# Patient Record
Sex: MALE | Race: WHITE | NOT HISPANIC OR LATINO | URBAN - METROPOLITAN AREA
[De-identification: names, ages, dates, MRNs, and addresses within clinical notes are randomized per-mention and may not be internally consistent; named-entity substitution may affect disease eponyms.]

---

## 2017-08-23 ENCOUNTER — EMERGENCY (EMERGENCY)
Facility: HOSPITAL | Age: 77
LOS: 1 days | Discharge: HOME | End: 2017-08-23
Attending: EMERGENCY MEDICINE | Admitting: EMERGENCY MEDICINE
Payer: MEDICARE

## 2017-08-23 VITALS
OXYGEN SATURATION: 100 % | TEMPERATURE: 97 F | DIASTOLIC BLOOD PRESSURE: 65 MMHG | HEART RATE: 55 BPM | SYSTOLIC BLOOD PRESSURE: 181 MMHG | RESPIRATION RATE: 18 BRPM

## 2017-08-23 VITALS
DIASTOLIC BLOOD PRESSURE: 74 MMHG | RESPIRATION RATE: 18 BRPM | OXYGEN SATURATION: 99 % | SYSTOLIC BLOOD PRESSURE: 190 MMHG | HEART RATE: 55 BPM | TEMPERATURE: 98 F

## 2017-08-23 LAB
ALBUMIN SERPL ELPH-MCNC: 4.1 G/DL — SIGNIFICANT CHANGE UP (ref 3.3–5)
ALP SERPL-CCNC: 89 U/L — SIGNIFICANT CHANGE UP (ref 40–120)
ALT FLD-CCNC: 12 U/L — SIGNIFICANT CHANGE UP (ref 4–41)
APPEARANCE UR: CLEAR — SIGNIFICANT CHANGE UP
AST SERPL-CCNC: 12 U/L — SIGNIFICANT CHANGE UP (ref 4–40)
BASOPHILS # BLD AUTO: 0.06 K/UL — SIGNIFICANT CHANGE UP (ref 0–0.2)
BASOPHILS NFR BLD AUTO: 0.5 % — SIGNIFICANT CHANGE UP (ref 0–2)
BILIRUB SERPL-MCNC: 1 MG/DL — SIGNIFICANT CHANGE UP (ref 0.2–1.2)
BILIRUB UR-MCNC: NEGATIVE — SIGNIFICANT CHANGE UP
BLOOD UR QL VISUAL: NEGATIVE — SIGNIFICANT CHANGE UP
BUN SERPL-MCNC: 27 MG/DL — HIGH (ref 7–23)
CALCIUM SERPL-MCNC: 9 MG/DL — SIGNIFICANT CHANGE UP (ref 8.4–10.5)
CHLORIDE SERPL-SCNC: 102 MMOL/L — SIGNIFICANT CHANGE UP (ref 98–107)
CO2 SERPL-SCNC: 23 MMOL/L — SIGNIFICANT CHANGE UP (ref 22–31)
COLOR SPEC: YELLOW — SIGNIFICANT CHANGE UP
CREAT SERPL-MCNC: 0.95 MG/DL — SIGNIFICANT CHANGE UP (ref 0.5–1.3)
EOSINOPHIL # BLD AUTO: 0.2 K/UL — SIGNIFICANT CHANGE UP (ref 0–0.5)
EOSINOPHIL NFR BLD AUTO: 1.6 % — SIGNIFICANT CHANGE UP (ref 0–6)
GLUCOSE SERPL-MCNC: 157 MG/DL — HIGH (ref 70–99)
GLUCOSE UR-MCNC: NEGATIVE — SIGNIFICANT CHANGE UP
HCT VFR BLD CALC: 38 % — LOW (ref 39–50)
HGB BLD-MCNC: 12.7 G/DL — LOW (ref 13–17)
IMM GRANULOCYTES # BLD AUTO: 0.06 # — SIGNIFICANT CHANGE UP
IMM GRANULOCYTES NFR BLD AUTO: 0.5 % — SIGNIFICANT CHANGE UP (ref 0–1.5)
KETONES UR-MCNC: NEGATIVE — SIGNIFICANT CHANGE UP
LEUKOCYTE ESTERASE UR-ACNC: NEGATIVE — SIGNIFICANT CHANGE UP
LIDOCAIN IGE QN: 25.5 U/L — SIGNIFICANT CHANGE UP (ref 7–60)
LYMPHOCYTES # BLD AUTO: 1.22 K/UL — SIGNIFICANT CHANGE UP (ref 1–3.3)
LYMPHOCYTES # BLD AUTO: 9.7 % — LOW (ref 13–44)
MCHC RBC-ENTMCNC: 29.8 PG — SIGNIFICANT CHANGE UP (ref 27–34)
MCHC RBC-ENTMCNC: 33.4 % — SIGNIFICANT CHANGE UP (ref 32–36)
MCV RBC AUTO: 89.2 FL — SIGNIFICANT CHANGE UP (ref 80–100)
MONOCYTES # BLD AUTO: 0.81 K/UL — SIGNIFICANT CHANGE UP (ref 0–0.9)
MONOCYTES NFR BLD AUTO: 6.5 % — SIGNIFICANT CHANGE UP (ref 2–14)
MUCOUS THREADS # UR AUTO: SIGNIFICANT CHANGE UP
NEUTROPHILS # BLD AUTO: 10.17 K/UL — HIGH (ref 1.8–7.4)
NEUTROPHILS NFR BLD AUTO: 81.2 % — HIGH (ref 43–77)
NITRITE UR-MCNC: NEGATIVE — SIGNIFICANT CHANGE UP
NON-SQ EPI CELLS # UR AUTO: <1 — SIGNIFICANT CHANGE UP
NRBC # FLD: 0 — SIGNIFICANT CHANGE UP
PH UR: 6 — SIGNIFICANT CHANGE UP (ref 4.6–8)
PLATELET # BLD AUTO: 224 K/UL — SIGNIFICANT CHANGE UP (ref 150–400)
PMV BLD: 10.8 FL — SIGNIFICANT CHANGE UP (ref 7–13)
POTASSIUM SERPL-MCNC: 4.1 MMOL/L — SIGNIFICANT CHANGE UP (ref 3.5–5.3)
POTASSIUM SERPL-SCNC: 4.1 MMOL/L — SIGNIFICANT CHANGE UP (ref 3.5–5.3)
PROT SERPL-MCNC: 6.8 G/DL — SIGNIFICANT CHANGE UP (ref 6–8.3)
PROT UR-MCNC: 30 — SIGNIFICANT CHANGE UP
RBC # BLD: 4.26 M/UL — SIGNIFICANT CHANGE UP (ref 4.2–5.8)
RBC # FLD: 13.9 % — SIGNIFICANT CHANGE UP (ref 10.3–14.5)
RBC CASTS # UR COMP ASSIST: SIGNIFICANT CHANGE UP (ref 0–?)
SODIUM SERPL-SCNC: 140 MMOL/L — SIGNIFICANT CHANGE UP (ref 135–145)
SP GR SPEC: 1.02 — SIGNIFICANT CHANGE UP (ref 1–1.03)
UROBILINOGEN FLD QL: 1 E.U. — SIGNIFICANT CHANGE UP (ref 0.1–0.2)
WBC # BLD: 12.52 K/UL — HIGH (ref 3.8–10.5)
WBC # FLD AUTO: 12.52 K/UL — HIGH (ref 3.8–10.5)
WBC UR QL: SIGNIFICANT CHANGE UP (ref 0–?)

## 2017-08-23 PROCEDURE — 99285 EMERGENCY DEPT VISIT HI MDM: CPT | Mod: 25

## 2017-08-23 PROCEDURE — 74177 CT ABD & PELVIS W/CONTRAST: CPT | Mod: 26

## 2017-08-23 PROCEDURE — 76705 ECHO EXAM OF ABDOMEN: CPT | Mod: 26

## 2017-08-23 RX ORDER — MORPHINE SULFATE 50 MG/1
4 CAPSULE, EXTENDED RELEASE ORAL ONCE
Qty: 0 | Refills: 0 | Status: DISCONTINUED | OUTPATIENT
Start: 2017-08-23 | End: 2017-08-23

## 2017-08-23 RX ADMIN — MORPHINE SULFATE 4 MILLIGRAM(S): 50 CAPSULE, EXTENDED RELEASE ORAL at 08:04

## 2017-08-23 NOTE — ED PROVIDER NOTE - CARE PLAN
Principal Discharge DX:	Cholelithiasis  Instructions for follow-up, activity and diet:	Follow up with your PMD within 48-72 hours.  Rest, increase fluids. Take tylenol 650mg every 6 hours for pain. Eat bland, small meals as tolerated - avoid fatty foods.  Worsening or continued fever, chills, weakness, nausea, vomiting, abdominal pain return to ER

## 2017-08-23 NOTE — ED ADULT NURSE NOTE - OBJECTIVE STATEMENT
pt. a&ox3, came in c/o pain on periumbilical area x 3.5 hrs, non-radiating. as per pt, "I ate chinese food last night and went home around 12am, about 4am I started to have pain on my stomach. I drank Pepto-Bismol by 5:30, but the pain didn't go away." pt. denies any n/v/d/constipation, last BM was yesterday, normal as per pt. denies any dysuria. pt. with hx of HTN, DM, and heart problem. pt. noted hypertensive, has not yet taken BP meds today. denies any h/a nor dizziness. labs sent. inserted g20 saline lock on rt ac with no ss of infiltration. awaits MD cruz. will continue to monitor

## 2017-08-23 NOTE — ED ADULT TRIAGE NOTE - CHIEF COMPLAINT QUOTE
periumbilical pain since last night after eating chinese food. denies N/V/D. offers no other complaints.  PMH: HTN, DM, Enlarged heart, meds: metoprolol Er, Atorvastatin, glimiperide,

## 2017-08-23 NOTE — ED PROVIDER NOTE - PROGRESS NOTE DETAILS
sx recs- likely cholelithiasis without evidence of acute cholecystitis - pt pain completely, resolved, will po challenge and have pt f/u as outpt Pt tolerated a complete plate of food well. Requesting pain medication prescription, does not know what pharmacy to send to, states he wants to call back with the pharmacy information, admin pa contact given, pt is completely pain free, not a candidate for narcotics, will send a prescription for tylneol 650 mg q6 hour x 1 week if pt calls for prescription. Pt states he will take the bus to his home on [email or web address] which he usually uses for transportation.

## 2017-08-23 NOTE — ED PROVIDER NOTE - MEDICAL DECISION MAKING DETAILS
76 y/o M with periumbilical pain s/p eating chinese food last night  -pain control, cbc, cmp, lipase, ua, ct a/p with con

## 2017-08-23 NOTE — CONSULT NOTE ADULT - ASSESSMENT
77M w/CAD s/p stents presenting with 6-hour hx of infraumbilical/suprapubic pain, now resolved. CT demonstrates 3cm stone in gallbladder neck, WBC=12.52, LFTs WNL, U/A negative.    -given clinical history and exam, low suspicion for gallbladder as etiology of patient's pain, which has since resolved  -unclear etiology for leukocytosis  -please obtain RUQ U/S to better evaluate gallbladder  -will d/w Dr. Dyer 77M w/CAD s/p stents presenting with 6-hour hx of infraumbilical/suprapubic pain, now resolved. CT demonstrates 3cm stone in gallbladder neck, WBC=12.52, LFTs WNL, U/A negative.    -given clinical history and exam, low suspicion for gallbladder as etiology of patient's pain, which has since resolved  -unclear etiology for leukocytosis  -please obtain RUQ U/S to better evaluate gallbladder  -will d/w Dr. Dyer    **Update 3pm 8/23/17  -RUQ U/S obtained, shows nonmobile gallstone in the gallbladder neck with mild gallbladder wall thickening up to 4 mm. No pericholecystic fluid. Negative sonographic Jenkins sign. Patient remains pain free at this time, doubt gallbladder pathology. Spoke to patient's brother/power of  Berto Mitchell at [phone] and explained findings. Recommend PO challenge and d/c home per ED. Discussed with Dr. Dyer.    DANNI Hubbard MD

## 2017-08-23 NOTE — ED PROVIDER NOTE - OBJECTIVE STATEMENT
76 y/o M PMH HTN, CAD s/p stent x 2 (pt denies DM and taking DM meds despite triage note/previous ED chart) c/o periumbilical jordin since 0400 this am. States Last night he eat chinese food prior to onset of pain. States pain has not changed or moved since onset. Denies fever, chills, CP, SOB, N/V/D/, dysuria, hematuria, difficulty urinating, back pain. Pt denies etOH use.

## 2017-08-23 NOTE — ED ADULT NURSE NOTE - PMH
CAD (Coronary Artery Disease)    DM (Diabetes Mellitus)    HTN (Hypertension)    Hypercholesterolemia

## 2017-08-23 NOTE — ED PROVIDER NOTE - PLAN OF CARE
Follow up with your PMD within 48-72 hours.  Rest, increase fluids. Take tylenol 650mg every 6 hours for pain. Eat bland, small meals as tolerated - avoid fatty foods.  Worsening or continued fever, chills, weakness, nausea, vomiting, abdominal pain return to ER

## 2017-08-23 NOTE — CONSULT NOTE ADULT - SUBJECTIVE AND OBJECTIVE BOX
77M with hx of CAD s/p stents x3 on ASA/Plavix p/w acute onset infraumbilical pain/suprapubic pain since ~4am on 17. He notes he went out for Chinese food with friends last night; he woke up to go to the bathroom and noticed the pain at that time. He denies associated F/C, N/V, diarrhea, melena, hematochezia, dysuria. He has never had pain like this before. He is having flatus; last BM 2 days ago, normal. Last colonoscopy 5-10 years ago, normal per his memory. He denies sick contacts or recent travel. Since arriving to the ED, his pain has improved. He is currently pain free.    PMH  CAD s/p stents x 2 in  and stent x 1   HTN   DM  Hypercholesterolemia    PSH  Coronary stents    MEDS (patient does not remember entire list of medications, denies insulin or oral anticoagulant use)  ASA  Plavix  Metoprolol  Metformin    Allergies  No Known Allergies    Social  Lives alone. Used tobacco briefly (2 months) 40 years ago. Nondrinker.    Physical Exam  T(C): 36.7 (17 @ 07:43), Max: 36.8 (17 @ 07:21)  HR: 62 (17 @ 08:34) (55 - 66)  BP: 186/81 (17 @ 08:34) (186/81 - 192/58)  RR: 18 (17 @ 08:34) (17 - 18)  SpO2: 100% (17 @ 08:34) (99% - 100%)  Tmax: T(C): , Max: 36.8 (17 @ 07:21)    Gen: NAD  HEENT: normocephalic, atraumatic, no scleral icterus  CV: S1, S2, RRR  Pulm: CTA B/L  Abd: soft, obese, ND, NT, small umbilical defect without hernia, negative Jenkins's sign  Ext: warm, no edema, palp dp/pt    Labs:                        12.7   12.52 )-----------( 224      ( 23 Aug 2017 07:42 )             38.0         140  |  102  |  27<H>  ----------------------------<  157<H>  4.1   |  23  |  0.95    Ca    9.0      23 Aug 2017 07:42    TPro  6.8  /  Alb  4.1  /  TBili  1.0  /  DBili  x   /  AST  12  /  ALT  12  /  AlkPhos  89      Urinalysis Basic - ( 23 Aug 2017 08:20 )    Color: YELLOW / Appearance: CLEAR / S.022 / pH: 6.0  Gluc: NEGATIVE / Ketone: NEGATIVE  / Bili: NEGATIVE / Urobili: 1 E.U.   Blood: NEGATIVE / Protein: 30 / Nitrite: NEGATIVE   Leuk Esterase: NEGATIVE / RBC: 0-2 / WBC 0-2   Sq Epi: x / Non Sq Epi: x / Bacteria: x    Imaging    CT A/P FINDINGS:    LOWER CHEST: Aortic and coronary artery calcification. Bibasilar   dependent atelectasis.    LIVER: Within normal limits.  BILE DUCTS: Within normal limits.  GALLBLADDER: Mild gallbladder wall thickening. Calculus in the   gallbladder neck measuring 3 cm.     SPLEEN: Hypodensity in the inferior aspect of the spleen measuring 9 mm.  PANCREAS: Within normal limits.  ADRENALS: Within normal limits.  KIDNEYS/URETERS: Within normal limits.    BLADDER: Within normal limits.  REPRODUCTIVE ORGANS: The prostate is enlarged.    BOWEL: No bowel obstruction. Colonic diverticulosis without   diverticulitis. Appendix appears normal.  PERITONEUM: No ascites.  VESSELS:  Aortic atherosclerosis.  RETROPERITONEUM: No lymphadenopathy.    ABDOMINAL WALL: Within normal limits.  BONES: Degenerative changes of the spine. Grade 1 anterolisthesis of L5   on S1 with bilateral pars defects of L5.    IMPRESSION:        A 3 mm calculus in the gallbladder neck with mild gallbladder wall   thickening. Correlate for right upper quadrant pain/cholecystitis.    Colonic diverticulosis without diverticulitis.    Enlarged prostate.

## 2017-08-23 NOTE — ED PROVIDER NOTE - ATTENDING CONTRIBUTION TO CARE
pt with vague lower abd pain reported after eating chinese food yesterday.  No fevers.  Abd exam is rather benign overall.  Based on age and vague symptoms will get CT scan to eval for occult pathology.  Will treat symptomatically at same time and dispo based on response to therapy and studies

## 2017-09-06 ENCOUNTER — APPOINTMENT (OUTPATIENT)
Dept: GASTROENTEROLOGY | Facility: CLINIC | Age: 77
End: 2017-09-06
Payer: MEDICARE

## 2017-09-06 VITALS — WEIGHT: 214 LBS | BODY MASS INDEX: 34.54 KG/M2

## 2017-09-06 VITALS
HEIGHT: 66 IN | WEIGHT: 24 LBS | DIASTOLIC BLOOD PRESSURE: 70 MMHG | SYSTOLIC BLOOD PRESSURE: 120 MMHG | OXYGEN SATURATION: 99 % | BODY MASS INDEX: 3.86 KG/M2 | TEMPERATURE: 98 F | HEART RATE: 68 BPM

## 2017-09-06 VITALS — BODY MASS INDEX: 36.15 KG/M2 | WEIGHT: 224 LBS

## 2017-09-06 DIAGNOSIS — Z78.9 OTHER SPECIFIED HEALTH STATUS: ICD-10-CM

## 2017-09-06 DIAGNOSIS — K80.20 CALCULUS OF GALLBLADDER W/OUT CHOLECYSTITIS W/OUT OBSTRUCTION: ICD-10-CM

## 2017-09-06 DIAGNOSIS — Z82.49 FAMILY HISTORY OF ISCHEMIC HEART DISEASE AND OTHER DISEASES OF THE CIRCULATORY SYSTEM: ICD-10-CM

## 2017-09-06 DIAGNOSIS — R10.10 UPPER ABDOMINAL PAIN, UNSPECIFIED: ICD-10-CM

## 2017-09-06 DIAGNOSIS — Z86.79 PERSONAL HISTORY OF OTHER DISEASES OF THE CIRCULATORY SYSTEM: ICD-10-CM

## 2017-09-06 DIAGNOSIS — F15.90 OTHER STIMULANT USE, UNSPECIFIED, UNCOMPLICATED: ICD-10-CM

## 2017-09-06 DIAGNOSIS — E11.9 TYPE 2 DIABETES MELLITUS W/OUT COMPLICATIONS: ICD-10-CM

## 2017-09-06 DIAGNOSIS — Z87.898 PERSONAL HISTORY OF OTHER SPECIFIED CONDITIONS: ICD-10-CM

## 2017-09-06 PROCEDURE — 99204 OFFICE O/P NEW MOD 45 MIN: CPT

## 2017-09-06 RX ORDER — RAMIPRIL 10 MG/1
10 CAPSULE ORAL
Qty: 90 | Refills: 0 | Status: ACTIVE | COMMUNITY
Start: 2017-06-22

## 2017-09-06 RX ORDER — BRIMONIDINE TARTRATE, TIMOLOL MALEATE 2; 5 MG/ML; MG/ML
0.2-0.5 SOLUTION/ DROPS OPHTHALMIC
Qty: 10 | Refills: 0 | Status: ACTIVE | COMMUNITY
Start: 2017-03-16

## 2017-09-06 RX ORDER — METOPROLOL SUCCINATE 25 MG/1
25 TABLET, EXTENDED RELEASE ORAL
Qty: 90 | Refills: 0 | Status: ACTIVE | COMMUNITY
Start: 2017-05-18

## 2017-09-06 RX ORDER — PIOGLITAZONE HYDROCHLORIDE 30 MG/1
30 TABLET ORAL
Qty: 90 | Refills: 0 | Status: ACTIVE | COMMUNITY
Start: 2017-05-18

## 2017-09-06 RX ORDER — FINASTERIDE 5 MG/1
5 TABLET, FILM COATED ORAL
Qty: 90 | Refills: 0 | Status: ACTIVE | COMMUNITY
Start: 2017-03-20

## 2017-09-06 RX ORDER — TAMSULOSIN HYDROCHLORIDE 0.4 MG/1
0.4 CAPSULE ORAL
Qty: 90 | Refills: 0 | Status: ACTIVE | COMMUNITY
Start: 2017-06-22

## 2017-09-06 RX ORDER — CLOPIDOGREL BISULFATE 75 MG/1
75 TABLET, FILM COATED ORAL
Qty: 30 | Refills: 0 | Status: ACTIVE | COMMUNITY
Start: 2017-06-21

## 2017-09-06 RX ORDER — CILOSTAZOL 50 MG/1
50 TABLET ORAL
Qty: 180 | Refills: 0 | Status: ACTIVE | COMMUNITY
Start: 2017-02-15

## 2017-09-06 RX ORDER — RAMIPRIL 5 MG/1
5 CAPSULE ORAL
Qty: 90 | Refills: 0 | Status: ACTIVE | COMMUNITY
Start: 2017-05-18

## 2017-09-06 RX ORDER — BIMATOPROST 0.1 MG/ML
0.01 SOLUTION/ DROPS OPHTHALMIC
Qty: 25 | Refills: 0 | Status: ACTIVE | COMMUNITY
Start: 2017-02-22

## 2017-09-06 RX ORDER — ATORVASTATIN CALCIUM 80 MG/1
80 TABLET, FILM COATED ORAL
Qty: 30 | Refills: 0 | Status: ACTIVE | COMMUNITY
Start: 2016-10-05

## 2017-09-06 RX ORDER — FAMOTIDINE 40 MG/1
40 TABLET, FILM COATED ORAL
Qty: 30 | Refills: 0 | Status: ACTIVE | COMMUNITY
Start: 2017-03-20

## 2017-09-06 RX ORDER — METFORMIN ER 500 MG 500 MG/1
500 TABLET ORAL
Qty: 360 | Refills: 0 | Status: ACTIVE | COMMUNITY
Start: 2017-05-18

## 2017-09-06 RX ORDER — METFORMIN HYDROCHLORIDE 625 MG/1
TABLET ORAL
Refills: 0 | Status: ACTIVE | COMMUNITY

## 2017-09-06 RX ORDER — ALBUTEROL SULFATE 90 UG/1
108 (90 BASE) AEROSOL, METERED RESPIRATORY (INHALATION)
Qty: 85 | Refills: 0 | Status: ACTIVE | COMMUNITY
Start: 2017-05-18

## 2017-09-06 RX ORDER — METOPROLOL SUCCINATE 50 MG/1
50 TABLET, EXTENDED RELEASE ORAL
Qty: 90 | Refills: 0 | Status: ACTIVE | COMMUNITY
Start: 2017-06-22

## 2017-09-06 RX ORDER — GLIMEPIRIDE 4 MG/1
4 TABLET ORAL
Qty: 180 | Refills: 0 | Status: ACTIVE | COMMUNITY
Start: 2017-05-18

## 2017-09-07 ENCOUNTER — RX RENEWAL (OUTPATIENT)
Age: 77
End: 2017-09-07

## 2017-09-07 RX ORDER — HYOSCYAMINE SULFATE 0.12 MG/1
0.12 TABLET, ORALLY DISINTEGRATING ORAL 3 TIMES DAILY
Qty: 50 | Refills: 0 | Status: ACTIVE | COMMUNITY
Start: 2017-09-06 | End: 1900-01-01

## 2017-09-12 ENCOUNTER — EMERGENCY (EMERGENCY)
Facility: HOSPITAL | Age: 77
LOS: 1 days | Discharge: HOME | End: 2017-09-12
Attending: EMERGENCY MEDICINE | Admitting: EMERGENCY MEDICINE
Payer: MEDICARE

## 2017-09-12 VITALS
HEART RATE: 81 BPM | RESPIRATION RATE: 18 BRPM | DIASTOLIC BLOOD PRESSURE: 85 MMHG | SYSTOLIC BLOOD PRESSURE: 192 MMHG | TEMPERATURE: 98 F | OXYGEN SATURATION: 99 %

## 2017-09-12 PROCEDURE — 70450 CT HEAD/BRAIN W/O DYE: CPT | Mod: 26

## 2017-09-12 PROCEDURE — 99285 EMERGENCY DEPT VISIT HI MDM: CPT

## 2017-09-12 RX ORDER — TETANUS TOXOID, REDUCED DIPHTHERIA TOXOID AND ACELLULAR PERTUSSIS VACCINE, ADSORBED 5; 2.5; 8; 8; 2.5 [IU]/.5ML; [IU]/.5ML; UG/.5ML; UG/.5ML; UG/.5ML
0.5 SUSPENSION INTRAMUSCULAR ONCE
Qty: 0 | Refills: 0 | Status: COMPLETED | OUTPATIENT
Start: 2017-09-12 | End: 2017-09-12

## 2017-09-12 RX ADMIN — TETANUS TOXOID, REDUCED DIPHTHERIA TOXOID AND ACELLULAR PERTUSSIS VACCINE, ADSORBED 0.5 MILLILITER(S): 5; 2.5; 8; 8; 2.5 SUSPENSION INTRAMUSCULAR at 11:37

## 2017-09-12 NOTE — ED PROVIDER NOTE - PROGRESS NOTE DETAILS
Steri strips with dermabond applied to superficial laceration over R eyebrow, bacitracin applied  CT head negative

## 2017-09-12 NOTE — PROVIDER CONTACT NOTE (OTHER) - ASSESSMENT
KAYLEE Miller and I met and spoke with pt and his brother Berto in results waiting.  Pt s/p fall, he had 2 falls in past few weeks.  Pt requesting homecare services.  Referral sent to [facility] upon pt's request for RN, PT and JOHN cruz.  Also provided pt with private hire List for homecare services.  Berto will provide pt transportation home and will assume responsibility for pt's wellbeing.

## 2017-09-12 NOTE — ED PROVIDER NOTE - PHYSICAL EXAMINATION
Pt superficial abrasions to R side forehead with mildly dermal laceration with slow ooze, s/p injury 3hrs ago.  No clinical signs infection, no erythema, no purulence, no FB  Pt neck asymptomatic, no tenderness nor pain on palpation - no pain with active ROM of C-spine neck with flexion/extension/lateral rotation  No musculoskeletal pain on palpation to UEs and LEs nor chest.  Clear bilaterally, pupils equal, round and reactive to light.

## 2017-09-12 NOTE — ED PROVIDER NOTE - CHIEF COMPLAINT
The patient is a 77y Male complaining of The patient is a 77y Male complaining of head injury abrasion

## 2017-09-12 NOTE — ED ADULT TRIAGE NOTE - CHIEF COMPLAINT QUOTE
Tripped and fell on uneven side walk. Denies dizziness prior to fall. Hit forehead on cement.  Abrasion noted, bleeding controlled with gauze dressing. Denies LOC. Ambulatory on scene.

## 2017-09-12 NOTE — ED PROVIDER NOTE - OBJECTIVE STATEMENT
Pt states that he was meeting friends this morning for breakfast, following which he fell outside of a bank and hit his head.  He states that he did not lose any consciousness and is not experiencing any N/V/F/SOB or any complaints at this time other than the scrape on his head.  Pt states that he fell about a month ago outside of the same bank but it did not warrant a trip to the hospital.  He states that his friends made him come to the hospital following the injury, but he did not want to come.  Pt was here recently for abdominal pain (gallstone) and opted to forego treatment at the time.  Brother possesses power of  Berto Stephen [phone], spoken to - patient has mental disability matches with baseline today.

## 2017-09-12 NOTE — ED PROVIDER NOTE - ATTENDING CONTRIBUTION TO CARE
I performed a history and physical exam of the patient and discussed their management with the resident. I reviewed the resident's note and agree with the documented findings and plan of care. My medical decison making and observations are found above.  Bleeding from scalp. tetnus unknown. no neck pain

## 2017-09-12 NOTE — ED PROVIDER NOTE - MEDICAL DECISION MAKING DETAILS
Donny: Head trauma after fall. Mental status not normal but may be baseline will check with elyse who has power of attorny.  No other trauma sequelli

## 2017-09-25 ENCOUNTER — OTHER (OUTPATIENT)
Age: 77
End: 2017-09-25

## 2017-09-25 RX ORDER — DICYCLOMINE HYDROCHLORIDE 10 MG/1
10 CAPSULE ORAL 3 TIMES DAILY
Qty: 50 | Refills: 1 | Status: ACTIVE | COMMUNITY
Start: 2017-09-25

## 2017-10-05 ENCOUNTER — OUTPATIENT (OUTPATIENT)
Dept: OUTPATIENT SERVICES | Facility: HOSPITAL | Age: 77
LOS: 1 days | End: 2017-10-05

## 2017-10-05 VITALS
WEIGHT: 214.95 LBS | OXYGEN SATURATION: 98 % | TEMPERATURE: 97 F | HEART RATE: 57 BPM | DIASTOLIC BLOOD PRESSURE: 84 MMHG | HEIGHT: 64 IN | RESPIRATION RATE: 18 BRPM | SYSTOLIC BLOOD PRESSURE: 130 MMHG

## 2017-10-05 DIAGNOSIS — K81.1 CHRONIC CHOLECYSTITIS: ICD-10-CM

## 2017-10-05 DIAGNOSIS — E11.9 TYPE 2 DIABETES MELLITUS WITHOUT COMPLICATIONS: ICD-10-CM

## 2017-10-05 DIAGNOSIS — I25.10 ATHEROSCLEROTIC HEART DISEASE OF NATIVE CORONARY ARTERY WITHOUT ANGINA PECTORIS: ICD-10-CM

## 2017-10-05 DIAGNOSIS — G47.33 OBSTRUCTIVE SLEEP APNEA (ADULT) (PEDIATRIC): ICD-10-CM

## 2017-10-05 DIAGNOSIS — N40.0 BENIGN PROSTATIC HYPERPLASIA WITHOUT LOWER URINARY TRACT SYMPTOMS: ICD-10-CM

## 2017-10-05 DIAGNOSIS — I10 ESSENTIAL (PRIMARY) HYPERTENSION: ICD-10-CM

## 2017-10-05 LAB
ALBUMIN SERPL ELPH-MCNC: 3.9 G/DL — SIGNIFICANT CHANGE UP (ref 3.3–5)
ALP SERPL-CCNC: 100 U/L — SIGNIFICANT CHANGE UP (ref 40–120)
ALT FLD-CCNC: 9 U/L — SIGNIFICANT CHANGE UP (ref 4–41)
AST SERPL-CCNC: 12 U/L — SIGNIFICANT CHANGE UP (ref 4–40)
BILIRUB SERPL-MCNC: 1 MG/DL — SIGNIFICANT CHANGE UP (ref 0.2–1.2)
BUN SERPL-MCNC: 21 MG/DL — SIGNIFICANT CHANGE UP (ref 7–23)
CALCIUM SERPL-MCNC: 8.9 MG/DL — SIGNIFICANT CHANGE UP (ref 8.4–10.5)
CHLORIDE SERPL-SCNC: 103 MMOL/L — SIGNIFICANT CHANGE UP (ref 98–107)
CO2 SERPL-SCNC: 24 MMOL/L — SIGNIFICANT CHANGE UP (ref 22–31)
CREAT SERPL-MCNC: 0.91 MG/DL — SIGNIFICANT CHANGE UP (ref 0.5–1.3)
GLUCOSE SERPL-MCNC: 96 MG/DL — SIGNIFICANT CHANGE UP (ref 70–99)
HBA1C BLD-MCNC: 6.7 % — HIGH (ref 4–5.6)
HCT VFR BLD CALC: 39.4 % — SIGNIFICANT CHANGE UP (ref 39–50)
HGB BLD-MCNC: 12.7 G/DL — LOW (ref 13–17)
MCHC RBC-ENTMCNC: 29.8 PG — SIGNIFICANT CHANGE UP (ref 27–34)
MCHC RBC-ENTMCNC: 32.2 % — SIGNIFICANT CHANGE UP (ref 32–36)
MCV RBC AUTO: 92.5 FL — SIGNIFICANT CHANGE UP (ref 80–100)
NRBC # FLD: 0 — SIGNIFICANT CHANGE UP
PLATELET # BLD AUTO: 237 K/UL — SIGNIFICANT CHANGE UP (ref 150–400)
PMV BLD: 11.3 FL — SIGNIFICANT CHANGE UP (ref 7–13)
POTASSIUM SERPL-MCNC: 4.3 MMOL/L — SIGNIFICANT CHANGE UP (ref 3.5–5.3)
POTASSIUM SERPL-SCNC: 4.3 MMOL/L — SIGNIFICANT CHANGE UP (ref 3.5–5.3)
PROT SERPL-MCNC: 6.7 G/DL — SIGNIFICANT CHANGE UP (ref 6–8.3)
RBC # BLD: 4.26 M/UL — SIGNIFICANT CHANGE UP (ref 4.2–5.8)
RBC # FLD: 13.7 % — SIGNIFICANT CHANGE UP (ref 10.3–14.5)
SODIUM SERPL-SCNC: 141 MMOL/L — SIGNIFICANT CHANGE UP (ref 135–145)
WBC # BLD: 10.17 K/UL — SIGNIFICANT CHANGE UP (ref 3.8–10.5)
WBC # FLD AUTO: 10.17 K/UL — SIGNIFICANT CHANGE UP (ref 3.8–10.5)

## 2017-10-05 RX ORDER — METFORMIN HYDROCHLORIDE 850 MG/1
2 TABLET ORAL
Qty: 0 | Refills: 0 | COMMUNITY

## 2017-10-05 NOTE — H&P PST ADULT - HISTORY OF PRESENT ILLNESS
78 yo male with hx of HTN, DM, CAD s/p cardiac stents x 2 presents to have [facility] evaluation for laparoscopic cholecystectomy on 10/16/2017.   Patient is a poor historian, his brother Berto Mitchell possesses power of .  Patient denies abdominal pain, n/v while at [facility].  Patient reports hx of fall in 9/2017 & hit his head, denies headache, lightheadedness. 78 yo male with hx of HTN, DM, CAD s/p cardiac stents x 2 presents to have [facility] evaluation for laparoscopic cholecystectomy on 10/16/2017.   Patient is a poor historian, his brother Berto Mitchell possesses power of . Patient denies abdominal pain, n/v while at [facility].  Patient reports, hx of fall in 9/2017 & hit his head. presented to ER at [facility]. Denies headache, lightheadedness, visual changes.

## 2017-10-05 NOTE — H&P PST ADULT - PROBLEM SELECTOR PLAN 4
HbgA1C sent, pending result. Blood glucose - Accuchek ordered stat on admit.  Instructed to hold pioglitazone AM of surgery. Last dose for metformin & glimepiride AM on 10/15/2017. Verbalized understanding.

## 2017-10-05 NOTE — H&P PST ADULT - NEGATIVE ENMT SYMPTOMS
no dysphagia/no nasal discharge/no ear pain/no tinnitus/no nasal congestion/no vertigo/no sinus symptoms/no hearing difficulty/no nasal obstruction/no throat pain

## 2017-10-05 NOTE — H&P PST ADULT - PROBLEM SELECTOR PLAN 2
s/p cardiac stent x 2. Patient was evaluated by cardiologist for clearance, and was instructed to stop Plavix 5 days prior to surgery (LD 10/10/2017) & stay on aspirin 81 mg. Instructed to take cilostazol AM of surgery with a sip of water.

## 2017-10-05 NOTE — H&P PST ADULT - NEGATIVE ALLERGY TYPES
no reactions to medicines/no reactions to food/no outdoor environmental allergies/no indoor environmental allergies

## 2017-10-05 NOTE — H&P PST ADULT - NEGATIVE MUSCULOSKELETAL SYMPTOMS
no arthritis/no stiffness/no neck pain/no muscle weakness no back pain/no stiffness/no neck pain/no muscle weakness

## 2017-10-05 NOTE — H&P PST ADULT - PMH
BPH (benign prostatic hyperplasia)    CAD (Coronary Artery Disease)  s/p cardiac stent ( > 20 years ago)  DM (Diabetes Mellitus)    HTN (Hypertension)    Hypercholesterolemia

## 2017-10-16 ENCOUNTER — TRANSCRIPTION ENCOUNTER (OUTPATIENT)
Age: 77
End: 2017-10-16

## 2017-10-16 ENCOUNTER — INPATIENT (INPATIENT)
Facility: HOSPITAL | Age: 77
LOS: 1 days | Discharge: HOME | End: 2017-10-18
Payer: MEDICARE

## 2017-10-16 ENCOUNTER — RESULT REVIEW (OUTPATIENT)
Age: 77
End: 2017-10-16

## 2017-10-16 VITALS
SYSTOLIC BLOOD PRESSURE: 189 MMHG | RESPIRATION RATE: 18 BRPM | DIASTOLIC BLOOD PRESSURE: 73 MMHG | TEMPERATURE: 98 F | HEIGHT: 64 IN | WEIGHT: 214.95 LBS | HEART RATE: 67 BPM | OXYGEN SATURATION: 98 %

## 2017-10-16 DIAGNOSIS — K81.1 CHRONIC CHOLECYSTITIS: ICD-10-CM

## 2017-10-16 LAB
GLUCOSE BLDC GLUCOMTR-MCNC: 133 MG/DL — HIGH (ref 70–99)
GLUCOSE BLDC GLUCOMTR-MCNC: 156 MG/DL — HIGH (ref 70–99)
GLUCOSE BLDC GLUCOMTR-MCNC: 171 MG/DL — HIGH (ref 70–99)

## 2017-10-16 PROCEDURE — 88304 TISSUE EXAM BY PATHOLOGIST: CPT | Mod: 26

## 2017-10-16 RX ORDER — INSULIN LISPRO 100/ML
VIAL (ML) SUBCUTANEOUS AT BEDTIME
Qty: 0 | Refills: 0 | Status: DISCONTINUED | OUTPATIENT
Start: 2017-10-16 | End: 2017-10-18

## 2017-10-16 RX ORDER — FINASTERIDE 5 MG/1
5 TABLET, FILM COATED ORAL DAILY
Qty: 0 | Refills: 0 | Status: DISCONTINUED | OUTPATIENT
Start: 2017-10-16 | End: 2017-10-18

## 2017-10-16 RX ORDER — SODIUM CHLORIDE 9 MG/ML
1000 INJECTION, SOLUTION INTRAVENOUS
Qty: 0 | Refills: 0 | Status: DISCONTINUED | OUTPATIENT
Start: 2017-10-16 | End: 2017-10-17

## 2017-10-16 RX ORDER — ATORVASTATIN CALCIUM 80 MG/1
80 TABLET, FILM COATED ORAL AT BEDTIME
Qty: 0 | Refills: 0 | Status: DISCONTINUED | OUTPATIENT
Start: 2017-10-16 | End: 2017-10-18

## 2017-10-16 RX ORDER — FENTANYL CITRATE 50 UG/ML
25 INJECTION INTRAVENOUS
Qty: 0 | Refills: 0 | Status: DISCONTINUED | OUTPATIENT
Start: 2017-10-16 | End: 2017-10-16

## 2017-10-16 RX ORDER — ONDANSETRON 8 MG/1
4 TABLET, FILM COATED ORAL ONCE
Qty: 0 | Refills: 0 | Status: DISCONTINUED | OUTPATIENT
Start: 2017-10-16 | End: 2017-10-16

## 2017-10-16 RX ORDER — METOPROLOL TARTRATE 50 MG
25 TABLET ORAL DAILY
Qty: 0 | Refills: 0 | Status: DISCONTINUED | OUTPATIENT
Start: 2017-10-16 | End: 2017-10-18

## 2017-10-16 RX ORDER — HYDROMORPHONE HYDROCHLORIDE 2 MG/ML
0.5 INJECTION INTRAMUSCULAR; INTRAVENOUS; SUBCUTANEOUS
Qty: 0 | Refills: 0 | Status: DISCONTINUED | OUTPATIENT
Start: 2017-10-16 | End: 2017-10-16

## 2017-10-16 RX ORDER — ASPIRIN/CALCIUM CARB/MAGNESIUM 324 MG
81 TABLET ORAL DAILY
Qty: 0 | Refills: 0 | Status: DISCONTINUED | OUTPATIENT
Start: 2017-10-16 | End: 2017-10-18

## 2017-10-16 RX ORDER — OXYCODONE AND ACETAMINOPHEN 5; 325 MG/1; MG/1
1 TABLET ORAL EVERY 4 HOURS
Qty: 0 | Refills: 0 | Status: DISCONTINUED | OUTPATIENT
Start: 2017-10-16 | End: 2017-10-18

## 2017-10-16 RX ORDER — HYDRALAZINE HCL 50 MG
10 TABLET ORAL ONCE
Qty: 0 | Refills: 0 | Status: COMPLETED | OUTPATIENT
Start: 2017-10-16 | End: 2017-10-16

## 2017-10-16 RX ORDER — SODIUM CHLORIDE 9 MG/ML
1000 INJECTION, SOLUTION INTRAVENOUS
Qty: 0 | Refills: 0 | Status: DISCONTINUED | OUTPATIENT
Start: 2017-10-16 | End: 2017-10-16

## 2017-10-16 RX ORDER — TAMSULOSIN HYDROCHLORIDE 0.4 MG/1
0.4 CAPSULE ORAL AT BEDTIME
Qty: 0 | Refills: 0 | Status: DISCONTINUED | OUTPATIENT
Start: 2017-10-16 | End: 2017-10-18

## 2017-10-16 RX ORDER — HEPARIN SODIUM 5000 [USP'U]/ML
5000 INJECTION INTRAVENOUS; SUBCUTANEOUS EVERY 8 HOURS
Qty: 0 | Refills: 0 | Status: DISCONTINUED | OUTPATIENT
Start: 2017-10-16 | End: 2017-10-18

## 2017-10-16 RX ORDER — LISINOPRIL 2.5 MG/1
40 TABLET ORAL DAILY
Qty: 0 | Refills: 0 | Status: DISCONTINUED | OUTPATIENT
Start: 2017-10-16 | End: 2017-10-18

## 2017-10-16 RX ORDER — INSULIN LISPRO 100/ML
VIAL (ML) SUBCUTANEOUS
Qty: 0 | Refills: 0 | Status: DISCONTINUED | OUTPATIENT
Start: 2017-10-16 | End: 2017-10-18

## 2017-10-16 RX ADMIN — HEPARIN SODIUM 5000 UNIT(S): 5000 INJECTION INTRAVENOUS; SUBCUTANEOUS at 16:50

## 2017-10-16 RX ADMIN — Medication 1: at 17:00

## 2017-10-16 RX ADMIN — Medication 25 MILLIGRAM(S): at 15:47

## 2017-10-16 RX ADMIN — HYDROMORPHONE HYDROCHLORIDE 0.5 MILLIGRAM(S): 2 INJECTION INTRAMUSCULAR; INTRAVENOUS; SUBCUTANEOUS at 16:00

## 2017-10-16 RX ADMIN — TAMSULOSIN HYDROCHLORIDE 0.4 MILLIGRAM(S): 0.4 CAPSULE ORAL at 22:11

## 2017-10-16 RX ADMIN — SODIUM CHLORIDE 75 MILLILITER(S): 9 INJECTION, SOLUTION INTRAVENOUS at 15:55

## 2017-10-16 RX ADMIN — Medication 10 MILLIGRAM(S): at 18:52

## 2017-10-16 RX ADMIN — ATORVASTATIN CALCIUM 80 MILLIGRAM(S): 80 TABLET, FILM COATED ORAL at 22:12

## 2017-10-16 RX ADMIN — LISINOPRIL 40 MILLIGRAM(S): 2.5 TABLET ORAL at 16:08

## 2017-10-16 RX ADMIN — HYDROMORPHONE HYDROCHLORIDE 0.5 MILLIGRAM(S): 2 INJECTION INTRAMUSCULAR; INTRAVENOUS; SUBCUTANEOUS at 15:51

## 2017-10-16 NOTE — PROGRESS NOTE ADULT - ASSESSMENT
ASSESSMENT  77y male s/p  laparoscopic cholecystectomy, recovering without issue, hemodynamically stable. Patient staying overnight for hemodynamic monitoring for potential bleeding as patient takes aspirin and Plavix at home.     PLAN  -   - Diet: Regular  - Pain control with PO and IV medications.    - Continue home medications  - OOB, ambulate as tolerated  - continue chemical VTE ppx  - f/u AM labs  - Dispo: Plan for probable d/c in AM    A Team, [id number] ASSESSMENT  77y male s/p  laparoscopic cholecystectomy, recovering without issue, hemodynamically stable. Patient staying overnight for hemodynamic monitoring for potential bleeding as patient takes aspirin and Plavix at home.     PLAN  -   - Diet: Regular  - Pain control with PO and IV medications.    - Continue home medications, will hold restarting Plavix until Friday (10/20)  - OOB, ambulate as tolerated  - continue chemical VTE ppx  - f/u AM labs  - Dispo: Plan for probable d/c in AM    A Team, [id number]

## 2017-10-16 NOTE — PROGRESS NOTE ADULT - SUBJECTIVE AND OBJECTIVE BOX
STATUS POST:    Laparoscopic cholecystectomy    POD#:   0      SUBJECTIVE: Pt seen + examined at bedside. Recovering without issue. Pain well controlled. Denied any nausea vomiting fever, chills, CP, SOB, lightheadedness, or palpitations.      ---------------------------------------------------------------------------------------------   VITALS  T(C): 36.3 (10-16-17 @ 16:00), Max: 36.6 (10-16-17 @ 10:14)  HR: 62 (10-16-17 @ 16:15) (54 - 68)  BP: 165/57 (10-16-17 @ 16:00) (164/87 - 193/67)  BP(mean): --  RR: 15 (10-16-17 @ 16:15) (12 - 20)  SpO2: 98% (10-16-17 @ 16:15) (97% - 99%)  Wt(kg): --  CAPILLARY BLOOD GLUCOSE  133 (16 Oct 2017 10:14)      POCT Blood Glucose.: 171 mg/dL (16 Oct 2017 16:53)  POCT Blood Glucose.: 133 mg/dL (16 Oct 2017 10:14)      Is/Os    10-16 @ 07:01  -  10-16 @ 18:26  --------------------------------------------------------  IN:    Oral Fluid: 300 mL  Total IN: 300 mL    OUT:    Voided: 200 mL  Total OUT: 200 mL    Total NET: 100 mL          ---------------------------------------------------------------------------------------------   PHYSICAL EXAM: ***  General: NAD, Lying in bed comfortably  Neuro: alert, oriented x3  Cardio: RRR, nml S1/S2  Resp: Breathing comfortably on RA  GI/Abd: Soft, minimally distended, appropriately tender. Port site dressings CDI.   Musculoskeletal: All 4 extremities moving spontaneously, no limitations, no LE edema    ---------------------------------------------------------------------------------------------   MEDICATIONS (STANDING): aspirin  chewable 81 milliGRAM(s) Oral daily  atorvastatin 80 milliGRAM(s) Oral at bedtime  finasteride 5 milliGRAM(s) Oral daily  heparin  Injectable 5000 Unit(s) SubCutaneous every 8 hours  insulin lispro (HumaLOG) corrective regimen sliding scale   SubCutaneous three times a day before meals  insulin lispro (HumaLOG) corrective regimen sliding scale   SubCutaneous at bedtime  lactated ringers. 1000 milliLiter(s) IV Continuous <Continuous>  lisinopril 40 milliGRAM(s) Oral daily  metoprolol 25 milliGRAM(s) Oral daily  tamsulosin 0.4 milliGRAM(s) Oral at bedtime    MEDICATIONS (PRN):fentaNYL    Injectable 25 MICROGram(s) IV Push every 10 minutes PRN Mild Pain (1 - 3)  HYDROmorphone  Injectable 0.5 milliGRAM(s) IV Push every 10 minutes PRN Moderate Pain  ondansetron Injectable 4 milliGRAM(s) IV Push once PRN Nausea and/or Vomiting  oxyCODONE    5 mG/acetaminophen 325 mG 1 Tablet(s) Oral every 4 hours PRN Moderate Pain      ---------------------------------------------------------------------------------------------   LABS STATUS POST:    Laparoscopic cholecystectomy    POD#:   0      SUBJECTIVE: Pt seen + examined at bedside. Recovering without issue. Pain well controlled. Denied any nausea vomiting fever, chills, CP, SOB, lightheadedness, or palpitations.      ---------------------------------------------------------------------------------------------   VITALS  T(C): 36.3 (10-16-17 @ 16:00), Max: 36.6 (10-16-17 @ 10:14)  HR: 62 (10-16-17 @ 16:15) (54 - 68)  BP: 165/57 (10-16-17 @ 16:00) (164/87 - 193/67)  BP(mean): --  RR: 15 (10-16-17 @ 16:15) (12 - 20)  SpO2: 98% (10-16-17 @ 16:15) (97% - 99%)  Wt(kg): --  CAPILLARY BLOOD GLUCOSE  133 (16 Oct 2017 10:14)      POCT Blood Glucose.: 171 mg/dL (16 Oct 2017 16:53)  POCT Blood Glucose.: 133 mg/dL (16 Oct 2017 10:14)      Is/Os    10-16 @ 07:01  -  10-16 @ 18:26  --------------------------------------------------------  IN:    Oral Fluid: 300 mL  Total IN: 300 mL    OUT:    Voided: 200 mL  Total OUT: 200 mL    Total NET: 100 mL          ---------------------------------------------------------------------------------------------   PHYSICAL EXAM: ***  General: NAD, Lying in bed comfortably  Neuro: alert, oriented x3  Cardio: RRR, nml S1/S2  Resp: Breathing comfortably on RA  GI/Abd: Soft, moderately distended, nontender tender. Resonant to percussion Port site dressings CDI.   Musculoskeletal: All 4 extremities moving spontaneously, no limitations, no LE edema    ---------------------------------------------------------------------------------------------   MEDICATIONS (STANDING): aspirin  chewable 81 milliGRAM(s) Oral daily  atorvastatin 80 milliGRAM(s) Oral at bedtime  finasteride 5 milliGRAM(s) Oral daily  heparin  Injectable 5000 Unit(s) SubCutaneous every 8 hours  insulin lispro (HumaLOG) corrective regimen sliding scale   SubCutaneous three times a day before meals  insulin lispro (HumaLOG) corrective regimen sliding scale   SubCutaneous at bedtime  lactated ringers. 1000 milliLiter(s) IV Continuous <Continuous>  lisinopril 40 milliGRAM(s) Oral daily  metoprolol 25 milliGRAM(s) Oral daily  tamsulosin 0.4 milliGRAM(s) Oral at bedtime    MEDICATIONS (PRN):fentaNYL    Injectable 25 MICROGram(s) IV Push every 10 minutes PRN Mild Pain (1 - 3)  HYDROmorphone  Injectable 0.5 milliGRAM(s) IV Push every 10 minutes PRN Moderate Pain  ondansetron Injectable 4 milliGRAM(s) IV Push once PRN Nausea and/or Vomiting  oxyCODONE    5 mG/acetaminophen 325 mG 1 Tablet(s) Oral every 4 hours PRN Moderate Pain      ---------------------------------------------------------------------------------------------   LABS

## 2017-10-16 NOTE — BRIEF OPERATIVE NOTE - PROCEDURE
<<-----Click on this checkbox to enter Procedure Laparoscopic cholecystectomy  10/16/2017    Active  [id number]

## 2017-10-17 ENCOUNTER — TRANSCRIPTION ENCOUNTER (OUTPATIENT)
Age: 77
End: 2017-10-17

## 2017-10-17 DIAGNOSIS — F43.20 ADJUSTMENT DISORDER, UNSPECIFIED: ICD-10-CM

## 2017-10-17 DIAGNOSIS — K80.20 CALCULUS OF GALLBLADDER WITHOUT CHOLECYSTITIS WITHOUT OBSTRUCTION: ICD-10-CM

## 2017-10-17 LAB
ALBUMIN SERPL ELPH-MCNC: 4 G/DL — SIGNIFICANT CHANGE UP (ref 3.3–5)
ALP SERPL-CCNC: 88 U/L — SIGNIFICANT CHANGE UP (ref 40–120)
ALT FLD-CCNC: 16 U/L — SIGNIFICANT CHANGE UP (ref 4–41)
AST SERPL-CCNC: 14 U/L — SIGNIFICANT CHANGE UP (ref 4–40)
BILIRUB SERPL-MCNC: 1.9 MG/DL — HIGH (ref 0.2–1.2)
BUN SERPL-MCNC: 17 MG/DL — SIGNIFICANT CHANGE UP (ref 7–23)
CALCIUM SERPL-MCNC: 8.6 MG/DL — SIGNIFICANT CHANGE UP (ref 8.4–10.5)
CHLORIDE SERPL-SCNC: 99 MMOL/L — SIGNIFICANT CHANGE UP (ref 98–107)
CO2 SERPL-SCNC: 24 MMOL/L — SIGNIFICANT CHANGE UP (ref 22–31)
CREAT SERPL-MCNC: 0.87 MG/DL — SIGNIFICANT CHANGE UP (ref 0.5–1.3)
GLUCOSE BLDC GLUCOMTR-MCNC: 139 MG/DL — HIGH (ref 70–99)
GLUCOSE BLDC GLUCOMTR-MCNC: 143 MG/DL — HIGH (ref 70–99)
GLUCOSE BLDC GLUCOMTR-MCNC: 167 MG/DL — HIGH (ref 70–99)
GLUCOSE BLDC GLUCOMTR-MCNC: 97 MG/DL — SIGNIFICANT CHANGE UP (ref 70–99)
GLUCOSE SERPL-MCNC: 140 MG/DL — HIGH (ref 70–99)
HCT VFR BLD CALC: 36.2 % — LOW (ref 39–50)
HGB BLD-MCNC: 12.2 G/DL — LOW (ref 13–17)
MCHC RBC-ENTMCNC: 29.9 PG — SIGNIFICANT CHANGE UP (ref 27–34)
MCHC RBC-ENTMCNC: 33.7 % — SIGNIFICANT CHANGE UP (ref 32–36)
MCV RBC AUTO: 88.7 FL — SIGNIFICANT CHANGE UP (ref 80–100)
NRBC # FLD: 0 — SIGNIFICANT CHANGE UP
PLATELET # BLD AUTO: 216 K/UL — SIGNIFICANT CHANGE UP (ref 150–400)
PMV BLD: 10.9 FL — SIGNIFICANT CHANGE UP (ref 7–13)
POTASSIUM SERPL-MCNC: 4.2 MMOL/L — SIGNIFICANT CHANGE UP (ref 3.5–5.3)
POTASSIUM SERPL-SCNC: 4.2 MMOL/L — SIGNIFICANT CHANGE UP (ref 3.5–5.3)
PROT SERPL-MCNC: 6.4 G/DL — SIGNIFICANT CHANGE UP (ref 6–8.3)
RBC # BLD: 4.08 M/UL — LOW (ref 4.2–5.8)
RBC # FLD: 14 % — SIGNIFICANT CHANGE UP (ref 10.3–14.5)
SODIUM SERPL-SCNC: 137 MMOL/L — SIGNIFICANT CHANGE UP (ref 135–145)
WBC # BLD: 12.64 K/UL — HIGH (ref 3.8–10.5)
WBC # FLD AUTO: 12.64 K/UL — HIGH (ref 3.8–10.5)

## 2017-10-17 PROCEDURE — 90792 PSYCH DIAG EVAL W/MED SRVCS: CPT

## 2017-10-17 RX ORDER — MAGNESIUM SULFATE 500 MG/ML
2 VIAL (ML) INJECTION ONCE
Qty: 0 | Refills: 0 | Status: COMPLETED | OUTPATIENT
Start: 2017-10-17 | End: 2017-10-17

## 2017-10-17 RX ORDER — QUETIAPINE FUMARATE 200 MG/1
12.5 TABLET, FILM COATED ORAL EVERY 6 HOURS
Qty: 0 | Refills: 0 | Status: DISCONTINUED | OUTPATIENT
Start: 2017-10-17 | End: 2017-10-18

## 2017-10-17 RX ORDER — HALOPERIDOL DECANOATE 100 MG/ML
0.5 INJECTION INTRAMUSCULAR EVERY 6 HOURS
Qty: 0 | Refills: 0 | Status: DISCONTINUED | OUTPATIENT
Start: 2017-10-17 | End: 2017-10-18

## 2017-10-17 RX ADMIN — HEPARIN SODIUM 5000 UNIT(S): 5000 INJECTION INTRAVENOUS; SUBCUTANEOUS at 00:28

## 2017-10-17 RX ADMIN — HEPARIN SODIUM 5000 UNIT(S): 5000 INJECTION INTRAVENOUS; SUBCUTANEOUS at 13:42

## 2017-10-17 RX ADMIN — Medication 50 GRAM(S): at 13:41

## 2017-10-17 RX ADMIN — Medication 1: at 13:40

## 2017-10-17 RX ADMIN — Medication 81 MILLIGRAM(S): at 13:41

## 2017-10-17 RX ADMIN — HEPARIN SODIUM 5000 UNIT(S): 5000 INJECTION INTRAVENOUS; SUBCUTANEOUS at 23:20

## 2017-10-17 RX ADMIN — HEPARIN SODIUM 5000 UNIT(S): 5000 INJECTION INTRAVENOUS; SUBCUTANEOUS at 06:12

## 2017-10-17 RX ADMIN — FINASTERIDE 5 MILLIGRAM(S): 5 TABLET, FILM COATED ORAL at 13:41

## 2017-10-17 RX ADMIN — ATORVASTATIN CALCIUM 80 MILLIGRAM(S): 80 TABLET, FILM COATED ORAL at 22:01

## 2017-10-17 RX ADMIN — TAMSULOSIN HYDROCHLORIDE 0.4 MILLIGRAM(S): 0.4 CAPSULE ORAL at 22:01

## 2017-10-17 NOTE — DISCHARGE NOTE ADULT - MEDICATION SUMMARY - MEDICATIONS TO TAKE
I will START or STAY ON the medications listed below when I get home from the hospital:    finasteride 5 mg oral tablet  -- 1 tab(s) by mouth once a day  -- Indication: For BPH    oxyCODONE-acetaminophen 5 mg-325 mg oral tablet  -- 1 tab(s) by mouth every 4 hours, As needed, Moderate Pain MDD:6 tabs  -- Indication: For Post op pain    aspirin 81 mg oral tablet  -- 1 tab(s) by mouth once a day  -- Indication: For CAD    ramipril 10 mg oral capsule  -- 1 cap(s) by mouth once a day  -- Indication: For HTN    tamsulosin 0.4 mg oral capsule  -- 1 cap(s) by mouth once a day  -- Indication: For BPH    glimepiride 4 mg oral tablet  -- orally 2 times a day  -- Indication: For DM    metFORMIN 500 mg oral tablet  -- 2 tab(s) by mouth 2 times a day  -- Indication: For DM    pioglitazone 30 mg oral tablet  -- 1 tab(s) by mouth once a day  -- Indication: For DM    atorvastatin 80 mg oral tablet  -- 1 tab(s) by mouth once a day  -- Indication: For HLD    clopidogrel 75 mg oral tablet  -- 1 tab(s) by mouth once a day LD 10/10/2017  -- Indication: For CAD    metoprolol tartrate 25 mg oral tablet  -- orally once a day  -- Indication: For HTN    cilostazol 50 mg oral tablet  -- 1 tab(s) by mouth 2 times a day  -- Indication: For CAD

## 2017-10-17 NOTE — PROGRESS NOTE ADULT - SUBJECTIVE AND OBJECTIVE BOX
A TEAM GENERAL SURGERY DAILY PROGRESS NOTE:       Subjective: Patient examined at bedside. No issues overnight. Pain well controlled. + Flatus/ -  BM. Denied n/v, fever, chills, CP or SOB.        MEDICATIONS  (STANDING):  aspirin  chewable 81 milliGRAM(s) Oral daily  atorvastatin 80 milliGRAM(s) Oral at bedtime  finasteride 5 milliGRAM(s) Oral daily  heparin  Injectable 5000 Unit(s) SubCutaneous every 8 hours  insulin lispro (HumaLOG) corrective regimen sliding scale   SubCutaneous three times a day before meals  insulin lispro (HumaLOG) corrective regimen sliding scale   SubCutaneous at bedtime  lisinopril 40 milliGRAM(s) Oral daily  metoprolol 25 milliGRAM(s) Oral daily  tamsulosin 0.4 milliGRAM(s) Oral at bedtime    MEDICATIONS  (PRN):  oxyCODONE    5 mG/acetaminophen 325 mG 1 Tablet(s) Oral every 4 hours PRN Moderate Pain      Objective:  Vital Signs Last 24 Hrs  T(C): 36.9 (17 Oct 2017 06:02), Max: 37.1 (17 Oct 2017 02:00)  T(F): 98.4 (17 Oct 2017 06:02), Max: 98.8 (17 Oct 2017 02:00)  HR: 80 (17 Oct 2017 06:02) (54 - 80)  BP: 120/54 (17 Oct 2017 06:02) (120/54 - 193/67)  BP(mean): --  RR: 16 (17 Oct 2017 06:02) (12 - 20)  SpO2: 96% (17 Oct 2017 06:02) (96% - 100%)    Gen: NAD, lying comfortably in bed  Resp: Breathing comfortably on RA  Cardiac: RRR, no murmurs , gallops or rubs  GI: soft, nontender, nondistended. Port site CDI.   Ext: All 4 extremities warm and well perfused, no edema    I&O's Detail    16 Oct 2017 07:01  -  17 Oct 2017 06:43  --------------------------------------------------------  IN:    lactated ringers.: 450 mL    Oral Fluid: 400 mL  Total IN: 850 mL    OUT:    Voided: 200 mL  Total OUT: 200 mL    Total NET: 650 mL          Daily Height in cm: 162.56 (16 Oct 2017 10:14)    Daily     LABS:                RADIOLOGY & ADDITIONAL STUDIES:

## 2017-10-17 NOTE — DISCHARGE NOTE ADULT - PATIENT PORTAL LINK FT
“You can access the Follow[employer] Patient Portal, offered by [facility], by registering with the following website: [email or web address]”

## 2017-10-17 NOTE — BEHAVIORAL HEALTH ASSESSMENT NOTE - HPI (INCLUDE ILLNESS QUALITY, SEVERITY, DURATION, TIMING, CONTEXT, MODIFYING FACTORS, ASSOCIATED SIGNS AND SYMPTOMS)
Patient is a 77y male with no past psychiatric history,  no suicidal attempts, CAD, resides alone,  admitted for abdominal pain s/p  laparoscopic cholecystectomy, psychiatry is consulted for AMS.     Patient is alert, oriented to person, place, he is calm and cooperative. Patient relates adequate sleep, fair energy level, concentration with good appetite.  He denies feeling depressed, hopeless or helpless or anxious.  Patient is busy with friends and goes to the gym. 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  At this time, he denies auditory or visual hallucinations, paranoia, passive or active suicidal or homicidal ideation, intent or plan.     Patient provided consent to contact his brother, Berto Mitchell, [phone], who reports that patient had a ‘nervous breakdown’ at the age of 17y when his girlfriend ended the relationship, since then he was residing with his mother until her death.  Patient is not able to work as he is on disability.  He has no history of being on psychotropic medications or seeing a psychiatrist. Patient was not maintaining his ADLs, possibly incontinent of urine, but he is more cognizant of it recently.  Patient has not voiced auditory or visual hallucinations, suicidal ideation, and has no history of violence or agitation.  He does not use alcohol or illicit drug use.  There is no family history of dementia.

## 2017-10-17 NOTE — PROGRESS NOTE ADULT - SUBJECTIVE AND OBJECTIVE BOX
A TEAM GENERAL SURGERY DAILY PROGRESS NOTE: evening note     pod #1  S/P sydney dayton       Subjective: Patient examined at bedside.  Pt was having mild change in mental status . knowing that he lives alone in his apartment in [place] and his brother is from the city. Psych was consulted today for eval and safe discharge .   Pain well controlled.  Pt tolerating diet no acute issues. Voiding well.  Denied n/v, fever, chills, CP or SOB.        Objective:  Vital Signs Last 24 Hrs  T(C): 36.9 (17 Oct 2017 06:02), Max: 37.1 (17 Oct 2017 02:00)  T(F): 98.4 (17 Oct 2017 06:02), Max: 98.8 (17 Oct 2017 02:00)  HR: 80 (17 Oct 2017 06:02) (54 - 80)  BP: 120/54 (17 Oct 2017 06:02) (120/54 - 193/67)  BP(mean): --  RR: 16 (17 Oct 2017 06:02) (12 - 20)  SpO2: 96% (17 Oct 2017 06:02) (96% - 100%)    Gen: NAD, lying comfortably in bed  Resp: Breathing comfortably on RA  Cardiac: RRR, no murmurs , gallops or rubs  GI: soft, nontender, nondistended. Port sites CDI.   Ext: All 4 extremities warm and well perfused, no edema    I&O's Detail    16 Oct 2017 07:01  -  17 Oct 2017 06:43  --------------------------------------------------------  IN:    lactated ringers.: 450 mL    Oral Fluid: 400 mL  Total IN: 850 mL    OUT:    Voided: 200 mL  Total OUT: 200 mL    Total NET: 650 mL          Daily Height in cm: 162.56 (16 Oct 2017 10:14)    Daily     LABS:                RADIOLOGY & ADDITIONAL STUDIES:

## 2017-10-17 NOTE — PROGRESS NOTE ADULT - ASSESSMENT
ASSESSMENT  77y male s/p  laparoscopic cholecystectomy, recovering without issue, hemodynamically stable. Patient stayed overnight for hemodynamic monitoring for potential bleeding as patient takes aspirin and Plavix at home, liekly go home today     PLAN  -   - Diet: Regular  - Pain control with PO     - Continue home medications, will hold restarting Plavix until Friday (10/20)  - OOB, ambulate as tolerated  - continue chemical VTE ppx  - f/u AM labs  - Dispo: Plan for  d/c home in AM    A Team, [id number]

## 2017-10-17 NOTE — DISCHARGE NOTE ADULT - CARE PROVIDER_API CALL
Nancy Garcia (MD), ColonRectal Surgery; Surgery  [address] [facility]  [address]  [place] [address]  Phone: [phone]  Fax: [phone]

## 2017-10-17 NOTE — PROGRESS NOTE ADULT - ASSESSMENT
ASSESSMENT  77y male s/p  laparoscopic cholecystectomy, recovering without issue, hemodynamically stable. Patient stayed overnight for hemodynamic monitoring for potential bleeding as patient takes aspirin and Plavix at home, liekly go home today     PLAN  - Will f/u psych reccs  - PT Eval- no skilled PT needed   - Diet: Regular  - Pain control with PO     - Continue home medications, will hold restarting Plavix until Friday (10/20)- discussed with Brother  - OOB, ambulate as tolerated  - continue chemical VTE ppx  - f/u AM labs  - Dispo: likely discharge tomorrow after psych re evaluates tomorrow AM (10/18/17)  - discussed in length with Brother Mr Sabas Mitchell regarding the plan   - Discussed with attending regarding the discharge plan likely tomorrow     A Team  [id number]      A Team, [id number]

## 2017-10-17 NOTE — DISCHARGE NOTE ADULT - ADDITIONAL INSTRUCTIONS
1. Please follow up with your primary care physician in one week regarding your hospitalization.   2. Please follow-up with Dr. Garcia, your surgeon, within 7 days following discharge- please call to schedule an appointment. 1. Please follow up with your primary care physician in one week regarding your hospitalization.   2. Please follow-up with Dr. Garcia, your surgeon, within 10 days following discharge- please call to schedule an appointment.  3. hold off restarting Plavix until this Friday (10/20/17). 1. Please follow up with your primary care physician in one week regarding your hospitalization. Dr Pankaj Cruz   2. Please follow-up with Dr. Garcia, your surgeon, within 10 days following discharge- please call to schedule an appointment.  3. hold off restarting Plavix until this Friday (10/20/17).

## 2017-10-17 NOTE — BEHAVIORAL HEALTH ASSESSMENT NOTE - NSBHCHARTREVIEWLAB_PSY_A_CORE FT
12.2   12.64 )-----------( 216      ( 17 Oct 2017 06:35 )             36.2    10-17    137  |  99  |  17  ----------------------------<  140<H>  4.2   |  24  |  0.87    Ca    8.6      17 Oct 2017 06:35    TPro  6.4  /  Alb  4.0  /  TBili  1.9<H>  /  DBili  x   /  AST  14  /  ALT  16  /  AlkPhos  88  10-17

## 2017-10-17 NOTE — BEHAVIORAL HEALTH ASSESSMENT NOTE - SUMMARY
Patient is a 77y male with no past psychiatric history,  no suicidal attempts, CAD, resides alone,  admitted for abdominal pain s/p  laparoscopic cholecystectomy, psychiatry is consulted for AMS, patient is alert, oriented to person, place, most likely he may be have been lucid earlier secondary to surgery.     1) Start Seroquel 12.5mg po Q6h PRN, agitation  2) Start Haldol 0.5mg IM/ IV Q6h, PRN,  for severe agitation  3) Vitamin B12, TSH, Free T4, RPR Patient is a 77y male with no past psychiatric history,  no suicidal attempts, CAD, resides alone,  admitted for abdominal pain s/p  laparoscopic cholecystectomy, psychiatry is consulted for AMS, patient is alert, oriented to person, place, most likely he may have been less lucid earlier secondary to surgery.     1) Start Seroquel 12.5mg po Q6h PRN, agitation or delirium  2) Start Haldol 0.5mg IM/ IV Q6h, PRN,  for severe agitation  3) Vitamin B12, TSH, Free T4, RPR

## 2017-10-17 NOTE — BEHAVIORAL HEALTH ASSESSMENT NOTE - CASE SUMMARY
Patient is a 77y male with no past psychiatric history,  no suicidal attempts, CAD, resides alone,  admitted for abdominal pain s/p  laparoscopic cholecystectomy, psychiatry is consulted for AMS, patient is alert, oriented to person, place, most likely he may have been less lucid earlier secondary to surgery, and opiate medications.  Pt was on IV Dilaudid until yesterday afternoon.  He has no symptoms of delirium now and was fully oriented and able to perform serial sevens (100-7=93, 93-7=86, 86-7=79...).  Pt may benefit from staying with his brother until he is stronger since he has a history of falls at home.

## 2017-10-17 NOTE — PROGRESS NOTE ADULT - SUBJECTIVE AND OBJECTIVE BOX
INTERVAL HPI/OVERNIGHT EVENTS: Pt seen and examined. Feeling well. Tolerating diet. Pain controlled.     STATUS POST:  Miller dayton    POST OPERATIVE DAY #: 1    MEDICATIONS  (STANDING):  aspirin  chewable 81 milliGRAM(s) Oral daily  atorvastatin 80 milliGRAM(s) Oral at bedtime  finasteride 5 milliGRAM(s) Oral daily  heparin  Injectable 5000 Unit(s) SubCutaneous every 8 hours  insulin lispro (HumaLOG) corrective regimen sliding scale   SubCutaneous three times a day before meals  insulin lispro (HumaLOG) corrective regimen sliding scale   SubCutaneous at bedtime  lisinopril 40 milliGRAM(s) Oral daily  metoprolol 25 milliGRAM(s) Oral daily  tamsulosin 0.4 milliGRAM(s) Oral at bedtime    MEDICATIONS  (PRN):  oxyCODONE    5 mG/acetaminophen 325 mG 1 Tablet(s) Oral every 4 hours PRN Moderate Pain      Vital Signs Last 24 Hrs  T(C): 36.9 (17 Oct 2017 06:02), Max: 37.1 (17 Oct 2017 02:00)  T(F): 98.4 (17 Oct 2017 06:02), Max: 98.8 (17 Oct 2017 02:00)  HR: 80 (17 Oct 2017 06:02) (54 - 80)  BP: 120/54 (17 Oct 2017 06:02) (120/54 - 193/67)  BP(mean): --  RR: 16 (17 Oct 2017 06:02) (12 - 20)  SpO2: 96% (17 Oct 2017 06:02) (96% - 100%)    PHYSICAL EXAM:      Constitutional: NAD    Gastrointestinal: Abd soft, NT, ND. Steri strips in place, clean and dry    Skin: No jaundice    Eyes: No scleral icterus              I&O's Detail    16 Oct 2017 07:01  -  17 Oct 2017 07:00  --------------------------------------------------------  IN:    lactated ringers.: 450 mL    Oral Fluid: 400 mL  Total IN: 850 mL    OUT:    Voided: 200 mL  Total OUT: 200 mL    Total NET: 650 mL          LABS:                        12.2   12.64 )-----------( 216      ( 17 Oct 2017 06:35 )             36.2     10-17    137  |  99  |  17  ----------------------------<  140<H>  4.2   |  24  |  0.87    Ca    8.6      17 Oct 2017 06:35    TPro  6.4  /  Alb  4.0  /  TBili  1.9<H>  /  DBili  x   /  AST  14  /  ALT  16  /  AlkPhos  88  10-17          RADIOLOGY & ADDITIONAL STUDIES:

## 2017-10-17 NOTE — DISCHARGE NOTE ADULT - CARE PLAN
Principal Discharge DX:	Biliary colic  Goal:	recovery from surgery  Instructions for follow-up, activity and diet:	WOUND CARE:  Please keep incisions clean and dry. Please do not Scrub or rub incisions. Do not use lotion or powder on incisions. Steri strips in place. May shower with strips in place will fall off on their own in 5-7 days.   BATHING: Please do not submerge wound underwater. You may shower and/or sponge bathe.  ACTIVITY: No heavy lifting or straining. Otherwise, you may return to your usual level of physical activity. If you are taking narcotic pain medication (such as Oxycodone) DO NOT drive a car, operate machinery or make important decisions.  DIET: Return to your usual diet.  NOTIFY YOUR SURGEON IF: You have any bleeding that does not stop, any pus draining from your wound(s), any fever (over 100.4 F) or chills, persistent nausea/vomiting, persistent diarrhea, or if your pain is not controlled on your discharge pain medications.  FOLLOW-UP: Please follow up with your primary care physician in one week regarding your hospitalization. Please follow-up with Dr. Garcia, your surgeon, within 7 days following discharge- please call to schedule an appointment. Patient instructed to restart Plavix this Friday (10/20/17).  Secondary Diagnosis:	CAD (coronary artery disease)

## 2017-10-17 NOTE — DISCHARGE NOTE ADULT - PLAN OF CARE
recovery from surgery WOUND CARE:  Please keep incisions clean and dry. Please do not Scrub or rub incisions. Do not use lotion or powder on incisions. Steri strips in place. May shower with strips in place will fall off on their own in 5-7 days.   BATHING: Please do not submerge wound underwater. You may shower and/or sponge bathe.  ACTIVITY: No heavy lifting or straining. Otherwise, you may return to your usual level of physical activity. If you are taking narcotic pain medication (such as Oxycodone) DO NOT drive a car, operate machinery or make important decisions.  DIET: Return to your usual diet.  NOTIFY YOUR SURGEON IF: You have any bleeding that does not stop, any pus draining from your wound(s), any fever (over 100.4 F) or chills, persistent nausea/vomiting, persistent diarrhea, or if your pain is not controlled on your discharge pain medications.  FOLLOW-UP: Please follow up with your primary care physician in one week regarding your hospitalization. Please follow-up with Dr. Garcia, your surgeon, within 7 days following discharge- please call to schedule an appointment. Patient instructed to restart Plavix this Friday (10/20/17).

## 2017-10-17 NOTE — BEHAVIORAL HEALTH ASSESSMENT NOTE - NSBHCHARTREVIEWVS_PSY_A_CORE FT
Vital Signs Last 24 Hrs  T(C): 36.4 (17 Oct 2017 13:26), Max: 37.1 (17 Oct 2017 02:00)  T(F): 97.6 (17 Oct 2017 13:26), Max: 98.8 (17 Oct 2017 02:00)  HR: 73 (17 Oct 2017 13:26) (60 - 80)  BP: 123/53 (17 Oct 2017 13:26) (117/54 - 179/62)  BP(mean): --  RR: 18 (17 Oct 2017 13:26) (12 - 18)  SpO2: 98% (17 Oct 2017 13:26) (96% - 100%)

## 2017-10-17 NOTE — BEHAVIORAL HEALTH ASSESSMENT NOTE - RISK ASSESSMENT
Risk factors:  Patient resides alone, history of multiple falls    Protective factors:  No past psychiatric history, no suicidal attempt, future oriented

## 2017-10-17 NOTE — PROGRESS NOTE ADULT - ASSESSMENT
77M POD# 1 Lap dayton 77M POD# 1 Lap dayton admitted due to intraop  HTN and to monitor for bleeding due to Plavix

## 2017-10-18 ENCOUNTER — APPOINTMENT (OUTPATIENT)
Dept: GASTROENTEROLOGY | Facility: CLINIC | Age: 77
End: 2017-10-18

## 2017-10-18 VITALS
SYSTOLIC BLOOD PRESSURE: 135 MMHG | OXYGEN SATURATION: 99 % | TEMPERATURE: 98 F | HEART RATE: 66 BPM | DIASTOLIC BLOOD PRESSURE: 58 MMHG | RESPIRATION RATE: 16 BRPM

## 2017-10-18 LAB
GLUCOSE BLDC GLUCOMTR-MCNC: 138 MG/DL — HIGH (ref 70–99)
GLUCOSE BLDC GLUCOMTR-MCNC: 193 MG/DL — HIGH (ref 70–99)

## 2017-10-18 RX ADMIN — FINASTERIDE 5 MILLIGRAM(S): 5 TABLET, FILM COATED ORAL at 16:33

## 2017-10-18 RX ADMIN — HEPARIN SODIUM 5000 UNIT(S): 5000 INJECTION INTRAVENOUS; SUBCUTANEOUS at 07:33

## 2017-10-18 RX ADMIN — Medication 1: at 10:06

## 2017-10-18 RX ADMIN — HEPARIN SODIUM 5000 UNIT(S): 5000 INJECTION INTRAVENOUS; SUBCUTANEOUS at 16:32

## 2017-10-18 RX ADMIN — Medication 81 MILLIGRAM(S): at 16:32

## 2017-10-18 RX ADMIN — LISINOPRIL 40 MILLIGRAM(S): 2.5 TABLET ORAL at 07:33

## 2017-10-18 NOTE — PROGRESS NOTE ADULT - SUBJECTIVE AND OBJECTIVE BOX
A TEAM GENERAL SURGERY DAILY PROGRESS NOTE    pod #2  S/P sydney burris       Subjective: Patient examined at bedside. Patient expresses that he is feeling comfortable this morning and is ready to go home.  Pain well controlled.  Pt tolerating diet no acute issues. Voiding well.  Denied n/v, fever, chills, CP or SOB.      Objective:  Vital Signs Last 24 Hrs  T(C): 37 (18 Oct 2017 02:15), Max: 37 (18 Oct 2017 02:15)  T(F): 98.6 (18 Oct 2017 02:15), Max: 98.6 (18 Oct 2017 02:15)  HR: 73 (18 Oct 2017 02:15) (69 - 83)  BP: 151/63 (18 Oct 2017 02:15) (117/54 - 163/69)  BP(mean): --  RR: 17 (18 Oct 2017 02:15) (17 - 18)  SpO2: 96% (18 Oct 2017 02:15) (96% - 100%)    I&O's Detail    16 Oct 2017 07:01  -  17 Oct 2017 07:00  --------------------------------------------------------  IN:    lactated ringers.: 450 mL    Oral Fluid: 400 mL  Total IN: 850 mL    OUT:    Voided: 200 mL  Total OUT: 200 mL    Total NET: 650 mL      17 Oct 2017 07:01  -  18 Oct 2017 06:41  --------------------------------------------------------  IN:    IV PiggyBack: 50 mL    Oral Fluid: 550 mL  Total IN: 600 mL    OUT:    Voided: 550 mL  Total OUT: 550 mL    Total NET: 50 mL    Physical Exam:  Gen: NAD, lying comfortably in bed  Resp: Breathing comfortably on RA  Cardiac: RRR, no murmurs , gallops or rubs  GI: soft, nontender, nondistended. Port sites CDI.   Ext: All 4 extremities warm and well perfused, no edema                            12.2   12.64 )-----------( 216      ( 17 Oct 2017 06:35 )             36.2       10-17    137  |  99  |  17  ----------------------------<  140<H>  4.2   |  24  |  0.87    Ca    8.6      17 Oct 2017 06:35    TPro  6.4  /  Alb  4.0  /  TBili  1.9<H>  /  DBili  x   /  AST  14  /  ALT  16  /  AlkPhos  88  10-17      CAPILLARY BLOOD GLUCOSE  139 (17 Oct 2017 21:46)  97 (17 Oct 2017 17:19)  143 (17 Oct 2017 08:46)      POCT Blood Glucose.: 139 mg/dL (17 Oct 2017 21:42)  POCT Blood Glucose.: 97 mg/dL (17 Oct 2017 17:09)  POCT Blood Glucose.: 167 mg/dL (17 Oct 2017 11:53)  POCT Blood Glucose.: 143 mg/dL (17 Oct 2017 08:42)      LIVER FUNCTIONS - ( 17 Oct 2017 06:35 )  Alb: 4.0 g/dL / Pro: 6.4 g/dL / ALK PHOS: 88 u/L / ALT: 16 u/L / AST: 14 u/L / GGT: x                       Daily Height in cm: 162.56 (16 Oct 2017 10:14)

## 2017-10-18 NOTE — PROGRESS NOTE ADULT - ASSESSMENT
ASSESSMENT  77y male s/p  laparoscopic cholecystectomy, recovering without issue, hemodynamically stable. Patient stayed overnight for hemodynamic monitoring for potential bleeding as patient takes aspirin and Plavix at home, liekly go home today     PLAN  - Psych eval appreciated. As per recommendations AMS likely secondary to surgery and pain medication, not underlying etiology.   - PT Eval- no skilled PT needed   - Diet: Regular  - Pain control with PO     - Continue home medications, will hold restarting Plavix until Friday (10/20)- discussed with Brother  - OOB, ambulate as tolerated  - continue chemical VTE ppx  - f/u AM labs  - Dispo: likely discharge today, cleared by psych.   - discussed in length with Brother Mr Berto Mitchell regarding the plan     A Team  [id number]

## 2017-10-19 LAB — SURGICAL PATHOLOGY STUDY: SIGNIFICANT CHANGE UP

## 2017-12-13 NOTE — ASU PREOP CHECKLIST - NS PREOP CHK MONITOR ANESTHESIA CONSENT
attempted to secure bed at [facility], [facility] and [facility] all are at capacity . They are all at capacity. Patient's guardian uanble to drive patient to locations that are further away. ED provider is aware of attempts and has been informed that hospitals will have to be called in the morning after discharges have been made. done

## 2018-05-18 NOTE — DISCHARGE NOTE ADULT - MEDICATION SUMMARY - MEDICATIONS TO CHANGE
I will SWITCH the dose or number of times a day I take the medications listed below when I get home from the hospital:  None
Unknown

## 2018-07-28 PROBLEM — Z78.9 ALCOHOL USE: Status: ACTIVE | Noted: 2017-09-06

## 2018-09-03 ENCOUNTER — INPATIENT (INPATIENT)
Facility: HOSPITAL | Age: 78
LOS: 3 days | Discharge: HOME HEALTH | End: 2018-09-07
Attending: LEGAL MEDICINE | Admitting: LEGAL MEDICINE
Payer: MEDICARE

## 2018-09-03 VITALS
HEART RATE: 91 BPM | RESPIRATION RATE: 20 BRPM | TEMPERATURE: 98 F | OXYGEN SATURATION: 99 % | DIASTOLIC BLOOD PRESSURE: 55 MMHG | SYSTOLIC BLOOD PRESSURE: 131 MMHG

## 2018-09-03 DIAGNOSIS — R55 SYNCOPE AND COLLAPSE: ICD-10-CM

## 2018-09-03 PROBLEM — N40.0 BENIGN PROSTATIC HYPERPLASIA WITHOUT LOWER URINARY TRACT SYMPTOMS: Chronic | Status: ACTIVE | Noted: 2017-10-05

## 2018-09-03 LAB
ALBUMIN SERPL ELPH-MCNC: 4.1 G/DL — SIGNIFICANT CHANGE UP (ref 3.3–5)
ALP SERPL-CCNC: 98 U/L — SIGNIFICANT CHANGE UP (ref 40–120)
ALT FLD-CCNC: 13 U/L — SIGNIFICANT CHANGE UP (ref 4–41)
APPEARANCE UR: CLEAR — SIGNIFICANT CHANGE UP
AST SERPL-CCNC: 13 U/L — SIGNIFICANT CHANGE UP (ref 4–40)
BASOPHILS # BLD AUTO: 0.06 K/UL — SIGNIFICANT CHANGE UP (ref 0–0.2)
BASOPHILS NFR BLD AUTO: 0.6 % — SIGNIFICANT CHANGE UP (ref 0–2)
BILIRUB SERPL-MCNC: 1 MG/DL — SIGNIFICANT CHANGE UP (ref 0.2–1.2)
BILIRUB UR-MCNC: NEGATIVE — SIGNIFICANT CHANGE UP
BLOOD UR QL VISUAL: NEGATIVE — SIGNIFICANT CHANGE UP
BUN SERPL-MCNC: 29 MG/DL — HIGH (ref 7–23)
CALCIUM SERPL-MCNC: 9.2 MG/DL — SIGNIFICANT CHANGE UP (ref 8.4–10.5)
CHLORIDE SERPL-SCNC: 96 MMOL/L — LOW (ref 98–107)
CO2 SERPL-SCNC: 24 MMOL/L — SIGNIFICANT CHANGE UP (ref 22–31)
COLOR SPEC: SIGNIFICANT CHANGE UP
CREAT SERPL-MCNC: 1.02 MG/DL — SIGNIFICANT CHANGE UP (ref 0.5–1.3)
EOSINOPHIL # BLD AUTO: 0.05 K/UL — SIGNIFICANT CHANGE UP (ref 0–0.5)
EOSINOPHIL NFR BLD AUTO: 0.5 % — SIGNIFICANT CHANGE UP (ref 0–6)
GLUCOSE SERPL-MCNC: 336 MG/DL — HIGH (ref 70–99)
GLUCOSE UR-MCNC: >1000 — SIGNIFICANT CHANGE UP
HCT VFR BLD CALC: 39.6 % — SIGNIFICANT CHANGE UP (ref 39–50)
HGB BLD-MCNC: 13.8 G/DL — SIGNIFICANT CHANGE UP (ref 13–17)
IMM GRANULOCYTES # BLD AUTO: 0.07 # — SIGNIFICANT CHANGE UP
IMM GRANULOCYTES NFR BLD AUTO: 0.6 % — SIGNIFICANT CHANGE UP (ref 0–1.5)
KETONES UR-MCNC: SIGNIFICANT CHANGE UP
LEUKOCYTE ESTERASE UR-ACNC: NEGATIVE — SIGNIFICANT CHANGE UP
LYMPHOCYTES # BLD AUTO: 1.13 K/UL — SIGNIFICANT CHANGE UP (ref 1–3.3)
LYMPHOCYTES # BLD AUTO: 10.5 % — LOW (ref 13–44)
MCHC RBC-ENTMCNC: 31.2 PG — SIGNIFICANT CHANGE UP (ref 27–34)
MCHC RBC-ENTMCNC: 34.8 % — SIGNIFICANT CHANGE UP (ref 32–36)
MCV RBC AUTO: 89.4 FL — SIGNIFICANT CHANGE UP (ref 80–100)
MONOCYTES # BLD AUTO: 0.7 K/UL — SIGNIFICANT CHANGE UP (ref 0–0.9)
MONOCYTES NFR BLD AUTO: 6.5 % — SIGNIFICANT CHANGE UP (ref 2–14)
NEUTROPHILS # BLD AUTO: 8.78 K/UL — HIGH (ref 1.8–7.4)
NEUTROPHILS NFR BLD AUTO: 81.3 % — HIGH (ref 43–77)
NITRITE UR-MCNC: NEGATIVE — SIGNIFICANT CHANGE UP
NRBC # FLD: 0 — SIGNIFICANT CHANGE UP
NT-PROBNP SERPL-SCNC: 107.6 PG/ML — SIGNIFICANT CHANGE UP
PH UR: 6 — SIGNIFICANT CHANGE UP (ref 5–8)
PLATELET # BLD AUTO: 223 K/UL — SIGNIFICANT CHANGE UP (ref 150–400)
PMV BLD: 10.9 FL — SIGNIFICANT CHANGE UP (ref 7–13)
POTASSIUM SERPL-MCNC: 4.7 MMOL/L — SIGNIFICANT CHANGE UP (ref 3.5–5.3)
POTASSIUM SERPL-SCNC: 4.7 MMOL/L — SIGNIFICANT CHANGE UP (ref 3.5–5.3)
PROT SERPL-MCNC: 7 G/DL — SIGNIFICANT CHANGE UP (ref 6–8.3)
PROT UR-MCNC: SIGNIFICANT CHANGE UP
RBC # BLD: 4.43 M/UL — SIGNIFICANT CHANGE UP (ref 4.2–5.8)
RBC # FLD: 13.2 % — SIGNIFICANT CHANGE UP (ref 10.3–14.5)
SODIUM SERPL-SCNC: 135 MMOL/L — SIGNIFICANT CHANGE UP (ref 135–145)
SP GR SPEC: 1.03 — SIGNIFICANT CHANGE UP (ref 1–1.04)
TROPONIN T, HIGH SENSITIVITY: 13 NG/L — SIGNIFICANT CHANGE UP (ref ?–14)
UROBILINOGEN FLD QL: NORMAL — SIGNIFICANT CHANGE UP
WBC # BLD: 10.79 K/UL — HIGH (ref 3.8–10.5)
WBC # FLD AUTO: 10.79 K/UL — HIGH (ref 3.8–10.5)

## 2018-09-03 PROCEDURE — 74018 RADEX ABDOMEN 1 VIEW: CPT | Mod: 26

## 2018-09-03 PROCEDURE — 73610 X-RAY EXAM OF ANKLE: CPT | Mod: 26,RT

## 2018-09-03 PROCEDURE — 71046 X-RAY EXAM CHEST 2 VIEWS: CPT | Mod: 26

## 2018-09-03 RX ORDER — SODIUM CHLORIDE 9 MG/ML
1000 INJECTION, SOLUTION INTRAVENOUS ONCE
Qty: 0 | Refills: 0 | Status: COMPLETED | OUTPATIENT
Start: 2018-09-03 | End: 2018-09-03

## 2018-09-03 RX ORDER — METFORMIN HYDROCHLORIDE 850 MG/1
500 TABLET ORAL
Qty: 0 | Refills: 0 | Status: DISCONTINUED | OUTPATIENT
Start: 2018-09-03 | End: 2018-09-04

## 2018-09-03 RX ORDER — CEPHALEXIN 500 MG
1 CAPSULE ORAL
Qty: 14 | Refills: 0 | OUTPATIENT
Start: 2018-09-03 | End: 2018-09-09

## 2018-09-03 RX ORDER — GLIMEPIRIDE 1 MG
4 TABLET ORAL
Qty: 0 | Refills: 0 | Status: DISCONTINUED | OUTPATIENT
Start: 2018-09-03 | End: 2018-09-04

## 2018-09-03 RX ORDER — LISINOPRIL 2.5 MG/1
40 TABLET ORAL DAILY
Qty: 0 | Refills: 0 | Status: DISCONTINUED | OUTPATIENT
Start: 2018-09-03 | End: 2018-09-07

## 2018-09-03 RX ORDER — CEPHALEXIN 500 MG
500 CAPSULE ORAL ONCE
Qty: 0 | Refills: 0 | Status: DISCONTINUED | OUTPATIENT
Start: 2018-09-03 | End: 2018-09-03

## 2018-09-03 RX ORDER — ATORVASTATIN CALCIUM 80 MG/1
80 TABLET, FILM COATED ORAL AT BEDTIME
Qty: 0 | Refills: 0 | Status: DISCONTINUED | OUTPATIENT
Start: 2018-09-03 | End: 2018-09-07

## 2018-09-03 RX ORDER — PIOGLITAZONE HYDROCHLORIDE 15 MG/1
30 TABLET ORAL DAILY
Qty: 0 | Refills: 0 | Status: DISCONTINUED | OUTPATIENT
Start: 2018-09-03 | End: 2018-09-04

## 2018-09-03 RX ORDER — TAMSULOSIN HYDROCHLORIDE 0.4 MG/1
0.4 CAPSULE ORAL AT BEDTIME
Qty: 0 | Refills: 0 | Status: DISCONTINUED | OUTPATIENT
Start: 2018-09-03 | End: 2018-09-07

## 2018-09-03 RX ORDER — LISINOPRIL 2.5 MG/1
40 TABLET ORAL DAILY
Qty: 0 | Refills: 0 | Status: DISCONTINUED | OUTPATIENT
Start: 2018-09-03 | End: 2018-09-03

## 2018-09-03 RX ADMIN — ATORVASTATIN CALCIUM 80 MILLIGRAM(S): 80 TABLET, FILM COATED ORAL at 23:37

## 2018-09-03 RX ADMIN — TAMSULOSIN HYDROCHLORIDE 0.4 MILLIGRAM(S): 0.4 CAPSULE ORAL at 23:37

## 2018-09-03 RX ADMIN — SODIUM CHLORIDE 1000 MILLILITER(S): 9 INJECTION, SOLUTION INTRAVENOUS at 12:32

## 2018-09-03 RX ADMIN — LISINOPRIL 40 MILLIGRAM(S): 2.5 TABLET ORAL at 19:19

## 2018-09-03 RX ADMIN — Medication 4 MILLIGRAM(S): at 19:19

## 2018-09-03 RX ADMIN — METFORMIN HYDROCHLORIDE 500 MILLIGRAM(S): 850 TABLET ORAL at 19:19

## 2018-09-03 RX ADMIN — PIOGLITAZONE HYDROCHLORIDE 30 MILLIGRAM(S): 15 TABLET ORAL at 19:19

## 2018-09-03 RX ADMIN — SODIUM CHLORIDE 1000 MILLILITER(S): 9 INJECTION, SOLUTION INTRAVENOUS at 14:03

## 2018-09-03 NOTE — ED PROVIDER NOTE - OBJECTIVE STATEMENT
79 yo M h/o obesity, DM2 on metformin, glimepiride and pioglitazone, HTN, CAD x2 stents (over 20 yrs ago), hypelipidemia, BPH, who reports near syncope episode approx 7:30 am while walking back from breakfast. Notes that he felt generalized weakness right after breakfast but otherwise no acute complaints prior to this morning. He was at his apartment complex when he felt he was going to black out and his neighbors noticed his distress and aided him gently on the front lawn grass. Pt. denies chest pain, shortness of breath, palpitations, fevers, chills, headaches, changes in vision, neck pain, abdominal pain, back pain, flank pain. Hx of BPH w/ no acute urinary complaints, does urinate frequently at baseline but no change. Denies any injury as a result of this episode. States that he lives alone, ambulates w/ assistance of walker, has been having more difficulty w/ ambulation and attributes much of this to pain over the medial aspect of his right ankle from a fall ~6 months ago. Pt. also states that he has not been compliant with many of his medications but unable to provide specifics.

## 2018-09-03 NOTE — ED ADULT NURSE REASSESSMENT NOTE - NS ED NURSE REASSESS COMMENT FT1
ER pt coming with weakness, decrease appetite x 4 months, pt denies ABD pain/no dysuria.    pt AOX 3 brought by daughters, labs done IV to right AC 20g angio cath.   U/S done,  pending dispo.  Rosy Cid RN

## 2018-09-03 NOTE — ED ADULT TRIAGE NOTE - CHIEF COMPLAINT QUOTE
c/o feeling weak pt states "I was out eating breakfast I felt weak like I was going to fall but was held up by friends"  c/o feeling weak pt states "I was out eating breakfast I felt weak like I was going to fall but was held up by friends"  denies any chest pain c/o feeling weak pt states "I was out eating breakfast I felt weak like I was going to fall but was held up by friends"  denies any chest pain history of CAD HTN diabetes states "I have not been taking my meds"

## 2018-09-03 NOTE — ED PROVIDER NOTE - CONSTITUTIONAL, MLM
normal... Sitting up in bed, awake, alert, oriented to person, place, time/situation and in no apparent distress.

## 2018-09-03 NOTE — ED PROVIDER NOTE - MUSCULOSKELETAL NEGATIVE STATEMENT, MLM
no back pain, no gout, +right medial ankle pain x 6 mo (see HPI), no neck pain, and +weakness (see HPI)

## 2018-09-03 NOTE — ED ADULT NURSE REASSESSMENT NOTE - NS ED NURSE REASSESS COMMENT FT1
ER pt bought in by EMS for weakness while pt was at dinner pt AOX 3 ambulatory at home with cane, pt morbid obese with frequent urination assisted to use urinal approx. 3x/30min.  pt stated have been having mult. falls with some foot pain, denies dizziness/SOB/CP at present time.    Labs done, IV to right AC 20g angio cath place.   Breakfast taken well 80%.    X-ray done.    Rosy Cid RN

## 2018-09-03 NOTE — ED CDU PROVIDER INITIAL DAY NOTE - MEDICAL DECISION MAKING DETAILS
77 yo male c pmhx CAD x 2 stents, DM2, s/p near syncope earlier this AM;   -tele monitor, echo in am

## 2018-09-03 NOTE — ED CDU PROVIDER INITIAL DAY NOTE - ATTENDING CONTRIBUTION TO CARE
Seen and examined, have discussed plan of care with PA.   I agree with note as stated above, having amended the EMR as needed to reflect the findings. Pt. NAD at time of eval., ambulating easily, no weakness/dizziness/llightheadedness/palpitations. C/o inc. bulge near umbilicus (has had in past) and denies N/V/abd pain, +mild tenderness superior to umbilicus. Plan CM and echocardiogram.

## 2018-09-03 NOTE — ED PROVIDER NOTE - MEDICAL DECISION MAKING DETAILS
78M p/w near syncopal episode this morning while walking back from breakfast, a/w gen weakness, felt like he was going to pass out, neighbors caught him and laid him on the grass.  R ankle medial aspect pain x 6 mos after fall, had not been evaluated for it previously.  No other c/o.  No CP/SOB.  Not adherent to medications.  EKG – SR at 96 no marquez no std, no twi pr 178 qtc 449.  Plan - Labx, xrays, CDU for echo and tele CM.  Ankle xray.  Observed ambulating independently to BR.

## 2018-09-03 NOTE — ED CDU PROVIDER INITIAL DAY NOTE - OBJECTIVE STATEMENT
79 yo M h/o obesity, DM2 on metformin, glimepiride and pioglitazone, HTN, CAD x2 stents (over 20 yrs ago), hypelipidemia, BPH, who reports near syncope episode approx 7:30 am while walking back from breakfast. Notes that he felt generalized weakness right after breakfast but otherwise no acute complaints prior to this morning. He was at his apartment complex when he felt he was going to black out and his neighbors noticed his distress and aided him gently on the front lawn grass. Pt. denies chest pain, shortness of breath, palpitations, fevers, chills, headaches, changes in vision, neck pain, abdominal pain, back pain, flank pain. Hx of BPH w/ no acute urinary complaints, does urinate frequently at baseline but no change. Denies any injury as a result of this episode. States that he lives alone, ambulates w/ assistance of walker, has been having more difficulty w/ ambulation and attributes much of this to pain over the medial aspect of his right ankle from a fall ~6 months ago. Pt. also states that he has not been compliant with many of his medications but unable to provide specifics.    CDU PA: Agree with above.  Patient s/p near syncope earlier this AM.  Sent to CDU for tele monitoring, echo in AM.  Pt denies any CP, SOb, abd pain, n/v, cough, fever, chills.  Pt also noted to have elevate blood sugars, will check hba1c, diabetes education.  Pt sees Dr. Askew (cardiologist).

## 2018-09-03 NOTE — ED PROVIDER NOTE - MUSCULOSKELETAL, MLM
Spine appears normal, range of motion is not limited, +medial malleolus point tenderness w/o swelling, erythema, warmth, or obvious signs of trauma, (+) tenderness w/ ROM; otherwise -  no muscle or joint tenderness elsewhere

## 2018-09-03 NOTE — ED CDU PROVIDER INITIAL DAY NOTE - PROGRESS NOTE DETAILS
Over the last hour, upon the patient awakening, he has been alert, oriented x 2/3 but confused and forgetful. Patient began wandering, forgetting he has just used the bathroom, unable to recount events of today. Dr. Crawford notified and patient evaluated together at bedside. Patient does not appear to have acute change in mental status, but rather something similar to sundowning. Upon review of pt chart, pt has h/o delirium and psych recs for seroquel/haldol. Patient is calm and tech at bedside for assist. Will admit as patient lives alone and would be unsafe discharge.

## 2018-09-03 NOTE — ED PROVIDER NOTE - ATTENDING CONTRIBUTION TO CARE
78M p/w near syncopal episode this morning while walking back from breakfast, a/w gen weakness, felt like he was going to pass out, neighbors caught him and laid him on the grass.  R ankle medial aspect pain x 6 mos after fall, had not been evaluated for it previously.  No other c/o.  No CP/SOB.  Not adherent to medications.  EKG – SR at 96 no marquez no std, no twi pr 178 qtc 449.  Plan - Labx, xrays, CDU for echo and tele CM.  Ankle xray.  Observed ambulating independently to BR.  VS:  unremarkable except htn    GEN - NAD; malaise; A+O x3 Obese  HEAD - NC/AT     ENT - PEERL, EOMI, mucous membranes  moist , no discharge      NECK: Neck supple, non-tender without lymphadenopathy, no masses, no JVD  PULM - CTA b/l,  symmetric breath sounds  COR -  normal heart sounds    ABD - , ND, NT, soft,  BACK - no CVA tenderness, nontender spine     EXTREMS - 1+ nonpitting edema, no deformity, warm and well perfused.  R ankle mild diffuse ttp no deformity.  SKIN - no rash or bruising      NEUROLOGIC - alert, CN 2-12 intact, sensation nl, motor no focal deficit.

## 2018-09-04 ENCOUNTER — TRANSCRIPTION ENCOUNTER (OUTPATIENT)
Age: 78
End: 2018-09-04

## 2018-09-04 DIAGNOSIS — R35.0 FREQUENCY OF MICTURITION: ICD-10-CM

## 2018-09-04 DIAGNOSIS — R55 SYNCOPE AND COLLAPSE: ICD-10-CM

## 2018-09-04 DIAGNOSIS — Z79.899 OTHER LONG TERM (CURRENT) DRUG THERAPY: ICD-10-CM

## 2018-09-04 DIAGNOSIS — E78.00 PURE HYPERCHOLESTEROLEMIA, UNSPECIFIED: ICD-10-CM

## 2018-09-04 DIAGNOSIS — N40.0 BENIGN PROSTATIC HYPERPLASIA WITHOUT LOWER URINARY TRACT SYMPTOMS: ICD-10-CM

## 2018-09-04 DIAGNOSIS — I25.10 ATHEROSCLEROTIC HEART DISEASE OF NATIVE CORONARY ARTERY WITHOUT ANGINA PECTORIS: ICD-10-CM

## 2018-09-04 DIAGNOSIS — I10 ESSENTIAL (PRIMARY) HYPERTENSION: ICD-10-CM

## 2018-09-04 DIAGNOSIS — E11.9 TYPE 2 DIABETES MELLITUS WITHOUT COMPLICATIONS: ICD-10-CM

## 2018-09-04 LAB
BUN SERPL-MCNC: 28 MG/DL — HIGH (ref 7–23)
CALCIUM SERPL-MCNC: 9.1 MG/DL — SIGNIFICANT CHANGE UP (ref 8.4–10.5)
CHLORIDE SERPL-SCNC: 96 MMOL/L — LOW (ref 98–107)
CO2 SERPL-SCNC: 23 MMOL/L — SIGNIFICANT CHANGE UP (ref 22–31)
CREAT SERPL-MCNC: 1.19 MG/DL — SIGNIFICANT CHANGE UP (ref 0.5–1.3)
GLUCOSE BLDC GLUCOMTR-MCNC: 156 MG/DL — HIGH (ref 70–99)
GLUCOSE BLDC GLUCOMTR-MCNC: 195 MG/DL — HIGH (ref 70–99)
GLUCOSE BLDC GLUCOMTR-MCNC: 268 MG/DL — HIGH (ref 70–99)
GLUCOSE BLDC GLUCOMTR-MCNC: 272 MG/DL — HIGH (ref 70–99)
GLUCOSE SERPL-MCNC: 231 MG/DL — HIGH (ref 70–99)
HCT VFR BLD CALC: 38.5 % — LOW (ref 39–50)
HGB BLD-MCNC: 13.3 G/DL — SIGNIFICANT CHANGE UP (ref 13–17)
MAGNESIUM SERPL-MCNC: 1.5 MG/DL — LOW (ref 1.6–2.6)
MCHC RBC-ENTMCNC: 30.8 PG — SIGNIFICANT CHANGE UP (ref 27–34)
MCHC RBC-ENTMCNC: 34.5 % — SIGNIFICANT CHANGE UP (ref 32–36)
MCV RBC AUTO: 89.1 FL — SIGNIFICANT CHANGE UP (ref 80–100)
NRBC # FLD: 0 — SIGNIFICANT CHANGE UP
PLATELET # BLD AUTO: 233 K/UL — SIGNIFICANT CHANGE UP (ref 150–400)
PMV BLD: 11 FL — SIGNIFICANT CHANGE UP (ref 7–13)
POTASSIUM SERPL-MCNC: 4.2 MMOL/L — SIGNIFICANT CHANGE UP (ref 3.5–5.3)
POTASSIUM SERPL-SCNC: 4.2 MMOL/L — SIGNIFICANT CHANGE UP (ref 3.5–5.3)
RBC # BLD: 4.32 M/UL — SIGNIFICANT CHANGE UP (ref 4.2–5.8)
RBC # FLD: 13.4 % — SIGNIFICANT CHANGE UP (ref 10.3–14.5)
SODIUM SERPL-SCNC: 134 MMOL/L — LOW (ref 135–145)
TROPONIN T, HIGH SENSITIVITY: 15 NG/L — SIGNIFICANT CHANGE UP (ref ?–14)
WBC # BLD: 12.6 K/UL — HIGH (ref 3.8–10.5)
WBC # FLD AUTO: 12.6 K/UL — HIGH (ref 3.8–10.5)

## 2018-09-04 PROCEDURE — 99223 1ST HOSP IP/OBS HIGH 75: CPT

## 2018-09-04 PROCEDURE — 93306 TTE W/DOPPLER COMPLETE: CPT | Mod: 26

## 2018-09-04 RX ORDER — DEXTROSE 50 % IN WATER 50 %
25 SYRINGE (ML) INTRAVENOUS ONCE
Qty: 0 | Refills: 0 | Status: DISCONTINUED | OUTPATIENT
Start: 2018-09-04 | End: 2018-09-07

## 2018-09-04 RX ORDER — FINASTERIDE 5 MG/1
1 TABLET, FILM COATED ORAL
Qty: 0 | Refills: 0 | COMMUNITY

## 2018-09-04 RX ORDER — HALOPERIDOL DECANOATE 100 MG/ML
1 INJECTION INTRAMUSCULAR EVERY 6 HOURS
Qty: 0 | Refills: 0 | Status: DISCONTINUED | OUTPATIENT
Start: 2018-09-04 | End: 2018-09-07

## 2018-09-04 RX ORDER — METOPROLOL TARTRATE 50 MG
50 TABLET ORAL DAILY
Qty: 0 | Refills: 0 | Status: DISCONTINUED | OUTPATIENT
Start: 2018-09-04 | End: 2018-09-07

## 2018-09-04 RX ORDER — LANOLIN ALCOHOL/MO/W.PET/CERES
6 CREAM (GRAM) TOPICAL AT BEDTIME
Qty: 0 | Refills: 0 | Status: DISCONTINUED | OUTPATIENT
Start: 2018-09-04 | End: 2018-09-07

## 2018-09-04 RX ORDER — INSULIN LISPRO 100/ML
VIAL (ML) SUBCUTANEOUS
Qty: 0 | Refills: 0 | Status: DISCONTINUED | OUTPATIENT
Start: 2018-09-04 | End: 2018-09-07

## 2018-09-04 RX ORDER — HEPARIN SODIUM 5000 [USP'U]/ML
5000 INJECTION INTRAVENOUS; SUBCUTANEOUS EVERY 8 HOURS
Qty: 0 | Refills: 0 | Status: DISCONTINUED | OUTPATIENT
Start: 2018-09-04 | End: 2018-09-07

## 2018-09-04 RX ORDER — GLIMEPIRIDE 1 MG
0 TABLET ORAL
Qty: 0 | Refills: 0 | COMMUNITY

## 2018-09-04 RX ORDER — INFLUENZA VIRUS VACCINE 15; 15; 15; 15 UG/.5ML; UG/.5ML; UG/.5ML; UG/.5ML
0.5 SUSPENSION INTRAMUSCULAR ONCE
Qty: 0 | Refills: 0 | Status: DISCONTINUED | OUTPATIENT
Start: 2018-09-04 | End: 2018-09-07

## 2018-09-04 RX ORDER — ASPIRIN/CALCIUM CARB/MAGNESIUM 324 MG
81 TABLET ORAL DAILY
Qty: 0 | Refills: 0 | Status: DISCONTINUED | OUTPATIENT
Start: 2018-09-04 | End: 2018-09-07

## 2018-09-04 RX ORDER — CLOPIDOGREL BISULFATE 75 MG/1
75 TABLET, FILM COATED ORAL DAILY
Qty: 0 | Refills: 0 | Status: DISCONTINUED | OUTPATIENT
Start: 2018-09-04 | End: 2018-09-07

## 2018-09-04 RX ORDER — GLUCAGON INJECTION, SOLUTION 0.5 MG/.1ML
1 INJECTION, SOLUTION SUBCUTANEOUS ONCE
Qty: 0 | Refills: 0 | Status: DISCONTINUED | OUTPATIENT
Start: 2018-09-04 | End: 2018-09-07

## 2018-09-04 RX ORDER — DEXTROSE 50 % IN WATER 50 %
12.5 SYRINGE (ML) INTRAVENOUS ONCE
Qty: 0 | Refills: 0 | Status: DISCONTINUED | OUTPATIENT
Start: 2018-09-04 | End: 2018-09-07

## 2018-09-04 RX ORDER — PIOGLITAZONE HYDROCHLORIDE 15 MG/1
1 TABLET ORAL
Qty: 0 | Refills: 0 | COMMUNITY

## 2018-09-04 RX ORDER — DEXTROSE 50 % IN WATER 50 %
15 SYRINGE (ML) INTRAVENOUS ONCE
Qty: 0 | Refills: 0 | Status: DISCONTINUED | OUTPATIENT
Start: 2018-09-04 | End: 2018-09-07

## 2018-09-04 RX ORDER — METOPROLOL TARTRATE 50 MG
0 TABLET ORAL
Qty: 0 | Refills: 0 | COMMUNITY

## 2018-09-04 RX ORDER — HALOPERIDOL DECANOATE 100 MG/ML
1 INJECTION INTRAMUSCULAR EVERY 6 HOURS
Qty: 0 | Refills: 0 | Status: DISCONTINUED | OUTPATIENT
Start: 2018-09-04 | End: 2018-09-04

## 2018-09-04 RX ORDER — SODIUM CHLORIDE 9 MG/ML
1000 INJECTION, SOLUTION INTRAVENOUS
Qty: 0 | Refills: 0 | Status: DISCONTINUED | OUTPATIENT
Start: 2018-09-04 | End: 2018-09-07

## 2018-09-04 RX ORDER — LATANOPROST 0.05 MG/ML
1 SOLUTION/ DROPS OPHTHALMIC; TOPICAL AT BEDTIME
Qty: 0 | Refills: 0 | Status: DISCONTINUED | OUTPATIENT
Start: 2018-09-04 | End: 2018-09-07

## 2018-09-04 RX ORDER — METFORMIN HYDROCHLORIDE 850 MG/1
2 TABLET ORAL
Qty: 0 | Refills: 0 | COMMUNITY

## 2018-09-04 RX ORDER — SODIUM CHLORIDE 9 MG/ML
1000 INJECTION INTRAMUSCULAR; INTRAVENOUS; SUBCUTANEOUS
Qty: 0 | Refills: 0 | Status: COMPLETED | OUTPATIENT
Start: 2018-09-04 | End: 2018-09-04

## 2018-09-04 RX ORDER — MAGNESIUM SULFATE 500 MG/ML
2 VIAL (ML) INJECTION ONCE
Qty: 0 | Refills: 0 | Status: COMPLETED | OUTPATIENT
Start: 2018-09-04 | End: 2018-09-04

## 2018-09-04 RX ORDER — CILOSTAZOL 100 MG/1
1 TABLET ORAL
Qty: 0 | Refills: 0 | COMMUNITY

## 2018-09-04 RX ORDER — ASPIRIN/CALCIUM CARB/MAGNESIUM 324 MG
1 TABLET ORAL
Qty: 0 | Refills: 0 | COMMUNITY

## 2018-09-04 RX ADMIN — ATORVASTATIN CALCIUM 80 MILLIGRAM(S): 80 TABLET, FILM COATED ORAL at 23:01

## 2018-09-04 RX ADMIN — Medication 2: at 17:19

## 2018-09-04 RX ADMIN — HEPARIN SODIUM 5000 UNIT(S): 5000 INJECTION INTRAVENOUS; SUBCUTANEOUS at 07:02

## 2018-09-04 RX ADMIN — Medication 6 MILLIGRAM(S): at 23:00

## 2018-09-04 RX ADMIN — TAMSULOSIN HYDROCHLORIDE 0.4 MILLIGRAM(S): 0.4 CAPSULE ORAL at 23:00

## 2018-09-04 RX ADMIN — Medication 6: at 08:28

## 2018-09-04 RX ADMIN — Medication 81 MILLIGRAM(S): at 12:34

## 2018-09-04 RX ADMIN — Medication 6: at 13:00

## 2018-09-04 RX ADMIN — Medication 50 GRAM(S): at 09:52

## 2018-09-04 RX ADMIN — HEPARIN SODIUM 5000 UNIT(S): 5000 INJECTION INTRAVENOUS; SUBCUTANEOUS at 23:00

## 2018-09-04 RX ADMIN — CLOPIDOGREL BISULFATE 75 MILLIGRAM(S): 75 TABLET, FILM COATED ORAL at 12:33

## 2018-09-04 RX ADMIN — Medication 50 MILLIGRAM(S): at 12:33

## 2018-09-04 RX ADMIN — SODIUM CHLORIDE 100 MILLILITER(S): 9 INJECTION INTRAMUSCULAR; INTRAVENOUS; SUBCUTANEOUS at 23:39

## 2018-09-04 RX ADMIN — LISINOPRIL 40 MILLIGRAM(S): 2.5 TABLET ORAL at 23:36

## 2018-09-04 NOTE — DISCHARGE NOTE ADULT - CARE PLAN
Principal Discharge DX:	Near syncope  Secondary Diagnosis:	CAD (Coronary Artery Disease)  Secondary Diagnosis:	DM (Diabetes Mellitus)  Secondary Diagnosis:	HTN (Hypertension)  Secondary Diagnosis:	Hypercholesterolemia Principal Discharge DX:	Near syncope  Goal:	prevent fall  Assessment and plan of treatment:	continue home medication, fall precaution. Follow up with your PMD in 2 weeks  Secondary Diagnosis:	CAD (Coronary Artery Disease)  Goal:	To be asymptomatic, to reduce risks factors such as hypertension, diabetes and hyperlipidemia to lower the risk of blood clots formation; and to prevent complications of coronary artery disease such as worsening chest pain, heart attack and death.  Assessment and plan of treatment:	continue plavix. Follow up with your PMD  Secondary Diagnosis:	DM (Diabetes Mellitus)  Goal:	To maintain a normal blood glucose level and HgA1C level < 5.7 and to prevent diabetic complications such as uncontrolled diabetes, diabetic coma, blindness, renal failure, and amputations.  Assessment and plan of treatment:	Monitor finger sticks pre-meal and bedtime, low salt, fat and carbohydrate diet, minimize glucose intake.  Exercise daily for at least 30 minutes and weight loss.  Follow up with primary care physician and endocrinologist for routine Hemoglobin A1C checks and management.  Follow up with your ophthalmologist for routine yearly vision exams.  Secondary Diagnosis:	HTN (Hypertension)  Goal:	To maintain a normal blood pressure to prevent heart attack, stroke and renal failure.  Assessment and plan of treatment:	Low sodium and fat diet, continue anti-hypertensive medications, and follow up with primary care physician.  Secondary Diagnosis:	Hypercholesterolemia  Goal:	To maintain normal cholesterol levels to prevent stroke, coronary artery disease, peripheral vascular disease and heart attacks.  Assessment and plan of treatment:	Low fat diet, exercise daily and continue current medications. Follow up with primary care physician and cardiologist for management.

## 2018-09-04 NOTE — H&P ADULT - NSHPPHYSICALEXAM_GEN_ALL_CORE
Vital Signs Last 24 Hrs  T(C): 36.4 (04 Sep 2018 05:30), Max: 36.8 (03 Sep 2018 13:06)  T(F): 97.6 (04 Sep 2018 05:30), Max: 98.2 (03 Sep 2018 13:06)  HR: 82 (04 Sep 2018 05:30) (72 - 91)  BP: 117/43 (04 Sep 2018 05:30) (117/43 - 163/97)  BP(mean): --  RR: 18 (04 Sep 2018 05:30) (17 - 20)  SpO2: 99% (04 Sep 2018 05:30) (99% - 100%)

## 2018-09-04 NOTE — H&P ADULT - PROBLEM SELECTOR PLAN 1
admitted to telemetry , troponin 13-> 15, pro  , daily EKG, echo ordered  monitor WBC count / temperature  attempted to reach patients pharmacy - will reattempt in am  will need  vs social work evaluation pending PT recs- currently lives alone with no home services ambulates with cane '  tele with no events admitted to telemetry , troponin 13-> 15, pro  , daily EKG, echo ordered  monitor WBC count / temperature  attempted to reach patients pharmacy - will reattempt in am  will need  vs social work evaluation pending PT recs- currently lives alone with no home services ambulates with cane '  tele with no events  discussed above with attending on call - Unclear etiology, possibly cardiac vs 2/2 poor PO intake (patient states that his diet has been poor recently) vs weakness 2/2 infection (?UTI)  - Currently doing better as per patient  - Will continue to monitor on telemetry, will check TTE, troponin 13-> 15 (inadequate rise, ruling out ACS), pro   - monitor WBC count / temperature  - Will obtain PT eval  - will need social work evaluation pending PT recs-> currently lives alone with no home services ambulates with cane '

## 2018-09-04 NOTE — DISCHARGE NOTE ADULT - HOSPITAL COURSE
79yo Male with history of diabetes currently non compliant with meds, HTN , CAD with 2 stents in the past, Hyperlipidemia, and BPH reports episode of weakness prior to admission. Patient said that he was walking in his neighborhood when he became weak and felt like he was going to fall down. His neighbors noticed this and help him down to the ground. He denied having any LOC, chest pain, palpitations, SOB, dizziness, or head trauma during this episode. Said that he has noticed an increase in the amount of times he has to urinate but denies having any dysuria, hematuria or sensation of incomplete bladder voiding. Said that he lives alone and lately feels like he is not doing well at home, has a brother who lives in [place] and neices/nephews around but he does not like to bother them. Ambulates with a cane but has had multiple falls and believes he is in need of a walker. Denies any other complaints currently.     Hospital course:  trop 15 -> 13  A1c= 8.9  Pro   LFt's normal   WBC 10.7   CXR clear  ankle xray negative   UA negative nitrites  9/4: TTE EF68  Mitral annular calcification, otherwise normal mitral valve. Minimal mitral regurgitation. Normal left ventricular internal dimensions and wall thicknesses. Endocardium not well visualized; grossly normal left ventricular systolic function. The right ventricle is not well visualized; grossly normal right ventricular systolic function.    Pt admitted with near syncope, CE negative. TTE unremarkable. Pt with elevated WBC but negative UA, no fever. PT recommended home PT and rolling walker. 79yo Male with history of diabetes currently non compliant with meds, HTN , CAD with 2 stents in the past, Hyperlipidemia, and BPH reports episode of weakness prior to admission. Patient said that he was walking in his neighborhood when he became weak and felt like he was going to fall down. His neighbors noticed this and help him down to the ground. He denied having any LOC, chest pain, palpitations, SOB, dizziness, or head trauma during this episode. Said that he has noticed an increase in the amount of times he has to urinate but denies having any dysuria, hematuria or sensation of incomplete bladder voiding. Said that he lives alone and lately feels like he is not doing well at home, has a brother who lives in [place] and neices/nephews around but he does not like to bother them. Ambulates with a cane but has had multiple falls and believes he is in need of a walker. Denies any other complaints currently.     Hospital course:  trop 15 -> 13  A1c= 8.9  Pro   LFt's normal   WBC 10.7   CXR clear  ankle xray negative   UA negative nitrites  9/4: TTE EF68  Mitral annular calcification, otherwise normal mitral valve. Minimal mitral regurgitation. Normal left ventricular internal dimensions and wall thicknesses. Endocardium not well visualized; grossly normal left ventricular systolic function. The right ventricle is not well visualized; grossly normal right ventricular systolic function.    Pt admitted with near syncope, CE negative. TTE unremarkable. Cardiology consulted for syncope, symptoms likely from dehydration pt given IVF. Pt with elevated WBC but negative UA, no fever. PT recommended home PT and rolling walker. Psy consulted for agitation and wandering, recommended haldol prn. SW/CM service provided for safe discharge plan 79yo Male with history of diabetes currently non compliant with meds, HTN , CAD with 2 stents in the past, Hyperlipidemia, and BPH reports episode of weakness prior to admission. Patient said that he was walking in his neighborhood when he became weak and felt like he was going to fall down. His neighbors noticed this and help him down to the ground. He denied having any LOC, chest pain, palpitations, SOB, dizziness, or head trauma during this episode. Said that he has noticed an increase in the amount of times he has to urinate but denies having any dysuria, hematuria or sensation of incomplete bladder voiding. Said that he lives alone and lately feels like he is not doing well at home, has a brother who lives in [place] and neices/nephews around but he does not like to bother them. Ambulates with a cane but has had multiple falls and believes he is in need of a walker. Denies any other complaints currently.     Hospital course:  trop 15 -> 13  A1c= 8.9  Pro   LFt's normal   WBC 10.7   CXR clear  ankle xray negative   UA negative nitrites  9/4: TTE EF68  Mitral annular calcification, otherwise normal mitral valve. Minimal mitral regurgitation. Normal left ventricular internal dimensions and wall thicknesses. Endocardium not well visualized; grossly normal left ventricular systolic function. The right ventricle is not well visualized; grossly normal right ventricular systolic function.    Pt admitted with near syncope, CE negative. TTE unremarkable. Cardiology consulted for syncope, symptoms likely from dehydration pt given IVF. Pt with elevated WBC but negative UA, no fever. PT recommended home PT and rolling walker. Psy consulted for agitation and wandering, recommended haldol prn. SW/[facility] service provided for safe discharge plan, brother agreed to Private hire aid for supplemental care and home care RN arranged by JACOB for home assessment. Patient insurance does not have placement coverage.

## 2018-09-04 NOTE — DISCHARGE NOTE ADULT - OTHER SIGNIFICANT FINDINGS
trop 15 -> 13  A1c= 8.9  Pro   LFt's normal   WBC 10.7 on admission  CXR clear  ankle xray negative   UA negative nitrites  9/4: TTE EF68 1. Mitral annular calcification, otherwise normal mitral valve. Minimal mitral regurgitation. 2. Normal left ventricular internal dimensions and wall thicknesses.  3. Endocardium not well visualized; grossly normal left ventricular systolic function.  4. The right ventricle is not well visualized; grossly normal right ventricular systolic function.

## 2018-09-04 NOTE — BEHAVIORAL HEALTH ASSESSMENT NOTE - SUMMARY
Patient is a 78y male with no past psychiatric history,  no suicidal attempts, HTN , hypercholes, DM, resides alone,  admitted for syncope and concerns for safety at home. Consulted for PRN medications for agitation.  Patient as per medical team has been AOx2 on previous assessments.  At this time, aware it is September, does not answer year, knows name and why in the hospital.  Patient denies change in mood.   Does recall seeing psychologist during teen years but denies medication.  No formal psychiatric hx of med trials. At this time, he denies auditory or visual hallucinations, paranoia, passive or active suicidal or homicidal ideation, intent or plan.   Patient denies substance use. He reports poor sleep, good appetite.    PLAN  -start Haldol 1mg PO/IM/IV for agitation  -no standing medications at this time  -avoid benzos as risk to increase confusion  -start Melatonin 6mg qhs for insomnia  - SW team involvement for safe dispo planning Patient is a 78y male with no past psychiatric history,  no suicidal attempts, HTN , hypercholes, DM, resides alone,  admitted for syncope and concerns for safety at home. Consulted for PRN medications for agitation. NONE given this admission.   Patient as per medical team has been AOx2 - patient does report some confusion at times, being forgetful.  At this time, aware it is September, does not answer year, knows name and why in the hospital.  Patient denies change in mood.   Does recall seeing psychologist during teen years but denies medication.  No formal psychiatric hx of med trials. At this time, he denies auditory or visual hallucinations, paranoia, passive or active suicidal or homicidal ideation, intent or plan.   Patient denies substance use. He reports poor sleep, good appetite.    PLAN  -start Haldol 1mg PO/IM/IV for agitation  -minimize the use of benzos, opiods, anticholinergics, and other deuterogenic agents whenever possible. Maintain sleep wake cycle.   -start Melatonin 6mg qhs for insomnia  - SW team involvement for safe dispo planning - involve brother as needed. Patient is a 78y male with no past psychiatric history,  no suicidal attempts, HTN , hypercholesterolemia, DM, resides alone,  admitted for syncope and concerns for safety at home. Consulted for PRN medications for agitation. NONE given this admission.   Patient as per medical team has been AOx2 - patient does report some confusion at times, being forgetful.  At this time, aware it is September, does not answer year, knows name and why in the hospital.  Patient denies change in mood.   Does recall seeing psychologist during teen years but denies medication.  No formal psychiatric hx of med trials. At this time, he denies auditory or visual hallucinations, paranoia, passive or active suicidal or homicidal ideation, intent or plan.   Patient denies substance use. He reports poor sleep, good appetite.    PLAN  -start Haldol 1mg PO/IM/IV for agitation  -minimize the use of benzos, opiods, anticholinergics, and other deuterogenic agents whenever possible. Maintain sleep wake cycle.   -start Melatonin 6mg qhs for insomnia  - SW team involvement for safe dispo planning - involve brother as needed.

## 2018-09-04 NOTE — BEHAVIORAL HEALTH ASSESSMENT NOTE - HPI (INCLUDE ILLNESS QUALITY, SEVERITY, DURATION, TIMING, CONTEXT, MODIFYING FACTORS, ASSOCIATED SIGNS AND SYMPTOMS)
Patient is a 78y male with no past psychiatric history,  no suicidal attempts ,HTN , hypercholes, DM resides alone,  admitted for syncope and concerns for safety at home.     Patient is alert, pleasant on approach.  Patient frequently making jokes, at times using jokes to avoid orientation questions.  Patient as per medical team has been AOx2 on previous assessments.  At this time, aware it is September, does not answer year, knows name and why in the hospital.  Patient denies change in mood.   Does recall seeing psychologist during teen years but denies medication.  No formal psychiatric hx of med trials. At this time, he denies auditory or visual hallucinations, paranoia, passive or active suicidal or homicidal ideation, intent or plan. Patient reports unrealistic goals as his protective factors such as traveling to [place] and buying a new sports care, however does discuss future orientation about moving back into his apartment and seeing family.  Patient denies substance use. He reports poor sleep, good appetite.     Collateral: Berto Mitchell, [phone], reports patient is "lonely" and would benefit being connected to resources to help occupy his time. Denies brother has made Suicidal statements. Denies agitation or aggression at home.  Does report being forgetful at times. He confirms no formal psychiatric hx.  He is currently in [place] and will be returning 9/6/18 stating he would like to help his brother settle in at home. Patient is a 78y male with no past psychiatric history,  no suicidal attempts ,HTN , hypercholes, DM resides alone,  admitted for syncope and concerns for safety at home.     Patient is alert, pleasant on approach.  Patient frequently making jokes, at times using jokes to avoid orientation questions.  Patient as per medical team has been AOx2 on previous assessments. Does report feeling confused at times.  At this time, aware it is September, does not answer year, knows name and why in the hospital.  Patient denies change in mood.   Does recall seeing psychologist during teen years but denies medication or current need for intervention.  No formal psychiatric hx or med trials. At this time, he denies auditory or visual hallucinations, paranoia, passive or active suicidal or homicidal ideation, intent or plan. Patient reports unrealistic goals as his protective factors such as traveling to [place] and buying a new sports car, however does discuss future orientation about moving back into his apartment and seeing family.  Patient denies substance use. He reports poor sleep, good appetite. No PRNs used this admission on chart review.     Collateral: Berto Mitchell, [phone], reports patient is "lonely" and would benefit being connected to resources to help occupy his time. Denies brother has made Suicidal statements. Denies agitation or aggression at home.  Does report being forgetful at times. He confirms no formal psychiatric hx.  He is currently in [place] and will be returning 9/6/18 stating he would like to help his brother settle in at home.

## 2018-09-04 NOTE — H&P ADULT - PROBLEM SELECTOR PLAN 3
continue tele  ASA/ plavix, bblocker ACE , statin - continue medication as per prior d/c, will need to clarify meds in AM

## 2018-09-04 NOTE — H&P ADULT - PROBLEM SELECTOR PLAN 2
continue medication - Patient states that he has had increased urinary frequency/urgency but does not produce a lot of urine when he urinates, denies any dysuria, hematuria, sensation of incomplete voiding, weak stream or dribbling  - UA with >1000 glucose but no signs of infection, might therefore have urinary frequency 2/2 glucosuria   - Will hold off on abx for now, will check UCx

## 2018-09-04 NOTE — BEHAVIORAL HEALTH ASSESSMENT NOTE - CASE SUMMARY
Patient is a 78y male with no past psychiatric history,  no suicidal attempts, HTN , hypercholesterolemia, DM, resides alone,  admitted for syncope and concerns for safety at home. Consulted for PRN medications for agitation. NONE given this admission.   Patient as per medical team has been AOx2 - patient does report some confusion at times, being forgetful.  At this time, aware it is September, does not answer year, knows name and why in the hospital.  Patient denies change in mood.   Does recall seeing psychologist during teen years but denies medication.  No formal psychiatric hx of med trials. At this time, he denies auditory or visual hallucinations, paranoia, passive or active suicidal or homicidal ideation, intent or plan.   Patient denies substance use. He reports poor sleep, good appetite. Pt may benefit from standing Melatonin and Trazodone 25mg as a prn for insomnia.

## 2018-09-04 NOTE — H&P ADULT - NEGATIVE CARDIOVASCULAR SYMPTOMS
no chest pain/no dyspnea on exertion/no palpitations no palpitations/no peripheral edema/no dyspnea on exertion/no chest pain

## 2018-09-04 NOTE — H&P ADULT - MUSCULOSKELETAL
negative No joint pain, swelling or deformity; no limitation of movement details… detailed exam normal strength/no joint swelling/no joint erythema/no calf tenderness

## 2018-09-04 NOTE — BEHAVIORAL HEALTH ASSESSMENT NOTE - RISK ASSESSMENT
Risk factors:  Patient resides alone, history of multiple falls    Protective factors:  No past psychiatric history, no suicidal attempt, future oriented, family support    Patient does not meet criteria for inpatient hospitalization nor is deemed an acute risk to self.

## 2018-09-04 NOTE — H&P ADULT - HISTORY OF PRESENT ILLNESS
77yo Male with history of diabetes currently non compliant with meds, HTN , CAD with 2 stents in the past, Hyperlipidemia BPH reports episode of weakness prior to admission . Patient laying in bed fully undressed. Alert and oriented to person , place and time. He states [place] when he became weak and was unable to walk . A neighbor noticed the patient not being able to ambulate and guided him down to the grass. Denies any dizziness  , syncope , LOC or head trauma. Denies chest pain ,s hortness of breath , palpitations , changes in bladder or bowel habits. + reports urinary frequency.  Denies any back pain , abdominal pain , fever,  headaches, changes in speech  + reports chills today . Ambulates with a cane but has had multiple falls and believes he is in need of a walker 77yo Male with history of diabetes currently non compliant with meds, HTN , CAD with 2 stents in the past, Hyperlipidemia BPH reports episode of weakness prior to admission . Patient laying in bed fully undressed. Alert and oriented to person , place and time. He states Was [place] apartment [place] when he became weak and was unable to walk . A neighbor noticed the patient not being able to ambulate and guided him down to the grass. Denies any dizziness  , syncope , LOC or head trauma. Denies chest pain ,s hortness of breath , palpitations , changes in bladder or bowel habits. + reports urinary frequency.  Denies any back pain , abdominal pain , fever,  headaches, changes in speech  + reports chills today . Ambulates with a cane but has had multiple falls and believes he is in need of a walker . 77yo Male with history of diabetes currently non compliant with meds, HTN , CAD with 2 stents in the past, Hyperlipidemia, and BPH reports episode of weakness prior to admission. Patient said that he was walking in his neighborhood when he became weak and felt like he was going to fall down. His neighbors noticed this and help him down to the ground. He denied having any LOC, chest pain, palpitations, SOB, dizziness, or head trauma during this episode. Said that he has noticed an increase in the amount of times he has to urinate but denies having any dysuria, hematuria or sensation of incomplete bladder voiding. Said that he lives alone and lately feels like he is not doing well at home, has a brother who lives in [place] and neices/nephews around but he does not like to bother them. Ambulates with a cane but has had multiple falls and believes he is in need of a walker. Denies any other complaints currently.     On arrival to the ED, his vitals were T 97.8, P 91, /55, R 20, O2 sat 99% RA. His lab work was most significant for elevated glucose and indeterminant hsTrop (on repeat it trended up less than 6). His imaging did not show any significant abnormalities. His UA showed >1000 glucose but was otherwise negative for an infection. He was initially admitted to the CDU but while there he was noted to be sundowning and confused and was therefore admitted to medicine for further management.

## 2018-09-04 NOTE — H&P ADULT - GENERAL GENITOURINARY SYMPTOMS
urinary frequency but denies dysuria urinary frequency but denies dysuria/increased urinary frequency

## 2018-09-04 NOTE — H&P ADULT - NSHPLABSRESULTS_GEN_ALL_CORE
Comprehensive Metabolic Panel (09.03.18 @ 09:45)    Sodium, Serum: 135 mmol/L    Potassium, Serum: 4.7 mmol/L    Chloride, Serum: 96 mmol/L    Carbon Dioxide, Serum: 24 mmol/L    Blood Urea Nitrogen, Serum: 29 mg/dL    Creatinine, Serum: 1.02 mg/dL    Glucose, Serum: 336 mg/dL    Calcium, Total Serum: 9.2 mg/dL    Protein Total, Serum: 7.0 g/dL    Albumin, Serum: 4.1 g/dL    Bilirubin Total, Serum: 1.0 mg/dL    Alkaline Phosphatase, Serum: 98 u/L    Aspartate Aminotransferase (AST/SGOT): 13 u/L    Alanine Aminotransferase (ALT/SGPT): 13 u/L    eGFR if Non : 71: The units for eGFR are ml/min/1.73m2 (normalized body  surface area). The eGFR is calculated from a serum  creatinine using the CKD-EPI equation. Other variables  required for calculation are race, age and sex. Among  patients with chronic kidney disease (CKD), the eGFR is  useful in determining the stage of disease according to  KDOQI CKD classification. All eGFR results are reported  numerically with the following interpretation.    GFR  (ml/min/1.73 m2)          W/KIDNEY DAMAGE    W/O KIDNEY DMG  ==========================================================  >= 90.......................Stage 1..............Normal  60-89.......................Stage 2...........Decreased GFR  30-59.......................Stage 3..............Stage 3  15-29.......................Stage 4..............Stage 4  < 15........................Stage 5..............Stage 5    Each stage of CKD assumes that the associated GFR level  has been in effect for at least 3 months. Determination of  stages one and two (with eGFR > 59ml/min/m2) requires  estimation of kidney damage for at least 3 months as  defined by structural or functional abnormalities.    Limitations: All estimates of GFR will be less accurate  for patients at extremes of muscle mass (including but  not limited to frail elderly, critically ill, or cancer  patients), those with unusual diets, and those with  conditions associated with reduced secretion or  extrarenal elimination of creatinine. The eGFR equation  is not recommended for use in patients with unstable  creatinine levels. mL/min    eGFR if : 82 mL/min      Complete Blood Count + Automated Diff (09.03.18 @ 09:45)    Nucleated RBC #: 0    WBC Count: 10.79 K/uL    RBC Count: 4.43 M/uL    Hemoglobin: 13.8 g/dL    Hematocrit: 39.6 %    Mean Cell Volume: 89.4 fL    Mean Cell Hemoglobin: 31.2 pg    Mean Cell Hemoglobin Conc: 34.8 %    Red Cell Distrib Width: 13.2 %    Platelet Count - Automated: 223 K/uL    MPV: 10.9 fl    Auto Neutrophil #: 8.78 K/uL    Auto Lymphocyte #: 1.13 K/uL    Auto Monocyte #: 0.70 K/uL    Auto Eosinophil #: 0.05 K/uL    Auto Basophil #: 0.06 K/uL    Auto Immature Granulocyte #: 0.07: (Includes meta, myelo and promyelocytes) #    Auto Neutrophil %: 81.3 %    Auto Lymphocyte %: 10.5 %    Auto Monocyte %: 6.5 %    Auto Eosinophil %: 0.5 %    Auto Basophil %: 0.6 %    Auto Immature Granulocyte %: 0.6: (Includes meta, myelo and promyelocytes) % LABS and ADDITIONAL STUDIES:                        13.8   10.79 )-----------( 223      ( 03 Sep 2018 09:45 )             39.6         135  |  96<L>  |  29<H>  ----------------------------<  336<H>  4.7   |  24  |  1.02    Ca    9.2      03 Sep 2018 09:45    A1C - 8.9    TPro  7.0  /  Alb  4.1  /  TBili  1.0  /  DBili  x   /  AST  13  /  ALT  13  /  AlkPhos  98      LIVER FUNCTIONS - ( 03 Sep 2018 09:45 )  Alb: 4.1 g/dL / Pro: 7.0 g/dL / ALK PHOS: 98 u/L / ALT: 13 u/L / AST: 13 u/L / GGT: x           Urinalysis Basic - ( 03 Sep 2018 09:40 )  Color: LIGHT YELLOW / Appearance: CLEAR / S.026 / pH: 6.0  Gluc: >1000 / Ketone: TRACE  / Bili: NEGATIVE / Urobili: NORMAL   Blood: NEGATIVE / Protein: TRACE / Nitrite: NEGATIVE   Leuk Esterase: NEGATIVE / RBC: x / WBC x   Sq Epi: x / Non Sq Epi: x / Bacteria: x    hsTrop 13-> 15  proBNP - 107.6    EKG - Sinus rhythm with sinus arrhythmia at 96, , QTc 449, No TWI, no ST changes    < from: Xray Chest 2 Views PA/Lat (18 @ 10:53) >  INTERPRETATION:   The lateral x-ray is limited due to technique.  Lungs are clear.  Lungs are clear bilaterally. No pleural effusion or pneumothorax noted. Heart size appears enlarged. Degenerative changes of the spine. Upper abdominal surgical clips are noted.    IMPRESSION: Clear lungs.  < end of copied text >    < from: Xray Ankle Complete 3 Views, Right (18 @ 10:52) >  FINDINGS:  No acute fracture or dislocation.  Preserved joint spaces. Degenerative changes.  Vascular calcifications are noted. Mild soft tissue swelling over the bilateral malleoli.    IMPRESSION:  No acute fracture or dislocation.  < end of copied text >    < from: Xray Kidney Ureter Bladder (18 @ 16:37) >  ******PRELIMINARY REPORT******        INTERPRETATION:  Left hand overlying the left hip limits evaluation. No   acute displaced fractures.  < end of copied text >

## 2018-09-04 NOTE — H&P ADULT - RS GEN PE MLT RESP DETAILS PC
no rales/no wheezes/good air movement/respirations non-labored/no rhonchi/airway patent/clear to auscultation bilaterally/breath sounds equal

## 2018-09-04 NOTE — H&P ADULT - PROBLEM SELECTOR PLAN 5
uncontrolled A1C = 8.9 - will hold oral diabetic medications while admitted place on sliding scale for now - monitor BP closely   - continue home meds

## 2018-09-04 NOTE — DISCHARGE NOTE ADULT - MEDICATION SUMMARY - MEDICATIONS TO TAKE
I will START or STAY ON the medications listed below when I get home from the hospital:    glaucometer  -- Use as directed  -- Indication: For DM (Diabetes Mellitus)    aspirin 81 mg oral delayed release tablet  -- 1 tab(s) by mouth once a day  -- Indication: For CAD (Coronary Artery Disease)    ramipril 10 mg oral capsule  -- 1 cap(s) by mouth once a day  -- Indication: For HTN (Hypertension)    tamsulosin 0.4 mg oral capsule  -- 1 cap(s) by mouth once a day (at bedtime)  -- Indication: For BPH (benign prostatic hyperplasia)    metFORMIN 1000 mg oral tablet  -- 1 tab(s) by mouth 2 times a day  -- Indication: For DM (Diabetes Mellitus)    glimepiride 4 mg oral tablet  -- 1 tab(s) by mouth 2 times a day  -- Indication: For DM (Diabetes Mellitus)    atorvastatin 80 mg oral tablet  -- 1 tab(s) by mouth once a day (at bedtime)  -- Indication: For HLD    clopidogrel 75 mg oral tablet  -- 1 tab(s) by mouth once a day  -- Indication: For CAD (Coronary Artery Disease)    metoprolol succinate 50 mg oral tablet, extended release  -- 1 tab(s) by mouth once a day  -- Indication: For HTN (Hypertension)    latanoprost 0.005% ophthalmic solution  -- 1 drop(s) to each affected eye once a day (in the evening)  -- Indication: For glauicoma

## 2018-09-04 NOTE — DISCHARGE NOTE ADULT - HOME CARE AGENCY
[facility] for RN, PT and Home Health Aid services.  Please call [phone] with questions/concerns.

## 2018-09-04 NOTE — DISCHARGE NOTE ADULT - PLAN OF CARE
prevent fall continue home medication, fall precaution. Follow up with your PMD in 2 weeks To be asymptomatic, to reduce risks factors such as hypertension, diabetes and hyperlipidemia to lower the risk of blood clots formation; and to prevent complications of coronary artery disease such as worsening chest pain, heart attack and death. continue plavix. Follow up with your PMD To maintain a normal blood glucose level and HgA1C level < 5.7 and to prevent diabetic complications such as uncontrolled diabetes, diabetic coma, blindness, renal failure, and amputations. Monitor finger sticks pre-meal and bedtime, low salt, fat and carbohydrate diet, minimize glucose intake.  Exercise daily for at least 30 minutes and weight loss.  Follow up with primary care physician and endocrinologist for routine Hemoglobin A1C checks and management.  Follow up with your ophthalmologist for routine yearly vision exams. To maintain a normal blood pressure to prevent heart attack, stroke and renal failure. Low sodium and fat diet, continue anti-hypertensive medications, and follow up with primary care physician. To maintain normal cholesterol levels to prevent stroke, coronary artery disease, peripheral vascular disease and heart attacks. Low fat diet, exercise daily and continue current medications. Follow up with primary care physician and cardiologist for management.

## 2018-09-04 NOTE — H&P ADULT - NEGATIVE GENERAL GENITOURINARY SYMPTOMS
no hematuria/no flank pain L/no flank pain R/no urinary hesitancy or sensation of incomplete voiding/no dysuria/no urinary hesitancy

## 2018-09-04 NOTE — DISCHARGE NOTE ADULT - PATIENT PORTAL LINK FT
You can access the [facility] Patient Portal, offered by [facility], by registering with the following website: [email or web address]

## 2018-09-04 NOTE — H&P ADULT - FAMILY HISTORY
No pertinent family history in first degree relatives No pertinent family history in first degree relatives, No family history pertinent to this admission

## 2018-09-04 NOTE — H&P ADULT - ATTENDING COMMENTS
Patient seen and examined on 9/4/18, case discussed with Tele NEIL Park, agree with assessment and plan as above.

## 2018-09-04 NOTE — H&P ADULT - PROBLEM SELECTOR PLAN 7
will need to call patients pharmacy in the am to verify medications and dosages. Patient unsure of medication names but confirms taking aspirin / plavix  ordered - continue lipitor 80

## 2018-09-04 NOTE — H&P ADULT - PROBLEM SELECTOR PLAN 6
continue lipitor 80 - uncontrolled A1C = 8.9 - will hold oral diabetic medications while admitted place on sliding scale for now

## 2018-09-04 NOTE — H&P ADULT - ASSESSMENT
77yo M with hx of HTN , DM , CAD with stents admitted with near syncope 77yo M with hx of HTN , DM , CAD with stents admitted with near syncope.

## 2018-09-04 NOTE — BEHAVIORAL HEALTH ASSESSMENT NOTE - NSBHCHARTREVIEWVS_PSY_A_CORE FT
Vital Signs Last 24 Hrs  T(C): 36.5 (04 Sep 2018 12:46), Max: 36.5 (04 Sep 2018 12:46)  T(F): 97.7 (04 Sep 2018 12:46), Max: 97.7 (04 Sep 2018 12:46)  HR: 81 (04 Sep 2018 12:46) (72 - 82)  BP: 105/52 (04 Sep 2018 12:46) (105/52 - 147/69)  BP(mean): --  RR: 18 (04 Sep 2018 12:46) (18 - 18)  SpO2: 99% (04 Sep 2018 12:46) (99% - 100%)

## 2018-09-04 NOTE — H&P ADULT - MENTAL STATUS
AAO x2-3 (oriented to self and place, sometimes to time), able to hold conversations but confused on details, requires frequent redirections as per nursing staff, getting out of bed frequently and undressing frequently

## 2018-09-04 NOTE — BEHAVIORAL HEALTH ASSESSMENT NOTE - NSBHCHARTREVIEWLAB_PSY_A_CORE FT
13.3   12.60 )-----------( 233      ( 04 Sep 2018 07:00 )             38.5   09-04    134<L>  |  96<L>  |  28<H>  ----------------------------<  231<H>  4.2   |  23  |  1.19    Ca    9.1      04 Sep 2018 07:00  Mg     1.5     09-04    TPro  7.0  /  Alb  4.1  /  TBili  1.0  /  DBili  x   /  AST  13  /  ALT  13  /  AlkPhos  98  09-03

## 2018-09-04 NOTE — H&P ADULT - PROBLEM SELECTOR PLAN 8
- will need to call patients pharmacy in the am to verify medications and dosages. Patient unsure of medication names but confirms taking aspirin / plavix  ordered  - Hospital pharmacist emailed to aide in medication reconciliation

## 2018-09-05 DIAGNOSIS — R55 SYNCOPE AND COLLAPSE: ICD-10-CM

## 2018-09-05 LAB
BUN SERPL-MCNC: 21 MG/DL — SIGNIFICANT CHANGE UP (ref 7–23)
CALCIUM SERPL-MCNC: 8.9 MG/DL — SIGNIFICANT CHANGE UP (ref 8.4–10.5)
CHLORIDE SERPL-SCNC: 102 MMOL/L — SIGNIFICANT CHANGE UP (ref 98–107)
CO2 SERPL-SCNC: 22 MMOL/L — SIGNIFICANT CHANGE UP (ref 22–31)
CREAT SERPL-MCNC: 0.91 MG/DL — SIGNIFICANT CHANGE UP (ref 0.5–1.3)
GLUCOSE BLDC GLUCOMTR-MCNC: 137 MG/DL — HIGH (ref 70–99)
GLUCOSE BLDC GLUCOMTR-MCNC: 174 MG/DL — HIGH (ref 70–99)
GLUCOSE BLDC GLUCOMTR-MCNC: 268 MG/DL — HIGH (ref 70–99)
GLUCOSE SERPL-MCNC: 174 MG/DL — HIGH (ref 70–99)
HCT VFR BLD CALC: 40.2 % — SIGNIFICANT CHANGE UP (ref 39–50)
HGB BLD-MCNC: 13.5 G/DL — SIGNIFICANT CHANGE UP (ref 13–17)
MAGNESIUM SERPL-MCNC: 1.7 MG/DL — SIGNIFICANT CHANGE UP (ref 1.6–2.6)
MCHC RBC-ENTMCNC: 29.7 PG — SIGNIFICANT CHANGE UP (ref 27–34)
MCHC RBC-ENTMCNC: 33.6 % — SIGNIFICANT CHANGE UP (ref 32–36)
MCV RBC AUTO: 88.5 FL — SIGNIFICANT CHANGE UP (ref 80–100)
NRBC # FLD: 0 — SIGNIFICANT CHANGE UP
PLATELET # BLD AUTO: 224 K/UL — SIGNIFICANT CHANGE UP (ref 150–400)
PMV BLD: 11.2 FL — SIGNIFICANT CHANGE UP (ref 7–13)
POTASSIUM SERPL-MCNC: 4.3 MMOL/L — SIGNIFICANT CHANGE UP (ref 3.5–5.3)
POTASSIUM SERPL-SCNC: 4.3 MMOL/L — SIGNIFICANT CHANGE UP (ref 3.5–5.3)
RBC # BLD: 4.54 M/UL — SIGNIFICANT CHANGE UP (ref 4.2–5.8)
RBC # FLD: 13.4 % — SIGNIFICANT CHANGE UP (ref 10.3–14.5)
SODIUM SERPL-SCNC: 137 MMOL/L — SIGNIFICANT CHANGE UP (ref 135–145)
WBC # BLD: 11.73 K/UL — HIGH (ref 3.8–10.5)
WBC # FLD AUTO: 11.73 K/UL — HIGH (ref 3.8–10.5)

## 2018-09-05 PROCEDURE — 90792 PSYCH DIAG EVAL W/MED SRVCS: CPT

## 2018-09-05 RX ORDER — INSULIN LISPRO 100/ML
6 VIAL (ML) SUBCUTANEOUS
Qty: 0 | Refills: 0 | Status: DISCONTINUED | OUTPATIENT
Start: 2018-09-05 | End: 2018-09-07

## 2018-09-05 RX ORDER — INSULIN GLARGINE 100 [IU]/ML
7 INJECTION, SOLUTION SUBCUTANEOUS AT BEDTIME
Qty: 0 | Refills: 0 | Status: DISCONTINUED | OUTPATIENT
Start: 2018-09-05 | End: 2018-09-07

## 2018-09-05 RX ADMIN — INSULIN GLARGINE 7 UNIT(S): 100 INJECTION, SOLUTION SUBCUTANEOUS at 21:48

## 2018-09-05 RX ADMIN — ATORVASTATIN CALCIUM 80 MILLIGRAM(S): 80 TABLET, FILM COATED ORAL at 21:29

## 2018-09-05 RX ADMIN — HALOPERIDOL DECANOATE 1 MILLIGRAM(S): 100 INJECTION INTRAMUSCULAR at 21:30

## 2018-09-05 RX ADMIN — HEPARIN SODIUM 5000 UNIT(S): 5000 INJECTION INTRAVENOUS; SUBCUTANEOUS at 21:30

## 2018-09-05 RX ADMIN — LATANOPROST 1 DROP(S): 0.05 SOLUTION/ DROPS OPHTHALMIC; TOPICAL at 21:30

## 2018-09-05 RX ADMIN — LISINOPRIL 40 MILLIGRAM(S): 2.5 TABLET ORAL at 21:31

## 2018-09-05 RX ADMIN — TAMSULOSIN HYDROCHLORIDE 0.4 MILLIGRAM(S): 0.4 CAPSULE ORAL at 21:29

## 2018-09-05 RX ADMIN — CLOPIDOGREL BISULFATE 75 MILLIGRAM(S): 75 TABLET, FILM COATED ORAL at 11:31

## 2018-09-05 RX ADMIN — Medication 81 MILLIGRAM(S): at 11:31

## 2018-09-05 RX ADMIN — Medication 6 MILLIGRAM(S): at 21:29

## 2018-09-05 RX ADMIN — Medication 6: at 12:40

## 2018-09-05 RX ADMIN — Medication 6 UNIT(S): at 18:37

## 2018-09-05 RX ADMIN — Medication 50 MILLIGRAM(S): at 05:38

## 2018-09-05 NOTE — CONSULT NOTE ADULT - ASSESSMENT
Assessment  DMT2: 78y Male with DM T2 with hyperglycemia on insulin coverage, blood sugars running high, no hypoglycemic episode,  eating meals,  non compliant with low carb diet.  CAD: On medications, stable, monitored.  Syncope: Etiology?, monitored, stable.  HTN: Controlled, On med.
EKG - NSR pac   Echo 9/4/18 - Normal LV     A/P     1) Syncope - likely sec to poor PO intake, as per pt it was 94 degrees out side, Echo looks ok, some PVC on tele, no major arrythmias, orthostatics mildly + would give IV fluids    2) CAD s/p PCI - cont asa plavix and lopressor    3) DVT prophylasix - on sc heparin

## 2018-09-05 NOTE — CONSULT NOTE ADULT - SUBJECTIVE AND OBJECTIVE BOX
HPI:  77yo Male with history of diabetes currently non compliant with meds, HTN , CAD with 2 stents in the past, Hyperlipidemia, and BPH reports episode of weakness prior to admission. Patient said that he was walking in his neighborhood when he became weak and felt like he was going to fall down. His neighbors noticed this and help him down to the ground. He denied having any LOC, chest pain, palpitations, SOB, dizziness, or head trauma during this episode. Said that he has noticed an increase in the amount of times he has to urinate but denies having any dysuria, hematuria or sensation of incomplete bladder voiding. Said that he lives alone and lately feels like he is not doing well at home, has a brother who lives in [place] and neices/nephews around but he does not like to bother them. Ambulates with a cane but has had multiple falls and believes he is in need of a walker. Denies any other complaints currently.     On arrival to the ED, his vitals were T 97.8, P 91, /55, R 20, O2 sat 99% RA. His lab work was most significant for elevated glucose and indeterminant hsTrop (on repeat it trended up less than 6). His imaging did not show any significant abnormalities. His UA showed >1000 glucose but was otherwise negative for an infection. He was initially admitted to the CDU but while there he was noted to be sundowning and confused and was therefore admitted to medicine for further management. (04 Sep 2018 02:46)  Patient has history of diabetes, on oral meds at home,  does not check sugars regularly, no recent hypoglycemic episodes, no polyuria polydipsia. Patient follows up with PCP for diabetes management.    PAST MEDICAL & SURGICAL HISTORY:  BPH (benign prostatic hyperplasia)  CAD (Coronary Artery Disease): s/p cardiac stent ( &gt; 20 years ago)  HTN (Hypertension)  DM (Diabetes Mellitus)  Hypercholesterolemia  Coronary Stent: x 2 ( 20 years )      FAMILY HISTORY:  No pertinent family history in first degree relatives: No family history pertinent to this admission      Social History:    Outpatient Medications:    MEDICATIONS  (STANDING):  aspirin enteric coated 81 milliGRAM(s) Oral daily  atorvastatin 80 milliGRAM(s) Oral at bedtime  clopidogrel Tablet 75 milliGRAM(s) Oral daily  dextrose 5%. 1000 milliLiter(s) (50 mL/Hr) IV Continuous <Continuous>  dextrose 50% Injectable 12.5 Gram(s) IV Push once  dextrose 50% Injectable 25 Gram(s) IV Push once  dextrose 50% Injectable 25 Gram(s) IV Push once  heparin  Injectable 5000 Unit(s) SubCutaneous every 8 hours  influenza   Vaccine 0.5 milliLiter(s) IntraMuscular once  insulin glargine Injectable (LANTUS) 7 Unit(s) SubCutaneous at bedtime  insulin lispro (HumaLOG) corrective regimen sliding scale   SubCutaneous three times a day before meals  insulin lispro Injectable (HumaLOG) 6 Unit(s) SubCutaneous three times a day before meals  latanoprost 0.005% Ophthalmic Solution 1 Drop(s) Both EYES at bedtime  lisinopril 40 milliGRAM(s) Oral daily  melatonin 6 milliGRAM(s) Oral at bedtime  metoprolol succinate ER 50 milliGRAM(s) Oral daily  tamsulosin 0.4 milliGRAM(s) Oral at bedtime    MEDICATIONS  (PRN):  dextrose 40% Gel 15 Gram(s) Oral once PRN Blood Glucose LESS THAN 70 milliGRAM(s)/deciliter  glucagon  Injectable 1 milliGRAM(s) IntraMuscular once PRN Glucose LESS THAN 70 milligrams/deciliter  haloperidol     Tablet 1 milliGRAM(s) Oral every 6 hours PRN agitation  haloperidol    Injectable 1 milliGRAM(s) IntraMuscular every 6 hours PRN agitation  haloperidol    Injectable 1 milliGRAM(s) IV Push every 6 hours PRN agitation      Allergies    No Known Allergies    Intolerances      Review of Systems:  Constitutional: No fever, no chills  Eyes: No blurry vision  Neuro: No tremors  HEENT: No pain, no neck swelling  Cardiovascular: No chest pain, no palpitations  Respiratory: Has SOB, no cough  GI: No nausea, vomiting, abdominal pain  : No dysuria  Skin: no rash  MSK: Has leg swelling.  Psych: no depression  Endocrine: no polyuria, polydipsia    ALL OTHER SYSTEMS REVIEWED AND NEGATIVE    UNABLE TO OBTAIN    PHYSICAL EXAM:  VITALS: T(C): 36.9 (09-05-18 @ 10:49)  T(F): 98.4 (09-05-18 @ 10:49), Max: 98.5 (09-04-18 @ 22:12)  HR: 66 (09-05-18 @ 10:49) (66 - 97)  BP: 124/50 (09-05-18 @ 10:49) (116/43 - 153/71)  RR:  (16 - 18)  SpO2:  (99% - 100%)  Wt(kg): --  GENERAL: NAD, well-groomed, well-developed  EYES: No proptosis, no lid lag  HEENT:  Atraumatic, Normocephalic  THYROID: Normal size, no palpable nodules  RESPIRATORY: Clear to auscultation bilaterally; No rales, rhonchi, wheezing  CARDIOVASCULAR: Si S2, No murmurs;  GI: Soft, non distended, normal bowel sounds  SKIN: Dry, intact, No rashes or lesions  MUSCULOSKELETAL: Has BL lower extremity edema.  NEURO:  no tremor, sensation decreased in feet BL,    POCT Blood Glucose.: 268 mg/dL (09-05-18 @ 12:31)  POCT Blood Glucose.: 156 mg/dL (09-04-18 @ 22:27)  POCT Blood Glucose.: 195 mg/dL (09-04-18 @ 16:52)  POCT Blood Glucose.: 272 mg/dL (09-04-18 @ 12:33)  POCT Blood Glucose.: 268 mg/dL (09-04-18 @ 08:11)  POCT Blood Glucose.: 240 mg/dL (09-03-18 @ 20:30)  POCT Blood Glucose.: 236 mg/dL (09-03-18 @ 18:41)  POCT Blood Glucose.: 292 mg/dL (09-03-18 @ 13:32)                            13.5   11.73 )-----------( 224      ( 05 Sep 2018 05:48 )             40.2       09-05    137  |  102  |  21  ----------------------------<  174<H>  4.3   |  22  |  0.91    EGFR if : 95  EGFR if non : 82    Ca    8.9      09-05  Mg     1.7     09-05    TPro  7.0  /  Alb  4.1  /  TBili  1.0  /  DBili  x   /  AST  13  /  ALT  13  /  AlkPhos  98  09-03      Thyroid Function Tests:      Hemoglobin A1C, Whole Blood: 8.9 % <H> [4.0 - 5.6] (09-03-18 @ 09:45)          Radiology:
Bennie Hwang MD  Interventional Cardiology / Advance Heart Failure and Cardiac Transplant Specialist  [place] Office : [address] [place] [address] [place] [address]  Tel:   [place] Office : [address] [facility] [place] [address]  Tel: [phone]  Cell : [phone]    HISTORY OF PRESENT ILLNESS:  79yo Male with history of diabetes currently non compliant with meds, HTN , CAD with 2 stents in the past, Hyperlipidemia, and BPH reports episode of weakness prior to admission. Patient said that he was walking in his neighborhood when he became weak and felt like he was going to fall down. His neighbors noticed this and help him down to the ground. He denied having any LOC, chest pain, palpitations, SOB, dizziness, or head trauma during this episode. Said that he has noticed an increase in the amount of times he has to urinate but denies having any dysuria, hematuria or sensation of incomplete bladder voiding. Said that he lives alone and lately feels like he is not doing well at home, has a brother who lives in [place] and neices/nephews around but he does not like to bother them. Ambulates with a cane but has had multiple falls and believes he is in need of a walker. Denies any other complaints currently.   On arrival to the ED, his vitals were T 97.8, P 91, /55, R 20, O2 sat 99% RA. His lab work was most significant for elevated glucose and indeterminant hsTrop (on repeat it trended up less than 6). His imaging did not show any significant abnormalities. His UA showed >1000 glucose but was otherwise negative for an infection. He was initially admitted to the CDU but while there he was noted to be sundowning and confused and was therefore admitted to medicine for further management.   Pt denies any chest pains or SOB prior to passing out, did get lightheaded and dizzy. as per pt passed out , no injury and he was out for 3 - 5 mins, no seizure like activity    PAST MEDICAL & SURGICAL HISTORY:  BPH (benign prostatic hyperplasia)  CAD (Coronary Artery Disease): s/p cardiac stent ( &gt; 20 years ago)  HTN (Hypertension)  DM (Diabetes Mellitus)  Hypercholesterolemia  Coronary Stent: x 2 ( 20 years )    	    MEDICATIONS:  aspirin enteric coated 81 milliGRAM(s) Oral daily  clopidogrel Tablet 75 milliGRAM(s) Oral daily  heparin  Injectable 5000 Unit(s) SubCutaneous every 8 hours  lisinopril 40 milliGRAM(s) Oral daily  metoprolol succinate ER 50 milliGRAM(s) Oral daily  tamsulosin 0.4 milliGRAM(s) Oral at bedtime            atorvastatin 80 milliGRAM(s) Oral at bedtime  dextrose 40% Gel 15 Gram(s) Oral once PRN  dextrose 50% Injectable 12.5 Gram(s) IV Push once  dextrose 50% Injectable 25 Gram(s) IV Push once  dextrose 50% Injectable 25 Gram(s) IV Push once  glucagon  Injectable 1 milliGRAM(s) IntraMuscular once PRN  insulin lispro (HumaLOG) corrective regimen sliding scale   SubCutaneous three times a day before meals    dextrose 5%. 1000 milliLiter(s) IV Continuous <Continuous>  influenza   Vaccine 0.5 milliLiter(s) IntraMuscular once  latanoprost 0.005% Ophthalmic Solution 1 Drop(s) Both EYES at bedtime      FAMILY HISTORY:  No pertinent family history in first degree relatives: No family history pertinent to this admission        Allergies    No Known Allergies    Intolerances    	      PHYSICAL EXAM:  T(C): 36.5 (09-04-18 @ 12:46), Max: 36.7 (09-03-18 @ 18:48)  HR: 81 (09-04-18 @ 12:46) (72 - 82)  BP: 105/52 (09-04-18 @ 12:46) (105/52 - 163/97)  RR: 18 (09-04-18 @ 12:46) (18 - 18)  SpO2: 99% (09-04-18 @ 12:46) (99% - 100%)  Wt(kg): --  I&O's Summary    03 Sep 2018 07:01  -  04 Sep 2018 07:00  --------------------------------------------------------  IN: 250 mL / OUT: 100 mL / NET: 150 mL    04 Sep 2018 07:01  -  04 Sep 2018 13:29  --------------------------------------------------------  IN: 0 mL / OUT: 275 mL / NET: -275 mL        Appearance: Normal	  HEENT:   Normal oral mucosa, PERRL, EOMI	  Cardiovascular: Normal S1 S2, No JVD, No murmurs, No edema  Respiratory: Lungs clear to auscultation	  Gastrointestinal:  Soft, Non-tender, + BS	  Extremities: 1+ edema b/l    LABS:	 	    CARDIAC MARKERS:                                  13.3   12.60 )-----------( 233      ( 04 Sep 2018 07:00 )             38.5     09-04    134<L>  |  96<L>  |  28<H>  ----------------------------<  231<H>  4.2   |  23  |  1.19    Ca    9.1      04 Sep 2018 07:00  Mg     1.5     09-04    TPro  7.0  /  Alb  4.1  /  TBili  1.0  /  DBili  x   /  AST  13  /  ALT  13  /  AlkPhos  98  09-03    proBNP:   Lipid Profile:   HgA1c:   TSH:     ASSESSMENT/PLAN:

## 2018-09-06 LAB
BUN SERPL-MCNC: 19 MG/DL — SIGNIFICANT CHANGE UP (ref 7–23)
CALCIUM SERPL-MCNC: 8.8 MG/DL — SIGNIFICANT CHANGE UP (ref 8.4–10.5)
CHLORIDE SERPL-SCNC: 100 MMOL/L — SIGNIFICANT CHANGE UP (ref 98–107)
CO2 SERPL-SCNC: 24 MMOL/L — SIGNIFICANT CHANGE UP (ref 22–31)
CREAT SERPL-MCNC: 0.85 MG/DL — SIGNIFICANT CHANGE UP (ref 0.5–1.3)
GLUCOSE BLDC GLUCOMTR-MCNC: 154 MG/DL — HIGH (ref 70–99)
GLUCOSE BLDC GLUCOMTR-MCNC: 168 MG/DL — HIGH (ref 70–99)
GLUCOSE BLDC GLUCOMTR-MCNC: 214 MG/DL — HIGH (ref 70–99)
GLUCOSE BLDC GLUCOMTR-MCNC: 90 MG/DL — SIGNIFICANT CHANGE UP (ref 70–99)
GLUCOSE SERPL-MCNC: 185 MG/DL — HIGH (ref 70–99)
HCT VFR BLD CALC: 40.1 % — SIGNIFICANT CHANGE UP (ref 39–50)
HGB BLD-MCNC: 13.4 G/DL — SIGNIFICANT CHANGE UP (ref 13–17)
MAGNESIUM SERPL-MCNC: 1.6 MG/DL — SIGNIFICANT CHANGE UP (ref 1.6–2.6)
MCHC RBC-ENTMCNC: 29.5 PG — SIGNIFICANT CHANGE UP (ref 27–34)
MCHC RBC-ENTMCNC: 33.4 % — SIGNIFICANT CHANGE UP (ref 32–36)
MCV RBC AUTO: 88.1 FL — SIGNIFICANT CHANGE UP (ref 80–100)
NRBC # FLD: 0 — SIGNIFICANT CHANGE UP
PLATELET # BLD AUTO: 224 K/UL — SIGNIFICANT CHANGE UP (ref 150–400)
PMV BLD: 11 FL — SIGNIFICANT CHANGE UP (ref 7–13)
POTASSIUM SERPL-MCNC: 4 MMOL/L — SIGNIFICANT CHANGE UP (ref 3.5–5.3)
POTASSIUM SERPL-SCNC: 4 MMOL/L — SIGNIFICANT CHANGE UP (ref 3.5–5.3)
RBC # BLD: 4.55 M/UL — SIGNIFICANT CHANGE UP (ref 4.2–5.8)
RBC # FLD: 13.3 % — SIGNIFICANT CHANGE UP (ref 10.3–14.5)
SODIUM SERPL-SCNC: 137 MMOL/L — SIGNIFICANT CHANGE UP (ref 135–145)
WBC # BLD: 10.28 K/UL — SIGNIFICANT CHANGE UP (ref 3.8–10.5)
WBC # FLD AUTO: 10.28 K/UL — SIGNIFICANT CHANGE UP (ref 3.8–10.5)

## 2018-09-06 RX ORDER — ATORVASTATIN CALCIUM 80 MG/1
1 TABLET, FILM COATED ORAL
Qty: 0 | Refills: 0 | COMMUNITY

## 2018-09-06 RX ORDER — CLOPIDOGREL BISULFATE 75 MG/1
1 TABLET, FILM COATED ORAL
Qty: 0 | Refills: 0 | COMMUNITY

## 2018-09-06 RX ORDER — TAMSULOSIN HYDROCHLORIDE 0.4 MG/1
1 CAPSULE ORAL
Qty: 0 | Refills: 0 | DISCHARGE
Start: 2018-09-06

## 2018-09-06 RX ORDER — CLOPIDOGREL BISULFATE 75 MG/1
1 TABLET, FILM COATED ORAL
Qty: 0 | Refills: 0 | DISCHARGE
Start: 2018-09-06

## 2018-09-06 RX ORDER — ATORVASTATIN CALCIUM 80 MG/1
1 TABLET, FILM COATED ORAL
Qty: 0 | Refills: 0 | DISCHARGE
Start: 2018-09-06

## 2018-09-06 RX ORDER — METOPROLOL TARTRATE 50 MG
1 TABLET ORAL
Qty: 0 | Refills: 0 | COMMUNITY

## 2018-09-06 RX ORDER — METOPROLOL TARTRATE 50 MG
1 TABLET ORAL
Qty: 0 | Refills: 0 | DISCHARGE
Start: 2018-09-06

## 2018-09-06 RX ORDER — ASPIRIN/CALCIUM CARB/MAGNESIUM 324 MG
1 TABLET ORAL
Qty: 0 | Refills: 0 | DISCHARGE
Start: 2018-09-06

## 2018-09-06 RX ORDER — TAMSULOSIN HYDROCHLORIDE 0.4 MG/1
1 CAPSULE ORAL
Qty: 0 | Refills: 0 | COMMUNITY

## 2018-09-06 RX ADMIN — HEPARIN SODIUM 5000 UNIT(S): 5000 INJECTION INTRAVENOUS; SUBCUTANEOUS at 13:31

## 2018-09-06 RX ADMIN — Medication 6 UNIT(S): at 08:44

## 2018-09-06 RX ADMIN — CLOPIDOGREL BISULFATE 75 MILLIGRAM(S): 75 TABLET, FILM COATED ORAL at 13:30

## 2018-09-06 RX ADMIN — Medication 6 UNIT(S): at 18:14

## 2018-09-06 RX ADMIN — Medication 2: at 08:43

## 2018-09-06 RX ADMIN — Medication 6 MILLIGRAM(S): at 21:55

## 2018-09-06 RX ADMIN — Medication 200 MILLIGRAM(S): at 21:54

## 2018-09-06 RX ADMIN — LISINOPRIL 40 MILLIGRAM(S): 2.5 TABLET ORAL at 21:55

## 2018-09-06 RX ADMIN — Medication 81 MILLIGRAM(S): at 13:30

## 2018-09-06 RX ADMIN — HEPARIN SODIUM 5000 UNIT(S): 5000 INJECTION INTRAVENOUS; SUBCUTANEOUS at 06:32

## 2018-09-06 RX ADMIN — ATORVASTATIN CALCIUM 80 MILLIGRAM(S): 80 TABLET, FILM COATED ORAL at 21:55

## 2018-09-06 RX ADMIN — TAMSULOSIN HYDROCHLORIDE 0.4 MILLIGRAM(S): 0.4 CAPSULE ORAL at 21:55

## 2018-09-06 RX ADMIN — Medication 50 MILLIGRAM(S): at 06:32

## 2018-09-06 RX ADMIN — INSULIN GLARGINE 7 UNIT(S): 100 INJECTION, SOLUTION SUBCUTANEOUS at 21:54

## 2018-09-06 RX ADMIN — Medication 6 UNIT(S): at 13:31

## 2018-09-06 RX ADMIN — HEPARIN SODIUM 5000 UNIT(S): 5000 INJECTION INTRAVENOUS; SUBCUTANEOUS at 21:55

## 2018-09-06 RX ADMIN — LATANOPROST 1 DROP(S): 0.05 SOLUTION/ DROPS OPHTHALMIC; TOPICAL at 21:54

## 2018-09-06 RX ADMIN — Medication 4: at 13:31

## 2018-09-06 NOTE — PROGRESS NOTE ADULT - PROBLEM SELECTOR PLAN 6
- uncontrolled A1C = 8.9 - will hold oral diabetic medications while admitted place on sliding scale for now
- uncontrolled A1C = 8.9 - started on lantus plus coverage

## 2018-09-06 NOTE — PROGRESS NOTE ADULT - PROBLEM SELECTOR PLAN 8
- will need to call patients pharmacy in the am to verify medications and dosages. Patient unsure of medication names but confirms taking aspirin / plavix  ordered  - Hospital pharmacist emailed to aide in medication reconciliation
- will need to call patients pharmacy in the am to verify medications and dosages. Patient unsure of medication names but confirms taking aspirin / plavix  ordered  - Hospital pharmacist emailed to aide in medication reconciliation

## 2018-09-07 VITALS
SYSTOLIC BLOOD PRESSURE: 119 MMHG | TEMPERATURE: 98 F | RESPIRATION RATE: 16 BRPM | OXYGEN SATURATION: 98 % | DIASTOLIC BLOOD PRESSURE: 65 MMHG | HEART RATE: 78 BPM

## 2018-09-07 LAB
BUN SERPL-MCNC: 20 MG/DL — SIGNIFICANT CHANGE UP (ref 7–23)
CALCIUM SERPL-MCNC: 9 MG/DL — SIGNIFICANT CHANGE UP (ref 8.4–10.5)
CHLORIDE SERPL-SCNC: 100 MMOL/L — SIGNIFICANT CHANGE UP (ref 98–107)
CO2 SERPL-SCNC: 21 MMOL/L — LOW (ref 22–31)
CREAT SERPL-MCNC: 0.98 MG/DL — SIGNIFICANT CHANGE UP (ref 0.5–1.3)
GLUCOSE BLDC GLUCOMTR-MCNC: 167 MG/DL — HIGH (ref 70–99)
GLUCOSE BLDC GLUCOMTR-MCNC: 224 MG/DL — HIGH (ref 70–99)
GLUCOSE SERPL-MCNC: 209 MG/DL — HIGH (ref 70–99)
HCT VFR BLD CALC: 40 % — SIGNIFICANT CHANGE UP (ref 39–50)
HGB BLD-MCNC: 13.5 G/DL — SIGNIFICANT CHANGE UP (ref 13–17)
MCHC RBC-ENTMCNC: 30.1 PG — SIGNIFICANT CHANGE UP (ref 27–34)
MCHC RBC-ENTMCNC: 33.8 % — SIGNIFICANT CHANGE UP (ref 32–36)
MCV RBC AUTO: 89.1 FL — SIGNIFICANT CHANGE UP (ref 80–100)
NRBC # FLD: 0 — SIGNIFICANT CHANGE UP
PLATELET # BLD AUTO: 226 K/UL — SIGNIFICANT CHANGE UP (ref 150–400)
PMV BLD: 10.8 FL — SIGNIFICANT CHANGE UP (ref 7–13)
POTASSIUM SERPL-MCNC: 4.1 MMOL/L — SIGNIFICANT CHANGE UP (ref 3.5–5.3)
POTASSIUM SERPL-SCNC: 4.1 MMOL/L — SIGNIFICANT CHANGE UP (ref 3.5–5.3)
RBC # BLD: 4.49 M/UL — SIGNIFICANT CHANGE UP (ref 4.2–5.8)
RBC # FLD: 13.3 % — SIGNIFICANT CHANGE UP (ref 10.3–14.5)
SODIUM SERPL-SCNC: 135 MMOL/L — SIGNIFICANT CHANGE UP (ref 135–145)
WBC # BLD: 13.63 K/UL — HIGH (ref 3.8–10.5)
WBC # FLD AUTO: 13.63 K/UL — HIGH (ref 3.8–10.5)

## 2018-09-07 PROCEDURE — 99232 SBSQ HOSP IP/OBS MODERATE 35: CPT

## 2018-09-07 RX ORDER — TRAZODONE HCL 50 MG
50 TABLET ORAL
Qty: 0 | Refills: 0 | Status: DISCONTINUED | OUTPATIENT
Start: 2018-09-07 | End: 2018-09-07

## 2018-09-07 RX ADMIN — Medication 81 MILLIGRAM(S): at 11:11

## 2018-09-07 RX ADMIN — Medication 4: at 12:45

## 2018-09-07 RX ADMIN — HALOPERIDOL DECANOATE 1 MILLIGRAM(S): 100 INJECTION INTRAMUSCULAR at 03:19

## 2018-09-07 RX ADMIN — Medication 50 MILLIGRAM(S): at 05:12

## 2018-09-07 RX ADMIN — HEPARIN SODIUM 5000 UNIT(S): 5000 INJECTION INTRAVENOUS; SUBCUTANEOUS at 14:20

## 2018-09-07 RX ADMIN — Medication 2: at 08:45

## 2018-09-07 RX ADMIN — HEPARIN SODIUM 5000 UNIT(S): 5000 INJECTION INTRAVENOUS; SUBCUTANEOUS at 05:12

## 2018-09-07 RX ADMIN — Medication 6 UNIT(S): at 08:45

## 2018-09-07 RX ADMIN — Medication 6 UNIT(S): at 12:44

## 2018-09-07 RX ADMIN — CLOPIDOGREL BISULFATE 75 MILLIGRAM(S): 75 TABLET, FILM COATED ORAL at 11:11

## 2018-09-07 NOTE — PROGRESS NOTE ADULT - PROBLEM SELECTOR PLAN 4
On meds primary team following up
- continue tele  - ASA/ plavix, bblocker ACE , statin
- continue tele  - ASA/ plavix, bblocker ACE , statin
On meds primary team following up

## 2018-09-07 NOTE — PROGRESS NOTE ADULT - PROBLEM SELECTOR PROBLEM 3
BPH (benign prostatic hyperplasia)
BPH (benign prostatic hyperplasia)
CAD (Coronary Artery Disease)
CAD (Coronary Artery Disease)

## 2018-09-07 NOTE — PROGRESS NOTE ADULT - PROBLEM SELECTOR PLAN 1
- Unclear etiology, possibly cardiac vs 2/2 poor PO intake (patient states that his diet has been poor recently) vs weakness 2/2 infection (?UTI)  - Currently doing better as per patient  - Will continue to monitor on telemetry, will check TTE, troponin 13-> 15 (inadequate rise, ruling out ACS), pro   - monitor WBC count / temperature  - Will obtain PT eval  - will need social work evaluation pending PT recs-> currently lives alone with no home services ambulates with cane '
Will continue current insulin regimen for now. Will continue monitoring FS, log, will Follow up.  Patient counseled for compliance with consistent low carb diet.
Will continue current insulin regimen for now. Will continue monitoring FS, log, will Follow up.  Patient counseled for compliance with consistent low carb diet.
- Unclear etiology, possibly cardiac vs 2/2 poor PO intake (patient states that his diet has been poor recently) vs weakness 2/2 infection (?UTI)  - Currently doing better as per patient  - Will continue to monitor on telemetry, will check TTE, troponin 13-> 15 (inadequate rise, ruling out ACS), pro   - monitor WBC count / temperature  - Will obtain PT eval  - will need social work evaluation pending PT recs-> currently lives alone with no home services ambulates with cane '

## 2018-09-07 NOTE — PROGRESS NOTE BEHAVIORAL HEALTH - SUMMARY
Patient is a 78y male with no past psychiatric history,  no suicidal attempts, HTN , hypercholesterolemia, DM, resides alone,  admitted for syncope and concerns for safety at home. Consulted for PRN medications for agitation. NONE given this admission.   Patient as per medical team has been AOx2 - patient does report some confusion at times, being forgetful.  At this time, aware it is September, does not answer year, knows name and why in the hospital.  Patient denies change in mood.   Does recall seeing psychologist during teen years but denies medication.  No formal psychiatric hx of med trials. At this time, he denies auditory or visual hallucinations, paranoia, passive or active suicidal or homicidal ideation, intent or plan.   Patient denies substance use. He reports poor sleep, good appetite.    PLAN  Consider Trazodone 50mg po q6pm as standing or as needed basis.   -start Haldol 1mg PO/IM/IV for agitation  -minimize the use of benzos, opiods, anticholinergics, and other deuterogenic agents whenever possible. Maintain sleep wake cycle.   -start Melatonin 6mg q6pm for insomnia  - SW team involvement for safe dispo planning - involve brother as needed.

## 2018-09-07 NOTE — PROGRESS NOTE ADULT - ASSESSMENT
79yo M with hx of HTN , DM , CAD with stents admitted with near syncope.
Assessment  DMT2: 78y Male with DM T2 with hyperglycemia started on basal bolus insulin, blood sugars improving, no hypoglycemic episode,  eating meals,  non compliant with low carb diet.  CAD: On medications, stable, monitored.  Syncope: Etiology?, monitored, stable.  HTN: Controlled, On med.
EKG - NSR pac   Echo 9/4/18 - Normal LV     A/P     1) Syncope - likely sec to poor PO intake, as per pt it was 94 degrees out side, Echo looks ok, some PVC on tele, no major arrythmias,     2) CAD s/p PCI - cont asa plavix and lopressor    3) DVT prophylasix - on sc heparin
Assessment  DMT2: 78y Male with DM T2 with hyperglycemia started on basal bolus insulin, blood sugars improving, no hypoglycemic episode,  eating meals,  non compliant with low carb diet.  CAD: On medications, stable, monitored.  Syncope: Etiology?, monitored, stable.  HTN: Controlled, On med.
79yo M with hx of HTN , DM , CAD with stents admitted with near syncope.

## 2018-09-07 NOTE — PROGRESS NOTE ADULT - PROVIDER SPECIALTY LIST ADULT
Cardiology
Endocrinology
Internal Medicine
Internal Medicine
Endocrinology
Internal Medicine

## 2018-09-07 NOTE — PROGRESS NOTE BEHAVIORAL HEALTH - NSBHCHARTREVIEWVS_PSY_A_CORE FT
Vital Signs Last 24 Hrs  T(C): 36.6 (07 Sep 2018 14:05), Max: 36.7 (06 Sep 2018 21:50)  T(F): 97.9 (07 Sep 2018 14:05), Max: 98 (06 Sep 2018 21:50)  HR: 78 (07 Sep 2018 14:05) (73 - 95)  BP: 119/65 (07 Sep 2018 14:05) (118/60 - 164/57)  BP(mean): --  RR: 16 (07 Sep 2018 14:05) (16 - 18)  SpO2: 98% (07 Sep 2018 14:05) (98% - 100%)

## 2018-09-07 NOTE — PROGRESS NOTE ADULT - PROBLEM SELECTOR PLAN 2
- Patient states that he has had increased urinary frequency/urgency but does not produce a lot of urine when he urinates, denies any dysuria, hematuria, sensation of incomplete voiding, weak stream or dribbling  - UA with >1000 glucose but no signs of infection, might therefore have urinary frequency 2/2 glucosuria   - Will hold off on abx for now, will check UCx
- Patient states that he has had increased urinary frequency/urgency but does not produce a lot of urine when he urinates, denies any dysuria, hematuria, sensation of incomplete voiding, weak stream or dribbling  - UA with >1000 glucose but no signs of infection, might therefore have urinary frequency 2/2 glucosuria   - Will hold off on abx for now, will check UCx
No hypoglycemic episodes, Continue monitoring, primary team FU
No hypoglycemic episodes, Continue monitoring, primary team FU

## 2018-09-07 NOTE — PROGRESS NOTE ADULT - PROBLEM SELECTOR PLAN 3
- continue medication as per prior d/c, will need to clarify meds in AM
- continue medication as per prior d/c, will need to clarify meds in AM
On medications, monitored and followed up by primary/cardiology team
On medications, monitored and followed up by primary/cardiology team

## 2018-09-07 NOTE — PROGRESS NOTE BEHAVIORAL HEALTH - NSBHCHARTREVIEWLAB_PSY_A_CORE FT
13.5   13.63 )-----------( 226      ( 07 Sep 2018 07:05 )             40.0   09-07    135  |  100  |  20  ----------------------------<  209<H>  4.1   |  21<L>  |  0.98    Ca    9.0      07 Sep 2018 07:05  Mg     1.6     09-06

## 2018-09-07 NOTE — PROGRESS NOTE ADULT - SUBJECTIVE AND OBJECTIVE BOX
Bennie Hwang MD  Interventional Cardiology  [place] Office : [address] [place] [address]  Tel:   [place] Office : [address] [facility] [place] [address]  Tel: [phone]  Cell : [phone]    Subjective : Pt lying in bed comfortable, not in distress, denies any chest pain or SOB  	  MEDICATIONS:  aspirin enteric coated 81 milliGRAM(s) Oral daily  clopidogrel Tablet 75 milliGRAM(s) Oral daily  heparin  Injectable 5000 Unit(s) SubCutaneous every 8 hours  lisinopril 40 milliGRAM(s) Oral daily  metoprolol succinate ER 50 milliGRAM(s) Oral daily  tamsulosin 0.4 milliGRAM(s) Oral at bedtime        haloperidol     Tablet 1 milliGRAM(s) Oral every 6 hours PRN  haloperidol    Injectable 1 milliGRAM(s) IntraMuscular every 6 hours PRN  haloperidol    Injectable 1 milliGRAM(s) IV Push every 6 hours PRN  melatonin 6 milliGRAM(s) Oral at bedtime      atorvastatin 80 milliGRAM(s) Oral at bedtime  dextrose 40% Gel 15 Gram(s) Oral once PRN  dextrose 50% Injectable 12.5 Gram(s) IV Push once  dextrose 50% Injectable 25 Gram(s) IV Push once  dextrose 50% Injectable 25 Gram(s) IV Push once  glucagon  Injectable 1 milliGRAM(s) IntraMuscular once PRN  insulin glargine Injectable (LANTUS) 7 Unit(s) SubCutaneous at bedtime  insulin lispro (HumaLOG) corrective regimen sliding scale   SubCutaneous three times a day before meals  insulin lispro Injectable (HumaLOG) 6 Unit(s) SubCutaneous three times a day before meals    dextrose 5%. 1000 milliLiter(s) IV Continuous <Continuous>  influenza   Vaccine 0.5 milliLiter(s) IntraMuscular once  latanoprost 0.005% Ophthalmic Solution 1 Drop(s) Both EYES at bedtime      PHYSICAL EXAM:  T(C): 36.6 (09-06-18 @ 16:10), Max: 36.9 (09-05-18 @ 21:28)  HR: 95 (09-06-18 @ 16:10) (65 - 95)  BP: 146/78 (09-06-18 @ 16:10) (146/78 - 150/68)  RR: 18 (09-06-18 @ 16:10) (18 - 18)  SpO2: 100% (09-06-18 @ 16:10) (100% - 100%)  Wt(kg): --  I&O's Summary    05 Sep 2018 07:01  -  06 Sep 2018 07:00  --------------------------------------------------------  IN: 0 mL / OUT: 700 mL / NET: -700 mL          Appearance: Normal	  HEENT:   Normal oral mucosa, PERRL, EOMI	  Cardiovascular: Normal S1 S2, No JVD, No murmurs, No edema  Respiratory: Lungs clear to auscultation	  Gastrointestinal:  Soft, Non-tender, + BS	  Extremities: Normal range of motion, No clubbing, cyanosis or edema                                    13.4   10.28 )-----------( 224      ( 06 Sep 2018 06:44 )             40.1     09-06    137  |  100  |  19  ----------------------------<  185<H>  4.0   |  24  |  0.85    Ca    8.8      06 Sep 2018 06:44  Mg     1.6     09-06      proBNP:   Lipid Profile:   HgA1c:   TSH:
Bennie Hwang MD  Interventional Cardiology / Advance Heart Failure and Cardiac Transplant Specialist  [place] Office : [address] [place] [address]  Tel:   [place] Office : [address] [facility] [place] [address]  Tel: [phone]  Cell : [phone]    Subjective : Pt lying in bed comfortable, not in distress, denies any chest pain or SOB  	  MEDICATIONS:  aspirin enteric coated 81 milliGRAM(s) Oral daily  clopidogrel Tablet 75 milliGRAM(s) Oral daily  heparin  Injectable 5000 Unit(s) SubCutaneous every 8 hours  lisinopril 40 milliGRAM(s) Oral daily  metoprolol succinate ER 50 milliGRAM(s) Oral daily  tamsulosin 0.4 milliGRAM(s) Oral at bedtime  haloperidol     Tablet 1 milliGRAM(s) Oral every 6 hours PRN  haloperidol    Injectable 1 milliGRAM(s) IntraMuscular every 6 hours PRN  haloperidol    Injectable 1 milliGRAM(s) IV Push every 6 hours PRN  melatonin 6 milliGRAM(s) Oral at bedtime  atorvastatin 80 milliGRAM(s) Oral at bedtime  dextrose 40% Gel 15 Gram(s) Oral once PRN  dextrose 50% Injectable 12.5 Gram(s) IV Push once  dextrose 50% Injectable 25 Gram(s) IV Push once  dextrose 50% Injectable 25 Gram(s) IV Push once  glucagon  Injectable 1 milliGRAM(s) IntraMuscular once PRN  insulin lispro (HumaLOG) corrective regimen sliding scale   SubCutaneous three times a day before meals  dextrose 5%. 1000 milliLiter(s) IV Continuous <Continuous>  influenza   Vaccine 0.5 milliLiter(s) IntraMuscular once  latanoprost 0.005% Ophthalmic Solution 1 Drop(s) Both EYES at bedtime      PHYSICAL EXAM:  T(C): 36.9 (09-05-18 @ 10:49), Max: 36.9 (09-04-18 @ 22:12)  HR: 66 (09-05-18 @ 10:49) (66 - 97)  BP: 124/50 (09-05-18 @ 10:49) (105/52 - 153/71)  RR: 16 (09-05-18 @ 10:49) (16 - 18)  SpO2: 99% (09-05-18 @ 10:49) (99% - 100%)  Wt(kg): --  I&O's Summary    04 Sep 2018 07:01  -  05 Sep 2018 07:00  --------------------------------------------------------  IN: 0 mL / OUT: 275 mL / NET: -275 mL          Appearance: Normal	  HEENT:   Normal oral mucosa, PERRL, EOMI	  Cardiovascular: Normal S1 S2, No JVD, No murmurs, No edema  Respiratory: Lungs clear to auscultation	  Gastrointestinal:  Soft, Non-tender, + BS	  Extremities: no edema                                    13.5   11.73 )-----------( 224      ( 05 Sep 2018 05:48 )             40.2     09-05    137  |  102  |  21  ----------------------------<  174<H>  4.3   |  22  |  0.91    Ca    8.9      05 Sep 2018 05:48  Mg     1.7     09-05      proBNP:   Lipid Profile:   HgA1c:   TSH:
Bennie Hwang MD  Interventional Cardiology / Advance Heart Failure and Cardiac Transplant Specialist  [place] Office : [address] [place] [address]  Tel:   [place] Office : [address] [facility] [address] [address]  Tel: [phone]  Cell : [phone]    Subjective : Pt lying in bed comfortable, not in distress, denies any chest pain or SOB  	  MEDICATIONS:  aspirin enteric coated 81 milliGRAM(s) Oral daily  clopidogrel Tablet 75 milliGRAM(s) Oral daily  heparin  Injectable 5000 Unit(s) SubCutaneous every 8 hours  lisinopril 40 milliGRAM(s) Oral daily  metoprolol succinate ER 50 milliGRAM(s) Oral daily  tamsulosin 0.4 milliGRAM(s) Oral at bedtime      guaiFENesin   Syrup  (Sugar-Free) 200 milliGRAM(s) Oral every 6 hours PRN    haloperidol     Tablet 1 milliGRAM(s) Oral every 6 hours PRN  haloperidol    Injectable 1 milliGRAM(s) IntraMuscular every 6 hours PRN  haloperidol    Injectable 1 milliGRAM(s) IV Push every 6 hours PRN  melatonin 6 milliGRAM(s) Oral at bedtime  traZODone 50 milliGRAM(s) Oral <User Schedule>      atorvastatin 80 milliGRAM(s) Oral at bedtime  dextrose 40% Gel 15 Gram(s) Oral once PRN  dextrose 50% Injectable 12.5 Gram(s) IV Push once  dextrose 50% Injectable 25 Gram(s) IV Push once  dextrose 50% Injectable 25 Gram(s) IV Push once  glucagon  Injectable 1 milliGRAM(s) IntraMuscular once PRN  insulin glargine Injectable (LANTUS) 7 Unit(s) SubCutaneous at bedtime  insulin lispro (HumaLOG) corrective regimen sliding scale   SubCutaneous three times a day before meals  insulin lispro Injectable (HumaLOG) 6 Unit(s) SubCutaneous three times a day before meals    dextrose 5%. 1000 milliLiter(s) IV Continuous <Continuous>  influenza   Vaccine 0.5 milliLiter(s) IntraMuscular once  latanoprost 0.005% Ophthalmic Solution 1 Drop(s) Both EYES at bedtime      PHYSICAL EXAM:  T(C): 36.6 (09-07-18 @ 14:05), Max: 36.7 (09-06-18 @ 21:50)  HR: 78 (09-07-18 @ 14:05) (73 - 89)  BP: 119/65 (09-07-18 @ 14:05) (118/60 - 164/57)  RR: 16 (09-07-18 @ 14:05) (16 - 18)  SpO2: 98% (09-07-18 @ 14:05) (98% - 100%)  Wt(kg): --  I&O's Summary        Appearance: Normal	  HEENT:   Normal oral mucosa, PERRL, EOMI	  Cardiovascular: Normal S1 S2, No JVD, No murmurs, No edema  Respiratory: Lungs clear to auscultation	  Gastrointestinal:  Soft, Non-tender, + BS	  Extremities: Normal range of motion, No clubbing, cyanosis or edema                                    13.5   13.63 )-----------( 226      ( 07 Sep 2018 07:05 )             40.0     09-07    135  |  100  |  20  ----------------------------<  209<H>  4.1   |  21<L>  |  0.98    Ca    9.0      07 Sep 2018 07:05  Mg     1.6     09-06      proBNP:   Lipid Profile:   HgA1c:   TSH:
Chief complaint  Patient is a 78y old  Male who presents with a chief complaint of weakness (06 Sep 2018 21:17)   Review of systems  Patient in bed, looks comfortable, no fever,  no hypoglycemia.    Labs and Fingersticks  CAPILLARY BLOOD GLUCOSE      POCT Blood Glucose.: 167 mg/dL (07 Sep 2018 08:26)  POCT Blood Glucose.: 154 mg/dL (06 Sep 2018 21:43)  POCT Blood Glucose.: 90 mg/dL (06 Sep 2018 17:20)  POCT Blood Glucose.: 214 mg/dL (06 Sep 2018 13:24)          Calcium, Total Serum: 9.0 (09-07 @ 07:05)  Calcium, Total Serum: 8.8 (09-06 @ 06:44)          09-07    135  |  100  |  20  ----------------------------<  209<H>  4.1   |  21<L>  |  0.98    Ca    9.0      07 Sep 2018 07:05  Mg     1.6     09-06                          13.5   13.63 )-----------( 226      ( 07 Sep 2018 07:05 )             40.0     Medications  MEDICATIONS  (STANDING):  aspirin enteric coated 81 milliGRAM(s) Oral daily  atorvastatin 80 milliGRAM(s) Oral at bedtime  clopidogrel Tablet 75 milliGRAM(s) Oral daily  dextrose 5%. 1000 milliLiter(s) (50 mL/Hr) IV Continuous <Continuous>  dextrose 50% Injectable 12.5 Gram(s) IV Push once  dextrose 50% Injectable 25 Gram(s) IV Push once  dextrose 50% Injectable 25 Gram(s) IV Push once  heparin  Injectable 5000 Unit(s) SubCutaneous every 8 hours  influenza   Vaccine 0.5 milliLiter(s) IntraMuscular once  insulin glargine Injectable (LANTUS) 7 Unit(s) SubCutaneous at bedtime  insulin lispro (HumaLOG) corrective regimen sliding scale   SubCutaneous three times a day before meals  insulin lispro Injectable (HumaLOG) 6 Unit(s) SubCutaneous three times a day before meals  latanoprost 0.005% Ophthalmic Solution 1 Drop(s) Both EYES at bedtime  lisinopril 40 milliGRAM(s) Oral daily  melatonin 6 milliGRAM(s) Oral at bedtime  metoprolol succinate ER 50 milliGRAM(s) Oral daily  tamsulosin 0.4 milliGRAM(s) Oral at bedtime      Physical Exam  General: Patient comfortable in bed  Vital Signs Last 12 Hrs  T(F): 98 (09-07-18 @ 05:11), Max: 98 (09-07-18 @ 05:11)  HR: 89 (09-07-18 @ 05:11) (89 - 89)  BP: 118/60 (09-07-18 @ 05:11) (118/60 - 118/60)  BP(mean): --  RR: 18 (09-07-18 @ 05:11) (18 - 18)  SpO2: 99% (09-07-18 @ 05:11) (99% - 99%)  Neck: No palpable thyroid nodules.  CVS: S1S2, No murmurs  Respiratory: No wheezing, no crepitations  GI: Abdomen soft, bowel sounds positive  Musculoskeletal:  edema lower extremities.   Skin: No skin rashes, no ecchymosis    Diagnostics
FESTUS BLAND  78y  Male      Patient is a 78y old  Male who presents with a chief complaint of weakness (06 Sep 2018 17:20)  Patient feels better.no sob,no cp,no cough,no dizziness,eating    REVIEW OF SYSTEMS:  neg    INTERVAL HPI/OVERNIGHT EVENTS:  T(C): 36.6 (09-06-18 @ 16:10), Max: 36.9 (09-05-18 @ 21:28)  HR: 95 (09-06-18 @ 16:10) (65 - 95)  BP: 146/78 (09-06-18 @ 16:10) (146/78 - 150/68)  RR: 18 (09-06-18 @ 16:10) (18 - 18)  SpO2: 100% (09-06-18 @ 16:10) (100% - 100%)  Wt(kg): --  I&O's Summary    05 Sep 2018 07:01  -  06 Sep 2018 07:00  --------------------------------------------------------  IN: 0 mL / OUT: 700 mL / NET: -700 mL      T(C): 36.6 (09-06-18 @ 16:10), Max: 36.9 (09-05-18 @ 21:28)  HR: 95 (09-06-18 @ 16:10) (65 - 95)  BP: 146/78 (09-06-18 @ 16:10) (146/78 - 150/68)  RR: 18 (09-06-18 @ 16:10) (18 - 18)  SpO2: 100% (09-06-18 @ 16:10) (100% - 100%)  Wt(kg): --Vital Signs Last 24 Hrs  T(C): 36.6 (06 Sep 2018 16:10), Max: 36.9 (05 Sep 2018 21:28)  T(F): 97.8 (06 Sep 2018 16:10), Max: 98.5 (05 Sep 2018 21:28)  HR: 95 (06 Sep 2018 16:10) (65 - 95)  BP: 146/78 (06 Sep 2018 16:10) (146/78 - 150/68)  BP(mean): --  RR: 18 (06 Sep 2018 16:10) (18 - 18)  SpO2: 100% (06 Sep 2018 16:10) (100% - 100%)    LABS:                        13.4   10.28 )-----------( 224      ( 06 Sep 2018 06:44 )             40.1     09-06    137  |  100  |  19  ----------------------------<  185<H>  4.0   |  24  |  0.85    Ca    8.8      06 Sep 2018 06:44  Mg     1.6     09-06          CAPILLARY BLOOD GLUCOSE      POCT Blood Glucose.: 90 mg/dL (06 Sep 2018 17:20)  POCT Blood Glucose.: 214 mg/dL (06 Sep 2018 13:24)  POCT Blood Glucose.: 168 mg/dL (06 Sep 2018 08:41)  POCT Blood Glucose.: 174 mg/dL (05 Sep 2018 21:45)            PAST MEDICAL & SURGICAL HISTORY:  BPH (benign prostatic hyperplasia)  CAD (Coronary Artery Disease): s/p cardiac stent ( &gt; 20 years ago)  HTN (Hypertension)  DM (Diabetes Mellitus)  Hypercholesterolemia  Coronary Stent: x 2 ( 20 years )      MEDICATIONS  (STANDING):  aspirin enteric coated 81 milliGRAM(s) Oral daily  atorvastatin 80 milliGRAM(s) Oral at bedtime  clopidogrel Tablet 75 milliGRAM(s) Oral daily  dextrose 5%. 1000 milliLiter(s) (50 mL/Hr) IV Continuous <Continuous>  dextrose 50% Injectable 12.5 Gram(s) IV Push once  dextrose 50% Injectable 25 Gram(s) IV Push once  dextrose 50% Injectable 25 Gram(s) IV Push once  heparin  Injectable 5000 Unit(s) SubCutaneous every 8 hours  influenza   Vaccine 0.5 milliLiter(s) IntraMuscular once  insulin glargine Injectable (LANTUS) 7 Unit(s) SubCutaneous at bedtime  insulin lispro (HumaLOG) corrective regimen sliding scale   SubCutaneous three times a day before meals  insulin lispro Injectable (HumaLOG) 6 Unit(s) SubCutaneous three times a day before meals  latanoprost 0.005% Ophthalmic Solution 1 Drop(s) Both EYES at bedtime  lisinopril 40 milliGRAM(s) Oral daily  melatonin 6 milliGRAM(s) Oral at bedtime  metoprolol succinate ER 50 milliGRAM(s) Oral daily  tamsulosin 0.4 milliGRAM(s) Oral at bedtime    MEDICATIONS  (PRN):  dextrose 40% Gel 15 Gram(s) Oral once PRN Blood Glucose LESS THAN 70 milliGRAM(s)/deciliter  glucagon  Injectable 1 milliGRAM(s) IntraMuscular once PRN Glucose LESS THAN 70 milligrams/deciliter  guaiFENesin   Syrup  (Sugar-Free) 200 milliGRAM(s) Oral every 6 hours PRN Cough  haloperidol     Tablet 1 milliGRAM(s) Oral every 6 hours PRN agitation  haloperidol    Injectable 1 milliGRAM(s) IntraMuscular every 6 hours PRN agitation  haloperidol    Injectable 1 milliGRAM(s) IV Push every 6 hours PRN agitation        RADIOLOGY & ADDITIONAL TESTS:    Imaging Personally Reviewed:  [ ] YES  [ ] NO    Consultant(s) Notes Reviewed:  [x ] YES  [ ] NO    PHYSICAL EXAM:  GENERAL: NAD, well-groomed, well-developed  HEAD:  Atraumatic, Normocephalic  EYES: EOMI, PERRLA, conjunctiva and sclera clear  ENMT: No tonsillar erythema, exudates, or enlargement; Moist mucous membranes, Good dentition, No lesions  NECK: Supple, No JVD, Normal thyroid  NERVOUS SYSTEM:  Alert & Oriented X3, Good concentration; Motor Strength 5/5 B/L upper and lower extremities; DTRs 2+ intact and symmetric  CHEST/LUNG: Clear to percussion bilaterally; No rales, rhonchi, wheezing, or rubs  HEART: Regular rate and rhythm; No murmurs, rubs, or gallops  ABDOMEN: Soft, Nontender, Nondistended; Bowel sounds present  EXTREMITIES:  2+ Peripheral Pulses, No clubbing, cyanosis, or edema  LYMPH: No lymphadenopathy noted  SKIN: No rashes or lesions    Care Discussed with Consultants/Other Providers [ ] YES  [ ] NO      Code Status: [] Full Code [] DNR [] DNI [] Goals of Care:   Disposition: [] ICU [] Stroke Unit [] RCU []PCU []Floor [] Discharge Home         JESS VargasP
MD note-Patient was seen and examined/chart reviewed.seen by Hospitalist earlier.Reported agitation,wondering,lives alone,confused at times. Admitted with syncope.seen by cardiology  a/p  presyncope-likely sec.to decreased po intake- mildly orthostatic hypotension .gentle hydration  cad  -r/o depression,anxiety disorder,Delirium,dementia-psych consult prior to d/c  -d/w staff  -cardiology consult appreciated  -cont.current meds  -DVT Prophylaxis    MAGGIE Vargas.
Chief complaint  Patient is a 78y old  Male who presents with a chief complaint of weakness (05 Sep 2018 20:05)   Review of systems  Patient in bed, looks comfortable, no fever, no hypoglycemia.    Labs and Fingersticks  CAPILLARY BLOOD GLUCOSE      POCT Blood Glucose.: 214 mg/dL (06 Sep 2018 13:24)  POCT Blood Glucose.: 168 mg/dL (06 Sep 2018 08:41)  POCT Blood Glucose.: 174 mg/dL (05 Sep 2018 21:45)  POCT Blood Glucose.: 137 mg/dL (05 Sep 2018 17:53)          Calcium, Total Serum: 8.8 (09-06 @ 06:44)  Calcium, Total Serum: 8.9 (09-05 @ 05:48)          09-06    137  |  100  |  19  ----------------------------<  185<H>  4.0   |  24  |  0.85    Ca    8.8      06 Sep 2018 06:44  Mg     1.6     09-06                          13.4   10.28 )-----------( 224      ( 06 Sep 2018 06:44 )             40.1     Medications  MEDICATIONS  (STANDING):  aspirin enteric coated 81 milliGRAM(s) Oral daily  atorvastatin 80 milliGRAM(s) Oral at bedtime  clopidogrel Tablet 75 milliGRAM(s) Oral daily  dextrose 5%. 1000 milliLiter(s) (50 mL/Hr) IV Continuous <Continuous>  dextrose 50% Injectable 12.5 Gram(s) IV Push once  dextrose 50% Injectable 25 Gram(s) IV Push once  dextrose 50% Injectable 25 Gram(s) IV Push once  heparin  Injectable 5000 Unit(s) SubCutaneous every 8 hours  influenza   Vaccine 0.5 milliLiter(s) IntraMuscular once  insulin glargine Injectable (LANTUS) 7 Unit(s) SubCutaneous at bedtime  insulin lispro (HumaLOG) corrective regimen sliding scale   SubCutaneous three times a day before meals  insulin lispro Injectable (HumaLOG) 6 Unit(s) SubCutaneous three times a day before meals  latanoprost 0.005% Ophthalmic Solution 1 Drop(s) Both EYES at bedtime  lisinopril 40 milliGRAM(s) Oral daily  melatonin 6 milliGRAM(s) Oral at bedtime  metoprolol succinate ER 50 milliGRAM(s) Oral daily  tamsulosin 0.4 milliGRAM(s) Oral at bedtime      Physical Exam  General: Patient comfortable in bed  Vital Signs Last 12 Hrs  T(F): 98 (09-06-18 @ 06:31), Max: 98 (09-06-18 @ 06:31)  HR: 65 (09-06-18 @ 06:31) (65 - 65)  BP: 147/71 (09-06-18 @ 06:31) (147/71 - 147/71)  BP(mean): --  RR: 18 (09-06-18 @ 06:31) (18 - 18)  SpO2: 100% (09-06-18 @ 06:31) (100% - 100%)  Neck: No palpable thyroid nodules.  CVS: S1S2, No murmurs  Respiratory: No wheezing, no crepitations  GI: Abdomen soft, bowel sounds positive  Musculoskeletal:  edema lower extremities.   Skin: No skin rashes, no ecchymosis    Diagnostics
BALDOFESTUS  78y  Male      Patient is a 78y old  Male who presents with a chief complaint of weakness (05 Sep 2018 13:47)  Patient is calm,comfortable,nad,no sob,no cp,no fever,no cough,no dizziness    REVIEW OF SYSTEMS:  as above      INTERVAL HPI/OVERNIGHT EVENTS:  T(C): 36.9 (09-05-18 @ 17:00), Max: 36.9 (09-04-18 @ 22:12)  HR: 67 (09-05-18 @ 17:00) (66 - 97)  BP: 130/44 (09-05-18 @ 17:00) (101/54 - 153/71)  RR: 17 (09-05-18 @ 17:00) (16 - 18)  SpO2: 100% (09-05-18 @ 17:00) (97% - 100%)  Wt(kg): --  I&O's Summary    04 Sep 2018 07:01  -  05 Sep 2018 07:00  --------------------------------------------------------  IN: 0 mL / OUT: 275 mL / NET: -275 mL    05 Sep 2018 07:01  -  05 Sep 2018 20:07  --------------------------------------------------------  IN: 0 mL / OUT: 700 mL / NET: -700 mL      T(C): 36.9 (09-05-18 @ 17:00), Max: 36.9 (09-04-18 @ 22:12)  HR: 67 (09-05-18 @ 17:00) (66 - 97)  BP: 130/44 (09-05-18 @ 17:00) (101/54 - 153/71)  RR: 17 (09-05-18 @ 17:00) (16 - 18)  SpO2: 100% (09-05-18 @ 17:00) (97% - 100%)  Wt(kg): --Vital Signs Last 24 Hrs  T(C): 36.9 (05 Sep 2018 17:00), Max: 36.9 (04 Sep 2018 22:12)  T(F): 98.5 (05 Sep 2018 17:00), Max: 98.5 (04 Sep 2018 22:12)  HR: 67 (05 Sep 2018 17:00) (66 - 97)  BP: 130/44 (05 Sep 2018 17:00) (101/54 - 153/71)  BP(mean): --  RR: 17 (05 Sep 2018 17:00) (16 - 18)  SpO2: 100% (05 Sep 2018 17:00) (97% - 100%)    LABS:                        13.5   11.73 )-----------( 224      ( 05 Sep 2018 05:48 )             40.2     09-05    137  |  102  |  21  ----------------------------<  174<H>  4.3   |  22  |  0.91    Ca    8.9      05 Sep 2018 05:48  Mg     1.7     09-05          CAPILLARY BLOOD GLUCOSE      POCT Blood Glucose.: 137 mg/dL (05 Sep 2018 17:53)  POCT Blood Glucose.: 268 mg/dL (05 Sep 2018 12:31)  POCT Blood Glucose.: 156 mg/dL (04 Sep 2018 22:27)            PAST MEDICAL & SURGICAL HISTORY:  BPH (benign prostatic hyperplasia)  CAD (Coronary Artery Disease): s/p cardiac stent ( &gt; 20 years ago)  HTN (Hypertension)  DM (Diabetes Mellitus)  Hypercholesterolemia  Coronary Stent: x 2 ( 20 years )      MEDICATIONS  (STANDING):  aspirin enteric coated 81 milliGRAM(s) Oral daily  atorvastatin 80 milliGRAM(s) Oral at bedtime  clopidogrel Tablet 75 milliGRAM(s) Oral daily  dextrose 5%. 1000 milliLiter(s) (50 mL/Hr) IV Continuous <Continuous>  dextrose 50% Injectable 12.5 Gram(s) IV Push once  dextrose 50% Injectable 25 Gram(s) IV Push once  dextrose 50% Injectable 25 Gram(s) IV Push once  heparin  Injectable 5000 Unit(s) SubCutaneous every 8 hours  influenza   Vaccine 0.5 milliLiter(s) IntraMuscular once  insulin glargine Injectable (LANTUS) 7 Unit(s) SubCutaneous at bedtime  insulin lispro (HumaLOG) corrective regimen sliding scale   SubCutaneous three times a day before meals  insulin lispro Injectable (HumaLOG) 6 Unit(s) SubCutaneous three times a day before meals  latanoprost 0.005% Ophthalmic Solution 1 Drop(s) Both EYES at bedtime  lisinopril 40 milliGRAM(s) Oral daily  melatonin 6 milliGRAM(s) Oral at bedtime  metoprolol succinate ER 50 milliGRAM(s) Oral daily  tamsulosin 0.4 milliGRAM(s) Oral at bedtime    MEDICATIONS  (PRN):  dextrose 40% Gel 15 Gram(s) Oral once PRN Blood Glucose LESS THAN 70 milliGRAM(s)/deciliter  glucagon  Injectable 1 milliGRAM(s) IntraMuscular once PRN Glucose LESS THAN 70 milligrams/deciliter  haloperidol     Tablet 1 milliGRAM(s) Oral every 6 hours PRN agitation  haloperidol    Injectable 1 milliGRAM(s) IntraMuscular every 6 hours PRN agitation  haloperidol    Injectable 1 milliGRAM(s) IV Push every 6 hours PRN agitation        RADIOLOGY & ADDITIONAL TESTS:    Imaging Personally Reviewed:  [ ] YES  [ ] NO    Consultant(s) Notes Reviewed:  [x ] YES  [ ] NO    PHYSICAL EXAM:  GENERAL: NAD, well-groomed, well-developed  HEAD:  Atraumatic, Normocephalic  EYES: EOMI, PERRLA, conjunctiva and sclera clear  ENMT: No tonsillar erythema, exudates, or enlargement; Moist mucous membranes, Good dentition, No lesions  NECK: Supple, No JVD, Normal thyroid  NERVOUS SYSTEM:  Alert & Oriented X3, Good concentration; Motor Strength 5/5 B/L upper and lower extremities; DTRs 2+ intact and symmetric  CHEST/LUNG: Clear to percussion bilaterally; No rales, rhonchi, wheezing, or rubs  HEART: Regular rate and rhythm; No murmurs, rubs, or gallops  ABDOMEN: Soft, Nontender, Nondistended; Bowel sounds present  EXTREMITIES:  2+ Peripheral Pulses, No clubbing, cyanosis, or edema  LYMPH: No lymphadenopathy noted  SKIN: No rashes or lesions    Care Discussed with Consultants/Other Providers [x ] YES  [ ] NO      Code Status: [] Full Code [] DNR [] DNI [] Goals of Care:   Disposition: [] ICU [] Stroke Unit [] RCU []PCU []Floor [] Discharge Home         JESS VargasP

## 2018-09-11 ENCOUNTER — EMERGENCY (EMERGENCY)
Facility: HOSPITAL | Age: 78
LOS: 1 days | Discharge: HOME | End: 2018-09-11
Admitting: EMERGENCY MEDICINE
Payer: MEDICARE

## 2018-09-11 VITALS
DIASTOLIC BLOOD PRESSURE: 55 MMHG | TEMPERATURE: 98 F | HEART RATE: 95 BPM | RESPIRATION RATE: 16 BRPM | SYSTOLIC BLOOD PRESSURE: 113 MMHG | OXYGEN SATURATION: 99 %

## 2018-09-11 VITALS — HEART RATE: 87 BPM | SYSTOLIC BLOOD PRESSURE: 115 MMHG | DIASTOLIC BLOOD PRESSURE: 52 MMHG | TEMPERATURE: 98 F

## 2018-09-11 LAB
ALBUMIN SERPL ELPH-MCNC: 3.9 G/DL — SIGNIFICANT CHANGE UP (ref 3.3–5)
ALP SERPL-CCNC: 99 U/L — SIGNIFICANT CHANGE UP (ref 40–120)
ALT FLD-CCNC: 17 U/L — SIGNIFICANT CHANGE UP (ref 4–41)
APAP SERPL-MCNC: < 15 UG/ML — LOW (ref 15–25)
AST SERPL-CCNC: 12 U/L — SIGNIFICANT CHANGE UP (ref 4–40)
BASOPHILS # BLD AUTO: 0.04 K/UL — SIGNIFICANT CHANGE UP (ref 0–0.2)
BASOPHILS NFR BLD AUTO: 0.3 % — SIGNIFICANT CHANGE UP (ref 0–2)
BILIRUB SERPL-MCNC: 1 MG/DL — SIGNIFICANT CHANGE UP (ref 0.2–1.2)
BUN SERPL-MCNC: 31 MG/DL — HIGH (ref 7–23)
CALCIUM SERPL-MCNC: 8.9 MG/DL — SIGNIFICANT CHANGE UP (ref 8.4–10.5)
CHLORIDE SERPL-SCNC: 103 MMOL/L — SIGNIFICANT CHANGE UP (ref 98–107)
CO2 SERPL-SCNC: 22 MMOL/L — SIGNIFICANT CHANGE UP (ref 22–31)
CREAT SERPL-MCNC: 1.11 MG/DL — SIGNIFICANT CHANGE UP (ref 0.5–1.3)
EOSINOPHIL # BLD AUTO: 0.09 K/UL — SIGNIFICANT CHANGE UP (ref 0–0.5)
EOSINOPHIL NFR BLD AUTO: 0.8 % — SIGNIFICANT CHANGE UP (ref 0–6)
ETHANOL BLD-MCNC: < 10 MG/DL — SIGNIFICANT CHANGE UP
GLUCOSE SERPL-MCNC: 274 MG/DL — HIGH (ref 70–99)
HCT VFR BLD CALC: 38 % — LOW (ref 39–50)
HGB BLD-MCNC: 12.8 G/DL — LOW (ref 13–17)
IMM GRANULOCYTES # BLD AUTO: 0.05 # — SIGNIFICANT CHANGE UP
IMM GRANULOCYTES NFR BLD AUTO: 0.4 % — SIGNIFICANT CHANGE UP (ref 0–1.5)
LYMPHOCYTES # BLD AUTO: 1.48 K/UL — SIGNIFICANT CHANGE UP (ref 1–3.3)
LYMPHOCYTES # BLD AUTO: 12.5 % — LOW (ref 13–44)
MCHC RBC-ENTMCNC: 30 PG — SIGNIFICANT CHANGE UP (ref 27–34)
MCHC RBC-ENTMCNC: 33.7 % — SIGNIFICANT CHANGE UP (ref 32–36)
MCV RBC AUTO: 89 FL — SIGNIFICANT CHANGE UP (ref 80–100)
MONOCYTES # BLD AUTO: 0.76 K/UL — SIGNIFICANT CHANGE UP (ref 0–0.9)
MONOCYTES NFR BLD AUTO: 6.4 % — SIGNIFICANT CHANGE UP (ref 2–14)
NEUTROPHILS # BLD AUTO: 9.38 K/UL — HIGH (ref 1.8–7.4)
NEUTROPHILS NFR BLD AUTO: 79.6 % — HIGH (ref 43–77)
NRBC # FLD: 0 — SIGNIFICANT CHANGE UP
PLATELET # BLD AUTO: 260 K/UL — SIGNIFICANT CHANGE UP (ref 150–400)
PMV BLD: 11 FL — SIGNIFICANT CHANGE UP (ref 7–13)
POTASSIUM SERPL-MCNC: 4.1 MMOL/L — SIGNIFICANT CHANGE UP (ref 3.5–5.3)
POTASSIUM SERPL-SCNC: 4.1 MMOL/L — SIGNIFICANT CHANGE UP (ref 3.5–5.3)
PROT SERPL-MCNC: 6.7 G/DL — SIGNIFICANT CHANGE UP (ref 6–8.3)
RBC # BLD: 4.27 M/UL — SIGNIFICANT CHANGE UP (ref 4.2–5.8)
RBC # FLD: 13.6 % — SIGNIFICANT CHANGE UP (ref 10.3–14.5)
SALICYLATES SERPL-MCNC: < 5 MG/DL — LOW (ref 15–30)
SODIUM SERPL-SCNC: 136 MMOL/L — SIGNIFICANT CHANGE UP (ref 135–145)
TSH SERPL-MCNC: 2.16 UIU/ML — SIGNIFICANT CHANGE UP (ref 0.27–4.2)
WBC # BLD: 11.8 K/UL — HIGH (ref 3.8–10.5)
WBC # FLD AUTO: 11.8 K/UL — HIGH (ref 3.8–10.5)

## 2018-09-11 PROCEDURE — 99283 EMERGENCY DEPT VISIT LOW MDM: CPT

## 2018-09-11 NOTE — ED PROVIDER NOTE - MEDICAL DECISION MAKING DETAILS
This is a 78 year old Male PMHX  BPH CAD DM HTN  Hypercholesterolemia BIBA from home activated by SW for suicidal ideations.  Patient with recent discharge from hospital was seen by home SW called EMS for evaluation r/t medication. Patient with follow up appoint No SI/HI concerns from family patient denies any SI/HI Reports made comment out of frustration. Medical evaluation performed. There is no clinical evidence of intoxication or any acute medical problem requiring immediate intervention.

## 2018-09-11 NOTE — ED BEHAVIORAL HEALTH NOTE - BEHAVIORAL HEALTH NOTE
received a voicemail message from Radhakelli Cho, [phone], a  at [facility], [phone]. She stated in her message that the patient was discharged unsafely, with no referring doctor, from a medical floor at [facility] and without adequate social supports. She stated when she visited the patient today he was confused regarding his medications. She mentioned in her message that writer could call the office and ask for the nurse, Kathy Manuel, to be paged.  left Ms. Cho a message, but was not able to speak with her.  called the office and left a message with the answering service.  later received a return call from Neema Gallego RN.  informed her that the patient’s brother told the unit NP that he visits the patient daily, and that the patient has a doctor’s appointment on Monday. Ms. Gallego stated the home care case has been closed. She added that the case cannot be re-opened until a doctor signs off on a request for home care. Then, before home care can begin, she will be evaluated by a  and nurse. She reported the patient, if given home care, will only get a visit a few days weekly, for a couple of weeks.  reported this to the [facility] ED NP, but the patient had been discharged from the ED by the time  spoke with Ms. Gallego.  called Ms. Cho back and informed her the patient had been discharged, mentioning the need for an APS report to be made.

## 2018-09-11 NOTE — ED PROVIDER NOTE - OBJECTIVE STATEMENT
This is a 78 year old Male PMHX  BPH CAD DM HTN  Hypercholesterolemia BIBA from home activated by SW for suicidal ideations.  Patient with recent discharge from hospital was seen by home SW called EMS for evaluation r/t medication. Patient accompanied by brother Berto [phone] who reports he sees his brother daily and provides assistant care with brother . Reports he is a person who jokes about things an things are taken out of context . Has no safety concerns for patient or self.  Reports appoint with Dr Vargas [phone] On Monday at 4:30.  Patient is calm and cooperative A + O  x 3 No complaint Denies SI/HI Denies AH/VH Denies ETOH/Illicit drugs REport he was seen today by SW and became frustrated because he pills was incorrectly in pill box. Reports he became frustrated when SW activated EMS and passively states " If I have to go back to the [facility] I would kill myself" Lilly romero does nt have any suciudal or homicidal ideations.

## 2018-09-11 NOTE — ED ADULT NURSE REASSESSMENT NOTE - NS ED NURSE REASSESS COMMENT FT1
Pt calm & cooperative sitting in low acuity tolerated dinner pt  in nad ambulatory denies Si/Hi/AVh presently, eval on going.

## 2018-09-11 NOTE — ED ADULT TRIAGE NOTE - CHIEF COMPLAINT QUOTE
Pt brought in by EMS from home because SW called and stated pt is non compliant with medications. As per EMS when SW was on the phone with 911 they heard on the recorded line the pt saying " I want to kill myself if I have to go to the hospital" Pts brother at bedside states he has been taking his medications and the pt does not want to harm himself. Pt calm and cooperative in triage. Pt denies SI/HI. Pts brother in the waiting room, cell #[phone]

## 2018-09-11 NOTE — ED ADULT NURSE REASSESSMENT NOTE - NS ED NURSE REASSESS COMMENT FT1
Pt calm & cooperative d/c by NP to brother's care pt  denies Si/Hi/AVh presently, family verbalizing understanding of d/c instructions.

## 2018-09-11 NOTE — ED PROVIDER NOTE - CHPI ED SYMPTOMS NEG
no hallucinations/no change in level of consciousness/no paranoia/no homicidal/no suicidal/no disorientation/no weight loss/no weakness

## 2018-09-11 NOTE — ED ADULT NURSE NOTE - OBJECTIVE STATEMENT
Received pt in  pt calm & cooperative pleasant ambulatory pt in [facility] denies, Si/Hi/AVh  presently,  emotional reassurance provided safety & comfort measures maintained eval on going.

## 2018-09-11 NOTE — ED ADULT NURSE NOTE - NSIMPLEMENTINTERV_GEN_ALL_ED
Implemented All Universal Safety Interventions:  [place] to call system. Call bell, personal items and telephone within reach. Instruct patient to call for assistance. Room bathroom lighting operational. Non-slip footwear when patient is off stretcher. Physically safe environment: no spills, clutter or unnecessary equipment. Stretcher in lowest position, wheels locked, appropriate side rails in place.

## 2018-09-11 NOTE — ED ADULT NURSE NOTE - CHIEF COMPLAINT QUOTE
Pt brought in by EMS from home because SW called and stated pt is non compliant with medications. As per EMS when SW was on the phone with 911 they heard on the recorded line the pt saying " I want to kill myself if I have to go to the hospital" Pts brother at bedside states he has been taking his medications and the pt does not want to harm himself. Pt calm and cooperative in triage. Pt denies SI/HI. Pts brother in the waiting room, cell #[phone]

## 2018-09-19 ENCOUNTER — INPATIENT (INPATIENT)
Facility: HOSPITAL | Age: 78
LOS: 11 days | Discharge: NURSING, REHAB OR LONG-TERM CARE | End: 2018-10-01
Attending: LEGAL MEDICINE | Admitting: LEGAL MEDICINE
Payer: MEDICARE

## 2018-09-19 VITALS
HEART RATE: 73 BPM | SYSTOLIC BLOOD PRESSURE: 147 MMHG | RESPIRATION RATE: 16 BRPM | TEMPERATURE: 98 F | DIASTOLIC BLOOD PRESSURE: 66 MMHG | OXYGEN SATURATION: 100 %

## 2018-09-19 DIAGNOSIS — S42.291A OTHER DISPLACED FRACTURE OF UPPER END OF RIGHT HUMERUS, INITIAL ENCOUNTER FOR CLOSED FRACTURE: ICD-10-CM

## 2018-09-19 DIAGNOSIS — I25.10 ATHEROSCLEROTIC HEART DISEASE OF NATIVE CORONARY ARTERY WITHOUT ANGINA PECTORIS: ICD-10-CM

## 2018-09-19 DIAGNOSIS — I10 ESSENTIAL (PRIMARY) HYPERTENSION: ICD-10-CM

## 2018-09-19 DIAGNOSIS — Z29.9 ENCOUNTER FOR PROPHYLACTIC MEASURES, UNSPECIFIED: ICD-10-CM

## 2018-09-19 DIAGNOSIS — R41.0 DISORIENTATION, UNSPECIFIED: ICD-10-CM

## 2018-09-19 DIAGNOSIS — E11.9 TYPE 2 DIABETES MELLITUS WITHOUT COMPLICATIONS: ICD-10-CM

## 2018-09-19 LAB
ALBUMIN SERPL ELPH-MCNC: 3.6 G/DL — SIGNIFICANT CHANGE UP (ref 3.3–5)
ALP SERPL-CCNC: 85 U/L — SIGNIFICANT CHANGE UP (ref 40–120)
ALT FLD-CCNC: 17 U/L — SIGNIFICANT CHANGE UP (ref 4–41)
APPEARANCE UR: CLEAR — SIGNIFICANT CHANGE UP
AST SERPL-CCNC: 22 U/L — SIGNIFICANT CHANGE UP (ref 4–40)
BASOPHILS # BLD AUTO: 0.04 K/UL — SIGNIFICANT CHANGE UP (ref 0–0.2)
BASOPHILS NFR BLD AUTO: 0.3 % — SIGNIFICANT CHANGE UP (ref 0–2)
BILIRUB SERPL-MCNC: 1 MG/DL — SIGNIFICANT CHANGE UP (ref 0.2–1.2)
BILIRUB UR-MCNC: NEGATIVE — SIGNIFICANT CHANGE UP
BLOOD UR QL VISUAL: NEGATIVE — SIGNIFICANT CHANGE UP
BUN SERPL-MCNC: 50 MG/DL — HIGH (ref 7–23)
CALCIUM SERPL-MCNC: 8.6 MG/DL — SIGNIFICANT CHANGE UP (ref 8.4–10.5)
CHLORIDE SERPL-SCNC: 101 MMOL/L — SIGNIFICANT CHANGE UP (ref 98–107)
CO2 SERPL-SCNC: 22 MMOL/L — SIGNIFICANT CHANGE UP (ref 22–31)
COLOR SPEC: SIGNIFICANT CHANGE UP
CREAT SERPL-MCNC: 1.25 MG/DL — SIGNIFICANT CHANGE UP (ref 0.5–1.3)
EOSINOPHIL # BLD AUTO: 0.09 K/UL — SIGNIFICANT CHANGE UP (ref 0–0.5)
EOSINOPHIL NFR BLD AUTO: 0.6 % — SIGNIFICANT CHANGE UP (ref 0–6)
GLUCOSE BLDC GLUCOMTR-MCNC: 203 MG/DL — HIGH (ref 70–99)
GLUCOSE SERPL-MCNC: 154 MG/DL — HIGH (ref 70–99)
GLUCOSE UR-MCNC: 150 — HIGH
HCT VFR BLD CALC: 36.1 % — LOW (ref 39–50)
HGB BLD-MCNC: 12.2 G/DL — LOW (ref 13–17)
IMM GRANULOCYTES # BLD AUTO: 0.11 # — SIGNIFICANT CHANGE UP
IMM GRANULOCYTES NFR BLD AUTO: 0.7 % — SIGNIFICANT CHANGE UP (ref 0–1.5)
KETONES UR-MCNC: NEGATIVE — SIGNIFICANT CHANGE UP
LEUKOCYTE ESTERASE UR-ACNC: NEGATIVE — SIGNIFICANT CHANGE UP
LYMPHOCYTES # BLD AUTO: 1.46 K/UL — SIGNIFICANT CHANGE UP (ref 1–3.3)
LYMPHOCYTES # BLD AUTO: 9.2 % — LOW (ref 13–44)
MCHC RBC-ENTMCNC: 30.6 PG — SIGNIFICANT CHANGE UP (ref 27–34)
MCHC RBC-ENTMCNC: 33.8 % — SIGNIFICANT CHANGE UP (ref 32–36)
MCV RBC AUTO: 90.5 FL — SIGNIFICANT CHANGE UP (ref 80–100)
MONOCYTES # BLD AUTO: 0.86 K/UL — SIGNIFICANT CHANGE UP (ref 0–0.9)
MONOCYTES NFR BLD AUTO: 5.4 % — SIGNIFICANT CHANGE UP (ref 2–14)
NEUTROPHILS # BLD AUTO: 13.29 K/UL — HIGH (ref 1.8–7.4)
NEUTROPHILS NFR BLD AUTO: 83.8 % — HIGH (ref 43–77)
NITRITE UR-MCNC: NEGATIVE — SIGNIFICANT CHANGE UP
NRBC # FLD: 0 — SIGNIFICANT CHANGE UP
PH UR: 6.5 — SIGNIFICANT CHANGE UP (ref 5–8)
PLATELET # BLD AUTO: 248 K/UL — SIGNIFICANT CHANGE UP (ref 150–400)
PMV BLD: 10.9 FL — SIGNIFICANT CHANGE UP (ref 7–13)
POTASSIUM SERPL-MCNC: 5.3 MMOL/L — SIGNIFICANT CHANGE UP (ref 3.5–5.3)
POTASSIUM SERPL-SCNC: 5.3 MMOL/L — SIGNIFICANT CHANGE UP (ref 3.5–5.3)
PROT SERPL-MCNC: 6.5 G/DL — SIGNIFICANT CHANGE UP (ref 6–8.3)
PROT UR-MCNC: 10 — SIGNIFICANT CHANGE UP
RBC # BLD: 3.99 M/UL — LOW (ref 4.2–5.8)
RBC # FLD: 13.8 % — SIGNIFICANT CHANGE UP (ref 10.3–14.5)
SODIUM SERPL-SCNC: 137 MMOL/L — SIGNIFICANT CHANGE UP (ref 135–145)
SP GR SPEC: 1.02 — SIGNIFICANT CHANGE UP (ref 1–1.04)
TROPONIN T, HIGH SENSITIVITY: 14 NG/L — SIGNIFICANT CHANGE UP (ref ?–14)
TROPONIN T, HIGH SENSITIVITY: 15 NG/L — SIGNIFICANT CHANGE UP (ref ?–14)
UROBILINOGEN FLD QL: NORMAL — SIGNIFICANT CHANGE UP
WBC # BLD: 15.85 K/UL — HIGH (ref 3.8–10.5)
WBC # FLD AUTO: 15.85 K/UL — HIGH (ref 3.8–10.5)

## 2018-09-19 PROCEDURE — 99223 1ST HOSP IP/OBS HIGH 75: CPT

## 2018-09-19 PROCEDURE — 71045 X-RAY EXAM CHEST 1 VIEW: CPT | Mod: 26

## 2018-09-19 PROCEDURE — 73521 X-RAY EXAM HIPS BI 2 VIEWS: CPT | Mod: 26

## 2018-09-19 PROCEDURE — 73030 X-RAY EXAM OF SHOULDER: CPT | Mod: 26,RT

## 2018-09-19 PROCEDURE — 73080 X-RAY EXAM OF ELBOW: CPT | Mod: 26,RT

## 2018-09-19 PROCEDURE — 73060 X-RAY EXAM OF HUMERUS: CPT | Mod: 26,RT,76

## 2018-09-19 PROCEDURE — 73060 X-RAY EXAM OF HUMERUS: CPT | Mod: 26,RT,77

## 2018-09-19 PROCEDURE — 70450 CT HEAD/BRAIN W/O DYE: CPT | Mod: 26

## 2018-09-19 PROCEDURE — 73200 CT UPPER EXTREMITY W/O DYE: CPT | Mod: 26,RT

## 2018-09-19 PROCEDURE — 76376 3D RENDER W/INTRP POSTPROCES: CPT | Mod: 26

## 2018-09-19 RX ORDER — ASPIRIN/CALCIUM CARB/MAGNESIUM 324 MG
81 TABLET ORAL DAILY
Qty: 0 | Refills: 0 | Status: DISCONTINUED | OUTPATIENT
Start: 2018-09-19 | End: 2018-09-21

## 2018-09-19 RX ORDER — CLOPIDOGREL BISULFATE 75 MG/1
75 TABLET, FILM COATED ORAL DAILY
Qty: 0 | Refills: 0 | Status: DISCONTINUED | OUTPATIENT
Start: 2018-09-19 | End: 2018-09-21

## 2018-09-19 RX ORDER — LATANOPROST 0.05 MG/ML
1 SOLUTION/ DROPS OPHTHALMIC; TOPICAL AT BEDTIME
Qty: 0 | Refills: 0 | Status: DISCONTINUED | OUTPATIENT
Start: 2018-09-19 | End: 2018-10-01

## 2018-09-19 RX ORDER — METOPROLOL TARTRATE 50 MG
50 TABLET ORAL DAILY
Qty: 0 | Refills: 0 | Status: DISCONTINUED | OUTPATIENT
Start: 2018-09-19 | End: 2018-10-01

## 2018-09-19 RX ORDER — MORPHINE SULFATE 50 MG/1
4 CAPSULE, EXTENDED RELEASE ORAL ONCE
Qty: 0 | Refills: 0 | Status: DISCONTINUED | OUTPATIENT
Start: 2018-09-19 | End: 2018-09-19

## 2018-09-19 RX ORDER — LISINOPRIL 2.5 MG/1
40 TABLET ORAL DAILY
Qty: 0 | Refills: 0 | Status: DISCONTINUED | OUTPATIENT
Start: 2018-09-19 | End: 2018-10-01

## 2018-09-19 RX ORDER — MORPHINE SULFATE 50 MG/1
2 CAPSULE, EXTENDED RELEASE ORAL ONCE
Qty: 0 | Refills: 0 | Status: DISCONTINUED | OUTPATIENT
Start: 2018-09-19 | End: 2018-09-19

## 2018-09-19 RX ORDER — ATORVASTATIN CALCIUM 80 MG/1
80 TABLET, FILM COATED ORAL AT BEDTIME
Qty: 0 | Refills: 0 | Status: DISCONTINUED | OUTPATIENT
Start: 2018-09-19 | End: 2018-10-01

## 2018-09-19 RX ORDER — HEPARIN SODIUM 5000 [USP'U]/ML
5000 INJECTION INTRAVENOUS; SUBCUTANEOUS EVERY 8 HOURS
Qty: 0 | Refills: 0 | Status: DISCONTINUED | OUTPATIENT
Start: 2018-09-19 | End: 2018-09-21

## 2018-09-19 RX ORDER — ACETAMINOPHEN 500 MG
650 TABLET ORAL ONCE
Qty: 0 | Refills: 0 | Status: COMPLETED | OUTPATIENT
Start: 2018-09-19 | End: 2018-09-19

## 2018-09-19 RX ORDER — INFLUENZA VIRUS VACCINE 15; 15; 15; 15 UG/.5ML; UG/.5ML; UG/.5ML; UG/.5ML
0.5 SUSPENSION INTRAMUSCULAR ONCE
Qty: 0 | Refills: 0 | Status: DISCONTINUED | OUTPATIENT
Start: 2018-09-19 | End: 2018-10-01

## 2018-09-19 RX ORDER — TAMSULOSIN HYDROCHLORIDE 0.4 MG/1
0.4 CAPSULE ORAL AT BEDTIME
Qty: 0 | Refills: 0 | Status: DISCONTINUED | OUTPATIENT
Start: 2018-09-19 | End: 2018-10-01

## 2018-09-19 RX ORDER — OXYCODONE AND ACETAMINOPHEN 5; 325 MG/1; MG/1
1 TABLET ORAL ONCE
Qty: 0 | Refills: 0 | Status: DISCONTINUED | OUTPATIENT
Start: 2018-09-19 | End: 2018-09-19

## 2018-09-19 RX ADMIN — HEPARIN SODIUM 5000 UNIT(S): 5000 INJECTION INTRAVENOUS; SUBCUTANEOUS at 23:00

## 2018-09-19 RX ADMIN — MORPHINE SULFATE 4 MILLIGRAM(S): 50 CAPSULE, EXTENDED RELEASE ORAL at 17:00

## 2018-09-19 RX ADMIN — LATANOPROST 1 DROP(S): 0.05 SOLUTION/ DROPS OPHTHALMIC; TOPICAL at 23:00

## 2018-09-19 RX ADMIN — Medication 650 MILLIGRAM(S): at 01:00

## 2018-09-19 RX ADMIN — ATORVASTATIN CALCIUM 80 MILLIGRAM(S): 80 TABLET, FILM COATED ORAL at 23:00

## 2018-09-19 RX ADMIN — Medication 650 MILLIGRAM(S): at 12:49

## 2018-09-19 RX ADMIN — OXYCODONE AND ACETAMINOPHEN 1 TABLET(S): 5; 325 TABLET ORAL at 23:00

## 2018-09-19 RX ADMIN — MORPHINE SULFATE 4 MILLIGRAM(S): 50 CAPSULE, EXTENDED RELEASE ORAL at 14:21

## 2018-09-19 RX ADMIN — MORPHINE SULFATE 4 MILLIGRAM(S): 50 CAPSULE, EXTENDED RELEASE ORAL at 17:15

## 2018-09-19 RX ADMIN — MORPHINE SULFATE 4 MILLIGRAM(S): 50 CAPSULE, EXTENDED RELEASE ORAL at 14:58

## 2018-09-19 RX ADMIN — TAMSULOSIN HYDROCHLORIDE 0.4 MILLIGRAM(S): 0.4 CAPSULE ORAL at 23:00

## 2018-09-19 NOTE — ED ADULT NURSE REASSESSMENT NOTE - NS ED NURSE REASSESS COMMENT FT1
Patient awake and alert, c/o right arm/shoulder pain. IV placed, labs sent, urine sent. Medicated for pain as ordered. Will continue to monitor.

## 2018-09-19 NOTE — ED ADULT NURSE REASSESSMENT NOTE - NS ED NURSE REASSESS COMMENT FT1
Report given to 5 [facility] RN. awaiting transport, presently patient is resting in no distress. Right arm with immobilizer  in place, fingers warm and mobile.

## 2018-09-19 NOTE — ED PROVIDER NOTE - ATTENDING CONTRIBUTION TO CARE
CAD with 2 stents, DM, HTN, hyperlipidemia.   s/p fall at bank. Hurt his right elbow and shoulder. States the wind knocked him over. Was dizzy prior to falling. Denies loc. Denies chest pain, sob.

## 2018-09-19 NOTE — H&P ADULT - HISTORY OF PRESENT ILLNESS
77 y/o M with DM, HTN, HLD, CAD s/p stents p/w shoulder pain after fall.  Pt reports he was walking to the bank, and lost his balance with the wind causing him to fall backwards.  When questioned, he also reports feeling lightheaded, but did not lose consciousness or strike his head.  Pt is unclear regarding further details of his presentation, and at times thinks he has been in the hospital for several days.  He reports some pain in his R arm and shoulder, but otherwise states that he feels well.  Denies any recent illness.  Pt has walked with a cane for some time, and reports he recently acquired a walker, however, he was using his cane when he fell today.      In the ED: pt had X-ray showing fracture of proximal R humerus.  He was evaluated by orthopedics, and had brace applied to R arm.

## 2018-09-19 NOTE — H&P ADULT - NSHPPHYSICALEXAM_GEN_ALL_CORE
Vital Signs Last 24 Hrs  T(C): 36 (19 Sep 2018 20:58), Max: 36.9 (19 Sep 2018 20:07)  T(F): 96.8 (19 Sep 2018 20:58), Max: 98.4 (19 Sep 2018 20:07)  HR: 72 (19 Sep 2018 20:58) (65 - 80)  BP: 141/57 (19 Sep 2018 20:58) (132/62 - 175/57)  BP(mean): --  RR: 16 (19 Sep 2018 20:58) (16 - 18)  SpO2: 100% (19 Sep 2018 20:58) (98% - 100%)    PHYSICAL EXAM:  GENERAL: No Acute Distress  EYES: conjunctiva and sclera clear  ENMT: Moist mucous membranes   NECK: Supple  CHEST/LUNG: Clear to auscultation bilaterally  HEART: Regular rate and rhythm; No murmurs, rubs, or gallops  ABDOMEN: Soft, Nontender, Nondistended; Bowel sounds normal  EXTREMITIES:   No clubbing, or cyanosis. 1+ edema to ankles  PSYCH: Normal Affect, Normal Behavior  NEUROLOGY:   - Mental status Alert, oriented to self, place, month and year (states sept 14th 2018,) but pt is intermittently confused (thinks he has been in the hospital for 3 days, and thinks he has met me before)   SKIN: No rashes or lesions  MUSCULOSKELETAL: L arm in brace.  No joint swelling

## 2018-09-19 NOTE — H&P ADULT - PROBLEM SELECTOR PLAN 2
Possible MCI/early dementia.  Pt with confusion on recent admission, and current findings appear to be similar.

## 2018-09-19 NOTE — ED PROVIDER NOTE - PROGRESS NOTE DETAILS
PA NOEMÍ: Pending CT head. Patient laying comfortably in bed NAD, c/o R arm pain, will order morphine 4mg. PA NOEMÍ: CT head neg acute finding. Pt seen & evaluated by ortho, reduced with splint applied. Pt was able to stand but unable to move more than 1 steps with cane. Discussed with attending, patient is very shaky, risk of fall, plus patient live alone by himself. Spoke with hospitalist, accepted patient for admission. MAR text paged. PA NOEMÍ: CT head neg acute finding. Xray shows comminuted fx of proximal humerus with displacement. Pt seen & evaluated by ortho, reduced with splint applied. Pt was able to stand but unable to move more than 1 steps with cane. Discussed with attending, patient is very shaky, risk of fall, plus patient live alone by himself. Spoke with hospitalist, accepted patient for admission. MAR text paged. Davonte NOVOA: Pt signed out to me.  He has been evaluated and splinted by ortho.  Upon re-evaluation, he appears extremely unsteady on his feet, unable to ambulate even with his cane and assistance.  Pt lives on his own, has no assistance at home.  Will admit for inability to ambulate. PA NOEMÍ: CT head neg acute finding. Xray shows comminuted fx of proximal humerus with displacement. Pt seen & evaluated by ortho, request add on of CT shoulder. Pt's fx reduced by ortho with splint applied. Pt was able to stand but unable to move more than 1 steps with cane. Discussed with attending, patient is very shaky, risk of fall, plus patient live alone by himself. Spoke with hospitalist, accepted patient for admission. MAR text paged.

## 2018-09-19 NOTE — H&P ADULT - PROBLEM SELECTOR PLAN 1
- orthopedics consult reviewed   - outpatient f/u.  Continue bracing  - PT eval for fall  - SW for possible increased home services

## 2018-09-19 NOTE — H&P ADULT - NSHPLABSRESULTS_GEN_ALL_CORE
137  |  101  |  50<H>  ----------------------------<  154<H>  5.3   |  22  |  1.25    Ca    8.6      19 Sep 2018 12:50    TPro  6.5  /  Alb  3.6  /  TBili  1.0  /  DBili  x   /  AST  22  /  ALT  17  /  AlkPhos  85                              12.2   15.85 )-----------( 248      ( 19 Sep 2018 12:50 )             36.1                 Urinalysis Basic - ( 19 Sep 2018 11:32 )    Color: LIGHT YELLOW / Appearance: CLEAR / S.020 / pH: 6.5  Gluc: 150 / Ketone: NEGATIVE  / Bili: NEGATIVE / Urobili: NORMAL   Blood: NEGATIVE / Protein: 10 / Nitrite: NEGATIVE   Leuk Esterase: NEGATIVE / RBC: x / WBC x   Sq Epi: x / Non Sq Epi: x / Bacteria: x    < from: Xray Humerus, Right (18 @ 16:52) >    acute displaced proximal humerus fracture with overlying   soft tissue swelling    < end of copied text >

## 2018-09-19 NOTE — ED PROVIDER NOTE - NONTENDER LOCATION
suprapubic/left lower quadrant/right costovertebral angle/periumbilical/left costovertebral angle/left upper quadrant/right upper quadrant/right lower quadrant/umbilical

## 2018-09-19 NOTE — H&P ADULT - PROBLEM SELECTOR PLAN 5
- hold glimepiride   - Humalog sliding scale - hold glimepiride and metformin  - Humalog sliding scale

## 2018-09-19 NOTE — CONSULT NOTE ADULT - SUBJECTIVE AND OBJECTIVE BOX
CC: right arm pain  HPI:   78yMale w/ hx of DM, HTN, CAD who presents complaining of right arm pain. Patient was walking to the bank when he says he felt "off balance" and fell backwards landing on his right side. He denies hitting his head or LOC. Denies fevers, chills, chest pain, sob, n/v. Denies other injuries or pain elsewhere    Review of systems: As per HPI, otherwise negative.     PAST MEDICAL & SURGICAL HISTORY:  BPH (benign prostatic hyperplasia)  CAD (Coronary Artery Disease): s/p cardiac stent ( &gt; 20 years ago)  HTN (Hypertension)  DM (Diabetes Mellitus)  Hypercholesterolemia  Coronary Stent: x 2 ( 20 years )    Family History: FAMILY HISTORY:  No pertinent family history in first degree relatives: No family history pertinent to this admission      Medications: MEDICATIONS  (STANDING):    MEDICATIONS  (PRN):      T(C): 36.7 (09-19-18 @ 10:21), Max: 36.7 (09-19-18 @ 10:21)  HR: 80 (09-19-18 @ 17:05) (65 - 80)  BP: 175/57 (09-19-18 @ 17:05) (139/54 - 175/57)  RR: 16 (09-19-18 @ 17:05) (16 - 18)  SpO2: 99% (09-19-18 @ 17:05) (99% - 100%)  Wt(kg): --                        12.2   15.85 )-----------( 248      ( 19 Sep 2018 12:50 )             36.1     09-19    137  |  101  |  50<H>  ----------------------------<  154<H>  5.3   |  22  |  1.25    Ca    8.6      19 Sep 2018 12:50    TPro  6.5  /  Alb  3.6  /  TBili  1.0  /  DBili  x   /  AST  22  /  ALT  17  /  AlkPhos  85  09-19      EXAM:  Awake, Alert and in no acute distress  Resp: no increase work of breathing  RUE:   - skin intact. gross deformity of upper arm. Skin intact. + ecchymosis of lateral upper arm. Tender to palpation over the deformity  - WWP. + radial/ulnar pulse  - No tenderness of shoulder, elbow, wrist.  - AIN/PIN/IO intact  - SILT to radial/ulnar/median nerve distributions.      Imaging  Right humerus xray: right comminuted midshaft transverse humerus fracture

## 2018-09-19 NOTE — ED PROVIDER NOTE - OBJECTIVE STATEMENT
77 yo M w/ PMH of DMII, HTN, HLD, CADx2 stents who BIBEMS presents to the ER c/o s/p fall with right shoulder & elbow pain today.  Pt states he was walking to the bank, felt dizzy "like someone or wind knocked him back", landed on the back, no LOC, no head trauma. Reports currently has no pain only worse with movement of right arm. Denies any fever, chills, n/v/d/c, headache, chest pain, sob, abdominal pain, back pain dysuria, leg swelling, weakness, numbness, recent travel or any other complaints.

## 2018-09-19 NOTE — ED ADULT NURSE NOTE - NSIMPLEMENTINTERV_GEN_ALL_ED
Implemented All Fall Risk Interventions:  [place] to call system. Call bell, personal items and telephone within reach. Instruct patient to call for assistance. Room bathroom lighting operational. Non-slip footwear when patient is off stretcher. Physically safe environment: no spills, clutter or unnecessary equipment. Stretcher in lowest position, wheels locked, appropriate side rails in place. Provide visual cue, wrist band, yellow gown, etc. Monitor gait and stability. Monitor for mental status changes and reorient to person, place, and time. Review medications for side effects contributing to fall risk. Reinforce activity limits and safety measures with patient and family.

## 2018-09-19 NOTE — ED PROVIDER NOTE - CARE PLAN
Principal Discharge DX:	Other closed displaced fracture of proximal end of right humerus, initial encounter

## 2018-09-19 NOTE — ED PROVIDER NOTE - EXTREMITY EXAM
Right shoulder with limited ROM, mild tenderness on anterior aspect of shoulder, no erythema, no edema, no obvious deformity; Right elbow with passive ROM, no erythema, no tenderness, no edema, no obvious deformity; left arm with bruising on posterior aspect of humerus without any tenderness or obvious deformity; left shoulder good ROM; right elbow good ROM; hip without any tenderness. Knee with ROM b/l, ankle with ROM b/l.

## 2018-09-19 NOTE — ED ADULT NURSE NOTE - OBJECTIVE STATEMENT
patient brought to room 19 awake and alert, s/p falling today outside by a bank. C/o right shoulder pain, unable to lift up his right arm due to the pain.  Denies hitting his head.  Alert and awake  x 4 .

## 2018-09-19 NOTE — ED ADULT TRIAGE NOTE - CHIEF COMPLAINT QUOTE
Pt BIBEMS for fall injuring Rt elbow and rt shoulder, denies head trauma, no LOC, pt states he was dizzy while walking and fell on side.

## 2018-09-20 DIAGNOSIS — F03.90 UNSPECIFIED DEMENTIA WITHOUT BEHAVIORAL DISTURBANCE: ICD-10-CM

## 2018-09-20 LAB
BASOPHILS # BLD AUTO: 0.04 K/UL — SIGNIFICANT CHANGE UP (ref 0–0.2)
BASOPHILS NFR BLD AUTO: 0.3 % — SIGNIFICANT CHANGE UP (ref 0–2)
BUN SERPL-MCNC: 32 MG/DL — HIGH (ref 7–23)
CALCIUM SERPL-MCNC: 8.9 MG/DL — SIGNIFICANT CHANGE UP (ref 8.4–10.5)
CHLORIDE SERPL-SCNC: 101 MMOL/L — SIGNIFICANT CHANGE UP (ref 98–107)
CO2 SERPL-SCNC: 24 MMOL/L — SIGNIFICANT CHANGE UP (ref 22–31)
CREAT SERPL-MCNC: 0.91 MG/DL — SIGNIFICANT CHANGE UP (ref 0.5–1.3)
EOSINOPHIL # BLD AUTO: 0.05 K/UL — SIGNIFICANT CHANGE UP (ref 0–0.5)
EOSINOPHIL NFR BLD AUTO: 0.4 % — SIGNIFICANT CHANGE UP (ref 0–6)
GLUCOSE BLDC GLUCOMTR-MCNC: 137 MG/DL — HIGH (ref 70–99)
GLUCOSE BLDC GLUCOMTR-MCNC: 151 MG/DL — HIGH (ref 70–99)
GLUCOSE BLDC GLUCOMTR-MCNC: 170 MG/DL — HIGH (ref 70–99)
GLUCOSE BLDC GLUCOMTR-MCNC: 205 MG/DL — HIGH (ref 70–99)
GLUCOSE SERPL-MCNC: 172 MG/DL — HIGH (ref 70–99)
HCT VFR BLD CALC: 35.1 % — LOW (ref 39–50)
HGB BLD-MCNC: 11.7 G/DL — LOW (ref 13–17)
IMM GRANULOCYTES # BLD AUTO: 0.06 # — SIGNIFICANT CHANGE UP
IMM GRANULOCYTES NFR BLD AUTO: 0.5 % — SIGNIFICANT CHANGE UP (ref 0–1.5)
LYMPHOCYTES # BLD AUTO: 1.11 K/UL — SIGNIFICANT CHANGE UP (ref 1–3.3)
LYMPHOCYTES # BLD AUTO: 9 % — LOW (ref 13–44)
MAGNESIUM SERPL-MCNC: 1.5 MG/DL — LOW (ref 1.6–2.6)
MCHC RBC-ENTMCNC: 29.8 PG — SIGNIFICANT CHANGE UP (ref 27–34)
MCHC RBC-ENTMCNC: 33.3 % — SIGNIFICANT CHANGE UP (ref 32–36)
MCV RBC AUTO: 89.3 FL — SIGNIFICANT CHANGE UP (ref 80–100)
MONOCYTES # BLD AUTO: 0.89 K/UL — SIGNIFICANT CHANGE UP (ref 0–0.9)
MONOCYTES NFR BLD AUTO: 7.2 % — SIGNIFICANT CHANGE UP (ref 2–14)
NEUTROPHILS # BLD AUTO: 10.14 K/UL — HIGH (ref 1.8–7.4)
NEUTROPHILS NFR BLD AUTO: 82.6 % — HIGH (ref 43–77)
NRBC # FLD: 0 — SIGNIFICANT CHANGE UP
PHOSPHATE SERPL-MCNC: 2.7 MG/DL — SIGNIFICANT CHANGE UP (ref 2.5–4.5)
PLATELET # BLD AUTO: 241 K/UL — SIGNIFICANT CHANGE UP (ref 150–400)
PMV BLD: 10.8 FL — SIGNIFICANT CHANGE UP (ref 7–13)
POTASSIUM SERPL-MCNC: 4.5 MMOL/L — SIGNIFICANT CHANGE UP (ref 3.5–5.3)
POTASSIUM SERPL-SCNC: 4.5 MMOL/L — SIGNIFICANT CHANGE UP (ref 3.5–5.3)
RBC # BLD: 3.93 M/UL — LOW (ref 4.2–5.8)
RBC # FLD: 13.6 % — SIGNIFICANT CHANGE UP (ref 10.3–14.5)
SODIUM SERPL-SCNC: 137 MMOL/L — SIGNIFICANT CHANGE UP (ref 135–145)
WBC # BLD: 12.29 K/UL — HIGH (ref 3.8–10.5)
WBC # FLD AUTO: 12.29 K/UL — HIGH (ref 3.8–10.5)

## 2018-09-20 PROCEDURE — 90792 PSYCH DIAG EVAL W/MED SRVCS: CPT

## 2018-09-20 RX ORDER — INSULIN LISPRO 100/ML
VIAL (ML) SUBCUTANEOUS
Qty: 0 | Refills: 0 | Status: DISCONTINUED | OUTPATIENT
Start: 2018-09-20 | End: 2018-10-01

## 2018-09-20 RX ORDER — ACETAMINOPHEN 500 MG
650 TABLET ORAL EVERY 6 HOURS
Qty: 0 | Refills: 0 | Status: DISCONTINUED | OUTPATIENT
Start: 2018-09-20 | End: 2018-10-01

## 2018-09-20 RX ORDER — DEXTROSE 50 % IN WATER 50 %
25 SYRINGE (ML) INTRAVENOUS ONCE
Qty: 0 | Refills: 0 | Status: DISCONTINUED | OUTPATIENT
Start: 2018-09-20 | End: 2018-10-01

## 2018-09-20 RX ORDER — HALOPERIDOL DECANOATE 100 MG/ML
1 INJECTION INTRAMUSCULAR EVERY 6 HOURS
Qty: 0 | Refills: 0 | Status: DISCONTINUED | OUTPATIENT
Start: 2018-09-20 | End: 2018-10-01

## 2018-09-20 RX ORDER — INSULIN LISPRO 100/ML
VIAL (ML) SUBCUTANEOUS AT BEDTIME
Qty: 0 | Refills: 0 | Status: DISCONTINUED | OUTPATIENT
Start: 2018-09-20 | End: 2018-10-01

## 2018-09-20 RX ORDER — GLUCAGON INJECTION, SOLUTION 0.5 MG/.1ML
1 INJECTION, SOLUTION SUBCUTANEOUS ONCE
Qty: 0 | Refills: 0 | Status: DISCONTINUED | OUTPATIENT
Start: 2018-09-20 | End: 2018-10-01

## 2018-09-20 RX ORDER — DEXTROSE 50 % IN WATER 50 %
12.5 SYRINGE (ML) INTRAVENOUS ONCE
Qty: 0 | Refills: 0 | Status: DISCONTINUED | OUTPATIENT
Start: 2018-09-20 | End: 2018-10-01

## 2018-09-20 RX ORDER — TRAZODONE HCL 50 MG
25 TABLET ORAL AT BEDTIME
Qty: 0 | Refills: 0 | Status: DISCONTINUED | OUTPATIENT
Start: 2018-09-20 | End: 2018-10-01

## 2018-09-20 RX ORDER — DEXTROSE 50 % IN WATER 50 %
15 SYRINGE (ML) INTRAVENOUS ONCE
Qty: 0 | Refills: 0 | Status: DISCONTINUED | OUTPATIENT
Start: 2018-09-20 | End: 2018-10-01

## 2018-09-20 RX ORDER — LANOLIN ALCOHOL/MO/W.PET/CERES
6 CREAM (GRAM) TOPICAL AT BEDTIME
Qty: 0 | Refills: 0 | Status: DISCONTINUED | OUTPATIENT
Start: 2018-09-20 | End: 2018-10-01

## 2018-09-20 RX ORDER — MAGNESIUM SULFATE 500 MG/ML
1 VIAL (ML) INJECTION ONCE
Qty: 0 | Refills: 0 | Status: COMPLETED | OUTPATIENT
Start: 2018-09-20 | End: 2018-09-20

## 2018-09-20 RX ADMIN — Medication 2: at 13:10

## 2018-09-20 RX ADMIN — Medication 650 MILLIGRAM(S): at 11:30

## 2018-09-20 RX ADMIN — Medication 25 MILLIGRAM(S): at 21:21

## 2018-09-20 RX ADMIN — Medication 6 MILLIGRAM(S): at 21:20

## 2018-09-20 RX ADMIN — CLOPIDOGREL BISULFATE 75 MILLIGRAM(S): 75 TABLET, FILM COATED ORAL at 13:14

## 2018-09-20 RX ADMIN — ATORVASTATIN CALCIUM 80 MILLIGRAM(S): 80 TABLET, FILM COATED ORAL at 21:14

## 2018-09-20 RX ADMIN — HEPARIN SODIUM 5000 UNIT(S): 5000 INJECTION INTRAVENOUS; SUBCUTANEOUS at 21:20

## 2018-09-20 RX ADMIN — TAMSULOSIN HYDROCHLORIDE 0.4 MILLIGRAM(S): 0.4 CAPSULE ORAL at 21:22

## 2018-09-20 RX ADMIN — Medication 1: at 08:41

## 2018-09-20 RX ADMIN — Medication 1: at 18:11

## 2018-09-20 RX ADMIN — HEPARIN SODIUM 5000 UNIT(S): 5000 INJECTION INTRAVENOUS; SUBCUTANEOUS at 06:26

## 2018-09-20 RX ADMIN — Medication 81 MILLIGRAM(S): at 13:14

## 2018-09-20 RX ADMIN — Medication 100 GRAM(S): at 10:40

## 2018-09-20 RX ADMIN — Medication 50 MILLIGRAM(S): at 06:26

## 2018-09-20 RX ADMIN — HEPARIN SODIUM 5000 UNIT(S): 5000 INJECTION INTRAVENOUS; SUBCUTANEOUS at 13:10

## 2018-09-20 RX ADMIN — LATANOPROST 1 DROP(S): 0.05 SOLUTION/ DROPS OPHTHALMIC; TOPICAL at 21:16

## 2018-09-20 RX ADMIN — LISINOPRIL 40 MILLIGRAM(S): 2.5 TABLET ORAL at 06:26

## 2018-09-20 RX ADMIN — Medication 650 MILLIGRAM(S): at 10:40

## 2018-09-20 RX ADMIN — OXYCODONE AND ACETAMINOPHEN 1 TABLET(S): 5; 325 TABLET ORAL at 00:00

## 2018-09-20 NOTE — OCCUPATIONAL THERAPY INITIAL EVALUATION ADULT - PERTINENT HX OF CURRENT PROBLEM, REHAB EVAL
Pt is a 78 year old Male with hx of DM, HTN, HLD, & CAD s/p stents, who presented to [facility] on 9/19/18 with shoulder pain after fall. CT Right Shoulder No Contrast on 9/19/18 displayed acute comminuted markedly angulated fracture the midshaft of the right humerus. Pt was evaluated by orthopedic team, and had brace applied to R arm.

## 2018-09-20 NOTE — BEHAVIORAL HEALTH ASSESSMENT NOTE - NSBHCONSULTOBSREASON_PSY_A_CORE FT
patient noted with [facility] staff. NO SI endorsed. Use as per primary medical team decision. no indication for enhanced supervision

## 2018-09-20 NOTE — BEHAVIORAL HEALTH ASSESSMENT NOTE - HPI (INCLUDE ILLNESS QUALITY, SEVERITY, DURATION, TIMING, CONTEXT, MODIFYING FACTORS, ASSOCIATED SIGNS AND SYMPTOMS)
Patient is a 78y male with no past psychiatric history,  no suicidal attempts ,HTN , hypercholes, DM resides alone,  admitted for syncope and concerns for safety at home.     Patient is alert, pleasant on approach.  Patient frequently making jokes, at times using jokes to avoid orientation questions.  Patient as per medical team has been AOx2 on previous assessments. Does report feeling confused at times.  At this time, aware it is September, does not answer year, knows name and why in the hospital.  Patient denies change in mood.   Does recall seeing psychologist during teen years but denies medication or current need for intervention.  No formal psychiatric hx or med trials. At this time, he denies auditory or visual hallucinations, paranoia, passive or active suicidal or homicidal ideation, intent or plan. Patient reports unrealistic goals as his protective factors such as traveling to [place] and buying a new sports car, however does discuss future orientation about moving back into his apartment and seeing family.  Patient denies substance use. He reports poor sleep, good appetite. No PRNs used this admission on chart review.     Collateral: Berto Mitchell, [phone], reports patient is "lonely" and would benefit being connected to resources to help occupy his time. Denies brother has made Suicidal statements. Denies agitation or aggression at home.  Does report being forgetful at times. He confirms no formal psychiatric hx.  He is currently in [place] and will be returning 9/6/18 stating he would like to help his brother settle in at home. Patient is a 79yo M, Domiciled at home alone w HHA, ambulates with cane, PHX early onset dementia, no psychiatric history, no history of SA or substance abuse. PHMX DM, HTN, HLD, CAD s/p stents. Pt presents to ED C/O shoulder pain after fall. Per chart Pt reports walking to the bank, and losing his balance with the wind causing him to fall backwards. Pt reports feeling lightheaded, but did not lose consciousness or strike his head.  Pt is unclear regarding further details of his presentation, and at times thinks he has been in the hospital for several days.  He reports some pain in his R arm and shoulder, but otherwise states that he feels well. Psychiatry consulted for delirium/ dementia      X-ray showing fracture of proximal R humerus.  Pt was evaluated by orthopedics, and had brace applied to R arm.    Seen and evaluated. Pt is alert, pleasant on approach. PT a&ox1, Able to state name, Reports that he is not sure where he is States "Im not good with numbers".  States date is 19. Pt disorganized and not able to give full history. Pt frequently making jokes, at times using jokes to avoid orientation questions. Pt reports feeling confused at times. States "I get confused, but It doesn't bother me". Reports that he is in the hospital because "the wind blew on a tree and a branch of the tree broke and hit me and I fell".  Reports feeling R shoulder pain subsequent to his fall. Reports that he lives at home alone and has a HHA. Pt reports mood as "good". Denies auditory or visual hallucinations, paranoia, passive or active suicidal or homicidal ideation, intent or plan. Pt denies past psychiatric history, treatment, hospitalization, suicide attempt, or substance use. Reports poor sleep, good appetite. Pt states that he is a happy man that enjoys "women and gambling". No PRNs used this admission on chart review.     LM for pts brother Berto Mitchell, [phone],   Per recent collateral obtained from pt's brother Berto Mitchell, [phone], (9/5). Reports patient is "lonely" and would benefit being connected to resources to help occupy his time. Denies brother has made Suicidal statements. Denies agitation or aggression at home.  Does report being forgetful at times. He confirms no formal psychiatric hx.

## 2018-09-20 NOTE — PHYSICAL THERAPY INITIAL EVALUATION ADULT - RANGE OF MOTION EXAMINATION, REHAB EVAL
Left UE ROM was WFL (within functional limits)/bilateral lower extremity ROM was WFL (within functional limits)/right UE not assessed

## 2018-09-20 NOTE — BEHAVIORAL HEALTH ASSESSMENT NOTE - NSBHCHARTREVIEWVS_PSY_A_CORE FT
Vital Signs Last 24 Hrs  T(C): 36.5 (20 Sep 2018 15:34), Max: 36.9 (19 Sep 2018 20:07)  T(F): 97.7 (20 Sep 2018 15:34), Max: 98.4 (19 Sep 2018 20:07)  HR: 78 (20 Sep 2018 15:34) (72 - 78)  BP: 113/51 (20 Sep 2018 15:34) (113/51 - 141/57)  BP(mean): --  RR: 18 (20 Sep 2018 15:34) (16 - 18)  SpO2: 98% (20 Sep 2018 15:34) (98% - 100%)

## 2018-09-20 NOTE — BEHAVIORAL HEALTH ASSESSMENT NOTE - CASE SUMMARY
Patient is a 78y male with no past psychiatric history,  no suicidal attempts, HTN , hypercholesterolemia, DM, resides alone,  admitted for syncope and concerns for safety at home. Consulted for PRN medications for agitation. NONE given this admission.   Patient as per medical team has been AOx2 - patient does report some confusion at times, being forgetful.  At this time, aware it is September, does not answer year, knows name and why in the hospital.  Patient denies change in mood.   Does recall seeing psychologist during teen years but denies medication.  No formal psychiatric hx of med trials. At this time, he denies auditory or visual hallucinations, paranoia, passive or active suicidal or homicidal ideation, intent or plan.   Patient denies substance use. He reports poor sleep, good appetite. Pt may benefit from standing Melatonin and Trazodone 25mg as a prn for insomnia. reviewed behavioral health note, agree with impression and plan. Please see progress note from today for impression and updated plan.

## 2018-09-20 NOTE — OCCUPATIONAL THERAPY INITIAL EVALUATION ADULT - GENERAL OBSERVATIONS, REHAB EVAL
Pt. received sitting on recliner chair in hallway outside room/near unit receptionist (UR). No acute distress. Patient agreed to evaluation from Occupational Therapist. +Heplock, +Right UE brace. PT present bedside to perform PT evaluation in conjunction with OT evaluation.

## 2018-09-20 NOTE — BEHAVIORAL HEALTH ASSESSMENT NOTE - NSBHCONSULTRECOMMENDOTHER_PSY_A_CORE FT
minimize the use of benzos, opiods, anticholinergics, and other deuterogenic agents whenever possible. Maintain sleep wake cycle.   Monitor QTC hold antipsychotic for QTC <50)    Psychiatry will follow

## 2018-09-20 NOTE — PHYSICAL THERAPY INITIAL EVALUATION ADULT - GENERAL OBSERVATIONS, REHAB EVAL
Consult received, chart reviewed. Patient received seated in recliner chair,  NAD, +right UE brace. Patient agreed to Evaluation from Physical Therapist.

## 2018-09-20 NOTE — OCCUPATIONAL THERAPY INITIAL EVALUATION ADULT - LEVEL OF INDEPENDENCE: SIT/SUPINE, REHAB EVAL
Not assessed. Pt left sitting in recliner chair outside room/near UR as received. No acute distress. Chair alarm activated. All lines intact and precautions maintained. RNDeborah made aware.

## 2018-09-20 NOTE — OCCUPATIONAL THERAPY INITIAL EVALUATION ADULT - MD ORDER
Occupational Therapy (OT) to evaluate and treat. Per PABLO Cabrera, pt is okay to participate in OT evaluation and perform activity as tolerated.

## 2018-09-20 NOTE — OCCUPATIONAL THERAPY INITIAL EVALUATION ADULT - DIAGNOSIS, OT EVAL
Acute comminuted markedly angulated fracture the midshaft of the right humerus; AAO x1; Decreased ability to follow commands; Decreased functional use of Right UE; Decreased standing balance; Decreased functional mobility; Decreased ADL management

## 2018-09-20 NOTE — PROGRESS NOTE ADULT - SUBJECTIVE AND OBJECTIVE BOX
FESTUS BLAND  78y  Male      Patient is a 78y old  Male who presents with a chief complaint of Fall, shoulder pain (19 Sep 2018 20:37)  Above noted.s/p fall.s/p fracture-rt.transveeres midshaft humerous fracture-s/p close reduction.comfortable,nad,no sob,no cp,no fever,no cough    REVIEW OF SYSTEMS:  as above      INTERVAL HPI/OVERNIGHT EVENTS:  T(C): 36.7 (18 @ 21:13), Max: 36.7 (18 @ 21:13)  HR: 81 (18 @ :) (74 - 81)  BP: 157/65 (18 @ 21:13) (113/51 - 157/65)  RR: 18 (18 @ :) ( - )  SpO2: 100% (18:) (98% - 100%)  Wt(kg): --  I&O's Summary    20 Sep 2018 07:01  -  20 Sep 2018 21:17  --------------------------------------------------------  IN: 0 mL / OUT: 100 mL / NET: -100 mL      T(C): 36.7 (18 @ 21:13), Max: 36.7 (18 @ 21:13)  HR: 81 (18 @ :13) (74 - 81)  BP: 157/65 (18 @ 21:13) (113/51 - 157/65)  RR: 18 (18 @ 21:13) ( - )  SpO2: 100% (18 @ 21:13) (98% - 100%)  Wt(kg): --Vital Signs Last 24 Hrs  T(C): 36.7 (20 Sep 2018 21:13), Max: 36.7 (20 Sep 2018 21:13)  T(F): 98.1 (20 Sep 2018 21:13), Max: 98.1 (20 Sep 2018 21:13)  HR: 81 (20 Sep 2018 21:13) (74 - 81)  BP: 157/65 (20 Sep 2018 21:13) (113/51 - 157/65)  BP(mean): --  RR: 18 (20 Sep 2018 21:13) (17 - 18)  SpO2: 100% (20 Sep 2018 21:13) (98% - 100%)    LABS:                        11.7   12.29 )-----------( 241      ( 20 Sep 2018 09:02 )             35.1         137  |  101  |  32<H>  ----------------------------<  172<H>  4.5   |  24  |  0.91    Ca    8.9      20 Sep 2018 09:02  Phos  2.7       Mg     1.5         TPro  6.5  /  Alb  3.6  /  TBili  1.0  /  DBili  x   /  AST  22  /  ALT  17  /  AlkPhos  85        Urinalysis Basic - ( 19 Sep 2018 11:32 )    Color: LIGHT YELLOW / Appearance: CLEAR / S.020 / pH: 6.5  Gluc: 150 / Ketone: NEGATIVE  / Bili: NEGATIVE / Urobili: NORMAL   Blood: NEGATIVE / Protein: 10 / Nitrite: NEGATIVE   Leuk Esterase: NEGATIVE / RBC: x / WBC x   Sq Epi: x / Non Sq Epi: x / Bacteria: x      CAPILLARY BLOOD GLUCOSE      POCT Blood Glucose.: 151 mg/dL (20 Sep 2018 17:00)  POCT Blood Glucose.: 205 mg/dL (20 Sep 2018 12:28)  POCT Blood Glucose.: 170 mg/dL (20 Sep 2018 08:29)  POCT Blood Glucose.: 203 mg/dL (19 Sep 2018 22:20)        Urinalysis Basic - ( 19 Sep 2018 11:32 )    Color: LIGHT YELLOW / Appearance: CLEAR / S.020 / pH: 6.5  Gluc: 150 / Ketone: NEGATIVE  / Bili: NEGATIVE / Urobili: NORMAL   Blood: NEGATIVE / Protein: 10 / Nitrite: NEGATIVE   Leuk Esterase: NEGATIVE / RBC: x / WBC x   Sq Epi: x / Non Sq Epi: x / Bacteria: x        PAST MEDICAL & SURGICAL HISTORY:  BPH (benign prostatic hyperplasia)  CAD (Coronary Artery Disease): s/p cardiac stent ( &gt; 20 years ago)  HTN (Hypertension)  DM (Diabetes Mellitus)  Hypercholesterolemia  Coronary Stent: x 2 ( 20 years )      MEDICATIONS  (STANDING):  aspirin enteric coated 81 milliGRAM(s) Oral daily  atorvastatin 80 milliGRAM(s) Oral at bedtime  clopidogrel Tablet 75 milliGRAM(s) Oral daily  dextrose 50% Injectable 12.5 Gram(s) IV Push once  dextrose 50% Injectable 25 Gram(s) IV Push once  dextrose 50% Injectable 25 Gram(s) IV Push once  heparin  Injectable 5000 Unit(s) SubCutaneous every 8 hours  influenza   Vaccine 0.5 milliLiter(s) IntraMuscular once  insulin lispro (HumaLOG) corrective regimen sliding scale   SubCutaneous three times a day before meals  insulin lispro (HumaLOG) corrective regimen sliding scale   SubCutaneous at bedtime  latanoprost 0.005% Ophthalmic Solution 1 Drop(s) Both EYES at bedtime  lisinopril 40 milliGRAM(s) Oral daily  melatonin 6 milliGRAM(s) Oral at bedtime  metoprolol succinate ER 50 milliGRAM(s) Oral daily  tamsulosin 0.4 milliGRAM(s) Oral at bedtime    MEDICATIONS  (PRN):  acetaminophen   Tablet .. 650 milliGRAM(s) Oral every 6 hours PRN Mild Pain (1 - 3), Moderate Pain (4 - 6)  dextrose 40% Gel 15 Gram(s) Oral once PRN Blood Glucose LESS THAN 70 milliGRAM(s)/deciliter  glucagon  Injectable 1 milliGRAM(s) IntraMuscular once PRN Glucose LESS THAN 70 milligrams/deciliter  haloperidol     Tablet 1 milliGRAM(s) Oral every 6 hours PRN agitation  haloperidol    Injectable 1 milliGRAM(s) IntraMuscular every 6 hours PRN agitation  haloperidol    Injectable 1 milliGRAM(s) IV Push every 6 hours PRN agitation  traZODone 25 milliGRAM(s) Oral at bedtime PRN insomnia        RADIOLOGY & ADDITIONAL TESTS:    Imaging Personally Reviewed:  [ ] YES  [ ] NO    Consultant(s) Notes Reviewed:  [x ] YES  [ ] NO    PHYSICAL EXAM:  GENERAL: NAD, well-groomed, well-developed  HEAD:  Atraumatic, Normocephalic  EYES: EOMI, PERRLA, conjunctiva and sclera clear  ENMT: No tonsillar erythema, exudates, or enlargement; Moist mucous membranes, Good dentition, No lesions  NECK: Supple, No JVD, Normal thyroid  NERVOUS SYSTEM:  Alert & Oriented X3, Good concentration; Motor Strength 5/5 B/L upper and lower extremities; DTRs 2+ intact and symmetric  CHEST/LUNG: Clear to percussion bilaterally; No rales, rhonchi, wheezing, or rubs  HEART: Regular rate and rhythm; No murmurs, rubs, or gallops  ABDOMEN: Soft, Nontender, Nondistended; Bowel sounds present  EXTREMITIES:  2+ Peripheral Pulses, No clubbing, cyanosis, or edema  .rt.upper arm with brace  SKIN: No rashes or lesions    Care Discussed with Consultants/Other Providers [x ] YES  [ ] NO      Code Status: [] Full Code [] DNR [] DNI [] Goals of Care:   Disposition: [] ICU [] Stroke Unit [] RCU []PCU []Floor [] Discharge Home         [email or web address]

## 2018-09-20 NOTE — OCCUPATIONAL THERAPY INITIAL EVALUATION ADULT - LIVES WITH, PROFILE
Pt. reports he lives alone in an apartment building. Accuracy of Social History questionable secondary to pt. with noted confusion & difficulty following commands during OT evaluation.

## 2018-09-20 NOTE — BEHAVIORAL HEALTH ASSESSMENT NOTE - PRIMARY DX
Adjustment disorder, unspecified type Dementia Delirium due to another medical condition, acute, mixed level of activity

## 2018-09-20 NOTE — BEHAVIORAL HEALTH ASSESSMENT NOTE - SUMMARY
Patient is a 78y male with no past psychiatric history,  no suicidal attempts, HTN , hypercholesterolemia, DM, resides alone,  admitted for syncope and concerns for safety at home. Consulted for PRN medications for agitation. NONE given this admission.   Patient as per medical team has been AOx2 - patient does report some confusion at times, being forgetful.  At this time, aware it is September, does not answer year, knows name and why in the hospital.  Patient denies change in mood.   Does recall seeing psychologist during teen years but denies medication.  No formal psychiatric hx of med trials. At this time, he denies auditory or visual hallucinations, paranoia, passive or active suicidal or homicidal ideation, intent or plan.   Patient denies substance use. He reports poor sleep, good appetite.    PLAN  -start Haldol 1mg PO/IM/IV for agitation  -minimize the use of benzos, opiods, anticholinergics, and other deuterogenic agents whenever possible. Maintain sleep wake cycle.   -start Melatonin 6mg qhs for insomnia  - SW team involvement for safe dispo planning - involve brother as needed. Patient is a 77yo M, Domiciled at home alone w HHA, ambulates with cane, PHX early onset dementia, no psychiatric history, no history of SA or substance abuse. PHMX DM, HTN, HLD, CAD s/p stents. Pt presents to ED C/O shoulder pain after fall. Per chart Pt reports walking to the bank, and losing his balance with the wind causing him to fall backwards. Pt reports feeling lightheaded, but did not lose consciousness or strike his head.  Pt is unclear regarding further details of his presentation, and at times thinks he has been in the hospital for several days.  He reports some pain in his R arm and shoulder, but otherwise states that he feels well. Psychiatry consulted for delirium/ dementia     Seen and Evaluated. Pt A&Ox 1.  Pt disorganized and not able to give full history. Per medical team has been AOx2 - patient does report some confusion at times, being forgetful. Pt reports mood as "good". Denies auditory or visual hallucinations, paranoia, passive or active suicidal or homicidal ideation, intent or plan. Pt denies past psychiatric history, treatment, hospitalization, suicide attempt, or substance use. Reports poor sleep, good appetite. Pt denies auditory or visual hallucinations, paranoia, passive or active suicidal or homicidal ideation, intent or plan.   Patient denies substance use. He reports poor sleep, good appetite. Presentation c/w delirium superimposed dementia     Plan:  1. Melatonin 6mg qhs for insomnia  2. PRN Haldol 1mg PO/IM/IV for agitation   3. PRN Trazadone 25mg qhs insomnia   4. minimize the use of benzos, opiods, anticholinergics, and other deuterogenic agents whenever possible. Maintain sleep wake cycle.   5. Monitor QTC hold antipsychotic for QTC <50)    Psychiatry will follow

## 2018-09-20 NOTE — PHYSICAL THERAPY INITIAL EVALUATION ADULT - CRITERIA FOR SKILLED THERAPEUTIC INTERVENTIONS
impairments found/risk reduction/prevention/rehab potential/anticipated discharge recommendation/predicted duration of therapy intervention/therapy frequency

## 2018-09-20 NOTE — PHYSICAL THERAPY INITIAL EVALUATION ADULT - ADDITIONAL COMMENTS
Social history and prior level of function not clear as pt. is A+O x 1. Pt. reports he lives in an apartment alone, has steps to enter. Ambulates with straight cane.     Pt. was left seated in recliner chair post PT Evaluation, NAD, +right UE brace, +chair alarm.

## 2018-09-20 NOTE — OCCUPATIONAL THERAPY INITIAL EVALUATION ADULT - PATIENT/FAMILY/SIGNIFICANT OTHER GOALS STATEMENT, OT EVAL
Pt unable to formulate a goal at this time secondary noted confusion with pt. unable to follow commands

## 2018-09-20 NOTE — OCCUPATIONAL THERAPY INITIAL EVALUATION ADULT - ADDITIONAL COMMENTS
Accuracy of above information regarding Prior Functioning Status questionable secondary to pt. with noted confusion & difficulty following commands during OT evaluation.

## 2018-09-20 NOTE — BEHAVIORAL HEALTH ASSESSMENT NOTE - NSBHCHARTREVIEWLAB_PSY_A_CORE FT
11.7   12.29 )-----------( 241      ( 20 Sep 2018 09:02 )             35.1   09-20    137  |  101  |  32<H>  ----------------------------<  172<H>  4.5   |  24  |  0.91    Ca    8.9      20 Sep 2018 09:02  Phos  2.7     09-20  Mg     1.5     09-20    TPro  6.5  /  Alb  3.6  /  TBili  1.0  /  DBili  x   /  AST  22  /  ALT  17  /  AlkPhos  85  09-19

## 2018-09-21 ENCOUNTER — TRANSCRIPTION ENCOUNTER (OUTPATIENT)
Age: 78
End: 2018-09-21

## 2018-09-21 DIAGNOSIS — K92.0 HEMATEMESIS: ICD-10-CM

## 2018-09-21 DIAGNOSIS — R41.0 DISORIENTATION, UNSPECIFIED: ICD-10-CM

## 2018-09-21 DIAGNOSIS — F05 DELIRIUM DUE TO KNOWN PHYSIOLOGICAL CONDITION: ICD-10-CM

## 2018-09-21 DIAGNOSIS — F09 UNSPECIFIED MENTAL DISORDER DUE TO KNOWN PHYSIOLOGICAL CONDITION: ICD-10-CM

## 2018-09-21 LAB
ALBUMIN SERPL ELPH-MCNC: 3.4 G/DL — SIGNIFICANT CHANGE UP (ref 3.3–5)
ALP SERPL-CCNC: 94 U/L — SIGNIFICANT CHANGE UP (ref 40–120)
ALT FLD-CCNC: 13 U/L — SIGNIFICANT CHANGE UP (ref 4–41)
APTT BLD: 26.9 SEC — LOW (ref 27.5–37.4)
AST SERPL-CCNC: 22 U/L — SIGNIFICANT CHANGE UP (ref 4–40)
BASOPHILS # BLD AUTO: 0.02 K/UL — SIGNIFICANT CHANGE UP (ref 0–0.2)
BASOPHILS # BLD AUTO: 0.04 K/UL — SIGNIFICANT CHANGE UP (ref 0–0.2)
BASOPHILS NFR BLD AUTO: 0.2 % — SIGNIFICANT CHANGE UP (ref 0–2)
BASOPHILS NFR BLD AUTO: 0.3 % — SIGNIFICANT CHANGE UP (ref 0–2)
BILIRUB SERPL-MCNC: 1.9 MG/DL — HIGH (ref 0.2–1.2)
BLD GP AB SCN SERPL QL: NEGATIVE — SIGNIFICANT CHANGE UP
BLD GP AB SCN SERPL QL: NEGATIVE — SIGNIFICANT CHANGE UP
BUN SERPL-MCNC: 28 MG/DL — HIGH (ref 7–23)
CALCIUM SERPL-MCNC: 8.8 MG/DL — SIGNIFICANT CHANGE UP (ref 8.4–10.5)
CHLORIDE SERPL-SCNC: 98 MMOL/L — SIGNIFICANT CHANGE UP (ref 98–107)
CO2 SERPL-SCNC: 23 MMOL/L — SIGNIFICANT CHANGE UP (ref 22–31)
CREAT SERPL-MCNC: 0.97 MG/DL — SIGNIFICANT CHANGE UP (ref 0.5–1.3)
EOSINOPHIL # BLD AUTO: 0.01 K/UL — SIGNIFICANT CHANGE UP (ref 0–0.5)
EOSINOPHIL # BLD AUTO: 0.01 K/UL — SIGNIFICANT CHANGE UP (ref 0–0.5)
EOSINOPHIL NFR BLD AUTO: 0.1 % — SIGNIFICANT CHANGE UP (ref 0–6)
EOSINOPHIL NFR BLD AUTO: 0.1 % — SIGNIFICANT CHANGE UP (ref 0–6)
GLUCOSE BLDC GLUCOMTR-MCNC: 132 MG/DL — HIGH (ref 70–99)
GLUCOSE BLDC GLUCOMTR-MCNC: 155 MG/DL — HIGH (ref 70–99)
GLUCOSE BLDC GLUCOMTR-MCNC: 183 MG/DL — HIGH (ref 70–99)
GLUCOSE BLDC GLUCOMTR-MCNC: 218 MG/DL — HIGH (ref 70–99)
GLUCOSE SERPL-MCNC: 195 MG/DL — HIGH (ref 70–99)
HCT VFR BLD CALC: 33.6 % — LOW (ref 39–50)
HCT VFR BLD CALC: 34.5 % — LOW (ref 39–50)
HGB BLD-MCNC: 11.2 G/DL — LOW (ref 13–17)
HGB BLD-MCNC: 11.7 G/DL — LOW (ref 13–17)
IMM GRANULOCYTES # BLD AUTO: 0.06 # — SIGNIFICANT CHANGE UP
IMM GRANULOCYTES # BLD AUTO: 0.08 # — SIGNIFICANT CHANGE UP
IMM GRANULOCYTES NFR BLD AUTO: 0.4 % — SIGNIFICANT CHANGE UP (ref 0–1.5)
IMM GRANULOCYTES NFR BLD AUTO: 0.7 % — SIGNIFICANT CHANGE UP (ref 0–1.5)
INR BLD: 1.25 — HIGH (ref 0.88–1.17)
LACTATE BLDV-MCNC: 1.2 MMOL/L — SIGNIFICANT CHANGE UP (ref 0.5–2)
LACTATE SERPL-SCNC: 1.1 MMOL/L — SIGNIFICANT CHANGE UP (ref 0.5–2)
LYMPHOCYTES # BLD AUTO: 0.64 K/UL — LOW (ref 1–3.3)
LYMPHOCYTES # BLD AUTO: 1.08 K/UL — SIGNIFICANT CHANGE UP (ref 1–3.3)
LYMPHOCYTES # BLD AUTO: 4.6 % — LOW (ref 13–44)
LYMPHOCYTES # BLD AUTO: 8.8 % — LOW (ref 13–44)
MAGNESIUM SERPL-MCNC: 1.5 MG/DL — LOW (ref 1.6–2.6)
MCHC RBC-ENTMCNC: 30.3 PG — SIGNIFICANT CHANGE UP (ref 27–34)
MCHC RBC-ENTMCNC: 30.3 PG — SIGNIFICANT CHANGE UP (ref 27–34)
MCHC RBC-ENTMCNC: 33.3 % — SIGNIFICANT CHANGE UP (ref 32–36)
MCHC RBC-ENTMCNC: 33.9 % — SIGNIFICANT CHANGE UP (ref 32–36)
MCV RBC AUTO: 89.4 FL — SIGNIFICANT CHANGE UP (ref 80–100)
MCV RBC AUTO: 90.8 FL — SIGNIFICANT CHANGE UP (ref 80–100)
MONOCYTES # BLD AUTO: 0.72 K/UL — SIGNIFICANT CHANGE UP (ref 0–0.9)
MONOCYTES # BLD AUTO: 0.99 K/UL — HIGH (ref 0–0.9)
MONOCYTES NFR BLD AUTO: 5.2 % — SIGNIFICANT CHANGE UP (ref 2–14)
MONOCYTES NFR BLD AUTO: 8.1 % — SIGNIFICANT CHANGE UP (ref 2–14)
NEUTROPHILS # BLD AUTO: 10.1 K/UL — HIGH (ref 1.8–7.4)
NEUTROPHILS # BLD AUTO: 12.49 K/UL — HIGH (ref 1.8–7.4)
NEUTROPHILS NFR BLD AUTO: 82.1 % — HIGH (ref 43–77)
NEUTROPHILS NFR BLD AUTO: 89.4 % — HIGH (ref 43–77)
NRBC # FLD: 0 — SIGNIFICANT CHANGE UP
NRBC # FLD: 0 — SIGNIFICANT CHANGE UP
PHOSPHATE SERPL-MCNC: 3 MG/DL — SIGNIFICANT CHANGE UP (ref 2.5–4.5)
PLATELET # BLD AUTO: 262 K/UL — SIGNIFICANT CHANGE UP (ref 150–400)
PLATELET # BLD AUTO: 263 K/UL — SIGNIFICANT CHANGE UP (ref 150–400)
PMV BLD: 11 FL — SIGNIFICANT CHANGE UP (ref 7–13)
PMV BLD: 11 FL — SIGNIFICANT CHANGE UP (ref 7–13)
POTASSIUM SERPL-MCNC: 4.5 MMOL/L — SIGNIFICANT CHANGE UP (ref 3.5–5.3)
POTASSIUM SERPL-SCNC: 4.5 MMOL/L — SIGNIFICANT CHANGE UP (ref 3.5–5.3)
PROT SERPL-MCNC: 5.9 G/DL — LOW (ref 6–8.3)
PROTHROM AB SERPL-ACNC: 14.4 SEC — HIGH (ref 9.8–13.1)
RBC # BLD: 3.7 M/UL — LOW (ref 4.2–5.8)
RBC # BLD: 3.86 M/UL — LOW (ref 4.2–5.8)
RBC # FLD: 13.4 % — SIGNIFICANT CHANGE UP (ref 10.3–14.5)
RBC # FLD: 13.8 % — SIGNIFICANT CHANGE UP (ref 10.3–14.5)
RH IG SCN BLD-IMP: POSITIVE — SIGNIFICANT CHANGE UP
RH IG SCN BLD-IMP: POSITIVE — SIGNIFICANT CHANGE UP
SODIUM SERPL-SCNC: 140 MMOL/L — SIGNIFICANT CHANGE UP (ref 135–145)
WBC # BLD: 12.28 K/UL — HIGH (ref 3.8–10.5)
WBC # BLD: 13.96 K/UL — HIGH (ref 3.8–10.5)
WBC # FLD AUTO: 12.28 K/UL — HIGH (ref 3.8–10.5)
WBC # FLD AUTO: 13.96 K/UL — HIGH (ref 3.8–10.5)

## 2018-09-21 PROCEDURE — 99222 1ST HOSP IP/OBS MODERATE 55: CPT | Mod: GC

## 2018-09-21 PROCEDURE — 71045 X-RAY EXAM CHEST 1 VIEW: CPT | Mod: 26

## 2018-09-21 PROCEDURE — 99232 SBSQ HOSP IP/OBS MODERATE 35: CPT

## 2018-09-21 RX ORDER — PANTOPRAZOLE SODIUM 20 MG/1
80 TABLET, DELAYED RELEASE ORAL ONCE
Qty: 0 | Refills: 0 | Status: COMPLETED | OUTPATIENT
Start: 2018-09-21 | End: 2018-09-21

## 2018-09-21 RX ORDER — PANTOPRAZOLE SODIUM 20 MG/1
40 TABLET, DELAYED RELEASE ORAL
Qty: 0 | Refills: 0 | Status: DISCONTINUED | OUTPATIENT
Start: 2018-09-21 | End: 2018-09-28

## 2018-09-21 RX ORDER — MAGNESIUM SULFATE 500 MG/ML
1 VIAL (ML) INJECTION ONCE
Qty: 0 | Refills: 0 | Status: COMPLETED | OUTPATIENT
Start: 2018-09-21 | End: 2018-09-21

## 2018-09-21 RX ADMIN — Medication 1: at 13:10

## 2018-09-21 RX ADMIN — Medication 50 MILLIGRAM(S): at 05:34

## 2018-09-21 RX ADMIN — Medication 2: at 08:58

## 2018-09-21 RX ADMIN — Medication 6 MILLIGRAM(S): at 21:39

## 2018-09-21 RX ADMIN — Medication 1: at 17:25

## 2018-09-21 RX ADMIN — Medication 100 GRAM(S): at 11:08

## 2018-09-21 RX ADMIN — LISINOPRIL 40 MILLIGRAM(S): 2.5 TABLET ORAL at 05:34

## 2018-09-21 RX ADMIN — LATANOPROST 1 DROP(S): 0.05 SOLUTION/ DROPS OPHTHALMIC; TOPICAL at 21:39

## 2018-09-21 RX ADMIN — PANTOPRAZOLE SODIUM 40 MILLIGRAM(S): 20 TABLET, DELAYED RELEASE ORAL at 17:16

## 2018-09-21 RX ADMIN — TAMSULOSIN HYDROCHLORIDE 0.4 MILLIGRAM(S): 0.4 CAPSULE ORAL at 21:39

## 2018-09-21 RX ADMIN — PANTOPRAZOLE SODIUM 40 MILLIGRAM(S): 20 TABLET, DELAYED RELEASE ORAL at 05:33

## 2018-09-21 RX ADMIN — HALOPERIDOL DECANOATE 1 MILLIGRAM(S): 100 INJECTION INTRAMUSCULAR at 21:39

## 2018-09-21 RX ADMIN — ATORVASTATIN CALCIUM 80 MILLIGRAM(S): 80 TABLET, FILM COATED ORAL at 21:39

## 2018-09-21 RX ADMIN — PANTOPRAZOLE SODIUM 80 MILLIGRAM(S): 20 TABLET, DELAYED RELEASE ORAL at 03:24

## 2018-09-21 NOTE — CONSULT NOTE ADULT - SUBJECTIVE AND OBJECTIVE BOX
Patient is a 78y old  Male who presents with a chief complaint of Fall, shoulder pain (21 Sep 2018 10:42)      HPI:  77 y/o M with DM, HTN, HLD, CAD s/p stents p/w shoulder pain after fall.  Pt reports he was walking to the bank, and lost his balance with the wind causing him to fall backwards.  When questioned, he also reports feeling lightheaded, but did not lose consciousness or strike his head.  Pt is unclear regarding further details of his presentation, and at times thinks he has been in the hospital for several days.  He reports some pain in his R arm and shoulder, but otherwise states that he feels well.  Denies any recent illness.  Pt has walked with a cane for some time, and reports he recently acquired a walker, however, he was using his cane when he fell today.      In the ED: pt had X-ray showing fracture of proximal R humerus.  He was evaluated by orthopedics, and had brace applied to R arm. (19 Sep 2018 20:37)      REVIEW OF SYSTEMS:    CONSTITUTIONAL: No fever, weight loss, or fatigue  EYES: No eye pain, visual disturbances, or discharge  ENMT:  No sore throat  NECK: No pain or stiffness  RESPIRATORY: No cough, wheezing, chills or hemoptysis; No shortness of breath  CARDIOVASCULAR: No chest pain, palpitations, dizziness, or leg swelling  GASTROINTESTINAL: No abdominal or epigastric pain. No nausea, vomiting, or hematemesis; No diarrhea or constipation. No melena or hematochezia.  GENITOURINARY: No dysuria, frequency, hematuria, or incontinence  NEUROLOGICAL: No headaches, memory loss, loss of strength, numbness, or tremors  SKIN: No itching, burning, rashes, or lesions   LYMPH NODES: No enlarged glands  MUSCULOSKELETAL: No joint pain or swelling; No muscle, back, or extremity pain      PAST MEDICAL & SURGICAL HISTORY:  BPH (benign prostatic hyperplasia)  CAD (Coronary Artery Disease): s/p cardiac stent ( &gt; 20 years ago)  HTN (Hypertension)  DM (Diabetes Mellitus)  Hypercholesterolemia  Coronary Stent: x 2 ( 20 years )      Allergies    No Known Allergies    Intolerances        FAMILY HISTORY:  No pertinent family history in first degree relatives      SOCIAL HISTORY:        MEDICATIONS  (STANDING):  atorvastatin 80 milliGRAM(s) Oral at bedtime  dextrose 50% Injectable 12.5 Gram(s) IV Push once  dextrose 50% Injectable 25 Gram(s) IV Push once  dextrose 50% Injectable 25 Gram(s) IV Push once  influenza   Vaccine 0.5 milliLiter(s) IntraMuscular once  insulin lispro (HumaLOG) corrective regimen sliding scale   SubCutaneous three times a day before meals  insulin lispro (HumaLOG) corrective regimen sliding scale   SubCutaneous at bedtime  latanoprost 0.005% Ophthalmic Solution 1 Drop(s) Both EYES at bedtime  lisinopril 40 milliGRAM(s) Oral daily  melatonin 6 milliGRAM(s) Oral at bedtime  metoprolol succinate ER 50 milliGRAM(s) Oral daily  pantoprazole  Injectable 40 milliGRAM(s) IV Push two times a day  tamsulosin 0.4 milliGRAM(s) Oral at bedtime    MEDICATIONS  (PRN):  acetaminophen   Tablet .. 650 milliGRAM(s) Oral every 6 hours PRN Mild Pain (1 - 3), Moderate Pain (4 - 6)  dextrose 40% Gel 15 Gram(s) Oral once PRN Blood Glucose LESS THAN 70 milliGRAM(s)/deciliter  glucagon  Injectable 1 milliGRAM(s) IntraMuscular once PRN Glucose LESS THAN 70 milligrams/deciliter  haloperidol     Tablet 1 milliGRAM(s) Oral every 6 hours PRN agitation  haloperidol    Injectable 1 milliGRAM(s) IntraMuscular every 6 hours PRN agitation  haloperidol    Injectable 1 milliGRAM(s) IV Push every 6 hours PRN agitation  traZODone 25 milliGRAM(s) Oral at bedtime PRN insomnia      Vital Signs Last 24 Hrs  T(C): 36.9 (21 Sep 2018 14:40), Max: 36.9 (21 Sep 2018 14:40)  T(F): 98.5 (21 Sep 2018 14:40), Max: 98.5 (21 Sep 2018 14:40)  HR: 70 (21 Sep 2018 14:40) (70 - 81)  BP: 123/54 (21 Sep 2018 14:40) (123/54 - 157/65)  BP(mean): --  RR: 18 (21 Sep 2018 14:40) (18 - 18)  SpO2: 99% (21 Sep 2018 14:40) (99% - 100%)    PHYSICAL EXAM:    GENERAL: NAD, well-groomed  HEAD:  Atraumatic, Normocephalic  EYES: EOMI, PERRLA, conjunctiva and sclera clear  ENMT: No tonsillar erythema, exudates, or enlargement; Moist mucous membranes  NECK: Supple, No JVD  CHEST/LUNG: Clear to percussion bilaterally; No rales, rhonchi, wheezing, or rubs  HEART: Regular rate and rhythm; No murmurs, rubs, or gallops  ABDOMEN: Soft, Nontender, Nondistended; Bowel sounds present  EXTREMITIES:  2+ Peripheral Pulses, No clubbing, cyanosis, or edema  LYMPH: No lymphadenopathy noted  SKIN: No rashes or lesions    LABS:  CBC Full  -  ( 21 Sep 2018 13:29 )  WBC Count : 12.28 K/uL  Hemoglobin : 11.2 g/dL  Hematocrit : 33.6 %  Platelet Count - Automated : 263 K/uL  Mean Cell Volume : 90.8 fL  Mean Cell Hemoglobin : 30.3 pg  Mean Cell Hemoglobin Concentration : 33.3 %  Auto Neutrophil # : 10.10 K/uL  Auto Lymphocyte # : 1.08 K/uL  Auto Monocyte # : 0.99 K/uL  Auto Eosinophil # : 0.01 K/uL  Auto Basophil # : 0.02 K/uL  Auto Neutrophil % : 82.1 %  Auto Lymphocyte % : 8.8 %  Auto Monocyte % : 8.1 %  Auto Eosinophil % : 0.1 %  Auto Basophil % : 0.2 %      09-21    140  |  98  |  28<H>  ----------------------------<  195<H>  4.5   |  23  |  0.97    Ca    8.8      21 Sep 2018 03:00  Phos  3.0     09-21  Mg     1.5     09-21    TPro  5.9<L>  /  Alb  3.4  /  TBili  1.9<H>  /  DBili  x   /  AST  22  /  ALT  13  /  AlkPhos  94  09-21      LIVER FUNCTIONS - ( 21 Sep 2018 03:00 )  Alb: 3.4 g/dL / Pro: 5.9 g/dL / ALK PHOS: 94 u/L / ALT: 13 u/L / AST: 22 u/L / GGT: x                               MICROBIOLOGY:                    RADIOLOGY: Patient is a 78y old  Male who presents with a chief complaint of Fall, shoulder pain (21 Sep 2018 10:42)      HPI:  79 y/o M with DM, HTN, HLD, CAD s/p stents p/w shoulder pain after fall.  Pt reports he was walking to the bank, and lost his balance with the wind causing him to fall backwards.  When questioned, he also reports feeling lightheaded, but did not lose consciousness or strike his head.  Pt is unclear regarding further details of his presentation, and at times thinks he has been in the hospital for several days.  He reports some pain in his R arm and shoulder, but otherwise states that he feels well.  Denies any recent illness.  Pt has walked with a cane for some time, and reports he recently acquired a walker, however, he was using his cane when he fell today.      In the ED: pt had X-ray showing fracture of proximal R humerus.  He was evaluated by orthopedics, and had brace applied to R arm. (19 Sep 2018 20:37)    HOSP COURSE:    Pt admitted with  closed displaced fracture of proximal end of right humerus, seen by Ortho, s/p closed reduction and brace placement. Pt noted to present with confusion, and as per chart notes, brother states pt with "mental disability"-- at times becomes forgetful--- had it all his life, per brother recently w/ beginning stages of dementia.  Pt is being followed by [facility].     On 9/21, rapid response called for coffee ground emesis.  Pt is planned for EGD to evaluate for source of bleed.      Pt has been afebrile and hemodynamically stable.  Noted to have elevated WBC in low teens.  ID consult called to evaluate for underlying infectious etiology.          REVIEW OF SYSTEMS:    CONSTITUTIONAL: No fever, weight loss, or fatigue  EYES: No eye pain, visual disturbances, or discharge  ENMT:  No sore throat  NECK: No pain or stiffness  RESPIRATORY: No cough, wheezing, chills or hemoptysis; No shortness of breath  CARDIOVASCULAR: No chest pain, palpitations, dizziness, or leg swelling  GASTROINTESTINAL: No abdominal or epigastric pain. No nausea, vomiting, or hematemesis; No diarrhea or constipation. No melena or hematochezia.  GENITOURINARY: No dysuria, frequency, hematuria, or incontinence  NEUROLOGICAL: No headaches, memory loss, loss of strength, numbness, or tremors  SKIN: No itching, burning, rashes, or lesions   LYMPH NODES: No enlarged glands  MUSCULOSKELETAL: No joint pain or swelling; No muscle, back, or extremity pain      PAST MEDICAL & SURGICAL HISTORY:  BPH (benign prostatic hyperplasia)  CAD (Coronary Artery Disease): s/p cardiac stent ( &gt; 20 years ago)  HTN (Hypertension)  DM (Diabetes Mellitus)  Hypercholesterolemia  Coronary Stent: x 2 ( 20 years )      Allergies    No Known Allergies    Intolerances        FAMILY HISTORY:  No pertinent family history in first degree relatives      SOCIAL HISTORY:    No ivdu, etoh, smoking.  Lives alone    MEDICATIONS  (STANDING):  atorvastatin 80 milliGRAM(s) Oral at bedtime  dextrose 50% Injectable 12.5 Gram(s) IV Push once  dextrose 50% Injectable 25 Gram(s) IV Push once  dextrose 50% Injectable 25 Gram(s) IV Push once  influenza   Vaccine 0.5 milliLiter(s) IntraMuscular once  insulin lispro (HumaLOG) corrective regimen sliding scale   SubCutaneous three times a day before meals  insulin lispro (HumaLOG) corrective regimen sliding scale   SubCutaneous at bedtime  latanoprost 0.005% Ophthalmic Solution 1 Drop(s) Both EYES at bedtime  lisinopril 40 milliGRAM(s) Oral daily  melatonin 6 milliGRAM(s) Oral at bedtime  metoprolol succinate ER 50 milliGRAM(s) Oral daily  pantoprazole  Injectable 40 milliGRAM(s) IV Push two times a day  tamsulosin 0.4 milliGRAM(s) Oral at bedtime    MEDICATIONS  (PRN):  acetaminophen   Tablet .. 650 milliGRAM(s) Oral every 6 hours PRN Mild Pain (1 - 3), Moderate Pain (4 - 6)  dextrose 40% Gel 15 Gram(s) Oral once PRN Blood Glucose LESS THAN 70 milliGRAM(s)/deciliter  glucagon  Injectable 1 milliGRAM(s) IntraMuscular once PRN Glucose LESS THAN 70 milligrams/deciliter  haloperidol     Tablet 1 milliGRAM(s) Oral every 6 hours PRN agitation  haloperidol    Injectable 1 milliGRAM(s) IntraMuscular every 6 hours PRN agitation  haloperidol    Injectable 1 milliGRAM(s) IV Push every 6 hours PRN agitation  traZODone 25 milliGRAM(s) Oral at bedtime PRN insomnia      Vital Signs Last 24 Hrs  T(C): 36.9 (21 Sep 2018 14:40), Max: 36.9 (21 Sep 2018 14:40)  T(F): 98.5 (21 Sep 2018 14:40), Max: 98.5 (21 Sep 2018 14:40)  HR: 70 (21 Sep 2018 14:40) (70 - 81)  BP: 123/54 (21 Sep 2018 14:40) (123/54 - 157/65)  BP(mean): --  RR: 18 (21 Sep 2018 14:40) (18 - 18)  SpO2: 99% (21 Sep 2018 14:40) (99% - 100%)    PHYSICAL EXAM:    GENERAL: NAD  HEAD:  Atraumatic, Normocephalic  EYES: EOMI, PERRLA, conjunctiva and sclera clear  ENMT: No tonsillar erythema, exudates, or enlargement; Moist mucous membranes  NECK: Supple, No JVD  CHEST/LUNG: Clear to percussion bilaterally; No rales, rhonchi, wheezing, or rubs  HEART: Regular rate and rhythm; No murmurs, rubs, or gallops  ABDOMEN: Soft, Nontender, Nondistended; Bowel sounds present  EXTREMITIES:  2+ Peripheral Pulses, No clubbing, cyanosis, or edema.  Rt arm brace  LYMPH: No lymphadenopathy noted  SKIN: No rashes or lesions    LABS:  CBC Full  -  ( 21 Sep 2018 13:29 )  WBC Count : 12.28 K/uL  Hemoglobin : 11.2 g/dL  Hematocrit : 33.6 %  Platelet Count - Automated : 263 K/uL  Mean Cell Volume : 90.8 fL  Mean Cell Hemoglobin : 30.3 pg  Mean Cell Hemoglobin Concentration : 33.3 %  Auto Neutrophil # : 10.10 K/uL  Auto Lymphocyte # : 1.08 K/uL  Auto Monocyte # : 0.99 K/uL  Auto Eosinophil # : 0.01 K/uL  Auto Basophil # : 0.02 K/uL  Auto Neutrophil % : 82.1 %  Auto Lymphocyte % : 8.8 %  Auto Monocyte % : 8.1 %  Auto Eosinophil % : 0.1 %  Auto Basophil % : 0.2 %      09-21    140  |  98  |  28<H>  ----------------------------<  195<H>  4.5   |  23  |  0.97    Ca    8.8      21 Sep 2018 03:00  Phos  3.0     09-21  Mg     1.5     09-21    TPro  5.9<L>  /  Alb  3.4  /  TBili  1.9<H>  /  DBili  x   /  AST  22  /  ALT  13  /  AlkPhos  94  09-21      LIVER FUNCTIONS - ( 21 Sep 2018 03:00 )  Alb: 3.4 g/dL / Pro: 5.9 g/dL / ALK PHOS: 94 u/L / ALT: 13 u/L / AST: 22 u/L / GGT: x                               MICROBIOLOGY:    Urinalysis (09.19.18 @ 11:32)    Color: LIGHT YELLOW    Urine Appearance: CLEAR    Glucose: 150    Bilirubin: NEGATIVE    Ketone - Urine: NEGATIVE    Specific Gravity: 1.020    Blood: NEGATIVE    pH - Urine: 6.5    Protein, Urine: 10    Urobilinogen: NORMAL    Nitrite: NEGATIVE    Leukocyte Esterase Concentration: NEGATIVE                    RADIOLOGY:      < from: Xray Chest 1 View- PORTABLE-Urgent (09.21.18 @ 03:52) >  FINDINGS:     Low lung volumes. The lungs are clear. Question small left pleural   effusion.    Unable to evaluate cardiac silhouette based on this single projection.    No acute osseous changes.     IMPRESSION: Question small left pleural effusion.    < end of copied text >          < from: Xray Humerus, Right (09.19.18 @ 19:15) >  FINDINGS:     Status post reduction of a previously described acute comminuted fracture   of the right humeral mid diaphysis. There is improved alignment of the   fracture fragments with persistent 7 mm lateral displacement of the   distal fracture fragment and approximately 1.4 cm of overriding fracture   fragments.    IMPRESSION:     Improved alignment of a previously described acute comminuted right   humeral mid diaphyseal fracture. Persistent displacement, as above.    < end of copied text >

## 2018-09-21 NOTE — DISCHARGE NOTE ADULT - COMMUNITY RESOURCES
[place], [address] [place] [address], [place], [facility] transportation , 5pm.

## 2018-09-21 NOTE — DISCHARGE NOTE ADULT - SECONDARY DIAGNOSIS.
Confusion Coffee ground emesis Coronary artery disease involving native coronary artery of native heart without angina pectoris DM (diabetes mellitus) HTN (hypertension)

## 2018-09-21 NOTE — CONSULT NOTE ADULT - SUBJECTIVE AND OBJECTIVE BOX
Patient is a poor historian and is confused. History obtained from Berto Mitchell, his younger brother (POA) and chart. Patient is a 78-yr-old M w/ PMHx of DM, HTN, HLD, CAD s/p stents on aspirin/plavix who presented with shoulder pain secondary to a fall. He was walking to the bank, when he lost his balance and fell backwards. As per brother, his dementia has been progressively worsening and he has been falling more frequently. Pt lives alone at home. He had a colonoscopy several years ago, but unable to obtain results.         PAST MEDICAL & SURGICAL HISTORY:  BPH (benign prostatic hyperplasia)  CAD (Coronary Artery Disease): s/p cardiac stent ( &gt; 20 years ago)  HTN (Hypertension)  DM (Diabetes Mellitus)  Hypercholesterolemia  Coronary Stent: x 2 ( 20 years )    Home Meds: Home Medications:  aspirin 81 mg oral delayed release tablet: 1 tab(s) orally once a day (06 Sep 2018 14:34)  atorvastatin 80 mg oral tablet: 1 tab(s) orally once a day (at bedtime) (06 Sep 2018 14:34)  clopidogrel 75 mg oral tablet: 1 tab(s) orally once a day (06 Sep 2018 14:34)  glimepiride 4 mg oral tablet: 1 tab(s) orally 2 times a day (04 Sep 2018 09:21)  latanoprost 0.005% ophthalmic solution: 1 drop(s) to each affected eye once a day (in the evening) (04 Sep 2018 09:21)  metFORMIN 1000 mg oral tablet: 1 tab(s) orally 2 times a day (04 Sep 2018 09:21)  metoprolol succinate 50 mg oral tablet, extended release: 1 tab(s) orally once a day (06 Sep 2018 14:34)  ramipril 10 mg oral capsule: 1 cap(s) orally once a day (04 Sep 2018 09:21)  tamsulosin 0.4 mg oral capsule: 1 cap(s) orally once a day (at bedtime) (06 Sep 2018 14:34)    Allergies: Allergies    No Known Allergies    Intolerances      Soc:   Advanced Directives: Presumed Full Code     CURRENT MEDICATIONS:   --------------------------------------------------------------------------------------  Neurologic Medications  acetaminophen   Tablet .. 650 milliGRAM(s) Oral every 6 hours PRN Mild Pain (1 - 3), Moderate Pain (4 - 6)  haloperidol     Tablet 1 milliGRAM(s) Oral every 6 hours PRN agitation  haloperidol    Injectable 1 milliGRAM(s) IntraMuscular every 6 hours PRN agitation  haloperidol    Injectable 1 milliGRAM(s) IV Push every 6 hours PRN agitation  melatonin 6 milliGRAM(s) Oral at bedtime  traZODone 25 milliGRAM(s) Oral at bedtime PRN insomnia    Respiratory Medications    Cardiovascular Medications  lisinopril 40 milliGRAM(s) Oral daily  metoprolol succinate ER 50 milliGRAM(s) Oral daily  tamsulosin 0.4 milliGRAM(s) Oral at bedtime    Gastrointestinal Medications  magnesium sulfate  IVPB 1 Gram(s) IV Intermittent once  pantoprazole  Injectable 40 milliGRAM(s) IV Push two times a day    Genitourinary Medications    Hematologic/Oncologic Medications  influenza   Vaccine 0.5 milliLiter(s) IntraMuscular once    Antimicrobial/Immunologic Medications    Endocrine/Metabolic Medications  atorvastatin 80 milliGRAM(s) Oral at bedtime  dextrose 40% Gel 15 Gram(s) Oral once PRN Blood Glucose LESS THAN 70 milliGRAM(s)/deciliter  dextrose 50% Injectable 12.5 Gram(s) IV Push once  dextrose 50% Injectable 25 Gram(s) IV Push once  dextrose 50% Injectable 25 Gram(s) IV Push once  glucagon  Injectable 1 milliGRAM(s) IntraMuscular once PRN Glucose LESS THAN 70 milligrams/deciliter  insulin lispro (HumaLOG) corrective regimen sliding scale   SubCutaneous three times a day before meals  insulin lispro (HumaLOG) corrective regimen sliding scale   SubCutaneous at bedtime    Topical/Other Medications  latanoprost 0.005% Ophthalmic Solution 1 Drop(s) Both EYES at bedtime    --------------------------------------------------------------------------------------    VITAL SIGNS, INS/OUTS (last 24 hours):  --------------------------------------------------------------------------------------  ICU Vital Signs Last 24 Hrs  T(C): 36.6 (21 Sep 2018 05:16), Max: 36.7 (20 Sep 2018 21:13)  T(F): 97.8 (21 Sep 2018 05:16), Max: 98.1 (20 Sep 2018 21:13)  HR: 76 (21 Sep 2018 05:16) (76 - 81)  BP: 130/48 (21 Sep 2018 05:16) (113/51 - 157/65)  BP(mean): --  ABP: --  ABP(mean): --  RR: 18 (21 Sep 2018 05:16) (18 - 18)  SpO2: 100% (21 Sep 2018 05:16) (98% - 100%)    I&O's Summary    20 Sep 2018 07:01  -  21 Sep 2018 07:00  --------------------------------------------------------  IN: 0 mL / OUT: 100 mL / NET: -100 mL      --------------------------------------------------------------------------------------    EXAM:  General/Neuro  GCS:   Exam: Normal, NAD, alert, oriented x 3, no focal deficits. PERRLA  ***    Respiratory  Exam: Lungs clear to auscultation, Normal expansion/effort.  ***    Cardiovascular  Exam: S1, S2.  Regular rate and rhythm.  Peripheral edema  ***  Cardiac Rhythm: Normal Sinus Rhythm    GI  Exam: Abdomen soft, Non-tender, Non-distended.    Wound:   ***    Extremities  Exam: Extremities warm, pink, well-perfused.      Derm:  Exam: Good skin turgor, no skin breakdown.      :   Exam: Dumont catheter in place.     LABS  --------------------------------------------------------------------------------------  Labs:  CAPILLARY BLOOD GLUCOSE      POCT Blood Glucose.: 218 mg/dL (21 Sep 2018 08:43)  POCT Blood Glucose.: 137 mg/dL (20 Sep 2018 22:12)  POCT Blood Glucose.: 151 mg/dL (20 Sep 2018 17:00)  POCT Blood Glucose.: 205 mg/dL (20 Sep 2018 12:28)                          11.7   13.96 )-----------( 262      ( 21 Sep 2018 03:00 )             34.5       Auto Neutrophil %: 89.4 % (18 @ 03:00)  Auto Immature Granulocyte %: 0.4 % (18 @ 03:00)        140  |  98  |  28<H>  ----------------------------<  195<H>  4.5   |  23  |  0.97      Phosphorus Level, Serum: 3.0 mg/dL (18 @ 03:00)      LFTs:             5.9  | 1.9  | 22       ------------------[94      ( 21 Sep 2018 03:00 )  3.4  | x    | 13          Lipase:x      Amylase:x         Blood Gas Venous - Lactate: 1.2 mmol/L (18 @ 03:00)  Lactate, Blood: 1.1 mmol/L (18 @ 03:00)      Coags:     14.4   ----< 1.25    ( 21 Sep 2018 03:00 )     26.9            Urinalysis Basic - ( 19 Sep 2018 11:32 )    Color: LIGHT YELLOW / Appearance: CLEAR / S.020 / pH: 6.5  Gluc: 150 / Ketone: NEGATIVE  / Bili: NEGATIVE / Urobili: NORMAL   Blood: NEGATIVE / Protein: 10 / Nitrite: NEGATIVE   Leuk Esterase: NEGATIVE / RBC: x / WBC x   Sq Epi: x / Non Sq Epi: x / Bacteria: x          --------------------------------------------------------------------------------------    OTHER LABS    IMAGING RESULTS  ****************      ASSESSMENT/ PLAN:  78y Male ***      Attending aware and agrees with plan Patient is a poor historian and is confused. History obtained from Berto Mitchell, his younger brother (POA) and chart. Patient is a 78-yr-old M w/ PMHx of DM, HTN, HLD, CAD s/p stents on aspirin/plavix who presented with shoulder pain secondary to a fall. He was walking to the bank, when he lost his balance and fell backwards. As per brother, his dementia has been progressively worsening and he has been falling more frequently. Pt lives alone at home. He had a colonoscopy several years ago, but unable to obtain results. His X-ray showed a fracture of proximal RT humerus and had a brace applied to RT arm.     Subjective/Overnight events- Yesterday, a medical response was called for coffee ground emesis noted on gown. He was otherwise HD stable and mentating at baseline. He was started on IV pantoprazole 40 mg BID and his heparin, aspirin and clopidogrel was discontinued. Today morning, he reported diffuse 7/10 abdominal pain. He was able to drink water. No subsequent episodes of coffee ground emesis, as per the nursing report.      PAST MEDICAL & SURGICAL HISTORY:  BPH (benign prostatic hyperplasia)  CAD (Coronary Artery Disease): s/p cardiac stent ( &gt; 20 years ago)  HTN (Hypertension)  DM (Diabetes Mellitus)  Hypercholesterolemia  Coronary Stent: x 2 ( 20 years )    Home Meds: Home Medications:  aspirin 81 mg oral delayed release tablet: 1 tab(s) orally once a day (06 Sep 2018 14:34)  atorvastatin 80 mg oral tablet: 1 tab(s) orally once a day (at bedtime) (06 Sep 2018 14:34)  clopidogrel 75 mg oral tablet: 1 tab(s) orally once a day (06 Sep 2018 14:34)  glimepiride 4 mg oral tablet: 1 tab(s) orally 2 times a day (04 Sep 2018 09:21)  latanoprost 0.005% ophthalmic solution: 1 drop(s) to each affected eye once a day (in the evening) (04 Sep 2018 09:21)  metFORMIN 1000 mg oral tablet: 1 tab(s) orally 2 times a day (04 Sep 2018 09:21)  metoprolol succinate 50 mg oral tablet, extended release: 1 tab(s) orally once a day (06 Sep 2018 14:34)  ramipril 10 mg oral capsule: 1 cap(s) orally once a day (04 Sep 2018 09:21)  tamsulosin 0.4 mg oral capsule: 1 cap(s) orally once a day (at bedtime) (06 Sep 2018 14:34)    Allergies: Allergies    No Known Allergies    Intolerances      Soc:   Advanced Directives: Presumed Full Code     CURRENT MEDICATIONS:   --------------------------------------------------------------------------------------  Neurologic Medications  acetaminophen   Tablet .. 650 milliGRAM(s) Oral every 6 hours PRN Mild Pain (1 - 3), Moderate Pain (4 - 6)  haloperidol     Tablet 1 milliGRAM(s) Oral every 6 hours PRN agitation  haloperidol    Injectable 1 milliGRAM(s) IntraMuscular every 6 hours PRN agitation  haloperidol    Injectable 1 milliGRAM(s) IV Push every 6 hours PRN agitation  melatonin 6 milliGRAM(s) Oral at bedtime  traZODone 25 milliGRAM(s) Oral at bedtime PRN insomnia    Respiratory Medications    Cardiovascular Medications  lisinopril 40 milliGRAM(s) Oral daily  metoprolol succinate ER 50 milliGRAM(s) Oral daily  tamsulosin 0.4 milliGRAM(s) Oral at bedtime    Gastrointestinal Medications  magnesium sulfate  IVPB 1 Gram(s) IV Intermittent once  pantoprazole  Injectable 40 milliGRAM(s) IV Push two times a day    Genitourinary Medications    Hematologic/Oncologic Medications  influenza   Vaccine 0.5 milliLiter(s) IntraMuscular once    Antimicrobial/Immunologic Medications    Endocrine/Metabolic Medications  atorvastatin 80 milliGRAM(s) Oral at bedtime  dextrose 40% Gel 15 Gram(s) Oral once PRN Blood Glucose LESS THAN 70 milliGRAM(s)/deciliter  dextrose 50% Injectable 12.5 Gram(s) IV Push once  dextrose 50% Injectable 25 Gram(s) IV Push once  dextrose 50% Injectable 25 Gram(s) IV Push once  glucagon  Injectable 1 milliGRAM(s) IntraMuscular once PRN Glucose LESS THAN 70 milligrams/deciliter  insulin lispro (HumaLOG) corrective regimen sliding scale   SubCutaneous three times a day before meals  insulin lispro (HumaLOG) corrective regimen sliding scale   SubCutaneous at bedtime    Topical/Other Medications  latanoprost 0.005% Ophthalmic Solution 1 Drop(s) Both EYES at bedtime    --------------------------------------------------------------------------------------    VITAL SIGNS, INS/OUTS (last 24 hours):  --------------------------------------------------------------------------------------  ICU Vital Signs Last 24 Hrs  T(C): 36.6 (21 Sep 2018 05:16), Max: 36.7 (20 Sep 2018 21:13)  T(F): 97.8 (21 Sep 2018 05:16), Max: 98.1 (20 Sep 2018 21:13)  HR: 76 (21 Sep 2018 05:16) (76 - 81)  BP: 130/48 (21 Sep 2018 05:16) (113/51 - 157/65)  BP(mean): --  ABP: --  ABP(mean): --  RR: 18 (21 Sep 2018 05:16) (18 - 18)  SpO2: 100% (21 Sep 2018 05:16) (98% - 100%)    I&O's Summary    20 Sep 2018 07:01  -  21 Sep 2018 07:00  --------------------------------------------------------  IN: 0 mL / OUT: 100 mL / NET: -100 mL      ---------------------------------------------------------------------------------  General: no acute distress. However, pt is not appropriately responding to questions and is confused.   Eyes: PERRLA/ EOMI, Gross vision intact  Respiratory: CTA B/L, No wheezing, rales, rhonchi  CV: RRR, S1S2, no murmurs, rubs or gallops  Abdominal: normoactive BS, reported abdominal discomfort to light palpation, non-distended  Extremities: No edema, + peripheral pulses  Neurology: A&Ox1 (to name). Per brother, his dementia has been progressively worsening.   Skin: No Rashes, Hematoma, Ecchymosis      LABS  --------------------------------------------------------------------------------------  Labs:  CAPILLARY BLOOD GLUCOSE      POCT Blood Glucose.: 218 mg/dL (21 Sep 2018 08:43)  POCT Blood Glucose.: 137 mg/dL (20 Sep 2018 22:12)  POCT Blood Glucose.: 151 mg/dL (20 Sep 2018 17:00)  POCT Blood Glucose.: 205 mg/dL (20 Sep 2018 12:28)                          11.7   13.96 )-----------( 262      ( 21 Sep 2018 03:00 )             34.5       Auto Neutrophil %: 89.4 % (18 @ 03:00)  Auto Immature Granulocyte %: 0.4 % (18 @ 03:00)        140  |  98  |  28<H>  ----------------------------<  195<H>  4.5   |  23  |  0.97      Phosphorus Level, Serum: 3.0 mg/dL (18 @ 03:00)      LFTs:             5.9  | 1.9  | 22       ------------------[94      ( 21 Sep 2018 03:00 )  3.4  | x    | 13          Lipase:x      Amylase:x         Blood Gas Venous - Lactate: 1.2 mmol/L (18 @ 03:00)  Lactate, Blood: 1.1 mmol/L (18 @ 03:00)      Coags:     14.4   ----< 1.25    ( 21 Sep 2018 03:00 )     26.9            Urinalysis Basic - ( 19 Sep 2018 11:32 )    Color: LIGHT YELLOW / Appearance: CLEAR / S.020 / pH: 6.5  Gluc: 150 / Ketone: NEGATIVE  / Bili: NEGATIVE / Urobili: NORMAL   Blood: NEGATIVE / Protein: 10 / Nitrite: NEGATIVE   Leuk Esterase: NEGATIVE / RBC: x / WBC x   Sq Epi: x / Non Sq Epi: x / Bacteria: x          --------------------------------------------------------------------------------------    OTHER LABS    IMAGING RESULTS  **************** Patient is a poor historian and is confused. History obtained from Berto Mitchell, his younger brother (POA) and chart. Patient is a 78-yr-old M w/ PMHx of DM, HTN, HLD, CAD s/p stents on aspirin/plavix who presented with shoulder pain secondary to a fall. He was walking to the bank, when he lost his balance and fell backwards. As per brother, his dementia has been progressively worsening and he has been falling more frequently. Pt lives alone at home. He had a colonoscopy several years ago, but unable to obtain results. His X-ray showed a fracture of proximal RT humerus and had a brace applied to RT arm.     Subjective/Overnight events- Yesterday, a medical response was called for coffee ground emesis noted on gown. He was otherwise HD stable and mentating at baseline. He was started on IV pantoprazole 40 mg BID and his heparin, aspirin and clopidogrel was discontinued. Today morning, he reported diffuse 7/10 abdominal pain. He was able to drink water. No subsequent episodes of coffee ground emesis, as per the nursing report.      PAST MEDICAL & SURGICAL HISTORY:  BPH (benign prostatic hyperplasia)  CAD (Coronary Artery Disease): s/p cardiac stent ( &gt; 20 years ago)  HTN (Hypertension)  DM (Diabetes Mellitus)  Hypercholesterolemia  Coronary Stent: x 2 ( 20 years )    Home Meds: Home Medications:  aspirin 81 mg oral delayed release tablet: 1 tab(s) orally once a day (06 Sep 2018 14:34)  atorvastatin 80 mg oral tablet: 1 tab(s) orally once a day (at bedtime) (06 Sep 2018 14:34)  clopidogrel 75 mg oral tablet: 1 tab(s) orally once a day (06 Sep 2018 14:34)  glimepiride 4 mg oral tablet: 1 tab(s) orally 2 times a day (04 Sep 2018 09:21)  latanoprost 0.005% ophthalmic solution: 1 drop(s) to each affected eye once a day (in the evening) (04 Sep 2018 09:21)  metFORMIN 1000 mg oral tablet: 1 tab(s) orally 2 times a day (04 Sep 2018 09:21)  metoprolol succinate 50 mg oral tablet, extended release: 1 tab(s) orally once a day (06 Sep 2018 14:34)  ramipril 10 mg oral capsule: 1 cap(s) orally once a day (04 Sep 2018 09:21)  tamsulosin 0.4 mg oral capsule: 1 cap(s) orally once a day (at bedtime) (06 Sep 2018 14:34)    Allergies: Allergies    No Known Allergies    Intolerances    Family history:  Unable to obtain.  Soc:  No ETOH abuse  Advanced Directives: Presumed Full Code     CURRENT MEDICATIONS:   --------------------------------------------------------------------------------------  Neurologic Medications  acetaminophen   Tablet .. 650 milliGRAM(s) Oral every 6 hours PRN Mild Pain (1 - 3), Moderate Pain (4 - 6)  haloperidol     Tablet 1 milliGRAM(s) Oral every 6 hours PRN agitation  haloperidol    Injectable 1 milliGRAM(s) IntraMuscular every 6 hours PRN agitation  haloperidol    Injectable 1 milliGRAM(s) IV Push every 6 hours PRN agitation  melatonin 6 milliGRAM(s) Oral at bedtime  traZODone 25 milliGRAM(s) Oral at bedtime PRN insomnia    Respiratory Medications    Cardiovascular Medications  lisinopril 40 milliGRAM(s) Oral daily  metoprolol succinate ER 50 milliGRAM(s) Oral daily  tamsulosin 0.4 milliGRAM(s) Oral at bedtime    Gastrointestinal Medications  magnesium sulfate  IVPB 1 Gram(s) IV Intermittent once  pantoprazole  Injectable 40 milliGRAM(s) IV Push two times a day    Genitourinary Medications    Hematologic/Oncologic Medications  influenza   Vaccine 0.5 milliLiter(s) IntraMuscular once    Antimicrobial/Immunologic Medications    Endocrine/Metabolic Medications  atorvastatin 80 milliGRAM(s) Oral at bedtime  dextrose 40% Gel 15 Gram(s) Oral once PRN Blood Glucose LESS THAN 70 milliGRAM(s)/deciliter  dextrose 50% Injectable 12.5 Gram(s) IV Push once  dextrose 50% Injectable 25 Gram(s) IV Push once  dextrose 50% Injectable 25 Gram(s) IV Push once  glucagon  Injectable 1 milliGRAM(s) IntraMuscular once PRN Glucose LESS THAN 70 milligrams/deciliter  insulin lispro (HumaLOG) corrective regimen sliding scale   SubCutaneous three times a day before meals  insulin lispro (HumaLOG) corrective regimen sliding scale   SubCutaneous at bedtime    Topical/Other Medications  latanoprost 0.005% Ophthalmic Solution 1 Drop(s) Both EYES at bedtime    --------------------------------------------------------------------------------------    VITAL SIGNS, INS/OUTS (last 24 hours):  --------------------------------------------------------------------------------------  ICU Vital Signs Last 24 Hrs  T(C): 36.6 (21 Sep 2018 05:16), Max: 36.7 (20 Sep 2018 21:13)  T(F): 97.8 (21 Sep 2018 05:16), Max: 98.1 (20 Sep 2018 21:13)  HR: 76 (21 Sep 2018 05:16) (76 - 81)  BP: 130/48 (21 Sep 2018 05:16) (113/51 - 157/65)  BP(mean): --  ABP: --  ABP(mean): --  RR: 18 (21 Sep 2018 05:16) (18 - 18)  SpO2: 100% (21 Sep 2018 05:16) (98% - 100%)    I&O's Summary    20 Sep 2018 07:01  -  21 Sep 2018 07:00  --------------------------------------------------------  IN: 0 mL / OUT: 100 mL / NET: -100 mL      ---------------------------------------------------------------------------------  General: no acute distress. However, pt is not appropriately responding to questions and is confused.   Eyes: PERRLA/ EOMI, Gross vision intact  Respiratory: CTA B/L, No wheezing, rales, rhonchi  CV: RRR, S1S2, no murmurs, rubs or gallops  Abdominal: normoactive BS, reported abdominal discomfort to light palpation, non-distended  Extremities: No edema, + peripheral pulses  Neurology: A&Ox1 (to name). Per brother, his dementia has been progressively worsening.   Skin: No Rashes, Hematoma, Ecchymosis      LABS  --------------------------------------------------------------------------------------  Labs:  CAPILLARY BLOOD GLUCOSE      POCT Blood Glucose.: 218 mg/dL (21 Sep 2018 08:43)  POCT Blood Glucose.: 137 mg/dL (20 Sep 2018 22:12)  POCT Blood Glucose.: 151 mg/dL (20 Sep 2018 17:00)  POCT Blood Glucose.: 205 mg/dL (20 Sep 2018 12:28)                          11.7   13.96 )-----------( 262      ( 21 Sep 2018 03:00 )             34.5       Auto Neutrophil %: 89.4 % (18 @ 03:00)  Auto Immature Granulocyte %: 0.4 % (18 @ 03:00)        140  |  98  |  28<H>  ----------------------------<  195<H>  4.5   |  23  |  0.97      Phosphorus Level, Serum: 3.0 mg/dL (18 @ 03:00)      LFTs:             5.9  | 1.9  | 22       ------------------[94      ( 21 Sep 2018 03:00 )  3.4  | x    | 13          Lipase:x      Amylase:x         Blood Gas Venous - Lactate: 1.2 mmol/L (18 @ 03:00)  Lactate, Blood: 1.1 mmol/L (18 @ 03:00)      Coags:     14.4   ----< 1.25    ( 21 Sep 2018 03:00 )     26.9            Urinalysis Basic - ( 19 Sep 2018 11:32 )    Color: LIGHT YELLOW / Appearance: CLEAR / S.020 / pH: 6.5  Gluc: 150 / Ketone: NEGATIVE  / Bili: NEGATIVE / Urobili: NORMAL   Blood: NEGATIVE / Protein: 10 / Nitrite: NEGATIVE   Leuk Esterase: NEGATIVE / RBC: x / WBC x   Sq Epi: x / Non Sq Epi: x / Bacteria: x          --------------------------------------------------------------------------------------    OTHER LABS    IMAGING RESULTS  ****************

## 2018-09-21 NOTE — PROGRESS NOTE ADULT - ATTENDING COMMENTS
-COFEE GROUND EMESIS-R/O GI BLEEDING -IV PROTONIX.GI CONSULT APPRECIATED.FOR EGD LIKELY,Monitor H/H  -Leucocytosis-no obvious source,monitor,hold off on antibiotics.ID Consult appreciated.  -PT  -Psych f/u  -d/w Brother.Answered all questions  -d/w staff

## 2018-09-21 NOTE — DISCHARGE NOTE ADULT - PATIENT PORTAL LINK FT
You can access the [facility] Portal, offered by [facility], by registering with the following website: [email or web address]

## 2018-09-21 NOTE — DISCHARGE NOTE ADULT - HOSPITAL COURSE
77 y/o M with HTN, HLD, CAD, DM presents after fall on sidewalk with R humerus fracture    Other closed displaced fracture of proximal end of right humerus 2/2 fall    - orthopedics consult reviewed   - outpatient f/u.  Continue bracing  - PT/OT eval: rec rehab   - SW for possible increased home services.   -Fall precautions     Confusion. Possible MCI/early dementia.    -Pt with confusion on recent admission, and current findings appear to be similar.   -per brother, pt has a "mental disability"-- at times becomes forgetful--- had it all his life, per brother recently w/ beginning stages of dementia   -psych consulted for medication management      Coronary artery disease    - continue Plavix and Lipitor.  - Plavix held 2/2 1 episode of hematemesis------       Essential hypertension.    - continue ARB and metoprolol.  - Monitored BP, parameters in place     Type 2 diabetes mellitus   - hold glimepiride and metformin  - Humalog sliding scale.  - finger sticks FS + TID with meals     Coffee ground emesis   -s/p RRT 9/20  -GI consulted------------------------------------------  -monitored cbc, hgb/hct stable 78 M HTN, HLD, CAD S/P stent, DM presents after fall on sidewalk with R humerus fracture. S/P brace to R shoulder by Orthopedics in ED. When questioned, he also reports feeling lightheaded, but did not lose consciousness or strike his head.  Pt is unclear regarding further details of his presentation, and at times thinks he has been in the hospital for several days. S/P closed reduction with Becker brace, NWB to ARPITAE, pain control PRN. Hospital course c/b coffee ground emesis. Plavix/ASA held. GI consulted, EGD showed 9/27 non-bleeding gastric ulcers with no stigmata of bleeding. No evidence of gastric outlet obstruction. Pathology negative for metaplasia nor H. pylori. As per GI, recommended PPI BID. ASA restarted 9/27. Pt also noted with distended abdomen during hospital stay. AXR 9/23 showed disproportionate distention of stomach could represent gastroparesis or even gastric outlet obstruction. CT A/P done with no obstruction. Surgery consulted. Given concern for obstruction, NGT placed 9/26, noted with vomiting during placement. NGT taken out 9/27. ID consulted, treated with 5 days Abx empirically.     Pt with confusion on recent admission, and current findings appear to be similar. Per brother, pt has a "mental disability"-- at times becomes forgetful - had it all his life, per brother recently w/ beginning stages of dementia. Psych consulted. Stable. Follow up [facility] outpatient walk in clinic.     PT/OT recommended rehab. As per attending, pt medically stable for discharge.

## 2018-09-21 NOTE — DISCHARGE NOTE ADULT - MEDICATION SUMMARY - MEDICATIONS TO TAKE
I will START or STAY ON the medications listed below when I get home from the hospital:    glaucometer  -- Use as directed  -- Indication: For Diabetes    Humeral Shaft Fracture Brace  -- 1   -- Indication: For Humeral fracture    oxyCODONE 5 mg oral tablet  -- 1 tab(s) by mouth every 6 hours, As needed, Severe Pain (7 - 10)  -- Indication: For Severe pain    aspirin 81 mg oral delayed release tablet  -- 1 tab(s) by mouth once a day  -- Indication: For Prophylactic measure    ramipril 10 mg oral capsule  -- 1 cap(s) by mouth once a day  -- Indication: For Hypertension    tamsulosin 0.4 mg oral capsule  -- 1 cap(s) by mouth once a day (at bedtime)  -- Indication: For Urinary retention    traZODone 50 mg oral tablet  -- 0.5 tab(s) by mouth once a day, As Needed Insomnia  -- Indication: For Insomnia    metFORMIN 1000 mg oral tablet  -- 1 tab(s) by mouth 2 times a day  -- Indication: For Diabetes    glimepiride 4 mg oral tablet  -- 1 tab(s) by mouth 2 times a day  -- Indication: For Diabetes    atorvastatin 80 mg oral tablet  -- 1 tab(s) by mouth once a day (at bedtime)  -- Indication: For Hyperlipidemia    clopidogrel 75 mg oral tablet  -- 1 tab(s) by mouth once a day  -- Indication: For Coronary artery disease involving native coronary artery of native heart without angina pectoris    metoprolol succinate 50 mg oral tablet, extended release  -- 1 tab(s) by mouth once a day  -- Indication: For Hypertension    melatonin 3 mg oral tablet  -- 2 tab(s) by mouth once a day (at bedtime)  -- Indication: For Insomnia    latanoprost 0.005% ophthalmic solution  -- 1 drop(s) to each affected eye once a day (in the evening)  -- Indication: For Eye drops     amoxicillin-clavulanate 875 mg-125 mg oral tablet  -- 1 tab(s) by mouth once  Please give tonight 10/1  -- Indication: For Prophylactic measure    pantoprazole 40 mg oral delayed release tablet  -- 1 tab(s) by mouth 2 times a day  -- Indication: For Coffee ground emesis

## 2018-09-21 NOTE — CHART NOTE - NSCHARTNOTEFT_GEN_A_CORE
MAR RRT NOTE    Medical rapid response called at ~0254 for coffee ground emesis.  Upon arrival, pt mentating at baseline per primary RN, conscious, conversive, making jokes.  Pt with known h/o dementia.  Pt with copious coffee ground emesis noted on gown.  Vitals remained stable.  Labs drawn including T+S, coags.  Heparin, aspirin, clopidogrel discontinued (pt with remote h/o stents >20y ago).  Pantoprazole 80mg IV x1 given, and pt to be started on 40mg IV BID.  Spoke to primary PA, she will f/u labs and consent brother for blood if Hgb drops 2+ units.  If hypotensive prior to blood administration, can give IVF.  No notable LV/RV dysfunction on recent echo.  Plan communicated to primary PA.    Zoey Bill MD  PGY-3 | Internal Medicine  [phone] / [phone]  [id number] ([facility]) / [id number] ([facility]) MAR RRT NOTE    Medical rapid response called at ~0254 for coffee ground emesis.  Upon arrival, pt mentating at baseline per primary RN, conscious, conversive, making jokes.  Pt with known h/o dementia.  Pt with copious coffee ground emesis noted on gown.  Vitals remained stable.  Labs drawn including T+S, coags.  Heparin, aspirin, clopidogrel discontinued (pt with remote h/o stents >20y ago).  Pantoprazole 80mg IV x1 given, and pt to be started on 40mg IV BID.  Spoke to primary NEIL Hooper, she will f/u labs and consent brother for blood if Hgb drops 2+ units.  If hypotensive prior to blood administration, can give IVF.  No notable LV/RV dysfunction on recent echo.  Plan communicated to primary PA.    Zoey Bill MD  PGY-3 | Internal Medicine  [phone] / [phone]  [id number] ([facility]) / [id number] ([facility]) MAR RRT NOTE    Medical rapid response called at ~0254 for coffee ground emesis.  Upon arrival, pt mentating at baseline per primary RN, conscious, conversive, making jokes.  Pt with known h/o dementia.  Pt with copious coffee ground emesis noted on gown.  Vitals remained stable.  Labs drawn including T+S, coags.  Heparin, aspirin, clopidogrel discontinued (pt with remote h/o stents >20y ago).  Pantoprazole 80mg IV x1 given, and pt to be started on 40mg IV BID.  Spoke to primary NEIL Hooper, she will f/u labs and consent brother for blood if Hgb drops 2+ units.  If hypotensive prior to blood administration, can give IVF.  No notable LV/RV dysfunction on recent echo.  GI to be emailed / called by primary team.  Plan communicated to primary NEIL Hooper.    Zoey Bill MD  PGY-3 | Internal Medicine  [phone] / [phone]  [id number] ([facility]) / [id number] ([facility])

## 2018-09-21 NOTE — DISCHARGE NOTE ADULT - CARE PLAN
Principal Discharge DX:	Other closed displaced fracture of proximal end of right humerus, initial encounter  Assessment and plan of treatment:	You were seen by orthopaedic team, no surgical intervention warranted. You were seen for a fall. Maintain a safe environment. Do not change positions quickly. Participate in program recommended by physical therapy Principal Discharge DX:	Other closed displaced fracture of proximal end of right humerus, initial encounter  Assessment and plan of treatment:	You were seen by Orthopaedic team, no surgical intervention warranted. You were seen for a fall. Maintain a safe environment. Do not change positions quickly. Participate in program recommended by physical therapy. Continue with brace and do not bear weight on right upper extremity. Follow up Dr. Eastman in 1-2 weeks for further management. Call [phone] to make an appointment.  Secondary Diagnosis:	Confusion  Assessment and plan of treatment:	You were seen by Psychiatry in-hospital. Stable. Follow up [facility] outpatient walk in clinic at [phone] (9am-7pm). Call to make an appointment. Principal Discharge DX:	Other closed displaced fracture of proximal end of right humerus, initial encounter  Goal:	pain management, brace  Assessment and plan of treatment:	You were seen by Orthopaedic team, no surgical intervention warranted. You were seen for a fall. Maintain a safe environment. Do not change positions quickly. Participate in program recommended by physical therapy. Continue with brace and do not bear weight on right upper extremity. Follow up Dr. Eastman in 1-2 weeks for further management. Call [phone] to make an appointment.  Secondary Diagnosis:	Confusion  Assessment and plan of treatment:	You were seen by Psychiatry in-hospital. Stable. Follow up [facility] outpatient walk in clinic at [phone] (9am-7pm). Call to make an appointment. Principal Discharge DX:	Other closed displaced fracture of proximal end of right humerus, initial encounter  Goal:	pain management, brace  Assessment and plan of treatment:	You were seen by Orthopaedic team, no surgical intervention warranted. You were seen for a fall. Maintain a safe environment. Do not change positions quickly. Participate in program recommended by physical therapy. Continue with brace and do not bear weight on right upper extremity. Follow up Dr. Eastman in 1-2 weeks for further management. Call [phone] to make an appointment.  Secondary Diagnosis:	Confusion  Assessment and plan of treatment:	You were seen by Psychiatry in-hospital. Stable. Follow up [facility] outpatient walk in clinic at [phone] (9am-7pm). Call to make an appointment.  Secondary Diagnosis:	Coffee ground emesis  Assessment and plan of treatment:	You were seen by Gastroenterology in-hospital. You had a EGD with non-bleeding gastric ulcers noted. Stable. Continue Protonix as prescribed. Follow up outpatient Gastroenterology in 4-6 weeks for repeat EGD to monitor for healing. Principal Discharge DX:	Other closed displaced fracture of proximal end of right humerus, initial encounter  Goal:	Pain management, brace  Assessment and plan of treatment:	You were seen by Orthopaedic team, no surgical intervention warranted. You were seen for a fall. Maintain a safe environment. Do not change positions quickly. Participate in program recommended by physical therapy. Continue with brace and do not bear weight on right upper extremity. Follow up Dr. Eastman in 1-2 weeks for further management. Call [phone] to make an appointment.  Secondary Diagnosis:	Confusion  Assessment and plan of treatment:	You were seen by Psychiatry in-hospital. Stable. Follow up [facility] outpatient walk in clinic at [phone] (9am-7pm). Call to make an appointment.  Secondary Diagnosis:	Coffee ground emesis  Assessment and plan of treatment:	You were seen by Gastroenterology in-hospital. You had a EGD with non-bleeding gastric ulcers noted. Stable. Continue Protonix as prescribed. Follow up outpatient Gastroenterology in 4-6 weeks for repeat EGD to monitor for healing.  Secondary Diagnosis:	Coronary artery disease involving native coronary artery of native heart without angina pectoris  Assessment and plan of treatment:	Stable. Continue with medications as prescribed. Follow up outpatient PCP in 1-2 weeks for further management. Monitor for shortness of breath, chest pain, palpitations.  Secondary Diagnosis:	DM (diabetes mellitus)  Assessment and plan of treatment:	Continue consistent carbohydrate diet.  Monitor blood glucose levels throughout the day before meals and at bedtime.  Record blood sugars and bring to outpatient providers appointment in order to be reviewed by your doctor for management modifications.  Be aware of diabetes management symptoms including feeling cool and clammy may be related to low glucose levels.  Feeling hot and dry may indicate high glucose levels. If you feel these symptoms, check your blood sugar.  Make regular podiatry appointments in order to have feet checked for wounds and toe nails cut by a doctor to prevent infections, as well as, appointments with an ophthalmologist to monitor your vision.  Secondary Diagnosis:	HTN (hypertension)  Assessment and plan of treatment:	Continue blood pressure medication regimen as directed. Monitor for any visual changes, headaches or dizziness.  Monitor blood pressure regularly.  Follow up with your PCP for further management for high blood pressure. Principal Discharge DX:	Other closed displaced fracture of proximal end of right humerus, initial encounter  Goal:	Pain management, brace  Assessment and plan of treatment:	You were seen by Orthopaedic team, no surgical intervention warranted. You were seen for a fall. Maintain a safe environment. Do not change positions quickly. Participate in program recommended by physical therapy. Continue with brace and do not bear weight on right upper extremity. Follow up Dr. Eastman in 1-2 weeks for further management. Call [phone] to make an appointment.  Secondary Diagnosis:	Confusion  Assessment and plan of treatment:	You were seen by Psychiatry in-hospital. Stable. Follow up [facility] outpatient walk in clinic at [phone] (9am-7pm). Call to make an appointment.  Secondary Diagnosis:	Coffee ground emesis  Assessment and plan of treatment:	You were seen by Gastroenterology in-hospital. You had a EGD with non-bleeding gastric ulcers noted. Stable. Continue Protonix as prescribed. Follow up outpatient Gastroenterology in 4-6 weeks for repeat EGD to monitor for healing. Call [phone] to make an appointment.  Secondary Diagnosis:	Coronary artery disease involving native coronary artery of native heart without angina pectoris  Assessment and plan of treatment:	Stable. Continue with medications as prescribed. Follow up outpatient PCP in 1-2 weeks for further management. Monitor for shortness of breath, chest pain, palpitations.  Secondary Diagnosis:	DM (diabetes mellitus)  Assessment and plan of treatment:	Continue consistent carbohydrate diet.  Monitor blood glucose levels throughout the day before meals and at bedtime.  Record blood sugars and bring to outpatient providers appointment in order to be reviewed by your doctor for management modifications.  Be aware of diabetes management symptoms including feeling cool and clammy may be related to low glucose levels.  Feeling hot and dry may indicate high glucose levels. If you feel these symptoms, check your blood sugar.  Make regular podiatry appointments in order to have feet checked for wounds and toe nails cut by a doctor to prevent infections, as well as, appointments with an ophthalmologist to monitor your vision.  Secondary Diagnosis:	HTN (hypertension)  Assessment and plan of treatment:	Continue blood pressure medication regimen as directed. Monitor for any visual changes, headaches or dizziness.  Monitor blood pressure regularly.  Follow up with your PCP for further management for high blood pressure.

## 2018-09-21 NOTE — DISCHARGE NOTE ADULT - CARE PROVIDER_API CALL
Pankaj Cruz (MD), Internal Medicine  [address] [place] [address] [place] [address]  Phone: [phone]  Fax: [phone] Pankaj Cruz (MD), Internal Medicine  [address] [place] [address] [place] [address]  Phone: [phone]  Fax: [phone]

## 2018-09-21 NOTE — PROGRESS NOTE BEHAVIORAL HEALTH - NSBHFUPINTERVALHXFT_PSY_A_CORE
Pt seen this am for follow up. He was in bed, kept his eyes closed most of the time, he said he is ok, just resting. He thought this si a car dealership and after was he told that this is a hospital, he was not sure why he came here. He was not oriented to time either. He denied hallucinations and he reports feeling safe. No SI/HI.   I spoke to pt's brother who said that the pt has no Dx of dementia to his knowledge, and that he has been increasingly incoherent over the past 2-3 months. He has no pph.

## 2018-09-21 NOTE — PROGRESS NOTE BEHAVIORAL HEALTH - NSBHCHARTREVIEWIMAGING_PSY_A_CORE FT
< from: CT Head No Cont (09.19.18 @ 15:16) >    INTERPRETATION:  History: Status post fall. Dizziness.    Description: A noncontrast head CT was performed. Axial images were   performed from the skull base to the vertex with coronal/sagittal   reconstructions.    Comparison is made to a head CT study from 09/12/2017.    There is no evidence for acute infarct, acute hemorrhage, mass effect,   calvarial fracture, or hydrocephalus.    Mild patchy hypodensity without mass effect is noted in the   periventricular white matter which most likely represents chronic   microvascular ischemic changes given the patient's age.    Cerebral volume loss is present with secondary proportional prominence of   the sulci and ventricles.    No lyticor blastic calvarial lesions are noted. The visualized portions   of the paranasal sinuses and mastoid air cells are clear.    IMPRESSION:    No acute intracranial pathology is noted. If the patient has new and   persistent symptoms, consider short interval follow-up head CT or brain   MRI follow-up if there are no MRI contraindications.

## 2018-09-21 NOTE — DISCHARGE NOTE ADULT - PLAN OF CARE
You were seen by orthopaedic team, no surgical intervention warranted. You were seen for a fall. Maintain a safe environment. Do not change positions quickly. Participate in program recommended by physical therapy You were seen by Orthopaedic team, no surgical intervention warranted. You were seen for a fall. Maintain a safe environment. Do not change positions quickly. Participate in program recommended by physical therapy. Continue with brace and do not bear weight on right upper extremity. Follow up Dr. Eastman in 1-2 weeks for further management. Call [phone] to make an appointment. You were seen by Psychiatry in-hospital. Stable. Follow up [facility] outpatient walk in clinic at [phone] (9am-7pm). Call to make an appointment. pain management, brace You were seen by Gastroenterology in-hospital. You had a EGD with non-bleeding gastric ulcers noted. Stable. Continue Protonix as prescribed. Follow up outpatient Gastroenterology in 4-6 weeks for repeat EGD to monitor for healing. Pain management, brace Stable. Continue with medications as prescribed. Follow up outpatient PCP in 1-2 weeks for further management. Monitor for shortness of breath, chest pain, palpitations. Continue consistent carbohydrate diet.  Monitor blood glucose levels throughout the day before meals and at bedtime.  Record blood sugars and bring to outpatient providers appointment in order to be reviewed by your doctor for management modifications.  Be aware of diabetes management symptoms including feeling cool and clammy may be related to low glucose levels.  Feeling hot and dry may indicate high glucose levels. If you feel these symptoms, check your blood sugar.  Make regular podiatry appointments in order to have feet checked for wounds and toe nails cut by a doctor to prevent infections, as well as, appointments with an ophthalmologist to monitor your vision. Continue blood pressure medication regimen as directed. Monitor for any visual changes, headaches or dizziness.  Monitor blood pressure regularly.  Follow up with your PCP for further management for high blood pressure. You were seen by Gastroenterology in-hospital. You had a EGD with non-bleeding gastric ulcers noted. Stable. Continue Protonix as prescribed. Follow up outpatient Gastroenterology in 4-6 weeks for repeat EGD to monitor for healing. Call [phone] to make an appointment.

## 2018-09-21 NOTE — CONSULT NOTE ADULT - ASSESSMENT
A/P: This is a 78y Male who presents today with right midshaft transverse right humeral fracture    - Becker brace applied s/p closed reduction  - pain control PRN  - NWB RUE  - SELVIN with Dr. Eastman within 1 week ([phone])  - page ortho with concerns/questions
79 y/o M with DM, HTN, HLD, CAD s/p stents p/w shoulder pain after fall.  Pt reports he was walking to the bank, and lost his balance with the wind causing him to fall backwards.  When questioned, he also reports feeling lightheaded, but did not lose consciousness or strike his head.  Pt is unclear regarding further details of his presentation, and at times thinks he has been in the hospital for several days.  He reports some pain in his R arm and shoulder, but otherwise states that he feels well.  Denies any recent illness.  Pt has walked with a cane for some time, and reports he recently acquired a walker, however, he was using his cane when he fell today.      In the ED: pt had X-ray showing fracture of proximal R humerus.  He was evaluated by orthopedics, and had brace applied to R arm. (19 Sep 2018 20:37)    HOSP COURSE:    Pt admitted with  closed displaced fracture of proximal end of right humerus, seen by Ortho, s/p closed reduction and brace placement. Pt noted to present with confusion, and as per chart notes, brother states pt with "mental disability"-- at times becomes forgetful--- had it all his life, per brother recently w/ beginning stages of dementia.  Pt is being followed by [facility].     On 9/21, rapid response called for coffee ground emesis.  Pt is planned for EGD to evaluate for source of bleed.      Pt has been afebrile and hemodynamically stable.  Noted to have elevated WBC in low teens.  ID consult called to evaluate for underlying infectious etiology.      Problem/Plan - 1:    ·	Leukocytosis    - Pt currently without fever, and is hemodynamically stable.  WBC in low teens.  Could be reactive to recent fall/humeral fracture and GIB.  UA is negative, and thus far chest xray with small left pleural effusion.  Monitor closely for new fevers, or cough as pt may have aspirated during emesis event.      - Will hold off on abx for now unless clinical status worsens.  If pt with new fevers, cough/purulent sputum, start zosyn to cover for aspiration pna.    - Check repeat CBC with differential.    - GI w/u in progress, pt planned for EGD.    Will follow,    Payal Meier  [phone]
Patient is a 78 yr-old M with PMHx of HTN, HLD, CAD, DM presents after fall on sidewalk with R humerus fracture. His hospital course was complicated by coffee ground emesis that occurred overnight.

## 2018-09-21 NOTE — PROGRESS NOTE ADULT - SUBJECTIVE AND OBJECTIVE BOX
FESTUS BLAND  78y  Male      Patient is a 78y old  Male who presents with a chief complaint of Fall, shoulder pain (21 Sep 2018 18:08)  -Above noted.s/p cofee fround emesis,sen by GI,Leucocytosis-no obvious source.seen by ID    REVIEW OF SYSTEMS:  AS ABOVE  INTERVAL HPI/OVERNIGHT EVENTS:  T(C): 36.9 (09-21-18 @ 14:40), Max: 36.9 (09-21-18 @ 14:40)  HR: 70 (09-21-18 @ 14:40) (70 - 81)  BP: 123/54 (09-21-18 @ 14:40) (123/54 - 157/65)  RR: 18 (09-21-18 @ 14:40) (18 - 18)  SpO2: 99% (09-21-18 @ 14:40) (99% - 100%)  Wt(kg): --  I&O's Summary    20 Sep 2018 07:01  -  21 Sep 2018 07:00  --------------------------------------------------------  IN: 0 mL / OUT: 100 mL / NET: -100 mL      T(C): 36.9 (09-21-18 @ 14:40), Max: 36.9 (09-21-18 @ 14:40)  HR: 70 (09-21-18 @ 14:40) (70 - 81)  BP: 123/54 (09-21-18 @ 14:40) (123/54 - 157/65)  RR: 18 (09-21-18 @ 14:40) (18 - 18)  SpO2: 99% (09-21-18 @ 14:40) (99% - 100%)  Wt(kg): --Vital Signs Last 24 Hrs  T(C): 36.9 (21 Sep 2018 14:40), Max: 36.9 (21 Sep 2018 14:40)  T(F): 98.5 (21 Sep 2018 14:40), Max: 98.5 (21 Sep 2018 14:40)  HR: 70 (21 Sep 2018 14:40) (70 - 81)  BP: 123/54 (21 Sep 2018 14:40) (123/54 - 157/65)  BP(mean): --  RR: 18 (21 Sep 2018 14:40) (18 - 18)  SpO2: 99% (21 Sep 2018 14:40) (99% - 100%)    LABS:                        11.2   12.28 )-----------( 263      ( 21 Sep 2018 13:29 )             33.6     09-21    140  |  98  |  28<H>  ----------------------------<  195<H>  4.5   |  23  |  0.97    Ca    8.8      21 Sep 2018 03:00  Phos  3.0     09-21  Mg     1.5     09-21    TPro  5.9<L>  /  Alb  3.4  /  TBili  1.9<H>  /  DBili  x   /  AST  22  /  ALT  13  /  AlkPhos  94  09-21    PT/INR - ( 21 Sep 2018 03:00 )   PT: 14.4 SEC;   INR: 1.25          PTT - ( 21 Sep 2018 03:00 )  PTT:26.9 SEC    CAPILLARY BLOOD GLUCOSE      POCT Blood Glucose.: 155 mg/dL (21 Sep 2018 17:04)  POCT Blood Glucose.: 183 mg/dL (21 Sep 2018 12:29)  POCT Blood Glucose.: 218 mg/dL (21 Sep 2018 08:43)  POCT Blood Glucose.: 137 mg/dL (20 Sep 2018 22:12)            PAST MEDICAL & SURGICAL HISTORY:  BPH (benign prostatic hyperplasia)  CAD (Coronary Artery Disease): s/p cardiac stent ( &gt; 20 years ago)  HTN (Hypertension)  DM (Diabetes Mellitus)  Hypercholesterolemia  Coronary Stent: x 2 ( 20 years )      MEDICATIONS  (STANDING):  atorvastatin 80 milliGRAM(s) Oral at bedtime  dextrose 50% Injectable 12.5 Gram(s) IV Push once  dextrose 50% Injectable 25 Gram(s) IV Push once  dextrose 50% Injectable 25 Gram(s) IV Push once  influenza   Vaccine 0.5 milliLiter(s) IntraMuscular once  insulin lispro (HumaLOG) corrective regimen sliding scale   SubCutaneous three times a day before meals  insulin lispro (HumaLOG) corrective regimen sliding scale   SubCutaneous at bedtime  latanoprost 0.005% Ophthalmic Solution 1 Drop(s) Both EYES at bedtime  lisinopril 40 milliGRAM(s) Oral daily  melatonin 6 milliGRAM(s) Oral at bedtime  metoprolol succinate ER 50 milliGRAM(s) Oral daily  pantoprazole  Injectable 40 milliGRAM(s) IV Push two times a day  tamsulosin 0.4 milliGRAM(s) Oral at bedtime    MEDICATIONS  (PRN):  acetaminophen   Tablet .. 650 milliGRAM(s) Oral every 6 hours PRN Mild Pain (1 - 3), Moderate Pain (4 - 6)  dextrose 40% Gel 15 Gram(s) Oral once PRN Blood Glucose LESS THAN 70 milliGRAM(s)/deciliter  glucagon  Injectable 1 milliGRAM(s) IntraMuscular once PRN Glucose LESS THAN 70 milligrams/deciliter  haloperidol     Tablet 1 milliGRAM(s) Oral every 6 hours PRN agitation  haloperidol    Injectable 1 milliGRAM(s) IntraMuscular every 6 hours PRN agitation  haloperidol    Injectable 1 milliGRAM(s) IV Push every 6 hours PRN agitation  traZODone 25 milliGRAM(s) Oral at bedtime PRN insomnia        RADIOLOGY & ADDITIONAL TESTS:    Imaging Personally Reviewed:  [ ] YES  [ ] NO    Consultant(s) Notes Reviewed:  [X ] YES  [ ] NO    PHYSICAL EXAM:  GENERAL: NAD, , well-developed,RESTING,AROUSABLE  HEAD:  Atraumatic, Normocephalic  EYES: EOMI, PERRLA, conjunctiva and sclera clear  ENMT: No tonsillar erythema, exudates, or enlargement; Moist mucous membranes, Good dentition, No lesions  NECK: Supple, No JVD, Normal thyroid  NERVOUS SYSTEM:  Alert & Oriented X1, Good concentration; Motor Strength 5/5 B/L upper and lower extremities; DTRs 2+ intact and symmetric  CHEST/LUNG: Clear to percussion bilaterally; No rales, rhonchi, wheezing, or rubs  HEART: Regular rate and rhythm; No murmurs, rubs, or gallops  ABDOMEN: Soft, Nontender, Nondistended; Bowel sounds present  EXTREMITIES:  2+ Peripheral Pulses, No clubbing, cyanosis, or edema  LYMPH: No lymphadenopathy noted  SKIN: No rashes or lesions    Care Discussed with Consultants/Other Providers [X ] YES  [ ] NO      Code Status: [] Full Code [] DNR [] DNI [] Goals of Care:   Disposition: [] ICU [] Stroke Unit [] RCU []PCU []Floor [] Discharge Home         [facility] Vargas.[facility]

## 2018-09-21 NOTE — CONSULT NOTE ADULT - PROBLEM SELECTOR RECOMMENDATION 9
- Pt with no significant GI history or SHx of alcohol abuse had a significant episode of coffee ground emesis. He ate spaghetti and meatball for dinner at 5:00 pm and the vomiting episode occurred at around 3:00 am.  - DDx- UGIB (PUD vs AVM) given appearnace of vomit and elevated BUN  - Hg- 11.7 (baseline 13.0, 9/3/2018), Hct- 34.5, INR- 1.25  - C/w IV Protonix 40 mg BID  - continue to monitor cbc and transfuse for Hg less than 7.0, please keep active type and screen  - Hold aspirin/plavix  - Pt is NPO. Aspiration precautions  - EGD to evaluate for a source of acute GI bleed - Pt with no significant GI history or SHx of alcohol abuse had a significant episode of coffee ground emesis. He ate spaghetti and meatball for dinner at 5:00 pm and the vomiting episode occurred at around 3:00 am.  - DDx- UGIB (PUD secondary to aspirin/plavix vs gastritis vs AVM) given appearnace of vomit and elevated BUN  - Hg- 11.7 (baseline 13.0, 9/3/2018), Hct- 34.5, INR- 1.25  - C/w IV Protonix 40 mg BID  - continue to monitor cbc and transfuse for Hg less than 7.0, please keep active type and screen  - Hold aspirin/plavix  - Pt is NPO. Aspiration precautions  - EGD to evaluate for a source of acute GI bleed - Pt with no significant GI history or SHx of alcohol abuse had an episode of coffee ground emesis. He ate spaghetti and meatball for dinner at 5:00 pm and the vomiting episode occurred at around 3:00 am.  - DDx- UGIB (PUD secondary to aspirin/plavix vs gastritis vs AVM) given appearnace of vomit and elevated BUN  - Hg- 11.7 (baseline 13.0, 9/3/2018), Hct- 34.5, INR- 1.25  - C/w IV Protonix 40 mg BID  - continue to monitor cbc and transfuse for Hg less than 7.0, please keep active type and screen  - Hold aspirin/plavix  - Pt is NPO. Aspiration precautions  - EGD to evaluate for a source of acute GI bleed

## 2018-09-21 NOTE — CHART NOTE - NSCHARTNOTEFT_GEN_A_CORE
FESTUS BLAND  78y  Male      Patient is a 78y old  Male who presents with a chief complaint of Fall, shoulder pain (20 Sep 2018 21:17)      INTERVAL HPI/OVERNIGHT EVENTS:    Medical Rapid Response was called due to an episode of coffee ground emesis.   Pt found to be alert, pleasant, making jokes, conversive, and mentating at baseline ( Hx Dementia). His gown was noted to be stained with coffee ground emesis.  Vitals were stable.  Last Hgb/Hct: 11.7/35.1  Last echocardiogram from 18: EF 68%    REVIEW OF SYSTEMS:  CONSTITUTIONAL: No fever, weight loss, or fatigue  EYES: No eye pain, visual disturbances, or discharge  ENMT:  No difficulty hearing, tinnitus, vertigo; No sinus or throat pain  NECK: No pain or stiffness  BREASTS: No pain, masses, or nipple discharge  RESPIRATORY: No cough, wheezing, chills or hemoptysis; No shortness of breath  CARDIOVASCULAR: No chest pain, palpitations, dizziness, or leg swelling  GASTROINTESTINAL: No abdominal or epigastric pain.; No diarrhea or constipation. No melena or hematochezia. + coffee ground emesis  GENITOURINARY: No dysuria, frequency, hematuria, or incontinence  NEUROLOGICAL: No headaches, memory loss, loss of strength, numbness, or tremors  SKIN: No itching, burning, rashes, or lesions   LYMPH NODES: No enlarged glands  ENDOCRINE: No heat or cold intolerance; No hair loss  MUSCULOSKELETAL: No joint pain or swelling; No muscle, back, or extremity pain  PSYCHIATRIC: No depression, anxiety, mood swings, or difficulty sleeping  HEME/LYMPH: No easy bruising, or bleeding gums  ALLERY AND IMMUNOLOGIC: No hives or eczema    T(C): 36.7 (18 @ 21:13), Max: 36.7 (18 @ 21:13)  HR: 81 (18 @ 21:13) (74 - 81)  BP: 157/65 (18 @ 21:13) (113/51 - 157/65)  RR: 18 (18 @ 21:13) (17 - 18)  SpO2: 100% (18 @ 21:13) (98% - 100%)  Wt(kg): --Vital Signs Last 24 Hrs  T(C): 36.7 (20 Sep 2018 21:13), Max: 36.7 (20 Sep 2018 21:13)  T(F): 98.1 (20 Sep 2018 21:13), Max: 98.1 (20 Sep 2018 21:13)  HR: 81 (20 Sep 2018 21:13) (74 - 81)  BP: 157/65 (20 Sep 2018 21:13) (113/51 - 157/65)  BP(mean): --  RR: 18 (20 Sep 2018 21:13) (17 - 18)  SpO2: 100% (20 Sep 2018 21:13) (98% - 100%)    PHYSICAL EXAM:  GENERAL: NAD, well-groomed, well-developed  HEAD:  Atraumatic, Normocephalic  EYES: EOMI, PERRLA, conjunctiva and sclera clear  ENMT: No tonsillar erythema, exudates, or enlargement; Moist mucous membranes, Good dentition, No lesions  NECK: Supple, No JVD, Normal thyroid  NERVOUS SYSTEM:  Alert & Oriented X1, baseline dementia Motor Strength 5/5 B/L upper and lower extremities; DTRs 2+ intact and symmetric  CHEST/LUNG: Clear to percussion bilaterally; No rales, rhonchi, wheezing, or rubs  HEART: Regular rate and rhythm; No murmurs, rubs, or gallops  ABDOMEN: Soft, Nontender, Nondistended; Bowel sounds present, + coffee ground emesis   EXTREMITIES:  2+ Peripheral Pulses, No clubbing, cyanosis, or edema  LYMPH: No lymphadenopathy noted  SKIN: No rashes or lesions    LABS:                        11.7   12.29 )-----------( 241      ( 20 Sep 2018 09:02 )             35.1     09-20    137  |  101  |  32<H>  ----------------------------<  172<H>  4.5   |  24  |  0.91    Ca    8.9      20 Sep 2018 09:02  Phos  2.7     09-20  Mg     1.5     -20    TPro  6.5  /  Alb  3.6  /  TBili  1.0  /  DBili  x   /  AST  22  /  ALT  17  /  AlkPhos  85  09-19      Urinalysis Basic - ( 19 Sep 2018 11:32 )    Color: LIGHT YELLOW / Appearance: CLEAR / S.020 / pH: 6.5  Gluc: 150 / Ketone: NEGATIVE  / Bili: NEGATIVE / Urobili: NORMAL   Blood: NEGATIVE / Protein: 10 / Nitrite: NEGATIVE   Leuk Esterase: NEGATIVE / RBC: x / WBC x   Sq Epi: x / Non Sq Epi: x / Bacteria: x      CAPILLARY BLOOD GLUCOSE      POCT Blood Glucose.: 137 mg/dL (20 Sep 2018 22:12)  POCT Blood Glucose.: 151 mg/dL (20 Sep 2018 17:00)  POCT Blood Glucose.: 205 mg/dL (20 Sep 2018 12:28)  POCT Blood Glucose.: 170 mg/dL (20 Sep 2018 08:29)        Urinalysis Basic - ( 19 Sep 2018 11:32 )    Color: LIGHT YELLOW / Appearance: CLEAR / S.020 / pH: 6.5  Gluc: 150 / Ketone: NEGATIVE  / Bili: NEGATIVE / Urobili: NORMAL   Blood: NEGATIVE / Protein: 10 / Nitrite: NEGATIVE   Leuk Esterase: NEGATIVE / RBC: x / WBC x   Sq Epi: x / Non Sq Epi: x / Bacteria: x      Medical rapid response called at ~0254 for coffee ground emesis.  Upon arrival, pt mentating at baseline per primary RN, conscious, conversive, making jokes.  Pt with known h/o dementia.  Pt with copious coffee ground emesis noted on gown.  Vitals remained stable.  Labs drawn including T+S, coags.  Heparin, aspirin, clopidogrel discontinued (pt with remote h/o stents >20y ago).  Pantoprazole 80mg IV x1 given, and pt to be started on 40mg IV BID.  Spoke to primary NEIL Hooper, she will f/u labs and consent brother for blood if Hgb drops 2+ units.  If hypotensive prior to blood administration, can give IVF.  No notable LV/RV dysfunction on recent echo.  GI to be emailed / called by primary team.  Plan communicated to primary NEIL Hooper.    A/P:  Coffee Ground Emesis  -R/o GI bleed  -NPO  -Heparin, Aspirin, Plavix discontinued  -Pantoprazole 80mg IV x 1 dose given, continue 40 mg IV BID  -F/up CBC, BMP + Mag, Phos, coags, Vbg w/ Lactate  -If Hgb drops >2 units, will call brother and obtain consent for blood transfusion  -F/up CXR, r/o Aspiration  -GI consult in am    Imaging Personally Reviewed:  [ ] YES  [ ] NO FESTUS BLAND  78y  Male      Patient is a 78y old  Male who presents with a chief complaint of Fall, shoulder pain (20 Sep 2018 21:17)      INTERVAL HPI/OVERNIGHT EVENTS:    Medical Rapid Response was called due to an episode of coffee ground emesis.   Pt found to be alert, pleasant, making jokes, conversive, and mentating at baseline ( Hx Dementia). His gown was noted to be stained with coffee ground emesis.  Vitals were stable.  Last Hgb/Hct: 11.7/35.1  Last echocardiogram from 18: EF 68%    REVIEW OF SYSTEMS:  CONSTITUTIONAL: No fever, weight loss, or fatigue  EYES: No eye pain, visual disturbances, or discharge  ENMT:  No difficulty hearing, tinnitus, vertigo; No sinus or throat pain  NECK: No pain or stiffness  BREASTS: No pain, masses, or nipple discharge  RESPIRATORY: No cough, wheezing, chills or hemoptysis; No shortness of breath  CARDIOVASCULAR: No chest pain, palpitations, dizziness, or leg swelling  GASTROINTESTINAL: No abdominal or epigastric pain.; No diarrhea or constipation. No melena or hematochezia. + coffee ground emesis  GENITOURINARY: No dysuria, frequency, hematuria, or incontinence  NEUROLOGICAL: No headaches, memory loss, loss of strength, numbness, or tremors  SKIN: No itching, burning, rashes, or lesions   LYMPH NODES: No enlarged glands  ENDOCRINE: No heat or cold intolerance; No hair loss  MUSCULOSKELETAL: No joint pain or swelling; No muscle, back, or extremity pain  PSYCHIATRIC: No depression, anxiety, mood swings, or difficulty sleeping  HEME/LYMPH: No easy bruising, or bleeding gums  ALLERY AND IMMUNOLOGIC: No hives or eczema    T(C): 36.7 (18 @ 21:13), Max: 36.7 (18 @ 21:13)  HR: 81 (18 @ 21:13) (74 - 81)  BP: 157/65 (18 @ 21:13) (113/51 - 157/65)  RR: 18 (18 @ 21:13) (17 - 18)  SpO2: 100% (18 @ 21:13) (98% - 100%)  Wt(kg): --Vital Signs Last 24 Hrs  T(C): 36.7 (20 Sep 2018 21:13), Max: 36.7 (20 Sep 2018 21:13)  T(F): 98.1 (20 Sep 2018 21:13), Max: 98.1 (20 Sep 2018 21:13)  HR: 81 (20 Sep 2018 21:13) (74 - 81)  BP: 157/65 (20 Sep 2018 21:13) (113/51 - 157/65)  BP(mean): --  RR: 18 (20 Sep 2018 21:13) (17 - 18)  SpO2: 100% (20 Sep 2018 21:13) (98% - 100%)    PHYSICAL EXAM:  GENERAL: NAD, well-groomed, well-developed  HEAD:  Atraumatic, Normocephalic  EYES: EOMI, PERRLA, conjunctiva and sclera clear  ENMT: No tonsillar erythema, exudates, or enlargement; Moist mucous membranes, Good dentition, No lesions  NECK: Supple, No JVD, Normal thyroid  NERVOUS SYSTEM:  Alert & Oriented X1, baseline dementia Motor Strength 5/5 B/L upper and lower extremities; DTRs 2+ intact and symmetric  CHEST/LUNG: Clear to percussion bilaterally; No rales, rhonchi, wheezing, or rubs  HEART: Regular rate and rhythm; No murmurs, rubs, or gallops  ABDOMEN: Soft, Nontender, Nondistended; Bowel sounds present, + coffee ground emesis   EXTREMITIES:  2+ Peripheral Pulses, No clubbing, cyanosis, or edema  LYMPH: No lymphadenopathy noted  SKIN: No rashes or lesions    LABS:                        11.7   12.29 )-----------( 241      ( 20 Sep 2018 09:02 )             35.1     09-20    137  |  101  |  32<H>  ----------------------------<  172<H>  4.5   |  24  |  0.91    Ca    8.9      20 Sep 2018 09:02  Phos  2.7     09-20  Mg     1.5     -20    TPro  6.5  /  Alb  3.6  /  TBili  1.0  /  DBili  x   /  AST  22  /  ALT  17  /  AlkPhos  85  09-19      Urinalysis Basic - ( 19 Sep 2018 11:32 )    Color: LIGHT YELLOW / Appearance: CLEAR / S.020 / pH: 6.5  Gluc: 150 / Ketone: NEGATIVE  / Bili: NEGATIVE / Urobili: NORMAL   Blood: NEGATIVE / Protein: 10 / Nitrite: NEGATIVE   Leuk Esterase: NEGATIVE / RBC: x / WBC x   Sq Epi: x / Non Sq Epi: x / Bacteria: x      CAPILLARY BLOOD GLUCOSE      POCT Blood Glucose.: 137 mg/dL (20 Sep 2018 22:12)  POCT Blood Glucose.: 151 mg/dL (20 Sep 2018 17:00)  POCT Blood Glucose.: 205 mg/dL (20 Sep 2018 12:28)  POCT Blood Glucose.: 170 mg/dL (20 Sep 2018 08:29)        Urinalysis Basic - ( 19 Sep 2018 11:32 )    Color: LIGHT YELLOW / Appearance: CLEAR / S.020 / pH: 6.5  Gluc: 150 / Ketone: NEGATIVE  / Bili: NEGATIVE / Urobili: NORMAL   Blood: NEGATIVE / Protein: 10 / Nitrite: NEGATIVE   Leuk Esterase: NEGATIVE / RBC: x / WBC x   Sq Epi: x / Non Sq Epi: x / Bacteria: x      A/P:  Coffee Ground Emesis  -R/o GI bleed  -NPO  -Heparin, Aspirin, Plavix discontinued  -Pantoprazole 80mg IV x 1 dose given, continue 40 mg IV BID  -F/up CBC, BMP + Mag, Phos, coags, Vbg w/ Lactate  -If Hgb drops >2 units, will call brother and obtain consent for blood transfusion  -F/up CXR, r/o Aspiration  -GI consult in am    Imaging Personally Reviewed:  [ ] YES  [ ] NO

## 2018-09-21 NOTE — CONSULT NOTE ADULT - ATTENDING COMMENTS
As above.    Impression:    #1. Coffee ground emesis x 1, without melena or hematochezia (no JAYJAY done due to pt location in hallway).  #2.  Mild anemia  #3.  Hemodynamically stable  #4.  CAD, on ASA/Plavix (currently held)  #5.  Admit post fall with humerus fracture.  #6.  Dementia.    Recommendations:    #0.  Clear liquid diet.  #1.  Empiric PPI  #2.  Follow CBC  #3.  May restart aspirin if CBC stable and no ongoing bleeding.

## 2018-09-21 NOTE — PROGRESS NOTE BEHAVIORAL HEALTH - NSBHCHARTREVIEWLAB_PSY_A_CORE FT
Labs reviewed personally [X]                        11.2   12.28 )-----------( 263      ( 21 Sep 2018 13:29 )             33.6     Hgb Trend: 11.2<--, 11.7<--, 11.7<--, 12.2<--    09-21    140  |  98  |  28<H>  ----------------------------<  195<H>  4.5   |  23  |  0.97    Ca    8.8      21 Sep 2018 03:00  Phos  3.0     09-21  Mg     1.5     09-21    TPro  5.9<L>  /  Alb  3.4  /  TBili  1.9<H>  /  DBili  x   /  AST  22  /  ALT  13  /  AlkPhos  94  09-21    Creatinine Trend: 0.97<--, 0.91<--, 1.25<--, 1.11<--, 0.98<--, 0.85<--  PT/INR - ( 21 Sep 2018 03:00 )   PT: 14.4 SEC;   INR: 1.25          PTT - ( 21 Sep 2018 03:00 )  PTT:26.9 SEC

## 2018-09-22 LAB
BASOPHILS # BLD AUTO: 0.03 K/UL — SIGNIFICANT CHANGE UP (ref 0–0.2)
BASOPHILS NFR BLD AUTO: 0.2 % — SIGNIFICANT CHANGE UP (ref 0–2)
BUN SERPL-MCNC: 47 MG/DL — HIGH (ref 7–23)
CALCIUM SERPL-MCNC: 8.7 MG/DL — SIGNIFICANT CHANGE UP (ref 8.4–10.5)
CHLORIDE SERPL-SCNC: 98 MMOL/L — SIGNIFICANT CHANGE UP (ref 98–107)
CO2 SERPL-SCNC: 24 MMOL/L — SIGNIFICANT CHANGE UP (ref 22–31)
CREAT SERPL-MCNC: 1.15 MG/DL — SIGNIFICANT CHANGE UP (ref 0.5–1.3)
EOSINOPHIL # BLD AUTO: 0.01 K/UL — SIGNIFICANT CHANGE UP (ref 0–0.5)
EOSINOPHIL NFR BLD AUTO: 0.1 % — SIGNIFICANT CHANGE UP (ref 0–6)
GLUCOSE BLDC GLUCOMTR-MCNC: 127 MG/DL — HIGH (ref 70–99)
GLUCOSE BLDC GLUCOMTR-MCNC: 144 MG/DL — HIGH (ref 70–99)
GLUCOSE BLDC GLUCOMTR-MCNC: 153 MG/DL — HIGH (ref 70–99)
GLUCOSE BLDC GLUCOMTR-MCNC: 191 MG/DL — HIGH (ref 70–99)
GLUCOSE BLDC GLUCOMTR-MCNC: 212 MG/DL — HIGH (ref 70–99)
GLUCOSE SERPL-MCNC: 197 MG/DL — HIGH (ref 70–99)
HCT VFR BLD CALC: 34.4 % — LOW (ref 39–50)
HGB BLD-MCNC: 11.2 G/DL — LOW (ref 13–17)
IMM GRANULOCYTES # BLD AUTO: 0.1 # — SIGNIFICANT CHANGE UP
IMM GRANULOCYTES NFR BLD AUTO: 0.7 % — SIGNIFICANT CHANGE UP (ref 0–1.5)
LYMPHOCYTES # BLD AUTO: 0.65 K/UL — LOW (ref 1–3.3)
LYMPHOCYTES # BLD AUTO: 4.4 % — LOW (ref 13–44)
MAGNESIUM SERPL-MCNC: 2 MG/DL — SIGNIFICANT CHANGE UP (ref 1.6–2.6)
MCHC RBC-ENTMCNC: 29.4 PG — SIGNIFICANT CHANGE UP (ref 27–34)
MCHC RBC-ENTMCNC: 32.6 % — SIGNIFICANT CHANGE UP (ref 32–36)
MCV RBC AUTO: 90.3 FL — SIGNIFICANT CHANGE UP (ref 80–100)
MONOCYTES # BLD AUTO: 0.63 K/UL — SIGNIFICANT CHANGE UP (ref 0–0.9)
MONOCYTES NFR BLD AUTO: 4.3 % — SIGNIFICANT CHANGE UP (ref 2–14)
NEUTROPHILS # BLD AUTO: 13.31 K/UL — HIGH (ref 1.8–7.4)
NEUTROPHILS NFR BLD AUTO: 90.3 % — HIGH (ref 43–77)
NRBC # FLD: 0 — SIGNIFICANT CHANGE UP
PHOSPHATE SERPL-MCNC: 3.3 MG/DL — SIGNIFICANT CHANGE UP (ref 2.5–4.5)
PLATELET # BLD AUTO: 265 K/UL — SIGNIFICANT CHANGE UP (ref 150–400)
PMV BLD: 11.2 FL — SIGNIFICANT CHANGE UP (ref 7–13)
POTASSIUM SERPL-MCNC: 4.2 MMOL/L — SIGNIFICANT CHANGE UP (ref 3.5–5.3)
POTASSIUM SERPL-SCNC: 4.2 MMOL/L — SIGNIFICANT CHANGE UP (ref 3.5–5.3)
RBC # BLD: 3.81 M/UL — LOW (ref 4.2–5.8)
RBC # FLD: 13.8 % — SIGNIFICANT CHANGE UP (ref 10.3–14.5)
SODIUM SERPL-SCNC: 139 MMOL/L — SIGNIFICANT CHANGE UP (ref 135–145)
WBC # BLD: 14.73 K/UL — HIGH (ref 3.8–10.5)
WBC # FLD AUTO: 14.73 K/UL — HIGH (ref 3.8–10.5)

## 2018-09-22 PROCEDURE — 74019 RADEX ABDOMEN 2 VIEWS: CPT | Mod: 26

## 2018-09-22 RX ORDER — ONDANSETRON 8 MG/1
4 TABLET, FILM COATED ORAL ONCE
Qty: 0 | Refills: 0 | Status: COMPLETED | OUTPATIENT
Start: 2018-09-22 | End: 2018-09-22

## 2018-09-22 RX ADMIN — PANTOPRAZOLE SODIUM 40 MILLIGRAM(S): 20 TABLET, DELAYED RELEASE ORAL at 05:24

## 2018-09-22 RX ADMIN — PANTOPRAZOLE SODIUM 40 MILLIGRAM(S): 20 TABLET, DELAYED RELEASE ORAL at 17:35

## 2018-09-22 RX ADMIN — Medication 6 MILLIGRAM(S): at 22:01

## 2018-09-22 RX ADMIN — ATORVASTATIN CALCIUM 80 MILLIGRAM(S): 80 TABLET, FILM COATED ORAL at 22:00

## 2018-09-22 RX ADMIN — LISINOPRIL 40 MILLIGRAM(S): 2.5 TABLET ORAL at 05:24

## 2018-09-22 RX ADMIN — Medication 1: at 13:10

## 2018-09-22 RX ADMIN — Medication 25 MILLIGRAM(S): at 02:10

## 2018-09-22 RX ADMIN — TAMSULOSIN HYDROCHLORIDE 0.4 MILLIGRAM(S): 0.4 CAPSULE ORAL at 22:01

## 2018-09-22 RX ADMIN — ONDANSETRON 4 MILLIGRAM(S): 8 TABLET, FILM COATED ORAL at 02:34

## 2018-09-22 RX ADMIN — Medication 2: at 08:44

## 2018-09-22 RX ADMIN — LATANOPROST 1 DROP(S): 0.05 SOLUTION/ DROPS OPHTHALMIC; TOPICAL at 22:02

## 2018-09-22 RX ADMIN — Medication 50 MILLIGRAM(S): at 05:23

## 2018-09-22 NOTE — PROGRESS NOTE ADULT - ATTENDING COMMENTS
-COFEE GROUND EMESIS-R/O GI BLEEDING -IV PROTONIX.GI CONSULT APPRECIATED.FOR EGD LIKELY,Monitor H/H  -Leucocytosis-no obvious source,monitor,hold off on antibiotics.ID Consult appreciated.  -vomiting/constipation-x-ray abdomen,stool sofner  -PT  -Psych f/u  -d/w Brother.Answered all questions  -d/w staff

## 2018-09-22 NOTE — PROVIDER CONTACT NOTE (OTHER) - ASSESSMENT
Patient alert and oriented to baseline. Temp 98.5F. 97% on room air. /56. HR 77. patient vomited 30 ml of dark brown emesis.

## 2018-09-22 NOTE — CHART NOTE - NSCHARTNOTEFT_GEN_A_CORE
As per RN, pt with vomiting episode at night. Pt is poor historian but bowel sounds noted and non-tender to palpitation of abdomen. As per record, last BM 9/20. Spoke with attending, will obtain abdominal X-ray to R/O obstruction.     ADS  [id number]

## 2018-09-22 NOTE — PROGRESS NOTE ADULT - SUBJECTIVE AND OBJECTIVE BOX
FESTUS BLAND  78y  Male      Patient is a 78y old  Male who presents with a chief complaint of Fall, shoulder pain (21 Sep 2018 20:30)  Above noted.reported vomiting,constipation.no cp,no sob,no fever    REVIEW OF SYSTEMS:  as above    INTERVAL HPI/OVERNIGHT EVENTS:  T(C): 36.6 (09-22-18 @ 15:15), Max: 36.9 (09-22-18 @ 02:30)  HR: 74 (09-22-18 @ 15:15) (71 - 78)  BP: 149/57 (09-22-18 @ 15:15) (122/51 - 151/56)  RR: 18 (09-22-18 @ 15:15) (18 - 18)  SpO2: 98% (09-22-18 @ 15:15) (97% - 100%)  Wt(kg): --  I&O's Summary    21 Sep 2018 07:01  -  22 Sep 2018 07:00  --------------------------------------------------------  IN: 0 mL / OUT: 30 mL / NET: -30 mL      T(C): 36.6 (09-22-18 @ 15:15), Max: 36.9 (09-22-18 @ 02:30)  HR: 74 (09-22-18 @ 15:15) (71 - 78)  BP: 149/57 (09-22-18 @ 15:15) (122/51 - 151/56)  RR: 18 (09-22-18 @ 15:15) (18 - 18)  SpO2: 98% (09-22-18 @ 15:15) (97% - 100%)  Wt(kg): --Vital Signs Last 24 Hrs  T(C): 36.6 (22 Sep 2018 15:15), Max: 36.9 (22 Sep 2018 02:30)  T(F): 97.8 (22 Sep 2018 15:15), Max: 98.5 (22 Sep 2018 02:30)  HR: 74 (22 Sep 2018 15:15) (71 - 78)  BP: 149/57 (22 Sep 2018 15:15) (122/51 - 151/56)  BP(mean): --  RR: 18 (22 Sep 2018 15:15) (18 - 18)  SpO2: 98% (22 Sep 2018 15:15) (97% - 100%)    LABS:                        11.2   14.73 )-----------( 265      ( 22 Sep 2018 06:45 )             34.4     09-22    139  |  98  |  47<H>  ----------------------------<  197<H>  4.2   |  24  |  1.15    Ca    8.7      22 Sep 2018 06:45  Phos  3.3     09-22  Mg     2.0     09-22    TPro  5.9<L>  /  Alb  3.4  /  TBili  1.9<H>  /  DBili  x   /  AST  22  /  ALT  13  /  AlkPhos  94  09-21    PT/INR - ( 21 Sep 2018 03:00 )   PT: 14.4 SEC;   INR: 1.25          PTT - ( 21 Sep 2018 03:00 )  PTT:26.9 SEC    CAPILLARY BLOOD GLUCOSE      POCT Blood Glucose.: 127 mg/dL (22 Sep 2018 17:12)  POCT Blood Glucose.: 153 mg/dL (22 Sep 2018 12:21)  POCT Blood Glucose.: 212 mg/dL (22 Sep 2018 08:28)  POCT Blood Glucose.: 191 mg/dL (22 Sep 2018 05:39)  POCT Blood Glucose.: 132 mg/dL (21 Sep 2018 21:25)            PAST MEDICAL & SURGICAL HISTORY:  BPH (benign prostatic hyperplasia)  CAD (Coronary Artery Disease): s/p cardiac stent ( &gt; 20 years ago)  HTN (Hypertension)  DM (Diabetes Mellitus)  Hypercholesterolemia  Coronary Stent: x 2 ( 20 years )      MEDICATIONS  (STANDING):  atorvastatin 80 milliGRAM(s) Oral at bedtime  dextrose 50% Injectable 12.5 Gram(s) IV Push once  dextrose 50% Injectable 25 Gram(s) IV Push once  dextrose 50% Injectable 25 Gram(s) IV Push once  influenza   Vaccine 0.5 milliLiter(s) IntraMuscular once  insulin lispro (HumaLOG) corrective regimen sliding scale   SubCutaneous three times a day before meals  insulin lispro (HumaLOG) corrective regimen sliding scale   SubCutaneous at bedtime  latanoprost 0.005% Ophthalmic Solution 1 Drop(s) Both EYES at bedtime  lisinopril 40 milliGRAM(s) Oral daily  melatonin 6 milliGRAM(s) Oral at bedtime  metoprolol succinate ER 50 milliGRAM(s) Oral daily  pantoprazole  Injectable 40 milliGRAM(s) IV Push two times a day  tamsulosin 0.4 milliGRAM(s) Oral at bedtime    MEDICATIONS  (PRN):  acetaminophen   Tablet .. 650 milliGRAM(s) Oral every 6 hours PRN Mild Pain (1 - 3), Moderate Pain (4 - 6)  dextrose 40% Gel 15 Gram(s) Oral once PRN Blood Glucose LESS THAN 70 milliGRAM(s)/deciliter  glucagon  Injectable 1 milliGRAM(s) IntraMuscular once PRN Glucose LESS THAN 70 milligrams/deciliter  haloperidol     Tablet 1 milliGRAM(s) Oral every 6 hours PRN agitation  haloperidol    Injectable 1 milliGRAM(s) IntraMuscular every 6 hours PRN agitation  haloperidol    Injectable 1 milliGRAM(s) IV Push every 6 hours PRN agitation  traZODone 25 milliGRAM(s) Oral at bedtime PRN insomnia        RADIOLOGY & ADDITIONAL TESTS:    Imaging Personally Reviewed:  [ ] YES  [ ] NO    Consultant(s) Notes Reviewed:  [ ] YES  [ ] NO    PHYSICAL EXAM:  GENERAL: NAD, well-groomed, well-developed  HEAD:  Atraumatic, Normocephalic  EYES: EOMI, PERRLA, conjunctiva and sclera clear  ENMT: No tonsillar erythema, exudates, or enlargement; Moist mucous membranes, Good dentition, No lesions  NECK: Supple, No JVD, Normal thyroid  NERVOUS SYSTEM:  Alert & Oriented X1, Good concentration; Motor Strength 5/5 B/L upper and lower extremities; DTRs 2+ intact and symmetric  CHEST/LUNG: Clear to percussion bilaterally; No rales, rhonchi, wheezing, or rubs  HEART: Regular rate and rhythm; No murmurs, rubs, or gallops  ABDOMEN: Soft, Nontender, Nondistended; Bowel sounds present  EXTREMITIES:  2+ Peripheral Pulses, No clubbing, cyanosis, or edema  LYMPH: No lymphadenopathy noted  SKIN: No rashes or lesions    Care Discussed with Consultants/Other Providers [ ] YES  [ ] NO      Code Status: [] Full Code [] DNR [] DNI [] Goals of Care:   Disposition: [] ICU [] Stroke Unit [] RCU []PCU []Floor [] Discharge Home         [facility]

## 2018-09-23 LAB
BASOPHILS # BLD AUTO: 0.06 K/UL — SIGNIFICANT CHANGE UP (ref 0–0.2)
BASOPHILS NFR BLD AUTO: 0.5 % — SIGNIFICANT CHANGE UP (ref 0–2)
BUN SERPL-MCNC: 43 MG/DL — HIGH (ref 7–23)
CALCIUM SERPL-MCNC: 8.5 MG/DL — SIGNIFICANT CHANGE UP (ref 8.4–10.5)
CHLORIDE SERPL-SCNC: 102 MMOL/L — SIGNIFICANT CHANGE UP (ref 98–107)
CO2 SERPL-SCNC: 23 MMOL/L — SIGNIFICANT CHANGE UP (ref 22–31)
CREAT SERPL-MCNC: 0.9 MG/DL — SIGNIFICANT CHANGE UP (ref 0.5–1.3)
EOSINOPHIL # BLD AUTO: 0.23 K/UL — SIGNIFICANT CHANGE UP (ref 0–0.5)
EOSINOPHIL NFR BLD AUTO: 1.8 % — SIGNIFICANT CHANGE UP (ref 0–6)
FOLATE SERPL-MCNC: 15.1 NG/ML — SIGNIFICANT CHANGE UP (ref 4.7–20)
GLUCOSE BLDC GLUCOMTR-MCNC: 143 MG/DL — HIGH (ref 70–99)
GLUCOSE BLDC GLUCOMTR-MCNC: 144 MG/DL — HIGH (ref 70–99)
GLUCOSE BLDC GLUCOMTR-MCNC: 153 MG/DL — HIGH (ref 70–99)
GLUCOSE BLDC GLUCOMTR-MCNC: 153 MG/DL — HIGH (ref 70–99)
GLUCOSE SERPL-MCNC: 145 MG/DL — HIGH (ref 70–99)
HCT VFR BLD CALC: 31.9 % — LOW (ref 39–50)
HGB BLD-MCNC: 10.6 G/DL — LOW (ref 13–17)
IMM GRANULOCYTES # BLD AUTO: 0.06 # — SIGNIFICANT CHANGE UP
IMM GRANULOCYTES NFR BLD AUTO: 0.5 % — SIGNIFICANT CHANGE UP (ref 0–1.5)
LYMPHOCYTES # BLD AUTO: 1.72 K/UL — SIGNIFICANT CHANGE UP (ref 1–3.3)
LYMPHOCYTES # BLD AUTO: 13.7 % — SIGNIFICANT CHANGE UP (ref 13–44)
MAGNESIUM SERPL-MCNC: 1.8 MG/DL — SIGNIFICANT CHANGE UP (ref 1.6–2.6)
MCHC RBC-ENTMCNC: 30 PG — SIGNIFICANT CHANGE UP (ref 27–34)
MCHC RBC-ENTMCNC: 33.2 % — SIGNIFICANT CHANGE UP (ref 32–36)
MCV RBC AUTO: 90.4 FL — SIGNIFICANT CHANGE UP (ref 80–100)
MONOCYTES # BLD AUTO: 0.81 K/UL — SIGNIFICANT CHANGE UP (ref 0–0.9)
MONOCYTES NFR BLD AUTO: 6.4 % — SIGNIFICANT CHANGE UP (ref 2–14)
NEUTROPHILS # BLD AUTO: 9.72 K/UL — HIGH (ref 1.8–7.4)
NEUTROPHILS NFR BLD AUTO: 77.1 % — HIGH (ref 43–77)
NRBC # FLD: 0 — SIGNIFICANT CHANGE UP
PHOSPHATE SERPL-MCNC: 2.7 MG/DL — SIGNIFICANT CHANGE UP (ref 2.5–4.5)
PLATELET # BLD AUTO: 250 K/UL — SIGNIFICANT CHANGE UP (ref 150–400)
PMV BLD: 11 FL — SIGNIFICANT CHANGE UP (ref 7–13)
POTASSIUM SERPL-MCNC: 4 MMOL/L — SIGNIFICANT CHANGE UP (ref 3.5–5.3)
POTASSIUM SERPL-SCNC: 4 MMOL/L — SIGNIFICANT CHANGE UP (ref 3.5–5.3)
RBC # BLD: 3.53 M/UL — LOW (ref 4.2–5.8)
RBC # FLD: 13.8 % — SIGNIFICANT CHANGE UP (ref 10.3–14.5)
SODIUM SERPL-SCNC: 139 MMOL/L — SIGNIFICANT CHANGE UP (ref 135–145)
VIT B12 SERPL-MCNC: 388 PG/ML — SIGNIFICANT CHANGE UP (ref 200–900)
WBC # BLD: 12.6 K/UL — HIGH (ref 3.8–10.5)
WBC # FLD AUTO: 12.6 K/UL — HIGH (ref 3.8–10.5)

## 2018-09-23 PROCEDURE — 74177 CT ABD & PELVIS W/CONTRAST: CPT | Mod: 26

## 2018-09-23 RX ORDER — OXYCODONE AND ACETAMINOPHEN 5; 325 MG/1; MG/1
1 TABLET ORAL EVERY 4 HOURS
Qty: 0 | Refills: 0 | Status: DISCONTINUED | OUTPATIENT
Start: 2018-09-23 | End: 2018-09-25

## 2018-09-23 RX ADMIN — Medication 6 MILLIGRAM(S): at 22:26

## 2018-09-23 RX ADMIN — TAMSULOSIN HYDROCHLORIDE 0.4 MILLIGRAM(S): 0.4 CAPSULE ORAL at 22:27

## 2018-09-23 RX ADMIN — LISINOPRIL 40 MILLIGRAM(S): 2.5 TABLET ORAL at 05:10

## 2018-09-23 RX ADMIN — OXYCODONE AND ACETAMINOPHEN 1 TABLET(S): 5; 325 TABLET ORAL at 22:23

## 2018-09-23 RX ADMIN — PANTOPRAZOLE SODIUM 40 MILLIGRAM(S): 20 TABLET, DELAYED RELEASE ORAL at 05:10

## 2018-09-23 RX ADMIN — ATORVASTATIN CALCIUM 80 MILLIGRAM(S): 80 TABLET, FILM COATED ORAL at 22:26

## 2018-09-23 RX ADMIN — Medication 50 MILLIGRAM(S): at 05:10

## 2018-09-23 RX ADMIN — LATANOPROST 1 DROP(S): 0.05 SOLUTION/ DROPS OPHTHALMIC; TOPICAL at 22:26

## 2018-09-23 RX ADMIN — Medication 650 MILLIGRAM(S): at 05:39

## 2018-09-23 RX ADMIN — PANTOPRAZOLE SODIUM 40 MILLIGRAM(S): 20 TABLET, DELAYED RELEASE ORAL at 18:15

## 2018-09-23 RX ADMIN — OXYCODONE AND ACETAMINOPHEN 1 TABLET(S): 5; 325 TABLET ORAL at 12:22

## 2018-09-23 RX ADMIN — Medication 1: at 07:48

## 2018-09-23 RX ADMIN — Medication 650 MILLIGRAM(S): at 05:09

## 2018-09-23 RX ADMIN — OXYCODONE AND ACETAMINOPHEN 1 TABLET(S): 5; 325 TABLET ORAL at 22:53

## 2018-09-23 RX ADMIN — OXYCODONE AND ACETAMINOPHEN 1 TABLET(S): 5; 325 TABLET ORAL at 11:52

## 2018-09-23 NOTE — CHART NOTE - NSCHARTNOTEFT_GEN_A_CORE
As per conversation with radiologist, abdominal X-ray pre-rogers shows enlarged stomach with concern for bowel obstruction. Spoke with attending, recommend GI follow up and surgery consult. Surgery consulted and recommend CT A/P w/ IV contrast to further evaluate. Spoke with brother (HCP) and pt at bedside. Requesting that MD place NG tube understanding the risk of perforation. Surgery aware and will assist with placement. GI paged, awaiting callback.     ADS  [id number] As per conversation with radiologist, abdominal X-ray pre-rogers shows enlarged stomach with concern for bowel obstruction. Spoke with attending, recommend GI follow up and surgery consult. Surgery consulted and recommend CT A/P w/ IV contrast to further evaluate. Spoke with brother (HCP) and pt at bedside. Requesting that MD place NG tube understanding the risk of perforation. Surgery aware and will assist with placement. GI informed, will postpone EGD given concern.    Spoke with Surgery, low clinical suspicion of obstruction, will hold off placement of NGT. Will monitor closely and if no change in clinical status, will inform Surgery for NG tube placement.     ADS  [id number]

## 2018-09-23 NOTE — CHART NOTE - NSCHARTNOTEFT_GEN_A_CORE
General Surgery  CC: Xray w/concerns for obstruction  HPI: 78M presents s/p mechanical fall w/right humeral fracture, now s/p close reduction. Consulted by medicine with concerns for obstruction. AXR showed a dilated stomach with air-fluid levels. The latter in an upright radiograph is usually normal. Patient seen and examined, was non-tender nor nauseated. Discussion was had with patient and brother at bedside, risks/benefits/options were discussed and the decision was made to avoid placing an NGT.     For additional evaluation, further imaging was obtained. CT a/p w/IV and Oral contrast did not show any signs of obstruction. CT imaging in addition to passage of flatus shows no obstruction and no current role for surgical intervention. Medicine team informed to consult surgery for future concerns or evaluation.     Hill Dewey PGY3

## 2018-09-23 NOTE — PROGRESS NOTE ADULT - ATTENDING COMMENTS
-COFEE GROUND EMESIS-R/O GI BLEEDING -IV PROTONIX.GI CONSULT APPRECIATED.FOR EGD   -x-ray abd.noted-possible gastric outlet obstruction?.CT ABD-pending,if nl-can start clear liquid .surgery eval.appreciated.   -Leucocytosis-no obvious source,monitor,hold off on antibiotics.ID Consult appreciated.  -vomiting/constipation-x-ray abdomen,stool sofner  -PT  -Psych f/u  -d/w Brother.Answered all questions  -d/w staff

## 2018-09-23 NOTE — CHART NOTE - NSCHARTNOTEFT_GEN_A_CORE
ADS NIGHT COVERAGE:    S/w surgery team, CT was reviewed no obstruction. No further surgical interventions needed. Diet changed to clear liquid as per GI note.     Will continue to monitor pt.  KAM GORDON PA-C  [id number] ADS NIGHT COVERAGE:    S/w surgery team, CT was reviewed no obstruction. No further surgical interventions needed. Will keep NPO except meds until further GI recommendations.     Will continue to monitor pt.  KAM GORDON PA-C  [id number] ADS NIGHT COVERAGE:    S/w surgery team, CT was reviewed no obstruction. No further surgical interventions needed. Will keep NPO except meds until further GI recommendations. Started IVF while NPO.     Will continue to monitor pt.  KAM GORDON PA-C  [id number]

## 2018-09-23 NOTE — PROGRESS NOTE ADULT - SUBJECTIVE AND OBJECTIVE BOX
FESTUS BLAND  78y  Male      Patient is a 78y old  Male who presents with a chief complaint of Fall, shoulder pain (22 Sep 2018 17:51)  Patient states he is hungry.no vomiting,no abd.pain,no nausea,no fever,denies rt.arm pain    REVIEW OF SYSTEMS:      INTERVAL HPI/OVERNIGHT EVENTS:  T(C): 36.7 (09-23-18 @ 14:54), Max: 36.7 (09-23-18 @ 14:54)  HR: 81 (09-23-18 @ 14:54) (70 - 81)  BP: 146/64 (09-23-18 @ 14:54) (125/51 - 146/64)  RR: 18 (09-23-18 @ 14:54) (18 - 18)  SpO2: 99% (09-23-18 @ 14:54) (98% - 100%)  Wt(kg): --  I&O's Summary    T(C): 36.7 (09-23-18 @ 14:54), Max: 36.7 (09-23-18 @ 14:54)  HR: 81 (09-23-18 @ 14:54) (70 - 81)  BP: 146/64 (09-23-18 @ 14:54) (125/51 - 146/64)  RR: 18 (09-23-18 @ 14:54) (18 - 18)  SpO2: 99% (09-23-18 @ 14:54) (98% - 100%)  Wt(kg): --Vital Signs Last 24 Hrs  T(C): 36.7 (23 Sep 2018 14:54), Max: 36.7 (23 Sep 2018 14:54)  T(F): 98.1 (23 Sep 2018 14:54), Max: 98.1 (23 Sep 2018 14:54)  HR: 81 (23 Sep 2018 14:54) (70 - 81)  BP: 146/64 (23 Sep 2018 14:54) (125/51 - 146/64)  BP(mean): --  RR: 18 (23 Sep 2018 14:54) (18 - 18)  SpO2: 99% (23 Sep 2018 14:54) (98% - 100%)    LABS:                        10.6   12.60 )-----------( 250      ( 23 Sep 2018 05:40 )             31.9     09-23    139  |  102  |  43<H>  ----------------------------<  145<H>  4.0   |  23  |  0.90    Ca    8.5      23 Sep 2018 05:40  Phos  2.7     09-23  Mg     1.8     09-23          CAPILLARY BLOOD GLUCOSE      POCT Blood Glucose.: 143 mg/dL (23 Sep 2018 17:38)  POCT Blood Glucose.: 144 mg/dL (23 Sep 2018 12:21)  POCT Blood Glucose.: 153 mg/dL (23 Sep 2018 07:23)  POCT Blood Glucose.: 144 mg/dL (22 Sep 2018 22:26)            PAST MEDICAL & SURGICAL HISTORY:  BPH (benign prostatic hyperplasia)  CAD (Coronary Artery Disease): s/p cardiac stent ( &gt; 20 years ago)  HTN (Hypertension)  DM (Diabetes Mellitus)  Hypercholesterolemia  Coronary Stent: x 2 ( 20 years )      MEDICATIONS  (STANDING):  atorvastatin 80 milliGRAM(s) Oral at bedtime  dextrose 50% Injectable 12.5 Gram(s) IV Push once  dextrose 50% Injectable 25 Gram(s) IV Push once  dextrose 50% Injectable 25 Gram(s) IV Push once  influenza   Vaccine 0.5 milliLiter(s) IntraMuscular once  insulin lispro (HumaLOG) corrective regimen sliding scale   SubCutaneous three times a day before meals  insulin lispro (HumaLOG) corrective regimen sliding scale   SubCutaneous at bedtime  latanoprost 0.005% Ophthalmic Solution 1 Drop(s) Both EYES at bedtime  lisinopril 40 milliGRAM(s) Oral daily  melatonin 6 milliGRAM(s) Oral at bedtime  metoprolol succinate ER 50 milliGRAM(s) Oral daily  pantoprazole  Injectable 40 milliGRAM(s) IV Push two times a day  tamsulosin 0.4 milliGRAM(s) Oral at bedtime    MEDICATIONS  (PRN):  acetaminophen   Tablet .. 650 milliGRAM(s) Oral every 6 hours PRN Mild Pain (1 - 3), Moderate Pain (4 - 6)  dextrose 40% Gel 15 Gram(s) Oral once PRN Blood Glucose LESS THAN 70 milliGRAM(s)/deciliter  glucagon  Injectable 1 milliGRAM(s) IntraMuscular once PRN Glucose LESS THAN 70 milligrams/deciliter  haloperidol     Tablet 1 milliGRAM(s) Oral every 6 hours PRN agitation  haloperidol    Injectable 1 milliGRAM(s) IntraMuscular every 6 hours PRN agitation  haloperidol    Injectable 1 milliGRAM(s) IV Push every 6 hours PRN agitation  oxyCODONE    5 mG/acetaminophen 325 mG 1 Tablet(s) Oral every 4 hours PRN Severe Pain (7 - 10)  traZODone 25 milliGRAM(s) Oral at bedtime PRN insomnia        RADIOLOGY & ADDITIONAL TESTS:    Imaging Personally Reviewed:  [ ] YES  [ ] NO    Consultant(s) Notes Reviewed:  [ ] YES  [ ] NO    PHYSICAL EXAM:  GENERAL: NAD, well-groomed, well-developed  HEAD:  Atraumatic, Normocephalic  EYES: EOMI, PERRLA, conjunctiva and sclera clear  ENMT: No tonsillar erythema, exudates, or enlargement; Moist mucous membranes, Good dentition, No lesions  NECK: Supple, No JVD, Normal thyroid  NERVOUS SYSTEM:  Alert & Oriented X1, Good concentration; Motor Strength 5/5 B/L upper and lower extremities; DTRs 2+ intact and symmetric  CHEST/LUNG: Clear to percussion bilaterally; No rales, rhonchi, wheezing, or rubs  HEART: Regular rate and rhythm; No murmurs, rubs, or gallops  ABDOMEN: Soft, Nontender, Nondistended; Bowel sounds present  EXTREMITIES:  2+ Peripheral Pulses, No clubbing, cyanosis, or edema  LYMPH: No lymphadenopathy noted  SKIN: No rashes or lesions    Care Discussed with Consultants/Other Providers [x ] YES  [ ] NO      Code Status: [] Full Code [] DNR [] DNI [] Goals of Care:   Disposition: [] ICU [] Stroke Unit [] RCU []PCU []Floor [] Discharge Home         LAINE VargasFACP

## 2018-09-24 LAB
ALBUMIN SERPL ELPH-MCNC: 3.4 G/DL — SIGNIFICANT CHANGE UP (ref 3.3–5)
ALP SERPL-CCNC: 93 U/L — SIGNIFICANT CHANGE UP (ref 40–120)
ALT FLD-CCNC: 21 U/L — SIGNIFICANT CHANGE UP (ref 4–41)
AST SERPL-CCNC: 17 U/L — SIGNIFICANT CHANGE UP (ref 4–40)
BILIRUB SERPL-MCNC: 2.1 MG/DL — HIGH (ref 0.2–1.2)
BUN SERPL-MCNC: 36 MG/DL — HIGH (ref 7–23)
BUN SERPL-MCNC: 42 MG/DL — HIGH (ref 7–23)
BUN SERPL-MCNC: 42 MG/DL — HIGH (ref 7–23)
CALCIUM SERPL-MCNC: 6.5 MG/DL — CRITICAL LOW (ref 8.4–10.5)
CALCIUM SERPL-MCNC: 8.2 MG/DL — LOW (ref 8.4–10.5)
CALCIUM SERPL-MCNC: 8.2 MG/DL — LOW (ref 8.4–10.5)
CHLORIDE SERPL-SCNC: 101 MMOL/L — SIGNIFICANT CHANGE UP (ref 98–107)
CHLORIDE SERPL-SCNC: 101 MMOL/L — SIGNIFICANT CHANGE UP (ref 98–107)
CHLORIDE SERPL-SCNC: 111 MMOL/L — HIGH (ref 98–107)
CO2 SERPL-SCNC: 18 MMOL/L — LOW (ref 22–31)
CO2 SERPL-SCNC: 24 MMOL/L — SIGNIFICANT CHANGE UP (ref 22–31)
CO2 SERPL-SCNC: 24 MMOL/L — SIGNIFICANT CHANGE UP (ref 22–31)
CREAT SERPL-MCNC: 0.75 MG/DL — SIGNIFICANT CHANGE UP (ref 0.5–1.3)
CREAT SERPL-MCNC: 0.98 MG/DL — SIGNIFICANT CHANGE UP (ref 0.5–1.3)
CREAT SERPL-MCNC: 0.98 MG/DL — SIGNIFICANT CHANGE UP (ref 0.5–1.3)
GLUCOSE BLDC GLUCOMTR-MCNC: 134 MG/DL — HIGH (ref 70–99)
GLUCOSE BLDC GLUCOMTR-MCNC: 136 MG/DL — HIGH (ref 70–99)
GLUCOSE BLDC GLUCOMTR-MCNC: 137 MG/DL — HIGH (ref 70–99)
GLUCOSE BLDC GLUCOMTR-MCNC: 140 MG/DL — HIGH (ref 70–99)
GLUCOSE BLDC GLUCOMTR-MCNC: 151 MG/DL — HIGH (ref 70–99)
GLUCOSE SERPL-MCNC: 120 MG/DL — HIGH (ref 70–99)
GLUCOSE SERPL-MCNC: 149 MG/DL — HIGH (ref 70–99)
GLUCOSE SERPL-MCNC: 149 MG/DL — HIGH (ref 70–99)
HCT VFR BLD CALC: 32.7 % — LOW (ref 39–50)
HGB BLD-MCNC: 10.9 G/DL — LOW (ref 13–17)
MAGNESIUM SERPL-MCNC: 1.4 MG/DL — LOW (ref 1.6–2.6)
MCHC RBC-ENTMCNC: 30.7 PG — SIGNIFICANT CHANGE UP (ref 27–34)
MCHC RBC-ENTMCNC: 33.3 % — SIGNIFICANT CHANGE UP (ref 32–36)
MCV RBC AUTO: 92.1 FL — SIGNIFICANT CHANGE UP (ref 80–100)
NRBC # FLD: 0 — SIGNIFICANT CHANGE UP
PHOSPHATE SERPL-MCNC: 2.7 MG/DL — SIGNIFICANT CHANGE UP (ref 2.5–4.5)
PLATELET # BLD AUTO: 251 K/UL — SIGNIFICANT CHANGE UP (ref 150–400)
PMV BLD: 11 FL — SIGNIFICANT CHANGE UP (ref 7–13)
POTASSIUM SERPL-MCNC: 3.3 MMOL/L — LOW (ref 3.5–5.3)
POTASSIUM SERPL-MCNC: 4 MMOL/L — SIGNIFICANT CHANGE UP (ref 3.5–5.3)
POTASSIUM SERPL-MCNC: 4 MMOL/L — SIGNIFICANT CHANGE UP (ref 3.5–5.3)
POTASSIUM SERPL-SCNC: 3.3 MMOL/L — LOW (ref 3.5–5.3)
POTASSIUM SERPL-SCNC: 4 MMOL/L — SIGNIFICANT CHANGE UP (ref 3.5–5.3)
POTASSIUM SERPL-SCNC: 4 MMOL/L — SIGNIFICANT CHANGE UP (ref 3.5–5.3)
PROT SERPL-MCNC: 5.6 G/DL — LOW (ref 6–8.3)
RBC # BLD: 3.55 M/UL — LOW (ref 4.2–5.8)
RBC # FLD: 13.8 % — SIGNIFICANT CHANGE UP (ref 10.3–14.5)
SODIUM SERPL-SCNC: 139 MMOL/L — SIGNIFICANT CHANGE UP (ref 135–145)
SODIUM SERPL-SCNC: 139 MMOL/L — SIGNIFICANT CHANGE UP (ref 135–145)
SODIUM SERPL-SCNC: 142 MMOL/L — SIGNIFICANT CHANGE UP (ref 135–145)
T PALLIDUM AB TITR SER: NEGATIVE — SIGNIFICANT CHANGE UP
WBC # BLD: 15.52 K/UL — HIGH (ref 3.8–10.5)
WBC # FLD AUTO: 15.52 K/UL — HIGH (ref 3.8–10.5)

## 2018-09-24 PROCEDURE — 99232 SBSQ HOSP IP/OBS MODERATE 35: CPT | Mod: GC

## 2018-09-24 RX ORDER — SODIUM CHLORIDE 9 MG/ML
1000 INJECTION INTRAMUSCULAR; INTRAVENOUS; SUBCUTANEOUS
Qty: 0 | Refills: 0 | Status: DISCONTINUED | OUTPATIENT
Start: 2018-09-24 | End: 2018-10-01

## 2018-09-24 RX ADMIN — LATANOPROST 1 DROP(S): 0.05 SOLUTION/ DROPS OPHTHALMIC; TOPICAL at 22:10

## 2018-09-24 RX ADMIN — PANTOPRAZOLE SODIUM 40 MILLIGRAM(S): 20 TABLET, DELAYED RELEASE ORAL at 17:32

## 2018-09-24 RX ADMIN — Medication 6 MILLIGRAM(S): at 22:10

## 2018-09-24 RX ADMIN — PANTOPRAZOLE SODIUM 40 MILLIGRAM(S): 20 TABLET, DELAYED RELEASE ORAL at 05:19

## 2018-09-24 RX ADMIN — SODIUM CHLORIDE 75 MILLILITER(S): 9 INJECTION INTRAMUSCULAR; INTRAVENOUS; SUBCUTANEOUS at 01:33

## 2018-09-24 RX ADMIN — TAMSULOSIN HYDROCHLORIDE 0.4 MILLIGRAM(S): 0.4 CAPSULE ORAL at 22:10

## 2018-09-24 RX ADMIN — ATORVASTATIN CALCIUM 80 MILLIGRAM(S): 80 TABLET, FILM COATED ORAL at 22:10

## 2018-09-24 RX ADMIN — LISINOPRIL 40 MILLIGRAM(S): 2.5 TABLET ORAL at 05:19

## 2018-09-24 RX ADMIN — Medication 25 MILLIGRAM(S): at 22:31

## 2018-09-24 RX ADMIN — SODIUM CHLORIDE 75 MILLILITER(S): 9 INJECTION INTRAMUSCULAR; INTRAVENOUS; SUBCUTANEOUS at 17:32

## 2018-09-24 RX ADMIN — Medication 50 MILLIGRAM(S): at 05:19

## 2018-09-24 RX ADMIN — HALOPERIDOL DECANOATE 1 MILLIGRAM(S): 100 INJECTION INTRAMUSCULAR at 18:36

## 2018-09-24 NOTE — PROGRESS NOTE ADULT - SUBJECTIVE AND OBJECTIVE BOX
FESTUS BLAND  78y  Male      Patient is a 78y old  Male who presents with a chief complaint of Fall, shoulder pain (24 Sep 2018 08:12)  patient feels better,no abd.pain,no n/v,no cp,no sob,denies pain in rt.arm    REVIEW OF SYSTEMS:  as above      INTERVAL HPI/OVERNIGHT EVENTS:  T(C): 36.7 (09-24-18 @ 20:40), Max: 36.7 (09-24-18 @ 20:40)  HR: 64 (09-24-18 @ 20:40) (64 - 67)  BP: 136/51 (09-24-18 @ 20:40) (136/51 - 148/59)  RR: 18 (09-24-18 @ 20:40) (18 - 18)  SpO2: 98% (09-24-18 @ 20:40) (98% - 100%)  Wt(kg): --  I&O's Summary    T(C): 36.7 (09-24-18 @ 20:40), Max: 36.7 (09-24-18 @ 20:40)  HR: 64 (09-24-18 @ 20:40) (64 - 67)  BP: 136/51 (09-24-18 @ 20:40) (136/51 - 148/59)  RR: 18 (09-24-18 @ 20:40) (18 - 18)  SpO2: 98% (09-24-18 @ 20:40) (98% - 100%)  Wt(kg): --Vital Signs Last 24 Hrs  T(C): 36.7 (24 Sep 2018 20:40), Max: 36.7 (24 Sep 2018 20:40)  T(F): 98 (24 Sep 2018 20:40), Max: 98 (24 Sep 2018 20:40)  HR: 64 (24 Sep 2018 20:40) (64 - 67)  BP: 136/51 (24 Sep 2018 20:40) (136/51 - 148/59)  BP(mean): --  RR: 18 (24 Sep 2018 20:40) (18 - 18)  SpO2: 98% (24 Sep 2018 20:40) (98% - 100%)    LABS:                        10.9   15.52 )-----------( 251      ( 24 Sep 2018 07:03 )             32.7     09-24    139  |  101  |  42<H>  ----------------------------<  149<H>  4.0   |  24  |  0.98    Ca    8.2<L>      24 Sep 2018 09:00  Phos  2.7     09-24  Mg     1.4     09-24    TPro  5.6<L>  /  Alb  3.4  /  TBili  2.1<H>  /  DBili  x   /  AST  17  /  ALT  21  /  AlkPhos  93  09-24        CAPILLARY BLOOD GLUCOSE      POCT Blood Glucose.: 136 mg/dL (24 Sep 2018 17:08)  POCT Blood Glucose.: 137 mg/dL (24 Sep 2018 11:59)  POCT Blood Glucose.: 140 mg/dL (24 Sep 2018 08:28)  POCT Blood Glucose.: 151 mg/dL (24 Sep 2018 04:09)  POCT Blood Glucose.: 153 mg/dL (23 Sep 2018 22:16)            PAST MEDICAL & SURGICAL HISTORY:  BPH (benign prostatic hyperplasia)  CAD (Coronary Artery Disease): s/p cardiac stent ( &gt; 20 years ago)  HTN (Hypertension)  DM (Diabetes Mellitus)  Hypercholesterolemia  Coronary Stent: x 2 ( 20 years )      MEDICATIONS  (STANDING):  atorvastatin 80 milliGRAM(s) Oral at bedtime  dextrose 50% Injectable 12.5 Gram(s) IV Push once  dextrose 50% Injectable 25 Gram(s) IV Push once  dextrose 50% Injectable 25 Gram(s) IV Push once  influenza   Vaccine 0.5 milliLiter(s) IntraMuscular once  insulin lispro (HumaLOG) corrective regimen sliding scale   SubCutaneous three times a day before meals  insulin lispro (HumaLOG) corrective regimen sliding scale   SubCutaneous at bedtime  latanoprost 0.005% Ophthalmic Solution 1 Drop(s) Both EYES at bedtime  lisinopril 40 milliGRAM(s) Oral daily  melatonin 6 milliGRAM(s) Oral at bedtime  metoprolol succinate ER 50 milliGRAM(s) Oral daily  pantoprazole  Injectable 40 milliGRAM(s) IV Push two times a day  sodium chloride 0.9%. 1000 milliLiter(s) (75 mL/Hr) IV Continuous <Continuous>  tamsulosin 0.4 milliGRAM(s) Oral at bedtime    MEDICATIONS  (PRN):  acetaminophen   Tablet .. 650 milliGRAM(s) Oral every 6 hours PRN Mild Pain (1 - 3), Moderate Pain (4 - 6)  dextrose 40% Gel 15 Gram(s) Oral once PRN Blood Glucose LESS THAN 70 milliGRAM(s)/deciliter  glucagon  Injectable 1 milliGRAM(s) IntraMuscular once PRN Glucose LESS THAN 70 milligrams/deciliter  haloperidol     Tablet 1 milliGRAM(s) Oral every 6 hours PRN agitation  haloperidol    Injectable 1 milliGRAM(s) IntraMuscular every 6 hours PRN agitation  haloperidol    Injectable 1 milliGRAM(s) IV Push every 6 hours PRN agitation  oxyCODONE    5 mG/acetaminophen 325 mG 1 Tablet(s) Oral every 4 hours PRN Severe Pain (7 - 10)  traZODone 25 milliGRAM(s) Oral at bedtime PRN insomnia        RADIOLOGY & ADDITIONAL TESTS:    Imaging Personally Reviewed:  [ ] YES  [ ] NO    Consultant(s) Notes Reviewed:  [ ] YES  [ ] NO    PHYSICAL EXAM:  GENERAL: NAD, well-groomed, well-developed  HEAD:  Atraumatic, Normocephalic  EYES: EOMI, PERRLA, conjunctiva and sclera clear  ENMT: No tonsillar erythema, exudates, or enlargement; Moist mucous membranes, Good dentition, No lesions  NECK: Supple, No JVD, Normal thyroid  NERVOUS SYSTEM:  Alert & Oriented X3, Good concentration; Motor Strength 5/5 B/L upper and lower extremities; DTRs 2+ intact and symmetric  CHEST/LUNG: Clear to percussion bilaterally; No rales, rhonchi, wheezing, or rubs  HEART: Regular rate and rhythm; No murmurs, rubs, or gallops  ABDOMEN: Soft, Nontender, Nondistended; Bowel sounds present  EXTREMITIES:  2+ Peripheral Pulses, No clubbing, cyanosis, or edema  LYMPH: No lymphadenopathy noted  SKIN: No rashes or lesions    Care Discussed with Consultants/Other Providers [ ] YES  [ ] NO      Code Status: [] Full Code [] DNR [] DNI [] Goals of Care:   Disposition: [] ICU [] Stroke Unit [] RCU []PCU []Floor [] Discharge Home         LAINE VargasGrays Harbor Community HospitalP

## 2018-09-24 NOTE — PROGRESS NOTE ADULT - SUBJECTIVE AND OBJECTIVE BOX
GASTROENTEROLOGY FOLLOW-UP NOTE    Chief Complaint:  Patient is a 78y old  Male who presents with a chief complaint of Fall, shoulder pain (23 Sep 2018 19:53)      Interval Events:     Allergies:  No Known Allergies      Hospital Medications:  acetaminophen   Tablet .. 650 milliGRAM(s) Oral every 6 hours PRN  atorvastatin 80 milliGRAM(s) Oral at bedtime  dextrose 40% Gel 15 Gram(s) Oral once PRN  dextrose 50% Injectable 12.5 Gram(s) IV Push once  dextrose 50% Injectable 25 Gram(s) IV Push once  dextrose 50% Injectable 25 Gram(s) IV Push once  glucagon  Injectable 1 milliGRAM(s) IntraMuscular once PRN  haloperidol     Tablet 1 milliGRAM(s) Oral every 6 hours PRN  haloperidol    Injectable 1 milliGRAM(s) IntraMuscular every 6 hours PRN  haloperidol    Injectable 1 milliGRAM(s) IV Push every 6 hours PRN  influenza   Vaccine 0.5 milliLiter(s) IntraMuscular once  insulin lispro (HumaLOG) corrective regimen sliding scale   SubCutaneous three times a day before meals  insulin lispro (HumaLOG) corrective regimen sliding scale   SubCutaneous at bedtime  latanoprost 0.005% Ophthalmic Solution 1 Drop(s) Both EYES at bedtime  lisinopril 40 milliGRAM(s) Oral daily  melatonin 6 milliGRAM(s) Oral at bedtime  metoprolol succinate ER 50 milliGRAM(s) Oral daily  oxyCODONE    5 mG/acetaminophen 325 mG 1 Tablet(s) Oral every 4 hours PRN  pantoprazole  Injectable 40 milliGRAM(s) IV Push two times a day  sodium chloride 0.9%. 1000 milliLiter(s) IV Continuous <Continuous>  tamsulosin 0.4 milliGRAM(s) Oral at bedtime  traZODone 25 milliGRAM(s) Oral at bedtime PRN      PMHX/PSHX:  BPH (benign prostatic hyperplasia)  CAD (Coronary Artery Disease)  HTN (Hypertension)  DM (Diabetes Mellitus)  Hypercholesterolemia  Coronary Stent      Family history:  No pertinent family history in first degree relatives      ROS:     General:  No wt loss, fevers, chills, night sweats, fatigue,   Eyes:  Good vision, no reported pain  ENT:  No sore throat, pain, runny nose, dysphagia  CV:  No pain, palpitations, hypo/hypertension  Resp:  No dyspnea, cough, tachypnea, wheezing  GI:  see HPI  :  No pain, bleeding, incontinence, nocturia  Muscle:  No pain, weakness  Neuro:  No weakness, tingling, memory problems  Psych:  No fatigue, insomnia, mood problems, depression  Endocrine:  No polyuria, polydipsia, cold/heat intolerance  Heme:  No petechiae, ecchymosis, easy bruisability  Skin:  No rash, tattoos, scars, edema      PHYSICAL EXAM:   Vital Signs:  Vital Signs Last 24 Hrs  T(C): 36.3 (24 Sep 2018 05:16), Max: 36.8 (23 Sep 2018 20:37)  T(F): 97.4 (24 Sep 2018 05:16), Max: 98.2 (23 Sep 2018 20:37)  HR: 67 (24 Sep 2018 05:16) (65 - 81)  BP: 138/56 (24 Sep 2018 05:16) (124/63 - 146/64)  BP(mean): --  RR: 18 (24 Sep 2018 05:16) (18 - 18)  SpO2: 99% (24 Sep 2018 05:16) (99% - 100%)  Daily     Daily     GENERAL:  no acute distress  HEENT:  -icterus   CHEST:  clear bilaterally, no wheezes or rales  HEART:  RRR, S1S2  ABDOMEN:  soft, non-tender, non-distended, normoactive bowel sounds,  no hepato-splenomegaly  EXTEREMITIES:  no  edema  SKIN:  No rash/erythema/ecchymoses/petechiae/wounds/abscess/warm/dry  NEURO:  alert, oriented, conversant     LABS:                        10.6   12.60 )-----------( 250      ( 23 Sep 2018 05:40 )             31.9     09-24    142  |  111<H>  |  36<H>  ----------------------------<  120<H>  3.3<L>   |  18<L>  |  0.75    Ca    6.5<LL>      24 Sep 2018 06:04  Phos  2.7     09-24  Mg     1.4     09-24      Imaging:  < from: Xray Abdomen 2 Views (09.22.18 @ 18:42) >  There is marked distention of the stomach with air and fluid extending   into the proximal duodenum.  Mild gaseous distention of more distal small   and large bowel.    Possibility of gastric outlet obstruction is considered.    COMPARISON:  Chest radiograph September 21 with partial visualization of   the distended stomach was seen.      IMPRESSION:  Disproportionate distention of stomach could represent   gastroparesis or even gastric outlet obstruction.    < end of copied text > GASTROENTEROLOGY FOLLOW-UP NOTE    Chief Complaint:  Patient is a 78y old  Male who presents with a chief complaint of Fall, shoulder pain (23 Sep 2018 19:53)      Interval Events:   - No abdominal pain, nausea, vomiting  - Continues to feel distended; no BM for 2-3 days, only passing small amount of gas    Allergies:  No Known Allergies      Hospital Medications:  acetaminophen   Tablet .. 650 milliGRAM(s) Oral every 6 hours PRN  atorvastatin 80 milliGRAM(s) Oral at bedtime  dextrose 40% Gel 15 Gram(s) Oral once PRN  dextrose 50% Injectable 12.5 Gram(s) IV Push once  dextrose 50% Injectable 25 Gram(s) IV Push once  dextrose 50% Injectable 25 Gram(s) IV Push once  glucagon  Injectable 1 milliGRAM(s) IntraMuscular once PRN  haloperidol     Tablet 1 milliGRAM(s) Oral every 6 hours PRN  haloperidol    Injectable 1 milliGRAM(s) IntraMuscular every 6 hours PRN  haloperidol    Injectable 1 milliGRAM(s) IV Push every 6 hours PRN  influenza   Vaccine 0.5 milliLiter(s) IntraMuscular once  insulin lispro (HumaLOG) corrective regimen sliding scale   SubCutaneous three times a day before meals  insulin lispro (HumaLOG) corrective regimen sliding scale   SubCutaneous at bedtime  latanoprost 0.005% Ophthalmic Solution 1 Drop(s) Both EYES at bedtime  lisinopril 40 milliGRAM(s) Oral daily  melatonin 6 milliGRAM(s) Oral at bedtime  metoprolol succinate ER 50 milliGRAM(s) Oral daily  oxyCODONE    5 mG/acetaminophen 325 mG 1 Tablet(s) Oral every 4 hours PRN  pantoprazole  Injectable 40 milliGRAM(s) IV Push two times a day  sodium chloride 0.9%. 1000 milliLiter(s) IV Continuous <Continuous>  tamsulosin 0.4 milliGRAM(s) Oral at bedtime  traZODone 25 milliGRAM(s) Oral at bedtime PRN      PMHX/PSHX:  BPH (benign prostatic hyperplasia)  CAD (Coronary Artery Disease)  HTN (Hypertension)  DM (Diabetes Mellitus)  Hypercholesterolemia  Coronary Stent      Family history:  No pertinent family history in first degree relatives      ROS:     General:  No wt loss, fevers, chills, night sweats, fatigue,   Eyes:  Good vision, no reported pain  ENT:  No sore throat, pain, runny nose, dysphagia  CV:  No pain, palpitations, hypo/hypertension  Resp:  No dyspnea, cough, tachypnea, wheezing  GI:  see HPI  :  No pain, bleeding, incontinence, nocturia  Muscle:  No pain, weakness  Neuro:  No weakness, tingling, memory problems  Psych:  No fatigue, insomnia, mood problems, depression  Endocrine:  No polyuria, polydipsia, cold/heat intolerance  Heme:  No petechiae, ecchymosis, easy bruisability  Skin:  No rash, tattoos, scars, edema      PHYSICAL EXAM:   Vital Signs:  Vital Signs Last 24 Hrs  T(C): 36.3 (24 Sep 2018 05:16), Max: 36.8 (23 Sep 2018 20:37)  T(F): 97.4 (24 Sep 2018 05:16), Max: 98.2 (23 Sep 2018 20:37)  HR: 67 (24 Sep 2018 05:16) (65 - 81)  BP: 138/56 (24 Sep 2018 05:16) (124/63 - 146/64)  BP(mean): --  RR: 18 (24 Sep 2018 05:16) (18 - 18)  SpO2: 99% (24 Sep 2018 05:16) (99% - 100%)  Daily     Daily     GENERAL:  no acute distress  HEENT:  -icterus   CHEST:  clear bilaterally, no wheezes or rales  HEART:  RRR, S1S2  ABDOMEN:  soft, non-tender, distended, tympanic   EXTEREMITIES:  no  edema  SKIN:  warm/dry  NEURO:  alert, oriented, conversant     LABS:                        10.6   12.60 )-----------( 250      ( 23 Sep 2018 05:40 )             31.9     09-24    142  |  111<H>  |  36<H>  ----------------------------<  120<H>  3.3<L>   |  18<L>  |  0.75    Ca    6.5<LL>      24 Sep 2018 06:04  Phos  2.7     09-24  Mg     1.4     09-24      Imaging:  < from: Xray Abdomen 2 Views (09.22.18 @ 18:42) >  There is marked distention of the stomach with air and fluid extending   into the proximal duodenum.  Mild gaseous distention of more distal small   and large bowel.    Possibility of gastric outlet obstruction is considered.    COMPARISON:  Chest radiograph September 21 with partial visualization of   the distended stomach was seen.      IMPRESSION:  Disproportionate distention of stomach could represent   gastroparesis or even gastric outlet obstruction.    < end of copied text >

## 2018-09-24 NOTE — PROGRESS NOTE ADULT - ATTENDING COMMENTS
-COFEE GROUND EMESIS-R/O GI BLEEDING -PO PROTONIX.GI F/U NOTED.Brother refused EGD?   -x-ray abd.noted-possible gastric outlet obstruction?.CT ABD--NO OBSTRUCTION   -Leucocytosis-no obvious source,monitor,hold off on antibiotics.ID Consult appreciated.  -vomiting/constipation--improved  -PT  -Psych f/u  -d/w Brother.Answered all questions  -d/w staff

## 2018-09-24 NOTE — PROGRESS NOTE ADULT - ASSESSMENT
Impression:  1) Coffee-ground emesis/acute blood loss anemia without melena/hematochezia - differential includes peptic ulcer disease, erosive esophagitis, angioectasia, gastric or esophageal malignancy.   2) Abdominal distension with AXR showing gastric distension - differential includes gastric outlet obstruction, gastroparesis, SBO, aerophagia, lactose intolerance, Celiac disease   3) Mechanical fall complicated by humerus fracture s/p surgical repair   4) CAD on DAPT  5) Dementia  6) Diabetes type 2     Recommendations:  - follow-up CT abdomen/pelvis read  - serial abdominal exams   - EGD for evaluation of coffee-grounds; likely tomorrow pending above   - NPO  - continue high dose PPI (IV BID)  - serial CBCs; transfuse to Hgb > 7  - antiplatelets per primary; if continued need for DAPT on discharge should be on lifelong PPI Impression:  1) Coffee-ground emesis/acute blood loss anemia without melena/hematochezia - differential includes peptic ulcer disease, erosive esophagitis, angioectasia, gastric or esophageal malignancy.   2) Abdominal distension with AXR showing gastric distension - concern for ileus in the setting of recent operation; also must consider gastric outlet obstruction, gastroparesis, SBO, aerophagia, lactose intolerance, Celiac disease   3) Mechanical fall complicated by humerus fracture s/p surgical repair   4) CAD on DAPT  5) Dementia  6) Diabetes type 2     Recommendations:  - follow-up CT abdomen/pelvis read  - unclear if patient had coffee-grounds or has gastroparesis and vomited partially digested food; H/H stable postoperatively; given concern for ileus, would defer EGD at this time   - continue high dose PPI (IV BID)  - serial CBCs; transfuse to Hgb > 7  - antiplatelets per primary; if continued need for DAPT on discharge should be on lifelong PPI

## 2018-09-25 LAB
BASOPHILS # BLD AUTO: 0.03 K/UL — SIGNIFICANT CHANGE UP (ref 0–0.2)
BASOPHILS NFR BLD AUTO: 0.3 % — SIGNIFICANT CHANGE UP (ref 0–2)
EOSINOPHIL # BLD AUTO: 0.16 K/UL — SIGNIFICANT CHANGE UP (ref 0–0.5)
EOSINOPHIL NFR BLD AUTO: 1.5 % — SIGNIFICANT CHANGE UP (ref 0–6)
GLUCOSE BLDC GLUCOMTR-MCNC: 130 MG/DL — HIGH (ref 70–99)
GLUCOSE BLDC GLUCOMTR-MCNC: 133 MG/DL — HIGH (ref 70–99)
GLUCOSE BLDC GLUCOMTR-MCNC: 149 MG/DL — HIGH (ref 70–99)
GLUCOSE BLDC GLUCOMTR-MCNC: 176 MG/DL — HIGH (ref 70–99)
HCT VFR BLD CALC: 26.4 % — LOW (ref 39–50)
HGB BLD-MCNC: 9 G/DL — LOW (ref 13–17)
IMM GRANULOCYTES # BLD AUTO: 0.08 # — SIGNIFICANT CHANGE UP
IMM GRANULOCYTES NFR BLD AUTO: 0.7 % — SIGNIFICANT CHANGE UP (ref 0–1.5)
LYMPHOCYTES # BLD AUTO: 0.87 K/UL — LOW (ref 1–3.3)
LYMPHOCYTES # BLD AUTO: 8.1 % — LOW (ref 13–44)
MCHC RBC-ENTMCNC: 30.8 PG — SIGNIFICANT CHANGE UP (ref 27–34)
MCHC RBC-ENTMCNC: 34.1 % — SIGNIFICANT CHANGE UP (ref 32–36)
MCV RBC AUTO: 90.4 FL — SIGNIFICANT CHANGE UP (ref 80–100)
MONOCYTES # BLD AUTO: 0.63 K/UL — SIGNIFICANT CHANGE UP (ref 0–0.9)
MONOCYTES NFR BLD AUTO: 5.9 % — SIGNIFICANT CHANGE UP (ref 2–14)
NEUTROPHILS # BLD AUTO: 8.99 K/UL — HIGH (ref 1.8–7.4)
NEUTROPHILS NFR BLD AUTO: 83.5 % — HIGH (ref 43–77)
NRBC # FLD: 0 — SIGNIFICANT CHANGE UP
PLATELET # BLD AUTO: 211 K/UL — SIGNIFICANT CHANGE UP (ref 150–400)
PMV BLD: 10.4 FL — SIGNIFICANT CHANGE UP (ref 7–13)
RBC # BLD: 2.92 M/UL — LOW (ref 4.2–5.8)
RBC # FLD: 13.5 % — SIGNIFICANT CHANGE UP (ref 10.3–14.5)
WBC # BLD: 10.76 K/UL — HIGH (ref 3.8–10.5)
WBC # FLD AUTO: 10.76 K/UL — HIGH (ref 3.8–10.5)

## 2018-09-25 PROCEDURE — 99232 SBSQ HOSP IP/OBS MODERATE 35: CPT | Mod: GC

## 2018-09-25 RX ADMIN — LISINOPRIL 40 MILLIGRAM(S): 2.5 TABLET ORAL at 05:58

## 2018-09-25 RX ADMIN — Medication 1: at 13:30

## 2018-09-25 RX ADMIN — TAMSULOSIN HYDROCHLORIDE 0.4 MILLIGRAM(S): 0.4 CAPSULE ORAL at 21:33

## 2018-09-25 RX ADMIN — Medication 50 MILLIGRAM(S): at 05:58

## 2018-09-25 RX ADMIN — PANTOPRAZOLE SODIUM 40 MILLIGRAM(S): 20 TABLET, DELAYED RELEASE ORAL at 05:58

## 2018-09-25 RX ADMIN — HALOPERIDOL DECANOATE 1 MILLIGRAM(S): 100 INJECTION INTRAMUSCULAR at 23:47

## 2018-09-25 RX ADMIN — PANTOPRAZOLE SODIUM 40 MILLIGRAM(S): 20 TABLET, DELAYED RELEASE ORAL at 18:02

## 2018-09-25 RX ADMIN — Medication 25 MILLIGRAM(S): at 21:33

## 2018-09-25 RX ADMIN — ATORVASTATIN CALCIUM 80 MILLIGRAM(S): 80 TABLET, FILM COATED ORAL at 21:33

## 2018-09-25 RX ADMIN — SODIUM CHLORIDE 75 MILLILITER(S): 9 INJECTION INTRAMUSCULAR; INTRAVENOUS; SUBCUTANEOUS at 01:18

## 2018-09-25 RX ADMIN — LATANOPROST 1 DROP(S): 0.05 SOLUTION/ DROPS OPHTHALMIC; TOPICAL at 21:34

## 2018-09-25 RX ADMIN — Medication 6 MILLIGRAM(S): at 21:33

## 2018-09-25 RX ADMIN — SODIUM CHLORIDE 75 MILLILITER(S): 9 INJECTION INTRAMUSCULAR; INTRAVENOUS; SUBCUTANEOUS at 18:02

## 2018-09-25 NOTE — PROGRESS NOTE ADULT - SUBJECTIVE AND OBJECTIVE BOX
GASTROENTEROLOGY FOLLOW-UP NOTE    Chief Complaint:  Patient is a 78y old  Male who presents with a chief complaint of Fall, shoulder pain (24 Sep 2018 20:54)      Interval Events:   - Hgb 10.9 from 10.6 the day prior  - No vomiting  - Still without BM or significant flatus     Allergies:  No Known Allergies      Hospital Medications:  acetaminophen   Tablet .. 650 milliGRAM(s) Oral every 6 hours PRN  atorvastatin 80 milliGRAM(s) Oral at bedtime  dextrose 40% Gel 15 Gram(s) Oral once PRN  dextrose 50% Injectable 12.5 Gram(s) IV Push once  dextrose 50% Injectable 25 Gram(s) IV Push once  dextrose 50% Injectable 25 Gram(s) IV Push once  glucagon  Injectable 1 milliGRAM(s) IntraMuscular once PRN  haloperidol     Tablet 1 milliGRAM(s) Oral every 6 hours PRN  haloperidol    Injectable 1 milliGRAM(s) IntraMuscular every 6 hours PRN  haloperidol    Injectable 1 milliGRAM(s) IV Push every 6 hours PRN  influenza   Vaccine 0.5 milliLiter(s) IntraMuscular once  insulin lispro (HumaLOG) corrective regimen sliding scale   SubCutaneous three times a day before meals  insulin lispro (HumaLOG) corrective regimen sliding scale   SubCutaneous at bedtime  latanoprost 0.005% Ophthalmic Solution 1 Drop(s) Both EYES at bedtime  lisinopril 40 milliGRAM(s) Oral daily  melatonin 6 milliGRAM(s) Oral at bedtime  metoprolol succinate ER 50 milliGRAM(s) Oral daily  oxyCODONE    5 mG/acetaminophen 325 mG 1 Tablet(s) Oral every 4 hours PRN  pantoprazole  Injectable 40 milliGRAM(s) IV Push two times a day  sodium chloride 0.9%. 1000 milliLiter(s) IV Continuous <Continuous>  tamsulosin 0.4 milliGRAM(s) Oral at bedtime  traZODone 25 milliGRAM(s) Oral at bedtime PRN      PMHX/PSHX:  BPH (benign prostatic hyperplasia)  CAD (Coronary Artery Disease)  HTN (Hypertension)  DM (Diabetes Mellitus)  Hypercholesterolemia  Coronary Stent      Family history:  No pertinent family history in first degree relatives      ROS:     General:  No wt loss, fevers, chills, night sweats, fatigue,   Eyes:  Good vision, no reported pain  ENT:  No sore throat, pain, runny nose, dysphagia  CV:  No pain, palpitations, hypo/hypertension  Resp:  No dyspnea, cough, tachypnea, wheezing  GI:  see HPI  :  No pain, bleeding, incontinence, nocturia  Muscle:  No pain, weakness  Neuro:  No weakness, tingling, memory problems  Psych:  No fatigue, insomnia, mood problems, depression  Endocrine:  No polyuria, polydipsia, cold/heat intolerance  Heme:  No petechiae, ecchymosis, easy bruisability  Skin:  No rash, tattoos, scars, edema      PHYSICAL EXAM:   Vital Signs:  Vital Signs Last 24 Hrs  T(C): 36.3 (25 Sep 2018 05:51), Max: 36.7 (24 Sep 2018 20:40)  T(F): 97.4 (25 Sep 2018 05:51), Max: 98 (24 Sep 2018 20:40)  HR: 74 (25 Sep 2018 05:51) (64 - 74)  BP: 154/46 (25 Sep 2018 05:51) (136/51 - 154/46)  BP(mean): --  RR: 18 (25 Sep 2018 05:51) (18 - 18)  SpO2: 99% (25 Sep 2018 05:51) (98% - 100%)  Daily     Daily     GENERAL:  no acute distress  HEENT:  -icterus   CHEST:  clear bilaterally, no wheezes or rales  HEART:  RRR, S1S2  ABDOMEN:  soft, non-tender, distended, tympanic   EXTEREMITIES:  no  edema  SKIN:  No rash/erythema/ecchymoses/petechiae/wounds/abscess/warm/dry  NEURO:  alert, oriented, conversant     LABS:                        10.9   15.52 )-----------( 251      ( 24 Sep 2018 07:03 )             32.7     09-24    139  |  101  |  42<H>  ----------------------------<  149<H>  4.0   |  24  |  0.98    Ca    8.2<L>      24 Sep 2018 09:00  Phos  2.7     09-24  Mg     1.4     09-24    TPro  5.6<L>  /  Alb  3.4  /  TBili  2.1<H>  /  DBili  x   /  AST  17  /  ALT  21  /  AlkPhos  93  09-24    LIVER FUNCTIONS - ( 24 Sep 2018 09:00 )  Alb: 3.4 g/dL / Pro: 5.6 g/dL / ALK PHOS: 93 u/L / ALT: 21 u/L / AST: 17 u/L / GGT: x                   Imaging:    < from: CT Abdomen and Pelvis w/ Oral Cont and w/ IV Cont (09.23.18 @ 19:37) >  FINDINGS:    LOWER CHEST: Within normal limits.    LIVER: Within normal limits.  BILE DUCTS: Normal caliber.  GALLBLADDER: Cholecystectomy.  SPLEEN: Unchanged small splenic cyst.  PANCREAS: Within normal limits.  ADRENALS: Within normal limits.  KIDNEYS/URETERS: Within normal limits.    BLADDER: Within normal limits.  REPRODUCTIVE ORGANS: The prostate is enlarged, measuring 7 cm   transversely.    BOWEL: No bowel obstruction. Appendix normal.  PERITONEUM: No free air or free fluid.  VESSELS:  Moderate calcific atherosclerotic disease of the aorta and its   branches.  RETROPERITONEUM: No lymphadenopathy.    ABDOMINAL WALL: Small supraumbilical hernia containing fat.  BONES: Within normal limits.    IMPRESSION: No bowel obstruction.    < end of copied text > GASTROENTEROLOGY FOLLOW-UP NOTE    Chief Complaint:  Patient is a 78y old  Male who presents with a chief complaint of Fall, shoulder pain (24 Sep 2018 20:54)      Interval Events:   - Still without BM or significant flatus   - Patient denies nausea or vomiting     Allergies:  No Known Allergies      Hospital Medications:  acetaminophen   Tablet .. 650 milliGRAM(s) Oral every 6 hours PRN  atorvastatin 80 milliGRAM(s) Oral at bedtime  dextrose 40% Gel 15 Gram(s) Oral once PRN  dextrose 50% Injectable 12.5 Gram(s) IV Push once  dextrose 50% Injectable 25 Gram(s) IV Push once  dextrose 50% Injectable 25 Gram(s) IV Push once  glucagon  Injectable 1 milliGRAM(s) IntraMuscular once PRN  haloperidol     Tablet 1 milliGRAM(s) Oral every 6 hours PRN  haloperidol    Injectable 1 milliGRAM(s) IntraMuscular every 6 hours PRN  haloperidol    Injectable 1 milliGRAM(s) IV Push every 6 hours PRN  influenza   Vaccine 0.5 milliLiter(s) IntraMuscular once  insulin lispro (HumaLOG) corrective regimen sliding scale   SubCutaneous three times a day before meals  insulin lispro (HumaLOG) corrective regimen sliding scale   SubCutaneous at bedtime  latanoprost 0.005% Ophthalmic Solution 1 Drop(s) Both EYES at bedtime  lisinopril 40 milliGRAM(s) Oral daily  melatonin 6 milliGRAM(s) Oral at bedtime  metoprolol succinate ER 50 milliGRAM(s) Oral daily  oxyCODONE    5 mG/acetaminophen 325 mG 1 Tablet(s) Oral every 4 hours PRN  pantoprazole  Injectable 40 milliGRAM(s) IV Push two times a day  sodium chloride 0.9%. 1000 milliLiter(s) IV Continuous <Continuous>  tamsulosin 0.4 milliGRAM(s) Oral at bedtime  traZODone 25 milliGRAM(s) Oral at bedtime PRN      PMHX/PSHX:  BPH (benign prostatic hyperplasia)  CAD (Coronary Artery Disease)  HTN (Hypertension)  DM (Diabetes Mellitus)  Hypercholesterolemia  Coronary Stent      Family history:  No pertinent family history in first degree relatives      ROS:     General:  No wt loss, fevers, chills, night sweats, fatigue,   Eyes:  Good vision, no reported pain  ENT:  No sore throat, pain, runny nose, dysphagia  CV:  No pain, palpitations, hypo/hypertension  Resp:  No dyspnea, cough, tachypnea, wheezing  GI:  see HPI  :  No pain, bleeding, incontinence, nocturia  Muscle:  No pain, weakness  Neuro:  No weakness, tingling, memory problems  Psych:  No fatigue, insomnia, mood problems, depression  Endocrine:  No polyuria, polydipsia, cold/heat intolerance  Heme:  No petechiae, ecchymosis, easy bruisability  Skin:  No rash, tattoos, scars, edema      PHYSICAL EXAM:   Vital Signs:  Vital Signs Last 24 Hrs  T(C): 36.3 (25 Sep 2018 05:51), Max: 36.7 (24 Sep 2018 20:40)  T(F): 97.4 (25 Sep 2018 05:51), Max: 98 (24 Sep 2018 20:40)  HR: 74 (25 Sep 2018 05:51) (64 - 74)  BP: 154/46 (25 Sep 2018 05:51) (136/51 - 154/46)  BP(mean): --  RR: 18 (25 Sep 2018 05:51) (18 - 18)  SpO2: 99% (25 Sep 2018 05:51) (98% - 100%)  Daily     Daily     GENERAL:  no acute distress  HEENT:  -icterus   CHEST:  clear bilaterally, no wheezes or rales  HEART:  RRR, S1S2  ABDOMEN:  soft, non-tender, distended, tympanic   EXTEREMITIES:  no  edema  SKIN:  No rash/erythema/ecchymoses/petechiae/wounds/abscess/warm/dry  NEURO:  alert, oriented, conversant     LABS:                        10.9   15.52 )-----------( 251      ( 24 Sep 2018 07:03 )             32.7     09-24    139  |  101  |  42<H>  ----------------------------<  149<H>  4.0   |  24  |  0.98    Ca    8.2<L>      24 Sep 2018 09:00  Phos  2.7     09-24  Mg     1.4     09-24    TPro  5.6<L>  /  Alb  3.4  /  TBili  2.1<H>  /  DBili  x   /  AST  17  /  ALT  21  /  AlkPhos  93  09-24    LIVER FUNCTIONS - ( 24 Sep 2018 09:00 )  Alb: 3.4 g/dL / Pro: 5.6 g/dL / ALK PHOS: 93 u/L / ALT: 21 u/L / AST: 17 u/L / GGT: x                   Imaging:    < from: CT Abdomen and Pelvis w/ Oral Cont and w/ IV Cont (09.23.18 @ 19:37) >  FINDINGS:    LOWER CHEST: Within normal limits.    LIVER: Within normal limits.  BILE DUCTS: Normal caliber.  GALLBLADDER: Cholecystectomy.  SPLEEN: Unchanged small splenic cyst.  PANCREAS: Within normal limits.  ADRENALS: Within normal limits.  KIDNEYS/URETERS: Within normal limits.    BLADDER: Within normal limits.  REPRODUCTIVE ORGANS: The prostate is enlarged, measuring 7 cm   transversely.    BOWEL: No bowel obstruction. Appendix normal.  PERITONEUM: No free air or free fluid.  VESSELS:  Moderate calcific atherosclerotic disease of the aorta and its   branches.  RETROPERITONEUM: No lymphadenopathy.    ABDOMINAL WALL: Small supraumbilical hernia containing fat.  BONES: Within normal limits.    IMPRESSION: No bowel obstruction.    < end of copied text >

## 2018-09-25 NOTE — PROGRESS NOTE ADULT - ASSESSMENT
Impression:  1) Coffee-ground emesis/acute blood loss anemia without melena/hematochezia - differential includes peptic ulcer disease, erosive esophagitis, angioectasia, gastric or esophageal malignancy.   2) Abdominal distension with AXR showing gastric distension - concern for ileus in the setting of recent operation; also must consider gastric outlet obstruction, gastroparesis, SBO, aerophagia, lactose intolerance, Celiac disease   3) Mechanical fall complicated by humerus fracture s/p surgical repair   4) CAD on DAPT  5) Dementia  6) Diabetes type 2     Recommendations:  - still with minimal flatus and no BM; continue to manage for likely post-operative ileus (may benefit from NGT though patient and family refusing)   - defer EGD pending improvement of above   - continue high dose PPI (IV BID)  - serial CBCs; transfuse to Hgb > 7  - antiplatelets per primary; if continued need for DAPT on discharge should be on lifelong PPI

## 2018-09-25 NOTE — PROGRESS NOTE ADULT - ATTENDING COMMENTS
-COFEE GROUND EMESIS-R/O GI BLEEDING -iv PROTONIX.GI F/U NOTED.Brother refused EGD,NGT-r/o ileus  -x-ray abd.noted-possible gastric outlet obstruction?.CT ABD--NO OBSTRUCTION   -Leucocytosis-no obvious source,monitor,hold off on antibiotics.ID Consult appreciated.  -vomiting/constipation--improved  -PT  -Psych f/u  -d/w Brother.Answered all questions  -d/w staff

## 2018-09-25 NOTE — PROGRESS NOTE ADULT - SUBJECTIVE AND OBJECTIVE BOX
FESTUS BLAND  78y  Male      Patient is a 78y old  Male who presents with a chief complaint of Fall, shoulder pain (25 Sep 2018 06:59)  Patient is comfortable,no vomiting,no abd.pain,no nausea,no cp,no sob    REVIEW OF SYSTEMS:  as above    INTERVAL HPI/OVERNIGHT EVENTS:  T(C): 37 (09-25-18 @ 20:18), Max: 37.1 (09-25-18 @ 15:07)  HR: 79 (09-25-18 @ 20:18) (70 - 79)  BP: 155/53 (09-25-18 @ 20:18) (120/58 - 155/53)  RR: 18 (09-25-18 @ 20:18) (18 - 18)  SpO2: 100% (09-25-18 @ 20:18) (98% - 100%)  Wt(kg): --  I&O's Summary    T(C): 37 (09-25-18 @ 20:18), Max: 37.1 (09-25-18 @ 15:07)  HR: 79 (09-25-18 @ 20:18) (70 - 79)  BP: 155/53 (09-25-18 @ 20:18) (120/58 - 155/53)  RR: 18 (09-25-18 @ 20:18) (18 - 18)  SpO2: 100% (09-25-18 @ 20:18) (98% - 100%)  Wt(kg): --Vital Signs Last 24 Hrs  T(C): 37 (25 Sep 2018 20:18), Max: 37.1 (25 Sep 2018 15:07)  T(F): 98.6 (25 Sep 2018 20:18), Max: 98.7 (25 Sep 2018 15:07)  HR: 79 (25 Sep 2018 20:18) (70 - 79)  BP: 155/53 (25 Sep 2018 20:18) (120/58 - 155/53)  BP(mean): --  RR: 18 (25 Sep 2018 20:18) (18 - 18)  SpO2: 100% (25 Sep 2018 20:18) (98% - 100%)    LABS:                        9.0    10.76 )-----------( 211      ( 25 Sep 2018 09:05 )             26.4     09-24    139  |  101  |  42<H>  ----------------------------<  149<H>  4.0   |  24  |  0.98    Ca    8.2<L>      24 Sep 2018 09:00  Phos  2.7     09-24  Mg     1.4     09-24    TPro  5.6<L>  /  Alb  3.4  /  TBili  2.1<H>  /  DBili  x   /  AST  17  /  ALT  21  /  AlkPhos  93  09-24        CAPILLARY BLOOD GLUCOSE      POCT Blood Glucose.: 130 mg/dL (25 Sep 2018 17:08)  POCT Blood Glucose.: 176 mg/dL (25 Sep 2018 12:38)  POCT Blood Glucose.: 149 mg/dL (25 Sep 2018 08:46)  POCT Blood Glucose.: 134 mg/dL (24 Sep 2018 22:10)            PAST MEDICAL & SURGICAL HISTORY:  BPH (benign prostatic hyperplasia)  CAD (Coronary Artery Disease): s/p cardiac stent ( &gt; 20 years ago)  HTN (Hypertension)  DM (Diabetes Mellitus)  Hypercholesterolemia  Coronary Stent: x 2 ( 20 years )      MEDICATIONS  (STANDING):  atorvastatin 80 milliGRAM(s) Oral at bedtime  dextrose 50% Injectable 12.5 Gram(s) IV Push once  dextrose 50% Injectable 25 Gram(s) IV Push once  dextrose 50% Injectable 25 Gram(s) IV Push once  influenza   Vaccine 0.5 milliLiter(s) IntraMuscular once  insulin lispro (HumaLOG) corrective regimen sliding scale   SubCutaneous three times a day before meals  insulin lispro (HumaLOG) corrective regimen sliding scale   SubCutaneous at bedtime  latanoprost 0.005% Ophthalmic Solution 1 Drop(s) Both EYES at bedtime  lisinopril 40 milliGRAM(s) Oral daily  melatonin 6 milliGRAM(s) Oral at bedtime  metoprolol succinate ER 50 milliGRAM(s) Oral daily  pantoprazole  Injectable 40 milliGRAM(s) IV Push two times a day  sodium chloride 0.9%. 1000 milliLiter(s) (75 mL/Hr) IV Continuous <Continuous>  tamsulosin 0.4 milliGRAM(s) Oral at bedtime    MEDICATIONS  (PRN):  acetaminophen   Tablet .. 650 milliGRAM(s) Oral every 6 hours PRN Mild Pain (1 - 3), Moderate Pain (4 - 6)  dextrose 40% Gel 15 Gram(s) Oral once PRN Blood Glucose LESS THAN 70 milliGRAM(s)/deciliter  glucagon  Injectable 1 milliGRAM(s) IntraMuscular once PRN Glucose LESS THAN 70 milligrams/deciliter  haloperidol     Tablet 1 milliGRAM(s) Oral every 6 hours PRN agitation  haloperidol    Injectable 1 milliGRAM(s) IntraMuscular every 6 hours PRN agitation  haloperidol    Injectable 1 milliGRAM(s) IV Push every 6 hours PRN agitation  traZODone 25 milliGRAM(s) Oral at bedtime PRN insomnia        RADIOLOGY & ADDITIONAL TESTS:    Imaging Personally Reviewed:  [ ] YES  [ ] NO    Consultant(s) Notes Reviewed:  [x ] YES  [ ] NO    PHYSICAL EXAM:  GENERAL: NAD, well-groomed, well-developed  HEAD:  Atraumatic, Normocephalic  EYES: EOMI, PERRLA, conjunctiva and sclera clear  ENMT: No tonsillar erythema, exudates, or enlargement; Moist mucous membranes, Good dentition, No lesions  NECK: Supple, No JVD, Normal thyroid  NERVOUS SYSTEM:  Alert & Oriented X2, Good concentration; Motor Strength 5/5 B/L upper and lower extremities; DTRs 2+ intact and symmetric  CHEST/LUNG: Clear to percussion bilaterally; No rales, rhonchi, wheezing, or rubs  HEART: Regular rate and rhythm; No murmurs, rubs, or gallops  ABDOMEN: Soft, Nontender, Nondistended; Bowel sounds present  EXTREMITIES:  2+ Peripheral Pulses, No clubbing, cyanosis, or edema  LYMPH: No lymphadenopathy noted  SKIN: No rashes or lesions    Care Discussed with Consultants/Other Providers [ ] YES  [ ] NO      Code Status: [] Full Code [] DNR [] DNI [] Goals of Care:   Disposition: [] ICU [] Stroke Unit [] RCU []PCU []Floor [] Discharge Home         LAINE VargasFACP

## 2018-09-26 LAB
GLUCOSE BLDC GLUCOMTR-MCNC: 119 MG/DL — HIGH (ref 70–99)
GLUCOSE BLDC GLUCOMTR-MCNC: 138 MG/DL — HIGH (ref 70–99)
GLUCOSE BLDC GLUCOMTR-MCNC: 149 MG/DL — HIGH (ref 70–99)
GLUCOSE BLDC GLUCOMTR-MCNC: 158 MG/DL — HIGH (ref 70–99)

## 2018-09-26 PROCEDURE — 71045 X-RAY EXAM CHEST 1 VIEW: CPT | Mod: 26,77

## 2018-09-26 PROCEDURE — 71045 X-RAY EXAM CHEST 1 VIEW: CPT | Mod: 26

## 2018-09-26 PROCEDURE — 74019 RADEX ABDOMEN 2 VIEWS: CPT | Mod: 26

## 2018-09-26 PROCEDURE — 99232 SBSQ HOSP IP/OBS MODERATE 35: CPT | Mod: GC

## 2018-09-26 RX ORDER — PIPERACILLIN AND TAZOBACTAM 4; .5 G/20ML; G/20ML
3.38 INJECTION, POWDER, LYOPHILIZED, FOR SOLUTION INTRAVENOUS EVERY 8 HOURS
Qty: 0 | Refills: 0 | Status: DISCONTINUED | OUTPATIENT
Start: 2018-09-26 | End: 2018-10-01

## 2018-09-26 RX ADMIN — PIPERACILLIN AND TAZOBACTAM 25 GRAM(S): 4; .5 INJECTION, POWDER, LYOPHILIZED, FOR SOLUTION INTRAVENOUS at 21:59

## 2018-09-26 RX ADMIN — TAMSULOSIN HYDROCHLORIDE 0.4 MILLIGRAM(S): 0.4 CAPSULE ORAL at 21:59

## 2018-09-26 RX ADMIN — Medication 25 MILLIGRAM(S): at 21:58

## 2018-09-26 RX ADMIN — LISINOPRIL 40 MILLIGRAM(S): 2.5 TABLET ORAL at 06:30

## 2018-09-26 RX ADMIN — ATORVASTATIN CALCIUM 80 MILLIGRAM(S): 80 TABLET, FILM COATED ORAL at 21:58

## 2018-09-26 RX ADMIN — Medication 50 MILLIGRAM(S): at 06:30

## 2018-09-26 RX ADMIN — Medication 1: at 17:44

## 2018-09-26 RX ADMIN — Medication 6 MILLIGRAM(S): at 21:58

## 2018-09-26 RX ADMIN — PANTOPRAZOLE SODIUM 40 MILLIGRAM(S): 20 TABLET, DELAYED RELEASE ORAL at 06:38

## 2018-09-26 RX ADMIN — PANTOPRAZOLE SODIUM 40 MILLIGRAM(S): 20 TABLET, DELAYED RELEASE ORAL at 17:45

## 2018-09-26 RX ADMIN — LATANOPROST 1 DROP(S): 0.05 SOLUTION/ DROPS OPHTHALMIC; TOPICAL at 21:58

## 2018-09-26 RX ADMIN — SODIUM CHLORIDE 75 MILLILITER(S): 9 INJECTION INTRAMUSCULAR; INTRAVENOUS; SUBCUTANEOUS at 06:42

## 2018-09-26 NOTE — PROGRESS NOTE ADULT - ATTENDING COMMENTS
I have seen and evaluated the patient with the GI Fellow and GI Team.  I agree with the findings, formulation and plan of care as documented in the  fellow's note, except as noted.

## 2018-09-26 NOTE — CHART NOTE - NSCHARTNOTEFT_GEN_A_CORE
GI Dr. Leroy recommending NGT placement- brother Berto consented to NGT and is agreeable. Patient agreeable to placement of tube as well. NGT placed with Neema NP assistance. Abd auscultated for placement. STAT cxray ordered. Awaiting read.

## 2018-09-26 NOTE — PROGRESS NOTE ADULT - SUBJECTIVE AND OBJECTIVE BOX
Infectious Diseases progress note:    Subjective:  Called to see pt again, as pt with continued cough after NGT placement.  Pt vomited large amount of food substance.  Abd distended, pt without recent BM.  Pt had flatus at time of NG tube placement.  Pt awake.  Concern for possible aspiration    ROS:  CONSTITUTIONAL:  No fever, chills, rigors  CARDIOVASCULAR:  No chest pain or palpitations  RESPIRATORY:   (+) cough  GASTROINTESTINAL:  No abd pain, N/V, diarrhea/constipation  EXTREMITIES:  No swelling or joint pain  GENITOURINARY:  No burning on urination, increased frequency or urgency.  No flank pain  NEUROLOGIC:  No HA, visual disturbances  SKIN: No rashes    Allergies    No Known Allergies    Intolerances        ANTIBIOTICS/RELEVANT:  antimicrobials  piperacillin/tazobactam IVPB. 3.375 Gram(s) IV Intermittent every 8 hours    immunologic:  influenza   Vaccine 0.5 milliLiter(s) IntraMuscular once    OTHER:  acetaminophen   Tablet .. 650 milliGRAM(s) Oral every 6 hours PRN  atorvastatin 80 milliGRAM(s) Oral at bedtime  dextrose 40% Gel 15 Gram(s) Oral once PRN  dextrose 50% Injectable 12.5 Gram(s) IV Push once  dextrose 50% Injectable 25 Gram(s) IV Push once  dextrose 50% Injectable 25 Gram(s) IV Push once  glucagon  Injectable 1 milliGRAM(s) IntraMuscular once PRN  haloperidol     Tablet 1 milliGRAM(s) Oral every 6 hours PRN  haloperidol    Injectable 1 milliGRAM(s) IntraMuscular every 6 hours PRN  haloperidol    Injectable 1 milliGRAM(s) IV Push every 6 hours PRN  insulin lispro (HumaLOG) corrective regimen sliding scale   SubCutaneous three times a day before meals  insulin lispro (HumaLOG) corrective regimen sliding scale   SubCutaneous at bedtime  latanoprost 0.005% Ophthalmic Solution 1 Drop(s) Both EYES at bedtime  lisinopril 40 milliGRAM(s) Oral daily  melatonin 6 milliGRAM(s) Oral at bedtime  metoprolol succinate ER 50 milliGRAM(s) Oral daily  pantoprazole  Injectable 40 milliGRAM(s) IV Push two times a day  sodium chloride 0.9%. 1000 milliLiter(s) IV Continuous <Continuous>  tamsulosin 0.4 milliGRAM(s) Oral at bedtime  traZODone 25 milliGRAM(s) Oral at bedtime PRN      Objective:  Vital Signs Last 24 Hrs  T(C): 36.9 (26 Sep 2018 14:37), Max: 37 (25 Sep 2018 20:18)  T(F): 98.5 (26 Sep 2018 14:37), Max: 98.6 (25 Sep 2018 20:18)  HR: 71 (26 Sep 2018 14:37) (71 - 79)  BP: 126/58 (26 Sep 2018 14:37) (126/58 - 173/75)  BP(mean): --  RR: 18 (26 Sep 2018 14:37) (18 - 18)  SpO2: 97% (26 Sep 2018 14:37) (97% - 100%)    PHYSICAL EXAM:  Constitutional:NAD, NGT in place  Eyes:BENNIE, EOMI  Ear/Nose/Throat: no thrush, mucositis.  Moist mucous membranes	  Neck:no JVD, no lymphadenopathy, supple  Respiratory: CTA qamar  Cardiovascular: S1S2 RRR, no murmurs  Gastrointestinal: distended, decreased BS  Extremities:no e/e/c  Skin:  no rashes, open wounds or ulcerations        LABS:                        9.0    10.76 )-----------( 211      ( 25 Sep 2018 09:05 )             26.4                     MICROBIOLOGY:          RADIOLOGY & ADDITIONAL STUDIES:    < from: Xray Chest 1 View-PORTABLE IMMEDIATE (09.26.18 @ 15:32) >  IMPRESSION:    There has been interval placement of an enteric tube, which courses below   the diaphragm, although the tip is not imaged.  The lungs are clear. No pleural effusions or pneumothoraces are seen   bilaterally.  A partially visualized displaced right humeral fracture seen.    < end of copied text >

## 2018-09-26 NOTE — PROGRESS NOTE ADULT - ASSESSMENT
79 y/o M with DM, HTN, HLD, CAD s/p stents p/w shoulder pain after fall.  Pt reports he was walking to the bank, and lost his balance with the wind causing him to fall backwards.  When questioned, he also reports feeling lightheaded, but did not lose consciousness or strike his head.  Pt is unclear regarding further details of his presentation, and at times thinks he has been in the hospital for several days.  He reports some pain in his R arm and shoulder, but otherwise states that he feels well.  Denies any recent illness.  Pt has walked with a cane for some time, and reports he recently acquired a walker, however, he was using his cane when he fell today.      In the ED: pt had X-ray showing fracture of proximal R humerus.  He was evaluated by orthopedics, and had brace applied to R arm. (19 Sep 2018 20:37)    HOSP COURSE:    Pt admitted with  closed displaced fracture of proximal end of right humerus, seen by Ortho, s/p closed reduction and brace placement. Pt noted to present with confusion, and as per chart notes, brother states pt with "mental disability"-- at times becomes forgetful--- had it all his life, per brother recently w/ beginning stages of dementia.  Pt is being followed by [facility].     On 9/21, rapid response called for coffee ground emesis.  Pt is planned for EGD to evaluate for source of bleed.      Pt has been afebrile and hemodynamically stable.  Noted to have elevated WBC in low teens.  ID consult called to evaluate for underlying infectious etiology.    Leukocytosis could be reactive to recent fall/humeral fracture and GIB.  UA is negative, and thus far chest xray with small left pleural effusion.   Antibiotics held initially.      Pt had NGT placement on 9/26 for abdominal distention seen on recent AXR and concern for ileus.  Pt with large amount of vomitus at time of placement, and continued cough - concern for aspiration event.        Problem/plan - 1:    ·	Possible aspiration pneumonitis    - Concern for possible aspiration event at time of NG tube placement, pt with continued cough.  Will start zosyn on empiric basis.  CXR shows appropriate placement of tube with clear lungs, although too early to see any radiographic changes if pt aspirated.    - Cont to monitor respiratory status, temp curve, and WBC.      Will follow,      Payal Meier  [phone]

## 2018-09-26 NOTE — PROGRESS NOTE ADULT - SUBJECTIVE AND OBJECTIVE BOX
GASTROENTEROLOGY FOLLOW-UP NOTE    Chief Complaint:  Patient is a 78y old  Male who presents with a chief complaint of Fall, shoulder pain (25 Sep 2018 20:42)      Interval Events:   - No abdominal pain, nausea, vomiting  - Minimal flatus, no BMs    Allergies:  No Known Allergies      Hospital Medications:  acetaminophen   Tablet .. 650 milliGRAM(s) Oral every 6 hours PRN  atorvastatin 80 milliGRAM(s) Oral at bedtime  dextrose 40% Gel 15 Gram(s) Oral once PRN  dextrose 50% Injectable 12.5 Gram(s) IV Push once  dextrose 50% Injectable 25 Gram(s) IV Push once  dextrose 50% Injectable 25 Gram(s) IV Push once  glucagon  Injectable 1 milliGRAM(s) IntraMuscular once PRN  haloperidol     Tablet 1 milliGRAM(s) Oral every 6 hours PRN  haloperidol    Injectable 1 milliGRAM(s) IntraMuscular every 6 hours PRN  haloperidol    Injectable 1 milliGRAM(s) IV Push every 6 hours PRN  influenza   Vaccine 0.5 milliLiter(s) IntraMuscular once  insulin lispro (HumaLOG) corrective regimen sliding scale   SubCutaneous three times a day before meals  insulin lispro (HumaLOG) corrective regimen sliding scale   SubCutaneous at bedtime  latanoprost 0.005% Ophthalmic Solution 1 Drop(s) Both EYES at bedtime  lisinopril 40 milliGRAM(s) Oral daily  melatonin 6 milliGRAM(s) Oral at bedtime  metoprolol succinate ER 50 milliGRAM(s) Oral daily  pantoprazole  Injectable 40 milliGRAM(s) IV Push two times a day  sodium chloride 0.9%. 1000 milliLiter(s) IV Continuous <Continuous>  tamsulosin 0.4 milliGRAM(s) Oral at bedtime  traZODone 25 milliGRAM(s) Oral at bedtime PRN      PMHX/PSHX:  BPH (benign prostatic hyperplasia)  CAD (Coronary Artery Disease)  HTN (Hypertension)  DM (Diabetes Mellitus)  Hypercholesterolemia  Coronary Stent      Family history:  No pertinent family history in first degree relatives      ROS:     General:  No wt loss, fevers, chills, night sweats, fatigue,   Eyes:  Good vision, no reported pain  ENT:  No sore throat, pain, runny nose, dysphagia  CV:  No pain, palpitations, hypo/hypertension  Resp:  No dyspnea, cough, tachypnea, wheezing  GI:  see HPI  :  No pain, bleeding, incontinence, nocturia  Muscle:  No pain, weakness  Neuro:  No weakness, tingling, memory problems  Psych:  No fatigue, insomnia, mood problems, depression  Endocrine:  No polyuria, polydipsia, cold/heat intolerance  Heme:  No petechiae, ecchymosis, easy bruisability  Skin:  No rash, tattoos, scars, edema      PHYSICAL EXAM:   Vital Signs:  Vital Signs Last 24 Hrs  T(C): 36.6 (26 Sep 2018 06:27), Max: 37.1 (25 Sep 2018 15:07)  T(F): 97.9 (26 Sep 2018 06:27), Max: 98.7 (25 Sep 2018 15:07)  HR: 74 (26 Sep 2018 06:27) (70 - 79)  BP: 173/75 (26 Sep 2018 06:27) (120/58 - 173/75)  BP(mean): --  RR: 18 (26 Sep 2018 06:27) (18 - 18)  SpO2: 98% (26 Sep 2018 06:27) (98% - 100%)  Daily     Daily     GENERAL:  no acute distress  HEENT:  -icterus   CHEST:  clear bilaterally, no wheezes or rales  HEART:  RRR, S1S2  ABDOMEN:  soft, non-tender, distended, tympanic   EXTEREMITIES:  no  edema  SKIN:  warm/dry  NEURO:  alert, oriented, conversant     LABS:                        9.0    10.76 )-----------( 211      ( 25 Sep 2018 09:05 )             26.4     09-24    139  |  101  |  42<H>  ----------------------------<  149<H>  4.0   |  24  |  0.98    Ca    8.2<L>      24 Sep 2018 09:00    TPro  5.6<L>  /  Alb  3.4  /  TBili  2.1<H>  /  DBili  x   /  AST  17  /  ALT  21  /  AlkPhos  93  09-24    LIVER FUNCTIONS - ( 24 Sep 2018 09:00 )  Alb: 3.4 g/dL / Pro: 5.6 g/dL / ALK PHOS: 93 u/L / ALT: 21 u/L / AST: 17 u/L / GGT: x GASTROENTEROLOGY FOLLOW-UP NOTE    Chief Complaint:  Patient is a 78y old  Male who presents with a chief complaint of Fall, shoulder pain (25 Sep 2018 20:42)      Interval Events:   - No abdominal pain, nausea, vomiting  - Not passing gas and no BM; distended this morning     Allergies:  No Known Allergies      Hospital Medications:  acetaminophen   Tablet .. 650 milliGRAM(s) Oral every 6 hours PRN  atorvastatin 80 milliGRAM(s) Oral at bedtime  dextrose 40% Gel 15 Gram(s) Oral once PRN  dextrose 50% Injectable 12.5 Gram(s) IV Push once  dextrose 50% Injectable 25 Gram(s) IV Push once  dextrose 50% Injectable 25 Gram(s) IV Push once  glucagon  Injectable 1 milliGRAM(s) IntraMuscular once PRN  haloperidol     Tablet 1 milliGRAM(s) Oral every 6 hours PRN  haloperidol    Injectable 1 milliGRAM(s) IntraMuscular every 6 hours PRN  haloperidol    Injectable 1 milliGRAM(s) IV Push every 6 hours PRN  influenza   Vaccine 0.5 milliLiter(s) IntraMuscular once  insulin lispro (HumaLOG) corrective regimen sliding scale   SubCutaneous three times a day before meals  insulin lispro (HumaLOG) corrective regimen sliding scale   SubCutaneous at bedtime  latanoprost 0.005% Ophthalmic Solution 1 Drop(s) Both EYES at bedtime  lisinopril 40 milliGRAM(s) Oral daily  melatonin 6 milliGRAM(s) Oral at bedtime  metoprolol succinate ER 50 milliGRAM(s) Oral daily  pantoprazole  Injectable 40 milliGRAM(s) IV Push two times a day  sodium chloride 0.9%. 1000 milliLiter(s) IV Continuous <Continuous>  tamsulosin 0.4 milliGRAM(s) Oral at bedtime  traZODone 25 milliGRAM(s) Oral at bedtime PRN      PMHX/PSHX:  BPH (benign prostatic hyperplasia)  CAD (Coronary Artery Disease)  HTN (Hypertension)  DM (Diabetes Mellitus)  Hypercholesterolemia  Coronary Stent      Family history:  No pertinent family history in first degree relatives      ROS:     General:  No wt loss, fevers, chills, night sweats, fatigue,   Eyes:  Good vision, no reported pain  ENT:  No sore throat, pain, runny nose, dysphagia  CV:  No pain, palpitations, hypo/hypertension  Resp:  No dyspnea, cough, tachypnea, wheezing  GI:  see HPI  :  No pain, bleeding, incontinence, nocturia  Muscle:  No pain, weakness  Neuro:  No weakness, tingling, memory problems  Psych:  No fatigue, insomnia, mood problems, depression  Endocrine:  No polyuria, polydipsia, cold/heat intolerance  Heme:  No petechiae, ecchymosis, easy bruisability  Skin:  No rash, tattoos, scars, edema      PHYSICAL EXAM:   Vital Signs:  Vital Signs Last 24 Hrs  T(C): 36.6 (26 Sep 2018 06:27), Max: 37.1 (25 Sep 2018 15:07)  T(F): 97.9 (26 Sep 2018 06:27), Max: 98.7 (25 Sep 2018 15:07)  HR: 74 (26 Sep 2018 06:27) (70 - 79)  BP: 173/75 (26 Sep 2018 06:27) (120/58 - 173/75)  BP(mean): --  RR: 18 (26 Sep 2018 06:27) (18 - 18)  SpO2: 98% (26 Sep 2018 06:27) (98% - 100%)  Daily     Daily     GENERAL:  no acute distress  HEENT:  -icterus   CHEST:  clear bilaterally, no wheezes or rales  HEART:  RRR, S1S2  ABDOMEN:  soft, non-tender, ++distended, tympanic   EXTEREMITIES:  no  edema  SKIN:  warm/dry  NEURO:  alert, oriented, conversant     LABS:                        9.0    10.76 )-----------( 211      ( 25 Sep 2018 09:05 )             26.4     09-24    139  |  101  |  42<H>  ----------------------------<  149<H>  4.0   |  24  |  0.98    Ca    8.2<L>      24 Sep 2018 09:00    TPro  5.6<L>  /  Alb  3.4  /  TBili  2.1<H>  /  DBili  x   /  AST  17  /  ALT  21  /  AlkPhos  93  09-24    LIVER FUNCTIONS - ( 24 Sep 2018 09:00 )  Alb: 3.4 g/dL / Pro: 5.6 g/dL / ALK PHOS: 93 u/L / ALT: 21 u/L / AST: 17 u/L / GGT: x

## 2018-09-26 NOTE — PROGRESS NOTE ADULT - ATTENDING COMMENTS
-COFEE GROUND EMESIS-R/O GI BLEEDING -iv PROTONIX.GI F/U NOTED.Brother agreed for ,NGT-drainin cofee ground material.-r/o ileus  -x-ray abd.noted-possible gastric outlet obstruction?.CT ABD--NO OBSTRUCTION   -Leucocytosis-no obvious source,monitor,hold off on antibiotics.ID f/u noted.Risk for aspiration,empiric antibiotic  -vomiting/constipation--improved  -PT  -Psych f/u  -d/w Brother.Answered all questions  -d/w staff

## 2018-09-26 NOTE — PROGRESS NOTE ADULT - SUBJECTIVE AND OBJECTIVE BOX
FESTUS BLAND  78y  Male      Patient is a 78y old  Male who presents with a chief complaint of Fall, shoulder pain (26 Sep 2018 17:14)  Above noted.Reported abdominal distention,no BM since few days.Brother agreed for NGT    REVIEW OF SYSTEMS:  NO FEVER,NO SOB,NO CP,NO ABD.PAIN,NO ARM PAIN      INTERVAL HPI/OVERNIGHT EVENTS:  T(C): 36.9 (09-26-18 @ 14:37), Max: 36.9 (09-26-18 @ 14:37)  HR: 71 (09-26-18 @ 14:37) (71 - 74)  BP: 126/58 (09-26-18 @ 14:37) (126/58 - 173/75)  RR: 18 (09-26-18 @ 14:37) (18 - 18)  SpO2: 97% (09-26-18 @ 14:37) (97% - 98%)  Wt(kg): --  I&O's Summary    T(C): 36.9 (09-26-18 @ 14:37), Max: 36.9 (09-26-18 @ 14:37)  HR: 71 (09-26-18 @ 14:37) (71 - 74)  BP: 126/58 (09-26-18 @ 14:37) (126/58 - 173/75)  RR: 18 (09-26-18 @ 14:37) (18 - 18)  SpO2: 97% (09-26-18 @ 14:37) (97% - 98%)  Wt(kg): --Vital Signs Last 24 Hrs  T(C): 36.9 (26 Sep 2018 14:37), Max: 36.9 (26 Sep 2018 14:37)  T(F): 98.5 (26 Sep 2018 14:37), Max: 98.5 (26 Sep 2018 14:37)  HR: 71 (26 Sep 2018 14:37) (71 - 74)  BP: 126/58 (26 Sep 2018 14:37) (126/58 - 173/75)  BP(mean): --  RR: 18 (26 Sep 2018 14:37) (18 - 18)  SpO2: 97% (26 Sep 2018 14:37) (97% - 98%)    LABS:                        9.0    10.76 )-----------( 211      ( 25 Sep 2018 09:05 )             26.4               CAPILLARY BLOOD GLUCOSE      POCT Blood Glucose.: 158 mg/dL (26 Sep 2018 17:08)  POCT Blood Glucose.: 138 mg/dL (26 Sep 2018 12:04)  POCT Blood Glucose.: 149 mg/dL (26 Sep 2018 08:41)  POCT Blood Glucose.: 133 mg/dL (25 Sep 2018 22:12)            PAST MEDICAL & SURGICAL HISTORY:  BPH (benign prostatic hyperplasia)  CAD (Coronary Artery Disease): s/p cardiac stent ( &gt; 20 years ago)  HTN (Hypertension)  DM (Diabetes Mellitus)  Hypercholesterolemia  Coronary Stent: x 2 ( 20 years )      MEDICATIONS  (STANDING):  atorvastatin 80 milliGRAM(s) Oral at bedtime  dextrose 50% Injectable 12.5 Gram(s) IV Push once  dextrose 50% Injectable 25 Gram(s) IV Push once  dextrose 50% Injectable 25 Gram(s) IV Push once  influenza   Vaccine 0.5 milliLiter(s) IntraMuscular once  insulin lispro (HumaLOG) corrective regimen sliding scale   SubCutaneous three times a day before meals  insulin lispro (HumaLOG) corrective regimen sliding scale   SubCutaneous at bedtime  latanoprost 0.005% Ophthalmic Solution 1 Drop(s) Both EYES at bedtime  lisinopril 40 milliGRAM(s) Oral daily  melatonin 6 milliGRAM(s) Oral at bedtime  metoprolol succinate ER 50 milliGRAM(s) Oral daily  pantoprazole  Injectable 40 milliGRAM(s) IV Push two times a day  piperacillin/tazobactam IVPB. 3.375 Gram(s) IV Intermittent every 8 hours  sodium chloride 0.9%. 1000 milliLiter(s) (75 mL/Hr) IV Continuous <Continuous>  tamsulosin 0.4 milliGRAM(s) Oral at bedtime    MEDICATIONS  (PRN):  acetaminophen   Tablet .. 650 milliGRAM(s) Oral every 6 hours PRN Mild Pain (1 - 3), Moderate Pain (4 - 6)  dextrose 40% Gel 15 Gram(s) Oral once PRN Blood Glucose LESS THAN 70 milliGRAM(s)/deciliter  glucagon  Injectable 1 milliGRAM(s) IntraMuscular once PRN Glucose LESS THAN 70 milligrams/deciliter  haloperidol     Tablet 1 milliGRAM(s) Oral every 6 hours PRN agitation  haloperidol    Injectable 1 milliGRAM(s) IntraMuscular every 6 hours PRN agitation  haloperidol    Injectable 1 milliGRAM(s) IV Push every 6 hours PRN agitation  traZODone 25 milliGRAM(s) Oral at bedtime PRN insomnia        RADIOLOGY & ADDITIONAL TESTS:    Imaging Personally Reviewed:  [ ] YES  [ ] NO    Consultant(s) Notes Reviewed:  [X ] YES  [ ] NO    PHYSICAL EXAM:  GENERAL: NAD, well-groomed, well-developed  HEAD:  Atraumatic, Normocephalic  EYES: EOMI, PERRLA, conjunctiva and sclera clear  ENMT: No tonsillar erythema, exudates, or enlargement; Moist mucous membranes, Good dentition, No lesions  NECK: Supple, No JVD, Normal thyroid  NERVOUS SYSTEM:  Alert & Oriented X3, Good concentration; Motor Strength 5/5 B/L upper and lower extremities; DTRs 2+ intact and symmetric  CHEST/LUNG: Clear to percussion bilaterally; No rales, rhonchi, wheezing, or rubs  HEART: Regular rate and rhythm; No murmurs, rubs, or gallops  ABDOMEN: Soft, Nontender, Nondistended; Bowel sounds present  EXTREMITIES:  2+ Peripheral Pulses, No clubbing, cyanosis, or edema  LYMPH: No lymphadenopathy noted  SKIN: No rashes or lesions    Care Discussed with Consultants/Other Providers [ ] YES  [ ] NO      Code Status: [] Full Code [] DNR [] DNI [] Goals of Care:   Disposition: [] ICU [] Stroke Unit [] RCU []PCU []Floor [] Discharge Home         LA[facility] VargasNorth[facility] HospitalP

## 2018-09-26 NOTE — PROGRESS NOTE ADULT - ASSESSMENT
Impression:  1) Coffee-ground emesis/acute blood loss anemia without melena/hematochezia - differential includes peptic ulcer disease, erosive esophagitis, angioectasia, gastric or esophageal malignancy.   2) Abdominal distension with AXR showing gastric distension - concern for ileus in the setting of recent operation; also must consider gastric outlet obstruction, gastroparesis, SBO, aerophagia, lactose intolerance, Celiac disease   3) Mechanical fall complicated by humerus fracture s/p surgical repair   4) CAD on DAPT  5) Dementia  6) Diabetes type 2     Recommendations:  - still with minimal flatus and no BM; continue to manage for likely post-operative ileus (may benefit from NGT though patient and family refusing)   - defer EGD pending improvement of above   - continue high dose PPI (PO BID)  - serial CBCs; transfuse to Hgb > 7  - antiplatelets per primary; if continued need for DAPT on discharge should be on lifelong PPI Impression:  1) Coffee-ground emesis/acute blood loss anemia without melena/hematochezia - differential includes peptic ulcer disease, erosive esophagitis, angioectasia, gastric or esophageal malignancy.   2) Abdominal distension with AXR showing gastric distension - concern for ileus in the setting of recent operation; also must consider gastric outlet obstruction, gastroparesis, SBO, aerophagia, lactose intolerance, Celiac disease   3) Mechanical fall complicated by humerus fracture s/p surgical repair   4) CAD on DAPT  5) Dementia  6) Diabetes type 2     Recommendations:  Patient now with minimal flatus and no BMs over many days; suspect post-operative ileus. Patient/family refusing NGT however if distension persists or is associated with vomiting, would strongly recommend placement of NGT to low intermittent suction. Patient has not had recurrence of coffee-ground emesis and Hgb remains stable, therefore no plan for EGD at this time. Could consider EGD later in this admission or as outpatient (or defer all together unless further bleeding). In meantime, continue PPI PO BID. Check CBC daily and transfuse to Hgb > 7. Dual antiplatelets per primary team; if continued need for DAPT on discharge should be on lifelong PPI. Impression:  1) Coffee-ground emesis/acute blood loss anemia without melena/hematochezia - differential includes peptic ulcer disease, erosive esophagitis, angioectasia, gastric or esophageal malignancy.   2) Abdominal distension with AXR showing gastric distension - concern for ileus in the setting of recent operation; also must consider gastric outlet obstruction, gastroparesis, SBO, aerophagia, lactose intolerance, Celiac disease   3) Mechanical fall complicated by humerus fracture s/p surgical repair   4) CAD on DAPT  5) Dementia  6) Diabetes type 2     Recommendations:  Patient now with minimal flatus and no BMs over many days; suspect post-operative ileus. Patient/family refusing NGT however patient has significant distension on exam and on AXR this morning; therefore would strongly recommend placement of NGT to low intermittent suction. Patient has not had recurrence of coffee-ground emesis and Hgb remains stable, therefore no plan for EGD at this time. Could consider EGD later in this admission or as outpatient (or defer all together unless further bleeding). In meantime, continue PPI PO BID. Check CBC daily and transfuse to Hgb > 7. Dual antiplatelets per primary team; if continued need for DAPT on discharge should be on lifelong PPI. Impression:  1) Coffee-ground emesis/acute blood loss anemia without melena/hematochezia - differential includes peptic ulcer disease, erosive esophagitis, angioectasia, gastric or esophageal malignancy.   2) Abdominal distension with AXR showing gastric distension - concern for ileus in the setting of recent operation; also must consider gastric outlet obstruction, gastroparesis, SBO, aerophagia, lactose intolerance, Celiac disease   3) Mechanical fall complicated by humerus fracture s/p surgical repair   4) CAD on DAPT  5) Dementia  6) Diabetes type 2     Recommendations:  Patient now with minimal flatus and no BMs over many days; suspect post-operative ileus with dilated stomach. Patient/family refusing NGT however patient has significant distension on exam and on AXR this morning; therefore would strongly recommend placement of NGT to low intermittent suction. Patient has not had recurrence of coffee-ground emesis and Hgb remains stable, therefore no plan for EGD at this time. Could consider EGD later in this admission or as outpatient (or defer all together unless further bleeding). In meantime, continue PPI PO BID. Check CBC daily and transfuse to Hgb > 7. Dual antiplatelets per primary team; if continued need for DAPT on discharge should be on lifelong PPI.

## 2018-09-27 ENCOUNTER — RESULT REVIEW (OUTPATIENT)
Age: 78
End: 2018-09-27

## 2018-09-27 LAB
BUN SERPL-MCNC: 18 MG/DL — SIGNIFICANT CHANGE UP (ref 7–23)
CALCIUM SERPL-MCNC: 7.9 MG/DL — LOW (ref 8.4–10.5)
CHLORIDE SERPL-SCNC: 102 MMOL/L — SIGNIFICANT CHANGE UP (ref 98–107)
CO2 SERPL-SCNC: 21 MMOL/L — LOW (ref 22–31)
CREAT SERPL-MCNC: 0.88 MG/DL — SIGNIFICANT CHANGE UP (ref 0.5–1.3)
GLUCOSE BLDC GLUCOMTR-MCNC: 116 MG/DL — HIGH (ref 70–99)
GLUCOSE BLDC GLUCOMTR-MCNC: 137 MG/DL — HIGH (ref 70–99)
GLUCOSE BLDC GLUCOMTR-MCNC: 140 MG/DL — HIGH (ref 70–99)
GLUCOSE SERPL-MCNC: 135 MG/DL — HIGH (ref 70–99)
HCT VFR BLD CALC: 31.4 % — LOW (ref 39–50)
HGB BLD-MCNC: 10.7 G/DL — LOW (ref 13–17)
MAGNESIUM SERPL-MCNC: 1.6 MG/DL — SIGNIFICANT CHANGE UP (ref 1.6–2.6)
MCHC RBC-ENTMCNC: 30.6 PG — SIGNIFICANT CHANGE UP (ref 27–34)
MCHC RBC-ENTMCNC: 34.1 % — SIGNIFICANT CHANGE UP (ref 32–36)
MCV RBC AUTO: 89.7 FL — SIGNIFICANT CHANGE UP (ref 80–100)
NRBC # FLD: 0 — SIGNIFICANT CHANGE UP
PHOSPHATE SERPL-MCNC: 2.5 MG/DL — SIGNIFICANT CHANGE UP (ref 2.5–4.5)
PLATELET # BLD AUTO: 245 K/UL — SIGNIFICANT CHANGE UP (ref 150–400)
PMV BLD: 10.5 FL — SIGNIFICANT CHANGE UP (ref 7–13)
POTASSIUM SERPL-MCNC: 3.8 MMOL/L — SIGNIFICANT CHANGE UP (ref 3.5–5.3)
POTASSIUM SERPL-SCNC: 3.8 MMOL/L — SIGNIFICANT CHANGE UP (ref 3.5–5.3)
RBC # BLD: 3.5 M/UL — LOW (ref 4.2–5.8)
RBC # FLD: 14.1 % — SIGNIFICANT CHANGE UP (ref 10.3–14.5)
SODIUM SERPL-SCNC: 136 MMOL/L — SIGNIFICANT CHANGE UP (ref 135–145)
WBC # BLD: 10.63 K/UL — HIGH (ref 3.8–10.5)
WBC # FLD AUTO: 10.63 K/UL — HIGH (ref 3.8–10.5)

## 2018-09-27 PROCEDURE — 43239 EGD BIOPSY SINGLE/MULTIPLE: CPT | Mod: GC

## 2018-09-27 PROCEDURE — 88312 SPECIAL STAINS GROUP 1: CPT | Mod: 26

## 2018-09-27 PROCEDURE — 88305 TISSUE EXAM BY PATHOLOGIST: CPT | Mod: 26

## 2018-09-27 RX ORDER — BENZOCAINE AND MENTHOL 5; 1 G/100ML; G/100ML
1 LIQUID ORAL
Qty: 0 | Refills: 0 | Status: COMPLETED | OUTPATIENT
Start: 2018-09-27 | End: 2018-09-28

## 2018-09-27 RX ORDER — LANOLIN ALCOHOL/MO/W.PET/CERES
2 CREAM (GRAM) TOPICAL
Qty: 0 | Refills: 0 | DISCHARGE
Start: 2018-09-27

## 2018-09-27 RX ORDER — ASPIRIN/CALCIUM CARB/MAGNESIUM 324 MG
81 TABLET ORAL DAILY
Qty: 0 | Refills: 0 | Status: DISCONTINUED | OUTPATIENT
Start: 2018-09-27 | End: 2018-10-01

## 2018-09-27 RX ADMIN — Medication 6 MILLIGRAM(S): at 21:13

## 2018-09-27 RX ADMIN — BENZOCAINE AND MENTHOL 1 LOZENGE: 5; 1 LIQUID ORAL at 17:58

## 2018-09-27 RX ADMIN — TAMSULOSIN HYDROCHLORIDE 0.4 MILLIGRAM(S): 0.4 CAPSULE ORAL at 21:12

## 2018-09-27 RX ADMIN — Medication 81 MILLIGRAM(S): at 17:57

## 2018-09-27 RX ADMIN — Medication 25 MILLIGRAM(S): at 21:13

## 2018-09-27 RX ADMIN — ATORVASTATIN CALCIUM 80 MILLIGRAM(S): 80 TABLET, FILM COATED ORAL at 21:12

## 2018-09-27 RX ADMIN — SODIUM CHLORIDE 75 MILLILITER(S): 9 INJECTION INTRAMUSCULAR; INTRAVENOUS; SUBCUTANEOUS at 21:11

## 2018-09-27 RX ADMIN — PANTOPRAZOLE SODIUM 40 MILLIGRAM(S): 20 TABLET, DELAYED RELEASE ORAL at 17:57

## 2018-09-27 RX ADMIN — Medication 50 MILLIGRAM(S): at 05:05

## 2018-09-27 RX ADMIN — PANTOPRAZOLE SODIUM 40 MILLIGRAM(S): 20 TABLET, DELAYED RELEASE ORAL at 05:04

## 2018-09-27 RX ADMIN — LISINOPRIL 40 MILLIGRAM(S): 2.5 TABLET ORAL at 05:05

## 2018-09-27 RX ADMIN — PIPERACILLIN AND TAZOBACTAM 25 GRAM(S): 4; .5 INJECTION, POWDER, LYOPHILIZED, FOR SOLUTION INTRAVENOUS at 05:04

## 2018-09-27 RX ADMIN — HALOPERIDOL DECANOATE 1 MILLIGRAM(S): 100 INJECTION INTRAMUSCULAR at 21:12

## 2018-09-27 RX ADMIN — LATANOPROST 1 DROP(S): 0.05 SOLUTION/ DROPS OPHTHALMIC; TOPICAL at 21:12

## 2018-09-27 RX ADMIN — PIPERACILLIN AND TAZOBACTAM 25 GRAM(S): 4; .5 INJECTION, POWDER, LYOPHILIZED, FOR SOLUTION INTRAVENOUS at 15:50

## 2018-09-27 RX ADMIN — SODIUM CHLORIDE 75 MILLILITER(S): 9 INJECTION INTRAMUSCULAR; INTRAVENOUS; SUBCUTANEOUS at 05:03

## 2018-09-27 RX ADMIN — PIPERACILLIN AND TAZOBACTAM 25 GRAM(S): 4; .5 INJECTION, POWDER, LYOPHILIZED, FOR SOLUTION INTRAVENOUS at 21:11

## 2018-09-27 NOTE — PROGRESS NOTE ADULT - SUBJECTIVE AND OBJECTIVE BOX
Infectious Diseases progress note:    Subjective: Pt for EGD today.  No acute o/n events.  No further vomiting episodes reported.  Afebrile.      ROS:  Pt u/a    Allergies    No Known Allergies    Intolerances        ANTIBIOTICS/RELEVANT:  antimicrobials  piperacillin/tazobactam IVPB. 3.375 Gram(s) IV Intermittent every 8 hours    immunologic:  influenza   Vaccine 0.5 milliLiter(s) IntraMuscular once    OTHER:  acetaminophen   Tablet .. 650 milliGRAM(s) Oral every 6 hours PRN  aspirin enteric coated 81 milliGRAM(s) Oral daily  atorvastatin 80 milliGRAM(s) Oral at bedtime  dextrose 40% Gel 15 Gram(s) Oral once PRN  dextrose 50% Injectable 12.5 Gram(s) IV Push once  dextrose 50% Injectable 25 Gram(s) IV Push once  dextrose 50% Injectable 25 Gram(s) IV Push once  glucagon  Injectable 1 milliGRAM(s) IntraMuscular once PRN  haloperidol     Tablet 1 milliGRAM(s) Oral every 6 hours PRN  haloperidol    Injectable 1 milliGRAM(s) IntraMuscular every 6 hours PRN  haloperidol    Injectable 1 milliGRAM(s) IV Push every 6 hours PRN  insulin lispro (HumaLOG) corrective regimen sliding scale   SubCutaneous three times a day before meals  insulin lispro (HumaLOG) corrective regimen sliding scale   SubCutaneous at bedtime  latanoprost 0.005% Ophthalmic Solution 1 Drop(s) Both EYES at bedtime  lisinopril 40 milliGRAM(s) Oral daily  melatonin 6 milliGRAM(s) Oral at bedtime  metoprolol succinate ER 50 milliGRAM(s) Oral daily  pantoprazole  Injectable 40 milliGRAM(s) IV Push two times a day  sodium chloride 0.9%. 1000 milliLiter(s) IV Continuous <Continuous>  tamsulosin 0.4 milliGRAM(s) Oral at bedtime  traZODone 25 milliGRAM(s) Oral at bedtime PRN      Objective:  Vital Signs Last 24 Hrs  T(C): 36.5 (27 Sep 2018 05:01), Max: 37.5 (26 Sep 2018 22:11)  T(F): 97.7 (27 Sep 2018 05:01), Max: 99.5 (26 Sep 2018 22:11)  HR: 84 (27 Sep 2018 05:01) (82 - 84)  BP: 161/60 (27 Sep 2018 05:01) (145/56 - 161/60)  BP(mean): --  RR: 18 (27 Sep 2018 05:01) (18 - 18)  SpO2: 99% (27 Sep 2018 05:01) (96% - 99%)    PHYSICAL EXAM:  Pt u/a        LABS:                        10.7   10.63 )-----------( 245      ( 27 Sep 2018 09:35 )             31.4     09-27    136  |  102  |  18  ----------------------------<  135<H>  3.8   |  21<L>  |  0.88    Ca    7.9<L>      27 Sep 2018 09:35  Phos  2.5     09-27  Mg     1.6     09-27              MICROBIOLOGY:          RADIOLOGY & ADDITIONAL STUDIES:    < from: Xray Chest 1 View-PORTABLE IMMEDIATE (09.26.18 @ 16:54) >  IMPRESSION:  Enteric tube tip likely in proximal stomach andside port   near the GE junction. The tube could be advanced, if felt to be   clinically indicated.    Possible trace right pleural effusion.    < end of copied text >

## 2018-09-27 NOTE — CHART NOTE - NSCHARTNOTEFT_GEN_A_CORE
As per GI Dr. Mariaa marte to resume aspirin for now. Will hold off DAPT at the moment. Will f/u recommendations prior to discharge. Will continue CLD.

## 2018-09-27 NOTE — PROGRESS NOTE ADULT - SUBJECTIVE AND OBJECTIVE BOX
GASTROENTEROLOGY FOLLOW-UP NOTE    Chief Complaint:  Patient is a 78y old  Male who presents with a chief complaint of Fall, shoulder pain (26 Sep 2018 21:01)      Interval Events:   - NGT placed yesterday due to worsening distension and gastric distension seen on AXR    Allergies:  No Known Allergies      Hospital Medications:  acetaminophen   Tablet .. 650 milliGRAM(s) Oral every 6 hours PRN  atorvastatin 80 milliGRAM(s) Oral at bedtime  dextrose 40% Gel 15 Gram(s) Oral once PRN  dextrose 50% Injectable 12.5 Gram(s) IV Push once  dextrose 50% Injectable 25 Gram(s) IV Push once  dextrose 50% Injectable 25 Gram(s) IV Push once  glucagon  Injectable 1 milliGRAM(s) IntraMuscular once PRN  haloperidol     Tablet 1 milliGRAM(s) Oral every 6 hours PRN  haloperidol    Injectable 1 milliGRAM(s) IntraMuscular every 6 hours PRN  haloperidol    Injectable 1 milliGRAM(s) IV Push every 6 hours PRN  influenza   Vaccine 0.5 milliLiter(s) IntraMuscular once  insulin lispro (HumaLOG) corrective regimen sliding scale   SubCutaneous three times a day before meals  insulin lispro (HumaLOG) corrective regimen sliding scale   SubCutaneous at bedtime  latanoprost 0.005% Ophthalmic Solution 1 Drop(s) Both EYES at bedtime  lisinopril 40 milliGRAM(s) Oral daily  melatonin 6 milliGRAM(s) Oral at bedtime  metoprolol succinate ER 50 milliGRAM(s) Oral daily  pantoprazole  Injectable 40 milliGRAM(s) IV Push two times a day  piperacillin/tazobactam IVPB. 3.375 Gram(s) IV Intermittent every 8 hours  sodium chloride 0.9%. 1000 milliLiter(s) IV Continuous <Continuous>  tamsulosin 0.4 milliGRAM(s) Oral at bedtime  traZODone 25 milliGRAM(s) Oral at bedtime PRN      PMHX/PSHX:  BPH (benign prostatic hyperplasia)  CAD (Coronary Artery Disease)  HTN (Hypertension)  DM (Diabetes Mellitus)  Hypercholesterolemia  Coronary Stent      Family history:  No pertinent family history in first degree relatives      ROS:     General:  No wt loss, fevers, chills, night sweats, fatigue,   Eyes:  Good vision, no reported pain  ENT:  No sore throat, pain, runny nose, dysphagia  CV:  No pain, palpitations, hypo/hypertension  Resp:  No dyspnea, cough, tachypnea, wheezing  GI:  see HPI  :  No pain, bleeding, incontinence, nocturia  Muscle:  No pain, weakness  Neuro:  No weakness, tingling, memory problems  Psych:  No fatigue, insomnia, mood problems, depression  Endocrine:  No polyuria, polydipsia, cold/heat intolerance  Heme:  No petechiae, ecchymosis, easy bruisability  Skin:  No rash, tattoos, scars, edema      PHYSICAL EXAM:   Vital Signs:  Vital Signs Last 24 Hrs  T(C): 36.5 (27 Sep 2018 05:01), Max: 37.5 (26 Sep 2018 22:11)  T(F): 97.7 (27 Sep 2018 05:01), Max: 99.5 (26 Sep 2018 22:11)  HR: 84 (27 Sep 2018 05:01) (71 - 84)  BP: 161/60 (27 Sep 2018 05:01) (126/58 - 161/60)  BP(mean): --  RR: 18 (27 Sep 2018 05:01) (18 - 18)  SpO2: 99% (27 Sep 2018 05:01) (96% - 99%)  Daily     Daily     GENERAL:  no acute distress  HEENT:  -icterus   CHEST:  clear bilaterally, no wheezes or rales  HEART:  RRR, S1S2  ABDOMEN:  soft, non-tender, distended, typmanic   EXTEREMITIES:  no  edema  SKIN:  No rash/erythema/ecchymoses/petechiae/wounds/abscess/warm/dry  NEURO:  alert, conversant     LABS:                        9.0    10.76 )-----------( 211      ( 25 Sep 2018 09:05 )             26.4                     Imaging: GASTROENTEROLOGY FOLLOW-UP NOTE    Chief Complaint:  Patient is a 78y old  Male who presents with a chief complaint of Fall, shoulder pain (26 Sep 2018 21:01)      Interval Events:   - NGT placed yesterday due to worsening distension and gastric distension seen on AXR  - Abdominal distension much improved today  - Patient denies nausea or vomiting  - Still without BM; minimal flatus     Allergies:  No Known Allergies      Hospital Medications:  acetaminophen   Tablet .. 650 milliGRAM(s) Oral every 6 hours PRN  atorvastatin 80 milliGRAM(s) Oral at bedtime  dextrose 40% Gel 15 Gram(s) Oral once PRN  dextrose 50% Injectable 12.5 Gram(s) IV Push once  dextrose 50% Injectable 25 Gram(s) IV Push once  dextrose 50% Injectable 25 Gram(s) IV Push once  glucagon  Injectable 1 milliGRAM(s) IntraMuscular once PRN  haloperidol     Tablet 1 milliGRAM(s) Oral every 6 hours PRN  haloperidol    Injectable 1 milliGRAM(s) IntraMuscular every 6 hours PRN  haloperidol    Injectable 1 milliGRAM(s) IV Push every 6 hours PRN  influenza   Vaccine 0.5 milliLiter(s) IntraMuscular once  insulin lispro (HumaLOG) corrective regimen sliding scale   SubCutaneous three times a day before meals  insulin lispro (HumaLOG) corrective regimen sliding scale   SubCutaneous at bedtime  latanoprost 0.005% Ophthalmic Solution 1 Drop(s) Both EYES at bedtime  lisinopril 40 milliGRAM(s) Oral daily  melatonin 6 milliGRAM(s) Oral at bedtime  metoprolol succinate ER 50 milliGRAM(s) Oral daily  pantoprazole  Injectable 40 milliGRAM(s) IV Push two times a day  piperacillin/tazobactam IVPB. 3.375 Gram(s) IV Intermittent every 8 hours  sodium chloride 0.9%. 1000 milliLiter(s) IV Continuous <Continuous>  tamsulosin 0.4 milliGRAM(s) Oral at bedtime  traZODone 25 milliGRAM(s) Oral at bedtime PRN      PMHX/PSHX:  BPH (benign prostatic hyperplasia)  CAD (Coronary Artery Disease)  HTN (Hypertension)  DM (Diabetes Mellitus)  Hypercholesterolemia  Coronary Stent      Family history:  No pertinent family history in first degree relatives      ROS:     General:  No wt loss, fevers, chills, night sweats, fatigue,   Eyes:  Good vision, no reported pain  ENT:  No sore throat, pain, runny nose, dysphagia  CV:  No pain, palpitations, hypo/hypertension  Resp:  No dyspnea, cough, tachypnea, wheezing  GI:  see HPI  :  No pain, bleeding, incontinence, nocturia  Muscle:  No pain, weakness  Neuro:  No weakness, tingling, memory problems  Psych:  No fatigue, insomnia, mood problems, depression  Endocrine:  No polyuria, polydipsia, cold/heat intolerance  Heme:  No petechiae, ecchymosis, easy bruisability  Skin:  No rash, tattoos, scars, edema      PHYSICAL EXAM:   Vital Signs:  Vital Signs Last 24 Hrs  T(C): 36.5 (27 Sep 2018 05:01), Max: 37.5 (26 Sep 2018 22:11)  T(F): 97.7 (27 Sep 2018 05:01), Max: 99.5 (26 Sep 2018 22:11)  HR: 84 (27 Sep 2018 05:01) (71 - 84)  BP: 161/60 (27 Sep 2018 05:01) (126/58 - 161/60)  BP(mean): --  RR: 18 (27 Sep 2018 05:01) (18 - 18)  SpO2: 99% (27 Sep 2018 05:01) (96% - 99%)  Daily     Daily     GENERAL:  no acute distress  HEENT:  -icterus   CHEST:  clear bilaterally, no wheezes or rales  HEART:  RRR, S1S2  ABDOMEN:  soft, non-tender, distended (improved), typmanic   EXTEREMITIES:  no  edema  SKIN:  No rash/erythema/ecchymoses/petechiae/wounds/abscess/warm/dry  NEURO:  alert, conversant     LABS:                        9.0    10.76 )-----------( 211      ( 25 Sep 2018 09:05 )             26.4                     Imaging:

## 2018-09-27 NOTE — PROGRESS NOTE ADULT - ATTENDING COMMENTS
-COFEE GROUND EMESIS-R/O GI BLEEDING -PO PROTONIX.GI F/U NOTED.S/P EGD-5 nonbleeding gastric ulcers.s/p biopsy.f/u path result  -x-ray abd.noted-possible gastric outlet obstruction?.CT ABD--NO OBSTRUCTION   -possible asp.pneumonia-empiric antibiotic  -vomiting/constipation--improved.clear liquid diet adv.as tolerated.  -PT  -Psych f/u  -d/w Brother.Answered all questions  -d/w staff

## 2018-09-27 NOTE — PROGRESS NOTE ADULT - ASSESSMENT
Impression:  1) Coffee-ground emesis/acute blood loss anemia without melena/hematochezia - differential includes peptic ulcer disease, erosive esophagitis, angioectasia, gastric or esophageal malignancy.   2) Abdominal distension with AXR showing gastric distension - concern for ileus; also must consider gastric outlet obstruction, SBO though no evidence on previous CT abdomen/pelvis   3) Mechanical fall complicated by humerus fracture s/p ortho repair   4) CAD on DAPT  5) Dementia  6) Diabetes type 2     Recommendations:  - continue NGT to low intermittent suction  - NPO  - given no recurrence of coffee-ground emesis and Hgb stable, no plan for EGD at this time  - consider EGD later in this admission or as outpatient (or defer all together unless further bleeding  - continue PPI PO BID  - CBC daily, transfuse to Hgb > 7  - dual antiplatelets per primary team; if continued need for DAPT on discharge should be on lifelong PPI Impression:  1) Coffee-ground emesis/acute blood loss anemia without melena/hematochezia - differential includes peptic ulcer disease, erosive esophagitis, angioectasia, gastric or esophageal malignancy.   2) Abdominal distension with AXR showing gastric distension - concern for gastric outlet obstruction (secondary to PUD vs malignancy)  3) Mechanical fall complicated by humerus fracture s/p ortho repair   4) CAD on DAPT  5) Dementia  6) Diabetes type 2     Recommendations:  - continue NGT to low intermittent suction  - NPO  - EGD today for evaluation of GOO  - continue PPI PO BID  - CBC daily, transfuse to Hgb > 7  - dual antiplatelets per primary team; if continued need for DAPT on discharge should be on lifelong PPI

## 2018-09-27 NOTE — PROGRESS NOTE ADULT - SUBJECTIVE AND OBJECTIVE BOX
FESTUS BLAND  78y  Male      Patient is a 78y old  Male who presents with a chief complaint of Fall, shoulder pain (27 Sep 2018 14:45)  Patient is comfortable,nad,no abd.pain,no cough,no fever.s/p EGD        INTERVAL HPI/OVERNIGHT EVENTS:  T(C): 36.7 (09-27-18 @ 21:09), Max: 36.7 (09-27-18 @ 21:09)  HR: 85 (09-27-18 @ 21:09) (84 - 85)  BP: 160/53 (09-27-18 @ 21:09) (160/53 - 161/60)  RR: 18 (09-27-18 @ 21:09) (18 - 18)  SpO2: 99% (09-27-18 @ 21:09) (99% - 99%)  Wt(kg): --  I&O's Summary    T(C): 36.7 (09-27-18 @ 21:09), Max: 36.7 (09-27-18 @ 21:09)  HR: 85 (09-27-18 @ 21:09) (84 - 85)  BP: 160/53 (09-27-18 @ 21:09) (160/53 - 161/60)  RR: 18 (09-27-18 @ 21:09) (18 - 18)  SpO2: 99% (09-27-18 @ 21:09) (99% - 99%)  Wt(kg): --Vital Signs Last 24 Hrs  T(C): 36.7 (27 Sep 2018 21:09), Max: 36.7 (27 Sep 2018 21:09)  T(F): 98.1 (27 Sep 2018 21:09), Max: 98.1 (27 Sep 2018 21:09)  HR: 85 (27 Sep 2018 21:09) (84 - 85)  BP: 160/53 (27 Sep 2018 21:09) (160/53 - 161/60)  BP(mean): --  RR: 18 (27 Sep 2018 21:09) (18 - 18)  SpO2: 99% (27 Sep 2018 21:09) (99% - 99%)    LABS:                        10.7   10.63 )-----------( 245      ( 27 Sep 2018 09:35 )             31.4     09-27    136  |  102  |  18  ----------------------------<  135<H>  3.8   |  21<L>  |  0.88    Ca    7.9<L>      27 Sep 2018 09:35  Phos  2.5     09-27  Mg     1.6     09-27          CAPILLARY BLOOD GLUCOSE      POCT Blood Glucose.: 116 mg/dL (27 Sep 2018 22:18)  POCT Blood Glucose.: 140 mg/dL (27 Sep 2018 17:14)  POCT Blood Glucose.: 137 mg/dL (27 Sep 2018 09:31)            PAST MEDICAL & SURGICAL HISTORY:  BPH (benign prostatic hyperplasia)  CAD (Coronary Artery Disease): s/p cardiac stent ( &gt; 20 years ago)  HTN (Hypertension)  DM (Diabetes Mellitus)  Hypercholesterolemia  Coronary Stent: x 2 ( 20 years )      MEDICATIONS  (STANDING):  aspirin enteric coated 81 milliGRAM(s) Oral daily  atorvastatin 80 milliGRAM(s) Oral at bedtime  benzocaine 15 mG/menthol 3.6 mG Lozenge 1 Lozenge Oral two times a day  dextrose 50% Injectable 12.5 Gram(s) IV Push once  dextrose 50% Injectable 25 Gram(s) IV Push once  dextrose 50% Injectable 25 Gram(s) IV Push once  influenza   Vaccine 0.5 milliLiter(s) IntraMuscular once  insulin lispro (HumaLOG) corrective regimen sliding scale   SubCutaneous three times a day before meals  insulin lispro (HumaLOG) corrective regimen sliding scale   SubCutaneous at bedtime  latanoprost 0.005% Ophthalmic Solution 1 Drop(s) Both EYES at bedtime  lisinopril 40 milliGRAM(s) Oral daily  melatonin 6 milliGRAM(s) Oral at bedtime  metoprolol succinate ER 50 milliGRAM(s) Oral daily  pantoprazole  Injectable 40 milliGRAM(s) IV Push two times a day  piperacillin/tazobactam IVPB. 3.375 Gram(s) IV Intermittent every 8 hours  sodium chloride 0.9%. 1000 milliLiter(s) (75 mL/Hr) IV Continuous <Continuous>  tamsulosin 0.4 milliGRAM(s) Oral at bedtime    MEDICATIONS  (PRN):  acetaminophen   Tablet .. 650 milliGRAM(s) Oral every 6 hours PRN Mild Pain (1 - 3), Moderate Pain (4 - 6)  dextrose 40% Gel 15 Gram(s) Oral once PRN Blood Glucose LESS THAN 70 milliGRAM(s)/deciliter  glucagon  Injectable 1 milliGRAM(s) IntraMuscular once PRN Glucose LESS THAN 70 milligrams/deciliter  haloperidol     Tablet 1 milliGRAM(s) Oral every 6 hours PRN agitation  haloperidol    Injectable 1 milliGRAM(s) IntraMuscular every 6 hours PRN agitation  haloperidol    Injectable 1 milliGRAM(s) IV Push every 6 hours PRN agitation  traZODone 25 milliGRAM(s) Oral at bedtime PRN insomnia        RADIOLOGY & ADDITIONAL TESTS:    Imaging Personally Reviewed:  [ ] YES  [ ] NO    Consultant(s) Notes Reviewed:  [X ] YES  [ ] NO    PHYSICAL EXAM:  GENERAL: NAD, well-groomed, well-developed  HEAD:  Atraumatic, Normocephalic  EYES: EOMI, PERRLA, conjunctiva and sclera clear  ENMT: No tonsillar erythema, exudates, or enlargement; Moist mucous membranes, Good dentition, No lesions  NECK: Supple, No JVD, Normal thyroid  NERVOUS SYSTEM:  Alert & Oriented X3, Good concentration; Motor Strength 5/5 B/L upper and lower extremities; DTRs 2+ intact and symmetric  CHEST/LUNG: Clear to percussion bilaterally; No rales, rhonchi, wheezing, or rubs  HEART: Regular rate and rhythm; No murmurs, rubs, or gallops  ABDOMEN: Soft, Nontender, Nondistended; Bowel sounds present  EXTREMITIES:  2+ Peripheral Pulses, No clubbing, cyanosis, or edema  LYMPH: No lymphadenopathy noted  SKIN: No rashes or lesions    Care Discussed with Consultants/Other Providers [ ] YES  [ ] NO      Code Status: [] Full Code [] DNR [] DNI [] Goals of Care:   Disposition: [] ICU [] Stroke Unit [] RCU []PCU []Floor [] Discharge Home         ALICIA Vargas

## 2018-09-27 NOTE — PROGRESS NOTE ADULT - ASSESSMENT
77 y/o M with DM, HTN, HLD, CAD s/p stents p/w shoulder pain after fall.  Pt reports he was walking to the bank, and lost his balance with the wind causing him to fall backwards.  When questioned, he also reports feeling lightheaded, but did not lose consciousness or strike his head.  Pt is unclear regarding further details of his presentation, and at times thinks he has been in the hospital for several days.  He reports some pain in his R arm and shoulder, but otherwise states that he feels well.  Denies any recent illness.  Pt has walked with a cane for some time, and reports he recently acquired a walker, however, he was using his cane when he fell today.      In the ED: pt had X-ray showing fracture of proximal R humerus.  He was evaluated by orthopedics, and had brace applied to R arm. (19 Sep 2018 20:37)    HOSP COURSE:    Pt admitted with  closed displaced fracture of proximal end of right humerus, seen by Ortho, s/p closed reduction and brace placement. Pt noted to present with confusion, and as per chart notes, brother states pt with "mental disability"-- at times becomes forgetful--- had it all his life, per brother recently w/ beginning stages of dementia.  Pt is being followed by [facility].     On 9/21, rapid response called for coffee ground emesis.  Pt is planned for EGD to evaluate for source of bleed.      Pt has been afebrile and hemodynamically stable.  Noted to have elevated WBC in low teens.  ID consult called to evaluate for underlying infectious etiology.    Leukocytosis could be reactive to recent fall/humeral fracture and GIB.  UA is negative, and thus far chest xray with small left pleural effusion.   Antibiotics held initially.      Pt had NGT placement on 9/26 for abdominal distention seen on recent AXR and concern for ileus.  Pt with large amount of vomitus at time of placement, and continued cough - concern for aspiration event.        Problem/plan - 1:    ·	Possible aspiration pneumonitis    - Concern for possible aspiration event at time of NG tube placement (9/26).  Pt had been coughing excessively at that time.   Cont zosyn on empiric basis.  CXR shows appropriate placement of tube with clear lungs, although too early to see any radiographic changes if pt aspirated.    - Cont to monitor respiratory status, temp curve, and WBC.      * Pt for EGD today    Will follow,      Payal Meier  [phone]

## 2018-09-28 LAB
BUN SERPL-MCNC: 13 MG/DL — SIGNIFICANT CHANGE UP (ref 7–23)
CALCIUM SERPL-MCNC: 8.1 MG/DL — LOW (ref 8.4–10.5)
CHLORIDE SERPL-SCNC: 102 MMOL/L — SIGNIFICANT CHANGE UP (ref 98–107)
CO2 SERPL-SCNC: 20 MMOL/L — LOW (ref 22–31)
CREAT SERPL-MCNC: 0.86 MG/DL — SIGNIFICANT CHANGE UP (ref 0.5–1.3)
GLUCOSE BLDC GLUCOMTR-MCNC: 147 MG/DL — HIGH (ref 70–99)
GLUCOSE BLDC GLUCOMTR-MCNC: 150 MG/DL — HIGH (ref 70–99)
GLUCOSE BLDC GLUCOMTR-MCNC: 158 MG/DL — HIGH (ref 70–99)
GLUCOSE BLDC GLUCOMTR-MCNC: 170 MG/DL — HIGH (ref 70–99)
GLUCOSE SERPL-MCNC: 141 MG/DL — HIGH (ref 70–99)
MAGNESIUM SERPL-MCNC: 1.6 MG/DL — SIGNIFICANT CHANGE UP (ref 1.6–2.6)
PHOSPHATE SERPL-MCNC: 2.4 MG/DL — LOW (ref 2.5–4.5)
POTASSIUM SERPL-MCNC: 3.6 MMOL/L — SIGNIFICANT CHANGE UP (ref 3.5–5.3)
POTASSIUM SERPL-SCNC: 3.6 MMOL/L — SIGNIFICANT CHANGE UP (ref 3.5–5.3)
SODIUM SERPL-SCNC: 139 MMOL/L — SIGNIFICANT CHANGE UP (ref 135–145)
SURGICAL PATHOLOGY STUDY: SIGNIFICANT CHANGE UP

## 2018-09-28 PROCEDURE — 99232 SBSQ HOSP IP/OBS MODERATE 35: CPT | Mod: GC

## 2018-09-28 PROCEDURE — 99232 SBSQ HOSP IP/OBS MODERATE 35: CPT

## 2018-09-28 RX ORDER — PANTOPRAZOLE SODIUM 20 MG/1
40 TABLET, DELAYED RELEASE ORAL
Qty: 0 | Refills: 0 | Status: DISCONTINUED | OUTPATIENT
Start: 2018-09-28 | End: 2018-10-01

## 2018-09-28 RX ORDER — OXYCODONE HYDROCHLORIDE 5 MG/1
5 TABLET ORAL EVERY 6 HOURS
Qty: 0 | Refills: 0 | Status: DISCONTINUED | OUTPATIENT
Start: 2018-09-28 | End: 2018-10-01

## 2018-09-28 RX ORDER — OXYCODONE HYDROCHLORIDE 5 MG/1
5 TABLET ORAL EVERY 4 HOURS
Qty: 0 | Refills: 0 | Status: DISCONTINUED | OUTPATIENT
Start: 2018-09-28 | End: 2018-09-28

## 2018-09-28 RX ADMIN — SODIUM CHLORIDE 75 MILLILITER(S): 9 INJECTION INTRAMUSCULAR; INTRAVENOUS; SUBCUTANEOUS at 21:52

## 2018-09-28 RX ADMIN — Medication 81 MILLIGRAM(S): at 12:37

## 2018-09-28 RX ADMIN — LISINOPRIL 40 MILLIGRAM(S): 2.5 TABLET ORAL at 05:48

## 2018-09-28 RX ADMIN — BENZOCAINE AND MENTHOL 1 LOZENGE: 5; 1 LIQUID ORAL at 05:49

## 2018-09-28 RX ADMIN — ATORVASTATIN CALCIUM 80 MILLIGRAM(S): 80 TABLET, FILM COATED ORAL at 21:51

## 2018-09-28 RX ADMIN — PIPERACILLIN AND TAZOBACTAM 25 GRAM(S): 4; .5 INJECTION, POWDER, LYOPHILIZED, FOR SOLUTION INTRAVENOUS at 21:52

## 2018-09-28 RX ADMIN — PANTOPRAZOLE SODIUM 40 MILLIGRAM(S): 20 TABLET, DELAYED RELEASE ORAL at 18:01

## 2018-09-28 RX ADMIN — PIPERACILLIN AND TAZOBACTAM 25 GRAM(S): 4; .5 INJECTION, POWDER, LYOPHILIZED, FOR SOLUTION INTRAVENOUS at 05:48

## 2018-09-28 RX ADMIN — Medication 50 MILLIGRAM(S): at 05:48

## 2018-09-28 RX ADMIN — PANTOPRAZOLE SODIUM 40 MILLIGRAM(S): 20 TABLET, DELAYED RELEASE ORAL at 05:48

## 2018-09-28 RX ADMIN — Medication 1: at 09:05

## 2018-09-28 RX ADMIN — Medication 6 MILLIGRAM(S): at 21:51

## 2018-09-28 RX ADMIN — Medication 25 MILLIGRAM(S): at 21:52

## 2018-09-28 RX ADMIN — SODIUM CHLORIDE 75 MILLILITER(S): 9 INJECTION INTRAMUSCULAR; INTRAVENOUS; SUBCUTANEOUS at 05:49

## 2018-09-28 RX ADMIN — LATANOPROST 1 DROP(S): 0.05 SOLUTION/ DROPS OPHTHALMIC; TOPICAL at 21:54

## 2018-09-28 RX ADMIN — PIPERACILLIN AND TAZOBACTAM 25 GRAM(S): 4; .5 INJECTION, POWDER, LYOPHILIZED, FOR SOLUTION INTRAVENOUS at 16:30

## 2018-09-28 RX ADMIN — TAMSULOSIN HYDROCHLORIDE 0.4 MILLIGRAM(S): 0.4 CAPSULE ORAL at 21:51

## 2018-09-28 NOTE — PROGRESS NOTE BEHAVIORAL HEALTH - NSBHCONSULTRECOMMENDOTHER_PSY_A_CORE FT
minimize the use of benzos, opiods, anticholinergics, and other deliriogenic agents whenever possible. Maintain sleep wake cycle. Allow sunlight in the room during the day.  Monitor QTC (hold antipsychotic for QTC >500)
minimize the use of benzos, opiods, anticholinergics, and other deliriogenic agents whenever possible. Maintain sleep wake cycle.   Monitor QTC (hold antipsychotic for QTC <50)

## 2018-09-28 NOTE — PROGRESS NOTE ADULT - ASSESSMENT
77 y/o M with DM, HTN, HLD, CAD s/p stents p/w shoulder pain after fall.  Pt reports he was walking to the bank, and lost his balance with the wind causing him to fall backwards.  When questioned, he also reports feeling lightheaded, but did not lose consciousness or strike his head.  Pt is unclear regarding further details of his presentation, and at times thinks he has been in the hospital for several days.  He reports some pain in his R arm and shoulder, but otherwise states that he feels well.  Denies any recent illness.  Pt has walked with a cane for some time, and reports he recently acquired a walker, however, he was using his cane when he fell today.      In the ED: pt had X-ray showing fracture of proximal R humerus.  He was evaluated by orthopedics, and had brace applied to R arm. (19 Sep 2018 20:37)    HOSP COURSE:    Pt admitted with  closed displaced fracture of proximal end of right humerus, seen by Ortho, s/p closed reduction and brace placement. Pt noted to present with confusion, and as per chart notes, brother states pt with "mental disability"-- at times becomes forgetful--- had it all his life, per brother recently w/ beginning stages of dementia.  Pt is being followed by [facility].     On 9/21, rapid response called for coffee ground emesis.  Pt is planned for EGD to evaluate for source of bleed.      Pt has been afebrile and hemodynamically stable.  Noted to have elevated WBC in low teens.  ID consult called to evaluate for underlying infectious etiology.    Leukocytosis could be reactive to recent fall/humeral fracture and GIB.  UA is negative, and thus far chest xray with small left pleural effusion.   Antibiotics held initially.      Pt had NGT placement on 9/26 for abdominal distention seen on recent AXR and concern for ileus.  Pt with large amount of vomitus at time of placement, and continued cough - concern for aspiration event.        Problem/plan - 1:    ·	Possible aspiration pneumonitis    - Concern for possible aspiration event at time of NG tube placement (9/26).  Pt had been coughing excessively at that time.   Cont zosyn on empiric basis.  CXR shows appropriate placement of tube with clear lungs, although too early to see any radiographic changes if pt aspirated.    - Cont to monitor respiratory status, temp curve, and WBC.      - pt s/p EGD on 9/27 showing numerous nonbleeding gastric ulcers.  Pt tolerating clear liquids    - Complete short course of zosyn x 5 days through 10/1    Will follow,      Payal Meier  [phone]

## 2018-09-28 NOTE — DIETITIAN INITIAL EVALUATION ADULT. - NS AS NUTRI INTERV ED CONTENT
Pt requested brief diabetes education. Discussed carbohydrate sources, portion sizes and mixed meals.

## 2018-09-28 NOTE — PROGRESS NOTE ADULT - SUBJECTIVE AND OBJECTIVE BOX
GASTROENTEROLOGY FOLLOW-UP NOTE    Chief Complaint:  Patient is a 78y old  Male who presents with a chief complaint of Fall, shoulder pain (27 Sep 2018 23:16)      Interval Events:   - EGD yesterday showed numerous gastric ulcers; none actively bleeding or with stigmata of recent bleeding  - Abdominal distension improving, no nausea/vomiting   - Tolerating clear liquid diet     Allergies:  No Known Allergies      Hospital Medications:  acetaminophen   Tablet .. 650 milliGRAM(s) Oral every 6 hours PRN  aspirin enteric coated 81 milliGRAM(s) Oral daily  atorvastatin 80 milliGRAM(s) Oral at bedtime  dextrose 40% Gel 15 Gram(s) Oral once PRN  dextrose 50% Injectable 12.5 Gram(s) IV Push once  dextrose 50% Injectable 25 Gram(s) IV Push once  dextrose 50% Injectable 25 Gram(s) IV Push once  glucagon  Injectable 1 milliGRAM(s) IntraMuscular once PRN  haloperidol     Tablet 1 milliGRAM(s) Oral every 6 hours PRN  haloperidol    Injectable 1 milliGRAM(s) IntraMuscular every 6 hours PRN  haloperidol    Injectable 1 milliGRAM(s) IV Push every 6 hours PRN  influenza   Vaccine 0.5 milliLiter(s) IntraMuscular once  insulin lispro (HumaLOG) corrective regimen sliding scale   SubCutaneous three times a day before meals  insulin lispro (HumaLOG) corrective regimen sliding scale   SubCutaneous at bedtime  latanoprost 0.005% Ophthalmic Solution 1 Drop(s) Both EYES at bedtime  lisinopril 40 milliGRAM(s) Oral daily  melatonin 6 milliGRAM(s) Oral at bedtime  metoprolol succinate ER 50 milliGRAM(s) Oral daily  pantoprazole  Injectable 40 milliGRAM(s) IV Push two times a day  piperacillin/tazobactam IVPB. 3.375 Gram(s) IV Intermittent every 8 hours  sodium chloride 0.9%. 1000 milliLiter(s) IV Continuous <Continuous>  tamsulosin 0.4 milliGRAM(s) Oral at bedtime  traZODone 25 milliGRAM(s) Oral at bedtime PRN      PMHX/PSHX:  BPH (benign prostatic hyperplasia)  CAD (Coronary Artery Disease)  HTN (Hypertension)  DM (Diabetes Mellitus)  Hypercholesterolemia  Coronary Stent      Family history:  No pertinent family history in first degree relatives      ROS:     General:  No wt loss, fevers, chills, night sweats, fatigue,   Eyes:  Good vision, no reported pain  ENT:  No sore throat, pain, runny nose, dysphagia  CV:  No pain, palpitations, hypo/hypertension  Resp:  No dyspnea, cough, tachypnea, wheezing  GI:  see HPI  :  No pain, bleeding, incontinence, nocturia  Muscle:  No pain, weakness  Neuro:  No weakness, tingling, memory problems  Psych:  No fatigue, insomnia, mood problems, depression  Endocrine:  No polyuria, polydipsia, cold/heat intolerance  Heme:  No petechiae, ecchymosis, easy bruisability  Skin:  No rash, tattoos, scars, edema      PHYSICAL EXAM:   Vital Signs:  Vital Signs Last 24 Hrs  T(C): 36.9 (28 Sep 2018 05:47), Max: 36.9 (28 Sep 2018 05:47)  T(F): 98.4 (28 Sep 2018 05:47), Max: 98.4 (28 Sep 2018 05:47)  HR: 80 (28 Sep 2018 05:47) (80 - 85)  BP: 180/76 (28 Sep 2018 05:47) (160/53 - 180/76)  BP(mean): --  RR: 17 (28 Sep 2018 05:47) (17 - 18)  SpO2: 100% (28 Sep 2018 05:47) (99% - 100%)  Daily     Daily     GENERAL:  no acute distress  HEENT:  -icterus   CHEST:  clear bilaterally, no wheezes or rales  HEART:  RRR, S1S2  ABDOMEN:  soft, non-tender, minimally distended   EXTEREMITIES:  no  edema  SKIN:  No rash/erythema/ecchymoses/petechiae/wounds/abscess/warm/dry  NEURO:  alert, conversant     LABS:                        10.7   10.63 )-----------( 245      ( 27 Sep 2018 09:35 )             31.4     09-27    136  |  102  |  18  ----------------------------<  135<H>  3.8   |  21<L>  |  0.88    Ca    7.9<L>      27 Sep 2018 09:35  Phos  2.5     09-27  Mg     1.6     09-27                Imaging: GASTROENTEROLOGY FOLLOW-UP NOTE    Chief Complaint:  Patient is a 78y old  Male who presents with a chief complaint of Fall, shoulder pain (27 Sep 2018 23:16)      Interval Events:   - EGD yesterday showed numerous gastric ulcers; none actively bleeding or with stigmata of recent bleeding  - Abdominal distension improving, no nausea/vomiting, passing "some" gas  - Tolerating clear liquid diet     Allergies:  No Known Allergies      Hospital Medications:  acetaminophen   Tablet .. 650 milliGRAM(s) Oral every 6 hours PRN  aspirin enteric coated 81 milliGRAM(s) Oral daily  atorvastatin 80 milliGRAM(s) Oral at bedtime  dextrose 40% Gel 15 Gram(s) Oral once PRN  dextrose 50% Injectable 12.5 Gram(s) IV Push once  dextrose 50% Injectable 25 Gram(s) IV Push once  dextrose 50% Injectable 25 Gram(s) IV Push once  glucagon  Injectable 1 milliGRAM(s) IntraMuscular once PRN  haloperidol     Tablet 1 milliGRAM(s) Oral every 6 hours PRN  haloperidol    Injectable 1 milliGRAM(s) IntraMuscular every 6 hours PRN  haloperidol    Injectable 1 milliGRAM(s) IV Push every 6 hours PRN  influenza   Vaccine 0.5 milliLiter(s) IntraMuscular once  insulin lispro (HumaLOG) corrective regimen sliding scale   SubCutaneous three times a day before meals  insulin lispro (HumaLOG) corrective regimen sliding scale   SubCutaneous at bedtime  latanoprost 0.005% Ophthalmic Solution 1 Drop(s) Both EYES at bedtime  lisinopril 40 milliGRAM(s) Oral daily  melatonin 6 milliGRAM(s) Oral at bedtime  metoprolol succinate ER 50 milliGRAM(s) Oral daily  pantoprazole  Injectable 40 milliGRAM(s) IV Push two times a day  piperacillin/tazobactam IVPB. 3.375 Gram(s) IV Intermittent every 8 hours  sodium chloride 0.9%. 1000 milliLiter(s) IV Continuous <Continuous>  tamsulosin 0.4 milliGRAM(s) Oral at bedtime  traZODone 25 milliGRAM(s) Oral at bedtime PRN      PMHX/PSHX:  BPH (benign prostatic hyperplasia)  CAD (Coronary Artery Disease)  HTN (Hypertension)  DM (Diabetes Mellitus)  Hypercholesterolemia  Coronary Stent      Family history:  No pertinent family history in first degree relatives      ROS:     General:  No wt loss, fevers, chills, night sweats, fatigue,   Eyes:  Good vision, no reported pain  ENT:  No sore throat, pain, runny nose, dysphagia  CV:  No pain, palpitations, hypo/hypertension  Resp:  No dyspnea, cough, tachypnea, wheezing  GI:  see HPI  :  No pain, bleeding, incontinence, nocturia  Muscle:  No pain, weakness  Neuro:  No weakness, tingling, memory problems  Psych:  No fatigue, insomnia, mood problems, depression  Endocrine:  No polyuria, polydipsia, cold/heat intolerance  Heme:  No petechiae, ecchymosis, easy bruisability  Skin:  No rash, tattoos, scars, edema      PHYSICAL EXAM:   Vital Signs:  Vital Signs Last 24 Hrs  T(C): 36.9 (28 Sep 2018 05:47), Max: 36.9 (28 Sep 2018 05:47)  T(F): 98.4 (28 Sep 2018 05:47), Max: 98.4 (28 Sep 2018 05:47)  HR: 80 (28 Sep 2018 05:47) (80 - 85)  BP: 180/76 (28 Sep 2018 05:47) (160/53 - 180/76)  BP(mean): --  RR: 17 (28 Sep 2018 05:47) (17 - 18)  SpO2: 100% (28 Sep 2018 05:47) (99% - 100%)  Daily     Daily     GENERAL:  no acute distress  HEENT:  -icterus   CHEST:  clear bilaterally, no wheezes or rales  HEART:  RRR, S1S2  ABDOMEN:  soft, non-tender, minimally distended   EXTEREMITIES:  no  edema  SKIN:  No rash/erythema/ecchymoses/petechiae/wounds/abscess/warm/dry  NEURO:  alert, conversant     LABS:                        10.7   10.63 )-----------( 245      ( 27 Sep 2018 09:35 )             31.4     09-27    136  |  102  |  18  ----------------------------<  135<H>  3.8   |  21<L>  |  0.88    Ca    7.9<L>      27 Sep 2018 09:35  Phos  2.5     09-27  Mg     1.6     09-27                Imaging:

## 2018-09-28 NOTE — PROGRESS NOTE BEHAVIORAL HEALTH - NSBHCHARTREVIEWINVESTIGATE_PSY_A_CORE FT
< from: 12 Lead ECG (09.19.18 @ 12:23) >    entricular Rate 73 BPM    Atrial Rate 73 BPM    P-R Interval 160 ms    QRS Duration 84 ms    Q-T Interval 382 ms    QTC Calculation(Bezet) 420 ms    P Axis 6 degrees    R Axis -6 degrees    T Axis 75 degrees    Diagnosis Line *** Poor data quality, interpretation may be adversely affected  Normal sinus rhythm  Normal ECG    < end of copied text >

## 2018-09-28 NOTE — PROGRESS NOTE ADULT - ASSESSMENT
Impression:  1) Coffee-ground emesis/acute blood loss anemia without melena/hematochezia - now s/p EGD on 9/27 showing numerous non-bleeding gastric ulcers (secondary to NSAIDs vs H plyori).    2) Abdominal distension with AXR showing gastric distension - concern for gastric outlet obstruction however not seen on EGD; also must consider ileus; now improving.   3) Mechanical fall complicated by humerus fracture s/p ortho repair   4) CAD on DAPT  5) Dementia  6) Diabetes type 2     Recommendations:  - continue PPI PO BID  - CBC daily, transfuse to Hgb > 7  - advance to full liquid diet; if tolerating without significant distension/nausea/vomiting, can advance to regular diet over the weekend  - if patient has recurrence of significant distension, would need re-insertion of NGT  - dual antiplatelets per primary team; if continued need for DAPT on discharge should be on lifelong PPI

## 2018-09-28 NOTE — PROGRESS NOTE BEHAVIORAL HEALTH - RISK ASSESSMENT
Risk factors:  Patient resides alone, history of multiple falls    Protective factors:  No past psychiatric history, no suicidal attempt, future oriented, family support    Patient does not meet criteria for inpatient hospitalization nor is deemed an acute risk to self.
Risk factors:  Patient resides alone, history of multiple falls    Protective factors:  No past psychiatric history, no suicidal attempt, future oriented, family support    Patient does not meet criteria for inpatient hospitalization nor is deemed an acute risk to self.

## 2018-09-28 NOTE — DIETITIAN INITIAL EVALUATION ADULT. - FACTORS AFF FOOD INTAKE
other (specify)/Pt NPO/clears x7 days d/t coffee ground emesis s/p NGT for suction 2/2 abdominal distension s/p EGD significant for gastric ulcers.

## 2018-09-28 NOTE — PROGRESS NOTE ADULT - NSHPATTENDINGPLANDISCUSS_GEN_ALL_CORE
Medicine NP
Dr. Leroy and patient
GI Fellow Dr. Leroy and patient
GI Fellow Dr. Leroy

## 2018-09-28 NOTE — PROGRESS NOTE ADULT - SUBJECTIVE AND OBJECTIVE BOX
Infectious Diseases progress note:    Subjective: NAd, s/p EGD yesterday with nonbleeding gastric ulcers.  Cough resolving.  Tolerating clear liquids today.  Afebrile.      ROS:  CONSTITUTIONAL:  No fever, chills, rigors  CARDIOVASCULAR:  No chest pain or palpitations  RESPIRATORY:   No SOB, cough, dyspnea on exertion.  No wheezing  GASTROINTESTINAL:  No abd pain, N/V, diarrhea/constipation  EXTREMITIES:  No swelling or joint pain  GENITOURINARY:  No burning on urination, increased frequency or urgency.  No flank pain  NEUROLOGIC:  No HA, visual disturbances  SKIN: No rashes    Allergies    No Known Allergies    Intolerances        ANTIBIOTICS/RELEVANT:  antimicrobials  piperacillin/tazobactam IVPB. 3.375 Gram(s) IV Intermittent every 8 hours    immunologic:  influenza   Vaccine 0.5 milliLiter(s) IntraMuscular once    OTHER:  acetaminophen   Tablet .. 650 milliGRAM(s) Oral every 6 hours PRN  aspirin enteric coated 81 milliGRAM(s) Oral daily  atorvastatin 80 milliGRAM(s) Oral at bedtime  dextrose 40% Gel 15 Gram(s) Oral once PRN  dextrose 50% Injectable 12.5 Gram(s) IV Push once  dextrose 50% Injectable 25 Gram(s) IV Push once  dextrose 50% Injectable 25 Gram(s) IV Push once  glucagon  Injectable 1 milliGRAM(s) IntraMuscular once PRN  haloperidol     Tablet 1 milliGRAM(s) Oral every 6 hours PRN  haloperidol    Injectable 1 milliGRAM(s) IntraMuscular every 6 hours PRN  haloperidol    Injectable 1 milliGRAM(s) IV Push every 6 hours PRN  insulin lispro (HumaLOG) corrective regimen sliding scale   SubCutaneous three times a day before meals  insulin lispro (HumaLOG) corrective regimen sliding scale   SubCutaneous at bedtime  latanoprost 0.005% Ophthalmic Solution 1 Drop(s) Both EYES at bedtime  lisinopril 40 milliGRAM(s) Oral daily  melatonin 6 milliGRAM(s) Oral at bedtime  metoprolol succinate ER 50 milliGRAM(s) Oral daily  oxyCODONE    IR 5 milliGRAM(s) Oral every 6 hours PRN  pantoprazole    Tablet 40 milliGRAM(s) Oral two times a day  sodium chloride 0.9%. 1000 milliLiter(s) IV Continuous <Continuous>  tamsulosin 0.4 milliGRAM(s) Oral at bedtime  traZODone 25 milliGRAM(s) Oral at bedtime PRN      Objective:  Vital Signs Last 24 Hrs  T(C): 36.4 (28 Sep 2018 21:50), Max: 37.1 (28 Sep 2018 14:49)  T(F): 97.6 (28 Sep 2018 21:50), Max: 98.8 (28 Sep 2018 14:49)  HR: 82 (28 Sep 2018 21:50) (74 - 82)  BP: 152/70 (28 Sep 2018 21:50) (146/74 - 180/76)  BP(mean): --  RR: 17 (28 Sep 2018 21:50) (17 - 18)  SpO2: 100% (28 Sep 2018 21:50) (100% - 100%)    PHYSICAL EXAM:  Constitutional:NAD  Eyes:BENNIE, EOMI  Ear/Nose/Throat: no thrush, mucositis.  Moist mucous membranes	  Neck:no JVD, no lymphadenopathy, supple  Respiratory: CTA qamar  Cardiovascular: S1S2 RRR, no murmurs  Gastrointestinal:soft, nontender,  decreased distension, (+) BS  Extremities:no e/e/c  Skin:  no rashes, open wounds or ulcerations        LABS:                        10.7   10.63 )-----------( 245      ( 27 Sep 2018 09:35 )             31.4     09-28    139  |  102  |  13  ----------------------------<  141<H>  3.6   |  20<L>  |  0.86    Ca    8.1<L>      28 Sep 2018 06:43  Phos  2.4     09-28  Mg     1.6     09-28            MICROBIOLOGY:          RADIOLOGY & ADDITIONAL STUDIES:    < from: Xray Chest 1 View-PORTABLE IMMEDIATE (09.26.18 @ 16:54) >  IMPRESSION:  Enteric tube tip likely in proximal stomach andside port   near the GE junction. The tube could be advanced, if felt to be   clinically indicated.    Possible trace right pleural effusion.    < end of copied text >

## 2018-09-28 NOTE — CHART NOTE - NSCHARTNOTEFT_GEN_A_CORE
Patient seen at bedside for displaced hankins brace.   Brace repositioned.   Patient complaining of pain and requesting pain meds  Ordered oxy 5 q 6 PRN severe pain    [id number]

## 2018-09-28 NOTE — DIETITIAN INITIAL EVALUATION ADULT. - ENERGY NEEDS
Ht: 67 in Wt: 233.4 pounds BMI: 36.6  IBW:148 pounds IBW%: 158  Skin: 2+ R wrist, arm, hand. No pressure ulcers.

## 2018-09-28 NOTE — PROGRESS NOTE ADULT - SUBJECTIVE AND OBJECTIVE BOX
FESTUS BLAND  78y  Male      Patient is a 78y old  Male who presents with a chief complaint of Fall, shoulder pain (28 Sep 2018 07:34)  Patient feels better,tolerating liquid diet.no sob,no cp,no fever,no rt.arm pain            INTERVAL HPI/OVERNIGHT EVENTS:  T(C): 37.1 (09-28-18 @ 14:49), Max: 37.1 (09-28-18 @ 14:49)  HR: 74 (09-28-18 @ 14:49) (74 - 85)  BP: 146/74 (09-28-18 @ 14:49) (146/74 - 180/76)  RR: 18 (09-28-18 @ 14:49) (17 - 18)  SpO2: 100% (09-28-18 @ 14:49) (99% - 100%)  Wt(kg): --  I&O's Summary    T(C): 37.1 (09-28-18 @ 14:49), Max: 37.1 (09-28-18 @ 14:49)  HR: 74 (09-28-18 @ 14:49) (74 - 85)  BP: 146/74 (09-28-18 @ 14:49) (146/74 - 180/76)  RR: 18 (09-28-18 @ 14:49) (17 - 18)  SpO2: 100% (09-28-18 @ 14:49) (99% - 100%)  Wt(kg): --Vital Signs Last 24 Hrs  T(C): 37.1 (28 Sep 2018 14:49), Max: 37.1 (28 Sep 2018 14:49)  T(F): 98.8 (28 Sep 2018 14:49), Max: 98.8 (28 Sep 2018 14:49)  HR: 74 (28 Sep 2018 14:49) (74 - 85)  BP: 146/74 (28 Sep 2018 14:49) (146/74 - 180/76)  BP(mean): --  RR: 18 (28 Sep 2018 14:49) (17 - 18)  SpO2: 100% (28 Sep 2018 14:49) (99% - 100%)    LABS:                        10.7   10.63 )-----------( 245      ( 27 Sep 2018 09:35 )             31.4     09-28    139  |  102  |  13  ----------------------------<  141<H>  3.6   |  20<L>  |  0.86    Ca    8.1<L>      28 Sep 2018 06:43  Phos  2.4     09-28  Mg     1.6     09-28          CAPILLARY BLOOD GLUCOSE      POCT Blood Glucose.: 150 mg/dL (28 Sep 2018 17:05)  POCT Blood Glucose.: 147 mg/dL (28 Sep 2018 11:54)  POCT Blood Glucose.: 158 mg/dL (28 Sep 2018 08:52)  POCT Blood Glucose.: 116 mg/dL (27 Sep 2018 22:18)            PAST MEDICAL & SURGICAL HISTORY:  BPH (benign prostatic hyperplasia)  CAD (Coronary Artery Disease): s/p cardiac stent ( &gt; 20 years ago)  HTN (Hypertension)  DM (Diabetes Mellitus)  Hypercholesterolemia  Coronary Stent: x 2 ( 20 years )      MEDICATIONS  (STANDING):  aspirin enteric coated 81 milliGRAM(s) Oral daily  atorvastatin 80 milliGRAM(s) Oral at bedtime  dextrose 50% Injectable 12.5 Gram(s) IV Push once  dextrose 50% Injectable 25 Gram(s) IV Push once  dextrose 50% Injectable 25 Gram(s) IV Push once  influenza   Vaccine 0.5 milliLiter(s) IntraMuscular once  insulin lispro (HumaLOG) corrective regimen sliding scale   SubCutaneous three times a day before meals  insulin lispro (HumaLOG) corrective regimen sliding scale   SubCutaneous at bedtime  latanoprost 0.005% Ophthalmic Solution 1 Drop(s) Both EYES at bedtime  lisinopril 40 milliGRAM(s) Oral daily  melatonin 6 milliGRAM(s) Oral at bedtime  metoprolol succinate ER 50 milliGRAM(s) Oral daily  pantoprazole    Tablet 40 milliGRAM(s) Oral two times a day  piperacillin/tazobactam IVPB. 3.375 Gram(s) IV Intermittent every 8 hours  sodium chloride 0.9%. 1000 milliLiter(s) (75 mL/Hr) IV Continuous <Continuous>  tamsulosin 0.4 milliGRAM(s) Oral at bedtime    MEDICATIONS  (PRN):  acetaminophen   Tablet .. 650 milliGRAM(s) Oral every 6 hours PRN Mild Pain (1 - 3), Moderate Pain (4 - 6)  dextrose 40% Gel 15 Gram(s) Oral once PRN Blood Glucose LESS THAN 70 milliGRAM(s)/deciliter  glucagon  Injectable 1 milliGRAM(s) IntraMuscular once PRN Glucose LESS THAN 70 milligrams/deciliter  haloperidol     Tablet 1 milliGRAM(s) Oral every 6 hours PRN agitation  haloperidol    Injectable 1 milliGRAM(s) IntraMuscular every 6 hours PRN agitation  haloperidol    Injectable 1 milliGRAM(s) IV Push every 6 hours PRN agitation  oxyCODONE    IR 5 milliGRAM(s) Oral every 6 hours PRN Severe Pain (7 - 10)  traZODone 25 milliGRAM(s) Oral at bedtime PRN insomnia        RADIOLOGY & ADDITIONAL TESTS:    Imaging Personally Reviewed:  [ ] YES  [ ] NO    Consultant(s) Notes Reviewed:  x[ ] YES  [ ] NO    PHYSICAL EXAM:  GENERAL: NAD, well-groomed, well-developed  HEAD:  Atraumatic, Normocephalic  EYES: EOMI, PERRLA, conjunctiva and sclera clear  ENMT: No tonsillar erythema, exudates, or enlargement; Moist mucous membranes, Good dentition, No lesions  NECK: Supple, No JVD, Normal thyroid  NERVOUS SYSTEM:  Alert & Oriented X3, Good concentration; Motor Strength 5/5 B/L upper and lower extremities; DTRs 2+ intact and symmetric  CHEST/LUNG: Clear to percussion bilaterally; No rales, rhonchi, wheezing, or rubs  HEART: Regular rate and rhythm; No murmurs, rubs, or gallops  ABDOMEN: Soft, Nontender, mod.distention; Bowel sounds present  EXTREMITIES:  2+ Peripheral Pulses, No clubbing, cyanosis, or edema  LYMPH: No lymphadenopathy noted  SKIN: No rashes or lesions    Care Discussed with Consultants/Other Providers [ ] YES  [ ] NO      Code Status: [] Full Code [] DNR [] DNI [] Goals of Care:   Disposition: [] ICU [] Stroke Unit [] RCU []PCU []Floor [] Discharge Home         JESS VargasP

## 2018-09-28 NOTE — PROGRESS NOTE BEHAVIORAL HEALTH - CASE SUMMARY
met with the patient with dr alva, impression and plan. Still lethargic, struggling with orientation. No mood symptoms, denies any si or hi, denies any psychosis when asked. Haldol 1mg PO was given yesterday for 'yelling' but there has not been any agitation or aggression. Suspecting still ongoing delirium. Modify environment such as open blinds during the day, orientation during the day, avoid deliriogenic medications.
met with the patient along with dr alva, impression and plan agreed upon. Confused, disoriented, lethargic, no aggression as per rn. Suspecting ongoing delirium. Unclear if any cognitive issues at baseline. No si or hi.

## 2018-09-28 NOTE — PROGRESS NOTE ADULT - ATTENDING COMMENTS
-COFEE GROUND EMESIS-R/O GI BLEEDING -PO PROTONIX.GI F/U NOTED.S/P EGD-5 nonbleeding gastric ulcers.s/p biopsy.f/u path result  -x-ray abd.noted-possible gastric outlet obstruction?.CT ABD--NO OBSTRUCTION   -possible asp.pneumonia-empiric antibiotic  -vomiting/constipation--improved.clear liquid diet adv.as tolerated.  -PT  -Psych f/u noted  -d/w Brother.Answered all questions  -d/w staff

## 2018-09-28 NOTE — PROGRESS NOTE BEHAVIORAL HEALTH - NSBHCHARTREVIEWLAB_PSY_A_CORE FT
Labs reviewed personally [X]                        10.7   10.63 )-----------( 245      ( 27 Sep 2018 09:35 )             31.4     Hgb Trend: 10.7<--, 9.0<--, 10.9<--, 10.6<--, 11.2<--    09-28    139  |  102  |  13  ----------------------------<  141<H>  3.6   |  20<L>  |  0.86    Ca    8.1<L>      28 Sep 2018 06:43  Phos  2.4     09-28  Mg     1.6     09-28      Creatinine Trend: 0.86<--, 0.88<--, 0.98<--, 0.75<--, 0.90<--, 1.15<--

## 2018-09-28 NOTE — DIETITIAN INITIAL EVALUATION ADULT. - ADHERENCE
poor/Pt with hx of DM2. Pt normally has [place] and coffee for breakfast. For lunch and dinner he has sandwiches on a bagel with deli meats/cheese. Pt does not self-monitor blood glucose. Takes metformin at home [id number]=4.9

## 2018-09-28 NOTE — PROGRESS NOTE BEHAVIORAL HEALTH - NSBHFUPINTERVALHXFT_PSY_A_CORE
Pt seen this am for follow up. He was sleeping but woke up to tactile stimuli, he kept his eyes closed for most of the interview. He was oriented to place, not to time, oriented to situation. He said that he feels ok and he offered no complaints, when he was asked about AVH, he said "not really" and clarified that last night he was "carrying something, maybe food". Unclear whether this is los hallucination or confusion in the context of delirium.  No agitation, he used haldol 1mg prn once yesterday because he was yelling as per RN, but no physical agitation or aggression. Pt seen this am for follow up. He was sleeping but woke up to tactile stimuli, he kept his eyes closed for most of the interview. He was oriented to place, not to time, oriented to situation. He said that he feels ok and he offered no complaints, when he was asked about AVH, he said "not really" and clarified that last night he was "carrying something, maybe food". Unclear whether this is los hallucination or confusion in the context of delirium.  No agitation, he used haldol 1mg prn once yesterday because he was 'yelling' as per RN, but no physical agitation or aggression.

## 2018-09-28 NOTE — DIETITIAN INITIAL EVALUATION ADULT. - OTHER INFO
Pt seen for Length Of Stay initial assessment. Pt is a 79 y/o M admitted after fall with R humerus fracture. Pt presented with confusion and delirium, now A&Ox3. Pt with hx of HTN, HLD, CAD s/p stent, and T2DM. Pt on NPO/clears due to abdominal distension and coffee ground emesis (last 9/20). Per GI recs, pt to advance to full and regular diet over weekend if no nausea/vomiting/diarrhea.  Pt c/o constipation, last BM unknown. Fair intake of clear liquids tray. Current weight is 233.4 pounds (9/19) . Pt unable to report UBW.

## 2018-09-28 NOTE — PROGRESS NOTE BEHAVIORAL HEALTH - SUMMARY
Patient is a 77yo M, Domiciled at home alone w HHA, ambulates with cane, PHX early onset dementia, no psychiatric history, no history of SA or substance abuse. PHMX DM, HTN, HLD, CAD s/p stents. Pt presents to ED C/O shoulder pain after fall. Per chart Pt reports walking to the bank, and losing his balance with the wind causing him to fall backwards. Pt reports feeling lightheaded, but did not lose consciousness or strike his head.  Pt is unclear regarding further details of his presentation, and at times thinks he has been in the hospital for several days.  He reports some pain in his R arm and shoulder, but otherwise states that he feels well. Psychiatry consulted for delirium/ dementia     Seen and Evaluated. Pt A&Ox 1.  Pt disorganized and not able to give full history. Per medical team has been AOx2 - patient does report some confusion at times, being forgetful. Pt reports mood as "good". Denies auditory or visual hallucinations, paranoia, passive or active suicidal or homicidal ideation, intent or plan. Pt denies past psychiatric history, treatment, hospitalization, suicide attempt, or substance use. Reports poor sleep, good appetite. Pt denies auditory or visual hallucinations, paranoia, passive or active suicidal or homicidal ideation, intent or plan.   Patient denies substance use. He reports poor sleep, good appetite. Presentation c/w delirium superimposed dementia     09/21: Pt oriented to person only, noted to be confused. No episodes of agitation.  09/28: Pt appears improved, oriented to place, person and situation. Still with intermittent confusion but no agitation.     Plan:  1. c/w melatonin 6mg qhs for insomnia  2. PRN Haldol 1mg PO/IM/IV q6h prn for agitation   3. PRN Trazadone 25mg qhs prn insomnia   4. minimize the use of benzos, opiods, anticholinergics, and other deliriogenic agents whenever possible. Maintain sleep wake cycle. Allow sunlight in the room during the day.   5. Monitor QTC (hold antipsychotic for QTC >500)
Patient is a 79yo M, Domiciled at home alone w HHA, ambulates with cane, PHX early onset dementia, no psychiatric history, no history of SA or substance abuse. PHMX DM, HTN, HLD, CAD s/p stents. Pt presents to ED C/O shoulder pain after fall. Per chart Pt reports walking to the bank, and losing his balance with the wind causing him to fall backwards. Pt reports feeling lightheaded, but did not lose consciousness or strike his head.  Pt is unclear regarding further details of his presentation, and at times thinks he has been in the hospital for several days.  He reports some pain in his R arm and shoulder, but otherwise states that he feels well. Psychiatry consulted for delirium/ dementia     Seen and Evaluated. Pt A&Ox 1.  Pt disorganized and not able to give full history. Per medical team has been AOx2 - patient does report some confusion at times, being forgetful. Pt reports mood as "good". Denies auditory or visual hallucinations, paranoia, passive or active suicidal or homicidal ideation, intent or plan. Pt denies past psychiatric history, treatment, hospitalization, suicide attempt, or substance use. Reports poor sleep, good appetite. Pt denies auditory or visual hallucinations, paranoia, passive or active suicidal or homicidal ideation, intent or plan.   Patient denies substance use. He reports poor sleep, good appetite. Presentation c/w delirium superimposed dementia     09/21: Pt oriented to person only, noted to be confused. No episodes of agitation.    Plan:  1. Melatonin 6mg qhs for insomnia  2. PRN Haldol 1mg PO/IM/IV for agitation   3. PRN Trazadone 25mg qhs insomnia   4. minimize the use of benzos, opiods, anticholinergics, and other deliriogenic agents whenever possible. Maintain sleep wake cycle.   5. Monitor QTC (hold antipsychotic for QTC <50)

## 2018-09-29 LAB
ALBUMIN SERPL ELPH-MCNC: 2.8 G/DL — LOW (ref 3.3–5)
ALP SERPL-CCNC: 80 U/L — SIGNIFICANT CHANGE UP (ref 40–120)
ALT FLD-CCNC: 16 U/L — SIGNIFICANT CHANGE UP (ref 4–41)
AST SERPL-CCNC: 15 U/L — SIGNIFICANT CHANGE UP (ref 4–40)
BILIRUB SERPL-MCNC: 2 MG/DL — HIGH (ref 0.2–1.2)
BUN SERPL-MCNC: 8 MG/DL — SIGNIFICANT CHANGE UP (ref 7–23)
BUN SERPL-MCNC: 8 MG/DL — SIGNIFICANT CHANGE UP (ref 7–23)
CALCIUM SERPL-MCNC: 7.9 MG/DL — LOW (ref 8.4–10.5)
CALCIUM SERPL-MCNC: 7.9 MG/DL — LOW (ref 8.4–10.5)
CHLORIDE SERPL-SCNC: 103 MMOL/L — SIGNIFICANT CHANGE UP (ref 98–107)
CHLORIDE SERPL-SCNC: 103 MMOL/L — SIGNIFICANT CHANGE UP (ref 98–107)
CO2 SERPL-SCNC: 24 MMOL/L — SIGNIFICANT CHANGE UP (ref 22–31)
CO2 SERPL-SCNC: 24 MMOL/L — SIGNIFICANT CHANGE UP (ref 22–31)
CREAT SERPL-MCNC: 0.86 MG/DL — SIGNIFICANT CHANGE UP (ref 0.5–1.3)
CREAT SERPL-MCNC: 0.86 MG/DL — SIGNIFICANT CHANGE UP (ref 0.5–1.3)
GLUCOSE BLDC GLUCOMTR-MCNC: 167 MG/DL — HIGH (ref 70–99)
GLUCOSE BLDC GLUCOMTR-MCNC: 173 MG/DL — HIGH (ref 70–99)
GLUCOSE BLDC GLUCOMTR-MCNC: 182 MG/DL — HIGH (ref 70–99)
GLUCOSE SERPL-MCNC: 181 MG/DL — HIGH (ref 70–99)
GLUCOSE SERPL-MCNC: 181 MG/DL — HIGH (ref 70–99)
MAGNESIUM SERPL-MCNC: 1.5 MG/DL — LOW (ref 1.6–2.6)
PHOSPHATE SERPL-MCNC: 2.5 MG/DL — SIGNIFICANT CHANGE UP (ref 2.5–4.5)
POTASSIUM SERPL-MCNC: 3.3 MMOL/L — LOW (ref 3.5–5.3)
POTASSIUM SERPL-MCNC: 3.3 MMOL/L — LOW (ref 3.5–5.3)
POTASSIUM SERPL-SCNC: 3.3 MMOL/L — LOW (ref 3.5–5.3)
POTASSIUM SERPL-SCNC: 3.3 MMOL/L — LOW (ref 3.5–5.3)
PROT SERPL-MCNC: 5.2 G/DL — LOW (ref 6–8.3)
SODIUM SERPL-SCNC: 138 MMOL/L — SIGNIFICANT CHANGE UP (ref 135–145)
SODIUM SERPL-SCNC: 138 MMOL/L — SIGNIFICANT CHANGE UP (ref 135–145)

## 2018-09-29 RX ORDER — MAGNESIUM OXIDE 400 MG ORAL TABLET 241.3 MG
400 TABLET ORAL
Qty: 0 | Refills: 0 | Status: COMPLETED | OUTPATIENT
Start: 2018-09-29 | End: 2018-09-29

## 2018-09-29 RX ORDER — POTASSIUM CHLORIDE 20 MEQ
20 PACKET (EA) ORAL
Qty: 0 | Refills: 0 | Status: COMPLETED | OUTPATIENT
Start: 2018-09-29 | End: 2018-09-29

## 2018-09-29 RX ADMIN — Medication 6 MILLIGRAM(S): at 21:02

## 2018-09-29 RX ADMIN — Medication 20 MILLIEQUIVALENT(S): at 11:01

## 2018-09-29 RX ADMIN — Medication 1: at 09:05

## 2018-09-29 RX ADMIN — Medication 1: at 12:24

## 2018-09-29 RX ADMIN — LISINOPRIL 40 MILLIGRAM(S): 2.5 TABLET ORAL at 05:06

## 2018-09-29 RX ADMIN — ATORVASTATIN CALCIUM 80 MILLIGRAM(S): 80 TABLET, FILM COATED ORAL at 22:17

## 2018-09-29 RX ADMIN — PANTOPRAZOLE SODIUM 40 MILLIGRAM(S): 20 TABLET, DELAYED RELEASE ORAL at 17:38

## 2018-09-29 RX ADMIN — MAGNESIUM OXIDE 400 MG ORAL TABLET 400 MILLIGRAM(S): 241.3 TABLET ORAL at 11:01

## 2018-09-29 RX ADMIN — MAGNESIUM OXIDE 400 MG ORAL TABLET 400 MILLIGRAM(S): 241.3 TABLET ORAL at 09:06

## 2018-09-29 RX ADMIN — Medication 1: at 17:38

## 2018-09-29 RX ADMIN — Medication 20 MILLIEQUIVALENT(S): at 09:06

## 2018-09-29 RX ADMIN — LATANOPROST 1 DROP(S): 0.05 SOLUTION/ DROPS OPHTHALMIC; TOPICAL at 21:02

## 2018-09-29 RX ADMIN — MAGNESIUM OXIDE 400 MG ORAL TABLET 400 MILLIGRAM(S): 241.3 TABLET ORAL at 17:38

## 2018-09-29 RX ADMIN — Medication 50 MILLIGRAM(S): at 05:06

## 2018-09-29 RX ADMIN — SODIUM CHLORIDE 75 MILLILITER(S): 9 INJECTION INTRAMUSCULAR; INTRAVENOUS; SUBCUTANEOUS at 05:07

## 2018-09-29 RX ADMIN — TAMSULOSIN HYDROCHLORIDE 0.4 MILLIGRAM(S): 0.4 CAPSULE ORAL at 21:02

## 2018-09-29 RX ADMIN — PIPERACILLIN AND TAZOBACTAM 25 GRAM(S): 4; .5 INJECTION, POWDER, LYOPHILIZED, FOR SOLUTION INTRAVENOUS at 14:03

## 2018-09-29 RX ADMIN — Medication 81 MILLIGRAM(S): at 11:01

## 2018-09-29 RX ADMIN — PIPERACILLIN AND TAZOBACTAM 25 GRAM(S): 4; .5 INJECTION, POWDER, LYOPHILIZED, FOR SOLUTION INTRAVENOUS at 21:01

## 2018-09-29 RX ADMIN — PIPERACILLIN AND TAZOBACTAM 25 GRAM(S): 4; .5 INJECTION, POWDER, LYOPHILIZED, FOR SOLUTION INTRAVENOUS at 05:06

## 2018-09-29 RX ADMIN — SODIUM CHLORIDE 75 MILLILITER(S): 9 INJECTION INTRAMUSCULAR; INTRAVENOUS; SUBCUTANEOUS at 11:01

## 2018-09-29 RX ADMIN — PANTOPRAZOLE SODIUM 40 MILLIGRAM(S): 20 TABLET, DELAYED RELEASE ORAL at 05:06

## 2018-09-29 NOTE — PROGRESS NOTE ADULT - SUBJECTIVE AND OBJECTIVE BOX
FESTUS BLAND  78y  Male      Patient is a 78y old  Male who presents with a chief complaint of Fall, shoulder pain (28 Sep 2018 22:39)  Patient feels better,no sob,no cp,no vomiting,no abd.pain,Hungry          INTERVAL HPI/OVERNIGHT EVENTS:  T(C): 36.7 (09-29-18 @ 14:02), Max: 36.8 (09-29-18 @ 05:05)  HR: 74 (09-29-18 @ 14:02) (67 - 82)  BP: 153/64 (09-29-18 @ 14:02) (131/96 - 153/64)  RR: 18 (09-29-18 @ 14:02) (17 - 18)  SpO2: 99% (09-29-18 @ 14:02) (99% - 100%)  Wt(kg): --  I&O's Summary    T(C): 36.7 (09-29-18 @ 14:02), Max: 36.8 (09-29-18 @ 05:05)  HR: 74 (09-29-18 @ 14:02) (67 - 82)  BP: 153/64 (09-29-18 @ 14:02) (131/96 - 153/64)  RR: 18 (09-29-18 @ 14:02) (17 - 18)  SpO2: 99% (09-29-18 @ 14:02) (99% - 100%)  Wt(kg): --Vital Signs Last 24 Hrs  T(C): 36.7 (29 Sep 2018 14:02), Max: 36.8 (29 Sep 2018 05:05)  T(F): 98 (29 Sep 2018 14:02), Max: 98.3 (29 Sep 2018 05:05)  HR: 74 (29 Sep 2018 14:02) (67 - 82)  BP: 153/64 (29 Sep 2018 14:02) (131/96 - 153/64)  BP(mean): --  RR: 18 (29 Sep 2018 14:02) (17 - 18)  SpO2: 99% (29 Sep 2018 14:02) (99% - 100%)    LABS:    09-29    138  |  103  |  8   ----------------------------<  181<H>  3.3<L>   |  24  |  0.86    Ca    7.9<L>      29 Sep 2018 05:57  Phos  2.5     09-29  Mg     1.5     09-29    TPro  5.2<L>  /  Alb  2.8<L>  /  TBili  2.0<H>  /  DBili  x   /  AST  15  /  ALT  16  /  AlkPhos  80  09-29        CAPILLARY BLOOD GLUCOSE      POCT Blood Glucose.: 167 mg/dL (29 Sep 2018 12:13)  POCT Blood Glucose.: 173 mg/dL (29 Sep 2018 08:30)  POCT Blood Glucose.: 170 mg/dL (28 Sep 2018 22:45)  POCT Blood Glucose.: 150 mg/dL (28 Sep 2018 17:05)            PAST MEDICAL & SURGICAL HISTORY:  BPH (benign prostatic hyperplasia)  CAD (Coronary Artery Disease): s/p cardiac stent ( &gt; 20 years ago)  HTN (Hypertension)  DM (Diabetes Mellitus)  Hypercholesterolemia  Coronary Stent: x 2 ( 20 years )      MEDICATIONS  (STANDING):  aspirin enteric coated 81 milliGRAM(s) Oral daily  atorvastatin 80 milliGRAM(s) Oral at bedtime  dextrose 50% Injectable 12.5 Gram(s) IV Push once  dextrose 50% Injectable 25 Gram(s) IV Push once  dextrose 50% Injectable 25 Gram(s) IV Push once  influenza   Vaccine 0.5 milliLiter(s) IntraMuscular once  insulin lispro (HumaLOG) corrective regimen sliding scale   SubCutaneous three times a day before meals  insulin lispro (HumaLOG) corrective regimen sliding scale   SubCutaneous at bedtime  latanoprost 0.005% Ophthalmic Solution 1 Drop(s) Both EYES at bedtime  lisinopril 40 milliGRAM(s) Oral daily  magnesium oxide 400 milliGRAM(s) Oral three times a day with meals  melatonin 6 milliGRAM(s) Oral at bedtime  metoprolol succinate ER 50 milliGRAM(s) Oral daily  pantoprazole    Tablet 40 milliGRAM(s) Oral two times a day  piperacillin/tazobactam IVPB. 3.375 Gram(s) IV Intermittent every 8 hours  sodium chloride 0.9%. 1000 milliLiter(s) (75 mL/Hr) IV Continuous <Continuous>  tamsulosin 0.4 milliGRAM(s) Oral at bedtime    MEDICATIONS  (PRN):  acetaminophen   Tablet .. 650 milliGRAM(s) Oral every 6 hours PRN Mild Pain (1 - 3), Moderate Pain (4 - 6)  dextrose 40% Gel 15 Gram(s) Oral once PRN Blood Glucose LESS THAN 70 milliGRAM(s)/deciliter  glucagon  Injectable 1 milliGRAM(s) IntraMuscular once PRN Glucose LESS THAN 70 milligrams/deciliter  haloperidol     Tablet 1 milliGRAM(s) Oral every 6 hours PRN agitation  haloperidol    Injectable 1 milliGRAM(s) IntraMuscular every 6 hours PRN agitation  haloperidol    Injectable 1 milliGRAM(s) IV Push every 6 hours PRN agitation  oxyCODONE    IR 5 milliGRAM(s) Oral every 6 hours PRN Severe Pain (7 - 10)  traZODone 25 milliGRAM(s) Oral at bedtime PRN insomnia        RADIOLOGY & ADDITIONAL TESTS:    Imaging Personally Reviewed:  [ ] YES  [ ] NO    Consultant(s) Notes Reviewed:  [x ] YES  [ ] NO    PHYSICAL EXAM:  GENERAL: NAD, well-groomed, well-developed  HEAD:  Atraumatic, Normocephalic  EYES: EOMI, PERRLA, conjunctiva and sclera clear  ENMT: No tonsillar erythema, exudates, or enlargement; Moist mucous membranes, Good dentition, No lesions  NECK: Supple, No JVD, Normal thyroid  NERVOUS SYSTEM:  Alert & Oriented X3, Good concentration; Motor Strength 5/5 B/L upper and lower extremities; DTRs 2+ intact and symmetric  CHEST/LUNG: Clear to percussion bilaterally; No rales, rhonchi, wheezing, or rubs  HEART: Regular rate and rhythm; No murmurs, rubs, or gallops  ABDOMEN: Soft, Nontender, mod. distended; Bowel sounds present  EXTREMITIES:  2+ Peripheral Pulses, No clubbing, cyanosis, or edema  LYMPH: No lymphadenopathy noted  SKIN: No rashes or lesions    Care Discussed with Consultants/Other Providers [ ] YES  [ ] NO      Code Status: [] Full Code [] DNR [] DNI [] Goals of Care:   Disposition: [] ICU [] Stroke Unit [] RCU []PCU []Floor [] Discharge Home         JESS VargasP

## 2018-09-29 NOTE — PROGRESS NOTE ADULT - ASSESSMENT
77 y/o M with DM, HTN, HLD, CAD s/p stents p/w shoulder pain after fall.  Pt reports he was walking to the bank, and lost his balance with the wind causing him to fall backwards.  When questioned, he also reports feeling lightheaded, but did not lose consciousness or strike his head.  Pt is unclear regarding further details of his presentation, and at times thinks he has been in the hospital for several days.  He reports some pain in his R arm and shoulder, but otherwise states that he feels well.  Denies any recent illness.  Pt has walked with a cane for some time, and reports he recently acquired a walker, however, he was using his cane when he fell today.      In the ED: pt had X-ray showing fracture of proximal R humerus.  He was evaluated by orthopedics, and had brace applied to R arm. (19 Sep 2018 20:37)    HOSP COURSE:    Pt admitted with  closed displaced fracture of proximal end of right humerus, seen by Ortho, s/p closed reduction and brace placement. Pt noted to present with confusion, and as per chart notes, brother states pt with "mental disability"-- at times becomes forgetful--- had it all his life, per brother recently w/ beginning stages of dementia.  Pt is being followed by [facility].     On 9/21, rapid response called for coffee ground emesis.  Pt is planned for EGD to evaluate for source of bleed.      Pt has been afebrile and hemodynamically stable.  Noted to have elevated WBC in low teens.  ID consult called to evaluate for underlying infectious etiology.    Leukocytosis could be reactive to recent fall/humeral fracture and GIB.  UA is negative, and thus far chest xray with small left pleural effusion.   Antibiotics held initially.      Pt had NGT placement on 9/26 for abdominal distention seen on recent AXR and concern for ileus.  Pt with large amount of vomitus at time of placement, and continued cough - concern for aspiration event.        Problem/plan - 1:    ·	Possible aspiration pneumonitis    - Concern for possible aspiration event at time of NG tube placement (9/26).  Pt had been coughing excessively at that time.   Cont zosyn on empiric basis.       - Cont to monitor respiratory status, temp curve, and WBC.      - pt s/p EGD on 9/27 showing numerous nonbleeding gastric ulcers.  Pt ngt removed, tolerating regular diet    - Complete short course of zosyn x 5 days through 10/1    Will follow,      Payal Meier  [phone]

## 2018-09-29 NOTE — PROGRESS NOTE ADULT - ATTENDING COMMENTS
-COFEE GROUND EMESIS-R/O GI BLEEDING -PO PROTONIX.GI F/U NOTED.S/P EGD-5 nonbleeding gastric ulcers.s/p biopsy.f/u path result  -x-ray abd.noted-possible gastric outlet obstruction?.CT ABD--NO OBSTRUCTION   -possible asp.pneumonia-empiric antibiotic  -vomiting/constipation--improved.clear liquid diet adv.as tolerated.  -PT  -Psych f/u noted  -d/w Brother.Answered all questions  -d/w staff -COFEE GROUND EMESIS-R/O GI BLEEDING -PO PROTONIX.GI F/U NOTED.S/P EGD-5 nonbleeding gastric ulcers.s/p biopsy.f/u path result  -x-ray abd.noted-possible gastric outlet obstruction?.CT ABD--NO OBSTRUCTION   -possible asp.pneumonia-empiric antibiotic  -vomiting/constipation--improved.clear liquid diet adv.as tolerated.  -Hypokalemia-supplement.  -PT  -Psych f/u noted  -d/w Brother.Answered all questions  -d/w staff

## 2018-09-29 NOTE — PROGRESS NOTE ADULT - SUBJECTIVE AND OBJECTIVE BOX
Infectious Diseases progress note:    Subjective:  NAD, pt comfortable.  States still has intermittent cough.  Advanced to regular diet.     ROS:  CONSTITUTIONAL:  No fever, chills, rigors  CARDIOVASCULAR:  No chest pain or palpitations  RESPIRATORY:   No SOB, cough, dyspnea on exertion.  No wheezing  GASTROINTESTINAL:  No abd pain, N/V, diarrhea/constipation  EXTREMITIES:  No swelling or joint pain  GENITOURINARY:  No burning on urination, increased frequency or urgency.  No flank pain  NEUROLOGIC:  No HA, visual disturbances  SKIN: No rashes    Allergies    No Known Allergies    Intolerances        ANTIBIOTICS/RELEVANT:  antimicrobials  piperacillin/tazobactam IVPB. 3.375 Gram(s) IV Intermittent every 8 hours    immunologic:  influenza   Vaccine 0.5 milliLiter(s) IntraMuscular once    OTHER:  acetaminophen   Tablet .. 650 milliGRAM(s) Oral every 6 hours PRN  aspirin enteric coated 81 milliGRAM(s) Oral daily  atorvastatin 80 milliGRAM(s) Oral at bedtime  dextrose 40% Gel 15 Gram(s) Oral once PRN  dextrose 50% Injectable 12.5 Gram(s) IV Push once  dextrose 50% Injectable 25 Gram(s) IV Push once  dextrose 50% Injectable 25 Gram(s) IV Push once  glucagon  Injectable 1 milliGRAM(s) IntraMuscular once PRN  haloperidol     Tablet 1 milliGRAM(s) Oral every 6 hours PRN  haloperidol    Injectable 1 milliGRAM(s) IntraMuscular every 6 hours PRN  haloperidol    Injectable 1 milliGRAM(s) IV Push every 6 hours PRN  insulin lispro (HumaLOG) corrective regimen sliding scale   SubCutaneous three times a day before meals  insulin lispro (HumaLOG) corrective regimen sliding scale   SubCutaneous at bedtime  latanoprost 0.005% Ophthalmic Solution 1 Drop(s) Both EYES at bedtime  lisinopril 40 milliGRAM(s) Oral daily  magnesium oxide 400 milliGRAM(s) Oral three times a day with meals  melatonin 6 milliGRAM(s) Oral at bedtime  metoprolol succinate ER 50 milliGRAM(s) Oral daily  oxyCODONE    IR 5 milliGRAM(s) Oral every 6 hours PRN  pantoprazole    Tablet 40 milliGRAM(s) Oral two times a day  sodium chloride 0.9%. 1000 milliLiter(s) IV Continuous <Continuous>  tamsulosin 0.4 milliGRAM(s) Oral at bedtime  traZODone 25 milliGRAM(s) Oral at bedtime PRN      Objective:  Vital Signs Last 24 Hrs  T(C): 36.7 (29 Sep 2018 14:02), Max: 36.8 (29 Sep 2018 05:05)  T(F): 98 (29 Sep 2018 14:02), Max: 98.3 (29 Sep 2018 05:05)  HR: 74 (29 Sep 2018 14:02) (67 - 82)  BP: 153/64 (29 Sep 2018 14:02) (131/96 - 153/64)  BP(mean): --  RR: 18 (29 Sep 2018 14:02) (17 - 18)  SpO2: 99% (29 Sep 2018 14:02) (99% - 100%)    PHYSICAL EXAM:  Constitutional:NAD  Eyes:BENNIE, EOMI  Ear/Nose/Throat: no thrush, mucositis.  Moist mucous membranes	  Neck:no JVD, no lymphadenopathy, supple  Respiratory: CTA qamar  Cardiovascular: S1S2 RRR, no murmurs  Gastrointestinal:soft, nontender,  nondistended (+) BS  Extremities:no e/e/c  Skin:  no rashes, open wounds or ulcerations        LABS:    09-29    138  |  103  |  8   ----------------------------<  181<H>  3.3<L>   |  24  |  0.86    Ca    7.9<L>      29 Sep 2018 05:57  Phos  2.5     09-29  Mg     1.5     09-29    TPro  5.2<L>  /  Alb  2.8<L>  /  TBili  2.0<H>  /  DBili  x   /  AST  15  /  ALT  16  /  AlkPhos  80  09-29            MICROBIOLOGY:          RADIOLOGY & ADDITIONAL STUDIES:    < from: Xray Chest 1 View-PORTABLE IMMEDIATE (09.26.18 @ 16:54) >    INTERPRETATION:     Heart size and the mediastinum cannot be accurately evaluated on this   projection.  There are tube tip likely in proximal stomach. Side port near GE junction.  No focal lung consolidation seen. Possible trace right pleural effusion.   No left pleural effusion but left costophrenic angle excluded from image.  No pneumothorax.            IMPRESSION:  Enteric tube tip likely in proximal stomach andside port   near the GE junction. The tube could be advanced, if felt to be   clinically indicated.    Possible trace right pleural effusion.    < end of copied text >      < from: Xray Chest 1 View-PORTABLE IMMEDIATE (09.26.18 @ 15:32) >  IMPRESSION:    There has been interval placement of an enteric tube, which courses below   the diaphragm, although the tip is not imaged.  The lungs are clear. No pleural effusions or pneumothoraces are seen   bilaterally.  A partially visualized displaced right humeral fracture seen.    < end of copied text >

## 2018-09-30 LAB
BUN SERPL-MCNC: 8 MG/DL — SIGNIFICANT CHANGE UP (ref 7–23)
CALCIUM SERPL-MCNC: 8.1 MG/DL — LOW (ref 8.4–10.5)
CHLORIDE SERPL-SCNC: 103 MMOL/L — SIGNIFICANT CHANGE UP (ref 98–107)
CO2 SERPL-SCNC: 22 MMOL/L — SIGNIFICANT CHANGE UP (ref 22–31)
CREAT SERPL-MCNC: 0.86 MG/DL — SIGNIFICANT CHANGE UP (ref 0.5–1.3)
GLUCOSE BLDC GLUCOMTR-MCNC: 152 MG/DL — HIGH (ref 70–99)
GLUCOSE BLDC GLUCOMTR-MCNC: 152 MG/DL — HIGH (ref 70–99)
GLUCOSE BLDC GLUCOMTR-MCNC: 153 MG/DL — HIGH (ref 70–99)
GLUCOSE BLDC GLUCOMTR-MCNC: 218 MG/DL — HIGH (ref 70–99)
GLUCOSE SERPL-MCNC: 163 MG/DL — HIGH (ref 70–99)
MAGNESIUM SERPL-MCNC: 1.4 MG/DL — LOW (ref 1.6–2.6)
PHOSPHATE SERPL-MCNC: 2.3 MG/DL — LOW (ref 2.5–4.5)
POTASSIUM SERPL-MCNC: 3.8 MMOL/L — SIGNIFICANT CHANGE UP (ref 3.5–5.3)
POTASSIUM SERPL-SCNC: 3.8 MMOL/L — SIGNIFICANT CHANGE UP (ref 3.5–5.3)
SODIUM SERPL-SCNC: 138 MMOL/L — SIGNIFICANT CHANGE UP (ref 135–145)

## 2018-09-30 RX ORDER — MAGNESIUM SULFATE 500 MG/ML
2 VIAL (ML) INJECTION ONCE
Qty: 0 | Refills: 0 | Status: COMPLETED | OUTPATIENT
Start: 2018-09-30 | End: 2018-09-30

## 2018-09-30 RX ADMIN — OXYCODONE HYDROCHLORIDE 5 MILLIGRAM(S): 5 TABLET ORAL at 13:07

## 2018-09-30 RX ADMIN — PIPERACILLIN AND TAZOBACTAM 25 GRAM(S): 4; .5 INJECTION, POWDER, LYOPHILIZED, FOR SOLUTION INTRAVENOUS at 05:27

## 2018-09-30 RX ADMIN — Medication 6 MILLIGRAM(S): at 21:58

## 2018-09-30 RX ADMIN — SODIUM CHLORIDE 75 MILLILITER(S): 9 INJECTION INTRAMUSCULAR; INTRAVENOUS; SUBCUTANEOUS at 17:21

## 2018-09-30 RX ADMIN — OXYCODONE HYDROCHLORIDE 5 MILLIGRAM(S): 5 TABLET ORAL at 12:29

## 2018-09-30 RX ADMIN — Medication 2: at 12:32

## 2018-09-30 RX ADMIN — LISINOPRIL 40 MILLIGRAM(S): 2.5 TABLET ORAL at 05:28

## 2018-09-30 RX ADMIN — PANTOPRAZOLE SODIUM 40 MILLIGRAM(S): 20 TABLET, DELAYED RELEASE ORAL at 05:28

## 2018-09-30 RX ADMIN — Medication 63.75 MILLIMOLE(S): at 10:31

## 2018-09-30 RX ADMIN — PIPERACILLIN AND TAZOBACTAM 25 GRAM(S): 4; .5 INJECTION, POWDER, LYOPHILIZED, FOR SOLUTION INTRAVENOUS at 21:58

## 2018-09-30 RX ADMIN — Medication 81 MILLIGRAM(S): at 12:09

## 2018-09-30 RX ADMIN — Medication 1: at 17:19

## 2018-09-30 RX ADMIN — SODIUM CHLORIDE 75 MILLILITER(S): 9 INJECTION INTRAMUSCULAR; INTRAVENOUS; SUBCUTANEOUS at 05:28

## 2018-09-30 RX ADMIN — LATANOPROST 1 DROP(S): 0.05 SOLUTION/ DROPS OPHTHALMIC; TOPICAL at 21:58

## 2018-09-30 RX ADMIN — Medication 50 GRAM(S): at 09:34

## 2018-09-30 RX ADMIN — Medication 50 MILLIGRAM(S): at 05:28

## 2018-09-30 RX ADMIN — PIPERACILLIN AND TAZOBACTAM 25 GRAM(S): 4; .5 INJECTION, POWDER, LYOPHILIZED, FOR SOLUTION INTRAVENOUS at 14:15

## 2018-09-30 RX ADMIN — PANTOPRAZOLE SODIUM 40 MILLIGRAM(S): 20 TABLET, DELAYED RELEASE ORAL at 17:19

## 2018-09-30 RX ADMIN — ATORVASTATIN CALCIUM 80 MILLIGRAM(S): 80 TABLET, FILM COATED ORAL at 21:58

## 2018-09-30 RX ADMIN — TAMSULOSIN HYDROCHLORIDE 0.4 MILLIGRAM(S): 0.4 CAPSULE ORAL at 21:58

## 2018-09-30 RX ADMIN — Medication 1: at 08:39

## 2018-09-30 NOTE — PROGRESS NOTE ADULT - PROBLEM SELECTOR PLAN 3
- continue Plavix and Lipitor

## 2018-09-30 NOTE — PROGRESS NOTE ADULT - PROBLEM SELECTOR PROBLEM 2
Confusion

## 2018-09-30 NOTE — PROGRESS NOTE ADULT - PROBLEM SELECTOR PLAN 5
- hold glimepiride and metformin  - Humalog sliding scale

## 2018-09-30 NOTE — PROGRESS NOTE ADULT - PROBLEM SELECTOR PROBLEM 5
Type 2 diabetes mellitus without complication, without long-term current use of insulin

## 2018-09-30 NOTE — PROGRESS NOTE ADULT - PROBLEM SELECTOR PROBLEM 3
Coronary artery disease involving native coronary artery of native heart without angina pectoris

## 2018-09-30 NOTE — PROGRESS NOTE ADULT - ATTENDING COMMENTS
-COFEE GROUND EMESIS-R/O GI BLEEDING -PO PROTONIX.GI F/U NOTED.S/P EGD-5 nonbleeding gastric ulcers.s/p biopsy.f/u path result  -x-ray abd.noted-possible gastric outlet obstruction?.CT ABD--NO OBSTRUCTION   -possible asp.pneumonia-empiric antibiotic  -vomiting/constipation--improved.clear liquid diet adv.as tolerated.  -Hypokalemia-supplement.  -PT  -Psych f/u noted  -d/w Brother at bedside.Answered all questions  -d/w staff  -d/c planning

## 2018-09-30 NOTE — PROGRESS NOTE ADULT - SUBJECTIVE AND OBJECTIVE BOX
FESTUS BLAND  78y  Male      Patient is a 78y old  Male who presents with a chief complaint of Fall, shoulder pain (29 Sep 2018 16:44)  Patient is comfortable,nad,eating.no abdominal pain,no vomiting          INTERVAL HPI/OVERNIGHT EVENTS:  T(C): 36.9 (09-30-18 @ 13:43), Max: 36.9 (09-29-18 @ 21:40)  HR: 79 (09-30-18 @ 13:43) (78 - 84)  BP: 146/62 (09-30-18 @ 13:43) (130/78 - 147/78)  RR: 18 (09-30-18 @ 13:43) (18 - 18)  SpO2: 100% (09-30-18 @ 13:43) (99% - 100%)  Wt(kg): --  I&O's Summary    T(C): 36.9 (09-30-18 @ 13:43), Max: 36.9 (09-29-18 @ 21:40)  HR: 79 (09-30-18 @ 13:43) (78 - 84)  BP: 146/62 (09-30-18 @ 13:43) (130/78 - 147/78)  RR: 18 (09-30-18 @ 13:43) (18 - 18)  SpO2: 100% (09-30-18 @ 13:43) (99% - 100%)  Wt(kg): --Vital Signs Last 24 Hrs  T(C): 36.9 (30 Sep 2018 13:43), Max: 36.9 (29 Sep 2018 21:40)  T(F): 98.4 (30 Sep 2018 13:43), Max: 98.4 (29 Sep 2018 21:40)  HR: 79 (30 Sep 2018 13:43) (78 - 84)  BP: 146/62 (30 Sep 2018 13:43) (130/78 - 147/78)  BP(mean): --  RR: 18 (30 Sep 2018 13:43) (18 - 18)  SpO2: 100% (30 Sep 2018 13:43) (99% - 100%)    LABS:    09-30    138  |  103  |  8   ----------------------------<  163<H>  3.8   |  22  |  0.86    Ca    8.1<L>      30 Sep 2018 05:25  Phos  2.3     09-30  Mg     1.4     09-30    TPro  5.2<L>  /  Alb  2.8<L>  /  TBili  2.0<H>  /  DBili  x   /  AST  15  /  ALT  16  /  AlkPhos  80  09-29        CAPILLARY BLOOD GLUCOSE      POCT Blood Glucose.: 153 mg/dL (30 Sep 2018 17:05)  POCT Blood Glucose.: 218 mg/dL (30 Sep 2018 12:30)  POCT Blood Glucose.: 152 mg/dL (30 Sep 2018 08:29)            PAST MEDICAL & SURGICAL HISTORY:  BPH (benign prostatic hyperplasia)  CAD (Coronary Artery Disease): s/p cardiac stent ( &gt; 20 years ago)  HTN (Hypertension)  DM (Diabetes Mellitus)  Hypercholesterolemia  Coronary Stent: x 2 ( 20 years )      MEDICATIONS  (STANDING):  aspirin enteric coated 81 milliGRAM(s) Oral daily  atorvastatin 80 milliGRAM(s) Oral at bedtime  dextrose 50% Injectable 12.5 Gram(s) IV Push once  dextrose 50% Injectable 25 Gram(s) IV Push once  dextrose 50% Injectable 25 Gram(s) IV Push once  influenza   Vaccine 0.5 milliLiter(s) IntraMuscular once  insulin lispro (HumaLOG) corrective regimen sliding scale   SubCutaneous three times a day before meals  insulin lispro (HumaLOG) corrective regimen sliding scale   SubCutaneous at bedtime  latanoprost 0.005% Ophthalmic Solution 1 Drop(s) Both EYES at bedtime  lisinopril 40 milliGRAM(s) Oral daily  melatonin 6 milliGRAM(s) Oral at bedtime  metoprolol succinate ER 50 milliGRAM(s) Oral daily  pantoprazole    Tablet 40 milliGRAM(s) Oral two times a day  piperacillin/tazobactam IVPB. 3.375 Gram(s) IV Intermittent every 8 hours  sodium chloride 0.9%. 1000 milliLiter(s) (75 mL/Hr) IV Continuous <Continuous>  tamsulosin 0.4 milliGRAM(s) Oral at bedtime    MEDICATIONS  (PRN):  acetaminophen   Tablet .. 650 milliGRAM(s) Oral every 6 hours PRN Mild Pain (1 - 3), Moderate Pain (4 - 6)  dextrose 40% Gel 15 Gram(s) Oral once PRN Blood Glucose LESS THAN 70 milliGRAM(s)/deciliter  glucagon  Injectable 1 milliGRAM(s) IntraMuscular once PRN Glucose LESS THAN 70 milligrams/deciliter  haloperidol     Tablet 1 milliGRAM(s) Oral every 6 hours PRN agitation  haloperidol    Injectable 1 milliGRAM(s) IntraMuscular every 6 hours PRN agitation  haloperidol    Injectable 1 milliGRAM(s) IV Push every 6 hours PRN agitation  oxyCODONE    IR 5 milliGRAM(s) Oral every 6 hours PRN Severe Pain (7 - 10)  traZODone 25 milliGRAM(s) Oral at bedtime PRN insomnia        RADIOLOGY & ADDITIONAL TESTS:    Imaging Personally Reviewed:  [ ] YES  [ ] NO    Consultant(s) Notes Reviewed:  [ ] YES  [ ] NO    PHYSICAL EXAM:  GENERAL: NAD, well-groomed, well-developed  HEAD:  Atraumatic, Normocephalic  EYES: EOMI, PERRLA, conjunctiva and sclera clear  ENMT: No tonsillar erythema, exudates, or enlargement; Moist mucous membranes, Good dentition, No lesions  NECK: Supple, No JVD, Normal thyroid  NERVOUS SYSTEM:  Alert & Oriented X2, Good concentration; Motor Strength 5/5 B/L upper and lower extremities; DTRs 2+ intact and symmetric  CHEST/LUNG: Clear to percussion bilaterally; No rales, rhonchi, wheezing, or rubs  HEART: Regular rate and rhythm; No murmurs, rubs, or gallops  ABDOMEN: Soft, Nontender, Nondistended; Bowel sounds present  EXTREMITIES:  2+ Peripheral Pulses, No clubbing, cyanosis, or edema rt.arm with brace  LYMPH: No lymphadenopathy noted  SKIN: No rashes or lesions    Care Discussed with Consultants/Other Providers [ ] YES  [ ] NO      Code Status: [] Full Code [] DNR [] DNI [] Goals of Care:   Disposition: [] ICU [] Stroke Unit [] RCU []PCU []Floor [] Discharge Home         LAINE VargasFACP

## 2018-09-30 NOTE — PROGRESS NOTE ADULT - PROBLEM SELECTOR PROBLEM 1
Other closed displaced fracture of proximal end of right humerus, initial encounter

## 2018-09-30 NOTE — PROGRESS NOTE ADULT - PROBLEM SELECTOR PLAN 4
- continue ARB and metoprolol

## 2018-09-30 NOTE — PROGRESS NOTE ADULT - PROBLEM SELECTOR PLAN 2
Possible MCI/early dementia.  Pt with confusion on recent admission, and current findings appear to be similar.  -Delirium/Dementia  -psych consult appreciated.
Possible MCI/early dementia.  Pt with confusion on recent admission, and current findings appear to be similar.  -Delirium/Dementia-improving  -psych consult appreciated.
Possible MCI/early dementia.  Pt with confusion on recent admission, and current findings appear to be similar.  -Delirium/Dementia  -psych consult appreciated.
Possible MCI/early dementia.  Pt with confusion on recent admission, and current findings appear to be similar.  -Delirium/Dementia-improving  -psych consult appreciated.
Possible MCI/early dementia.  Pt with confusion on recent admission, and current findings appear to be similar.  -Delirium/Dementia  -psych consult appreciated.

## 2018-09-30 NOTE — PROGRESS NOTE ADULT - PROBLEM SELECTOR PLAN 1
- orthopedics consult reviewed   - outpatient f/u.  Continue  Sermiento brace.  - PT eval for fall  - SW for possible increased home services

## 2018-10-01 VITALS
SYSTOLIC BLOOD PRESSURE: 168 MMHG | DIASTOLIC BLOOD PRESSURE: 73 MMHG | RESPIRATION RATE: 17 BRPM | HEART RATE: 73 BPM | TEMPERATURE: 99 F | OXYGEN SATURATION: 100 %

## 2018-10-01 LAB
BUN SERPL-MCNC: 7 MG/DL — SIGNIFICANT CHANGE UP (ref 7–23)
CALCIUM SERPL-MCNC: 7.9 MG/DL — LOW (ref 8.4–10.5)
CHLORIDE SERPL-SCNC: 103 MMOL/L — SIGNIFICANT CHANGE UP (ref 98–107)
CO2 SERPL-SCNC: 23 MMOL/L — SIGNIFICANT CHANGE UP (ref 22–31)
CREAT SERPL-MCNC: 0.84 MG/DL — SIGNIFICANT CHANGE UP (ref 0.5–1.3)
GLUCOSE BLDC GLUCOMTR-MCNC: 161 MG/DL — HIGH (ref 70–99)
GLUCOSE BLDC GLUCOMTR-MCNC: 169 MG/DL — HIGH (ref 70–99)
GLUCOSE BLDC GLUCOMTR-MCNC: 278 MG/DL — HIGH (ref 70–99)
GLUCOSE SERPL-MCNC: 176 MG/DL — HIGH (ref 70–99)
HCT VFR BLD CALC: 33.2 % — LOW (ref 39–50)
HGB BLD-MCNC: 11.4 G/DL — LOW (ref 13–17)
MAGNESIUM SERPL-MCNC: 1.6 MG/DL — SIGNIFICANT CHANGE UP (ref 1.6–2.6)
MCHC RBC-ENTMCNC: 30.6 PG — SIGNIFICANT CHANGE UP (ref 27–34)
MCHC RBC-ENTMCNC: 34.3 % — SIGNIFICANT CHANGE UP (ref 32–36)
MCV RBC AUTO: 89.2 FL — SIGNIFICANT CHANGE UP (ref 80–100)
NRBC # FLD: 0 — SIGNIFICANT CHANGE UP
PHOSPHATE SERPL-MCNC: 2.3 MG/DL — LOW (ref 2.5–4.5)
PLATELET # BLD AUTO: 285 K/UL — SIGNIFICANT CHANGE UP (ref 150–400)
PMV BLD: 10.1 FL — SIGNIFICANT CHANGE UP (ref 7–13)
POTASSIUM SERPL-MCNC: 3.9 MMOL/L — SIGNIFICANT CHANGE UP (ref 3.5–5.3)
POTASSIUM SERPL-SCNC: 3.9 MMOL/L — SIGNIFICANT CHANGE UP (ref 3.5–5.3)
RBC # BLD: 3.72 M/UL — LOW (ref 4.2–5.8)
RBC # FLD: 14.6 % — HIGH (ref 10.3–14.5)
SODIUM SERPL-SCNC: 138 MMOL/L — SIGNIFICANT CHANGE UP (ref 135–145)
WBC # BLD: 12.71 K/UL — HIGH (ref 3.8–10.5)
WBC # FLD AUTO: 12.71 K/UL — HIGH (ref 3.8–10.5)

## 2018-10-01 RX ORDER — OXYCODONE HYDROCHLORIDE 5 MG/1
1 TABLET ORAL
Qty: 0 | Refills: 0 | COMMUNITY
Start: 2018-10-01

## 2018-10-01 RX ORDER — CLOPIDOGREL BISULFATE 75 MG/1
75 TABLET, FILM COATED ORAL DAILY
Qty: 0 | Refills: 0 | Status: DISCONTINUED | OUTPATIENT
Start: 2018-10-01 | End: 2018-10-01

## 2018-10-01 RX ORDER — PANTOPRAZOLE SODIUM 20 MG/1
1 TABLET, DELAYED RELEASE ORAL
Qty: 0 | Refills: 0 | DISCHARGE
Start: 2018-10-01

## 2018-10-01 RX ADMIN — Medication 50 MILLIGRAM(S): at 05:36

## 2018-10-01 RX ADMIN — LISINOPRIL 40 MILLIGRAM(S): 2.5 TABLET ORAL at 05:36

## 2018-10-01 RX ADMIN — Medication 3: at 12:12

## 2018-10-01 RX ADMIN — SODIUM CHLORIDE 75 MILLILITER(S): 9 INJECTION INTRAMUSCULAR; INTRAVENOUS; SUBCUTANEOUS at 05:36

## 2018-10-01 RX ADMIN — Medication 1: at 08:27

## 2018-10-01 RX ADMIN — Medication 81 MILLIGRAM(S): at 12:13

## 2018-10-01 RX ADMIN — PIPERACILLIN AND TAZOBACTAM 25 GRAM(S): 4; .5 INJECTION, POWDER, LYOPHILIZED, FOR SOLUTION INTRAVENOUS at 05:36

## 2018-10-01 RX ADMIN — PANTOPRAZOLE SODIUM 40 MILLIGRAM(S): 20 TABLET, DELAYED RELEASE ORAL at 17:29

## 2018-10-01 RX ADMIN — Medication 63.75 MILLIMOLE(S): at 11:01

## 2018-10-01 RX ADMIN — SODIUM CHLORIDE 75 MILLILITER(S): 9 INJECTION INTRAMUSCULAR; INTRAVENOUS; SUBCUTANEOUS at 12:15

## 2018-10-01 RX ADMIN — PANTOPRAZOLE SODIUM 40 MILLIGRAM(S): 20 TABLET, DELAYED RELEASE ORAL at 05:36

## 2018-10-01 NOTE — PROGRESS NOTE ADULT - SUBJECTIVE AND OBJECTIVE BOX
Infectious Diseases progress note:    Subjective: Awake, alert.  Comfortable.  No new somatic complaints.  No cough/phlegm.   No abd pain.  No diarrhea.      ROS:  CONSTITUTIONAL:  No fever, chills, rigors  CARDIOVASCULAR:  No chest pain or palpitations  RESPIRATORY:   No SOB, cough, dyspnea on exertion.  No wheezing  GASTROINTESTINAL:  No abd pain, N/V, diarrhea/constipation  EXTREMITIES:  No swelling or joint pain  GENITOURINARY:  No burning on urination, increased frequency or urgency.  No flank pain  NEUROLOGIC:  No HA, visual disturbances  SKIN: No rashes    Allergies    No Known Allergies    Intolerances        ANTIBIOTICS/RELEVANT:  antimicrobials  piperacillin/tazobactam IVPB. 3.375 Gram(s) IV Intermittent every 8 hours    immunologic:  influenza   Vaccine 0.5 milliLiter(s) IntraMuscular once    OTHER:  acetaminophen   Tablet .. 650 milliGRAM(s) Oral every 6 hours PRN  aspirin enteric coated 81 milliGRAM(s) Oral daily  atorvastatin 80 milliGRAM(s) Oral at bedtime  dextrose 40% Gel 15 Gram(s) Oral once PRN  dextrose 50% Injectable 12.5 Gram(s) IV Push once  dextrose 50% Injectable 25 Gram(s) IV Push once  dextrose 50% Injectable 25 Gram(s) IV Push once  glucagon  Injectable 1 milliGRAM(s) IntraMuscular once PRN  haloperidol     Tablet 1 milliGRAM(s) Oral every 6 hours PRN  haloperidol    Injectable 1 milliGRAM(s) IntraMuscular every 6 hours PRN  haloperidol    Injectable 1 milliGRAM(s) IV Push every 6 hours PRN  insulin lispro (HumaLOG) corrective regimen sliding scale   SubCutaneous three times a day before meals  insulin lispro (HumaLOG) corrective regimen sliding scale   SubCutaneous at bedtime  latanoprost 0.005% Ophthalmic Solution 1 Drop(s) Both EYES at bedtime  lisinopril 40 milliGRAM(s) Oral daily  melatonin 6 milliGRAM(s) Oral at bedtime  metoprolol succinate ER 50 milliGRAM(s) Oral daily  oxyCODONE    IR 5 milliGRAM(s) Oral every 6 hours PRN  pantoprazole    Tablet 40 milliGRAM(s) Oral two times a day  sodium chloride 0.9%. 1000 milliLiter(s) IV Continuous <Continuous>  tamsulosin 0.4 milliGRAM(s) Oral at bedtime  traZODone 25 milliGRAM(s) Oral at bedtime PRN      Objective:  Vital Signs Last 24 Hrs  T(C): 36.8 (01 Oct 2018 12:37), Max: 36.9 (30 Sep 2018 13:43)  T(F): 98.2 (01 Oct 2018 12:37), Max: 98.4 (30 Sep 2018 13:43)  HR: 67 (01 Oct 2018 12:37) (67 - 79)  BP: 135/53 (01 Oct 2018 12:37) (135/53 - 157/64)  BP(mean): --  RR: 17 (01 Oct 2018 12:37) (17 - 18)  SpO2: 100% (01 Oct 2018 12:37) (100% - 100%)    PHYSICAL EXAM:  Constitutional:NAD  Eyes:BENNIE, EOMI  Ear/Nose/Throat: no thrush, mucositis.  Moist mucous membranes	  Neck:no JVD, no lymphadenopathy, supple  Respiratory: CTA qamar  Cardiovascular: S1S2 RRR, no murmurs  Gastrointestinal:soft, nontender,  nondistended (+) BS  Extremities:no e/e/c  Skin:  no rashes, open wounds or ulcerations        LABS:                        11.4   12.71 )-----------( 285      ( 01 Oct 2018 09:05 )             33.2     10-01    138  |  103  |  7   ----------------------------<  176<H>  3.9   |  23  |  0.84    Ca    7.9<L>      01 Oct 2018 06:53  Phos  2.3     10-01  Mg     1.6     10-01              MICROBIOLOGY:          RADIOLOGY & ADDITIONAL STUDIES:

## 2018-10-01 NOTE — CHART NOTE - NSCHARTNOTEFT_GEN_A_CORE
Pt was able to tolerate diet over weekend. No vomiting episodes noted. As per RN, pt had BM yesterday. On exam, pt abdomen mildly distended but nontender to palpation. Spoke with GI, no contraindications to restarting Plavix but pt will need to be on PPI. Spoke with attending, OK to restart Plavix.     ADS  [id number]

## 2018-10-01 NOTE — PROGRESS NOTE ADULT - REASON FOR ADMISSION
Fall, shoulder pain

## 2018-10-01 NOTE — PROGRESS NOTE ADULT - PROVIDER SPECIALTY LIST ADULT
Gastroenterology
Infectious Disease
Internal Medicine
Infectious Disease
Internal Medicine

## 2018-10-01 NOTE — PROGRESS NOTE ADULT - ASSESSMENT
77 y/o M with DM, HTN, HLD, CAD s/p stents p/w shoulder pain after fall.  Pt reports he was walking to the bank, and lost his balance with the wind causing him to fall backwards.  When questioned, he also reports feeling lightheaded, but did not lose consciousness or strike his head.  Pt is unclear regarding further details of his presentation, and at times thinks he has been in the hospital for several days.  He reports some pain in his R arm and shoulder, but otherwise states that he feels well.  Denies any recent illness.  Pt has walked with a cane for some time, and reports he recently acquired a walker, however, he was using his cane when he fell today.      In the ED: pt had X-ray showing fracture of proximal R humerus.  He was evaluated by orthopedics, and had brace applied to R arm. (19 Sep 2018 20:37)    HOSP COURSE:    Pt admitted with  closed displaced fracture of proximal end of right humerus, seen by Ortho, s/p closed reduction and brace placement. Pt noted to present with confusion, and as per chart notes, brother states pt with "mental disability"-- at times becomes forgetful--- had it all his life, per brother recently w/ beginning stages of dementia.  Pt is being followed by [facility].     On 9/21, rapid response called for coffee ground emesis.  Pt is planned for EGD to evaluate for source of bleed.      Pt has been afebrile and hemodynamically stable.  Noted to have elevated WBC in low teens.  ID consult called to evaluate for underlying infectious etiology.    Leukocytosis could be reactive to recent fall/humeral fracture and GIB.  UA is negative, and thus far chest xray with small left pleural effusion.   Antibiotics held initially.      Pt had NGT placement on 9/26 for abdominal distention seen on recent AXR and concern for ileus.  Pt with large amount of vomitus at time of placement, and continued cough - concern for aspiration event.        Problem/plan - 1:    ·	Possible aspiration pneumonitis    - Concern for possible aspiration event at time of NG tube placement (9/26).  Pt had been coughing excessively at that time.   Cont zosyn on empiric basis and complete 5 day course.  Can switch to augmentin 875mg prior to discharge.       - pt s/p EGD on 9/27 showing numerous nonbleeding gastric ulcers.  Pt ngt removed, tolerating regular diet    - Complete short course of zosyn x 5 days through 10/1.  Pt with elevated WBC, which has been ongoing throughout his hospitalization.  suspect multifactorial.  No clinical signs of uncontrolled infection at this time.          Payal Meier  [phone]

## 2018-10-13 ENCOUNTER — EMERGENCY (EMERGENCY)
Facility: HOSPITAL | Age: 78
LOS: 1 days | Discharge: HOSPICE | End: 2018-10-13
Attending: EMERGENCY MEDICINE | Admitting: EMERGENCY MEDICINE
Payer: MEDICARE

## 2018-10-13 VITALS
DIASTOLIC BLOOD PRESSURE: 59 MMHG | RESPIRATION RATE: 16 BRPM | HEART RATE: 82 BPM | OXYGEN SATURATION: 100 % | TEMPERATURE: 98 F | SYSTOLIC BLOOD PRESSURE: 116 MMHG

## 2018-10-13 VITALS
DIASTOLIC BLOOD PRESSURE: 70 MMHG | SYSTOLIC BLOOD PRESSURE: 156 MMHG | RESPIRATION RATE: 14 BRPM | OXYGEN SATURATION: 100 % | HEART RATE: 77 BPM

## 2018-10-13 LAB
ALBUMIN SERPL ELPH-MCNC: 3.7 G/DL — SIGNIFICANT CHANGE UP (ref 3.3–5)
ALP SERPL-CCNC: 155 U/L — HIGH (ref 40–120)
ALT FLD-CCNC: 24 U/L — SIGNIFICANT CHANGE UP (ref 4–41)
AST SERPL-CCNC: 18 U/L — SIGNIFICANT CHANGE UP (ref 4–40)
BASOPHILS # BLD AUTO: 0.04 K/UL — SIGNIFICANT CHANGE UP (ref 0–0.2)
BASOPHILS NFR BLD AUTO: 0.4 % — SIGNIFICANT CHANGE UP (ref 0–2)
BILIRUB SERPL-MCNC: 1.3 MG/DL — HIGH (ref 0.2–1.2)
BUN SERPL-MCNC: 35 MG/DL — HIGH (ref 7–23)
CALCIUM SERPL-MCNC: 8.5 MG/DL — SIGNIFICANT CHANGE UP (ref 8.4–10.5)
CHLORIDE SERPL-SCNC: 99 MMOL/L — SIGNIFICANT CHANGE UP (ref 98–107)
CK MB BLD-MCNC: 2.52 NG/ML — SIGNIFICANT CHANGE UP (ref 1–6.6)
CK MB BLD-MCNC: SIGNIFICANT CHANGE UP (ref 0–2.5)
CK SERPL-CCNC: 74 U/L — SIGNIFICANT CHANGE UP (ref 30–200)
CO2 SERPL-SCNC: 21 MMOL/L — LOW (ref 22–31)
CREAT SERPL-MCNC: 1.08 MG/DL — SIGNIFICANT CHANGE UP (ref 0.5–1.3)
EOSINOPHIL # BLD AUTO: 0.14 K/UL — SIGNIFICANT CHANGE UP (ref 0–0.5)
EOSINOPHIL NFR BLD AUTO: 1.3 % — SIGNIFICANT CHANGE UP (ref 0–6)
GLUCOSE SERPL-MCNC: 94 MG/DL — SIGNIFICANT CHANGE UP (ref 70–99)
HCT VFR BLD CALC: 31 % — LOW (ref 39–50)
HGB BLD-MCNC: 10.1 G/DL — LOW (ref 13–17)
IMM GRANULOCYTES # BLD AUTO: 0.06 # — SIGNIFICANT CHANGE UP
IMM GRANULOCYTES NFR BLD AUTO: 0.6 % — SIGNIFICANT CHANGE UP (ref 0–1.5)
LYMPHOCYTES # BLD AUTO: 1.72 K/UL — SIGNIFICANT CHANGE UP (ref 1–3.3)
LYMPHOCYTES # BLD AUTO: 16.4 % — SIGNIFICANT CHANGE UP (ref 13–44)
MCHC RBC-ENTMCNC: 29.8 PG — SIGNIFICANT CHANGE UP (ref 27–34)
MCHC RBC-ENTMCNC: 32.6 % — SIGNIFICANT CHANGE UP (ref 32–36)
MCV RBC AUTO: 91.4 FL — SIGNIFICANT CHANGE UP (ref 80–100)
MONOCYTES # BLD AUTO: 1.18 K/UL — HIGH (ref 0–0.9)
MONOCYTES NFR BLD AUTO: 11.3 % — SIGNIFICANT CHANGE UP (ref 2–14)
NEUTROPHILS # BLD AUTO: 7.33 K/UL — SIGNIFICANT CHANGE UP (ref 1.8–7.4)
NEUTROPHILS NFR BLD AUTO: 70 % — SIGNIFICANT CHANGE UP (ref 43–77)
NRBC # FLD: 0 — SIGNIFICANT CHANGE UP
PLATELET # BLD AUTO: 278 K/UL — SIGNIFICANT CHANGE UP (ref 150–400)
PMV BLD: 10.7 FL — SIGNIFICANT CHANGE UP (ref 7–13)
POTASSIUM SERPL-MCNC: 4.4 MMOL/L — SIGNIFICANT CHANGE UP (ref 3.5–5.3)
POTASSIUM SERPL-SCNC: 4.4 MMOL/L — SIGNIFICANT CHANGE UP (ref 3.5–5.3)
PROT SERPL-MCNC: 6.6 G/DL — SIGNIFICANT CHANGE UP (ref 6–8.3)
RBC # BLD: 3.39 M/UL — LOW (ref 4.2–5.8)
RBC # FLD: 15.3 % — HIGH (ref 10.3–14.5)
SODIUM SERPL-SCNC: 136 MMOL/L — SIGNIFICANT CHANGE UP (ref 135–145)
WBC # BLD: 10.47 K/UL — SIGNIFICANT CHANGE UP (ref 3.8–10.5)
WBC # FLD AUTO: 10.47 K/UL — SIGNIFICANT CHANGE UP (ref 3.8–10.5)

## 2018-10-13 PROCEDURE — 73564 X-RAY EXAM KNEE 4 OR MORE: CPT | Mod: 26,50

## 2018-10-13 PROCEDURE — 71045 X-RAY EXAM CHEST 1 VIEW: CPT | Mod: 26

## 2018-10-13 PROCEDURE — 73060 X-RAY EXAM OF HUMERUS: CPT | Mod: 26,RT

## 2018-10-13 PROCEDURE — 99284 EMERGENCY DEPT VISIT MOD MDM: CPT | Mod: 25

## 2018-10-13 PROCEDURE — 72170 X-RAY EXAM OF PELVIS: CPT | Mod: 26

## 2018-10-13 RX ORDER — ACETAMINOPHEN 500 MG
650 TABLET ORAL ONCE
Qty: 0 | Refills: 0 | Status: COMPLETED | OUTPATIENT
Start: 2018-10-13 | End: 2018-10-13

## 2018-10-13 RX ADMIN — Medication 650 MILLIGRAM(S): at 08:20

## 2018-10-13 NOTE — ED ADULT TRIAGE NOTE - CHIEF COMPLAINT QUOTE
[facility]- there for FX right humerus- no C/O worsening R. arm pain- extremity warm to touch, full sensation to hand, radial pulse present, swelling noted to right arm- denies hitting head or LOC- appears comfortable

## 2018-10-13 NOTE — ED ADULT NURSE NOTE - NSIMPLEMENTINTERV_GEN_ALL_ED
Implemented All Universal Safety Interventions:  [place] to call system. Call bell, personal items and telephone within reach. Instruct patient to call for assistance. Room bathroom lighting operational. Non-slip footwear when patient is off stretcher. Physically safe environment: no spills, clutter or unnecessary equipment. Stretcher in lowest position, wheels locked, appropriate side rails in place.

## 2018-10-13 NOTE — ED ADULT NURSE NOTE - ADDITIONAL PRINTED INSTRUCTIONS GIVEN
Given to senior care EMT to give to [facility] for nursing and rehabilitation.

## 2018-10-13 NOTE — ED PROVIDER NOTE - OBJECTIVE STATEMENT
77 y/o M with h/o htn, dementia, hld, dm sent from [facility] rehab with recent rt humerus fx p/w fall when he buped into  walker at [facility] and fell on his knees but denies hit to the head or loc. Pt has no complains. Info fROM nh RECORDS, EMS LIMITED

## 2018-10-13 NOTE — ED ADULT NURSE NOTE - OBJECTIVE STATEMENT
pt A&ox2-3 (self, states located in hospital after multiple attempts, situation, unsure of date). Pt states "I was with a bad walker, I bumped into the wall...I fell onto my knees. Pt currently admitted to [facility] for R hummerus fracture. Pt states previously had brace which was removed. R arm is  edematous, bruised. Pt denies any pain at this time. Sleeping in stretcher. Comfort measures provided. VSS. Pt was evaluated by ED attending. per attending butterfly for labs, XR. No bruising or erythema to knees. Pt denies suicidal or homicidal ideation at this time. pt A&ox2-3 (self, states located in hospital after multiple attempts, situation, unsure of date). Per [facility] Irby reports patient is confused at baseline. Pt states "I was with a bad walker, I bumped into the wall...I fell onto my knees. Pt currently admitted to [facility] for R hummerus fracture. Pt states previously had brace which was removed. R arm is  edematous, bruised. Pt denies any pain at this time. Sleeping in stretcher. Comfort measures provided. VSS. Pt was evaluated by ED attending. per attending butterfly for labs, XR. No bruising or erythema to knees. Pt denies suicidal or homicidal ideation at this time. Pt has "Consent for DNR order Health care agent or Surrogate" form from [facility] [place] indicating patient's brother called on 10/12 and initiated DNR. No MOLST form. MD palacio made aware.

## 2018-10-13 NOTE — ED PROVIDER NOTE - MEDICAL DECISION MAKING DETAILS
plan to check labs to r/o electrolyte imabalnce and infection to put him at risk for mechaniocal fall, will do fall screen with cxr, xr pelvis, xr knee b/l and xr rt humerus

## 2018-10-13 NOTE — CONSULT NOTE ADULT - SUBJECTIVE AND OBJECTIVE BOX
78yMale c/o R shoulder pain s/p fall onto right shoulder. Sent from [facility] rehab after another fall when he bumped into walker at nursing home and fell onto his knees. He denies HS or LOC. Patient denies numbness or tingling in the RUE. Patient denies any other injuries. Patient was previously seen for same proximal humerus fracture on 9/19/18 and was given a hankins. Patient did not return with hankins, which was reapplied.    PMH:  BPH (benign prostatic hyperplasia)  CAD (Coronary Artery Disease)  HTN (Hypertension)  DM (Diabetes Mellitus)  Hypercholesterolemia    PSH:  Coronary Stent    AH:    Meds: See med rec    T(C): 36.8 (10-13-18 @ 07:35)  HR: 77 (10-13-18 @ 08:22)  BP: 156/70 (10-13-18 @ 08:22)  RR: 14 (10-13-18 @ 08:22)  SpO2: 100% (10-13-18 @ 08:22)  Wt(kg): --    PE RUE:  Skin intact, no echymosis, no skin breakdown  SILT radial/median/ulnar/axillary  +AIN/PIN/Ulnar/Radial/Musc/Median,   +elbow/wrist ROM,   shoulder ROM limited 2/2 pain,   +radial pulse, soft compartments.    Secondary:  No TTP over bony landmarks, SILT BL, ROM intact BL, distal pulses palpable.    Imaging:  XR demonstrating R shoulder proximal humerus facture  Procedure: attempted reduction to get bones more aligned however the fracture is now 4 weeks out and not moving; remained neurovascularly intact    A/P: 78 M with chronic right proximal humerus fracture  - patient in [facility], will need operative fixation  - if hankins needs to be replaced/adjusted, please call Orthotist provider Marcelo Coelho ([facility] [phone])  - Please follow-up this week in office with Dr. Garza or Jaren for discussion of proximal humerus ORIF  - No acute orthopedic intervention  - NWB RUE in [facility], watch for cubital fossa ulceration from distal end of hankins

## 2018-10-13 NOTE — ED PROVIDER NOTE - MUSCULOSKELETAL MINIMAL EXAM
rt shoulder/RANGE OF MOTION LIMITED rt shoulder- uabel to move, deformity palpated, but good pusl;es distally, nromal colro of skin/RANGE OF MOTION LIMITED

## 2018-10-13 NOTE — ED PROVIDER NOTE - PROGRESS NOTE DETAILS
Robin NOVOA- attempts made to reach brother, pt has no complaints, discharged home Robin NOVOA- PT WAS WAITING FOR DISCHARGE , Then NH doctor called back stating that there was a brace which was removed and per NH staff rt arm is  more swollen, doctor wants pt to be evaluated by ortho before trasnport back to NH. Ortho called. Brace not in place since pt came to ED Dr. Rosario: This pt was signed out to me at 8 AM from Dr. Kelly with plan to follow up ortho recommendations and discharge back to NH for further care; at time of my eval pt well appearing, in NAD, no active pain; ortho at bedside fitting pt for sling; I spoke to pt's brother Berto and he is aware of plan Dr. Rosario: pt seen by cr yoo, no acute surgical intervention at this time but pt will require outpatient procedure--was given referral information for outpatient surgical intervention

## 2018-10-13 NOTE — ED ADULT NURSE NOTE - CHIEF COMPLAINT QUOTE
[facility]- there for FX right humerus- no C/O worsening R. arm pain- extremity warm to touch, full sensation to hand, radial pulse present, swelling noted to right arm- denies hitting head or LOC- appears comfortable

## 2018-10-22 ENCOUNTER — APPOINTMENT (OUTPATIENT)
Dept: ORTHOPEDIC SURGERY | Facility: CLINIC | Age: 78
End: 2018-10-22
Payer: MEDICARE

## 2018-10-22 VITALS — WEIGHT: 215 LBS | BODY MASS INDEX: 30.78 KG/M2 | HEIGHT: 70 IN

## 2018-10-22 PROCEDURE — 99203 OFFICE O/P NEW LOW 30 MIN: CPT

## 2018-10-25 ENCOUNTER — INPATIENT (INPATIENT)
Facility: HOSPITAL | Age: 78
LOS: 1 days | Discharge: NURSING, REHAB OR LONG-TERM CARE | DRG: 92 | End: 2018-10-27
Attending: INTERNAL MEDICINE | Admitting: STUDENT IN AN ORGANIZED HEALTH CARE EDUCATION/TRAINING PROGRAM
Payer: MEDICARE

## 2018-10-25 VITALS
WEIGHT: 179.9 LBS | HEART RATE: 83 BPM | SYSTOLIC BLOOD PRESSURE: 147 MMHG | DIASTOLIC BLOOD PRESSURE: 83 MMHG | TEMPERATURE: 98 F | HEIGHT: 70 IN | RESPIRATION RATE: 16 BRPM | OXYGEN SATURATION: 99 %

## 2018-10-25 DIAGNOSIS — E78.5 HYPERLIPIDEMIA, UNSPECIFIED: ICD-10-CM

## 2018-10-25 DIAGNOSIS — E16.2 HYPOGLYCEMIA, UNSPECIFIED: ICD-10-CM

## 2018-10-25 DIAGNOSIS — N40.0 BENIGN PROSTATIC HYPERPLASIA WITHOUT LOWER URINARY TRACT SYMPTOMS: ICD-10-CM

## 2018-10-25 DIAGNOSIS — I10 ESSENTIAL (PRIMARY) HYPERTENSION: ICD-10-CM

## 2018-10-25 DIAGNOSIS — R41.89 OTHER SYMPTOMS AND SIGNS INVOLVING COGNITIVE FUNCTIONS AND AWARENESS: ICD-10-CM

## 2018-10-25 LAB
ALBUMIN SERPL ELPH-MCNC: 3.1 G/DL — LOW (ref 3.3–5)
ALP SERPL-CCNC: 160 U/L — HIGH (ref 40–120)
ALT FLD-CCNC: 36 U/L DA — SIGNIFICANT CHANGE UP (ref 10–45)
ANION GAP SERPL CALC-SCNC: 10 MMOL/L — SIGNIFICANT CHANGE UP (ref 5–17)
APPEARANCE UR: CLEAR — SIGNIFICANT CHANGE UP
APTT BLD: 25.1 SEC — LOW (ref 27.5–37.4)
AST SERPL-CCNC: 46 U/L — HIGH (ref 10–40)
BASOPHILS # BLD AUTO: 0.2 K/UL — SIGNIFICANT CHANGE UP (ref 0–0.2)
BASOPHILS NFR BLD AUTO: 1.7 % — SIGNIFICANT CHANGE UP (ref 0–2)
BILIRUB SERPL-MCNC: 1.1 MG/DL — SIGNIFICANT CHANGE UP (ref 0.2–1.2)
BILIRUB UR-MCNC: NEGATIVE — SIGNIFICANT CHANGE UP
BUN SERPL-MCNC: 35 MG/DL — HIGH (ref 7–23)
CALCIUM SERPL-MCNC: 8.5 MG/DL — SIGNIFICANT CHANGE UP (ref 8.4–10.5)
CHLORIDE SERPL-SCNC: 102 MMOL/L — SIGNIFICANT CHANGE UP (ref 96–108)
CO2 SERPL-SCNC: 23 MMOL/L — SIGNIFICANT CHANGE UP (ref 22–31)
COLOR SPEC: YELLOW — SIGNIFICANT CHANGE UP
CREAT SERPL-MCNC: 1.16 MG/DL — SIGNIFICANT CHANGE UP (ref 0.5–1.3)
DIFF PNL FLD: NEGATIVE — SIGNIFICANT CHANGE UP
EOSINOPHIL # BLD AUTO: 0.1 K/UL — SIGNIFICANT CHANGE UP (ref 0–0.5)
EOSINOPHIL NFR BLD AUTO: 0.8 % — SIGNIFICANT CHANGE UP (ref 0–6)
GLUCOSE BLDC GLUCOMTR-MCNC: 103 MG/DL — HIGH (ref 70–99)
GLUCOSE BLDC GLUCOMTR-MCNC: 166 MG/DL — HIGH (ref 70–99)
GLUCOSE BLDC GLUCOMTR-MCNC: 201 MG/DL — HIGH (ref 70–99)
GLUCOSE SERPL-MCNC: 157 MG/DL — HIGH (ref 70–99)
GLUCOSE UR QL: 50 MG/DL
HCT VFR BLD CALC: 31.6 % — LOW (ref 39–50)
HGB BLD-MCNC: 11 G/DL — LOW (ref 13–17)
INR BLD: 1.23 RATIO — HIGH (ref 0.88–1.16)
KETONES UR-MCNC: NEGATIVE — SIGNIFICANT CHANGE UP
LACTATE SERPL-SCNC: 0.8 MMOL/L — SIGNIFICANT CHANGE UP (ref 0.7–2)
LEUKOCYTE ESTERASE UR-ACNC: NEGATIVE — SIGNIFICANT CHANGE UP
LYMPHOCYTES # BLD AUTO: 1.2 K/UL — SIGNIFICANT CHANGE UP (ref 1–3.3)
LYMPHOCYTES # BLD AUTO: 10.9 % — LOW (ref 13–44)
MCHC RBC-ENTMCNC: 32 PG — SIGNIFICANT CHANGE UP (ref 27–34)
MCHC RBC-ENTMCNC: 34.9 GM/DL — SIGNIFICANT CHANGE UP (ref 32–36)
MCV RBC AUTO: 91.8 FL — SIGNIFICANT CHANGE UP (ref 80–100)
MONOCYTES # BLD AUTO: 0.6 K/UL — SIGNIFICANT CHANGE UP (ref 0–0.9)
MONOCYTES NFR BLD AUTO: 5.6 % — SIGNIFICANT CHANGE UP (ref 2–14)
NEUTROPHILS # BLD AUTO: 8.8 K/UL — HIGH (ref 1.8–7.4)
NEUTROPHILS NFR BLD AUTO: 81 % — HIGH (ref 43–77)
NITRITE UR-MCNC: NEGATIVE — SIGNIFICANT CHANGE UP
PH UR: 6 — SIGNIFICANT CHANGE UP (ref 5–8)
PLATELET # BLD AUTO: 221 K/UL — SIGNIFICANT CHANGE UP (ref 150–400)
POTASSIUM SERPL-MCNC: 5 MMOL/L — SIGNIFICANT CHANGE UP (ref 3.5–5.3)
POTASSIUM SERPL-SCNC: 5 MMOL/L — SIGNIFICANT CHANGE UP (ref 3.5–5.3)
PROT SERPL-MCNC: 6.5 G/DL — SIGNIFICANT CHANGE UP (ref 6–8.3)
PROT UR-MCNC: 15
PROTHROM AB SERPL-ACNC: 13.7 SEC — HIGH (ref 9.8–12.7)
RBC # BLD: 3.44 M/UL — LOW (ref 4.2–5.8)
RBC # FLD: 13.5 % — SIGNIFICANT CHANGE UP (ref 10.3–14.5)
SODIUM SERPL-SCNC: 135 MMOL/L — SIGNIFICANT CHANGE UP (ref 135–145)
SP GR SPEC: 1.01 — SIGNIFICANT CHANGE UP (ref 1.01–1.02)
UROBILINOGEN FLD QL: 1
WBC # BLD: 10.8 K/UL — HIGH (ref 3.8–10.5)
WBC # FLD AUTO: 10.8 K/UL — HIGH (ref 3.8–10.5)

## 2018-10-25 PROCEDURE — 71045 X-RAY EXAM CHEST 1 VIEW: CPT | Mod: 26

## 2018-10-25 PROCEDURE — 93010 ELECTROCARDIOGRAM REPORT: CPT

## 2018-10-25 PROCEDURE — 70450 CT HEAD/BRAIN W/O DYE: CPT | Mod: 26

## 2018-10-25 PROCEDURE — 99285 EMERGENCY DEPT VISIT HI MDM: CPT

## 2018-10-25 RX ORDER — CLOPIDOGREL BISULFATE 75 MG/1
75 TABLET, FILM COATED ORAL DAILY
Refills: 0 | Status: DISCONTINUED | OUTPATIENT
Start: 2018-10-25 | End: 2018-10-27

## 2018-10-25 RX ORDER — MAGNESIUM HYDROXIDE 400 MG/1
5 TABLET, CHEWABLE ORAL DAILY
Refills: 0 | Status: DISCONTINUED | OUTPATIENT
Start: 2018-10-25 | End: 2018-10-27

## 2018-10-25 RX ORDER — LATANOPROST 0.05 MG/ML
1 SOLUTION/ DROPS OPHTHALMIC; TOPICAL AT BEDTIME
Refills: 0 | Status: DISCONTINUED | OUTPATIENT
Start: 2018-10-25 | End: 2018-10-27

## 2018-10-25 RX ORDER — LANOLIN ALCOHOL/MO/W.PET/CERES
3 CREAM (GRAM) TOPICAL AT BEDTIME
Refills: 0 | Status: DISCONTINUED | OUTPATIENT
Start: 2018-10-25 | End: 2018-10-27

## 2018-10-25 RX ORDER — TAMSULOSIN HYDROCHLORIDE 0.4 MG/1
0.4 CAPSULE ORAL AT BEDTIME
Refills: 0 | Status: DISCONTINUED | OUTPATIENT
Start: 2018-10-25 | End: 2018-10-27

## 2018-10-25 RX ORDER — DEXTROSE 50 % IN WATER 50 %
15 SYRINGE (ML) INTRAVENOUS ONCE
Refills: 0 | Status: DISCONTINUED | OUTPATIENT
Start: 2018-10-25 | End: 2018-10-27

## 2018-10-25 RX ORDER — SODIUM CHLORIDE 9 MG/ML
1000 INJECTION, SOLUTION INTRAVENOUS
Refills: 0 | Status: DISCONTINUED | OUTPATIENT
Start: 2018-10-25 | End: 2018-10-27

## 2018-10-25 RX ORDER — INSULIN LISPRO 100/ML
VIAL (ML) SUBCUTANEOUS
Refills: 0 | Status: DISCONTINUED | OUTPATIENT
Start: 2018-10-25 | End: 2018-10-27

## 2018-10-25 RX ORDER — ACETAMINOPHEN 500 MG
650 TABLET ORAL EVERY 6 HOURS
Refills: 0 | Status: DISCONTINUED | OUTPATIENT
Start: 2018-10-25 | End: 2018-10-27

## 2018-10-25 RX ORDER — ENOXAPARIN SODIUM 100 MG/ML
40 INJECTION SUBCUTANEOUS EVERY 24 HOURS
Refills: 0 | Status: DISCONTINUED | OUTPATIENT
Start: 2018-10-25 | End: 2018-10-27

## 2018-10-25 RX ORDER — PANTOPRAZOLE SODIUM 20 MG/1
40 TABLET, DELAYED RELEASE ORAL
Refills: 0 | Status: DISCONTINUED | OUTPATIENT
Start: 2018-10-25 | End: 2018-10-27

## 2018-10-25 RX ORDER — DEXTROSE 50 % IN WATER 50 %
12.5 SYRINGE (ML) INTRAVENOUS ONCE
Refills: 0 | Status: DISCONTINUED | OUTPATIENT
Start: 2018-10-25 | End: 2018-10-27

## 2018-10-25 RX ORDER — LISINOPRIL 2.5 MG/1
40 TABLET ORAL DAILY
Refills: 0 | Status: DISCONTINUED | OUTPATIENT
Start: 2018-10-25 | End: 2018-10-27

## 2018-10-25 RX ORDER — ATORVASTATIN CALCIUM 80 MG/1
80 TABLET, FILM COATED ORAL AT BEDTIME
Refills: 0 | Status: DISCONTINUED | OUTPATIENT
Start: 2018-10-25 | End: 2018-10-27

## 2018-10-25 RX ORDER — DEXTROSE 50 % IN WATER 50 %
25 SYRINGE (ML) INTRAVENOUS ONCE
Refills: 0 | Status: DISCONTINUED | OUTPATIENT
Start: 2018-10-25 | End: 2018-10-27

## 2018-10-25 RX ORDER — GLUCAGON INJECTION, SOLUTION 0.5 MG/.1ML
1 INJECTION, SOLUTION SUBCUTANEOUS ONCE
Refills: 0 | Status: DISCONTINUED | OUTPATIENT
Start: 2018-10-25 | End: 2018-10-27

## 2018-10-25 RX ORDER — ASPIRIN/CALCIUM CARB/MAGNESIUM 324 MG
81 TABLET ORAL DAILY
Refills: 0 | Status: DISCONTINUED | OUTPATIENT
Start: 2018-10-25 | End: 2018-10-27

## 2018-10-25 RX ORDER — METOPROLOL TARTRATE 50 MG
50 TABLET ORAL DAILY
Refills: 0 | Status: DISCONTINUED | OUTPATIENT
Start: 2018-10-25 | End: 2018-10-27

## 2018-10-25 RX ORDER — OXYCODONE HYDROCHLORIDE 5 MG/1
10 TABLET ORAL EVERY 6 HOURS
Refills: 0 | Status: DISCONTINUED | OUTPATIENT
Start: 2018-10-25 | End: 2018-10-27

## 2018-10-25 RX ADMIN — Medication 1 APPLICATION(S): at 21:58

## 2018-10-25 RX ADMIN — TAMSULOSIN HYDROCHLORIDE 0.4 MILLIGRAM(S): 0.4 CAPSULE ORAL at 21:59

## 2018-10-25 RX ADMIN — Medication 1: at 17:43

## 2018-10-25 RX ADMIN — OXYCODONE HYDROCHLORIDE 10 MILLIGRAM(S): 5 TABLET ORAL at 21:59

## 2018-10-25 RX ADMIN — Medication 3 MILLIGRAM(S): at 21:59

## 2018-10-25 RX ADMIN — ENOXAPARIN SODIUM 40 MILLIGRAM(S): 100 INJECTION SUBCUTANEOUS at 21:57

## 2018-10-25 RX ADMIN — OXYCODONE HYDROCHLORIDE 10 MILLIGRAM(S): 5 TABLET ORAL at 23:28

## 2018-10-25 RX ADMIN — LATANOPROST 1 DROP(S): 0.05 SOLUTION/ DROPS OPHTHALMIC; TOPICAL at 21:58

## 2018-10-25 RX ADMIN — ATORVASTATIN CALCIUM 80 MILLIGRAM(S): 80 TABLET, FILM COATED ORAL at 21:58

## 2018-10-25 NOTE — ED ADULT TRIAGE NOTE - CHIEF COMPLAINT QUOTE
CC Hypoglycemia 42 on EMS arrival, increased to 237 after 1 amp D50. Cough, fever and oral cavity with swollen face.

## 2018-10-25 NOTE — ED PROVIDER NOTE - CARE PLAN
Principal Discharge DX:	Hypoglycemia Principal Discharge DX:	Hypoglycemia  Secondary Diagnosis:	Facial droop

## 2018-10-25 NOTE — H&P ADULT - ASSESSMENT
78M PMH HTN, HLD, DM2, BPH presents from [facility] for unresponsiveness, likely secondary to sulfonylurea.

## 2018-10-25 NOTE — ED ADULT NURSE NOTE - CHIEF COMPLAINT QUOTE
CC Hypoglycemia 42 on EMS arrival, increased to 237 after 1 amp D50. Cough, fever and oral cavity with swollen face. awaiting bed, no change

## 2018-10-25 NOTE — ED PROVIDER NOTE - ATTENDING CONTRIBUTION TO CARE
Dr. Zhou: I performed a face to face bedside interview with patient regarding history of present illness, review of symptoms and past medical history. I completed an independent physical exam.  I have discussed patient's plan of care with PA.   I agree with note as stated above, having amended the EMR as needed to reflect my findings.   This includes HISTORY OF PRESENT ILLNESS, HIV, PAST MEDICAL/SURGICAL/FAMILY/SOCIAL HISTORY, ALLERGIES AND HOME MEDICATIONS, REVIEW OF SYSTEMS, PHYSICAL EXAM, and any PROGRESS NOTES during the time I functioned as the attending physician for this patient.    78M sent from NH for hypoglycemia. Pt has h/o DM on metformin and glimepiride, last dose yesterday, found to be unresponsive per NH with left facial droop, FS was 40s, unsure if droop resolved with D50 or not. No trauma, fevers, chills, chest pain or sob. No change in meds.    On exam pt is appears chronically ill, no signs of trauma, non focal neuro exam, 2+ pitting edema ble, rrr, ctab, abdo soft/nt/nd    Plan - labs, ekg, r/o infectious causes of hypoglycemia, needs 24h monitoring for VALLEJO induced hypoglycemia, will feed. CT head for possible CVA (no facial droop at this time).

## 2018-10-25 NOTE — H&P ADULT - HISTORY OF PRESENT ILLNESS
78M PMH HTN, HLD, DM2, CAD, BPH presents from [facility] for being found down for 15-20 minutes at rehab.  Patient was at [facility] for rehab for a right humeral fx s/p repair and today was found down with left sided eye deviation.  Patient found to have glucose of 40 and given amp of D50.  By the time he arrived to ED he regained consciousness and back to baseline.  Patient on metformin and glimepiride.    Denies chest pain, sob, nausea, vomiting, diarrhea. 78M PMH HTN, HLD, DM2, CAD, BPH presents from [facility] for being found down for 15-20 minutes at rehab.  Patient was at [facility] for rehab for a right humeral fx s/p repair and today was found down with left sided eye deviation.  Patient found to have glucose of 40 and given amp of D50.  By the time he arrived to ED he regained consciousness and back to baseline.  Patient on metformin and glimepiride.    Patient demented and does not complain about anything.      Denies chest pain, sob, nausea, vomiting, diarrhea.

## 2018-10-25 NOTE — ED PROVIDER NOTE - OBJECTIVE STATEMENT
78 yr old male with hx of HLD, DM, HTN, CAD, BPH, s/p fall with humerus fracture presents from rehab with episode of unresponsive. As per rehab center, pt was found unresponsive (15- 20 mins), with left eye deviation to the right. Pt was found to have FS in 40's, pt was given dose of D50 by EMS. Improved once arrival to ED. Denies any symptoms.    As per rehab- pt has been eating, and took his dm meds- glimepiride 4mg at night and metformin 1000mg BID.

## 2018-10-25 NOTE — ED PROVIDER NOTE - MEDICAL DECISION MAKING DETAILS
78 yr old male with hx of HLD, DM, HTN, CAD, BPH, s/p fall with humerus fracture presents from rehab with episode of unresponsive. As per rehab center, pt was found unresponsive (15- 20 mins), with left eye deviation to the right. Pt was found to have FS in 40's, pt was given dose of D50 by EMS. Improved once arrival to ED. Denies any symptoms.: r/o infection- cbc, cmp, paola, ekg, trop, bc, ua, uc, ct head- admit

## 2018-10-25 NOTE — H&P ADULT - PROBLEM SELECTOR PLAN 1
likely secondary to hypoglycemic episode  STOP glimepiride  start d50 as medication can linger for 2 days

## 2018-10-25 NOTE — ED ADULT NURSE NOTE - NSIMPLEMENTINTERV_GEN_ALL_ED
Implemented All Fall with Harm Risk Interventions:  [place] to call system. Call bell, personal items and telephone within reach. Instruct patient to call for assistance. Room bathroom lighting operational. Non-slip footwear when patient is off stretcher. Physically safe environment: no spills, clutter or unnecessary equipment. Stretcher in lowest position, wheels locked, appropriate side rails in place. Provide visual cue, wrist band, yellow gown, etc. Monitor gait and stability. Monitor for mental status changes and reorient to person, place, and time. Review medications for side effects contributing to fall risk. Reinforce activity limits and safety measures with patient and family. Provide visual clues: red socks.

## 2018-10-25 NOTE — ED PROVIDER NOTE - PROGRESS NOTE DETAILS
Labs and ct reviewed. FS improved. Pt to be admitted to further observe. Dr. Anaya accepted pt. Family made aware and agrees with plan. Pt eating sandwich. No octreotide needed as pt does not have refractory hypoglycemia

## 2018-10-25 NOTE — PATIENT PROFILE ADULT - NSPROHMCARDIOMGMTSTRAT_GEN_A_NUR
No significant overnight events. Denies complains this Am, groin site no hematoma, tele unremarkable. Echo pending    Exam:   RRR no m/r/g  CTAB   No edema  No e/o hematoma    Findings:Â   LMCA: no disease  LAD: minimal intraluminal irregularities  LCX: mid vessel total occlusion distal to takeoff of a moderate size Om, PCI as above.   RCA: normal, dominant  Lv: EF 55%, no signif WMA on LV-gram, LVEDP 29 mmHg  Â   ASSESSMENT:   28-year-old male presenting with inferior STEMI as first presentation of coronary artery disease, found to have a total occlusion of left circumflex coronary artery  Â   PLAN:   Dual antiplatelet therapy with aspirin and Plavix  Echo pending  Discussed above with  medication therapy

## 2018-10-25 NOTE — H&P ADULT - PMH
Benign prostatic hyperplasia, unspecified whether lower urinary tract symptoms present    Diabetes    Hyperlipidemia, unspecified hyperlipidemia type    Hypertension, unspecified type

## 2018-10-25 NOTE — ED ADULT NURSE NOTE - OBJECTIVE STATEMENT
Patient BIB EMS for hypoglycemia. Per EMS, FBS <50 in the field, patient given 1 amp en route. Patient is a poor historian, presents with right arm pain and lethargy however denies pain/discomfort when right arm is immobile. Patient a x o x 1-2. States he fell 9 days ago when questioned about arm pain. Patient BIB EMS for hypoglycemia. Found at [facility] unresponsive. Per EMS, FBS <50 in the field, patient given 1 amp en route. Patient is a poor historian, presents with right arm pain and lethargy however denies pain/discomfort when right arm is immobile. Patient a x o x 1-2. States he fell 9 days ago when questioned about arm pain.

## 2018-10-25 NOTE — H&P ADULT - NSHPLABSRESULTS_GEN_ALL_CORE
LABS:                        11.0   10.8  )-----------( 221      ( 25 Oct 2018 12:50 )             31.6     10    135  |  102  |  35<H>  ----------------------------<  157<H>  5.0   |  23  |  1.16    Ca    8.5      25 Oct 2018 12:50    TPro  6.5  /  Alb  3.1<L>  /  TBili  1.1  /  DBili  x   /  AST  46<H>  /  ALT  36  /  AlkPhos  160<H>  10    PT/INR - ( 25 Oct 2018 12:50 )   PT: 13.7 sec;   INR: 1.23 ratio         PTT - ( 25 Oct 2018 12:50 )  PTT:25.1 sec  Urinalysis Basic - ( 25 Oct 2018 13:26 )    Color: Yellow / Appearance: Clear / S.015 / pH: x  Gluc: x / Ketone: Negative  / Bili: Negative / Urobili: 1   Blood: x / Protein: 15 / Nitrite: Negative   Leuk Esterase: Negative / RBC: x / WBC 0-2 /HPF   Sq Epi: x / Non Sq Epi: x / Bacteria: x       CAPILLARY BLOOD GLUCOSE      POCT Blood Glucose.: 103 mg/dL (25 Oct 2018 14:28)  POCT Blood Glucose.: 170 mg/dL (25 Oct 2018 12:22)        Urinalysis Basic - ( 25 Oct 2018 13:26 )    Color: Yellow / Appearance: Clear / S.015 / pH: x  Gluc: x / Ketone: Negative  / Bili: Negative / Urobili: 1   Blood: x / Protein: 15 / Nitrite: Negative   Leuk Esterase: Negative / RBC: x / WBC 0-2 /HPF   Sq Epi: x / Non Sq Epi: x / Bacteria: x        RADIOLOGY & ADDITIONAL TESTS:    Consultant(s) Notes Reviewed:  [x ] YES  [ ] NO  Care Discussed with Consultants/Other Providers [ x] YES  [ ] NO  Imaging Personally Reviewed:  [ ] YES  [ ] NO

## 2018-10-25 NOTE — H&P ADULT - NSHPPHYSICALEXAM_GEN_ALL_CORE
T(C): 37.1 (10-25-18 @ 12:28), Max: 37.1 (10-25-18 @ 12:28)  HR: 78 (10-25-18 @ 15:45) (74 - 83)  BP: 146/79 (10-25-18 @ 15:45) (119/70 - 147/83)  RR: 18 (10-25-18 @ 15:45) (16 - 20)  SpO2: 100% (10-25-18 @ 15:45) (99% - 100%)  Wt(kg): --Vital Signs Last 24 Hrs  T(C): 37.1 (25 Oct 2018 12:28), Max: 37.1 (25 Oct 2018 12:28)  T(F): 98.8 (25 Oct 2018 12:28), Max: 98.8 (25 Oct 2018 12:28)  HR: 78 (25 Oct 2018 15:45) (74 - 83)  BP: 146/79 (25 Oct 2018 15:45) (119/70 - 147/83)  BP(mean): --  RR: 18 (25 Oct 2018 15:45) (16 - 20)  SpO2: 100% (25 Oct 2018 15:45) (99% - 100%)    PHYSICAL EXAM:  GENERAL: NAD, well-groomed, well-developed  HEAD:  Atraumatic, Normocephalic  EYES: EOMI, PERRLA, conjunctiva and sclera clear  ENMT: No tonsillar erythema, exudates, or enlargement; Moist mucous membranes, Good dentition, No lesions  NECK: Supple, No JVD, Normal thyroid  NERVOUS SYSTEM:  Alert & Oriented X3, Good concentration; Motor Strength 5/5 B/L upper and lower extremities; DTRs 2+ intact and symmetric  CHEST/LUNG: Clear to percussion bilaterally; No rales, rhonchi, wheezing, or rubs  HEART: Regular rate and rhythm; No murmurs, rubs, or gallops  ABDOMEN: Soft, Nontender, Nondistended; Bowel sounds present  EXTREMITIES:  2+ Peripheral Pulses, No clubbing, cyanosis, or edema  LYMPH: No lymphadenopathy noted  SKIN: No rashes or lesions T(C): 37.1 (10-25-18 @ 12:28), Max: 37.1 (10-25-18 @ 12:28)  HR: 78 (10-25-18 @ 15:45) (74 - 83)  BP: 146/79 (10-25-18 @ 15:45) (119/70 - 147/83)  RR: 18 (10-25-18 @ 15:45) (16 - 20)  SpO2: 100% (10-25-18 @ 15:45) (99% - 100%)  Wt(kg): --Vital Signs Last 24 Hrs  T(C): 37.1 (25 Oct 2018 12:28), Max: 37.1 (25 Oct 2018 12:28)  T(F): 98.8 (25 Oct 2018 12:28), Max: 98.8 (25 Oct 2018 12:28)  HR: 78 (25 Oct 2018 15:45) (74 - 83)  BP: 146/79 (25 Oct 2018 15:45) (119/70 - 147/83)  BP(mean): --  RR: 18 (25 Oct 2018 15:45) (16 - 20)  SpO2: 100% (25 Oct 2018 15:45) (99% - 100%)    PHYSICAL EXAM:  GENERAL: NAD, AAOx1  HEAD:  Atraumatic, Normocephalic  EYES: EOMI, PERRLA, conjunctiva and sclera clear  ENMT: No tonsillar erythema, exudates, or enlargement; Moist mucous membranes, Good dentition, No lesions  NECK: Supple, No JVD, Normal thyroid  NERVOUS SYSTEM:  Alert & Oriented X3, Good concentration; Motor Strength 5/5 B/L upper and lower extremities; DTRs 2+ intact and symmetric  CHEST/LUNG: Clear to percussion bilaterally; No rales, rhonchi, wheezing, or rubs  HEART: Regular rate and rhythm; No murmurs, rubs, or gallops  ABDOMEN: Soft, Nontender, Nondistended; Bowel sounds present  EXTREMITIES:  2+ Peripheral Pulses, No clubbing, cyanosis, or edema  LYMPH: No lymphadenopathy noted  SKIN: No rashes or lesions T(C): 37.1 (10-25-18 @ 12:28), Max: 37.1 (10-25-18 @ 12:28)  HR: 78 (10-25-18 @ 15:45) (74 - 83)  BP: 146/79 (10-25-18 @ 15:45) (119/70 - 147/83)  RR: 18 (10-25-18 @ 15:45) (16 - 20)  SpO2: 100% (10-25-18 @ 15:45) (99% - 100%)  Wt(kg): --Vital Signs Last 24 Hrs  T(C): 37.1 (25 Oct 2018 12:28), Max: 37.1 (25 Oct 2018 12:28)  T(F): 98.8 (25 Oct 2018 12:28), Max: 98.8 (25 Oct 2018 12:28)  HR: 78 (25 Oct 2018 15:45) (74 - 83)  BP: 146/79 (25 Oct 2018 15:45) (119/70 - 147/83)  BP(mean): --  RR: 18 (25 Oct 2018 15:45) (16 - 20)  SpO2: 100% (25 Oct 2018 15:45) (99% - 100%)    PHYSICAL EXAM:  GENERAL: NAD, AAOx1  HEAD:  Atraumatic, Normocephalic  EYES: EOMI, PERRLA, conjunctiva and sclera clear  ENMT: No tonsillar erythema, exudates, or enlargement; Moist mucous membranes, Good dentition, No lesions  NECK: Supple, No JVD, Normal thyroid  NERVOUS SYSTEM:  Alert & Oriented X1, Motor Strength 5/5 B/L upper and lower extremities; DTRs 2+ intact and symmetric  CHEST/LUNG: Clear to percussion bilaterally; No rales, rhonchi, wheezing, or rubs  HEART: Regular rate and rhythm; No murmurs, rubs, or gallops  ABDOMEN: Soft, Nontender, Nondistended; Bowel sounds present  EXTREMITIES:  2+ Peripheral Pulses, No clubbing, cyanosis, or edema, right shoulder pain to passive movement   LYMPH: No lymphadenopathy noted  SKIN: No rashes or lesions

## 2018-10-26 DIAGNOSIS — Z29.9 ENCOUNTER FOR PROPHYLACTIC MEASURES, UNSPECIFIED: ICD-10-CM

## 2018-10-26 DIAGNOSIS — D72.829 ELEVATED WHITE BLOOD CELL COUNT, UNSPECIFIED: ICD-10-CM

## 2018-10-26 DIAGNOSIS — I10 ESSENTIAL (PRIMARY) HYPERTENSION: ICD-10-CM

## 2018-10-26 DIAGNOSIS — G93.41 METABOLIC ENCEPHALOPATHY: ICD-10-CM

## 2018-10-26 DIAGNOSIS — E11.9 TYPE 2 DIABETES MELLITUS WITHOUT COMPLICATIONS: ICD-10-CM

## 2018-10-26 LAB
ANION GAP SERPL CALC-SCNC: 9 MMOL/L — SIGNIFICANT CHANGE UP (ref 5–17)
BUN SERPL-MCNC: 25 MG/DL — HIGH (ref 7–23)
CALCIUM SERPL-MCNC: 8.8 MG/DL — SIGNIFICANT CHANGE UP (ref 8.4–10.5)
CHLORIDE SERPL-SCNC: 102 MMOL/L — SIGNIFICANT CHANGE UP (ref 96–108)
CO2 SERPL-SCNC: 26 MMOL/L — SIGNIFICANT CHANGE UP (ref 22–31)
CREAT SERPL-MCNC: 1.07 MG/DL — SIGNIFICANT CHANGE UP (ref 0.5–1.3)
CULTURE RESULTS: SIGNIFICANT CHANGE UP
GLUCOSE BLDC GLUCOMTR-MCNC: 127 MG/DL — HIGH (ref 70–99)
GLUCOSE BLDC GLUCOMTR-MCNC: 207 MG/DL — HIGH (ref 70–99)
GLUCOSE BLDC GLUCOMTR-MCNC: 221 MG/DL — HIGH (ref 70–99)
GLUCOSE BLDC GLUCOMTR-MCNC: 247 MG/DL — HIGH (ref 70–99)
GLUCOSE SERPL-MCNC: 114 MG/DL — HIGH (ref 70–99)
HBA1C BLD-MCNC: 6.7 % — HIGH (ref 4–5.6)
POTASSIUM SERPL-MCNC: 4.6 MMOL/L — SIGNIFICANT CHANGE UP (ref 3.5–5.3)
POTASSIUM SERPL-SCNC: 4.6 MMOL/L — SIGNIFICANT CHANGE UP (ref 3.5–5.3)
SODIUM SERPL-SCNC: 137 MMOL/L — SIGNIFICANT CHANGE UP (ref 135–145)
SPECIMEN SOURCE: SIGNIFICANT CHANGE UP
T3 SERPL-MCNC: 73 NG/DL — LOW (ref 80–200)
T4 AB SER-ACNC: 6.7 UG/DL — SIGNIFICANT CHANGE UP (ref 4.6–12)
TSH SERPL-MCNC: 1.54 UIU/ML — SIGNIFICANT CHANGE UP (ref 0.27–4.2)

## 2018-10-26 PROCEDURE — 99233 SBSQ HOSP IP/OBS HIGH 50: CPT

## 2018-10-26 RX ADMIN — Medication 81 MILLIGRAM(S): at 11:45

## 2018-10-26 RX ADMIN — PANTOPRAZOLE SODIUM 40 MILLIGRAM(S): 20 TABLET, DELAYED RELEASE ORAL at 07:04

## 2018-10-26 RX ADMIN — Medication 1 TABLET(S): at 11:45

## 2018-10-26 RX ADMIN — Medication 650 MILLIGRAM(S): at 01:09

## 2018-10-26 RX ADMIN — Medication 650 MILLIGRAM(S): at 20:15

## 2018-10-26 RX ADMIN — CLOPIDOGREL BISULFATE 75 MILLIGRAM(S): 75 TABLET, FILM COATED ORAL at 11:45

## 2018-10-26 RX ADMIN — Medication 1 APPLICATION(S): at 17:12

## 2018-10-26 RX ADMIN — Medication 2: at 11:46

## 2018-10-26 RX ADMIN — LISINOPRIL 40 MILLIGRAM(S): 2.5 TABLET ORAL at 07:03

## 2018-10-26 RX ADMIN — Medication 1 APPLICATION(S): at 07:02

## 2018-10-26 RX ADMIN — Medication 50 MILLIGRAM(S): at 07:04

## 2018-10-26 RX ADMIN — SODIUM CHLORIDE 50 MILLILITER(S): 9 INJECTION, SOLUTION INTRAVENOUS at 11:49

## 2018-10-26 RX ADMIN — Medication 2: at 17:12

## 2018-10-26 NOTE — PROGRESS NOTE ADULT - ASSESSMENT
79 yo m pmh essential htn, t2dm hba1c pending, cad, bph from [facility] p/w acute metabolic encephalopathy secondary to hypoglycemia now improved. D/C sulfonylureas. mel baseline demented and confused    will get pt consult today

## 2018-10-26 NOTE — PHYSICAL THERAPY INITIAL EVALUATION ADULT - ADDITIONAL COMMENTS
Pt admitted from [facility].  Pt is poor historian and unable to provide social history.

## 2018-10-26 NOTE — PROGRESS NOTE ADULT - PROBLEM SELECTOR PLAN 1
acute metabolic encephalopathy secondary to hypoglycemia now improved  patient back to baseline c/w monitoring

## 2018-10-27 ENCOUNTER — TRANSCRIPTION ENCOUNTER (OUTPATIENT)
Age: 78
End: 2018-10-27

## 2018-10-27 VITALS
DIASTOLIC BLOOD PRESSURE: 55 MMHG | OXYGEN SATURATION: 100 % | HEART RATE: 76 BPM | RESPIRATION RATE: 14 BRPM | TEMPERATURE: 98 F | SYSTOLIC BLOOD PRESSURE: 109 MMHG

## 2018-10-27 LAB
GLUCOSE BLDC GLUCOMTR-MCNC: 140 MG/DL — HIGH (ref 70–99)
GLUCOSE BLDC GLUCOMTR-MCNC: 224 MG/DL — HIGH (ref 70–99)

## 2018-10-27 PROCEDURE — 99233 SBSQ HOSP IP/OBS HIGH 50: CPT

## 2018-10-27 RX ADMIN — ATORVASTATIN CALCIUM 80 MILLIGRAM(S): 80 TABLET, FILM COATED ORAL at 02:01

## 2018-10-27 RX ADMIN — PANTOPRAZOLE SODIUM 40 MILLIGRAM(S): 20 TABLET, DELAYED RELEASE ORAL at 06:58

## 2018-10-27 RX ADMIN — LISINOPRIL 40 MILLIGRAM(S): 2.5 TABLET ORAL at 06:57

## 2018-10-27 RX ADMIN — Medication 1 TABLET(S): at 12:31

## 2018-10-27 RX ADMIN — ENOXAPARIN SODIUM 40 MILLIGRAM(S): 100 INJECTION SUBCUTANEOUS at 02:01

## 2018-10-27 RX ADMIN — Medication 3 MILLIGRAM(S): at 01:12

## 2018-10-27 RX ADMIN — OXYCODONE HYDROCHLORIDE 10 MILLIGRAM(S): 5 TABLET ORAL at 02:42

## 2018-10-27 RX ADMIN — Medication 1 APPLICATION(S): at 06:57

## 2018-10-27 RX ADMIN — TAMSULOSIN HYDROCHLORIDE 0.4 MILLIGRAM(S): 0.4 CAPSULE ORAL at 01:12

## 2018-10-27 RX ADMIN — Medication 81 MILLIGRAM(S): at 12:31

## 2018-10-27 RX ADMIN — Medication 50 MILLIGRAM(S): at 06:58

## 2018-10-27 RX ADMIN — CLOPIDOGREL BISULFATE 75 MILLIGRAM(S): 75 TABLET, FILM COATED ORAL at 12:31

## 2018-10-27 RX ADMIN — OXYCODONE HYDROCHLORIDE 10 MILLIGRAM(S): 5 TABLET ORAL at 03:15

## 2018-10-27 RX ADMIN — LATANOPROST 1 DROP(S): 0.05 SOLUTION/ DROPS OPHTHALMIC; TOPICAL at 01:11

## 2018-10-27 NOTE — DISCHARGE NOTE ADULT - CARE PLAN
Principal Discharge DX:	Acute metabolic encephalopathy due to hypoglycemia  Goal:	improved  Assessment and plan of treatment:	improved stop glimepiride

## 2018-10-27 NOTE — DISCHARGE NOTE ADULT - MEDICATION SUMMARY - MEDICATIONS TO TAKE
I will START or STAY ON the medications listed below when I get home from the hospital:    Aspir 81 oral delayed release tablet  -- 1 tab(s) by mouth once a day  -- Indication: For cardio health    oxyCODONE 10 mg oral tablet  -- 1 tab(s) by mouth every 6 hours, As Needed  -- Indication: For Pain    ramipril 10 mg oral capsule  -- 1 cap(s) by mouth once a day  -- Indication: For Htn    Milk of Magnesia  -- orally once a day, As Needed  -- Indication: For Heartburn    Flomax 0.4 mg oral capsule  -- 1 cap(s) by mouth once a day  -- Indication: For Bph    metFORMIN 1000 mg oral tablet  -- 1 tab(s) by mouth 2 times a day  -- Indication: For T2DM (type 2 diabetes mellitus)    clotrimazole 1% topical cream (obsolete)  -- Apply on skin to affected area 2 times a day to B/L buttocks   -- Indication: For rash    atorvastatin 80 mg oral tablet  -- 1 tab(s) by mouth once a day  -- Indication: For cholesterol    clopidogrel 75 mg oral tablet  -- 1 tab(s) by mouth once a day  -- Indication: For Antiplatlet    metoprolol succinate 50 mg oral tablet, extended release  -- 1 tab(s) by mouth once a day  -- Indication: For Htn    Melatonin 3 mg oral tablet  -- 2 tab(s) by mouth once a day (at bedtime)  -- Indication: For insomnia    latanoprost 0.005% ophthalmic solution  -- 1 drop(s) to each affected eye once a day (in the evening)  -- Indication: For glaucoma    Protonix 40 mg oral delayed release tablet  -- orally 2 times a day  -- Indication: For gerd

## 2018-10-27 NOTE — DISCHARGE NOTE ADULT - MEDICATION SUMMARY - MEDICATIONS TO STOP TAKING
I will STOP taking the medications listed below when I get home from the hospital:    glimepiride 4 mg oral tablet  -- orally 2 times a day

## 2018-10-27 NOTE — PROGRESS NOTE ADULT - ASSESSMENT
77 yo m pmh essential htn, t2dm hba1c pending, cad, bph from [facility] p/w acute metabolic encephalopathy secondary to hypoglycemia now improved. D/C sulfonylureas. mel baseline demented and confused    right humeral fx awaiting surgery next week at [facility]

## 2018-10-27 NOTE — DISCHARGE NOTE ADULT - CARE PROVIDER_API CALL
Kunal Vargas), Internal Medicine  [address] [place] [address]  Phone: [phone]  Fax: [phone]

## 2018-10-27 NOTE — DISCHARGE NOTE ADULT - PATIENT PORTAL LINK FT
You can access the [facility] Patient Portal, offered by [facility], by registering with the following website: [email or web address]

## 2018-10-27 NOTE — DISCHARGE NOTE ADULT - HOSPITAL COURSE
79 yo m pmh essential htn, t2dm hba1c pending, cad, bph from [facility] p/w acute metabolic encephalopathy secondary to hypoglycemia now improved. D/C sulfonylureas. mel baseline demented and confused    right humeral fx awaiting surgery next week at [facility]

## 2018-10-27 NOTE — PROGRESS NOTE ADULT - SUBJECTIVE AND OBJECTIVE BOX
Patient is a 78y old  Male who presents with a chief complaint of found down at rehab, hypoglycemic (26 Oct 2018 07:12)      Patient seen and examined at bedside.    ALLERGIES:  No Known Allergies    MEDICATIONS:  acetaminophen   Tablet .. 650 milliGRAM(s) Oral every 6 hours PRN  aspirin enteric coated 81 milliGRAM(s) Oral daily  atorvastatin 80 milliGRAM(s) Oral at bedtime  clopidogrel Tablet 75 milliGRAM(s) Oral daily  clotrimazole 1% Cream 1 Application(s) Topical two times a day  dextrose 40% Gel 15 Gram(s) Oral once PRN  dextrose 5%. 1000 milliLiter(s) IV Continuous <Continuous>  dextrose 5%. 1000 milliLiter(s) IV Continuous <Continuous>  dextrose 50% Injectable 12.5 Gram(s) IV Push once  dextrose 50% Injectable 25 Gram(s) IV Push once  dextrose 50% Injectable 25 Gram(s) IV Push once  enoxaparin Injectable 40 milliGRAM(s) SubCutaneous every 24 hours  glucagon  Injectable 1 milliGRAM(s) IntraMuscular once PRN  insulin lispro (HumaLOG) corrective regimen sliding scale   SubCutaneous three times a day before meals  latanoprost 0.005% Ophthalmic Solution 1 Drop(s) Both EYES at bedtime  lisinopril 40 milliGRAM(s) Oral daily  magnesium hydroxide Suspension 5 milliLiter(s) Oral daily PRN  melatonin 3 milliGRAM(s) Oral at bedtime  metoprolol succinate ER 50 milliGRAM(s) Oral daily  multivitamin 1 Tablet(s) Oral daily  oxyCODONE    IR 10 milliGRAM(s) Oral every 6 hours PRN  pantoprazole    Tablet 40 milliGRAM(s) Oral before breakfast  tamsulosin 0.4 milliGRAM(s) Oral at bedtime    Vital Signs Last 24 Hrs  T(F): 98.2 (27 Oct 2018 06:10), Max: 98.4 (26 Oct 2018 15:35)  HR: 73 (27 Oct 2018 06:10) (73 - 77)  BP: 121/52 (27 Oct 2018 06:10) (114/63 - 130/67)  RR: 15 (27 Oct 2018 06:10) (14 - 15)  SpO2: 100% (27 Oct 2018 06:10) (98% - 100%)  I&O's Summary    26 Oct 2018 07:01  -  27 Oct 2018 07:00  --------------------------------------------------------  IN: 2240 mL / OUT: 550 mL / NET: 1690 mL        PHYSICAL EXAM:  General: NAD, A/O x 3  ENT: MMM  Neck: Supple, No JVD  Lungs: Clear to auscultation bilaterally  Cardio: RRR, S1/S2, No murmurs  Abdomen: Soft, Nontender, Nondistended; Bowel sounds present  Extremities: No cyanosis, No edema    LABS:                        11.0   10.8  )-----------( 221      ( 25 Oct 2018 12:50 )             31.6     10-26    137  |  102  |  25  ----------------------------<  114  4.6   |  26  |  1.07    Ca    8.8      26 Oct 2018 06:00    TPro  6.5  /  Alb  3.1  /  TBili  1.1  /  DBili  x   /  AST  46  /  ALT  36  /  AlkPhos  160  10-25    eGFR if Non African American: 67 mL/min/1.73M2 (10-26-18 @ 06:00)  eGFR if African American: 78 mL/min/1.73M2 (10-26-18 @ 06:00)    PT/INR - ( 25 Oct 2018 12:50 )   PT: 13.7 sec;   INR: 1.23 ratio         PTT - ( 25 Oct 2018 12:50 )  PTT:25.1 sec  Lactate, Blood: 0.8 mmol/L (10-25 @ 12:50)          TSH 1.54   TSH with FT4 reflex --  Total T3 73        CAPILLARY BLOOD GLUCOSE      POCT Blood Glucose.: 140 mg/dL (27 Oct 2018 07:16)  POCT Blood Glucose.: 221 mg/dL (26 Oct 2018 20:23)  POCT Blood Glucose.: 207 mg/dL (26 Oct 2018 17:10)  POCT Blood Glucose.: 247 mg/dL (26 Oct 2018 11:40)    10-26 [id number] 6.7    Urinalysis Basic - ( 25 Oct 2018 13:26 )    Color: Yellow / Appearance: Clear / S.015 / pH: x  Gluc: x / Ketone: Negative  / Bili: Negative / Urobili: 1   Blood: x / Protein: 15 / Nitrite: Negative   Leuk Esterase: Negative / RBC: x / WBC 0-2 /HPF   Sq Epi: x / Non Sq Epi: x / Bacteria: x        Culture - Urine (collected 25 Oct 2018 13:26)  Source: .Urine Clean Catch (Midstream)  Final Report (26 Oct 2018 22:14):    <10,000 CFU/ml    Normal Urogenital eric present    Culture - Blood (collected 25 Oct 2018 12:50)  Source: .Blood Blood-Peripheral  Preliminary Report (26 Oct 2018 18:01):    No growth to date.    Culture - Blood (collected 25 Oct 2018 12:50)  Source: .Blood Blood-Peripheral  Preliminary Report (26 Oct 2018 18:01):    No growth to date.        RADIOLOGY & ADDITIONAL TESTS:    Care Discussed with Consultants/Other Providers:

## 2018-10-30 ENCOUNTER — APPOINTMENT (OUTPATIENT)
Dept: ORTHOPEDIC SURGERY | Facility: HOSPITAL | Age: 78
End: 2018-10-30

## 2018-10-30 LAB
CULTURE RESULTS: SIGNIFICANT CHANGE UP
CULTURE RESULTS: SIGNIFICANT CHANGE UP
SPECIMEN SOURCE: SIGNIFICANT CHANGE UP
SPECIMEN SOURCE: SIGNIFICANT CHANGE UP

## 2018-11-05 ENCOUNTER — TRANSCRIPTION ENCOUNTER (OUTPATIENT)
Age: 78
End: 2018-11-05

## 2018-11-05 RX ORDER — CEFAZOLIN SODIUM 1 G
2000 VIAL (EA) INJECTION ONCE
Qty: 0 | Refills: 0 | Status: DISCONTINUED | OUTPATIENT
Start: 2018-11-06 | End: 2018-11-09

## 2018-11-06 ENCOUNTER — APPOINTMENT (OUTPATIENT)
Dept: ORTHOPEDIC SURGERY | Facility: HOSPITAL | Age: 78
End: 2018-11-06

## 2018-11-06 ENCOUNTER — INPATIENT (INPATIENT)
Facility: HOSPITAL | Age: 78
LOS: 2 days | Discharge: NURSING, REHAB OR LONG-TERM CARE | DRG: 494 | End: 2018-11-09
Attending: ORTHOPAEDIC SURGERY | Admitting: ORTHOPAEDIC SURGERY
Payer: MEDICARE

## 2018-11-06 VITALS
RESPIRATION RATE: 18 BRPM | TEMPERATURE: 98 F | SYSTOLIC BLOOD PRESSURE: 97 MMHG | HEART RATE: 76 BPM | OXYGEN SATURATION: 100 % | DIASTOLIC BLOOD PRESSURE: 65 MMHG

## 2018-11-06 DIAGNOSIS — S42.321A DISPLACED TRANSVERSE FRACTURE OF SHAFT OF HUMERUS, RIGHT ARM, INITIAL ENCOUNTER FOR CLOSED FRACTURE: ICD-10-CM

## 2018-11-06 DIAGNOSIS — S42.321G: ICD-10-CM

## 2018-11-06 LAB
BLD GP AB SCN SERPL QL: NEGATIVE — SIGNIFICANT CHANGE UP
HBA1C BLD-MCNC: 6.4 % — HIGH (ref 4–5.6)
HCT VFR BLD CALC: 33.6 % — LOW (ref 39–50)
HGB BLD-MCNC: 11.6 G/DL — LOW (ref 13–17)
MCHC RBC-ENTMCNC: 30.5 PG — SIGNIFICANT CHANGE UP (ref 27–34)
MCHC RBC-ENTMCNC: 34.5 GM/DL — SIGNIFICANT CHANGE UP (ref 32–36)
MCV RBC AUTO: 88.5 FL — SIGNIFICANT CHANGE UP (ref 80–100)
PLATELET # BLD AUTO: 275 K/UL — SIGNIFICANT CHANGE UP (ref 150–400)
RBC # BLD: 3.8 M/UL — LOW (ref 4.2–5.8)
RBC # FLD: 13.9 % — SIGNIFICANT CHANGE UP (ref 10.3–14.5)
RH IG SCN BLD-IMP: POSITIVE — SIGNIFICANT CHANGE UP
WBC # BLD: 18.9 K/UL — HIGH (ref 3.8–10.5)
WBC # FLD AUTO: 18.9 K/UL — HIGH (ref 3.8–10.5)

## 2018-11-06 PROCEDURE — 24515 OPTX HUMRL SHFT FX PLATE/SCR: CPT | Mod: RT

## 2018-11-06 RX ORDER — LANOLIN ALCOHOL/MO/W.PET/CERES
3 CREAM (GRAM) TOPICAL AT BEDTIME
Qty: 0 | Refills: 0 | Status: DISCONTINUED | OUTPATIENT
Start: 2018-11-06 | End: 2018-11-09

## 2018-11-06 RX ORDER — INSULIN LISPRO 100/ML
VIAL (ML) SUBCUTANEOUS
Qty: 0 | Refills: 0 | Status: DISCONTINUED | OUTPATIENT
Start: 2018-11-06 | End: 2018-11-09

## 2018-11-06 RX ORDER — DEXTROSE 50 % IN WATER 50 %
25 SYRINGE (ML) INTRAVENOUS ONCE
Qty: 0 | Refills: 0 | Status: DISCONTINUED | OUTPATIENT
Start: 2018-11-06 | End: 2018-11-09

## 2018-11-06 RX ORDER — LATANOPROST 0.05 MG/ML
1 SOLUTION/ DROPS OPHTHALMIC; TOPICAL AT BEDTIME
Qty: 0 | Refills: 0 | Status: DISCONTINUED | OUTPATIENT
Start: 2018-11-06 | End: 2018-11-09

## 2018-11-06 RX ORDER — ONDANSETRON 8 MG/1
4 TABLET, FILM COATED ORAL EVERY 6 HOURS
Qty: 0 | Refills: 0 | Status: DISCONTINUED | OUTPATIENT
Start: 2018-11-06 | End: 2018-11-09

## 2018-11-06 RX ORDER — ATORVASTATIN CALCIUM 80 MG/1
80 TABLET, FILM COATED ORAL AT BEDTIME
Qty: 0 | Refills: 0 | Status: DISCONTINUED | OUTPATIENT
Start: 2018-11-06 | End: 2018-11-09

## 2018-11-06 RX ORDER — PANTOPRAZOLE SODIUM 20 MG/1
40 TABLET, DELAYED RELEASE ORAL
Qty: 0 | Refills: 0 | Status: DISCONTINUED | OUTPATIENT
Start: 2018-11-06 | End: 2018-11-09

## 2018-11-06 RX ORDER — METOPROLOL TARTRATE 50 MG
50 TABLET ORAL DAILY
Qty: 0 | Refills: 0 | Status: DISCONTINUED | OUTPATIENT
Start: 2018-11-06 | End: 2018-11-09

## 2018-11-06 RX ORDER — DEXTROSE 50 % IN WATER 50 %
12.5 SYRINGE (ML) INTRAVENOUS ONCE
Qty: 0 | Refills: 0 | Status: DISCONTINUED | OUTPATIENT
Start: 2018-11-06 | End: 2018-11-09

## 2018-11-06 RX ORDER — CLOPIDOGREL BISULFATE 75 MG/1
75 TABLET, FILM COATED ORAL DAILY
Qty: 0 | Refills: 0 | Status: DISCONTINUED | OUTPATIENT
Start: 2018-11-07 | End: 2018-11-09

## 2018-11-06 RX ORDER — SODIUM CHLORIDE 9 MG/ML
1000 INJECTION, SOLUTION INTRAVENOUS
Qty: 0 | Refills: 0 | Status: DISCONTINUED | OUTPATIENT
Start: 2018-11-06 | End: 2018-11-09

## 2018-11-06 RX ORDER — TRAZODONE HCL 50 MG
25 TABLET ORAL DAILY
Qty: 0 | Refills: 0 | Status: DISCONTINUED | OUTPATIENT
Start: 2018-11-06 | End: 2018-11-09

## 2018-11-06 RX ORDER — TAMSULOSIN HYDROCHLORIDE 0.4 MG/1
0.4 CAPSULE ORAL AT BEDTIME
Qty: 0 | Refills: 0 | Status: DISCONTINUED | OUTPATIENT
Start: 2018-11-06 | End: 2018-11-09

## 2018-11-06 RX ORDER — HYDROMORPHONE HYDROCHLORIDE 2 MG/ML
0.25 INJECTION INTRAMUSCULAR; INTRAVENOUS; SUBCUTANEOUS
Qty: 0 | Refills: 0 | Status: DISCONTINUED | OUTPATIENT
Start: 2018-11-06 | End: 2018-11-07

## 2018-11-06 RX ORDER — OXYCODONE HYDROCHLORIDE 5 MG/1
5 TABLET ORAL EVERY 4 HOURS
Qty: 0 | Refills: 0 | Status: DISCONTINUED | OUTPATIENT
Start: 2018-11-06 | End: 2018-11-08

## 2018-11-06 RX ORDER — GLUCAGON INJECTION, SOLUTION 0.5 MG/.1ML
1 INJECTION, SOLUTION SUBCUTANEOUS ONCE
Qty: 0 | Refills: 0 | Status: DISCONTINUED | OUTPATIENT
Start: 2018-11-06 | End: 2018-11-09

## 2018-11-06 RX ORDER — OXYCODONE HYDROCHLORIDE 5 MG/1
10 TABLET ORAL EVERY 4 HOURS
Qty: 0 | Refills: 0 | Status: DISCONTINUED | OUTPATIENT
Start: 2018-11-06 | End: 2018-11-08

## 2018-11-06 RX ORDER — DEXTROSE 50 % IN WATER 50 %
15 SYRINGE (ML) INTRAVENOUS ONCE
Qty: 0 | Refills: 0 | Status: DISCONTINUED | OUTPATIENT
Start: 2018-11-06 | End: 2018-11-09

## 2018-11-06 RX ORDER — LISINOPRIL 2.5 MG/1
40 TABLET ORAL DAILY
Qty: 0 | Refills: 0 | Status: DISCONTINUED | OUTPATIENT
Start: 2018-11-06 | End: 2018-11-09

## 2018-11-06 RX ORDER — INSULIN LISPRO 100/ML
VIAL (ML) SUBCUTANEOUS AT BEDTIME
Qty: 0 | Refills: 0 | Status: DISCONTINUED | OUTPATIENT
Start: 2018-11-06 | End: 2018-11-09

## 2018-11-06 RX ORDER — SODIUM CHLORIDE 9 MG/ML
1000 INJECTION INTRAMUSCULAR; INTRAVENOUS; SUBCUTANEOUS
Qty: 0 | Refills: 0 | Status: DISCONTINUED | OUTPATIENT
Start: 2018-11-06 | End: 2018-11-09

## 2018-11-06 RX ORDER — SODIUM CHLORIDE 9 MG/ML
3 INJECTION INTRAMUSCULAR; INTRAVENOUS; SUBCUTANEOUS EVERY 8 HOURS
Qty: 0 | Refills: 0 | Status: DISCONTINUED | OUTPATIENT
Start: 2018-11-06 | End: 2018-11-06

## 2018-11-06 RX ORDER — ACETAMINOPHEN 500 MG
650 TABLET ORAL EVERY 6 HOURS
Qty: 0 | Refills: 0 | Status: DISCONTINUED | OUTPATIENT
Start: 2018-11-06 | End: 2018-11-09

## 2018-11-06 RX ADMIN — OXYCODONE HYDROCHLORIDE 5 MILLIGRAM(S): 5 TABLET ORAL at 22:20

## 2018-11-06 RX ADMIN — HYDROMORPHONE HYDROCHLORIDE 0.25 MILLIGRAM(S): 2 INJECTION INTRAMUSCULAR; INTRAVENOUS; SUBCUTANEOUS at 16:31

## 2018-11-06 RX ADMIN — Medication 25 MILLIGRAM(S): at 23:29

## 2018-11-06 RX ADMIN — Medication 25 MILLIGRAM(S): at 23:19

## 2018-11-06 RX ADMIN — LATANOPROST 1 DROP(S): 0.05 SOLUTION/ DROPS OPHTHALMIC; TOPICAL at 22:05

## 2018-11-06 RX ADMIN — ATORVASTATIN CALCIUM 80 MILLIGRAM(S): 80 TABLET, FILM COATED ORAL at 22:57

## 2018-11-06 RX ADMIN — Medication 100 MILLIGRAM(S): at 20:23

## 2018-11-06 RX ADMIN — Medication 1: at 16:48

## 2018-11-06 RX ADMIN — SODIUM CHLORIDE 75 MILLILITER(S): 9 INJECTION INTRAMUSCULAR; INTRAVENOUS; SUBCUTANEOUS at 20:08

## 2018-11-06 RX ADMIN — OXYCODONE HYDROCHLORIDE 5 MILLIGRAM(S): 5 TABLET ORAL at 21:51

## 2018-11-06 RX ADMIN — TAMSULOSIN HYDROCHLORIDE 0.4 MILLIGRAM(S): 0.4 CAPSULE ORAL at 22:57

## 2018-11-06 NOTE — BRIEF OPERATIVE NOTE - PROCEDURE
<<-----Click on this checkbox to enter Procedure Open reduction and internal fixation of fracture of shaft of right humerus  11/06/2018    Active  JATIN

## 2018-11-06 NOTE — CHART NOTE - NSCHARTNOTEFT_GEN_A_CORE
No complaints; Denies SOB/CP/N/V; Pain controlled; No acute events overnight.    T(C): 36.8 (11-06-18 @ 15:28)  T(F): 98.2 (11-06-18 @ 15:28)  HR: 87 (11-06-18 @ 18:30)  BP: 118/57 (11-06-18 @ 18:30)  RR: 14 (11-06-18 @ 18:30)  SpO2: 100% (11-06-18 @ 18:30)  Wt(kg): --    Exam:  Gen: A/O; NAD    RUE:   Dressing/Splint CDI  Sensation/R/U/M/AIN/PIN grossly intact    Decreased sensation & no motor s/p nerve block; Digits WWP; Cap refill <2 secs                          11.6   18.9  )-----------( 275      ( 06 Nov 2018 17:58 )             33.6         A/P: 78y Male s/p R ; POD ; Stable  -Pain control; Ice prn; Elevate  -DVT ppx; IS  -Am labs  -PT eval: NWB in sling/immobilizer  -Cont current tx  -DC planning    Jane Meyer PA-C  Orthopedic Surgery  Pagers [phone] No complaints; Denies SOB/CP/N/V; Pain controlled.    T(C): 36.8 (11-06-18 @ 15:28)  T(F): 98.2 (11-06-18 @ 15:28)  HR: 87 (11-06-18 @ 18:30)  BP: 118/57 (11-06-18 @ 18:30)  RR: 14 (11-06-18 @ 18:30)  SpO2: 100% (11-06-18 @ 18:30)      Exam:  Gen: A/O; NAD    RUE:   Dressing CDI; Sling on  Sensation/R/U/M/AIN/PIN grossly intact  Digits WWP; Cap refill <2 secs           S/p 1U PRBC intraop                     11.6   18.9  )-----------( 275      ( 06 Nov 2018 17:58 )             33.6       A/P: 78y Male s/p R humerus ORIF; Stable  -Pain control; Ice prn; Elevate  -DVT ppx; IS  -OT eval: NWB RUE in sling/immobilizer  -Cont current tx  -DC planning    Jane Meyer PA-C  Orthopedic Surgery  Pagers [phone] No complaints; Denies SOB/CP/N/V; Pain controlled.    T(C): 36.8 (11-06-18 @ 15:28)  T(F): 98.2 (11-06-18 @ 15:28)  HR: 87 (11-06-18 @ 18:30)  BP: 118/57 (11-06-18 @ 18:30)  RR: 14 (11-06-18 @ 18:30)  SpO2: 100% (11-06-18 @ 18:30)      Exam:  Gen: A/O; NAD    RUE:   Dressing CDI; Sling on  Sensation/R/U/M/AIN/PIN grossly intact  Digits WWP; Cap refill <2 secs           S/p 1U PRBC intraop                     11.6   18.9  )-----------( 275      ( 06 Nov 2018 17:58 )             33.6       A/P: 78y Male s/p R humerus ORIF; Stable  -Pain control; Ice prn; Elevate  -DVT ppx; IS  -OT eval: WBAT RUE; Sling/immobilizer for comfort  -Cont current tx  -DC planning    Jane Meyer PA-C  Orthopedic Surgery  Pagers [phone]

## 2018-11-06 NOTE — H&P ADULT - HISTORY OF PRESENT ILLNESS
78 year old male s/p fall 3 weeks ago with right humeral shaft fx, widely displaced with no signs of healing. He has a complex PMHx as stated below.

## 2018-11-06 NOTE — H&P ADULT - NSHPPHYSICALEXAM_GEN_ALL_CORE
Awake and alert  RUE- compartments soft, R/U/M/AIN/PIN M+. SILT at the distal extremities   + resolving echymosis over the fx site  Head ATNC

## 2018-11-06 NOTE — H&P ADULT - ATTENDING COMMENTS
R Humeral shaft fracture severe displaced, failed nonop mgmt. Incidated for ORIF. All RBAs discussed. All questions answered. Informed consent obtained.

## 2018-11-06 NOTE — H&P ADULT - ASSESSMENT
Assessed 78 M with right humeral shaft fx with no evidence of healing or better alignment with non operative management Plan for ORIF Today with Dr. Garza

## 2018-11-07 LAB
ANION GAP SERPL CALC-SCNC: 10 MMOL/L — SIGNIFICANT CHANGE UP (ref 5–17)
ANION GAP SERPL CALC-SCNC: 10 MMOL/L — SIGNIFICANT CHANGE UP (ref 5–17)
BUN SERPL-MCNC: 33 MG/DL — HIGH (ref 7–23)
BUN SERPL-MCNC: 37 MG/DL — HIGH (ref 7–23)
CALCIUM SERPL-MCNC: 7.9 MG/DL — LOW (ref 8.4–10.5)
CALCIUM SERPL-MCNC: 8.1 MG/DL — LOW (ref 8.4–10.5)
CHLORIDE SERPL-SCNC: 104 MMOL/L — SIGNIFICANT CHANGE UP (ref 96–108)
CHLORIDE SERPL-SCNC: 105 MMOL/L — SIGNIFICANT CHANGE UP (ref 96–108)
CO2 SERPL-SCNC: 21 MMOL/L — LOW (ref 22–31)
CO2 SERPL-SCNC: 22 MMOL/L — SIGNIFICANT CHANGE UP (ref 22–31)
CREAT SERPL-MCNC: 1.01 MG/DL — SIGNIFICANT CHANGE UP (ref 0.5–1.3)
CREAT SERPL-MCNC: 1.07 MG/DL — SIGNIFICANT CHANGE UP (ref 0.5–1.3)
GLUCOSE SERPL-MCNC: 154 MG/DL — HIGH (ref 70–99)
GLUCOSE SERPL-MCNC: 198 MG/DL — HIGH (ref 70–99)
HCT VFR BLD CALC: 24.7 % — LOW (ref 39–50)
HCT VFR BLD CALC: 25.8 % — LOW (ref 39–50)
HGB BLD-MCNC: 8.7 G/DL — LOW (ref 13–17)
HGB BLD-MCNC: 8.8 G/DL — LOW (ref 13–17)
MCHC RBC-ENTMCNC: 30.5 PG — SIGNIFICANT CHANGE UP (ref 27–34)
MCHC RBC-ENTMCNC: 31.5 PG — SIGNIFICANT CHANGE UP (ref 27–34)
MCHC RBC-ENTMCNC: 34.3 GM/DL — SIGNIFICANT CHANGE UP (ref 32–36)
MCHC RBC-ENTMCNC: 35.2 GM/DL — SIGNIFICANT CHANGE UP (ref 32–36)
MCV RBC AUTO: 89 FL — SIGNIFICANT CHANGE UP (ref 80–100)
MCV RBC AUTO: 89.3 FL — SIGNIFICANT CHANGE UP (ref 80–100)
PLATELET # BLD AUTO: 208 K/UL — SIGNIFICANT CHANGE UP (ref 150–400)
PLATELET # BLD AUTO: 223 K/UL — SIGNIFICANT CHANGE UP (ref 150–400)
POTASSIUM SERPL-MCNC: 4.7 MMOL/L — SIGNIFICANT CHANGE UP (ref 3.5–5.3)
POTASSIUM SERPL-MCNC: 4.8 MMOL/L — SIGNIFICANT CHANGE UP (ref 3.5–5.3)
POTASSIUM SERPL-SCNC: 4.7 MMOL/L — SIGNIFICANT CHANGE UP (ref 3.5–5.3)
POTASSIUM SERPL-SCNC: 4.8 MMOL/L — SIGNIFICANT CHANGE UP (ref 3.5–5.3)
RBC # BLD: 2.77 M/UL — LOW (ref 4.2–5.8)
RBC # BLD: 2.9 M/UL — LOW (ref 4.2–5.8)
RBC # FLD: 14 % — SIGNIFICANT CHANGE UP (ref 10.3–14.5)
RBC # FLD: 14 % — SIGNIFICANT CHANGE UP (ref 10.3–14.5)
SODIUM SERPL-SCNC: 136 MMOL/L — SIGNIFICANT CHANGE UP (ref 135–145)
SODIUM SERPL-SCNC: 136 MMOL/L — SIGNIFICANT CHANGE UP (ref 135–145)
WBC # BLD: 8.2 K/UL — SIGNIFICANT CHANGE UP (ref 3.8–10.5)
WBC # BLD: 9 K/UL — SIGNIFICANT CHANGE UP (ref 3.8–10.5)
WBC # FLD AUTO: 8.2 K/UL — SIGNIFICANT CHANGE UP (ref 3.8–10.5)
WBC # FLD AUTO: 9 K/UL — SIGNIFICANT CHANGE UP (ref 3.8–10.5)

## 2018-11-07 RX ORDER — ACETAMINOPHEN 500 MG
1000 TABLET ORAL ONCE
Qty: 0 | Refills: 0 | Status: COMPLETED | OUTPATIENT
Start: 2018-11-07 | End: 2018-11-07

## 2018-11-07 RX ORDER — INFLUENZA VIRUS VACCINE 15; 15; 15; 15 UG/.5ML; UG/.5ML; UG/.5ML; UG/.5ML
0.5 SUSPENSION INTRAMUSCULAR ONCE
Qty: 0 | Refills: 0 | Status: COMPLETED | OUTPATIENT
Start: 2018-11-07 | End: 2018-11-07

## 2018-11-07 RX ADMIN — OXYCODONE HYDROCHLORIDE 5 MILLIGRAM(S): 5 TABLET ORAL at 22:59

## 2018-11-07 RX ADMIN — OXYCODONE HYDROCHLORIDE 5 MILLIGRAM(S): 5 TABLET ORAL at 23:30

## 2018-11-07 RX ADMIN — Medication 650 MILLIGRAM(S): at 13:00

## 2018-11-07 RX ADMIN — Medication 650 MILLIGRAM(S): at 23:30

## 2018-11-07 RX ADMIN — Medication 650 MILLIGRAM(S): at 22:56

## 2018-11-07 RX ADMIN — ATORVASTATIN CALCIUM 80 MILLIGRAM(S): 80 TABLET, FILM COATED ORAL at 22:55

## 2018-11-07 RX ADMIN — Medication 400 MILLIGRAM(S): at 02:51

## 2018-11-07 RX ADMIN — Medication 650 MILLIGRAM(S): at 12:27

## 2018-11-07 RX ADMIN — Medication 1000 MILLIGRAM(S): at 03:14

## 2018-11-07 RX ADMIN — Medication 50 MILLIGRAM(S): at 06:38

## 2018-11-07 RX ADMIN — CLOPIDOGREL BISULFATE 75 MILLIGRAM(S): 75 TABLET, FILM COATED ORAL at 12:29

## 2018-11-07 RX ADMIN — Medication 1: at 09:53

## 2018-11-07 RX ADMIN — Medication 1: at 13:38

## 2018-11-07 RX ADMIN — PANTOPRAZOLE SODIUM 40 MILLIGRAM(S): 20 TABLET, DELAYED RELEASE ORAL at 06:38

## 2018-11-07 RX ADMIN — TAMSULOSIN HYDROCHLORIDE 0.4 MILLIGRAM(S): 0.4 CAPSULE ORAL at 22:56

## 2018-11-07 RX ADMIN — LATANOPROST 1 DROP(S): 0.05 SOLUTION/ DROPS OPHTHALMIC; TOPICAL at 22:55

## 2018-11-07 RX ADMIN — Medication 100 MILLIGRAM(S): at 03:34

## 2018-11-07 NOTE — PHYSICAL THERAPY INITIAL EVALUATION ADULT - GENERAL OBSERVATIONS, REHAB EVAL
Pt tolerated 45min PT initial evaluation fairly well, Pt POD1 s/p L arm ORIF. Pt rec'd in bed in NAD, +sling, ice. Pt A&O x2-3 with options, VC to keep eyes open.

## 2018-11-07 NOTE — PHYSICAL THERAPY INITIAL EVALUATION ADULT - ADDITIONAL COMMENTS
Pt poor historian, per CM not from previous admission pt lives alone in an apartment with 3 steps to enter. Pt has single axis cane and RW.

## 2018-11-07 NOTE — OCCUPATIONAL THERAPY INITIAL EVALUATION ADULT - PLANNED THERAPY INTERVENTIONS, OT EVAL
cognitive, visual perceptual/balance training/ADL retraining/bed mobility training/transfer training

## 2018-11-07 NOTE — OCCUPATIONAL THERAPY INITIAL EVALUATION ADULT - LIVES WITH, PROFILE
alone/Pt lives alone in apartment with 3 steps to enter, walk in shower in bathroom. Pt I in ADLs and ambulation prior admission

## 2018-11-07 NOTE — PHYSICAL THERAPY INITIAL EVALUATION ADULT - MD ORDER
JUDYE FWB, RUE WBAT, BLE FWB, PT Evaluate and treat, gentle codman's/penduliums, elbow>digit AROM AAROM.

## 2018-11-07 NOTE — PHYSICAL THERAPY INITIAL EVALUATION ADULT - TRANSFER SAFETY CONCERNS NOTED: SIT/STAND, REHAB EVAL
stand pivot/decreased safety awareness/decreased weight-shifting ability/decreased balance during turns

## 2018-11-07 NOTE — OCCUPATIONAL THERAPY INITIAL EVALUATION ADULT - BALANCE TRAINING, PT EVAL
Pt will demonstrate improved static/dynamic balance to fair in order to increase safety and independence in ADLs within 4 weeks

## 2018-11-07 NOTE — PHYSICAL THERAPY INITIAL EVALUATION ADULT - RANGE OF MOTION EXAMINATION, REHAB EVAL
LUE shoulder flexion limited 2/2 ORIF/Right UE ROM was WFL (within functional limits)/bilateral lower extremity ROM was WFL (within functional limits) RUE shoulder flexion limited 2/2 ORIF/bilateral lower extremity ROM was WFL (within functional limits)/Left UE ROM was WFL (within functional limits)

## 2018-11-07 NOTE — OCCUPATIONAL THERAPY INITIAL EVALUATION ADULT - PERTINENT HX OF CURRENT PROBLEM, REHAB EVAL
78 year old male s/p fall 3 weeks ago with right humeral shaft fx, widely displaced with no signs of healing. Pt s/p R humerus ORIF See below

## 2018-11-07 NOTE — PHYSICAL THERAPY INITIAL EVALUATION ADULT - PERTINENT HX OF CURRENT PROBLEM, REHAB EVAL
78 year old male s/p fall 3 weeks ago with right humeral shaft fx, widely displaced with no signs of healing. Pt s/p R humerus ORIF

## 2018-11-07 NOTE — PHYSICAL THERAPY INITIAL EVALUATION ADULT - MANUAL MUSCLE TESTING RESULTS, REHAB EVAL
BLE at least 3/5 grossly throughout, L wrist and fingers 3/5/grossly assessed due to RUE and BLE at least 3/5 grossly throughout, R wrist and fingers 3/5/grossly assessed due to

## 2018-11-07 NOTE — PROVIDER CONTACT NOTE (OTHER) - ASSESSMENT
pt with previous IV symptomatic. D/C as per policy. New IV attempted x2 by RN and MIKAELA Burns. Unable to establish new IV access.

## 2018-11-07 NOTE — PHYSICAL THERAPY INITIAL EVALUATION ADULT - GAIT DEVIATIONS NOTED, PT EVAL
decreased stride length/decreased velocity of limb motion/decreased swing-to-stance ratio/increased time in double stance/decreased weight-shifting ability/decreased anisa/decreased step length/shuffled

## 2018-11-07 NOTE — PROGRESS NOTE ADULT - SUBJECTIVE AND OBJECTIVE BOX
No acute events overnight. Pain well controlled with pain medications.    Vital Signs Last 24 Hrs  T(C): 36.6 (07 Nov 2018 05:12), Max: 36.8 (06 Nov 2018 15:28)  T(F): 97.8 (07 Nov 2018 05:12), Max: 98.2 (06 Nov 2018 15:28)  HR: 87 (07 Nov 2018 05:12) (76 - 90)  BP: 108/64 (07 Nov 2018 05:12) (92/50 - 147/67)  BP(mean): 85 (07 Nov 2018 01:00) (70 - 103)  RR: 18 (07 Nov 2018 05:12) (14 - 18)  SpO2: 98% (07 Nov 2018 05:12) (97% - 100%)    Exam:  Gen: NAD  Motor: intact motor function with AIN/PIN/Median/Radial/Ulnar nerve distributions, but weak, tremulous  Sensory: SILT Median/Radial/Ulnar nerve distributions  Vascular: 2+ Radial pulse  Dressing: Clean, Dry, Intact    A/P:  78 year old M s/p R humeral shaft ORIF POD#1    - Pain Control  - Regular Diet  - PT/OT, OOB  - Monitor labs  - Sling, WBAT RUE  - Discharge Planning      Livier NOVOA

## 2018-11-08 ENCOUNTER — TRANSCRIPTION ENCOUNTER (OUTPATIENT)
Age: 78
End: 2018-11-08

## 2018-11-08 LAB
ANION GAP SERPL CALC-SCNC: 13 MMOL/L — SIGNIFICANT CHANGE UP (ref 5–17)
BLD GP AB SCN SERPL QL: NEGATIVE — SIGNIFICANT CHANGE UP
BUN SERPL-MCNC: 20 MG/DL — SIGNIFICANT CHANGE UP (ref 7–23)
CALCIUM SERPL-MCNC: 8.5 MG/DL — SIGNIFICANT CHANGE UP (ref 8.4–10.5)
CHLORIDE SERPL-SCNC: 102 MMOL/L — SIGNIFICANT CHANGE UP (ref 96–108)
CO2 SERPL-SCNC: 22 MMOL/L — SIGNIFICANT CHANGE UP (ref 22–31)
CREAT SERPL-MCNC: 0.71 MG/DL — SIGNIFICANT CHANGE UP (ref 0.5–1.3)
GLUCOSE SERPL-MCNC: 157 MG/DL — HIGH (ref 70–99)
POTASSIUM SERPL-MCNC: 4.3 MMOL/L — SIGNIFICANT CHANGE UP (ref 3.5–5.3)
POTASSIUM SERPL-SCNC: 4.3 MMOL/L — SIGNIFICANT CHANGE UP (ref 3.5–5.3)
RH IG SCN BLD-IMP: POSITIVE — SIGNIFICANT CHANGE UP
SODIUM SERPL-SCNC: 137 MMOL/L — SIGNIFICANT CHANGE UP (ref 135–145)

## 2018-11-08 PROCEDURE — 93010 ELECTROCARDIOGRAM REPORT: CPT

## 2018-11-08 RX ORDER — ACETAMINOPHEN 500 MG
1000 TABLET ORAL ONCE
Qty: 0 | Refills: 0 | Status: COMPLETED | OUTPATIENT
Start: 2018-11-08 | End: 2018-11-08

## 2018-11-08 RX ORDER — OXYCODONE HYDROCHLORIDE 5 MG/1
1 TABLET ORAL
Qty: 0 | Refills: 0 | COMMUNITY
Start: 2018-11-08

## 2018-11-08 RX ORDER — OXYCODONE HYDROCHLORIDE 5 MG/1
1 TABLET ORAL
Qty: 0 | Refills: 0 | DISCHARGE
Start: 2018-11-08

## 2018-11-08 RX ORDER — OXYCODONE HYDROCHLORIDE 5 MG/1
2.5 TABLET ORAL EVERY 4 HOURS
Qty: 0 | Refills: 0 | Status: DISCONTINUED | OUTPATIENT
Start: 2018-11-08 | End: 2018-11-09

## 2018-11-08 RX ORDER — OXYCODONE HYDROCHLORIDE 5 MG/1
5 TABLET ORAL EVERY 4 HOURS
Qty: 0 | Refills: 0 | Status: DISCONTINUED | OUTPATIENT
Start: 2018-11-08 | End: 2018-11-09

## 2018-11-08 RX ORDER — OXYCODONE HYDROCHLORIDE 5 MG/1
2.5 TABLET ORAL
Qty: 0 | Refills: 0 | DISCHARGE
Start: 2018-11-08

## 2018-11-08 RX ORDER — ACETAMINOPHEN 500 MG
2 TABLET ORAL
Qty: 0 | Refills: 0 | DISCHARGE
Start: 2018-11-08

## 2018-11-08 RX ADMIN — LATANOPROST 1 DROP(S): 0.05 SOLUTION/ DROPS OPHTHALMIC; TOPICAL at 22:56

## 2018-11-08 RX ADMIN — Medication 1: at 09:15

## 2018-11-08 RX ADMIN — Medication 650 MILLIGRAM(S): at 07:30

## 2018-11-08 RX ADMIN — PANTOPRAZOLE SODIUM 40 MILLIGRAM(S): 20 TABLET, DELAYED RELEASE ORAL at 06:51

## 2018-11-08 RX ADMIN — ATORVASTATIN CALCIUM 80 MILLIGRAM(S): 80 TABLET, FILM COATED ORAL at 22:56

## 2018-11-08 RX ADMIN — Medication 1: at 16:59

## 2018-11-08 RX ADMIN — Medication 50 MILLIGRAM(S): at 06:51

## 2018-11-08 RX ADMIN — Medication 1000 MILLIGRAM(S): at 23:37

## 2018-11-08 RX ADMIN — TAMSULOSIN HYDROCHLORIDE 0.4 MILLIGRAM(S): 0.4 CAPSULE ORAL at 22:56

## 2018-11-08 RX ADMIN — Medication 650 MILLIGRAM(S): at 14:26

## 2018-11-08 RX ADMIN — Medication 400 MILLIGRAM(S): at 23:07

## 2018-11-08 RX ADMIN — Medication 1: at 12:20

## 2018-11-08 RX ADMIN — CLOPIDOGREL BISULFATE 75 MILLIGRAM(S): 75 TABLET, FILM COATED ORAL at 12:13

## 2018-11-08 RX ADMIN — Medication 650 MILLIGRAM(S): at 06:53

## 2018-11-08 RX ADMIN — Medication 650 MILLIGRAM(S): at 14:30

## 2018-11-08 RX ADMIN — LISINOPRIL 40 MILLIGRAM(S): 2.5 TABLET ORAL at 06:51

## 2018-11-08 NOTE — PROGRESS NOTE ADULT - SUBJECTIVE AND OBJECTIVE BOX
Post op Day #2 s/p ORIF R humeral shaft    Patient resting without complaints.  No chest pain, SOB, N/V.      T(C): 36.4 (11-08-18 @ 05:27), Max: 36.9 (11-07-18 @ 14:55)  HR: 73 (11-08-18 @ 05:27) (71 - 82)  BP: 149/72 (11-08-18 @ 05:27) (110/67 - 163/71)  RR: 18 (11-08-18 @ 05:27) (18 - 18)  SpO2: 99% (11-08-18 @ 05:27) (96% - 100%)  Wt(kg): --    Exam:  Alert, No Acute Distress  Upper Extremity: Right Humerus- Dressing Right humerus and ace c/d/i, sling in place  Compartments soft, compressible  Calves Soft, Non-tender bilaterally  + Right wrist flexion, extension, ulnar, radial deviation 3/5  SILT  +Radial Pulse                          8.8    9.0   )-----------( 223      ( 07 Nov 2018 15:43 )             25.8    11-07    136  |  104  |  33<H>  ----------------------------<  198<H>  4.7   |  22  |  1.07    Ca    8.1<L>      07 Nov 2018 15:43

## 2018-11-08 NOTE — DISCHARGE NOTE ADULT - PATIENT PORTAL LINK FT
You can access the [facility] Patient Portal, offered by [facility], by registering with the following website: [email or web address]

## 2018-11-08 NOTE — DISCHARGE NOTE ADULT - CARE PLAN
Principal Discharge DX:	Closed displaced transverse fracture of shaft of right humerus with delayed healing, subsequent encounter  Goal:	improved ADL's, pain control  Assessment and plan of treatment:	Please follow up with Dr. Garza after your discharge from Rehab (2 weeks).  PT-weight bearing as tolerated.  Aspirin and Plavix for dvt prevention.  Keep dressing clean and intact, have doctor remove staples post op day 14 and apply steristrips if applicable.

## 2018-11-08 NOTE — DISCHARGE NOTE ADULT - MEDICATION SUMMARY - MEDICATIONS TO TAKE
I will START or STAY ON the medications listed below when I get home from the hospital:    glaucometer  -- Use as directed  -- Indication: For diabetes    Humeral Shaft Fracture Brace  -- 1   -- Indication: For fracture    acetaminophen 325 mg oral tablet  -- 2 tab(s) by mouth every 6 hours, As needed, Temp greater or equal to 38C (100.4F), Mild Pain (1 - 3)  -- Indication: For temp/pain    oxyCODONE 5 mg oral tablet  -- 1 tab(s) by mouth every 4 hours, As needed, Severe Pain (7 - 10)  -- Indication: For pain    oxyCODONE  -- 2.5 milligram(s) by mouth every 4 hours, As Needed moderate pain  -- Indication: For pain    aspirin 81 mg oral delayed release tablet  -- 1 tab(s) by mouth once a day  -- Indication: For antiplatelet    ramipril 10 mg oral capsule  -- 1 cap(s) by mouth once a day  -- Indication: For blood pressure    tamsulosin 0.4 mg oral capsule  -- 1 cap(s) by mouth once a day (at bedtime)  -- Indication: For BPH    traZODone 50 mg oral tablet  -- 0.5 tab(s) by mouth once a day, As Needed Insomnia  -- Indication: For sleep    metFORMIN 1000 mg oral tablet  -- 1 tab(s) by mouth 2 times a day  -- Indication: For diabetes    glimepiride 4 mg oral tablet  -- 1 tab(s) by mouth 2 times a day  -- Indication: For diabetes    atorvastatin 80 mg oral tablet  -- 1 tab(s) by mouth once a day (at bedtime)  -- Indication: For Cholesterol    clopidogrel 75 mg oral tablet  -- 1 tab(s) by mouth once a day  -- Indication: For antiplatelet    metoprolol succinate 50 mg oral tablet, extended release  -- 1 tab(s) by mouth once a day  -- Indication: For blood pressure    melatonin 3 mg oral tablet  -- 2 tab(s) by mouth once a day (at bedtime)  -- Indication: For sleep    latanoprost 0.005% ophthalmic solution  -- 1 drop(s) to each affected eye once a day (in the evening)  -- Indication: For eyedrop    pantoprazole 40 mg oral delayed release tablet  -- 1 tab(s) by mouth 2 times a day  -- Indication: For gi prevention

## 2018-11-08 NOTE — PROGRESS NOTE ADULT - PROBLEM SELECTOR PLAN 1
PT/OT-WBAT  IS  DVT PPx- Aspirin/Plavix, SCD's  Sling in place  Pain Control  Continue Current Tx.  Dispo planning    Meg Allen PA-C  Team Pager: #[phone]

## 2018-11-08 NOTE — DISCHARGE NOTE ADULT - MEDICATION SUMMARY - MEDICATIONS TO CHANGE
I will SWITCH the dose or number of times a day I take the medications listed below when I get home from the hospital:    oxyCODONE 5 mg oral tablet  -- 1 tab(s) by mouth every 6 hours, As needed, Severe Pain (7 - 10)

## 2018-11-08 NOTE — PROGRESS NOTE ADULT - PROBLEM SELECTOR PROBLEM 1
Closed displaced transverse fracture of shaft of right humerus with delayed healing, subsequent encounter

## 2018-11-08 NOTE — DISCHARGE NOTE ADULT - PLAN OF CARE
improved ADL's, pain control Please follow up with Dr. Garza after your discharge from Rehab (2 weeks).  PT-weight bearing as tolerated.  Aspirin and Plavix for dvt prevention.  Keep dressing clean and intact, have doctor remove staples post op day 14 and apply steristrips if applicable.

## 2018-11-08 NOTE — DISCHARGE NOTE ADULT - MEDICATION SUMMARY - MEDICATIONS TO STOP TAKING
I will STOP taking the medications listed below when I get home from the hospital:    amoxicillin-clavulanate 875 mg-125 mg oral tablet  -- 1 tab(s) by mouth once  Please give tonight 10/1

## 2018-11-08 NOTE — PROGRESS NOTE ADULT - SUBJECTIVE AND OBJECTIVE BOX
No acute events overnight. Pain well controlled with pain medications.    Vital Signs Last 24 Hrs  T(C): 36.4 (08 Nov 2018 05:27), Max: 36.9 (07 Nov 2018 14:55)  T(F): 97.6 (08 Nov 2018 05:27), Max: 98.5 (07 Nov 2018 14:55)  HR: 73 (08 Nov 2018 05:27) (71 - 82)  BP: 149/72 (08 Nov 2018 05:27) (110/67 - 163/71)  BP(mean): --  RR: 18 (08 Nov 2018 05:27) (18 - 18)  SpO2: 99% (08 Nov 2018 05:27) (96% - 100%)    Exam:  Gen: NAD  Motor: intact motor function with AIN/PIN/Median/Radial/Ulnar nerve distributions, but weak, tremulous  Sensory: SILT Median/Radial/Ulnar nerve distributions  Vascular: 2+ Radial pulse  Dressing: Clean, Dry, Intact    A/P:  78 year old M s/p R humeral shaft ORIF POD#2    - Pain Control  - Regular Diet  - PT/OT, OOB  - Monitor labs  - Sling, WBAT RUE  - Discharge Planning      [place]

## 2018-11-08 NOTE — DISCHARGE NOTE ADULT - NS AS ACTIVITY OBS
No Heavy lifting/straining/Walking-Indoors allowed/Walking-Outdoors allowed/Stairs allowed/Do not make important decisions/Do not drive or operate machinery/Showering allowed

## 2018-11-08 NOTE — DISCHARGE NOTE ADULT - HOSPITAL COURSE
This is a 78 year old male admitted to [facility] on 11/6/18 with a R Humeral Shaft Fracture.  Surgery was unremarkable.  Evaluated and treated by PT, recommended for subacute rehab.  Remain of hospital stay unremarkable, and patient discharged to Rehab when bed available. Reason for Admission: Right humeral shaft fx	  History of Present Illness: 	  78 year old male s/p fall 3 weeks ago with right humeral shaft fx, widely displaced with no signs of healing. He has a complex PMHx as stated below.      Review of Systems:  Other Review of Systems: All other review of systems negative, except as noted in HPI	      Allergies and Intolerances:        Allergies:  	No Known Allergies:     Home Medications:   * Incomplete Medication History as of 06-Nov-2018 11:43 documented in Structured Notes  · 	amoxicillin-clavulanate 875 mg-125 mg oral tablet: 1 tab(s) orally once  	Please give tonight 10/1, Last Dose Taken: 30-Oct-2018   · 	pantoprazole 40 mg oral delayed release tablet: 1 tab(s) orally 2 times a day, Last Dose Taken: 06-Nov-2018 6:00 AM  · 	oxyCODONE 5 mg oral tablet: 1 tab(s) orally every 6 hours, As needed, Severe Pain (7 - 10), Last Dose Taken: 04-Nov-2018   · 	melatonin 3 mg oral tablet: 2 tab(s) orally once a day (at bedtime), Last Dose Taken: 05-Nov-2018 PM  · 	Humeral Shaft Fracture Brace: 1 , Last Dose Taken:    · 	atorvastatin 80 mg oral tablet: 1 tab(s) orally once a day (at bedtime), Last Dose Taken: 05-Nov-2018 PM  · 	aspirin 81 mg oral delayed release tablet: 1 tab(s) orally once a day, Last Dose Taken: 31-Oct-2018   · 	clopidogrel 75 mg oral tablet: 1 tab(s) orally once a day, Last Dose Taken: 31-Oct-2018   · 	metoprolol succinate 50 mg oral tablet, extended release: 1 tab(s) orally once a day, Last Dose Taken: 06-Nov-2018 8:00 AM  · 	tamsulosin 0.4 mg oral capsule: 1 cap(s) orally once a day (at bedtime), Last Dose Taken: 05-Nov-2018 PM  · 	glaucometer: Use as directed, Last Dose Taken:    · 	metFORMIN 1000 mg oral tablet: 1 tab(s) orally 2 times a day, Last Dose Taken: 05-Nov-2018 5:00 PM  · 	glimepiride 4 mg oral tablet: 1 tab(s) orally 2 times a day, Last Dose Taken:    · 	latanoprost 0.005% ophthalmic solution: 1 drop(s) to each affected eye once a day (in the evening), Last Dose Taken: 05-Nov-2018 PM  · 	ramipril 10 mg oral capsule: 1 cap(s) orally once a day, Last Dose Taken: 06-Nov-2018 8:00 AM  · 	traZODone 50 mg oral tablet: 0.5 tab(s) orally once a day, As Needed Insomnia, Last Dose Taken: UNKNOWN UNKNOWN    Patient History:    Past Medical History:  BPH (benign prostatic hyperplasia)    CAD (Coronary Artery Disease)  s/p cardiac stent ( > 20 years ago)  DM (Diabetes Mellitus)    HTN (Hypertension)    Hypercholesterolemia.     Past Surgical History:  Coronary Stent  x 2 ( 20 years ).    This is a 78 year old male admitted to [facility] on 11/6/18 with a R Humeral Shaft Fracture.  Surgery was unremarkable.  Evaluated and treated by PT, recommended for subacute rehab.  Remain of hospital stay unremarkable, and patient discharged to Rehab when bed available.

## 2018-11-08 NOTE — DISCHARGE NOTE ADULT - ADDITIONAL INSTRUCTIONS
Please follow up with Dr. Garza.  See your PCP within 1 month of hospital discharge.  Recommend outpatient follow up with Neurology for possible Dementia eval. Please follow up with Dr. Garza after your discharge from Rehab (2 weeks).  PT-weight bearing as tolerated.  Aspirin and Plavix for dvt prevention.  Keep dressing clean and intact, have doctor remove staples post op day 14 and apply steristrips if applicable.  Please follow up with Dr. Garza.  See your PCP within 1 month of hospital discharge.  Recommend outpatient follow up with Neurology for possible Dementia eval.

## 2018-11-09 VITALS
SYSTOLIC BLOOD PRESSURE: 133 MMHG | TEMPERATURE: 98 F | RESPIRATION RATE: 18 BRPM | HEART RATE: 73 BPM | OXYGEN SATURATION: 100 % | DIASTOLIC BLOOD PRESSURE: 72 MMHG

## 2018-11-09 PROCEDURE — 86901 BLOOD TYPING SEROLOGIC RH(D): CPT

## 2018-11-09 PROCEDURE — 97116 GAIT TRAINING THERAPY: CPT

## 2018-11-09 PROCEDURE — C1769: CPT

## 2018-11-09 PROCEDURE — C1713: CPT

## 2018-11-09 PROCEDURE — 82962 GLUCOSE BLOOD TEST: CPT

## 2018-11-09 PROCEDURE — 83036 HEMOGLOBIN GLYCOSYLATED A1C: CPT

## 2018-11-09 PROCEDURE — 80048 BASIC METABOLIC PNL TOTAL CA: CPT

## 2018-11-09 PROCEDURE — 86850 RBC ANTIBODY SCREEN: CPT

## 2018-11-09 PROCEDURE — C1889: CPT

## 2018-11-09 PROCEDURE — P9016: CPT

## 2018-11-09 PROCEDURE — 85027 COMPLETE CBC AUTOMATED: CPT

## 2018-11-09 PROCEDURE — 86923 COMPATIBILITY TEST ELECTRIC: CPT

## 2018-11-09 PROCEDURE — 76000 FLUOROSCOPY <1 HR PHYS/QHP: CPT

## 2018-11-09 PROCEDURE — 36430 TRANSFUSION BLD/BLD COMPNT: CPT

## 2018-11-09 PROCEDURE — 93005 ELECTROCARDIOGRAM TRACING: CPT

## 2018-11-09 PROCEDURE — 97110 THERAPEUTIC EXERCISES: CPT

## 2018-11-09 PROCEDURE — 86900 BLOOD TYPING SEROLOGIC ABO: CPT

## 2018-11-09 PROCEDURE — 97162 PT EVAL MOD COMPLEX 30 MIN: CPT

## 2018-11-09 PROCEDURE — 97530 THERAPEUTIC ACTIVITIES: CPT

## 2018-11-09 PROCEDURE — 97166 OT EVAL MOD COMPLEX 45 MIN: CPT

## 2018-11-09 RX ADMIN — CLOPIDOGREL BISULFATE 75 MILLIGRAM(S): 75 TABLET, FILM COATED ORAL at 12:47

## 2018-11-09 RX ADMIN — Medication 1: at 09:32

## 2018-11-09 RX ADMIN — LISINOPRIL 40 MILLIGRAM(S): 2.5 TABLET ORAL at 07:32

## 2018-11-09 RX ADMIN — PANTOPRAZOLE SODIUM 40 MILLIGRAM(S): 20 TABLET, DELAYED RELEASE ORAL at 07:32

## 2018-11-09 RX ADMIN — Medication 50 MILLIGRAM(S): at 07:32

## 2018-11-09 RX ADMIN — Medication 2: at 12:45

## 2018-11-09 NOTE — PROGRESS NOTE ADULT - ASSESSMENT
79 y/o M Right humeral shaft fx-ORIF POD#3, d/c planning for rehab when bed availble  Dea Yi PA-C  Orthopaedic Surgery  Team pager [phone]  [facility] [phone]  [phone]

## 2018-11-09 NOTE — PROGRESS NOTE ADULT - SUBJECTIVE AND OBJECTIVE BOX
Patient is a 78y old  Male who presents with a chief complaint of Right humeral shaft fx-ORIF POD#3  Patient resting without complaints. alert, confused at times,   No chest pain, SOB, N/V.    T(C): 36.4 (11-09-18 @ 05:01), Max: 37.1 (11-08-18 @ 13:58)  HR: 82 (11-09-18 @ 05:01) (73 - 82)  BP: 138/60 (11-09-18 @ 05:01) (102/55 - 161/76)  RR: 18 (11-09-18 @ 05:01) (18 - 18)  SpO2: 99% (11-09-18 @ 05:01) (98% - 100%)    Exam:  Alert and Oriented, No Acute Distress  Cards: +S1/S2, RRR  Pulm: CTAB  U/E: moving digits, sensation intact  Lower Extremities:  Calves Soft, Non-tender bilaterally  +PF/DF/EHL/FHL  SILT  +DP Pulse                         9.5    9.6   )-----------( 219      ( 08 Nov 2018 08:42 )             26.7    11-08  137  |  102  |  20  ----------------------------<  157<H>  4.3   |  22  |  0.71  Ca    8.5      08 Nov 2018 08:32

## 2018-11-09 NOTE — PROGRESS NOTE ADULT - ASSESSMENT
78 M POD#3 from right humerus ORIF.    - dressing changed today  - PT/OT/OOB  - DVT ppx  - I/S  - dispo planning home today

## 2018-11-09 NOTE — PROGRESS NOTE ADULT - SUBJECTIVE AND OBJECTIVE BOX
Orthopedic Surgery Progress Note     S: Patient seen and examined today. No acute events overnight. Pain is well controlled. Denies f/c, chest pain, shortness of breath, dizziness. Patient removed dressing overnight.    MEDICATIONS  (STANDING):  atorvastatin 80 milliGRAM(s) Oral at bedtime  ceFAZolin   IVPB 2000 milliGRAM(s) IV Intermittent once  clopidogrel Tablet 75 milliGRAM(s) Oral daily  dextrose 5%. 1000 milliLiter(s) (50 mL/Hr) IV Continuous <Continuous>  dextrose 50% Injectable 12.5 Gram(s) IV Push once  dextrose 50% Injectable 25 Gram(s) IV Push once  dextrose 50% Injectable 25 Gram(s) IV Push once  influenza   Vaccine 0.5 milliLiter(s) IntraMuscular once  insulin lispro (HumaLOG) corrective regimen sliding scale   SubCutaneous at bedtime  insulin lispro (HumaLOG) corrective regimen sliding scale   SubCutaneous three times a day before meals  latanoprost 0.005% Ophthalmic Solution 1 Drop(s) Both EYES at bedtime  lisinopril 40 milliGRAM(s) Oral daily  metoprolol succinate ER 50 milliGRAM(s) Oral daily  pantoprazole    Tablet 40 milliGRAM(s) Oral before breakfast  sodium chloride 0.9%. 1000 milliLiter(s) (75 mL/Hr) IV Continuous <Continuous>  tamsulosin 0.4 milliGRAM(s) Oral at bedtime    MEDICATIONS  (PRN):  acetaminophen   Tablet .. 650 milliGRAM(s) Oral every 6 hours PRN Temp greater or equal to 38C (100.4F), Mild Pain (1 - 3)  dextrose 40% Gel 15 Gram(s) Oral once PRN Blood Glucose LESS THAN 70 milliGRAM(s)/deciliter  glucagon  Injectable 1 milliGRAM(s) IntraMuscular once PRN Glucose LESS THAN 70 milligrams/deciliter  melatonin 3 milliGRAM(s) Oral at bedtime PRN Insomnia  ondansetron Injectable 4 milliGRAM(s) IV Push every 6 hours PRN Nausea and/or Vomiting  oxyCODONE    IR 5 milliGRAM(s) Oral every 4 hours PRN Severe Pain (7 - 10)  oxyCODONE    IR 2.5 milliGRAM(s) Oral every 4 hours PRN Moderate Pain (4 - 6)  traZODone 25 milliGRAM(s) Oral daily PRN insomnia      Physical Exam:    Vital Signs Last 24 Hrs  T(C): 36.4 (09 Nov 2018 05:01), Max: 37.1 (08 Nov 2018 13:58)  T(F): 97.6 (09 Nov 2018 05:01), Max: 98.7 (08 Nov 2018 13:58)  HR: 82 (09 Nov 2018 05:01) (73 - 82)  BP: 138/60 (09 Nov 2018 05:01) (102/55 - 161/76)  BP(mean): --  RR: 18 (09 Nov 2018 05:01) (18 - 18)  SpO2: 99% (09 Nov 2018 05:01) (98% - 100%)    11-07-18 @ 07:01  -  11-08-18 @ 07:00  --------------------------------------------------------  IN: 845 mL / OUT: 450 mL / NET: 395 mL    11-08-18 @ 07:01  -  11-09-18 @ 06:27  --------------------------------------------------------  IN: 740 mL / OUT: 520 mL / NET: 220 mL        Gen: NAD  RUE:  - incision  clean and intact. Scant serosanguinous discharge. Dressing half undone.  + AIN/PIN/IO  C5-T1 SILT  compartments soft  radial pulse 2+          LABS:                        9.5    9.6   )-----------( 219      ( 08 Nov 2018 08:42 )             26.7     11-08    137  |  102  |  20  ----------------------------<  157<H>  4.3   |  22  |  0.71    Ca    8.5      08 Nov 2018 08:32

## 2018-11-09 NOTE — PROGRESS NOTE ADULT - REASON FOR ADMISSION
Right humeral shaft fx

## 2018-11-11 PROCEDURE — 84443 ASSAY THYROID STIM HORMONE: CPT

## 2018-11-11 PROCEDURE — 84480 ASSAY TRIIODOTHYRONINE (T3): CPT

## 2018-11-11 PROCEDURE — 99285 EMERGENCY DEPT VISIT HI MDM: CPT | Mod: 25

## 2018-11-11 PROCEDURE — 93005 ELECTROCARDIOGRAM TRACING: CPT

## 2018-11-11 PROCEDURE — 80048 BASIC METABOLIC PNL TOTAL CA: CPT

## 2018-11-11 PROCEDURE — 97161 PT EVAL LOW COMPLEX 20 MIN: CPT

## 2018-11-11 PROCEDURE — 83605 ASSAY OF LACTIC ACID: CPT

## 2018-11-11 PROCEDURE — 80053 COMPREHEN METABOLIC PANEL: CPT

## 2018-11-11 PROCEDURE — 83036 HEMOGLOBIN GLYCOSYLATED A1C: CPT

## 2018-11-11 PROCEDURE — 87040 BLOOD CULTURE FOR BACTERIA: CPT

## 2018-11-11 PROCEDURE — 85610 PROTHROMBIN TIME: CPT

## 2018-11-11 PROCEDURE — 36415 COLL VENOUS BLD VENIPUNCTURE: CPT

## 2018-11-11 PROCEDURE — 84436 ASSAY OF TOTAL THYROXINE: CPT

## 2018-11-11 PROCEDURE — 71045 X-RAY EXAM CHEST 1 VIEW: CPT

## 2018-11-11 PROCEDURE — 87086 URINE CULTURE/COLONY COUNT: CPT

## 2018-11-11 PROCEDURE — 81001 URINALYSIS AUTO W/SCOPE: CPT

## 2018-11-11 PROCEDURE — 85027 COMPLETE CBC AUTOMATED: CPT

## 2018-11-11 PROCEDURE — 85730 THROMBOPLASTIN TIME PARTIAL: CPT

## 2018-11-11 PROCEDURE — 70450 CT HEAD/BRAIN W/O DYE: CPT

## 2018-11-11 PROCEDURE — 82962 GLUCOSE BLOOD TEST: CPT

## 2018-11-16 ENCOUNTER — EMERGENCY (EMERGENCY)
Facility: HOSPITAL | Age: 78
LOS: 1 days | Discharge: HOME | End: 2018-11-16
Attending: EMERGENCY MEDICINE | Admitting: EMERGENCY MEDICINE
Payer: MEDICARE

## 2018-11-16 VITALS
DIASTOLIC BLOOD PRESSURE: 66 MMHG | WEIGHT: 214.95 LBS | OXYGEN SATURATION: 100 % | HEIGHT: 67 IN | RESPIRATION RATE: 18 BRPM | SYSTOLIC BLOOD PRESSURE: 107 MMHG | HEART RATE: 77 BPM | TEMPERATURE: 97 F

## 2018-11-16 VITALS
HEART RATE: 75 BPM | OXYGEN SATURATION: 100 % | SYSTOLIC BLOOD PRESSURE: 117 MMHG | RESPIRATION RATE: 16 BRPM | DIASTOLIC BLOOD PRESSURE: 58 MMHG

## 2018-11-16 DIAGNOSIS — F03.90 UNSPECIFIED DEMENTIA, UNSPECIFIED SEVERITY, WITHOUT BEHAVIORAL DISTURBANCE, PSYCHOTIC DISTURBANCE, MOOD DISTURBANCE, AND ANXIETY: ICD-10-CM

## 2018-11-16 LAB — GLUCOSE BLDC GLUCOMTR-MCNC: 95 MG/DL — SIGNIFICANT CHANGE UP (ref 70–99)

## 2018-11-16 PROCEDURE — 70450 CT HEAD/BRAIN W/O DYE: CPT | Mod: 26

## 2018-11-16 PROCEDURE — 73030 X-RAY EXAM OF SHOULDER: CPT

## 2018-11-16 PROCEDURE — 99284 EMERGENCY DEPT VISIT MOD MDM: CPT | Mod: 25

## 2018-11-16 PROCEDURE — 73030 X-RAY EXAM OF SHOULDER: CPT | Mod: 26,RT

## 2018-11-16 PROCEDURE — 72125 CT NECK SPINE W/O DYE: CPT

## 2018-11-16 PROCEDURE — 72125 CT NECK SPINE W/O DYE: CPT | Mod: 26

## 2018-11-16 PROCEDURE — 82962 GLUCOSE BLOOD TEST: CPT

## 2018-11-16 PROCEDURE — 70450 CT HEAD/BRAIN W/O DYE: CPT

## 2018-11-16 NOTE — ED PROVIDER NOTE - PHYSICAL EXAMINATION
Gen:  alert, awake, no acute distress, follows basic comman  HEENT:  atraumatic head, airway clear, pupils equal and round  CV:  rrr, nl S1, S2, no m/r/g  Pulm:  lungs CTA b/l  Abd: s/nt/nd, +BS  Ext:  moving all extremities  Neuro:  grossly intact, no focal deficits  Skin:  clear, dry, intact  Psych: AOx3, normal affect, no apparent risk to self or others

## 2018-11-16 NOTE — ED PROVIDER NOTE - OBJECTIVE STATEMENT
pt w dementia, s/p recent R humerus fracture and surgical repair with plate, today at nursing pt was found on floor with some minor bleeding to the R shoulder wound.  spoke with SNF nursing supervisor  Bruna who read RNs report which states that when pt was found pt was at his baseline mental status which was dementia/confusion. pt unable to provide any reliable HPI 2/2 dementia. pt w dementia, s/p recent R humerus fracture and surgical repair with plate, today at nursing pt fell onto R shoulder with some minor bleeding to the R shoulder wound.  spoke with SNF nursing supervisor  Bruna who read RNs report which states that when pt was found pt was at his baseline mental status which was dementia/confusion. pt unable to provide any reliable HPI 2/2 dementia.

## 2018-11-16 NOTE — ED ADULT NURSE NOTE - NSIMPLEMENTINTERV_GEN_ALL_ED
Implemented All Universal Safety Interventions:  [place] to call system. Call bell, personal items and telephone within reach. Instruct patient to call for assistance. Room bathroom lighting operational. Non-slip footwear when patient is off stretcher. Physically safe environment: no spills, clutter or unnecessary equipment. Stretcher in lowest position, wheels locked, appropriate side rails in place.

## 2018-11-16 NOTE — ED ADULT TRIAGE NOTE - CHIEF COMPLAINT QUOTE
I was feeling dizzy and fell from my bed and landed on my right shoulder where I had Surgery with staples last week. It was bleeding a little bit but no pain.  My sugar was 37"

## 2018-11-26 ENCOUNTER — APPOINTMENT (OUTPATIENT)
Dept: RADIOLOGY | Facility: HOSPITAL | Age: 78
End: 2018-11-26
Payer: MEDICARE

## 2018-11-26 ENCOUNTER — APPOINTMENT (OUTPATIENT)
Dept: ORTHOPEDIC SURGERY | Facility: CLINIC | Age: 78
End: 2018-11-26
Payer: MEDICARE

## 2018-11-26 ENCOUNTER — OUTPATIENT (OUTPATIENT)
Dept: OUTPATIENT SERVICES | Facility: HOSPITAL | Age: 78
LOS: 1 days | End: 2018-11-26
Payer: MEDICARE

## 2018-11-26 DIAGNOSIS — S42.321A DISPLACED TRANSVERSE FRACTURE OF SHAFT OF HUMERUS, RIGHT ARM, INITIAL ENCOUNTER FOR CLOSED FRACTURE: ICD-10-CM

## 2018-11-26 PROCEDURE — 73060 X-RAY EXAM OF HUMERUS: CPT | Mod: 26,RT

## 2018-11-26 PROCEDURE — 99024 POSTOP FOLLOW-UP VISIT: CPT

## 2018-11-26 PROCEDURE — 73060 X-RAY EXAM OF HUMERUS: CPT | Mod: RT

## 2018-11-26 PROCEDURE — 73060 X-RAY EXAM OF HUMERUS: CPT

## 2018-12-04 NOTE — DISCHARGE NOTE ADULT - DISCHARGE DATE
Physician Discharge Summary     Patient ID:  Name: Ferdinand Hagan  MRN: [id number]  : 1962    Admit date: 2018    Discharge date: 2018    Admitting Physician: Rafal Kraus MD    Discharge Physician: Johnnie Camacho MD    Admission Diagnoses: Chronic kidney disease (CKD), stage III (moderate) (CMS/HCC) [N18.3]    Discharge Diagnoses:   1. H/O PCKD s/p LDKT on 18. POD 4             -- Thymoglobulin low dose induction. Received 4.5 mg/kg total             -- Good allograft function. POD 1 US noted with good arterial flow and resistive indices.             -- Good UOP. Santana removed   -- Ureteral stent in place             -- Incision CDI and CHANI with staples in place             -- Tolerating diet. + flatus / + BM             -- Continue ASA  2. Prophylactic Immunosuppression.             -- Prograf 3 mg BID. Trough goal 8-10 ng/mL             -- Cellcept 1000 mg BID             -- Prednisone taper                        - 20 mg daily -                        - 15 mg daily -                        - 10 mg daily -                        - 5 mg daily starting   3. Post-operative pain management.              -- Percocet PRN             -- Continue Senokot BID for constipation prophylaxis.  4. Transplant Prophylaxis             -- Fungal Prophylaxis. Mycelex yoanna QID x 1 month (end date 18).             -- PCP / UTI Prophylaxis. Bactrim SS daily x 6 months (end date 19)             -- CMV Prophylaxis (D-/R+). Valcyte 900 mg daily x 3 months (end date )             -- GI Prophylaxis. Protonix 40 mg QHS x 1 month (end date 19).    Admission Condition: good    Discharged Condition: good    Hospital Course:   Ferdinand Hagan is a 56 year old male with a PMHx significant for a history of ESRD secondary to polycystic kidney disease (hereditary). He has no significant complications from his PCKD. He has been on dialysis since 17.  04-Sep-2018 He makes a moderate amount of urine. He currently utilizes a R AVF.      Patient's additional history significant for secondary hyperparathyroidism and 30 pack year smoking history.      He is now s/p a living unrelated donor kidney transplant on 11/30/18. His hospital course was unremarkable and he was stable to be discharged home on 12/4/18    Consults: Nephrology    Significant Diagnostic Studies:   12/1/18  Renal Transplant  Stable renal transplant with patent vasculature.    Medications     Summary of your Discharge Medications      Take these Medications      Details   aspirin 81 MG chewable tablet   Chew 1 tablet by mouth daily.     clotrimazole 10 MG vipin  Commonly known as:  MYCELEX   Take 1 Vipin by mouth after meals and at bedtime.     docusate sodium-sennosides 50-8.6 MG per tablet  Commonly known as:  SENOKOT S   Take 1 tablet by mouth 2 times daily.     mycophenolate 500 MG tablet  Commonly known as:  CELLCEPT   Take 2 tablets by mouth 2 times daily.     oxyCODONE-acetaminophen 5-325 MG per tablet  Commonly known as:  PERCOCET   Take 1 tablet by mouth every 4 hours as needed for Pain.     pantoprazole 40 MG tablet  Commonly known as:  PROTONIX   Take 1 tablet by mouth daily.     predniSONE 5 MG tablet  Commonly known as:  DELTASONE   4 tabs daily 12/5-12/11, 3 tabs daily 12/12-12/18, 2 tabs daily 12/19-12/25, and 1 tab daily starting on 12/26     sulfamethoxazole-trimethoprim 400-80 MG per tablet  Commonly known as:  BACTRIM   Take 1 tablet by mouth daily.     * tacrolimus 0.5 MG capsule  Commonly known as:  PROGRAF   Take as directed up to 1 capsule twice daily.     * tacrolimus 1 MG capsule  Commonly known as:  PROGRAF   Take as directed up to 4 capsules twice daily.     valGANciclovir 450 MG tablet  Commonly known as:  VALCYTE   Take 2 tablets by mouth daily.         * This list has 2 medication(s) that are the same as other medications prescribed for you. Read the directions carefully, and ask  your doctor or other care provider to review them with you.                Discharge Exam:  Vitals:    12/04/18 0930   BP: 114/69   Pulse: 60   Resp: 24   Temp: 97.6 °F (36.4 °C)       Physical Exam  General- Awake/alert in NAD. Calm/cooperative. Well nourished.  Skin- Warm and dry to touch. No rashes/lesions.  CV- S1S2, RRR. No murmurs, rubs or gallops to auscultation.  Pulmonary- Clear breath sounds to auscultation. On RA. Normal Respiratory Effort.  Abdomen- Round, soft, appropriately tender post-operatively, and non-distended with palpation. + BS. Surgical incision CDI and CHANI with staples in place.  Extremities- + PP (radial/pedal). No erythema. No edema.    Disposition: Home    Patient Instructions   1. No driving or lifting more than 10 pounds (1 gallon of milk) until cleared by the surgeon.  2. Able to take a shower, but no baths until incision is fully healed. Let warm soapy water run over the incision, do not scrub at the incision. Shower with your back to the shower head.  3. Call if change in incision drainage, redness, hardness, warmth, or increased incisional pain. Call if temperature above 100.0°, chest pain, cough, abdominal pain, abdominal distension, weight gain of 2 pounds, nausea, vomiting, or flu-like symptoms  Clinic Phone # [phone]  4. Bring medication box, all medication bottles, tracking sheets for vital signs/intake and output, and medication list to clinic appointments. Do not take Prograf prior to lab appointments  5. Use abdominal binder as necessary for pain. Take pain medication only as needed. Take stool softener when taking pain medication - if you stop pain medication you can stop taking the stool softener.     Nutrition Plan - Regular diet    Psychosocial Concerns - No concerns    Activity - As tolerated    Financial Concerns - No concerns    Follow Ups  Follow Up with Transplant Surgery - 12/6/18    Greater than 30 minutes were spent coordinating the discharge for this  patient. Plan of care was discussed with the patient who agreed. All questions and concerns were answered.     Serg Basurto PA-C  Abdominal Transplant & Hepatology  Pager [phone]  12/4/2018       06-Sep-2018 07-Sep-2018

## 2018-12-10 ENCOUNTER — APPOINTMENT (OUTPATIENT)
Dept: ORTHOPEDIC SURGERY | Facility: CLINIC | Age: 78
End: 2018-12-10
Payer: MEDICARE

## 2018-12-10 VITALS — BODY MASS INDEX: 30.78 KG/M2 | WEIGHT: 215 LBS | HEIGHT: 70 IN

## 2018-12-10 DIAGNOSIS — S42.321A DISPLACED TRANSVERSE FRACTURE OF SHAFT OF HUMERUS, RIGHT ARM, INITIAL ENCOUNTER FOR CLOSED FRACTURE: ICD-10-CM

## 2018-12-10 PROCEDURE — 99024 POSTOP FOLLOW-UP VISIT: CPT

## 2018-12-10 PROCEDURE — 73060 X-RAY EXAM OF HUMERUS: CPT | Mod: RT

## 2019-01-05 ENCOUNTER — INPATIENT (INPATIENT)
Facility: HOSPITAL | Age: 79
LOS: 3 days | Discharge: NURSING, REHAB OR LONG-TERM CARE | DRG: 639 | End: 2019-01-09
Attending: HOSPITALIST | Admitting: INTERNAL MEDICINE
Payer: MEDICARE

## 2019-01-05 VITALS
RESPIRATION RATE: 16 BRPM | SYSTOLIC BLOOD PRESSURE: 147 MMHG | TEMPERATURE: 99 F | OXYGEN SATURATION: 97 % | DIASTOLIC BLOOD PRESSURE: 76 MMHG | HEART RATE: 97 BPM

## 2019-01-05 DIAGNOSIS — D72.829 ELEVATED WHITE BLOOD CELL COUNT, UNSPECIFIED: ICD-10-CM

## 2019-01-05 LAB
ANION GAP SERPL CALC-SCNC: 9 MMOL/L — SIGNIFICANT CHANGE UP (ref 5–17)
APPEARANCE UR: CLEAR — SIGNIFICANT CHANGE UP
BACTERIA # UR AUTO: NEGATIVE /HPF — SIGNIFICANT CHANGE UP
BASOPHILS # BLD AUTO: 0.1 K/UL — SIGNIFICANT CHANGE UP (ref 0–0.2)
BASOPHILS NFR BLD AUTO: 0.6 % — SIGNIFICANT CHANGE UP (ref 0–2)
BILIRUB UR-MCNC: ABNORMAL
BUN SERPL-MCNC: 16 MG/DL — SIGNIFICANT CHANGE UP (ref 7–23)
CALCIUM SERPL-MCNC: 8.7 MG/DL — SIGNIFICANT CHANGE UP (ref 8.4–10.5)
CHLORIDE SERPL-SCNC: 101 MMOL/L — SIGNIFICANT CHANGE UP (ref 96–108)
CO2 SERPL-SCNC: 28 MMOL/L — SIGNIFICANT CHANGE UP (ref 22–31)
COLOR SPEC: YELLOW — SIGNIFICANT CHANGE UP
CREAT SERPL-MCNC: 0.94 MG/DL — SIGNIFICANT CHANGE UP (ref 0.5–1.3)
DIFF PNL FLD: ABNORMAL
EOSINOPHIL # BLD AUTO: 0 K/UL — SIGNIFICANT CHANGE UP (ref 0–0.5)
EOSINOPHIL NFR BLD AUTO: 0 % — SIGNIFICANT CHANGE UP (ref 0–6)
EPI CELLS # UR: SIGNIFICANT CHANGE UP
FLU A RESULT: SIGNIFICANT CHANGE UP
FLU A RESULT: SIGNIFICANT CHANGE UP
FLUAV AG NPH QL: SIGNIFICANT CHANGE UP
FLUBV AG NPH QL: SIGNIFICANT CHANGE UP
GLUCOSE BLDC GLUCOMTR-MCNC: 141 MG/DL — HIGH (ref 70–99)
GLUCOSE BLDC GLUCOMTR-MCNC: 76 MG/DL — SIGNIFICANT CHANGE UP (ref 70–99)
GLUCOSE BLDC GLUCOMTR-MCNC: 89 MG/DL — SIGNIFICANT CHANGE UP (ref 70–99)
GLUCOSE SERPL-MCNC: 137 MG/DL — HIGH (ref 70–99)
GLUCOSE UR QL: 100 MG/DL
HCT VFR BLD CALC: 30.5 % — LOW (ref 39–50)
HGB BLD-MCNC: 10.4 G/DL — LOW (ref 13–17)
KETONES UR-MCNC: ABNORMAL
LACTATE SERPL-SCNC: 1.4 MMOL/L — SIGNIFICANT CHANGE UP (ref 0.7–2)
LEUKOCYTE ESTERASE UR-ACNC: NEGATIVE — SIGNIFICANT CHANGE UP
LYMPHOCYTES # BLD AUTO: 0.8 K/UL — LOW (ref 1–3.3)
LYMPHOCYTES # BLD AUTO: 4 % — LOW (ref 13–44)
MCHC RBC-ENTMCNC: 30.8 PG — SIGNIFICANT CHANGE UP (ref 27–34)
MCHC RBC-ENTMCNC: 34.2 GM/DL — SIGNIFICANT CHANGE UP (ref 32–36)
MCV RBC AUTO: 90 FL — SIGNIFICANT CHANGE UP (ref 80–100)
MONOCYTES # BLD AUTO: 1.4 K/UL — HIGH (ref 0–0.9)
MONOCYTES NFR BLD AUTO: 7 % — SIGNIFICANT CHANGE UP (ref 1–9)
NEUTROPHILS # BLD AUTO: 17.8 K/UL — HIGH (ref 1.8–7.4)
NEUTROPHILS NFR BLD AUTO: 88.4 % — HIGH (ref 43–77)
NITRITE UR-MCNC: NEGATIVE — SIGNIFICANT CHANGE UP
PH UR: 5 — SIGNIFICANT CHANGE UP (ref 5–8)
PLATELET # BLD AUTO: 248 K/UL — SIGNIFICANT CHANGE UP (ref 150–400)
POTASSIUM SERPL-MCNC: 3.8 MMOL/L — SIGNIFICANT CHANGE UP (ref 3.5–5.3)
POTASSIUM SERPL-SCNC: 3.8 MMOL/L — SIGNIFICANT CHANGE UP (ref 3.5–5.3)
PROCALCITONIN SERPL-MCNC: 0.96 NG/ML — HIGH
PROT UR-MCNC: 15
RBC # BLD: 3.39 M/UL — LOW (ref 4.2–5.8)
RBC # FLD: 13.6 % — SIGNIFICANT CHANGE UP (ref 10.3–14.5)
RBC CASTS # UR COMP ASSIST: NEGATIVE /HPF — SIGNIFICANT CHANGE UP (ref 0–4)
RSV RESULT: SIGNIFICANT CHANGE UP
RSV RNA RESP QL NAA+PROBE: SIGNIFICANT CHANGE UP
SODIUM SERPL-SCNC: 138 MMOL/L — SIGNIFICANT CHANGE UP (ref 135–145)
SP GR SPEC: 1.01 — SIGNIFICANT CHANGE UP (ref 1.01–1.02)
TROPONIN I SERPL-MCNC: 0.04 NG/ML — SIGNIFICANT CHANGE UP (ref 0.02–0.06)
UROBILINOGEN FLD QL: 4
WBC # BLD: 20.1 K/UL — HIGH (ref 3.8–10.5)
WBC # FLD AUTO: 20.1 K/UL — HIGH (ref 3.8–10.5)
WBC UR QL: NEGATIVE /HPF — SIGNIFICANT CHANGE UP (ref 0–5)

## 2019-01-05 PROCEDURE — 99223 1ST HOSP IP/OBS HIGH 75: CPT | Mod: AI

## 2019-01-05 PROCEDURE — 99285 EMERGENCY DEPT VISIT HI MDM: CPT

## 2019-01-05 PROCEDURE — 71045 X-RAY EXAM CHEST 1 VIEW: CPT | Mod: 26

## 2019-01-05 PROCEDURE — 93010 ELECTROCARDIOGRAM REPORT: CPT

## 2019-01-05 RX ORDER — OXYCODONE HYDROCHLORIDE 5 MG/1
5 TABLET ORAL EVERY 6 HOURS
Refills: 0 | Status: DISCONTINUED | OUTPATIENT
Start: 2019-01-05 | End: 2019-01-09

## 2019-01-05 RX ORDER — METOPROLOL TARTRATE 50 MG
50 TABLET ORAL DAILY
Refills: 0 | Status: DISCONTINUED | OUTPATIENT
Start: 2019-01-05 | End: 2019-01-09

## 2019-01-05 RX ORDER — INSULIN LISPRO 100/ML
VIAL (ML) SUBCUTANEOUS
Refills: 0 | Status: DISCONTINUED | OUTPATIENT
Start: 2019-01-05 | End: 2019-01-07

## 2019-01-05 RX ORDER — ATORVASTATIN CALCIUM 80 MG/1
80 TABLET, FILM COATED ORAL AT BEDTIME
Refills: 0 | Status: DISCONTINUED | OUTPATIENT
Start: 2019-01-05 | End: 2019-01-09

## 2019-01-05 RX ORDER — ACETAMINOPHEN 500 MG
650 TABLET ORAL ONCE
Refills: 0 | Status: COMPLETED | OUTPATIENT
Start: 2019-01-05 | End: 2019-01-05

## 2019-01-05 RX ORDER — DEXTROSE 50 % IN WATER 50 %
25 SYRINGE (ML) INTRAVENOUS ONCE
Refills: 0 | Status: DISCONTINUED | OUTPATIENT
Start: 2019-01-05 | End: 2019-01-07

## 2019-01-05 RX ORDER — PANTOPRAZOLE SODIUM 20 MG/1
40 TABLET, DELAYED RELEASE ORAL
Refills: 0 | Status: DISCONTINUED | OUTPATIENT
Start: 2019-01-05 | End: 2019-01-09

## 2019-01-05 RX ORDER — SODIUM CHLORIDE 9 MG/ML
1000 INJECTION, SOLUTION INTRAVENOUS
Refills: 0 | Status: COMPLETED | OUTPATIENT
Start: 2019-01-05 | End: 2019-01-05

## 2019-01-05 RX ORDER — ASPIRIN/CALCIUM CARB/MAGNESIUM 324 MG
81 TABLET ORAL DAILY
Refills: 0 | Status: DISCONTINUED | OUTPATIENT
Start: 2019-01-05 | End: 2019-01-09

## 2019-01-05 RX ORDER — CLOPIDOGREL BISULFATE 75 MG/1
75 TABLET, FILM COATED ORAL DAILY
Refills: 0 | Status: DISCONTINUED | OUTPATIENT
Start: 2019-01-05 | End: 2019-01-09

## 2019-01-05 RX ORDER — CEFTRIAXONE 500 MG/1
1 INJECTION, POWDER, FOR SOLUTION INTRAMUSCULAR; INTRAVENOUS EVERY 24 HOURS
Refills: 0 | Status: DISCONTINUED | OUTPATIENT
Start: 2019-01-05 | End: 2019-01-07

## 2019-01-05 RX ORDER — SODIUM CHLORIDE 9 MG/ML
1000 INJECTION, SOLUTION INTRAVENOUS
Refills: 0 | Status: DISCONTINUED | OUTPATIENT
Start: 2019-01-05 | End: 2019-01-07

## 2019-01-05 RX ORDER — SODIUM CHLORIDE 9 MG/ML
500 INJECTION INTRAMUSCULAR; INTRAVENOUS; SUBCUTANEOUS ONCE
Refills: 0 | Status: COMPLETED | OUTPATIENT
Start: 2019-01-05 | End: 2019-01-05

## 2019-01-05 RX ORDER — TAMSULOSIN HYDROCHLORIDE 0.4 MG/1
0.4 CAPSULE ORAL AT BEDTIME
Refills: 0 | Status: DISCONTINUED | OUTPATIENT
Start: 2019-01-05 | End: 2019-01-09

## 2019-01-05 RX ORDER — LISINOPRIL 2.5 MG/1
20 TABLET ORAL DAILY
Refills: 0 | Status: DISCONTINUED | OUTPATIENT
Start: 2019-01-05 | End: 2019-01-09

## 2019-01-05 RX ORDER — SODIUM CHLORIDE 9 MG/ML
1000 INJECTION INTRAMUSCULAR; INTRAVENOUS; SUBCUTANEOUS ONCE
Refills: 0 | Status: COMPLETED | OUTPATIENT
Start: 2019-01-05 | End: 2019-01-05

## 2019-01-05 RX ORDER — DEXTROSE 50 % IN WATER 50 %
15 SYRINGE (ML) INTRAVENOUS ONCE
Refills: 0 | Status: DISCONTINUED | OUTPATIENT
Start: 2019-01-05 | End: 2019-01-07

## 2019-01-05 RX ORDER — CEFTRIAXONE 500 MG/1
1 INJECTION, POWDER, FOR SOLUTION INTRAMUSCULAR; INTRAVENOUS ONCE
Refills: 0 | Status: COMPLETED | OUTPATIENT
Start: 2019-01-05 | End: 2019-01-05

## 2019-01-05 RX ORDER — DEXTROSE 50 % IN WATER 50 %
25 SYRINGE (ML) INTRAVENOUS ONCE
Refills: 0 | Status: DISCONTINUED | OUTPATIENT
Start: 2019-01-05 | End: 2019-01-08

## 2019-01-05 RX ORDER — LANOLIN ALCOHOL/MO/W.PET/CERES
6 CREAM (GRAM) TOPICAL AT BEDTIME
Refills: 0 | Status: DISCONTINUED | OUTPATIENT
Start: 2019-01-05 | End: 2019-01-09

## 2019-01-05 RX ORDER — GLUCAGON INJECTION, SOLUTION 0.5 MG/.1ML
1 INJECTION, SOLUTION SUBCUTANEOUS ONCE
Refills: 0 | Status: DISCONTINUED | OUTPATIENT
Start: 2019-01-05 | End: 2019-01-07

## 2019-01-05 RX ORDER — MAGNESIUM HYDROXIDE 400 MG/1
30 TABLET, CHEWABLE ORAL DAILY
Refills: 0 | Status: DISCONTINUED | OUTPATIENT
Start: 2019-01-05 | End: 2019-01-09

## 2019-01-05 RX ORDER — LATANOPROST 0.05 MG/ML
1 SOLUTION/ DROPS OPHTHALMIC; TOPICAL AT BEDTIME
Refills: 0 | Status: DISCONTINUED | OUTPATIENT
Start: 2019-01-05 | End: 2019-01-09

## 2019-01-05 RX ORDER — ACETAMINOPHEN 500 MG
650 TABLET ORAL EVERY 6 HOURS
Refills: 0 | Status: DISCONTINUED | OUTPATIENT
Start: 2019-01-05 | End: 2019-01-09

## 2019-01-05 RX ORDER — DEXTROSE 50 % IN WATER 50 %
12.5 SYRINGE (ML) INTRAVENOUS ONCE
Refills: 0 | Status: DISCONTINUED | OUTPATIENT
Start: 2019-01-05 | End: 2019-01-07

## 2019-01-05 RX ORDER — ENOXAPARIN SODIUM 100 MG/ML
40 INJECTION SUBCUTANEOUS EVERY 24 HOURS
Refills: 0 | Status: DISCONTINUED | OUTPATIENT
Start: 2019-01-05 | End: 2019-01-09

## 2019-01-05 RX ORDER — INSULIN LISPRO 100/ML
VIAL (ML) SUBCUTANEOUS AT BEDTIME
Refills: 0 | Status: DISCONTINUED | OUTPATIENT
Start: 2019-01-05 | End: 2019-01-07

## 2019-01-05 RX ADMIN — SODIUM CHLORIDE 2000 MILLILITER(S): 9 INJECTION INTRAMUSCULAR; INTRAVENOUS; SUBCUTANEOUS at 18:09

## 2019-01-05 RX ADMIN — Medication 6 MILLIGRAM(S): at 23:04

## 2019-01-05 RX ADMIN — ATORVASTATIN CALCIUM 80 MILLIGRAM(S): 80 TABLET, FILM COATED ORAL at 23:04

## 2019-01-05 RX ADMIN — CEFTRIAXONE 100 GRAM(S): 500 INJECTION, POWDER, FOR SOLUTION INTRAMUSCULAR; INTRAVENOUS at 19:54

## 2019-01-05 RX ADMIN — Medication 650 MILLIGRAM(S): at 21:47

## 2019-01-05 RX ADMIN — SODIUM CHLORIDE 1000 MILLILITER(S): 9 INJECTION INTRAMUSCULAR; INTRAVENOUS; SUBCUTANEOUS at 18:39

## 2019-01-05 RX ADMIN — CEFTRIAXONE 1 GRAM(S): 500 INJECTION, POWDER, FOR SOLUTION INTRAMUSCULAR; INTRAVENOUS at 20:30

## 2019-01-05 RX ADMIN — Medication 650 MILLIGRAM(S): at 20:00

## 2019-01-05 RX ADMIN — LATANOPROST 1 DROP(S): 0.05 SOLUTION/ DROPS OPHTHALMIC; TOPICAL at 23:06

## 2019-01-05 RX ADMIN — SODIUM CHLORIDE 1000 MILLILITER(S): 9 INJECTION INTRAMUSCULAR; INTRAVENOUS; SUBCUTANEOUS at 19:54

## 2019-01-05 RX ADMIN — TAMSULOSIN HYDROCHLORIDE 0.4 MILLIGRAM(S): 0.4 CAPSULE ORAL at 23:04

## 2019-01-05 RX ADMIN — SODIUM CHLORIDE 75 MILLILITER(S): 9 INJECTION, SOLUTION INTRAVENOUS at 23:10

## 2019-01-05 RX ADMIN — SODIUM CHLORIDE 500 MILLILITER(S): 9 INJECTION INTRAMUSCULAR; INTRAVENOUS; SUBCUTANEOUS at 21:48

## 2019-01-05 RX ADMIN — SODIUM CHLORIDE 2000 MILLILITER(S): 9 INJECTION INTRAMUSCULAR; INTRAVENOUS; SUBCUTANEOUS at 21:48

## 2019-01-05 NOTE — H&P ADULT - NSHPPHYSICALEXAM_GEN_ALL_CORE
Vital Signs (24 Hrs):  T(C): 36.8 (01-05-19 @ 21:01), Max: 38 (01-05-19 @ 18:00)  HR: 71 (01-05-19 @ 21:01) (71 - 97)  BP: 132/58 (01-05-19 @ 21:01) (132/58 - 147/76)  RR: 18 (01-05-19 @ 21:01) (16 - 18)  SpO2: 99% (01-05-19 @ 21:01) (97% - 99%)

## 2019-01-05 NOTE — ED PROVIDER NOTE - MEDICAL DECISION MAKING DETAILS
pt with hypoglycemia, elevated wbc, likely sepsis with low grade temp, will start abx and eval for source, urine cxr, flu, cardiac eval

## 2019-01-05 NOTE — ED ADULT NURSE NOTE - OBJECTIVE STATEMENT
Pt presents A&O to person and place from [facility] with c/c of hypoglycemia.   As per pt's family member (brother - Tristan), pt has been sleeping all day today and had nothing to eat, staff at the facility could not wake pt up.  When pt woke up found to have low blood sugar.  Pt Type 2 diabetic on po meds.  In rehab due to frequent falls.  As per brother pt's mental status has been declining over months.  Pt denies dizziness, SOB, CP, n/v/d/c.

## 2019-01-05 NOTE — ED ADULT NURSE REASSESSMENT NOTE - NS ED NURSE REASSESS COMMENT FT1
Pt more alert, back to his baseline mental status according to brother.  Pt eating a sandwich and drinking apple juice with his brother's help, MD Cook aware.  As per brother, pt let's know when has to void, will verbalize in order to collect urine.  Will continue to monitor further.

## 2019-01-05 NOTE — ED PROVIDER NOTE - PHYSICAL EXAMINATION
Gen:  alert, awake, no acute distress  Head:  atraumatic, normocephalic  HEENT: PERRLA, EOMI, normal nose, normal oropharynx, no tonsillar edema, erythema, or exudate  CV:  rrr, nl S1, S2, no m/r/g  Pulm:  lungs CTA b/l  Abd: s/nt/nd, +BS  MSK:  moving all extremities, no back midline ttp, no stepoffs, no cva TTP  Neuro:  grossly intact, no focal deficits  Skin:  clear, warm, dry, intact  Psych: Awake (baseline), normal affect, no apparent risk to self or others

## 2019-01-05 NOTE — ED ADULT NURSE NOTE - PMH
Benign prostatic hyperplasia, unspecified whether lower urinary tract symptoms present    Diabetes    Hyperlipidemia, unspecified hyperlipidemia type    Hypertension, unspecified type Benign prostatic hyperplasia, unspecified whether lower urinary tract symptoms present    BPH (benign prostatic hyperplasia)    Cellulitis    Dementia    Diabetes    Hyperlipidemia, unspecified hyperlipidemia type    Hypertension, unspecified type    Muscle weakness    Nocardiosis    Parkinson disease

## 2019-01-05 NOTE — H&P ADULT - ASSESSMENT
78M PMH HTN, HLD, DM2, CAD, BPH, dementia presents with hypoglycemia, however, also has a low grade temp, elev WBC, no evident source at this point but noted to have sl reddened right ankle, +edema (pt does not complain)    Admit  IV Hydration  FFup FS  will d/c metformin, insulin coverage  Empiric Ceftriaxone pending cultures  Cont Metoprolol, Ramipril  Cont Flomax  Leg dopplers to r/o DVT  GI/DVT prophylaxis    CAPRINI SCORE [CLOT]  [x ] Age= 75 years                                              (3 Points)                 [ x] Edema in the lower extremities                       (1 Point)                  [x ] BMI > 25 Kg/m2                                            (1 Point)                               Total Score [5  ]

## 2019-01-05 NOTE — ED PROVIDER NOTE - PMH
Benign prostatic hyperplasia, unspecified whether lower urinary tract symptoms present    Diabetes    Hyperlipidemia, unspecified hyperlipidemia type    Hypertension, unspecified type Benign prostatic hyperplasia, unspecified whether lower urinary tract symptoms present    Dementia    Diabetes    Hyperlipidemia, unspecified hyperlipidemia type    Hypertension, unspecified type    Parkinson disease

## 2019-01-05 NOTE — ED ADULT NURSE NOTE - NSIMPLEMENTINTERV_GEN_ALL_ED
Implemented All Fall with Harm Risk Interventions:  [place] to call system. Call bell, personal items and telephone within reach. Instruct patient to call for assistance. Room bathroom lighting operational. Non-slip footwear when patient is off stretcher. Physically safe environment: no spills, clutter or unnecessary equipment. Stretcher in lowest position, wheels locked, appropriate side rails in place. Provide visual cue, wrist band, yellow gown, etc. Monitor gait and stability. Monitor for mental status changes and reorient to person, place, and time. Review medications for side effects contributing to fall risk. Reinforce activity limits and safety measures with patient and family. Provide visual clues: red socks.

## 2019-01-05 NOTE — H&P ADULT - RS GEN PE MLT RESP DETAILS PC
decreased BS at bases, poor insp effort/airway patent/breath sounds equal/good air movement/respirations non-labored

## 2019-01-05 NOTE — H&P ADULT - PMH
Benign prostatic hyperplasia, unspecified whether lower urinary tract symptoms present    Dementia    Diabetes    Hyperlipidemia, unspecified hyperlipidemia type    Hypertension, unspecified type    Parkinson disease

## 2019-01-05 NOTE — H&P ADULT - HISTORY OF PRESENT ILLNESS
78M PMH HTN, HLD, DM2, CAD, BPH Patient on metformin.  Patient demented and does not complain about anything.    Denies chest pain, sob, nausea, vomiting, diarrhea. 78M PMH HTN, HLD, DM2, CAD, BPH, dementia, unable to give history and does not complain.  He is verbal but confused at baseline.  Patient on metformin and brought in by EMS, from from nursing facility for hypoglycemia, lethargy, noted to have FS 47.  He received D50 and glucagon in the ED.  Noted to have temp of 100, WBC 20,000.  Cxray and urine unremarkable, Procalcitonin elev.  Denies chest pain, sob, nausea, vomiting, diarrhea.    Pt received a dose of Ceftriaxone in the ED.

## 2019-01-05 NOTE — ED PROVIDER NOTE - CARE PLAN
Principal Discharge DX:	Leukocytosis, unspecified type  Secondary Diagnosis:	Other specified diabetes mellitus with hypoglycemia without coma, without long-term current use of insulin

## 2019-01-05 NOTE — ED PROVIDER NOTE - OBJECTIVE STATEMENT
Pt from nursing home, brought for hypoglycemia, fs 47. Pt with dementia, cannot provide history. Brother at bedside. Brought by ems. Pt was given morning meds then slept all day, kanwalher said he didn't eat, was then lethargic. FS 47, improved with glucagon, pt awake.

## 2019-01-05 NOTE — ED PROVIDER NOTE - SECONDARY DIAGNOSIS.
Other specified diabetes mellitus with hypoglycemia without coma, without long-term current use of insulin

## 2019-01-06 LAB
ANION GAP SERPL CALC-SCNC: 6 MMOL/L — SIGNIFICANT CHANGE UP (ref 5–17)
BASOPHILS # BLD AUTO: 0.1 K/UL — SIGNIFICANT CHANGE UP (ref 0–0.2)
BASOPHILS NFR BLD AUTO: 1.1 % — SIGNIFICANT CHANGE UP (ref 0–2)
BUN SERPL-MCNC: 18 MG/DL — SIGNIFICANT CHANGE UP (ref 7–23)
CALCIUM SERPL-MCNC: 7.9 MG/DL — LOW (ref 8.4–10.5)
CHLORIDE SERPL-SCNC: 105 MMOL/L — SIGNIFICANT CHANGE UP (ref 96–108)
CO2 SERPL-SCNC: 26 MMOL/L — SIGNIFICANT CHANGE UP (ref 22–31)
CREAT SERPL-MCNC: 0.95 MG/DL — SIGNIFICANT CHANGE UP (ref 0.5–1.3)
CULTURE RESULTS: NO GROWTH — SIGNIFICANT CHANGE UP
EOSINOPHIL # BLD AUTO: 0.3 K/UL — SIGNIFICANT CHANGE UP (ref 0–0.5)
EOSINOPHIL NFR BLD AUTO: 2.9 % — SIGNIFICANT CHANGE UP (ref 0–6)
GLUCOSE BLDC GLUCOMTR-MCNC: 101 MG/DL — HIGH (ref 70–99)
GLUCOSE BLDC GLUCOMTR-MCNC: 140 MG/DL — HIGH (ref 70–99)
GLUCOSE BLDC GLUCOMTR-MCNC: 196 MG/DL — HIGH (ref 70–99)
GLUCOSE BLDC GLUCOMTR-MCNC: 93 MG/DL — SIGNIFICANT CHANGE UP (ref 70–99)
GLUCOSE SERPL-MCNC: 104 MG/DL — HIGH (ref 70–99)
HBA1C BLD-MCNC: 5.3 % — SIGNIFICANT CHANGE UP (ref 4–5.6)
HCT VFR BLD CALC: 27 % — LOW (ref 39–50)
HGB BLD-MCNC: 9.1 G/DL — LOW (ref 13–17)
LACTATE SERPL-SCNC: 0.6 MMOL/L — LOW (ref 0.7–2)
LYMPHOCYTES # BLD AUTO: 1.3 K/UL — SIGNIFICANT CHANGE UP (ref 1–3.3)
LYMPHOCYTES # BLD AUTO: 11.3 % — LOW (ref 13–44)
MAGNESIUM SERPL-MCNC: 1.3 MG/DL — LOW (ref 1.6–2.6)
MCHC RBC-ENTMCNC: 30.4 PG — SIGNIFICANT CHANGE UP (ref 27–34)
MCHC RBC-ENTMCNC: 33.7 GM/DL — SIGNIFICANT CHANGE UP (ref 32–36)
MCV RBC AUTO: 90.3 FL — SIGNIFICANT CHANGE UP (ref 80–100)
MONOCYTES # BLD AUTO: 1 K/UL — HIGH (ref 0–0.9)
MONOCYTES NFR BLD AUTO: 8.2 % — SIGNIFICANT CHANGE UP (ref 2–14)
NEUTROPHILS # BLD AUTO: 9 K/UL — HIGH (ref 1.8–7.4)
NEUTROPHILS NFR BLD AUTO: 76.5 % — SIGNIFICANT CHANGE UP (ref 43–77)
NT-PROBNP SERPL-SCNC: 1656 PG/ML — HIGH (ref 0–300)
PLATELET # BLD AUTO: 215 K/UL — SIGNIFICANT CHANGE UP (ref 150–400)
POTASSIUM SERPL-MCNC: 3.6 MMOL/L — SIGNIFICANT CHANGE UP (ref 3.5–5.3)
POTASSIUM SERPL-SCNC: 3.6 MMOL/L — SIGNIFICANT CHANGE UP (ref 3.5–5.3)
PROCALCITONIN SERPL-MCNC: 2.03 NG/ML — HIGH
RBC # BLD: 2.99 M/UL — LOW (ref 4.2–5.8)
RBC # FLD: 13.3 % — SIGNIFICANT CHANGE UP (ref 10.3–14.5)
SODIUM SERPL-SCNC: 137 MMOL/L — SIGNIFICANT CHANGE UP (ref 135–145)
SPECIMEN SOURCE: SIGNIFICANT CHANGE UP
TROPONIN I SERPL-MCNC: <.017 NG/ML — LOW (ref 0.02–0.06)
TSH SERPL-MCNC: 2.27 UIU/ML — SIGNIFICANT CHANGE UP (ref 0.27–4.2)
WBC # BLD: 11.7 K/UL — HIGH (ref 3.8–10.5)
WBC # FLD AUTO: 11.7 K/UL — HIGH (ref 3.8–10.5)

## 2019-01-06 PROCEDURE — 93970 EXTREMITY STUDY: CPT | Mod: 26

## 2019-01-06 PROCEDURE — 71260 CT THORAX DX C+: CPT | Mod: 26

## 2019-01-06 PROCEDURE — 99233 SBSQ HOSP IP/OBS HIGH 50: CPT

## 2019-01-06 PROCEDURE — 74177 CT ABD & PELVIS W/CONTRAST: CPT | Mod: 26

## 2019-01-06 RX ORDER — IOHEXOL 300 MG/ML
30 INJECTION, SOLUTION INTRAVENOUS ONCE
Refills: 0 | Status: COMPLETED | OUTPATIENT
Start: 2019-01-06 | End: 2019-01-06

## 2019-01-06 RX ORDER — MAGNESIUM SULFATE 500 MG/ML
1 VIAL (ML) INJECTION ONCE
Refills: 0 | Status: COMPLETED | OUTPATIENT
Start: 2019-01-06 | End: 2019-01-06

## 2019-01-06 RX ADMIN — Medication 1 APPLICATION(S): at 06:03

## 2019-01-06 RX ADMIN — IOHEXOL 30 MILLILITER(S): 300 INJECTION, SOLUTION INTRAVENOUS at 15:17

## 2019-01-06 RX ADMIN — LISINOPRIL 20 MILLIGRAM(S): 2.5 TABLET ORAL at 06:05

## 2019-01-06 RX ADMIN — Medication 100 GRAM(S): at 10:43

## 2019-01-06 RX ADMIN — Medication 6 MILLIGRAM(S): at 21:12

## 2019-01-06 RX ADMIN — CLOPIDOGREL BISULFATE 75 MILLIGRAM(S): 75 TABLET, FILM COATED ORAL at 12:40

## 2019-01-06 RX ADMIN — LATANOPROST 1 DROP(S): 0.05 SOLUTION/ DROPS OPHTHALMIC; TOPICAL at 21:12

## 2019-01-06 RX ADMIN — PANTOPRAZOLE SODIUM 40 MILLIGRAM(S): 20 TABLET, DELAYED RELEASE ORAL at 06:04

## 2019-01-06 RX ADMIN — ATORVASTATIN CALCIUM 80 MILLIGRAM(S): 80 TABLET, FILM COATED ORAL at 21:12

## 2019-01-06 RX ADMIN — Medication 81 MILLIGRAM(S): at 12:40

## 2019-01-06 RX ADMIN — TAMSULOSIN HYDROCHLORIDE 0.4 MILLIGRAM(S): 0.4 CAPSULE ORAL at 21:12

## 2019-01-06 RX ADMIN — Medication 1 APPLICATION(S): at 18:40

## 2019-01-06 RX ADMIN — ENOXAPARIN SODIUM 40 MILLIGRAM(S): 100 INJECTION SUBCUTANEOUS at 06:03

## 2019-01-06 RX ADMIN — Medication 50 MILLIGRAM(S): at 06:04

## 2019-01-06 RX ADMIN — Medication 100 MILLIGRAM(S): at 18:40

## 2019-01-07 DIAGNOSIS — E16.2 HYPOGLYCEMIA, UNSPECIFIED: ICD-10-CM

## 2019-01-07 DIAGNOSIS — F03.90 UNSPECIFIED DEMENTIA, UNSPECIFIED SEVERITY, WITHOUT BEHAVIORAL DISTURBANCE, PSYCHOTIC DISTURBANCE, MOOD DISTURBANCE, AND ANXIETY: ICD-10-CM

## 2019-01-07 DIAGNOSIS — N40.0 BENIGN PROSTATIC HYPERPLASIA WITHOUT LOWER URINARY TRACT SYMPTOMS: ICD-10-CM

## 2019-01-07 DIAGNOSIS — D72.829 ELEVATED WHITE BLOOD CELL COUNT, UNSPECIFIED: ICD-10-CM

## 2019-01-07 DIAGNOSIS — R50.9 FEVER, UNSPECIFIED: ICD-10-CM

## 2019-01-07 LAB
ANION GAP SERPL CALC-SCNC: 7 MMOL/L — SIGNIFICANT CHANGE UP (ref 5–17)
BUN SERPL-MCNC: 18 MG/DL — SIGNIFICANT CHANGE UP (ref 7–23)
CALCIUM SERPL-MCNC: 8 MG/DL — LOW (ref 8.4–10.5)
CHLORIDE SERPL-SCNC: 101 MMOL/L — SIGNIFICANT CHANGE UP (ref 96–108)
CO2 SERPL-SCNC: 26 MMOL/L — SIGNIFICANT CHANGE UP (ref 22–31)
CREAT SERPL-MCNC: 0.96 MG/DL — SIGNIFICANT CHANGE UP (ref 0.5–1.3)
GLUCOSE SERPL-MCNC: 142 MG/DL — HIGH (ref 70–99)
HCT VFR BLD CALC: 25.9 % — LOW (ref 39–50)
HGB BLD-MCNC: 8.6 G/DL — LOW (ref 13–17)
MCHC RBC-ENTMCNC: 30.2 PG — SIGNIFICANT CHANGE UP (ref 27–34)
MCHC RBC-ENTMCNC: 33.2 GM/DL — SIGNIFICANT CHANGE UP (ref 32–36)
MCV RBC AUTO: 90.9 FL — SIGNIFICANT CHANGE UP (ref 80–100)
PLATELET # BLD AUTO: 198 K/UL — SIGNIFICANT CHANGE UP (ref 150–400)
POTASSIUM SERPL-MCNC: 3.5 MMOL/L — SIGNIFICANT CHANGE UP (ref 3.5–5.3)
POTASSIUM SERPL-SCNC: 3.5 MMOL/L — SIGNIFICANT CHANGE UP (ref 3.5–5.3)
RBC # BLD: 2.85 M/UL — LOW (ref 4.2–5.8)
RBC # FLD: 13.3 % — SIGNIFICANT CHANGE UP (ref 10.3–14.5)
SODIUM SERPL-SCNC: 134 MMOL/L — LOW (ref 135–145)
TROPONIN I SERPL-MCNC: 0.07 NG/ML — HIGH (ref 0.02–0.06)
WBC # BLD: 10.2 K/UL — SIGNIFICANT CHANGE UP (ref 3.8–10.5)
WBC # FLD AUTO: 10.2 K/UL — SIGNIFICANT CHANGE UP (ref 3.8–10.5)

## 2019-01-07 PROCEDURE — 93306 TTE W/DOPPLER COMPLETE: CPT | Mod: 26

## 2019-01-07 PROCEDURE — 99233 SBSQ HOSP IP/OBS HIGH 50: CPT

## 2019-01-07 RX ORDER — ACETAMINOPHEN 500 MG
650 TABLET ORAL EVERY 6 HOURS
Refills: 0 | Status: DISCONTINUED | OUTPATIENT
Start: 2019-01-07 | End: 2019-01-09

## 2019-01-07 RX ADMIN — PANTOPRAZOLE SODIUM 40 MILLIGRAM(S): 20 TABLET, DELAYED RELEASE ORAL at 06:03

## 2019-01-07 RX ADMIN — CLOPIDOGREL BISULFATE 75 MILLIGRAM(S): 75 TABLET, FILM COATED ORAL at 16:17

## 2019-01-07 RX ADMIN — Medication 50 MILLIGRAM(S): at 05:58

## 2019-01-07 RX ADMIN — Medication 1 APPLICATION(S): at 05:57

## 2019-01-07 RX ADMIN — Medication 100 MILLIGRAM(S): at 05:58

## 2019-01-07 RX ADMIN — CEFTRIAXONE 100 GRAM(S): 500 INJECTION, POWDER, FOR SOLUTION INTRAMUSCULAR; INTRAVENOUS at 05:57

## 2019-01-07 RX ADMIN — TAMSULOSIN HYDROCHLORIDE 0.4 MILLIGRAM(S): 0.4 CAPSULE ORAL at 23:00

## 2019-01-07 RX ADMIN — LATANOPROST 1 DROP(S): 0.05 SOLUTION/ DROPS OPHTHALMIC; TOPICAL at 22:59

## 2019-01-07 RX ADMIN — Medication 650 MILLIGRAM(S): at 08:37

## 2019-01-07 RX ADMIN — Medication 6 MILLIGRAM(S): at 23:00

## 2019-01-07 RX ADMIN — Medication 81 MILLIGRAM(S): at 16:18

## 2019-01-07 RX ADMIN — Medication 1 APPLICATION(S): at 22:58

## 2019-01-07 RX ADMIN — ATORVASTATIN CALCIUM 80 MILLIGRAM(S): 80 TABLET, FILM COATED ORAL at 22:59

## 2019-01-07 RX ADMIN — LISINOPRIL 20 MILLIGRAM(S): 2.5 TABLET ORAL at 05:58

## 2019-01-07 RX ADMIN — ENOXAPARIN SODIUM 40 MILLIGRAM(S): 100 INJECTION SUBCUTANEOUS at 05:58

## 2019-01-07 RX ADMIN — Medication 650 MILLIGRAM(S): at 10:37

## 2019-01-08 LAB
ANION GAP SERPL CALC-SCNC: 8 MMOL/L — SIGNIFICANT CHANGE UP (ref 5–17)
BUN SERPL-MCNC: 24 MG/DL — HIGH (ref 7–23)
CALCIUM SERPL-MCNC: 8 MG/DL — LOW (ref 8.4–10.5)
CHLORIDE SERPL-SCNC: 103 MMOL/L — SIGNIFICANT CHANGE UP (ref 96–108)
CO2 SERPL-SCNC: 27 MMOL/L — SIGNIFICANT CHANGE UP (ref 22–31)
CREAT SERPL-MCNC: 0.99 MG/DL — SIGNIFICANT CHANGE UP (ref 0.5–1.3)
GLUCOSE BLDC GLUCOMTR-MCNC: 133 MG/DL — HIGH (ref 70–99)
GLUCOSE SERPL-MCNC: 130 MG/DL — HIGH (ref 70–99)
HCT VFR BLD CALC: 27.1 % — LOW (ref 39–50)
HGB BLD-MCNC: 9 G/DL — LOW (ref 13–17)
MCHC RBC-ENTMCNC: 30.1 PG — SIGNIFICANT CHANGE UP (ref 27–34)
MCHC RBC-ENTMCNC: 33.1 GM/DL — SIGNIFICANT CHANGE UP (ref 32–36)
MCV RBC AUTO: 90.8 FL — SIGNIFICANT CHANGE UP (ref 80–100)
PLATELET # BLD AUTO: 182 K/UL — SIGNIFICANT CHANGE UP (ref 150–400)
POTASSIUM SERPL-MCNC: 3.6 MMOL/L — SIGNIFICANT CHANGE UP (ref 3.5–5.3)
POTASSIUM SERPL-SCNC: 3.6 MMOL/L — SIGNIFICANT CHANGE UP (ref 3.5–5.3)
RBC # BLD: 2.98 M/UL — LOW (ref 4.2–5.8)
RBC # FLD: 13.2 % — SIGNIFICANT CHANGE UP (ref 10.3–14.5)
SODIUM SERPL-SCNC: 138 MMOL/L — SIGNIFICANT CHANGE UP (ref 135–145)
WBC # BLD: 8.5 K/UL — SIGNIFICANT CHANGE UP (ref 3.8–10.5)
WBC # FLD AUTO: 8.5 K/UL — SIGNIFICANT CHANGE UP (ref 3.8–10.5)

## 2019-01-08 PROCEDURE — 99233 SBSQ HOSP IP/OBS HIGH 50: CPT

## 2019-01-08 RX ADMIN — LISINOPRIL 20 MILLIGRAM(S): 2.5 TABLET ORAL at 06:01

## 2019-01-08 RX ADMIN — Medication 50 MILLIGRAM(S): at 06:01

## 2019-01-08 RX ADMIN — TAMSULOSIN HYDROCHLORIDE 0.4 MILLIGRAM(S): 0.4 CAPSULE ORAL at 22:29

## 2019-01-08 RX ADMIN — CLOPIDOGREL BISULFATE 75 MILLIGRAM(S): 75 TABLET, FILM COATED ORAL at 11:25

## 2019-01-08 RX ADMIN — ENOXAPARIN SODIUM 40 MILLIGRAM(S): 100 INJECTION SUBCUTANEOUS at 06:01

## 2019-01-08 RX ADMIN — PANTOPRAZOLE SODIUM 40 MILLIGRAM(S): 20 TABLET, DELAYED RELEASE ORAL at 06:03

## 2019-01-08 RX ADMIN — Medication 650 MILLIGRAM(S): at 07:00

## 2019-01-08 RX ADMIN — ATORVASTATIN CALCIUM 80 MILLIGRAM(S): 80 TABLET, FILM COATED ORAL at 22:30

## 2019-01-08 RX ADMIN — LATANOPROST 1 DROP(S): 0.05 SOLUTION/ DROPS OPHTHALMIC; TOPICAL at 22:30

## 2019-01-08 RX ADMIN — Medication 1 APPLICATION(S): at 06:01

## 2019-01-08 RX ADMIN — Medication 650 MILLIGRAM(S): at 06:03

## 2019-01-08 RX ADMIN — Medication 6 MILLIGRAM(S): at 22:30

## 2019-01-08 RX ADMIN — Medication 81 MILLIGRAM(S): at 11:25

## 2019-01-09 ENCOUNTER — TRANSCRIPTION ENCOUNTER (OUTPATIENT)
Age: 79
End: 2019-01-09

## 2019-01-09 VITALS
RESPIRATION RATE: 15 BRPM | HEART RATE: 80 BPM | OXYGEN SATURATION: 93 % | DIASTOLIC BLOOD PRESSURE: 67 MMHG | SYSTOLIC BLOOD PRESSURE: 152 MMHG

## 2019-01-09 PROCEDURE — 99239 HOSP IP/OBS DSCHRG MGMT >30: CPT

## 2019-01-09 PROCEDURE — 74177 CT ABD & PELVIS W/CONTRAST: CPT

## 2019-01-09 PROCEDURE — 93970 EXTREMITY STUDY: CPT

## 2019-01-09 PROCEDURE — 99285 EMERGENCY DEPT VISIT HI MDM: CPT | Mod: 25

## 2019-01-09 PROCEDURE — 71260 CT THORAX DX C+: CPT

## 2019-01-09 PROCEDURE — 84443 ASSAY THYROID STIM HORMONE: CPT

## 2019-01-09 PROCEDURE — 83605 ASSAY OF LACTIC ACID: CPT

## 2019-01-09 PROCEDURE — 83880 ASSAY OF NATRIURETIC PEPTIDE: CPT

## 2019-01-09 PROCEDURE — 82962 GLUCOSE BLOOD TEST: CPT

## 2019-01-09 PROCEDURE — 96361 HYDRATE IV INFUSION ADD-ON: CPT

## 2019-01-09 PROCEDURE — 83036 HEMOGLOBIN GLYCOSYLATED A1C: CPT

## 2019-01-09 PROCEDURE — 85027 COMPLETE CBC AUTOMATED: CPT

## 2019-01-09 PROCEDURE — 87040 BLOOD CULTURE FOR BACTERIA: CPT

## 2019-01-09 PROCEDURE — 81001 URINALYSIS AUTO W/SCOPE: CPT

## 2019-01-09 PROCEDURE — 87086 URINE CULTURE/COLONY COUNT: CPT

## 2019-01-09 PROCEDURE — 83735 ASSAY OF MAGNESIUM: CPT

## 2019-01-09 PROCEDURE — 71045 X-RAY EXAM CHEST 1 VIEW: CPT

## 2019-01-09 PROCEDURE — 93005 ELECTROCARDIOGRAM TRACING: CPT

## 2019-01-09 PROCEDURE — 87631 RESP VIRUS 3-5 TARGETS: CPT

## 2019-01-09 PROCEDURE — C8929: CPT

## 2019-01-09 PROCEDURE — 96365 THER/PROPH/DIAG IV INF INIT: CPT

## 2019-01-09 PROCEDURE — 84145 PROCALCITONIN (PCT): CPT

## 2019-01-09 PROCEDURE — 84484 ASSAY OF TROPONIN QUANT: CPT

## 2019-01-09 PROCEDURE — 80048 BASIC METABOLIC PNL TOTAL CA: CPT

## 2019-01-09 RX ADMIN — Medication 81 MILLIGRAM(S): at 12:24

## 2019-01-09 RX ADMIN — Medication 1 APPLICATION(S): at 05:49

## 2019-01-09 RX ADMIN — CLOPIDOGREL BISULFATE 75 MILLIGRAM(S): 75 TABLET, FILM COATED ORAL at 12:24

## 2019-01-09 RX ADMIN — ENOXAPARIN SODIUM 40 MILLIGRAM(S): 100 INJECTION SUBCUTANEOUS at 05:49

## 2019-01-09 RX ADMIN — Medication 50 MILLIGRAM(S): at 05:49

## 2019-01-09 RX ADMIN — LISINOPRIL 20 MILLIGRAM(S): 2.5 TABLET ORAL at 05:49

## 2019-01-09 RX ADMIN — PANTOPRAZOLE SODIUM 40 MILLIGRAM(S): 20 TABLET, DELAYED RELEASE ORAL at 05:49

## 2019-01-09 NOTE — DISCHARGE NOTE ADULT - HOSPITAL COURSE
78M PMH dementia, BPH, HTN, CAD presents for hypoglycemia from [facility].  Patient also found to have fevers and leukocytosis.  Patient's A1C found to be 5.3 and diabetic medications stopped to prevent further hypoglycemia. Patient also with fever but cultures negative and no pneumonia.  patient to be discharged to [facility].

## 2019-01-09 NOTE — DISCHARGE NOTE ADULT - PATIENT PORTAL LINK FT
You can access the [facility] Patient Portal, offered by [facility], by registering with the following website: [email or web address]

## 2019-01-09 NOTE — DISCHARGE NOTE ADULT - CARE PLAN
Principal Discharge DX:	Hypoglycemia  Goal:	Prevent Hypogylcemia  Assessment and plan of treatment:	STOP METFORMIN   Patient's A1C is 5.3 and no longer has diabetes  Secondary Diagnosis:	Leukocytosis, unspecified type  Secondary Diagnosis:	Fever, unspecified fever cause

## 2019-01-09 NOTE — DISCHARGE NOTE ADULT - MEDICATION SUMMARY - MEDICATIONS TO TAKE
I will START or STAY ON the medications listed below when I get home from the hospital:    Aspir 81 oral delayed release tablet  -- 1 tab(s) by mouth once a day  -- Indication: For Cardioprotective    oxyCODONE 10 mg oral tablet  -- 1 tab(s) by mouth every 6 hours, As Needed  -- Indication: For Pain    ramipril 10 mg oral capsule  -- 1 cap(s) by mouth once a day  -- Indication: For Hypertension    Milk of Magnesia  -- orally once a day, As Needed  -- Indication: For Constipation    Flomax 0.4 mg oral capsule  -- 1 cap(s) by mouth once a day  -- Indication: For BPH (benign prostatic hyperplasia)    clotrimazole 1% topical cream (obsolete)  -- Apply on skin to affected area 2 times a day to B/L buttocks   -- Indication: For topical    atorvastatin 80 mg oral tablet  -- 1 tab(s) by mouth once a day  -- Indication: For Hypercholesterolemia    clopidogrel 75 mg oral tablet  -- 1 tab(s) by mouth once a day  -- Indication: For Cardioprotective    metoprolol succinate 50 mg oral tablet, extended release  -- 1 tab(s) by mouth once a day  -- Indication: For Hypertension    Melatonin 3 mg oral tablet  -- 2 tab(s) by mouth once a day (at bedtime)  -- Indication: For Insomnia    latanoprost 0.005% ophthalmic solution  -- 1 drop(s) to each affected eye once a day (in the evening)  -- Indication: For Eyes    Protonix 40 mg oral delayed release tablet  -- orally 2 times a day  -- Indication: For Reflux

## 2019-01-09 NOTE — PROGRESS NOTE ADULT - PROBLEM SELECTOR PLAN 1
fingersticks now on high side  A1C is 5.3  STOP metformin as outpatient  patient no longer has diabetes type 2

## 2019-01-09 NOTE — PROGRESS NOTE ADULT - PROBLEM SELECTOR PLAN 2
afebrile overnight   back to baseline  recheck CBC this AM
improving   no evidence of infection  stop antibiotics for now
back to baseline  recheck CBC this AM

## 2019-01-09 NOTE — PROGRESS NOTE ADULT - SUBJECTIVE AND OBJECTIVE BOX
Patient is a 78y old  Male who presents with a chief complaint of AMS (2019 14:35)    Patient feels okay. Confused at baseline but conversant.  No complaints at this time.      Patient seen and examined at bedside.    ALLERGIES:  No Known Allergies    MEDICATIONS  (STANDING):  aspirin enteric coated 81 milliGRAM(s) Oral daily  atorvastatin 80 milliGRAM(s) Oral at bedtime  clopidogrel Tablet 75 milliGRAM(s) Oral daily  clotrimazole 1% Cream 1 Application(s) Topical two times a day  dextrose 5%. 1000 milliLiter(s) (50 mL/Hr) IV Continuous <Continuous>  dextrose 50% Injectable 12.5 Gram(s) IV Push once  dextrose 50% Injectable 25 Gram(s) IV Push once  dextrose 50% Injectable 25 Gram(s) IV Push once  enoxaparin Injectable 40 milliGRAM(s) SubCutaneous every 24 hours  insulin lispro (HumaLOG) corrective regimen sliding scale   SubCutaneous three times a day before meals  insulin lispro (HumaLOG) corrective regimen sliding scale   SubCutaneous at bedtime  latanoprost 0.005% Ophthalmic Solution 1 Drop(s) Both EYES at bedtime  lisinopril 20 milliGRAM(s) Oral daily  melatonin 6 milliGRAM(s) Oral at bedtime  metoprolol succinate ER 50 milliGRAM(s) Oral daily  pantoprazole    Tablet 40 milliGRAM(s) Oral before breakfast  tamsulosin 0.4 milliGRAM(s) Oral at bedtime    MEDICATIONS  (PRN):  acetaminophen   Tablet .. 650 milliGRAM(s) Oral every 6 hours PRN Temp greater or equal to 38C (100.4F), Mild Pain (1 - 3)  acetaminophen   Tablet .. 650 milliGRAM(s) Oral every 6 hours PRN Temp greater or equal to 38C (100.4F), Mild Pain (1 - 3)  dextrose 40% Gel 15 Gram(s) Oral once PRN Blood Glucose LESS THAN 70 milliGRAM(s)/deciliter  glucagon  Injectable 1 milliGRAM(s) IntraMuscular once PRN Glucose LESS THAN 70 milligrams/deciliter  magnesium hydroxide Suspension 30 milliLiter(s) Oral daily PRN Constipation  oxyCODONE    IR 5 milliGRAM(s) Oral every 6 hours PRN Severe Pain (7 - 10)    Vital Signs Last 24 Hrs  T(F): 98.3 (2019 04:59), Max: 98.8 (2019 15:55)  HR: 98 (2019 04:59) (77 - 98)  BP: 163/63 (2019 04:59) (148/60 - 163/63)  RR: 16 (2019 04:59) (15 - 16)  SpO2: 94% (2019 04:59) (94% - 98%)  I&O's Summary    2019 07:01  -  2019 07:00  --------------------------------------------------------  IN: 890 mL / OUT: 100 mL / NET: 790 mL    BMI (kg/m2): 34 (19 @ 05:09)  PHYSICAL EXAM:  General: NAD, A/O x 3  ENT: MMM  Neck: Supple, No JVD  Lungs: Clear to auscultation bilaterally  Cardio: RRR, S1/S2, No murmurs  Abdomen: Soft, Nontender, Nondistended; Bowel sounds present  Extremities: No calf tenderness, No pitting edema    LABS:                        9.1    11.7  )-----------( 215      ( 2019 06:12 )             27.0           137  |  105  |  18  ----------------------------<  104  3.6   |  26  |  0.95    Ca    7.9      2019 06:12  Mg     1.3          eGFR if Non African American: 77 mL/min/1.73M2 (19 @ 06:12)  eGFR if African American: 90 mL/min/1.73M2 (19 @ 06:12)     Lactate, Blood: 0.6 mmol/L ( @ 08:52)  Lactate, Blood: 1.4 mmol/L ( @ 19:45)    CARDIAC MARKERS ( 2019 15:50 )  <.017 ng/mL / x     / x     / x     / x      CARDIAC MARKERS ( 2019 18:05 )  .044 ng/mL / x     / x     / x     / x        TSH 2.27   TSH with FT4 reflex --  Total T3 --    CAPILLARY BLOOD GLUCOSE    POCT Blood Glucose.: 196 mg/dL (2019 21:09)  POCT Blood Glucose.: 140 mg/dL (2019 17:02)  POCT Blood Glucose.: 101 mg/dL (2019 12:05)    - YzdagiegwpR8D 5.3  10 LzylvaxkhuO3D 6.7    Urinalysis Basic - ( 2019 19:45 )    Color: Yellow / Appearance: Clear / S.015 / pH: x  Gluc: x / Ketone: Moderate  / Bili: Small / Urobili: 4   Blood: x / Protein: 15 / Nitrite: Negative   Leuk Esterase: Negative / RBC: Negative /HPF / WBC Negative /HPF   Sq Epi: x / Non Sq Epi: Neg.-Few / Bacteria: Negative /HPF    Culture - Urine (collected 2019 19:45)  Source: .Urine Clean Catch (Midstream)  Final Report (2019 22:36):    No growth    Culture - Blood (collected 2019 19:45)  Source: .Blood Blood-Peripheral  Preliminary Report (2019 01:02):    No growth to date.    Culture - Blood (collected 2019 19:45)  Source: .Blood Blood-Peripheral  Preliminary Report (2019 01:02):    No growth to date.    RADIOLOGY & ADDITIONAL TESTS:    Care Discussed with Consultants/Other Providers:
Patient is a 78y old  Male who presents with a chief complaint of fever (08 Jan 2019 07:19)    Patient feels okay.  No acute issues.  No fevers overnight.      Patient seen and examined at bedside.    ALLERGIES:  No Known Allergies    MEDICATIONS  (STANDING):  aspirin enteric coated 81 milliGRAM(s) Oral daily  atorvastatin 80 milliGRAM(s) Oral at bedtime  clopidogrel Tablet 75 milliGRAM(s) Oral daily  clotrimazole 1% Cream 1 Application(s) Topical two times a day  enoxaparin Injectable 40 milliGRAM(s) SubCutaneous every 24 hours  latanoprost 0.005% Ophthalmic Solution 1 Drop(s) Both EYES at bedtime  lisinopril 20 milliGRAM(s) Oral daily  melatonin 6 milliGRAM(s) Oral at bedtime  metoprolol succinate ER 50 milliGRAM(s) Oral daily  pantoprazole    Tablet 40 milliGRAM(s) Oral before breakfast  tamsulosin 0.4 milliGRAM(s) Oral at bedtime    MEDICATIONS  (PRN):  acetaminophen   Tablet .. 650 milliGRAM(s) Oral every 6 hours PRN Temp greater or equal to 38C (100.4F), Mild Pain (1 - 3)  acetaminophen   Tablet .. 650 milliGRAM(s) Oral every 6 hours PRN Temp greater or equal to 38C (100.4F), Mild Pain (1 - 3)  magnesium hydroxide Suspension 30 milliLiter(s) Oral daily PRN Constipation  oxyCODONE    IR 5 milliGRAM(s) Oral every 6 hours PRN Severe Pain (7 - 10)    Vital Signs Last 24 Hrs  T(F): 98.2 (09 Jan 2019 04:35), Max: 98.8 (08 Jan 2019 22:28)  HR: 81 (09 Jan 2019 04:35) (70 - 81)  BP: 144/67 (09 Jan 2019 04:35) (133/55 - 147/76)  RR: 15 (09 Jan 2019 04:35) (14 - 15)  SpO2: 93% (09 Jan 2019 04:35) (93% - 95%)  I&O's Summary    08 Jan 2019 07:01  -  09 Jan 2019 07:00  --------------------------------------------------------  IN: 1560 mL / OUT: 2 mL / NET: 1558 mL    BMI (kg/m2): 34 (01-06-19 @ 05:09)  PHYSICAL EXAM:  General: NAD, A/O x 2, conversant but confused at times   ENT: MMM  Neck: Supple, No JVD  Lungs: Clear to auscultation bilaterally  Cardio: RRR, S1/S2, No murmurs  Abdomen: Soft, Nontender, Nondistended; Bowel sounds present  Extremities: No calf tenderness, No pitting edema    LABS:                        9.0    8.5   )-----------( 182      ( 08 Jan 2019 08:53 )             27.1       01-08    138  |  103  |  24  ----------------------------<  130  3.6   |  27  |  0.99    Ca    8.0      08 Jan 2019 08:53       eGFR if Non African American: 73 mL/min/1.73M2 (01-08-19 @ 08:53)  eGFR if : 85 mL/min/1.73M2 (01-08-19 @ 08:53)       Lactate, Blood: 0.6 mmol/L (01-06 @ 08:52)    CARDIAC MARKERS ( 07 Jan 2019 09:17 )  .068 ng/mL / x     / x     / x     / x      CARDIAC MARKERS ( 06 Jan 2019 15:50 )  <.017 ng/mL / x     / x     / x     / x        CAPILLARY BLOOD GLUCOSE    POCT Blood Glucose.: 133 mg/dL (08 Jan 2019 07:55)    01-06 PnpgbbbojuI1C 5.3  10-26 NvubkhvffyI2Q 6.7    Culture - Urine (collected 05 Jan 2019 19:45)  Source: .Urine Clean Catch (Midstream)  Final Report (06 Jan 2019 22:36):    No growth    Culture - Blood (collected 05 Jan 2019 19:45)  Source: .Blood Blood-Peripheral  Preliminary Report (07 Jan 2019 01:02):    No growth to date.    Culture - Blood (collected 05 Jan 2019 19:45)  Source: .Blood Blood-Peripheral  Preliminary Report (07 Jan 2019 01:02):    No growth to date.        RADIOLOGY & ADDITIONAL TESTS:    Care Discussed with Consultants/Other Providers:
Patient sometimes responds appropriately   to questions  No complaints this morning  States he feels fine  Comfortable  No complaints of abdominal, or leg pain  No reports from nursing    Vital Signs Last 24 Hrs  T(F): 97.4 Max: 100.4 HR: 69 BP: 121/48 RR: 15     PHYSICAL EXAM:  General: NAD, Comfortable  Pulm: CTA BL No Rhonchi Rales or wheezes  Neck: Supple, No JVD  CVS: RRR No rubs murmurs or gallops  Abdomen: Soft, Nontender, Nondistended; No masses or organomegally  Extremities: No calf tenderness, No pitting edema, Mild erythema of right leg    LABS:              9.1         11.7)------(215              27.0   137  |  105  |  18  ---------------------<  104  3.6   |  26  |  0.95    Ca    7.9      06 Jan 2019 06:12  Mg     1.3     01-06    Lactate, Blood: 0.6 mmol/L (01-06 @ 08:52)  Lactate, Blood: 1.4 mmol/L (01-05 @ 19:45)    CARDIAC MARKERS ( 05 Jan 2019 18:05 )  .044 ng/mL / x     / x     / x     / x
Patient is a 78y old  Male who presents with a chief complaint of Temperature/leukocytosis (2019 08:09)      Patient seen and examined at bedside.    ALLERGIES:  No Known Allergies    MEDICATIONS  (STANDING):  aspirin enteric coated 81 milliGRAM(s) Oral daily  atorvastatin 80 milliGRAM(s) Oral at bedtime  clopidogrel Tablet 75 milliGRAM(s) Oral daily  clotrimazole 1% Cream 1 Application(s) Topical two times a day  dextrose 50% Injectable 25 Gram(s) IV Push once  enoxaparin Injectable 40 milliGRAM(s) SubCutaneous every 24 hours  latanoprost 0.005% Ophthalmic Solution 1 Drop(s) Both EYES at bedtime  lisinopril 20 milliGRAM(s) Oral daily  melatonin 6 milliGRAM(s) Oral at bedtime  metoprolol succinate ER 50 milliGRAM(s) Oral daily  pantoprazole    Tablet 40 milliGRAM(s) Oral before breakfast  tamsulosin 0.4 milliGRAM(s) Oral at bedtime    MEDICATIONS  (PRN):  acetaminophen   Tablet .. 650 milliGRAM(s) Oral every 6 hours PRN Temp greater or equal to 38C (100.4F), Mild Pain (1 - 3)  acetaminophen   Tablet .. 650 milliGRAM(s) Oral every 6 hours PRN Temp greater or equal to 38C (100.4F), Mild Pain (1 - 3)  magnesium hydroxide Suspension 30 milliLiter(s) Oral daily PRN Constipation  oxyCODONE    IR 5 milliGRAM(s) Oral every 6 hours PRN Severe Pain (7 - 10)    Vital Signs Last 24 Hrs  T(F): 100.6 (2019 05:27), Max: 100.6 (2019 05:27)  HR: 79 (2019 05:27) (79 - 90)  BP: 139/62 (2019 05:27) (139/62 - 166/77)  RR: 16 (2019 05:27) (16 - 16)  SpO2: 93% (2019 05:27) (93% - 97%)  I&O's Summary    2019 07:01  -  2019 07:00  --------------------------------------------------------  IN: 840 mL / OUT: 0 mL / NET: 840 mL      BMI (kg/m2): 34 (19 @ 05:09)  PHYSICAL EXAM:  General: NAD, A/O x 3  ENT: MMM  Neck: Supple, No JVD  Lungs: Clear to auscultation bilaterally  Cardio: RRR, S1/S2, No murmurs  Abdomen: Soft, Nontender, Nondistended; Bowel sounds present  Extremities: No calf tenderness, No pitting edema    LABS:                        8.6    10.2  )-----------( 198      ( 2019 09:17 )             25.9           134  |  101  |  18  ----------------------------<  142  3.5   |  26  |  0.96    Ca    8.0      2019 09:17  Mg     1.3            eGFR if Non African American: 77 mL/min/1.73M2 (19 @ 09:17)  eGFR if : 89 mL/min/1.73M2 (19 @ 09:17)       Lactate, Blood: 0.6 mmol/L ( @ 08:52)  Lactate, Blood: 1.4 mmol/L ( @ 19:45)    CARDIAC MARKERS ( 2019 09:17 )  .068 ng/mL / x     / x     / x     / x      CARDIAC MARKERS ( 2019 15:50 )  <.017 ng/mL / x     / x     / x     / x      CARDIAC MARKERS ( 2019 18:05 )  .044 ng/mL / x     / x     / x     / x            TSH 2.27   TSH with FT4 reflex --  Total T3 --        CAPILLARY BLOOD GLUCOSE         FmkhugrtzkL1J 5.3  10- KberadsvcyJ9C 6.7    Urinalysis Basic - ( 2019 19:45 )    Color: Yellow / Appearance: Clear / S.015 / pH: x  Gluc: x / Ketone: Moderate  / Bili: Small / Urobili: 4   Blood: x / Protein: 15 / Nitrite: Negative   Leuk Esterase: Negative / RBC: Negative /HPF / WBC Negative /HPF   Sq Epi: x / Non Sq Epi: Neg.-Few / Bacteria: Negative /HPF        Culture - Urine (collected 2019 19:45)  Source: .Urine Clean Catch (Midstream)  Final Report (2019 22:36):    No growth    Culture - Blood (collected 2019 19:45)  Source: .Blood Blood-Peripheral  Preliminary Report (2019 01:02):    No growth to date.    Culture - Blood (collected 2019 19:45)  Source: .Blood Blood-Peripheral  Preliminary Report (2019 01:02):    No growth to date.        RADIOLOGY & ADDITIONAL TESTS:    Care Discussed with Consultants/Other Providers:

## 2019-01-09 NOTE — PROGRESS NOTE ADULT - PROBLEM SELECTOR PLAN 3
afebrile overnight  off antibiotics
unclear  viral in etiology?  RSV negative
still with low grade fever  possible viral etiology although RSV negative, patient could have viral infection not detected by RSV

## 2019-01-30 ENCOUNTER — INBOUND DOCUMENT (OUTPATIENT)
Age: 79
End: 2019-01-30

## 2019-03-01 NOTE — BEHAVIORAL HEALTH ASSESSMENT NOTE - NSBHCONSULTFOLLOWAFTERCARE_PSY_A_CORE FT
Normal for race
no psychiatric f/u needed unless pt is interested.  He currently is not interested in f/u.

## 2019-03-16 NOTE — ED PROVIDER NOTE - MUSCULOSKELETAL NECK EXAM
Subjective:     Interval History:  Hgb 9.1 this morning. Net negative 1 L after lasix. Patient denies any dyspnea with exertion.     Objective:     Vital Signs (Most Recent):  Temp: 97.8 °F (36.6 °C) (03/16/19 1154)  Pulse: 65 (03/16/19 1154)  Resp: 18 (03/16/19 1154)  BP: (!) 145/66 (03/16/19 1154)  SpO2: (!) 94 % (03/16/19 1154) Vital Signs (24h Range):  Temp:  [97.3 °F (36.3 °C)-98.6 °F (37 °C)] 97.8 °F (36.6 °C)  Pulse:  [60-72] 65  Resp:  [18] 18  SpO2:  [93 %-96 %] 94 %  BP: (111-157)/(57-69) 145/66     Weight: 86.2 kg (190 lb 0.6 oz)  Body mass index is 31.62 kg/m².  Body surface area is 1.99 meters squared.    ECOG SCORE         [unfilled]    Intake/Output - Last 3 Shifts       03/14 0700 - 03/15 0659 03/15 0700 - 03/16 0659 03/16 0700 - 03/17 0659    P.O. 420      I.V. (mL/kg)       Blood 325 749     Total Intake(mL/kg) 745 (8.6) 749 (8.7)     Urine (mL/kg/hr) 1050 (0.5) 1850 (0.9)     Stool 0 0     Total Output 1050 1850     Net -305 -1101            Urine Occurrence 1 x 1 x     Stool Occurrence 1 x 1 x           Physical Exam   Constitutional: She is oriented to person, place, and time. She appears well-developed and well-nourished. No distress.   HENT:   Head: Normocephalic and atraumatic.   Eyes: EOM are normal. Pupils are equal, round, and reactive to light. No scleral icterus.   Neck: Normal range of motion. Neck supple. No JVD present.   Cardiovascular: Normal rate and regular rhythm.   No murmur heard.  Pulmonary/Chest: Effort normal and breath sounds normal. No respiratory distress. She has no wheezes. She exhibits no tenderness.   Abdominal: Soft. Bowel sounds are normal. She exhibits no distension. There is no tenderness. There is no guarding.   Musculoskeletal: Normal range of motion. She exhibits no edema or deformity.   No lower extremity edema    Neurological: She is alert and oriented to person, place, and time. She has normal reflexes. No cranial nerve deficit.   Skin: Skin is warm and dry.  Capillary refill takes less than 2 seconds. No erythema.       Significant Labs:   All pertinent labs from the last 24 hours have been reviewed.    Diagnostic Results:  I have reviewed all pertinent imaging results/findings within the past 24 hours.   no deformity, pain or tenderness. no restriction of movement

## 2019-04-16 NOTE — BEHAVIORAL HEALTH ASSESSMENT NOTE - NS ED BHA MSE GENERAL APPEARANCE
“Patient's name, , procedure and correct site were confirmed during the [facility] Timeout.”
Well developed

## 2019-05-27 ENCOUNTER — EMERGENCY (EMERGENCY)
Facility: HOSPITAL | Age: 79
LOS: 1 days | Discharge: ANOTHER HOSPITAL | End: 2019-05-27
Attending: EMERGENCY MEDICINE | Admitting: EMERGENCY MEDICINE
Payer: MEDICARE

## 2019-05-27 VITALS
DIASTOLIC BLOOD PRESSURE: 78 MMHG | SYSTOLIC BLOOD PRESSURE: 137 MMHG | OXYGEN SATURATION: 98 % | HEART RATE: 79 BPM | RESPIRATION RATE: 17 BRPM | TEMPERATURE: 98 F | WEIGHT: 179.9 LBS | HEIGHT: 67 IN

## 2019-05-27 LAB — GLUCOSE BLDC GLUCOMTR-MCNC: 207 MG/DL — HIGH (ref 70–99)

## 2019-05-27 PROCEDURE — 99284 EMERGENCY DEPT VISIT MOD MDM: CPT | Mod: 25

## 2019-05-27 PROCEDURE — 72125 CT NECK SPINE W/O DYE: CPT

## 2019-05-27 PROCEDURE — 90715 TDAP VACCINE 7 YRS/> IM: CPT

## 2019-05-27 PROCEDURE — 82962 GLUCOSE BLOOD TEST: CPT

## 2019-05-27 PROCEDURE — 70450 CT HEAD/BRAIN W/O DYE: CPT

## 2019-05-27 PROCEDURE — 12002 RPR S/N/AX/GEN/TRNK2.6-7.5CM: CPT

## 2019-05-27 PROCEDURE — 90471 IMMUNIZATION ADMIN: CPT

## 2019-05-27 PROCEDURE — 72170 X-RAY EXAM OF PELVIS: CPT

## 2019-05-27 PROCEDURE — 70450 CT HEAD/BRAIN W/O DYE: CPT | Mod: 26

## 2019-05-27 PROCEDURE — 72170 X-RAY EXAM OF PELVIS: CPT | Mod: 26

## 2019-05-27 PROCEDURE — 72125 CT NECK SPINE W/O DYE: CPT | Mod: 26

## 2019-05-27 RX ORDER — TETANUS TOXOID, REDUCED DIPHTHERIA TOXOID AND ACELLULAR PERTUSSIS VACCINE, ADSORBED 5; 2.5; 8; 8; 2.5 [IU]/.5ML; [IU]/.5ML; UG/.5ML; UG/.5ML; UG/.5ML
0.5 SUSPENSION INTRAMUSCULAR ONCE
Refills: 0 | Status: COMPLETED | OUTPATIENT
Start: 2019-05-27 | End: 2019-05-27

## 2019-05-27 RX ADMIN — TETANUS TOXOID, REDUCED DIPHTHERIA TOXOID AND ACELLULAR PERTUSSIS VACCINE, ADSORBED 0.5 MILLILITER(S): 5; 2.5; 8; 8; 2.5 SUSPENSION INTRAMUSCULAR at 19:37

## 2019-05-27 NOTE — ED ADULT NURSE NOTE - OBJECTIVE STATEMENT
pt states he got dizzy and fell striking his head, denies loc.  3cm lac noted to rt occipital head.  Pt is a/ox3, neuro intact, denies pain and denies trauma to other areas. pt states he got dizzy and fell striking his head, denies loc.  3cm lac noted to rt occipital head.  Pt is a/ox1 with hx dementia, neuro intact, denies pain and denies trauma to other areas.

## 2019-05-27 NOTE — ED ADULT NURSE NOTE - NSIMPLEMENTINTERV_GEN_ALL_ED
Implemented All Fall with Harm Risk Interventions:  [place] to call system. Call bell, personal items and telephone within reach. Instruct patient to call for assistance. Room bathroom lighting operational. Non-slip footwear when patient is off stretcher. Physically safe environment: no spills, clutter or unnecessary equipment. Stretcher in lowest position, wheels locked, appropriate side rails in place. Provide visual cue, wrist band, yellow gown, etc. Monitor gait and stability. Monitor for mental status changes and reorient to person, place, and time. Review medications for side effects contributing to fall risk. Reinforce activity limits and safety measures with patient and family. Provide visual clues: red socks.

## 2019-05-27 NOTE — ED PROVIDER NOTE - NSFOLLOWUPINSTRUCTIONS_ED_ALL_ED_FT
Follow up in 7 days for suture removal   clean area with soap and water   rest   return if any worsening symptoms.       YOU NEED TO KNOW:    Staples are often used to close a wound. Your staples may be placed for 3 to 14 days, depending on the location of your wound.    DISCHARGE INSTRUCTIONS:    Care for your wound:     Clean:   You may be able to shower in 24 hours. Do not soak your wound under water.      Gently wash your wound with soap and warm water daily. Lightly pat it dry. Do not cover your wound unless your healthcare provider tells you to.       You may also need to clean your wound with a mixture of hydrogen peroxide and water. Ask how to do this.      Do not apply ointment or cream to the wound unless your healthcare provider tells you to.      Elevate:   Rest any arm or leg that has a wound on pillows above the level of your heart. Do this as often as possible for 2 days. This will help decrease swelling and pain, and help you heal faster.          Minimize scarring:   Avoid sunshine on your wound to reduce scarring.         Follow up with your healthcare provider as directed: You may need to return for a wound checkup 3 days after your staples are placed. Ask when you should return to get your staples removed.    Staple removal:     A medical staple remover will be used to take out your staples. Your healthcare provider will slide the tool under each staple, squeeze the handle, and gently pull the staple out.       Medical tape will be placed on your wound once your staples are removed. This will help keep your wound closed. The medical tape will fall off on its own after several days.     Contact your healthcare provider if:     You have redness, pain, swelling, and pus draining from your wound.      Your pain medicine does not relieve your pain.      You have a fever of 101°F (38.5°C) or higher.      You have an odor coming from your wound.      You have questions or concerns about your condition or care.    Return to the emergency department if:     Your wound reopens.      You have red streaks in your skin that spread out from your wound.      You have severe pain or vomiting.        Head Injury    WHAT YOU NEED TO KNOW:    A head injury is most often caused by a blow to the head. This may occur from a fall, bicycle injury, sports injury, being struck in the head, or a motor vehicle accident.     DISCHARGE INSTRUCTIONS:    Call 911 or have someone else call for any of the following:     You cannot be woken.      You have a seizure.      You stop responding to others or you faint.      You have blurry or double vision.      Your speech becomes slurred or confused.      You have arm or leg weakness, loss of feeling, or new problems with coordination.      Your pupils are larger than usual or one pupil is a different size than the other.       You have blood or clear fluid coming out of your ears or nose.    Return to the emergency department if:     You have repeated or forceful vomiting.      You feel confused.      Your headache gets worse or becomes severe.      You or someone caring for you notices that you are harder to wake than usual.    Contact your healthcare provider if:     Your symptoms last longer than 6 weeks after the injury.      You have questions or concerns about your condition or care.    Medicines:     Acetaminophen decreases pain. Acetaminophen is available without a doctor's order. Ask how much to take and how often to take it. Follow directions. Acetaminophen can cause liver damage if not taken correctly.      Take your medicine as directed. Contact your healthcare provider if you think your medicine is not helping or if you have side effects. Tell him or her if you are allergic to any medicine. Keep a list of the medicines, vitamins, and herbs you take. Include the amounts, and when and why you take them. Bring the list or the pill bottles to follow-up visits. Carry your medicine list with you in case of an emergency.    Self-care:     Rest or do quiet activities for 24 to 48 hours. Limit your time watching TV, using the computer, or doing tasks that require a lot of thinking. Slowly return to your normal activities as directed. Do not play sports or do activities that may cause you to get hit in the head. Ask your healthcare provider when you can return to sports.       Apply ice on your head for 15 to 20 minutes every hour or as directed. Use an ice pack, or put crushed ice in a plastic bag. Cover it with a towel before you apply it to your skin. Ice helps prevent tissue damage and decreases swelling and pain.       Have someone stay with you for 24 hours or as directed. This person can monitor you for complications and call 911. When you are awake the person should ask you a few questions to see if you are thinking clearly. An example would be to ask your name or your address.     Prevent another head injury:     Wear a helmet that fits properly. Do this when you play sports, or ride a bike, scooter, or skateboard. Helmets help decrease your risk of a serious head injury. Talk to your healthcare provider about other ways you can protect yourself if you play sports.      Wear your seat belt every time you are in a car. This helps to decrease your risk for a head injury if you are in a car accident.     Follow up with your healthcare provider as directed: Write down your questions so you remember to ask them during your visits.

## 2019-05-27 NOTE — ED PROVIDER NOTE - PMH
Benign prostatic hyperplasia, unspecified whether lower urinary tract symptoms present    BPH (benign prostatic hyperplasia)    Cellulitis    Dementia    Diabetes    Hyperlipidemia, unspecified hyperlipidemia type    Hypertension, unspecified type    Muscle weakness    Nocardiosis    Parkinson disease

## 2019-05-27 NOTE — ED PROVIDER NOTE - CLINICAL SUMMARY MEDICAL DECISION MAKING FREE TEXT BOX
79 yr old male with hx of HTN, HLD, parkinson's, BPH, diabetes, difficulty walking presents [facility] s/p unwitnessed fall, with scalp laceration. Pt was found awake, next to another resident, with head on wheelchair. tetanus unknown.   CT head/ neck 79 yr old male with hx of HTN, HLD, parkinson's, BPH, diabetes, difficulty walking presents from [facility] rehab s/p unwitnessed fall, with scalp laceration. Pt was found awake, next to another resident, with head on wheelchair. tetanus unknown.   CT head/ neck- showing no acute findings, pelvic xray reviewed.   5 staples placed, wound care, fu in 7 days for staple removal, stable for discharge.

## 2019-05-27 NOTE — ED ADULT NURSE NOTE - CHPI ED NUR SYMPTOMS NEG
no abrasion/no confusion/no deformity/no fever/no loss of consciousness/no numbness/no vomiting/no weakness

## 2019-05-27 NOTE — ED PROVIDER NOTE - PHYSICAL EXAMINATION
right occipital scalp- 4 cm linear laceration, no bleeding   spine- no bony tenderness   b/l LE - positive ROM, no tenderness, positive 2+ pedal pulses, less than 2 sec cap refill

## 2019-05-27 NOTE — ED PROVIDER NOTE - OBJECTIVE STATEMENT
79 yr old male with hx of HTN, HLD, parkinson's, BPH, diabetes, difficulty walking [facility], with scalp laceration. Pt was found awake, next to another resident, with head on wheelchair. tetanus unknown.

## 2019-05-27 NOTE — ED PROVIDER NOTE - ATTENDING CONTRIBUTION TO CARE
Ariadna with NEIL [facility]. 79 yr old male with hx of HTN, HLD, parkinson's, BPH, diabetes, difficulty walking presents from [facility] s/p fall, with scalp laceration. Pt was found awake, next to another resident, with head on wheelchair. tetanus unknown. CT head/ neck- showing no acute findings, pelvic xray reviewed. 5 staples placed, wound care, fu in 7 days for staple removal, stable for discharge. I performed a face to face bedside interview with patient regarding history of present illness, review of symptoms and past medical history. I completed an independent physical exam.  I have discussed the patient's plan of care with Physician Assistant (PA). I agree with note as stated above, having amended the EMR as needed to reflect my findings.   This includes History of Present Illness, HIV, Past Medical/Surgical/Family/Social History, Allergies and Home Medications, Review of Systems, Physical Exam, and any Progress Notes during the time I functioned as the attending physician for this patient.

## 2019-06-06 NOTE — BEHAVIORAL HEALTH ASSESSMENT NOTE - NSBHCONSULTWRKUPYES_PSY_A_CORE
Chief Complaint   Patient presents with   • Follow-up     Cardiac management. He is currently taking Chemo for multiple myeloma per Dr Smith. PCP writes refills on medication.   • Shortness of Breath     Has occasional with exertion, and sometimes during the night, follows with Dr Massey. He uses Breo inhaler.   • Chest Pain     He had episode of chest pain while laying in bed lasting about 5 minutes, he is unable to inform of how it felt.       Subjective       Max Velarde is a 86 y.o. male with a history of HTN, hyperlipidemia, and PVCs whose stress in 2010 showed inferior ischemia managed medically.  More SOB reported in 2017.  Echo remained stable.  Stress not repeated as he was unable to lie flat on the table.  He has significant osteoporosis, multiple stress fractures and kyphoplasties have been done. He was diagnosed with multiple myeloma with involvement of thoracic and lumbar spine and right shoulder, managed by Dr. Smith. On 9/7/18, he was admitted to [facility] with  bilateral pneumonia, possible acute CHF. He responded well to treatment and was discharged home prior to echo report available. 9/10/18 echo report per Dr. Castillo showed LVEF 50-55%, mild MR and AI, RVSP 44 mmHg. Lasix was prescribed on prn basis. In October 2018, he experienced a fall resulting in right femur fracture. He underwent surgery without complications. Rhythm showed multiple PVCs and PACs, no PAF. Cardiac enzymes were mildly elevated and Dr. Murguia consulted.  Echocardiogram ordered and showed LVEF stable at 45-50%. He developed heart failure symptoms and pneumonia, later gout and shingles.  Lasix reduced last visit.  He came today for routine follow-up.  Overall, feels stable.  No significant shortness of breath.  No edema, orthopnea.  Occasional palpitations.  He reports one episode of significant midsternal to left-sided chest pain few weeks ago.  He was lying in bed, pain lasted about 5 to 10 minutes then subsided.  No  recurrent episodes.  Labs followed with Dr. Guerrero and Dr. Smith.  He continues to receive treatment for multiple myeloma.  HPI         Cardiac History:    Past Surgical History:   Procedure Laterality Date   • CARDIOVASCULAR STRESS TEST  03/17/2008    7 min, 81% THR, Apicoinferior Ischemia.   • CARDIOVASCULAR STRESS TEST  06/15/2010    7 min, 30 sec, 77% THR. Inferior ischemia.   • ECHO - CONVERTED  03/04/2008    EF 60%, Mild AI, Mod MR, Freq PVC's, Anterior WMA.   • ECHO - CONVERTED  04/22/2010    EF 60 %, mild to mod MR   • ECHO - CONVERTED  04/13/2017    EF 65%, no AS, mild to mod MR   • ECHO - CONVERTED  09/10/2018    [facility]- Dr. Castillo- LVEF 50-55%, mild to moderate diastolic dysfunction, mild AI, mild MR, RVSP 44 mmHg   • ECHO - CONVERTED  10/15/2018    LVEF 45-50%, mild MR, PA pressure not calc       Current Outpatient Medications   Medication Sig Dispense Refill   • aspirin 81 MG EC tablet Take 81 mg by mouth Daily.     • calcium carbonate (OS-DEENA) 600 MG tablet Take 600 mg by mouth Daily.     • cetirizine (zyrTEC) 10 MG tablet Take 10 mg by mouth Daily.     • docusate calcium (SURFAK) 240 MG capsule Take 240 mg by mouth As Needed for Constipation.     • FERROUS SULFATE ER PO Take 1 tablet by mouth Daily.     • finasteride (PROSCAR) 5 MG tablet Take 5 mg by mouth Daily.     • Fluticasone Furoate-Vilanterol (BREO ELLIPTA IN) Inhale Daily.     • furosemide (LASIX) 40 MG tablet Take 40 mg by mouth 2 (Two) Times a Day. Decrease to once a day     • metoprolol tartrate (LOPRESSOR) 100 MG tablet Take 100 mg by mouth 2 (Two) Times a Day.     • Morphine (MSIR) 15 MG tablet One tablet in morning and 2 tablets at night     • polyethylene glycol (MIRALAX) packet Take 17 g by mouth Daily.     • simvastatin (ZOCOR) 40 MG tablet Take 40 mg by mouth Every Night.     • Arformoterol Tartrate (BROVANA IN) Inhale.     • budesonide (PULMICORT) 180 MCG/ACT inhaler Inhale 1 puff 2 (Two) Times a Day.     • celecoxib (CeleBREX) 200  MG capsule Take 200 mg by mouth Daily.     • nitroglycerin (NITROSTAT) 0.4 MG SL tablet 1 under the tongue as needed for angina, may repeat q5mins for up three doses 25 tablet 3     No current facility-administered medications for this visit.      Patient has no known allergies.    Past Medical History:   Diagnosis Date   • Cancer (CMS/HCC)    • Gout    • H/O kyphoplasty 2016   • History of back surgery    • History of elbow surgery     (r) ELBOW LANCED   • Hypercholesterolemia    • Hyperlipidemia    • Hypertension    • MR (mitral regurgitation)    • Multiple myeloma (CMS/HCC)    • PVC's (premature ventricular contractions)      Social History     Socioeconomic History   • Marital status:      Spouse name: Not on file   • Number of children: Not on file   • Years of education: Not on file   • Highest education level: Not on file   Tobacco Use   • Smoking status: Former Smoker     Last attempt to quit: 1956     Years since quittin.4   • Smokeless tobacco: Never Used   Substance and Sexual Activity   • Alcohol use: No   • Drug use: No     Family History   Problem Relation Age of Onset   • Heart disease Father      Review of Systems   Constitution: Positive for weight gain. Negative for decreased appetite, weakness and malaise/fatigue.   HENT: Negative.    Eyes: Negative.    Cardiovascular: Positive for dyspnea on exertion and palpitations (Intermittent). Negative for chest pain, leg swelling and syncope.   Respiratory: Positive for shortness of breath (Mild, unchanged). Negative for cough.    Endocrine: Negative.    Hematologic/Lymphatic: Negative.    Skin: Negative.    Musculoskeletal: Positive for arthritis, back pain, joint pain and stiffness. Negative for falls and myalgias.   Gastrointestinal: Negative for abdominal pain.   Genitourinary: Negative for dysuria and hematuria.   Neurological: Negative for dizziness.   Psychiatric/Behavioral: Negative for altered mental status and depression.  "  Allergic/Immunologic: Negative.       Diabetes- No  Thyroid-normal    Objective     /60 (BP Location: Left arm)   Pulse 60   Ht 180.3 cm (70.98\")   Wt 71.7 kg (158 lb)   BMI 22.05 kg/m²     Physical Exam   Constitutional: He is oriented to person, place, and time. He appears well-developed and well-nourished. No distress.   HENT:   Head: Normocephalic and atraumatic.   Eyes: Pupils are equal, round, and reactive to light.   Neck: Normal range of motion. Neck supple.   Cardiovascular: Normal rate, regular rhythm and intact distal pulses.   Pulmonary/Chest: Effort normal and breath sounds normal. No respiratory distress. He has no wheezes.   Abdominal: Soft. Bowel sounds are normal. He exhibits no distension.   Musculoskeletal: Normal range of motion. He exhibits deformity (Significant chest wall deformity). He exhibits no edema.   Neurological: He is alert and oriented to person, place, and time.   Skin: Skin is warm and dry. He is not diaphoretic.   Psychiatric: He has a normal mood and affect.   Nursing note and vitals reviewed.     Procedures          Assessment/Plan    Heart rate and blood pressure stable.  BMI improved to 22.  He has gained 13 pounds in the last 6 months as his appetite has improved.  No s/s of volume overload or HF.  Continue metoprolol and furosemide 40 mg daily.  He tolerates simvastatin for hyperlipidemia.  No recent lipids available.  TSH and magnesium normal 8 months ago.  We discussed his chest pain episode at length.  He declines investigation at this time.  He was given sublingual nitro for as needed use should this recur.  No testing ordered today.  Limit sodium intake.  Adequate water intake.  Pulmonary management per Dr. Massey.  He appears stable from a cardiac standpoint.  We will see him back in 6 months or sooner if needed.  He understands to present to [facility] ER for CP that does not subside with nitro.  Max was seen today for follow-up, shortness of breath and chest " pain.    Diagnoses and all orders for this visit:    Coronary artery disease involving native coronary artery of native heart without angina pectoris    Abnormal stress test    Essential hypertension    Hypercholesteremia    Non-rheumatic mitral regurgitation    PVC (premature ventricular contraction)    Other orders  -     nitroglycerin (NITROSTAT) 0.4 MG SL tablet; 1 under the tongue as needed for angina, may repeat q5mins for up three doses        Patient's Body mass index is 22.05 kg/m². BMI is within normal parameters. No follow-up required..                    Electronically signed by GABRIELE Molina,  June 6, 2019 1:04 PM   labs/Vitamin B12, TSH, Free T4, RPR

## 2019-07-08 NOTE — ED PROVIDER NOTE - THROAT, MLM
CS injection as per procedure note below  Likely bursitis, but if doesn't improve, then check hip Xray uvula midline, no vesicles, no redness, and no oropharyngeal exudate.

## 2019-11-22 NOTE — H&P PST ADULT - DENTITION
Recent PHQ 2/9 Score    PHQ 2:  Date Adult PHQ 2 Score   11/22/2019 0       PHQ 9:  Date Adult PHQ 9 Score   11/22/2019 0     Health Maintenance Due   Topic Date Due   • Shingles Vaccine (1 of 2) 09/21/2006   • Hepatitis C Screening  09/21/2007       Patient is due for topics listed above, he wishes to proceed with Hepatitis C Screening, but is not proceeding with Immunization(s) Shingles at this time due to insufficient supplies. The following has occured: Orders placed for Hepatitis C Screening.           denies loose teeth

## 2019-12-06 NOTE — PATIENT PROFILE ADULT. - HEALTHCARE INFORMATION NEEDED, PROFILE
Patient is a 36-year-old male with history of alcohol abuse in new diagnosis of alcoholic cirrhosis who presented with SBP and pancreatitis, worsening despite treatment with IV antibiotics.  Repeat infx workup notable for fungemia currently on treatment and undergoing transplant evaluation. Renal failure progressively worsening since admission and started on dialysis.  Completed evaluation, approved for listing.  Transfer to ICU on 12/05 due to hypotension and clinical worsening.  Currently stable on low-dose vasopressor with negative repeat infectious workup.    Plan  -care per ICU team, input appreciated  - SBP. Completed antibiotics, negative para 12/3.  repeat paracentesis  - fungemia likely 2/2 GI translocation. On micafungin. Negative optho examination. Negative echo. To complete 2 week of therapy on 12/17.  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  On CRT.  Appreciate nephrology input  -varices screening:  No EGD in the past  -hepatocellular carcinoma screening:  CTAP and RUQ neg. AFP WNL  - encouraged PT and nutrition  -transplant candidacy:  For listing today     none

## 2020-01-28 NOTE — PATIENT PROFILE ADULT - NSPROHMDIABETBLDGLCUSUAL_GEN_A_NUR
Anesthesia Post Evaluation    Patient: Adam Barba    Procedure(s) Performed: Procedure(s) (LRB):  CLOSURE, WOUND, STERNUM, PEDIATRIC (N/A)  IRRIGATION, MEDIASTINUM (N/A)  APPLICATION, WOUND VAC (N/A)    Final Anesthesia Type: general    Patient location during evaluation: PICU  Patient participation: No - Unable to Participate, Intubation  Level of consciousness: sedated  Post-procedure vital signs: reviewed and stable  Pain management: adequate  Airway patency: patent    PONV status at discharge: No PONV  Anesthetic complications: no      Cardiovascular status: blood pressure returned to baseline and hemodynamically stable  Respiratory status: ETT and ventilator  Hydration status: euvolemic  Follow-up not needed.          Vitals Value Taken Time   BP 73/34 1/27/2020  8:10 PM   Temp 37.6 °C (99.6 °F) 1/28/2020  4:00 AM   Pulse 142 1/28/2020  6:44 AM   Resp 20 1/28/2020  6:44 AM   SpO2 100 % 1/28/2020  6:44 AM   Vitals shown include unvalidated device data.      No case tracking events are documented in the log.      Pain/Brandee Score: Presence of Pain: non-verbal indicators present (1/28/2020  6:00 AM)  Pain Rating Prior to Med Admin: 4 (1/28/2020  6:20 AM)  Pain Rating Post Med Admin: 3 (1/27/2020 10:48 PM)        
unknown

## 2020-08-07 NOTE — ED PROVIDER NOTE - CHIEF COMPLAINT
The patient is a 78y Male complaining of fall. Initiate Treatment: Tranexamic Acid 3%, Tretinoin 0.025%, Triamcinolone 0.1%, Kojic Acid 3% qhs Plan: Encouraged patient to apply moisturizer first , then compound Discontinue Regimen: Hydroquinone 6%+Tretinoin 0.025%+desonide 0.05+Kojic acid 3% compound apply at hs Detail Level: Zone

## 2020-09-10 NOTE — PATIENT PROFILE ADULT. - BILL OF RIGHTS/ADMISSION INFORMATION PROVIDED TO:
Patient Education   Personalized Prevention Plan  You are due for the preventive services outlined below.  Your care team is available to assist you in scheduling these services.  If you have already completed any of these items, please share that information with your care team to update in your medical record.  Health Maintenance Due   Topic Date Due     Zoster (Shingles) Vaccine (1 of 2) 11/13/1999     AORTIC ANEURYSM SCREENING (SYSTEM ASSIGNED)  11/13/2014     FALL RISK ASSESSMENT  02/28/2020     Flu Vaccine (1) 09/01/2020     Annual Wellness Visit  08/19/2020     Meningitis A Vaccine (3 - Risk 2-dose series) 09/21/2020         Patient

## 2021-02-05 ENCOUNTER — INPATIENT (INPATIENT)
Facility: HOSPITAL | Age: 81
LOS: 6 days | Discharge: HOME HEALTH | End: 2021-02-12
Attending: HOSPITALIST | Admitting: HOSPITALIST
Payer: MEDICARE

## 2021-02-05 VITALS
HEIGHT: 67 IN | HEART RATE: 62 BPM | RESPIRATION RATE: 18 BRPM | DIASTOLIC BLOOD PRESSURE: 62 MMHG | SYSTOLIC BLOOD PRESSURE: 137 MMHG | OXYGEN SATURATION: 100 % | TEMPERATURE: 97 F

## 2021-02-05 DIAGNOSIS — I25.10 ATHEROSCLEROTIC HEART DISEASE OF NATIVE CORONARY ARTERY WITHOUT ANGINA PECTORIS: ICD-10-CM

## 2021-02-05 DIAGNOSIS — J90 PLEURAL EFFUSION, NOT ELSEWHERE CLASSIFIED: ICD-10-CM

## 2021-02-05 DIAGNOSIS — Z29.9 ENCOUNTER FOR PROPHYLACTIC MEASURES, UNSPECIFIED: ICD-10-CM

## 2021-02-05 DIAGNOSIS — R05 COUGH: ICD-10-CM

## 2021-02-05 DIAGNOSIS — R93.89 ABNORMAL FINDINGS ON DIAGNOSTIC IMAGING OF OTHER SPECIFIED BODY STRUCTURES: ICD-10-CM

## 2021-02-05 DIAGNOSIS — E11.69 TYPE 2 DIABETES MELLITUS WITH OTHER SPECIFIED COMPLICATION: ICD-10-CM

## 2021-02-05 DIAGNOSIS — I10 ESSENTIAL (PRIMARY) HYPERTENSION: ICD-10-CM

## 2021-02-05 LAB
ALBUMIN SERPL ELPH-MCNC: 3.2 G/DL — LOW (ref 3.3–5)
ALP SERPL-CCNC: 137 U/L — HIGH (ref 40–120)
ALT FLD-CCNC: 5 U/L — SIGNIFICANT CHANGE UP (ref 4–41)
ANION GAP SERPL CALC-SCNC: 10 MMOL/L — SIGNIFICANT CHANGE UP (ref 7–14)
AST SERPL-CCNC: 6 U/L — SIGNIFICANT CHANGE UP (ref 4–40)
BASOPHILS # BLD AUTO: 0.04 K/UL — SIGNIFICANT CHANGE UP (ref 0–0.2)
BASOPHILS NFR BLD AUTO: 0.5 % — SIGNIFICANT CHANGE UP (ref 0–2)
BILIRUB SERPL-MCNC: 0.9 MG/DL — SIGNIFICANT CHANGE UP (ref 0.2–1.2)
BUN SERPL-MCNC: 24 MG/DL — HIGH (ref 7–23)
CALCIUM SERPL-MCNC: 8.9 MG/DL — SIGNIFICANT CHANGE UP (ref 8.4–10.5)
CHLORIDE SERPL-SCNC: 98 MMOL/L — SIGNIFICANT CHANGE UP (ref 98–107)
CK MB BLD-MCNC: 11.5 % — HIGH (ref 0–2.5)
CK MB CFR SERPL CALC: 3.8 NG/ML — SIGNIFICANT CHANGE UP
CK SERPL-CCNC: 33 U/L — SIGNIFICANT CHANGE UP (ref 30–200)
CO2 SERPL-SCNC: 29 MMOL/L — SIGNIFICANT CHANGE UP (ref 22–31)
CREAT SERPL-MCNC: 0.75 MG/DL — SIGNIFICANT CHANGE UP (ref 0.5–1.3)
EOSINOPHIL # BLD AUTO: 0.07 K/UL — SIGNIFICANT CHANGE UP (ref 0–0.5)
EOSINOPHIL NFR BLD AUTO: 0.8 % — SIGNIFICANT CHANGE UP (ref 0–6)
GLUCOSE BLDC GLUCOMTR-MCNC: 134 MG/DL — HIGH (ref 70–99)
GLUCOSE BLDC GLUCOMTR-MCNC: 79 MG/DL — SIGNIFICANT CHANGE UP (ref 70–99)
GLUCOSE SERPL-MCNC: 119 MG/DL — HIGH (ref 70–99)
HCT VFR BLD CALC: 32.1 % — LOW (ref 39–50)
HGB BLD-MCNC: 10.1 G/DL — LOW (ref 13–17)
IANC: 6.35 K/UL — SIGNIFICANT CHANGE UP (ref 1.5–8.5)
IMM GRANULOCYTES NFR BLD AUTO: 0.5 % — SIGNIFICANT CHANGE UP (ref 0–1.5)
LIDOCAIN IGE QN: 11 U/L — SIGNIFICANT CHANGE UP (ref 7–60)
LYMPHOCYTES # BLD AUTO: 1.06 K/UL — SIGNIFICANT CHANGE UP (ref 1–3.3)
LYMPHOCYTES # BLD AUTO: 12.6 % — LOW (ref 13–44)
MCHC RBC-ENTMCNC: 29.4 PG — SIGNIFICANT CHANGE UP (ref 27–34)
MCHC RBC-ENTMCNC: 31.5 GM/DL — LOW (ref 32–36)
MCV RBC AUTO: 93.3 FL — SIGNIFICANT CHANGE UP (ref 80–100)
MONOCYTES # BLD AUTO: 0.84 K/UL — SIGNIFICANT CHANGE UP (ref 0–0.9)
MONOCYTES NFR BLD AUTO: 10 % — SIGNIFICANT CHANGE UP (ref 2–14)
NEUTROPHILS # BLD AUTO: 6.35 K/UL — SIGNIFICANT CHANGE UP (ref 1.8–7.4)
NEUTROPHILS NFR BLD AUTO: 75.6 % — SIGNIFICANT CHANGE UP (ref 43–77)
NRBC # BLD: 0 /100 WBCS — SIGNIFICANT CHANGE UP
NRBC # FLD: 0 K/UL — SIGNIFICANT CHANGE UP
NT-PROBNP SERPL-SCNC: 2804 PG/ML — HIGH
PLATELET # BLD AUTO: 277 K/UL — SIGNIFICANT CHANGE UP (ref 150–400)
POTASSIUM SERPL-MCNC: 4.4 MMOL/L — SIGNIFICANT CHANGE UP (ref 3.5–5.3)
POTASSIUM SERPL-SCNC: 4.4 MMOL/L — SIGNIFICANT CHANGE UP (ref 3.5–5.3)
PROCALCITONIN SERPL-MCNC: 0.07 NG/ML — SIGNIFICANT CHANGE UP (ref 0.02–0.1)
PROT SERPL-MCNC: 6.5 G/DL — SIGNIFICANT CHANGE UP (ref 6–8.3)
RBC # BLD: 3.44 M/UL — LOW (ref 4.2–5.8)
RBC # FLD: 13.8 % — SIGNIFICANT CHANGE UP (ref 10.3–14.5)
SARS-COV-2 RNA SPEC QL NAA+PROBE: SIGNIFICANT CHANGE UP
SODIUM SERPL-SCNC: 137 MMOL/L — SIGNIFICANT CHANGE UP (ref 135–145)
TROPONIN T, HIGH SENSITIVITY RESULT: 24 NG/L — SIGNIFICANT CHANGE UP
TROPONIN T, HIGH SENSITIVITY RESULT: 24 NG/L — SIGNIFICANT CHANGE UP
WBC # BLD: 8.4 K/UL — SIGNIFICANT CHANGE UP (ref 3.8–10.5)
WBC # FLD AUTO: 8.4 K/UL — SIGNIFICANT CHANGE UP (ref 3.8–10.5)

## 2021-02-05 PROCEDURE — 71045 X-RAY EXAM CHEST 1 VIEW: CPT | Mod: 26

## 2021-02-05 PROCEDURE — 99223 1ST HOSP IP/OBS HIGH 75: CPT | Mod: GC

## 2021-02-05 PROCEDURE — 99285 EMERGENCY DEPT VISIT HI MDM: CPT

## 2021-02-05 PROCEDURE — 93306 TTE W/DOPPLER COMPLETE: CPT | Mod: 26

## 2021-02-05 RX ORDER — DEXTROSE 50 % IN WATER 50 %
12.5 SYRINGE (ML) INTRAVENOUS ONCE
Refills: 0 | Status: DISCONTINUED | OUTPATIENT
Start: 2021-02-05 | End: 2021-02-12

## 2021-02-05 RX ORDER — LATANOPROST 0.05 MG/ML
1 SOLUTION/ DROPS OPHTHALMIC; TOPICAL
Qty: 0 | Refills: 0 | DISCHARGE

## 2021-02-05 RX ORDER — SODIUM CHLORIDE 9 MG/ML
1000 INJECTION, SOLUTION INTRAVENOUS
Refills: 0 | Status: DISCONTINUED | OUTPATIENT
Start: 2021-02-05 | End: 2021-02-12

## 2021-02-05 RX ORDER — INSULIN LISPRO 100/ML
VIAL (ML) SUBCUTANEOUS
Refills: 0 | Status: DISCONTINUED | OUTPATIENT
Start: 2021-02-05 | End: 2021-02-12

## 2021-02-05 RX ORDER — LANOLIN ALCOHOL/MO/W.PET/CERES
6 CREAM (GRAM) TOPICAL AT BEDTIME
Refills: 0 | Status: DISCONTINUED | OUTPATIENT
Start: 2021-02-05 | End: 2021-02-12

## 2021-02-05 RX ORDER — TAMSULOSIN HYDROCHLORIDE 0.4 MG/1
0.4 CAPSULE ORAL AT BEDTIME
Refills: 0 | Status: DISCONTINUED | OUTPATIENT
Start: 2021-02-05 | End: 2021-02-12

## 2021-02-05 RX ORDER — QUETIAPINE FUMARATE 200 MG/1
50 TABLET, FILM COATED ORAL AT BEDTIME
Refills: 0 | Status: DISCONTINUED | OUTPATIENT
Start: 2021-02-05 | End: 2021-02-12

## 2021-02-05 RX ORDER — CARBIDOPA AND LEVODOPA 25; 100 MG/1; MG/1
1 TABLET ORAL THREE TIMES A DAY
Refills: 0 | Status: DISCONTINUED | OUTPATIENT
Start: 2021-02-05 | End: 2021-02-12

## 2021-02-05 RX ORDER — ACETAMINOPHEN 500 MG
650 TABLET ORAL EVERY 6 HOURS
Refills: 0 | Status: DISCONTINUED | OUTPATIENT
Start: 2021-02-05 | End: 2021-02-12

## 2021-02-05 RX ORDER — METOPROLOL TARTRATE 50 MG
50 TABLET ORAL DAILY
Refills: 0 | Status: DISCONTINUED | OUTPATIENT
Start: 2021-02-05 | End: 2021-02-12

## 2021-02-05 RX ORDER — GLIMEPIRIDE 1 MG
1 TABLET ORAL
Qty: 0 | Refills: 0 | DISCHARGE

## 2021-02-05 RX ORDER — FUROSEMIDE 40 MG
40 TABLET ORAL EVERY 12 HOURS
Refills: 0 | Status: DISCONTINUED | OUTPATIENT
Start: 2021-02-05 | End: 2021-02-06

## 2021-02-05 RX ORDER — LOSARTAN POTASSIUM 100 MG/1
25 TABLET, FILM COATED ORAL DAILY
Refills: 0 | Status: DISCONTINUED | OUTPATIENT
Start: 2021-02-05 | End: 2021-02-12

## 2021-02-05 RX ORDER — INSULIN LISPRO 100/ML
VIAL (ML) SUBCUTANEOUS AT BEDTIME
Refills: 0 | Status: DISCONTINUED | OUTPATIENT
Start: 2021-02-05 | End: 2021-02-12

## 2021-02-05 RX ORDER — GLUCAGON INJECTION, SOLUTION 0.5 MG/.1ML
1 INJECTION, SOLUTION SUBCUTANEOUS ONCE
Refills: 0 | Status: DISCONTINUED | OUTPATIENT
Start: 2021-02-05 | End: 2021-02-12

## 2021-02-05 RX ORDER — PANTOPRAZOLE SODIUM 20 MG/1
40 TABLET, DELAYED RELEASE ORAL
Refills: 0 | Status: DISCONTINUED | OUTPATIENT
Start: 2021-02-05 | End: 2021-02-12

## 2021-02-05 RX ORDER — CLOPIDOGREL BISULFATE 75 MG/1
75 TABLET, FILM COATED ORAL DAILY
Refills: 0 | Status: DISCONTINUED | OUTPATIENT
Start: 2021-02-05 | End: 2021-02-07

## 2021-02-05 RX ORDER — TRAZODONE HCL 50 MG
0.5 TABLET ORAL
Qty: 0 | Refills: 0 | DISCHARGE

## 2021-02-05 RX ORDER — RAMIPRIL 5 MG
1 CAPSULE ORAL
Qty: 0 | Refills: 0 | DISCHARGE

## 2021-02-05 RX ORDER — DEXTROSE 50 % IN WATER 50 %
25 SYRINGE (ML) INTRAVENOUS ONCE
Refills: 0 | Status: DISCONTINUED | OUTPATIENT
Start: 2021-02-05 | End: 2021-02-12

## 2021-02-05 RX ORDER — DEXTROSE 50 % IN WATER 50 %
15 SYRINGE (ML) INTRAVENOUS ONCE
Refills: 0 | Status: DISCONTINUED | OUTPATIENT
Start: 2021-02-05 | End: 2021-02-12

## 2021-02-05 RX ORDER — ENOXAPARIN SODIUM 100 MG/ML
40 INJECTION SUBCUTANEOUS DAILY
Refills: 0 | Status: DISCONTINUED | OUTPATIENT
Start: 2021-02-05 | End: 2021-02-12

## 2021-02-05 RX ORDER — ATORVASTATIN CALCIUM 80 MG/1
10 TABLET, FILM COATED ORAL AT BEDTIME
Refills: 0 | Status: DISCONTINUED | OUTPATIENT
Start: 2021-02-05 | End: 2021-02-12

## 2021-02-05 RX ADMIN — QUETIAPINE FUMARATE 50 MILLIGRAM(S): 200 TABLET, FILM COATED ORAL at 22:28

## 2021-02-05 RX ADMIN — CARBIDOPA AND LEVODOPA 1 TABLET(S): 25; 100 TABLET ORAL at 21:34

## 2021-02-05 RX ADMIN — ATORVASTATIN CALCIUM 10 MILLIGRAM(S): 80 TABLET, FILM COATED ORAL at 21:34

## 2021-02-05 RX ADMIN — TAMSULOSIN HYDROCHLORIDE 0.4 MILLIGRAM(S): 0.4 CAPSULE ORAL at 21:34

## 2021-02-05 RX ADMIN — Medication 40 MILLIGRAM(S): at 20:19

## 2021-02-05 RX ADMIN — Medication 6 MILLIGRAM(S): at 22:28

## 2021-02-05 NOTE — H&P ADULT - NSHPSOCIALHISTORY_GEN_ALL_CORE
Never smoker, alcohol, drug use. Has 24/7 aid. Brother (Berto) is power of  and caretaker. Walks with assistance and walker. No recent travel or sick contacts

## 2021-02-05 NOTE — H&P ADULT - NSHPPHYSICALEXAM_GEN_ALL_CORE
ICU Vital Signs Last 24 Hrs  T(C): 36.3 (05 Feb 2021 15:19), Max: 36.3 (05 Feb 2021 11:17)  T(F): 97.4 (05 Feb 2021 15:19), Max: 97.4 (05 Feb 2021 11:17)  HR: 68 (05 Feb 2021 15:19) (62 - 68)  BP: 159/71 (05 Feb 2021 15:19) (137/62 - 164/78)  BP(mean): --  ABP: --  ABP(mean): --  RR: 18 (05 Feb 2021 15:19) (18 - 18)  SpO2: 97% (05 Feb 2021 15:19) (97% - 100%) T(C): 36.3 (02-05-21 @ 15:19), Max: 36.3 (02-05-21 @ 11:17)  HR: 68 (02-05-21 @ 15:19) (62 - 68)  BP: 159/71 (02-05-21 @ 15:19) (137/62 - 164/78)  RR: 18 (02-05-21 @ 15:19) (18 - 18)  SpO2: 97% (02-05-21 @ 15:19) (97% - 100%)    GENERAL: Laying comfortably, NAD  EYES: EOMI, PERRL, no scleral icterus  NECK: No JVD  LUNG: Diminished breath sounds on the left side  HEART: Regular rate and rhythm; No murmurs, rubs, or gallops  ABDOMEN: Soft, Nontender, Nondistended  EXTREMITIES:  2+ pitting edema b/l LE up to knee. 1+ pitting edema in the hands  PSYCH: AAOx3  NEUROLOGY: non-focal, strength 5/5 in all extremities, sensation intact  SKIN: No rashes or lesions T(C): 36.3 (02-05-21 @ 15:19), Max: 36.3 (02-05-21 @ 11:17)  HR: 68 (02-05-21 @ 15:19) (62 - 68)  BP: 159/71 (02-05-21 @ 15:19) (137/62 - 164/78)  RR: 18 (02-05-21 @ 15:19) (18 - 18)  SpO2: 97% (02-05-21 @ 15:19) (97% - 100%)    GENERAL: Laying comfortably, NAD  EYES: EOMI, PERRL, no scleral icterus  NECK: No JVD  LUNG: Diminished breath sounds on the left side  HEART: Regular rate and rhythm; No murmurs, rubs, or gallops  ABDOMEN: Soft, Nontender, Nondistended  EXTREMITIES:  2+ pitting edema b/l LE up to knee. 1+ pitting edema in the hands  PSYCH: AAOx2  NEUROLOGY: non-focal, strength 5/5 in all extremities, sensation intact  SKIN: No rashes or lesions

## 2021-02-05 NOTE — ED PROVIDER NOTE - CLINICAL SUMMARY MEDICAL DECISION MAKING FREE TEXT BOX
79 y/o M HTN, DM, dementia, CAD s/p stents sent to ED for fluid in lungs and gallstones. According to pt's brother, he reports pt was recently seen by doctors on call for a chronic cough pt had for a year. The provider did an ultrasound, and advised the brother to go to the ER for fluid in the lungs and multiple gallstones. Pt only c/o cough, has no other complaints.    well appearing, vss, lungs mild rales    -labs, cxr

## 2021-02-05 NOTE — H&P ADULT - PROBLEM SELECTOR PLAN 1
Ddx likely acute onset HF given lower extremity swelling, elevated proBNP and CXR concerning for fluid overload. DDx less likely PNA vs COVID  --proBNP 2800  --CXR: Left pleural effusion and large airspace opacity in the left lung. Mild patchy airspace opacities in the right lung.   --start Lasix 40 IV BID  --f/u TTE  --f/u COVID  --f/u procalcitonin  --Fluid restriction 2L Ddx likely acute onset HF given lower extremity swelling, elevated proBNP and CXR concerning for fluid overload. DDx less likely PNA vs COVID  --proBNP 2800  --CXR: Left pleural effusion and large airspace opacity in the left lung. Mild patchy airspace opacities in the right lung.   --start Lasix 40 IV BID  --f/u TTE  --f/u COVID  --f/u procalcitonin  --Fluid restriction 2L, strict I&Os Ddx likely acute onset HF given lower extremity swelling, elevated proBNP and CXR concerning for fluid overload. DDx less likely PNA vs COVID  --proBNP 2800  --CXR: Left pleural effusion and large airspace opacity in the left lung. Mild patchy airspace opacities in the right lung.   --start Lasix 40 IV BID  --f/u TTE  --f/u COVID  --f/u procalcitonin  --Fluid restriction 1.5L, strict I&Os

## 2021-02-05 NOTE — H&P ADULT - PROBLEM SELECTOR PLAN 2
--CXR: Left pleural effusion and large airspace opacity in the left lung. Mild patchy airspace opacities in the right lung. Patient states had a covid test last week at [facility] and it was negative  --f/u COVID swab

## 2021-02-05 NOTE — ED ADULT TRIAGE NOTE - CHIEF COMPLAINT QUOTE
Patient brought to ER by EMS after his brother called and the aide stated his results show he has fluid in his lungs and gallstones and needs to go to ER. PT has a cough for a couple of days.

## 2021-02-05 NOTE — H&P ADULT - NSICDXPASTMEDICALHX_GEN_ALL_CORE_FT
PAST MEDICAL HISTORY:  BPH (benign prostatic hyperplasia)     CAD (Coronary Artery Disease) s/p cardiac stent ( > 20 years ago)    DM (Diabetes Mellitus)     HTN (Hypertension)     Hypercholesterolemia

## 2021-02-05 NOTE — ED PROVIDER NOTE - ATTENDING CONTRIBUTION TO CARE
Dr. Cuello:  I performed a face to face bedside interview with patient regarding history of present illness, review of symptoms and past medical history. I completed an independent physical exam.  I have discussed patient's plan of care with PA.   I agree with note as stated above, having amended the EMR as needed to reflect my findings.   This includes HISTORY OF PRESENT ILLNESS, HIV, PAST MEDICAL/SURGICAL/FAMILY/SOCIAL HISTORY, ALLERGIES AND HOME MEDICATIONS, REVIEW OF SYSTEMS, PHYSICAL EXAM, and any PROGRESS NOTES during the time I functioned as the attending physician for this patient.    80M h/o htn, dementia, hld, dm, sent in by visiting home doctor for reported "fluid in lungs" found on imaging.  also noted to incidentally have gallstones.  Pt denies any complaints except cough.      Exam:  - nad  - slightly irregular  - ctab   -abd soft ntnd  - BLE edema    A/P  - suspect CHF  - cbc, cmp, bnp, cxr, ekg

## 2021-02-05 NOTE — H&P ADULT - ATTENDING COMMENTS
Patient seen and examined at bedside. In brief, 81 y/o M HTN, Type 2 DM, dementia (AO X 2), CAD s/p stents >20 years, parkinson's disease presenting for abnormal outpatient ultrasound with fluid in lungs and gallstones and cough. Pt found to have 2+ pitting edema to shins, with  decreased bi-basilar breath sounds, with elevated BNP of 2804 and  chest xray,  with Left pleural effusion and large airspace opacity in the left lung and Mild patchy airspace opacities in the right lung, c/f  pulmonary edema versus atypical pneumonia.   While patient has lower end albumin of 3.2, patient with findings concerning for new onset heart failure, will start on diuretics with close monitoring of output since lasix naive and awaiting TTE. As well as R/O COVID given cough and chest xray findings.

## 2021-02-05 NOTE — H&P ADULT - ASSESSMENT
79 y/o M HTN, DM, dementia (AOx2 at baseline), parkinsons disease, CAD s/p stents (>20 years ago) sent to ED by provider for fluid in lungs and gallstones found in outpatient ultrasound. Patient has significant LE edema, elevated pro-BNP cocnerning for new onset heart failure exacerbation 79 y/o M HTN, DM, dementia (AOx2 at baseline), parkinsons disease, CAD s/p stents (>20 years ago) sent to ED by provider for fluid in lungs and gallstones found in outpatient ultrasound. Patient has significant LE edema, elevated pro-BNP cocnerning for new onset heart failure exacerbation.

## 2021-02-05 NOTE — ED PROVIDER NOTE - CARE PLAN
Principal Discharge DX:	Abnormal CXR  Secondary Diagnosis:	Pleural effusion  Secondary Diagnosis:	CAD (coronary artery disease)

## 2021-02-05 NOTE — H&P ADULT - HISTORY OF PRESENT ILLNESS
79 y/o M HTN, DM, dementia, CAD s/p stents sent to ED for fluid in lungs and gallstones. According to pt's brother ([phone]), he reports pt was recently seen by doctors on call for a chronic cough pt had for a year. The provider did an ultrasound, and advised the brother to go to the ER for fluid in the lungs and multiple gallstones. Pt only c/o cough, has no other complaints. 81 y/o M HTN, DM, dementia (AOx2 at baseline), parkinsons disease, CAD s/p stents (>20 years ago) sent to ED for fluid in lungs and gallstones found in outpatient ultrasound. Patient reports having cough for 6-8 months. Worsened in the past week. Patient sleeps with 2 pillows and is worse when he lays flat. Patient has noticed increased lower leg swelling for the past 2 months. According to pt's brother Berto ([phone]), he reports pt was recently seen by doctors on call for a chronic cough pt had for a year. The provider did an ultrasound, and advised the brother to go to the ER for fluid in the lungs and multiple gallstones. Pt only c/o cough, has no other complaints. Denies fever, chills, SOB, CP, N/V/D 81 y/o M HTN, DM, dementia (AOx2 at baseline), parkinsons disease, CAD s/p stents (>20 years ago) sent to ED for fluid in lungs and gallstones found in outpatient ultrasound. Patient reports having cough for 6-8 months. Worsened in the past week. Patient sleeps with 2 pillows and is worse when he lays flat. Patient has noticed increased lower leg swelling for the past 2 months. According to pt's brother Berto ([phone]), he reports pt was recently seen by doctors on call for a chronic cough pt had for a year. The provider did an ultrasound, and advised the brother to go to the ER for fluid in the lungs and multiple gallstones. Pt only c/o cough, has no other complaints. Denies fever, chills, SOB, CP, N/V/D    ED: afebrile, tylenol

## 2021-02-05 NOTE — PATIENT PROFILE ADULT - VISION (WITH CORRECTIVE LENSES IF THE PATIENT USUALLY WEARS THEM):
Relevant Problems   No relevant active problems       Anesthetic History               Review of Systems / Medical History      Pulmonary                   Neuro/Psych     seizures         Cardiovascular    Hypertension                   GI/Hepatic/Renal                Endo/Other             Other Findings              Physical Exam    Airway  Mallampati: II  TM Distance: > 6 cm  Neck ROM: normal range of motion   Mouth opening: Normal     Cardiovascular  Regular rate and rhythm,  S1 and S2 normal,  no murmur, click, rub, or gallop             Dental  No notable dental hx       Pulmonary  Breath sounds clear to auscultation               Abdominal  GI exam deferred       Other Findings            Anesthetic Plan    ASA: 3  Anesthesia type: MAC            Anesthetic plan and risks discussed with: Patient Partially impaired: cannot see medication labels or newsprint, but can see obstacles in path, and the surrounding layout; can count fingers at arm's length

## 2021-02-05 NOTE — H&P ADULT - NSHPLABSRESULTS_GEN_ALL_CORE
(02-05 @ 12:50)                      10.1  8.40 )-----------( 277                 32.1    Neutrophils = 6.35 (75.6%)  Lymphocytes = 1.06 (12.6%)  Eosinophils = 0.07 (0.8%)  Basophils = 0.04 (0.5%)  Monocytes = 0.84 (10.0%)  Bands = --%    02-05    137  |  98  |  24<H>  ----------------------------<  119<H>  4.4   |  29  |  0.75    Ca    8.9      05 Feb 2021 12:20    TPro  6.5  /  Alb  3.2<L>  /  TBili  0.9  /  DBili  x   /  AST  6   /  ALT  5   /  AlkPhos  137<H>  02-05      CARDIAC MARKERS ( 05 Feb 2021 12:20 )  Trop x     / CK 33 U/L / CKMB x         < from: Xray Chest 1 View- PORTABLE-Urgent (Xray Chest 1 View- PORTABLE-Urgent .) (02.05.21 @ 14:01) >    IMPRESSION: Left pleural effusion and large airspace opacity in the left lung. Mild patchy airspace opacities in the right lung. This may represent pulmonary edema versus atypical pneumonia such as COVID 19. If clinically warranted, further evaluation may be obtained with CT scan of the chest.  < end of copied text >        RVP:  Tox: (02-05 @ 12:50)                      10.1  8.40 )-----------( 277                 32.1    Neutrophils = 6.35 (75.6%)  Lymphocytes = 1.06 (12.6%)  Eosinophils = 0.07 (0.8%)  Basophils = 0.04 (0.5%)  Monocytes = 0.84 (10.0%)  Bands = --%    02-05    137  |  98  |  24<H>  ----------------------------<  119<H>  4.4   |  29  |  0.75    Ca    8.9      05 Feb 2021 12:20    TPro  6.5  /  Alb  3.2<L>  /  TBili  0.9  /  DBili  x   /  AST  6   /  ALT  5   /  AlkPhos  137<H>  02-05      CARDIAC MARKERS ( 05 Feb 2021 12:20 )  Trop x     / CK 33 U/L / CKMB x         < from: Xray Chest 1 View- PORTABLE-Urgent (Xray Chest 1 View- PORTABLE-Urgent .) (02.05.21 @ 14:01) >    IMPRESSION: Left pleural effusion and large airspace opacity in the left lung. Mild patchy airspace opacities in the right lung. This may represent pulmonary edema versus atypical pneumonia such as COVID 19. If clinically warranted, further evaluation may be obtained with CT scan of the chest.  < end of copied text >    EKG: Rate: 68, elevated VT interval 212ms. Occasional dropped P-QRS complexes as well as PVCs

## 2021-02-05 NOTE — H&P ADULT - NSHPREVIEWOFSYSTEMS_GEN_ALL_CORE
CONSTITUTIONAL:  No weight loss, fever, chills, weakness or fatigue.  HEENT:  Eyes:  No visual loss, blurred vision, double vision or yellow sclerae. Ears, Nose, Throat:  No hearing loss, sneezing, congestion, runny nose or sore throat.  SKIN:  No rash or itching.  CARDIOVASCULAR:  + ORTHOPNEA, COUGH, LE SWELLING  RESPIRATORY:  No shortness of breath, cough or sputum.  GASTROINTESTINAL:  No anorexia, nausea, vomiting or diarrhea. No abdominal pain or blood.  GENITOURINARY:  Denies hematuria, dysuria.   NEUROLOGICAL:  No headache, dizziness, syncope, paralysis, ataxia, numbness or tingling in the extremities. No change in bowel or bladder control.  MUSCULOSKELETAL:  No muscle, back pain, joint pain or stiffness.  HEMATOLOGIC:  No anemia, bleeding or bruising.  LYMPHATICS:  No enlarged nodes.   PSYCHIATRIC:  No history of depression or anxiety.  ENDOCRINOLOGIC:  No reports of sweating, cold or heat intolerance. No polyuria or polydipsia.  ALLERGIES:  No history of asthma, hives, eczema or rhinitis.

## 2021-02-05 NOTE — ED PROVIDER NOTE - OBJECTIVE STATEMENT
79 y/o M HTN, DM, dementia, CAD s/p stents sent to ED for fluid in lungs and gallstones. According to pt's brother, he reports pt was recently seen by doctors on call for a chronic cough pt had for a year. The provider did an ultrasound, and advised the brother to go to the ER for fluid in the lungs and multiple gallstones. Pt only c/o cough, has no other complaints. 81 y/o M HTN, DM, dementia, CAD s/p stents sent to ED for fluid in lungs and gallstones. According to pt's brother ([phone]), he reports pt was recently seen by doctors on call for a chronic cough pt had for a year. The provider did an ultrasound, and advised the brother to go to the ER for fluid in the lungs and multiple gallstones. Pt only c/o cough, has no other complaints.

## 2021-02-05 NOTE — ED ADULT NURSE NOTE - OBJECTIVE STATEMENT
PT. is A&Ox3 presents to ER c/o cough for a couple days as per EMS. As per pt. brother, pt. has fluid in his lungs and gallstones. Pt. respirations even and unlabored, abdomen nontender and nondistended. 20G IV obtained in RAC, blood obtained, flushing without resistance, dressing dry and intact.

## 2021-02-06 DIAGNOSIS — I50.21 ACUTE SYSTOLIC (CONGESTIVE) HEART FAILURE: ICD-10-CM

## 2021-02-06 LAB
A1C WITH ESTIMATED AVERAGE GLUCOSE RESULT: 5.3 % — SIGNIFICANT CHANGE UP (ref 4–5.6)
ANION GAP SERPL CALC-SCNC: 10 MMOL/L — SIGNIFICANT CHANGE UP (ref 7–14)
ANION GAP SERPL CALC-SCNC: 9 MMOL/L — SIGNIFICANT CHANGE UP (ref 7–14)
BASOPHILS # BLD AUTO: 0.04 K/UL — SIGNIFICANT CHANGE UP (ref 0–0.2)
BASOPHILS NFR BLD AUTO: 0.5 % — SIGNIFICANT CHANGE UP (ref 0–2)
BUN SERPL-MCNC: 21 MG/DL — SIGNIFICANT CHANGE UP (ref 7–23)
BUN SERPL-MCNC: 23 MG/DL — SIGNIFICANT CHANGE UP (ref 7–23)
CALCIUM SERPL-MCNC: 8.7 MG/DL — SIGNIFICANT CHANGE UP (ref 8.4–10.5)
CALCIUM SERPL-MCNC: 9 MG/DL — SIGNIFICANT CHANGE UP (ref 8.4–10.5)
CHLORIDE SERPL-SCNC: 96 MMOL/L — LOW (ref 98–107)
CHLORIDE SERPL-SCNC: 99 MMOL/L — SIGNIFICANT CHANGE UP (ref 98–107)
CO2 SERPL-SCNC: 32 MMOL/L — HIGH (ref 22–31)
CO2 SERPL-SCNC: 33 MMOL/L — HIGH (ref 22–31)
CREAT SERPL-MCNC: 0.8 MG/DL — SIGNIFICANT CHANGE UP (ref 0.5–1.3)
CREAT SERPL-MCNC: 0.95 MG/DL — SIGNIFICANT CHANGE UP (ref 0.5–1.3)
EOSINOPHIL # BLD AUTO: 0.09 K/UL — SIGNIFICANT CHANGE UP (ref 0–0.5)
EOSINOPHIL NFR BLD AUTO: 1.2 % — SIGNIFICANT CHANGE UP (ref 0–6)
ESTIMATED AVERAGE GLUCOSE: 105 MG/DL — SIGNIFICANT CHANGE UP (ref 68–114)
GLUCOSE BLDC GLUCOMTR-MCNC: 115 MG/DL — HIGH (ref 70–99)
GLUCOSE BLDC GLUCOMTR-MCNC: 156 MG/DL — HIGH (ref 70–99)
GLUCOSE BLDC GLUCOMTR-MCNC: 167 MG/DL — HIGH (ref 70–99)
GLUCOSE BLDC GLUCOMTR-MCNC: 90 MG/DL — SIGNIFICANT CHANGE UP (ref 70–99)
GLUCOSE SERPL-MCNC: 146 MG/DL — HIGH (ref 70–99)
GLUCOSE SERPL-MCNC: 90 MG/DL — SIGNIFICANT CHANGE UP (ref 70–99)
HCT VFR BLD CALC: 29.3 % — LOW (ref 39–50)
HGB BLD-MCNC: 9.4 G/DL — LOW (ref 13–17)
IANC: 5.62 K/UL — SIGNIFICANT CHANGE UP (ref 1.5–8.5)
IMM GRANULOCYTES NFR BLD AUTO: 0.4 % — SIGNIFICANT CHANGE UP (ref 0–1.5)
LYMPHOCYTES # BLD AUTO: 1.07 K/UL — SIGNIFICANT CHANGE UP (ref 1–3.3)
LYMPHOCYTES # BLD AUTO: 13.9 % — SIGNIFICANT CHANGE UP (ref 13–44)
MAGNESIUM SERPL-MCNC: 1.1 MG/DL — LOW (ref 1.6–2.6)
MAGNESIUM SERPL-MCNC: 1.8 MG/DL — SIGNIFICANT CHANGE UP (ref 1.6–2.6)
MAGNESIUM SERPL-MCNC: 1.9 MG/DL — SIGNIFICANT CHANGE UP (ref 1.6–2.6)
MCHC RBC-ENTMCNC: 29.7 PG — SIGNIFICANT CHANGE UP (ref 27–34)
MCHC RBC-ENTMCNC: 32.1 GM/DL — SIGNIFICANT CHANGE UP (ref 32–36)
MCV RBC AUTO: 92.4 FL — SIGNIFICANT CHANGE UP (ref 80–100)
MONOCYTES # BLD AUTO: 0.83 K/UL — SIGNIFICANT CHANGE UP (ref 0–0.9)
MONOCYTES NFR BLD AUTO: 10.8 % — SIGNIFICANT CHANGE UP (ref 2–14)
NEUTROPHILS # BLD AUTO: 5.62 K/UL — SIGNIFICANT CHANGE UP (ref 1.8–7.4)
NEUTROPHILS NFR BLD AUTO: 73.2 % — SIGNIFICANT CHANGE UP (ref 43–77)
NRBC # BLD: 0 /100 WBCS — SIGNIFICANT CHANGE UP
NRBC # FLD: 0 K/UL — SIGNIFICANT CHANGE UP
PHOSPHATE SERPL-MCNC: 3.6 MG/DL — SIGNIFICANT CHANGE UP (ref 2.5–4.5)
PHOSPHATE SERPL-MCNC: 4 MG/DL — SIGNIFICANT CHANGE UP (ref 2.5–4.5)
PHOSPHATE SERPL-MCNC: 4 MG/DL — SIGNIFICANT CHANGE UP (ref 2.5–4.5)
PLATELET # BLD AUTO: 266 K/UL — SIGNIFICANT CHANGE UP (ref 150–400)
POTASSIUM SERPL-MCNC: 3.7 MMOL/L — SIGNIFICANT CHANGE UP (ref 3.5–5.3)
POTASSIUM SERPL-MCNC: 3.8 MMOL/L — SIGNIFICANT CHANGE UP (ref 3.5–5.3)
POTASSIUM SERPL-SCNC: 3.7 MMOL/L — SIGNIFICANT CHANGE UP (ref 3.5–5.3)
POTASSIUM SERPL-SCNC: 3.8 MMOL/L — SIGNIFICANT CHANGE UP (ref 3.5–5.3)
RBC # BLD: 3.17 M/UL — LOW (ref 4.2–5.8)
RBC # FLD: 13.9 % — SIGNIFICANT CHANGE UP (ref 10.3–14.5)
SODIUM SERPL-SCNC: 138 MMOL/L — SIGNIFICANT CHANGE UP (ref 135–145)
SODIUM SERPL-SCNC: 141 MMOL/L — SIGNIFICANT CHANGE UP (ref 135–145)
WBC # BLD: 7.68 K/UL — SIGNIFICANT CHANGE UP (ref 3.8–10.5)
WBC # FLD AUTO: 7.68 K/UL — SIGNIFICANT CHANGE UP (ref 3.8–10.5)

## 2021-02-06 PROCEDURE — 99223 1ST HOSP IP/OBS HIGH 75: CPT | Mod: GC

## 2021-02-06 PROCEDURE — 99233 SBSQ HOSP IP/OBS HIGH 50: CPT | Mod: GC

## 2021-02-06 RX ORDER — MAGNESIUM SULFATE 500 MG/ML
2 VIAL (ML) INJECTION ONCE
Refills: 0 | Status: COMPLETED | OUTPATIENT
Start: 2021-02-06 | End: 2021-02-06

## 2021-02-06 RX ORDER — FUROSEMIDE 40 MG
40 TABLET ORAL DAILY
Refills: 0 | Status: DISCONTINUED | OUTPATIENT
Start: 2021-02-07 | End: 2021-02-07

## 2021-02-06 RX ORDER — MAGNESIUM SULFATE 500 MG/ML
4 VIAL (ML) INJECTION ONCE
Refills: 0 | Status: DISCONTINUED | OUTPATIENT
Start: 2021-02-06 | End: 2021-02-06

## 2021-02-06 RX ORDER — MAGNESIUM OXIDE 400 MG ORAL TABLET 241.3 MG
400 TABLET ORAL
Refills: 0 | Status: COMPLETED | OUTPATIENT
Start: 2021-02-06 | End: 2021-02-09

## 2021-02-06 RX ADMIN — ENOXAPARIN SODIUM 40 MILLIGRAM(S): 100 INJECTION SUBCUTANEOUS at 11:02

## 2021-02-06 RX ADMIN — TAMSULOSIN HYDROCHLORIDE 0.4 MILLIGRAM(S): 0.4 CAPSULE ORAL at 20:58

## 2021-02-06 RX ADMIN — CARBIDOPA AND LEVODOPA 1 TABLET(S): 25; 100 TABLET ORAL at 12:46

## 2021-02-06 RX ADMIN — Medication 6 MILLIGRAM(S): at 20:59

## 2021-02-06 RX ADMIN — LOSARTAN POTASSIUM 25 MILLIGRAM(S): 100 TABLET, FILM COATED ORAL at 04:16

## 2021-02-06 RX ADMIN — Medication 50 MILLIGRAM(S): at 04:16

## 2021-02-06 RX ADMIN — Medication 50 GRAM(S): at 11:01

## 2021-02-06 RX ADMIN — MAGNESIUM OXIDE 400 MG ORAL TABLET 400 MILLIGRAM(S): 241.3 TABLET ORAL at 16:55

## 2021-02-06 RX ADMIN — QUETIAPINE FUMARATE 50 MILLIGRAM(S): 200 TABLET, FILM COATED ORAL at 20:58

## 2021-02-06 RX ADMIN — Medication 1: at 12:46

## 2021-02-06 RX ADMIN — Medication 50 GRAM(S): at 12:46

## 2021-02-06 RX ADMIN — CARBIDOPA AND LEVODOPA 1 TABLET(S): 25; 100 TABLET ORAL at 04:16

## 2021-02-06 RX ADMIN — CLOPIDOGREL BISULFATE 75 MILLIGRAM(S): 75 TABLET, FILM COATED ORAL at 11:02

## 2021-02-06 RX ADMIN — PANTOPRAZOLE SODIUM 40 MILLIGRAM(S): 20 TABLET, DELAYED RELEASE ORAL at 04:16

## 2021-02-06 RX ADMIN — ATORVASTATIN CALCIUM 10 MILLIGRAM(S): 80 TABLET, FILM COATED ORAL at 20:58

## 2021-02-06 RX ADMIN — CARBIDOPA AND LEVODOPA 1 TABLET(S): 25; 100 TABLET ORAL at 20:58

## 2021-02-06 RX ADMIN — Medication 40 MILLIGRAM(S): at 09:06

## 2021-02-06 NOTE — PROGRESS NOTE ADULT - SUBJECTIVE AND OBJECTIVE BOX
Saud Gardner MD  PGY 1 Department of Internal Medicine  Pager: [phone] ([facility])   Pager: [phone] ([facility])  Also available on Microsoft Teams      Patient is a 80y old  Male who presents with a chief complaint of Cough (05 Feb 2021 16:16)      SUBJECTIVE / OVERNIGHT EVENTS: No acute overnight events. Pt seen and examined. States cough has improved. Denies fevers, chills, CP, SOB, Abdominal pain, N/V, Constipation, Diarrhea    MEDICATIONS  (STANDING):  atorvastatin 10 milliGRAM(s) Oral at bedtime  carbidopa/levodopa  25/100 1 Tablet(s) Oral three times a day  clopidogrel Tablet 75 milliGRAM(s) Oral daily  dextrose 40% Gel 15 Gram(s) Oral once  dextrose 5%. 1000 milliLiter(s) (50 mL/Hr) IV Continuous <Continuous>  dextrose 5%. 1000 milliLiter(s) (100 mL/Hr) IV Continuous <Continuous>  dextrose 50% Injectable 25 Gram(s) IV Push once  dextrose 50% Injectable 12.5 Gram(s) IV Push once  dextrose 50% Injectable 25 Gram(s) IV Push once  enoxaparin Injectable 40 milliGRAM(s) SubCutaneous daily  furosemide   Injectable 40 milliGRAM(s) IV Push every 12 hours  glucagon  Injectable 1 milliGRAM(s) IntraMuscular once  insulin lispro (ADMELOG) corrective regimen sliding scale   SubCutaneous three times a day before meals  insulin lispro (ADMELOG) corrective regimen sliding scale   SubCutaneous at bedtime  losartan 25 milliGRAM(s) Oral daily  melatonin 6 milliGRAM(s) Oral at bedtime  metoprolol succinate ER 50 milliGRAM(s) Oral daily  pantoprazole    Tablet 40 milliGRAM(s) Oral before breakfast  QUEtiapine 50 milliGRAM(s) Oral at bedtime  tamsulosin 0.4 milliGRAM(s) Oral at bedtime    MEDICATIONS  (PRN):  acetaminophen   Tablet .. 650 milliGRAM(s) Oral every 6 hours PRN Temp greater or equal to 38C (100.4F)      I&O's Summary    05 Feb 2021 07:01  -  06 Feb 2021 07:00  --------------------------------------------------------  IN: 420 mL / OUT: 1900 mL / NET: -1480 mL        Vital Signs Last 24 Hrs  T(C): 36.7 (06 Feb 2021 04:09), Max: 36.7 (06 Feb 2021 04:09)  T(F): 98.1 (06 Feb 2021 04:09), Max: 98.1 (06 Feb 2021 04:09)  HR: 64 (06 Feb 2021 09:05) (62 - 75)  BP: 132/59 (06 Feb 2021 09:05) (132/59 - 164/78)  BP(mean): --  RR: 17 (06 Feb 2021 04:09) (17 - 20)  SpO2: 96% (06 Feb 2021 04:09) (96% - 100%)    =================PHYSICAL EXAM=================    GENERAL: Laying comfortably, NAD  EYES: EOMI, PERRL, no scleral icterus  NECK: No JVD  LUNG: Clear to auscultation bilaterally; No wheeze, crackles or rhonci  HEART: Regular rate and rhythm; No murmurs, rubs, or gallops  ABDOMEN: Soft, Nontender, Nondistended  EXTREMITIES:  2+ pitting edema b/l LE up to mid shins (improvement)  PSYCH: AAOx2 (person, place)  NEUROLOGY: non-focal, strength 5/5 in all extremities, sensation intact  SKIN: No rashes or lesions    =================================================    LABS:                        9.4    7.68  )-----------( 266      ( 06 Feb 2021 07:37 )             29.3     Auto Eosinophil # 0.09  / Auto Eosinophil % 1.2   / Auto Neutrophil # 5.62  / Auto Neutrophil % 73.2  / BANDS % x                            10.1   8.40  )-----------( 277      ( 05 Feb 2021 12:50 )             32.1     Auto Eosinophil # 0.07  / Auto Eosinophil % 0.8   / Auto Neutrophil # 6.35  / Auto Neutrophil % 75.6  / BANDS % x        02-06    141  |  99  |  21  ----------------------------<  90  3.7   |  32<H>  |  0.80  02-05    137  |  98  |  24<H>  ----------------------------<  119<H>  4.4   |  29  |  0.75    Ca    8.7      06 Feb 2021 07:37  Mg     1.1     02-06  Phos  3.6     02-06  TPro  6.5  /  Alb  3.2<L>  /  TBili  0.9  /  DBili  x   /  AST  6   /  ALT  5   /  AlkPhos  137<H>  02-05      CARDIAC MARKERS ( 05 Feb 2021 12:20 )  x     / x     / 33 U/L / x     / 3.8 ng/mL              RADIOLOGY & ADDITIONAL TESTS:    Imaging Personally Reviewed:    Consultant(s) Notes Reviewed:      Care Discussed with Consultants/Other Providers:

## 2021-02-06 NOTE — CONSULT NOTE ADULT - ASSESSMENT
Assessment and Plan    79 y/o M HTN, DM, dementia (AOx2 at baseline), parkinsons disease, CAD s/p stents (>20 years ago) sent to ED for fluid in lungs and gallstones found in outpatient ultrasound    EKG: NSR 1st degree AVB, PVCs  tele: NSR PACs PVCs    1. CHF  -echo with mild LV systolic dysfunction (new compared to 2018)  -CXR with patchy airspace opacities   -proBNP elevated  -continue with IV lasix   -continue to monitor I&Os  -will consider stress once euvolemic. cath in 2010 showed mild luminal disease and patent stents    2. CAD s/p stent  -cath in 2010 showed mild luminal disease and patent stents  -denies chest pain  -echo with new mild LV dysfunction  -will consider stress once euvolemic  -continue with plavix    3. HTN  -latest BP soft   -on toprol and losartan  -continue to monitor BP

## 2021-02-06 NOTE — PHYSICAL THERAPY INITIAL EVALUATION ADULT - DISCHARGE DISPOSITION, PT EVAL
Anticipated discharge to home with home PT services to improve functional mobility and strength, to optimize safety within the home environment, and to allow pt to return to prior level of function. Resume 24 hour/7 day home health aide.

## 2021-02-06 NOTE — PROGRESS NOTE ADULT - ASSESSMENT
79 y/o M HTN, DM, dementia (AOx2 at baseline), parkinsons disease, CAD s/p stents (>20 years ago) sent to ED by provider for fluid in lungs and gallstones found in outpatient ultrasound. Patient has significant LE edema, elevated pro-BNP cocnerning for new onset heart failure exacerbation. 81 y/o M HTN, DM, dementia (AOx2 at baseline), parkinsons disease, CAD s/p stents (>20 years ago) p/w acute systolic CHF exacerbation with new L pleural effusion.

## 2021-02-06 NOTE — PHYSICAL THERAPY INITIAL EVALUATION ADULT - PERTINENT HX OF CURRENT PROBLEM, REHAB EVAL
79 y/o M HTN, DM, dementia (AOx2 at baseline), parkinsons disease, CAD s/p stents (>20 years ago) sent to ED for fluid in lungs and gallstones found in outpatient ultrasound. Patient reports having cough for 6-8 months. Worsened in the past week. Patient sleeps with 2 pillows and is worse when he lays flat. Patient has noticed increased lower leg swelling for the past 2 months. 80 year old male HTN, DM, dementia (AOx2 at baseline), Parkinson's disease, CAD s/p stents (>20 years ago) sent to ED for fluid in lungs and gallstones found in outpatient ultrasound. Patient reports having cough for 6-8 months. Worsened in the past week. Patient sleeps with 2 pillows and is worse when he lays flat. Patient has noticed increased lower leg swelling for the past 2 months.

## 2021-02-06 NOTE — PHYSICAL THERAPY INITIAL EVALUATION ADULT - PLANNED THERAPY INTERVENTIONS, PT EVAL
Patient left supine in bed in NAD, call bell in reach, all lines intact. +Bed alarm./balance training/bed mobility training/gait training/strengthening/transfer training

## 2021-02-06 NOTE — CONSULT NOTE ADULT - ASSESSMENT
80 year-old M with PMH dementia, CAD, DM, Parkinson's disease, and HTN who presented to the hospital after outpatient ultrasound showed a pleural effusion. Pulmonary consulted for evaluation of pleural effusion.    Pleural Effusion    David Mcdonnell, PGY-5  Pulmonary and Critical Care Medicine  [id number] 80 year-old M with PMH dementia, CAD, DM, Parkinson's disease, and HTN who presented to the hospital after outpatient ultrasound showed a pleural effusion. Pulmonary consulted for evaluation of pleural effusion.    Pleural Effusion  - Patient currently on room air and breathing comfortably  - Would hold off on procedure for now  - Would further clarify goals of care with patient's family given his poor functional status and baseline mental status, unclear if benefits of diagnostic procedure outweigh risks  - Diuresis per medicine    David Mcdonnell, PGY-5  Pulmonary and Critical Care Medicine  [phone]

## 2021-02-06 NOTE — PHYSICAL THERAPY INITIAL EVALUATION ADULT - GENERAL OBSERVATIONS, REHAB EVAL
Pt found supine in bed in NAD; agreeable to PT. Pt found supine in bed in NAD; agreeable to PT; +caceres.

## 2021-02-06 NOTE — PHYSICAL THERAPY INITIAL EVALUATION ADULT - ADDITIONAL COMMENTS
As per chart, patient has a 24/7 home health aide. As per chart, patient has a 24/7 home health aide and a supportive brother, Berto, to assist as needed.

## 2021-02-06 NOTE — PROGRESS NOTE ADULT - PROBLEM SELECTOR PLAN 2
Likely related to acute onset heart failure  --same management as above Likely related to acute onset heart failure  --same management as above  --pulm consult Likely related to acute onset heart failure but have concern for possiblity of malignancy given unilateral etiology.  --pulm consult for possible thoracentesis.

## 2021-02-06 NOTE — CONSULT NOTE ADULT - SUBJECTIVE AND OBJECTIVE BOX
CC: Cough    HPI:  81 y/o M HTN, DM, dementia (AOx2 at baseline), parkinsons disease, CAD s/p stents (>20 years ago) sent to ED for fluid in lungs and gallstones found in outpatient ultrasound. Patient reports having cough for 6-8 months. Worsened in the past week. Patient sleeps with 2 pillows and is worse when he lays flat. Patient has noticed increased lower leg swelling for the past 2 months. According to pt's brother Berto ([phone]), he reports pt was recently seen by doctors on call for a chronic cough pt had for a year. The provider did an ultrasound, and advised the brother to go to the ER for fluid in the lungs and multiple gallstones. Pt only c/o cough, has no other complaints. Denies fever, chills, SOB, CP, N/V/D    ED: afebrile, tylenol (05 Feb 2021 16:16)    Patient seen and examined at bedside. Pulmonary consulted for evaluation of pleural effusion. History primarily obtained from chart review as patient is AAOx1. He denies shortness of breath, fevers/chills, and pain. He does endorse a cough. He is currently on room air and breathing comfortably.    PAST MEDICAL & SURGICAL HISTORY:  BPH (benign prostatic hyperplasia)    CAD (Coronary Artery Disease)  s/p cardiac stent ( &gt; 20 years ago)    HTN (Hypertension)    DM (Diabetes Mellitus)    Hypercholesterolemia    Coronary Stent  x 2 ( 20 years )        FAMILY HISTORY:  No pertinent family history in first degree relatives        SOCIAL HISTORY:  Smoking: Never  EtOH Use: None  Has 24 hour aide.    Allergies    No Known Allergies    Intolerances        HOME MEDICATIONS:    REVIEW OF SYSTEMS:  Constitutional: No fevers or chills. No fatigue.  Eyes: No itching or discharge from the eyes  ENT: No ear pain. No ear discharge.  CV: No chest pain. No palpitations. No lightheadedness or dizziness.   Resp: No dyspnea at rest. No wheezing. +Cough. No stridor.  GI: No nausea. No vomiting. No diarrhea.  MSK: No joint pain or pain in any extremities  Integumentary: No skin lesions. +Pedal edema.  Neurological: No gross motor weakness. No sensory changes.  [x ] All other systems negative  [ ] Unable to assess ROS because ________    OBJECTIVE:  ICU Vital Signs Last 24 Hrs  T(C): 36.6 (06 Feb 2021 13:35), Max: 36.7 (06 Feb 2021 04:09)  T(F): 97.8 (06 Feb 2021 13:35), Max: 98.1 (06 Feb 2021 04:09)  HR: 62 (06 Feb 2021 13:35) (62 - 75)  BP: 101/57 (06 Feb 2021 13:35) (101/57 - 148/68)  BP(mean): --  ABP: --  ABP(mean): --  RR: 17 (06 Feb 2021 13:35) (17 - 20)  SpO2: 97% (06 Feb 2021 13:35) (96% - 98%)        02-05 @ 07:01  -  02-06 @ 07:00  --------------------------------------------------------  IN: 420 mL / OUT: 1900 mL / NET: -1480 mL      CAPILLARY BLOOD GLUCOSE      POCT Blood Glucose.: 156 mg/dL (06 Feb 2021 12:07)      PHYSICAL EXAM:  General: Awake, alert, oriented X 1.   HEENT: Atraumatic, normocephalic.   Neck: No JVD. No carotid bruit.   Respiratory: Normal chest expansion. Decreased breath sounds L base.  Cardiovascular: S1 S2 normal. No murmurs, rubs or gallops.   Abdomen: Soft, non-tender, non-distended. No organomegaly.  Extremities: Warm to touch. Peripheral pulse palpable.  Skin: No rashes  Neurological: No focal deficits appreciated.    HOSPITAL MEDICATIONS:  MEDICATIONS  (STANDING):  atorvastatin 10 milliGRAM(s) Oral at bedtime  carbidopa/levodopa  25/100 1 Tablet(s) Oral three times a day  clopidogrel Tablet 75 milliGRAM(s) Oral daily  dextrose 40% Gel 15 Gram(s) Oral once  dextrose 5%. 1000 milliLiter(s) (50 mL/Hr) IV Continuous <Continuous>  dextrose 5%. 1000 milliLiter(s) (100 mL/Hr) IV Continuous <Continuous>  dextrose 50% Injectable 25 Gram(s) IV Push once  dextrose 50% Injectable 12.5 Gram(s) IV Push once  dextrose 50% Injectable 25 Gram(s) IV Push once  enoxaparin Injectable 40 milliGRAM(s) SubCutaneous daily  glucagon  Injectable 1 milliGRAM(s) IntraMuscular once  insulin lispro (ADMELOG) corrective regimen sliding scale   SubCutaneous three times a day before meals  insulin lispro (ADMELOG) corrective regimen sliding scale   SubCutaneous at bedtime  losartan 25 milliGRAM(s) Oral daily  magnesium oxide 400 milliGRAM(s) Oral two times a day with meals  melatonin 6 milliGRAM(s) Oral at bedtime  metoprolol succinate ER 50 milliGRAM(s) Oral daily  pantoprazole    Tablet 40 milliGRAM(s) Oral before breakfast  QUEtiapine 50 milliGRAM(s) Oral at bedtime  tamsulosin 0.4 milliGRAM(s) Oral at bedtime    MEDICATIONS  (PRN):  acetaminophen   Tablet .. 650 milliGRAM(s) Oral every 6 hours PRN Temp greater or equal to 38C (100.4F)      LABS:                        9.4    7.68  )-----------( 266      ( 06 Feb 2021 07:37 )             29.3     02-06    141  |  99  |  21  ----------------------------<  90  3.7   |  32<H>  |  0.80    Ca    8.7      06 Feb 2021 07:37  Phos  4.0     02-06  Mg     1.9     02-06    TPro  6.5  /  Alb  3.2<L>  /  TBili  0.9  /  DBili  x   /  AST  6   /  ALT  5   /  AlkPhos  137<H>  02-05              MICROBIOLOGY:     RADIOLOGY:  [x] Reviewed and interpreted by me,  < from: Xray Chest 1 View- PORTABLE-Urgent (Xray Chest 1 View- PORTABLE-Urgent .) (02.05.21 @ 14:01) >  EXAM:  XR CHEST PORTABLE URGENT 1V        PROCEDURE DATE:  Feb 5 2021         INTERPRETATION:  Indication: Pulmonary edema.    TECHNIQUE: Single portable view of the chest.    COMPARISON: 10/13/2018    FINDINGS: The heart size cannot be adequatelyassessed on this single view but appears enlarged. There is a left pleural effusion and/or left basilar atelectasis. There is a large airspace opacity in the left lung. There are patchy airspace opacities in the right lung.    IMPRESSION: Left pleural effusion and large airspace opacity in the left lung. Mild patchy airspace opacities in the right lung. This may represent pulmonary edema versus atypical pneumonia such as COVID 19. If clinically warranted, further evaluation may be obtained with CT scan of the chest.              JERRI ACOSTA MD; Attending Radiologist  This document has been electronically signed. Feb 5 2021  2:37PM    < end of copied text >      Point of Care Ultrasound Findings;    PFT:    EKG:

## 2021-02-06 NOTE — CONSULT NOTE ADULT - SUBJECTIVE AND OBJECTIVE BOX
Bennie Hwang MD  Interventional Cardiology / Endovascular Specialist  [place] Office : [phone] [place] [address] [place] [address]  Tel:   [place] Office : [address] [place] [address]  Tel: [phone]  Cell : [phone]    HISTORY OF PRESENTING ILLNESS:  79 y/o M HTN, DM, dementia (AOx2 at baseline), parkinsons disease, CAD s/p stents (>20 years ago) sent to ED for fluid in lungs and gallstones found in outpatient ultrasound. Patient reports having cough for 6-8 months. Worsened in the past week. Patient sleeps with 2 pillows and is worse when he lays flat. Patient has noticed increased lower leg swelling for the past 2 months. According to pt's brother Berto ([phone]), he reports pt was recently seen by doctors on call for a chronic cough pt had for a year. The provider did an ultrasound, and advised the brother to go to the ER for fluid in the lungs and multiple gallstones. Pt only c/o cough, has no other complaints. Denies fever, chills, SOB, CP, N/V/D  	  MEDICATIONS:  clopidogrel Tablet 75 milliGRAM(s) Oral daily  enoxaparin Injectable 40 milliGRAM(s) SubCutaneous daily  losartan 25 milliGRAM(s) Oral daily  metoprolol succinate ER 50 milliGRAM(s) Oral daily  tamsulosin 0.4 milliGRAM(s) Oral at bedtime        acetaminophen   Tablet .. 650 milliGRAM(s) Oral every 6 hours PRN  carbidopa/levodopa  25/100 1 Tablet(s) Oral three times a day  melatonin 6 milliGRAM(s) Oral at bedtime  QUEtiapine 50 milliGRAM(s) Oral at bedtime    pantoprazole    Tablet 40 milliGRAM(s) Oral before breakfast    atorvastatin 10 milliGRAM(s) Oral at bedtime  dextrose 40% Gel 15 Gram(s) Oral once  dextrose 50% Injectable 25 Gram(s) IV Push once  dextrose 50% Injectable 12.5 Gram(s) IV Push once  dextrose 50% Injectable 25 Gram(s) IV Push once  glucagon  Injectable 1 milliGRAM(s) IntraMuscular once  insulin lispro (ADMELOG) corrective regimen sliding scale   SubCutaneous three times a day before meals  insulin lispro (ADMELOG) corrective regimen sliding scale   SubCutaneous at bedtime    dextrose 5%. 1000 milliLiter(s) IV Continuous <Continuous>  dextrose 5%. 1000 milliLiter(s) IV Continuous <Continuous>  magnesium oxide 400 milliGRAM(s) Oral two times a day with meals      PAST MEDICAL/SURGICAL HISTORY  PAST MEDICAL & SURGICAL HISTORY:  BPH (benign prostatic hyperplasia)    CAD (Coronary Artery Disease)  s/p cardiac stent ( &gt; 20 years ago)    HTN (Hypertension)    DM (Diabetes Mellitus)    Hypercholesterolemia    Coronary Stent  x 2 ( 20 years )        SOCIAL HISTORY: Substance Use (street drugs): ( x ) never used  (  ) other:    FAMILY HISTORY:  No pertinent family history in first degree relatives        REVIEW OF SYSTEMS:  CONSTITUTIONAL: No fever, weight loss, or fatigue  EYES: No eye pain, visual disturbances, or discharge  ENMT:  No difficulty hearing, tinnitus, vertigo; No sinus or throat pain  BREASTS: No pain, masses, or nipple discharge  GASTROINTESTINAL: No abdominal or epigastric pain. No nausea, vomiting, or hematemesis; No diarrhea or constipation. No melena or hematochezia.  GENITOURINARY: No dysuria, frequency, hematuria, or incontinence  NEUROLOGICAL: No headaches, memory loss, loss of strength, numbness, or tremors  ENDOCRINE: No heat or cold intolerance; No hair loss  MUSCULOSKELETAL: No joint pain or swelling; No muscle, back, or extremity pain  PSYCHIATRIC: No depression, anxiety, mood swings, or difficulty sleeping  HEME/LYMPH: No easy bruising, or bleeding gums  All others negative    PHYSICAL EXAM:  T(C): 36.6 (02-06-21 @ 13:35), Max: 36.7 (02-06-21 @ 04:09)  HR: 62 (02-06-21 @ 13:35) (62 - 75)  BP: 101/57 (02-06-21 @ 13:35) (101/57 - 148/68)  RR: 17 (02-06-21 @ 13:35) (17 - 20)  SpO2: 97% (02-06-21 @ 13:35) (96% - 98%)  Wt(kg): --  I&O's Summary    05 Feb 2021 07:01  -  06 Feb 2021 07:00  --------------------------------------------------------  IN: 420 mL / OUT: 1900 mL / NET: -1480 mL        Weight (kg): 73.7 (02-05 @ 21:22)    GENERAL: NAD  EYES: EOMI, PERRLA, conjunctiva and sclera clear  ENMT: No tonsillar erythema, exudates, or enlargement  Cardiovascular: Normal S1 S2, No JVD, No murmurs, No edema  Respiratory: Lungs clear to auscultation	  Gastrointestinal:  Soft, Non-tender, + BS	  Extremities: No edema  NERVOUS SYSTEM:  Alert & Oriented X3                                    9.4    7.68  )-----------( 266      ( 06 Feb 2021 07:37 )             29.3     02-06    141  |  99  |  21  ----------------------------<  90  3.7   |  32<H>  |  0.80    Ca    8.7      06 Feb 2021 07:37  Phos  4.0     02-06  Mg     1.9     02-06    TPro  6.5  /  Alb  3.2<L>  /  TBili  0.9  /  DBili  x   /  AST  6   /  ALT  5   /  AlkPhos  137<H>  02-05    proBNP:   Lipid Profile:   HgA1c:   TSH:     Consultant(s) Notes Reviewed:  [x ] YES  [ ] NO    Care Discussed with Consultants/Other Providers [ x] YES  [ ] NO    Imaging Personally Reviewed independently:  [x] YES  [ ] NO    All labs, radiologic studies, vitals, orders and medications list reviewed. Patient is seen and examined at bedside. Case discussed with medical team.

## 2021-02-06 NOTE — PHYSICAL THERAPY INITIAL EVALUATION ADULT - GAIT DEVIATIONS NOTED, PT EVAL
decreased anisa/decreased step length/decreased weight-shifting ability festinating gait/decreased anisa/decreased step length/decreased weight-shifting ability flexed trunk posture; festinating gait/decreased anisa/decreased step length/decreased weight-shifting ability

## 2021-02-06 NOTE — PROGRESS NOTE ADULT - PROBLEM SELECTOR PLAN 1
--TTE: EF: 50%, moderate pulmonary hypertension, Small pericardial effusion, Left pleural effusion.  --proBNP 2800  --CXR: Left pleural effusion and large airspace opacity in the left lung. Mild patchy airspace opacities in the right lung.   ======PLAN======  --Net negative 1.5L x 24 hours  --c/w Lasix 40 IV BID`  --Fluid restriction 1.5L, strict I&Os, Lytes BID --TTE: EF: 50%, moderate pulmonary hypertension, Small pericardial effusion, Left pleural effusion.  --proBNP 2800  --CXR: Left pleural effusion and large airspace opacity in the left lung. Mild patchy airspace opacities in the right lung.   ======PLAN======  --Net negative 1.5L x 24 hours  --c/w Lasix 40 IV BID  --cardiology consult  --Fluid restriction 1.5L, strict I&Os, Lytes BID --TTE: EF: 50%, Moderate diastolic dysfunction (Stage II), moderate pulmonary hypertension, Small pericardial effusion, Left pleural effusion.  --proBNP 2800  --CXR: Left pleural effusion and large airspace opacity in the left lung. Mild patchy airspace opacities in the right lung.   ======PLAN======  --Net negative 1.5L x 24 hours  --c/w Lasix 40 IV BID  --cardiology consult  --Fluid restriction 1.5L, strict I&Os, Lytes BID

## 2021-02-07 LAB
ANION GAP SERPL CALC-SCNC: 9 MMOL/L — SIGNIFICANT CHANGE UP (ref 7–14)
BUN SERPL-MCNC: 25 MG/DL — HIGH (ref 7–23)
CALCIUM SERPL-MCNC: 8.8 MG/DL — SIGNIFICANT CHANGE UP (ref 8.4–10.5)
CHLORIDE SERPL-SCNC: 99 MMOL/L — SIGNIFICANT CHANGE UP (ref 98–107)
CO2 SERPL-SCNC: 32 MMOL/L — HIGH (ref 22–31)
CREAT SERPL-MCNC: 0.78 MG/DL — SIGNIFICANT CHANGE UP (ref 0.5–1.3)
GLUCOSE BLDC GLUCOMTR-MCNC: 101 MG/DL — HIGH (ref 70–99)
GLUCOSE BLDC GLUCOMTR-MCNC: 132 MG/DL — HIGH (ref 70–99)
GLUCOSE BLDC GLUCOMTR-MCNC: 145 MG/DL — HIGH (ref 70–99)
GLUCOSE BLDC GLUCOMTR-MCNC: 172 MG/DL — HIGH (ref 70–99)
GLUCOSE SERPL-MCNC: 120 MG/DL — HIGH (ref 70–99)
HCT VFR BLD CALC: 30 % — LOW (ref 39–50)
HGB BLD-MCNC: 9.7 G/DL — LOW (ref 13–17)
MAGNESIUM SERPL-MCNC: 1.7 MG/DL — SIGNIFICANT CHANGE UP (ref 1.6–2.6)
MCHC RBC-ENTMCNC: 29.5 PG — SIGNIFICANT CHANGE UP (ref 27–34)
MCHC RBC-ENTMCNC: 32.3 GM/DL — SIGNIFICANT CHANGE UP (ref 32–36)
MCV RBC AUTO: 91.2 FL — SIGNIFICANT CHANGE UP (ref 80–100)
NRBC # BLD: 0 /100 WBCS — SIGNIFICANT CHANGE UP
NRBC # FLD: 0 K/UL — SIGNIFICANT CHANGE UP
PHOSPHATE SERPL-MCNC: 3.5 MG/DL — SIGNIFICANT CHANGE UP (ref 2.5–4.5)
PLATELET # BLD AUTO: 260 K/UL — SIGNIFICANT CHANGE UP (ref 150–400)
POTASSIUM SERPL-MCNC: 3.7 MMOL/L — SIGNIFICANT CHANGE UP (ref 3.5–5.3)
POTASSIUM SERPL-SCNC: 3.7 MMOL/L — SIGNIFICANT CHANGE UP (ref 3.5–5.3)
RBC # BLD: 3.29 M/UL — LOW (ref 4.2–5.8)
RBC # FLD: 13.8 % — SIGNIFICANT CHANGE UP (ref 10.3–14.5)
SODIUM SERPL-SCNC: 140 MMOL/L — SIGNIFICANT CHANGE UP (ref 135–145)
WBC # BLD: 8.44 K/UL — SIGNIFICANT CHANGE UP (ref 3.8–10.5)
WBC # FLD AUTO: 8.44 K/UL — SIGNIFICANT CHANGE UP (ref 3.8–10.5)

## 2021-02-07 PROCEDURE — 99233 SBSQ HOSP IP/OBS HIGH 50: CPT | Mod: GC

## 2021-02-07 RX ORDER — FUROSEMIDE 40 MG
40 TABLET ORAL EVERY 12 HOURS
Refills: 0 | Status: DISCONTINUED | OUTPATIENT
Start: 2021-02-07 | End: 2021-02-08

## 2021-02-07 RX ADMIN — CARBIDOPA AND LEVODOPA 1 TABLET(S): 25; 100 TABLET ORAL at 07:41

## 2021-02-07 RX ADMIN — ATORVASTATIN CALCIUM 10 MILLIGRAM(S): 80 TABLET, FILM COATED ORAL at 21:49

## 2021-02-07 RX ADMIN — CARBIDOPA AND LEVODOPA 1 TABLET(S): 25; 100 TABLET ORAL at 21:49

## 2021-02-07 RX ADMIN — Medication 40 MILLIGRAM(S): at 07:41

## 2021-02-07 RX ADMIN — Medication 50 MILLIGRAM(S): at 09:28

## 2021-02-07 RX ADMIN — PANTOPRAZOLE SODIUM 40 MILLIGRAM(S): 20 TABLET, DELAYED RELEASE ORAL at 05:49

## 2021-02-07 RX ADMIN — ENOXAPARIN SODIUM 40 MILLIGRAM(S): 100 INJECTION SUBCUTANEOUS at 12:50

## 2021-02-07 RX ADMIN — MAGNESIUM OXIDE 400 MG ORAL TABLET 400 MILLIGRAM(S): 241.3 TABLET ORAL at 09:28

## 2021-02-07 RX ADMIN — LOSARTAN POTASSIUM 25 MILLIGRAM(S): 100 TABLET, FILM COATED ORAL at 09:28

## 2021-02-07 RX ADMIN — TAMSULOSIN HYDROCHLORIDE 0.4 MILLIGRAM(S): 0.4 CAPSULE ORAL at 21:49

## 2021-02-07 RX ADMIN — CARBIDOPA AND LEVODOPA 1 TABLET(S): 25; 100 TABLET ORAL at 12:50

## 2021-02-07 RX ADMIN — Medication 1: at 12:50

## 2021-02-07 RX ADMIN — QUETIAPINE FUMARATE 50 MILLIGRAM(S): 200 TABLET, FILM COATED ORAL at 21:49

## 2021-02-07 RX ADMIN — MAGNESIUM OXIDE 400 MG ORAL TABLET 400 MILLIGRAM(S): 241.3 TABLET ORAL at 16:49

## 2021-02-07 RX ADMIN — Medication 40 MILLIGRAM(S): at 16:49

## 2021-02-07 RX ADMIN — Medication 6 MILLIGRAM(S): at 21:49

## 2021-02-07 NOTE — PROGRESS NOTE ADULT - SUBJECTIVE AND OBJECTIVE BOX
Emily Landeros MD-PGY3  Department of Internal Medicine  Pager [phone]    PROGRESS NOTE:       Patient is a 80y old  Male who presents with a chief complaint of Cough (06 Feb 2021 16:15)      SUBJECTIVE / OVERNIGHT EVENTS:    ADDITIONAL REVIEW OF SYSTEMS:    MEDICATIONS  (STANDING):  atorvastatin 10 milliGRAM(s) Oral at bedtime  carbidopa/levodopa  25/100 1 Tablet(s) Oral three times a day  clopidogrel Tablet 75 milliGRAM(s) Oral daily  dextrose 40% Gel 15 Gram(s) Oral once  dextrose 5%. 1000 milliLiter(s) (50 mL/Hr) IV Continuous <Continuous>  dextrose 5%. 1000 milliLiter(s) (100 mL/Hr) IV Continuous <Continuous>  dextrose 50% Injectable 25 Gram(s) IV Push once  dextrose 50% Injectable 12.5 Gram(s) IV Push once  dextrose 50% Injectable 25 Gram(s) IV Push once  enoxaparin Injectable 40 milliGRAM(s) SubCutaneous daily  furosemide   Injectable 40 milliGRAM(s) IV Push daily  glucagon  Injectable 1 milliGRAM(s) IntraMuscular once  insulin lispro (ADMELOG) corrective regimen sliding scale   SubCutaneous three times a day before meals  insulin lispro (ADMELOG) corrective regimen sliding scale   SubCutaneous at bedtime  losartan 25 milliGRAM(s) Oral daily  magnesium oxide 400 milliGRAM(s) Oral two times a day with meals  melatonin 6 milliGRAM(s) Oral at bedtime  metoprolol succinate ER 50 milliGRAM(s) Oral daily  pantoprazole    Tablet 40 milliGRAM(s) Oral before breakfast  QUEtiapine 50 milliGRAM(s) Oral at bedtime  tamsulosin 0.4 milliGRAM(s) Oral at bedtime    MEDICATIONS  (PRN):  acetaminophen   Tablet .. 650 milliGRAM(s) Oral every 6 hours PRN Temp greater or equal to 38C (100.4F)      CAPILLARY BLOOD GLUCOSE      POCT Blood Glucose.: 167 mg/dL (06 Feb 2021 20:31)  POCT Blood Glucose.: 115 mg/dL (06 Feb 2021 16:49)  POCT Blood Glucose.: 156 mg/dL (06 Feb 2021 12:07)  POCT Blood Glucose.: 90 mg/dL (06 Feb 2021 07:41)    I&O's Summary    06 Feb 2021 07:01  -  07 Feb 2021 07:00  --------------------------------------------------------  IN: 1020 mL / OUT: 1401 mL / NET: -381 mL        PHYSICAL EXAM:  Vital Signs Last 24 Hrs  T(C): 36.4 (07 Feb 2021 05:43), Max: 36.6 (06 Feb 2021 13:35)  T(F): 97.6 (07 Feb 2021 05:43), Max: 97.8 (06 Feb 2021 13:35)  HR: 59 (07 Feb 2021 05:43) (59 - 65)  BP: 118/40 (07 Feb 2021 05:43) (101/57 - 132/59)  BP(mean): --  RR: 16 (07 Feb 2021 05:43) (16 - 17)  SpO2: 97% (07 Feb 2021 05:43) (96% - 97%)    CONSTITUTIONAL: NAD, well-developed  RESPIRATORY: Normal respiratory effort; lungs are clear to auscultation bilaterally  CARDIOVASCULAR: Regular rate and rhythm, normal S1 and S2, no murmur/rub/gallop; No lower extremity edema; Peripheral pulses are 2+ bilaterally  ABDOMEN: Nontender to palpation, normoactive bowel sounds, no rebound/guarding; No hepatosplenomegaly  MUSCLOSKELETAL: no clubbing or cyanosis of digits; no joint swelling or tenderness to palpation  PSYCH: A+O to person, place, and time; affect appropriate    LABS:                        9.4    7.68  )-----------( 266      ( 06 Feb 2021 07:37 )             29.3     02-06    138  |  96<L>  |  23  ----------------------------<  146<H>  3.8   |  33<H>  |  0.95    Ca    9.0      06 Feb 2021 18:49  Phos  4.0     02-06  Mg     1.8     02-06    TPro  6.5  /  Alb  3.2<L>  /  TBili  0.9  /  DBili  x   /  AST  6   /  ALT  5   /  AlkPhos  137<H>  02-05      CARDIAC MARKERS ( 05 Feb 2021 12:20 )  x     / x     / 33 U/L / x     / 3.8 ng/mL            RADIOLOGY & ADDITIONAL TESTS:  Results Reviewed:   Imaging Personally Reviewed:  Electrocardiogram Personally Reviewed:    COORDINATION OF CARE:  Care Discussed with Consultants/Other Providers [Y/N]:  Prior or Outpatient Records Reviewed [Y/N]:   Emily Landeros MD-PGY3  Department of Internal Medicine  Pager [phone]    PROGRESS NOTE:       Patient is a 80y old  Male who presents with a chief complaint of Cough (06 Feb 2021 16:15)      SUBJECTIVE / OVERNIGHT EVENTS: none    ADDITIONAL REVIEW OF SYSTEMS: unable to obtain due to dementia    MEDICATIONS  (STANDING):  atorvastatin 10 milliGRAM(s) Oral at bedtime  carbidopa/levodopa  25/100 1 Tablet(s) Oral three times a day  clopidogrel Tablet 75 milliGRAM(s) Oral daily  dextrose 40% Gel 15 Gram(s) Oral once  dextrose 5%. 1000 milliLiter(s) (50 mL/Hr) IV Continuous <Continuous>  dextrose 5%. 1000 milliLiter(s) (100 mL/Hr) IV Continuous <Continuous>  dextrose 50% Injectable 25 Gram(s) IV Push once  dextrose 50% Injectable 12.5 Gram(s) IV Push once  dextrose 50% Injectable 25 Gram(s) IV Push once  enoxaparin Injectable 40 milliGRAM(s) SubCutaneous daily  furosemide   Injectable 40 milliGRAM(s) IV Push daily  glucagon  Injectable 1 milliGRAM(s) IntraMuscular once  insulin lispro (ADMELOG) corrective regimen sliding scale   SubCutaneous three times a day before meals  insulin lispro (ADMELOG) corrective regimen sliding scale   SubCutaneous at bedtime  losartan 25 milliGRAM(s) Oral daily  magnesium oxide 400 milliGRAM(s) Oral two times a day with meals  melatonin 6 milliGRAM(s) Oral at bedtime  metoprolol succinate ER 50 milliGRAM(s) Oral daily  pantoprazole    Tablet 40 milliGRAM(s) Oral before breakfast  QUEtiapine 50 milliGRAM(s) Oral at bedtime  tamsulosin 0.4 milliGRAM(s) Oral at bedtime    MEDICATIONS  (PRN):  acetaminophen   Tablet .. 650 milliGRAM(s) Oral every 6 hours PRN Temp greater or equal to 38C (100.4F)      CAPILLARY BLOOD GLUCOSE      POCT Blood Glucose.: 167 mg/dL (06 Feb 2021 20:31)  POCT Blood Glucose.: 115 mg/dL (06 Feb 2021 16:49)  POCT Blood Glucose.: 156 mg/dL (06 Feb 2021 12:07)  POCT Blood Glucose.: 90 mg/dL (06 Feb 2021 07:41)    I&O's Summary    06 Feb 2021 07:01  -  07 Feb 2021 07:00  --------------------------------------------------------  IN: 1020 mL / OUT: 1401 mL / NET: -381 mL        PHYSICAL EXAM:  Vital Signs Last 24 Hrs  T(C): 36.4 (07 Feb 2021 05:43), Max: 36.6 (06 Feb 2021 13:35)  T(F): 97.6 (07 Feb 2021 05:43), Max: 97.8 (06 Feb 2021 13:35)  HR: 59 (07 Feb 2021 05:43) (59 - 65)  BP: 118/40 (07 Feb 2021 05:43) (101/57 - 132/59)  BP(mean): --  RR: 16 (07 Feb 2021 05:43) (16 - 17)  SpO2: 97% (07 Feb 2021 05:43) (96% - 97%)    PHYSICAL EXAM:  General: WN/WD NAD  Neurology: A&Ox1, nonfocal, SINGH x 4  Eyes: PERRLA/ EOMI, Gross vision intact  ENT/Neck: Neck supple, trachea midline, No JVD, Gross hearing intact  Respiratory: BS decreased on the left. CTA on the right. No wheezing, rales, or rhonchi.  CV: RRR, +S1/S2, -S3/S4, no murmurs, rubs or gallops  Abdominal: Soft, NT, ND +BS, No HSM  MSK: 5/5 strength UE/LE bilaterally  Extremities: No edema, 2+ peripheral pulses  Skin: No Rashes, Hematoma, Ecchymosis    LABS:                        9.4    7.68  )-----------( 266      ( 06 Feb 2021 07:37 )             29.3     02-06    138  |  96<L>  |  23  ----------------------------<  146<H>  3.8   |  33<H>  |  0.95    Ca    9.0      06 Feb 2021 18:49  Phos  4.0     02-06  Mg     1.8     02-06    TPro  6.5  /  Alb  3.2<L>  /  TBili  0.9  /  DBili  x   /  AST  6   /  ALT  5   /  AlkPhos  137<H>  02-05      CARDIAC MARKERS ( 05 Feb 2021 12:20 )  x     / x     / 33 U/L / x     / 3.8 ng/mL            RADIOLOGY & ADDITIONAL TESTS:  Results Reviewed:   Imaging Personally Reviewed:  Electrocardiogram Personally Reviewed:    COORDINATION OF CARE:  Care Discussed with Consultants/Other Providers [Y/N]:  Prior or Outpatient Records Reviewed [Y/N]:

## 2021-02-07 NOTE — PROGRESS NOTE ADULT - PROBLEM SELECTOR PLAN 2
Likely related to acute onset heart failure but have concern for possiblity of malignancy given unilateral etiology.  --pulm consult for possible thoracentesis.

## 2021-02-07 NOTE — PROGRESS NOTE ADULT - ASSESSMENT
79 y/o M HTN, DM, dementia (AOx2 at baseline), parkinsons disease, CAD s/p stents (>20 years ago) p/w acute systolic CHF exacerbation with new L pleural effusion.

## 2021-02-07 NOTE — PROGRESS NOTE ADULT - SUBJECTIVE AND OBJECTIVE BOX
Bennie Hwang MD  Interventional Cardiology / Endovascular Specialist  [place] Office : [phone] [place] [address] [place] [address]  Tel:   [place] Office : [address] [facility] [place] [address]  Tel: [phone]  Cell : [phone]    Subjective/Overnight events: Patient lying in bed comfortably. No acute distress. Denies chest pain, SOB or palpitations  	  MEDICATIONS:  clopidogrel Tablet 75 milliGRAM(s) Oral daily  enoxaparin Injectable 40 milliGRAM(s) SubCutaneous daily  furosemide   Injectable 40 milliGRAM(s) IV Push daily  losartan 25 milliGRAM(s) Oral daily  metoprolol succinate ER 50 milliGRAM(s) Oral daily  tamsulosin 0.4 milliGRAM(s) Oral at bedtime        acetaminophen   Tablet .. 650 milliGRAM(s) Oral every 6 hours PRN  carbidopa/levodopa  25/100 1 Tablet(s) Oral three times a day  melatonin 6 milliGRAM(s) Oral at bedtime  QUEtiapine 50 milliGRAM(s) Oral at bedtime    pantoprazole    Tablet 40 milliGRAM(s) Oral before breakfast    atorvastatin 10 milliGRAM(s) Oral at bedtime  dextrose 40% Gel 15 Gram(s) Oral once  dextrose 50% Injectable 25 Gram(s) IV Push once  dextrose 50% Injectable 12.5 Gram(s) IV Push once  dextrose 50% Injectable 25 Gram(s) IV Push once  glucagon  Injectable 1 milliGRAM(s) IntraMuscular once  insulin lispro (ADMELOG) corrective regimen sliding scale   SubCutaneous three times a day before meals  insulin lispro (ADMELOG) corrective regimen sliding scale   SubCutaneous at bedtime    dextrose 5%. 1000 milliLiter(s) IV Continuous <Continuous>  dextrose 5%. 1000 milliLiter(s) IV Continuous <Continuous>  magnesium oxide 400 milliGRAM(s) Oral two times a day with meals      PAST MEDICAL/SURGICAL HISTORY  PAST MEDICAL & SURGICAL HISTORY:  BPH (benign prostatic hyperplasia)    CAD (Coronary Artery Disease)  s/p cardiac stent ( &gt; 20 years ago)    HTN (Hypertension)    DM (Diabetes Mellitus)    Hypercholesterolemia    Coronary Stent  x 2 ( 20 years )        SOCIAL HISTORY: Substance Use (street drugs): ( x ) never used  (  ) other:    FAMILY HISTORY:  No pertinent family history in first degree relatives        REVIEW OF SYSTEMS:  CONSTITUTIONAL: No fever, weight loss, or fatigue  EYES: No eye pain, visual disturbances, or discharge  ENMT:  No difficulty hearing, tinnitus, vertigo; No sinus or throat pain  BREASTS: No pain, masses, or nipple discharge  GASTROINTESTINAL: No abdominal or epigastric pain. No nausea, vomiting, or hematemesis; No diarrhea or constipation. No melena or hematochezia.  GENITOURINARY: No dysuria, frequency, hematuria, or incontinence  NEUROLOGICAL: No headaches, memory loss, loss of strength, numbness, or tremors  ENDOCRINE: No heat or cold intolerance; No hair loss  MUSCULOSKELETAL: No joint pain or swelling; No muscle, back, or extremity pain  PSYCHIATRIC: No depression, anxiety, mood swings, or difficulty sleeping  HEME/LYMPH: No easy bruising, or bleeding gums  All others negative    PHYSICAL EXAM:  T(C): 36.4 (02-07-21 @ 09:16), Max: 36.6 (02-06-21 @ 13:35)  HR: 61 (02-07-21 @ 09:16) (55 - 65)  BP: 125/53 (02-07-21 @ 09:16) (101/57 - 126/53)  RR: 18 (02-07-21 @ 09:16) (16 - 18)  SpO2: 98% (02-07-21 @ 09:16) (96% - 98%)  Wt(kg): --  I&O's Summary    06 Feb 2021 07:01  -  07 Feb 2021 07:00  --------------------------------------------------------  IN: 1020 mL / OUT: 1401 mL / NET: -381 mL      GENERAL: NAD  EYES: EOMI, PERRLA, conjunctiva and sclera clear  ENMT: No tonsillar erythema, exudates, or enlargement  Cardiovascular: Normal S1 S2, No JVD, No murmurs, No edema  Respiratory: Lungs clear to auscultation	  Gastrointestinal:  Soft, Non-tender, + BS	  Extremities: No edema  NERVOUS SYSTEM:  Alert & Oriented X3                                    9.7    8.44  )-----------( 260      ( 07 Feb 2021 07:22 )             30.0     02-07    140  |  99  |  25<H>  ----------------------------<  120<H>  3.7   |  32<H>  |  0.78    Ca    8.8      07 Feb 2021 07:22  Phos  3.5     02-07  Mg     1.7     02-07    TPro  6.5  /  Alb  3.2<L>  /  TBili  0.9  /  DBili  x   /  AST  6   /  ALT  5   /  AlkPhos  137<H>  02-05    proBNP:   Lipid Profile:   HgA1c:   TSH:     Consultant(s) Notes Reviewed:  [x ] YES  [ ] NO    Care Discussed with Consultants/Other Providers [ x] YES  [ ] NO    Imaging Personally Reviewed independently:  [x] YES  [ ] NO    All labs, radiologic studies, vitals, orders and medications list reviewed. Patient is seen and examined at bedside. Case discussed with medical team.

## 2021-02-07 NOTE — PROGRESS NOTE ADULT - SUBJECTIVE AND OBJECTIVE BOX
CHIEF COMPLAINT:    Interval Events:    REVIEW OF SYSTEMS:  CONSTITUTIONAL: No weakness, fevers or chills  EYES/ENT: No visual changes;  No vertigo or throat pain   NECK: No pain or stiffness  RESPIRATORY: No cough, wheezing, hemoptysis; No shortness of breath  CARDIOVASCULAR: No chest pain or palpitations  GASTROINTESTINAL: No abdominal or epigastric pain. No nausea, vomiting, or hematemesis; No diarrhea or constipation. No melena or hematochezia.  GENITOURINARY: No dysuria, frequency or hematuria  NEUROLOGICAL: No numbness or weakness  SKIN: No itching, burning, rashes, or lesions   All other review of systems is negative unless indicated above.    OBJECTIVE:  ICU Vital Signs Last 24 Hrs  T(C): 36.4 (07 Feb 2021 05:43), Max: 36.6 (06 Feb 2021 13:35)  T(F): 97.6 (07 Feb 2021 05:43), Max: 97.8 (06 Feb 2021 13:35)  HR: 55 (07 Feb 2021 08:00) (55 - 65)  BP: 123/54 (07 Feb 2021 08:00) (101/57 - 132/59)  BP(mean): --  ABP: --  ABP(mean): --  RR: 16 (07 Feb 2021 05:43) (16 - 17)  SpO2: 97% (07 Feb 2021 05:43) (96% - 97%)        02-06 @ 07:01  -  02-07 @ 07:00  --------------------------------------------------------  IN: 1020 mL / OUT: 1401 mL / NET: -381 mL      CAPILLARY BLOOD GLUCOSE      POCT Blood Glucose.: 101 mg/dL (07 Feb 2021 07:51)      PHYSICAL EXAM:  General: WN/WD NAD  Neurology: A&Ox3, nonfocal, SINGH x 4  Eyes: PERRLA/ EOMI, Gross vision intact  ENT/Neck: Neck supple, trachea midline, No JVD, Gross hearing intact  Respiratory: CTA B/L, No wheezing, rales, rhonchi  CV: RRR, +S1/S2, -S3/S4, no murmurs, rubs or gallops  Abdominal: Soft, NT, ND +BS, No HSM  MSK: 5/5 strength UE/LE bilaterally  Extremities: No edema, 2+ peripheral pulses  Skin: No Rashes, Hematoma, Ecchymosis  Incisions:   Tubes:    HOSPITAL MEDICATIONS:  MEDICATIONS  (STANDING):  atorvastatin 10 milliGRAM(s) Oral at bedtime  carbidopa/levodopa  25/100 1 Tablet(s) Oral three times a day  clopidogrel Tablet 75 milliGRAM(s) Oral daily  dextrose 40% Gel 15 Gram(s) Oral once  dextrose 5%. 1000 milliLiter(s) (50 mL/Hr) IV Continuous <Continuous>  dextrose 5%. 1000 milliLiter(s) (100 mL/Hr) IV Continuous <Continuous>  dextrose 50% Injectable 25 Gram(s) IV Push once  dextrose 50% Injectable 12.5 Gram(s) IV Push once  dextrose 50% Injectable 25 Gram(s) IV Push once  enoxaparin Injectable 40 milliGRAM(s) SubCutaneous daily  furosemide   Injectable 40 milliGRAM(s) IV Push daily  glucagon  Injectable 1 milliGRAM(s) IntraMuscular once  insulin lispro (ADMELOG) corrective regimen sliding scale   SubCutaneous three times a day before meals  insulin lispro (ADMELOG) corrective regimen sliding scale   SubCutaneous at bedtime  losartan 25 milliGRAM(s) Oral daily  magnesium oxide 400 milliGRAM(s) Oral two times a day with meals  melatonin 6 milliGRAM(s) Oral at bedtime  metoprolol succinate ER 50 milliGRAM(s) Oral daily  pantoprazole    Tablet 40 milliGRAM(s) Oral before breakfast  QUEtiapine 50 milliGRAM(s) Oral at bedtime  tamsulosin 0.4 milliGRAM(s) Oral at bedtime    MEDICATIONS  (PRN):  acetaminophen   Tablet .. 650 milliGRAM(s) Oral every 6 hours PRN Temp greater or equal to 38C (100.4F)      LABS:                        9.7    8.44  )-----------( 260      ( 07 Feb 2021 07:22 )             30.0     Hgb Trend: 9.7<--, 9.4<--, 10.1<--  02-07    140  |  99  |  25<H>  ----------------------------<  120<H>  3.7   |  32<H>  |  0.78    Ca    8.8      07 Feb 2021 07:22  Phos  3.5     02-07  Mg     1.7     02-07    TPro  6.5  /  Alb  3.2<L>  /  TBili  0.9  /  DBili  x   /  AST  6   /  ALT  5   /  AlkPhos  137<H>  02-05    Creatinine Trend: 0.78<--, 0.95<--, 0.80<--, 0.75<--            MICROBIOLOGY:       RADIOLOGY:  [ ] Reviewed by me    PULMONARY FUNCTION TESTS:    EKG: CHIEF COMPLAINT:    Interval Events: Patient currently eupnic, laying in bed, no acute distress. Denies chest pain, shortness of breath, or fevers. AAOx1.    REVIEW OF SYSTEMS:  CONSTITUTIONAL: No weakness, fevers or chills  EYES/ENT: No visual changes;  No vertigo or throat pain   NECK: No pain or stiffness  RESPIRATORY: No cough, wheezing, hemoptysis; No shortness of breath  CARDIOVASCULAR: No chest pain or palpitations  GASTROINTESTINAL: No abdominal or epigastric pain. No nausea, vomiting, or hematemesis; No diarrhea or constipation. No melena or hematochezia.  GENITOURINARY: No dysuria, frequency or hematuria  NEUROLOGICAL: No numbness or weakness  SKIN: No itching, burning, rashes, or lesions   All other review of systems is negative unless indicated above.    OBJECTIVE:  ICU Vital Signs Last 24 Hrs  T(C): 36.4 (07 Feb 2021 05:43), Max: 36.6 (06 Feb 2021 13:35)  T(F): 97.6 (07 Feb 2021 05:43), Max: 97.8 (06 Feb 2021 13:35)  HR: 55 (07 Feb 2021 08:00) (55 - 65)  BP: 123/54 (07 Feb 2021 08:00) (101/57 - 132/59)  BP(mean): --  ABP: --  ABP(mean): --  RR: 16 (07 Feb 2021 05:43) (16 - 17)  SpO2: 97% (07 Feb 2021 05:43) (96% - 97%)        02-06 @ 07:01  -  02-07 @ 07:00  --------------------------------------------------------  IN: 1020 mL / OUT: 1401 mL / NET: -381 mL      CAPILLARY BLOOD GLUCOSE      POCT Blood Glucose.: 101 mg/dL (07 Feb 2021 07:51)      PHYSICAL EXAM:  General: WN/WD NAD  Neurology: A&Ox1, nonfocal, SINGH x 4  Eyes: PERRLA/ EOMI, Gross vision intact  ENT/Neck: Neck supple, trachea midline, No JVD, Gross hearing intact  Respiratory: BS decreased on the left. CTA on the right. No wheezing, rales, or rhonchi.  CV: RRR, +S1/S2, -S3/S4, no murmurs, rubs or gallops  Abdominal: Soft, NT, ND +BS, No HSM  MSK: 5/5 strength UE/LE bilaterally  Extremities: No edema, 2+ peripheral pulses  Skin: No Rashes, Hematoma, Ecchymosis      HOSPITAL MEDICATIONS:  MEDICATIONS  (STANDING):  atorvastatin 10 milliGRAM(s) Oral at bedtime  carbidopa/levodopa  25/100 1 Tablet(s) Oral three times a day  clopidogrel Tablet 75 milliGRAM(s) Oral daily  dextrose 40% Gel 15 Gram(s) Oral once  dextrose 5%. 1000 milliLiter(s) (50 mL/Hr) IV Continuous <Continuous>  dextrose 5%. 1000 milliLiter(s) (100 mL/Hr) IV Continuous <Continuous>  dextrose 50% Injectable 25 Gram(s) IV Push once  dextrose 50% Injectable 12.5 Gram(s) IV Push once  dextrose 50% Injectable 25 Gram(s) IV Push once  enoxaparin Injectable 40 milliGRAM(s) SubCutaneous daily  furosemide   Injectable 40 milliGRAM(s) IV Push daily  glucagon  Injectable 1 milliGRAM(s) IntraMuscular once  insulin lispro (ADMELOG) corrective regimen sliding scale   SubCutaneous three times a day before meals  insulin lispro (ADMELOG) corrective regimen sliding scale   SubCutaneous at bedtime  losartan 25 milliGRAM(s) Oral daily  magnesium oxide 400 milliGRAM(s) Oral two times a day with meals  melatonin 6 milliGRAM(s) Oral at bedtime  metoprolol succinate ER 50 milliGRAM(s) Oral daily  pantoprazole    Tablet 40 milliGRAM(s) Oral before breakfast  QUEtiapine 50 milliGRAM(s) Oral at bedtime  tamsulosin 0.4 milliGRAM(s) Oral at bedtime    MEDICATIONS  (PRN):  acetaminophen   Tablet .. 650 milliGRAM(s) Oral every 6 hours PRN Temp greater or equal to 38C (100.4F)      LABS:                        9.7    8.44  )-----------( 260      ( 07 Feb 2021 07:22 )             30.0     Hgb Trend: 9.7<--, 9.4<--, 10.1<--  02-07    140  |  99  |  25<H>  ----------------------------<  120<H>  3.7   |  32<H>  |  0.78    Ca    8.8      07 Feb 2021 07:22  Phos  3.5     02-07  Mg     1.7     02-07    TPro  6.5  /  Alb  3.2<L>  /  TBili  0.9  /  DBili  x   /  AST  6   /  ALT  5   /  AlkPhos  137<H>  02-05    Creatinine Trend: 0.78<--, 0.95<--, 0.80<--, 0.75<--            MICROBIOLOGY:       RADIOLOGY:  [x] Reviewed by me    CXR:   IMPRESSION: Left pleural effusion and large airspace opacity in the left lung. Mild patchy airspace opacities in the right lung.

## 2021-02-07 NOTE — PROGRESS NOTE ADULT - PROBLEM SELECTOR PLAN 1
--TTE: EF: 50%, Moderate diastolic dysfunction (Stage II), moderate pulmonary hypertension, Small pericardial effusion, Left pleural effusion.  --proBNP 2800  --CXR: Left pleural effusion and large airspace opacity in the left lung. Mild patchy airspace opacities in the right lung.   ======PLAN======  --Net negative 1.5L x 24 hours  --c/w Lasix 40 IV BID  --cardiology consult  --Fluid restriction 1.5L, strict I&Os, Lytes BID

## 2021-02-07 NOTE — PROGRESS NOTE ADULT - ASSESSMENT
Assessment and Plan    81 y/o M HTN, DM, dementia (AOx2 at baseline), parkinsons disease, CAD s/p stents (>20 years ago) sent to ED for fluid in lungs and gallstones found in outpatient ultrasound    EKG: NSR 1st degree AVB, PVCs  tele: NSR PACs PVCs    1. CHF  -echo with mild LV systolic dysfunction (new compared to 2018)  -CXR with patchy airspace opacities   -proBNP elevated  -continue with IV lasix daily  -continue to monitor I&Os  -will consider stress once euvolemic. cath in 2010 showed mild luminal disease and patent stents    2. CAD s/p stent  -cath in 2010 showed mild luminal disease and patent stents  -denies chest pain  -echo with new mild LV dysfunction  -will consider stress once euvolemic  -continue with plavix    3. HTN  -controlled  -on toprol and losartan  -continue to monitor BP   Assessment and Plan    79 y/o M HTN, DM, dementia (AOx2 at baseline), parkinsons disease, CAD s/p stents (>20 years ago) sent to ED for fluid in lungs and gallstones found in outpatient ultrasound    EKG: NSR 1st degree AVB, PVCs  tele: NSR PACs PVCs,. 7 bts NSVT    1. CHF  -echo with mild LV systolic dysfunction (new compared to 2018)  -CXR with patchy airspace opacities   -proBNP elevated  -continue with IV lasix daily  -continue to monitor I&Os  -will consider stress once euvolemic. cath in 2010 showed mild luminal disease and patent stents    2. CAD s/p stent  -cath in 2010 showed mild luminal disease and patent stents  -denies chest pain  -echo with new mild LV dysfunction  -will consider stress once euvolemic  -continue with plavix    3. HTN  -controlled  -on toprol and losartan  -continue to monitor BP

## 2021-02-07 NOTE — PROGRESS NOTE ADULT - ASSESSMENT
80M PMH dementia, CAD, DM, Parkinson's disease, and HTN who presented to the hospital after outpatient ultrasound showed a pleural effusion. Pulmonary consulted for evaluation of pleural effusion.    # Pleural Effusion  - Patient currently on room air and breathing comfortably  - On POCUS, patient has small-modearate loculated pleural, left sided effusion  - Patient is currently on DAPT. This effusion should receive a diagnostic thoracentesis to r/o malignancy due to left sided-unilateral nature; however, patient at high risk for bleed with plavix. Would need to stop plavix for 5d prior to procedure to occur.   - Patient can receive inpatient or schedule outpatient thoracentesis after plavix has been held for 5d.   - Would further clarify goals of care with patient's family given his poor functional status and baseline mental status, unclear if benefits of diagnostic procedure outweigh risks  - Diuresis per medicine        Vivek Kamara MD  PGY-6  Pulmonary and Critical Care Fellow  Pager: [phone]

## 2021-02-08 ENCOUNTER — TRANSCRIPTION ENCOUNTER (OUTPATIENT)
Age: 81
End: 2021-02-08

## 2021-02-08 LAB
ANION GAP SERPL CALC-SCNC: 10 MMOL/L — SIGNIFICANT CHANGE UP (ref 7–14)
BUN SERPL-MCNC: 29 MG/DL — HIGH (ref 7–23)
CALCIUM SERPL-MCNC: 9.1 MG/DL — SIGNIFICANT CHANGE UP (ref 8.4–10.5)
CHLORIDE SERPL-SCNC: 96 MMOL/L — LOW (ref 98–107)
CO2 SERPL-SCNC: 34 MMOL/L — HIGH (ref 22–31)
CREAT SERPL-MCNC: 0.98 MG/DL — SIGNIFICANT CHANGE UP (ref 0.5–1.3)
GLUCOSE BLDC GLUCOMTR-MCNC: 120 MG/DL — HIGH (ref 70–99)
GLUCOSE SERPL-MCNC: 119 MG/DL — HIGH (ref 70–99)
MAGNESIUM SERPL-MCNC: 1.5 MG/DL — LOW (ref 1.6–2.6)
PHOSPHATE SERPL-MCNC: 3.1 MG/DL — SIGNIFICANT CHANGE UP (ref 2.5–4.5)
POTASSIUM SERPL-MCNC: 3.5 MMOL/L — SIGNIFICANT CHANGE UP (ref 3.5–5.3)
POTASSIUM SERPL-SCNC: 3.5 MMOL/L — SIGNIFICANT CHANGE UP (ref 3.5–5.3)
SODIUM SERPL-SCNC: 140 MMOL/L — SIGNIFICANT CHANGE UP (ref 135–145)

## 2021-02-08 PROCEDURE — 99233 SBSQ HOSP IP/OBS HIGH 50: CPT | Mod: GC

## 2021-02-08 PROCEDURE — 71045 X-RAY EXAM CHEST 1 VIEW: CPT | Mod: 26

## 2021-02-08 RX ORDER — FUROSEMIDE 40 MG
40 TABLET ORAL DAILY
Refills: 0 | Status: DISCONTINUED | OUTPATIENT
Start: 2021-02-09 | End: 2021-02-11

## 2021-02-08 RX ORDER — MAGNESIUM SULFATE 500 MG/ML
2 VIAL (ML) INJECTION ONCE
Refills: 0 | Status: COMPLETED | OUTPATIENT
Start: 2021-02-08 | End: 2021-02-08

## 2021-02-08 RX ORDER — POTASSIUM CHLORIDE 20 MEQ
40 PACKET (EA) ORAL ONCE
Refills: 0 | Status: COMPLETED | OUTPATIENT
Start: 2021-02-08 | End: 2021-02-08

## 2021-02-08 RX ADMIN — ENOXAPARIN SODIUM 40 MILLIGRAM(S): 100 INJECTION SUBCUTANEOUS at 13:20

## 2021-02-08 RX ADMIN — Medication 6 MILLIGRAM(S): at 21:15

## 2021-02-08 RX ADMIN — LOSARTAN POTASSIUM 25 MILLIGRAM(S): 100 TABLET, FILM COATED ORAL at 05:13

## 2021-02-08 RX ADMIN — CARBIDOPA AND LEVODOPA 1 TABLET(S): 25; 100 TABLET ORAL at 13:21

## 2021-02-08 RX ADMIN — CARBIDOPA AND LEVODOPA 1 TABLET(S): 25; 100 TABLET ORAL at 21:15

## 2021-02-08 RX ADMIN — QUETIAPINE FUMARATE 50 MILLIGRAM(S): 200 TABLET, FILM COATED ORAL at 21:15

## 2021-02-08 RX ADMIN — Medication 40 MILLIGRAM(S): at 05:14

## 2021-02-08 RX ADMIN — Medication 40 MILLIEQUIVALENT(S): at 06:00

## 2021-02-08 RX ADMIN — MAGNESIUM OXIDE 400 MG ORAL TABLET 400 MILLIGRAM(S): 241.3 TABLET ORAL at 17:46

## 2021-02-08 RX ADMIN — Medication 50 MILLIGRAM(S): at 05:13

## 2021-02-08 RX ADMIN — MAGNESIUM OXIDE 400 MG ORAL TABLET 400 MILLIGRAM(S): 241.3 TABLET ORAL at 08:56

## 2021-02-08 RX ADMIN — Medication 50 GRAM(S): at 06:00

## 2021-02-08 RX ADMIN — Medication 1: at 17:46

## 2021-02-08 RX ADMIN — PANTOPRAZOLE SODIUM 40 MILLIGRAM(S): 20 TABLET, DELAYED RELEASE ORAL at 05:13

## 2021-02-08 RX ADMIN — TAMSULOSIN HYDROCHLORIDE 0.4 MILLIGRAM(S): 0.4 CAPSULE ORAL at 21:15

## 2021-02-08 RX ADMIN — ATORVASTATIN CALCIUM 10 MILLIGRAM(S): 80 TABLET, FILM COATED ORAL at 21:15

## 2021-02-08 RX ADMIN — CARBIDOPA AND LEVODOPA 1 TABLET(S): 25; 100 TABLET ORAL at 05:13

## 2021-02-08 NOTE — PROGRESS NOTE ADULT - PROBLEM SELECTOR PLAN 2
Unilateral. Diff: malignancy vs. HF   --s/p pulm consult---- thoracentesis warranted given the unilateral presentation, pending further discussion with daughter regarding GOC   --plavix held since 2/7 in preparation for thoracentesis, need to be held for 5 days

## 2021-02-08 NOTE — PROGRESS NOTE ADULT - SUBJECTIVE AND OBJECTIVE BOX
PROGRESS NOTE:     CONTACT INFO:  Ines Espinosa MD  Internal Medicine PGY2  Pager: [phone]    Patient is a 81y old  Male who presents with a chief complaint of Cough (07 Feb 2021 10:45)      SUBJECTIVE / OVERNIGHT EVENTS:   ADDITIONAL REVIEW OF SYSTEMS:    MEDICATIONS  (STANDING):  atorvastatin 10 milliGRAM(s) Oral at bedtime  carbidopa/levodopa  25/100 1 Tablet(s) Oral three times a day  dextrose 40% Gel 15 Gram(s) Oral once  dextrose 5%. 1000 milliLiter(s) (50 mL/Hr) IV Continuous <Continuous>  dextrose 5%. 1000 milliLiter(s) (100 mL/Hr) IV Continuous <Continuous>  dextrose 50% Injectable 25 Gram(s) IV Push once  dextrose 50% Injectable 12.5 Gram(s) IV Push once  dextrose 50% Injectable 25 Gram(s) IV Push once  enoxaparin Injectable 40 milliGRAM(s) SubCutaneous daily  furosemide   Injectable 40 milliGRAM(s) IV Push every 12 hours  glucagon  Injectable 1 milliGRAM(s) IntraMuscular once  insulin lispro (ADMELOG) corrective regimen sliding scale   SubCutaneous three times a day before meals  insulin lispro (ADMELOG) corrective regimen sliding scale   SubCutaneous at bedtime  losartan 25 milliGRAM(s) Oral daily  magnesium oxide 400 milliGRAM(s) Oral two times a day with meals  melatonin 6 milliGRAM(s) Oral at bedtime  metoprolol succinate ER 50 milliGRAM(s) Oral daily  pantoprazole    Tablet 40 milliGRAM(s) Oral before breakfast  QUEtiapine 50 milliGRAM(s) Oral at bedtime  tamsulosin 0.4 milliGRAM(s) Oral at bedtime    MEDICATIONS  (PRN):  acetaminophen   Tablet .. 650 milliGRAM(s) Oral every 6 hours PRN Temp greater or equal to 38C (100.4F)      CAPILLARY BLOOD GLUCOSE      POCT Blood Glucose.: 145 mg/dL (07 Feb 2021 20:41)  POCT Blood Glucose.: 132 mg/dL (07 Feb 2021 16:34)  POCT Blood Glucose.: 172 mg/dL (07 Feb 2021 11:53)  POCT Blood Glucose.: 101 mg/dL (07 Feb 2021 07:51)    I&O's Summary    06 Feb 2021 07:01  -  07 Feb 2021 07:00  --------------------------------------------------------  IN: 1020 mL / OUT: 1401 mL / NET: -381 mL    07 Feb 2021 07:01  -  08 Feb 2021 05:37  --------------------------------------------------------  IN: 570 mL / OUT: 1700 mL / NET: -1130 mL        PHYSICAL EXAM:  Vital Signs Last 24 Hrs  T(C): 36.5 (08 Feb 2021 05:09), Max: 36.5 (08 Feb 2021 05:09)  T(F): 97.7 (08 Feb 2021 05:09), Max: 97.7 (08 Feb 2021 05:09)  HR: 62 (08 Feb 2021 05:09) (55 - 98)  BP: 128/42 (08 Feb 2021 05:09) (115/46 - 128/42)  BP(mean): --  RR: 18 (08 Feb 2021 05:09) (16 - 18)  SpO2: 96% (08 Feb 2021 05:09) (96% - 100%)        LABS:                        9.7    8.44  )-----------( 260      ( 07 Feb 2021 07:22 )             30.0     02-08    140  |  96<L>  |  29<H>  ----------------------------<  119<H>  3.5   |  34<H>  |  0.98    Ca    9.1      08 Feb 2021 04:49  Phos  3.1     02-08  Mg     1.5     02-08                  RADIOLOGY & ADDITIONAL TESTS:  Results Reviewed:   Imaging Personally Reviewed:  Electrocardiogram Personally Reviewed:    COORDINATION OF CARE:  Care Discussed with Consultants/Other Providers [Y/N]:  Prior or Outpatient Records Reviewed [Y/N]:   PROGRESS NOTE:     CONTACT INFO:  Ines Espinosa MD  Internal Medicine PGY2  Pager: [phone]    Patient is a 81y old  Male who presents with a chief complaint of Cough (07 Feb 2021 10:45)      SUBJECTIVE / OVERNIGHT EVENTS: No acute event overnight. Tele, SR with PVCs, HR in 60s. Assessed patient this morning, no chest pain/shortness of breath/abdominal pain.   ADDITIONAL REVIEW OF SYSTEMS:    MEDICATIONS  (STANDING):  atorvastatin 10 milliGRAM(s) Oral at bedtime  carbidopa/levodopa  25/100 1 Tablet(s) Oral three times a day  dextrose 40% Gel 15 Gram(s) Oral once  dextrose 5%. 1000 milliLiter(s) (50 mL/Hr) IV Continuous <Continuous>  dextrose 5%. 1000 milliLiter(s) (100 mL/Hr) IV Continuous <Continuous>  dextrose 50% Injectable 25 Gram(s) IV Push once  dextrose 50% Injectable 12.5 Gram(s) IV Push once  dextrose 50% Injectable 25 Gram(s) IV Push once  enoxaparin Injectable 40 milliGRAM(s) SubCutaneous daily  furosemide   Injectable 40 milliGRAM(s) IV Push every 12 hours  glucagon  Injectable 1 milliGRAM(s) IntraMuscular once  insulin lispro (ADMELOG) corrective regimen sliding scale   SubCutaneous three times a day before meals  insulin lispro (ADMELOG) corrective regimen sliding scale   SubCutaneous at bedtime  losartan 25 milliGRAM(s) Oral daily  magnesium oxide 400 milliGRAM(s) Oral two times a day with meals  melatonin 6 milliGRAM(s) Oral at bedtime  metoprolol succinate ER 50 milliGRAM(s) Oral daily  pantoprazole    Tablet 40 milliGRAM(s) Oral before breakfast  QUEtiapine 50 milliGRAM(s) Oral at bedtime  tamsulosin 0.4 milliGRAM(s) Oral at bedtime    MEDICATIONS  (PRN):  acetaminophen   Tablet .. 650 milliGRAM(s) Oral every 6 hours PRN Temp greater or equal to 38C (100.4F)      CAPILLARY BLOOD GLUCOSE      POCT Blood Glucose.: 145 mg/dL (07 Feb 2021 20:41)  POCT Blood Glucose.: 132 mg/dL (07 Feb 2021 16:34)  POCT Blood Glucose.: 172 mg/dL (07 Feb 2021 11:53)  POCT Blood Glucose.: 101 mg/dL (07 Feb 2021 07:51)    I&O's Summary    06 Feb 2021 07:01  -  07 Feb 2021 07:00  --------------------------------------------------------  IN: 1020 mL / OUT: 1401 mL / NET: -381 mL    07 Feb 2021 07:01  -  08 Feb 2021 05:37  --------------------------------------------------------  IN: 570 mL / OUT: 1700 mL / NET: -1130 mL        PHYSICAL EXAM:  Vital Signs Last 24 Hrs  T(C): 36.5 (08 Feb 2021 05:09), Max: 36.5 (08 Feb 2021 05:09)  T(F): 97.7 (08 Feb 2021 05:09), Max: 97.7 (08 Feb 2021 05:09)  HR: 62 (08 Feb 2021 05:09) (55 - 98)  BP: 128/42 (08 Feb 2021 05:09) (115/46 - 128/42)  BP(mean): --  RR: 18 (08 Feb 2021 05:09) (16 - 18)  SpO2: 96% (08 Feb 2021 05:09) (96% - 100%)    GENERAL: lying in bed, in no acute distress   HEAD:  Atraumatic, Normocephalic  HEENT: scleral anicteric.   CV: Regular rate and rhythm. No murmurs, rubs, or gallops  Respiratory: normal respiratory effort, + crackles   ABDOMEN: Soft, Nontender, Nondistended; Bowel sounds normal  EXTREMITIES: Trace bilateral lower extremity edema. Bilateral LE symmetric in size.   PSYCH: Alert, oriented x1   NEURO: Symmetric facial expressions.   Skin: No rashes      LABS:                  (02-07 @ 07:22)                      9.7  8.44 )-----------( 260                 30.0    Neutrophils = -- (--%)  Lymphocytes = -- (--%)  Eosinophils = -- (--%)  Basophils = -- (--%)  Monocytes = -- (--%)  Bands = --%    02-08    140  |  96<L>  |  29<H>  ----------------------------<  119<H>  3.5   |  34<H>  |  0.98    Ca    9.1      08 Feb 2021 04:49  Phos  3.1     02-08  Mg     1.5     02-08    RADIOLOGY & ADDITIONAL TESTS:  Results Reviewed:   Imaging Personally Reviewed:  Electrocardiogram Personally Reviewed:    COORDINATION OF CARE:  Care Discussed with Consultants/Other Providers [Y/N]:  Prior or Outpatient Records Reviewed [Y/N]:

## 2021-02-08 NOTE — DISCHARGE NOTE PROVIDER - NSDCCPCAREPLAN_GEN_ALL_CORE_FT
PRINCIPAL DISCHARGE DIAGNOSIS  Diagnosis: Systolic CHF, acute  Assessment and Plan of Treatment: You came into the hospital because you had a cough. You were found to have too much fluid in your body. You were started on water pills to remove the fluid from your body. An ultrasound of your heart was done which showed decreased pumping of your heart, which is the most likely cause of these symptoms. Your symptoms improved with removing the extra fluid from your body. Please follow up with Cardiology Dr. Hairston 1-2 weeks after discharge ([phone]). Please follow up with your PCP 1-2 weeks after discharge. Please start taking lasix 20mg oral daily. Please come back to the emergency room if you have worsening cough, shortness of breath, fever, dizziness, lightheadedness      SECONDARY DISCHARGE DIAGNOSES  Diagnosis: CAD (coronary artery disease)  Assessment and Plan of Treatment:     Diagnosis: Pleural effusion  Assessment and Plan of Treatment:

## 2021-02-08 NOTE — PROGRESS NOTE ADULT - SUBJECTIVE AND OBJECTIVE BOX
Bennie Hwang MD  Interventional Cardiology / Advance Heart Failure and Cardiac Transplant Specialist  [place] Office : [address] [place] [address] [place]. [address]  Tel:   [place] Office : [address] [facility] [place] [address]  Tel: [phone]  Cell : [phone]    Subjective/Overnight events: Pt is lying in bed comfortable not in distress, no chest pains no SOB no palpitations  	  MEDICATIONS:  enoxaparin Injectable 40 milliGRAM(s) SubCutaneous daily  furosemide   Injectable 40 milliGRAM(s) IV Push every 12 hours  losartan 25 milliGRAM(s) Oral daily  metoprolol succinate ER 50 milliGRAM(s) Oral daily  tamsulosin 0.4 milliGRAM(s) Oral at bedtime        acetaminophen   Tablet .. 650 milliGRAM(s) Oral every 6 hours PRN  carbidopa/levodopa  25/100 1 Tablet(s) Oral three times a day  melatonin 6 milliGRAM(s) Oral at bedtime  QUEtiapine 50 milliGRAM(s) Oral at bedtime    pantoprazole    Tablet 40 milliGRAM(s) Oral before breakfast    atorvastatin 10 milliGRAM(s) Oral at bedtime  dextrose 40% Gel 15 Gram(s) Oral once  dextrose 50% Injectable 25 Gram(s) IV Push once  dextrose 50% Injectable 12.5 Gram(s) IV Push once  dextrose 50% Injectable 25 Gram(s) IV Push once  glucagon  Injectable 1 milliGRAM(s) IntraMuscular once  insulin lispro (ADMELOG) corrective regimen sliding scale   SubCutaneous three times a day before meals  insulin lispro (ADMELOG) corrective regimen sliding scale   SubCutaneous at bedtime    dextrose 5%. 1000 milliLiter(s) IV Continuous <Continuous>  dextrose 5%. 1000 milliLiter(s) IV Continuous <Continuous>  magnesium oxide 400 milliGRAM(s) Oral two times a day with meals      PAST MEDICAL/SURGICAL HISTORY  PAST MEDICAL & SURGICAL HISTORY:  BPH (benign prostatic hyperplasia)    CAD (Coronary Artery Disease)  s/p cardiac stent ( &gt; 20 years ago)    HTN (Hypertension)    DM (Diabetes Mellitus)    Hypercholesterolemia    Coronary Stent  x 2 ( 20 years )        SOCIAL HISTORY: Substance Use (street drugs): ( x ) never used  (  ) other:    FAMILY HISTORY:  No pertinent family history in first degree relatives        REVIEW OF SYSTEMS:  CONSTITUTIONAL: No fever, weight loss, or fatigue  EYES: No eye pain, visual disturbances, or discharge  ENMT:  No difficulty hearing, tinnitus, vertigo; No sinus or throat pain  BREASTS: No pain, masses, or nipple discharge  GASTROINTESTINAL: No abdominal or epigastric pain. No nausea, vomiting, or hematemesis; No diarrhea or constipation. No melena or hematochezia.  GENITOURINARY: No dysuria, frequency, hematuria, or incontinence  NEUROLOGICAL: No headaches, memory loss, loss of strength, numbness, or tremors  ENDOCRINE: No heat or cold intolerance; No hair loss  MUSCULOSKELETAL: No joint pain or swelling; No muscle, back, or extremity pain  PSYCHIATRIC: No depression, anxiety, mood swings, or difficulty sleeping  HEME/LYMPH: No easy bruising, or bleeding gums  All others negative    PHYSICAL EXAM:  T(C): 36.5 (02-08-21 @ 05:09), Max: 36.5 (02-08-21 @ 05:09)  HR: 62 (02-08-21 @ 05:09) (62 - 98)  BP: 128/42 (02-08-21 @ 05:09) (115/46 - 128/42)  RR: 18 (02-08-21 @ 05:09) (17 - 18)  SpO2: 96% (02-08-21 @ 05:09) (96% - 100%)  Wt(kg): --  I&O's Summary    07 Feb 2021 07:01  -  08 Feb 2021 07:00  --------------------------------------------------------  IN: 570 mL / OUT: 1700 mL / NET: -1130 mL          GENERAL: NAD  EYES: EOMI, PERRLA, conjunctiva and sclera clear  ENMT: No tonsillar erythema, exudates, or enlargement  Cardiovascular: Normal S1 S2, No JVD, No murmurs, No edema  Respiratory: Lungs clear to auscultation	  Gastrointestinal:  Soft, Non-tender, + BS	  Extremities: No edema  NERVOUS SYSTEM:  Alert & Oriented X3                                    9.7    8.44  )-----------( 260      ( 07 Feb 2021 07:22 )             30.0     02-08    140  |  96<L>  |  29<H>  ----------------------------<  119<H>  3.5   |  34<H>  |  0.98    Ca    9.1      08 Feb 2021 04:49  Phos  3.1     02-08  Mg     1.5     02-08      proBNP:   Lipid Profile:   HgA1c:   TSH:     Consultant(s) Notes Reviewed:  [x ] YES  [ ] NO    Care Discussed with Consultants/Other Providers [ x] YES  [ ] NO    Imaging Personally Reviewed independently:  [x] YES  [ ] NO    All labs, radiologic studies, vitals, orders and medications list reviewed. Patient is seen and examined at bedside. Case discussed with medical team.

## 2021-02-08 NOTE — DISCHARGE NOTE PROVIDER - HOSPITAL COURSE
Patient should follow up with Cardiology Dr. Bennie Hairston 1-2 weeks after discharge ([phone])   HPI:  79 y/o M HTN, DM, dementia (AOx2 at baseline), parkinsons disease, CAD s/p stents (>20 years ago) sent to ED for fluid in lungs and gallstones found in outpatient ultrasound. Patient reports having cough for 6-8 months. Worsened in the past week. Patient sleeps with 2 pillows and is worse when he lays flat. Patient has noticed increased lower leg swelling for the past 2 months. According to pt's brother Berto ([phone]), he reports pt was recently seen by doctors on call for a chronic cough pt had for a year. The provider did an ultrasound, and advised the brother to go to the ER for fluid in the lungs and multiple gallstones. Pt only c/o cough, has no other complaints. Denies fever, chills, SOB, CP, N/V/D    In the ED vitals were WNL. CXR: Left pleural effusion with pulmonary infiltrates consistent with pulmonary edema. Patient was admitted for new onset acute heart failure. Patient was started on lasix 40 IV BID with great urine output. TTE: EF: 50%, Moderate diastolic dysfunction (Stage II), moderate pulmonary hypertension, Small pericardial effusion, Left pleural effusion. Patient was transitioned to oral lasix as paient became more euvolemic. Pleural effusion persisted although volume status improved. Pulmonary was consulted and recommended thoracentesis. Risks/Benefits of the procedure was discussed with Brother - Berto, next of kin and he declined this procedure. Stress test was performed given new onset heart failure. Stress test showed small, moderate defects in apical, distal lateral walls that are predominantly reversible, suggestive of ischemia. Risks/benefits of cardiac catheterization was discussed with brother and was declined. Patient's plavix was resumed since no interventions are planned. Patient's symptoms improved with diuresis. Patient should follow up with Cardiology Dr. Hairston 1-2 weeks after discharge ([phone]). Physical therapy was consulted and recommended home with home PT. Please follow up with PCP: Maira Villalobos 1-2 weeks after discharge. Please continue taking all your medications as directed. Please start taking lasix 20mg oral daily. Patient is hemodynamically stable and ready for discharge to home.

## 2021-02-08 NOTE — PROGRESS NOTE ADULT - ASSESSMENT
81 y/o M HTN, DM, dementia (AOx2 at baseline), parkinsons disease, CAD s/p stents (>20 years ago) p/w acute systolic CHF exacerbation with new L pleural effusion.

## 2021-02-08 NOTE — PROGRESS NOTE ADULT - PROBLEM SELECTOR PLAN 1
--TTE: EF: 50%, Moderate diastolic dysfunction (Stage II), moderate pulmonary hypertension, Small pericardial effusion, Left pleural effusion.  --proBNP 2800  --CXR: Left pleural effusion and large airspace opacity in the left lung. Mild patchy airspace opacities in the right lung.   ======PLAN======  --c/w Lasix 40 IV BID  - BMP,mg, phos BID while being aggressively diuresed, patient is lasix naiive   --cardiology following ---- consider stress test once euvolemic   --Fluid restriction 1.5L, strict I&Os, Lytes BID

## 2021-02-08 NOTE — DISCHARGE NOTE PROVIDER - NSDCMRMEDTOKEN_GEN_ALL_CORE_FT
atorvastatin 10 mg oral tablet: 1 tab(s) orally once a day  carbidopa-levodopa 25 mg-100 mg oral tablet: 1 tab(s) orally 3 times a day  clopidogrel 75 mg oral tablet: 1 tab(s) orally once a day  docusate sodium 100 mg oral tablet: 1 tab(s) orally 2 times a day  Jardiance 10 mg oral tablet: 1 tab(s) orally once a day (in the morning)  losartan 25 mg oral tablet: 1 tab(s) orally once a day  melatonin 3 mg oral tablet: 2 tab(s) orally once a day (at bedtime)  metFORMIN 1000 mg oral tablet: 1 tab(s) orally 2 times a day  metoprolol succinate 50 mg oral tablet, extended release: 1 tab(s) orally once a day  pantoprazole 40 mg oral delayed release tablet: 1 tab(s) orally 2 times a day  SEROquel 50 mg oral tablet: 1 tab(s) orally once a day (at bedtime)  tamsulosin 0.4 mg oral capsule: 1 cap(s) orally once a day (at bedtime)   atorvastatin 10 mg oral tablet: 1 tab(s) orally once a day  carbidopa-levodopa 25 mg-100 mg oral tablet: 1 tab(s) orally 3 times a day  clopidogrel 75 mg oral tablet: 1 tab(s) orally once a day  docusate sodium 100 mg oral tablet: 1 tab(s) orally 2 times a day  furosemide 20 mg oral tablet: 1 tab(s) orally once a day  Jardiance 10 mg oral tablet: 1 tab(s) orally once a day (in the morning)  losartan 25 mg oral tablet: 1 tab(s) orally once a day  melatonin 3 mg oral tablet: 2 tab(s) orally once a day (at bedtime)  metFORMIN 1000 mg oral tablet: 1 tab(s) orally 2 times a day  metoprolol succinate 50 mg oral tablet, extended release: 1 tab(s) orally once a day  pantoprazole 40 mg oral delayed release tablet: 1 tab(s) orally 2 times a day  SEROquel 50 mg oral tablet: 1 tab(s) orally once a day (at bedtime)  tamsulosin 0.4 mg oral capsule: 1 cap(s) orally once a day (at bedtime)

## 2021-02-08 NOTE — DISCHARGE NOTE PROVIDER - NSDCFUADDAPPT_GEN_ALL_CORE_FT
Patient should follow up with Cardiology Dr. Bennie Hairston 1-2 weeks after discharge ([phone])   Patient should follow up with Cardiology Dr. Bennie Hairston 1-2 weeks after discharge ([phone])  Please follow up with your PCP Dr. Maira Villalobos 1-2 weeks after discharge

## 2021-02-09 LAB
ANION GAP SERPL CALC-SCNC: 10 MMOL/L — SIGNIFICANT CHANGE UP (ref 7–14)
BUN SERPL-MCNC: 35 MG/DL — HIGH (ref 7–23)
CALCIUM SERPL-MCNC: 9.4 MG/DL — SIGNIFICANT CHANGE UP (ref 8.4–10.5)
CHLORIDE SERPL-SCNC: 95 MMOL/L — LOW (ref 98–107)
CO2 SERPL-SCNC: 32 MMOL/L — HIGH (ref 22–31)
CREAT SERPL-MCNC: 0.73 MG/DL — SIGNIFICANT CHANGE UP (ref 0.5–1.3)
GLUCOSE SERPL-MCNC: 91 MG/DL — SIGNIFICANT CHANGE UP (ref 70–99)
HCT VFR BLD CALC: 35.9 % — LOW (ref 39–50)
HGB BLD-MCNC: 11.1 G/DL — LOW (ref 13–17)
MAGNESIUM SERPL-MCNC: 1.8 MG/DL — SIGNIFICANT CHANGE UP (ref 1.6–2.6)
MCHC RBC-ENTMCNC: 29.2 PG — SIGNIFICANT CHANGE UP (ref 27–34)
MCHC RBC-ENTMCNC: 30.9 GM/DL — LOW (ref 32–36)
MCV RBC AUTO: 94.5 FL — SIGNIFICANT CHANGE UP (ref 80–100)
NRBC # BLD: 0 /100 WBCS — SIGNIFICANT CHANGE UP
NRBC # FLD: 0 K/UL — SIGNIFICANT CHANGE UP
PHOSPHATE SERPL-MCNC: 3.5 MG/DL — SIGNIFICANT CHANGE UP (ref 2.5–4.5)
PLATELET # BLD AUTO: 327 K/UL — SIGNIFICANT CHANGE UP (ref 150–400)
POTASSIUM SERPL-MCNC: 3.5 MMOL/L — SIGNIFICANT CHANGE UP (ref 3.5–5.3)
POTASSIUM SERPL-SCNC: 3.5 MMOL/L — SIGNIFICANT CHANGE UP (ref 3.5–5.3)
RBC # BLD: 3.8 M/UL — LOW (ref 4.2–5.8)
RBC # FLD: 13.4 % — SIGNIFICANT CHANGE UP (ref 10.3–14.5)
SODIUM SERPL-SCNC: 137 MMOL/L — SIGNIFICANT CHANGE UP (ref 135–145)
WBC # BLD: 10.01 K/UL — SIGNIFICANT CHANGE UP (ref 3.8–10.5)
WBC # FLD AUTO: 10.01 K/UL — SIGNIFICANT CHANGE UP (ref 3.8–10.5)

## 2021-02-09 PROCEDURE — 99233 SBSQ HOSP IP/OBS HIGH 50: CPT | Mod: GC

## 2021-02-09 RX ORDER — POTASSIUM CHLORIDE 20 MEQ
40 PACKET (EA) ORAL ONCE
Refills: 0 | Status: COMPLETED | OUTPATIENT
Start: 2021-02-09 | End: 2021-02-09

## 2021-02-09 RX ORDER — POTASSIUM CHLORIDE 20 MEQ
20 PACKET (EA) ORAL
Refills: 0 | Status: COMPLETED | OUTPATIENT
Start: 2021-02-09 | End: 2021-02-09

## 2021-02-09 RX ORDER — MAGNESIUM SULFATE 500 MG/ML
1 VIAL (ML) INJECTION ONCE
Refills: 0 | Status: COMPLETED | OUTPATIENT
Start: 2021-02-09 | End: 2021-02-09

## 2021-02-09 RX ADMIN — Medication 100 GRAM(S): at 18:02

## 2021-02-09 RX ADMIN — ENOXAPARIN SODIUM 40 MILLIGRAM(S): 100 INJECTION SUBCUTANEOUS at 11:56

## 2021-02-09 RX ADMIN — CARBIDOPA AND LEVODOPA 1 TABLET(S): 25; 100 TABLET ORAL at 20:37

## 2021-02-09 RX ADMIN — Medication 40 MILLIGRAM(S): at 05:40

## 2021-02-09 RX ADMIN — CARBIDOPA AND LEVODOPA 1 TABLET(S): 25; 100 TABLET ORAL at 05:40

## 2021-02-09 RX ADMIN — TAMSULOSIN HYDROCHLORIDE 0.4 MILLIGRAM(S): 0.4 CAPSULE ORAL at 20:37

## 2021-02-09 RX ADMIN — Medication 50 MILLIGRAM(S): at 05:40

## 2021-02-09 RX ADMIN — Medication 1: at 12:38

## 2021-02-09 RX ADMIN — MAGNESIUM OXIDE 400 MG ORAL TABLET 400 MILLIGRAM(S): 241.3 TABLET ORAL at 11:56

## 2021-02-09 RX ADMIN — Medication 20 MILLIEQUIVALENT(S): at 20:37

## 2021-02-09 RX ADMIN — Medication 40 MILLIEQUIVALENT(S): at 11:56

## 2021-02-09 RX ADMIN — Medication 6 MILLIGRAM(S): at 20:37

## 2021-02-09 RX ADMIN — Medication 20 MILLIEQUIVALENT(S): at 18:02

## 2021-02-09 RX ADMIN — CARBIDOPA AND LEVODOPA 1 TABLET(S): 25; 100 TABLET ORAL at 12:38

## 2021-02-09 RX ADMIN — PANTOPRAZOLE SODIUM 40 MILLIGRAM(S): 20 TABLET, DELAYED RELEASE ORAL at 05:40

## 2021-02-09 RX ADMIN — ATORVASTATIN CALCIUM 10 MILLIGRAM(S): 80 TABLET, FILM COATED ORAL at 20:37

## 2021-02-09 RX ADMIN — LOSARTAN POTASSIUM 25 MILLIGRAM(S): 100 TABLET, FILM COATED ORAL at 05:41

## 2021-02-09 RX ADMIN — Medication 2: at 17:25

## 2021-02-09 RX ADMIN — QUETIAPINE FUMARATE 50 MILLIGRAM(S): 200 TABLET, FILM COATED ORAL at 20:37

## 2021-02-09 NOTE — PROGRESS NOTE ADULT - SUBJECTIVE AND OBJECTIVE BOX
Bennie Hwang MD  Interventional Cardiology / Advance Heart Failure and Cardiac Transplant Specialist  [place] Office : [address] [place] [address] [place]. [address]  Tel:   [place] Office : [address] [facility] [place] [address]  Tel: [phone]  Cell : [phone]    Subjective/Overnight events: Pt is lying in bed comfortable not in distress, no chest pains no SOB no palpitations  	  MEDICATIONS:  enoxaparin Injectable 40 milliGRAM(s) SubCutaneous daily  furosemide    Tablet 40 milliGRAM(s) Oral daily  losartan 25 milliGRAM(s) Oral daily  metoprolol succinate ER 50 milliGRAM(s) Oral daily  tamsulosin 0.4 milliGRAM(s) Oral at bedtime        acetaminophen   Tablet .. 650 milliGRAM(s) Oral every 6 hours PRN  carbidopa/levodopa  25/100 1 Tablet(s) Oral three times a day  melatonin 6 milliGRAM(s) Oral at bedtime  QUEtiapine 50 milliGRAM(s) Oral at bedtime    pantoprazole    Tablet 40 milliGRAM(s) Oral before breakfast    atorvastatin 10 milliGRAM(s) Oral at bedtime  dextrose 40% Gel 15 Gram(s) Oral once  dextrose 50% Injectable 25 Gram(s) IV Push once  dextrose 50% Injectable 12.5 Gram(s) IV Push once  dextrose 50% Injectable 25 Gram(s) IV Push once  glucagon  Injectable 1 milliGRAM(s) IntraMuscular once  insulin lispro (ADMELOG) corrective regimen sliding scale   SubCutaneous three times a day before meals  insulin lispro (ADMELOG) corrective regimen sliding scale   SubCutaneous at bedtime    dextrose 5%. 1000 milliLiter(s) IV Continuous <Continuous>  dextrose 5%. 1000 milliLiter(s) IV Continuous <Continuous>      PAST MEDICAL/SURGICAL HISTORY  PAST MEDICAL & SURGICAL HISTORY:  BPH (benign prostatic hyperplasia)    CAD (Coronary Artery Disease)  s/p cardiac stent ( &gt; 20 years ago)    HTN (Hypertension)    DM (Diabetes Mellitus)    Hypercholesterolemia    Coronary Stent  x 2 ( 20 years )        SOCIAL HISTORY: Substance Use (street drugs): ( x ) never used  (  ) other:    FAMILY HISTORY:  No pertinent family history in first degree relatives        REVIEW OF SYSTEMS:  CONSTITUTIONAL: No fever, weight loss, or fatigue  EYES: No eye pain, visual disturbances, or discharge  ENMT:  No difficulty hearing, tinnitus, vertigo; No sinus or throat pain  BREASTS: No pain, masses, or nipple discharge  GASTROINTESTINAL: No abdominal or epigastric pain. No nausea, vomiting, or hematemesis; No diarrhea or constipation. No melena or hematochezia.  GENITOURINARY: No dysuria, frequency, hematuria, or incontinence  NEUROLOGICAL: No headaches, memory loss, loss of strength, numbness, or tremors  ENDOCRINE: No heat or cold intolerance; No hair loss  MUSCULOSKELETAL: No joint pain or swelling; No muscle, back, or extremity pain  PSYCHIATRIC: No depression, anxiety, mood swings, or difficulty sleeping  HEME/LYMPH: No easy bruising, or bleeding gums  All others negative    PHYSICAL EXAM:  T(C): 36.6 (02-09-21 @ 12:34), Max: 36.6 (02-09-21 @ 12:34)  HR: 65 (02-09-21 @ 12:34) (63 - 94)  BP: 100/46 (02-09-21 @ 12:34) (100/46 - 140/60)  RR: 18 (02-09-21 @ 12:34) (18 - 18)  SpO2: 95% (02-09-21 @ 12:34) (95% - 100%)  Wt(kg): --  I&O's Summary    08 Feb 2021 07:01  -  09 Feb 2021 07:00  --------------------------------------------------------  IN: 330 mL / OUT: 1400 mL / NET: -1070 mL            GENERAL: NAD  EYES: EOMI, PERRLA, conjunctiva and sclera clear  ENMT: No tonsillar erythema, exudates, or enlargement  Cardiovascular: Normal S1 S2, No JVD, No murmurs, No edema  Respiratory: Lungs clear to auscultation	  Gastrointestinal:  Soft, Non-tender, + BS	  Extremities: No edema  NERVOUS SYSTEM:  Alert & Oriented X3                                    11.1   10.01 )-----------( 327      ( 09 Feb 2021 07:56 )             35.9     02-09    137  |  95<L>  |  35<H>  ----------------------------<  91  3.5   |  32<H>  |  x     Ca    9.4      09 Feb 2021 07:56  Phos  3.5     02-09  Mg     1.5     02-08      proBNP:   Lipid Profile:   HgA1c:   TSH:     Consultant(s) Notes Reviewed:  [x ] YES  [ ] NO    Care Discussed with Consultants/Other Providers [ x] YES  [ ] NO    Imaging Personally Reviewed independently:  [x] YES  [ ] NO    All labs, radiologic studies, vitals, orders and medications list reviewed. Patient is seen and examined at bedside. Case discussed with medical team.

## 2021-02-09 NOTE — PROGRESS NOTE ADULT - SUBJECTIVE AND OBJECTIVE BOX
Saud Gardner MD  PGY 1 Department of Internal Medicine  Pager: [phone] ([facility])   Pager: [phone] ([facility])  Also available on Microsoft Teams      Patient is a 81y old  Male who presents with a chief complaint of Cough (08 Feb 2021 11:39)      SUBJECTIVE / OVERNIGHT EVENTS: No acute overnight events. Pt seen and examined. Denies fevers, chills, CP, SOB, Abdominal pain, N/V, Constipation, Diarrhea    MEDICATIONS  (STANDING):  atorvastatin 10 milliGRAM(s) Oral at bedtime  carbidopa/levodopa  25/100 1 Tablet(s) Oral three times a day  dextrose 40% Gel 15 Gram(s) Oral once  dextrose 5%. 1000 milliLiter(s) (50 mL/Hr) IV Continuous <Continuous>  dextrose 5%. 1000 milliLiter(s) (100 mL/Hr) IV Continuous <Continuous>  dextrose 50% Injectable 25 Gram(s) IV Push once  dextrose 50% Injectable 12.5 Gram(s) IV Push once  dextrose 50% Injectable 25 Gram(s) IV Push once  enoxaparin Injectable 40 milliGRAM(s) SubCutaneous daily  furosemide    Tablet 40 milliGRAM(s) Oral daily  glucagon  Injectable 1 milliGRAM(s) IntraMuscular once  insulin lispro (ADMELOG) corrective regimen sliding scale   SubCutaneous three times a day before meals  insulin lispro (ADMELOG) corrective regimen sliding scale   SubCutaneous at bedtime  losartan 25 milliGRAM(s) Oral daily  melatonin 6 milliGRAM(s) Oral at bedtime  metoprolol succinate ER 50 milliGRAM(s) Oral daily  pantoprazole    Tablet 40 milliGRAM(s) Oral before breakfast  QUEtiapine 50 milliGRAM(s) Oral at bedtime  tamsulosin 0.4 milliGRAM(s) Oral at bedtime    MEDICATIONS  (PRN):  acetaminophen   Tablet .. 650 milliGRAM(s) Oral every 6 hours PRN Temp greater or equal to 38C (100.4F)      I&O's Summary    08 Feb 2021 07:01  -  09 Feb 2021 07:00  --------------------------------------------------------  IN: 330 mL / OUT: 1400 mL / NET: -1070 mL        Vital Signs Last 24 Hrs  T(C): 36.6 (09 Feb 2021 12:34), Max: 36.6 (09 Feb 2021 12:34)  T(F): 97.9 (09 Feb 2021 12:34), Max: 97.9 (09 Feb 2021 12:34)  HR: 65 (09 Feb 2021 12:34) (63 - 94)  BP: 100/46 (09 Feb 2021 12:34) (100/46 - 140/60)  BP(mean): --  RR: 18 (09 Feb 2021 12:34) (18 - 18)  SpO2: 95% (09 Feb 2021 12:34) (95% - 100%)    =================PHYSICAL EXAM=================    GENERAL: Laying comfortably, NAD  EYES: EOMI, PERRL, no scleral icterus  NECK: No JVD  LUNG: Clear to auscultation bilaterally; No wheeze, crackles or rhonci  HEART: Regular rate and rhythm; No murmurs, rubs, or gallops  ABDOMEN: Soft, Nontender, Nondistended  EXTREMITIES:  No LE edema, 2+ Peripheral Pulses, No clubbing, cyanosis, or edema  PSYCH: AAOx2  NEUROLOGY: non-focal, strength 5/5 in all extremities, sensation intact  SKIN: No rashes or lesions    =================================================    LABS:                        11.1   10.01 )-----------( 327      ( 09 Feb 2021 07:56 )             35.9     Auto Eosinophil # x     / Auto Eosinophil % x     / Auto Neutrophil # x     / Auto Neutrophil % x     / BANDS % x        02-09    137  |  95<L>  |  35<H>  ----------------------------<  91  3.5   |  32<H>  |  x   02-08    140  |  96<L>  |  29<H>  ----------------------------<  119<H>  3.5   |  34<H>  |  0.98    Ca    9.4      09 Feb 2021 07:56  Mg     1.5     02-08  Phos  3.5     02-09                  RADIOLOGY & ADDITIONAL TESTS:    Imaging Personally Reviewed:    Consultant(s) Notes Reviewed:      Care Discussed with Consultants/Other Providers:

## 2021-02-09 NOTE — PROGRESS NOTE ADULT - PROBLEM SELECTOR PLAN 1
--TTE: EF: 50%, Moderate diastolic dysfunction (Stage II), moderate pulmonary hypertension, Small pericardial effusion, Left pleural effusion.  --proBNP 2800  --CXR: Left pleural effusion and large airspace opacity in the left lung. Mild patchy airspace opacities in the right lung.   ======PLAN======  --c/w Lasix 40 oral daily  - BMP,mg, phos BID while being aggressively diuresed, patient is lasix naiive   --f/u stress test (brother consented)  --f/u cards recs  --Fluid restriction 1.5L, strict I&Os, Lytes BID

## 2021-02-09 NOTE — PROGRESS NOTE ADULT - ASSESSMENT
Assessment and Plan    79 y/o M HTN, DM, dementia (AOx2 at baseline), parkinsons disease, CAD s/p stents (>20 years ago) sent to ED for fluid in lungs and gallstones found in outpatient ultrasound    EKG: NSR 1st degree AVB, PVCs  tele: SB/SR 50-60s. Frequent PVCs.     1. CHF  -echo with mild LV systolic dysfunction (new compared to 2018)  -CXR with patchy airspace opacities   -proBNP elevated  -c/w with 40mg PO lasix daily. continue to monitor I&Os  - will do stress test after thoracentesis   -having frequent PVCs, keep Mg >2 and K>4      2. CAD s/p stent  -cath in 2010 showed mild luminal disease and patent stents  -denies chest pain  -echo with new mild LV dysfunction  -plavix held for possible thoracentesis. has left sided loculated pleural effusion. f/u pulm    3. HTN  -controlled  -on toprol and losartan  -continue to monitor BP

## 2021-02-10 LAB
ANION GAP SERPL CALC-SCNC: 9 MMOL/L — SIGNIFICANT CHANGE UP (ref 7–14)
BUN SERPL-MCNC: 44 MG/DL — HIGH (ref 7–23)
CALCIUM SERPL-MCNC: 9.4 MG/DL — SIGNIFICANT CHANGE UP (ref 8.4–10.5)
CHLORIDE SERPL-SCNC: 100 MMOL/L — SIGNIFICANT CHANGE UP (ref 98–107)
CO2 SERPL-SCNC: 31 MMOL/L — SIGNIFICANT CHANGE UP (ref 22–31)
CREAT SERPL-MCNC: 0.97 MG/DL — SIGNIFICANT CHANGE UP (ref 0.5–1.3)
GLUCOSE SERPL-MCNC: 95 MG/DL — SIGNIFICANT CHANGE UP (ref 70–99)
HCT VFR BLD CALC: 32.8 % — LOW (ref 39–50)
HGB BLD-MCNC: 10.4 G/DL — LOW (ref 13–17)
MAGNESIUM SERPL-MCNC: 2.2 MG/DL — SIGNIFICANT CHANGE UP (ref 1.6–2.6)
MCHC RBC-ENTMCNC: 29.2 PG — SIGNIFICANT CHANGE UP (ref 27–34)
MCHC RBC-ENTMCNC: 31.7 GM/DL — LOW (ref 32–36)
MCV RBC AUTO: 92.1 FL — SIGNIFICANT CHANGE UP (ref 80–100)
NRBC # BLD: 0 /100 WBCS — SIGNIFICANT CHANGE UP
NRBC # FLD: 0 K/UL — SIGNIFICANT CHANGE UP
PHOSPHATE SERPL-MCNC: 3.4 MG/DL — SIGNIFICANT CHANGE UP (ref 2.5–4.5)
PLATELET # BLD AUTO: 345 K/UL — SIGNIFICANT CHANGE UP (ref 150–400)
POTASSIUM SERPL-MCNC: 4.5 MMOL/L — SIGNIFICANT CHANGE UP (ref 3.5–5.3)
POTASSIUM SERPL-SCNC: 4.5 MMOL/L — SIGNIFICANT CHANGE UP (ref 3.5–5.3)
RBC # BLD: 3.56 M/UL — LOW (ref 4.2–5.8)
RBC # FLD: 13.5 % — SIGNIFICANT CHANGE UP (ref 10.3–14.5)
SODIUM SERPL-SCNC: 140 MMOL/L — SIGNIFICANT CHANGE UP (ref 135–145)
WBC # BLD: 8.74 K/UL — SIGNIFICANT CHANGE UP (ref 3.8–10.5)
WBC # FLD AUTO: 8.74 K/UL — SIGNIFICANT CHANGE UP (ref 3.8–10.5)

## 2021-02-10 PROCEDURE — 93016 CV STRESS TEST SUPVJ ONLY: CPT | Mod: GC

## 2021-02-10 PROCEDURE — 78452 HT MUSCLE IMAGE SPECT MULT: CPT | Mod: 26

## 2021-02-10 PROCEDURE — 93018 CV STRESS TEST I&R ONLY: CPT | Mod: GC

## 2021-02-10 PROCEDURE — 99233 SBSQ HOSP IP/OBS HIGH 50: CPT | Mod: GC

## 2021-02-10 RX ORDER — CLOPIDOGREL BISULFATE 75 MG/1
75 TABLET, FILM COATED ORAL DAILY
Refills: 0 | Status: DISCONTINUED | OUTPATIENT
Start: 2021-02-10 | End: 2021-02-12

## 2021-02-10 RX ADMIN — CARBIDOPA AND LEVODOPA 1 TABLET(S): 25; 100 TABLET ORAL at 12:42

## 2021-02-10 RX ADMIN — CARBIDOPA AND LEVODOPA 1 TABLET(S): 25; 100 TABLET ORAL at 21:22

## 2021-02-10 RX ADMIN — PANTOPRAZOLE SODIUM 40 MILLIGRAM(S): 20 TABLET, DELAYED RELEASE ORAL at 04:36

## 2021-02-10 RX ADMIN — CARBIDOPA AND LEVODOPA 1 TABLET(S): 25; 100 TABLET ORAL at 04:36

## 2021-02-10 RX ADMIN — Medication 40 MILLIGRAM(S): at 04:36

## 2021-02-10 RX ADMIN — QUETIAPINE FUMARATE 50 MILLIGRAM(S): 200 TABLET, FILM COATED ORAL at 21:22

## 2021-02-10 RX ADMIN — Medication 1: at 17:08

## 2021-02-10 RX ADMIN — ENOXAPARIN SODIUM 40 MILLIGRAM(S): 100 INJECTION SUBCUTANEOUS at 12:42

## 2021-02-10 RX ADMIN — LOSARTAN POTASSIUM 25 MILLIGRAM(S): 100 TABLET, FILM COATED ORAL at 04:37

## 2021-02-10 RX ADMIN — Medication 6 MILLIGRAM(S): at 21:22

## 2021-02-10 RX ADMIN — Medication 50 MILLIGRAM(S): at 04:36

## 2021-02-10 RX ADMIN — CLOPIDOGREL BISULFATE 75 MILLIGRAM(S): 75 TABLET, FILM COATED ORAL at 12:45

## 2021-02-10 RX ADMIN — TAMSULOSIN HYDROCHLORIDE 0.4 MILLIGRAM(S): 0.4 CAPSULE ORAL at 21:22

## 2021-02-10 RX ADMIN — ATORVASTATIN CALCIUM 10 MILLIGRAM(S): 80 TABLET, FILM COATED ORAL at 21:22

## 2021-02-10 NOTE — PROGRESS NOTE ADULT - PROBLEM SELECTOR PLAN 2
Unilateral. Diff: malignancy vs. HF   --s/p pulm consult---- thoracentesis warranted given the unilateral presentation, brother refused  --plavix on hold incase plans change

## 2021-02-10 NOTE — CHART NOTE - NSCHARTNOTEFT_GEN_A_CORE
Discussed with primary team, patient's HCP refusing thoracentesis. Pulmonary will sign off, please re-consult if HCP's wishes change.    David Mcdonnell, PGY-5  [facility]  [id number]
Goals of Care discussed at length with patient's Brother, Next of kin, Berto Mitchell [phone]. This conversation included current condition, prognosis, and options for therapy. Discussed what his wishes would be if his heart were to stop beating or his respiratory status worsened. As of right now patient's brother does NOT want chest compressions to be performed if his heart were to stop beating. Patients brother does want a trial of intubation if his respiratory status worsened. Patien is DNR with a trial of intubation. MOLST Form in the chart. Length of Conversation ~30 minutes.

## 2021-02-10 NOTE — PROGRESS NOTE ADULT - ASSESSMENT
Assessment and Plan    81 y/o M HTN, DM, dementia (AOx2 at baseline), parkinsons disease, CAD s/p stents (>20 years ago) sent to ED for fluid in lungs and gallstones found in outpatient ultrasound    EKG: NSR 1st degree AVB, PVCs  tele: SB/SR 50-60s. Frequent PVCs.     1. CHF  -echo with mild LV systolic dysfunction (new compared to 2018)  -CXR with patchy airspace opacities   -proBNP elevated  -c/w with 40mg PO lasix daily. continue to monitor I&Os  -stress pending  -having frequent PVCs, keep Mg >2 and K>4      2. CAD s/p stent  -cath in 2010 showed mild luminal disease and patent stents  -denies chest pain  -echo with new mild LV dysfunction  -c/w plavix     3. HTN  -controlled  -on toprol and losartan  -continue to monitor BP

## 2021-02-10 NOTE — PROGRESS NOTE ADULT - SUBJECTIVE AND OBJECTIVE BOX
Saud Gardner MD  PGY 1 Department of Internal Medicine  Pager: [phone] ([facility])   Pager: [phone] ([facility])  Also available on Microsoft Teams      Patient is a 81y old  Male who presents with a chief complaint of Cough (09 Feb 2021 14:44)      SUBJECTIVE / OVERNIGHT EVENTS: No acute overnight events. Pt seen and examined. Denies fevers, chills, CP, SOB, Abdominal pain, N/V, Constipation, Diarrhea    MEDICATIONS  (STANDING):  atorvastatin 10 milliGRAM(s) Oral at bedtime  carbidopa/levodopa  25/100 1 Tablet(s) Oral three times a day  dextrose 40% Gel 15 Gram(s) Oral once  dextrose 5%. 1000 milliLiter(s) (50 mL/Hr) IV Continuous <Continuous>  dextrose 5%. 1000 milliLiter(s) (100 mL/Hr) IV Continuous <Continuous>  dextrose 50% Injectable 25 Gram(s) IV Push once  dextrose 50% Injectable 12.5 Gram(s) IV Push once  dextrose 50% Injectable 25 Gram(s) IV Push once  enoxaparin Injectable 40 milliGRAM(s) SubCutaneous daily  furosemide    Tablet 40 milliGRAM(s) Oral daily  glucagon  Injectable 1 milliGRAM(s) IntraMuscular once  insulin lispro (ADMELOG) corrective regimen sliding scale   SubCutaneous three times a day before meals  insulin lispro (ADMELOG) corrective regimen sliding scale   SubCutaneous at bedtime  losartan 25 milliGRAM(s) Oral daily  melatonin 6 milliGRAM(s) Oral at bedtime  metoprolol succinate ER 50 milliGRAM(s) Oral daily  pantoprazole    Tablet 40 milliGRAM(s) Oral before breakfast  QUEtiapine 50 milliGRAM(s) Oral at bedtime  tamsulosin 0.4 milliGRAM(s) Oral at bedtime    MEDICATIONS  (PRN):  acetaminophen   Tablet .. 650 milliGRAM(s) Oral every 6 hours PRN Temp greater or equal to 38C (100.4F)      I&O's Summary    09 Feb 2021 07:01  -  10 Feb 2021 07:00  --------------------------------------------------------  IN: 810 mL / OUT: 725 mL / NET: 85 mL        Vital Signs Last 24 Hrs  T(C): 36.3 (10 Feb 2021 04:34), Max: 36.6 (09 Feb 2021 12:34)  T(F): 97.4 (10 Feb 2021 04:34), Max: 97.9 (09 Feb 2021 12:34)  HR: 68 (10 Feb 2021 04:34) (65 - 68)  BP: 117/60 (10 Feb 2021 04:34) (100/46 - 121/50)  BP(mean): --  RR: 17 (10 Feb 2021 04:34) (17 - 18)  SpO2: 96% (10 Feb 2021 04:34) (95% - 97%)    =================PHYSICAL EXAM=================    GENERAL: Laying comfortably, NAD  EYES: EOMI, PERRL, no scleral icterus  NECK: No JVD  LUNG: Clear to auscultation bilaterally; No wheeze, crackles or rhonci  HEART: Regular rate and rhythm; No murmurs, rubs, or gallops  ABDOMEN: Soft, Nontender, Nondistended  EXTREMITIES:  No LE edema, 2+ Peripheral Pulses, No clubbing, cyanosis, or edema  PSYCH: AAOx2  NEUROLOGY: non-focal, strength 5/5 in all extremities, sensation intact  SKIN: No rashes or lesions    =================================================    LABS:                        10.4   8.74  )-----------( 345      ( 10 Feb 2021 07:44 )             32.8     Auto Eosinophil # x     / Auto Eosinophil % x     / Auto Neutrophil # x     / Auto Neutrophil % x     / BANDS % x                            11.1   10.01 )-----------( 327      ( 09 Feb 2021 07:56 )             35.9     Auto Eosinophil # x     / Auto Eosinophil % x     / Auto Neutrophil # x     / Auto Neutrophil % x     / BANDS % x        02-10    140  |  100  |  44<H>  ----------------------------<  95  4.5   |  31  |  0.97  02-09    137  |  95<L>  |  35<H>  ----------------------------<  91  3.5   |  32<H>  |  0.73    Ca    9.4      10 Feb 2021 07:44  Mg     2.2     02-10  Phos  3.4     02-10                  RADIOLOGY & ADDITIONAL TESTS:    Imaging Personally Reviewed:    Consultant(s) Notes Reviewed:      Care Discussed with Consultants/Other Providers:

## 2021-02-10 NOTE — PROGRESS NOTE ADULT - PROBLEM SELECTOR PLAN 1
--TTE: EF: 50%, Moderate diastolic dysfunction (Stage II), moderate pulmonary hypertension, Small pericardial effusion, Left pleural effusion.  --proBNP 2800  --CXR: Left pleural effusion and large airspace opacity in the left lung. Mild patchy airspace opacities in the right lung.   ======PLAN======  --c/w Lasix 40 oral daily  --f/u stress test (brother consented)  --f/u cards recs  --Fluid restriction 1.5L, strict I&Os, Lytes BID

## 2021-02-10 NOTE — PROGRESS NOTE ADULT - SUBJECTIVE AND OBJECTIVE BOX
Bennie Hwang MD  Interventional Cardiology / Advance Heart Failure and Cardiac Transplant Specialist  [place] Office : [address] [place] [address] [place]. [address]  Tel:   [place] Office : [address] [facility] [place] [address]  Tel: [phone]  Cell : [phone]    Subjective/Overnight events: Pt is lying in bed comfortable not in distress, no chest pains no SOB no palpitations  	  MEDICATIONS:  clopidogrel Tablet 75 milliGRAM(s) Oral daily  enoxaparin Injectable 40 milliGRAM(s) SubCutaneous daily  furosemide    Tablet 40 milliGRAM(s) Oral daily  losartan 25 milliGRAM(s) Oral daily  metoprolol succinate ER 50 milliGRAM(s) Oral daily  tamsulosin 0.4 milliGRAM(s) Oral at bedtime        acetaminophen   Tablet .. 650 milliGRAM(s) Oral every 6 hours PRN  carbidopa/levodopa  25/100 1 Tablet(s) Oral three times a day  melatonin 6 milliGRAM(s) Oral at bedtime  QUEtiapine 50 milliGRAM(s) Oral at bedtime    pantoprazole    Tablet 40 milliGRAM(s) Oral before breakfast    atorvastatin 10 milliGRAM(s) Oral at bedtime  dextrose 40% Gel 15 Gram(s) Oral once  dextrose 50% Injectable 25 Gram(s) IV Push once  dextrose 50% Injectable 12.5 Gram(s) IV Push once  dextrose 50% Injectable 25 Gram(s) IV Push once  glucagon  Injectable 1 milliGRAM(s) IntraMuscular once  insulin lispro (ADMELOG) corrective regimen sliding scale   SubCutaneous three times a day before meals  insulin lispro (ADMELOG) corrective regimen sliding scale   SubCutaneous at bedtime    dextrose 5%. 1000 milliLiter(s) IV Continuous <Continuous>  dextrose 5%. 1000 milliLiter(s) IV Continuous <Continuous>      PAST MEDICAL/SURGICAL HISTORY  PAST MEDICAL & SURGICAL HISTORY:  BPH (benign prostatic hyperplasia)    CAD (Coronary Artery Disease)  s/p cardiac stent ( &gt; 20 years ago)    HTN (Hypertension)    DM (Diabetes Mellitus)    Hypercholesterolemia    Coronary Stent  x 2 ( 20 years )        SOCIAL HISTORY: Substance Use (street drugs): ( x ) never used  (  ) other:    FAMILY HISTORY:  No pertinent family history in first degree relatives        REVIEW OF SYSTEMS:  CONSTITUTIONAL: No fever, weight loss, or fatigue  EYES: No eye pain, visual disturbances, or discharge  ENMT:  No difficulty hearing, tinnitus, vertigo; No sinus or throat pain  BREASTS: No pain, masses, or nipple discharge  GASTROINTESTINAL: No abdominal or epigastric pain. No nausea, vomiting, or hematemesis; No diarrhea or constipation. No melena or hematochezia.  GENITOURINARY: No dysuria, frequency, hematuria, or incontinence  NEUROLOGICAL: No headaches, memory loss, loss of strength, numbness, or tremors  ENDOCRINE: No heat or cold intolerance; No hair loss  MUSCULOSKELETAL: No joint pain or swelling; No muscle, back, or extremity pain  PSYCHIATRIC: No depression, anxiety, mood swings, or difficulty sleeping  HEME/LYMPH: No easy bruising, or bleeding gums  All others negative    PHYSICAL EXAM:  T(C): 36.8 (02-10-21 @ 13:56), Max: 36.8 (02-10-21 @ 13:56)  HR: 52 (02-10-21 @ 13:56) (52 - 68)  BP: 115/62 (02-10-21 @ 13:56) (115/62 - 121/50)  RR: 18 (02-10-21 @ 13:56) (17 - 18)  SpO2: 96% (02-10-21 @ 13:56) (96% - 97%)  Wt(kg): --  I&O's Summary    09 Feb 2021 07:01  -  10 Feb 2021 07:00  --------------------------------------------------------  IN: 810 mL / OUT: 725 mL / NET: 85 mL          GENERAL: NAD  EYES: EOMI, PERRLA, conjunctiva and sclera clear  ENMT: No tonsillar erythema, exudates, or enlargement  Cardiovascular: Normal S1 S2, No JVD, No murmurs, No edema  Respiratory: Lungs clear to auscultation	  Gastrointestinal:  Soft, Non-tender, + BS	  Extremities: No edema  NERVOUS SYSTEM:  Alert & Oriented X3                                    10.4   8.74  )-----------( 345      ( 10 Feb 2021 07:44 )             32.8     02-10    140  |  100  |  44<H>  ----------------------------<  95  4.5   |  31  |  0.97    Ca    9.4      10 Feb 2021 07:44  Phos  3.4     02-10  Mg     2.2     02-10      proBNP:   Lipid Profile:   HgA1c:   TSH:     Consultant(s) Notes Reviewed:  [x ] YES  [ ] NO    Care Discussed with Consultants/Other Providers [ x] YES  [ ] NO    Imaging Personally Reviewed independently:  [x] YES  [ ] NO    All labs, radiologic studies, vitals, orders and medications list reviewed. Patient is seen and examined at bedside. Case discussed with medical team.

## 2021-02-11 LAB
ANION GAP SERPL CALC-SCNC: 9 MMOL/L — SIGNIFICANT CHANGE UP (ref 7–14)
BUN SERPL-MCNC: 42 MG/DL — HIGH (ref 7–23)
CALCIUM SERPL-MCNC: 8.9 MG/DL — SIGNIFICANT CHANGE UP (ref 8.4–10.5)
CHLORIDE SERPL-SCNC: 101 MMOL/L — SIGNIFICANT CHANGE UP (ref 98–107)
CO2 SERPL-SCNC: 30 MMOL/L — SIGNIFICANT CHANGE UP (ref 22–31)
CREAT SERPL-MCNC: 0.8 MG/DL — SIGNIFICANT CHANGE UP (ref 0.5–1.3)
GLUCOSE SERPL-MCNC: 91 MG/DL — SIGNIFICANT CHANGE UP (ref 70–99)
HCT VFR BLD CALC: 32.1 % — LOW (ref 39–50)
HGB BLD-MCNC: 10.2 G/DL — LOW (ref 13–17)
MAGNESIUM SERPL-MCNC: 1.7 MG/DL — SIGNIFICANT CHANGE UP (ref 1.6–2.6)
MCHC RBC-ENTMCNC: 29.7 PG — SIGNIFICANT CHANGE UP (ref 27–34)
MCHC RBC-ENTMCNC: 31.8 GM/DL — LOW (ref 32–36)
MCV RBC AUTO: 93.3 FL — SIGNIFICANT CHANGE UP (ref 80–100)
NRBC # BLD: 0 /100 WBCS — SIGNIFICANT CHANGE UP
NRBC # FLD: 0 K/UL — SIGNIFICANT CHANGE UP
PHOSPHATE SERPL-MCNC: 3.6 MG/DL — SIGNIFICANT CHANGE UP (ref 2.5–4.5)
PLATELET # BLD AUTO: 353 K/UL — SIGNIFICANT CHANGE UP (ref 150–400)
POTASSIUM SERPL-MCNC: 3.9 MMOL/L — SIGNIFICANT CHANGE UP (ref 3.5–5.3)
POTASSIUM SERPL-SCNC: 3.9 MMOL/L — SIGNIFICANT CHANGE UP (ref 3.5–5.3)
RBC # BLD: 3.44 M/UL — LOW (ref 4.2–5.8)
RBC # FLD: 13.6 % — SIGNIFICANT CHANGE UP (ref 10.3–14.5)
SODIUM SERPL-SCNC: 140 MMOL/L — SIGNIFICANT CHANGE UP (ref 135–145)
WBC # BLD: 9.01 K/UL — SIGNIFICANT CHANGE UP (ref 3.8–10.5)
WBC # FLD AUTO: 9.01 K/UL — SIGNIFICANT CHANGE UP (ref 3.8–10.5)

## 2021-02-11 PROCEDURE — 99233 SBSQ HOSP IP/OBS HIGH 50: CPT | Mod: GC

## 2021-02-11 RX ORDER — MAGNESIUM SULFATE 500 MG/ML
1 VIAL (ML) INJECTION ONCE
Refills: 0 | Status: COMPLETED | OUTPATIENT
Start: 2021-02-11 | End: 2021-02-11

## 2021-02-11 RX ORDER — FUROSEMIDE 40 MG
20 TABLET ORAL DAILY
Refills: 0 | Status: DISCONTINUED | OUTPATIENT
Start: 2021-02-11 | End: 2021-02-12

## 2021-02-11 RX ADMIN — Medication 50 MILLIGRAM(S): at 15:35

## 2021-02-11 RX ADMIN — CARBIDOPA AND LEVODOPA 1 TABLET(S): 25; 100 TABLET ORAL at 21:19

## 2021-02-11 RX ADMIN — LOSARTAN POTASSIUM 25 MILLIGRAM(S): 100 TABLET, FILM COATED ORAL at 15:35

## 2021-02-11 RX ADMIN — QUETIAPINE FUMARATE 50 MILLIGRAM(S): 200 TABLET, FILM COATED ORAL at 21:18

## 2021-02-11 RX ADMIN — ATORVASTATIN CALCIUM 10 MILLIGRAM(S): 80 TABLET, FILM COATED ORAL at 21:19

## 2021-02-11 RX ADMIN — ENOXAPARIN SODIUM 40 MILLIGRAM(S): 100 INJECTION SUBCUTANEOUS at 15:35

## 2021-02-11 RX ADMIN — Medication 100 GRAM(S): at 09:21

## 2021-02-11 RX ADMIN — Medication 1: at 17:17

## 2021-02-11 RX ADMIN — CLOPIDOGREL BISULFATE 75 MILLIGRAM(S): 75 TABLET, FILM COATED ORAL at 15:35

## 2021-02-11 RX ADMIN — TAMSULOSIN HYDROCHLORIDE 0.4 MILLIGRAM(S): 0.4 CAPSULE ORAL at 21:19

## 2021-02-11 RX ADMIN — Medication 6 MILLIGRAM(S): at 21:19

## 2021-02-11 RX ADMIN — CARBIDOPA AND LEVODOPA 1 TABLET(S): 25; 100 TABLET ORAL at 05:45

## 2021-02-11 RX ADMIN — Medication 20 MILLIGRAM(S): at 15:38

## 2021-02-11 RX ADMIN — CARBIDOPA AND LEVODOPA 1 TABLET(S): 25; 100 TABLET ORAL at 15:35

## 2021-02-11 RX ADMIN — PANTOPRAZOLE SODIUM 40 MILLIGRAM(S): 20 TABLET, DELAYED RELEASE ORAL at 05:45

## 2021-02-11 NOTE — PROGRESS NOTE ADULT - SUBJECTIVE AND OBJECTIVE BOX
Saud Gardner MD  PGY 1 Department of Internal Medicine  Pager: [phone] ([facility])   Pager: [phone] ([facility])  Also available on Microsoft Teams      Patient is a 81y old  Male who presents with a chief complaint of Cough (10 Feb 2021 14:05)      SUBJECTIVE / OVERNIGHT EVENTS: No acute overnight events. Pt seen and examined. Denies fevers, chills, CP, SOB, Abdominal pain, N/V, Constipation, Diarrhea    MEDICATIONS  (STANDING):  atorvastatin 10 milliGRAM(s) Oral at bedtime  carbidopa/levodopa  25/100 1 Tablet(s) Oral three times a day  clopidogrel Tablet 75 milliGRAM(s) Oral daily  dextrose 40% Gel 15 Gram(s) Oral once  dextrose 5%. 1000 milliLiter(s) (50 mL/Hr) IV Continuous <Continuous>  dextrose 5%. 1000 milliLiter(s) (100 mL/Hr) IV Continuous <Continuous>  dextrose 50% Injectable 25 Gram(s) IV Push once  dextrose 50% Injectable 12.5 Gram(s) IV Push once  dextrose 50% Injectable 25 Gram(s) IV Push once  enoxaparin Injectable 40 milliGRAM(s) SubCutaneous daily  furosemide    Tablet 40 milliGRAM(s) Oral daily  glucagon  Injectable 1 milliGRAM(s) IntraMuscular once  insulin lispro (ADMELOG) corrective regimen sliding scale   SubCutaneous three times a day before meals  insulin lispro (ADMELOG) corrective regimen sliding scale   SubCutaneous at bedtime  losartan 25 milliGRAM(s) Oral daily  magnesium sulfate  IVPB 1 Gram(s) IV Intermittent once  melatonin 6 milliGRAM(s) Oral at bedtime  metoprolol succinate ER 50 milliGRAM(s) Oral daily  pantoprazole    Tablet 40 milliGRAM(s) Oral before breakfast  QUEtiapine 50 milliGRAM(s) Oral at bedtime  tamsulosin 0.4 milliGRAM(s) Oral at bedtime    MEDICATIONS  (PRN):  acetaminophen   Tablet .. 650 milliGRAM(s) Oral every 6 hours PRN Temp greater or equal to 38C (100.4F)      I&O's Summary    10 Feb 2021 07:01  -  11 Feb 2021 07:00  --------------------------------------------------------  IN: 418 mL / OUT: 200 mL / NET: 218 mL        Vital Signs Last 24 Hrs  T(C): 36.7 (11 Feb 2021 05:41), Max: 37 (10 Feb 2021 17:07)  T(F): 98.1 (11 Feb 2021 05:41), Max: 98.6 (10 Feb 2021 17:07)  HR: 56 (11 Feb 2021 05:41) (52 - 59)  BP: 108/67 (11 Feb 2021 05:41) (106/58 - 150/52)  BP(mean): --  RR: 18 (11 Feb 2021 05:41) (18 - 20)  SpO2: 98% (11 Feb 2021 05:41) (96% - 100%)    =================PHYSICAL EXAM=================    GENERAL: Laying comfortably, NAD  EYES: EOMI, PERRL, no scleral icterus  NECK: No JVD  LUNG: Clear to auscultation bilaterally; No wheeze, crackles or rhonci  HEART: Regular rate and rhythm; No murmurs, rubs, or gallops  ABDOMEN: Soft, Nontender, Nondistended  EXTREMITIES:  No LE edema, 2+ Peripheral Pulses, No clubbing, cyanosis, or edema  PSYCH: AAOx2  NEUROLOGY: non-focal, strength 5/5 in all extremities, sensation intact  SKIN: No rashes or lesions    =================================================    LABS:                        10.2   9.01  )-----------( 353      ( 11 Feb 2021 07:42 )             32.1     Auto Eosinophil # x     / Auto Eosinophil % x     / Auto Neutrophil # x     / Auto Neutrophil % x     / BANDS % x                            10.4   8.74  )-----------( 345      ( 10 Feb 2021 07:44 )             32.8     Auto Eosinophil # x     / Auto Eosinophil % x     / Auto Neutrophil # x     / Auto Neutrophil % x     / BANDS % x        02-11    140  |  101  |  42<H>  ----------------------------<  91  3.9   |  30  |  0.80  02-10    140  |  100  |  44<H>  ----------------------------<  95  4.5   |  31  |  0.97    Ca    8.9      11 Feb 2021 07:42  Mg     1.7     02-11  Phos  3.6     02-11                  RADIOLOGY & ADDITIONAL TESTS:    Imaging Personally Reviewed:    Consultant(s) Notes Reviewed:      Care Discussed with Consultants/Other Providers:

## 2021-02-11 NOTE — PROGRESS NOTE ADULT - SUBJECTIVE AND OBJECTIVE BOX
Bennie Hwang MD  Interventional Cardiology / Advance Heart Failure and Cardiac Transplant Specialist  [place] Office : [address] [place] [address] [place]. [address]  Tel:   [place] Office : [address] [facility] [place] [address]  Tel: [phone]  Cell : [phone]    Pt is lying in bed comfortable not in distress, no chest pains no SOB no palpitations  	  MEDICATIONS:  clopidogrel Tablet 75 milliGRAM(s) Oral daily  enoxaparin Injectable 40 milliGRAM(s) SubCutaneous daily  furosemide    Tablet 20 milliGRAM(s) Oral daily  losartan 25 milliGRAM(s) Oral daily  metoprolol succinate ER 50 milliGRAM(s) Oral daily  tamsulosin 0.4 milliGRAM(s) Oral at bedtime        acetaminophen   Tablet .. 650 milliGRAM(s) Oral every 6 hours PRN  carbidopa/levodopa  25/100 1 Tablet(s) Oral three times a day  melatonin 6 milliGRAM(s) Oral at bedtime  QUEtiapine 50 milliGRAM(s) Oral at bedtime    pantoprazole    Tablet 40 milliGRAM(s) Oral before breakfast    atorvastatin 10 milliGRAM(s) Oral at bedtime  dextrose 40% Gel 15 Gram(s) Oral once  dextrose 50% Injectable 25 Gram(s) IV Push once  dextrose 50% Injectable 12.5 Gram(s) IV Push once  dextrose 50% Injectable 25 Gram(s) IV Push once  glucagon  Injectable 1 milliGRAM(s) IntraMuscular once  insulin lispro (ADMELOG) corrective regimen sliding scale   SubCutaneous three times a day before meals  insulin lispro (ADMELOG) corrective regimen sliding scale   SubCutaneous at bedtime    dextrose 5%. 1000 milliLiter(s) IV Continuous <Continuous>  dextrose 5%. 1000 milliLiter(s) IV Continuous <Continuous>      PAST MEDICAL/SURGICAL HISTORY  PAST MEDICAL & SURGICAL HISTORY:  BPH (benign prostatic hyperplasia)    CAD (Coronary Artery Disease)  s/p cardiac stent ( &gt; 20 years ago)    HTN (Hypertension)    DM (Diabetes Mellitus)    Hypercholesterolemia    Coronary Stent  x 2 ( 20 years )        SOCIAL HISTORY: Substance Use (street drugs): ( x ) never used  (  ) other:    FAMILY HISTORY:  No pertinent family history in first degree relatives        REVIEW OF SYSTEMS:  CONSTITUTIONAL: No fever, weight loss, or fatigue  EYES: No eye pain, visual disturbances, or discharge  ENMT:  No difficulty hearing, tinnitus, vertigo; No sinus or throat pain  BREASTS: No pain, masses, or nipple discharge  GASTROINTESTINAL: No abdominal or epigastric pain. No nausea, vomiting, or hematemesis; No diarrhea or constipation. No melena or hematochezia.  GENITOURINARY: No dysuria, frequency, hematuria, or incontinence  NEUROLOGICAL: No headaches, memory loss, loss of strength, numbness, or tremors  ENDOCRINE: No heat or cold intolerance; No hair loss  MUSCULOSKELETAL: No joint pain or swelling; No muscle, back, or extremity pain  PSYCHIATRIC: No depression, anxiety, mood swings, or difficulty sleeping  HEME/LYMPH: No easy bruising, or bleeding gums  All others negative    PHYSICAL EXAM:  T(C): 36.5 (02-11-21 @ 12:23), Max: 37 (02-10-21 @ 17:07)  HR: 66 (02-11-21 @ 15:33) (52 - 66)  BP: 112/51 (02-11-21 @ 15:33) (106/58 - 150/52)  RR: 16 (02-11-21 @ 12:23) (16 - 19)  SpO2: 100% (02-11-21 @ 12:23) (98% - 100%)  Wt(kg): --  I&O's Summary    10 Feb 2021 07:01  -  11 Feb 2021 07:00  --------------------------------------------------------  IN: 418 mL / OUT: 200 mL / NET: 218 mL          GENERAL: NAD  EYES: EOMI, PERRLA, conjunctiva and sclera clear  ENMT: No tonsillar erythema, exudates, or enlargement; Moist mucous membranes, Good dentition, No lesions  Cardiovascular: Normal S1 S2, No JVD, No murmurs, No edema  Respiratory: Lungs clear to auscultation	  Gastrointestinal:  Soft, Non-tender, + BS	  Extremities: Normal range of motion, No clubbing, cyanosis or edema  LYMPH: No lymphadenopathy noted  NERVOUS SYSTEM:  Alert & Oriented X3, Good concentration; Motor Strength 5/5 B/L upper and lower extremities; DTRs 2+ intact and symmetric                                    10.2   9.01  )-----------( 353      ( 11 Feb 2021 07:42 )             32.1     02-11    140  |  101  |  42<H>  ----------------------------<  91  3.9   |  30  |  0.80    Ca    8.9      11 Feb 2021 07:42  Phos  3.6     02-11  Mg     1.7     02-11      proBNP:   Lipid Profile:   HgA1c:   TSH:     Consultant(s) Notes Reviewed:  [x ] YES  [ ] NO    Care Discussed with Consultants/Other Providers [ x] YES  [ ] NO    Imaging Personally Reviewed independently:  [x] YES  [ ] NO    All labs, radiologic studies, vitals, orders and medications list reviewed. Patient is seen and examined at bedside. Case discussed with medical team.

## 2021-02-11 NOTE — PROGRESS NOTE ADULT - ASSESSMENT
Assessment and Plan    81 y/o M HTN, DM, dementia (AOx2 at baseline), parkinsons disease, CAD s/p stents (>20 years ago) sent to ED for fluid in lungs and gallstones found in outpatient ultrasound    EKG: NSR 1st degree AVB, PVCs  tele: SB/SR 50-60s. Frequent PVCs.     1. CHF  -echo with mild LV systolic dysfunction (new compared to 2018)  -CXR with patchy airspace opacities   -proBNP elevated  -c/w with 20mg PO lasix daily. continue to monitor I&Os  -stress test positive small mod defect , spoke to pt brother jasvir, he doesnt want pt getting a cath as he has dementia and parkinsons, will treat medically cont plavix  -having frequent PVCs, keep Mg >2 and K>4      2. CAD s/p stent  -cath in 2010 showed mild luminal disease and patent stents  -denies chest pain  -echo with new mild LV dysfunction  -c/w plavix     3. HTN  -controlled  -on toprol and losartan  -continue to monitor BP

## 2021-02-12 ENCOUNTER — TRANSCRIPTION ENCOUNTER (OUTPATIENT)
Age: 81
End: 2021-02-12

## 2021-02-12 VITALS
OXYGEN SATURATION: 99 % | DIASTOLIC BLOOD PRESSURE: 54 MMHG | RESPIRATION RATE: 17 BRPM | HEART RATE: 54 BPM | TEMPERATURE: 97 F | SYSTOLIC BLOOD PRESSURE: 112 MMHG

## 2021-02-12 DIAGNOSIS — R94.39 ABNORMAL RESULT OF OTHER CARDIOVASCULAR FUNCTION STUDY: ICD-10-CM

## 2021-02-12 LAB
ANION GAP SERPL CALC-SCNC: 9 MMOL/L — SIGNIFICANT CHANGE UP (ref 7–14)
BUN SERPL-MCNC: 36 MG/DL — HIGH (ref 7–23)
CALCIUM SERPL-MCNC: 8.8 MG/DL — SIGNIFICANT CHANGE UP (ref 8.4–10.5)
CHLORIDE SERPL-SCNC: 100 MMOL/L — SIGNIFICANT CHANGE UP (ref 98–107)
CO2 SERPL-SCNC: 28 MMOL/L — SIGNIFICANT CHANGE UP (ref 22–31)
CREAT SERPL-MCNC: 0.71 MG/DL — SIGNIFICANT CHANGE UP (ref 0.5–1.3)
GLUCOSE SERPL-MCNC: 110 MG/DL — HIGH (ref 70–99)
HCT VFR BLD CALC: 32.4 % — LOW (ref 39–50)
HGB BLD-MCNC: 10.1 G/DL — LOW (ref 13–17)
MAGNESIUM SERPL-MCNC: 1.7 MG/DL — SIGNIFICANT CHANGE UP (ref 1.6–2.6)
MCHC RBC-ENTMCNC: 29.1 PG — SIGNIFICANT CHANGE UP (ref 27–34)
MCHC RBC-ENTMCNC: 31.2 GM/DL — LOW (ref 32–36)
MCV RBC AUTO: 93.4 FL — SIGNIFICANT CHANGE UP (ref 80–100)
NRBC # BLD: 0 /100 WBCS — SIGNIFICANT CHANGE UP
NRBC # FLD: 0 K/UL — SIGNIFICANT CHANGE UP
PHOSPHATE SERPL-MCNC: 3 MG/DL — SIGNIFICANT CHANGE UP (ref 2.5–4.5)
PLATELET # BLD AUTO: 380 K/UL — SIGNIFICANT CHANGE UP (ref 150–400)
POTASSIUM SERPL-MCNC: 4.1 MMOL/L — SIGNIFICANT CHANGE UP (ref 3.5–5.3)
POTASSIUM SERPL-SCNC: 4.1 MMOL/L — SIGNIFICANT CHANGE UP (ref 3.5–5.3)
RBC # BLD: 3.47 M/UL — LOW (ref 4.2–5.8)
RBC # FLD: 13.2 % — SIGNIFICANT CHANGE UP (ref 10.3–14.5)
SARS-COV-2 RNA SPEC QL NAA+PROBE: SIGNIFICANT CHANGE UP
SODIUM SERPL-SCNC: 137 MMOL/L — SIGNIFICANT CHANGE UP (ref 135–145)
WBC # BLD: 8.83 K/UL — SIGNIFICANT CHANGE UP (ref 3.8–10.5)
WBC # FLD AUTO: 8.83 K/UL — SIGNIFICANT CHANGE UP (ref 3.8–10.5)

## 2021-02-12 PROCEDURE — 99239 HOSP IP/OBS DSCHRG MGMT >30: CPT

## 2021-02-12 RX ORDER — MAGNESIUM SULFATE 500 MG/ML
1 VIAL (ML) INJECTION ONCE
Refills: 0 | Status: COMPLETED | OUTPATIENT
Start: 2021-02-12 | End: 2021-02-12

## 2021-02-12 RX ORDER — FUROSEMIDE 40 MG
1 TABLET ORAL
Qty: 0 | Refills: 0 | DISCHARGE
Start: 2021-02-12

## 2021-02-12 RX ADMIN — CARBIDOPA AND LEVODOPA 1 TABLET(S): 25; 100 TABLET ORAL at 12:24

## 2021-02-12 RX ADMIN — CLOPIDOGREL BISULFATE 75 MILLIGRAM(S): 75 TABLET, FILM COATED ORAL at 12:25

## 2021-02-12 RX ADMIN — Medication 50 MILLIGRAM(S): at 05:04

## 2021-02-12 RX ADMIN — LOSARTAN POTASSIUM 25 MILLIGRAM(S): 100 TABLET, FILM COATED ORAL at 05:04

## 2021-02-12 RX ADMIN — PANTOPRAZOLE SODIUM 40 MILLIGRAM(S): 20 TABLET, DELAYED RELEASE ORAL at 05:04

## 2021-02-12 RX ADMIN — CARBIDOPA AND LEVODOPA 1 TABLET(S): 25; 100 TABLET ORAL at 05:04

## 2021-02-12 RX ADMIN — Medication 20 MILLIGRAM(S): at 05:04

## 2021-02-12 RX ADMIN — ENOXAPARIN SODIUM 40 MILLIGRAM(S): 100 INJECTION SUBCUTANEOUS at 12:24

## 2021-02-12 RX ADMIN — Medication 100 GRAM(S): at 12:24

## 2021-02-12 NOTE — PROGRESS NOTE ADULT - SUBJECTIVE AND OBJECTIVE BOX
Bennie Hwang MD  Interventional Cardiology / Advance Heart Failure and Cardiac Transplant Specialist  [place] Office : [address] [place] [address] [place]. [address]  Tel:   [place] Office : [address] [facility] [place] [address]  Tel: [phone]  Cell : [phone]    Pt is lying in bed comfortable not in distress, no chest pains no SOB no palpitations  	  MEDICATIONS:  clopidogrel Tablet 75 milliGRAM(s) Oral daily  enoxaparin Injectable 40 milliGRAM(s) SubCutaneous daily  furosemide    Tablet 20 milliGRAM(s) Oral daily  losartan 25 milliGRAM(s) Oral daily  metoprolol succinate ER 50 milliGRAM(s) Oral daily  tamsulosin 0.4 milliGRAM(s) Oral at bedtime        acetaminophen   Tablet .. 650 milliGRAM(s) Oral every 6 hours PRN  carbidopa/levodopa  25/100 1 Tablet(s) Oral three times a day  melatonin 6 milliGRAM(s) Oral at bedtime  QUEtiapine 50 milliGRAM(s) Oral at bedtime    pantoprazole    Tablet 40 milliGRAM(s) Oral before breakfast    atorvastatin 10 milliGRAM(s) Oral at bedtime  dextrose 40% Gel 15 Gram(s) Oral once  dextrose 50% Injectable 25 Gram(s) IV Push once  dextrose 50% Injectable 12.5 Gram(s) IV Push once  dextrose 50% Injectable 25 Gram(s) IV Push once  glucagon  Injectable 1 milliGRAM(s) IntraMuscular once  insulin lispro (ADMELOG) corrective regimen sliding scale   SubCutaneous three times a day before meals  insulin lispro (ADMELOG) corrective regimen sliding scale   SubCutaneous at bedtime    dextrose 5%. 1000 milliLiter(s) IV Continuous <Continuous>  dextrose 5%. 1000 milliLiter(s) IV Continuous <Continuous>      PAST MEDICAL/SURGICAL HISTORY  PAST MEDICAL & SURGICAL HISTORY:  BPH (benign prostatic hyperplasia)    CAD (Coronary Artery Disease)  s/p cardiac stent ( &gt; 20 years ago)    HTN (Hypertension)    DM (Diabetes Mellitus)    Hypercholesterolemia    Coronary Stent  x 2 ( 20 years )        SOCIAL HISTORY: Substance Use (street drugs): ( x ) never used  (  ) other:    FAMILY HISTORY:  No pertinent family history in first degree relatives        REVIEW OF SYSTEMS:  CONSTITUTIONAL: No fever, weight loss, or fatigue  EYES: No eye pain, visual disturbances, or discharge  ENMT:  No difficulty hearing, tinnitus, vertigo; No sinus or throat pain  BREASTS: No pain, masses, or nipple discharge  GASTROINTESTINAL: No abdominal or epigastric pain. No nausea, vomiting, or hematemesis; No diarrhea or constipation. No melena or hematochezia.  GENITOURINARY: No dysuria, frequency, hematuria, or incontinence  NEUROLOGICAL: No headaches, memory loss, loss of strength, numbness, or tremors  ENDOCRINE: No heat or cold intolerance; No hair loss  MUSCULOSKELETAL: No joint pain or swelling; No muscle, back, or extremity pain  PSYCHIATRIC: No depression, anxiety, mood swings, or difficulty sleeping  HEME/LYMPH: No easy bruising, or bleeding gums  All others negative    PHYSICAL EXAM:  T(C): 36.3 (02-12-21 @ 12:19), Max: 36.8 (02-11-21 @ 21:16)  HR: 54 (02-12-21 @ 12:19) (54 - 66)  BP: 112/54 (02-12-21 @ 12:19) (112/54 - 128/61)  RR: 17 (02-12-21 @ 12:19) (17 - 17)  SpO2: 99% (02-12-21 @ 12:19) (97% - 99%)  Wt(kg): --  I&O's Summary    11 Feb 2021 07:01  -  12 Feb 2021 07:00  --------------------------------------------------------  IN: 350 mL / OUT: 975 mL / NET: -625 mL    12 Feb 2021 07:01  -  12 Feb 2021 18:43  --------------------------------------------------------  IN: 585 mL / OUT: 600 mL / NET: -15 mL          GENERAL: NAD  EYES: EOMI, PERRLA, conjunctiva and sclera clear  ENMT: No tonsillar erythema, exudates, or enlargement; Moist mucous membranes, Good dentition, No lesions  Cardiovascular: Normal S1 S2, No JVD, No murmurs, No edema  Respiratory: Lungs clear to auscultation	  Gastrointestinal:  Soft, Non-tender, + BS	  Extremities: Normal range of motion, No clubbing, cyanosis or edema  LYMPH: No lymphadenopathy noted  NERVOUS SYSTEM:  Alert & Oriented X3, Good concentration; Motor Strength 5/5 B/L upper and lower extremities; DTRs 2+ intact and symmetric                                    10.1   8.83  )-----------( 380      ( 12 Feb 2021 07:57 )             32.4     02-12    137  |  100  |  36<H>  ----------------------------<  110<H>  4.1   |  28  |  0.71    Ca    8.8      12 Feb 2021 07:57  Phos  3.0     02-12  Mg     1.7     02-12      proBNP:   Lipid Profile:   HgA1c:   TSH:     Consultant(s) Notes Reviewed:  [x ] YES  [ ] NO    Care Discussed with Consultants/Other Providers [ x] YES  [ ] NO    Imaging Personally Reviewed independently:  [x] YES  [ ] NO    All labs, radiologic studies, vitals, orders and medications list reviewed. Patient is seen and examined at bedside. Case discussed with medical team.

## 2021-02-12 NOTE — DISCHARGE NOTE NURSING/CASE MANAGEMENT/SOCIAL WORK - PATIENT PORTAL LINK FT
You can access the FollowMyHealth Patient Portal offered by [facility] by registering at the following website: [email or web address]. By joining [employer]’s FollowMyHealth portal, you will also be able to view your health information using other applications (apps) compatible with our system.

## 2021-02-12 NOTE — PROGRESS NOTE ADULT - PROVIDER SPECIALTY LIST ADULT
Internal Medicine
Pulmonology
Cardiology
Internal Medicine

## 2021-02-12 NOTE — PROGRESS NOTE ADULT - ATTENDING COMMENTS
80M DM type 2, Parkinson's DZ, Dementia (Baseline A/Ox2), CAD/stent p/w acute systolic CHF c/b L pleural effusion.  TTE reviewed.  Pulm - brother refusing for thoracentesis. Card - Changed to Lasix PO.  NST done - result pending. MOLST done with patient's brother - DNR, but not DNI. Dispo: Home with [facility] - will let CM know when patient is medically optimzied for discharge.
80M DM type 2, Parkinson's DZ, Dementia (Baseline A/Ox2), CAD/stent p/w acute systolic CHF c/b L pleural effusion.  TTE reviewed.  Pulm - brother refusing for thoracentesis. Card - Changed to Lasix PO.  NST pending. MOLST done with patient's brother - DNR, but not DNI.
80M DM type 2, Parkinson's DZ, Dementia (Baseline A/Ox2), CAD/stent p/w acute systolic CHF c/b L pleural effusion.  TTE reviewed.  Pulm - needs to be off plavix for 5 days for thoracentesis, will continue to monitor. Card - D/C'd plavix. Changed to Lasix PO.  NST ordered.  MOLST done with patient's brother - DNR, but not DNI.
80M DM type 2, Parkinson's DZ, Dementia (Baseline A/Ox2), CAD/stent p/w acute systolic CHF c/b L pleural effusion.  TTE reviewed.  Continue Lasix IV diuresis.  Pulm - needs to be off plavix for 5 days for thoracentesis, will continue to monitor. Card - D/C'd plavix.
80M DM type 2, Parkinson's DZ, Dementia (Baseline A/Ox2), CAD/stent p/w acute systolic CHF c/b L pleural effusion.  TTE reviewed.  Pulm - needs to be off plavix for 5 days for thoracentesis, will continue to monitor. Card - D/C'd plavix. Changed to Lasix PO.  Will inquire about NST. Repeat CXR to evaluate for L pleural effusion.
80M DM type 2, Parkinson's DZ, Dementia (Baseline A/Ox2), CAD/stent p/w acute systolic CHF c/b L pleural effusion.  TTE reviewed.  Continue Lasix IV diuresis.  Will consult pulmonary for L pleural effusion diagnostic thoracentesis. Replete Mg.
80M DM type 2, Parkinson's DZ, Dementia (Baseline A/Ox2), CAD/stent p/w acute systolic CHF c/b L pleural effusion.  TTE reviewed.  Pulm - brother refusing for thoracentesis. Card - Changed to Lasix PO.  NST done - abnormal but brother does not wish to proceed to [facility].  Favor conservative management. MOLST done with patient's brother - DNR, but not DNI. Dispo: Home with [facility]. Patient medically optimized for discharge.  Discharge planning 40 minutes - discussed with patient and consultants.

## 2021-02-12 NOTE — PROGRESS NOTE ADULT - SUBJECTIVE AND OBJECTIVE BOX
Saud Gardner MD  PGY 1 Department of Internal Medicine  Pager: [phone] ([facility])   Pager: [phone] ([facility])  Also available on Microsoft Teams      Patient is a 81y old  Male who presents with a chief complaint of Cough (11 Feb 2021 11:04)      SUBJECTIVE / OVERNIGHT EVENTS: No acute overnight events. Pt seen and examined. Denies fevers, chills, CP, SOB, Abdominal pain, N/V, Constipation, Diarrhea    MEDICATIONS  (STANDING):  atorvastatin 10 milliGRAM(s) Oral at bedtime  carbidopa/levodopa  25/100 1 Tablet(s) Oral three times a day  clopidogrel Tablet 75 milliGRAM(s) Oral daily  dextrose 40% Gel 15 Gram(s) Oral once  dextrose 5%. 1000 milliLiter(s) (50 mL/Hr) IV Continuous <Continuous>  dextrose 5%. 1000 milliLiter(s) (100 mL/Hr) IV Continuous <Continuous>  dextrose 50% Injectable 25 Gram(s) IV Push once  dextrose 50% Injectable 12.5 Gram(s) IV Push once  dextrose 50% Injectable 25 Gram(s) IV Push once  enoxaparin Injectable 40 milliGRAM(s) SubCutaneous daily  furosemide    Tablet 20 milliGRAM(s) Oral daily  glucagon  Injectable 1 milliGRAM(s) IntraMuscular once  insulin lispro (ADMELOG) corrective regimen sliding scale   SubCutaneous three times a day before meals  insulin lispro (ADMELOG) corrective regimen sliding scale   SubCutaneous at bedtime  losartan 25 milliGRAM(s) Oral daily  magnesium sulfate  IVPB 1 Gram(s) IV Intermittent once  melatonin 6 milliGRAM(s) Oral at bedtime  metoprolol succinate ER 50 milliGRAM(s) Oral daily  pantoprazole    Tablet 40 milliGRAM(s) Oral before breakfast  QUEtiapine 50 milliGRAM(s) Oral at bedtime  tamsulosin 0.4 milliGRAM(s) Oral at bedtime    MEDICATIONS  (PRN):  acetaminophen   Tablet .. 650 milliGRAM(s) Oral every 6 hours PRN Temp greater or equal to 38C (100.4F)      I&O's Summary    11 Feb 2021 07:01  -  12 Feb 2021 07:00  --------------------------------------------------------  IN: 350 mL / OUT: 975 mL / NET: -625 mL        Vital Signs Last 24 Hrs  T(C): 36.8 (12 Feb 2021 05:01), Max: 36.8 (11 Feb 2021 21:16)  T(F): 98.3 (12 Feb 2021 05:01), Max: 98.3 (12 Feb 2021 05:01)  HR: 66 (12 Feb 2021 05:01) (59 - 66)  BP: 121/56 (12 Feb 2021 05:01) (112/51 - 131/50)  BP(mean): --  RR: 17 (12 Feb 2021 05:01) (16 - 17)  SpO2: 97% (12 Feb 2021 05:01) (97% - 100%)    =================PHYSICAL EXAM=================    GENERAL: Laying comfortably, NAD  EYES: EOMI, PERRL, no scleral icterus  NECK: No JVD  LUNG: Clear to auscultation bilaterally; No wheeze, crackles or rhonci  HEART: Regular rate and rhythm; No murmurs, rubs, or gallops  ABDOMEN: Soft, Nontender, Nondistended  EXTREMITIES:  No LE edema, 2+ Peripheral Pulses, No clubbing, cyanosis, or edema  PSYCH: AAOx2  NEUROLOGY: non-focal, strength 5/5 in all extremities, sensation intact  SKIN: No rashes or lesions    =================================================    LABS:                        10.1   8.83  )-----------( 380      ( 12 Feb 2021 07:57 )             32.4     Auto Eosinophil # x     / Auto Eosinophil % x     / Auto Neutrophil # x     / Auto Neutrophil % x     / BANDS % x                            10.2   9.01  )-----------( 353      ( 11 Feb 2021 07:42 )             32.1     Auto Eosinophil # x     / Auto Eosinophil % x     / Auto Neutrophil # x     / Auto Neutrophil % x     / BANDS % x        02-12    137  |  100  |  36<H>  ----------------------------<  110<H>  4.1   |  28  |  0.71  02-11    140  |  101  |  42<H>  ----------------------------<  91  3.9   |  30  |  0.80    Ca    8.8      12 Feb 2021 07:57  Mg     1.7     02-12  Phos  3.0     02-12                  RADIOLOGY & ADDITIONAL TESTS:    Imaging Personally Reviewed:    Consultant(s) Notes Reviewed:      Care Discussed with Consultants/Other Providers:

## 2021-02-12 NOTE — PROGRESS NOTE ADULT - PROBLEM SELECTOR PLAN 2
Pharmacological stress test: moderate defects in apical, distal lateral walls that are  predominantly reversible, suggestive of ischemia  --Cardiologist discussed risks/benefits of heart catherization with brother and brother declined procedure  --f/u outpatient

## 2021-02-12 NOTE — PROGRESS NOTE ADULT - PROBLEM SELECTOR PLAN 5
Home meds: Losartan  --c/w home losartan 25mg
s/p 2 stents 20 years ago  --c/w home metoprolol succinate  --f/u stress test  --restart plavix since no procdures planned
Home meds: Losartan  --c/w home losartan 25mg
Home meds: Losartan  --c/w home losartan 25mg

## 2021-02-12 NOTE — PROGRESS NOTE ADULT - PROBLEM SELECTOR PLAN 3
Hold home meds  --start ISS  --f/u A1C
Hold home meds  --start ISS  --f/u A1C
A1c 5.3. Hold home meds  --start ISS
Hold home meds  --start ISS  --f/u A1C
A1c 5.3. Hold home meds  --start ISS
A1c 5.3. Hold home meds  --start ISS
Unilateral. Diff: malignancy vs. HF   --s/p pulm consult---- thoracentesis warranted given the unilateral presentation, brother refused  --restart plavix since no procdures planned

## 2021-02-12 NOTE — PROGRESS NOTE ADULT - PROBLEM SELECTOR PROBLEM 6
Preventive measure
HTN (hypertension)
Preventive measure

## 2021-02-12 NOTE — PROGRESS NOTE ADULT - PROBLEM SELECTOR PROBLEM 1
Systolic CHF, acute

## 2021-02-12 NOTE — DISCHARGE NOTE NURSING/CASE MANAGEMENT/SOCIAL WORK - NSDCFUADDAPPT_GEN_ALL_CORE_FT
Patient should follow up with Cardiology Dr. Bennie Hairston 1-2 weeks after discharge ([phone])  Please follow up with your PCP Dr. Maira Villalobos 1-2 weeks after discharge

## 2021-02-12 NOTE — PROGRESS NOTE ADULT - PROBLEM SELECTOR PROBLEM 4
Type 2 diabetes mellitus with other specified complication, unspecified whether long term insulin use
CAD (coronary artery disease)

## 2021-02-12 NOTE — PROGRESS NOTE ADULT - PROBLEM SELECTOR PLAN 4
s/p 2 stents 20 years ago  --c/w home metoprolol succinate  --f/u stress test  --plavix on hold since 2/7 in preparation for thoracentesis, need to be held for 5 days
s/p 2 stents 20 years ago  --c/w home metoprolol succinate  ----plavix held since 2/7 in preparation for thoracentesis, need to be held for 5 days
s/p 2 stents 20 years ago  --c/w home metoprolol succinate  --f/u stress test  --plavix on hold since 2/7 in preparation for thoracentesis, need to be held for 5 days
s/p 2 stents 20 years ago  --c/w home plavix and metoprolol succinate
s/p 2 stents 20 years ago  --c/w home metoprolol succinate  --f/u stress test  --plavix on hold since 2/7 in preparation for thoracentesis, need to be held for 5 days
A1c 5.3. Hold home meds  --start ISS
s/p 2 stents 20 years ago  --c/w home plavix and metoprolol succinate

## 2021-02-12 NOTE — PROGRESS NOTE ADULT - PROBLEM SELECTOR PLAN 1
--TTE: EF: 50%, Moderate diastolic dysfunction (Stage II), moderate pulmonary hypertension, Small pericardial effusion, Left pleural effusion.  --proBNP 2800  --CXR: Left pleural effusion and large airspace opacity in the left lung. Mild patchy airspace opacities in the right lung.   ======PLAN======  --decrease to Lasix 20 oral daily  --cardiology cleared for D/C with follow up

## 2021-02-12 NOTE — DISCHARGE NOTE NURSING/CASE MANAGEMENT/SOCIAL WORK - NSSCNAMETXT_GEN_ALL_CORE
[facility]  Pending acceptance.  The nurse will call you the day after discharge to arrange to see you at your home.  [facility].   The nurse will call you the day after discharge to arrange to see you at your home.

## 2021-02-12 NOTE — PROGRESS NOTE ADULT - PROBLEM SELECTOR PROBLEM 2
Abnormal stress test
Pleural effusion

## 2021-02-12 NOTE — PROGRESS NOTE ADULT - ASSESSMENT
Assessment and Plan    79 y/o M HTN, DM, dementia (AOx2 at baseline), parkinsons disease, CAD s/p stents (>20 years ago) sent to ED for fluid in lungs and gallstones found in outpatient ultrasound    EKG: NSR 1st degree AVB, PVCs  tele: SB/SR 50-60s. Frequent PVCs.     1. CHF  -echo with mild LV systolic dysfunction (new compared to 2018)  -CXR with patchy airspace opacities   -proBNP elevated  -c/w with 20mg PO lasix daily. continue to monitor I&Os  -stress test positive small mod defect , spoke to pt brother jasvir, he doesnt want pt getting a cath as he has dementia and parkinsons, will treat medically cont plavix  -having frequent PVCs, keep Mg >2 and K>4      2. CAD s/p stent  -cath in 2010 showed mild luminal disease and patent stents  -denies chest pain  -echo with new mild LV dysfunction  -c/w plavix     3. HTN  -controlled  -on toprol and losartan  -continue to monitor BP

## 2021-02-12 NOTE — PROGRESS NOTE ADULT - PROBLEM SELECTOR PLAN 6
DVT: Lovenox 40 daily  Diet: DASH/TLC CC  Dispo: Pending medical improvement   PT rec: home w/ home PT , resume 24h/7 day home health aid
DVT: Lovenox 40 daily  Diet: DASH/TLC CC  Dispo: PT nancy
DVT: Lovenox 40 daily  Diet: DASH/TLC CC  Dispo: Pending medical improvement   PT rec: home w/ home PT , resume 24h/7 day home health aid
DVT: Lovenox 40 daily  Diet: DASH/TLC CC  Dispo: Pending medical improvement   PT rec: home w/ home PT , resume 24h/7 day home health aid
Home meds: Losartan  --c/w home losartan 25mg
DVT: Lovenox 40 daily  Diet: DASH/TLC CC  Dispo: PT nancy
DVT: Lovenox 40 daily  Diet: DASH/TLC CC  Dispo: Pending medical improvement   PT rec: home w/ home PT , resume 24h/7 day home health aid

## 2021-02-12 NOTE — PROGRESS NOTE ADULT - PROBLEM SELECTOR PROBLEM 3
Type 2 diabetes mellitus with other specified complication, unspecified whether long term insulin use
Pleural effusion
Type 2 diabetes mellitus with other specified complication, unspecified whether long term insulin use

## 2021-02-12 NOTE — PROGRESS NOTE ADULT - PROBLEM SELECTOR PROBLEM 5
HTN (hypertension)
CAD (coronary artery disease)
HTN (hypertension)

## 2021-03-17 ENCOUNTER — INPATIENT (INPATIENT)
Facility: HOSPITAL | Age: 81
LOS: 12 days | Discharge: HOME HEALTH | End: 2021-03-30
Attending: STUDENT IN AN ORGANIZED HEALTH CARE EDUCATION/TRAINING PROGRAM | Admitting: STUDENT IN AN ORGANIZED HEALTH CARE EDUCATION/TRAINING PROGRAM
Payer: MEDICARE

## 2021-03-17 VITALS
OXYGEN SATURATION: 98 % | RESPIRATION RATE: 16 BRPM | HEIGHT: 67 IN | HEART RATE: 52 BPM | SYSTOLIC BLOOD PRESSURE: 95 MMHG | DIASTOLIC BLOOD PRESSURE: 60 MMHG

## 2021-03-17 DIAGNOSIS — J86.9 PYOTHORAX WITHOUT FISTULA: ICD-10-CM

## 2021-03-17 LAB
ALBUMIN SERPL ELPH-MCNC: 2.7 G/DL — LOW (ref 3.3–5)
ALP SERPL-CCNC: 97 U/L — SIGNIFICANT CHANGE UP (ref 40–120)
ALT FLD-CCNC: 5 U/L — SIGNIFICANT CHANGE UP (ref 4–41)
ANION GAP SERPL CALC-SCNC: 14 MMOL/L — SIGNIFICANT CHANGE UP (ref 7–14)
APPEARANCE UR: CLEAR — SIGNIFICANT CHANGE UP
AST SERPL-CCNC: 11 U/L — SIGNIFICANT CHANGE UP (ref 4–40)
B PERT DNA SPEC QL NAA+PROBE: SIGNIFICANT CHANGE UP
BASOPHILS # BLD AUTO: 0 K/UL — SIGNIFICANT CHANGE UP (ref 0–0.2)
BASOPHILS NFR BLD AUTO: 0 % — SIGNIFICANT CHANGE UP (ref 0–2)
BILIRUB SERPL-MCNC: 0.3 MG/DL — SIGNIFICANT CHANGE UP (ref 0.2–1.2)
BILIRUB UR-MCNC: NEGATIVE — SIGNIFICANT CHANGE UP
BLOOD GAS VENOUS COMPREHENSIVE RESULT: SIGNIFICANT CHANGE UP
BLOOD GAS VENOUS COMPREHENSIVE RESULT: SIGNIFICANT CHANGE UP
BUN SERPL-MCNC: 48 MG/DL — HIGH (ref 7–23)
C PNEUM DNA SPEC QL NAA+PROBE: SIGNIFICANT CHANGE UP
CALCIUM SERPL-MCNC: 9 MG/DL — SIGNIFICANT CHANGE UP (ref 8.4–10.5)
CHLORIDE SERPL-SCNC: 102 MMOL/L — SIGNIFICANT CHANGE UP (ref 98–107)
CO2 SERPL-SCNC: 25 MMOL/L — SIGNIFICANT CHANGE UP (ref 22–31)
COLOR SPEC: YELLOW — SIGNIFICANT CHANGE UP
CREAT SERPL-MCNC: 0.85 MG/DL — SIGNIFICANT CHANGE UP (ref 0.5–1.3)
DIFF PNL FLD: NEGATIVE — SIGNIFICANT CHANGE UP
EOSINOPHIL # BLD AUTO: 0.04 K/UL — SIGNIFICANT CHANGE UP (ref 0–0.5)
EOSINOPHIL NFR BLD AUTO: 0.7 % — SIGNIFICANT CHANGE UP (ref 0–6)
FLUAV SUBTYP SPEC NAA+PROBE: SIGNIFICANT CHANGE UP
FLUBV RNA SPEC QL NAA+PROBE: SIGNIFICANT CHANGE UP
GLUCOSE BLDC GLUCOMTR-MCNC: 100 MG/DL — HIGH (ref 70–99)
GLUCOSE SERPL-MCNC: 162 MG/DL — HIGH (ref 70–99)
GLUCOSE UR QL: NEGATIVE — SIGNIFICANT CHANGE UP
HADV DNA SPEC QL NAA+PROBE: SIGNIFICANT CHANGE UP
HCOV 229E RNA SPEC QL NAA+PROBE: SIGNIFICANT CHANGE UP
HCOV HKU1 RNA SPEC QL NAA+PROBE: SIGNIFICANT CHANGE UP
HCOV NL63 RNA SPEC QL NAA+PROBE: SIGNIFICANT CHANGE UP
HCOV OC43 RNA SPEC QL NAA+PROBE: SIGNIFICANT CHANGE UP
HCT VFR BLD CALC: 32.1 % — LOW (ref 39–50)
HGB BLD-MCNC: 10.2 G/DL — LOW (ref 13–17)
HMPV RNA SPEC QL NAA+PROBE: SIGNIFICANT CHANGE UP
HPIV1 RNA SPEC QL NAA+PROBE: SIGNIFICANT CHANGE UP
HPIV2 RNA SPEC QL NAA+PROBE: SIGNIFICANT CHANGE UP
HPIV3 RNA SPEC QL NAA+PROBE: SIGNIFICANT CHANGE UP
HPIV4 RNA SPEC QL NAA+PROBE: SIGNIFICANT CHANGE UP
IANC: 4.91 K/UL — SIGNIFICANT CHANGE UP (ref 1.5–8.5)
IMM GRANULOCYTES NFR BLD AUTO: 0.3 % — SIGNIFICANT CHANGE UP (ref 0–1.5)
KETONES UR-MCNC: NEGATIVE — SIGNIFICANT CHANGE UP
LEUKOCYTE ESTERASE UR-ACNC: NEGATIVE — SIGNIFICANT CHANGE UP
LYMPHOCYTES # BLD AUTO: 0.64 K/UL — LOW (ref 1–3.3)
LYMPHOCYTES # BLD AUTO: 10.6 % — LOW (ref 13–44)
MAGNESIUM SERPL-MCNC: 1 MG/DL — CRITICAL LOW (ref 1.6–2.6)
MCHC RBC-ENTMCNC: 29.1 PG — SIGNIFICANT CHANGE UP (ref 27–34)
MCHC RBC-ENTMCNC: 31.8 GM/DL — LOW (ref 32–36)
MCV RBC AUTO: 91.7 FL — SIGNIFICANT CHANGE UP (ref 80–100)
MONOCYTES # BLD AUTO: 0.43 K/UL — SIGNIFICANT CHANGE UP (ref 0–0.9)
MONOCYTES NFR BLD AUTO: 7.1 % — SIGNIFICANT CHANGE UP (ref 2–14)
NEUTROPHILS # BLD AUTO: 4.91 K/UL — SIGNIFICANT CHANGE UP (ref 1.8–7.4)
NEUTROPHILS NFR BLD AUTO: 81.3 % — HIGH (ref 43–77)
NITRITE UR-MCNC: NEGATIVE — SIGNIFICANT CHANGE UP
NRBC # BLD: 0 /100 WBCS — SIGNIFICANT CHANGE UP
NRBC # FLD: 0 K/UL — SIGNIFICANT CHANGE UP
NT-PROBNP SERPL-SCNC: 1306 PG/ML — HIGH
PH UR: 6 — SIGNIFICANT CHANGE UP (ref 5–8)
PHOSPHATE SERPL-MCNC: 3.7 MG/DL — SIGNIFICANT CHANGE UP (ref 2.5–4.5)
PLATELET # BLD AUTO: 311 K/UL — SIGNIFICANT CHANGE UP (ref 150–400)
POTASSIUM SERPL-MCNC: 3.8 MMOL/L — SIGNIFICANT CHANGE UP (ref 3.5–5.3)
POTASSIUM SERPL-SCNC: 3.8 MMOL/L — SIGNIFICANT CHANGE UP (ref 3.5–5.3)
PROT SERPL-MCNC: 6.5 G/DL — SIGNIFICANT CHANGE UP (ref 6–8.3)
PROT UR-MCNC: ABNORMAL
RAPID RVP RESULT: DETECTED
RBC # BLD: 3.5 M/UL — LOW (ref 4.2–5.8)
RBC # FLD: 13.8 % — SIGNIFICANT CHANGE UP (ref 10.3–14.5)
RSV RNA SPEC QL NAA+PROBE: SIGNIFICANT CHANGE UP
RV+EV RNA SPEC QL NAA+PROBE: SIGNIFICANT CHANGE UP
SARS-COV-2 RNA SPEC QL NAA+PROBE: DETECTED
SODIUM SERPL-SCNC: 141 MMOL/L — SIGNIFICANT CHANGE UP (ref 135–145)
SP GR SPEC: 1.03 — HIGH (ref 1.01–1.02)
TROPONIN T, HIGH SENSITIVITY RESULT: 31 NG/L — SIGNIFICANT CHANGE UP
TROPONIN T, HIGH SENSITIVITY RESULT: 31 NG/L — SIGNIFICANT CHANGE UP
UROBILINOGEN FLD QL: ABNORMAL
WBC # BLD: 6.04 K/UL — SIGNIFICANT CHANGE UP (ref 3.8–10.5)
WBC # FLD AUTO: 6.04 K/UL — SIGNIFICANT CHANGE UP (ref 3.8–10.5)

## 2021-03-17 PROCEDURE — 71045 X-RAY EXAM CHEST 1 VIEW: CPT | Mod: 26

## 2021-03-17 PROCEDURE — 99285 EMERGENCY DEPT VISIT HI MDM: CPT | Mod: CS,25

## 2021-03-17 PROCEDURE — 93010 ELECTROCARDIOGRAM REPORT: CPT

## 2021-03-17 PROCEDURE — 71260 CT THORAX DX C+: CPT | Mod: 26

## 2021-03-17 PROCEDURE — 74177 CT ABD & PELVIS W/CONTRAST: CPT | Mod: 26

## 2021-03-17 RX ORDER — PIPERACILLIN AND TAZOBACTAM 4; .5 G/20ML; G/20ML
3.38 INJECTION, POWDER, LYOPHILIZED, FOR SOLUTION INTRAVENOUS ONCE
Refills: 0 | Status: COMPLETED | OUTPATIENT
Start: 2021-03-17 | End: 2021-03-17

## 2021-03-17 RX ORDER — MAGNESIUM SULFATE 500 MG/ML
2 VIAL (ML) INJECTION ONCE
Refills: 0 | Status: COMPLETED | OUTPATIENT
Start: 2021-03-17 | End: 2021-03-17

## 2021-03-17 RX ORDER — MINERAL OIL
133 OIL (ML) MISCELLANEOUS ONCE
Refills: 0 | Status: COMPLETED | OUTPATIENT
Start: 2021-03-17 | End: 2021-03-17

## 2021-03-17 RX ORDER — VANCOMYCIN HCL 1 G
1000 VIAL (EA) INTRAVENOUS ONCE
Refills: 0 | Status: COMPLETED | OUTPATIENT
Start: 2021-03-17 | End: 2021-03-17

## 2021-03-17 RX ORDER — SODIUM CHLORIDE 9 MG/ML
1000 INJECTION INTRAMUSCULAR; INTRAVENOUS; SUBCUTANEOUS ONCE
Refills: 0 | Status: COMPLETED | OUTPATIENT
Start: 2021-03-17 | End: 2021-03-17

## 2021-03-17 RX ADMIN — PIPERACILLIN AND TAZOBACTAM 200 GRAM(S): 4; .5 INJECTION, POWDER, LYOPHILIZED, FOR SOLUTION INTRAVENOUS at 15:47

## 2021-03-17 RX ADMIN — Medication 133 MILLILITER(S): at 15:47

## 2021-03-17 RX ADMIN — Medication 250 MILLIGRAM(S): at 16:40

## 2021-03-17 RX ADMIN — SODIUM CHLORIDE 1000 MILLILITER(S): 9 INJECTION INTRAMUSCULAR; INTRAVENOUS; SUBCUTANEOUS at 23:44

## 2021-03-17 RX ADMIN — Medication 2 GRAM(S): at 23:44

## 2021-03-17 RX ADMIN — Medication 50 GRAM(S): at 18:00

## 2021-03-17 RX ADMIN — PIPERACILLIN AND TAZOBACTAM 3.38 GRAM(S): 4; .5 INJECTION, POWDER, LYOPHILIZED, FOR SOLUTION INTRAVENOUS at 16:30

## 2021-03-17 NOTE — ED PROVIDER NOTE - BIRTH SEX
Office Policies 
 
o Phone calls/patient messages: Please allow up to 24 hours for someone in the office to contact you about your call or message. Be mindful your provider may be out of the office or your message may require further review. We encourage you to use [employer] for your messages as this is a faster, more efficient way to communicate with our office 
 
o Medication Refills: 
Prescription medications require up to 48 business hours to process. We encourage you to use [employer] for your refills. For controlled medications: Please allow up to 72 business hours to process. Certain medications may require you to  a written prescription at our office. NO narcotic/controlled medications will be prescribed after 4pm Monday through Friday or on weekends 
 
o Form/Paperwork Completion: We ask that you allow 7-14 business days. You may also download your forms to [employer] to have your doctor print off.
Male

## 2021-03-17 NOTE — ED ADULT TRIAGE NOTE - CHIEF COMPLAINT QUOTE
Pt sent from cardiologist office for hypotension. On EMS arrival, BP 72/35. At this time, BP 95/60. In triage, HR noted to drop to 40's-50's. PMH dementia, alzheimers, parkinsons, DM , HTN, CHF, stents x2

## 2021-03-17 NOTE — ED PROVIDER NOTE - PROGRESS NOTE DETAILS
Carlos Dent, PGY 3: Received sign out on patient. patient has CTap and chest done and pending results and on 3L NC sat 96%.

## 2021-03-17 NOTE — ED PROVIDER NOTE - CLINICAL SUMMARY MEDICAL DECISION MAKING FREE TEXT BOX
O'Sam DO PGY-1: pt presents w/ hypotensions. Initial temp is around 92.7 but potentially inaccurate due to possible fecal impaction. Ordered sepsis work up, cbc, cmp, trop, pro-bnp, cultures, ua, urine cultures, RVP, CXR, cardiac monitor. O'Sam DO PGY-1: pt presents w/ hypotensions. Initial temp is around 92.7 but potentially inaccurate due to possible fecal impaction. Ordered sepsis work up, cbc, cmp, trop, pro-bnp, cultures, ua, urine cultures, RVP, CXR, cardiac monitor. Ordered empiric abx and CTAP w/ iv con to r/o obstruction vs perforation due to potential impaction. O'Sam DO PGY-1: pt presents w/ hypotensions. Initial temp is around 92.7 but potentially inaccurate due to possible fecal impaction. Ordered sepsis work up, cbc, cmp, trop, pro-bnp, cultures, ua, urine cultures, RVP, CXR, cardiac monitor. Ordered empiric abx and CTAP w/ iv con to r/o obstruction vs perforation due to potential impaction/ Stercoral Cholitis

## 2021-03-17 NOTE — ED PROVIDER NOTE - OBJECTIVE STATEMENT
82 y/o M w/ pmhx of dementia, parkinson's disease, DM, HTN, CHF, CAD 2 stents presents from cardiology office for hypotension. Per EMS on sense pressur was 72/35. In ED at triage pressure 95/60. Denies any recent f/c, n/v, cp, sob, abd pain. States he has not eaten much over the past 4 days. Last BM was several days ago. States home healthcare aids take care of him and states he does not live w/ family. 80 y/o M w/ pmhx of dementia, parkinson's disease, DM, HTN, CHF, CAD 2 stents presents from cardiology office for hypotension. Per EMS on scene pressure was 72/35. In ED at triage pressure 95/60. Denies any recent f/c, n/v, cp, sob, abd pain. States he has not eaten much over the past 4 days. Last BM was several days ago. States home healthcare aids take care of him and states he does not live w/ family.

## 2021-03-17 NOTE — CONSULT NOTE ADULT - ASSESSMENT
Assessment and Plan    80 y/o M w/ pmhx of dementia, parkinson's disease, DM, HTN, CHF, CAD 2 stents sent from cardiology office for hypotension.     EKG: Sinus Josemanuel 59 PACs      1. Sepsis  -patient from office with hypotension, improving in ER  -hypothermic  -lactate 4.2 on VBG  -f/u cultures, CXR and ct abd/pelvis    2.CHF  -echo with mild LV systolic dysfunction (new compared to 2018)  -currently appears euvolemic on exam, f/u CXR and ProBNP  -stress test positive previous admission with small mod defect , spoke to pt brother jasvir at that time and he did not want pt getting a cath as he has dementia and parkinsons, will treat medically cont plavix    3. CAD s/p stent  -cath in 2010 showed mild luminal disease and patent stents  -denies chest pain  -echo with mild LV dysfunction  -c/w plavix     4. HTN  -hypotensive on admission, improving   -BP meds on hold   -continue to monitor BP

## 2021-03-17 NOTE — ED ADULT NURSE REASSESSMENT NOTE - NS ED NURSE REASSESS COMMENT FT1
Received PT from day RN. PT AAO2 (name, ) with moments of confusion. Requires reorientation. On 3L NC sp02 98%, respirations even and unlabored. Denies chest pain, SOB, headache, dizziness. Will continue to monitor.

## 2021-03-17 NOTE — CONSULT NOTE ADULT - SUBJECTIVE AND OBJECTIVE BOX
Bennie Hwang MD  Interventional Cardiology / Endovascular Specialist  [place] Office : [phone] [place] [address] [place] [address]  Tel:   [place] Office : [address] [facility] [place] [address]  Tel: [phone]  Cell : [phone]    HISTORY OF PRESENTING ILLNESS:  80 y/o M w/ pmhx of dementia, parkinson's disease, DM, HTN, CHF, CAD 2 stents sent from cardiology office for hypotension. Per EMS on scene pressure was 72/35. In ED at triage pressure 95/60. Denies any recent f/c, n/v, cp, sob, abd pain. States he has not eaten much over the past 4 days. Last BM was several days ago. States home healthcare aids take care of him and states he does not live w/ family. Sepsis workup in progress.   	  MEDICATIONS:    vancomycin  IVPB. 1000 milliGRAM(s) IV Intermittent once                PAST MEDICAL/SURGICAL HISTORY  PAST MEDICAL & SURGICAL HISTORY:  BPH (benign prostatic hyperplasia)    CAD (Coronary Artery Disease)  s/p cardiac stent ( &gt; 20 years ago)    HTN (Hypertension)    DM (Diabetes Mellitus)    Hypercholesterolemia    Coronary Stent  x 2 ( 20 years )        SOCIAL HISTORY: Substance Use (street drugs): ( x ) never used  (  ) other:    FAMILY HISTORY:  No pertinent family history in first degree relatives        REVIEW OF SYSTEMS:  CONSTITUTIONAL: No fever, weight loss, or fatigue  EYES: No eye pain, visual disturbances, or discharge  ENMT:  No difficulty hearing, tinnitus, vertigo; No sinus or throat pain  BREASTS: No pain, masses, or nipple discharge  GASTROINTESTINAL: No abdominal or epigastric pain. No nausea, vomiting, or hematemesis; No diarrhea or constipation. No melena or hematochezia.  GENITOURINARY: No dysuria, frequency, hematuria, or incontinence  NEUROLOGICAL: No headaches, memory loss, loss of strength, numbness, or tremors  ENDOCRINE: No heat or cold intolerance; No hair loss  MUSCULOSKELETAL: No joint pain or swelling; No muscle, back, or extremity pain  PSYCHIATRIC: No depression, anxiety, mood swings, or difficulty sleeping  HEME/LYMPH: No easy bruising, or bleeding gums  All others negative    PHYSICAL EXAM:  T(C): 33.7 (03-17-21 @ 15:23), Max: 33.7 (03-17-21 @ 15:23)  HR: 63 (03-17-21 @ 15:23) (52 - 63)  BP: 104/43 (03-17-21 @ 15:23) (95/60 - 104/43)  RR: 20 (03-17-21 @ 15:23) (16 - 20)  SpO2: 98% (03-17-21 @ 15:23) (98% - 98%)  Wt(kg): --  I&O's Summary    Height (cm): 170.2 (03-17 @ 13:34)    GENERAL: NAD  EYES: EOMI, PERRLA, conjunctiva and sclera clear  ENMT: No tonsillar erythema, exudates, or enlargement  Cardiovascular: Normal S1 S2, No JVD, No murmurs, No edema  Respiratory: Lungs clear to auscultation	  Gastrointestinal:  Soft, Non-tender, + BS	  Extremities: No edema, cool                                      10.2   6.04  )-----------( 311      ( 17 Mar 2021 15:27 )             32.1     03-17    141  |  102  |  48<H>  ----------------------------<  162<H>  3.8   |  25  |  0.85    Ca    9.0      17 Mar 2021 15:27  Phos  3.7     03-17    TPro  6.5  /  Alb  2.7<L>  /  TBili  0.3  /  DBili  x   /  AST  11  /  ALT  5   /  AlkPhos  97  03-17    proBNP:   Lipid Profile:   HgA1c:   TSH:     Consultant(s) Notes Reviewed:  [x ] YES  [ ] NO    Care Discussed with Consultants/Other Providers [ x] YES  [ ] NO    Imaging Personally Reviewed independently:  [x] YES  [ ] NO    All labs, radiologic studies, vitals, orders and medications list reviewed. Patient is seen and examined at bedside. Case discussed with medical team.

## 2021-03-17 NOTE — ED ADULT NURSE NOTE - INTERVENTIONS DEFINITIONS
Call bell, personal items and telephone within reach/Instruct patient to call for assistance/Non-slip footwear when patient is off stretcher/Physically safe environment: no spills, clutter or unnecessary equipment/Stretcher in lowest position, wheels locked, appropriate side rails in place/Monitor gait and stability/Monitor for mental status changes and reorient to person, place, and time

## 2021-03-17 NOTE — ED PROVIDER NOTE - NS ED ROS FT
CONSTITUTIONAL - No fever, No diaphoresis, No weight change, +hypotension   SKIN - No rash  HEMATOLOGIC - No abnormal bleeding or bruising  EYES - No eye pain, No blurred vision  ENT - No change in hearing, No sore throat, No neck pain, No rhinorrhea, No ear pain  RESPIRATORY - No shortness of breath, No cough  CARDIAC -No chest pain, No palpitations  GI - No abdominal pain, No nausea, No vomiting, No diarrhea, No constipation  - No dysuria, no frequency, no hematuria.   MUSCULOSKELETAL - No joint pain, No swelling, No back pain  NEUROLOGIC - No numbness, No focal weakness, No headache, No dizziness CONSTITUTIONAL - No fever, No diaphoresis, No weight change, +hypotension   EYES - No eye pain, No blurred vision  ENT - No change in hearing, No sore throat, No neck pain, No rhinorrhea, No ear pain  RESPIRATORY - No shortness of breath, No cough  CARDIAC -No chest pain, No palpitations  GI - No abdominal pain, No nausea, No vomiting, No diarrhea, No constipation  - No dysuria, no frequency, no hematuria.   MUSCULOSKELETAL - No joint pain, No swelling, No back pain  NEUROLOGIC - No numbness, No focal weakness, No headache, No dizziness

## 2021-03-17 NOTE — ED PROVIDER NOTE - PHYSICAL EXAMINATION
CONSTITUTIONAL: Well-developed; well-nourished; in no acute distress.   SKIN: warm, dry  HEAD: Normocephalic; atraumatic.  EYES: no conjunctival injection.   ENT: No nasal discharge; airway clear.  NECK: Supple; non tender.  CARD: S1, S2 normal; no murmurs, gallops, or rubs. Regular rate and rhythm.   RESP: +fine crackles at bases.   ABD: soft not tender but firm., no guarding, no distention   EXT:  No cyanosis. +1 distal edema.   NEURO: AOx3.   PSYCH: Cooperative, appropriate. CONSTITUTIONAL: Well-developed; well-nourished; in no acute distress but appears weak.   SKIN: cold, dry  HEAD: Normocephalic; atraumatic.  EYES: no conjunctival injection.   ENT: No nasal discharge; airway clear.  NECK: Supple; non tender.  CARD: S1, S2 no murmurs, gallops, or rubs.   RESP: +fine crackles at bases.   ABD: soft not tender but firm., no guarding  EXT:  No cyanosis. +1 distal edema.   NEURO: AOx3  PSYCH: Cooperative, appropriate. CONSTITUTIONAL: Well-developed; well-nourished; in no acute distress but appears weak.   SKIN: cold, dry  HEAD: Normocephalic; atraumatic.  EYES: no conjunctival injection.   ENT: No nasal discharge; airway clear.  NECK: Supple; non tender.  CARD: S1, S2 no murmurs, gallops, or rubs.   RESP: +fine crackles at bases.   ABD: soft not tender but firm., no guarding  EXT:  No cyanosis. +1 distal edema.   NEURO: AOx1  PSYCH: Cooperative, appropriate.

## 2021-03-17 NOTE — ED ADULT NURSE NOTE - NSFALLRSKASSESASSIST_ED_ALL_ED
ANTICOAGULATION FOLLOW-UP CLINIC VISIT    Patient Name:  Natalie Hair  Date:  11/21/2017  Contact Type:  Face to Face/accompanied by daughter    SUBJECTIVE:     Patient Findings     Positives No Problem Findings    Comments Pt and daughter deny missed doses, changes in diet, meds or activity, report pt sees MD for f/u on pneumothorax Wed 11/22.           OBJECTIVE    INR Protime   Date Value Ref Range Status   11/21/2017 3.3 (A) 0.86 - 1.14 Final       ASSESSMENT / PLAN  No question data found.  Anticoagulation Summary as of 11/21/2017     INR goal 2.5-3.5   Today's INR 3.3   Maintenance plan 4 mg (4 mg x 1) on Mon, Wed, Fri; 6 mg (4 mg x 1.5) all other days   Full instructions 4 mg on Mon, Wed, Fri; 6 mg all other days   Weekly total 36 mg   Plan last modified Christa Guzman RN (11/15/2017)   Next INR check 11/29/2017   Priority INR   Target end date Indefinite    Indications   Heart valve replaced [Z95.2]  Long term current use of anticoagulant therapy [Z79.01]         Anticoagulation Episode Summary     INR check location     Preferred lab     Send INR reminders to [facility]    Comments * no bandaid        Anticoagulation Care Providers     Provider Role Specialty Phone number    Mimi Mcguire MD [facility] [phone]            See the Encounter Report to view Anticoagulation Flowsheet and Dosing Calendar (Go to Encounters tab in chart review, and find the Anticoagulation Therapy Visit)        Marizol Santoro RN               
yes

## 2021-03-17 NOTE — ED ADULT NURSE NOTE - OBJECTIVE STATEMENT
Pt arriving to room 22 A&OX2 disoriented to time sent from cardiologist for hypotension. PMHx CAD, HTN, DM. On arrival to ED, Pt /43. Pt endorsing cough. Crackles noted. O2 sat 98% on RA. Sinus blake on monitor. Abdomen nontender, appears distended. Hernia noted to lower abdomen. Skin dry and intact. Pt denies any physical complaints. Rectal temp 92.6F. MD Cochran aware. Straight cathed for urine, tolerated well. IV established with 20G in left hand. Labs drawn and sent. Will continue to monitor.

## 2021-03-18 DIAGNOSIS — J96.01 ACUTE RESPIRATORY FAILURE WITH HYPOXIA: ICD-10-CM

## 2021-03-18 DIAGNOSIS — R94.31 ABNORMAL ELECTROCARDIOGRAM [ECG] [EKG]: ICD-10-CM

## 2021-03-18 DIAGNOSIS — J90 PLEURAL EFFUSION, NOT ELSEWHERE CLASSIFIED: ICD-10-CM

## 2021-03-18 DIAGNOSIS — A41.9 SEPSIS, UNSPECIFIED ORGANISM: ICD-10-CM

## 2021-03-18 DIAGNOSIS — U07.1 COVID-19: ICD-10-CM

## 2021-03-18 DIAGNOSIS — I10 ESSENTIAL (PRIMARY) HYPERTENSION: ICD-10-CM

## 2021-03-18 DIAGNOSIS — E11.9 TYPE 2 DIABETES MELLITUS WITHOUT COMPLICATIONS: ICD-10-CM

## 2021-03-18 DIAGNOSIS — I50.42 CHRONIC COMBINED SYSTOLIC (CONGESTIVE) AND DIASTOLIC (CONGESTIVE) HEART FAILURE: ICD-10-CM

## 2021-03-18 DIAGNOSIS — J86.9 PYOTHORAX WITHOUT FISTULA: ICD-10-CM

## 2021-03-18 DIAGNOSIS — Z29.9 ENCOUNTER FOR PROPHYLACTIC MEASURES, UNSPECIFIED: ICD-10-CM

## 2021-03-18 DIAGNOSIS — I95.9 HYPOTENSION, UNSPECIFIED: ICD-10-CM

## 2021-03-18 LAB
ANION GAP SERPL CALC-SCNC: 10 MMOL/L — SIGNIFICANT CHANGE UP (ref 7–14)
BASOPHILS # BLD AUTO: 0.02 K/UL — SIGNIFICANT CHANGE UP (ref 0–0.2)
BASOPHILS NFR BLD AUTO: 0.3 % — SIGNIFICANT CHANGE UP (ref 0–2)
BLOOD GAS VENOUS COMPREHENSIVE RESULT: SIGNIFICANT CHANGE UP
BUN SERPL-MCNC: 37 MG/DL — HIGH (ref 7–23)
CALCIUM SERPL-MCNC: 8.8 MG/DL — SIGNIFICANT CHANGE UP (ref 8.4–10.5)
CHLORIDE SERPL-SCNC: 103 MMOL/L — SIGNIFICANT CHANGE UP (ref 98–107)
CO2 SERPL-SCNC: 29 MMOL/L — SIGNIFICANT CHANGE UP (ref 22–31)
CREAT SERPL-MCNC: 0.69 MG/DL — SIGNIFICANT CHANGE UP (ref 0.5–1.3)
CRP SERPL-MCNC: 87.8 MG/L — HIGH
CULTURE RESULTS: NO GROWTH — SIGNIFICANT CHANGE UP
EOSINOPHIL # BLD AUTO: 0.05 K/UL — SIGNIFICANT CHANGE UP (ref 0–0.5)
EOSINOPHIL NFR BLD AUTO: 0.8 % — SIGNIFICANT CHANGE UP (ref 0–6)
FERRITIN SERPL-MCNC: 709 NG/ML — HIGH (ref 30–400)
GLUCOSE BLDC GLUCOMTR-MCNC: 135 MG/DL — HIGH (ref 70–99)
GLUCOSE BLDC GLUCOMTR-MCNC: 191 MG/DL — HIGH (ref 70–99)
GLUCOSE BLDC GLUCOMTR-MCNC: 199 MG/DL — HIGH (ref 70–99)
GLUCOSE BLDC GLUCOMTR-MCNC: 200 MG/DL — HIGH (ref 70–99)
GLUCOSE SERPL-MCNC: 100 MG/DL — HIGH (ref 70–99)
HCT VFR BLD CALC: 34.3 % — LOW (ref 39–50)
HGB BLD-MCNC: 10.8 G/DL — LOW (ref 13–17)
IANC: 4.6 K/UL — SIGNIFICANT CHANGE UP (ref 1.5–8.5)
IMM GRANULOCYTES NFR BLD AUTO: 0.3 % — SIGNIFICANT CHANGE UP (ref 0–1.5)
IRON SATN MFR SERPL: 43 % — SIGNIFICANT CHANGE UP (ref 14–50)
IRON SATN MFR SERPL: 63 UG/DL — SIGNIFICANT CHANGE UP (ref 45–165)
LYMPHOCYTES # BLD AUTO: 0.79 K/UL — LOW (ref 1–3.3)
LYMPHOCYTES # BLD AUTO: 13.4 % — SIGNIFICANT CHANGE UP (ref 13–44)
MAGNESIUM SERPL-MCNC: 1.4 MG/DL — LOW (ref 1.6–2.6)
MCHC RBC-ENTMCNC: 28.3 PG — SIGNIFICANT CHANGE UP (ref 27–34)
MCHC RBC-ENTMCNC: 31.5 GM/DL — LOW (ref 32–36)
MCV RBC AUTO: 89.8 FL — SIGNIFICANT CHANGE UP (ref 80–100)
MONOCYTES # BLD AUTO: 0.42 K/UL — SIGNIFICANT CHANGE UP (ref 0–0.9)
MONOCYTES NFR BLD AUTO: 7.1 % — SIGNIFICANT CHANGE UP (ref 2–14)
NEUTROPHILS # BLD AUTO: 4.6 K/UL — SIGNIFICANT CHANGE UP (ref 1.8–7.4)
NEUTROPHILS NFR BLD AUTO: 78.1 % — HIGH (ref 43–77)
NRBC # BLD: 0 /100 WBCS — SIGNIFICANT CHANGE UP
NRBC # FLD: 0 K/UL — SIGNIFICANT CHANGE UP
PHOSPHATE SERPL-MCNC: 3.1 MG/DL — SIGNIFICANT CHANGE UP (ref 2.5–4.5)
PLATELET # BLD AUTO: 369 K/UL — SIGNIFICANT CHANGE UP (ref 150–400)
POTASSIUM SERPL-MCNC: 3.6 MMOL/L — SIGNIFICANT CHANGE UP (ref 3.5–5.3)
POTASSIUM SERPL-SCNC: 3.6 MMOL/L — SIGNIFICANT CHANGE UP (ref 3.5–5.3)
PROCALCITONIN SERPL-MCNC: 0.13 NG/ML — HIGH (ref 0.02–0.1)
RBC # BLD: 3.82 M/UL — LOW (ref 4.2–5.8)
RBC # FLD: 13.7 % — SIGNIFICANT CHANGE UP (ref 10.3–14.5)
SODIUM SERPL-SCNC: 142 MMOL/L — SIGNIFICANT CHANGE UP (ref 135–145)
SPECIMEN SOURCE: SIGNIFICANT CHANGE UP
TIBC SERPL-MCNC: 148 UG/DL — LOW (ref 220–430)
TSH SERPL-MCNC: 2.21 UIU/ML — SIGNIFICANT CHANGE UP (ref 0.27–4.2)
UIBC SERPL-MCNC: 85 UG/DL — LOW (ref 110–370)
WBC # BLD: 5.9 K/UL — SIGNIFICANT CHANGE UP (ref 3.8–10.5)
WBC # FLD AUTO: 5.9 K/UL — SIGNIFICANT CHANGE UP (ref 3.8–10.5)

## 2021-03-18 PROCEDURE — 99223 1ST HOSP IP/OBS HIGH 75: CPT | Mod: CS,GC

## 2021-03-18 RX ORDER — CLOPIDOGREL BISULFATE 75 MG/1
75 TABLET, FILM COATED ORAL DAILY
Refills: 0 | Status: DISCONTINUED | OUTPATIENT
Start: 2021-03-25 | End: 2021-03-30

## 2021-03-18 RX ORDER — ATORVASTATIN CALCIUM 80 MG/1
10 TABLET, FILM COATED ORAL AT BEDTIME
Refills: 0 | Status: DISCONTINUED | OUTPATIENT
Start: 2021-03-18 | End: 2021-03-30

## 2021-03-18 RX ORDER — MAGNESIUM SULFATE 500 MG/ML
2 VIAL (ML) INJECTION ONCE
Refills: 0 | Status: COMPLETED | OUTPATIENT
Start: 2021-03-18 | End: 2021-03-18

## 2021-03-18 RX ORDER — INSULIN LISPRO 100/ML
VIAL (ML) SUBCUTANEOUS
Refills: 0 | Status: DISCONTINUED | OUTPATIENT
Start: 2021-03-18 | End: 2021-03-23

## 2021-03-18 RX ORDER — SODIUM CHLORIDE 9 MG/ML
1000 INJECTION, SOLUTION INTRAVENOUS
Refills: 0 | Status: DISCONTINUED | OUTPATIENT
Start: 2021-03-18 | End: 2021-03-30

## 2021-03-18 RX ORDER — TAMSULOSIN HYDROCHLORIDE 0.4 MG/1
0.4 CAPSULE ORAL AT BEDTIME
Refills: 0 | Status: DISCONTINUED | OUTPATIENT
Start: 2021-03-18 | End: 2021-03-30

## 2021-03-18 RX ORDER — REMDESIVIR 5 MG/ML
200 INJECTION INTRAVENOUS EVERY 24 HOURS
Refills: 0 | Status: COMPLETED | OUTPATIENT
Start: 2021-03-18 | End: 2021-03-18

## 2021-03-18 RX ORDER — GLUCAGON INJECTION, SOLUTION 0.5 MG/.1ML
1 INJECTION, SOLUTION SUBCUTANEOUS ONCE
Refills: 0 | Status: DISCONTINUED | OUTPATIENT
Start: 2021-03-18 | End: 2021-03-30

## 2021-03-18 RX ORDER — DEXTROSE 50 % IN WATER 50 %
25 SYRINGE (ML) INTRAVENOUS ONCE
Refills: 0 | Status: DISCONTINUED | OUTPATIENT
Start: 2021-03-18 | End: 2021-03-30

## 2021-03-18 RX ORDER — FUROSEMIDE 40 MG
20 TABLET ORAL DAILY
Refills: 0 | Status: DISCONTINUED | OUTPATIENT
Start: 2021-03-18 | End: 2021-03-19

## 2021-03-18 RX ORDER — QUETIAPINE FUMARATE 200 MG/1
50 TABLET, FILM COATED ORAL AT BEDTIME
Refills: 0 | Status: DISCONTINUED | OUTPATIENT
Start: 2021-03-18 | End: 2021-03-30

## 2021-03-18 RX ORDER — INSULIN LISPRO 100/ML
VIAL (ML) SUBCUTANEOUS AT BEDTIME
Refills: 0 | Status: DISCONTINUED | OUTPATIENT
Start: 2021-03-18 | End: 2021-03-23

## 2021-03-18 RX ORDER — CARBIDOPA AND LEVODOPA 25; 100 MG/1; MG/1
1 TABLET ORAL THREE TIMES A DAY
Refills: 0 | Status: DISCONTINUED | OUTPATIENT
Start: 2021-03-18 | End: 2021-03-30

## 2021-03-18 RX ORDER — DEXTROSE 50 % IN WATER 50 %
12.5 SYRINGE (ML) INTRAVENOUS ONCE
Refills: 0 | Status: DISCONTINUED | OUTPATIENT
Start: 2021-03-18 | End: 2021-03-30

## 2021-03-18 RX ORDER — PANTOPRAZOLE SODIUM 20 MG/1
40 TABLET, DELAYED RELEASE ORAL EVERY 12 HOURS
Refills: 0 | Status: DISCONTINUED | OUTPATIENT
Start: 2021-03-18 | End: 2021-03-22

## 2021-03-18 RX ORDER — REMDESIVIR 5 MG/ML
INJECTION INTRAVENOUS
Refills: 0 | Status: COMPLETED | OUTPATIENT
Start: 2021-03-18 | End: 2021-03-22

## 2021-03-18 RX ORDER — DEXAMETHASONE 0.5 MG/5ML
6 ELIXIR ORAL DAILY
Refills: 0 | Status: COMPLETED | OUTPATIENT
Start: 2021-03-18 | End: 2021-03-27

## 2021-03-18 RX ORDER — LANOLIN ALCOHOL/MO/W.PET/CERES
3 CREAM (GRAM) TOPICAL AT BEDTIME
Refills: 0 | Status: DISCONTINUED | OUTPATIENT
Start: 2021-03-18 | End: 2021-03-30

## 2021-03-18 RX ORDER — REMDESIVIR 5 MG/ML
100 INJECTION INTRAVENOUS EVERY 24 HOURS
Refills: 0 | Status: COMPLETED | OUTPATIENT
Start: 2021-03-19 | End: 2021-03-22

## 2021-03-18 RX ORDER — DEXTROSE 50 % IN WATER 50 %
15 SYRINGE (ML) INTRAVENOUS ONCE
Refills: 0 | Status: DISCONTINUED | OUTPATIENT
Start: 2021-03-18 | End: 2021-03-30

## 2021-03-18 RX ORDER — ENOXAPARIN SODIUM 100 MG/ML
40 INJECTION SUBCUTANEOUS DAILY
Refills: 0 | Status: DISCONTINUED | OUTPATIENT
Start: 2021-03-18 | End: 2021-03-30

## 2021-03-18 RX ORDER — PIPERACILLIN AND TAZOBACTAM 4; .5 G/20ML; G/20ML
3.38 INJECTION, POWDER, LYOPHILIZED, FOR SOLUTION INTRAVENOUS EVERY 8 HOURS
Refills: 0 | Status: DISCONTINUED | OUTPATIENT
Start: 2021-03-18 | End: 2021-03-23

## 2021-03-18 RX ADMIN — REMDESIVIR 500 MILLIGRAM(S): 5 INJECTION INTRAVENOUS at 12:43

## 2021-03-18 RX ADMIN — CARBIDOPA AND LEVODOPA 1 TABLET(S): 25; 100 TABLET ORAL at 07:16

## 2021-03-18 RX ADMIN — TAMSULOSIN HYDROCHLORIDE 0.4 MILLIGRAM(S): 0.4 CAPSULE ORAL at 22:25

## 2021-03-18 RX ADMIN — Medication 3 MILLIGRAM(S): at 22:25

## 2021-03-18 RX ADMIN — PIPERACILLIN AND TAZOBACTAM 25 GRAM(S): 4; .5 INJECTION, POWDER, LYOPHILIZED, FOR SOLUTION INTRAVENOUS at 13:45

## 2021-03-18 RX ADMIN — ATORVASTATIN CALCIUM 10 MILLIGRAM(S): 80 TABLET, FILM COATED ORAL at 22:24

## 2021-03-18 RX ADMIN — Medication 20 MILLIGRAM(S): at 07:17

## 2021-03-18 RX ADMIN — PANTOPRAZOLE SODIUM 40 MILLIGRAM(S): 20 TABLET, DELAYED RELEASE ORAL at 18:03

## 2021-03-18 RX ADMIN — PIPERACILLIN AND TAZOBACTAM 25 GRAM(S): 4; .5 INJECTION, POWDER, LYOPHILIZED, FOR SOLUTION INTRAVENOUS at 22:24

## 2021-03-18 RX ADMIN — PIPERACILLIN AND TAZOBACTAM 25 GRAM(S): 4; .5 INJECTION, POWDER, LYOPHILIZED, FOR SOLUTION INTRAVENOUS at 07:44

## 2021-03-18 RX ADMIN — ENOXAPARIN SODIUM 40 MILLIGRAM(S): 100 INJECTION SUBCUTANEOUS at 12:44

## 2021-03-18 RX ADMIN — PANTOPRAZOLE SODIUM 40 MILLIGRAM(S): 20 TABLET, DELAYED RELEASE ORAL at 07:17

## 2021-03-18 RX ADMIN — Medication 50 GRAM(S): at 10:35

## 2021-03-18 RX ADMIN — Medication 6 MILLIGRAM(S): at 07:17

## 2021-03-18 RX ADMIN — QUETIAPINE FUMARATE 50 MILLIGRAM(S): 200 TABLET, FILM COATED ORAL at 22:25

## 2021-03-18 RX ADMIN — CARBIDOPA AND LEVODOPA 1 TABLET(S): 25; 100 TABLET ORAL at 13:38

## 2021-03-18 RX ADMIN — Medication 1: at 18:02

## 2021-03-18 RX ADMIN — Medication 50 GRAM(S): at 05:16

## 2021-03-18 RX ADMIN — CARBIDOPA AND LEVODOPA 1 TABLET(S): 25; 100 TABLET ORAL at 22:24

## 2021-03-18 RX ADMIN — Medication 1: at 13:38

## 2021-03-18 NOTE — H&P ADULT - NSHPPHYSICALEXAM_GEN_ALL_CORE
Vital Signs Last 24 Hrs  T(C): 35.7 (17 Mar 2021 22:26), Max: 35.7 (17 Mar 2021 22:26)  T(F): 96.3 (17 Mar 2021 22:26), Max: 96.3 (17 Mar 2021 22:26)  HR: 68 (17 Mar 2021 23:38) (52 - 75)  BP: 124/76 (17 Mar 2021 23:38) (95/60 - 150/46)  BP(mean): --  RR: 20 (17 Mar 2021 23:38) (16 - 20)  SpO2: 99% (17 Mar 2021 23:38) (89% - 100%)    CONSTITUTIONAL: Well-developed; well-nourished; in no acute distress but appears weak.   SKIN: cold, dry  HEAD: Normocephalic; atraumatic.  EYES: no conjunctival injection.   ENT: No nasal discharge; airway clear.  NECK: Supple; non tender.  CARD: S1, S2 no murmurs, gallops, or rubs.   RESP: +fine crackles at bases.   ABD: soft not tender but firm., no guarding  EXT:  No cyanosis. +1 distal edema.   NEURO: AOx1  PSYCH: Cooperative, appropriate. Vital Signs Last 24 Hrs  T(C): 35.7 (17 Mar 2021 22:26), Max: 35.7 (17 Mar 2021 22:26)  T(F): 96.3 (17 Mar 2021 22:26), Max: 96.3 (17 Mar 2021 22:26)  HR: 68 (17 Mar 2021 23:38) (52 - 75)  BP: 124/76 (17 Mar 2021 23:38) (95/60 - 150/46)  BP(mean): --  RR: 20 (17 Mar 2021 23:38) (16 - 20)  SpO2: 99% (17 Mar 2021 23:38) (89% - 100%)    CONSTITUTIONAL: Well-developed; well-nourished; in no acute distress, chronically ill-appearing, weak, on 3L NC   SKIN: warm, dry   HEAD: Normocephalic; atraumatic.  EYES: no conjunctival injection.   ENT: No nasal discharge; airway clear.  NECK: Supple; non tender.  CARD: bradycardic, no murmurs   RESP: +fine crackles at bases, decreased breath sounds L side   ABD: soft non tender, reducible hernia   EXT:  No cyanosis. no LE edema   NEURO: AOx2   PSYCH: Cooperative, appropriate Vital Signs Last 24 Hrs  T(C): 35.7 (17 Mar 2021 22:26), Max: 35.7 (17 Mar 2021 22:26)  T(F): 96.3 (17 Mar 2021 22:26), Max: 96.3 (17 Mar 2021 22:26)  HR: 68 (17 Mar 2021 23:38) (52 - 75)  BP: 124/76 (17 Mar 2021 23:38) (95/60 - 150/46)  BP(mean): --  RR: 20 (17 Mar 2021 23:38) (16 - 20)  SpO2: 99% (17 Mar 2021 23:38) (89% - 100%)    CONSTITUTIONAL: Well-developed; well-nourished; in no acute distress, chronically ill-appearing, weak, on 3L NC   SKIN: warm, dry   HEAD: Normocephalic; atraumatic.  EYES: no conjunctival injection.   ENT: No nasal discharge; airway clear.  NECK: Supple; non tender.  CARD: bradycardic, no murmurs   RESP: +fine crackles at bases, decreased breath sounds L side   ABD: soft non tender, reducible hernia   EXT:  No cyanosis. no LE edema   NEURO: AOx2, nonfocal exam  PSYCH: Cooperative, appropriate

## 2021-03-18 NOTE — PROGRESS NOTE ADULT - ASSESSMENT
82 y/o M HTN, DM, HLD, dementia (AOx2 at baseline), Parkinson's disease, CAD s/p stents x2 (>20 years ago), recent new onset HF (EF: 50%, Moderate diastolic dysfunction (Stage II), moderate pulmonary hypertension, small pericardial effusion), L pleural effusion, BPH, gastric ulcers, admitted for further management of sepsis 2/2 empyema and COVID, along with hypoxic respiratory failure.

## 2021-03-18 NOTE — H&P ADULT - PROBLEM SELECTOR PLAN 3
- As per EMS BP was 72/35  - Likely in the setting of sepsis   - S/p 1L NS in the ED with improvement in BP - currently 124/76   - Judicious use of fluids given heart failure  - Monitor BP  - C/w infectious workup as above   - Check TSH, cortisol

## 2021-03-18 NOTE — PROGRESS NOTE ADULT - PROBLEM SELECTOR PLAN 4
Complicated by acute hypoxic respiratory failure.  - Start remdesivir and decadron (3/18 - )  - Trend inflammatory markers  - Wean oxygen as tolerated Presumed due to sepsis. Now resolved s/p IV fluids. TSH WNL  - Monitor BP. Encourage adequate hydration and oral intake  - C/w infectious workup as above

## 2021-03-18 NOTE — H&P ADULT - PROBLEM SELECTOR PLAN 10
- Diet: CC DASH diet  - DVT: lovenox  - Dispo: pending clinical improvement  - GOC: MOLST filled out last hospitalization, pt DNR with trial of intubation - Diet: CC DASH diet  - DVT: lovenox  - Dispo: pending clinical improvement  - GOC: MOLST filled out last hospitalization, pt DNR with trial of intubation, confirmed with brother - Diet: CC DASH diet  - DVT: lovenox  - Dispo: pending clinical improvement  - GOC: MOLST filled out last hospitalization, pt DNR with trial of intubation, confirmed with brother overnight. Brother to bring MOLST in today.

## 2021-03-18 NOTE — PROGRESS NOTE ADULT - PROBLEM SELECTOR PLAN 6
- TTE 2/2021 EF: 50%, Moderate diastolic dysfunction (Stage II), moderate pulmonary hypertension, Small pericardial effusion, Left pleural effusion (new compared to 2018)   - Trop 31 --> 31   - proBNP 1306 (prior hospitalization 2800)   - CXR mild pulmonary edema, moderate L pleural effusion grossly unchanged from 2/8/21  - Cards following, recs appreciated  - C/w lasix 20 mg PO daily   - Recent abnormal pharmacological stress test: moderate defects in apical, distal lateral walls that are predominantly reversible, suggestive of ischemia - brother declined catheterization given pt's hx of dementia and Parkinson's   - C/w plavix given stent hx   - Hold metoprolol succinate 50 daily in the setting of sepsis/hypotension   - Monitor on telemetry, replete lytes for K>4, Mg>2 - QTc 485 likely in the setting of seroquel for dementia   - C/w seroquel for mood  - Monitor EKG

## 2021-03-18 NOTE — H&P ADULT - PROBLEM SELECTOR PLAN 1
- Hypothermic on presentation T 92.6-96.3, tachypneic with hypoxia on room air  - Likely multifactorial in the setting of COVID and empyema  - U/A negative  - COVID+  - CXR mild pulmonary edema, moderate L pleural effusion grossly unchanged from 2/8/2  - CT chest w/ IV contrast: Bilateral patchy ground glass opacities throughout all the lobes of the bilateral lungs. Findings are likely secondary to infection, possibly COVID pneumonia. Large left pleural fluid collection suspicious for empyema.  A 17.1 x 11.0 x 8.3 cm fluid collection with peripheral enhancement in the left basilar pleural space. Small pericardial effusion  - S/p vancomycin 1 g IV, zosyn 3.375 g IV, and NS 1L bolus in the ED  - F/u blood cultures, urine culture   - C/w broad spectrum antibiotics  - Management of COVID as below  - Lactate 4.2 --> 2.1, continue to trend - Hypothermic on presentation T 92.6-96.3, tachypneic with hypoxia on room air  - Likely multifactorial in the setting of COVID and empyema  - U/A negative  - COVID+  - CXR mild pulmonary edema, moderate L pleural effusion grossly unchanged from 2/8/2  - CT chest w/ IV contrast: Bilateral patchy ground glass opacities throughout all the lobes of the bilateral lungs. Findings are likely secondary to infection, possibly COVID pneumonia. Large left pleural fluid collection suspicious for empyema.  A 17.1 x 11.0 x 8.3 cm fluid collection with peripheral enhancement in the left basilar pleural space. Small pericardial effusion  - S/p vancomycin 1 g IV, zosyn 3.375 g IV, and NS 1L bolus in the ED  - F/u blood cultures, urine culture   - C/w broad spectrum antibiotics zosyn (3/18 - )  - Management of COVID as below  - Lactate 4.2 --> 2.1, continue to trend

## 2021-03-18 NOTE — PROGRESS NOTE ADULT - PROBLEM SELECTOR PLAN 10
- Diet: CC DASH diet  - DVT: lovenox  - Dispo: pending clinical improvement  - GOC: MOLST filled out last hospitalization, pt DNR with trial of intubation, confirmed with brother overnight. Brother to bring MOLST in today.

## 2021-03-18 NOTE — H&P ADULT - ASSESSMENT
82 y/o M HTN, DM, HLD, dementia (AOx2 at baseline), Parkinson's disease, CAD s/p stents x2 (>20 years ago), recent new onset HF (EF: 50%, Moderate diastolic dysfunction (Stage II), moderate pulmonary hypertension, small pericardial effusion), L pleural effusion, BPH, gastric ulcers, sent from his Cardiology office for hypotension. Per EMS on scene pressure was 72/35.     Denies any recent f/c, n/v, cp, sob, abd pain. States he has not eaten much over the past 4 days. Last BM was several days ago. States this home healthcare aide take care of him and states that he does not live w/ family. 82 y/o M HTN, DM, HLD, dementia (AOx2 at baseline), Parkinson's disease, CAD s/p stents x2 (>20 years ago), recent new onset HF (EF: 50%, Moderate diastolic dysfunction (Stage II), moderate pulmonary hypertension, small pericardial effusion), L pleural effusion, BPH, gastric ulcers, admitted for further management of sepsis 2/2 empyema and COVID, along with hypoxic respiratory failure.

## 2021-03-18 NOTE — PATIENT PROFILE ADULT - HAVE YOU EXPERIENCED VIOLENCE OR A TRAUMATIC EVENT?
Subjective:       Patient ID: Mandi Cline is a 67 y.o. female.    Chief Complaint: Follow-up (6 month follow )    Since our last visit the patient had a work place injury(APRIL)-fell tripped over a rack, hurt back rapid clinic-took x-ray only at that time.   She continued to have severe pain.Daughter took patient to an  He found specialist for patient and was told cannot go back to work.   She started physical therapy at [facility]. She is worried that she has not gotten much relief yet. She tells me that she has had more bladder incontinence over the past few months. Sometimes this is dribble, sometimes more urine. No changes in bowel habits. She has an ortho appointment later this week.    She also has the following active problems.  1. Prediabetes-Lab Results       Component                Value               Date                       LDLCALC                  92.2                03/13/2018                  2.  Vitamin-D deficiency-vitamin-D 29.  She is not currently taking her vitamin-D replacement consistently.    AN  WAS PRESENT DURING THE ENTIRE VISIT.  IS THE PATIENT PREDOMINANTLY SPEAKS Cambodian.    Review of Systems   Constitutional: Negative for appetite change, chills and fever.   HENT: Negative for ear pain and postnasal drip.    Eyes: Negative for pain and itching.   Respiratory: Negative for chest tightness and shortness of breath.    Gastrointestinal: Negative for abdominal distention and abdominal pain.   Endocrine: Negative for polydipsia and polyuria.   Genitourinary: Negative for difficulty urinating and flank pain.   Musculoskeletal: Positive for arthralgias, back pain and myalgias.   Skin: Negative for color change and pallor.   Neurological: Negative for light-headedness and headaches.   Hematological: Negative for adenopathy. Does not bruise/bleed easily.   Psychiatric/Behavioral: Negative for agitation.       Past medical, surgical, family and social history  reviewed.  Objective:     Vitals:    06/17/19 1533   BP: 124/70   Pulse: 72   Resp: 18   Temp: 98 °F (36.7 °C)   TempSrc: Oral   SpO2: 96%   Weight: 70.2 kg (154 lb 14 oz)   Height: 5' (1.524 m)   PainSc:   7   PainLoc: Head     Body mass index is 30.25 kg/m².     Physical Exam   Constitutional: She is oriented to person, place, and time. She appears well-developed. No distress.   Obese female    HENT:   Head: Normocephalic and atraumatic.   Right Ear: Hearing, tympanic membrane, external ear and ear canal normal.   Left Ear: Hearing, tympanic membrane, external ear and ear canal normal.   Nose: Nose normal.   Mouth/Throat: Uvula is midline, oropharynx is clear and moist and mucous membranes are normal.   Eyes: Pupils are equal, round, and reactive to light. Conjunctivae and EOM are normal. Right eye exhibits no discharge. Left eye exhibits no discharge.   Neck: Trachea normal and normal range of motion. Neck supple. No JVD present. Carotid bruit is not present. No thyromegaly present.   Cardiovascular: Normal rate and regular rhythm. Exam reveals no gallop and no friction rub.   No murmur heard.  Pulmonary/Chest: Effort normal and breath sounds normal. No respiratory distress. She has no wheezes. She has no rales. She exhibits no tenderness.   Abdominal: Soft. Bowel sounds are normal. She exhibits no distension and no mass. There is no tenderness. There is no rebound and no guarding.   Musculoskeletal: Normal range of motion.   Pain over the lumbar spine paraspinal muscles.  Pain at 30 ° on the right, neg SLT on left   Neurological: She is alert and oriented to person, place, and time. Coordination normal.   Skin: Skin is warm and dry. She is not diaphoretic.   Psychiatric: She has a normal mood and affect. Her behavior is normal. Judgment and thought content normal.       Assessment:       1. Prediabetes    2. Obesity (BMI 30-39.9)    3. Vitamin D deficiency    4. Encounter for long-term current use of medication     5. Colon cancer screening    6. Immunization due    7. HX: breast cancer    8. Back pain, unspecified back location, unspecified back pain laterality, unspecified chronicity        Plan:       Mandi was seen today for follow-up.    Diagnoses and all orders for this visit:    Prediabetes  Check hemoglobin A1c    Obesity (BMI 30-39.9)  Weight loss discussed    Vitamin D deficiency  -     Vitamin D; Future    Encounter for long-term current use of medication  -     CBC auto differential; Future  -     Comprehensive metabolic panel; Future  -     Hemoglobin A1c; Future  -     Lipid panel; Future  -     TSH; Future    Colon cancer screening  -     Case request GI: COLONOSCOPY    Immunization due  -     Pneumococcal Conjugate Vaccine (13 Valent) (IM)    HX: breast cancer  -     Ambulatory referral to Hematology / Oncology    Back pain, unspecified back location, unspecified back pain laterality, unspecified chronicity  Follow-up with Orthopedics later this week         no

## 2021-03-18 NOTE — PROGRESS NOTE ADULT - PROBLEM SELECTOR PLAN 7
- QTc 485 likely in the setting of seroquel for dementia   - C/w seroquel for mood  - Monitor EKG - Hold BP meds (losartan 25, metoprolol succinate 50) given hypotension --> will assess daily when to resume  - Monitor BP

## 2021-03-18 NOTE — PROGRESS NOTE ADULT - ASSESSMENT
Assessment and Plan    80 y/o M w/ pmhx of dementia, parkinson's disease, DM, HTN, CHF, CAD 2 stents sent from cardiology office for hypotension.     EKG: Sinus Josemanuel 59 PACs      1. Sepsis  -patient sent from office with hypotension, improving in ER  -COVID positive  -cont to trend lactate  -on IV abx  -CT chest with b/l opacities & left large pleural effusion concerning for empyema, t/t per primary team, brother refusing thoracentesis at this time      2.CHF  -echo with mild LV systolic dysfunction (new compared to 2018)  -currently appears euvolemic on exam, c/w PO lasix 20mg   -stress test positive previous admission with small mod defect , spoke to pt brother jasvir at that time and he did not want pt getting a cath as he has dementia and parkinsons, will treat medically cont plavix    3. CAD s/p stent  -cath in 2010 showed mild luminal disease and patent stents  -denies chest pain  -echo with mild LV dysfunction  -c/w plavix     4. HTN  -hypotensive on admission, improving   -BP meds on hold   -continue to monitor BP   Assessment and Plan    82 y/o M w/ pmhx of dementia, parkinson's disease, DM, HTN, CHF, CAD 2 stents sent from cardiology office for hypotension.     EKG: Sinus Josemanuel 59 PACs      1. Sepsis  -patient sent from office with hypotension, improving  -COVID positive  -cont to trend lactate  -on IV abx  -CT chest with b/l opacities & left large pleural effusion concerning for empyema, t/t per primary team, brother refusing thoracentesis at this time      2.CHF  -echo with mild LV systolic dysfunction (new compared to 2018)  -currently appears euvolemic on exam, c/w PO lasix 20mg   -stress test positive previous admission with small mod defect , spoke to pt brother jasvir at that time and he did not want pt getting a cath as he has dementia and parkinsons, will treat medically cont plavix    3. CAD s/p stent  -cath in 2010 showed mild luminal disease and patent stents  -denies chest pain  -echo with mild LV dysfunction  -c/w plavix     4. HTN  -hypotensive on admission, improving   -BP meds on hold   -continue to monitor BP

## 2021-03-18 NOTE — H&P ADULT - NSHPPOAPULMEMBOLUS_GEN_A_CORE

## 2021-03-18 NOTE — H&P ADULT - NSHPREVIEWOFSYSTEMS_GEN_ALL_CORE
ROS: unable to obtain given dementia REVIEW OF SYSTEMS:    CONSTITUTIONAL: No fevers or chills  EYES/ENT: No visual changes;  No dysphagia  NECK: No pain or stiffness  RESPIRATORY: No cough, No shortness of breath  CARDIOVASCULAR: No chest pain. No lower extremity edema  GASTROINTESTINAL: No abdominal pain. No diarrhea or constipation. No melena or hematochezia.  GENITOURINARY: No dysuria, frequency or hematuria  NEUROLOGICAL: No numbness or weakness  SKIN: No itching, burning, rashes, or lesions

## 2021-03-18 NOTE — PROGRESS NOTE ADULT - PROBLEM SELECTOR PLAN 2
CT chest large left pleural fluid collection suspicious for empyema --> A 17.1 x 11.0 x 8.3 cm fluid collection with peripheral enhancement in the left basilar pleural space  - Pt with L pleural effusion 2/2020, followed by Pulm at that time  Thoracentesis warranted given the unilateral presentation, brother refused then  - Given concern for empyema now, re-addressed with brother to see if his wishes have changed - explained risks/benefits, does NOT want to pursue thoracentesis - Likely 2/2 COVID  - Management as above

## 2021-03-18 NOTE — PROGRESS NOTE ADULT - PROBLEM SELECTOR PLAN 5
- Likely 2/2 COVID  - Management as above - TTE 2/2021 EF: 50%, Moderate diastolic dysfunction (Stage II), moderate pulmonary hypertension, Small pericardial effusion  - proBNP 1306 (prior hospitalization 2800)   - Cards following, recs appreciated  - C/w lasix 20 mg PO daily   - Recent abnormal pharmacological stress test: moderate defects in apical, distal lateral walls that are predominantly reversible, suggestive of ischemia - brother declined catheterization given pt's hx of dementia and Parkinson's   - C/w plavix given stent hx   - Hold metoprolol succinate 50 daily in the setting of sepsis/hypotension --> will evaluate daily as to when to resume  - pt not in acute exacerbation, can d/c telemetry.

## 2021-03-18 NOTE — PROGRESS NOTE ADULT - PROBLEM SELECTOR PROBLEM 9
Type 2 diabetes mellitus without complication, without long-term current use of insulin Preventive measure

## 2021-03-18 NOTE — H&P ADULT - ATTENDING COMMENTS
Pt seen and examined. I discussed the case with Dr. Plascencia and agree. Pt presenting septic with resp failure 2/2 COVID and associated likely empyema. Will manage covid with steroids/remdesivir. CT imaging suspicious for empyema however pt's brother adamantly refusing thora for pt currently. Will continue empiric antibiotics for now. Can attempt further discussion with pt's brother in AM regarding need for thora to accurately diagnose/guide tx.

## 2021-03-18 NOTE — PROGRESS NOTE ADULT - SUBJECTIVE AND OBJECTIVE BOX
Patient is a 81y old  Male who presents with a chief complaint of hypotension (18 Mar 2021 00:12)      SUBJECTIVE / OVERNIGHT EVENTS:      MEDICATIONS  (STANDING):  atorvastatin 10 milliGRAM(s) Oral at bedtime  carbidopa/levodopa  25/100 1 Tablet(s) Oral three times a day  dexAMETHasone  Injectable 6 milliGRAM(s) IV Push daily  dextrose 40% Gel 15 Gram(s) Oral once  dextrose 5%. 1000 milliLiter(s) (50 mL/Hr) IV Continuous <Continuous>  dextrose 5%. 1000 milliLiter(s) (100 mL/Hr) IV Continuous <Continuous>  dextrose 50% Injectable 25 Gram(s) IV Push once  dextrose 50% Injectable 12.5 Gram(s) IV Push once  dextrose 50% Injectable 25 Gram(s) IV Push once  enoxaparin Injectable 40 milliGRAM(s) SubCutaneous daily  furosemide    Tablet 20 milliGRAM(s) Oral daily  glucagon  Injectable 1 milliGRAM(s) IntraMuscular once  insulin lispro (ADMELOG) corrective regimen sliding scale   SubCutaneous three times a day before meals  insulin lispro (ADMELOG) corrective regimen sliding scale   SubCutaneous at bedtime  melatonin 3 milliGRAM(s) Oral at bedtime  pantoprazole    Tablet 40 milliGRAM(s) Oral every 12 hours  piperacillin/tazobactam IVPB.. 3.375 Gram(s) IV Intermittent every 8 hours  QUEtiapine 50 milliGRAM(s) Oral at bedtime  remdesivir  IVPB 200 milliGRAM(s) IV Intermittent every 24 hours  remdesivir  IVPB   IV Intermittent   tamsulosin 0.4 milliGRAM(s) Oral at bedtime    MEDICATIONS  (PRN):      Vital Signs Last 24 Hrs  T(C): 36.6 (18 Mar 2021 09:37), Max: 36.6 (18 Mar 2021 09:37)  T(F): 97.8 (18 Mar 2021 09:37), Max: 97.8 (18 Mar 2021 09:37)  HR: 56 (18 Mar 2021 09:37) (52 - 75)  BP: 125/56 (18 Mar 2021 09:37) (95/60 - 150/46)  BP(mean): --  RR: 18 (18 Mar 2021 09:37) (16 - 20)  SpO2: 98% (18 Mar 2021 09:37) (89% - 100%)  CAPILLARY BLOOD GLUCOSE      POCT Blood Glucose.: 135 mg/dL (18 Mar 2021 09:08)  POCT Blood Glucose.: 100 mg/dL (17 Mar 2021 23:37)  POCT Blood Glucose.: 176 mg/dL (17 Mar 2021 14:50)    I&O's Summary    18 Mar 2021 07:01  -  18 Mar 2021 10:47  --------------------------------------------------------  IN: 0 mL / OUT: 200 mL / NET: -200 mL          PHYSICAL EXAM  GENERAL: NAD, well-developed  HEAD:  Atraumatic, Normocephalic  EYES: EOMI, PERRLA, conjunctiva and sclera clear  NECK: Supple, No JVD  CHEST/LUNG: Clear to auscultation bilaterally; No wheeze  HEART: Regular rate and rhythm; No murmurs, rubs, or gallops  ABDOMEN: Soft, Nontender, Nondistended; Bowel sounds present  EXTREMITIES:  2+ Peripheral Pulses, No clubbing, cyanosis, or edema  PSYCH: AAOx3  SKIN: No rashes or lesions    LABS:                        10.8   5.90  )-----------( 369      ( 18 Mar 2021 04:07 )             34.3     03-18    142  |  103  |  37<H>  ----------------------------<  100<H>  3.6   |  29  |  0.69    Ca    8.8      18 Mar 2021 04:07  Phos  3.1     03-18  Mg     1.4     -18    TPro  6.5  /  Alb  2.7<L>  /  TBili  0.3  /  DBili  x   /  AST  11  /  ALT  5   /  AlkPhos  97  03-17          Urinalysis Basic - ( 17 Mar 2021 15:34 )    Color: Yellow / Appearance: Clear / S.029 / pH: x  Gluc: x / Ketone: Negative  / Bili: Negative / Urobili: 6 mg/dL   Blood: x / Protein: 30 mg/dL / Nitrite: Negative   Leuk Esterase: Negative / RBC: 6 /HPF / WBC 4 /HPF   Sq Epi: x / Non Sq Epi: 2 /HPF / Bacteria: Negative        RADIOLOGY & ADDITIONAL TESTS:    Imaging Personally Reviewed:  Consultant(s) Notes Reviewed:    Care Discussed with Consultants/Other Providers:   Patient is a 81y old  Male who presents with a chief complaint of hypotension (18 Mar 2021 00:12)      SUBJECTIVE / OVERNIGHT EVENTS:  Patient with cough with intermittent sputum production. He otherwise stated he felt good. He denied chest pain, SOB    MEDICATIONS  (STANDING):  atorvastatin 10 milliGRAM(s) Oral at bedtime  carbidopa/levodopa  25/100 1 Tablet(s) Oral three times a day  dexAMETHasone  Injectable 6 milliGRAM(s) IV Push daily  dextrose 40% Gel 15 Gram(s) Oral once  dextrose 5%. 1000 milliLiter(s) (50 mL/Hr) IV Continuous <Continuous>  dextrose 5%. 1000 milliLiter(s) (100 mL/Hr) IV Continuous <Continuous>  dextrose 50% Injectable 25 Gram(s) IV Push once  dextrose 50% Injectable 12.5 Gram(s) IV Push once  dextrose 50% Injectable 25 Gram(s) IV Push once  enoxaparin Injectable 40 milliGRAM(s) SubCutaneous daily  furosemide    Tablet 20 milliGRAM(s) Oral daily  glucagon  Injectable 1 milliGRAM(s) IntraMuscular once  insulin lispro (ADMELOG) corrective regimen sliding scale   SubCutaneous three times a day before meals  insulin lispro (ADMELOG) corrective regimen sliding scale   SubCutaneous at bedtime  melatonin 3 milliGRAM(s) Oral at bedtime  pantoprazole    Tablet 40 milliGRAM(s) Oral every 12 hours  piperacillin/tazobactam IVPB.. 3.375 Gram(s) IV Intermittent every 8 hours  QUEtiapine 50 milliGRAM(s) Oral at bedtime  remdesivir  IVPB 200 milliGRAM(s) IV Intermittent every 24 hours  remdesivir  IVPB   IV Intermittent   tamsulosin 0.4 milliGRAM(s) Oral at bedtime    MEDICATIONS  (PRN):      Vital Signs Last 24 Hrs  T(C): 36.6 (18 Mar 2021 09:37), Max: 36.6 (18 Mar 2021 09:37)  T(F): 97.8 (18 Mar 2021 09:37), Max: 97.8 (18 Mar 2021 09:37)  HR: 56 (18 Mar 2021 09:37) (52 - 75)  BP: 125/56 (18 Mar 2021 09:37) (95/60 - 150/46)  RR: 18 (18 Mar 2021 09:37) (16 - 20)  SpO2: 98% (18 Mar 2021 09:37) (89% - 100%)  CAPILLARY BLOOD GLUCOSE      POCT Blood Glucose.: 135 mg/dL (18 Mar 2021 09:08)  POCT Blood Glucose.: 100 mg/dL (17 Mar 2021 23:37)  POCT Blood Glucose.: 176 mg/dL (17 Mar 2021 14:50)    I&O's Summary    18 Mar 2021 07:01  -  18 Mar 2021 10:47  --------------------------------------------------------  IN: 0 mL / OUT: 200 mL / NET: -200 mL          PHYSICAL EXAM  GENERAL: NAD, elderly man laying comfortably in bed, speaking in full sentences on RA  CHEST/LUNG: Crackles in right mid and lower lung fields; No BS at mid to lower lung fields on the left; No wheezes. Normal respiratory effort  HEART: Regular rate and rhythm  ABDOMEN: Soft, Nontender, Nondistended  EXTREMITIES:  2+ Peripheral Pulses, No clubbing, cyanosis, or edema  PSYCH: Alert, oriented to self and place. Cooperative, follows simple commands and pleasant      LABS:                        10.8   5.90  )-----------( 369      ( 18 Mar 2021 04:07 )             34.3     03-18    142  |  103  |  37<H>  ----------------------------<  100<H>  3.6   |  29  |  0.69    Ca    8.8      18 Mar 2021 04:07  Phos  3.1     03-18  Mg     1.4     03-18    TPro  6.5  /  Alb  2.7<L>  /  TBili  0.3  /  DBili  x   /  AST  11  /  ALT  5   /  AlkPhos  97  03-17            Consultant(s) Notes Reviewed:  yes  Care Discussed with Consultants/Other Providers:

## 2021-03-18 NOTE — PROGRESS NOTE ADULT - SUBJECTIVE AND OBJECTIVE BOX
Bennie Hwang MD  Interventional Cardiology / Endovascular Specialist  [place] Office : [phone] [place], [place] [address]  Tel:   [place] Office : [address] [facility] [place] [address]  Tel: [phone]  Cell : [phone]    Subjective/Overnight events: Patient lying in bed comfortably. No acute distress. having productive cough at times   	  MEDICATIONS:  enoxaparin Injectable 40 milliGRAM(s) SubCutaneous daily  furosemide    Tablet 20 milliGRAM(s) Oral daily  tamsulosin 0.4 milliGRAM(s) Oral at bedtime    piperacillin/tazobactam IVPB.. 3.375 Gram(s) IV Intermittent every 8 hours  remdesivir  IVPB 200 milliGRAM(s) IV Intermittent every 24 hours  remdesivir  IVPB   IV Intermittent       carbidopa/levodopa  25/100 1 Tablet(s) Oral three times a day  melatonin 3 milliGRAM(s) Oral at bedtime  QUEtiapine 50 milliGRAM(s) Oral at bedtime    pantoprazole    Tablet 40 milliGRAM(s) Oral every 12 hours    atorvastatin 10 milliGRAM(s) Oral at bedtime  dexAMETHasone  Injectable 6 milliGRAM(s) IV Push daily  dextrose 40% Gel 15 Gram(s) Oral once  dextrose 50% Injectable 25 Gram(s) IV Push once  dextrose 50% Injectable 12.5 Gram(s) IV Push once  dextrose 50% Injectable 25 Gram(s) IV Push once  glucagon  Injectable 1 milliGRAM(s) IntraMuscular once  insulin lispro (ADMELOG) corrective regimen sliding scale   SubCutaneous three times a day before meals  insulin lispro (ADMELOG) corrective regimen sliding scale   SubCutaneous at bedtime    dextrose 5%. 1000 milliLiter(s) IV Continuous <Continuous>  dextrose 5%. 1000 milliLiter(s) IV Continuous <Continuous>      PAST MEDICAL/SURGICAL HISTORY  PAST MEDICAL & SURGICAL HISTORY:  BPH (benign prostatic hyperplasia)    CAD (Coronary Artery Disease)  s/p cardiac stent ( &gt; 20 years ago)    HTN (Hypertension)    DM (Diabetes Mellitus)    Hypercholesterolemia    Coronary Stent  x 2 ( 20 years )        SOCIAL HISTORY: Substance Use (street drugs): ( x ) never used  (  ) other:    FAMILY HISTORY:  No pertinent family history in first degree relatives        REVIEW OF SYSTEMS:  CONSTITUTIONAL: No fever, weight loss, or fatigue  EYES: No eye pain, visual disturbances, or discharge  ENMT:  No difficulty hearing, tinnitus, vertigo; No sinus or throat pain  BREASTS: No pain, masses, or nipple discharge  GASTROINTESTINAL: No abdominal or epigastric pain. No nausea, vomiting, or hematemesis; No diarrhea or constipation. No melena or hematochezia.  GENITOURINARY: No dysuria, frequency, hematuria, or incontinence  NEUROLOGICAL: No headaches, memory loss, loss of strength, numbness, or tremors  ENDOCRINE: No heat or cold intolerance; No hair loss  MUSCULOSKELETAL: No joint pain or swelling; No muscle, back, or extremity pain  PSYCHIATRIC: No depression, anxiety, mood swings, or difficulty sleeping  HEME/LYMPH: No easy bruising, or bleeding gums  All others negative    PHYSICAL EXAM:  T(C): 36.6 (03-18-21 @ 09:37), Max: 36.6 (03-18-21 @ 09:37)  HR: 56 (03-18-21 @ 09:37) (52 - 75)  BP: 125/56 (03-18-21 @ 09:37) (95/60 - 150/46)  RR: 18 (03-18-21 @ 09:37) (16 - 20)  SpO2: 98% (03-18-21 @ 09:37) (89% - 100%)  Wt(kg): --  I&O's Summary    18 Mar 2021 07:01  -  18 Mar 2021 12:29  --------------------------------------------------------  IN: 0 mL / OUT: 400 mL / NET: -400 mL      Height (cm): 170 (03-17 @ 23:38)  Weight (kg): 65.7 (03-17 @ 23:38)  BMI (kg/m2): 22.7 (03-17 @ 23:38)  BSA (m2): 1.76 (03-17 @ 23:38)      GENERAL: NAD  EYES: EOMI, PERRLA, conjunctiva and sclera clear  ENMT: No tonsillar erythema, exudates, or enlargement  Cardiovascular: Normal S1 S2, No JVD, No murmurs, No edema  Respiratory: Lungs b/l crackles at bases	  Gastrointestinal:  Soft, Non-tender, + BS	  Extremities: No edema                                    10.8   5.90  )-----------( 369      ( 18 Mar 2021 04:07 )             34.3     03-18    142  |  103  |  37<H>  ----------------------------<  100<H>  3.6   |  29  |  0.69    Ca    8.8      18 Mar 2021 04:07  Phos  3.1     03-18  Mg     1.4     03-18    TPro  6.5  /  Alb  2.7<L>  /  TBili  0.3  /  DBili  x   /  AST  11  /  ALT  5   /  AlkPhos  97  03-17    proBNP: Serum Pro-Brain Natriuretic Peptide: 1306 pg/mL (03-17 @ 15:27)    Lipid Profile:   HgA1c:   TSH: Thyroid Stimulating Hormone, Serum: 2.21 uIU/mL (03-18 @ 04:07)      Consultant(s) Notes Reviewed:  [x ] YES  [ ] NO    Care Discussed with Consultants/Other Providers [ x] YES  [ ] NO    Imaging Personally Reviewed independently:  [x] YES  [ ] NO    All labs, radiologic studies, vitals, orders and medications list reviewed. Patient is seen and examined at bedside. Case discussed with medical team.

## 2021-03-18 NOTE — PROGRESS NOTE ADULT - PROBLEM SELECTOR PLAN 3
Presumed due to sepsis. Now resolved s/p   - S/p 1L NS in the ED with improvement in BP - currently 124/76   - Judicious use of fluids given heart failure  - Monitor BP  - C/w infectious workup as above   - Check TSH, cortisol CT chest large left pleural fluid collection --> A 17.1 x 11.0 x 8.3 cm fluid collection with peripheral enhancement in the left basilar pleural space. DDx includes empyema, simple parapneumonic effusion, malignant effusion. Pt with L pleural effusion first diagnosed in 2/2021. Diagnostic thoracentesis deferred at that time as per family wishes. Given pt's age and comorbidities, agree with continued abx therapy for possible bacterial empyema, but cannot exclude other etiologies.   - continue empiric therapy with Zosyn; would treat for 5-days  - will continue to defer diagnostic and/or therapeutic thoracentesis at this time since pt maintaining adequate O2 sat on RA.

## 2021-03-18 NOTE — PROGRESS NOTE ADULT - PROBLEM SELECTOR PLAN 8
- Hold BP meds (losartan 25, metoprolol succinate 50) given hypotension  - Monitor BP - A1c 5.3 on prior hospitalization.   - On oral meds at home, holding while inpatient  - Diabetic diet  - FSG checks and sliding scale insulin protocol given increase in blood glucose levels while on steroids

## 2021-03-18 NOTE — H&P ADULT - PROBLEM SELECTOR PLAN 4
- COVID positive, hypoxic to 89% on room air, currently on 3L NC  - Start remdesivir and decadron (3/18 - )  - Trend inflammatory markers  - Wean oxygen as tolerated

## 2021-03-18 NOTE — PROGRESS NOTE ADULT - PROBLEM SELECTOR PLAN 1
- Hypothermic on presentation T 92.6-96.3, tachypneic with hypoxia on room air  - Likely multifactorial in the setting of COVID and empyema  - U/A negative  - COVID+  - CXR mild pulmonary edema, moderate L pleural effusion grossly unchanged from 2/8/2  - CT chest w/ IV contrast: Bilateral patchy ground glass opacities throughout all the lobes of the bilateral lungs. Findings are likely secondary to infection, possibly COVID pneumonia. Large left pleural fluid collection suspicious for empyema.  A 17.1 x 11.0 x 8.3 cm fluid collection with peripheral enhancement in the left basilar pleural space. Small pericardial effusion  - S/p vancomycin 1 g IV, zosyn 3.375 g IV, and NS 1L bolus in the ED  - F/u blood cultures, urine culture   - C/w broad spectrum antibiotics zosyn (3/18 - )  - Management of COVID as below  - Lactate 4.2 --> 2.1, continue to trend Complicated by acute hypoxic respiratory failure. Initially met sepsis criteria - no longer septic today. WBC normal. Pt hemodynamically stable and not requiring O2 at rest. O2 sat 96% and above on RA. CT chest w/ IV contrast: Bilateral patchy ground glass opacities throughout all the lobes of the bilateral lungs. Findings are likely secondary to infection, possibly COVID pneumonia.   - F/u blood cultures, urine culture   - c/w remdesivir and decadron (3/18 - ); day 1  - Trend inflammatory markers and monitor renal and liver function  - Wean oxygen as tolerated

## 2021-03-18 NOTE — H&P ADULT - NSHPLABSRESULTS_GEN_ALL_CORE
10.2   6.04  )-----------( 311      ( 17 Mar 2021 15:27 )             32.1       03-17    141  |  102  |  48<H>  ----------------------------<  162<H>  3.8   |  25  |  0.85    Ca    9.0      17 Mar 2021 15:27  Phos  3.7     -  Mg     1.0     -17    TPro  6.5  /  Alb  2.7<L>  /  TBili  0.3  /  DBili  x   /  AST  11  /  ALT  5   /  AlkPhos  97  03-17              Urinalysis Basic - ( 17 Mar 2021 15:34 )    Color: Yellow / Appearance: Clear / S.029 / pH: x  Gluc: x / Ketone: Negative  / Bili: Negative / Urobili: 6 mg/dL   Blood: x / Protein: 30 mg/dL / Nitrite: Negative   Leuk Esterase: Negative / RBC: 6 /HPF / WBC 4 /HPF   Sq Epi: x / Non Sq Epi: 2 /HPF / Bacteria: Negative            Lactate Trend            CAPILLARY BLOOD GLUCOSE      EKG SR at 59 bpm with QTc 485, PACs       POCT Blood Glucose.: 100 mg/dL (17 Mar 2021 23:37) 10.2   6.04  )-----------( 311      ( 17 Mar 2021 15:27 )             32.1       03-17    141  |  102  |  48<H>  ----------------------------<  162<H>  3.8   |  25  |  0.85    Ca    9.0      17 Mar 2021 15:27  Phos  3.7     -  Mg     1.0     -17    TPro  6.5  /  Alb  2.7<L>  /  TBili  0.3  /  DBili  x   /  AST  11  /  ALT  5   /  AlkPhos  97  03-17              Urinalysis Basic - ( 17 Mar 2021 15:34 )    Color: Yellow / Appearance: Clear / S.029 / pH: x  Gluc: x / Ketone: Negative  / Bili: Negative / Urobili: 6 mg/dL   Blood: x / Protein: 30 mg/dL / Nitrite: Negative   Leuk Esterase: Negative / RBC: 6 /HPF / WBC 4 /HPF   Sq Epi: x / Non Sq Epi: 2 /HPF / Bacteria: Negative            Lactate Trend            CAPILLARY BLOOD GLUCOSE      EKG SR at 59 bpm with QTc 485, PACs     POCT Blood Glucose.: 100 mg/dL (17 Mar 2021 23:37)

## 2021-03-18 NOTE — H&P ADULT - NSHPSOCIALHISTORY_GEN_ALL_CORE
Never smoker, alcohol, drug use. Has 24/7 aide. Brother (Berto) is power of  and caretaker. Walks with assistance and walker

## 2021-03-18 NOTE — PROGRESS NOTE ADULT - PROBLEM SELECTOR PLAN 9
- A1c 5.3 on prior hospitalization  - On oral meds at home, hold  - Start ISS, monitor fingersticks  - Diabetic diet - Diet: CC DASH diet  - DVT: lovenox  - Dispo: pending clinical improvement  - GOC: MOLST filled out last hospitalization, pt DNR with trial of intubation, confirmed with brother overnight. Brother to bring MOLST in today.

## 2021-03-18 NOTE — H&P ADULT - HISTORY OF PRESENT ILLNESS
80 y/o M HTN, DM, HLD, dementia (AOx2 at baseline), Parkinson's disease, CAD s/p stents x2 (>20 years ago), recent new onset HF (EF: 50%, Moderate diastolic dysfunction (Stage II), moderate pulmonary hypertension, small pericardial effusion), L pleural effusion, BPH, gastric ulcers, sent from his Cardiology office for hypotension. Per EMS on scene pressure was 72/35.     Denies any recent f/c, n/v, cp, sob, abd pain. States he has not eaten much over the past 4 days. Last BM was several days ago. States this home healthcare aide take care of him and states that he does not live w/ family.    In the ED, pt's vitals were T 92.6-96.3 HR 52-75 BP /43-60 RR 20 POX 89% RA, 100% 3L NC. In the ED, pt treated with vancomycin 1 g IV, zosyn 3.375 g IV, NS 1L bolus, Mg 2 g IV, and mineral oil enema.  80 y/o M HTN, DM, HLD, dementia (AOx2 at baseline), Parkinson's disease, CAD s/p stents x2 (>20 years ago), recent new onset HF (EF: 50%, Moderate diastolic dysfunction (Stage II), moderate pulmonary hypertension, small pericardial effusion), L pleural effusion, BPH, gastric ulcers, sent from his Cardiology office for hypotension. Per EMS on scene pressure was 72/35.     Pt AOx2. Denies any recent f/c, n/v, cp, sob, abd pain. States he has not eaten much over the past 4 days. Last BM was several days ago. States this home healthcare aide takes care of him and states that he does not live w/ family. Reports that his brother also got COVID recently.     In the ED, pt's vitals were T 92.6-96.3 HR 52-75 BP /43-60 RR 20 POX 89% RA, 100% 3L NC. In the ED, pt treated with vancomycin 1 g IV, zosyn 3.375 g IV, NS 1L bolus, Mg 2 g IV, and mineral oil enema.  82 y/o M HTN, DM, HLD, dementia (AOx2 at baseline), Parkinson's disease, CAD s/p stents x2 (>20 years ago), recent new onset HF (EF: 50%, Moderate diastolic dysfunction (Stage II), moderate pulmonary hypertension, small pericardial effusion), L pleural effusion, BPH, gastric ulcers, sent from his Cardiology office for hypotension. Per EMS on scene pressure was 72/35.     Pt AOx2. Denies any recent f/c, n/v, cp, sob, abd pain. States he has not eaten much over the past 4 days. Last BM was several days ago. States this home healthcare aide takes care of him and states that he does not live w/ family. Reports that his brother also got COVID recently.     Of note, pt with recent hospitalization 2/2021 for new onset HF. Started on diuresis and transitioned to PO lasix. Had a L pleural effusion that persisted although his volume status improved. Pulm was consulted and recommended thoracentesis. Brother Berto declined the procedure. Stress test was also done given new HF and suggested ischemia, but brother declined catheterization given his dementia.     In the ED, pt's vitals were T 92.6-96.3 HR 52-75 BP /43-60 RR 20 POX 89% RA, 100% 3L NC. In the ED, pt treated with vancomycin 1 g IV, zosyn 3.375 g IV, NS 1L bolus, Mg 2 g IV, and mineral oil enema.

## 2021-03-18 NOTE — H&P ADULT - PROBLEM SELECTOR PLAN 9
- A1c 5.3 on prior hospitalization  - On oral meds at home, hold  - Start ISS, monitor fingersticks  - Diabetic diet

## 2021-03-18 NOTE — H&P ADULT - PROBLEM SELECTOR PLAN 6
- TTE 2/2021 EF: 50%, Moderate diastolic dysfunction (Stage II), moderate pulmonary hypertension, Small pericardial effusion, Left pleural effusion (new compared to 2018)   - Trop 31 --> 31   - proBNP 1306 (prior hospitalization 2800)   - CXR mild pulmonary edema, moderate L pleural effusion grossly unchanged from 2/8/21  - Cards following, recs appreciated  - C/w lasix 20 mg PO daily   - Recent abnormal pharmacological stress test: moderate defects in apical, distal lateral walls that are predominantly reversible, suggestive of ischemia - brother declined catheterization given pt's hx of dementia and Parkinson's   - C/w plavix given stent hx   - Hold metoprolol succinate 50 daily in the setting of sepsis/hypotension   - Monitor on telemetry, replete lytes for K>4, Mg>2

## 2021-03-19 LAB
ANION GAP SERPL CALC-SCNC: 12 MMOL/L — SIGNIFICANT CHANGE UP (ref 7–14)
BUN SERPL-MCNC: 34 MG/DL — HIGH (ref 7–23)
CALCIUM SERPL-MCNC: 8.9 MG/DL — SIGNIFICANT CHANGE UP (ref 8.4–10.5)
CHLORIDE SERPL-SCNC: 103 MMOL/L — SIGNIFICANT CHANGE UP (ref 98–107)
CO2 SERPL-SCNC: 26 MMOL/L — SIGNIFICANT CHANGE UP (ref 22–31)
CREAT SERPL-MCNC: 0.79 MG/DL — SIGNIFICANT CHANGE UP (ref 0.5–1.3)
GLUCOSE BLDC GLUCOMTR-MCNC: 169 MG/DL — HIGH (ref 70–99)
GLUCOSE BLDC GLUCOMTR-MCNC: 176 MG/DL — HIGH (ref 70–99)
GLUCOSE BLDC GLUCOMTR-MCNC: 213 MG/DL — HIGH (ref 70–99)
GLUCOSE BLDC GLUCOMTR-MCNC: 226 MG/DL — HIGH (ref 70–99)
GLUCOSE SERPL-MCNC: 177 MG/DL — HIGH (ref 70–99)
HCT VFR BLD CALC: 30.1 % — LOW (ref 39–50)
HGB BLD-MCNC: 9.6 G/DL — LOW (ref 13–17)
MAGNESIUM SERPL-MCNC: 1.8 MG/DL — SIGNIFICANT CHANGE UP (ref 1.6–2.6)
MCHC RBC-ENTMCNC: 28.4 PG — SIGNIFICANT CHANGE UP (ref 27–34)
MCHC RBC-ENTMCNC: 31.9 GM/DL — LOW (ref 32–36)
MCV RBC AUTO: 89.1 FL — SIGNIFICANT CHANGE UP (ref 80–100)
NRBC # BLD: 0 /100 WBCS — SIGNIFICANT CHANGE UP
NRBC # FLD: 0 K/UL — SIGNIFICANT CHANGE UP
PLATELET # BLD AUTO: 378 K/UL — SIGNIFICANT CHANGE UP (ref 150–400)
POTASSIUM SERPL-MCNC: 3.5 MMOL/L — SIGNIFICANT CHANGE UP (ref 3.5–5.3)
POTASSIUM SERPL-SCNC: 3.5 MMOL/L — SIGNIFICANT CHANGE UP (ref 3.5–5.3)
RBC # BLD: 3.38 M/UL — LOW (ref 4.2–5.8)
RBC # FLD: 13.4 % — SIGNIFICANT CHANGE UP (ref 10.3–14.5)
SODIUM SERPL-SCNC: 141 MMOL/L — SIGNIFICANT CHANGE UP (ref 135–145)
WBC # BLD: 6.04 K/UL — SIGNIFICANT CHANGE UP (ref 3.8–10.5)
WBC # FLD AUTO: 6.04 K/UL — SIGNIFICANT CHANGE UP (ref 3.8–10.5)

## 2021-03-19 PROCEDURE — 99233 SBSQ HOSP IP/OBS HIGH 50: CPT | Mod: CS

## 2021-03-19 RX ADMIN — CARBIDOPA AND LEVODOPA 1 TABLET(S): 25; 100 TABLET ORAL at 13:08

## 2021-03-19 RX ADMIN — REMDESIVIR 500 MILLIGRAM(S): 5 INJECTION INTRAVENOUS at 12:32

## 2021-03-19 RX ADMIN — Medication 6 MILLIGRAM(S): at 06:05

## 2021-03-19 RX ADMIN — PIPERACILLIN AND TAZOBACTAM 25 GRAM(S): 4; .5 INJECTION, POWDER, LYOPHILIZED, FOR SOLUTION INTRAVENOUS at 22:34

## 2021-03-19 RX ADMIN — Medication 1: at 09:37

## 2021-03-19 RX ADMIN — QUETIAPINE FUMARATE 50 MILLIGRAM(S): 200 TABLET, FILM COATED ORAL at 22:26

## 2021-03-19 RX ADMIN — ATORVASTATIN CALCIUM 10 MILLIGRAM(S): 80 TABLET, FILM COATED ORAL at 22:26

## 2021-03-19 RX ADMIN — Medication 20 MILLIGRAM(S): at 06:05

## 2021-03-19 RX ADMIN — ENOXAPARIN SODIUM 40 MILLIGRAM(S): 100 INJECTION SUBCUTANEOUS at 12:30

## 2021-03-19 RX ADMIN — PANTOPRAZOLE SODIUM 40 MILLIGRAM(S): 20 TABLET, DELAYED RELEASE ORAL at 06:06

## 2021-03-19 RX ADMIN — Medication 2: at 13:34

## 2021-03-19 RX ADMIN — PIPERACILLIN AND TAZOBACTAM 25 GRAM(S): 4; .5 INJECTION, POWDER, LYOPHILIZED, FOR SOLUTION INTRAVENOUS at 06:05

## 2021-03-19 RX ADMIN — Medication 3 MILLIGRAM(S): at 22:27

## 2021-03-19 RX ADMIN — PANTOPRAZOLE SODIUM 40 MILLIGRAM(S): 20 TABLET, DELAYED RELEASE ORAL at 18:32

## 2021-03-19 RX ADMIN — TAMSULOSIN HYDROCHLORIDE 0.4 MILLIGRAM(S): 0.4 CAPSULE ORAL at 22:34

## 2021-03-19 RX ADMIN — CARBIDOPA AND LEVODOPA 1 TABLET(S): 25; 100 TABLET ORAL at 06:05

## 2021-03-19 RX ADMIN — Medication 2: at 18:32

## 2021-03-19 RX ADMIN — PIPERACILLIN AND TAZOBACTAM 25 GRAM(S): 4; .5 INJECTION, POWDER, LYOPHILIZED, FOR SOLUTION INTRAVENOUS at 13:09

## 2021-03-19 RX ADMIN — Medication 0: at 22:25

## 2021-03-19 RX ADMIN — CARBIDOPA AND LEVODOPA 1 TABLET(S): 25; 100 TABLET ORAL at 22:27

## 2021-03-19 NOTE — PROGRESS NOTE ADULT - PROBLEM SELECTOR PLAN 6
Pt with low normal BP and low normal heart rates.  - continue to Hold BP meds (losartan 25, metoprolol succinate 50) --> will discuss further with cardiology when to resume  - Monitor BP  - d/c lasix 20mg daily

## 2021-03-19 NOTE — PROGRESS NOTE ADULT - PROBLEM SELECTOR PLAN 3
CT chest large left pleural fluid collection --> A 17.1 x 11.0 x 8.3 cm fluid collection with peripheral enhancement in the left basilar pleural space. DDx includes empyema, simple parapneumonic effusion, malignant effusion. Pt with L pleural effusion first diagnosed in 2/2021. Diagnostic thoracentesis deferred at that time as per family wishes. Given pt's age and comorbidities, agree with continued abx therapy for possible bacterial empyema, but cannot exclude other etiologies.   - continue empiric therapy with Zosyn; would treat for 5-days, currently on day 2  - will continue to defer diagnostic and/or therapeutic thoracentesis at this time since pt maintaining adequate O2 sat on RA.

## 2021-03-19 NOTE — PROVIDER CONTACT NOTE (OTHER) - ASSESSMENT
DNR is mentioned in medical records - no MOLST form in the chart  Daytime nurse mentioned that brother was suppose to bring in the form on 3/19/21

## 2021-03-19 NOTE — PROGRESS NOTE ADULT - ASSESSMENT
80 y/o M w/ pmhx of dementia, parkinson's disease, DM, HTN, CHF, CAD 2 stents sent from cardiology office for hypotension.     EKG: Sinus Josemanuel 59 PACs      1. Sepsis  -patient from office with hypotension, improving   -hypothermic  -lactate 4.2 on VBG  - CT with large loculated pleural effusion family wants to conservatively management     2.CHF  -echo with mild LV systolic dysfunction (new compared to 2018)  -currently appears euvolemic on exam  -stress test positive previous admission with small mod defect , spoke to pt brother jasvir at that time and he did not want pt getting a cath as he has dementia and parkinsons, will treat medically cont plavix    3. CAD s/p stent  -cath in 2010 showed mild luminal disease and patent stents  -denies chest pain  -echo with mild LV dysfunction  -c/w plavix     4. HTN  -hypotensive on admission, improving   -BP meds on hold   -continue to monitor BP

## 2021-03-19 NOTE — PROGRESS NOTE ADULT - PROBLEM SELECTOR PLAN 1
Complicated by acute hypoxic respiratory failure. Initially met sepsis criteria - no longer septic. WBC normal. Pt hemodynamically stable and not requiring O2 at rest. O2 sat 96% and above on RA. CT chest w/ IV contrast: Bilateral patchy ground glass opacities throughout all the lobes of the bilateral lungs. Findings are likely secondary to infection, possibly COVID pneumonia.   - F/u blood cultures, urine culture   - c/w remdesivir and decadron (3/18 - ); day 2  - Trend inflammatory markers and monitor renal and liver function  - Wean oxygen as tolerated

## 2021-03-19 NOTE — PROGRESS NOTE ADULT - PROBLEM SELECTOR PLAN 4
TTE 2/2021 EF: 50%, Moderate diastolic dysfunction (Stage II), moderate pulmonary hypertension, Small pericardial effusion; proBNP 1306 (prior hospitalization 2800)   Recent 2/2021 abnormal pharmacological stress test: moderate defects in apical, distal lateral walls that are predominantly reversible, suggestive of ischemia - brother declined catheterization given pt's hx of dementia and Parkinson's   - Cards following, recs appreciated  - d/c lasix 20 mg PO daily  - C/w plavix given stent hx   - Continue to hold metoprolol succinate 50 daily since pt with HR in 50-60s already. Will further discuss when to resume with his cardiologist  - pt not in acute exacerbation, no need for telemetry at this

## 2021-03-19 NOTE — DIETITIAN INITIAL EVALUATION ADULT. - PROBLEM/PLAN-9
Chief Complaint   Patient presents with   • Follow-up     6 mo      Denies known Latex allergy or symptoms of Latex sensitivity.  Medications reviewed and updated.  History   Smoking Status   • Never Smoker   Smokeless Tobacco   • Never Used     Patient would like communication of their results via:        Cell Phone:   Telephone Information:   Mobile [phone]     Okay to leave a message containing results? Yes  Health Maintenance Due   Topic Date Due   • Osteoporosis Screening  07/17/2015   • Pneumococcal Vaccine Adult (1 of 2 - PCV13) 07/17/2015   • Medicare Wellness 65+  11/20/2018     Patient is up to date, no discussion needed.         DISPLAY PLAN FREE TEXT

## 2021-03-19 NOTE — DIETITIAN INITIAL EVALUATION ADULT. - OTHER INFO
Unable to conduct a face to face interview or nutrition-focused physical exam due to limited contact restrictions related to Pt's medical condition and isolation precautions. Collateral information was obtained from chart review.   Unable to assess PO intake although it is noted in flow sheet that nutrition is probably inadequate. No GI distress (nausea/vomiting/diarrhea/constipation) noted as per chart. No chewing or swallowing difficulties at this time as per chart. Unable to assess wt history.   Suggest liberalizing diet by removing the cholesterol restriction to allow Pt more options. Also suggest Glucerna Shake supplements.

## 2021-03-19 NOTE — PROGRESS NOTE ADULT - SUBJECTIVE AND OBJECTIVE BOX
Patient is a 81y old  Male who presents with a chief complaint of hypotension (19 Mar 2021 12:05)        SUBJECTIVE / OVERNIGHT EVENTS:      MEDICATIONS  (STANDING):  atorvastatin 10 milliGRAM(s) Oral at bedtime  carbidopa/levodopa  25/100 1 Tablet(s) Oral three times a day  dexAMETHasone  Injectable 6 milliGRAM(s) IV Push daily  dextrose 40% Gel 15 Gram(s) Oral once  dextrose 5%. 1000 milliLiter(s) (50 mL/Hr) IV Continuous <Continuous>  dextrose 5%. 1000 milliLiter(s) (100 mL/Hr) IV Continuous <Continuous>  dextrose 50% Injectable 25 Gram(s) IV Push once  dextrose 50% Injectable 12.5 Gram(s) IV Push once  dextrose 50% Injectable 25 Gram(s) IV Push once  enoxaparin Injectable 40 milliGRAM(s) SubCutaneous daily  furosemide    Tablet 20 milliGRAM(s) Oral daily  glucagon  Injectable 1 milliGRAM(s) IntraMuscular once  insulin lispro (ADMELOG) corrective regimen sliding scale   SubCutaneous three times a day before meals  insulin lispro (ADMELOG) corrective regimen sliding scale   SubCutaneous at bedtime  melatonin 3 milliGRAM(s) Oral at bedtime  pantoprazole    Tablet 40 milliGRAM(s) Oral every 12 hours  piperacillin/tazobactam IVPB.. 3.375 Gram(s) IV Intermittent every 8 hours  QUEtiapine 50 milliGRAM(s) Oral at bedtime  remdesivir  IVPB 100 milliGRAM(s) IV Intermittent every 24 hours  remdesivir  IVPB   IV Intermittent   tamsulosin 0.4 milliGRAM(s) Oral at bedtime    MEDICATIONS  (PRN):  benzonatate 100 milliGRAM(s) Oral three times a day PRN Cough      Vital Signs Last 24 Hrs  T(C): 35 (19 Mar 2021 14:49), Max: 37.1 (18 Mar 2021 18:14)  T(F): 95 (19 Mar 2021 14:49), Max: 98.8 (18 Mar 2021 18:14)  HR: 65 (19 Mar 2021 14:07) (59 - 78)  BP: 107/49 (19 Mar 2021 14:07) (107/49 - 136/57)  BP(mean): --  RR: 17 (19 Mar 2021 14:07) (17 - 18)  SpO2: 97% (19 Mar 2021 14:07) (95% - 99%)  CAPILLARY BLOOD GLUCOSE      POCT Blood Glucose.: 226 mg/dL (19 Mar 2021 13:21)  POCT Blood Glucose.: 169 mg/dL (19 Mar 2021 09:30)  POCT Blood Glucose.: 191 mg/dL (18 Mar 2021 21:30)    I&O's Summary    18 Mar 2021 07:01  -  19 Mar 2021 07:00  --------------------------------------------------------  IN: 0 mL / OUT: 400 mL / NET: -400 mL          PHYSICAL EXAM  GENERAL: NAD, well-developed  HEAD:  Atraumatic, Normocephalic  EYES: EOMI, PERRLA, conjunctiva and sclera clear  NECK: Supple, No JVD  CHEST/LUNG: Clear to auscultation bilaterally; No wheeze  HEART: Regular rate and rhythm; No murmurs, rubs, or gallops  ABDOMEN: Soft, Nontender, Nondistended; Bowel sounds present  EXTREMITIES:  2+ Peripheral Pulses, No clubbing, cyanosis, or edema  PSYCH: AAOx3  SKIN: No rashes or lesions    LABS:                        9.6    6.04  )-----------( 378      ( 19 Mar 2021 07:43 )             30.1     03-19    141  |  103  |  34<H>  ----------------------------<  177<H>  3.5   |  26  |  0.79    Ca    8.9      19 Mar 2021 07:43  Phos  3.1     03-18  Mg     1.8     03-19                RADIOLOGY & ADDITIONAL TESTS:    Imaging Personally Reviewed:  Consultant(s) Notes Reviewed:    Care Discussed with Consultants/Other Providers:   Patient is a 81y old  Male who presents with a chief complaint of hypotension (19 Mar 2021 12:05)    SUBJECTIVE / OVERNIGHT EVENTS:  Patient with intermittent cough but otherwise has no complaints. No fever, chills, chest pain, SOB, n/v or abdominal pain.     MEDICATIONS  (STANDING):  atorvastatin 10 milliGRAM(s) Oral at bedtime  carbidopa/levodopa  25/100 1 Tablet(s) Oral three times a day  dexAMETHasone  Injectable 6 milliGRAM(s) IV Push daily  dextrose 40% Gel 15 Gram(s) Oral once  dextrose 5%. 1000 milliLiter(s) (50 mL/Hr) IV Continuous <Continuous>  dextrose 5%. 1000 milliLiter(s) (100 mL/Hr) IV Continuous <Continuous>  dextrose 50% Injectable 25 Gram(s) IV Push once  dextrose 50% Injectable 12.5 Gram(s) IV Push once  dextrose 50% Injectable 25 Gram(s) IV Push once  enoxaparin Injectable 40 milliGRAM(s) SubCutaneous daily  furosemide    Tablet 20 milliGRAM(s) Oral daily  glucagon  Injectable 1 milliGRAM(s) IntraMuscular once  insulin lispro (ADMELOG) corrective regimen sliding scale   SubCutaneous three times a day before meals  insulin lispro (ADMELOG) corrective regimen sliding scale   SubCutaneous at bedtime  melatonin 3 milliGRAM(s) Oral at bedtime  pantoprazole    Tablet 40 milliGRAM(s) Oral every 12 hours  piperacillin/tazobactam IVPB.. 3.375 Gram(s) IV Intermittent every 8 hours  QUEtiapine 50 milliGRAM(s) Oral at bedtime  remdesivir  IVPB 100 milliGRAM(s) IV Intermittent every 24 hours  remdesivir  IVPB   IV Intermittent   tamsulosin 0.4 milliGRAM(s) Oral at bedtime    MEDICATIONS  (PRN):  benzonatate 100 milliGRAM(s) Oral three times a day PRN Cough      Vital Signs Last 24 Hrs  T(C): 35 (19 Mar 2021 14:49), Max: 37.1 (18 Mar 2021 18:14)  T(F): 95 (19 Mar 2021 14:49), Max: 98.8 (18 Mar 2021 18:14)  HR: 65 (19 Mar 2021 14:07) (59 - 78)  BP: 107/49 (19 Mar 2021 14:07) (107/49 - 136/57)  BP(mean): --  RR: 17 (19 Mar 2021 14:07) (17 - 18)  SpO2: 97% (19 Mar 2021 14:07) (95% - 99%)  CAPILLARY BLOOD GLUCOSE      POCT Blood Glucose.: 226 mg/dL (19 Mar 2021 13:21)  POCT Blood Glucose.: 169 mg/dL (19 Mar 2021 09:30)  POCT Blood Glucose.: 191 mg/dL (18 Mar 2021 21:30)    I&O's Summary    18 Mar 2021 07:01  -  19 Mar 2021 07:00  --------------------------------------------------------  IN: 0 mL / OUT: 400 mL / NET: -400 mL          PHYSICAL EXAM  GENERAL: NAD, elderly man laying comfortably in bed, speaking in full sentences on RA  CHEST/LUNG: Crackles in right mid and lower lung fields; No BS at mid to lower lung fields on the left; No wheezes. Normal respiratory effort  HEART: Regular rate and rhythm  ABDOMEN: Soft, Nontender, Nondistended  EXTREMITIES:  2+ Peripheral Pulses, No clubbing, cyanosis, or edema  PSYCH: Alert, oriented to self and place. Cooperative, follows simple commands and pleasant      LABS:                        9.6    6.04  )-----------( 378      ( 19 Mar 2021 07:43 )             30.1     03-19    141  |  103  |  34<H>  ----------------------------<  177<H>  3.5   |  26  |  0.79    Ca    8.9      19 Mar 2021 07:43  Phos  3.1     03-18  Mg     1.8     03-19                Consultant(s) Notes Reviewed:  yes  Care Discussed with Consultants/Other Providers:

## 2021-03-19 NOTE — PROVIDER CONTACT NOTE (OTHER) - ASSESSMENT
Temp 95.6, HR 73, /57, RR16, Satting 93 on room air  Wheezing heard on lung assessment  Adventitious lung sounds heard on lung assessment

## 2021-03-19 NOTE — PROGRESS NOTE ADULT - ASSESSMENT
82 y/o M HTN, DM, HLD, dementia (AOx2 at baseline), Parkinson's disease, CAD s/p stents x2 (>20 years ago), recent new onset HF (EF: 50%, Moderate diastolic dysfunction (Stage II), moderate pulmonary hypertension, small pericardial effusion), L pleural effusion, BPH, gastric ulcers, admitted for further management of sepsis 2/2 possible empyema and COVID, along with hypoxic respiratory failure.

## 2021-03-19 NOTE — PROGRESS NOTE ADULT - SUBJECTIVE AND OBJECTIVE BOX
Bennie Hwang MD  Interventional Cardiology / Endovascular Specialist  [place] Office : [address] [place] [address] [place]. [address]  Tel:   [place] Office : [address] [facility] [place] [address]  Tel: [phone]  Cell : [phone]    Subjective/Overnight events: Patient lying in bed comfortably. No acute distress. having productive cough at times     MEDICATIONS:  enoxaparin Injectable 40 milliGRAM(s) SubCutaneous daily  tamsulosin 0.4 milliGRAM(s) Oral at bedtime    piperacillin/tazobactam IVPB.. 3.375 Gram(s) IV Intermittent every 8 hours  remdesivir  IVPB 100 milliGRAM(s) IV Intermittent every 24 hours  remdesivir  IVPB   IV Intermittent     benzonatate 100 milliGRAM(s) Oral three times a day PRN    carbidopa/levodopa  25/100 1 Tablet(s) Oral three times a day  melatonin 3 milliGRAM(s) Oral at bedtime  QUEtiapine 50 milliGRAM(s) Oral at bedtime    pantoprazole    Tablet 40 milliGRAM(s) Oral every 12 hours    atorvastatin 10 milliGRAM(s) Oral at bedtime  dexAMETHasone  Injectable 6 milliGRAM(s) IV Push daily  dextrose 40% Gel 15 Gram(s) Oral once  dextrose 50% Injectable 25 Gram(s) IV Push once  dextrose 50% Injectable 12.5 Gram(s) IV Push once  dextrose 50% Injectable 25 Gram(s) IV Push once  glucagon  Injectable 1 milliGRAM(s) IntraMuscular once  insulin lispro (ADMELOG) corrective regimen sliding scale   SubCutaneous three times a day before meals  insulin lispro (ADMELOG) corrective regimen sliding scale   SubCutaneous at bedtime    dextrose 5%. 1000 milliLiter(s) IV Continuous <Continuous>  dextrose 5%. 1000 milliLiter(s) IV Continuous <Continuous>      PAST MEDICAL/SURGICAL HISTORY  PAST MEDICAL & SURGICAL HISTORY:  BPH (benign prostatic hyperplasia)    CAD (Coronary Artery Disease)  s/p cardiac stent ( &gt; 20 years ago)    HTN (Hypertension)    DM (Diabetes Mellitus)    Hypercholesterolemia    Coronary Stent  x 2 ( 20 years )        SOCIAL HISTORY: Substance Use (street drugs): ( x ) never used  (  ) other:    FAMILY HISTORY:  No pertinent family history in first degree relatives        REVIEW OF SYSTEMS:  unable to accurately obtain   PHYSICAL EXAM:  T(C): 35.3 (03-19-21 @ 17:59), Max: 37.1 (03-18-21 @ 21:44)  HR: 67 (03-19-21 @ 17:59) (59 - 78)  BP: 119/97 (03-19-21 @ 17:59) (107/49 - 136/57)  RR: 18 (03-19-21 @ 17:59) (17 - 18)  SpO2: 96% (03-19-21 @ 17:59) (95% - 99%)  Wt(kg): --  I&O's Summary    18 Mar 2021 07:01  -  19 Mar 2021 07:00  --------------------------------------------------------  IN: 0 mL / OUT: 400 mL / NET: -400 mL      GENERAL: NAD  EYES: EOMI, PERRLA, conjunctiva and sclera clear  ENMT: No tonsillar erythema, exudates, or enlargement  Cardiovascular: Normal S1 S2, No JVD, No murmurs, No edema  Respiratory: Lungs b/l crackles at bases	  Gastrointestinal:  Soft, Non-tender, + BS	  Extremities: No wanda                                9.6    6.04  )-----------( 378      ( 19 Mar 2021 07:43 )             30.1     03-19    141  |  103  |  34<H>  ----------------------------<  177<H>  3.5   |  26  |  0.79    Ca    8.9      19 Mar 2021 07:43  Phos  3.1     03-18  Mg     1.8     03-19      proBNP:   Lipid Profile:   HgA1c:   TSH:     Consultant(s) Notes Reviewed:  [x ] YES  [ ] NO    Care Discussed with Consultants/Other Providers [ x] YES  [ ] NO    Imaging Personally Reviewed independently:  [x] YES  [ ] NO    All labs, radiologic studies, vitals, orders and medications list reviewed. Patient is seen and examined at bedside. Case discussed with medical team.

## 2021-03-19 NOTE — PROVIDER CONTACT NOTE (OTHER) - BACKGROUND
Covid +, new onset of HF, moderate pulmonary hypotention, Left pleural effusion  Admit dx: Empyema of pleural space without fistula

## 2021-03-19 NOTE — PROGRESS NOTE ADULT - PROBLEM SELECTOR PLAN 7
- A1c 5.3 on prior hospitalization.   - On oral meds at home, holding while inpatient  - Diabetic diet  - FSG checks and sliding scale insulin protocol given increase in blood glucose levels while on steroids

## 2021-03-20 DIAGNOSIS — G20 PARKINSON'S DISEASE: ICD-10-CM

## 2021-03-20 DIAGNOSIS — F03.90 UNSPECIFIED DEMENTIA, UNSPECIFIED SEVERITY, WITHOUT BEHAVIORAL DISTURBANCE, PSYCHOTIC DISTURBANCE, MOOD DISTURBANCE, AND ANXIETY: ICD-10-CM

## 2021-03-20 LAB
ALBUMIN SERPL ELPH-MCNC: 2.7 G/DL — LOW (ref 3.3–5)
ALP SERPL-CCNC: 86 U/L — SIGNIFICANT CHANGE UP (ref 40–120)
ALT FLD-CCNC: 5 U/L — SIGNIFICANT CHANGE UP (ref 4–41)
ANION GAP SERPL CALC-SCNC: 9 MMOL/L — SIGNIFICANT CHANGE UP (ref 7–14)
AST SERPL-CCNC: 18 U/L — SIGNIFICANT CHANGE UP (ref 4–40)
BILIRUB SERPL-MCNC: 0.4 MG/DL — SIGNIFICANT CHANGE UP (ref 0.2–1.2)
BUN SERPL-MCNC: 36 MG/DL — HIGH (ref 7–23)
CALCIUM SERPL-MCNC: 9.2 MG/DL — SIGNIFICANT CHANGE UP (ref 8.4–10.5)
CHLORIDE SERPL-SCNC: 103 MMOL/L — SIGNIFICANT CHANGE UP (ref 98–107)
CO2 SERPL-SCNC: 28 MMOL/L — SIGNIFICANT CHANGE UP (ref 22–31)
CREAT SERPL-MCNC: 0.87 MG/DL — SIGNIFICANT CHANGE UP (ref 0.5–1.3)
CRP SERPL-MCNC: 31.3 MG/L — HIGH
D DIMER BLD IA.RAPID-MCNC: 7355 NG/ML DDU — HIGH
FERRITIN SERPL-MCNC: 279 NG/ML — SIGNIFICANT CHANGE UP (ref 30–400)
GLUCOSE BLDC GLUCOMTR-MCNC: 114 MG/DL — HIGH (ref 70–99)
GLUCOSE BLDC GLUCOMTR-MCNC: 140 MG/DL — HIGH (ref 70–99)
GLUCOSE BLDC GLUCOMTR-MCNC: 175 MG/DL — HIGH (ref 70–99)
GLUCOSE SERPL-MCNC: 166 MG/DL — HIGH (ref 70–99)
HCT VFR BLD CALC: 31.5 % — LOW (ref 39–50)
HGB BLD-MCNC: 10.3 G/DL — LOW (ref 13–17)
LDH SERPL L TO P-CCNC: 169 U/L — SIGNIFICANT CHANGE UP (ref 135–225)
MAGNESIUM SERPL-MCNC: 1.6 MG/DL — SIGNIFICANT CHANGE UP (ref 1.6–2.6)
MCHC RBC-ENTMCNC: 28.9 PG — SIGNIFICANT CHANGE UP (ref 27–34)
MCHC RBC-ENTMCNC: 32.7 GM/DL — SIGNIFICANT CHANGE UP (ref 32–36)
MCV RBC AUTO: 88.5 FL — SIGNIFICANT CHANGE UP (ref 80–100)
NRBC # BLD: 0 /100 WBCS — SIGNIFICANT CHANGE UP
NRBC # FLD: 0 K/UL — SIGNIFICANT CHANGE UP
PHOSPHATE SERPL-MCNC: 2.5 MG/DL — SIGNIFICANT CHANGE UP (ref 2.5–4.5)
PLATELET # BLD AUTO: 456 K/UL — HIGH (ref 150–400)
POTASSIUM SERPL-MCNC: 3.9 MMOL/L — SIGNIFICANT CHANGE UP (ref 3.5–5.3)
POTASSIUM SERPL-SCNC: 3.9 MMOL/L — SIGNIFICANT CHANGE UP (ref 3.5–5.3)
PROT SERPL-MCNC: 6.3 G/DL — SIGNIFICANT CHANGE UP (ref 6–8.3)
RBC # BLD: 3.56 M/UL — LOW (ref 4.2–5.8)
RBC # FLD: 13.5 % — SIGNIFICANT CHANGE UP (ref 10.3–14.5)
SODIUM SERPL-SCNC: 140 MMOL/L — SIGNIFICANT CHANGE UP (ref 135–145)
WBC # BLD: 8.52 K/UL — SIGNIFICANT CHANGE UP (ref 3.8–10.5)
WBC # FLD AUTO: 8.52 K/UL — SIGNIFICANT CHANGE UP (ref 3.8–10.5)

## 2021-03-20 PROCEDURE — 99232 SBSQ HOSP IP/OBS MODERATE 35: CPT | Mod: CS

## 2021-03-20 RX ORDER — HALOPERIDOL DECANOATE 100 MG/ML
0.25 INJECTION INTRAMUSCULAR ONCE
Refills: 0 | Status: COMPLETED | OUTPATIENT
Start: 2021-03-20 | End: 2021-03-20

## 2021-03-20 RX ORDER — METOPROLOL TARTRATE 50 MG
50 TABLET ORAL DAILY
Refills: 0 | Status: DISCONTINUED | OUTPATIENT
Start: 2021-03-20 | End: 2021-03-23

## 2021-03-20 RX ADMIN — REMDESIVIR 500 MILLIGRAM(S): 5 INJECTION INTRAVENOUS at 12:28

## 2021-03-20 RX ADMIN — Medication 100 MILLIGRAM(S): at 22:01

## 2021-03-20 RX ADMIN — Medication 100 MILLIGRAM(S): at 18:47

## 2021-03-20 RX ADMIN — ENOXAPARIN SODIUM 40 MILLIGRAM(S): 100 INJECTION SUBCUTANEOUS at 12:27

## 2021-03-20 RX ADMIN — Medication 6 MILLIGRAM(S): at 05:13

## 2021-03-20 RX ADMIN — PIPERACILLIN AND TAZOBACTAM 25 GRAM(S): 4; .5 INJECTION, POWDER, LYOPHILIZED, FOR SOLUTION INTRAVENOUS at 13:45

## 2021-03-20 RX ADMIN — HALOPERIDOL DECANOATE 0.25 MILLIGRAM(S): 100 INJECTION INTRAMUSCULAR at 01:29

## 2021-03-20 RX ADMIN — PANTOPRAZOLE SODIUM 40 MILLIGRAM(S): 20 TABLET, DELAYED RELEASE ORAL at 05:13

## 2021-03-20 RX ADMIN — CARBIDOPA AND LEVODOPA 1 TABLET(S): 25; 100 TABLET ORAL at 13:45

## 2021-03-20 RX ADMIN — CARBIDOPA AND LEVODOPA 1 TABLET(S): 25; 100 TABLET ORAL at 21:59

## 2021-03-20 RX ADMIN — PIPERACILLIN AND TAZOBACTAM 25 GRAM(S): 4; .5 INJECTION, POWDER, LYOPHILIZED, FOR SOLUTION INTRAVENOUS at 21:59

## 2021-03-20 RX ADMIN — Medication 50 MILLIGRAM(S): at 18:46

## 2021-03-20 RX ADMIN — PIPERACILLIN AND TAZOBACTAM 25 GRAM(S): 4; .5 INJECTION, POWDER, LYOPHILIZED, FOR SOLUTION INTRAVENOUS at 05:13

## 2021-03-20 RX ADMIN — QUETIAPINE FUMARATE 50 MILLIGRAM(S): 200 TABLET, FILM COATED ORAL at 21:59

## 2021-03-20 RX ADMIN — Medication 3 MILLIGRAM(S): at 21:59

## 2021-03-20 RX ADMIN — CARBIDOPA AND LEVODOPA 1 TABLET(S): 25; 100 TABLET ORAL at 05:13

## 2021-03-20 RX ADMIN — ATORVASTATIN CALCIUM 10 MILLIGRAM(S): 80 TABLET, FILM COATED ORAL at 21:59

## 2021-03-20 RX ADMIN — PANTOPRAZOLE SODIUM 40 MILLIGRAM(S): 20 TABLET, DELAYED RELEASE ORAL at 18:47

## 2021-03-20 NOTE — PROGRESS NOTE ADULT - PROBLEM SELECTOR PLAN 1
Complicated by acute hypoxic respiratory failure. Initially met sepsis criteria - no longer septic. WBC normal. Pt hemodynamically stable and not requiring O2 at rest. O2 sat 96% and above on RA. CT chest w/ IV contrast: Bilateral patchy ground glass opacities throughout all the lobes of the bilateral lungs. Findings are likely secondary to infection, possibly COVID pneumonia.   - F/u blood cultures, urine culture   - c/w remdesivir and decadron (3/18 - ); day 2  - Trend inflammatory markers and monitor renal and liver function  - Wean oxygen as tolerated

## 2021-03-20 NOTE — PROVIDER CONTACT NOTE (OTHER) - ASSESSMENT
Patient is pulling out his IV, trying to climb out of bed, aggressive towards PCA  Temp 97.9, HR 84, /52, RR 17, oxygen saturation 96 on room air  Enhanced supervision in progress

## 2021-03-20 NOTE — PROGRESS NOTE ADULT - ASSESSMENT
82 y/o M w/ pmhx of dementia, parkinson's disease, DM, HTN, CHF, CAD 2 stents sent from cardiology office for hypotension.     EKG: Sinus Josemanuel 59 PACs      1. Sepsis  - COVID +, DDimer elevated consider therapeutic a/c    -patient from office with hypotension, improving   -hypothermic  -lactate 4.2 on VBG  - CT with large loculated pleural effusion family wants to conservatively management     2.CHF  -echo with mild LV systolic dysfunction (new compared to 2018)  -currently appears euvolemic on exam  -stress test positive previous admission with small mod defect , spoke to pt brother jasvir at that time and he did not want pt getting a cath as he has dementia and parkinsons, will treat medically cont plavix    3. CAD s/p stent  -cath in 2010 showed mild luminal disease and patent stents  -denies chest pain  -echo with mild LV dysfunction  -c/w plavix     4. HTN  -hypotensive on admission, improving   -BP meds on hold   -continue to monitor BP

## 2021-03-20 NOTE — PROGRESS NOTE ADULT - SUBJECTIVE AND OBJECTIVE BOX
Bennie Hwang MD  Interventional Cardiology / Endovascular Specialist  [place] Office : [address] [place], [place] [address]  Tel:   [place] Office : [address] [facility] [place] [address]  Tel: [phone]  Cell : [phone]    Subjective/Overnight events: Patient lying in bed comfortably. No acute distress. having productive cough at times   	  MEDICATIONS:  enoxaparin Injectable 40 milliGRAM(s) SubCutaneous daily  metoprolol succinate ER 50 milliGRAM(s) Oral daily  tamsulosin 0.4 milliGRAM(s) Oral at bedtime    piperacillin/tazobactam IVPB.. 3.375 Gram(s) IV Intermittent every 8 hours  remdesivir  IVPB 100 milliGRAM(s) IV Intermittent every 24 hours  remdesivir  IVPB   IV Intermittent     benzonatate 100 milliGRAM(s) Oral three times a day PRN    carbidopa/levodopa  25/100 1 Tablet(s) Oral three times a day  melatonin 3 milliGRAM(s) Oral at bedtime  QUEtiapine 50 milliGRAM(s) Oral at bedtime    pantoprazole    Tablet 40 milliGRAM(s) Oral every 12 hours    atorvastatin 10 milliGRAM(s) Oral at bedtime  dexAMETHasone  Injectable 6 milliGRAM(s) IV Push daily  dextrose 40% Gel 15 Gram(s) Oral once  dextrose 50% Injectable 25 Gram(s) IV Push once  dextrose 50% Injectable 12.5 Gram(s) IV Push once  dextrose 50% Injectable 25 Gram(s) IV Push once  glucagon  Injectable 1 milliGRAM(s) IntraMuscular once  insulin lispro (ADMELOG) corrective regimen sliding scale   SubCutaneous three times a day before meals  insulin lispro (ADMELOG) corrective regimen sliding scale   SubCutaneous at bedtime    dextrose 5%. 1000 milliLiter(s) IV Continuous <Continuous>  dextrose 5%. 1000 milliLiter(s) IV Continuous <Continuous>      PAST MEDICAL/SURGICAL HISTORY  PAST MEDICAL & SURGICAL HISTORY:  BPH (benign prostatic hyperplasia)    CAD (Coronary Artery Disease)  s/p cardiac stent ( &gt; 20 years ago)    HTN (Hypertension)    DM (Diabetes Mellitus)    Hypercholesterolemia    Coronary Stent  x 2 ( 20 years )        SOCIAL HISTORY: Substance Use (street drugs): ( x ) never used  (  ) other:    FAMILY HISTORY:  No pertinent family history in first degree relatives        REVIEW OF SYSTEMS:  CONSTITUTIONAL: No fever, weight loss, or fatigue  EYES: No eye pain, visual disturbances, or discharge  ENMT:  No difficulty hearing, tinnitus, vertigo; No sinus or throat pain  BREASTS: No pain, masses, or nipple discharge  GASTROINTESTINAL: No abdominal or epigastric pain. No nausea, vomiting, or hematemesis; No diarrhea or constipation. No melena or hematochezia.  GENITOURINARY: No dysuria, frequency, hematuria, or incontinence  NEUROLOGICAL: No headaches, memory loss, loss of strength, numbness, or tremors  ENDOCRINE: No heat or cold intolerance; No hair loss  MUSCULOSKELETAL: No joint pain or swelling; No muscle, back, or extremity pain  PSYCHIATRIC: No depression, anxiety, mood swings, or difficulty sleeping  HEME/LYMPH: No easy bruising, or bleeding gums  All others negative    PHYSICAL EXAM:  T(C): 36.6 (03-20-21 @ 21:51), Max: 36.7 (03-20-21 @ 05:11)  HR: 75 (03-20-21 @ 21:51) (64 - 84)  BP: 151/59 (03-20-21 @ 21:51) (129/65 - 151/59)  RR: 18 (03-20-21 @ 21:51) (16 - 18)  SpO2: 97% (03-20-21 @ 21:51) (94% - 97%)  Wt(kg): --  I&O's Summary    19 Mar 2021 07:01  -  20 Mar 2021 07:00  --------------------------------------------------------  IN: 0 mL / OUT: 100 mL / NET: -100 mL        GENERAL: NAD  EYES: EOMI, PERRLA, conjunctiva and sclera clear  ENMT: No tonsillar erythema, exudates, or enlargement  Cardiovascular: Normal S1 S2, No JVD, No murmurs, No edema  Respiratory: Lungs b/l crackles at bases	  Gastrointestinal:  Soft, Non-tender, + BS	  Extremities: No wanda                                10.3   8.52  )-----------( 456      ( 20 Mar 2021 04:39 )             31.5     03-20    140  |  103  |  36<H>  ----------------------------<  166<H>  3.9   |  28  |  0.87    Ca    9.2      20 Mar 2021 04:39  Phos  2.5     03-20  Mg     1.6     03-20    TPro  6.3  /  Alb  2.7<L>  /  TBili  0.4  /  DBili  x   /  AST  18  /  ALT  5   /  AlkPhos  86  03-20    proBNP:   Lipid Profile:   HgA1c:   TSH:     Consultant(s) Notes Reviewed:  [x ] YES  [ ] NO    Care Discussed with Consultants/Other Providers [ x] YES  [ ] NO    Imaging Personally Reviewed independently:  [x] YES  [ ] NO    All labs, radiologic studies, vitals, orders and medications list reviewed. Patient is seen and examined at bedside. Case discussed with medical team.

## 2021-03-20 NOTE — PROGRESS NOTE ADULT - PROBLEM SELECTOR PLAN 3
CT chest large left pleural fluid collection --> A 17.1 x 11.0 x 8.3 cm fluid collection with peripheral enhancement in the left basilar pleural space. DDx includes empyema, simple parapneumonic effusion, malignant effusion. Pt with L pleural effusion first diagnosed in 2/2021. Diagnostic thoracentesis deferred at that time as per family wishes. Given pt's age and comorbidities, agree with continued abx therapy for possible bacterial empyema, but cannot exclude other etiologies.   - continue empiric therapy with Zosyn; would treat for 5-days, currently on day 3  - will continue to defer diagnostic and/or therapeutic thoracentesis at this time since pt maintaining adequate O2 sat on RA.

## 2021-03-20 NOTE — PROGRESS NOTE ADULT - PROBLEM SELECTOR PLAN 7
BP and HR increased today  - resume metoprolol succinate 50mg daily with hold parameters  - resume home regimen of losartan 25mg daily with hold parameters  - Monitor BP  - d/c lasix 20mg daily

## 2021-03-20 NOTE — PROGRESS NOTE ADULT - SUBJECTIVE AND OBJECTIVE BOX
Patient is a 81y old  Male who presents with a chief complaint of hypotension (19 Mar 2021 19:04)      SUBJECTIVE / OVERNIGHT EVENTS:  As per nursing staff, patient intermittently trying to pull out IV and pulling on lines. Pt given dose of Haldol 0.25mg earlier in the AM. In the afternoon, pt was resting in bed but removed his gown. He has no chest pain, SOB, n/v or abdominal pain and remains on RA.     MEDICATIONS  (STANDING):  atorvastatin 10 milliGRAM(s) Oral at bedtime  carbidopa/levodopa  25/100 1 Tablet(s) Oral three times a day  dexAMETHasone  Injectable 6 milliGRAM(s) IV Push daily  dextrose 40% Gel 15 Gram(s) Oral once  dextrose 5%. 1000 milliLiter(s) (50 mL/Hr) IV Continuous <Continuous>  dextrose 5%. 1000 milliLiter(s) (100 mL/Hr) IV Continuous <Continuous>  dextrose 50% Injectable 25 Gram(s) IV Push once  dextrose 50% Injectable 12.5 Gram(s) IV Push once  dextrose 50% Injectable 25 Gram(s) IV Push once  enoxaparin Injectable 40 milliGRAM(s) SubCutaneous daily  glucagon  Injectable 1 milliGRAM(s) IntraMuscular once  insulin lispro (ADMELOG) corrective regimen sliding scale   SubCutaneous three times a day before meals  insulin lispro (ADMELOG) corrective regimen sliding scale   SubCutaneous at bedtime  melatonin 3 milliGRAM(s) Oral at bedtime  pantoprazole    Tablet 40 milliGRAM(s) Oral every 12 hours  piperacillin/tazobactam IVPB.. 3.375 Gram(s) IV Intermittent every 8 hours  QUEtiapine 50 milliGRAM(s) Oral at bedtime  remdesivir  IVPB 100 milliGRAM(s) IV Intermittent every 24 hours  remdesivir  IVPB   IV Intermittent   tamsulosin 0.4 milliGRAM(s) Oral at bedtime    MEDICATIONS  (PRN):  benzonatate 100 milliGRAM(s) Oral three times a day PRN Cough      Vital Signs Last 24 Hrs  T(C): 36.3 (20 Mar 2021 14:11), Max: 37 (19 Mar 2021 22:07)  T(F): 97.4 (20 Mar 2021 14:11), Max: 98.6 (19 Mar 2021 22:07)  HR: 82 (20 Mar 2021 14:11) (64 - 84)  BP: 133/50 (20 Mar 2021 14:11) (101/57 - 152/59)  BP(mean): --  RR: 18 (20 Mar 2021 14:11) (16 - 18)  SpO2: 96% (20 Mar 2021 14:11) (93% - 97%)  CAPILLARY BLOOD GLUCOSE      POCT Blood Glucose.: 114 mg/dL (20 Mar 2021 09:05)  POCT Blood Glucose.: 176 mg/dL (19 Mar 2021 22:18)  POCT Blood Glucose.: 213 mg/dL (19 Mar 2021 18:11)    I&O's Summary    19 Mar 2021 07:01  -  20 Mar 2021 07:00  --------------------------------------------------------  IN: 0 mL / OUT: 100 mL / NET: -100 mL          PHYSICAL EXAM  GENERAL: NAD, elderly man laying comfortably in bed, speaking in full sentences on RA  CHEST/LUNG: Crackles in right mid and lower lung fields; No BS at mid to lower lung fields on the left; No wheezes. Normal respiratory effort  HEART: Regular rate and rhythm  ABDOMEN: Soft, Nontender, Nondistended  EXTREMITIES:  2+ Peripheral Pulses, No clubbing, cyanosis, or edema  PSYCH: Alert, oriented to self and place. Cooperative, follows simple commands and pleasant        LABS:                        10.3   8.52  )-----------( 456      ( 20 Mar 2021 04:39 )             31.5     03-20    140  |  103  |  36<H>  ----------------------------<  166<H>  3.9   |  28  |  0.87    Ca    9.2      20 Mar 2021 04:39  Phos  2.5     03-20  Mg     1.6     03-20    TPro  6.3  /  Alb  2.7<L>  /  TBili  0.4  /  DBili  x   /  AST  18  /  ALT  5   /  AlkPhos  86  03-20

## 2021-03-20 NOTE — PROGRESS NOTE ADULT - PROBLEM SELECTOR PLAN 4
TTE 2/2021 EF: 50%, Moderate diastolic dysfunction (Stage II), moderate pulmonary hypertension, Small pericardial effusion; proBNP 1306 (prior hospitalization 2800)   Recent 2/2021 abnormal pharmacological stress test: moderate defects in apical, distal lateral walls that are predominantly reversible, suggestive of ischemia - brother declined catheterization given pt's hx of dementia and Parkinson's   - Cards following, recs appreciated  - continue to hold lasix 20 mg PO daily  - C/w plavix given stent hx   - resume metoprolol succinate 50 daily   - resume losartan 25mg daily  - pt not in acute exacerbation, no need for telemetry at this

## 2021-03-20 NOTE — PROGRESS NOTE ADULT - PROBLEM SELECTOR PLAN 6
- C/w Seroquel 50mg at bedtime; monitor QTc if making adjustments  - frequent re-orientation  - Monitor EKG

## 2021-03-21 LAB
ALBUMIN SERPL ELPH-MCNC: 2.3 G/DL — LOW (ref 3.3–5)
ALP SERPL-CCNC: 85 U/L — SIGNIFICANT CHANGE UP (ref 40–120)
ALT FLD-CCNC: 7 U/L — SIGNIFICANT CHANGE UP (ref 4–41)
ANION GAP SERPL CALC-SCNC: 10 MMOL/L — SIGNIFICANT CHANGE UP (ref 7–14)
AST SERPL-CCNC: 23 U/L — SIGNIFICANT CHANGE UP (ref 4–40)
BILIRUB SERPL-MCNC: 0.5 MG/DL — SIGNIFICANT CHANGE UP (ref 0.2–1.2)
BUN SERPL-MCNC: 28 MG/DL — HIGH (ref 7–23)
CALCIUM SERPL-MCNC: 8.5 MG/DL — SIGNIFICANT CHANGE UP (ref 8.4–10.5)
CHLORIDE SERPL-SCNC: 105 MMOL/L — SIGNIFICANT CHANGE UP (ref 98–107)
CO2 SERPL-SCNC: 28 MMOL/L — SIGNIFICANT CHANGE UP (ref 22–31)
CREAT SERPL-MCNC: 0.78 MG/DL — SIGNIFICANT CHANGE UP (ref 0.5–1.3)
GLUCOSE BLDC GLUCOMTR-MCNC: 117 MG/DL — HIGH (ref 70–99)
GLUCOSE BLDC GLUCOMTR-MCNC: 178 MG/DL — HIGH (ref 70–99)
GLUCOSE BLDC GLUCOMTR-MCNC: 189 MG/DL — HIGH (ref 70–99)
GLUCOSE BLDC GLUCOMTR-MCNC: 215 MG/DL — HIGH (ref 70–99)
GLUCOSE SERPL-MCNC: 76 MG/DL — SIGNIFICANT CHANGE UP (ref 70–99)
HCT VFR BLD CALC: 28.1 % — LOW (ref 39–50)
HGB BLD-MCNC: 8.8 G/DL — LOW (ref 13–17)
MAGNESIUM SERPL-MCNC: 1.3 MG/DL — LOW (ref 1.6–2.6)
MCHC RBC-ENTMCNC: 28.6 PG — SIGNIFICANT CHANGE UP (ref 27–34)
MCHC RBC-ENTMCNC: 31.3 GM/DL — LOW (ref 32–36)
MCV RBC AUTO: 91.2 FL — SIGNIFICANT CHANGE UP (ref 80–100)
NRBC # BLD: 0 /100 WBCS — SIGNIFICANT CHANGE UP
NRBC # FLD: 0 K/UL — SIGNIFICANT CHANGE UP
PHOSPHATE SERPL-MCNC: 2.9 MG/DL — SIGNIFICANT CHANGE UP (ref 2.5–4.5)
PLATELET # BLD AUTO: 425 K/UL — HIGH (ref 150–400)
POTASSIUM SERPL-MCNC: 4.1 MMOL/L — SIGNIFICANT CHANGE UP (ref 3.5–5.3)
POTASSIUM SERPL-SCNC: 4.1 MMOL/L — SIGNIFICANT CHANGE UP (ref 3.5–5.3)
PROT SERPL-MCNC: 5.7 G/DL — LOW (ref 6–8.3)
RBC # BLD: 3.08 M/UL — LOW (ref 4.2–5.8)
RBC # FLD: 13.8 % — SIGNIFICANT CHANGE UP (ref 10.3–14.5)
SODIUM SERPL-SCNC: 143 MMOL/L — SIGNIFICANT CHANGE UP (ref 135–145)
WBC # BLD: 7.33 K/UL — SIGNIFICANT CHANGE UP (ref 3.8–10.5)
WBC # FLD AUTO: 7.33 K/UL — SIGNIFICANT CHANGE UP (ref 3.8–10.5)

## 2021-03-21 PROCEDURE — 93010 ELECTROCARDIOGRAM REPORT: CPT

## 2021-03-21 PROCEDURE — 71045 X-RAY EXAM CHEST 1 VIEW: CPT | Mod: 26

## 2021-03-21 PROCEDURE — 99232 SBSQ HOSP IP/OBS MODERATE 35: CPT | Mod: CS

## 2021-03-21 RX ORDER — MAGNESIUM SULFATE 500 MG/ML
2 VIAL (ML) INJECTION ONCE
Refills: 0 | Status: COMPLETED | OUTPATIENT
Start: 2021-03-21 | End: 2021-03-21

## 2021-03-21 RX ORDER — LOSARTAN POTASSIUM 100 MG/1
25 TABLET, FILM COATED ORAL DAILY
Refills: 0 | Status: DISCONTINUED | OUTPATIENT
Start: 2021-03-21 | End: 2021-03-30

## 2021-03-21 RX ADMIN — CARBIDOPA AND LEVODOPA 1 TABLET(S): 25; 100 TABLET ORAL at 13:29

## 2021-03-21 RX ADMIN — PIPERACILLIN AND TAZOBACTAM 25 GRAM(S): 4; .5 INJECTION, POWDER, LYOPHILIZED, FOR SOLUTION INTRAVENOUS at 06:59

## 2021-03-21 RX ADMIN — PIPERACILLIN AND TAZOBACTAM 25 GRAM(S): 4; .5 INJECTION, POWDER, LYOPHILIZED, FOR SOLUTION INTRAVENOUS at 17:40

## 2021-03-21 RX ADMIN — REMDESIVIR 500 MILLIGRAM(S): 5 INJECTION INTRAVENOUS at 17:39

## 2021-03-21 RX ADMIN — Medication 50 GRAM(S): at 12:22

## 2021-03-21 RX ADMIN — CARBIDOPA AND LEVODOPA 1 TABLET(S): 25; 100 TABLET ORAL at 07:16

## 2021-03-21 RX ADMIN — ENOXAPARIN SODIUM 40 MILLIGRAM(S): 100 INJECTION SUBCUTANEOUS at 12:22

## 2021-03-21 RX ADMIN — PANTOPRAZOLE SODIUM 40 MILLIGRAM(S): 20 TABLET, DELAYED RELEASE ORAL at 17:43

## 2021-03-21 RX ADMIN — LOSARTAN POTASSIUM 25 MILLIGRAM(S): 100 TABLET, FILM COATED ORAL at 12:22

## 2021-03-21 RX ADMIN — PIPERACILLIN AND TAZOBACTAM 25 GRAM(S): 4; .5 INJECTION, POWDER, LYOPHILIZED, FOR SOLUTION INTRAVENOUS at 22:01

## 2021-03-21 RX ADMIN — Medication 3 MILLIGRAM(S): at 22:00

## 2021-03-21 RX ADMIN — Medication 6 MILLIGRAM(S): at 07:15

## 2021-03-21 RX ADMIN — Medication 1: at 13:30

## 2021-03-21 RX ADMIN — QUETIAPINE FUMARATE 50 MILLIGRAM(S): 200 TABLET, FILM COATED ORAL at 22:35

## 2021-03-21 RX ADMIN — TAMSULOSIN HYDROCHLORIDE 0.4 MILLIGRAM(S): 0.4 CAPSULE ORAL at 22:00

## 2021-03-21 RX ADMIN — CARBIDOPA AND LEVODOPA 1 TABLET(S): 25; 100 TABLET ORAL at 22:01

## 2021-03-21 RX ADMIN — PANTOPRAZOLE SODIUM 40 MILLIGRAM(S): 20 TABLET, DELAYED RELEASE ORAL at 07:15

## 2021-03-21 RX ADMIN — ATORVASTATIN CALCIUM 10 MILLIGRAM(S): 80 TABLET, FILM COATED ORAL at 22:00

## 2021-03-21 RX ADMIN — Medication 50 MILLIGRAM(S): at 07:16

## 2021-03-21 NOTE — PROGRESS NOTE ADULT - SUBJECTIVE AND OBJECTIVE BOX
Bennie Hwang MD  Interventional Cardiology / Endovascular Specialist  [place] Office : [phone] [place], [place] [address]  Tel:   [place] Office : [address] [facility] [place] [address]  Tel: [phone]  Cell : [phone]    Subjective/Overnight events: Patient lying in bed comfortably. No acute distress. having productive cough at times   	  MEDICATIONS:  enoxaparin Injectable 40 milliGRAM(s) SubCutaneous daily  metoprolol succinate ER 50 milliGRAM(s) Oral daily  tamsulosin 0.4 milliGRAM(s) Oral at bedtime    piperacillin/tazobactam IVPB.. 3.375 Gram(s) IV Intermittent every 8 hours  remdesivir  IVPB 100 milliGRAM(s) IV Intermittent every 24 hours  remdesivir  IVPB   IV Intermittent     benzonatate 100 milliGRAM(s) Oral three times a day PRN    carbidopa/levodopa  25/100 1 Tablet(s) Oral three times a day  melatonin 3 milliGRAM(s) Oral at bedtime  QUEtiapine 50 milliGRAM(s) Oral at bedtime    pantoprazole    Tablet 40 milliGRAM(s) Oral every 12 hours    atorvastatin 10 milliGRAM(s) Oral at bedtime  dexAMETHasone  Injectable 6 milliGRAM(s) IV Push daily  dextrose 40% Gel 15 Gram(s) Oral once  dextrose 50% Injectable 25 Gram(s) IV Push once  dextrose 50% Injectable 12.5 Gram(s) IV Push once  dextrose 50% Injectable 25 Gram(s) IV Push once  glucagon  Injectable 1 milliGRAM(s) IntraMuscular once  insulin lispro (ADMELOG) corrective regimen sliding scale   SubCutaneous three times a day before meals  insulin lispro (ADMELOG) corrective regimen sliding scale   SubCutaneous at bedtime    dextrose 5%. 1000 milliLiter(s) IV Continuous <Continuous>  dextrose 5%. 1000 milliLiter(s) IV Continuous <Continuous>      PAST MEDICAL/SURGICAL HISTORY  PAST MEDICAL & SURGICAL HISTORY:  BPH (benign prostatic hyperplasia)    CAD (Coronary Artery Disease)  s/p cardiac stent ( &gt; 20 years ago)    HTN (Hypertension)    DM (Diabetes Mellitus)    Hypercholesterolemia    Coronary Stent  x 2 ( 20 years )        SOCIAL HISTORY: Substance Use (street drugs): ( x ) never used  (  ) other:    FAMILY HISTORY:  No pertinent family history in first degree relatives        REVIEW OF SYSTEMS:  CONSTITUTIONAL: No fever, weight loss, or fatigue  EYES: No eye pain, visual disturbances, or discharge  ENMT:  No difficulty hearing, tinnitus, vertigo; No sinus or throat pain  BREASTS: No pain, masses, or nipple discharge  GASTROINTESTINAL: No abdominal or epigastric pain. No nausea, vomiting, or hematemesis; No diarrhea or constipation. No melena or hematochezia.  GENITOURINARY: No dysuria, frequency, hematuria, or incontinence  NEUROLOGICAL: No headaches, memory loss, loss of strength, numbness, or tremors  ENDOCRINE: No heat or cold intolerance; No hair loss  MUSCULOSKELETAL: No joint pain or swelling; No muscle, back, or extremity pain  PSYCHIATRIC: No depression, anxiety, mood swings, or difficulty sleeping  HEME/LYMPH: No easy bruising, or bleeding gums  All others negative    PHYSICAL EXAM:  T(C): 36.3 (03-21-21 @ 07:12), Max: 36.9 (03-21-21 @ 02:29)  HR: 63 (03-21-21 @ 07:12) (63 - 82)  BP: 149/59 (03-21-21 @ 07:12) (133/50 - 151/59)  RR: 20 (03-21-21 @ 07:12) (16 - 20)  SpO2: 96% (03-21-21 @ 07:12) (94% - 97%)  Wt(kg): --  I&O's Summary        GENERAL: NAD  EYES: EOMI, PERRLA, conjunctiva and sclera clear  ENMT: No tonsillar erythema, exudates, or enlargement  Cardiovascular: Normal S1 S2, No JVD, No murmurs, No edema  Respiratory: Lungs b/l crackles at bases	  Gastrointestinal:  Soft, Non-tender, + BS	  Extremities: No wanda                                    8.8    7.33  )-----------( 425      ( 21 Mar 2021 08:28 )             28.1     03-21    143  |  105  |  28<H>  ----------------------------<  76  4.1   |  28  |  0.78    Ca    8.5      21 Mar 2021 08:28  Phos  2.9     03-21  Mg     1.3     03-21    TPro  5.7<L>  /  Alb  2.3<L>  /  TBili  0.5  /  DBili  x   /  AST  23  /  ALT  7   /  AlkPhos  85  03-21    proBNP:   Lipid Profile:   HgA1c:   TSH:     Consultant(s) Notes Reviewed:  [x ] YES  [ ] NO    Care Discussed with Consultants/Other Providers [ x] YES  [ ] NO    Imaging Personally Reviewed independently:  [x] YES  [ ] NO    All labs, radiologic studies, vitals, orders and medications list reviewed. Patient is seen and examined at bedside. Case discussed with medical team.

## 2021-03-21 NOTE — PROGRESS NOTE ADULT - ASSESSMENT
80 y/o M HTN, DM, HLD, dementia (AOx2 at baseline), Parkinson's disease, CAD s/p stents x2 (>20 years ago), recent new onset HF (EF: 50%, Moderate diastolic dysfunction (Stage II), moderate pulmonary hypertension, small pericardial effusion), L pleural effusion, BPH, gastric ulcers, admitted for further management of sepsis 2/2 possible empyema and COVID, along with hypoxic respiratory failure.

## 2021-03-21 NOTE — PROGRESS NOTE ADULT - PROBLEM SELECTOR PLAN 1
Complicated by acute hypoxic respiratory failure. Initially met sepsis criteria - no longer septic. WBC normal. Pt hemodynamically stable and not requiring O2 at rest. CT chest w/ IV contrast: Bilateral patchy ground glass opacities throughout all the lobes of the bilateral lungs  - F/u 3/17 blood cultures, urine culture --> no growth thus far  - c/w remdesivir and decadron; currently on day 4  - Trend inflammatory markers and monitor renal and liver function

## 2021-03-21 NOTE — PROGRESS NOTE ADULT - PROBLEM SELECTOR PLAN 7
- c/w metoprolol succinate 50mg daily with hold parameters  - c/w home regimen of losartan 25mg daily with hold parameters  - Monitor BP  - d/c lasix 20mg daily

## 2021-03-21 NOTE — PROGRESS NOTE ADULT - PROBLEM SELECTOR PLAN 3
CT chest large left pleural fluid collection --> A 17.1 x 11.0 x 8.3 cm fluid collection with peripheral enhancement in the left basilar pleural space. DDx includes empyema, simple parapneumonic effusion, malignant effusion. Pt with L pleural effusion first diagnosed in 2/2021. Diagnostic thoracentesis deferred at that time as per family wishes. Given pt's age and comorbidities, agree with continued abx therapy for possible bacterial empyema, but cannot exclude other etiologies.   - continue empiric therapy with Zosyn; would treat for 5-days, currently on day 4  - will continue to defer diagnostic and/or therapeutic thoracentesis at this time since pt maintaining adequate O2 sat on RA.

## 2021-03-21 NOTE — PROGRESS NOTE ADULT - PROBLEM SELECTOR PLAN 9
- Diet: CC DASH diet  - DVT: lovenox  - Dispo: pending clinical improvement. Anticipate possible d/c after last doses of empiric abx and remdesivir tomorrow vs. the next day after  - GOC: MOLST filled out last hospitalization, pt DNR with trial of intubation, confirmed with brother at time of H&P.

## 2021-03-21 NOTE — PROGRESS NOTE ADULT - PROBLEM SELECTOR PLAN 4
TTE 2/2021 EF: 50%, Moderate diastolic dysfunction (Stage II), moderate pulmonary hypertension, Small pericardial effusion; proBNP 1306 (prior hospitalization 2800)   Recent 2/2021 abnormal pharmacological stress test: moderate defects in apical, distal lateral walls that are predominantly reversible, suggestive of ischemia - brother declined catheterization given pt's hx of dementia and Parkinson's   - Cards following, recs appreciated  - continue to hold lasix 20 mg PO daily  - C/w plavix given stent hx   - c/w metoprolol succinate 50 daily   - c/w losartan 25mg daily  - pt not in acute exacerbation, no need for telemetry at this

## 2021-03-22 LAB
ALBUMIN SERPL ELPH-MCNC: 2.7 G/DL — LOW (ref 3.3–5)
ALP SERPL-CCNC: 88 U/L — SIGNIFICANT CHANGE UP (ref 40–120)
ALT FLD-CCNC: 9 U/L — SIGNIFICANT CHANGE UP (ref 4–41)
ANION GAP SERPL CALC-SCNC: 8 MMOL/L — SIGNIFICANT CHANGE UP (ref 7–14)
AST SERPL-CCNC: 20 U/L — SIGNIFICANT CHANGE UP (ref 4–40)
BILIRUB SERPL-MCNC: 0.6 MG/DL — SIGNIFICANT CHANGE UP (ref 0.2–1.2)
BUN SERPL-MCNC: 25 MG/DL — HIGH (ref 7–23)
CALCIUM SERPL-MCNC: 8.6 MG/DL — SIGNIFICANT CHANGE UP (ref 8.4–10.5)
CHLORIDE SERPL-SCNC: 102 MMOL/L — SIGNIFICANT CHANGE UP (ref 98–107)
CO2 SERPL-SCNC: 29 MMOL/L — SIGNIFICANT CHANGE UP (ref 22–31)
CREAT SERPL-MCNC: 0.8 MG/DL — SIGNIFICANT CHANGE UP (ref 0.5–1.3)
CULTURE RESULTS: SIGNIFICANT CHANGE UP
CULTURE RESULTS: SIGNIFICANT CHANGE UP
GLUCOSE BLDC GLUCOMTR-MCNC: 144 MG/DL — HIGH (ref 70–99)
GLUCOSE BLDC GLUCOMTR-MCNC: 215 MG/DL — HIGH (ref 70–99)
GLUCOSE BLDC GLUCOMTR-MCNC: 237 MG/DL — HIGH (ref 70–99)
GLUCOSE BLDC GLUCOMTR-MCNC: 278 MG/DL — HIGH (ref 70–99)
GLUCOSE SERPL-MCNC: 104 MG/DL — HIGH (ref 70–99)
HCT VFR BLD CALC: 30 % — LOW (ref 39–50)
HGB BLD-MCNC: 9.6 G/DL — LOW (ref 13–17)
MAGNESIUM SERPL-MCNC: 1.7 MG/DL — SIGNIFICANT CHANGE UP (ref 1.6–2.6)
MCHC RBC-ENTMCNC: 29 PG — SIGNIFICANT CHANGE UP (ref 27–34)
MCHC RBC-ENTMCNC: 32 GM/DL — SIGNIFICANT CHANGE UP (ref 32–36)
MCV RBC AUTO: 90.6 FL — SIGNIFICANT CHANGE UP (ref 80–100)
NRBC # BLD: 0 /100 WBCS — SIGNIFICANT CHANGE UP
NRBC # FLD: 0 K/UL — SIGNIFICANT CHANGE UP
PHOSPHATE SERPL-MCNC: 2.7 MG/DL — SIGNIFICANT CHANGE UP (ref 2.5–4.5)
PLATELET # BLD AUTO: 446 K/UL — HIGH (ref 150–400)
POTASSIUM SERPL-MCNC: 3.7 MMOL/L — SIGNIFICANT CHANGE UP (ref 3.5–5.3)
POTASSIUM SERPL-SCNC: 3.7 MMOL/L — SIGNIFICANT CHANGE UP (ref 3.5–5.3)
PROT SERPL-MCNC: 5.9 G/DL — LOW (ref 6–8.3)
RBC # BLD: 3.31 M/UL — LOW (ref 4.2–5.8)
RBC # FLD: 13.7 % — SIGNIFICANT CHANGE UP (ref 10.3–14.5)
SODIUM SERPL-SCNC: 139 MMOL/L — SIGNIFICANT CHANGE UP (ref 135–145)
SPECIMEN SOURCE: SIGNIFICANT CHANGE UP
SPECIMEN SOURCE: SIGNIFICANT CHANGE UP
WBC # BLD: 7.67 K/UL — SIGNIFICANT CHANGE UP (ref 3.8–10.5)
WBC # FLD AUTO: 7.67 K/UL — SIGNIFICANT CHANGE UP (ref 3.8–10.5)

## 2021-03-22 PROCEDURE — 99232 SBSQ HOSP IP/OBS MODERATE 35: CPT | Mod: CS

## 2021-03-22 RX ORDER — PANTOPRAZOLE SODIUM 20 MG/1
40 TABLET, DELAYED RELEASE ORAL EVERY 12 HOURS
Refills: 0 | Status: DISCONTINUED | OUTPATIENT
Start: 2021-03-22 | End: 2021-03-30

## 2021-03-22 RX ORDER — SENNA PLUS 8.6 MG/1
2 TABLET ORAL AT BEDTIME
Refills: 0 | Status: DISCONTINUED | OUTPATIENT
Start: 2021-03-22 | End: 2021-03-30

## 2021-03-22 RX ADMIN — PIPERACILLIN AND TAZOBACTAM 25 GRAM(S): 4; .5 INJECTION, POWDER, LYOPHILIZED, FOR SOLUTION INTRAVENOUS at 05:49

## 2021-03-22 RX ADMIN — Medication 3 MILLIGRAM(S): at 22:46

## 2021-03-22 RX ADMIN — Medication 100 MILLIGRAM(S): at 05:53

## 2021-03-22 RX ADMIN — CARBIDOPA AND LEVODOPA 1 TABLET(S): 25; 100 TABLET ORAL at 13:31

## 2021-03-22 RX ADMIN — QUETIAPINE FUMARATE 50 MILLIGRAM(S): 200 TABLET, FILM COATED ORAL at 22:46

## 2021-03-22 RX ADMIN — Medication 2: at 14:07

## 2021-03-22 RX ADMIN — CARBIDOPA AND LEVODOPA 1 TABLET(S): 25; 100 TABLET ORAL at 22:43

## 2021-03-22 RX ADMIN — REMDESIVIR 500 MILLIGRAM(S): 5 INJECTION INTRAVENOUS at 11:49

## 2021-03-22 RX ADMIN — PIPERACILLIN AND TAZOBACTAM 25 GRAM(S): 4; .5 INJECTION, POWDER, LYOPHILIZED, FOR SOLUTION INTRAVENOUS at 22:44

## 2021-03-22 RX ADMIN — PANTOPRAZOLE SODIUM 40 MILLIGRAM(S): 20 TABLET, DELAYED RELEASE ORAL at 18:15

## 2021-03-22 RX ADMIN — Medication 6 MILLIGRAM(S): at 05:50

## 2021-03-22 RX ADMIN — TAMSULOSIN HYDROCHLORIDE 0.4 MILLIGRAM(S): 0.4 CAPSULE ORAL at 22:45

## 2021-03-22 RX ADMIN — CARBIDOPA AND LEVODOPA 1 TABLET(S): 25; 100 TABLET ORAL at 05:50

## 2021-03-22 RX ADMIN — PIPERACILLIN AND TAZOBACTAM 25 GRAM(S): 4; .5 INJECTION, POWDER, LYOPHILIZED, FOR SOLUTION INTRAVENOUS at 13:30

## 2021-03-22 RX ADMIN — LOSARTAN POTASSIUM 25 MILLIGRAM(S): 100 TABLET, FILM COATED ORAL at 05:51

## 2021-03-22 RX ADMIN — ENOXAPARIN SODIUM 40 MILLIGRAM(S): 100 INJECTION SUBCUTANEOUS at 11:49

## 2021-03-22 RX ADMIN — Medication 2: at 18:15

## 2021-03-22 RX ADMIN — ATORVASTATIN CALCIUM 10 MILLIGRAM(S): 80 TABLET, FILM COATED ORAL at 22:44

## 2021-03-22 RX ADMIN — PANTOPRAZOLE SODIUM 40 MILLIGRAM(S): 20 TABLET, DELAYED RELEASE ORAL at 05:52

## 2021-03-22 RX ADMIN — SENNA PLUS 2 TABLET(S): 8.6 TABLET ORAL at 22:49

## 2021-03-22 NOTE — SWALLOW BEDSIDE ASSESSMENT ADULT - ADDITIONAL RECOMMENDATIONS
1. Monitor tolerance and reconsult this service as indicated  2. Swallow therapy pending discharge plans (rehab versus home care versus out patient at [facility] [phone]), this service will attempt to follow up as schedule permits

## 2021-03-22 NOTE — PROGRESS NOTE ADULT - PROBLEM SELECTOR PLAN 3
CT chest large left pleural fluid collection --> A 17.1 x 11.0 x 8.3 cm fluid collection with peripheral enhancement in the left basilar pleural space. DDx includes empyema, simple parapneumonic effusion, malignant effusion. Pt with L pleural effusion first diagnosed in 2/2021. Diagnostic thoracentesis deferred at that time as per family wishes. Given pt's age and comorbidities, agree with continued abx therapy for possible bacterial empyema, but cannot exclude other etiologies.   - continue empiric therapy with Zosyn; would treat for 5-days, currently on day 4  - will continue to defer diagnostic and/or therapeutic thoracentesis at this time since pt maintaining adequate O2 sat on RA. CT chest large left pleural fluid collection --> A 17.1 x 11.0 x 8.3 cm fluid collection with peripheral enhancement in the left basilar pleural space. DDx includes empyema, simple parapneumonic effusion, malignant effusion. Pt with L pleural effusion first diagnosed in 2/2021. Diagnostic thoracentesis deferred at that time as per family wishes. Given pt's age and comorbidities, agree with continued abx therapy for possible bacterial empyema, but cannot exclude other etiologies.   - continue empiric therapy with Zosyn; would treat for 5-days, currently on day 5/5  - will continue to defer diagnostic and/or therapeutic thoracentesis at this time since pt clinically improved and  maintaining adequate O2 sat on RA.

## 2021-03-22 NOTE — PROGRESS NOTE ADULT - PROBLEM SELECTOR PLAN 7
order supplementation - c/w metoprolol succinate 50mg daily with hold parameters  - c/w home regimen of losartan 25mg daily with hold parameters  - Monitor BP  - d/c lasix 20mg daily

## 2021-03-22 NOTE — SWALLOW BEDSIDE ASSESSMENT ADULT - PHARYNGEAL PHASE
Delayed pharyngeal swallow/Cough post oral intake coughing on liquid wash, indicative of laryngeal penetration/aspiration Within functional limits

## 2021-03-22 NOTE — PROGRESS NOTE ADULT - SUBJECTIVE AND OBJECTIVE BOX
Patient is a 81y old  Male who presents with a chief complaint of Sepsis 2/2 COVID +/- empyema (21 Mar 2021 16:57)      SUBJECTIVE / OVERNIGHT EVENTS:    MEDICATIONS  (STANDING):  atorvastatin 10 milliGRAM(s) Oral at bedtime  carbidopa/levodopa  25/100 1 Tablet(s) Oral three times a day  dexAMETHasone  Injectable 6 milliGRAM(s) IV Push daily  dextrose 40% Gel 15 Gram(s) Oral once  dextrose 5%. 1000 milliLiter(s) (50 mL/Hr) IV Continuous <Continuous>  dextrose 5%. 1000 milliLiter(s) (100 mL/Hr) IV Continuous <Continuous>  dextrose 50% Injectable 25 Gram(s) IV Push once  dextrose 50% Injectable 12.5 Gram(s) IV Push once  dextrose 50% Injectable 25 Gram(s) IV Push once  enoxaparin Injectable 40 milliGRAM(s) SubCutaneous daily  glucagon  Injectable 1 milliGRAM(s) IntraMuscular once  insulin lispro (ADMELOG) corrective regimen sliding scale   SubCutaneous three times a day before meals  insulin lispro (ADMELOG) corrective regimen sliding scale   SubCutaneous at bedtime  losartan 25 milliGRAM(s) Oral daily  melatonin 3 milliGRAM(s) Oral at bedtime  metoprolol succinate ER 50 milliGRAM(s) Oral daily  pantoprazole    Tablet 40 milliGRAM(s) Oral every 12 hours  piperacillin/tazobactam IVPB.. 3.375 Gram(s) IV Intermittent every 8 hours  QUEtiapine 50 milliGRAM(s) Oral at bedtime  remdesivir  IVPB 100 milliGRAM(s) IV Intermittent every 24 hours  remdesivir  IVPB   IV Intermittent   senna 2 Tablet(s) Oral at bedtime  tamsulosin 0.4 milliGRAM(s) Oral at bedtime    MEDICATIONS  (PRN):  benzonatate 100 milliGRAM(s) Oral three times a day PRN Cough      Vital Signs Last 24 Hrs  T(C): 36.4 (22 Mar 2021 09:45), Max: 36.7 (21 Mar 2021 21:57)  T(F): 97.5 (22 Mar 2021 09:45), Max: 98 (21 Mar 2021 21:57)  HR: 54 (22 Mar 2021 09:45) (50 - 80)  BP: 129/65 (22 Mar 2021 09:45) (129/65 - 149/70)  BP(mean): --  RR: 18 (22 Mar 2021 09:45) (18 - 19)  SpO2: 94% (22 Mar 2021 09:45) (92% - 100%)  CAPILLARY BLOOD GLUCOSE      POCT Blood Glucose.: 144 mg/dL (22 Mar 2021 09:00)  POCT Blood Glucose.: 215 mg/dL (21 Mar 2021 21:31)  POCT Blood Glucose.: 189 mg/dL (21 Mar 2021 18:40)  POCT Blood Glucose.: 178 mg/dL (21 Mar 2021 12:58)    I&O's Summary      PHYSICAL EXAM:  GENERAL: NAD, well-developed  HEAD:  Atraumatic, Normocephalic  EYES: EOMI, PERRLA, conjunctiva and sclera clear  NECK: Supple, No JVD  CHEST/LUNG: Clear to auscultation bilaterally; No wheeze  HEART: Regular rate and rhythm; No murmurs, rubs, or gallops  ABDOMEN: Soft, Nontender, Nondistended; Bowel sounds present  EXTREMITIES:  2+ Peripheral Pulses, No clubbing, cyanosis, or edema  PSYCH: AAOx3  NEUROLOGY: non-focal  SKIN: No rashes or lesions    LABS:                        9.6    7.67  )-----------( 446      ( 22 Mar 2021 07:46 )             30.0     03-22    139  |  102  |  25<H>  ----------------------------<  104<H>  3.7   |  29  |  0.80    Ca    8.6      22 Mar 2021 07:46  Phos  2.7     03-22  Mg     1.7     03-22    TPro  5.9<L>  /  Alb  2.7<L>  /  TBili  0.6  /  DBili  x   /  AST  20  /  ALT  9   /  AlkPhos  88  03-22              RADIOLOGY & ADDITIONAL TESTS:    Imaging Personally Reviewed:    Consultant(s) Notes Reviewed:      Care Discussed with Consultants/Other Providers:   Patient is a 81y old  Male who presents with a chief complaint of Sepsis 2/2 COVID +/- empyema (21 Mar 2021 16:57)      SUBJECTIVE / OVERNIGHT EVENTS: patient seen and examined by bedside, no acute distress noted  pt denies headache, dizziness, SOB, CP, Palpitations , N/V/D, abdominal pain  pt saturating 94% on RA      MEDICATIONS  (STANDING):  atorvastatin 10 milliGRAM(s) Oral at bedtime  carbidopa/levodopa  25/100 1 Tablet(s) Oral three times a day  dexAMETHasone  Injectable 6 milliGRAM(s) IV Push daily  dextrose 40% Gel 15 Gram(s) Oral once  dextrose 5%. 1000 milliLiter(s) (50 mL/Hr) IV Continuous <Continuous>  dextrose 5%. 1000 milliLiter(s) (100 mL/Hr) IV Continuous <Continuous>  dextrose 50% Injectable 25 Gram(s) IV Push once  dextrose 50% Injectable 12.5 Gram(s) IV Push once  dextrose 50% Injectable 25 Gram(s) IV Push once  enoxaparin Injectable 40 milliGRAM(s) SubCutaneous daily  glucagon  Injectable 1 milliGRAM(s) IntraMuscular once  insulin lispro (ADMELOG) corrective regimen sliding scale   SubCutaneous three times a day before meals  insulin lispro (ADMELOG) corrective regimen sliding scale   SubCutaneous at bedtime  losartan 25 milliGRAM(s) Oral daily  melatonin 3 milliGRAM(s) Oral at bedtime  metoprolol succinate ER 50 milliGRAM(s) Oral daily  pantoprazole    Tablet 40 milliGRAM(s) Oral every 12 hours  piperacillin/tazobactam IVPB.. 3.375 Gram(s) IV Intermittent every 8 hours  QUEtiapine 50 milliGRAM(s) Oral at bedtime  remdesivir  IVPB 100 milliGRAM(s) IV Intermittent every 24 hours  remdesivir  IVPB   IV Intermittent   senna 2 Tablet(s) Oral at bedtime  tamsulosin 0.4 milliGRAM(s) Oral at bedtime    MEDICATIONS  (PRN):  benzonatate 100 milliGRAM(s) Oral three times a day PRN Cough      Vital Signs Last 24 Hrs  T(C): 36.4 (22 Mar 2021 09:45), Max: 36.7 (21 Mar 2021 21:57)  T(F): 97.5 (22 Mar 2021 09:45), Max: 98 (21 Mar 2021 21:57)  HR: 54 (22 Mar 2021 09:45) (50 - 80)  BP: 129/65 (22 Mar 2021 09:45) (129/65 - 149/70)  BP(mean): --  RR: 18 (22 Mar 2021 09:45) (18 - 19)  SpO2: 94% (22 Mar 2021 09:45) (92% - 100%)  CAPILLARY BLOOD GLUCOSE      POCT Blood Glucose.: 144 mg/dL (22 Mar 2021 09:00)  POCT Blood Glucose.: 215 mg/dL (21 Mar 2021 21:31)  POCT Blood Glucose.: 189 mg/dL (21 Mar 2021 18:40)  POCT Blood Glucose.: 178 mg/dL (21 Mar 2021 12:58)    I&O's Summary    PHYSICAL EXAM  GENERAL: NAD, elderly man laying comfortably in bed,   CHEST/LUNG: Crackles in right mid and lower lung fields; No BS at mid to lower lung fields on the left; No wheezes. Normal respiratory effort  HEART: Regular rate and rhythm  ABDOMEN: Soft, Nontender, Nondistended  EXTREMITIES:  2+ Peripheral Pulses, No clubbing, cyanosis, or edema  PSYCH: Alert, oriented to self and place. Cooperative, follows simple commands and pleasant            LABS:                        9.6    7.67  )-----------( 446      ( 22 Mar 2021 07:46 )             30.0     03-22    139  |  102  |  25<H>  ----------------------------<  104<H>  3.7   |  29  |  0.80    Ca    8.6      22 Mar 2021 07:46  Phos  2.7     03-22  Mg     1.7     03-22    TPro  5.9<L>  /  Alb  2.7<L>  /  TBili  0.6  /  DBili  x   /  AST  20  /  ALT  9   /  AlkPhos  88  03-22              RADIOLOGY & ADDITIONAL TESTS:    Imaging Personally Reviewed:    Consultant(s) Notes Reviewed:      Care Discussed with Consultants/Other Providers:

## 2021-03-22 NOTE — SWALLOW BEDSIDE ASSESSMENT ADULT - ORAL PHASE
Within functional limits suspect premature spillage moderate lingual residue required several liquid washes

## 2021-03-22 NOTE — CONSULT NOTE ADULT - SUBJECTIVE AND OBJECTIVE BOX
80 y/o male w/ HTN, DM, HLD, dementia, Parkinson's disease, CAD s/p stents (PCI), diastolic dysfunction, pulmonary hypertension, BPH, gastric ulcers, sent from his Cardiologists office on 3/17/21 for hypotension.  Tested COVID+, and patient was made DNR.  Left pleural effusion reportedly diagnosed last month, treated as empyema  (Day 5 of Zosyn today), and diagnostic thoracentesis has been deferred up until this point as per family wishes.  Patient is currently sitting in bed comfortably on room-air, and denies any chest pains or SOB.      Vital Signs:  Vital Signs Last 24 Hrs  T(C): 36.4 (03-22-21 @ 17:48), Max: 36.7 (03-21-21 @ 21:57)  T(F): 97.6 (03-22-21 @ 17:48), Max: 98 (03-21-21 @ 21:57)  HR: 54 (03-22-21 @ 17:48) (50 - 80)  BP: 120/48 (03-22-21 @ 17:48) (105/47 - 149/70)  RR: 18 (03-22-21 @ 17:48) (18 - 19)  SpO2: 95% (03-22-21 @ 17:48) (92% - 100%) on (O2)      General: Awake, Alert, NAD, sitting-up in bed on room-air  Eyes: Scleras clear, PERRLA/ EOMI, Gross vision intact  ENT: Gross hearing intact, grossly patent pharynx, no stridor  Neck: Neck supple, trachea midline, No JVD  Respiratory: rhonchi left side  CV: S1S2; no audible murmurs  Abdominal: +BS's x4, soft, ND/NT  Extremities: No edema, + peripheral pulses  Skin: No Rashes, Hematoma, Ecchymosis                          9.6    7.67  )-----------( 446      ( 22 Mar 2021 07:46 )             30.0       139  |  102  |  25<H>  ----------------------------<  104<H>  3.7   |  29  |  0.80    Ca    8.6        Phos  2.7       Mg     1.7        TPro  5.9<L>  /  Alb  2.7<L>  /  TBili  0.6  /  DBili  x   /  AST  20  /  ALT  9   /  AlkPhos  88      MEDICATIONS  (STANDING):  atorvastatin 10 milliGRAM(s) Oral at bedtime  carbidopa/levodopa  25/100 1 Tablet(s) Oral three times a day  dexAMETHasone  Injectable 6 milliGRAM(s) IV Push daily  dextrose 40% Gel 15 Gram(s) Oral once  dextrose 5%. 1000 milliLiter(s) (50 mL/Hr) IV Continuous <Continuous>  dextrose 5%. 1000 milliLiter(s) (100 mL/Hr) IV Continuous <Continuous>  dextrose 50% Injectable 25 Gram(s) IV Push once  dextrose 50% Injectable 12.5 Gram(s) IV Push once  dextrose 50% Injectable 25 Gram(s) IV Push once  enoxaparin Injectable 40 milliGRAM(s) SubCutaneous daily  glucagon  Injectable 1 milliGRAM(s) IntraMuscular once  insulin lispro (ADMELOG) corrective regimen sliding scale   SubCutaneous three times a day before meals  insulin lispro (ADMELOG) corrective regimen sliding scale   SubCutaneous at bedtime  losartan 25 milliGRAM(s) Oral daily  melatonin 3 milliGRAM(s) Oral at bedtime  metoprolol succinate ER 50 milliGRAM(s) Oral daily  pantoprazole  Injectable 40 milliGRAM(s) IV Push every 12 hours  piperacillin/tazobactam IVPB.. 3.375 Gram(s) IV Intermittent every 8 hours  QUEtiapine 50 milliGRAM(s) Oral at bedtime  senna 2 Tablet(s) Oral at bedtime  tamsulosin 0.4 milliGRAM(s) Oral at bedtime    MEDICATIONS  (PRN):  benzonatate 100 milliGRAM(s) Oral three times a day PRN Cough      Assessment  80 y/o male w/ PAST MEDICAL & SURGICAL HISTORY:  BPH (benign prostatic hyperplasia)  CAD (Coronary Artery Disease) - s/p cardiac stent ( &gt; 20 years ago)  HTN (Hypertension)  DM (Diabetes Mellitus)  Hypercholesterolemia    80 y/o male, COVID+, with large loculated left pleural effusion    PLAN  Ultrasound exam performed by myself at bedside  Loculated left pleural effusion  Recommend IR consult for drainage    Continue care as per Primary Team    Patient discussed w/ Dr. Curtis

## 2021-03-22 NOTE — PROGRESS NOTE ADULT - ASSESSMENT
82 y/o M w/ pmhx of dementia, parkinson's disease, DM, HTN, CHF, CAD 2 stents sent from cardiology office for hypotension.     EKG: Sinus Josemanuel 59 PACs      1. Sepsis  - COVID +, DDimer elevated consider therapeutic a/c    -patient from office with hypotension, improving   -hypothermic  -lactate 4.2 on VBG  - CT with large loculated pleural effusion will have thoracic surgery evaluate    2.CHF  -echo with mild LV systolic dysfunction (new compared to 2018)  -currently appears euvolemic on exam    3. CAD s/p stent  -cath in 2010 showed mild luminal disease and patent stents  -denies chest pain  -echo with mild LV dysfunction  -c/w plavix     4. HTN  -hypotensive on admission, improving   -BP meds on hold   -continue to monitor BP

## 2021-03-22 NOTE — SWALLOW BEDSIDE ASSESSMENT ADULT - SWALLOW EVAL: DIAGNOSIS
Patient consumed trials of puree, mechanical soft solids, thin liquids and nectar thick liquids presenting with mild-moderate oropharyngeal dysphagia. Oral phase characterized by adequate acceptance, adequate anterior bolus containment, increased time for mastication, suspect premature spillage with thin liquids and moderate lingual residue post swallow which cleared with multiple liquid washes.  Pharyngeal phase characterized by suspect delayed initiation of the pharyngeal swallow and hyolaryngeal elevation and excursion noted upon palpation.  Coughing following trials of solids with liquid wash and wet/gurgly vocal quality with thin liquids post swallow indicative of laryngeal penetration/aspiration.  No clinically overt signs or symptoms of aspiration across trials of puree and nectar thick liquids.

## 2021-03-22 NOTE — SWALLOW BEDSIDE ASSESSMENT ADULT - COMMENTS
As per Hospitalist Note: 80 y/o M HTN, DM, HLD, dementia (AOx2 at baseline), Parkinson's disease, CAD s/p stents x2 (>20 years ago), recent new onset HF (EF: 50%, Moderate diastolic dysfunction (Stage II), moderate pulmonary hypertension, small pericardial effusion), L pleural effusion, BPH, gastric ulcers, admitted for further management of sepsis 2/2 possible empyema and COVID, along with hypoxic respiratory failure.    Chest CT 3/17/21: Bilateral patchy ground glass opacities throughout all the lobes of the bilateral lungs. Findings are likely secondary to infection, possibly COVID pneumonia.  Large left pleural fluid collection suspicious for empyema.    CXR 3/21/21: Right lung appears free of focal consolidations but mild interstitial edema is present and a linear density along the right chest wall has the appearance of a skinfold. No pneumothorax.    SLP received patient sitting upright in bed, awake, alert, able to follow simple directions and answer simple questions. Patient denies pain. Patient reports coughing on solid foods.

## 2021-03-22 NOTE — PROGRESS NOTE ADULT - SUBJECTIVE AND OBJECTIVE BOX
Bennie Hwang MD  Interventional Cardiology / Advance Heart Failure and Cardiac Transplant Specialist  [place] Office : [address] [place] [address] [place]. [address]  Tel:   [place] Office : [address] [facility] [place] [address]  Tel: [phone]  Cell : [phone]    Pt is lying in bed comfortable not in distress lethargic  	  MEDICATIONS:  enoxaparin Injectable 40 milliGRAM(s) SubCutaneous daily  losartan 25 milliGRAM(s) Oral daily  metoprolol succinate ER 50 milliGRAM(s) Oral daily  tamsulosin 0.4 milliGRAM(s) Oral at bedtime    piperacillin/tazobactam IVPB.. 3.375 Gram(s) IV Intermittent every 8 hours    benzonatate 100 milliGRAM(s) Oral three times a day PRN    carbidopa/levodopa  25/100 1 Tablet(s) Oral three times a day  melatonin 3 milliGRAM(s) Oral at bedtime  QUEtiapine 50 milliGRAM(s) Oral at bedtime    pantoprazole    Tablet 40 milliGRAM(s) Oral every 12 hours  senna 2 Tablet(s) Oral at bedtime    atorvastatin 10 milliGRAM(s) Oral at bedtime  dexAMETHasone  Injectable 6 milliGRAM(s) IV Push daily  dextrose 40% Gel 15 Gram(s) Oral once  dextrose 50% Injectable 25 Gram(s) IV Push once  dextrose 50% Injectable 25 Gram(s) IV Push once  dextrose 50% Injectable 12.5 Gram(s) IV Push once  glucagon  Injectable 1 milliGRAM(s) IntraMuscular once  insulin lispro (ADMELOG) corrective regimen sliding scale   SubCutaneous at bedtime  insulin lispro (ADMELOG) corrective regimen sliding scale   SubCutaneous three times a day before meals    dextrose 5%. 1000 milliLiter(s) IV Continuous <Continuous>  dextrose 5%. 1000 milliLiter(s) IV Continuous <Continuous>      PAST MEDICAL/SURGICAL HISTORY  PAST MEDICAL & SURGICAL HISTORY:  BPH (benign prostatic hyperplasia)    CAD (Coronary Artery Disease)  s/p cardiac stent ( &gt; 20 years ago)    HTN (Hypertension)    DM (Diabetes Mellitus)    Hypercholesterolemia    Coronary Stent  x 2 ( 20 years )        SOCIAL HISTORY: Substance Use (street drugs): ( x ) never used  (  ) other:    FAMILY HISTORY:  No pertinent family history in first degree relatives       PHYSICAL EXAM:  T(C): 36.4 (03-22-21 @ 14:41), Max: 36.7 (03-21-21 @ 21:57)  HR: 58 (03-22-21 @ 14:41) (50 - 80)  BP: 105/47 (03-22-21 @ 14:41) (105/47 - 149/70)  RR: 18 (03-22-21 @ 14:41) (18 - 19)  SpO2: 94% (03-22-21 @ 14:41) (92% - 100%)  Wt(kg): --  I&O's Summary    22 Mar 2021 07:01  -  22 Mar 2021 15:55  --------------------------------------------------------  IN: 350 mL / OUT: 0 mL / NET: 350 mL          EYES: EOMI, PERRLA, conjunctiva and sclera clear  ENMT: No tonsillar erythema, exudates, or enlargement; Moist mucous membranes, Good dentition, No lesions  Cardiovascular: Normal S1 S2, No JVD, No murmurs, No edema  Respiratory: Lungs clear to auscultation	  Gastrointestinal:  Soft, Non-tender, + BS	  Extremities: no edema                                    9.6    7.67  )-----------( 446      ( 22 Mar 2021 07:46 )             30.0     03-22    139  |  102  |  25<H>  ----------------------------<  104<H>  3.7   |  29  |  0.80    Ca    8.6      22 Mar 2021 07:46  Phos  2.7     03-22  Mg     1.7     03-22    TPro  5.9<L>  /  Alb  2.7<L>  /  TBili  0.6  /  DBili  x   /  AST  20  /  ALT  9   /  AlkPhos  88  03-22    proBNP:   Lipid Profile:   HgA1c:   TSH:     Consultant(s) Notes Reviewed:  [x ] YES  [ ] NO    Care Discussed with Consultants/Other Providers [ x] YES  [ ] NO    Imaging Personally Reviewed independently:  [x] YES  [ ] NO    All labs, radiologic studies, vitals, orders and medications list reviewed. Patient is seen and examined at bedside. Case discussed with medical team.

## 2021-03-22 NOTE — PROGRESS NOTE ADULT - PROBLEM SELECTOR PLAN 1
Complicated by acute hypoxic respiratory failure. Initially met sepsis criteria - no longer septic. WBC normal. Pt hemodynamically stable and not requiring O2 at rest. CT chest w/ IV contrast: Bilateral patchy ground glass opacities throughout all the lobes of the bilateral lungs  - F/u 3/17 blood cultures, urine culture --> no growth thus far  - c/w remdesivir and decadron; currently on day 4  - Trend inflammatory markers and monitor renal and liver function Complicated by acute hypoxic respiratory failure. Initially met sepsis criteria - no longer septic. WBC normal. Pt hemodynamically stable and not requiring O2 at rest. CT chest w/ IV contrast: Bilateral patchy ground glass opacities throughout all the lobes of the bilateral lungs  - F/u 3/17 blood cultures, urine culture --> no growth thus far  - c/w remdesivir and decadron; currently on day 5  - Trend inflammatory markers and monitor renal and liver function

## 2021-03-23 LAB
ALBUMIN SERPL ELPH-MCNC: 2.4 G/DL — LOW (ref 3.3–5)
ALP SERPL-CCNC: 80 U/L — SIGNIFICANT CHANGE UP (ref 40–120)
ALT FLD-CCNC: <5 U/L — LOW (ref 4–41)
ANION GAP SERPL CALC-SCNC: 9 MMOL/L — SIGNIFICANT CHANGE UP (ref 7–14)
AST SERPL-CCNC: 10 U/L — SIGNIFICANT CHANGE UP (ref 4–40)
BILIRUB SERPL-MCNC: 0.4 MG/DL — SIGNIFICANT CHANGE UP (ref 0.2–1.2)
BUN SERPL-MCNC: 28 MG/DL — HIGH (ref 7–23)
CALCIUM SERPL-MCNC: 8.6 MG/DL — SIGNIFICANT CHANGE UP (ref 8.4–10.5)
CHLORIDE SERPL-SCNC: 100 MMOL/L — SIGNIFICANT CHANGE UP (ref 98–107)
CO2 SERPL-SCNC: 29 MMOL/L — SIGNIFICANT CHANGE UP (ref 22–31)
CREAT SERPL-MCNC: 0.73 MG/DL — SIGNIFICANT CHANGE UP (ref 0.5–1.3)
CRP SERPL-MCNC: 16.4 MG/L — HIGH
D DIMER BLD IA.RAPID-MCNC: 3327 NG/ML DDU — HIGH
FERRITIN SERPL-MCNC: 173 NG/ML — SIGNIFICANT CHANGE UP (ref 30–400)
GLUCOSE BLDC GLUCOMTR-MCNC: 149 MG/DL — HIGH (ref 70–99)
GLUCOSE BLDC GLUCOMTR-MCNC: 160 MG/DL — HIGH (ref 70–99)
GLUCOSE BLDC GLUCOMTR-MCNC: 259 MG/DL — HIGH (ref 70–99)
GLUCOSE BLDC GLUCOMTR-MCNC: 366 MG/DL — HIGH (ref 70–99)
GLUCOSE SERPL-MCNC: 146 MG/DL — HIGH (ref 70–99)
HCT VFR BLD CALC: 28 % — LOW (ref 39–50)
HGB BLD-MCNC: 9 G/DL — LOW (ref 13–17)
LDH SERPL L TO P-CCNC: 161 U/L — SIGNIFICANT CHANGE UP (ref 135–225)
MAGNESIUM SERPL-MCNC: 1.5 MG/DL — LOW (ref 1.6–2.6)
MCHC RBC-ENTMCNC: 28.9 PG — SIGNIFICANT CHANGE UP (ref 27–34)
MCHC RBC-ENTMCNC: 32.1 GM/DL — SIGNIFICANT CHANGE UP (ref 32–36)
MCV RBC AUTO: 90 FL — SIGNIFICANT CHANGE UP (ref 80–100)
NRBC # BLD: 0 /100 WBCS — SIGNIFICANT CHANGE UP
NRBC # FLD: 0 K/UL — SIGNIFICANT CHANGE UP
PHOSPHATE SERPL-MCNC: 2.5 MG/DL — SIGNIFICANT CHANGE UP (ref 2.5–4.5)
PLATELET # BLD AUTO: 409 K/UL — HIGH (ref 150–400)
POTASSIUM SERPL-MCNC: 3.8 MMOL/L — SIGNIFICANT CHANGE UP (ref 3.5–5.3)
POTASSIUM SERPL-SCNC: 3.8 MMOL/L — SIGNIFICANT CHANGE UP (ref 3.5–5.3)
PROCALCITONIN SERPL-MCNC: 0.05 NG/ML — SIGNIFICANT CHANGE UP (ref 0.02–0.1)
PROT SERPL-MCNC: 5.6 G/DL — LOW (ref 6–8.3)
RBC # BLD: 3.11 M/UL — LOW (ref 4.2–5.8)
RBC # FLD: 13.5 % — SIGNIFICANT CHANGE UP (ref 10.3–14.5)
SODIUM SERPL-SCNC: 138 MMOL/L — SIGNIFICANT CHANGE UP (ref 135–145)
WBC # BLD: 9.82 K/UL — SIGNIFICANT CHANGE UP (ref 3.8–10.5)
WBC # FLD AUTO: 9.82 K/UL — SIGNIFICANT CHANGE UP (ref 3.8–10.5)

## 2021-03-23 PROCEDURE — 99233 SBSQ HOSP IP/OBS HIGH 50: CPT | Mod: CS

## 2021-03-23 RX ORDER — MAGNESIUM SULFATE 500 MG/ML
2 VIAL (ML) INJECTION ONCE
Refills: 0 | Status: COMPLETED | OUTPATIENT
Start: 2021-03-23 | End: 2021-03-23

## 2021-03-23 RX ORDER — INSULIN LISPRO 100/ML
VIAL (ML) SUBCUTANEOUS
Refills: 0 | Status: DISCONTINUED | OUTPATIENT
Start: 2021-03-23 | End: 2021-03-30

## 2021-03-23 RX ORDER — METOPROLOL TARTRATE 50 MG
25 TABLET ORAL DAILY
Refills: 0 | Status: DISCONTINUED | OUTPATIENT
Start: 2021-03-23 | End: 2021-03-30

## 2021-03-23 RX ORDER — INSULIN LISPRO 100/ML
VIAL (ML) SUBCUTANEOUS AT BEDTIME
Refills: 0 | Status: DISCONTINUED | OUTPATIENT
Start: 2021-03-23 | End: 2021-03-30

## 2021-03-23 RX ADMIN — PANTOPRAZOLE SODIUM 40 MILLIGRAM(S): 20 TABLET, DELAYED RELEASE ORAL at 18:55

## 2021-03-23 RX ADMIN — ENOXAPARIN SODIUM 40 MILLIGRAM(S): 100 INJECTION SUBCUTANEOUS at 12:13

## 2021-03-23 RX ADMIN — Medication 6: at 18:54

## 2021-03-23 RX ADMIN — ATORVASTATIN CALCIUM 10 MILLIGRAM(S): 80 TABLET, FILM COATED ORAL at 21:38

## 2021-03-23 RX ADMIN — Medication 100 MILLIGRAM(S): at 14:12

## 2021-03-23 RX ADMIN — CARBIDOPA AND LEVODOPA 1 TABLET(S): 25; 100 TABLET ORAL at 21:38

## 2021-03-23 RX ADMIN — Medication 6 MILLIGRAM(S): at 05:37

## 2021-03-23 RX ADMIN — QUETIAPINE FUMARATE 50 MILLIGRAM(S): 200 TABLET, FILM COATED ORAL at 21:38

## 2021-03-23 RX ADMIN — SENNA PLUS 2 TABLET(S): 8.6 TABLET ORAL at 21:45

## 2021-03-23 RX ADMIN — PANTOPRAZOLE SODIUM 40 MILLIGRAM(S): 20 TABLET, DELAYED RELEASE ORAL at 05:40

## 2021-03-23 RX ADMIN — Medication 100 MILLIGRAM(S): at 21:38

## 2021-03-23 RX ADMIN — Medication 50 GRAM(S): at 10:55

## 2021-03-23 RX ADMIN — TAMSULOSIN HYDROCHLORIDE 0.4 MILLIGRAM(S): 0.4 CAPSULE ORAL at 21:38

## 2021-03-23 RX ADMIN — CARBIDOPA AND LEVODOPA 1 TABLET(S): 25; 100 TABLET ORAL at 05:37

## 2021-03-23 RX ADMIN — Medication 1: at 09:52

## 2021-03-23 RX ADMIN — CARBIDOPA AND LEVODOPA 1 TABLET(S): 25; 100 TABLET ORAL at 13:48

## 2021-03-23 RX ADMIN — Medication 10: at 13:49

## 2021-03-23 RX ADMIN — Medication 3 MILLIGRAM(S): at 21:38

## 2021-03-23 NOTE — PHYSICAL THERAPY INITIAL EVALUATION ADULT - PERTINENT HX OF CURRENT PROBLEM, REHAB EVAL
Patient is 81 year old male admitted with history of HTN, DM, HLD, dementia ( A+0=2 at baseline ), Parkinson's disease, CAD s/p stents x2 (>20 years ago), recent new onset HF (EF: 50%, Moderate diastolic dysfunction (Stage II), moderate pulmonary hypertension, small pericardial effusion), left pleural effusion, BPH, gastric ulcers, sent from his Cardiology office for hypotension.

## 2021-03-23 NOTE — PROGRESS NOTE ADULT - PROBLEM SELECTOR PLAN 9
- Diet: CC DASH diet  - DVT: lovenox  - Dispo: pending clinical improvement. Anticipate possible d/c after last doses of empiric abx and remdesivir tomorrow vs. the next day after  - GOC: [facility] filled out last hospitalization, pt DNR with trial of intubation, confirmed with brother at time of H&P. - Diet: CC DASH diet  - DVT: lovenox prophylactic, case d/w cardiology, agree to c/w with prophylactic dose as pt also on plavix and family prefers comfort care   - Dispo: PT recommend home PT, case d/w bother , pt lives with 2 home HHA , will d/w CM   - GOC: [facility] filled out last hospitalization, pt DNR with trial of intubation, confirmed with brother at time of H&P. Asked brother to bring in the [facility] form or copy for the chart

## 2021-03-23 NOTE — CONSULT NOTE ADULT - SUBJECTIVE AND OBJECTIVE BOX
Interventional Radiology    Evaluate for Procedure:     HPI: 82 y/o male w/ HTN, DM, HLD, dementia, Parkinson's disease, CAD s/p stents (PCI), diastolic dysfunction, pulmonary hypertension, BPH, gastric ulcers, sent from his Cardiologists office on 3/17/21 for hypotension.  Tested COVID+, and patient was made DNR.  Left pleural effusion reportedly diagnosed last month, treated as empyema  (s/p 5 days of Zosyn), and diagnostic thoracentesis has been deferred up until this point as per family wishes. Request for drainage.     Allergies:   Medications (Abx/Cardiac/Anticoagulation/Blood Products)    enoxaparin Injectable: 40 milliGRAM(s) SubCutaneous (03-23 @ 12:13)  losartan: 25 milliGRAM(s) Oral (03-22 @ 05:51)  piperacillin/tazobactam IVPB..: 25 mL/Hr IV Intermittent (03-22 @ 22:44)  remdesivir  IVPB: 500 mL/Hr IV Intermittent (03-22 @ 11:49)  tamsulosin: 0.4 milliGRAM(s) Oral (03-22 @ 22:45)    Data:    T(C): 36.4  HR: 59  BP: 90/57  RR: 18  SpO2: 97%    -WBC 9.82 / HgB 9.0 / Hct 28.0 / Plt 409  -Na 138 / Cl 100 / BUN 28 / Glucose 146  -K 3.8 / CO2 29 / Cr 0.73  -ALT <5 / Alk Phos 80 / T.Bili 0.4  -INR 1.25 / PTT 26.9      Radiology:     Assessment/Plan:     -- Family has not wanted drainage thus far to be done  -Primary team to get in touch with family   -Call back IR with update Interventional Radiology    Evaluate for Procedure:     HPI: 80 y/o male w/ HTN, DM, HLD, dementia, Parkinson's disease, CAD s/p stents (PCI), diastolic dysfunction, pulmonary hypertension, BPH, gastric ulcers, sent from his Cardiologists office on 3/17/21 for hypotension.  Tested COVID+, and patient was made DNR.  Left pleural effusion reportedly diagnosed last month, treated as empyema  (s/p 5 days of Zosyn), and diagnostic thoracentesis has been deferred up until this point as per family wishes. Request for drainage.     Allergies:   Medications (Abx/Cardiac/Anticoagulation/Blood Products)    enoxaparin Injectable: 40 milliGRAM(s) SubCutaneous (03-23 @ 12:13)  losartan: 25 milliGRAM(s) Oral (03-22 @ 05:51)  piperacillin/tazobactam IVPB..: 25 mL/Hr IV Intermittent (03-22 @ 22:44)  remdesivir  IVPB: 500 mL/Hr IV Intermittent (03-22 @ 11:49)  tamsulosin: 0.4 milliGRAM(s) Oral (03-22 @ 22:45)    Data:    T(C): 36.4  HR: 59  BP: 90/57  RR: 18  SpO2: 97%    -WBC 9.82 / HgB 9.0 / Hct 28.0 / Plt 409  -Na 138 / Cl 100 / BUN 28 / Glucose 146  -K 3.8 / CO2 29 / Cr 0.73  -ALT <5 / Alk Phos 80 / T.Bili 0.4  -INR 1.25 / PTT 26.9      Radiology:     Assessment/Plan:     - Spoke with patients brother Berto Mitchell at [phone] regarding procedure details. Brother would not like to proceed at this time. We discussed the benefits and risks of the procedure. Discussed with brother that empyema is a source of infection. Brother believes patient would be more comfortable without procedure and would like to wait to see how his respiratory status is given pt is currently not on supplemental oxygen.   -No acute IR intervention at this time.   -Please reconsult PRN if clinical picture changes and/or brother (health care proxy) changes his mind

## 2021-03-23 NOTE — PROGRESS NOTE ADULT - SUBJECTIVE AND OBJECTIVE BOX
Bennie Hwang MD  Interventional Cardiology / Advance Heart Failure and Cardiac Transplant Specialist  [place] Office : [address] [place] [address]. [address]  Tel:   [place] Office : [address] [facility] [place] [address]  Tel: [phone]  Cell : [phone]    Subjective/Overnight events: Pt is lying in bed comfortable not in distress  	  MEDICATIONS:  enoxaparin Injectable 40 milliGRAM(s) SubCutaneous daily  losartan 25 milliGRAM(s) Oral daily  metoprolol succinate ER 50 milliGRAM(s) Oral daily  tamsulosin 0.4 milliGRAM(s) Oral at bedtime      benzonatate 100 milliGRAM(s) Oral three times a day PRN    carbidopa/levodopa  25/100 1 Tablet(s) Oral three times a day  melatonin 3 milliGRAM(s) Oral at bedtime  QUEtiapine 50 milliGRAM(s) Oral at bedtime    pantoprazole  Injectable 40 milliGRAM(s) IV Push every 12 hours  senna 2 Tablet(s) Oral at bedtime    atorvastatin 10 milliGRAM(s) Oral at bedtime  dexAMETHasone  Injectable 6 milliGRAM(s) IV Push daily  dextrose 40% Gel 15 Gram(s) Oral once  dextrose 50% Injectable 25 Gram(s) IV Push once  dextrose 50% Injectable 12.5 Gram(s) IV Push once  dextrose 50% Injectable 25 Gram(s) IV Push once  glucagon  Injectable 1 milliGRAM(s) IntraMuscular once  insulin lispro (ADMELOG) corrective regimen sliding scale   SubCutaneous three times a day before meals  insulin lispro (ADMELOG) corrective regimen sliding scale   SubCutaneous at bedtime    dextrose 5%. 1000 milliLiter(s) IV Continuous <Continuous>  dextrose 5%. 1000 milliLiter(s) IV Continuous <Continuous>      PAST MEDICAL/SURGICAL HISTORY  PAST MEDICAL & SURGICAL HISTORY:  BPH (benign prostatic hyperplasia)    CAD (Coronary Artery Disease)  s/p cardiac stent ( &gt; 20 years ago)    HTN (Hypertension)    DM (Diabetes Mellitus)    Hypercholesterolemia    Coronary Stent  x 2 ( 20 years )        SOCIAL HISTORY: Substance Use (street drugs): ( x ) never used  (  ) other:    FAMILY HISTORY:  No pertinent family history in first degree relatives        REVIEW OF SYSTEMS:  unable to obtain    PHYSICAL EXAM:  T(C): 36.4 (03-23-21 @ 12:49), Max: 36.4 (03-22-21 @ 17:48)  HR: 59 (03-23-21 @ 14:13) (48 - 65)  BP: 102/50 (03-23-21 @ 14:13) (90/57 - 123/61)  RR: 18 (03-23-21 @ 14:13) (18 - 18)  SpO2: 94% (03-23-21 @ 14:13) (94% - 97%)  Wt(kg): --  I&O's Summary    22 Mar 2021 07:01  -  23 Mar 2021 07:00  --------------------------------------------------------  IN: 350 mL / OUT: 0 mL / NET: 350 mL    23 Mar 2021 07:01  -  23 Mar 2021 14:46  --------------------------------------------------------  IN: 0 mL / OUT: 200 mL / NET: -200 mL        EYES: EOMI, PERRLA, conjunctiva and sclera clear  ENMT: No tonsillar erythema, exudates, or enlargement;  Cardiovascular: Normal S1 S2, No JVD, No murmurs, No edema  Respiratory: Lungs diminished  Gastrointestinal:  Soft, Non-tender, + BS	  Extremities: no edema                                  9.0    9.82  )-----------( 409      ( 23 Mar 2021 08:00 )             28.0     03-23    138  |  100  |  28<H>  ----------------------------<  146<H>  3.8   |  29  |  0.73    Ca    8.6      23 Mar 2021 08:00  Phos  2.5     03-23  Mg     1.5     03-23    TPro  5.6<L>  /  Alb  2.4<L>  /  TBili  0.4  /  DBili  x   /  AST  10  /  ALT  <5<L>  /  AlkPhos  80  03-23    proBNP:   Lipid Profile:   HgA1c:   TSH:     Consultant(s) Notes Reviewed:  [x ] YES  [ ] NO    Care Discussed with Consultants/Other Providers [ x] YES  [ ] NO    Imaging Personally Reviewed independently:  [x] YES  [ ] NO    All labs, radiologic studies, vitals, orders and medications list reviewed. Patient is seen and examined at bedside. Case discussed with medical team.

## 2021-03-23 NOTE — PROGRESS NOTE ADULT - PROBLEM SELECTOR PLAN 3
CT chest large left pleural fluid collection --> A 17.1 x 11.0 x 8.3 cm fluid collection with peripheral enhancement in the left basilar pleural space. DDx includes empyema, simple parapneumonic effusion, malignant effusion. Pt with L pleural effusion first diagnosed in 2/2021. Diagnostic thoracentesis deferred at that time as per family wishes. Given pt's age and comorbidities, agree with continued abx therapy for possible bacterial empyema, but cannot exclude other etiologies.   - continue empiric therapy with Zosyn; would treat for 5-days, currently on day 5/5  - will continue to defer diagnostic and/or therapeutic thoracentesis at this time since pt clinically improved and  maintaining adequate O2 sat on RA. CT chest large left pleural fluid collection --> A 17.1 x 11.0 x 8.3 cm fluid collection with peripheral enhancement in the left basilar pleural space. DDx includes empyema, simple parapneumonic effusion, malignant effusion. Pt with L pleural effusion first diagnosed in 2/2021. Diagnostic thoracentesis deferred at that time as per family wishes. Given pt's age and comorbidities, s/p empiric  abx therapy for possible bacterial empyema, but cannot exclude other etiologies.   - Pt evaluated by Thoracic sx and IR for possible thoracentesis and drainage of Pleural effusion , but brother did not want drainage at this time   - will continue to defer diagnostic and/or therapeutic thoracentesis at this time since pt clinically improved and  maintaining adequate O2 sat on RA as per patient and family wishes

## 2021-03-23 NOTE — PROGRESS NOTE ADULT - ASSESSMENT
82 y/o M w/ pmhx of dementia, parkinson's disease, DM, HTN, CHF, CAD 2 stents sent from cardiology office for hypotension.     EKG: Sinus Josemanuel 59 PACs      1. Sepsis  - COVID +, DDimer elevated consider therapeutic a/c    -patient from office with hypotension, improving   - CT with large loculated pleural effusion will have thoracic surgery evaluate    2.CHF  -echo with mild LV systolic dysfunction (new compared to 2018)  -currently appears euvolemic on exam    3. CAD s/p stent  -cath in 2010 showed mild luminal disease and patent stents  -denies chest pain  -echo with mild LV dysfunction  -c/w plavix     4. HTN  -BP running soft  -toprol and losartan held this morning  -continue to monitor BP   80 y/o M w/ pmhx of dementia, parkinson's disease, DM, HTN, CHF, CAD 2 stents sent from cardiology office for hypotension.     EKG: Sinus Josemanuel 59 PACs      1. Sepsis  - COVID +, DDimer elevated consider therapeutic a/c    -patient from office with hypotension, improving   - CT with large loculated pleural effusion , brother wants medical management , discussed at length    2.CHF  -echo with mild LV systolic dysfunction (new compared to 2018)  -currently appears euvolemic on exam    3. CAD s/p stent  -cath in 2010 showed mild luminal disease and patent stents  -denies chest pain  -echo with mild LV dysfunction  -c/w plavix     4. HTN  -BP running soft  -toprol and losartan held this morning  -continue to monitor BP

## 2021-03-23 NOTE — PROGRESS NOTE ADULT - PROBLEM SELECTOR PLAN 7
- c/w metoprolol succinate 50mg daily with hold parameters  - c/w home regimen of losartan 25mg daily with hold parameters  - Monitor BP  - d/c lasix 20mg daily - pt on  metoprolol succinate 50mg daily with hold parameters, pt noted to have bradycardia , d/w Cardiology, agree to decrease the dose of Metoprolol to 25 mg with hold parameters   - c/w home regimen of losartan 25mg daily with hold parameters  - Monitor BP  - d/c lasix 20mg daily

## 2021-03-23 NOTE — PROGRESS NOTE ADULT - PROBLEM SELECTOR PLAN 1
Complicated by acute hypoxic respiratory failure. Initially met sepsis criteria - no longer septic. WBC normal. Pt hemodynamically stable and not requiring O2 at rest. CT chest w/ IV contrast: Bilateral patchy ground glass opacities throughout all the lobes of the bilateral lungs  - F/u 3/17 blood cultures, urine culture --> no growth thus far  - c/w remdesivir and decadron; currently on day 5  - Trend inflammatory markers and monitor renal and liver function

## 2021-03-23 NOTE — PHYSICAL THERAPY INITIAL EVALUATION ADULT - ADDITIONAL COMMENTS
Patient report lives with 24 hours x 7 days aide in apartment, ambulated with a walker and assistance of an aide.

## 2021-03-23 NOTE — PROGRESS NOTE ADULT - SUBJECTIVE AND OBJECTIVE BOX
Patient is a 81y old  Male who presents with a chief complaint of hypotension (22 Mar 2021 18:53)      SUBJECTIVE / OVERNIGHT EVENTS:    MEDICATIONS  (STANDING):  atorvastatin 10 milliGRAM(s) Oral at bedtime  carbidopa/levodopa  25/100 1 Tablet(s) Oral three times a day  dexAMETHasone  Injectable 6 milliGRAM(s) IV Push daily  dextrose 40% Gel 15 Gram(s) Oral once  dextrose 5%. 1000 milliLiter(s) (50 mL/Hr) IV Continuous <Continuous>  dextrose 5%. 1000 milliLiter(s) (100 mL/Hr) IV Continuous <Continuous>  dextrose 50% Injectable 25 Gram(s) IV Push once  dextrose 50% Injectable 12.5 Gram(s) IV Push once  dextrose 50% Injectable 25 Gram(s) IV Push once  enoxaparin Injectable 40 milliGRAM(s) SubCutaneous daily  glucagon  Injectable 1 milliGRAM(s) IntraMuscular once  insulin lispro (ADMELOG) corrective regimen sliding scale   SubCutaneous three times a day before meals  insulin lispro (ADMELOG) corrective regimen sliding scale   SubCutaneous at bedtime  losartan 25 milliGRAM(s) Oral daily  melatonin 3 milliGRAM(s) Oral at bedtime  metoprolol succinate ER 50 milliGRAM(s) Oral daily  pantoprazole  Injectable 40 milliGRAM(s) IV Push every 12 hours  QUEtiapine 50 milliGRAM(s) Oral at bedtime  senna 2 Tablet(s) Oral at bedtime  tamsulosin 0.4 milliGRAM(s) Oral at bedtime    MEDICATIONS  (PRN):  benzonatate 100 milliGRAM(s) Oral three times a day PRN Cough      Vital Signs Last 24 Hrs  T(C): 36.2 (23 Mar 2021 07:41), Max: 36.4 (22 Mar 2021 14:41)  T(F): 97.2 (23 Mar 2021 07:41), Max: 97.6 (22 Mar 2021 14:41)  HR: 48 (23 Mar 2021 07:41) (48 - 65)  BP: 111/50 (23 Mar 2021 07:41) (105/47 - 123/61)  BP(mean): --  RR: 18 (23 Mar 2021 07:41) (18 - 18)  SpO2: 96% (23 Mar 2021 07:41) (94% - 96%)  CAPILLARY BLOOD GLUCOSE      POCT Blood Glucose.: 160 mg/dL (23 Mar 2021 09:18)  POCT Blood Glucose.: 278 mg/dL (22 Mar 2021 21:00)  POCT Blood Glucose.: 237 mg/dL (22 Mar 2021 17:47)  POCT Blood Glucose.: 215 mg/dL (22 Mar 2021 13:46)    I&O's Summary    22 Mar 2021 07:01  -  23 Mar 2021 07:00  --------------------------------------------------------  IN: 350 mL / OUT: 0 mL / NET: 350 mL        PHYSICAL EXAM:  GENERAL: NAD, well-developed  HEAD:  Atraumatic, Normocephalic  EYES: EOMI, PERRLA, conjunctiva and sclera clear  NECK: Supple, No JVD  CHEST/LUNG: Clear to auscultation bilaterally; No wheeze  HEART: Regular rate and rhythm; No murmurs, rubs, or gallops  ABDOMEN: Soft, Nontender, Nondistended; Bowel sounds present  EXTREMITIES:  2+ Peripheral Pulses, No clubbing, cyanosis, or edema  PSYCH: AAOx3  NEUROLOGY: non-focal  SKIN: No rashes or lesions    LABS:                        9.0    9.82  )-----------( 409      ( 23 Mar 2021 08:00 )             28.0     03-23    138  |  100  |  28<H>  ----------------------------<  146<H>  3.8   |  29  |  0.73    Ca    8.6      23 Mar 2021 08:00  Phos  2.5     03-23  Mg     1.5     03-23    TPro  5.6<L>  /  Alb  2.4<L>  /  TBili  0.4  /  DBili  x   /  AST  10  /  ALT  <5<L>  /  AlkPhos  80  03-23              RADIOLOGY & ADDITIONAL TESTS:    Imaging Personally Reviewed:    Consultant(s) Notes Reviewed:      Care Discussed with Consultants/Other Providers:   Patient is a 81y old  Male who presents with a chief complaint of hypotension (22 Mar 2021 18:53)      SUBJECTIVE / OVERNIGHT EVENTS: patient seen and examined by bedside, pt denies headache, dizziness, SOB, CP, Palpitations , N/V/D, abdominal pain  pt on RA , saturating  94% - 96%    MEDICATIONS  (STANDING):  atorvastatin 10 milliGRAM(s) Oral at bedtime  carbidopa/levodopa  25/100 1 Tablet(s) Oral three times a day  dexAMETHasone  Injectable 6 milliGRAM(s) IV Push daily  dextrose 40% Gel 15 Gram(s) Oral once  dextrose 5%. 1000 milliLiter(s) (50 mL/Hr) IV Continuous <Continuous>  dextrose 5%. 1000 milliLiter(s) (100 mL/Hr) IV Continuous <Continuous>  dextrose 50% Injectable 25 Gram(s) IV Push once  dextrose 50% Injectable 12.5 Gram(s) IV Push once  dextrose 50% Injectable 25 Gram(s) IV Push once  enoxaparin Injectable 40 milliGRAM(s) SubCutaneous daily  glucagon  Injectable 1 milliGRAM(s) IntraMuscular once  insulin lispro (ADMELOG) corrective regimen sliding scale   SubCutaneous three times a day before meals  insulin lispro (ADMELOG) corrective regimen sliding scale   SubCutaneous at bedtime  losartan 25 milliGRAM(s) Oral daily  melatonin 3 milliGRAM(s) Oral at bedtime  metoprolol succinate ER 50 milliGRAM(s) Oral daily  pantoprazole  Injectable 40 milliGRAM(s) IV Push every 12 hours  QUEtiapine 50 milliGRAM(s) Oral at bedtime  senna 2 Tablet(s) Oral at bedtime  tamsulosin 0.4 milliGRAM(s) Oral at bedtime    MEDICATIONS  (PRN):  benzonatate 100 milliGRAM(s) Oral three times a day PRN Cough      Vital Signs Last 24 Hrs  T(C): 36.2 (23 Mar 2021 07:41), Max: 36.4 (22 Mar 2021 14:41)  T(F): 97.2 (23 Mar 2021 07:41), Max: 97.6 (22 Mar 2021 14:41)  HR: 48 (23 Mar 2021 07:41) (48 - 65)  BP: 111/50 (23 Mar 2021 07:41) (105/47 - 123/61)  BP(mean): --  RR: 18 (23 Mar 2021 07:41) (18 - 18)  SpO2: 96% (23 Mar 2021 07:41) (94% - 96%)  CAPILLARY BLOOD GLUCOSE      POCT Blood Glucose.: 160 mg/dL (23 Mar 2021 09:18)  POCT Blood Glucose.: 278 mg/dL (22 Mar 2021 21:00)  POCT Blood Glucose.: 237 mg/dL (22 Mar 2021 17:47)  POCT Blood Glucose.: 215 mg/dL (22 Mar 2021 13:46)    I&O's Summary    22 Mar 2021 07:01  -  23 Mar 2021 07:00  --------------------------------------------------------  IN: 350 mL / OUT: 0 mL / NET: 350 mL        PHYSICAL EXAM  GENERAL: NAD, elderly man laying comfortably in bed,   CHEST/LUNG: decreased BS at mid to lower lung fields on the left; No wheezes. Normal respiratory effort  HEART: Regular rate and rhythm  ABDOMEN: Soft, Nontender, Nondistended  EXTREMITIES:  2+ Peripheral Pulses, No clubbing, cyanosis, or edema  PSYCH: Alert, oriented to self and place. Cooperative, follows simple commands and pleasant    LABS:                        9.0    9.82  )-----------( 409      ( 23 Mar 2021 08:00 )             28.0     03-23    138  |  100  |  28<H>  ----------------------------<  146<H>  3.8   |  29  |  0.73    Ca    8.6      23 Mar 2021 08:00  Phos  2.5     03-23  Mg     1.5     03-23    TPro  5.6<L>  /  Alb  2.4<L>  /  TBili  0.4  /  DBili  x   /  AST  10  /  ALT  <5<L>  /  AlkPhos  80  03-23              RADIOLOGY & ADDITIONAL TESTS:    Imaging Personally Reviewed:    Consultant(s) Notes Reviewed:  Thoracic Sx, IR , cardiology     Care Discussed with Consultants/Other Providers: IR , cardiology

## 2021-03-24 LAB
ALBUMIN SERPL ELPH-MCNC: 2.7 G/DL — LOW (ref 3.3–5)
ALP SERPL-CCNC: 87 U/L — SIGNIFICANT CHANGE UP (ref 40–120)
ALT FLD-CCNC: <5 U/L — LOW (ref 4–41)
ANION GAP SERPL CALC-SCNC: 11 MMOL/L — SIGNIFICANT CHANGE UP (ref 7–14)
AST SERPL-CCNC: 10 U/L — SIGNIFICANT CHANGE UP (ref 4–40)
BILIRUB SERPL-MCNC: 0.4 MG/DL — SIGNIFICANT CHANGE UP (ref 0.2–1.2)
BUN SERPL-MCNC: 28 MG/DL — HIGH (ref 7–23)
CALCIUM SERPL-MCNC: 8.8 MG/DL — SIGNIFICANT CHANGE UP (ref 8.4–10.5)
CHLORIDE SERPL-SCNC: 102 MMOL/L — SIGNIFICANT CHANGE UP (ref 98–107)
CO2 SERPL-SCNC: 27 MMOL/L — SIGNIFICANT CHANGE UP (ref 22–31)
CREAT SERPL-MCNC: 0.64 MG/DL — SIGNIFICANT CHANGE UP (ref 0.5–1.3)
GLUCOSE BLDC GLUCOMTR-MCNC: 145 MG/DL — HIGH (ref 70–99)
GLUCOSE BLDC GLUCOMTR-MCNC: 147 MG/DL — HIGH (ref 70–99)
GLUCOSE BLDC GLUCOMTR-MCNC: 213 MG/DL — HIGH (ref 70–99)
GLUCOSE BLDC GLUCOMTR-MCNC: 320 MG/DL — HIGH (ref 70–99)
GLUCOSE SERPL-MCNC: 117 MG/DL — HIGH (ref 70–99)
HCT VFR BLD CALC: 32.3 % — LOW (ref 39–50)
HGB BLD-MCNC: 10.4 G/DL — LOW (ref 13–17)
MAGNESIUM SERPL-MCNC: 1.5 MG/DL — LOW (ref 1.6–2.6)
MCHC RBC-ENTMCNC: 29 PG — SIGNIFICANT CHANGE UP (ref 27–34)
MCHC RBC-ENTMCNC: 32.2 GM/DL — SIGNIFICANT CHANGE UP (ref 32–36)
MCV RBC AUTO: 90 FL — SIGNIFICANT CHANGE UP (ref 80–100)
NRBC # BLD: 0 /100 WBCS — SIGNIFICANT CHANGE UP
NRBC # FLD: 0 K/UL — SIGNIFICANT CHANGE UP
PHOSPHATE SERPL-MCNC: 2.7 MG/DL — SIGNIFICANT CHANGE UP (ref 2.5–4.5)
PLATELET # BLD AUTO: 417 K/UL — HIGH (ref 150–400)
POTASSIUM SERPL-MCNC: 4.4 MMOL/L — SIGNIFICANT CHANGE UP (ref 3.5–5.3)
POTASSIUM SERPL-SCNC: 4.4 MMOL/L — SIGNIFICANT CHANGE UP (ref 3.5–5.3)
PROT SERPL-MCNC: 5.6 G/DL — LOW (ref 6–8.3)
RBC # BLD: 3.59 M/UL — LOW (ref 4.2–5.8)
RBC # FLD: 13.2 % — SIGNIFICANT CHANGE UP (ref 10.3–14.5)
SODIUM SERPL-SCNC: 140 MMOL/L — SIGNIFICANT CHANGE UP (ref 135–145)
WBC # BLD: 9.03 K/UL — SIGNIFICANT CHANGE UP (ref 3.8–10.5)
WBC # FLD AUTO: 9.03 K/UL — SIGNIFICANT CHANGE UP (ref 3.8–10.5)

## 2021-03-24 PROCEDURE — 99232 SBSQ HOSP IP/OBS MODERATE 35: CPT | Mod: CS

## 2021-03-24 PROCEDURE — 99447 NTRPROF PH1/NTRNET/EHR 11-20: CPT

## 2021-03-24 RX ADMIN — Medication 3 MILLIGRAM(S): at 21:55

## 2021-03-24 RX ADMIN — CARBIDOPA AND LEVODOPA 1 TABLET(S): 25; 100 TABLET ORAL at 21:55

## 2021-03-24 RX ADMIN — PANTOPRAZOLE SODIUM 40 MILLIGRAM(S): 20 TABLET, DELAYED RELEASE ORAL at 17:35

## 2021-03-24 RX ADMIN — Medication 4: at 18:12

## 2021-03-24 RX ADMIN — ENOXAPARIN SODIUM 40 MILLIGRAM(S): 100 INJECTION SUBCUTANEOUS at 12:30

## 2021-03-24 RX ADMIN — CARBIDOPA AND LEVODOPA 1 TABLET(S): 25; 100 TABLET ORAL at 13:42

## 2021-03-24 RX ADMIN — CARBIDOPA AND LEVODOPA 1 TABLET(S): 25; 100 TABLET ORAL at 05:40

## 2021-03-24 RX ADMIN — Medication 100 MILLIGRAM(S): at 05:42

## 2021-03-24 RX ADMIN — Medication 25 MILLIGRAM(S): at 05:53

## 2021-03-24 RX ADMIN — TAMSULOSIN HYDROCHLORIDE 0.4 MILLIGRAM(S): 0.4 CAPSULE ORAL at 21:55

## 2021-03-24 RX ADMIN — LOSARTAN POTASSIUM 25 MILLIGRAM(S): 100 TABLET, FILM COATED ORAL at 05:41

## 2021-03-24 RX ADMIN — PANTOPRAZOLE SODIUM 40 MILLIGRAM(S): 20 TABLET, DELAYED RELEASE ORAL at 05:42

## 2021-03-24 RX ADMIN — ATORVASTATIN CALCIUM 10 MILLIGRAM(S): 80 TABLET, FILM COATED ORAL at 21:56

## 2021-03-24 RX ADMIN — Medication 6 MILLIGRAM(S): at 05:39

## 2021-03-24 RX ADMIN — QUETIAPINE FUMARATE 50 MILLIGRAM(S): 200 TABLET, FILM COATED ORAL at 21:55

## 2021-03-24 RX ADMIN — Medication 100 MILLIGRAM(S): at 21:57

## 2021-03-24 RX ADMIN — SENNA PLUS 2 TABLET(S): 8.6 TABLET ORAL at 21:57

## 2021-03-24 RX ADMIN — Medication 8: at 13:42

## 2021-03-24 NOTE — PROGRESS NOTE ADULT - PROBLEM SELECTOR PLAN 4
TTE 2/2021 EF: 50%, Moderate diastolic dysfunction (Stage II), moderate pulmonary hypertension, Small pericardial effusion; proBNP 1306 (prior hospitalization 2800)   Recent 2/2021 abnormal pharmacological stress test: moderate defects in apical, distal lateral walls that are predominantly reversible, suggestive of ischemia - brother declined catheterization given pt's hx of dementia and Parkinson's   - Cards following, recs appreciated  - continue to hold lasix 20 mg PO daily  - C/w plavix given stent hx   - c/w metoprolol succinate 50 daily   - c/w losartan 25mg daily  - pt not in acute exacerbation, no need for telemetry at this TTE 2/2021 EF: 50%, Moderate diastolic dysfunction (Stage II), moderate pulmonary hypertension, Small pericardial effusion; proBNP 1306 (prior hospitalization 2800)   Recent 2/2021 abnormal pharmacological stress test: moderate defects in apical, distal lateral walls that are predominantly reversible, suggestive of ischemia - brother declined catheterization given pt's hx of dementia and Parkinson's   - Cards following, recs appreciated  - continue to hold lasix 20 mg PO daily  - C/w plavix given stent hx   - c/w metoprolol succinate , dose reduced to 25 for bradycardia   - c/w losartan 25mg daily  - pt not in acute exacerbation, no need for telemetry at this

## 2021-03-24 NOTE — PROGRESS NOTE ADULT - PROBLEM SELECTOR PLAN 7
- pt on  metoprolol succinate 50mg daily with hold parameters, pt noted to have bradycardia , d/w Cardiology, agree to decrease the dose of Metoprolol to 25 mg with hold parameters   - c/w home regimen of losartan 25mg daily with hold parameters  - Monitor BP  - d/c lasix 20mg daily - pt was on  metoprolol succinate 50mg daily with hold parameters, pt noted to have bradycardia , d/w Cardiology, agree to decrease the dose of Metoprolol to 25 mg with hold parameters   - c/w home regimen of losartan 25mg daily with hold parameters  - Monitor BP  - d/c lasix 20mg daily

## 2021-03-24 NOTE — PROGRESS NOTE ADULT - ASSESSMENT
82 y/o M w/ pmhx of dementia, parkinson's disease, DM, HTN, CHF, CAD 2 stents sent from cardiology office for hypotension.     EKG: Sinus Josemanuel 59 PACs      1. Sepsis  - COVID +, DDimer elevated consider therapeutic a/c    -patient from office with hypotension, improving   - CT with large loculated pleural effusion , brother wants medical management , discussed at length    2.CHF  -echo with mild LV systolic dysfunction (new compared to 2018)  -currently appears euvolemic on exam    3. CAD s/p stent  -cath in 2010 showed mild luminal disease and patent stents  -denies chest pain  -echo with mild LV dysfunction  -c/w plavix     4. HTN  -BP running soft  -continue to monitor BP

## 2021-03-24 NOTE — PROGRESS NOTE ADULT - PROBLEM SELECTOR PLAN 9
- Diet: CC DASH diet  - DVT: lovenox prophylactic, case d/w cardiology, agree to c/w with prophylactic dose as pt also on plavix and family prefers comfort care   - Dispo: PT recommend home PT, case d/w bother , pt lives with 2 home HHA , will d/w CM   - GOC: MOLST filled out last hospitalization, pt DNR with trial of intubation, confirmed with brother at time of H&P. Asked brother to bring in the Molst form or copy for the chart

## 2021-03-24 NOTE — PROGRESS NOTE ADULT - SUBJECTIVE AND OBJECTIVE BOX
Bennie Hwang MD  Interventional Cardiology / Advance Heart Failure and Cardiac Transplant Specialist  [place] Office : [phone] [place] [id number]  Tel:   [place] Office : [address] [facility] [place] [address]  Tel: [phone]  Cell : [phone]    Subjective/Overnight events: Pt is lying in bed comfortable not in distress  	  MEDICATIONS:  enoxaparin Injectable 40 milliGRAM(s) SubCutaneous daily  losartan 25 milliGRAM(s) Oral daily  metoprolol succinate ER 25 milliGRAM(s) Oral daily  tamsulosin 0.4 milliGRAM(s) Oral at bedtime      benzonatate 100 milliGRAM(s) Oral three times a day PRN    carbidopa/levodopa  25/100 1 Tablet(s) Oral three times a day  melatonin 3 milliGRAM(s) Oral at bedtime  QUEtiapine 50 milliGRAM(s) Oral at bedtime    pantoprazole  Injectable 40 milliGRAM(s) IV Push every 12 hours  senna 2 Tablet(s) Oral at bedtime    atorvastatin 10 milliGRAM(s) Oral at bedtime  dexAMETHasone  Injectable 6 milliGRAM(s) IV Push daily  dextrose 40% Gel 15 Gram(s) Oral once  dextrose 50% Injectable 25 Gram(s) IV Push once  dextrose 50% Injectable 12.5 Gram(s) IV Push once  dextrose 50% Injectable 25 Gram(s) IV Push once  glucagon  Injectable 1 milliGRAM(s) IntraMuscular once  insulin lispro (ADMELOG) corrective regimen sliding scale   SubCutaneous three times a day before meals  insulin lispro (ADMELOG) corrective regimen sliding scale   SubCutaneous at bedtime    dextrose 5%. 1000 milliLiter(s) IV Continuous <Continuous>  dextrose 5%. 1000 milliLiter(s) IV Continuous <Continuous>      PAST MEDICAL/SURGICAL HISTORY  PAST MEDICAL & SURGICAL HISTORY:  BPH (benign prostatic hyperplasia)    CAD (Coronary Artery Disease)  s/p cardiac stent ( &gt; 20 years ago)    HTN (Hypertension)    DM (Diabetes Mellitus)    Hypercholesterolemia    Coronary Stent  x 2 ( 20 years )        SOCIAL HISTORY: Substance Use (street drugs): ( x ) never used  (  ) other:    FAMILY HISTORY:  No pertinent family history in first degree relatives        REVIEW OF SYSTEMS:  unable to obtain    PHYSICAL EXAM:  T(C): 36.4 (03-24-21 @ 14:12), Max: 36.6 (03-23-21 @ 21:15)  HR: 60 (03-24-21 @ 14:12) (60 - 71)  BP: 92/40 (03-24-21 @ 14:12) (92/40 - 148/56)  RR: 20 (03-24-21 @ 14:12) (16 - 20)  SpO2: 99% (03-24-21 @ 14:12) (94% - 99%)  Wt(kg): --  I&O's Summary    23 Mar 2021 07:01  -  24 Mar 2021 07:00  --------------------------------------------------------  IN: 0 mL / OUT: 200 mL / NET: -200 mL    24 Mar 2021 07:01  -  24 Mar 2021 15:10  --------------------------------------------------------  IN: 250 mL / OUT: 0 mL / NET: 250 mL          EYES: EOMI, PERRLA, conjunctiva and sclera clear  ENMT: No tonsillar erythema, exudates, or enlargement;  Cardiovascular: Normal S1 S2, No JVD, No murmurs, No edema  Respiratory: Lungs diminished  Gastrointestinal:  Soft, Non-tender, + BS	  Extremities: no edema                                    10.4   9.03  )-----------( 417      ( 24 Mar 2021 07:09 )             32.3     03-24    140  |  102  |  28<H>  ----------------------------<  117<H>  4.4   |  27  |  0.64    Ca    8.8      24 Mar 2021 07:09  Phos  2.7     03-24  Mg     1.5     03-24    TPro  5.6<L>  /  Alb  2.7<L>  /  TBili  0.4  /  DBili  x   /  AST  10  /  ALT  <5<L>  /  AlkPhos  87  03-24    proBNP:   Lipid Profile:   HgA1c:   TSH:     Consultant(s) Notes Reviewed:  [x ] YES  [ ] NO    Care Discussed with Consultants/Other Providers [ x] YES  [ ] NO    Imaging Personally Reviewed independently:  [x] YES  [ ] NO    All labs, radiologic studies, vitals, orders and medications list reviewed. Patient is seen and examined at bedside. Case discussed with medical team.

## 2021-03-24 NOTE — PROGRESS NOTE ADULT - PROBLEM SELECTOR PLAN 3
CT chest large left pleural fluid collection --> A 17.1 x 11.0 x 8.3 cm fluid collection with peripheral enhancement in the left basilar pleural space. DDx includes empyema, simple parapneumonic effusion, malignant effusion. Pt with L pleural effusion first diagnosed in 2/2021. Diagnostic thoracentesis deferred at that time as per family wishes. Given pt's age and comorbidities, s/p empiric  abx therapy for possible bacterial empyema, but cannot exclude other etiologies.   - Pt evaluated by Thoracic sx and IR for possible thoracentesis and drainage of Pleural effusion , but brother did not want drainage at this time   - will continue to defer diagnostic and/or therapeutic thoracentesis at this time since pt clinically improved and  maintaining adequate O2 sat on RA as per patient and family wishes

## 2021-03-24 NOTE — CHART NOTE - NSCHARTNOTEFT_GEN_A_CORE
Pt/Pt's HCP and team deferring any further interventions at this   time for pleural effusion.   Thoracic surgery to sign off  Re-call with any new questions or concerns.

## 2021-03-24 NOTE — PROGRESS NOTE ADULT - SUBJECTIVE AND OBJECTIVE BOX
Patient is a 81y old  Male who presents with a chief complaint of hypotension (23 Mar 2021 14:45)      SUBJECTIVE / OVERNIGHT EVENTS:    MEDICATIONS  (STANDING):  atorvastatin 10 milliGRAM(s) Oral at bedtime  carbidopa/levodopa  25/100 1 Tablet(s) Oral three times a day  dexAMETHasone  Injectable 6 milliGRAM(s) IV Push daily  dextrose 40% Gel 15 Gram(s) Oral once  dextrose 5%. 1000 milliLiter(s) (50 mL/Hr) IV Continuous <Continuous>  dextrose 5%. 1000 milliLiter(s) (100 mL/Hr) IV Continuous <Continuous>  dextrose 50% Injectable 25 Gram(s) IV Push once  dextrose 50% Injectable 12.5 Gram(s) IV Push once  dextrose 50% Injectable 25 Gram(s) IV Push once  enoxaparin Injectable 40 milliGRAM(s) SubCutaneous daily  glucagon  Injectable 1 milliGRAM(s) IntraMuscular once  insulin lispro (ADMELOG) corrective regimen sliding scale   SubCutaneous three times a day before meals  insulin lispro (ADMELOG) corrective regimen sliding scale   SubCutaneous at bedtime  losartan 25 milliGRAM(s) Oral daily  melatonin 3 milliGRAM(s) Oral at bedtime  metoprolol succinate ER 25 milliGRAM(s) Oral daily  pantoprazole  Injectable 40 milliGRAM(s) IV Push every 12 hours  QUEtiapine 50 milliGRAM(s) Oral at bedtime  senna 2 Tablet(s) Oral at bedtime  tamsulosin 0.4 milliGRAM(s) Oral at bedtime    MEDICATIONS  (PRN):  benzonatate 100 milliGRAM(s) Oral three times a day PRN Cough      Vital Signs Last 24 Hrs  T(C): 36.4 (24 Mar 2021 10:00), Max: 36.6 (23 Mar 2021 21:15)  T(F): 97.6 (24 Mar 2021 10:00), Max: 97.8 (23 Mar 2021 21:15)  HR: 64 (24 Mar 2021 10:00) (59 - 71)  BP: 135/72 (24 Mar 2021 10:00) (90/57 - 148/56)  BP(mean): --  RR: 18 (24 Mar 2021 10:00) (16 - 18)  SpO2: 96% (24 Mar 2021 10:00) (94% - 98%)  CAPILLARY BLOOD GLUCOSE      POCT Blood Glucose.: 147 mg/dL (24 Mar 2021 07:43)  POCT Blood Glucose.: 149 mg/dL (23 Mar 2021 21:43)  POCT Blood Glucose.: 259 mg/dL (23 Mar 2021 18:50)  POCT Blood Glucose.: 366 mg/dL (23 Mar 2021 13:16)    I&O's Summary    23 Mar 2021 07:01  -  24 Mar 2021 07:00  --------------------------------------------------------  IN: 0 mL / OUT: 200 mL / NET: -200 mL    24 Mar 2021 07:01  -  24 Mar 2021 10:29  --------------------------------------------------------  IN: 250 mL / OUT: 0 mL / NET: 250 mL        PHYSICAL EXAM:  GENERAL: NAD, well-developed  HEAD:  Atraumatic, Normocephalic  EYES: EOMI, PERRLA, conjunctiva and sclera clear  NECK: Supple, No JVD  CHEST/LUNG: Clear to auscultation bilaterally; No wheeze  HEART: Regular rate and rhythm; No murmurs, rubs, or gallops  ABDOMEN: Soft, Nontender, Nondistended; Bowel sounds present  EXTREMITIES:  2+ Peripheral Pulses, No clubbing, cyanosis, or edema  PSYCH: AAOx3  NEUROLOGY: non-focal  SKIN: No rashes or lesions    LABS:                        10.4   9.03  )-----------( 417      ( 24 Mar 2021 07:09 )             32.3     03-24    140  |  102  |  28<H>  ----------------------------<  117<H>  4.4   |  27  |  0.64    Ca    8.8      24 Mar 2021 07:09  Phos  2.7     03-24  Mg     1.5     03-24    TPro  5.6<L>  /  Alb  2.7<L>  /  TBili  0.4  /  DBili  x   /  AST  10  /  ALT  <5<L>  /  AlkPhos  87  03-24              RADIOLOGY & ADDITIONAL TESTS:    Imaging Personally Reviewed:    Consultant(s) Notes Reviewed:      Care Discussed with Consultants/Other Providers:   Patient is a 81y old  Male who presents with a chief complaint of hypotension (23 Mar 2021 14:45)      SUBJECTIVE / OVERNIGHT EVENTS: patient seen and examined by bedside, pt appears comfortable, denies headache, dizziness, SOB, CP, Palpitations , N/V/D, abdominal pain  pt was noted to have rectal temp of 93.1  this morning , pt was given warming blankets , now resolved  blood cx were sent       MEDICATIONS  (STANDING):  atorvastatin 10 milliGRAM(s) Oral at bedtime  carbidopa/levodopa  25/100 1 Tablet(s) Oral three times a day  dexAMETHasone  Injectable 6 milliGRAM(s) IV Push daily  dextrose 40% Gel 15 Gram(s) Oral once  dextrose 5%. 1000 milliLiter(s) (50 mL/Hr) IV Continuous <Continuous>  dextrose 5%. 1000 milliLiter(s) (100 mL/Hr) IV Continuous <Continuous>  dextrose 50% Injectable 25 Gram(s) IV Push once  dextrose 50% Injectable 12.5 Gram(s) IV Push once  dextrose 50% Injectable 25 Gram(s) IV Push once  enoxaparin Injectable 40 milliGRAM(s) SubCutaneous daily  glucagon  Injectable 1 milliGRAM(s) IntraMuscular once  insulin lispro (ADMELOG) corrective regimen sliding scale   SubCutaneous three times a day before meals  insulin lispro (ADMELOG) corrective regimen sliding scale   SubCutaneous at bedtime  losartan 25 milliGRAM(s) Oral daily  melatonin 3 milliGRAM(s) Oral at bedtime  metoprolol succinate ER 25 milliGRAM(s) Oral daily  pantoprazole  Injectable 40 milliGRAM(s) IV Push every 12 hours  QUEtiapine 50 milliGRAM(s) Oral at bedtime  senna 2 Tablet(s) Oral at bedtime  tamsulosin 0.4 milliGRAM(s) Oral at bedtime    MEDICATIONS  (PRN):  benzonatate 100 milliGRAM(s) Oral three times a day PRN Cough      Vital Signs Last 24 Hrs  T(C): 36.4 (24 Mar 2021 10:00), Max: 36.6 (23 Mar 2021 21:15)  T(F): 97.6 (24 Mar 2021 10:00), Max: 97.8 (23 Mar 2021 21:15)  HR: 64 (24 Mar 2021 10:00) (59 - 71)  BP: 135/72 (24 Mar 2021 10:00) (90/57 - 148/56)  BP(mean): --  RR: 18 (24 Mar 2021 10:00) (16 - 18)  SpO2: 96% (24 Mar 2021 10:00) (94% - 98%)  CAPILLARY BLOOD GLUCOSE      POCT Blood Glucose.: 147 mg/dL (24 Mar 2021 07:43)  POCT Blood Glucose.: 149 mg/dL (23 Mar 2021 21:43)  POCT Blood Glucose.: 259 mg/dL (23 Mar 2021 18:50)  POCT Blood Glucose.: 366 mg/dL (23 Mar 2021 13:16)    I&O's Summary    23 Mar 2021 07:01  -  24 Mar 2021 07:00  --------------------------------------------------------  IN: 0 mL / OUT: 200 mL / NET: -200 mL    24 Mar 2021 07:01  -  24 Mar 2021 10:29  --------------------------------------------------------  IN: 250 mL / OUT: 0 mL / NET: 250 mL      PHYSICAL EXAM  GENERAL: NAD, elderly man laying comfortably in bed,   CHEST/LUNG: decreased BS at mid to lower lung fields on the left; No wheezes. Normal respiratory effort  HEART: Regular rate and rhythm  ABDOMEN: Soft, Nontender, Nondistended  EXTREMITIES:  2+ Peripheral Pulses, No clubbing, cyanosis, or edema  PSYCH: Alert, oriented to self and place. Cooperative, follows simple commands and pleasant        LABS:                        10.4   9.03  )-----------( 417      ( 24 Mar 2021 07:09 )             32.3     03-24    140  |  102  |  28<H>  ----------------------------<  117<H>  4.4   |  27  |  0.64    Ca    8.8      24 Mar 2021 07:09  Phos  2.7     03-24  Mg     1.5     03-24    TPro  5.6<L>  /  Alb  2.7<L>  /  TBili  0.4  /  DBili  x   /  AST  10  /  ALT  <5<L>  /  AlkPhos  87  03-24              RADIOLOGY & ADDITIONAL TESTS:    Imaging Personally Reviewed:    Consultant(s) Notes Reviewed:  cardiology, thoracic sx     Care Discussed with Consultants/Other Providers:

## 2021-03-24 NOTE — PROGRESS NOTE ADULT - PROBLEM SELECTOR PLAN 1
Complicated by acute hypoxic respiratory failure. Initially met sepsis criteria - no longer septic. WBC normal. Pt hemodynamically stable and not requiring O2 at rest. CT chest w/ IV contrast: Bilateral patchy ground glass opacities throughout all the lobes of the bilateral lungs  - F/u 3/17 blood cultures, urine culture --> no growth thus far  - c/w remdesivir and decadron; currently on day 5  - Trend inflammatory markers and monitor renal and liver function Complicated by acute hypoxic respiratory failure. Initially met sepsis criteria - no longer septic. WBC normal. Pt hemodynamically stable and not requiring O2 at rest. CT chest w/ IV contrast: Bilateral patchy ground glass opacities throughout all the lobes of the bilateral lungs  - F/u 3/17 blood cultures, urine culture --> no growth thus far  - completed  remdesivir , c/w  decadron; currently on day 7/10   - Trend inflammatory markers and monitor renal and liver function  -hypothermia noted this morning, now resolved, Blood cx sent, will monitor off Abx at this time

## 2021-03-25 LAB
ALBUMIN SERPL ELPH-MCNC: 2.7 G/DL — LOW (ref 3.3–5)
ALP SERPL-CCNC: 87 U/L — SIGNIFICANT CHANGE UP (ref 40–120)
ALT FLD-CCNC: 6 U/L — SIGNIFICANT CHANGE UP (ref 4–41)
ANION GAP SERPL CALC-SCNC: 9 MMOL/L — SIGNIFICANT CHANGE UP (ref 7–14)
AST SERPL-CCNC: 9 U/L — SIGNIFICANT CHANGE UP (ref 4–40)
BILIRUB SERPL-MCNC: 0.5 MG/DL — SIGNIFICANT CHANGE UP (ref 0.2–1.2)
BUN SERPL-MCNC: 26 MG/DL — HIGH (ref 7–23)
CALCIUM SERPL-MCNC: 8.7 MG/DL — SIGNIFICANT CHANGE UP (ref 8.4–10.5)
CHLORIDE SERPL-SCNC: 101 MMOL/L — SIGNIFICANT CHANGE UP (ref 98–107)
CO2 SERPL-SCNC: 28 MMOL/L — SIGNIFICANT CHANGE UP (ref 22–31)
CREAT SERPL-MCNC: 0.78 MG/DL — SIGNIFICANT CHANGE UP (ref 0.5–1.3)
GLUCOSE BLDC GLUCOMTR-MCNC: 117 MG/DL — HIGH (ref 70–99)
GLUCOSE BLDC GLUCOMTR-MCNC: 138 MG/DL — HIGH (ref 70–99)
GLUCOSE BLDC GLUCOMTR-MCNC: 215 MG/DL — HIGH (ref 70–99)
GLUCOSE BLDC GLUCOMTR-MCNC: 216 MG/DL — HIGH (ref 70–99)
GLUCOSE SERPL-MCNC: 97 MG/DL — SIGNIFICANT CHANGE UP (ref 70–99)
HCT VFR BLD CALC: 30.2 % — LOW (ref 39–50)
HGB BLD-MCNC: 9.6 G/DL — LOW (ref 13–17)
MAGNESIUM SERPL-MCNC: 1.4 MG/DL — LOW (ref 1.6–2.6)
MCHC RBC-ENTMCNC: 28.2 PG — SIGNIFICANT CHANGE UP (ref 27–34)
MCHC RBC-ENTMCNC: 31.8 GM/DL — LOW (ref 32–36)
MCV RBC AUTO: 88.8 FL — SIGNIFICANT CHANGE UP (ref 80–100)
NRBC # BLD: 0 /100 WBCS — SIGNIFICANT CHANGE UP
NRBC # FLD: 0 K/UL — SIGNIFICANT CHANGE UP
PHOSPHATE SERPL-MCNC: 2.8 MG/DL — SIGNIFICANT CHANGE UP (ref 2.5–4.5)
PLATELET # BLD AUTO: 434 K/UL — HIGH (ref 150–400)
POTASSIUM SERPL-MCNC: 4 MMOL/L — SIGNIFICANT CHANGE UP (ref 3.5–5.3)
POTASSIUM SERPL-SCNC: 4 MMOL/L — SIGNIFICANT CHANGE UP (ref 3.5–5.3)
PROT SERPL-MCNC: 5.6 G/DL — LOW (ref 6–8.3)
RBC # BLD: 3.4 M/UL — LOW (ref 4.2–5.8)
RBC # FLD: 13.5 % — SIGNIFICANT CHANGE UP (ref 10.3–14.5)
SODIUM SERPL-SCNC: 138 MMOL/L — SIGNIFICANT CHANGE UP (ref 135–145)
WBC # BLD: 9.62 K/UL — SIGNIFICANT CHANGE UP (ref 3.8–10.5)
WBC # FLD AUTO: 9.62 K/UL — SIGNIFICANT CHANGE UP (ref 3.8–10.5)

## 2021-03-25 PROCEDURE — 99232 SBSQ HOSP IP/OBS MODERATE 35: CPT | Mod: CS

## 2021-03-25 RX ORDER — MAGNESIUM SULFATE 500 MG/ML
2 VIAL (ML) INJECTION ONCE
Refills: 0 | Status: COMPLETED | OUTPATIENT
Start: 2021-03-25 | End: 2021-03-25

## 2021-03-25 RX ADMIN — PANTOPRAZOLE SODIUM 40 MILLIGRAM(S): 20 TABLET, DELAYED RELEASE ORAL at 18:08

## 2021-03-25 RX ADMIN — CARBIDOPA AND LEVODOPA 1 TABLET(S): 25; 100 TABLET ORAL at 21:40

## 2021-03-25 RX ADMIN — LOSARTAN POTASSIUM 25 MILLIGRAM(S): 100 TABLET, FILM COATED ORAL at 06:32

## 2021-03-25 RX ADMIN — TAMSULOSIN HYDROCHLORIDE 0.4 MILLIGRAM(S): 0.4 CAPSULE ORAL at 21:40

## 2021-03-25 RX ADMIN — CARBIDOPA AND LEVODOPA 1 TABLET(S): 25; 100 TABLET ORAL at 13:57

## 2021-03-25 RX ADMIN — SENNA PLUS 2 TABLET(S): 8.6 TABLET ORAL at 21:40

## 2021-03-25 RX ADMIN — ATORVASTATIN CALCIUM 10 MILLIGRAM(S): 80 TABLET, FILM COATED ORAL at 21:40

## 2021-03-25 RX ADMIN — Medication 4: at 18:18

## 2021-03-25 RX ADMIN — CARBIDOPA AND LEVODOPA 1 TABLET(S): 25; 100 TABLET ORAL at 06:32

## 2021-03-25 RX ADMIN — CLOPIDOGREL BISULFATE 75 MILLIGRAM(S): 75 TABLET, FILM COATED ORAL at 13:59

## 2021-03-25 RX ADMIN — PANTOPRAZOLE SODIUM 40 MILLIGRAM(S): 20 TABLET, DELAYED RELEASE ORAL at 06:36

## 2021-03-25 RX ADMIN — Medication 4: at 13:54

## 2021-03-25 RX ADMIN — Medication 50 GRAM(S): at 18:08

## 2021-03-25 RX ADMIN — Medication 3 MILLIGRAM(S): at 21:40

## 2021-03-25 RX ADMIN — Medication 6 MILLIGRAM(S): at 06:34

## 2021-03-25 RX ADMIN — ENOXAPARIN SODIUM 40 MILLIGRAM(S): 100 INJECTION SUBCUTANEOUS at 13:57

## 2021-03-25 NOTE — PROGRESS NOTE ADULT - SUBJECTIVE AND OBJECTIVE BOX
Patient is a 81y old  Male who presents with a chief complaint of hypotension (24 Mar 2021 15:10)      SUBJECTIVE / OVERNIGHT EVENTS:    MEDICATIONS  (STANDING):  atorvastatin 10 milliGRAM(s) Oral at bedtime  carbidopa/levodopa  25/100 1 Tablet(s) Oral three times a day  clopidogrel Tablet 75 milliGRAM(s) Oral daily  dexAMETHasone  Injectable 6 milliGRAM(s) IV Push daily  dextrose 40% Gel 15 Gram(s) Oral once  dextrose 5%. 1000 milliLiter(s) (50 mL/Hr) IV Continuous <Continuous>  dextrose 5%. 1000 milliLiter(s) (100 mL/Hr) IV Continuous <Continuous>  dextrose 50% Injectable 25 Gram(s) IV Push once  dextrose 50% Injectable 12.5 Gram(s) IV Push once  dextrose 50% Injectable 25 Gram(s) IV Push once  enoxaparin Injectable 40 milliGRAM(s) SubCutaneous daily  glucagon  Injectable 1 milliGRAM(s) IntraMuscular once  insulin lispro (ADMELOG) corrective regimen sliding scale   SubCutaneous three times a day before meals  insulin lispro (ADMELOG) corrective regimen sliding scale   SubCutaneous at bedtime  losartan 25 milliGRAM(s) Oral daily  melatonin 3 milliGRAM(s) Oral at bedtime  metoprolol succinate ER 25 milliGRAM(s) Oral daily  pantoprazole  Injectable 40 milliGRAM(s) IV Push every 12 hours  QUEtiapine 50 milliGRAM(s) Oral at bedtime  senna 2 Tablet(s) Oral at bedtime  tamsulosin 0.4 milliGRAM(s) Oral at bedtime    MEDICATIONS  (PRN):  benzonatate 100 milliGRAM(s) Oral three times a day PRN Cough      Vital Signs Last 24 Hrs  T(C): 36 (25 Mar 2021 06:30), Max: 36.4 (24 Mar 2021 14:12)  T(F): 96.8 (25 Mar 2021 06:30), Max: 97.5 (24 Mar 2021 14:12)  HR: 59 (25 Mar 2021 06:30) (59 - 62)  BP: 148/59 (25 Mar 2021 06:30) (92/40 - 148/59)  BP(mean): --  RR: 18 (25 Mar 2021 06:30) (18 - 20)  SpO2: 97% (25 Mar 2021 06:30) (96% - 100%)  CAPILLARY BLOOD GLUCOSE      POCT Blood Glucose.: 117 mg/dL (25 Mar 2021 09:13)  POCT Blood Glucose.: 145 mg/dL (24 Mar 2021 21:54)  POCT Blood Glucose.: 213 mg/dL (24 Mar 2021 17:58)  POCT Blood Glucose.: 320 mg/dL (24 Mar 2021 12:59)    I&O's Summary    24 Mar 2021 07:01  -  25 Mar 2021 07:00  --------------------------------------------------------  IN: 250 mL / OUT: 0 mL / NET: 250 mL        PHYSICAL EXAM:  GENERAL: NAD, well-developed  HEAD:  Atraumatic, Normocephalic  EYES: EOMI, PERRLA, conjunctiva and sclera clear  NECK: Supple, No JVD  CHEST/LUNG: Clear to auscultation bilaterally; No wheeze  HEART: Regular rate and rhythm; No murmurs, rubs, or gallops  ABDOMEN: Soft, Nontender, Nondistended; Bowel sounds present  EXTREMITIES:  2+ Peripheral Pulses, No clubbing, cyanosis, or edema  PSYCH: AAOx3  NEUROLOGY: non-focal  SKIN: No rashes or lesions    LABS:                        9.6    9.62  )-----------( 434      ( 25 Mar 2021 07:38 )             30.2     03-25    138  |  101  |  26<H>  ----------------------------<  97  4.0   |  28  |  0.78    Ca    8.7      25 Mar 2021 07:38  Phos  2.8     03-25  Mg     1.4     03-25    TPro  5.6<L>  /  Alb  2.7<L>  /  TBili  0.5  /  DBili  x   /  AST  9   /  ALT  6   /  AlkPhos  87  03-25              RADIOLOGY & ADDITIONAL TESTS:    Imaging Personally Reviewed:    Consultant(s) Notes Reviewed:      Care Discussed with Consultants/Other Providers:   Patient is a 81y old  Male who presents with a chief complaint of hypotension (24 Mar 2021 15:10)      SUBJECTIVE / OVERNIGHT EVENTS: patient seen and examined by bedside, no acute distress noted, pt noted with temp of 96 last night and 96.1 this morning,  pt alert and awake , denies headache, dizziness, SOB, CP, Palpitations , N/V/D, abdominal pain  pt saturating in 90s on RA       MEDICATIONS  (STANDING):  atorvastatin 10 milliGRAM(s) Oral at bedtime  carbidopa/levodopa  25/100 1 Tablet(s) Oral three times a day  clopidogrel Tablet 75 milliGRAM(s) Oral daily  dexAMETHasone  Injectable 6 milliGRAM(s) IV Push daily  dextrose 40% Gel 15 Gram(s) Oral once  dextrose 5%. 1000 milliLiter(s) (50 mL/Hr) IV Continuous <Continuous>  dextrose 5%. 1000 milliLiter(s) (100 mL/Hr) IV Continuous <Continuous>  dextrose 50% Injectable 25 Gram(s) IV Push once  dextrose 50% Injectable 12.5 Gram(s) IV Push once  dextrose 50% Injectable 25 Gram(s) IV Push once  enoxaparin Injectable 40 milliGRAM(s) SubCutaneous daily  glucagon  Injectable 1 milliGRAM(s) IntraMuscular once  insulin lispro (ADMELOG) corrective regimen sliding scale   SubCutaneous three times a day before meals  insulin lispro (ADMELOG) corrective regimen sliding scale   SubCutaneous at bedtime  losartan 25 milliGRAM(s) Oral daily  melatonin 3 milliGRAM(s) Oral at bedtime  metoprolol succinate ER 25 milliGRAM(s) Oral daily  pantoprazole  Injectable 40 milliGRAM(s) IV Push every 12 hours  QUEtiapine 50 milliGRAM(s) Oral at bedtime  senna 2 Tablet(s) Oral at bedtime  tamsulosin 0.4 milliGRAM(s) Oral at bedtime    MEDICATIONS  (PRN):  benzonatate 100 milliGRAM(s) Oral three times a day PRN Cough      Vital Signs Last 24 Hrs  T(C): 36 (25 Mar 2021 06:30), Max: 36.4 (24 Mar 2021 14:12)  T(F): 96.8 (25 Mar 2021 06:30), Max: 97.5 (24 Mar 2021 14:12)  HR: 59 (25 Mar 2021 06:30) (59 - 62)  BP: 148/59 (25 Mar 2021 06:30) (92/40 - 148/59)  BP(mean): --  RR: 18 (25 Mar 2021 06:30) (18 - 20)  SpO2: 97% (25 Mar 2021 06:30) (96% - 100%)  CAPILLARY BLOOD GLUCOSE      POCT Blood Glucose.: 117 mg/dL (25 Mar 2021 09:13)  POCT Blood Glucose.: 145 mg/dL (24 Mar 2021 21:54)  POCT Blood Glucose.: 213 mg/dL (24 Mar 2021 17:58)  POCT Blood Glucose.: 320 mg/dL (24 Mar 2021 12:59)    I&O's Summary    24 Mar 2021 07:01  -  25 Mar 2021 07:00  --------------------------------------------------------  IN: 250 mL / OUT: 0 mL / NET: 250 mL        PHYSICAL EXAM  GENERAL: NAD, elderly man laying comfortably in bed,   CHEST/LUNG: decreased BS at mid to lower lung fields on the left; No wheezes. Normal respiratory effort  HEART: Regular rate and rhythm  ABDOMEN: Soft, Nontender, Nondistended  EXTREMITIES:  2+ Peripheral Pulses, No clubbing, cyanosis, or edema  PSYCH: Alert, oriented to self and place. Cooperative, follows simple commands and pleasant        LABS:                        9.6    9.62  )-----------( 434      ( 25 Mar 2021 07:38 )             30.2     03-25    138  |  101  |  26<H>  ----------------------------<  97  4.0   |  28  |  0.78    Ca    8.7      25 Mar 2021 07:38  Phos  2.8     03-25  Mg     1.4     03-25    TPro  5.6<L>  /  Alb  2.7<L>  /  TBili  0.5  /  DBili  x   /  AST  9   /  ALT  6   /  AlkPhos  87  03-25              RADIOLOGY & ADDITIONAL TESTS:    Imaging Personally Reviewed:    Consultant(s) Notes Reviewed:      Care Discussed with Consultants/Other Providers:

## 2021-03-25 NOTE — PROGRESS NOTE ADULT - PROBLEM SELECTOR PLAN 4
TTE 2/2021 EF: 50%, Moderate diastolic dysfunction (Stage II), moderate pulmonary hypertension, Small pericardial effusion; proBNP 1306 (prior hospitalization 2800)   Recent 2/2021 abnormal pharmacological stress test: moderate defects in apical, distal lateral walls that are predominantly reversible, suggestive of ischemia - brother declined catheterization given pt's hx of dementia and Parkinson's   - Cards following, recs appreciated  - continue to hold lasix 20 mg PO daily  - C/w plavix given stent hx   - c/w metoprolol succinate , dose reduced to 25 for bradycardia   - c/w losartan 25mg daily  - pt not in acute exacerbation, no need for telemetry at this

## 2021-03-25 NOTE — PROGRESS NOTE ADULT - PROBLEM SELECTOR PLAN 7
- pt was on  metoprolol succinate 50mg daily with hold parameters, pt noted to have bradycardia , d/w Cardiology, agree to decrease the dose of Metoprolol to 25 mg with hold parameters   - c/w home regimen of losartan 25mg daily with hold parameters  - Monitor BP  - d/c lasix 20mg daily - pt was on  metoprolol succinate 50mg daily with hold parameters, pt noted to have bradycardia , d/w Cardiology, agree to decrease the dose of Metoprolol to 25 mg with hold parameters   - c/w home regimen of losartan 25mg daily with hold parameters  - Monitor BP  - off  lasix 20mg daily

## 2021-03-25 NOTE — PROGRESS NOTE ADULT - SUBJECTIVE AND OBJECTIVE BOX
Bennie Hwang MD  Interventional Cardiology / Advance Heart Failure and Cardiac Transplant Specialist  [place] Office : [phone] [place] [address] [place]. [address]  Tel:   [place] Office : [address] [facility] [place] [address]  Tel: [phone]  Cell : [phone]    Subjective/Overnight events: Pt is lying in bed comfortable not in distress  	  MEDICATIONS:  clopidogrel Tablet 75 milliGRAM(s) Oral daily  enoxaparin Injectable 40 milliGRAM(s) SubCutaneous daily  losartan 25 milliGRAM(s) Oral daily  metoprolol succinate ER 25 milliGRAM(s) Oral daily  tamsulosin 0.4 milliGRAM(s) Oral at bedtime      benzonatate 100 milliGRAM(s) Oral three times a day PRN    carbidopa/levodopa  25/100 1 Tablet(s) Oral three times a day  melatonin 3 milliGRAM(s) Oral at bedtime  QUEtiapine 50 milliGRAM(s) Oral at bedtime    pantoprazole  Injectable 40 milliGRAM(s) IV Push every 12 hours  senna 2 Tablet(s) Oral at bedtime    atorvastatin 10 milliGRAM(s) Oral at bedtime  dexAMETHasone  Injectable 6 milliGRAM(s) IV Push daily  dextrose 40% Gel 15 Gram(s) Oral once  dextrose 50% Injectable 25 Gram(s) IV Push once  dextrose 50% Injectable 12.5 Gram(s) IV Push once  dextrose 50% Injectable 25 Gram(s) IV Push once  glucagon  Injectable 1 milliGRAM(s) IntraMuscular once  insulin lispro (ADMELOG) corrective regimen sliding scale   SubCutaneous three times a day before meals  insulin lispro (ADMELOG) corrective regimen sliding scale   SubCutaneous at bedtime    dextrose 5%. 1000 milliLiter(s) IV Continuous <Continuous>  dextrose 5%. 1000 milliLiter(s) IV Continuous <Continuous>      PAST MEDICAL/SURGICAL HISTORY  PAST MEDICAL & SURGICAL HISTORY:  BPH (benign prostatic hyperplasia)    CAD (Coronary Artery Disease)  s/p cardiac stent ( &gt; 20 years ago)    HTN (Hypertension)    DM (Diabetes Mellitus)    Hypercholesterolemia    Coronary Stent  x 2 ( 20 years )        SOCIAL HISTORY: Substance Use (street drugs): ( x ) never used  (  ) other:    FAMILY HISTORY:  No pertinent family history in first degree relatives        REVIEW OF SYSTEMS:  CONSTITUTIONAL: No fever, weight loss, or fatigue  EYES: No eye pain, visual disturbances, or discharge  ENMT:  No difficulty hearing, tinnitus, vertigo; No sinus or throat pain  BREASTS: No pain, masses, or nipple discharge  GASTROINTESTINAL: No abdominal or epigastric pain. No nausea, vomiting, or hematemesis; No diarrhea or constipation. No melena or hematochezia.  GENITOURINARY: No dysuria, frequency, hematuria, or incontinence  NEUROLOGICAL: No headaches, memory loss, loss of strength, numbness, or tremors  ENDOCRINE: No heat or cold intolerance; No hair loss  MUSCULOSKELETAL: No joint pain or swelling; No muscle, back, or extremity pain  PSYCHIATRIC: No depression, anxiety, mood swings, or difficulty sleeping  HEME/LYMPH: No easy bruising, or bleeding gums  All others negative    PHYSICAL EXAM:  T(C): 35.6 (03-25-21 @ 10:00), Max: 36.4 (03-24-21 @ 14:12)  HR: 53 (03-25-21 @ 10:00) (53 - 62)  BP: 126/91 (03-25-21 @ 10:00) (92/40 - 148/59)  RR: 18 (03-25-21 @ 10:00) (18 - 20)  SpO2: 94% (03-25-21 @ 10:00) (94% - 100%)  Wt(kg): --  I&O's Summary    24 Mar 2021 07:01  -  25 Mar 2021 07:00  --------------------------------------------------------  IN: 250 mL / OUT: 0 mL / NET: 250 mL    25 Mar 2021 07:01  -  25 Mar 2021 14:01  --------------------------------------------------------  IN: 250 mL / OUT: 0 mL / NET: 250 mL          EYES: EOMI, PERRLA, conjunctiva and sclera clear  ENMT: No tonsillar erythema, exudates, or enlargement;  Cardiovascular: Normal S1 S2, No JVD, No murmurs, No edema  Respiratory: Lungs diminished  Gastrointestinal:  Soft, Non-tender, + BS	  Extremities: no edema                                  9.6    9.62  )-----------( 434      ( 25 Mar 2021 07:38 )             30.2     03-25    138  |  101  |  26<H>  ----------------------------<  97  4.0   |  28  |  0.78    Ca    8.7      25 Mar 2021 07:38  Phos  2.8     03-25  Mg     1.4     03-25    TPro  5.6<L>  /  Alb  2.7<L>  /  TBili  0.5  /  DBili  x   /  AST  9   /  ALT  6   /  AlkPhos  87  03-25    proBNP:   Lipid Profile:   HgA1c:   TSH:     Consultant(s) Notes Reviewed:  [x ] YES  [ ] NO    Care Discussed with Consultants/Other Providers [ x] YES  [ ] NO    Imaging Personally Reviewed independently:  [x] YES  [ ] NO    All labs, radiologic studies, vitals, orders and medications list reviewed. Patient is seen and examined at bedside. Case discussed with medical team.

## 2021-03-25 NOTE — PROGRESS NOTE ADULT - PROBLEM SELECTOR PLAN 1
Complicated by acute hypoxic respiratory failure. Initially met sepsis criteria - no longer septic. WBC normal. Pt hemodynamically stable and not requiring O2 at rest. CT chest w/ IV contrast: Bilateral patchy ground glass opacities throughout all the lobes of the bilateral lungs  - F/u 3/17 blood cultures, urine culture --> no growth thus far  - completed  remdesivir , c/w  decadron; currently on day 7/10   - Trend inflammatory markers and monitor renal and liver function  -hypothermia noted this morning, now resolved, Blood cx sent, will monitor off Abx at this time Complicated by acute hypoxic respiratory failure. Initially met sepsis criteria - no longer septic. WBC normal. Pt hemodynamically stable and not requiring O2 at rest. CT chest w/ IV contrast: Bilateral patchy ground glass opacities throughout all the lobes of the bilateral lungs  - F/u 3/17 blood cultures, urine culture --> no growth thus far  - completed  remdesivir , c/w  decadron; currently on day 8/10   - Trend inflammatory markers and monitor renal and liver function  -hypothermia noted on 3/24, now resolved, Blood cx NGTD , will monitor off Abx at this time

## 2021-03-25 NOTE — PROGRESS NOTE ADULT - ASSESSMENT
82 y/o M w/ pmhx of dementia, parkinson's disease, DM, HTN, CHF, CAD 2 stents sent from cardiology office for hypotension.     EKG: Sinus Josemanuel 59 PACs      1. Sepsis  - COVID +, DDimer elevated consider therapeutic a/c    -patient from office with hypotension, improving   - CT with large loculated pleural effusion , brother wants medical management , discussed at length    2.CHF  -echo with mild LV systolic dysfunction (new compared to 2018)  -currently appears euvolemic on exam    3. CAD s/p stent  -cath in 2010 showed mild luminal disease and patent stents  -denies chest pain  -echo with mild LV dysfunction  -c/w plavix     4. HTN  -BP running soft previously-improving now  -continue to monitor BP

## 2021-03-26 ENCOUNTER — TRANSCRIPTION ENCOUNTER (OUTPATIENT)
Age: 81
End: 2021-03-26

## 2021-03-26 LAB
ALBUMIN SERPL ELPH-MCNC: 2.5 G/DL — LOW (ref 3.3–5)
ALP SERPL-CCNC: 104 U/L — SIGNIFICANT CHANGE UP (ref 40–120)
ALT FLD-CCNC: <5 U/L — LOW (ref 4–41)
ANION GAP SERPL CALC-SCNC: 10 MMOL/L — SIGNIFICANT CHANGE UP (ref 7–14)
AST SERPL-CCNC: 8 U/L — SIGNIFICANT CHANGE UP (ref 4–40)
BILIRUB SERPL-MCNC: 0.6 MG/DL — SIGNIFICANT CHANGE UP (ref 0.2–1.2)
BUN SERPL-MCNC: 25 MG/DL — HIGH (ref 7–23)
CALCIUM SERPL-MCNC: 9.1 MG/DL — SIGNIFICANT CHANGE UP (ref 8.4–10.5)
CHLORIDE SERPL-SCNC: 100 MMOL/L — SIGNIFICANT CHANGE UP (ref 98–107)
CO2 SERPL-SCNC: 26 MMOL/L — SIGNIFICANT CHANGE UP (ref 22–31)
CREAT SERPL-MCNC: 0.76 MG/DL — SIGNIFICANT CHANGE UP (ref 0.5–1.3)
GLUCOSE BLDC GLUCOMTR-MCNC: 113 MG/DL — HIGH (ref 70–99)
GLUCOSE BLDC GLUCOMTR-MCNC: 163 MG/DL — HIGH (ref 70–99)
GLUCOSE BLDC GLUCOMTR-MCNC: 174 MG/DL — HIGH (ref 70–99)
GLUCOSE BLDC GLUCOMTR-MCNC: 182 MG/DL — HIGH (ref 70–99)
GLUCOSE BLDC GLUCOMTR-MCNC: 188 MG/DL — HIGH (ref 70–99)
GLUCOSE SERPL-MCNC: 98 MG/DL — SIGNIFICANT CHANGE UP (ref 70–99)
HCT VFR BLD CALC: 33.1 % — LOW (ref 39–50)
HGB BLD-MCNC: 10.5 G/DL — LOW (ref 13–17)
MAGNESIUM SERPL-MCNC: 1.8 MG/DL — SIGNIFICANT CHANGE UP (ref 1.6–2.6)
MCHC RBC-ENTMCNC: 28.7 PG — SIGNIFICANT CHANGE UP (ref 27–34)
MCHC RBC-ENTMCNC: 31.7 GM/DL — LOW (ref 32–36)
MCV RBC AUTO: 90.4 FL — SIGNIFICANT CHANGE UP (ref 80–100)
NRBC # BLD: 0 /100 WBCS — SIGNIFICANT CHANGE UP
NRBC # FLD: 0 K/UL — SIGNIFICANT CHANGE UP
PHOSPHATE SERPL-MCNC: 3.9 MG/DL — SIGNIFICANT CHANGE UP (ref 2.5–4.5)
PLATELET # BLD AUTO: 368 K/UL — SIGNIFICANT CHANGE UP (ref 150–400)
POTASSIUM SERPL-MCNC: 4.4 MMOL/L — SIGNIFICANT CHANGE UP (ref 3.5–5.3)
POTASSIUM SERPL-SCNC: 4.4 MMOL/L — SIGNIFICANT CHANGE UP (ref 3.5–5.3)
PROT SERPL-MCNC: 5.9 G/DL — LOW (ref 6–8.3)
RBC # BLD: 3.66 M/UL — LOW (ref 4.2–5.8)
RBC # FLD: 13.6 % — SIGNIFICANT CHANGE UP (ref 10.3–14.5)
SODIUM SERPL-SCNC: 136 MMOL/L — SIGNIFICANT CHANGE UP (ref 135–145)
WBC # BLD: 7.32 K/UL — SIGNIFICANT CHANGE UP (ref 3.8–10.5)
WBC # FLD AUTO: 7.32 K/UL — SIGNIFICANT CHANGE UP (ref 3.8–10.5)

## 2021-03-26 PROCEDURE — 99232 SBSQ HOSP IP/OBS MODERATE 35: CPT | Mod: CS

## 2021-03-26 RX ADMIN — Medication 2: at 17:29

## 2021-03-26 RX ADMIN — CARBIDOPA AND LEVODOPA 1 TABLET(S): 25; 100 TABLET ORAL at 21:20

## 2021-03-26 RX ADMIN — CARBIDOPA AND LEVODOPA 1 TABLET(S): 25; 100 TABLET ORAL at 06:27

## 2021-03-26 RX ADMIN — ATORVASTATIN CALCIUM 10 MILLIGRAM(S): 80 TABLET, FILM COATED ORAL at 21:20

## 2021-03-26 RX ADMIN — Medication 6 MILLIGRAM(S): at 06:28

## 2021-03-26 RX ADMIN — CLOPIDOGREL BISULFATE 75 MILLIGRAM(S): 75 TABLET, FILM COATED ORAL at 11:15

## 2021-03-26 RX ADMIN — Medication 3 MILLIGRAM(S): at 21:20

## 2021-03-26 RX ADMIN — QUETIAPINE FUMARATE 50 MILLIGRAM(S): 200 TABLET, FILM COATED ORAL at 21:20

## 2021-03-26 RX ADMIN — LOSARTAN POTASSIUM 25 MILLIGRAM(S): 100 TABLET, FILM COATED ORAL at 06:28

## 2021-03-26 RX ADMIN — ENOXAPARIN SODIUM 40 MILLIGRAM(S): 100 INJECTION SUBCUTANEOUS at 11:16

## 2021-03-26 RX ADMIN — PANTOPRAZOLE SODIUM 40 MILLIGRAM(S): 20 TABLET, DELAYED RELEASE ORAL at 06:28

## 2021-03-26 RX ADMIN — PANTOPRAZOLE SODIUM 40 MILLIGRAM(S): 20 TABLET, DELAYED RELEASE ORAL at 16:42

## 2021-03-26 RX ADMIN — TAMSULOSIN HYDROCHLORIDE 0.4 MILLIGRAM(S): 0.4 CAPSULE ORAL at 21:20

## 2021-03-26 RX ADMIN — Medication 100 MILLIGRAM(S): at 21:21

## 2021-03-26 RX ADMIN — Medication 2: at 12:02

## 2021-03-26 RX ADMIN — CARBIDOPA AND LEVODOPA 1 TABLET(S): 25; 100 TABLET ORAL at 13:47

## 2021-03-26 NOTE — DISCHARGE NOTE PROVIDER - NSDCMRMEDTOKEN_GEN_ALL_CORE_FT
atorvastatin 10 mg oral tablet: 1 tab(s) orally once a day  carbidopa-levodopa 25 mg-100 mg oral tablet: 1 tab(s) orally 3 times a day  clopidogrel 75 mg oral tablet: 1 tab(s) orally once a day  docusate sodium 100 mg oral tablet: 1 tab(s) orally 2 times a day  furosemide 20 mg oral tablet: 1 tab(s) orally once a day  Jardiance 10 mg oral tablet: 1 tab(s) orally once a day (in the morning)  losartan 25 mg oral tablet: 1 tab(s) orally once a day  melatonin 3 mg oral tablet: 2 tab(s) orally once a day (at bedtime)  metFORMIN 1000 mg oral tablet: 1 tab(s) orally 2 times a day  metoprolol succinate 50 mg oral tablet, extended release: 1 tab(s) orally once a day  pantoprazole 40 mg oral delayed release tablet: 1 tab(s) orally 2 times a day  SEROquel 50 mg oral tablet: 1 tab(s) orally once a day (at bedtime)  tamsulosin 0.4 mg oral capsule: 1 cap(s) orally once a day (at bedtime)   atorvastatin 10 mg oral tablet: 1 tab(s) orally once a day  carbidopa-levodopa 25 mg-100 mg oral tablet: 1 tab(s) orally 3 times a day  clopidogrel 75 mg oral tablet: 1 tab(s) orally once a day  docusate sodium 100 mg oral tablet: 1 tab(s) orally 2 times a day  enoxaparin 40 mg/0.4 mL injectable solution: 40 milligram(s) subcutaneously once a day   furosemide 20 mg oral tablet: 1 tab(s) orally once a day  Jardiance 10 mg oral tablet: 1 tab(s) orally once a day (in the morning)  losartan 25 mg oral tablet: 1 tab(s) orally once a day  melatonin 3 mg oral tablet: 2 tab(s) orally once a day (at bedtime)  metFORMIN 1000 mg oral tablet: 1 tab(s) orally 2 times a day  metoprolol succinate 50 mg oral tablet, extended release: 1 tab(s) orally once a day  pantoprazole 40 mg oral delayed release tablet: 1 tab(s) orally 2 times a day  SEROquel 50 mg oral tablet: 1 tab(s) orally once a day (at bedtime)  tamsulosin 0.4 mg oral capsule: 1 cap(s) orally once a day (at bedtime)   atorvastatin 10 mg oral tablet: 1 tab(s) orally once a day  carbidopa-levodopa 25 mg-100 mg oral tablet: 1 tab(s) orally 3 times a day  clopidogrel 75 mg oral tablet: 1 tab(s) orally once a day  docusate sodium 100 mg oral tablet: 1 tab(s) orally 2 times a day  furosemide 20 mg oral tablet: 1 tab(s) orally once a day  Jardiance 10 mg oral tablet: 1 tab(s) orally once a day (in the morning)  losartan 25 mg oral tablet: 1 tab(s) orally once a day  melatonin 3 mg oral tablet: 2 tab(s) orally once a day (at bedtime)  metFORMIN 1000 mg oral tablet: 1 tab(s) orally 2 times a day  metoprolol succinate 50 mg oral tablet, extended release: 1 tab(s) orally once a day  pantoprazole 40 mg oral delayed release tablet: 1 tab(s) orally 2 times a day  rivaroxaban 10 mg oral tablet: 1 tab(s) orally once a day   SEROquel 50 mg oral tablet: 1 tab(s) orally once a day (at bedtime)  tamsulosin 0.4 mg oral capsule: 1 cap(s) orally once a day (at bedtime)

## 2021-03-26 NOTE — PROGRESS NOTE ADULT - PROBLEM SELECTOR PLAN 1
Complicated by acute hypoxic respiratory failure. Initially met sepsis criteria - no longer septic. WBC normal. Pt hemodynamically stable and not requiring O2 at rest. CT chest w/ IV contrast: Bilateral patchy ground glass opacities throughout all the lobes of the bilateral lungs  - F/u 3/17 blood cultures, urine culture --> no growth thus far  - completed  remdesivir , c/w  decadron; currently on day 8/10   - Trend inflammatory markers and monitor renal and liver function  -hypothermia noted on 3/24, now resolved, Blood cx NGTD , will monitor off Abx at this time

## 2021-03-26 NOTE — PROVIDER CONTACT NOTE (OTHER) - ASSESSMENT
Pt. V/S presented w/ HR of 47. Pt. resting in bed asymptomatic. Pt. V/S presented w/ bradycardia of 47 & /49. Pt. resting in bed asymptomatic.

## 2021-03-26 NOTE — PROGRESS NOTE ADULT - SUBJECTIVE AND OBJECTIVE BOX
Bennie Hwang MD  Interventional Cardiology / Advance Heart Failure and Cardiac Transplant Specialist  [place] Office : [address] [place] [address] [place]. [address]  Tel:   [place] Office : [address] [facility] [place] [address]  Tel: [phone]  Cell : [phone]    Pt is lying in bed comfortable not in distress, no chest pains no SOB no palpitations  	  MEDICATIONS:  clopidogrel Tablet 75 milliGRAM(s) Oral daily  enoxaparin Injectable 40 milliGRAM(s) SubCutaneous daily  losartan 25 milliGRAM(s) Oral daily  metoprolol succinate ER 25 milliGRAM(s) Oral daily  tamsulosin 0.4 milliGRAM(s) Oral at bedtime      benzonatate 100 milliGRAM(s) Oral three times a day PRN    carbidopa/levodopa  25/100 1 Tablet(s) Oral three times a day  melatonin 3 milliGRAM(s) Oral at bedtime  QUEtiapine 50 milliGRAM(s) Oral at bedtime    pantoprazole  Injectable 40 milliGRAM(s) IV Push every 12 hours  senna 2 Tablet(s) Oral at bedtime    atorvastatin 10 milliGRAM(s) Oral at bedtime  dexAMETHasone  Injectable 6 milliGRAM(s) IV Push daily  dextrose 40% Gel 15 Gram(s) Oral once  dextrose 50% Injectable 25 Gram(s) IV Push once  dextrose 50% Injectable 12.5 Gram(s) IV Push once  dextrose 50% Injectable 25 Gram(s) IV Push once  glucagon  Injectable 1 milliGRAM(s) IntraMuscular once  insulin lispro (ADMELOG) corrective regimen sliding scale   SubCutaneous three times a day before meals  insulin lispro (ADMELOG) corrective regimen sliding scale   SubCutaneous at bedtime    dextrose 5%. 1000 milliLiter(s) IV Continuous <Continuous>  dextrose 5%. 1000 milliLiter(s) IV Continuous <Continuous>      PAST MEDICAL/SURGICAL HISTORY  PAST MEDICAL & SURGICAL HISTORY:  BPH (benign prostatic hyperplasia)    CAD (Coronary Artery Disease)  s/p cardiac stent ( &gt; 20 years ago)    HTN (Hypertension)    DM (Diabetes Mellitus)    Hypercholesterolemia    Coronary Stent  x 2 ( 20 years )        SOCIAL HISTORY: Substance Use (street drugs): ( x ) never used  (  ) other:    FAMILY HISTORY:  No pertinent family history in first degree relatives         PHYSICAL EXAM:  T(C): 37.3 (03-26-21 @ 09:01), Max: 37.3 (03-26-21 @ 09:01)  HR: 58 (03-26-21 @ 09:01) (47 - 58)  BP: 132/52 (03-26-21 @ 09:01) (111/73 - 137/49)  RR: 18 (03-26-21 @ 09:01) (17 - 19)  SpO2: 94% (03-26-21 @ 09:01) (94% - 95%)  Wt(kg): --  I&O's Summary    25 Mar 2021 07:01  -  26 Mar 2021 07:00  --------------------------------------------------------  IN: 250 mL / OUT: 0 mL / NET: 250 mL             EYES: EOMI, PERRLA, conjunctiva and sclera clear  ENMT: No tonsillar erythema, exudates, or enlargement; Moist mucous membranes, Good dentition, No lesions  Cardiovascular: Normal S1 S2, No JVD, No murmurs, No edema  Respiratory: b/l rhonchi  Gastrointestinal:  Soft, Non-tender, + BS	  Extremities: no edema                                    10.5   7.32  )-----------( 368      ( 26 Mar 2021 07:09 )             33.1     03-26    136  |  100  |  25<H>  ----------------------------<  98  4.4   |  26  |  0.76    Ca    9.1      26 Mar 2021 07:09  Phos  3.9     03-26  Mg     1.8     03-26    TPro  5.9<L>  /  Alb  2.5<L>  /  TBili  0.6  /  DBili  x   /  AST  8   /  ALT  <5<L>  /  AlkPhos  104  03-26    proBNP:   Lipid Profile:   HgA1c:   TSH:     Consultant(s) Notes Reviewed:  [x ] YES  [ ] NO    Care Discussed with Consultants/Other Providers [ x] YES  [ ] NO    Imaging Personally Reviewed independently:  [x] YES  [ ] NO    All labs, radiologic studies, vitals, orders and medications list reviewed. Patient is seen and examined at bedside. Case discussed with medical team.

## 2021-03-26 NOTE — DISCHARGE NOTE PROVIDER - HOSPITAL COURSE
80 y/o M HTN, DM, HLD, dementia (AOx2 at baseline), Parkinson's disease, CAD s/p stents x2 (>20 years ago), recent new onset HF (EF: 50%, Moderate diastolic dysfunction (Stage II), moderate pulmonary hypertension, small pericardial effusion), L pleural effusion, BPH, gastric ulcers, admitted for further management of sepsis 2/2 possible empyema and COVID, along with hypoxic respiratory failure.      Problem/Plan - 1:  ·  Problem: COVID-19.    Plan: Complicated by acute hypoxic respiratory failure. Initially met sepsis criteria - no longer septic. WBC normal. Pt hemodynamically stable and not requiring O2 at rest. CT chest w/ IV contrast: Bilateral patchy ground glass opacities throughout all the lobes of the bilateral lungs  - F/u 3/17 blood cultures, urine culture --> no growth thus far  - completed  remdesivir , c/w  decadron; currently on day 8/10   - Trend inflammatory markers and monitor renal and liver function  -hypothermia noted on 3/24, now resolved, Blood cx NGTD , will monitor off Abx at this time.       Problem/Plan - 2:  ·  Problem: Acute respiratory failure with hypoxia.    Plan: Likely 2/2 COVID +/- suspected empyema. Resolved at this time.      Problem/Plan - 3:  ·  Problem: Large pleural effusion.    Plan: CT chest large left pleural fluid collection --> A 17.1 x 11.0 x 8.3 cm fluid collection with peripheral enhancement in the left basilar pleural space. DDx includes empyema, simple parapneumonic effusion, malignant effusion. Pt with L pleural effusion first diagnosed in 2/2021. Diagnostic thoracentesis deferred at that time as per family wishes. Given pt's age and comorbidities, s/p empiric  abx therapy for possible bacterial empyema, but cannot exclude other etiologies.   - Pt evaluated by Thoracic sx and IR for possible thoracentesis and drainage of Pleural effusion , but brother did not want drainage at this time   - will continue to defer diagnostic and/or therapeutic thoracentesis at this time since pt clinically improved and  maintaining adequate O2 sat on RA as per patient and family wishes.      Problem/Plan - 4:  ·  Problem: Chronic combined systolic and diastolic heart failure.    Plan: TTE 2/2021 EF: 50%, Moderate diastolic dysfunction (Stage II), moderate pulmonary hypertension, Small pericardial effusion; proBNP 1306 (prior hospitalization 2800)   Recent 2/2021 abnormal pharmacological stress test: moderate defects in apical, distal lateral walls that are predominantly reversible, suggestive of ischemia - brother declined catheterization given pt's hx of dementia and Parkinson's   - Cards following, recs appreciated  - continue to hold lasix 20 mg PO daily  - C/w plavix given stent hx   - c/w metoprolol succinate , dose reduced to 25 for bradycardia   - c/w losartan 25mg daily  - pt not in acute exacerbation, no need for telemetry at this.      Problem/Plan - 5:  ·  Problem: Parkinson disease.    Plan: - continue home regimen of Sinemet.      Problem/Plan - 6:  Problem: Dementia.   Plan: - C/w Seroquel 50mg at bedtime; monitor QTc if making adjustments  - frequent re-orientation  - Monitor EKG.     Problem/Plan - 7:  ·  Problem: Essential hypertension.    Plan: - pt was on  metoprolol succinate 50mg daily with hold parameters, pt noted to have bradycardia , d/w Cardiology, agree to decrease the dose of Metoprolol to 25 mg with hold parameters   - c/w home regimen of losartan 25mg daily with hold parameters  - Monitor BP  - off  lasix 20mg daily.       Problem/Plan - 8:  ·  Problem: Type 2 diabetes mellitus without complication, without long-term current use of insulin.    Plan: - A1c 5.3 on prior hospitalization.   - On oral meds at home, holding while inpatient  - Diabetic diet  - FSG checks and sliding scale insulin protocol given increase in blood glucose levels while on steroids.      Problem/Plan - 9:  ·  Problem: Preventive measure.    Plan: - Diet: CC DASH diet  - DVT: lovenox prophylactic, case d/w cardiology, agree to c/w with prophylactic dose as pt also on plavix and family prefers comfort care   - Dispo: PT recommend home PT, case d/w bother , pt lives with 2 home HHA , will d/w CM   - GOC: [facility] filled out last hospitalization, pt DNR with trial of intubation, confirmed with brother at time of H&P. Asked brother to bring in the Molst form or copy for the chart.     Dispo:    On_________, case was discussed with __________, patient is medically cleared and optimized for discharge today. All medications were reviewed with attending, and sent to mutually agreed upon pharmacy. 82 y/o M HTN, DM, HLD, dementia (AOx2 at baseline), Parkinson's disease, CAD s/p stents x2 (>20 years ago), recent new onset HF (EF: 50%, Moderate diastolic dysfunction (Stage II), moderate pulmonary hypertension, small pericardial effusion), L pleural effusion, BPH, gastric ulcers, admitted for further management of sepsis 2/2 possible empyema and COVID, along with hypoxic respiratory failure.      Problem/Plan - 1:  ·  Problem: COVID-19.    Plan: Complicated by acute hypoxic respiratory failure. Initially met sepsis criteria - no longer septic. WBC normal. Pt hemodynamically stable and not requiring O2 at rest. CT chest w/ IV contrast: Bilateral patchy ground glass opacities throughout all the lobes of the bilateral lungs  - F/u 3/17 blood cultures, urine culture --> no growth thus far  - completed  remdesivir , c/w  decadron; currently on day 8/10   - Trend inflammatory markers and monitor renal and liver function  -hypothermia noted on 3/24, now resolved, Blood cx NGTD , will monitor off Abx at this time.       Problem/Plan - 2:  ·  Problem: Acute respiratory failure with hypoxia.    Plan: Likely 2/2 COVID +/- suspected empyema. Resolved at this time.      Problem/Plan - 3:  ·  Problem: Large pleural effusion.    Plan: CT chest large left pleural fluid collection --> A 17.1 x 11.0 x 8.3 cm fluid collection with peripheral enhancement in the left basilar pleural space. DDx includes empyema, simple parapneumonic effusion, malignant effusion. Pt with L pleural effusion first diagnosed in 2/2021. Diagnostic thoracentesis deferred at that time as per family wishes. Given pt's age and comorbidities, s/p empiric  abx therapy for possible bacterial empyema, but cannot exclude other etiologies.   - Pt evaluated by Thoracic sx and IR for possible thoracentesis and drainage of Pleural effusion , but brother did not want drainage at this time   - will continue to defer diagnostic and/or therapeutic thoracentesis at this time since pt clinically improved and  maintaining adequate O2 sat on RA as per patient and family wishes.      Problem/Plan - 4:  ·  Problem: Chronic combined systolic and diastolic heart failure.    Plan: TTE 2/2021 EF: 50%, Moderate diastolic dysfunction (Stage II), moderate pulmonary hypertension, Small pericardial effusion; proBNP 1306 (prior hospitalization 2800)   Recent 2/2021 abnormal pharmacological stress test: moderate defects in apical, distal lateral walls that are predominantly reversible, suggestive of ischemia - brother declined catheterization given pt's hx of dementia and Parkinson's   - Cards following, recs appreciated  - continue to hold lasix 20 mg PO daily  - C/w plavix given stent hx   - c/w metoprolol succinate , dose reduced to 25 for bradycardia   - c/w losartan 25mg daily  - pt not in acute exacerbation, no need for telemetry at this.      Problem/Plan - 5:  ·  Problem: Parkinson disease.    Plan: - continue home regimen of Sinemet.      Problem/Plan - 6:  Problem: Dementia.   Plan: - C/w Seroquel 50mg at bedtime; monitor QTc if making adjustments  - frequent re-orientation  - Monitor EKG.     Problem/Plan - 7:  ·  Problem: Essential hypertension.    Plan: - pt was on  metoprolol succinate 50mg daily with hold parameters, pt noted to have bradycardia , d/w Cardiology, agree to decrease the dose of Metoprolol to 25 mg with hold parameters   - c/w home regimen of losartan 25mg daily with hold parameters  - Monitor BP  - off  lasix 20mg daily.       Problem/Plan - 8:  ·  Problem: Type 2 diabetes mellitus without complication, without long-term current use of insulin.    Plan: - A1c 5.3 on prior hospitalization.   - On oral meds at home, holding while inpatient  - Diabetic diet  - FSG checks and sliding scale insulin protocol given increase in blood glucose levels while on steroids.      Problem/Plan - 9:  ·  Problem: Preventive measure.    Plan: - Diet: CC DASH diet  - DVT: lovenox prophylactic, case d/w cardiology, agree to c/w with prophylactic dose as pt also on plavix and family prefers comfort care   - Dispo: PT recommend home PT, case d/w bother , pt lives with 2 home HHA , will d/w CM   - GOC: [facility] filled out last hospitalization, pt DNR with trial of intubation, confirmed with brother at time of H&P. Asked brother to bring in the Mol[facility] form or copy for the chart.     Dispo: home with home care     On_________, case was discussed with __________, patient is medically cleared and optimized for discharge today. All medications were reviewed with attending, and sent to mutually agreed upon pharmacy. 82 y/o M HTN, DM, HLD, dementia (AOx2 at baseline), Parkinson's disease, CAD s/p stents x2 (>20 years ago), recent new onset HF (EF: 50%, Moderate diastolic dysfunction (Stage II), moderate pulmonary hypertension, small pericardial effusion), L pleural effusion, BPH, gastric ulcers, admitted for further management of sepsis 2/2 possible empyema and COVID, along with hypoxic respiratory failure.      Problem/Plan - 1:  ·  Problem: COVID-19.    Plan: Complicated by acute hypoxic respiratory failure. Initially met sepsis criteria - no longer septic. WBC normal. Pt hemodynamically stable and not requiring O2 at rest. CT chest w/ IV contrast: Bilateral patchy ground glass opacities throughout all the lobes of the bilateral lungs  - F/u 3/17 blood cultures, urine culture --> no growth thus far  - completed  remdesivir , c/w  decadron; currently on day 8/10   - Trend inflammatory markers and monitor renal and liver function  -hypothermia noted on 3/24, now resolved, Blood cx NGTD , will monitor off Abx at this time.       Problem/Plan - 2:  ·  Problem: Acute respiratory failure with hypoxia.    Plan: Likely 2/2 COVID +/- suspected empyema. Resolved at this time.      Problem/Plan - 3:  ·  Problem: Large pleural effusion.    Plan: CT chest large left pleural fluid collection --> A 17.1 x 11.0 x 8.3 cm fluid collection with peripheral enhancement in the left basilar pleural space. DDx includes empyema, simple parapneumonic effusion, malignant effusion. Pt with L pleural effusion first diagnosed in 2/2021. Diagnostic thoracentesis deferred at that time as per family wishes. Given pt's age and comorbidities, s/p empiric  abx therapy for possible bacterial empyema, but cannot exclude other etiologies.   - Pt evaluated by Thoracic sx and IR for possible thoracentesis and drainage of Pleural effusion , but brother did not want drainage at this time   - will continue to defer diagnostic and/or therapeutic thoracentesis at this time since pt clinically improved and  maintaining adequate O2 sat on RA as per patient and family wishes.      Problem/Plan - 4:  ·  Problem: Chronic combined systolic and diastolic heart failure.    Plan: TTE 2/2021 EF: 50%, Moderate diastolic dysfunction (Stage II), moderate pulmonary hypertension, Small pericardial effusion; proBNP 1306 (prior hospitalization 2800)   Recent 2/2021 abnormal pharmacological stress test: moderate defects in apical, distal lateral walls that are predominantly reversible, suggestive of ischemia - brother declined catheterization given pt's hx of dementia and Parkinson's   - Cards following, recs appreciated  - continue to hold lasix 20 mg PO daily  - C/w plavix given stent hx   - c/w metoprolol succinate , dose reduced to 25 for bradycardia   - c/w losartan 25mg daily  - pt not in acute exacerbation, no need for telemetry at this.      Problem/Plan - 5:  ·  Problem: Parkinson disease.    Plan: - continue home regimen of Sinemet.      Problem/Plan - 6:  Problem: Dementia.   Plan: - C/w Seroquel 50mg at bedtime; monitor QTc if making adjustments  - frequent re-orientation  - Monitor EKG.     Problem/Plan - 7:  ·  Problem: Essential hypertension.    Plan: - pt was on  metoprolol succinate 50mg daily with hold parameters, pt noted to have bradycardia , d/w Cardiology, agree to decrease the dose of Metoprolol to 25 mg with hold parameters   - c/w home regimen of losartan 25mg daily with hold parameters  - Monitor BP  - off  lasix 20mg daily.       Problem/Plan - 8:  ·  Problem: Type 2 diabetes mellitus without complication, without long-term current use of insulin.    Plan: - A1c 5.3 on prior hospitalization.   - On oral meds at home, holding while inpatient  - Diabetic diet  - FSG checks and sliding scale insulin protocol given increase in blood glucose levels while on steroids.      Problem/Plan - 9:  ·  Problem: Preventive measure.    Plan: - Diet: CC DASH diet  - DVT: lovenox prophylactic, case d/w cardiology, agree to c/w with prophylactic dose as pt also on plavix and family prefers comfort care   - Dispo: PT recommend home PT, case d/w bother , pt lives with 2 home HHA , will d/w CM   - GOC: [facility] filled out last hospitalization, pt DNR with trial of intubation, confirmed with brother at time of H&P. Asked brother to bring in the Molst form or copy for the chart.     Dispo: home with home care     On 3/30/2021  case was discussed with , patient is medically cleared and optimized for discharge today. All medications were reviewed with attending, and sent to mutually agreed upon [facility] pharmacy  brother was called multiple times - phone directly to voicemail - unable to leave message. 82 y/o M HTN, DM, HLD, dementia (AOx2 at baseline), Parkinson's disease, CAD s/p stents x2 (>20 years ago), recent new onset HF (EF: 50%, Moderate diastolic dysfunction (Stage II), moderate pulmonary hypertension, small pericardial effusion), L pleural effusion, BPH, gastric ulcers, admitted for further management of sepsis 2/2 possible empyema and COVID, along with hypoxic respiratory failure.      Problem/Plan - 1:  ·  Problem: COVID-19.    Plan: Complicated by acute hypoxic respiratory failure. Initially met sepsis criteria - no longer septic. WBC normal. Pt hemodynamically stable and not requiring O2 at rest. CT chest w/ IV contrast: Bilateral patchy ground glass opacities throughout all the lobes of the bilateral lungs  - F/u 3/17 blood cultures, urine culture --> no growth thus far  - completed  remdesivir , c/w  decadron; currently on day 8/10   - Trend inflammatory markers and monitor renal and liver function  -hypothermia noted on 3/24, now resolved, Blood cx NGTD , will monitor off Abx at this time.       Problem/Plan - 2:  ·  Problem: Acute respiratory failure with hypoxia.    Plan: Likely 2/2 COVID +/- suspected empyema. Resolved at this time.      Problem/Plan - 3:  ·  Problem: Large pleural effusion.    Plan: CT chest large left pleural fluid collection --> A 17.1 x 11.0 x 8.3 cm fluid collection with peripheral enhancement in the left basilar pleural space. DDx includes empyema, simple parapneumonic effusion, malignant effusion. Pt with L pleural effusion first diagnosed in 2/2021. Diagnostic thoracentesis deferred at that time as per family wishes. Given pt's age and comorbidities, s/p empiric  abx therapy for possible bacterial empyema, but cannot exclude other etiologies.   - Pt evaluated by Thoracic sx and IR for possible thoracentesis and drainage of Pleural effusion , but brother did not want drainage at this time   - will continue to defer diagnostic and/or therapeutic thoracentesis at this time since pt clinically improved and  maintaining adequate O2 sat on RA as per patient and family wishes.      Problem/Plan - 4:  ·  Problem: Chronic combined systolic and diastolic heart failure.    Plan: TTE 2/2021 EF: 50%, Moderate diastolic dysfunction (Stage II), moderate pulmonary hypertension, Small pericardial effusion; proBNP 1306 (prior hospitalization 2800)   Recent 2/2021 abnormal pharmacological stress test: moderate defects in apical, distal lateral walls that are predominantly reversible, suggestive of ischemia - brother declined catheterization given pt's hx of dementia and Parkinson's   - Cards following, recs appreciated  - continue to hold lasix 20 mg PO daily  - C/w plavix given stent hx   - c/w metoprolol succinate , dose reduced to 25 for bradycardia   - c/w losartan 25mg daily  - pt not in acute exacerbation, no need for telemetry at this.      Problem/Plan - 5:  ·  Problem: Parkinson disease.    Plan: - continue home regimen of Sinemet.      Problem/Plan - 6:  Problem: Dementia.   Plan: - C/w Seroquel 50mg at bedtime; monitor QTc if making adjustments  - frequent re-orientation  - Monitor EKG.     Problem/Plan - 7:  ·  Problem: Essential hypertension.    Plan: - pt was on  metoprolol succinate 50mg daily with hold parameters, pt noted to have bradycardia , d/w Cardiology, agree to decrease the dose of Metoprolol to 25 mg with hold parameters   - c/w home regimen of losartan 25mg daily with hold parameters  - Monitor BP  - off  lasix 20mg daily.       Problem/Plan - 8:  ·  Problem: Type 2 diabetes mellitus without complication, without long-term current use of insulin.    Plan: - A1c 5.3 on prior hospitalization.   - On oral meds at home, holding while inpatient  - Diabetic diet  - FSG checks and sliding scale insulin protocol given increase in blood glucose levels while on steroids.       On 3/30/2021  case was discussed with , patient is medically cleared and optimized for discharge today. All medications were reviewed with attending, and sent to mutually agreed upon [facility] pharmacy  brother was called multiple times - phone directly to voicemail - unable to leave message.

## 2021-03-26 NOTE — DISCHARGE NOTE PROVIDER - PROVIDER TOKENS
FREE:[LAST:[salon],FIRST:[eliseo],PHONE:[(   )    -],FAX:[(   )    -],FOLLOWUP:[1 week],ESTABLISHEDPATIENT:[T]],PROVIDER:[TOKEN:[[id number]],FOLLOWUP:[1 week],ESTABLISHEDPATIENT:[T]]

## 2021-03-26 NOTE — DISCHARGE NOTE PROVIDER - CARE PROVIDER_API CALL
eliseo granado  Phone: (   )    -  Fax: (   )    -  Established Patient  Follow Up Time: 1 week    Bennie Hwang  CARDIOVASCULAR DISEASE  [phone] [place] [address]  Phone: [phone]  Fax: [phone]  Established Patient  Follow Up Time: 1 week

## 2021-03-26 NOTE — DISCHARGE NOTE PROVIDER - NSDCFUADDINST_GEN_ALL_CORE_FT
You were found to be positive COVID 19 virus (diagnosed on    ). Please follow full instructions listed in your  paperwork. Please self quarantine at home for 14 days from discharge and stay 6 feet away minimum from other individuals and animals in your home. Do not go to work, school, public areas. Do not use public transportation, ride-sharing, or taxis. Restrict outside activity except for medical care.  Please call ahead if you have an appointment with your doctor to inform them of your COVID positive status. Always wear a facemask at home. Cover your sneezes and coughs, and wash hands with soap and water for at least 20 seconds frequently. Avoid sharing personal household items. Clean all "high-touch" surfaces where you contact frequently such as counters and doorknobs frequently.     It has been determined that you no longer need hospitalization and can recover while remaining in self-quarantine at home. You should follow the prevention steps below until a healthcare provider or local or state health department says you can return to your normal activities. Your [place] Department of Health can be reached at [phone] for further information about COVID-19.      If you have any worsening breathing, faster breathing or trouble with breathing call 911 immediately or your healthcare provider ahead of time; If possible wear a facemask before being seen by medical personel.  Take tylenol for your fevers.

## 2021-03-26 NOTE — DISCHARGE NOTE PROVIDER - NSDCCPCAREPLAN_GEN_ALL_CORE_FT
PRINCIPAL DISCHARGE DIAGNOSIS  Diagnosis: Empyema  Assessment and Plan of Treatment: You wer admitted to the hospital and found to have on CT scan of the chest a large left pleural fluid collection measuring 17.1 x 11.0 x 8.3 cm with peripheral enhancement in the left basilar pleural space. The diagnoses for this includes empyema, simple parapneumonic effusion, malignant effusion. You had a left pleural effusion first diagnosed in 2/2021. Diagnostic thoracentesis deferred at that time as per family wishes. Given your age and comorbidities, you were given antiobiotic therapy for possible bacterial empyema, but cannot exclude other etiologies.   - You were evaluated by Thoracic surgery service and Intrventional Radiology for possible thoracentesis and drainage of Pleural effusion , your family elected to hold off on drainage at this time.   - Diagnostic and/or therapeutic thoracentesis will be deferred at this time since you have demonstrated clinically improvemnt and  maintaining adequate oxygenation on room air as per patient and family wishes.      SECONDARY DISCHARGE DIAGNOSES  Diagnosis: Sepsis  Assessment and Plan of Treatment: Please continue to monitor your breathing and temperature at home. Report any unusual changes to your doctor. Please keep all scheduled appointments.    Diagnosis: COVID-19  Assessment and Plan of Treatment: You have been diagnosed with the COVID-19 virus during your hospital stay. You must self quarantine to complete a 14 day time period.  Monitor for fevers, shortness of breath and cough primarily.  Monitor your temperature daily to not any changes and increases.    It has been determined that you no longer need hospitalization and can recover while remaining in self-quarantine at home. You should follow the prevention steps below until a healthcare provider or local or state health department says you can return to your normal activities.  1. You should restrict activities outside your home, except for getting medical care.  2. Do not go to work, school, or public areas.  3. Avoid using public transportation, ride-sharing, or taxis.  4. Separate yourself from other people and animals in your home.  5. Call ahead before visiting your doctor.  6. Wear a facemask.  7. Cover your coughs and sneezes.  8. Clean your hands often.  9. Avoid sharing personal household items.  10. Clean all “high-touch” surfaces everyday.  11. Monitor your symptoms.  If you have a medical emergency and need to call 911, notify the dispatch personnel that you have COVID-19 If possible, put on a facemask before emergency medical services arrive.  12. Stopping home isolation.  Patients with confirmed COVID-19 should remain under home isolation precautions for 14 days since the positive COVID-19 test and until the risk of secondary transmission to others is thought to be low. The decision to discontinue home isolation precautions should be made on a case-by-case basis, in consultation with healthcare providers and state and local health departments.  Your [facility] Department of Health can be reached at [phone] for further information about COVID-19.

## 2021-03-26 NOTE — PROGRESS NOTE ADULT - SUBJECTIVE AND OBJECTIVE BOX
Patient is a 81y old  Male who presents with a chief complaint of hypotension (26 Mar 2021 10:14)      SUBJECTIVE / OVERNIGHT EVENTS:    MEDICATIONS  (STANDING):  atorvastatin 10 milliGRAM(s) Oral at bedtime  carbidopa/levodopa  25/100 1 Tablet(s) Oral three times a day  clopidogrel Tablet 75 milliGRAM(s) Oral daily  dexAMETHasone  Injectable 6 milliGRAM(s) IV Push daily  dextrose 40% Gel 15 Gram(s) Oral once  dextrose 5%. 1000 milliLiter(s) (50 mL/Hr) IV Continuous <Continuous>  dextrose 5%. 1000 milliLiter(s) (100 mL/Hr) IV Continuous <Continuous>  dextrose 50% Injectable 25 Gram(s) IV Push once  dextrose 50% Injectable 12.5 Gram(s) IV Push once  dextrose 50% Injectable 25 Gram(s) IV Push once  enoxaparin Injectable 40 milliGRAM(s) SubCutaneous daily  glucagon  Injectable 1 milliGRAM(s) IntraMuscular once  insulin lispro (ADMELOG) corrective regimen sliding scale   SubCutaneous three times a day before meals  insulin lispro (ADMELOG) corrective regimen sliding scale   SubCutaneous at bedtime  losartan 25 milliGRAM(s) Oral daily  melatonin 3 milliGRAM(s) Oral at bedtime  metoprolol succinate ER 25 milliGRAM(s) Oral daily  pantoprazole  Injectable 40 milliGRAM(s) IV Push every 12 hours  QUEtiapine 50 milliGRAM(s) Oral at bedtime  senna 2 Tablet(s) Oral at bedtime  tamsulosin 0.4 milliGRAM(s) Oral at bedtime    MEDICATIONS  (PRN):  benzonatate 100 milliGRAM(s) Oral three times a day PRN Cough      Vital Signs Last 24 Hrs  T(C): 37.3 (26 Mar 2021 09:01), Max: 37.3 (26 Mar 2021 09:01)  T(F): 99.2 (26 Mar 2021 09:01), Max: 99.2 (26 Mar 2021 09:01)  HR: 58 (26 Mar 2021 09:01) (47 - 58)  BP: 132/52 (26 Mar 2021 09:01) (111/73 - 137/49)  BP(mean): --  RR: 18 (26 Mar 2021 09:01) (17 - 19)  SpO2: 94% (26 Mar 2021 09:01) (94% - 95%)  CAPILLARY BLOOD GLUCOSE      POCT Blood Glucose.: 113 mg/dL (26 Mar 2021 08:21)  POCT Blood Glucose.: 138 mg/dL (25 Mar 2021 21:26)  POCT Blood Glucose.: 216 mg/dL (25 Mar 2021 17:55)  POCT Blood Glucose.: 215 mg/dL (25 Mar 2021 13:49)    I&O's Summary    25 Mar 2021 07:01  -  26 Mar 2021 07:00  --------------------------------------------------------  IN: 250 mL / OUT: 0 mL / NET: 250 mL        PHYSICAL EXAM:  GENERAL: NAD, well-developed  HEAD:  Atraumatic, Normocephalic  EYES: EOMI, PERRLA, conjunctiva and sclera clear  NECK: Supple, No JVD  CHEST/LUNG: Clear to auscultation bilaterally; No wheeze  HEART: Regular rate and rhythm; No murmurs, rubs, or gallops  ABDOMEN: Soft, Nontender, Nondistended; Bowel sounds present  EXTREMITIES:  2+ Peripheral Pulses, No clubbing, cyanosis, or edema  PSYCH: AAOx3  NEUROLOGY: non-focal  SKIN: No rashes or lesions    LABS:                        10.5   7.32  )-----------( 368      ( 26 Mar 2021 07:09 )             33.1     03-26    136  |  100  |  25<H>  ----------------------------<  98  4.4   |  26  |  0.76    Ca    9.1      26 Mar 2021 07:09  Phos  3.9     03-26  Mg     1.8     03-26    TPro  5.9<L>  /  Alb  2.5<L>  /  TBili  0.6  /  DBili  x   /  AST  8   /  ALT  <5<L>  /  AlkPhos  104  03-26              RADIOLOGY & ADDITIONAL TESTS:    Imaging Personally Reviewed:    Consultant(s) Notes Reviewed:      Care Discussed with Consultants/Other Providers:   Patient is a 81y old  Male who presents with a chief complaint of hypotension (26 Mar 2021 10:14)      SUBJECTIVE / OVERNIGHT EVENTS: patient seen and examined by bedside, no acute distress noted, no acute events over night  pt saturating well on RA         MEDICATIONS  (STANDING):  atorvastatin 10 milliGRAM(s) Oral at bedtime  carbidopa/levodopa  25/100 1 Tablet(s) Oral three times a day  clopidogrel Tablet 75 milliGRAM(s) Oral daily  dexAMETHasone  Injectable 6 milliGRAM(s) IV Push daily  dextrose 40% Gel 15 Gram(s) Oral once  dextrose 5%. 1000 milliLiter(s) (50 mL/Hr) IV Continuous <Continuous>  dextrose 5%. 1000 milliLiter(s) (100 mL/Hr) IV Continuous <Continuous>  dextrose 50% Injectable 25 Gram(s) IV Push once  dextrose 50% Injectable 12.5 Gram(s) IV Push once  dextrose 50% Injectable 25 Gram(s) IV Push once  enoxaparin Injectable 40 milliGRAM(s) SubCutaneous daily  glucagon  Injectable 1 milliGRAM(s) IntraMuscular once  insulin lispro (ADMELOG) corrective regimen sliding scale   SubCutaneous three times a day before meals  insulin lispro (ADMELOG) corrective regimen sliding scale   SubCutaneous at bedtime  losartan 25 milliGRAM(s) Oral daily  melatonin 3 milliGRAM(s) Oral at bedtime  metoprolol succinate ER 25 milliGRAM(s) Oral daily  pantoprazole  Injectable 40 milliGRAM(s) IV Push every 12 hours  QUEtiapine 50 milliGRAM(s) Oral at bedtime  senna 2 Tablet(s) Oral at bedtime  tamsulosin 0.4 milliGRAM(s) Oral at bedtime    MEDICATIONS  (PRN):  benzonatate 100 milliGRAM(s) Oral three times a day PRN Cough      Vital Signs Last 24 Hrs  T(C): 37.3 (26 Mar 2021 09:01), Max: 37.3 (26 Mar 2021 09:01)  T(F): 99.2 (26 Mar 2021 09:01), Max: 99.2 (26 Mar 2021 09:01)  HR: 58 (26 Mar 2021 09:01) (47 - 58)  BP: 132/52 (26 Mar 2021 09:01) (111/73 - 137/49)  BP(mean): --  RR: 18 (26 Mar 2021 09:01) (17 - 19)  SpO2: 94% (26 Mar 2021 09:01) (94% - 95%)  CAPILLARY BLOOD GLUCOSE      POCT Blood Glucose.: 113 mg/dL (26 Mar 2021 08:21)  POCT Blood Glucose.: 138 mg/dL (25 Mar 2021 21:26)  POCT Blood Glucose.: 216 mg/dL (25 Mar 2021 17:55)  POCT Blood Glucose.: 215 mg/dL (25 Mar 2021 13:49)    I&O's Summary    25 Mar 2021 07:01  -  26 Mar 2021 07:00  --------------------------------------------------------  IN: 250 mL / OUT: 0 mL / NET: 250 mL        PHYSICAL EXAM  GENERAL: NAD, elderly man laying comfortably in bed,   CHEST/LUNG: decreased BS at mid to lower lung fields on the left; No wheezes. Normal respiratory effort  HEART: Regular rate and rhythm  ABDOMEN: Soft, Nontender, Nondistended  EXTREMITIES:  2+ Peripheral Pulses, No clubbing, cyanosis, or edema  PSYCH: Alert, oriented to self and place. Cooperative, follows simple commands and pleasant        LABS:                        10.5   7.32  )-----------( 368      ( 26 Mar 2021 07:09 )             33.1     03-26    136  |  100  |  25<H>  ----------------------------<  98  4.4   |  26  |  0.76    Ca    9.1      26 Mar 2021 07:09  Phos  3.9     03-26  Mg     1.8     03-26    TPro  5.9<L>  /  Alb  2.5<L>  /  TBili  0.6  /  DBili  x   /  AST  8   /  ALT  <5<L>  /  AlkPhos  104  03-26              RADIOLOGY & ADDITIONAL TESTS:    Imaging Personally Reviewed:    Consultant(s) Notes Reviewed:      Care Discussed with Consultants/Other Providers:

## 2021-03-26 NOTE — PROGRESS NOTE ADULT - PROBLEM SELECTOR PLAN 7
- pt was on  metoprolol succinate 50mg daily with hold parameters, pt noted to have bradycardia , d/w Cardiology, agree to decrease the dose of Metoprolol to 25 mg with hold parameters   - c/w home regimen of losartan 25mg daily with hold parameters  - Monitor BP  - off  lasix 20mg daily

## 2021-03-27 LAB
ALBUMIN SERPL ELPH-MCNC: 2.4 G/DL — LOW (ref 3.3–5)
ALP SERPL-CCNC: 98 U/L — SIGNIFICANT CHANGE UP (ref 40–120)
ALT FLD-CCNC: <5 U/L — LOW (ref 4–41)
ANION GAP SERPL CALC-SCNC: 8 MMOL/L — SIGNIFICANT CHANGE UP (ref 7–14)
AST SERPL-CCNC: 6 U/L — SIGNIFICANT CHANGE UP (ref 4–40)
BILIRUB SERPL-MCNC: 0.6 MG/DL — SIGNIFICANT CHANGE UP (ref 0.2–1.2)
BUN SERPL-MCNC: 26 MG/DL — HIGH (ref 7–23)
CALCIUM SERPL-MCNC: 8.8 MG/DL — SIGNIFICANT CHANGE UP (ref 8.4–10.5)
CHLORIDE SERPL-SCNC: 101 MMOL/L — SIGNIFICANT CHANGE UP (ref 98–107)
CO2 SERPL-SCNC: 28 MMOL/L — SIGNIFICANT CHANGE UP (ref 22–31)
CREAT SERPL-MCNC: 0.73 MG/DL — SIGNIFICANT CHANGE UP (ref 0.5–1.3)
GLUCOSE BLDC GLUCOMTR-MCNC: 145 MG/DL — HIGH (ref 70–99)
GLUCOSE BLDC GLUCOMTR-MCNC: 187 MG/DL — HIGH (ref 70–99)
GLUCOSE BLDC GLUCOMTR-MCNC: 193 MG/DL — HIGH (ref 70–99)
GLUCOSE BLDC GLUCOMTR-MCNC: 202 MG/DL — HIGH (ref 70–99)
GLUCOSE SERPL-MCNC: 114 MG/DL — HIGH (ref 70–99)
HCT VFR BLD CALC: 29.2 % — LOW (ref 39–50)
HGB BLD-MCNC: 9.4 G/DL — LOW (ref 13–17)
MAGNESIUM SERPL-MCNC: 1.6 MG/DL — SIGNIFICANT CHANGE UP (ref 1.6–2.6)
MCHC RBC-ENTMCNC: 28.8 PG — SIGNIFICANT CHANGE UP (ref 27–34)
MCHC RBC-ENTMCNC: 32.2 GM/DL — SIGNIFICANT CHANGE UP (ref 32–36)
MCV RBC AUTO: 89.6 FL — SIGNIFICANT CHANGE UP (ref 80–100)
NRBC # BLD: 0 /100 WBCS — SIGNIFICANT CHANGE UP
NRBC # FLD: 0 K/UL — SIGNIFICANT CHANGE UP
PHOSPHATE SERPL-MCNC: 3.6 MG/DL — SIGNIFICANT CHANGE UP (ref 2.5–4.5)
PLATELET # BLD AUTO: 325 K/UL — SIGNIFICANT CHANGE UP (ref 150–400)
POTASSIUM SERPL-MCNC: 4.1 MMOL/L — SIGNIFICANT CHANGE UP (ref 3.5–5.3)
POTASSIUM SERPL-SCNC: 4.1 MMOL/L — SIGNIFICANT CHANGE UP (ref 3.5–5.3)
PROT SERPL-MCNC: 5.4 G/DL — LOW (ref 6–8.3)
RBC # BLD: 3.26 M/UL — LOW (ref 4.2–5.8)
RBC # FLD: 13.8 % — SIGNIFICANT CHANGE UP (ref 10.3–14.5)
SODIUM SERPL-SCNC: 137 MMOL/L — SIGNIFICANT CHANGE UP (ref 135–145)
WBC # BLD: 7.46 K/UL — SIGNIFICANT CHANGE UP (ref 3.8–10.5)
WBC # FLD AUTO: 7.46 K/UL — SIGNIFICANT CHANGE UP (ref 3.8–10.5)

## 2021-03-27 PROCEDURE — 99232 SBSQ HOSP IP/OBS MODERATE 35: CPT | Mod: CS

## 2021-03-27 RX ADMIN — Medication 2: at 18:41

## 2021-03-27 RX ADMIN — CARBIDOPA AND LEVODOPA 1 TABLET(S): 25; 100 TABLET ORAL at 06:01

## 2021-03-27 RX ADMIN — CARBIDOPA AND LEVODOPA 1 TABLET(S): 25; 100 TABLET ORAL at 22:03

## 2021-03-27 RX ADMIN — TAMSULOSIN HYDROCHLORIDE 0.4 MILLIGRAM(S): 0.4 CAPSULE ORAL at 22:04

## 2021-03-27 RX ADMIN — QUETIAPINE FUMARATE 50 MILLIGRAM(S): 200 TABLET, FILM COATED ORAL at 22:03

## 2021-03-27 RX ADMIN — Medication 6 MILLIGRAM(S): at 06:01

## 2021-03-27 RX ADMIN — CLOPIDOGREL BISULFATE 75 MILLIGRAM(S): 75 TABLET, FILM COATED ORAL at 13:13

## 2021-03-27 RX ADMIN — CARBIDOPA AND LEVODOPA 1 TABLET(S): 25; 100 TABLET ORAL at 13:14

## 2021-03-27 RX ADMIN — LOSARTAN POTASSIUM 25 MILLIGRAM(S): 100 TABLET, FILM COATED ORAL at 06:01

## 2021-03-27 RX ADMIN — ENOXAPARIN SODIUM 40 MILLIGRAM(S): 100 INJECTION SUBCUTANEOUS at 13:13

## 2021-03-27 RX ADMIN — Medication 4: at 13:16

## 2021-03-27 RX ADMIN — SENNA PLUS 2 TABLET(S): 8.6 TABLET ORAL at 22:03

## 2021-03-27 RX ADMIN — Medication 3 MILLIGRAM(S): at 22:03

## 2021-03-27 RX ADMIN — PANTOPRAZOLE SODIUM 40 MILLIGRAM(S): 20 TABLET, DELAYED RELEASE ORAL at 06:01

## 2021-03-27 RX ADMIN — PANTOPRAZOLE SODIUM 40 MILLIGRAM(S): 20 TABLET, DELAYED RELEASE ORAL at 18:06

## 2021-03-27 RX ADMIN — ATORVASTATIN CALCIUM 10 MILLIGRAM(S): 80 TABLET, FILM COATED ORAL at 22:02

## 2021-03-27 NOTE — PROGRESS NOTE ADULT - SUBJECTIVE AND OBJECTIVE BOX
Bennie Hwang MD  Interventional Cardiology / [facility] Card[facility] Transplant Specialist  [place] Office : [phone] [place] [address] [place]. [address]  Tel:   [place] Office : [phone] [facility] [place] [address]  Tel: [phone]  Cell : [phone]    Subjective/Overnight events: Pt is lying in bed comfortable not in distress, no chest pains no SOB no palpitations  	  MEDICATIONS:  clopidogrel Tablet 75 milliGRAM(s) Oral daily  enoxaparin Injectable 40 milliGRAM(s) SubCutaneous daily  losartan 25 milliGRAM(s) Oral daily  metoprolol succinate ER 25 milliGRAM(s) Oral daily  tamsulosin 0.4 milliGRAM(s) Oral at bedtime      benzonatate 100 milliGRAM(s) Oral three times a day PRN    carbidopa/levodopa  25/100 1 Tablet(s) Oral three times a day  melatonin 3 milliGRAM(s) Oral at bedtime  QUEtiapine 50 milliGRAM(s) Oral at bedtime    pantoprazole  Injectable 40 milliGRAM(s) IV Push every 12 hours  senna 2 Tablet(s) Oral at bedtime    atorvastatin 10 milliGRAM(s) Oral at bedtime  dextrose 40% Gel 15 Gram(s) Oral once  dextrose 50% Injectable 25 Gram(s) IV Push once  dextrose 50% Injectable 12.5 Gram(s) IV Push once  dextrose 50% Injectable 25 Gram(s) IV Push once  glucagon  Injectable 1 milliGRAM(s) IntraMuscular once  insulin lispro (ADMELOG) corrective regimen sliding scale   SubCutaneous three times a day before meals  insulin lispro (ADMELOG) corrective regimen sliding scale   SubCutaneous at bedtime    dextrose 5%. 1000 milliLiter(s) IV Continuous <Continuous>  dextrose 5%. 1000 milliLiter(s) IV Continuous <Continuous>      PAST MEDICAL/SURGICAL HISTORY  PAST MEDICAL & SURGICAL HISTORY:  BPH (benign prostatic hyperplasia)    CAD (Coronary Artery Disease)  s/p cardiac stent ( &gt; 20 years ago)    HTN (Hypertension)    DM (Diabetes Mellitus)    Hypercholesterolemia    Coronary Stent  x 2 ( 20 years )        SOCIAL HISTORY: Substance Use (street drugs): ( x ) never used  (  ) other:    FAMILY HISTORY:  No pertinent family history in first degree relatives        REVIEW OF SYSTEMS:  CONSTITUTIONAL: No fever, weight loss, or fatigue  EYES: No eye pain, visual disturbances, or discharge  ENMT:  No difficulty hearing, tinnitus, vertigo; No sinus or throat pain  BREASTS: No pain, masses, or nipple discharge  GASTROINTESTINAL: No abdominal or epigastric pain. No nausea, vomiting, or hematemesis; No diarrhea or constipation. No melena or hematochezia.  GENITOURINARY: No dysuria, frequency, hematuria, or incontinence  NEUROLOGICAL: No headaches, memory loss, loss of strength, numbness, or tremors  ENDOCRINE: No heat or cold intolerance; No hair loss  MUSCULOSKELETAL: No joint pain or swelling; No muscle, back, or extremity pain  PSYCHIATRIC: No depression, anxiety, mood swings, or difficulty sleeping  HEME/LYMPH: No easy bruising, or bleeding gums  All others negative    PHYSICAL EXAM:  T(C): 36.6 (03-27-21 @ 05:59), Max: 37.2 (03-26-21 @ 14:33)  HR: 54 (03-27-21 @ 09:37) (50 - 66)  BP: 116/57 (03-27-21 @ 09:37) (103/49 - 128/55)  RR: 18 (03-27-21 @ 09:37) (16 - 18)  SpO2: 98% (03-27-21 @ 09:37) (95% - 100%)  Wt(kg): --  I&O's Summary           EYES: EOMI, PERRLA, conjunctiva and sclera clear  ENMT: No tonsillar erythema, exudates, or enlargement  Cardiovascular: Normal S1 S2, No JVD, No murmurs, No edema  Respiratory: b/l rhonchi  Gastrointestinal:  Soft, Non-tender, + BS	  Extremities: no edema                                    9.4    7.46  )-----------( 325      ( 27 Mar 2021 07:15 )             29.2     03-27    137  |  101  |  26<H>  ----------------------------<  114<H>  4.1   |  28  |  0.73    Ca    8.8      27 Mar 2021 07:15  Phos  3.6     03-27  Mg     1.6     03-27    TPro  5.4<L>  /  Alb  2.4<L>  /  TBili  0.6  /  DBili  x   /  AST  6   /  ALT  <5<L>  /  AlkPhos  98  03-27    proBNP:   Lipid Profile:   HgA1c:   TSH:     Consultant(s) Notes Reviewed:  [x ] YES  [ ] NO    Care Discussed with Consultants/Other Providers [ x] YES  [ ] NO    Imaging Personally Reviewed independently:  [x] YES  [ ] NO    All labs, radiologic studies, vitals, orders and medications list reviewed. Patient is seen and examined at bedside. Case discussed with medical team.

## 2021-03-27 NOTE — PROGRESS NOTE ADULT - PROBLEM SELECTOR PLAN 9
- Diet: CC DASH diet  - DVT: lovenox prophylactic, case d/w cardiology, agree to c/w with prophylactic dose as pt also on plavix and family prefers comfort care   - Dispo: PT recommend home PT, case d/w bother , pt lives with 2 home HHA , will d/w CM   - GOC: [facility] filled out last hospitalization, pt DNR with trial of intubation, confirmed with brother at time of H&P. Asked brother to bring in the Mol[facility] form or copy for the chart - Diet: CC DASH diet  - DVT: lovenox prophylactic, case d/w cardiology, agree to c/w with prophylactic dose as pt also on plavix and family prefers comfort care   - Dispo: PT recommend home PT, case d/w bother , pt lives with 2 home HHA , will d/w CM   - GOC: MOLST filled out last hospitalization, pt DNR with trial of intubation, confirmed with brother at time of H&P. Asked brother to bring in the Molst form or copy for the chart  Tried calling brother, did not , unable to leave message as mailbox full

## 2021-03-27 NOTE — PROGRESS NOTE ADULT - SUBJECTIVE AND OBJECTIVE BOX
Patient is a 81y old  Male who presents with a chief complaint of hypotension (27 Mar 2021 10:00)      SUBJECTIVE / OVERNIGHT EVENTS:    MEDICATIONS  (STANDING):  atorvastatin 10 milliGRAM(s) Oral at bedtime  carbidopa/levodopa  25/100 1 Tablet(s) Oral three times a day  clopidogrel Tablet 75 milliGRAM(s) Oral daily  dextrose 40% Gel 15 Gram(s) Oral once  dextrose 5%. 1000 milliLiter(s) (50 mL/Hr) IV Continuous <Continuous>  dextrose 5%. 1000 milliLiter(s) (100 mL/Hr) IV Continuous <Continuous>  dextrose 50% Injectable 25 Gram(s) IV Push once  dextrose 50% Injectable 12.5 Gram(s) IV Push once  dextrose 50% Injectable 25 Gram(s) IV Push once  enoxaparin Injectable 40 milliGRAM(s) SubCutaneous daily  glucagon  Injectable 1 milliGRAM(s) IntraMuscular once  insulin lispro (ADMELOG) corrective regimen sliding scale   SubCutaneous three times a day before meals  insulin lispro (ADMELOG) corrective regimen sliding scale   SubCutaneous at bedtime  losartan 25 milliGRAM(s) Oral daily  melatonin 3 milliGRAM(s) Oral at bedtime  metoprolol succinate ER 25 milliGRAM(s) Oral daily  pantoprazole  Injectable 40 milliGRAM(s) IV Push every 12 hours  QUEtiapine 50 milliGRAM(s) Oral at bedtime  senna 2 Tablet(s) Oral at bedtime  tamsulosin 0.4 milliGRAM(s) Oral at bedtime    MEDICATIONS  (PRN):  benzonatate 100 milliGRAM(s) Oral three times a day PRN Cough      Vital Signs Last 24 Hrs  T(C): 36.6 (27 Mar 2021 05:59), Max: 37.2 (26 Mar 2021 14:33)  T(F): 97.8 (27 Mar 2021 05:59), Max: 99 (26 Mar 2021 14:33)  HR: 54 (27 Mar 2021 09:37) (50 - 66)  BP: 116/57 (27 Mar 2021 09:37) (103/49 - 128/55)  BP(mean): --  RR: 18 (27 Mar 2021 09:37) (16 - 18)  SpO2: 98% (27 Mar 2021 09:37) (95% - 100%)  CAPILLARY BLOOD GLUCOSE      POCT Blood Glucose.: 145 mg/dL (27 Mar 2021 09:09)  POCT Blood Glucose.: 182 mg/dL (26 Mar 2021 21:39)  POCT Blood Glucose.: 188 mg/dL (26 Mar 2021 21:18)  POCT Blood Glucose.: 174 mg/dL (26 Mar 2021 17:18)  POCT Blood Glucose.: 163 mg/dL (26 Mar 2021 11:55)    I&O's Summary      PHYSICAL EXAM:  GENERAL: NAD, well-developed  HEAD:  Atraumatic, Normocephalic  EYES: EOMI, PERRLA, conjunctiva and sclera clear  NECK: Supple, No JVD  CHEST/LUNG: Clear to auscultation bilaterally; No wheeze  HEART: Regular rate and rhythm; No murmurs, rubs, or gallops  ABDOMEN: Soft, Nontender, Nondistended; Bowel sounds present  EXTREMITIES:  2+ Peripheral Pulses, No clubbing, cyanosis, or edema  PSYCH: AAOx3  NEUROLOGY: non-focal  SKIN: No rashes or lesions    LABS:                        9.4    7.46  )-----------( 325      ( 27 Mar 2021 07:15 )             29.2     03-27    137  |  101  |  26<H>  ----------------------------<  114<H>  4.1   |  28  |  0.73    Ca    8.8      27 Mar 2021 07:15  Phos  3.6     03-27  Mg     1.6     03-27    TPro  5.4<L>  /  Alb  2.4<L>  /  TBili  0.6  /  DBili  x   /  AST  6   /  ALT  <5<L>  /  AlkPhos  98  03-27              RADIOLOGY & ADDITIONAL TESTS:    Imaging Personally Reviewed:    Consultant(s) Notes Reviewed:      Care Discussed with Consultants/Other Providers:   Patient is a 81y old  Male who presents with a chief complaint of hypotension (27 Mar 2021 10:00)      SUBJECTIVE / OVERNIGHT EVENTS: patient seen and examined by bedside, no acute distress noted  saturating well on RA , no acute events over night        MEDICATIONS  (STANDING):  atorvastatin 10 milliGRAM(s) Oral at bedtime  carbidopa/levodopa  25/100 1 Tablet(s) Oral three times a day  clopidogrel Tablet 75 milliGRAM(s) Oral daily  dextrose 40% Gel 15 Gram(s) Oral once  dextrose 5%. 1000 milliLiter(s) (50 mL/Hr) IV Continuous <Continuous>  dextrose 5%. 1000 milliLiter(s) (100 mL/Hr) IV Continuous <Continuous>  dextrose 50% Injectable 25 Gram(s) IV Push once  dextrose 50% Injectable 12.5 Gram(s) IV Push once  dextrose 50% Injectable 25 Gram(s) IV Push once  enoxaparin Injectable 40 milliGRAM(s) SubCutaneous daily  glucagon  Injectable 1 milliGRAM(s) IntraMuscular once  insulin lispro (ADMELOG) corrective regimen sliding scale   SubCutaneous three times a day before meals  insulin lispro (ADMELOG) corrective regimen sliding scale   SubCutaneous at bedtime  losartan 25 milliGRAM(s) Oral daily  melatonin 3 milliGRAM(s) Oral at bedtime  metoprolol succinate ER 25 milliGRAM(s) Oral daily  pantoprazole  Injectable 40 milliGRAM(s) IV Push every 12 hours  QUEtiapine 50 milliGRAM(s) Oral at bedtime  senna 2 Tablet(s) Oral at bedtime  tamsulosin 0.4 milliGRAM(s) Oral at bedtime    MEDICATIONS  (PRN):  benzonatate 100 milliGRAM(s) Oral three times a day PRN Cough      Vital Signs Last 24 Hrs  T(C): 36.6 (27 Mar 2021 05:59), Max: 37.2 (26 Mar 2021 14:33)  T(F): 97.8 (27 Mar 2021 05:59), Max: 99 (26 Mar 2021 14:33)  HR: 54 (27 Mar 2021 09:37) (50 - 66)  BP: 116/57 (27 Mar 2021 09:37) (103/49 - 128/55)  BP(mean): --  RR: 18 (27 Mar 2021 09:37) (16 - 18)  SpO2: 98% (27 Mar 2021 09:37) (95% - 100%)  CAPILLARY BLOOD GLUCOSE      POCT Blood Glucose.: 145 mg/dL (27 Mar 2021 09:09)  POCT Blood Glucose.: 182 mg/dL (26 Mar 2021 21:39)  POCT Blood Glucose.: 188 mg/dL (26 Mar 2021 21:18)  POCT Blood Glucose.: 174 mg/dL (26 Mar 2021 17:18)  POCT Blood Glucose.: 163 mg/dL (26 Mar 2021 11:55)    I&O's Summary    PHYSICAL EXAM  GENERAL: NAD, elderly man laying comfortably in bed,   CHEST/LUNG: decreased BS at mid to lower lung fields on the left; No wheezes. Normal respiratory effort  HEART: Regular rate and rhythm  ABDOMEN: Soft, Nontender, Nondistended  EXTREMITIES:  2+ Peripheral Pulses, No clubbing, cyanosis, or edema  PSYCH: Alert, oriented to self and place. Cooperative, follows simple commands and pleasant        LABS:                        9.4    7.46  )-----------( 325      ( 27 Mar 2021 07:15 )             29.2     03-27    137  |  101  |  26<H>  ----------------------------<  114<H>  4.1   |  28  |  0.73    Ca    8.8      27 Mar 2021 07:15  Phos  3.6     03-27  Mg     1.6     03-27    TPro  5.4<L>  /  Alb  2.4<L>  /  TBili  0.6  /  DBili  x   /  AST  6   /  ALT  <5<L>  /  AlkPhos  98  03-27              RADIOLOGY & ADDITIONAL TESTS:    Imaging Personally Reviewed:    Consultant(s) Notes Reviewed:      Care Discussed with Consultants/Other Providers:

## 2021-03-28 LAB
GLUCOSE BLDC GLUCOMTR-MCNC: 116 MG/DL — HIGH (ref 70–99)
GLUCOSE BLDC GLUCOMTR-MCNC: 152 MG/DL — HIGH (ref 70–99)
GLUCOSE BLDC GLUCOMTR-MCNC: 170 MG/DL — HIGH (ref 70–99)
GLUCOSE BLDC GLUCOMTR-MCNC: 183 MG/DL — HIGH (ref 70–99)

## 2021-03-28 PROCEDURE — 99232 SBSQ HOSP IP/OBS MODERATE 35: CPT | Mod: CS

## 2021-03-28 RX ADMIN — Medication 3 MILLIGRAM(S): at 23:22

## 2021-03-28 RX ADMIN — CARBIDOPA AND LEVODOPA 1 TABLET(S): 25; 100 TABLET ORAL at 06:12

## 2021-03-28 RX ADMIN — LOSARTAN POTASSIUM 25 MILLIGRAM(S): 100 TABLET, FILM COATED ORAL at 06:12

## 2021-03-28 RX ADMIN — QUETIAPINE FUMARATE 50 MILLIGRAM(S): 200 TABLET, FILM COATED ORAL at 23:22

## 2021-03-28 RX ADMIN — ATORVASTATIN CALCIUM 10 MILLIGRAM(S): 80 TABLET, FILM COATED ORAL at 23:18

## 2021-03-28 RX ADMIN — TAMSULOSIN HYDROCHLORIDE 0.4 MILLIGRAM(S): 0.4 CAPSULE ORAL at 23:22

## 2021-03-28 RX ADMIN — PANTOPRAZOLE SODIUM 40 MILLIGRAM(S): 20 TABLET, DELAYED RELEASE ORAL at 06:11

## 2021-03-28 RX ADMIN — ENOXAPARIN SODIUM 40 MILLIGRAM(S): 100 INJECTION SUBCUTANEOUS at 13:23

## 2021-03-28 RX ADMIN — SENNA PLUS 2 TABLET(S): 8.6 TABLET ORAL at 23:22

## 2021-03-28 RX ADMIN — CARBIDOPA AND LEVODOPA 1 TABLET(S): 25; 100 TABLET ORAL at 23:19

## 2021-03-28 RX ADMIN — Medication 2: at 09:27

## 2021-03-28 RX ADMIN — Medication 2: at 13:20

## 2021-03-28 RX ADMIN — PANTOPRAZOLE SODIUM 40 MILLIGRAM(S): 20 TABLET, DELAYED RELEASE ORAL at 17:53

## 2021-03-28 RX ADMIN — CLOPIDOGREL BISULFATE 75 MILLIGRAM(S): 75 TABLET, FILM COATED ORAL at 13:22

## 2021-03-28 RX ADMIN — Medication 2: at 17:53

## 2021-03-28 RX ADMIN — CARBIDOPA AND LEVODOPA 1 TABLET(S): 25; 100 TABLET ORAL at 13:22

## 2021-03-28 NOTE — PROGRESS NOTE ADULT - SUBJECTIVE AND OBJECTIVE BOX
Patient is a 81y old  Male who presents with a chief complaint of hypotension (27 Mar 2021 11:20)      SUBJECTIVE / OVERNIGHT EVENTS:    MEDICATIONS  (STANDING):  atorvastatin 10 milliGRAM(s) Oral at bedtime  carbidopa/levodopa  25/100 1 Tablet(s) Oral three times a day  clopidogrel Tablet 75 milliGRAM(s) Oral daily  dextrose 40% Gel 15 Gram(s) Oral once  dextrose 5%. 1000 milliLiter(s) (50 mL/Hr) IV Continuous <Continuous>  dextrose 5%. 1000 milliLiter(s) (100 mL/Hr) IV Continuous <Continuous>  dextrose 50% Injectable 25 Gram(s) IV Push once  dextrose 50% Injectable 12.5 Gram(s) IV Push once  dextrose 50% Injectable 25 Gram(s) IV Push once  enoxaparin Injectable 40 milliGRAM(s) SubCutaneous daily  glucagon  Injectable 1 milliGRAM(s) IntraMuscular once  insulin lispro (ADMELOG) corrective regimen sliding scale   SubCutaneous three times a day before meals  insulin lispro (ADMELOG) corrective regimen sliding scale   SubCutaneous at bedtime  losartan 25 milliGRAM(s) Oral daily  melatonin 3 milliGRAM(s) Oral at bedtime  metoprolol succinate ER 25 milliGRAM(s) Oral daily  pantoprazole  Injectable 40 milliGRAM(s) IV Push every 12 hours  QUEtiapine 50 milliGRAM(s) Oral at bedtime  senna 2 Tablet(s) Oral at bedtime  tamsulosin 0.4 milliGRAM(s) Oral at bedtime    MEDICATIONS  (PRN):  benzonatate 100 milliGRAM(s) Oral three times a day PRN Cough      Vital Signs Last 24 Hrs  T(C): 36.3 (28 Mar 2021 06:09), Max: 36.5 (27 Mar 2021 21:44)  T(F): 97.3 (28 Mar 2021 06:09), Max: 97.7 (27 Mar 2021 21:44)  HR: 56 (28 Mar 2021 06:09) (56 - 85)  BP: 121/54 (28 Mar 2021 06:09) (106/47 - 125/51)  BP(mean): --  RR: 18 (28 Mar 2021 06:09) (17 - 18)  SpO2: 95% (28 Mar 2021 06:09) (95% - 100%)  CAPILLARY BLOOD GLUCOSE      POCT Blood Glucose.: 152 mg/dL (28 Mar 2021 08:58)  POCT Blood Glucose.: 187 mg/dL (27 Mar 2021 21:49)  POCT Blood Glucose.: 193 mg/dL (27 Mar 2021 18:38)  POCT Blood Glucose.: 202 mg/dL (27 Mar 2021 13:10)    I&O's Summary      PHYSICAL EXAM:  GENERAL: NAD, well-developed  HEAD:  Atraumatic, Normocephalic  EYES: EOMI, PERRLA, conjunctiva and sclera clear  NECK: Supple, No JVD  CHEST/LUNG: Clear to auscultation bilaterally; No wheeze  HEART: Regular rate and rhythm; No murmurs, rubs, or gallops  ABDOMEN: Soft, Nontender, Nondistended; Bowel sounds present  EXTREMITIES:  2+ Peripheral Pulses, No clubbing, cyanosis, or edema  PSYCH: AAOx3  NEUROLOGY: non-focal  SKIN: No rashes or lesions    LABS:                        9.4    7.46  )-----------( 325      ( 27 Mar 2021 07:15 )             29.2     03-27    137  |  101  |  26<H>  ----------------------------<  114<H>  4.1   |  28  |  0.73    Ca    8.8      27 Mar 2021 07:15  Phos  3.6     03-27  Mg     1.6     03-27    TPro  5.4<L>  /  Alb  2.4<L>  /  TBili  0.6  /  DBili  x   /  AST  6   /  ALT  <5<L>  /  AlkPhos  98  03-27              RADIOLOGY & ADDITIONAL TESTS:    Imaging Personally Reviewed:    Consultant(s) Notes Reviewed:      Care Discussed with Consultants/Other Providers:   Patient is a 81y old  Male who presents with a chief complaint of hypotension (27 Mar 2021 11:20)      SUBJECTIVE / OVERNIGHT EVENTS:  patient seen and examined by bedside, no acute distress noted  no acute events over night        MEDICATIONS  (STANDING):  atorvastatin 10 milliGRAM(s) Oral at bedtime  carbidopa/levodopa  25/100 1 Tablet(s) Oral three times a day  clopidogrel Tablet 75 milliGRAM(s) Oral daily  dextrose 40% Gel 15 Gram(s) Oral once  dextrose 5%. 1000 milliLiter(s) (50 mL/Hr) IV Continuous <Continuous>  dextrose 5%. 1000 milliLiter(s) (100 mL/Hr) IV Continuous <Continuous>  dextrose 50% Injectable 25 Gram(s) IV Push once  dextrose 50% Injectable 12.5 Gram(s) IV Push once  dextrose 50% Injectable 25 Gram(s) IV Push once  enoxaparin Injectable 40 milliGRAM(s) SubCutaneous daily  glucagon  Injectable 1 milliGRAM(s) IntraMuscular once  insulin lispro (ADMELOG) corrective regimen sliding scale   SubCutaneous three times a day before meals  insulin lispro (ADMELOG) corrective regimen sliding scale   SubCutaneous at bedtime  losartan 25 milliGRAM(s) Oral daily  melatonin 3 milliGRAM(s) Oral at bedtime  metoprolol succinate ER 25 milliGRAM(s) Oral daily  pantoprazole  Injectable 40 milliGRAM(s) IV Push every 12 hours  QUEtiapine 50 milliGRAM(s) Oral at bedtime  senna 2 Tablet(s) Oral at bedtime  tamsulosin 0.4 milliGRAM(s) Oral at bedtime    MEDICATIONS  (PRN):  benzonatate 100 milliGRAM(s) Oral three times a day PRN Cough      Vital Signs Last 24 Hrs  T(C): 36.3 (28 Mar 2021 06:09), Max: 36.5 (27 Mar 2021 21:44)  T(F): 97.3 (28 Mar 2021 06:09), Max: 97.7 (27 Mar 2021 21:44)  HR: 56 (28 Mar 2021 06:09) (56 - 85)  BP: 121/54 (28 Mar 2021 06:09) (106/47 - 125/51)  BP(mean): --  RR: 18 (28 Mar 2021 06:09) (17 - 18)  SpO2: 95% (28 Mar 2021 06:09) (95% - 100%)  CAPILLARY BLOOD GLUCOSE      POCT Blood Glucose.: 152 mg/dL (28 Mar 2021 08:58)  POCT Blood Glucose.: 187 mg/dL (27 Mar 2021 21:49)  POCT Blood Glucose.: 193 mg/dL (27 Mar 2021 18:38)  POCT Blood Glucose.: 202 mg/dL (27 Mar 2021 13:10)    I&O's Summary      PHYSICAL EXAM  GENERAL: NAD, elderly man laying comfortably in bed,   CHEST/LUNG: decreased BS at mid to lower lung fields on the left; No wheezes. Normal respiratory effort  HEART: Regular rate and rhythm  ABDOMEN: Soft, Nontender, Nondistended  EXTREMITIES:  2+ Peripheral Pulses, No clubbing, cyanosis, or edema  PSYCH: Alert, oriented to self and place. Cooperative, follows simple commands and pleasant    LABS:                        9.4    7.46  )-----------( 325      ( 27 Mar 2021 07:15 )             29.2     03-27    137  |  101  |  26<H>  ----------------------------<  114<H>  4.1   |  28  |  0.73    Ca    8.8      27 Mar 2021 07:15  Phos  3.6     03-27  Mg     1.6     03-27    TPro  5.4<L>  /  Alb  2.4<L>  /  TBili  0.6  /  DBili  x   /  AST  6   /  ALT  <5<L>  /  AlkPhos  98  03-27              RADIOLOGY & ADDITIONAL TESTS:    Imaging Personally Reviewed:    Consultant(s) Notes Reviewed:  cardiology     Care Discussed with Consultants/Other Providers:

## 2021-03-28 NOTE — PROGRESS NOTE ADULT - PROBLEM SELECTOR PLAN 9
- Diet: CC DASH diet  - DVT: lovenox prophylactic, case d/w cardiology, agree to c/w with prophylactic dose as pt also on plavix and family prefers comfort care   - Dispo: PT recommend home PT, case d/w bother , pt lives with 2 home HHA , will d/w CM   - GOC: MOLST filled out last hospitalization, pt DNR with trial of intubation, confirmed with brother at time of H&P. Asked brother to bring in the Molst form or copy for the chart  Tried calling brother, did not , unable to leave message as mailbox full - Diet: CC DASH diet  - DVT: lovenox prophylactic, case d/w cardiology, agree to c/w with prophylactic dose as pt also on plavix and family prefers comfort care   - Dispo: PT recommend home PT, case d/w bother , pt lives with 2 home HHA , will d/w CM   - GOC: MOLST filled out last hospitalization, pt DNR with trial of intubation, confirmed with brother at time of H&P. Asked brother to bring in the Molst form or copy for the chart  Tried calling brother on 3/27 and 3/28 , did not , unable to leave message as mailbox full

## 2021-03-28 NOTE — PROGRESS NOTE ADULT - PROBLEM SELECTOR PLAN 1
Complicated by acute hypoxic respiratory failure. Initially met sepsis criteria - no longer septic. WBC normal. Pt hemodynamically stable and not requiring O2 at rest. CT chest w/ IV contrast: Bilateral patchy ground glass opacities throughout all the lobes of the bilateral lungs  - F/u 3/17 blood cultures, urine culture --> no growth thus far  - completed  remdesivir , c/w  decadron; currently on day 8/10   - Trend inflammatory markers and monitor renal and liver function  -hypothermia noted on 3/24, now resolved, Blood cx NGTD , will monitor off Abx at this time Complicated by acute hypoxic respiratory failure. Initially met sepsis criteria - no longer septic. WBC normal. Pt hemodynamically stable and not requiring O2 at rest. CT chest w/ IV contrast: Bilateral patchy ground glass opacities throughout all the lobes of the bilateral lungs  - F/u 3/17 blood cultures, urine culture --> no growth thus far  - completed  remdesivir , completed   decadron 3/27   - Trend inflammatory markers and monitor renal and liver function  -hypothermia noted on 3/24, now resolved, Blood cx NGTD , will monitor off Abx at this time

## 2021-03-28 NOTE — PROGRESS NOTE ADULT - SUBJECTIVE AND OBJECTIVE BOX
Bennie Hwang MD  Interventional Cardiology / Advance Heart Failure and Cardiac Transplant Specialist  [place] Office : [address] [place] [address]. [phone]  Tel:   [place] Office : [address] [facility] [place] [address]  Tel: [phone]  Cell : [phone]    Pt is lying in bed comfortable not in distress   	  MEDICATIONS:  clopidogrel Tablet 75 milliGRAM(s) Oral daily  enoxaparin Injectable 40 milliGRAM(s) SubCutaneous daily  losartan 25 milliGRAM(s) Oral daily  metoprolol succinate ER 25 milliGRAM(s) Oral daily  tamsulosin 0.4 milliGRAM(s) Oral at bedtime      benzonatate 100 milliGRAM(s) Oral three times a day PRN    carbidopa/levodopa  25/100 1 Tablet(s) Oral three times a day  melatonin 3 milliGRAM(s) Oral at bedtime  QUEtiapine 50 milliGRAM(s) Oral at bedtime    pantoprazole  Injectable 40 milliGRAM(s) IV Push every 12 hours  senna 2 Tablet(s) Oral at bedtime    atorvastatin 10 milliGRAM(s) Oral at bedtime  dextrose 40% Gel 15 Gram(s) Oral once  dextrose 50% Injectable 25 Gram(s) IV Push once  dextrose 50% Injectable 12.5 Gram(s) IV Push once  dextrose 50% Injectable 25 Gram(s) IV Push once  glucagon  Injectable 1 milliGRAM(s) IntraMuscular once  insulin lispro (ADMELOG) corrective regimen sliding scale   SubCutaneous three times a day before meals  insulin lispro (ADMELOG) corrective regimen sliding scale   SubCutaneous at bedtime    dextrose 5%. 1000 milliLiter(s) IV Continuous <Continuous>  dextrose 5%. 1000 milliLiter(s) IV Continuous <Continuous>      PAST MEDICAL/SURGICAL HISTORY  PAST MEDICAL & SURGICAL HISTORY:  BPH (benign prostatic hyperplasia)    CAD (Coronary Artery Disease)  s/p cardiac stent ( &gt; 20 years ago)    HTN (Hypertension)    DM (Diabetes Mellitus)    Hypercholesterolemia    Coronary Stent  x 2 ( 20 years )        SOCIAL HISTORY: Substance Use (street drugs): ( x ) never used  (  ) other:    FAMILY HISTORY:  No pertinent family history in first degree relatives         PHYSICAL EXAM:  T(C): 36.1 (03-28-21 @ 13:33), Max: 36.5 (03-27-21 @ 21:44)  HR: 91 (03-28-21 @ 13:33) (56 - 91)  BP: 134/54 (03-28-21 @ 13:33) (121/54 - 134/54)  RR: 18 (03-28-21 @ 13:33) (17 - 18)  SpO2: 100% (03-28-21 @ 13:33) (95% - 100%)  Wt(kg): --  I&O's Summary    28 Mar 2021 07:01  -  28 Mar 2021 15:35  --------------------------------------------------------  IN: 0 mL / OUT: 300 mL / NET: -300 mL             EYES: EOMI, PERRLA, conjunctiva and sclera clear  ENMT: No tonsillar erythema, exudates, or enlargement; Moist mucous membranes, Good dentition, No lesions  Cardiovascular: Normal S1 S2, No JVD, No murmurs, No edema  Respiratory: b/l rhonchi  Gastrointestinal:  Soft, Non-tender, + BS	  Extremities: no edema                                  9.4    7.46  )-----------( 325      ( 27 Mar 2021 07:15 )             29.2     03-27    137  |  101  |  26<H>  ----------------------------<  114<H>  4.1   |  28  |  0.73    Ca    8.8      27 Mar 2021 07:15  Phos  3.6     03-27  Mg     1.6     03-27    TPro  5.4<L>  /  Alb  2.4<L>  /  TBili  0.6  /  DBili  x   /  AST  6   /  ALT  <5<L>  /  AlkPhos  98  03-27    proBNP:   Lipid Profile:   HgA1c:   TSH:     Consultant(s) Notes Reviewed:  [x ] YES  [ ] NO    Care Discussed with Consultants/Other Providers [ x] YES  [ ] NO    Imaging Personally Reviewed independently:  [x] YES  [ ] NO    All labs, radiologic studies, vitals, orders and medications list reviewed. Patient is seen and examined at bedside. Case discussed with medical team.

## 2021-03-29 LAB
ALBUMIN SERPL ELPH-MCNC: 2.8 G/DL — LOW (ref 3.3–5)
ALP SERPL-CCNC: 91 U/L — SIGNIFICANT CHANGE UP (ref 40–120)
ALT FLD-CCNC: <5 U/L — SIGNIFICANT CHANGE UP (ref 4–41)
ANION GAP SERPL CALC-SCNC: 8 MMOL/L — SIGNIFICANT CHANGE UP (ref 7–14)
AST SERPL-CCNC: 6 U/L — SIGNIFICANT CHANGE UP (ref 4–40)
BILIRUB SERPL-MCNC: 0.5 MG/DL — SIGNIFICANT CHANGE UP (ref 0.2–1.2)
BUN SERPL-MCNC: 26 MG/DL — HIGH (ref 7–23)
CALCIUM SERPL-MCNC: 8.6 MG/DL — SIGNIFICANT CHANGE UP (ref 8.4–10.5)
CHLORIDE SERPL-SCNC: 100 MMOL/L — SIGNIFICANT CHANGE UP (ref 98–107)
CO2 SERPL-SCNC: 28 MMOL/L — SIGNIFICANT CHANGE UP (ref 22–31)
CREAT SERPL-MCNC: 0.78 MG/DL — SIGNIFICANT CHANGE UP (ref 0.5–1.3)
CULTURE RESULTS: SIGNIFICANT CHANGE UP
CULTURE RESULTS: SIGNIFICANT CHANGE UP
GLUCOSE BLDC GLUCOMTR-MCNC: 113 MG/DL — HIGH (ref 70–99)
GLUCOSE BLDC GLUCOMTR-MCNC: 148 MG/DL — HIGH (ref 70–99)
GLUCOSE BLDC GLUCOMTR-MCNC: 158 MG/DL — HIGH (ref 70–99)
GLUCOSE BLDC GLUCOMTR-MCNC: 98 MG/DL — SIGNIFICANT CHANGE UP (ref 70–99)
GLUCOSE SERPL-MCNC: 98 MG/DL — SIGNIFICANT CHANGE UP (ref 70–99)
HCT VFR BLD CALC: 29.2 % — LOW (ref 39–50)
HGB BLD-MCNC: 9.2 G/DL — LOW (ref 13–17)
MAGNESIUM SERPL-MCNC: 1.5 MG/DL — LOW (ref 1.6–2.6)
MCHC RBC-ENTMCNC: 28.7 PG — SIGNIFICANT CHANGE UP (ref 27–34)
MCHC RBC-ENTMCNC: 31.5 GM/DL — LOW (ref 32–36)
MCV RBC AUTO: 91 FL — SIGNIFICANT CHANGE UP (ref 80–100)
NRBC # BLD: 0 /100 WBCS — SIGNIFICANT CHANGE UP
NRBC # FLD: 0 K/UL — SIGNIFICANT CHANGE UP
PHOSPHATE SERPL-MCNC: 2.8 MG/DL — SIGNIFICANT CHANGE UP (ref 2.5–4.5)
PLATELET # BLD AUTO: 278 K/UL — SIGNIFICANT CHANGE UP (ref 150–400)
POTASSIUM SERPL-MCNC: 3.9 MMOL/L — SIGNIFICANT CHANGE UP (ref 3.5–5.3)
POTASSIUM SERPL-SCNC: 3.9 MMOL/L — SIGNIFICANT CHANGE UP (ref 3.5–5.3)
PROT SERPL-MCNC: 5.4 G/DL — LOW (ref 6–8.3)
RBC # BLD: 3.21 M/UL — LOW (ref 4.2–5.8)
RBC # FLD: 14 % — SIGNIFICANT CHANGE UP (ref 10.3–14.5)
SODIUM SERPL-SCNC: 136 MMOL/L — SIGNIFICANT CHANGE UP (ref 135–145)
SPECIMEN SOURCE: SIGNIFICANT CHANGE UP
SPECIMEN SOURCE: SIGNIFICANT CHANGE UP
WBC # BLD: 6.8 K/UL — SIGNIFICANT CHANGE UP (ref 3.8–10.5)
WBC # FLD AUTO: 6.8 K/UL — SIGNIFICANT CHANGE UP (ref 3.8–10.5)

## 2021-03-29 PROCEDURE — 99232 SBSQ HOSP IP/OBS MODERATE 35: CPT | Mod: CS

## 2021-03-29 RX ORDER — MAGNESIUM SULFATE 500 MG/ML
2 VIAL (ML) INJECTION ONCE
Refills: 0 | Status: COMPLETED | OUTPATIENT
Start: 2021-03-29 | End: 2021-03-29

## 2021-03-29 RX ORDER — MAGNESIUM OXIDE 400 MG ORAL TABLET 241.3 MG
400 TABLET ORAL
Refills: 0 | Status: DISCONTINUED | OUTPATIENT
Start: 2021-03-29 | End: 2021-03-29

## 2021-03-29 RX ADMIN — CARBIDOPA AND LEVODOPA 1 TABLET(S): 25; 100 TABLET ORAL at 22:11

## 2021-03-29 RX ADMIN — PANTOPRAZOLE SODIUM 40 MILLIGRAM(S): 20 TABLET, DELAYED RELEASE ORAL at 18:16

## 2021-03-29 RX ADMIN — Medication 3 MILLIGRAM(S): at 22:11

## 2021-03-29 RX ADMIN — Medication 50 GRAM(S): at 10:31

## 2021-03-29 RX ADMIN — ENOXAPARIN SODIUM 40 MILLIGRAM(S): 100 INJECTION SUBCUTANEOUS at 13:33

## 2021-03-29 RX ADMIN — PANTOPRAZOLE SODIUM 40 MILLIGRAM(S): 20 TABLET, DELAYED RELEASE ORAL at 06:17

## 2021-03-29 RX ADMIN — CARBIDOPA AND LEVODOPA 1 TABLET(S): 25; 100 TABLET ORAL at 06:17

## 2021-03-29 RX ADMIN — TAMSULOSIN HYDROCHLORIDE 0.4 MILLIGRAM(S): 0.4 CAPSULE ORAL at 22:11

## 2021-03-29 RX ADMIN — CLOPIDOGREL BISULFATE 75 MILLIGRAM(S): 75 TABLET, FILM COATED ORAL at 14:45

## 2021-03-29 RX ADMIN — Medication 2: at 18:00

## 2021-03-29 RX ADMIN — QUETIAPINE FUMARATE 50 MILLIGRAM(S): 200 TABLET, FILM COATED ORAL at 22:11

## 2021-03-29 RX ADMIN — CARBIDOPA AND LEVODOPA 1 TABLET(S): 25; 100 TABLET ORAL at 14:45

## 2021-03-29 RX ADMIN — LOSARTAN POTASSIUM 25 MILLIGRAM(S): 100 TABLET, FILM COATED ORAL at 06:17

## 2021-03-29 RX ADMIN — ATORVASTATIN CALCIUM 10 MILLIGRAM(S): 80 TABLET, FILM COATED ORAL at 22:11

## 2021-03-29 NOTE — PROGRESS NOTE ADULT - ATTENDING COMMENTS
agree with above plan
pt seen and examined agree with above plan
pt seen and examined agree with plan above
Pt seen and examined with NP, case discussed agree with plan above
Admitted With COVID   CAD abnormal stress conservative managed given comorbidities
S&S eval noted , recommend  Puree (dysphagia 1) and Nectar thick liquids  f/u PT eval recs   DC planning if stable in am

## 2021-03-29 NOTE — PROGRESS NOTE ADULT - SUBJECTIVE AND OBJECTIVE BOX
Bennie Hwang MD  Interventional Cardiology / Endovascular Specialist  [place] Office : [address] [place] [address] [place]. [address]  Tel:   [place] Office : [address] [facility] [place] [address]  Tel: [phone]  Cell : [phone]    Subjective/Overnight events: Patient lying in bed comfortably. No acute distress. on warming blanket  	  MEDICATIONS:  clopidogrel Tablet 75 milliGRAM(s) Oral daily  enoxaparin Injectable 40 milliGRAM(s) SubCutaneous daily  losartan 25 milliGRAM(s) Oral daily  metoprolol succinate ER 25 milliGRAM(s) Oral daily  tamsulosin 0.4 milliGRAM(s) Oral at bedtime      benzonatate 100 milliGRAM(s) Oral three times a day PRN    carbidopa/levodopa  25/100 1 Tablet(s) Oral three times a day  melatonin 3 milliGRAM(s) Oral at bedtime  QUEtiapine 50 milliGRAM(s) Oral at bedtime    pantoprazole  Injectable 40 milliGRAM(s) IV Push every 12 hours  senna 2 Tablet(s) Oral at bedtime    atorvastatin 10 milliGRAM(s) Oral at bedtime  dextrose 40% Gel 15 Gram(s) Oral once  dextrose 50% Injectable 25 Gram(s) IV Push once  dextrose 50% Injectable 12.5 Gram(s) IV Push once  dextrose 50% Injectable 25 Gram(s) IV Push once  glucagon  Injectable 1 milliGRAM(s) IntraMuscular once  insulin lispro (ADMELOG) corrective regimen sliding scale   SubCutaneous at bedtime  insulin lispro (ADMELOG) corrective regimen sliding scale   SubCutaneous three times a day before meals    dextrose 5%. 1000 milliLiter(s) IV Continuous <Continuous>  dextrose 5%. 1000 milliLiter(s) IV Continuous <Continuous>  magnesium sulfate  IVPB 2 Gram(s) IV Intermittent once      PAST MEDICAL/SURGICAL HISTORY  PAST MEDICAL & SURGICAL HISTORY:  BPH (benign prostatic hyperplasia)    CAD (Coronary Artery Disease)  s/p cardiac stent ( &gt; 20 years ago)    HTN (Hypertension)    DM (Diabetes Mellitus)    Hypercholesterolemia    Coronary Stent  x 2 ( 20 years )        SOCIAL HISTORY: Substance Use (street drugs): ( x ) never used  (  ) other:    FAMILY HISTORY:  No pertinent family history in first degree relatives        REVIEW OF SYSTEMS:  unable to obtain    PHYSICAL EXAM:  T(C): 35.6 (03-29-21 @ 09:23), Max: 36.5 (03-28-21 @ 23:13)  HR: 58 (03-29-21 @ 06:12) (58 - 91)  BP: 119/60 (03-29-21 @ 06:12) (119/60 - 134/54)  RR: 15 (03-29-21 @ 06:12) (15 - 18)  SpO2: 99% (03-29-21 @ 06:12) (98% - 100%)  Wt(kg): --  I&O's Summary    28 Mar 2021 07:01  -  29 Mar 2021 07:00  --------------------------------------------------------  IN: 0 mL / OUT: 300 mL / NET: -300 mL          EYES: EOMI, PERRLA, conjunctiva and sclera clear  ENMT: No tonsillar erythema, exudates, or enlargement; Moist mucous membranes, Good dentition, No lesions  Cardiovascular: Normal S1 S2, No JVD, No murmurs, No edema  Respiratory: b/l rhonchi  Gastrointestinal:  Soft, Non-tender, + BS	  Extremities: no edema                                    9.2    6.80  )-----------( 278      ( 29 Mar 2021 06:49 )             29.2     03-29    136  |  100  |  26<H>  ----------------------------<  98  3.9   |  28  |  0.78    Ca    8.6      29 Mar 2021 06:49  Phos  2.8     03-29  Mg     1.5     03-29    TPro  5.4<L>  /  Alb  2.8<L>  /  TBili  0.5  /  DBili  x   /  AST  6   /  ALT  <5  /  AlkPhos  91  03-29    proBNP:   Lipid Profile:   HgA1c:   TSH:     Consultant(s) Notes Reviewed:  [x ] YES  [ ] NO    Care Discussed with Consultants/Other Providers [ x] YES  [ ] NO    Imaging Personally Reviewed independently:  [x] YES  [ ] NO    All labs, radiologic studies, vitals, orders and medications list reviewed. Patient is seen and examined at bedside. Case discussed with medical team.

## 2021-03-29 NOTE — PROGRESS NOTE ADULT - PROBLEM SELECTOR PLAN 1
Complicated by acute hypoxic respiratory failure. Initially met sepsis criteria, now resolved, not using O2 at rest. CT chest w/ IV contrast: Bilateral patchy ground glass opacities throughout all the lobes of the bilateral lungs  - F/u 3/17 blood cultures, urine culture --> no growth thus far  - completed  remdesivir , completed decadron 3/27   - Trend inflammatory markers and monitor renal and liver function  - Hypothermia noted on 3/24, resolved and then noted this AM, on Nicole hugger, will attempt to discontinue and re-check temp. Infectious workup aside from COVID has been neg to date. Ctm off antibiotics.

## 2021-03-29 NOTE — PROGRESS NOTE ADULT - ASSESSMENT
82 y/o M w/ pmhx of dementia, parkinson's disease, DM, HTN, CHF, CAD 2 stents sent from cardiology office for hypotension.     EKG: Sinus Josemanuel 59 PACs      1. Sepsis  - COVID +, DDimer elevated consider therapeutic a/c    -patient from office with hypotension, improving   - CT with large loculated pleural effusion , brother wants medical management , discussed at length  -hypothermic overnight on warming blanket     2.CHF  -echo with mild LV systolic dysfunction (new compared to 2018)  -currently appears euvolemic on exam    3. CAD s/p stent  -cath in 2010 showed mild luminal disease and patent stents  -denies chest pain  -echo with mild LV dysfunction  -c/w plavix     4. HTN  -BP running soft previously  -improving now  -continue to monitor BP       80 y/o M w/ pmhx of dementia, parkinson's disease, DM, HTN, CHF, CAD 2 stents sent from cardiology office for hypotension.     EKG: Sinus Josemanuel 59 PACs      1. Sepsis  - COVID +  -patient from office with hypotension, improving   - CT with large loculated pleural effusion , brother wants medical management , discussed at length  -hypothermic overnight on warming blanket     2.CHF  -echo with mild LV systolic dysfunction (new compared to 2018)  -currently appears euvolemic on exam    3. CAD s/p stent  -cath in 2010 showed mild luminal disease and patent stents  -denies chest pain  -echo with mild LV dysfunction  -c/w plavix     4. HTN  -BP running soft previously  -improving now  -continue to monitor BP

## 2021-03-29 NOTE — PROGRESS NOTE ADULT - PROBLEM SELECTOR PLAN 9
- Diet: CC DASH diet  - DVT: lovenox prophylactic, case d/w cardiology, agree to c/w with prophylactic dose as pt also on plavix and family prefers comfort care   - Dispo: PT recommend home PT, case d/w bother , pt lives with 2 home HHA , will d/w CM   - GOC: MOLST filled out last hospitalization, pt DNR with trial of intubation, confirmed with brother at time of H&P. Asked brother to bring in the Molst form or copy for the chart  Tried calling brother on 3/27, 3/28, 3/29 did not , unable to leave message as mailbox full

## 2021-03-29 NOTE — PROGRESS NOTE ADULT - SUBJECTIVE AND OBJECTIVE BOX
Patient is a 81y old  Male who presents with a chief complaint of hypotension (27 Mar 2021 11:20)      SUBJECTIVE / OVERNIGHT EVENTS:   Hypothermic, started on a cherise hugger overnight.   Patient feels well, has no acute complaints. hungry and wants to eat. Denies any CP, SOB.         MEDICATIONS  (STANDING):  atorvastatin 10 milliGRAM(s) Oral at bedtime  carbidopa/levodopa  25/100 1 Tablet(s) Oral three times a day  clopidogrel Tablet 75 milliGRAM(s) Oral daily  dextrose 40% Gel 15 Gram(s) Oral once  dextrose 5%. 1000 milliLiter(s) (50 mL/Hr) IV Continuous <Continuous>  dextrose 5%. 1000 milliLiter(s) (100 mL/Hr) IV Continuous <Continuous>  dextrose 50% Injectable 25 Gram(s) IV Push once  dextrose 50% Injectable 12.5 Gram(s) IV Push once  dextrose 50% Injectable 25 Gram(s) IV Push once  enoxaparin Injectable 40 milliGRAM(s) SubCutaneous daily  glucagon  Injectable 1 milliGRAM(s) IntraMuscular once  insulin lispro (ADMELOG) corrective regimen sliding scale   SubCutaneous three times a day before meals  insulin lispro (ADMELOG) corrective regimen sliding scale   SubCutaneous at bedtime  losartan 25 milliGRAM(s) Oral daily  melatonin 3 milliGRAM(s) Oral at bedtime  metoprolol succinate ER 25 milliGRAM(s) Oral daily  pantoprazole  Injectable 40 milliGRAM(s) IV Push every 12 hours  QUEtiapine 50 milliGRAM(s) Oral at bedtime  senna 2 Tablet(s) Oral at bedtime  tamsulosin 0.4 milliGRAM(s) Oral at bedtime    MEDICATIONS  (PRN):  benzonatate 100 milliGRAM(s) Oral three times a day PRN Cough      Vital Signs Last 24 Hrs  T(C): 36.3 (28 Mar 2021 06:09), Max: 36.5 (27 Mar 2021 21:44)  T(F): 97.3 (28 Mar 2021 06:09), Max: 97.7 (27 Mar 2021 21:44)  HR: 56 (28 Mar 2021 06:09) (56 - 85)  BP: 121/54 (28 Mar 2021 06:09) (106/47 - 125/51)  BP(mean): --  RR: 18 (28 Mar 2021 06:09) (17 - 18)  SpO2: 95% (28 Mar 2021 06:09) (95% - 100%)  CAPILLARY BLOOD GLUCOSE      POCT Blood Glucose.: 152 mg/dL (28 Mar 2021 08:58)  POCT Blood Glucose.: 187 mg/dL (27 Mar 2021 21:49)  POCT Blood Glucose.: 193 mg/dL (27 Mar 2021 18:38)  POCT Blood Glucose.: 202 mg/dL (27 Mar 2021 13:10)    I&O's Summary      PHYSICAL EXAM  GENERAL: NAD, elderly man laying comfortably in bed,   CHEST/LUNG: decreased BS at mid to lower lung fields on the left; No wheezes. Normal respiratory effort  HEART: Regular rate and rhythm  ABDOMEN: Soft, Nontender, Nondistended  EXTREMITIES:  2+ Peripheral Pulses, No clubbing, cyanosis, or edema  PSYCH: Alert, oriented to self and place. Cooperative, follows simple commands and pleasant    LABS:                        9.4    7.46  )-----------( 325      ( 27 Mar 2021 07:15 )             29.2     03-27    137  |  101  |  26<H>  ----------------------------<  114<H>  4.1   |  28  |  0.73    Ca    8.8      27 Mar 2021 07:15  Phos  3.6     03-27  Mg     1.6     03-27    TPro  5.4<L>  /  Alb  2.4<L>  /  TBili  0.6  /  DBili  x   /  AST  6   /  ALT  <5<L>  /  AlkPhos  98  03-27              RADIOLOGY & ADDITIONAL TESTS:    Imaging Personally Reviewed:    Consultant(s) Notes Reviewed:  cardiology     Care Discussed with Consultants/Other Providers:

## 2021-03-30 ENCOUNTER — TRANSCRIPTION ENCOUNTER (OUTPATIENT)
Age: 81
End: 2021-03-30

## 2021-03-30 VITALS
DIASTOLIC BLOOD PRESSURE: 42 MMHG | RESPIRATION RATE: 17 BRPM | HEART RATE: 64 BPM | SYSTOLIC BLOOD PRESSURE: 94 MMHG | TEMPERATURE: 98 F | OXYGEN SATURATION: 100 %

## 2021-03-30 LAB — GLUCOSE BLDC GLUCOMTR-MCNC: 129 MG/DL — HIGH (ref 70–99)

## 2021-03-30 PROCEDURE — 99238 HOSP IP/OBS DSCHRG MGMT 30/<: CPT | Mod: CS

## 2021-03-30 RX ORDER — METOPROLOL TARTRATE 50 MG
12.5 TABLET ORAL
Refills: 0 | Status: DISCONTINUED | OUTPATIENT
Start: 2021-03-30 | End: 2021-03-30

## 2021-03-30 RX ORDER — ENOXAPARIN SODIUM 100 MG/ML
40 INJECTION SUBCUTANEOUS
Qty: 1 | Refills: 0
Start: 2021-03-30

## 2021-03-30 RX ORDER — LOSARTAN POTASSIUM 100 MG/1
1 TABLET, FILM COATED ORAL
Qty: 0 | Refills: 0 | DISCHARGE

## 2021-03-30 RX ORDER — RIVAROXABAN 15 MG-20MG
1 KIT ORAL
Qty: 30 | Refills: 0
Start: 2021-03-30

## 2021-03-30 RX ADMIN — PANTOPRAZOLE SODIUM 40 MILLIGRAM(S): 20 TABLET, DELAYED RELEASE ORAL at 06:09

## 2021-03-30 RX ADMIN — ENOXAPARIN SODIUM 40 MILLIGRAM(S): 100 INJECTION SUBCUTANEOUS at 12:21

## 2021-03-30 RX ADMIN — CLOPIDOGREL BISULFATE 75 MILLIGRAM(S): 75 TABLET, FILM COATED ORAL at 12:21

## 2021-03-30 RX ADMIN — CARBIDOPA AND LEVODOPA 1 TABLET(S): 25; 100 TABLET ORAL at 06:09

## 2021-03-30 NOTE — PROVIDER CONTACT NOTE (OTHER) - ASSESSMENT
Patient rectal temp 96.6. /66 HR 62. Patient shows no signs/symptoms of distress or discomfort. Patient resting calmly in bed.

## 2021-03-30 NOTE — PROVIDER CONTACT NOTE (OTHER) - SITUATION
DNR/MOLST form not in chart.
rectal temp 93.1
HR of 47
Patient Temperature is 95 degrees rectally, /49, HR 65
Patient is pulling out his IV, trying to climb out of bed, aggressive towards PCA
Patient rectal temp 96.6.
Pt's /47, HR 58
Darshan lozano in 50s on  machine and VS machine
Temp 95.6, /57, Adventitios sounds heard on lung assessment anterior and posterior, wheezing, coughing
DNR MOLST Form is not in the chart
PT BP 90/57. HR 59. NP notified.
Patient repeat rectal 97.

## 2021-03-30 NOTE — PROGRESS NOTE ADULT - PROVIDER SPECIALTY LIST ADULT
Cardiology
Hospitalist

## 2021-03-30 NOTE — PROGRESS NOTE ADULT - ASSESSMENT
80 y/o M w/ pmhx of dementia, parkinson's disease, DM, HTN, CHF, CAD 2 stents sent from cardiology office for hypotension.     EKG: Sinus Josemanuel 59 PACs      1. Sepsis  - COVID +  -patient from office with hypotension, improving   - CT with large loculated pleural effusion , brother wants medical management , discussed at length  -hypothermic overnight on warming blanket     2.CHF  -echo with mild LV systolic dysfunction (new compared to 2018)  -currently appears euvolemic on exam    3. CAD s/p stent  -cath in 2010 showed mild luminal disease and patent stents  -denies chest pain  -echo with mild LV dysfunction  -c/w plavix     4. HTN  -BP running soft previously  -improving now  -continue to monitor BP

## 2021-03-30 NOTE — PROGRESS NOTE ADULT - REASON FOR ADMISSION
Sepsis 2/2 COVID +/- empyema
hypotension
Sepsis 2/2 COVID +/- empyema
hypotension

## 2021-03-30 NOTE — DISCHARGE NOTE NURSING/CASE MANAGEMENT/SOCIAL WORK - PATIENT PORTAL LINK FT
You can access the FollowMyHealth Patient Portal offered by [facility] by registering at the following website: [email or web address]. By joining [facility]’s FollowMyHealth portal, you will also be able to view your health information using other applications (apps) compatible with our system.

## 2021-03-30 NOTE — PROVIDER CONTACT NOTE (OTHER) - NAME OF MD/NP/PA/DO NOTIFIED:
Lilian, [id number]
[facility] [id number]
Lilian, [id number]
CATALINA Meehan
Erin Hwang
Stephany Matthew
Lilian, [id number]
NEIL Morse
Sean TREJO
ACP, Jonathon
Lilian TREJO
Sean TREJO

## 2021-03-30 NOTE — PROGRESS NOTE ADULT - PROBLEM SELECTOR PLAN 1
Complicated by acute hypoxic respiratory failure. Initially met sepsis criteria, now resolved, not using O2 at rest. CT chest w/ IV contrast: Bilateral patchy ground glass opacities throughout all the lobes of the bilateral lungs  - F/u 3/17 blood cultures, urine culture --> no growth thus far  - completed  remdesivir , completed decadron 3/27   - Trend inflammatory markers and monitor renal and liver function  - Hypothermia noted on 3/24, resolved and then noted this AM, on Nicole hugger, will attempt to discontinue and re-check temp. Infectious workup aside from COVID has been neg to date. Ctm off antibiotics. Complicated by acute hypoxic respiratory failure. Initially met sepsis criteria, now resolved, not using O2 at rest. CT chest w/ IV contrast: Bilateral patchy ground glass opacities throughout all the lobes of the bilateral lungs  - F/u 3/17 blood cultures, urine culture --> no growth thus far  - completed  remdesivir , completed decadron 3/27   - Trend inflammatory markers and monitor renal and liver function  - Hypothermia noted on 3/24, resolved and then noted 3/29 now resolved. Infectious workup aside from COVID has been neg to date. Ctm off antibiotics.

## 2021-03-30 NOTE — DISCHARGE NOTE NURSING/CASE MANAGEMENT/SOCIAL WORK - NSDCPEPTCAREGIVEDUMATLIST _GEN_ALL_CORE
Heart Failure/Diabetes/Coronavirus/COVID19 Heart Failure/Diabetes/Rivaroxaban/Xarelto/Coronavirus/COVID19

## 2021-03-30 NOTE — PROVIDER CONTACT NOTE (OTHER) - DATE AND TIME:
27-Mar-2021 06:00
19-Mar-2021 14:55
19-Mar-2021 19:50
23-Mar-2021 13:25
26-Mar-2021 05:54
30-Mar-2021 07:44
20-Mar-2021 01:20
22-Mar-2021 14:45
30-Mar-2021 05:48
19-Mar-2021 20:25
24-Mar-2021 05:35
30-Mar-2021 00:26

## 2021-03-30 NOTE — PROVIDER CONTACT NOTE (OTHER) - RECOMMENDATIONS
Assess at bedside, PRN meds for agitation
Metoprolol ER not given per paramaters  Losartan administered
will continue to monitor, warming blankets
Reach out to brother
Reach out to brother and get form.
hyperthermia blanket
Encourage PO  fluids.
Provider notified.
Chest X-Ray, ABG, ECG, consider Lasix, consider tele monitoring, Come up and assess patient in person
Put blankets on patient and reassess in an hour.
Hold BP medication. Encourage fluids.

## 2021-03-30 NOTE — PROGRESS NOTE ADULT - SUBJECTIVE AND OBJECTIVE BOX
Bennie Hwang MD  Interventional Cardiology / Endovascular Specialist  [place] Office : [phone] [place] [address]. [phone]  Tel:   [place] Office : [address] [facility] [place] [address]  Tel: [phone]  Cell : [phone]      Pt Lying in bed in NAD       PAST MEDICAL/SURGICAL HISTORY  PAST MEDICAL & SURGICAL HISTORY:  Hypercholesterolemia    DM (Diabetes Mellitus)    HTN (Hypertension)    CAD (Coronary Artery Disease)  s/p cardiac stent ( &gt; 20 years ago)    BPH (benign prostatic hyperplasia)    Coronary Stent  x 2 ( 20 years )        SOCIAL HISTORY: Substance Use (street drugs): ( x ) never used  (  ) other:    FAMILY HISTORY:  No pertinent family history in first degree relatives        REVIEW OF SYSTEMS:  CONSTITUTIONAL: No fever, weight loss, or fatigue  EYES: No eye pain, visual disturbances, or discharge  ENMT:  No difficulty hearing, tinnitus, vertigo; No sinus or throat pain  BREASTS: No pain, masses, or nipple discharge  GASTROINTESTINAL: No abdominal or epigastric pain. No nausea, vomiting, or hematemesis; No diarrhea or constipation. No melena or hematochezia.  GENITOURINARY: No dysuria, frequency, hematuria, or incontinence  NEUROLOGICAL: No headaches, memory loss, loss of strength, numbness, or tremors  ENDOCRINE: No heat or cold intolerance; No hair loss  MUSCULOSKELETAL: No joint pain or swelling; No muscle, back, or extremity pain  PSYCHIATRIC: No depression, anxiety, mood swings, or difficulty sleeping  HEME/LYMPH: No easy bruising, or bleeding gums  All others negative    PHYSICAL EXAM:  T(C): 36.5 (03-30-21 @ 09:31), Max: 36.5 (03-30-21 @ 09:31)  HR: 64 (03-30-21 @ 09:31) (64 - 64)  BP: 94/42 (03-30-21 @ 09:31) (94/42 - 94/42)  RR: 17 (03-30-21 @ 09:31) (17 - 17)  SpO2: 100% (03-30-21 @ 09:31) (100% - 100%)  Wt(kg): --  I&O's Summary          EYES: EOMI, PERRLA, conjunctiva and sclera clear  ENMT: No tonsillar erythema, exudates, or enlargement; Moist mucous membranes, Good dentition, No lesions  Cardiovascular: Normal S1 S2, No JVD, No murmurs, No edema  Respiratory: b/l rhonchi  Gastrointestinal:  Soft, Non-tender, + BS	  Extremities: no edema                  proBNP:   Lipid Profile:   HgA1c:   TSH:     Consultant(s) Notes Reviewed:  [x ] YES  [ ] NO    Care Discussed with Consultants/Other Providers [ x] YES  [ ] NO    Imaging Personally Reviewed independently:  [x] YES  [ ] NO    All labs, radiologic studies, vitals, orders and medications list reviewed. Patient is seen and examined at bedside. Case discussed with medical team.

## 2021-03-30 NOTE — PROVIDER CONTACT NOTE (OTHER) - ASSESSMENT
DNR was mentioned in medical records and report but no MOLST form in chart. As per provider handoff tab- brother unable to be reach. Was supposed to drop off completed form, has not yet and is unable to be reached by phone.

## 2021-03-30 NOTE — CHART NOTE - NSCHARTNOTEFT_GEN_A_CORE
Patient's dose of Toprol XL 25 daily changed to equivalent conversion of metoprolol 12.5 mg po twice daily as Toprol couldn't be crushed

## 2021-03-30 NOTE — PROVIDER CONTACT NOTE (OTHER) - ACTION/TREATMENT ORDERED:
No new interventions at this time. Will monitor.
Provider aware. Place blankets on patient and reassess rectal temp in one hor. Reschedule AM BP meds since BP soft. Will continue to monitor.
Provider notified. No further interventions ordered at this time. Will continue to monitor.
Team notified. Will order warming blanket and Lasix. No further orders at this time. Will continue to monitor.
blood cultures x2 and hyperthermia blanket
Team notified. No further orders at this time. Will continue to monitor.
Team notified. Will put in orders. Will look out for orders and continue to monitor.  Will continue to monitor.
NP aware of low BP. Recheck BP in 45 minutes.
Provider notified. No further orders/interventions at this time. Will continue to monitor.
Provider stated he will come to assess patient.
Continue to monitor VS and patient condition. Awaiting page back from ACP
Provider PUSHPA Matthew made aware of HR. Metoprolol to be held, OK to administer Lopressor. No further orders. Will encourage fluid intake  Will continue to monitor.

## 2021-03-30 NOTE — PROVIDER CONTACT NOTE (OTHER) - REASON
Patient Temperature is 95 degrees rectally
Temp 95.6, /57, Adventitious sounds heard on lung assessment anterior and posterior, wheezing, coughing
DNR/ MOLST form not in chart
Pt with /47
pt rectal temp 96.6
DNR MOLST Form is not in the chart
t 93.1 rectal
BP low
HR 47
Patient repeat rectal 97.0
Patient is pulling out his IV, trying to climb out of bed, aggressive towards PCA
Patient HR in 50s this AM

## 2021-03-30 NOTE — PROGRESS NOTE ADULT - SUBJECTIVE AND OBJECTIVE BOX
Patient is a 81y old  Male who presents with a chief complaint of hypotension (27 Mar 2021 11:20)      SUBJECTIVE / OVERNIGHT EVENTS:   Patient feels well, has no acute complaints. hungry and wants to eat. Denies any CP, SOB. Requesting more food.         MEDICATIONS  (STANDING):  atorvastatin 10 milliGRAM(s) Oral at bedtime  carbidopa/levodopa  25/100 1 Tablet(s) Oral three times a day  clopidogrel Tablet 75 milliGRAM(s) Oral daily  dextrose 40% Gel 15 Gram(s) Oral once  dextrose 5%. 1000 milliLiter(s) (50 mL/Hr) IV Continuous <Continuous>  dextrose 5%. 1000 milliLiter(s) (100 mL/Hr) IV Continuous <Continuous>  dextrose 50% Injectable 25 Gram(s) IV Push once  dextrose 50% Injectable 12.5 Gram(s) IV Push once  dextrose 50% Injectable 25 Gram(s) IV Push once  enoxaparin Injectable 40 milliGRAM(s) SubCutaneous daily  glucagon  Injectable 1 milliGRAM(s) IntraMuscular once  insulin lispro (ADMELOG) corrective regimen sliding scale   SubCutaneous three times a day before meals  insulin lispro (ADMELOG) corrective regimen sliding scale   SubCutaneous at bedtime  losartan 25 milliGRAM(s) Oral daily  melatonin 3 milliGRAM(s) Oral at bedtime  metoprolol succinate ER 25 milliGRAM(s) Oral daily  pantoprazole  Injectable 40 milliGRAM(s) IV Push every 12 hours  QUEtiapine 50 milliGRAM(s) Oral at bedtime  senna 2 Tablet(s) Oral at bedtime  tamsulosin 0.4 milliGRAM(s) Oral at bedtime    MEDICATIONS  (PRN):  benzonatate 100 milliGRAM(s) Oral three times a day PRN Cough      Vital Signs Last 24 Hrs  T(C): 36.3 (28 Mar 2021 06:09), Max: 36.5 (27 Mar 2021 21:44)  T(F): 97.3 (28 Mar 2021 06:09), Max: 97.7 (27 Mar 2021 21:44)  HR: 56 (28 Mar 2021 06:09) (56 - 85)  BP: 121/54 (28 Mar 2021 06:09) (106/47 - 125/51)  BP(mean): --  RR: 18 (28 Mar 2021 06:09) (17 - 18)  SpO2: 95% (28 Mar 2021 06:09) (95% - 100%)  CAPILLARY BLOOD GLUCOSE      POCT Blood Glucose.: 152 mg/dL (28 Mar 2021 08:58)  POCT Blood Glucose.: 187 mg/dL (27 Mar 2021 21:49)  POCT Blood Glucose.: 193 mg/dL (27 Mar 2021 18:38)  POCT Blood Glucose.: 202 mg/dL (27 Mar 2021 13:10)    I&O's Summary      PHYSICAL EXAM  GENERAL: NAD, elderly man laying comfortably in bed,   CHEST/LUNG: decreased BS at mid to lower lung fields on the left; No wheezes. Normal respiratory effort  HEART: Regular rate and rhythm  ABDOMEN: Soft, Nontender, Nondistended  EXTREMITIES:  2+ Peripheral Pulses, No clubbing, cyanosis, or edema  PSYCH: Alert, oriented to self and place. Cooperative, follows simple commands and pleasant    LABS:                        9.4    7.46  )-----------( 325      ( 27 Mar 2021 07:15 )             29.2     03-27    137  |  101  |  26<H>  ----------------------------<  114<H>  4.1   |  28  |  0.73    Ca    8.8      27 Mar 2021 07:15  Phos  3.6     03-27  Mg     1.6     03-27    TPro  5.4<L>  /  Alb  2.4<L>  /  TBili  0.6  /  DBili  x   /  AST  6   /  ALT  <5<L>  /  AlkPhos  98  03-27              RADIOLOGY & ADDITIONAL TESTS:    Imaging Personally Reviewed:    Consultant(s) Notes Reviewed:  cardiology     Care Discussed with Consultants/Other Providers:

## 2021-05-16 NOTE — ED PROVIDER NOTE - NS ED MD DISPO DISCHARGE
I reviewed pertinent labs and imaging, and discussed /agreed on the plan of care with Dr. Johnson Hospitalist Progress Note NAME: Neda Devine :  1936 MRN:  [id number] Assessment / Plan: 
 
1300 called by primary RN states pt confused and impulsive requiring sitter for safety Pt had code neuro work up no acute findings . Sbp elevated could be related to epi and benadryl given on admission , will continue to monitor. Repeat lactic 1.9 Anaphylaxis due to reaction to Keflex POA Causing Hypotension POA-now resolved in ED status post epinephrine, IV fluids Transient Sinus tachycardia POA-as high as in 180/min S/p code stroke in ER - hypotensive in field with facial droop , dorene arm weakness - pt states rash has resolved and Itching  
- state has taken Keflex before without incident - Sbp now 160-170s  karla cont home meds of metoprolol - Lactic acid 2.98 - 500 ml NS bolus  Recheck per protocol  
- HR 75 
 
CT HEAD IMPRESSION No acute changes. MRI Brain IMPRESSION Prominent atrophy and white matter disease. Progression since the 
prior examination, no acute findings. - symptoms resolved in ER  
- no further neuro work up Hypokalemia POA- mild K+ 3.0 on admission now at 3.8 Suspected ? UTI POA 
- Ua unremarkable - Hx of UTIs - exhibited by odor , itching and dysuria  
- no dysiuria , odor or itching this morning Hypertension  
- cont Metoprolol and HCTZ per home meds Estimated discharge date: May 18 Barriers: 
 
Code status: Full Prophylaxis: Lovenox Recommended Disposition: Home w/Family Subjective: Chief Complaint / Reason for Physician Visit \"Pt seen this morning states rash has resolved ,feels better . Lactic acid remains elevated will recheck after fluid bolus Review of Systems: 
Symptom Y/N Comments  Symptom Y/N Comments Fever/Chills n   Chest Pain n   
Poor Appetite n   Edema n   
Cough n   Abdominal Pain Sputum n Joint Pain n   
SOB/GAMBINO n   Pruritis/Rash y   
Nausea/vomit n   Tolerating PT/OT Diarrhea    Tolerating Diet y Constipation    Other Could NOT obtain due to:   
 
Objective: VITALS:  
Last 24hrs VS reviewed since prior progress note. Most recent are: 
Patient Vitals for the past 24 hrs: 
 Temp Pulse Resp BP SpO2  
05/16/21 0600  75 17 (!) 170/83   
05/16/21 0530  (!) 109 25    
05/16/21 0500  77 19 (!) 174/77   
05/16/21 0430  74 17 (!) 180/92   
05/16/21 0400  (!) 143 16 (!) 176/85   
05/16/21 0250  95 21 (!) 181/87 97 % 05/16/21 0247  97  (!) 181/87   
05/15/21 2351 97.7 °F (36.5 °C)      
05/15/21 2330    139/71   
05/15/21 2225  (!) 105 27  92 % 05/15/21 2210    (!) 112/59  No intake or output data in the 24 hours ending 05/16/21 0749 I had a face to face encounter and independently examined this patient on 5/16/2021, as outlined below: PHYSICAL EXAM: 
General: WD, WN. Alert, cooperative, no acute distress EENT:  EOMI. Anicteric sclerae. MMM missing teeth Resp:   no wheezing or rales. No accessory muscle use CV:  Regular  rhythm,  No edema GI:  Soft, Non distended, Non tender. +Bowel sounds Neurologic:  Alert and oriented X 3, normal speech, Psych:   . Not anxious nor agitated Skin:  No rashes. No jaundice Reviewed most current lab test results and cultures  YES Reviewed most current radiology test results   YES Review and summation of old records today    NO Reviewed patient's current orders and MAR    YES 
PMH/SH reviewed - no change compared to H&P 
________________________________________________________________________ Care Plan discussed with: 
  Comments Patient x Family RN x Care Manager Consultant Multidiciplinary team rounds were held today with , nursing, pharmacist and clinical coordinator.   Patient's plan of care was discussed; medications were reviewed and discharge planning was addressed. ________________________________________________________________________ Total NON critical care TIME:  30   Minutes Total CRITICAL CARE TIME Spent:   Minutes non procedure based Comments >50% of visit spent in counseling and coordination of care    
________________________________________________________________________ Xin Chen NP Procedures: see electronic medical records for all procedures/Xrays and details which were not copied into this note but were reviewed prior to creation of Plan. LABS: 
I reviewed today's most current labs and imaging studies. Pertinent labs include: 
Recent Labs 05/15/21 
2216 WBC 6.8 HGB 11.1*  
HCT 33.2*  
 Recent Labs 05/16/21 
0236 05/15/21 
2216  139  
K 3.8 3.0*  
 103 CO2 28 30 * 167* BUN 18 20 CREA 0.88 0.88 CA 8.1* 8.4* ALB  --  2.8* TBILI  --  0.6 ALT  --  23 INR  --  1.0 Signed: Xin Chen NP 
 
 
 
 
 Home

## 2021-05-27 ENCOUNTER — INPATIENT (INPATIENT)
Facility: HOSPITAL | Age: 81
LOS: 10 days | Discharge: HOME HEALTH | End: 2021-06-07
Attending: HOSPITALIST | Admitting: HOSPITALIST
Payer: MEDICARE

## 2021-05-27 VITALS
RESPIRATION RATE: 18 BRPM | OXYGEN SATURATION: 100 % | DIASTOLIC BLOOD PRESSURE: 41 MMHG | SYSTOLIC BLOOD PRESSURE: 109 MMHG | TEMPERATURE: 98 F | HEART RATE: 70 BPM | HEIGHT: 67 IN

## 2021-05-27 DIAGNOSIS — R29.6 REPEATED FALLS: ICD-10-CM

## 2021-05-27 DIAGNOSIS — E11.9 TYPE 2 DIABETES MELLITUS WITHOUT COMPLICATIONS: ICD-10-CM

## 2021-05-27 DIAGNOSIS — Z29.9 ENCOUNTER FOR PROPHYLACTIC MEASURES, UNSPECIFIED: ICD-10-CM

## 2021-05-27 DIAGNOSIS — F03.90 UNSPECIFIED DEMENTIA WITHOUT BEHAVIORAL DISTURBANCE: ICD-10-CM

## 2021-05-27 DIAGNOSIS — I10 ESSENTIAL (PRIMARY) HYPERTENSION: ICD-10-CM

## 2021-05-27 DIAGNOSIS — I50.32 CHRONIC DIASTOLIC (CONGESTIVE) HEART FAILURE: ICD-10-CM

## 2021-05-27 DIAGNOSIS — N40.0 BENIGN PROSTATIC HYPERPLASIA WITHOUT LOWER URINARY TRACT SYMPTOMS: ICD-10-CM

## 2021-05-27 DIAGNOSIS — I25.10 ATHEROSCLEROTIC HEART DISEASE OF NATIVE CORONARY ARTERY WITHOUT ANGINA PECTORIS: ICD-10-CM

## 2021-05-27 DIAGNOSIS — U07.1 COVID-19: ICD-10-CM

## 2021-05-27 DIAGNOSIS — G20 PARKINSON'S DISEASE: ICD-10-CM

## 2021-05-27 LAB
ALBUMIN SERPL ELPH-MCNC: 3.6 G/DL — SIGNIFICANT CHANGE UP (ref 3.3–5)
ALP SERPL-CCNC: 150 U/L — HIGH (ref 40–120)
ALT FLD-CCNC: 7 U/L — SIGNIFICANT CHANGE UP (ref 4–41)
ANION GAP SERPL CALC-SCNC: 14 MMOL/L — SIGNIFICANT CHANGE UP (ref 7–14)
APPEARANCE UR: CLEAR — SIGNIFICANT CHANGE UP
APTT BLD: 43.6 SEC — HIGH (ref 27–36.3)
AST SERPL-CCNC: 9 U/L — SIGNIFICANT CHANGE UP (ref 4–40)
BACTERIA # UR AUTO: ABNORMAL
BASOPHILS # BLD AUTO: 0.05 K/UL — SIGNIFICANT CHANGE UP (ref 0–0.2)
BASOPHILS NFR BLD AUTO: 0.6 % — SIGNIFICANT CHANGE UP (ref 0–2)
BILIRUB SERPL-MCNC: 0.6 MG/DL — SIGNIFICANT CHANGE UP (ref 0.2–1.2)
BILIRUB UR-MCNC: NEGATIVE — SIGNIFICANT CHANGE UP
BUN SERPL-MCNC: 39 MG/DL — HIGH (ref 7–23)
CALCIUM SERPL-MCNC: 9.8 MG/DL — SIGNIFICANT CHANGE UP (ref 8.4–10.5)
CHLORIDE SERPL-SCNC: 101 MMOL/L — SIGNIFICANT CHANGE UP (ref 98–107)
CO2 SERPL-SCNC: 21 MMOL/L — LOW (ref 22–31)
COLOR SPEC: SIGNIFICANT CHANGE UP
CREAT SERPL-MCNC: 0.8 MG/DL — SIGNIFICANT CHANGE UP (ref 0.5–1.3)
D DIMER BLD IA.RAPID-MCNC: 3455 NG/ML DDU — HIGH
DIFF PNL FLD: NEGATIVE — SIGNIFICANT CHANGE UP
EOSINOPHIL # BLD AUTO: 0.09 K/UL — SIGNIFICANT CHANGE UP (ref 0–0.5)
EOSINOPHIL NFR BLD AUTO: 1.2 % — SIGNIFICANT CHANGE UP (ref 0–6)
GLUCOSE BLDC GLUCOMTR-MCNC: 121 MG/DL — HIGH (ref 70–99)
GLUCOSE BLDC GLUCOMTR-MCNC: 130 MG/DL — HIGH (ref 70–99)
GLUCOSE BLDC GLUCOMTR-MCNC: 96 MG/DL — SIGNIFICANT CHANGE UP (ref 70–99)
GLUCOSE SERPL-MCNC: 82 MG/DL — SIGNIFICANT CHANGE UP (ref 70–99)
GLUCOSE UR QL: ABNORMAL
HCT VFR BLD CALC: 35.4 % — LOW (ref 39–50)
HGB BLD-MCNC: 11.2 G/DL — LOW (ref 13–17)
IANC: 5.67 K/UL — SIGNIFICANT CHANGE UP (ref 1.5–8.5)
IMM GRANULOCYTES NFR BLD AUTO: 0.4 % — SIGNIFICANT CHANGE UP (ref 0–1.5)
INR BLD: 1.38 RATIO — HIGH (ref 0.88–1.16)
KETONES UR-MCNC: NEGATIVE — SIGNIFICANT CHANGE UP
LEUKOCYTE ESTERASE UR-ACNC: NEGATIVE — SIGNIFICANT CHANGE UP
LYMPHOCYTES # BLD AUTO: 1.11 K/UL — SIGNIFICANT CHANGE UP (ref 1–3.3)
LYMPHOCYTES # BLD AUTO: 14.3 % — SIGNIFICANT CHANGE UP (ref 13–44)
MCHC RBC-ENTMCNC: 28.9 PG — SIGNIFICANT CHANGE UP (ref 27–34)
MCHC RBC-ENTMCNC: 31.6 GM/DL — LOW (ref 32–36)
MCV RBC AUTO: 91.2 FL — SIGNIFICANT CHANGE UP (ref 80–100)
MONOCYTES # BLD AUTO: 0.8 K/UL — SIGNIFICANT CHANGE UP (ref 0–0.9)
MONOCYTES NFR BLD AUTO: 10.3 % — SIGNIFICANT CHANGE UP (ref 2–14)
NEUTROPHILS # BLD AUTO: 5.67 K/UL — SIGNIFICANT CHANGE UP (ref 1.8–7.4)
NEUTROPHILS NFR BLD AUTO: 73.2 % — SIGNIFICANT CHANGE UP (ref 43–77)
NITRITE UR-MCNC: NEGATIVE — SIGNIFICANT CHANGE UP
NRBC # BLD: 0 /100 WBCS — SIGNIFICANT CHANGE UP
NRBC # FLD: 0 K/UL — SIGNIFICANT CHANGE UP
PH UR: 6.5 — SIGNIFICANT CHANGE UP (ref 5–8)
PLATELET # BLD AUTO: 234 K/UL — SIGNIFICANT CHANGE UP (ref 150–400)
POTASSIUM SERPL-MCNC: 4.6 MMOL/L — SIGNIFICANT CHANGE UP (ref 3.5–5.3)
POTASSIUM SERPL-SCNC: 4.6 MMOL/L — SIGNIFICANT CHANGE UP (ref 3.5–5.3)
PROT SERPL-MCNC: 7.1 G/DL — SIGNIFICANT CHANGE UP (ref 6–8.3)
PROT UR-MCNC: ABNORMAL
PROTHROM AB SERPL-ACNC: 15.5 SEC — HIGH (ref 10.6–13.6)
RBC # BLD: 3.88 M/UL — LOW (ref 4.2–5.8)
RBC # FLD: 16.3 % — HIGH (ref 10.3–14.5)
SARS-COV-2 RNA SPEC QL NAA+PROBE: DETECTED
SODIUM SERPL-SCNC: 136 MMOL/L — SIGNIFICANT CHANGE UP (ref 135–145)
SP GR SPEC: 1.02 — SIGNIFICANT CHANGE UP (ref 1.01–1.02)
TROPONIN T, HIGH SENSITIVITY RESULT: 13 NG/L — SIGNIFICANT CHANGE UP
UROBILINOGEN FLD QL: SIGNIFICANT CHANGE UP
WBC # BLD: 7.75 K/UL — SIGNIFICANT CHANGE UP (ref 3.8–10.5)
WBC # FLD AUTO: 7.75 K/UL — SIGNIFICANT CHANGE UP (ref 3.8–10.5)

## 2021-05-27 PROCEDURE — 71045 X-RAY EXAM CHEST 1 VIEW: CPT | Mod: 26

## 2021-05-27 PROCEDURE — 99285 EMERGENCY DEPT VISIT HI MDM: CPT | Mod: CS

## 2021-05-27 PROCEDURE — 72170 X-RAY EXAM OF PELVIS: CPT | Mod: 26

## 2021-05-27 PROCEDURE — 99223 1ST HOSP IP/OBS HIGH 75: CPT

## 2021-05-27 PROCEDURE — 73090 X-RAY EXAM OF FOREARM: CPT | Mod: 26,LT

## 2021-05-27 PROCEDURE — 72125 CT NECK SPINE W/O DYE: CPT | Mod: 26

## 2021-05-27 PROCEDURE — 70450 CT HEAD/BRAIN W/O DYE: CPT | Mod: 26

## 2021-05-27 RX ORDER — SODIUM CHLORIDE 9 MG/ML
1000 INJECTION, SOLUTION INTRAVENOUS
Refills: 0 | Status: DISCONTINUED | OUTPATIENT
Start: 2021-05-27 | End: 2021-06-07

## 2021-05-27 RX ORDER — CARBIDOPA AND LEVODOPA 25; 100 MG/1; MG/1
1 TABLET ORAL THREE TIMES A DAY
Refills: 0 | Status: DISCONTINUED | OUTPATIENT
Start: 2021-05-27 | End: 2021-06-07

## 2021-05-27 RX ORDER — METOPROLOL TARTRATE 50 MG
50 TABLET ORAL DAILY
Refills: 0 | Status: DISCONTINUED | OUTPATIENT
Start: 2021-05-27 | End: 2021-05-27

## 2021-05-27 RX ORDER — INSULIN LISPRO 100/ML
VIAL (ML) SUBCUTANEOUS AT BEDTIME
Refills: 0 | Status: DISCONTINUED | OUTPATIENT
Start: 2021-05-27 | End: 2021-06-07

## 2021-05-27 RX ORDER — CLOPIDOGREL BISULFATE 75 MG/1
75 TABLET, FILM COATED ORAL DAILY
Refills: 0 | Status: DISCONTINUED | OUTPATIENT
Start: 2021-05-28 | End: 2021-06-07

## 2021-05-27 RX ORDER — INSULIN LISPRO 100/ML
VIAL (ML) SUBCUTANEOUS
Refills: 0 | Status: DISCONTINUED | OUTPATIENT
Start: 2021-05-27 | End: 2021-06-07

## 2021-05-27 RX ORDER — PANTOPRAZOLE SODIUM 20 MG/1
40 TABLET, DELAYED RELEASE ORAL
Refills: 0 | Status: DISCONTINUED | OUTPATIENT
Start: 2021-05-27 | End: 2021-06-07

## 2021-05-27 RX ORDER — DEXTROSE 50 % IN WATER 50 %
12.5 SYRINGE (ML) INTRAVENOUS ONCE
Refills: 0 | Status: DISCONTINUED | OUTPATIENT
Start: 2021-05-27 | End: 2021-06-07

## 2021-05-27 RX ORDER — GLUCAGON INJECTION, SOLUTION 0.5 MG/.1ML
1 INJECTION, SOLUTION SUBCUTANEOUS ONCE
Refills: 0 | Status: DISCONTINUED | OUTPATIENT
Start: 2021-05-27 | End: 2021-06-07

## 2021-05-27 RX ORDER — TETANUS TOXOID, REDUCED DIPHTHERIA TOXOID AND ACELLULAR PERTUSSIS VACCINE, ADSORBED 5; 2.5; 8; 8; 2.5 [IU]/.5ML; [IU]/.5ML; UG/.5ML; UG/.5ML; UG/.5ML
0.5 SUSPENSION INTRAMUSCULAR ONCE
Refills: 0 | Status: COMPLETED | OUTPATIENT
Start: 2021-05-27 | End: 2021-05-27

## 2021-05-27 RX ORDER — DEXTROSE 50 % IN WATER 50 %
15 SYRINGE (ML) INTRAVENOUS ONCE
Refills: 0 | Status: DISCONTINUED | OUTPATIENT
Start: 2021-05-27 | End: 2021-06-07

## 2021-05-27 RX ORDER — DEXTROSE 50 % IN WATER 50 %
25 SYRINGE (ML) INTRAVENOUS ONCE
Refills: 0 | Status: DISCONTINUED | OUTPATIENT
Start: 2021-05-27 | End: 2021-06-07

## 2021-05-27 RX ORDER — QUETIAPINE FUMARATE 200 MG/1
50 TABLET, FILM COATED ORAL AT BEDTIME
Refills: 0 | Status: DISCONTINUED | OUTPATIENT
Start: 2021-05-27 | End: 2021-06-07

## 2021-05-27 RX ORDER — RIVAROXABAN 15 MG-20MG
10 KIT ORAL DAILY
Refills: 0 | Status: DISCONTINUED | OUTPATIENT
Start: 2021-05-28 | End: 2021-05-29

## 2021-05-27 RX ORDER — TAMSULOSIN HYDROCHLORIDE 0.4 MG/1
0.4 CAPSULE ORAL AT BEDTIME
Refills: 0 | Status: DISCONTINUED | OUTPATIENT
Start: 2021-05-27 | End: 2021-06-07

## 2021-05-27 RX ORDER — ATORVASTATIN CALCIUM 80 MG/1
10 TABLET, FILM COATED ORAL AT BEDTIME
Refills: 0 | Status: DISCONTINUED | OUTPATIENT
Start: 2021-05-27 | End: 2021-06-07

## 2021-05-27 RX ADMIN — TETANUS TOXOID, REDUCED DIPHTHERIA TOXOID AND ACELLULAR PERTUSSIS VACCINE, ADSORBED 0.5 MILLILITER(S): 5; 2.5; 8; 8; 2.5 SUSPENSION INTRAMUSCULAR at 03:26

## 2021-05-27 RX ADMIN — CARBIDOPA AND LEVODOPA 1 TABLET(S): 25; 100 TABLET ORAL at 14:56

## 2021-05-27 RX ADMIN — ATORVASTATIN CALCIUM 10 MILLIGRAM(S): 80 TABLET, FILM COATED ORAL at 21:45

## 2021-05-27 RX ADMIN — CARBIDOPA AND LEVODOPA 1 TABLET(S): 25; 100 TABLET ORAL at 21:46

## 2021-05-27 RX ADMIN — TAMSULOSIN HYDROCHLORIDE 0.4 MILLIGRAM(S): 0.4 CAPSULE ORAL at 21:46

## 2021-05-27 RX ADMIN — QUETIAPINE FUMARATE 50 MILLIGRAM(S): 200 TABLET, FILM COATED ORAL at 21:46

## 2021-05-27 NOTE — ED ADULT NURSE NOTE - OBJECTIVE STATEMENT
Facilitating RN- 81 year old male  with hx of HTN, DM, PK, HF ,dementia coming IN FOR  FALL. As per  her private aid , she heard in fall and saw him on his left side. Pt refuses to use to walker even though his balance is off. The aid denies  fever, chills, sob, urinary symptoms. Abrasion to left posterior forearm superficial about 4cm, site cleaned bacitracin applied. Stage 1 in sacrum. Hernia noted near umbilicus. Pt A*Ox2, pt pleasantly confused, labs obtained, unable to get IV. Pt endorses pain  upper chest since the fall. Primary RN updated.

## 2021-05-27 NOTE — ED ADULT NURSE REASSESSMENT NOTE - NS ED NURSE REASSESS COMMENT FT1
Pt awake, alert and oriented x 2.   Pt denies pain.   able to move himself in bed with assistance.   voiding to diaper.   Respirations even and unlabored.  VSS.   MD team aware of BP.   IV site unremarkable.   awaiting inpatient bed.   No SOB, no chest pain, no cough, no fever, no n/v/d.

## 2021-05-27 NOTE — H&P ADULT - PROBLEM SELECTOR PLAN 1
- Unclear if pt had syncopal episode but he was found awake and alert by his aid after the fall  - Monitor on tele   - Check orthostatic VS if possible  - TTE   - D-dimer

## 2021-05-27 NOTE — ED PROVIDER NOTE - OBJECTIVE STATEMENT
PGY3/MD Marci. 82 y/o M HTN, DM, HLD, dementia (AOx2 at baseline), Parkinson's disease, CAD s/p stents x2 (>20 years ago), recent new onset HF (EF: 50%, on Xarelto, Moderate diastolic dysfunction (Stage II), moderate pulmonary hypertension, small pericardial effusion), L pleural effusion, BPH, gastric ulcers, admission in March for empyema, COVID, who presented to the ED, for s/p fall. Pt was with the aid, who herd him fall, pt has had off-balanced but denies using walker, + risk of fall, found on the floor, but pt was awake and alert. The aid denies fever, chills, or change in mental status. PGY3/MD Marci. 82 y/o M HTN, DM, HLD, dementia (AOx2 at baseline), Parkinson's disease, CAD s/p stents x2 (>20 years ago), recent new onset HF (EF: 50%, on Xarelto, Moderate diastolic dysfunction (Stage II), moderate pulmonary hypertension, small pericardial effusion), L pleural effusion, BPH, gastric ulcers, admission in March for empyema, COVID, who presented to the ED, for s/p fall. Pt was with the aid, who herd him fall, pt has had off-balanced but denies using walker, + risk of fall, found on the floor, but pt was awake and alert. The aid denies fever, chills, or change in mental status.    Brother - Berto Mitchell ([facility], [phone])

## 2021-05-27 NOTE — PHYSICAL THERAPY INITIAL EVALUATION ADULT - GENERAL OBSERVATIONS, REHAB EVAL
Pt found semi reclined in bed in NAD; spoke to PABLO Chao and patient may participate in physical therapy.

## 2021-05-27 NOTE — H&P ADULT - NSHPLABSRESULTS_GEN_ALL_CORE
.  LABS:                         11.2   7.75  )-----------( 234      ( 27 May 2021 05:48 )             35.4         136  |  101  |  39<H>  ----------------------------<  82  4.6   |  21<L>  |  0.80    Ca    9.8      27 May 2021 05:48    TPro  7.1  /  Alb  3.6  /  TBili  0.6  /  DBili  x   /  AST  9   /  ALT  7   /  AlkPhos  150<H>      PT/INR - ( 27 May 2021 05:48 )   PT: 15.5 sec;   INR: 1.38 ratio         PTT - ( 27 May 2021 05:48 )  PTT:43.6 sec  Urinalysis Basic - ( 27 May 2021 07:03 )    Color: Light Yellow / Appearance: Clear / S.020 / pH: x  Gluc: x / Ketone: Negative  / Bili: Negative / Urobili: <2 mg/dL   Blood: x / Protein: Trace / Nitrite: Negative   Leuk Esterase: Negative / RBC: x / WBC x   Sq Epi: x / Non Sq Epi: x / Bacteria: Few                RADIOLOGY, EKG & ADDITIONAL TESTS: Reviewed.

## 2021-05-27 NOTE — H&P ADULT - PROBLEM SELECTOR PLAN 10
- Pt on Xarelto, unclear indication  - Prior notes indicate pt is DNR, MOLST had been filled. Unable to contact his brother to confirm

## 2021-05-27 NOTE — H&P ADULT - ASSESSMENT
82 y/o M HTN, DM, HLD, dementia (AOx2 at baseline), Parkinson's disease, CAD s/p stents x2 (>20 years ago), recent new onset HF (EF: 50%, on Xarelto, Moderate diastolic dysfunction (Stage II), moderate pulmonary hypertension, small pericardial effusion), L pleural effusion, BPH, gastric ulcers presenting after fall, ?syncope.

## 2021-05-27 NOTE — PATIENT PROFILE ADULT - FUNCTIONAL SCREEN CURRENT LEVEL: SWALLOWING (IF SCORE 2 OR MORE FOR ANY ITEM, CONSULT REHAB SERVICES), MLM)
0 = swallows foods/liquids without difficulty Pt states he has no issues swallowing. RN observed patient drink water without coughing./0 = swallows foods/liquids without difficulty

## 2021-05-27 NOTE — PHYSICAL THERAPY INITIAL EVALUATION ADULT - PLANNED THERAPY INTERVENTIONS, PT EVAL
Patient left positioned for safety in bed in NAD, call bell in reach, all lines intact. +bed alarm. PABLO Chao aware/balance training/bed mobility training/gait training/strengthening/transfer training

## 2021-05-27 NOTE — H&P ADULT - NSHPPHYSICALEXAM_GEN_ALL_CORE
T(C): 36.4 (05-27-21 @ 10:38), Max: 36.7 (05-27-21 @ 01:56)  HR: 58 (05-27-21 @ 10:38) (58 - 70)  BP: 131/40 (05-27-21 @ 10:38) (109/41 - 131/40)  RR: 19 (05-27-21 @ 10:38) (16 - 19)  SpO2: 98% (05-27-21 @ 10:38) (98% - 100%)    GENERAL: No acute distress, well-developed  HEAD:  Atraumatic, Normocephalic  ENT: EOMI, PERRLA, conjunctiva and sclera clear, Neck supple, No JVD, moist mucosa, no pharyngeal erythema, no tonsillar enlargement or exudate  CHEST/LUNG: Clear to auscultation bilaterally; No wheeze, equal breath sounds bilaterally   HEART: Regular rate and rhythm; No murmurs, rubs, or gallops  ABDOMEN: Soft, Nontender, Nondistended; Bowel sounds present, no organomegaly  EXTREMITIES:  2+ Peripheral Pulses, No clubbing, cyanosis, or edema  PSYCH: AAOx3, normal affect, normal behavior   NEUROLOGY: non-focal, cranial nerves intact  SKIN: Normal color, No rashes or lesions

## 2021-05-27 NOTE — ED ADULT NURSE NOTE - INTERVENTIONS DEFINITIONS
[place] to call system/Call bell, personal items and telephone within reach/Instruct patient to call for assistance/Non-slip footwear when patient is off stretcher/Monitor gait and stability

## 2021-05-27 NOTE — H&P ADULT - HISTORY OF PRESENT ILLNESS
80 y/o M HTN, DM, HLD, dementia (AOx2 at baseline), Parkinson's disease, CAD s/p stents x2 (>20 years ago), recent new onset HF (EF: 50%, on Xarelto, Moderate diastolic dysfunction (Stage II), moderate pulmonary hypertension, small pericardial effusion), L pleural effusion, BPH, gastric ulcers presenting after syncopal episode. Pt is poor historian. Initially stated that he was lifting something when he fell then stated he was walking to the bathroom at the time. Per ED records, pt's aid her him fall but did not witness it. He was found on the floor and was awake and alert at the time. The aid denied any fever, chills, or change in mental status.      VS: 131/40  58  97.5  19  98% on RA  80 yo M HTN, DM, HLD, dementia (AOx2 at baseline), Parkinson's disease, CAD s/p stents x2 (>20 years ago), recent new onset HF (EF 50%, on Xarelto, Moderate diastolic dysfunction (Stage II), moderate pulmonary hypertension, small pericardial effusion), L pleural effusion, BPH, gastric ulcers presenting after fall, ? syncope. Pt is poor historian. Initially stated that he was lifting something when he fell then stated he was walking to the bathroom at the time. Per ED records, pt's aid her him fall but did not witness it. He was found on the floor and was awake and alert at the time. The aid denied any fever, chills, or change in mental status.      VS: 131/40  58  97.5  19  98% on RA

## 2021-05-27 NOTE — PHYSICAL THERAPY INITIAL EVALUATION ADULT - PERTINENT HX OF CURRENT PROBLEM, REHAB EVAL
80 yo M HTN, DM, HLD, dementia (AOx2 at baseline), Parkinson's disease, CAD s/p stents x2 (>20 years ago), recent new onset HF (EF 50%, on Xarelto, Moderate diastolic dysfunction (Stage II), moderate pulmonary hypertension, small pericardial effusion), L pleural effusion, BPH, gastric ulcers presenting after fall, ? syncope.  CT head negative. Xrays negative.

## 2021-05-27 NOTE — ED ADULT TRIAGE NOTE - MODE OF ARRIVAL
Ambulance 34yo male PMh seizure disorder, autism presenting with suspicion of foreign body. Patient was eating lunch when staff noticed a piece of the insulation from his lunchbox was missing. No vomiting, no difficulty breathing. Patient is nonverbal. Staff from [facility] brought patient to urgent care who told them to come here. Patient has history of PICA in past. EMS 32yo male PMH seizure disorder, autism presenting with suspicion of foreign body. Patient was eating lunch when staff noticed a piece of the insulation from his lunch box was missing, about 1 inch in diameter. No vomiting, no difficulty breathing. Patient is nonverbal. Staff from [facility] brought patient to urgent care who told them to come here. Patient has history of PICA in past.

## 2021-05-27 NOTE — ED ADULT TRIAGE NOTE - CHIEF COMPLAINT QUOTE
mechanical fall from standing position, unknown head trauma, sustained abrasion L arm, on Xarelto, HX dementia

## 2021-05-27 NOTE — H&P ADULT - PROBLEM SELECTOR PLAN 9
- Pt appears Euvolemic  - Continue Toprol. Continue Lasix if orthostatic VS negative   - f/u TTE - Pt appears Euvolemic  - Continue Toprol, Lasix and Losartan If orthostatics are neg   - f/u TTE

## 2021-05-27 NOTE — ED PROVIDER NOTE - ATTENDING CONTRIBUTION TO CARE
MD Brooke:  I performed a face to face bedside interview with patient regarding history of present illness, review of symptoms and past medical history. I completed an independent physical exam(documented below).  I have discussed patient's plan of care with resident.   I agree with note as stated above, having amended the EMR as needed to reflect my findings. I have discussed the assessment and plan of care.  This includes during the time I functioned as the attending physician for this patient.  PE:  Gen: Alert, NAD  Head: NC, AT,  EOMI, normal lids/conjunctiva  ENT:  normal hearing, patent oropharynx without erythema/exudate  Neck: +supple, no tenderness/meningismus/JVD, +Trachea midline  Chest: no chest wall tenderness, equal chest rise  Pulm: Bilateral BS, normal resp effort, no wheeze/stridor/retractions  CV: RRR, no M/R/G, +dist pulses  Abd: +BS, soft, NT/ND  Rectal: deferred  Mskel: no edema/erythema/cyanosis  Skin: no rash  Neuro: AAOx3, no sensory/motor deficits, CN 2-12 intact   MDM:   82yo M pmh as above, s/p recent admission in March for empyema, bibems for eval after unwitnessed mechanical fall while on xarelto, no LOC. Per HHA, pt dc'd w/ home PT in March, underwent PT for approx 2wks but does not believe he MD Brooke:  I performed a face to face bedside interview with patient regarding history of present illness, review of symptoms and past medical history. I completed an independent physical exam(documented below).  I have discussed patient's plan of care with resident.   I agree with note as stated above, having amended the EMR as needed to reflect my findings. I have discussed the assessment and plan of care.  This includes during the time I functioned as the attending physician for this patient.  PE:  Gen: Alert, NAD  Head: NC, AT,  EOMI, normal lids/conjunctiva  ENT:  normal hearing, patent oropharynx without erythema/exudate  Neck: +supple, no tenderness/meningismus/JVD, +Trachea midline  Chest: no chest wall tenderness, equal chest rise  Pulm: Bilateral BS, normal resp effort, no wheeze/stridor/retractions  CV: RRR, no M/R/G, +dist pulses  Abd: +BS, soft, NT/ND  Rectal: deferred  Mskel: no edema/erythema/cyanosis  Skin: no rash  Neuro: AAOx2, no sensory/motor deficits, CN 2-12 intact   MDM:   80yo M pmh as above, s/p recent admission in March for empyema, bibems for eval after unwitnessed mechanical fall while on xarelto, no LOC. Per HHA, pt dc'd w/ home PT in March, underwent PT for approx 2wks but does not believe he improved and states pt is still unwilling to use walker. On my assessment, pt also reporting having thoughts of dying by "jumping out of the window" but when asked specifically if he has thoughts to hurt himself or anyone else, patient responds "no". Given fact that pt continues to be a fall risk (on xarelto) with ?failed outpt PT, and now also ?passive SI - pt will likely benefit from re-admission to better address these issues.

## 2021-05-27 NOTE — ED PROVIDER NOTE - CLINICAL SUMMARY MEDICAL DECISION MAKING FREE TEXT BOX
PGY3/MD Marci. 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

## 2021-05-28 LAB
A1C WITH ESTIMATED AVERAGE GLUCOSE RESULT: 5.7 % — HIGH (ref 4–5.6)
ANION GAP SERPL CALC-SCNC: 11 MMOL/L — SIGNIFICANT CHANGE UP (ref 7–14)
BUN SERPL-MCNC: 27 MG/DL — HIGH (ref 7–23)
CALCIUM SERPL-MCNC: 9.3 MG/DL — SIGNIFICANT CHANGE UP (ref 8.4–10.5)
CHLORIDE SERPL-SCNC: 101 MMOL/L — SIGNIFICANT CHANGE UP (ref 98–107)
CO2 SERPL-SCNC: 27 MMOL/L — SIGNIFICANT CHANGE UP (ref 22–31)
COVID-19 SPIKE DOMAIN AB INTERP: POSITIVE
COVID-19 SPIKE DOMAIN ANTIBODY RESULT: 235 U/ML — HIGH
CREAT SERPL-MCNC: 0.59 MG/DL — SIGNIFICANT CHANGE UP (ref 0.5–1.3)
ESTIMATED AVERAGE GLUCOSE: 117 MG/DL — HIGH (ref 68–114)
GLUCOSE BLDC GLUCOMTR-MCNC: 110 MG/DL — HIGH (ref 70–99)
GLUCOSE BLDC GLUCOMTR-MCNC: 135 MG/DL — HIGH (ref 70–99)
GLUCOSE BLDC GLUCOMTR-MCNC: 97 MG/DL — SIGNIFICANT CHANGE UP (ref 70–99)
GLUCOSE BLDC GLUCOMTR-MCNC: 98 MG/DL — SIGNIFICANT CHANGE UP (ref 70–99)
GLUCOSE SERPL-MCNC: 96 MG/DL — SIGNIFICANT CHANGE UP (ref 70–99)
HCT VFR BLD CALC: 33.7 % — LOW (ref 39–50)
HGB BLD-MCNC: 11.4 G/DL — LOW (ref 13–17)
MAGNESIUM SERPL-MCNC: 1.1 MG/DL — LOW (ref 1.6–2.6)
MCHC RBC-ENTMCNC: 29.6 PG — SIGNIFICANT CHANGE UP (ref 27–34)
MCHC RBC-ENTMCNC: 33.8 GM/DL — SIGNIFICANT CHANGE UP (ref 32–36)
MCV RBC AUTO: 87.5 FL — SIGNIFICANT CHANGE UP (ref 80–100)
NRBC # BLD: 0 /100 WBCS — SIGNIFICANT CHANGE UP
NRBC # FLD: 0 K/UL — SIGNIFICANT CHANGE UP
PHOSPHATE SERPL-MCNC: 3.1 MG/DL — SIGNIFICANT CHANGE UP (ref 2.5–4.5)
PLATELET # BLD AUTO: 237 K/UL — SIGNIFICANT CHANGE UP (ref 150–400)
POTASSIUM SERPL-MCNC: 4.3 MMOL/L — SIGNIFICANT CHANGE UP (ref 3.5–5.3)
POTASSIUM SERPL-SCNC: 4.3 MMOL/L — SIGNIFICANT CHANGE UP (ref 3.5–5.3)
RBC # BLD: 3.85 M/UL — LOW (ref 4.2–5.8)
RBC # FLD: 16 % — HIGH (ref 10.3–14.5)
SARS-COV-2 IGG+IGM SERPL QL IA: 235 U/ML — HIGH
SARS-COV-2 IGG+IGM SERPL QL IA: POSITIVE
SODIUM SERPL-SCNC: 139 MMOL/L — SIGNIFICANT CHANGE UP (ref 135–145)
WBC # BLD: 6.88 K/UL — SIGNIFICANT CHANGE UP (ref 3.8–10.5)
WBC # FLD AUTO: 6.88 K/UL — SIGNIFICANT CHANGE UP (ref 3.8–10.5)

## 2021-05-28 PROCEDURE — 93306 TTE W/DOPPLER COMPLETE: CPT | Mod: 26

## 2021-05-28 PROCEDURE — 99233 SBSQ HOSP IP/OBS HIGH 50: CPT

## 2021-05-28 RX ORDER — MAGNESIUM SULFATE 500 MG/ML
1 VIAL (ML) INJECTION ONCE
Refills: 0 | Status: COMPLETED | OUTPATIENT
Start: 2021-05-28 | End: 2021-05-28

## 2021-05-28 RX ORDER — METOPROLOL TARTRATE 50 MG
12.5 TABLET ORAL
Refills: 0 | Status: DISCONTINUED | OUTPATIENT
Start: 2021-05-28 | End: 2021-05-29

## 2021-05-28 RX ADMIN — TAMSULOSIN HYDROCHLORIDE 0.4 MILLIGRAM(S): 0.4 CAPSULE ORAL at 20:34

## 2021-05-28 RX ADMIN — Medication 12.5 MILLIGRAM(S): at 17:27

## 2021-05-28 RX ADMIN — ATORVASTATIN CALCIUM 10 MILLIGRAM(S): 80 TABLET, FILM COATED ORAL at 20:34

## 2021-05-28 RX ADMIN — CLOPIDOGREL BISULFATE 75 MILLIGRAM(S): 75 TABLET, FILM COATED ORAL at 12:05

## 2021-05-28 RX ADMIN — RIVAROXABAN 10 MILLIGRAM(S): KIT at 12:05

## 2021-05-28 RX ADMIN — CARBIDOPA AND LEVODOPA 1 TABLET(S): 25; 100 TABLET ORAL at 20:34

## 2021-05-28 RX ADMIN — CARBIDOPA AND LEVODOPA 1 TABLET(S): 25; 100 TABLET ORAL at 12:05

## 2021-05-28 RX ADMIN — CARBIDOPA AND LEVODOPA 1 TABLET(S): 25; 100 TABLET ORAL at 05:10

## 2021-05-28 RX ADMIN — QUETIAPINE FUMARATE 50 MILLIGRAM(S): 200 TABLET, FILM COATED ORAL at 20:34

## 2021-05-28 RX ADMIN — Medication 100 GRAM(S): at 08:33

## 2021-05-28 RX ADMIN — PANTOPRAZOLE SODIUM 40 MILLIGRAM(S): 20 TABLET, DELAYED RELEASE ORAL at 05:07

## 2021-05-28 NOTE — PROGRESS NOTE ADULT - PROBLEM SELECTOR PLAN 9
- Pt appears Euvolemic  - Restart Toprol, Lasix and Losartan If orthostatics are neg   - f/u TTE - Pt appears Euvolemic  - Restart Toprol for now at a lower dose as bp is on the low side  -hold Lasix and Losartan for now, restart as needed if bp improves  - f/u TTE

## 2021-05-28 NOTE — PROGRESS NOTE ADULT - PROBLEM SELECTOR PLAN 10
- Pt on Xarelto, - appears pt was started upon dc after covid in March, will continue for dvt ppx for now  - Prior notes indicate pt is DNR, MOLST had been filled. Unable to contact his brother to confirm, (called  brother Stephen Thomson at the number provided in sunrise) - Pt on Xarelto, - appears pt was started upon dc after covid in March, will continue for dvt ppx for now  - Prior notes indicate pt is DNR, MOLST had been filled. Unable to contact his brother to confirm, (called  brother Stephen Thomson at the number provided in sunrise)    - Hypomagnesemia- replete mag

## 2021-05-28 NOTE — PROGRESS NOTE ADULT - SUBJECTIVE AND OBJECTIVE BOX
Patient is a 81y old  Male who presents with a chief complaint of Fall (27 May 2021 18:15)      SUBJECTIVE / OVERNIGHT EVENTS: Pt seen and examined at 10:15am, no overnight events, tele with sinus with pacs, pt denies any pain, although history limited due to his mental status. No other issues reported by nsg.    MEDICATIONS  (STANDING):  atorvastatin 10 milliGRAM(s) Oral at bedtime  carbidopa/levodopa  25/100 1 Tablet(s) Oral three times a day  clopidogrel Tablet 75 milliGRAM(s) Oral daily  dextrose 40% Gel 15 Gram(s) Oral once  dextrose 5%. 1000 milliLiter(s) (50 mL/Hr) IV Continuous <Continuous>  dextrose 5%. 1000 milliLiter(s) (100 mL/Hr) IV Continuous <Continuous>  dextrose 50% Injectable 25 Gram(s) IV Push once  dextrose 50% Injectable 12.5 Gram(s) IV Push once  dextrose 50% Injectable 25 Gram(s) IV Push once  glucagon  Injectable 1 milliGRAM(s) IntraMuscular once  insulin lispro (ADMELOG) corrective regimen sliding scale   SubCutaneous three times a day before meals  insulin lispro (ADMELOG) corrective regimen sliding scale   SubCutaneous at bedtime  pantoprazole    Tablet 40 milliGRAM(s) Oral before breakfast  QUEtiapine 50 milliGRAM(s) Oral at bedtime  rivaroxaban 10 milliGRAM(s) Oral daily  tamsulosin 0.4 milliGRAM(s) Oral at bedtime    MEDICATIONS  (PRN):      Vital Signs Last 24 Hrs  T(C): 36.3 (28 May 2021 11:35), Max: 37.1 (27 May 2021 21:45)  T(F): 97.3 (28 May 2021 11:35), Max: 98.7 (27 May 2021 21:45)  HR: 60 (28 May 2021 11:35) (60 - 82)  BP: 111/61 (28 May 2021 11:35) (111/61 - 144/93)  BP(mean): --  RR: 17 (28 May 2021 11:35) (17 - 18)  SpO2: 99% (28 May 2021 11:35) (98% - 99%)  CAPILLARY BLOOD GLUCOSE      POCT Blood Glucose.: 110 mg/dL (28 May 2021 12:20)  POCT Blood Glucose.: 97 mg/dL (28 May 2021 08:47)  POCT Blood Glucose.: 121 mg/dL (27 May 2021 21:22)  POCT Blood Glucose.: 96 mg/dL (27 May 2021 18:28)  POCT Blood Glucose.: 130 mg/dL (27 May 2021 12:40)    I&O's Summary    27 May 2021 07:01  -  28 May 2021 07:00  --------------------------------------------------------  IN: 240 mL / OUT: 0 mL / NET: 240 mL        PHYSICAL EXAM:  GENERAL: NAD, well-developed  CHEST/LUNG: Clear to auscultation bilaterally; No wheeze  HEART: Regular rate and rhythm; No murmurs  ABDOMEN: Soft, Nontender, Nondistended  EXTREMITIES: no LE edema  PSYCH: Calm  NEUROLOGY: AA, oriented to self, says he is in [facility]  SKIN: No rashes or lesions    LABS:                        11.4   6.88  )-----------( 237      ( 28 May 2021 06:48 )             33.7     05-28    139  |  101  |  27<H>  ----------------------------<  96  4.3   |  27  |  0.59    Ca    9.3      28 May 2021 06:48  Phos  3.1       Mg     1.1         TPro  7.1  /  Alb  3.6  /  TBili  0.6  /  DBili  x   /  AST  9   /  ALT  7   /  AlkPhos  150<H>  05    PT/INR - ( 27 May 2021 05:48 )   PT: 15.5 sec;   INR: 1.38 ratio         PTT - ( 27 May 2021 05:48 )  PTT:43.6 sec      Urinalysis Basic - ( 27 May 2021 07:03 )    Color: Light Yellow / Appearance: Clear / S.020 / pH: x  Gluc: x / Ketone: Negative  / Bili: Negative / Urobili: <2 mg/dL   Blood: x / Protein: Trace / Nitrite: Negative   Leuk Esterase: Negative / RBC: x / WBC x   Sq Epi: x / Non Sq Epi: x / Bacteria: Few        RADIOLOGY & ADDITIONAL TESTS:    Imaging Personally Reviewed:    Consultant(s) Notes Reviewed:      Care Discussed with Consultants/Other Providers:

## 2021-05-28 NOTE — PROGRESS NOTE ADULT - ASSESSMENT
Assessment and Plan     1) Fall: mechanical , denies LOC     2) CAD s/p stent  -cath in 2010 showed mild luminal disease and patent stents  -denies chest pain  -echo with mild LV dysfunction  -c/w plavix     3) DVT PPX Xarelto

## 2021-05-28 NOTE — PROGRESS NOTE ADULT - PROBLEM SELECTOR PLAN 2
- No ischemic changes on EKG. Troponin downtrending 16->13  - Continue  Plavix and statin - No ischemic changes on EKG. Troponin downtrending 16->13  - Continue  Plavix and statin  - cards consulted and follg

## 2021-05-28 NOTE — PROGRESS NOTE ADULT - SUBJECTIVE AND OBJECTIVE BOX
Bennie Hwang MD  Interventional Cardiology / Endovascular Specialist  [place] Office : [phone] [place] [address] [place] [address]  Tel:   [place] Office : [phone] [facility] [place] [address]  Tel: [phone]  Cell : [phone]    Pt lying in bed in NAD     MEDICATIONS:  clopidogrel Tablet 75 milliGRAM(s) Oral daily  metoprolol tartrate 12.5 milliGRAM(s) Oral two times a day  rivaroxaban 10 milliGRAM(s) Oral daily  tamsulosin 0.4 milliGRAM(s) Oral at bedtime        carbidopa/levodopa  25/100 1 Tablet(s) Oral three times a day  QUEtiapine 50 milliGRAM(s) Oral at bedtime    pantoprazole    Tablet 40 milliGRAM(s) Oral before breakfast    atorvastatin 10 milliGRAM(s) Oral at bedtime  dextrose 40% Gel 15 Gram(s) Oral once  dextrose 50% Injectable 25 Gram(s) IV Push once  dextrose 50% Injectable 12.5 Gram(s) IV Push once  dextrose 50% Injectable 25 Gram(s) IV Push once  glucagon  Injectable 1 milliGRAM(s) IntraMuscular once  insulin lispro (ADMELOG) corrective regimen sliding scale   SubCutaneous three times a day before meals  insulin lispro (ADMELOG) corrective regimen sliding scale   SubCutaneous at bedtime    dextrose 5%. 1000 milliLiter(s) IV Continuous <Continuous>  dextrose 5%. 1000 milliLiter(s) IV Continuous <Continuous>      PAST MEDICAL/SURGICAL HISTORY  PAST MEDICAL & SURGICAL HISTORY:  Hypercholesterolemia    DM (Diabetes Mellitus)    HTN (Hypertension)    CAD (Coronary Artery Disease)  s/p cardiac stent ( &gt; 20 years ago)    BPH (benign prostatic hyperplasia)    Coronary Stent  x 2 ( 20 years )        SOCIAL HISTORY: Substance Use (street drugs): ( x ) never used  (  ) other:    FAMILY HISTORY:  No pertinent family history in first degree relatives        REVIEW OF SYSTEMS:  CONSTITUTIONAL: No fever, weight loss, or fatigue  EYES: No eye pain, visual disturbances, or discharge  ENMT:  No difficulty hearing, tinnitus, vertigo; No sinus or throat pain  BREASTS: No pain, masses, or nipple discharge  GASTROINTESTINAL: No abdominal or epigastric pain. No nausea, vomiting, or hematemesis; No diarrhea or constipation. No melena or hematochezia.  GENITOURINARY: No dysuria, frequency, hematuria, or incontinence  NEUROLOGICAL: No headaches, memory loss, loss of strength, numbness, or tremors  ENDOCRINE: No heat or cold intolerance; No hair loss  MUSCULOSKELETAL: No joint pain or swelling; No muscle, back, or extremity pain  PSYCHIATRIC: No depression, anxiety, mood swings, or difficulty sleeping  HEME/LYMPH: No easy bruising, or bleeding gums  All others negative    PHYSICAL EXAM:  T(C): 36.4 (05-28-21 @ 17:25), Max: 37.1 (05-27-21 @ 21:45)  HR: 66 (05-28-21 @ 17:25) (60 - 82)  BP: 128/68 (05-28-21 @ 17:25) (111/61 - 144/93)  RR: 17 (05-28-21 @ 17:25) (17 - 18)  SpO2: 98% (05-28-21 @ 17:25) (98% - 99%)  Wt(kg): --  I&O's Summary    27 May 2021 07:01  -  28 May 2021 07:00  --------------------------------------------------------  IN: 240 mL / OUT: 0 mL / NET: 240 mL        EYES: EOMI, PERRLA, conjunctiva and sclera clear  ENMT: No tonsillar erythema, exudates, or enlargement  Cardiovascular: Normal S1 S2, No JVD, No murmurs, No edema  Respiratory: Lungs clear to auscultation	  Gastrointestinal:  Soft, Non-tender, + BS	  Extremities: No edema, cool                                11.4   6.88  )-----------( 237      ( 28 May 2021 06:48 )             33.7     05-28    139  |  101  |  27<H>  ----------------------------<  96  4.3   |  27  |  0.59    Ca    9.3      28 May 2021 06:48  Phos  3.1     05-28  Mg     1.1     05-28    TPro  7.1  /  Alb  3.6  /  TBili  0.6  /  DBili  x   /  AST  9   /  ALT  7   /  AlkPhos  150<H>  05-27    proBNP:   Lipid Profile:   HgA1c:   TSH:     Consultant(s) Notes Reviewed:  [x ] YES  [ ] NO    Care Discussed with Consultants/Other Providers [ x] YES  [ ] NO    Imaging Personally Reviewed independently:  [x] YES  [ ] NO    All labs, radiologic studies, vitals, orders and medications list reviewed. Patient is seen and examined at bedside. Case discussed with medical team.

## 2021-05-28 NOTE — PROGRESS NOTE ADULT - PROBLEM SELECTOR PLAN 1
- Unclear if pt had syncopal episode but he was found awake and alert by his aid after the fall  - Monitor on tele   - Monitor orthostatics  - TTE - Unclear if pt had syncopal episode but he was found awake and alert by his aid after the fall  - Monitor on tele   - Monitor orthostatics- improving now  - TTE

## 2021-05-29 LAB
ALBUMIN SERPL ELPH-MCNC: 3 G/DL — LOW (ref 3.3–5)
ALP SERPL-CCNC: 148 U/L — HIGH (ref 40–120)
ALT FLD-CCNC: <5 U/L — SIGNIFICANT CHANGE UP (ref 4–41)
ANION GAP SERPL CALC-SCNC: 11 MMOL/L — SIGNIFICANT CHANGE UP (ref 7–14)
ANION GAP SERPL CALC-SCNC: 14 MMOL/L — SIGNIFICANT CHANGE UP (ref 7–14)
APPEARANCE UR: CLEAR — SIGNIFICANT CHANGE UP
AST SERPL-CCNC: 6 U/L — SIGNIFICANT CHANGE UP (ref 4–40)
BACTERIA # UR AUTO: NEGATIVE — SIGNIFICANT CHANGE UP
BASOPHILS # BLD AUTO: 0.04 K/UL — SIGNIFICANT CHANGE UP (ref 0–0.2)
BASOPHILS NFR BLD AUTO: 0.7 % — SIGNIFICANT CHANGE UP (ref 0–2)
BILIRUB SERPL-MCNC: 1.1 MG/DL — SIGNIFICANT CHANGE UP (ref 0.2–1.2)
BILIRUB UR-MCNC: NEGATIVE — SIGNIFICANT CHANGE UP
BLOOD GAS VENOUS COMPREHENSIVE RESULT: SIGNIFICANT CHANGE UP
BUN SERPL-MCNC: 29 MG/DL — HIGH (ref 7–23)
BUN SERPL-MCNC: 32 MG/DL — HIGH (ref 7–23)
CALCIUM SERPL-MCNC: 8.8 MG/DL — SIGNIFICANT CHANGE UP (ref 8.4–10.5)
CALCIUM SERPL-MCNC: 9.4 MG/DL — SIGNIFICANT CHANGE UP (ref 8.4–10.5)
CHLORIDE SERPL-SCNC: 102 MMOL/L — SIGNIFICANT CHANGE UP (ref 98–107)
CHLORIDE SERPL-SCNC: 99 MMOL/L — SIGNIFICANT CHANGE UP (ref 98–107)
CO2 SERPL-SCNC: 25 MMOL/L — SIGNIFICANT CHANGE UP (ref 22–31)
CO2 SERPL-SCNC: 25 MMOL/L — SIGNIFICANT CHANGE UP (ref 22–31)
COLOR SPEC: YELLOW — SIGNIFICANT CHANGE UP
CORTIS F PM SERPL-MCNC: 7.4 UG/DL — SIGNIFICANT CHANGE UP (ref 2.7–10.5)
CREAT SERPL-MCNC: 0.82 MG/DL — SIGNIFICANT CHANGE UP (ref 0.5–1.3)
CREAT SERPL-MCNC: 0.84 MG/DL — SIGNIFICANT CHANGE UP (ref 0.5–1.3)
CULTURE RESULTS: SIGNIFICANT CHANGE UP
DIFF PNL FLD: NEGATIVE — SIGNIFICANT CHANGE UP
EOSINOPHIL # BLD AUTO: 0.14 K/UL — SIGNIFICANT CHANGE UP (ref 0–0.5)
EOSINOPHIL NFR BLD AUTO: 2.6 % — SIGNIFICANT CHANGE UP (ref 0–6)
EPI CELLS # UR: 1 /HPF — SIGNIFICANT CHANGE UP (ref 0–5)
GLUCOSE BLDC GLUCOMTR-MCNC: 145 MG/DL — HIGH (ref 70–99)
GLUCOSE BLDC GLUCOMTR-MCNC: 168 MG/DL — HIGH (ref 70–99)
GLUCOSE BLDC GLUCOMTR-MCNC: 171 MG/DL — HIGH (ref 70–99)
GLUCOSE BLDC GLUCOMTR-MCNC: 86 MG/DL — SIGNIFICANT CHANGE UP (ref 70–99)
GLUCOSE SERPL-MCNC: 157 MG/DL — HIGH (ref 70–99)
GLUCOSE SERPL-MCNC: 76 MG/DL — SIGNIFICANT CHANGE UP (ref 70–99)
GLUCOSE UR QL: ABNORMAL
HCT VFR BLD CALC: 33.1 % — LOW (ref 39–50)
HCT VFR BLD CALC: 33.6 % — LOW (ref 39–50)
HGB BLD-MCNC: 10.7 G/DL — LOW (ref 13–17)
HGB BLD-MCNC: 10.9 G/DL — LOW (ref 13–17)
IANC: 3.49 K/UL — SIGNIFICANT CHANGE UP (ref 1.5–8.5)
IMM GRANULOCYTES NFR BLD AUTO: 0.2 % — SIGNIFICANT CHANGE UP (ref 0–1.5)
KETONES UR-MCNC: NEGATIVE — SIGNIFICANT CHANGE UP
LACTATE SERPL-SCNC: 1.4 MMOL/L — SIGNIFICANT CHANGE UP (ref 0.5–2)
LEUKOCYTE ESTERASE UR-ACNC: NEGATIVE — SIGNIFICANT CHANGE UP
LYMPHOCYTES # BLD AUTO: 0.96 K/UL — LOW (ref 1–3.3)
LYMPHOCYTES # BLD AUTO: 17.8 % — SIGNIFICANT CHANGE UP (ref 13–44)
MAGNESIUM SERPL-MCNC: 1.5 MG/DL — LOW (ref 1.6–2.6)
MAGNESIUM SERPL-MCNC: 1.8 MG/DL — SIGNIFICANT CHANGE UP (ref 1.6–2.6)
MCHC RBC-ENTMCNC: 29.1 PG — SIGNIFICANT CHANGE UP (ref 27–34)
MCHC RBC-ENTMCNC: 29.3 PG — SIGNIFICANT CHANGE UP (ref 27–34)
MCHC RBC-ENTMCNC: 32.3 GM/DL — SIGNIFICANT CHANGE UP (ref 32–36)
MCHC RBC-ENTMCNC: 32.4 GM/DL — SIGNIFICANT CHANGE UP (ref 32–36)
MCV RBC AUTO: 89.8 FL — SIGNIFICANT CHANGE UP (ref 80–100)
MCV RBC AUTO: 90.7 FL — SIGNIFICANT CHANGE UP (ref 80–100)
MONOCYTES # BLD AUTO: 0.75 K/UL — SIGNIFICANT CHANGE UP (ref 0–0.9)
MONOCYTES NFR BLD AUTO: 13.9 % — SIGNIFICANT CHANGE UP (ref 2–14)
NEUTROPHILS # BLD AUTO: 3.49 K/UL — SIGNIFICANT CHANGE UP (ref 1.8–7.4)
NEUTROPHILS NFR BLD AUTO: 64.8 % — SIGNIFICANT CHANGE UP (ref 43–77)
NITRITE UR-MCNC: NEGATIVE — SIGNIFICANT CHANGE UP
NRBC # BLD: 0 /100 WBCS — SIGNIFICANT CHANGE UP
NRBC # BLD: 0 /100 WBCS — SIGNIFICANT CHANGE UP
NRBC # FLD: 0 K/UL — SIGNIFICANT CHANGE UP
NRBC # FLD: 0 K/UL — SIGNIFICANT CHANGE UP
PH UR: 5.5 — SIGNIFICANT CHANGE UP (ref 5–8)
PHOSPHATE SERPL-MCNC: 4 MG/DL — SIGNIFICANT CHANGE UP (ref 2.5–4.5)
PLATELET # BLD AUTO: 236 K/UL — SIGNIFICANT CHANGE UP (ref 150–400)
PLATELET # BLD AUTO: 236 K/UL — SIGNIFICANT CHANGE UP (ref 150–400)
POTASSIUM SERPL-MCNC: 4.2 MMOL/L — SIGNIFICANT CHANGE UP (ref 3.5–5.3)
POTASSIUM SERPL-MCNC: 4.3 MMOL/L — SIGNIFICANT CHANGE UP (ref 3.5–5.3)
POTASSIUM SERPL-SCNC: 4.2 MMOL/L — SIGNIFICANT CHANGE UP (ref 3.5–5.3)
POTASSIUM SERPL-SCNC: 4.3 MMOL/L — SIGNIFICANT CHANGE UP (ref 3.5–5.3)
PROCALCITONIN SERPL-MCNC: 0.1 NG/ML — SIGNIFICANT CHANGE UP (ref 0.02–0.1)
PROT SERPL-MCNC: 6 G/DL — SIGNIFICANT CHANGE UP (ref 6–8.3)
PROT UR-MCNC: NEGATIVE — SIGNIFICANT CHANGE UP
RBC # BLD: 3.65 M/UL — LOW (ref 4.2–5.8)
RBC # BLD: 3.74 M/UL — LOW (ref 4.2–5.8)
RBC # FLD: 15.9 % — HIGH (ref 10.3–14.5)
RBC # FLD: 16.1 % — HIGH (ref 10.3–14.5)
RBC CASTS # UR COMP ASSIST: 1 /HPF — SIGNIFICANT CHANGE UP (ref 0–4)
SODIUM SERPL-SCNC: 138 MMOL/L — SIGNIFICANT CHANGE UP (ref 135–145)
SODIUM SERPL-SCNC: 138 MMOL/L — SIGNIFICANT CHANGE UP (ref 135–145)
SP GR SPEC: 1.02 — SIGNIFICANT CHANGE UP (ref 1.01–1.02)
SPECIMEN SOURCE: SIGNIFICANT CHANGE UP
TSH SERPL-MCNC: 4.74 UIU/ML — HIGH (ref 0.27–4.2)
UROBILINOGEN FLD QL: ABNORMAL
WBC # BLD: 5.39 K/UL — SIGNIFICANT CHANGE UP (ref 3.8–10.5)
WBC # BLD: 6.24 K/UL — SIGNIFICANT CHANGE UP (ref 3.8–10.5)
WBC # FLD AUTO: 5.39 K/UL — SIGNIFICANT CHANGE UP (ref 3.8–10.5)
WBC # FLD AUTO: 6.24 K/UL — SIGNIFICANT CHANGE UP (ref 3.8–10.5)
WBC UR QL: 1 /HPF — SIGNIFICANT CHANGE UP (ref 0–5)

## 2021-05-29 PROCEDURE — 99223 1ST HOSP IP/OBS HIGH 75: CPT | Mod: GC

## 2021-05-29 PROCEDURE — 99233 SBSQ HOSP IP/OBS HIGH 50: CPT

## 2021-05-29 PROCEDURE — 93306 TTE W/DOPPLER COMPLETE: CPT | Mod: 26

## 2021-05-29 PROCEDURE — 71045 X-RAY EXAM CHEST 1 VIEW: CPT | Mod: 26

## 2021-05-29 PROCEDURE — 93010 ELECTROCARDIOGRAM REPORT: CPT

## 2021-05-29 RX ORDER — HYDROCORTISONE 20 MG
50 TABLET ORAL EVERY 8 HOURS
Refills: 0 | Status: DISCONTINUED | OUTPATIENT
Start: 2021-05-29 | End: 2021-05-31

## 2021-05-29 RX ORDER — SODIUM CHLORIDE 9 MG/ML
500 INJECTION, SOLUTION INTRAVENOUS
Refills: 0 | Status: DISCONTINUED | OUTPATIENT
Start: 2021-05-29 | End: 2021-05-29

## 2021-05-29 RX ORDER — SODIUM CHLORIDE 9 MG/ML
1000 INJECTION, SOLUTION INTRAVENOUS
Refills: 0 | Status: DISCONTINUED | OUTPATIENT
Start: 2021-05-29 | End: 2021-06-01

## 2021-05-29 RX ORDER — SODIUM CHLORIDE 9 MG/ML
1000 INJECTION INTRAMUSCULAR; INTRAVENOUS; SUBCUTANEOUS
Refills: 0 | Status: DISCONTINUED | OUTPATIENT
Start: 2021-05-29 | End: 2021-06-01

## 2021-05-29 RX ORDER — MIDODRINE HYDROCHLORIDE 2.5 MG/1
10 TABLET ORAL ONCE
Refills: 0 | Status: DISCONTINUED | OUTPATIENT
Start: 2021-05-29 | End: 2021-05-29

## 2021-05-29 RX ORDER — ENOXAPARIN SODIUM 100 MG/ML
40 INJECTION SUBCUTANEOUS DAILY
Refills: 0 | Status: DISCONTINUED | OUTPATIENT
Start: 2021-05-30 | End: 2021-06-07

## 2021-05-29 RX ORDER — SODIUM CHLORIDE 9 MG/ML
500 INJECTION INTRAMUSCULAR; INTRAVENOUS; SUBCUTANEOUS ONCE
Refills: 0 | Status: COMPLETED | OUTPATIENT
Start: 2021-05-29 | End: 2021-05-29

## 2021-05-29 RX ORDER — PIPERACILLIN AND TAZOBACTAM 4; .5 G/20ML; G/20ML
3.38 INJECTION, POWDER, LYOPHILIZED, FOR SOLUTION INTRAVENOUS EVERY 8 HOURS
Refills: 0 | Status: DISCONTINUED | OUTPATIENT
Start: 2021-05-29 | End: 2021-06-04

## 2021-05-29 RX ORDER — MAGNESIUM SULFATE 500 MG/ML
2 VIAL (ML) INJECTION ONCE
Refills: 0 | Status: COMPLETED | OUTPATIENT
Start: 2021-05-29 | End: 2021-05-29

## 2021-05-29 RX ORDER — MIDODRINE HYDROCHLORIDE 2.5 MG/1
10 TABLET ORAL THREE TIMES A DAY
Refills: 0 | Status: DISCONTINUED | OUTPATIENT
Start: 2021-05-29 | End: 2021-05-29

## 2021-05-29 RX ORDER — PIPERACILLIN AND TAZOBACTAM 4; .5 G/20ML; G/20ML
3.38 INJECTION, POWDER, LYOPHILIZED, FOR SOLUTION INTRAVENOUS ONCE
Refills: 0 | Status: COMPLETED | OUTPATIENT
Start: 2021-05-29 | End: 2021-05-29

## 2021-05-29 RX ORDER — SODIUM CHLORIDE 9 MG/ML
500 INJECTION, SOLUTION INTRAVENOUS ONCE
Refills: 0 | Status: COMPLETED | OUTPATIENT
Start: 2021-05-29 | End: 2021-05-29

## 2021-05-29 RX ADMIN — Medication 50 GRAM(S): at 08:43

## 2021-05-29 RX ADMIN — MIDODRINE HYDROCHLORIDE 10 MILLIGRAM(S): 2.5 TABLET ORAL at 15:27

## 2021-05-29 RX ADMIN — SODIUM CHLORIDE 650 MILLILITER(S): 9 INJECTION, SOLUTION INTRAVENOUS at 17:10

## 2021-05-29 RX ADMIN — CARBIDOPA AND LEVODOPA 1 TABLET(S): 25; 100 TABLET ORAL at 08:43

## 2021-05-29 RX ADMIN — RIVAROXABAN 10 MILLIGRAM(S): KIT at 08:43

## 2021-05-29 RX ADMIN — PANTOPRAZOLE SODIUM 40 MILLIGRAM(S): 20 TABLET, DELAYED RELEASE ORAL at 05:22

## 2021-05-29 RX ADMIN — Medication 50 MILLIGRAM(S): at 18:27

## 2021-05-29 RX ADMIN — Medication 12.5 MILLIGRAM(S): at 05:21

## 2021-05-29 RX ADMIN — SODIUM CHLORIDE 500 MILLILITER(S): 9 INJECTION, SOLUTION INTRAVENOUS at 14:24

## 2021-05-29 RX ADMIN — CARBIDOPA AND LEVODOPA 1 TABLET(S): 25; 100 TABLET ORAL at 21:02

## 2021-05-29 RX ADMIN — ATORVASTATIN CALCIUM 10 MILLIGRAM(S): 80 TABLET, FILM COATED ORAL at 21:02

## 2021-05-29 RX ADMIN — PIPERACILLIN AND TAZOBACTAM 25 GRAM(S): 4; .5 INJECTION, POWDER, LYOPHILIZED, FOR SOLUTION INTRAVENOUS at 21:02

## 2021-05-29 RX ADMIN — SODIUM CHLORIDE 500 MILLILITER(S): 9 INJECTION INTRAMUSCULAR; INTRAVENOUS; SUBCUTANEOUS at 13:00

## 2021-05-29 RX ADMIN — TAMSULOSIN HYDROCHLORIDE 0.4 MILLIGRAM(S): 0.4 CAPSULE ORAL at 21:02

## 2021-05-29 RX ADMIN — SODIUM CHLORIDE 500 MILLILITER(S): 9 INJECTION INTRAMUSCULAR; INTRAVENOUS; SUBCUTANEOUS at 12:02

## 2021-05-29 RX ADMIN — SODIUM CHLORIDE 75 MILLILITER(S): 9 INJECTION INTRAMUSCULAR; INTRAVENOUS; SUBCUTANEOUS at 10:07

## 2021-05-29 RX ADMIN — PIPERACILLIN AND TAZOBACTAM 200 GRAM(S): 4; .5 INJECTION, POWDER, LYOPHILIZED, FOR SOLUTION INTRAVENOUS at 17:10

## 2021-05-29 RX ADMIN — CARBIDOPA AND LEVODOPA 1 TABLET(S): 25; 100 TABLET ORAL at 05:21

## 2021-05-29 RX ADMIN — Medication 1: at 16:14

## 2021-05-29 RX ADMIN — CLOPIDOGREL BISULFATE 75 MILLIGRAM(S): 75 TABLET, FILM COATED ORAL at 08:45

## 2021-05-29 RX ADMIN — QUETIAPINE FUMARATE 50 MILLIGRAM(S): 200 TABLET, FILM COATED ORAL at 21:02

## 2021-05-29 NOTE — PROGRESS NOTE ADULT - PROBLEM SELECTOR PLAN 10
- Pt on Xarelto, - appears pt was started upon dc after covid in March, more than a month now, change to prophylactic lovenox  - Prior notes indicate pt is DNR, MOLST had been filled. Unable to contact his brother to confirm, (called  brother Stephen Thomson at the number provided in sunrise)    - Hypomagnesemia- replete mag

## 2021-05-29 NOTE — PROGRESS NOTE ADULT - PROBLEM SELECTOR PLAN 9
- Pt appears Euvolemic  - Restart Toprol for now at a lower dose as bp is on the low side  -hold Lasix and Losartan for now, restart as needed if bp improves  - echo increased pericardial effusion  -f/u cardiology recs

## 2021-05-29 NOTE — PROGRESS NOTE ADULT - SUBJECTIVE AND OBJECTIVE BOX
Dr. Karolina Duckworth  Pager [phone]    PROGRESS NOTE:     Patient is a 81y old  Male who presents with a chief complaint of Fall (28 May 2021 17:41)      SUBJECTIVE / OVERNIGHT EVENTS: pt denies chest pain or sob , demented, states he lives at home with his brother and mother  ADDITIONAL REVIEW OF SYSTEMS: afebrile    MEDICATIONS  (STANDING):  atorvastatin 10 milliGRAM(s) Oral at bedtime  carbidopa/levodopa  25/100 1 Tablet(s) Oral three times a day  clopidogrel Tablet 75 milliGRAM(s) Oral daily  dextrose 40% Gel 15 Gram(s) Oral once  dextrose 5%. 1000 milliLiter(s) (50 mL/Hr) IV Continuous <Continuous>  dextrose 5%. 1000 milliLiter(s) (100 mL/Hr) IV Continuous <Continuous>  dextrose 50% Injectable 25 Gram(s) IV Push once  dextrose 50% Injectable 12.5 Gram(s) IV Push once  dextrose 50% Injectable 25 Gram(s) IV Push once  glucagon  Injectable 1 milliGRAM(s) IntraMuscular once  insulin lispro (ADMELOG) corrective regimen sliding scale   SubCutaneous three times a day before meals  insulin lispro (ADMELOG) corrective regimen sliding scale   SubCutaneous at bedtime  metoprolol tartrate 12.5 milliGRAM(s) Oral two times a day  pantoprazole    Tablet 40 milliGRAM(s) Oral before breakfast  QUEtiapine 50 milliGRAM(s) Oral at bedtime  rivaroxaban 10 milliGRAM(s) Oral daily  tamsulosin 0.4 milliGRAM(s) Oral at bedtime    MEDICATIONS  (PRN):      CAPILLARY BLOOD GLUCOSE      POCT Blood Glucose.: 86 mg/dL (29 May 2021 08:33)  POCT Blood Glucose.: 135 mg/dL (28 May 2021 21:00)  POCT Blood Glucose.: 98 mg/dL (28 May 2021 17:22)  POCT Blood Glucose.: 110 mg/dL (28 May 2021 12:20)    I&O's Summary    28 May 2021 07:01  -  29 May 2021 07:00  --------------------------------------------------------  IN: 360 mL / OUT: 700 mL / NET: -340 mL        PHYSICAL EXAM:  Vital Signs Last 24 Hrs  T(C): 36.5 (29 May 2021 05:20), Max: 36.5 (29 May 2021 05:20)  T(F): 97.7 (29 May 2021 05:20), Max: 97.7 (29 May 2021 05:20)  HR: 48 (29 May 2021 09:23) (48 - 70)  BP: 119/72 (29 May 2021 09:23) (111/61 - 128/68)  BP(mean): --  RR: 18 (29 May 2021 05:20) (17 - 18)  SpO2: 99% (29 May 2021 05:20) (98% - 99%)  CONSTITUTIONAL: NAD, debilitated   RESPIRATORY: Normal respiratory effort; lungs are clear to auscultation bilaterally  CARDIOVASCULAR: Regular rate and rhythm, normal S1 and S2, no murmur/rub/gallop; No lower extremity edema; Peripheral pulses are 2+ bilaterally  ABDOMEN: Nontender to palpation, normoactive bowel sounds, no rebound/guarding; No hepatosplenomegaly  MUSCULOSKELETAL: no clubbing or cyanosis of digits; no joint swelling or tenderness to palpation  PSYCH: A+O to person only, demented  LABS:                        10.9   6.24  )-----------( 236      ( 29 May 2021 06:55 )             33.6     05-29    138  |  99  |  29<H>  ----------------------------<  76  4.2   |  25  |  0.84    Ca    9.4      29 May 2021 06:55  Phos  4.0     05-29  Mg     1.5     05-29      Culture - Urine (collected 27 May 2021 06:30)  Source: .Urine Clean Catch (Midstream)  Final Report (29 May 2021 07:43):    <10,000 CFU/mL Normal Urogenital Rosalba    RADIOLOGY & ADDITIONAL TESTS:  Results Reviewed:   Imaging Personally Reviewed:  < from: CT Head No Cont (05.27.21 @ 07:12) >  CT Head:  No CT evidence of acute intracranial pathology.    CT Cervical Spine: No CT evidence of cervical spine fracture or traumatic malalignment.    e< from: Transthoracic Echocardiogram (05.28.21 @ 19:28) >  CONCLUSIONS:  LVEF 55-60%  1. Increased relative wall thickness with normal left  ventricular mass index, consistent with concentric left  ventricular remodeling.  2. Normal left ventricular systolic function. No segmental  wall motion abnormalities.  3. The right ventricle is not well visualized; grossly  normal right ventricular systolic function.  4. Estimated right ventricular systolic pressure equals 24  mm Hg, assuming right atrial pressure equals 10 mm Hg,  consistent with normal pulmonary pressures.  5. Normal pericardium with Moderate sized circumferential  pericardial effusion seen predominantly apical and  inferolateral to the left ventricle (Largest measurement  1.6 cm apical and lateral to the left ventricle in the 4  chamber view. It measures 1.2 cm inferior to the left  ventricle in the 3 chamber view). No echocardiographic  evidence of tamponade is seen ( no late diastolic RA or  early diastolic RV diastolic collapse)  *** Compared with echocardiogram of 2/5/2021, left  ventricular function has increased. Pulmonary hypertension  is not seen on the present study. Pericardial effusion size  has increased.    Electrocardiogram Personally Reviewed:    COORDINATION OF CARE:  Care Discussed with Consultants/Other Providers [Y/N]:  Prior or Outpatient Records Reviewed [Y/N]:   Dr. Karolina Duckworth  Pager [phone]    PROGRESS NOTE:     Patient is a 81y old  Male who presents with a chief complaint of Fall (28 May 2021 17:41)      SUBJECTIVE / OVERNIGHT EVENTS: pt denies chest pain or sob , demented, states he lives at home with his brother and mother  ADDITIONAL REVIEW OF SYSTEMS: afebrile    MEDICATIONS  (STANDING):  atorvastatin 10 milliGRAM(s) Oral at bedtime  carbidopa/levodopa  25/100 1 Tablet(s) Oral three times a day  clopidogrel Tablet 75 milliGRAM(s) Oral daily  dextrose 40% Gel 15 Gram(s) Oral once  dextrose 5%. 1000 milliLiter(s) (50 mL/Hr) IV Continuous <Continuous>  dextrose 5%. 1000 milliLiter(s) (100 mL/Hr) IV Continuous <Continuous>  dextrose 50% Injectable 25 Gram(s) IV Push once  dextrose 50% Injectable 12.5 Gram(s) IV Push once  dextrose 50% Injectable 25 Gram(s) IV Push once  glucagon  Injectable 1 milliGRAM(s) IntraMuscular once  insulin lispro (ADMELOG) corrective regimen sliding scale   SubCutaneous three times a day before meals  insulin lispro (ADMELOG) corrective regimen sliding scale   SubCutaneous at bedtime  metoprolol tartrate 12.5 milliGRAM(s) Oral two times a day  pantoprazole    Tablet 40 milliGRAM(s) Oral before breakfast  QUEtiapine 50 milliGRAM(s) Oral at bedtime  rivaroxaban 10 milliGRAM(s) Oral daily  tamsulosin 0.4 milliGRAM(s) Oral at bedtime    MEDICATIONS  (PRN):      CAPILLARY BLOOD GLUCOSE      POCT Blood Glucose.: 86 mg/dL (29 May 2021 08:33)  POCT Blood Glucose.: 135 mg/dL (28 May 2021 21:00)  POCT Blood Glucose.: 98 mg/dL (28 May 2021 17:22)  POCT Blood Glucose.: 110 mg/dL (28 May 2021 12:20)    I&O's Summary    28 May 2021 07:01  -  29 May 2021 07:00  --------------------------------------------------------  IN: 360 mL / OUT: 700 mL / NET: -340 mL        PHYSICAL EXAM:  Vital Signs Last 24 Hrs  T(C): 36.5 (29 May 2021 05:20), Max: 36.5 (29 May 2021 05:20)  T(F): 97.7 (29 May 2021 05:20), Max: 97.7 (29 May 2021 05:20)  HR: 48 (29 May 2021 09:23) (48 - 70)  BP: 119/72 (29 May 2021 09:23) (111/61 - 128/68)  BP(mean): --  RR: 18 (29 May 2021 05:20) (17 - 18)  SpO2: 99% (29 May 2021 05:20) (98% - 99%)  CONSTITUTIONAL: NAD, debilitated   RESPIRATORY: Normal respiratory effort; lungs are clear to auscultation bilaterally  CARDIOVASCULAR: Regular rate and rhythm, normal S1 and S2, no murmur/rub/gallop; No lower extremity edema; Peripheral pulses are 2+ bilaterally  ABDOMEN: Nontender to palpation, normoactive bowel sounds, no rebound/guarding; No hepatosplenomegaly  MUSCULOSKELETAL: no clubbing or cyanosis of digits; no joint swelling or tenderness to palpation  PSYCH: A+O to person only, demented  LABS:                        10.9   6.24  )-----------( 236      ( 29 May 2021 06:55 )             33.6     05-29    138  |  99  |  29<H>  ----------------------------<  76  4.2   |  25  |  0.84    Ca    9.4      29 May 2021 06:55  Phos  4.0     05-29  Mg     1.5     05-29      Culture - Urine (collected 27 May 2021 06:30)  Source: .Urine Clean Catch (Midstream)  Final Report (29 May 2021 07:43):    <10,000 CFU/mL Normal Urogenital Rosalba    RADIOLOGY & ADDITIONAL TESTS:  Results Reviewed:   Imaging Personally Reviewed:  < from: CT Head No Cont (05.27.21 @ 07:12) >  CT Head:  No CT evidence of acute intracranial pathology.    CT Cervical Spine: No CT evidence of cervical spine fracture or traumatic malalignment.    e< from: Transthoracic Echocardiogram (05.28.21 @ 19:28) >  CONCLUSIONS:  LVEF 55-60%  1. Increased relative wall thickness with normal left  ventricular mass index, consistent with concentric left  ventricular remodeling.  2. Normal left ventricular systolic function. No segmental  wall motion abnormalities.  3. The right ventricle is not well visualized; grossly  normal right ventricular systolic function.  4. Estimated right ventricular systolic pressure equals 24  mm Hg, assuming right atrial pressure equals 10 mm Hg,  consistent with normal pulmonary pressures.  5. Normal pericardium with Moderate sized circumferential  pericardial effusion seen predominantly apical and  inferolateral to the left ventricle (Largest measurement  1.6 cm apical and lateral to the left ventricle in the 4  chamber view. It measures 1.2 cm inferior to the left  ventricle in the 3 chamber view). No echocardiographic  evidence of tamponade is seen ( no late diastolic RA or  early diastolic RV diastolic collapse)  *** Compared with echocardiogram of 2/5/2021, left  ventricular function has increased. Pulmonary hypertension  is not seen on the present study. Pericardial effusion size  has increased.    Electrocardiogram Personally Reviewed:    COORDINATION OF CARE:  Care Discussed with Consultants/Other Providers [Y/N]: acp Tessy, fluid bolus, echo, if still. hypotensive ICU consult  Prior or Outpatient Records Reviewed [Y/N]:

## 2021-05-29 NOTE — PROGRESS NOTE ADULT - ASSESSMENT
80 y/o M HTN, DM, HLD, dementia (AOx2 at baseline), Parkinson's disease, CAD s/p stents x2 (>20 years ago), recent new onset HF (EF: 50%, on Xarelto, Moderate diastolic dysfunction (Stage II), moderate pulmonary hypertension, small pericardial effusion), L pleural effusion, BPH, gastric ulcers presenting after fall, ?syncope.

## 2021-05-29 NOTE — CHART NOTE - NSCHARTNOTEFT_GEN_A_CORE
Medicine Subsequent Hospital Care Note- ACP  CC: Patient is a 81y old  Male who presents with a chief complaint of Fall (29 May 2021 13:50)    HPI/Subjective: HPI:  80 yo M HTN, DM, HLD, dementia (AOx2 at baseline), Parkinson's disease, CAD s/p stents x2 (>20 years ago), recent new onset HF (EF 50%, on Xarelto, Moderate diastolic dysfunction (Stage II), moderate pulmonary hypertension, small pericardial effusion), L pleural effusion, BPH, gastric ulcers presenting after fall, ? syncope. Pt is poor historian. Initially stated that he was lifting something when he fell then stated he was walking to the bathroom at the time. Per ED records, pt's aid her him fall but did not witness it. He was found on the floor and was awake and alert at the time. The aid denied any fever, chills, or change in mental status.    ROS:  Denies fever, chills, diaphoresis , malaise, night sweat, generalized weakness, SOB cough, sputum production, wheezing, hemoptysis, CP, GERBER, orthopnea, PND, palpitations, diaphoresis, lightheadedness, dizziness, syncope, edema. nausea, vomiting, diarrhea, constipation, abdominal pain, melena, hematochezia, dysphagia, dysuria, frequency, urgency, hematuria, nocturia.  [  ] I spoke with the attending, nurse, and family to obtain the history.  [  ] Unable to obtain history due to ___________.  -------------------------------------------------------------------------------------------------------------------------------------------------  Vital Signs:  Vital Signs Last 24 Hrs  T(C): 35.3 (05-29-21 @ 15:37), Max: 36.5 (05-29-21 @ 05:20)  T(F): 95.5 (05-29-21 @ 15:37), Max: 97.7 (05-29-21 @ 05:20)  HR: 49 (05-29-21 @ 15:15) (48 - 72)  BP: 80/42 (05-29-21 @ 15:15) (80/42 - 128/68)  RR: 18 (05-29-21 @ 11:21) (17 - 18)  SpO2: 100% (05-29-21 @ 11:21) (98% - 100%) on (O2)    Telemetry/Alarms:  General: WN/WD NAD  Neurology: Awake, nonfocal, SINGH x 2, history of dementia   Eyes: Scleras clear, PERRLA/ EOMI, Gross vision intact  ENT:Gross hearing intact, grossly patent pharynx, no stridor  Neck: Neck supple, trachea midline, No JVD,   Respiratory: CTA B/L, No wheezing, rales, rhonchi, Fine base crackes left side  CV: RRR, S1S2, no murmurs, rubs or gallops, bradycardic on tele   Abdominal: Soft, NT, ND +BS,   Extremities: No edema, + peripheral pulses  Skin: No Rashes, Hematoma, Ecchymosis  Lymphatic: No Neck, axilla, groin LAD  Psych: Oriented x 2, normal affect  Relevant labs, radiology and Medications reviewed                        10.7   5.39  )-----------( 236      ( 29 May 2021 15:58 )             33.1     05-29    138  |  99  |  29<H>  ----------------------------<  76  4.2   |  25  |  0.84    Ca    9.4      29 May 2021 06:55  Phos  4.0     05-29  Mg     1.5     05-29    MEDICATIONS  (STANDING):  atorvastatin 10 milliGRAM(s) Oral at bedtime  carbidopa/levodopa  25/100 1 Tablet(s) Oral three times a day  clopidogrel Tablet 75 milliGRAM(s) Oral daily  dextrose 40% Gel 15 Gram(s) Oral once  dextrose 5%. 1000 milliLiter(s) (50 mL/Hr) IV Continuous <Continuous>  dextrose 5%. 1000 milliLiter(s) (100 mL/Hr) IV Continuous <Continuous>  dextrose 50% Injectable 25 Gram(s) IV Push once  dextrose 50% Injectable 12.5 Gram(s) IV Push once  dextrose 50% Injectable 25 Gram(s) IV Push once  glucagon  Injectable 1 milliGRAM(s) IntraMuscular once  insulin lispro (ADMELOG) corrective regimen sliding scale   SubCutaneous three times a day before meals  insulin lispro (ADMELOG) corrective regimen sliding scale   SubCutaneous at bedtime  pantoprazole    Tablet 40 milliGRAM(s) Oral before breakfast  QUEtiapine 50 milliGRAM(s) Oral at bedtime  sodium chloride 0.9%. 1000 milliLiter(s) (75 mL/Hr) IV Continuous <Continuous>  tamsulosin 0.4 milliGRAM(s) Oral at bedtime    MEDICATIONS  (PRN):    I&O's Summary    28 May 2021 07:01  -  29 May 2021 07:00  --------------------------------------------------------  IN: 360 mL / OUT: 700 mL / NET: -340 mL    29 May 2021 07:01  -  29 May 2021 16:24  --------------------------------------------------------  IN: 240 mL / OUT: 200 mL / NET: 40 mL      I reviewed the above lab results, tests, telemetry, and  EKG interpretation. .  Assessment  81y Male  w/ PAST MEDICAL & SURGICAL HISTORY:  Hypercholesterolemia    DM (Diabetes Mellitus)    HTN (Hypertension)    CAD (Coronary Artery Disease)  s/p cardiac stent ( &gt; 20 years ago)    BPH (benign prostatic hyperplasia)    Coronary Stent  x 2 ( 20 years )    Admitted with complaints of a syncope/fall.   Called by RN for patient with multiple episodes of hypotension starting this am at approximately 1130. Initially NS 250cc bolus given followed by an additional 500cc. Dr. Duckworth made aware. Despite hypotension patient still with baseline mental asatus a&ox2 slightly sleepy but arousable.   Patient persistently hypotensive, associated with bradycardia in the 40s on telemetry. 12 lead EKG performed. Patient Provided with a TOTAL of 2L IVF per Dr. Duckworth. CCU called for consult to r/i carduac etiology condsidering patient with known worsening pericardial effusion. Per limited echo report, effusion decreased in size.   Patient this afternoon still with persistent hypotension last BP 80/40. Requested rectal temperature resulted with 95.5F. Panculture ordered. Patietn without any overt signs of cause of possible infection. MICU called for evaluation. Upon re-evaluation in the afternoon, patients mental status slightly worsened A&Ox1, MICU at bedside. Awaiting recommendations.    Of note, patient with history if being DNR on previous admissions. Patient unable to report and discuss his code status. Multiple calls made to his brother to attempt to confirm code status without response. Attending Dr. Duckworth aware. No MOLST in chart and or available from old charts.    PLAN  1)FU further MICU recs  2) VBG, lactate, BC x2, UA, Urine Cx, procalcitonin, CBC, CMP Mg/Phos     Disposition: Of note, patient with history if being DNR on previous admissions. Patient unable to report and discuaa his code status. Multiple calls made to his brother to attempt to confirm code status without response. Attending Dr. Duckworth aware.   Discussed with Dr. Duckworth  Clinical findings, labs, tests, telemetry, and ekg reviewed with attending. Will monitor patient closely.   [x] Moderate complexity/risk ( Time> 30min) Medicine Subsequent Hospital Care Note- ACP  CC: Patient is a 81y old  Male who presents with a chief complaint of Fall (29 May 2021 13:50)    HPI/Subjective: HPI:  80 yo M HTN, DM, HLD, dementia (AOx2 at baseline), Parkinson's disease, CAD s/p stents x2 (>20 years ago), recent new onset HF (EF 50%, on Xarelto, Moderate diastolic dysfunction (Stage II), moderate pulmonary hypertension, small pericardial effusion), L pleural effusion, BPH, gastric ulcers presenting after fall, ? syncope. Pt is poor historian. Initially stated that he was lifting something when he fell then stated he was walking to the bathroom at the time. Per ED records, pt's aid her him fall but did not witness it. He was found on the floor and was awake and alert at the time. The aid denied any fever, chills, or change in mental status.    ROS:  Denies fever, chills, diaphoresis , malaise, night sweat, generalized weakness, SOB cough, sputum production, wheezing, hemoptysis, CP, GERBER, orthopnea, PND, palpitations, diaphoresis, lightheadedness, dizziness, syncope, edema. nausea, vomiting, diarrhea, constipation, abdominal pain, melena, hematochezia, dysphagia, dysuria, frequency, urgency, hematuria, nocturia.  [  ] I spoke with the attending, nurse, and family to obtain the history.  [  ] Unable to obtain history due to ___________.  -------------------------------------------------------------------------------------------------------------------------------------------------  Vital Signs:  Vital Signs Last 24 Hrs  T(C): 35.3 (05-29-21 @ 15:37), Max: 36.5 (05-29-21 @ 05:20)  T(F): 95.5 (05-29-21 @ 15:37), Max: 97.7 (05-29-21 @ 05:20)  HR: 49 (05-29-21 @ 15:15) (48 - 72)  BP: 80/42 (05-29-21 @ 15:15) (80/42 - 128/68)  RR: 18 (05-29-21 @ 11:21) (17 - 18)  SpO2: 100% (05-29-21 @ 11:21) (98% - 100%) on (O2)    Telemetry/Alarms:  General: WN/WD NAD  Neurology: Awake, nonfocal, SINGH x 2, history of dementia   Eyes: Scleras clear, PERRLA/ EOMI, Gross vision intact  ENT:Gross hearing intact, grossly patent pharynx, no stridor  Neck: Neck supple, trachea midline, No JVD,   Respiratory: CTA B/L, No wheezing, rales, rhonchi, Fine base crackes left side  CV: RRR, S1S2, no murmurs, rubs or gallops, bradycardic on tele   Abdominal: Soft, NT, ND +BS,   Extremities: No edema, + peripheral pulses  Skin: No Rashes, Hematoma, Ecchymosis  Lymphatic: No Neck, axilla, groin LAD  Psych: Oriented x 2, normal affect  Relevant labs, radiology and Medications reviewed                        10.7   5.39  )-----------( 236      ( 29 May 2021 15:58 )             33.1     05-29    138  |  99  |  29<H>  ----------------------------<  76  4.2   |  25  |  0.84    Ca    9.4      29 May 2021 06:55  Phos  4.0     05-29  Mg     1.5     05-29    MEDICATIONS  (STANDING):  atorvastatin 10 milliGRAM(s) Oral at bedtime  carbidopa/levodopa  25/100 1 Tablet(s) Oral three times a day  clopidogrel Tablet 75 milliGRAM(s) Oral daily  dextrose 40% Gel 15 Gram(s) Oral once  dextrose 5%. 1000 milliLiter(s) (50 mL/Hr) IV Continuous <Continuous>  dextrose 5%. 1000 milliLiter(s) (100 mL/Hr) IV Continuous <Continuous>  dextrose 50% Injectable 25 Gram(s) IV Push once  dextrose 50% Injectable 12.5 Gram(s) IV Push once  dextrose 50% Injectable 25 Gram(s) IV Push once  glucagon  Injectable 1 milliGRAM(s) IntraMuscular once  insulin lispro (ADMELOG) corrective regimen sliding scale   SubCutaneous three times a day before meals  insulin lispro (ADMELOG) corrective regimen sliding scale   SubCutaneous at bedtime  pantoprazole    Tablet 40 milliGRAM(s) Oral before breakfast  QUEtiapine 50 milliGRAM(s) Oral at bedtime  sodium chloride 0.9%. 1000 milliLiter(s) (75 mL/Hr) IV Continuous <Continuous>  tamsulosin 0.4 milliGRAM(s) Oral at bedtime    MEDICATIONS  (PRN):    I&O's Summary    28 May 2021 07:01  -  29 May 2021 07:00  --------------------------------------------------------  IN: 360 mL / OUT: 700 mL / NET: -340 mL    29 May 2021 07:01  -  29 May 2021 16:24  --------------------------------------------------------  IN: 240 mL / OUT: 200 mL / NET: 40 mL      I reviewed the above lab results, tests, telemetry, and  EKG interpretation. .  Assessment  81y Male  w/ PAST MEDICAL & SURGICAL HISTORY:  Hypercholesterolemia    DM (Diabetes Mellitus)    HTN (Hypertension)    CAD (Coronary Artery Disease)  s/p cardiac stent ( &gt; 20 years ago)    BPH (benign prostatic hyperplasia)    Coronary Stent  x 2 ( 20 years )    Admitted with complaints of a syncope/fall.   Called by RN for patient with multiple episodes of hypotension starting this am at approximately 1130. Initially NS 250cc bolus given followed by an additional 500cc. Dr. Duckworth made aware. Despite hypotension patient still with baseline mental asatus a&ox2 slightly sleepy but arousable.   Patient persistently hypotensive, associated with bradycardia in the 40s on telemetry. 12 lead EKG performed. Patient Provided with a TOTAL of 2L IVF per Dr. Duckworth. CCU called for consult to r/i carduac etiology condsidering patient with known worsening pericardial effusion. Per limited echo report, effusion decreased in size.   Patient this afternoon still with persistent hypotension last BP 80/40. Requested rectal temperature resulted with 95.5F. Panculture ordered. Patietn without any overt signs of cause of possible infection. MICU called for evaluation. Upon re-evaluation in the afternoon, patients mental status slightly worsened A&Ox1, MICU at bedside. Awaiting recommendations.    Of note, patient with history if being DNR on previous admissions. Patient unable to report and discuss his code status. Multiple calls made to his brother to attempt to confirm code status without response. Attending Dr. Duckworth aware. No MOLST in chart and or available from old charts.    PLAN  1)FU further MICU recs  2) VBG, lactate, BC x2, UA, Urine Cx, procalcitonin, CBC, CMP Mg/Phos     Disposition: Of note, patient with history if being DNR on previous admissions. Patient unable to report and discuaa his code status. Multiple calls made to his brother to attempt to confirm code status without response. Attending Dr. Duckworth aware.   Discussed with Dr. Duckworth  Clinical findings, labs, tests, telemetry, and ekg reviewed with attending. Will monitor patient closely.   [x] Moderate complexity/risk ( Time> 30min)    ADDENDUM: Upon re-evaluation patient BP reported by RN at 127/63. Patient mentation improved.

## 2021-05-29 NOTE — PROGRESS NOTE ADULT - SUBJECTIVE AND OBJECTIVE BOX
Bennie Hwang MD  Interventional Cardiology / Endovascular Specialist  [place] Office : [address] [place] [address] [place] [address]  Tel:   [place] Office : [address] [facility] [place] [address]  Tel: [phone]  Cell : [phone]    Pt lying in bed in NAD   	  MEDICATIONS:  clopidogrel Tablet 75 milliGRAM(s) Oral daily  metoprolol tartrate 12.5 milliGRAM(s) Oral two times a day  tamsulosin 0.4 milliGRAM(s) Oral at bedtime        carbidopa/levodopa  25/100 1 Tablet(s) Oral three times a day  QUEtiapine 50 milliGRAM(s) Oral at bedtime    pantoprazole    Tablet 40 milliGRAM(s) Oral before breakfast    atorvastatin 10 milliGRAM(s) Oral at bedtime  dextrose 40% Gel 15 Gram(s) Oral once  dextrose 50% Injectable 25 Gram(s) IV Push once  dextrose 50% Injectable 12.5 Gram(s) IV Push once  dextrose 50% Injectable 25 Gram(s) IV Push once  glucagon  Injectable 1 milliGRAM(s) IntraMuscular once  insulin lispro (ADMELOG) corrective regimen sliding scale   SubCutaneous three times a day before meals  insulin lispro (ADMELOG) corrective regimen sliding scale   SubCutaneous at bedtime    dextrose 5%. 1000 milliLiter(s) IV Continuous <Continuous>  dextrose 5%. 1000 milliLiter(s) IV Continuous <Continuous>  sodium chloride 0.9%. 1000 milliLiter(s) IV Continuous <Continuous>      PAST MEDICAL/SURGICAL HISTORY  PAST MEDICAL & SURGICAL HISTORY:  Hypercholesterolemia    DM (Diabetes Mellitus)    HTN (Hypertension)    CAD (Coronary Artery Disease)  s/p cardiac stent ( &gt; 20 years ago)    BPH (benign prostatic hyperplasia)    Coronary Stent  x 2 ( 20 years )        SOCIAL HISTORY: Substance Use (street drugs): ( x ) never used  (  ) other:    FAMILY HISTORY:  No pertinent family history in first degree relatives        REVIEW OF SYSTEMS:  CONSTITUTIONAL: No fever, weight loss, or fatigue  EYES: No eye pain, visual disturbances, or discharge  ENMT:  No difficulty hearing, tinnitus, vertigo; No sinus or throat pain  BREASTS: No pain, masses, or nipple discharge  GASTROINTESTINAL: No abdominal or epigastric pain. No nausea, vomiting, or hematemesis; No diarrhea or constipation. No melena or hematochezia.  GENITOURINARY: No dysuria, frequency, hematuria, or incontinence  NEUROLOGICAL: No headaches, memory loss, loss of strength, numbness, or tremors  ENDOCRINE: No heat or cold intolerance; No hair loss  MUSCULOSKELETAL: No joint pain or swelling; No muscle, back, or extremity pain  PSYCHIATRIC: No depression, anxiety, mood swings, or difficulty sleeping  HEME/LYMPH: No easy bruising, or bleeding gums  All others negative    PHYSICAL EXAM:  T(C): 36.5 (05-29-21 @ 05:20), Max: 36.5 (05-29-21 @ 05:20)  HR: 48 (05-29-21 @ 09:23) (48 - 70)  BP: 119/72 (05-29-21 @ 09:23) (111/61 - 128/68)  RR: 18 (05-29-21 @ 05:20) (17 - 18)  SpO2: 99% (05-29-21 @ 05:20) (98% - 99%)  Wt(kg): --  I&O's Summary    28 May 2021 07:01  -  29 May 2021 07:00  --------------------------------------------------------  IN: 360 mL / OUT: 700 mL / NET: -340 mL        EYES: EOMI, PERRLA, conjunctiva and sclera clear  ENMT: No tonsillar erythema, exudates, or enlargement  Cardiovascular: Normal S1 S2, No JVD, No murmurs, No edema  Respiratory: Lungs clear to auscultation	  Gastrointestinal:  Soft, Non-tender, + BS	  Extremities: No edema, cool                              10.9   6.24  )-----------( 236      ( 29 May 2021 06:55 )             33.6     05-29    138  |  99  |  29<H>  ----------------------------<  76  4.2   |  25  |  0.84    Ca    9.4      29 May 2021 06:55  Phos  4.0     05-29  Mg     1.5     05-29      proBNP:   Lipid Profile:   HgA1c:   TSH:     Consultant(s) Notes Reviewed:  [x ] YES  [ ] NO    Care Discussed with Consultants/Other Providers [ x] YES  [ ] NO    Imaging Personally Reviewed independently:  [x] YES  [ ] NO    All labs, radiologic studies, vitals, orders and medications list reviewed. Patient is seen and examined at bedside. Case discussed with medical team.

## 2021-05-29 NOTE — CONSULT NOTE ADULT - TIME BILLING
chart review, clinical assessment, discussion management and plan with ICU team, discussion of case with primary team

## 2021-05-29 NOTE — CONSULT NOTE ADULT - ASSESSMENT
82 yo M HTN, DM, HLD, dementia (AOx2 at baseline), Parkinson's disease, CAD s/p stents x2 (>20 years ago), recent new onset HF (EF 50%, on Xarelto, Moderate diastolic dysfunction (Stage II), moderate pulmonary hypertension, small pericardial effusion), L pleural effusion, BPH, gastric ulcers presenting after fall, ? syncope.     Hypotension: Most likely related to autonomic dysfunction from Parkinson's, possibly related to effusion, however would likely not have progressed to tamponade this rapidly compared to last night.  - would give another 1 L IVF bolus  - hold beta blocker  - repeat limited echo to rule out effusion  - would repeat discussion with patient / family regarding code status  - check TSH to evaluate for hypothyroidism as the cause for pericardial effusion given his concomitant bradycardia, although could also be iatrogenic from BB    Bradycardia: likely iatrogenic from BB, however would also like to rule out hypothyroidism as well  - TSH  - would place zoll pads and atropine at bedside, however likely will not need  - hold BB    Primary team will also notify the cardiology team taking care of the patient    For now, no indication for CCU, however echo still pending    Jody Jorgensen MD  Cardiology Fellow, PGY-4  [phone]  For all other Cardiology service contact information, go to [email or web address] and use "[email or web address]" to login.
80 y/o M HTN, DM, HLD, dementia (AOx2 at baseline), Parkinson's disease, CAD s/p stents x2 (>20 years ago), recent new onset HF (EF: 50%, on Xarelto at home, Moderate diastolic dysfunction (Stage II), moderate pulmonary hypertension, small pericardial effusion), L pleural effusion, BPH, gastric ulcers presented after unwitnessed fall and possible syncope. MICU was consulted for hypotension since AM.     Assessment and Plan  - Unclear etiology for hypotension currently  - Possible infection though lab work was not indicative. F/u Bcx, UA, and Ucx  - TSH was minimally elevated  - Bedside POCUs with good LV/RV function, no signs of tamponade. High variability of IVC, and gross A-line presentation without significant pleff.   - Will trial additional 2L IVF and re-assess response  - If BP not responding, consider checking cortisol and dose hydrocortisone  - Continue to clarify GOC  - Doesn't require MICU level of care at this time; Please re-consult for status change
Assessment and Plan     1) Fall: mechanical , denies LOC     2) CAD s/p stent  -cath in 2010 showed mild luminal disease and patent stents  -denies chest pain  -echo with mild LV dysfunction  -c/w plavix     3) DVT PPX Xarelto

## 2021-05-29 NOTE — CONSULT NOTE ADULT - ATTENDING COMMENTS
81 M with history above here with fall. ICU consulted for hypotension. Appears dry on physical exam. Bedside POCUS demonstrates good LV systolic function and no dry lungs. Agree with trial of 2-3 L IVF. Check cortisol. No need for ICU at this time. Please re-consult as needed. Discussed with primary team at bedside. 81 M with history above here with fall. ICU consulted for hypotension. Appears dry on physical exam. Bedside POCUS demonstrates good LV systolic function and no dry lungs. Unclear etiology. Possibly hypovolemic vs autonomic dysfunction given Parkinsons. Agree with trial of 2-3 L IVF. Check cortisol. No need for ICU at this time. Please re-consult as needed. Discussed with primary team at bedside. 81 M with history above here with fall. ICU consulted for hypotension. Appears dry on physical exam. Bedside POCUS demonstrates good LV systolic function and no dry lungs. Unclear etiology. Possibly hypovolemic vs autonomic dysfunction given Parkinson's. Agree with trial of 2-3 L IVF. Check cortisol. Most recent /66. No need for ICU at this time. Please re-consult as needed. Discussed with primary team at bedside.

## 2021-05-29 NOTE — CONSULT NOTE ADULT - SUBJECTIVE AND OBJECTIVE BOX
HPI:  80 y/o M HTN, DM, HLD, dementia (AOx2 at baseline), Parkinson's disease, CAD s/p stents x2 (>20 years ago), recent new onset HF (EF: 50%, on Xarelto at home, Moderate diastolic dysfunction (Stage II), moderate pulmonary hypertension, small pericardial effusion), L pleural effusion, BPH, gastric ulcers presented after unwitnessed fall and possible syncope. MICU was consulted for hypotension since AM.     Since admission, patient has been sinus bradycardic in 50's, with SBP in 120-130's. Afebrile and mentating at baseline. Orthostatics was borderline positive on 5/28 and positive 5/29 AM (lying to sitting).   Around noon time today, patient was found to be persistently hypotensive in 80's/50's with sinus bradycardia and declining mental status. Per primary team, patient was easily arousable in the AM, A + O x 2 (baseline) and ate breakfast, but towards PM, patient was more lethargic, hard to awake and alerted to self. CCU was consulted given concern for tamponade 2/2 moderate pericardial effusion seen on TTE on 5/28. Repeated limited ECHO at bedside revealed improved pericardial effusion without tamponade physiology. Per CCU, bradycardia may be due to iatrogenic BB, and hypotension may be due to autonomic dysfunction with hx of PD. Lopressor was d/c'ed. Patient was given 2L IVF bolus without significant improvement in BP. Midodrine 10 mg x 1 given, however, further use was limited by bradycardia.   Patient was then found to be hypothermic rectally to 95.5F. Bcx, UA, Ucx, procal, and full set of labs were ordered. , TSH 4.74, procal 0.1, lactate 1.4, and WBC 5.39. H&H stable without source of bleeding.   Patient denied fever, chest pain, SOB, cough, nausea, vomiting, abdominal pain, dysuria, diarrhea, urinary retention, BLE pain or swelling.   Awake to questions, and answered simple questions with slight slurred speech. Oriented to self, but unable to [place] at [place] right hip, at my belly button").     Primary team was unable to reach family for GOC clarification. Patient was reportedly DNR in previous admission.         PAST MEDICAL & SURGICAL HISTORY:  Hypercholesterolemia    DM (Diabetes Mellitus)    HTN (Hypertension)    CAD (Coronary Artery Disease)  s/p cardiac stent ( &gt; 20 years ago)    BPH (benign prostatic hyperplasia)    Coronary Stent  x 2 ( 20 years )        FAMILY HISTORY:  No pertinent family history in first degree relatives        SOCIAL HISTORY:  See H&P    Allergies    No Known Allergies    Intolerances        HOME MEDICATIONS:  See home med rec    REVIEW OF SYSTEMS:  See HPI    OBJECTIVE:  ICU Vital Signs Last 24 Hrs  T(C): 35.3 (29 May 2021 15:37), Max: 36.5 (29 May 2021 05:20)  T(F): 95.5 (29 May 2021 15:37), Max: 97.7 (29 May 2021 05:20)  HR: 49 (29 May 2021 15:15) (48 - 72)  BP: 80/42 (29 May 2021 15:15) (80/42 - 128/68)  BP(mean): --  ABP: --  ABP(mean): --  RR: 18 (29 May 2021 11:21) (17 - 18)  SpO2: 100% (29 May 2021 11:21) (98% - 100%)        05-28 @ 07:01  -  05-29 @ 07:00  --------------------------------------------------------  IN: 360 mL / OUT: 700 mL / NET: -340 mL    05-29 @ 07:01 - 05-29 @ 16:46  --------------------------------------------------------  IN: 240 mL / OUT: 200 mL / NET: 40 mL      CAPILLARY BLOOD GLUCOSE      POCT Blood Glucose.: 171 mg/dL (29 May 2021 16:06)      PHYSICAL EXAM:  General: Sleepy, but awake to voice, NAD  HEENT: Clear conjunctive; pupils are constricted but reactive  Neck: Supple  Respiratory: Clear to auscultation b/l, no wheezing; decreased air movement in b/l bases; no respiratory distress on room air  Cardiovascular: Bradycardic and regular, S1/S2 present  Abdomen: Soft, non-tender, nondistended. No suprapubic distension or tenderness. (+) condom cath with clear urine in bag  Extremities: Spontaneous movements of extremities, no BLE edema, no cyanosis or joint swelling  Skin: Dry, cool to touch, no active rashes. (+) small erythematous lesion on L chest wall with minimal bloody drainage on expression, no overt erythema in adjacent chest wall region.   Neurological: A + O x 1, arousable to voice, confused, but pleasant. Grossly non-focal, but unable to fully assess due to energy level    HOSPITAL MEDICATIONS:  MEDICATIONS  (STANDING):  atorvastatin 10 milliGRAM(s) Oral at bedtime  carbidopa/levodopa  25/100 1 Tablet(s) Oral three times a day  clopidogrel Tablet 75 milliGRAM(s) Oral daily  dextrose 40% Gel 15 Gram(s) Oral once  dextrose 5%. 1000 milliLiter(s) (50 mL/Hr) IV Continuous <Continuous>  dextrose 5%. 1000 milliLiter(s) (100 mL/Hr) IV Continuous <Continuous>  dextrose 50% Injectable 25 Gram(s) IV Push once  dextrose 50% Injectable 12.5 Gram(s) IV Push once  dextrose 50% Injectable 25 Gram(s) IV Push once  glucagon  Injectable 1 milliGRAM(s) IntraMuscular once  insulin lispro (ADMELOG) corrective regimen sliding scale   SubCutaneous three times a day before meals  insulin lispro (ADMELOG) corrective regimen sliding scale   SubCutaneous at bedtime  lactated ringers. 500 milliLiter(s) (500 mL/Hr) IV Continuous <Continuous>  pantoprazole    Tablet 40 milliGRAM(s) Oral before breakfast  piperacillin/tazobactam IVPB. 3.375 Gram(s) IV Intermittent once  piperacillin/tazobactam IVPB.. 3.375 Gram(s) IV Intermittent every 8 hours  QUEtiapine 50 milliGRAM(s) Oral at bedtime  sodium chloride 0.9%. 1000 milliLiter(s) (75 mL/Hr) IV Continuous <Continuous>  tamsulosin 0.4 milliGRAM(s) Oral at bedtime    MEDICATIONS  (PRN):      LABS:                        10.7   5.39  )-----------( 236      ( 29 May 2021 15:58 )             33.1     05-29    138  |  102  |  32<H>  ----------------------------<  157<H>  4.3   |  25  |  0.82    Ca    8.8      29 May 2021 15:58  Phos  4.0     05-29  Mg     1.8     05-29    TPro  6.0  /  Alb  3.0<L>  /  TBili  1.1  /  DBili  x   /  AST  6   /  ALT  <5  /  AlkPhos  148<H>  05-29          Venous Blood Gas:  05-29 @ 16:06  7.40/48/54/28/86.7  VBG Lactate: 1.4      MICROBIOLOGY:  Infectious workup pending    RADIOLOGY:  No new imaging
Bennie Hwang MD  Interventional Cardiology / Endovascular Specialist  [place] Office : [phone] [place] [address] [place] [address]  Tel:   [place] Office : [address] [facility] [place] [address]  Tel: [phone]  Cell : [phone]      HISTORY OF PRESENTING ILLNESS:  80 yo M HTN, DM, HLD, dementia (AOx2 at baseline), Parkinson's disease, CAD s/p stents x2 (>20 years ago), recent new onset HF (EF 50%, on Xarelto, Moderate diastolic dysfunction (Stage II), moderate pulmonary hypertension, small pericardial effusion), L pleural effusion, BPH, gastric ulcers presenting after fall, ? syncope. Pt is poor historian. Initially stated that he was lifting something when he fell then stated he was walking to the bathroom at the time. Per ED records, pt's aid her him fall but did not witness it. He was found on the floor and was awake and alert at the time. The aid denied any fever, chills, or change in mental status.    PAST MEDICAL & SURGICAL HISTORY:  Hypercholesterolemia    DM (Diabetes Mellitus)    HTN (Hypertension)    CAD (Coronary Artery Disease)  s/p cardiac stent ( &gt; 20 years ago)    BPH (benign prostatic hyperplasia)    Coronary Stent  x 2 ( 20 years )        SOCIAL HISTORY: Substance Use (street drugs): ( x ) never used  (  ) other:    FAMILY HISTORY:  No pertinent family history in first degree relatives        REVIEW OF SYSTEMS:  CONSTITUTIONAL: No fever, weight loss, or fatigue  EYES: No eye pain, visual disturbances, or discharge  ENMT:  No difficulty hearing, tinnitus, vertigo; No sinus or throat pain  BREASTS: No pain, masses, or nipple discharge  GASTROINTESTINAL: No abdominal or epigastric pain. No nausea, vomiting, or hematemesis; No diarrhea or constipation. No melena or hematochezia.  GENITOURINARY: No dysuria, frequency, hematuria, or incontinence  NEUROLOGICAL: No headaches, memory loss, loss of strength, numbness, or tremors  ENDOCRINE: No heat or cold intolerance; No hair loss  MUSCULOSKELETAL: No joint pain or swelling; No muscle, back, or extremity pain  PSYCHIATRIC: No depression, anxiety, mood swings, or difficulty sleeping  HEME/LYMPH: No easy bruising, or bleeding gums  All others negative    MEDICATIONS:  tamsulosin 0.4 milliGRAM(s) Oral at bedtime        carbidopa/levodopa  25/100 1 Tablet(s) Oral three times a day  QUEtiapine 50 milliGRAM(s) Oral at bedtime    pantoprazole    Tablet 40 milliGRAM(s) Oral before breakfast    atorvastatin 10 milliGRAM(s) Oral at bedtime  dextrose 40% Gel 15 Gram(s) Oral once  dextrose 50% Injectable 25 Gram(s) IV Push once  dextrose 50% Injectable 12.5 Gram(s) IV Push once  dextrose 50% Injectable 25 Gram(s) IV Push once  glucagon  Injectable 1 milliGRAM(s) IntraMuscular once  insulin lispro (ADMELOG) corrective regimen sliding scale   SubCutaneous three times a day before meals  insulin lispro (ADMELOG) corrective regimen sliding scale   SubCutaneous at bedtime    dextrose 5%. 1000 milliLiter(s) IV Continuous <Continuous>  dextrose 5%. 1000 milliLiter(s) IV Continuous <Continuous>      FAMILY HISTORY:  No pertinent family history in first degree relatives          Allergies    No Known Allergies    Intolerances    	      PHYSICAL EXAM:  T(C): 36.4 (05-27-21 @ 15:08), Max: 36.7 (05-27-21 @ 01:56)  HR: 60 (05-27-21 @ 15:08) (58 - 70)  BP: 130/40 (05-27-21 @ 15:08) (109/41 - 131/40)  RR: 18 (05-27-21 @ 15:08) (16 - 19)  SpO2: 99% (05-27-21 @ 15:08) (98% - 100%)  Wt(kg): --  I&O's Summary      GENERAL: NAD  EYES: EOMI, PERRLA, conjunctiva and sclera clear  ENMT: No tonsillar erythema, exudates, or enlargement  Cardiovascular: Normal S1 S2, No JVD, No murmurs, No edema  Respiratory: Lungs clear to auscultation	  Gastrointestinal:  Soft, Non-tender, + BS	  Extremities: No edema, cool          LABS:	 	    CARDIAC MARKERS:                                  11.2   7.75  )-----------( 234      ( 27 May 2021 05:48 )             35.4     05-27    136  |  101  |  39<H>  ----------------------------<  82  4.6   |  21<L>  |  0.80    Ca    9.8      27 May 2021 05:48    TPro  7.1  /  Alb  3.6  /  TBili  0.6  /  DBili  x   /  AST  9   /  ALT  7   /  AlkPhos  150<H>  05-27    proBNP:   Lipid Profile:   HgA1c:   TSH:     Consultant(s) Notes Reviewed:  [x ] YES  [ ] NO    Care Discussed with Consultants/Other Providers [ x] YES  [ ] NO    Imaging Personally Reviewed independently:  [x] YES  [ ] NO    All labs, radiologic studies, vitals, orders and medications list reviewed. Patient is seen and examined at bedside. Case discussed with medical team.    ASSESSMENT/PLAN: 	  
Patient seen and evaluated at bedside    Chief Complaint:    HPI:  80 yo M HTN, DM, HLD, dementia (AOx2 at baseline), Parkinson's disease, CAD s/p stents x2 (>20 years ago), recent new onset HF (EF 50%, on Xarelto, Moderate diastolic dysfunction (Stage II), moderate pulmonary hypertension, small pericardial effusion), L pleural effusion, BPH, gastric ulcers presenting after fall, ? syncope. Pt is poor historian. Initially stated that he was lifting something when he fell then stated he was walking to the bathroom at the time. Per ED records, pt's aid her him fall but did not witness it. He was found on the floor and was awake and alert at the time. The aid denied any fever, chills, or change in mental status.      VS: 131/40  58  97.5  19  98% on RA  (27 May 2021 11:58)    Today was consulted for hypotension. The patient's BP lowest earlier today was 70/40. The patient got 500 cc IVF, which improved the BP to 90/56. The patient currently is at his baseline mental status (AOx2) and denies any lightheadedness or dizziness. He does not have any complaints, no fevers, chills, dysuria. Of note, patient does have a known pericardial effusion. He did have an echocardiogram done last night, which showed a moderate sized pericardial effusion, predominantly apical and inferolateral to LV, 1.6 cm apical and lateral to LV in the 4 chamber view. It was 1.2 cm inferior to the LV in the 3 chamber view. There was no evidence of tamponade. The effusion has increased in size compared to the echo from months prior. Also of note, patient does not have PH on the most recent echo (as opposed to prior echo). Also of note, the patient was started on lopressor 12.5 BID yesterday, and he received a dose yesterday and this morning. Patient now more bradycardic as well, in the 50s.     < from: Transthoracic Echocardiogram (05.28.21 @ 19:28) >  CONCLUSIONS:  1. Increased relative wall thickness with normal left  ventricular mass index, consistent with concentric left  ventricular remodeling.  2. Normal left ventricular systolic function. No segmental  wall motion abnormalities.  3. The right ventricle is not well visualized; grossly  normal right ventricular systolic function.  4. Estimated right ventricular systolic pressure equals 24  mm Hg, assuming right atrial pressure equals 10 mm Hg,  consistent with normal pulmonary pressures.  5. Normal pericardium with Moderate sized circumferential  pericardial effusion seen predominantly apical and  inferolateral to the left ventricle (Largest measurement  1.6 cm apical and lateral to the left ventricle in the 4  chamber view. It measures 1.2 cm inferior to the left  ventricle in the 3 chamber view). No echocardiographic  evidence of tamponade is seen ( no late diastolic RA or  early diastolic RV diastolic collapse)  *** Compared with echocardiogram of 2/5/2021, left  ventricular function has increased. Pulmonary hypertension  is not seen on the present study. Pericardial effusion size  has increased.  ------------------------------------------------------------------------  Confirmed on  5/28/2021 - 20:47:37 by Trip Romero M.D.  ------------------------------------------------------------------------    < end of copied text >      Outpatient cardiologist: Being followed by Dr. Bennie Hwang    PMHx:   Hypercholesterolemia    DM (Diabetes Mellitus)    HTN (Hypertension)    CAD (Coronary Artery Disease)    BPH (benign prostatic hyperplasia)        PSHx:   Coronary Stent        Allergies:  No Known Allergies      Home Meds:    Current Medications:   atorvastatin 10 milliGRAM(s) Oral at bedtime  carbidopa/levodopa  25/100 1 Tablet(s) Oral three times a day  clopidogrel Tablet 75 milliGRAM(s) Oral daily  dextrose 40% Gel 15 Gram(s) Oral once  dextrose 5%. 1000 milliLiter(s) IV Continuous <Continuous>  dextrose 5%. 1000 milliLiter(s) IV Continuous <Continuous>  dextrose 50% Injectable 25 Gram(s) IV Push once  dextrose 50% Injectable 12.5 Gram(s) IV Push once  dextrose 50% Injectable 25 Gram(s) IV Push once  glucagon  Injectable 1 milliGRAM(s) IntraMuscular once  insulin lispro (ADMELOG) corrective regimen sliding scale   SubCutaneous three times a day before meals  insulin lispro (ADMELOG) corrective regimen sliding scale   SubCutaneous at bedtime  pantoprazole    Tablet 40 milliGRAM(s) Oral before breakfast  QUEtiapine 50 milliGRAM(s) Oral at bedtime  sodium chloride 0.9%. 1000 milliLiter(s) IV Continuous <Continuous>  tamsulosin 0.4 milliGRAM(s) Oral at bedtime      FAMILY HISTORY:  No pertinent family history in first degree relatives        Social History: lives in nursing home  Smoking History:  Alcohol Use:  Drug Use:    REVIEW OF SYSTEMS:  CONSTITUTIONAL: No weakness, fevers or chills  EYES/ENT: No visual changes;  No dysphagia  NECK: No pain or stiffness  RESPIRATORY: No cough, wheezing, hemoptysis; No shortness of breath  CARDIOVASCULAR: No chest pain or palpitations; No lower extremity edema  GASTROINTESTINAL: No abdominal or epigastric pain. No nausea, vomiting, or hematemesis; No diarrhea or constipation. No melena or hematochezia.  BACK: No back pain  GENITOURINARY: No dysuria, frequency or hematuria  NEUROLOGICAL: No numbness or weakness  SKIN: No itching, burning, rashes, or lesions   All other review of systems is negative unless indicated above.    Physical Exam:  T(F): 97 (05-29), Max: 97.7 (05-29)  HR: 50 (05-29) (48 - 72)  BP: 90/56 (05-29) (81/58 - 128/68)  RR: 18 (05-29)  SpO2: 100% (05-29)  GENERAL: No acute distress, well-developed  HEAD:  Atraumatic, Normocephalic  ENT: EOMI, PERRLA, conjunctiva and sclera clear, Neck supple, No JVD, moist mucosa  CHEST/LUNG: Clear to auscultation bilaterally; No wheeze, equal breath sounds bilaterally   BACK: No spinal tenderness  HEART: Regular rate and rhythm; No murmurs, rubs, or gallops  ABDOMEN: Soft, Nontender, Nondistended; Bowel sounds present  EXTREMITIES:  No clubbing, cyanosis, or edema  PSYCH: Nl behavior, nl affect  NEUROLOGY: AAOx3, non-focal, cranial nerves intact  SKIN: Normal color, No rashes or lesions  LINES:    Cardiovascular Diagnostic Testing:    ECG: Personally reviewed: NSR with PACs, no ST/T wave abnormalities    Echo: Personally reviewed:  < from: Transthoracic Echocardiogram (05.28.21 @ 19:28) >  CONCLUSIONS:  1. Increased relative wall thickness with normal left  ventricular mass index, consistent with concentric left  ventricular remodeling.  2. Normal left ventricular systolic function. No segmental  wall motion abnormalities.  3. The right ventricle is not well visualized; grossly  normal right ventricular systolic function.  4. Estimated right ventricular systolic pressure equals 24  mm Hg, assuming right atrial pressure equals 10 mm Hg,  consistent with normal pulmonary pressures.  5. Normal pericardium with Moderate sized circumferential  pericardial effusion seen predominantly apical and  inferolateral to the left ventricle (Largest measurement  1.6 cm apical and lateral to the left ventricle in the 4  chamber view. It measures 1.2 cm inferior to the left  ventricle in the 3 chamber view). No echocardiographic  evidence of tamponade is seen ( no late diastolic RA or  early diastolic RV diastolic collapse)  *** Compared with echocardiogram of 2/5/2021, left  ventricular function has increased. Pulmonary hypertension  is not seen on the present study. Pericardial effusion size  has increased.  ------------------------------------------------------------------------  Confirmed on  5/28/2021 - 20:47:37 by Trip Romero M.D.  ------------------------------------------------------------------------    < end of copied text >  < from: Transthoracic Echocardiogram (02.05.21 @ 18:56) >  CONCLUSIONS:  1. Calcified trileaflet aortic valve with normal opening.  2. Normal left ventricular internal dimensions and wall  thicknesses.  3. Mild left ventricular systolic dysfunction.  4. The right ventricle is not well visualized; grossly  normal right ventricular systolic function.  5. Estimated pulmonary artery systolic pressure equals 51  mm Hg, assuming right atrial pressure equals 10  mm Hg,  consistent with moderate pulmonary hypertension.  6. Small pericardial effusion.  7. Left pleural effusion.  ------------------------------------------------------------------------  Confirmed on  2/5/2021 - 22:18:19 by Miguel Forbes M.D.  ------------------------------------------------------------------------    < end of copied text >      Stress Testing:    Cath:    Imaging:    CXR: Personally reviewed    Labs: Personally reviewed                        10.9   6.24  )-----------( 236      ( 29 May 2021 06:55 )             33.6     05-29    138  |  99  |  29<H>  ----------------------------<  76  4.2   |  25  |  0.84    Ca    9.4      29 May 2021 06:55  Phos  4.0     05-29  Mg     1.5     05-29    CARDIAC MARKERS ( 27 May 2021 07:56 )  13 ng/L / x     / x     / x     / x     / x      CARDIAC MARKERS ( 27 May 2021 05:50 )  16 ng/L / x     / x     / x     / x     / x

## 2021-05-29 NOTE — PROGRESS NOTE ADULT - ASSESSMENT
Assessment and Plan     1) Fall: mechanical , denies LOC     2) CAD s/p stent  -cath in 2010 showed mild luminal disease and patent stents  -denies chest pain  -echo with moderate Pericardial effusion with no evidence of tamponade, no intervention at this time    -c/w plavix     3) DVT PPX Xarelto  Assessment and Plan     1) Fall: mechanical , denies LOC     2) CAD s/p stent  -cath in 2010 showed mild luminal disease and patent stents  -denies chest pain  -echo with moderate Pericardial effusion with no evidence of tamponade, no intervention at this time    -c/w plavix     3) Hypotension   - Pericardial Effusion moderate no evidence of tamponade  - Hypotensive today getting fluids s/p 2 Liters getting 3rd liter   - started on abx       DVT PPX Xarelto

## 2021-05-29 NOTE — PROVIDER CONTACT NOTE (OTHER) - ACTION/TREATMENT ORDERED:
will continue to monitor, give 500ml bolus over 1 hour will continue to monitor, give 250ml bolus over 1 hour

## 2021-05-29 NOTE — PROGRESS NOTE ADULT - PROBLEM SELECTOR PLAN 1
- Unclear if pt had syncopal episode but he was found awake and alert by his aid after the fall  - Monitor on tele   -CT head/c-spine no acute path  -PT recs rehab   - Monitor orthostatics- improving now  - TTE 5/28 normal LVEF 55-60%, increased pericardial effusion compared to prior echo, Normal pericardium with moderate sized circumferential pericardial effusion seen predominantly apical and  inferolateral to the left ventricle. No tamponade physiology.  -f/u cardiology  -monitor vitals closely, if hypotensive repeat echo and IVF prn - Unclear if pt had syncopal episode but he was found awake and alert by his aid after the fall  - Monitor on tele   -CT head/c-spine no acute path  -PT recs rehab   - Monitor orthostatics- improving now, give IVF NS 75ml/h x10h  - TTE 5/28 normal LVEF 55-60%, increased pericardial effusion compared to prior echo, Normal pericardium with moderate sized circumferential pericardial effusion seen predominantly apical and  inferolateral to the left ventricle. No tamponade physiology.  -f/u cardiology  -monitor vitals closely, if hypotensive repeat echo and IVF prn - Unclear if pt had syncopal episode but he was found awake and alert by his aid after the fall  - Monitor on tele   -CT head/c-spine no acute path  -PT recs rehab   - Monitor orthostatics- improving now, give IVF NS 75ml/h x10h  - TTE 5/28 normal LVEF 55-60%, increased pericardial effusion compared to prior echo, Normal pericardium with moderate sized circumferential pericardial effusion seen predominantly apical and  inferolateral to the left ventricle. No tamponade physiology.  -pt hypotensive to 80's and also intermittent bradycardia w/ ectopy, d/c lopressor, IVF hydration NS bolus and mIVF prn, repeat echo, d/w ACP and cardiology Dr. Hwang  -monitor vitals closely, if remains hypotensive, call ICU consult

## 2021-05-29 NOTE — PROGRESS NOTE ADULT - PROBLEM SELECTOR PLAN 2
- No ischemic changes on EKG. Troponin downtrending 16->13  - Continue  Plavix and statin  - echo as above, increased pericardial effusion without tamponade physiology  -f/u cardiology

## 2021-05-29 NOTE — CHART NOTE - NSCHARTNOTEFT_GEN_A_CORE
: Jose Juan Mcwilliams MD    INDICATION: Hypotension    PROCEDURE:    [x] LIMITED ECHO  [x] LIMITED CHEST    FINDINGS:  A-line predominant pattern with lung sliding anterior lung fields  No consolidations  No pleural effusions    Normal LV systolic function  Small pericardial effusion  No evidence of tamponade  Pulsed wave doppler used to measure LVOT VTI  24 cm    INTERPRETATION:  Normal LV systolic function  No evidence of pulmonary edema or pleural effusion    Images stored on [facility]

## 2021-05-30 LAB
ANION GAP SERPL CALC-SCNC: 13 MMOL/L — SIGNIFICANT CHANGE UP (ref 7–14)
BUN SERPL-MCNC: 27 MG/DL — HIGH (ref 7–23)
CALCIUM SERPL-MCNC: 9.3 MG/DL — SIGNIFICANT CHANGE UP (ref 8.4–10.5)
CHLORIDE SERPL-SCNC: 102 MMOL/L — SIGNIFICANT CHANGE UP (ref 98–107)
CO2 SERPL-SCNC: 24 MMOL/L — SIGNIFICANT CHANGE UP (ref 22–31)
CREAT SERPL-MCNC: 0.76 MG/DL — SIGNIFICANT CHANGE UP (ref 0.5–1.3)
GLUCOSE BLDC GLUCOMTR-MCNC: 120 MG/DL — HIGH (ref 70–99)
GLUCOSE BLDC GLUCOMTR-MCNC: 147 MG/DL — HIGH (ref 70–99)
GLUCOSE BLDC GLUCOMTR-MCNC: 160 MG/DL — HIGH (ref 70–99)
GLUCOSE BLDC GLUCOMTR-MCNC: 163 MG/DL — HIGH (ref 70–99)
GLUCOSE SERPL-MCNC: 103 MG/DL — HIGH (ref 70–99)
HCT VFR BLD CALC: 34.7 % — LOW (ref 39–50)
HGB BLD-MCNC: 11.3 G/DL — LOW (ref 13–17)
MAGNESIUM SERPL-MCNC: 1.7 MG/DL — SIGNIFICANT CHANGE UP (ref 1.6–2.6)
MCHC RBC-ENTMCNC: 29.4 PG — SIGNIFICANT CHANGE UP (ref 27–34)
MCHC RBC-ENTMCNC: 32.6 GM/DL — SIGNIFICANT CHANGE UP (ref 32–36)
MCV RBC AUTO: 90.4 FL — SIGNIFICANT CHANGE UP (ref 80–100)
NRBC # BLD: 0 /100 WBCS — SIGNIFICANT CHANGE UP
NRBC # FLD: 0 K/UL — SIGNIFICANT CHANGE UP
PHOSPHATE SERPL-MCNC: 2.9 MG/DL — SIGNIFICANT CHANGE UP (ref 2.5–4.5)
PLATELET # BLD AUTO: 254 K/UL — SIGNIFICANT CHANGE UP (ref 150–400)
POTASSIUM SERPL-MCNC: 4.1 MMOL/L — SIGNIFICANT CHANGE UP (ref 3.5–5.3)
POTASSIUM SERPL-SCNC: 4.1 MMOL/L — SIGNIFICANT CHANGE UP (ref 3.5–5.3)
RBC # BLD: 3.84 M/UL — LOW (ref 4.2–5.8)
RBC # FLD: 16 % — HIGH (ref 10.3–14.5)
SODIUM SERPL-SCNC: 139 MMOL/L — SIGNIFICANT CHANGE UP (ref 135–145)
WBC # BLD: 7.7 K/UL — SIGNIFICANT CHANGE UP (ref 3.8–10.5)
WBC # FLD AUTO: 7.7 K/UL — SIGNIFICANT CHANGE UP (ref 3.8–10.5)

## 2021-05-30 PROCEDURE — 99233 SBSQ HOSP IP/OBS HIGH 50: CPT

## 2021-05-30 RX ADMIN — PANTOPRAZOLE SODIUM 40 MILLIGRAM(S): 20 TABLET, DELAYED RELEASE ORAL at 05:21

## 2021-05-30 RX ADMIN — CLOPIDOGREL BISULFATE 75 MILLIGRAM(S): 75 TABLET, FILM COATED ORAL at 09:04

## 2021-05-30 RX ADMIN — ENOXAPARIN SODIUM 40 MILLIGRAM(S): 100 INJECTION SUBCUTANEOUS at 09:04

## 2021-05-30 RX ADMIN — CARBIDOPA AND LEVODOPA 1 TABLET(S): 25; 100 TABLET ORAL at 05:21

## 2021-05-30 RX ADMIN — PIPERACILLIN AND TAZOBACTAM 25 GRAM(S): 4; .5 INJECTION, POWDER, LYOPHILIZED, FOR SOLUTION INTRAVENOUS at 13:17

## 2021-05-30 RX ADMIN — CARBIDOPA AND LEVODOPA 1 TABLET(S): 25; 100 TABLET ORAL at 13:17

## 2021-05-30 RX ADMIN — QUETIAPINE FUMARATE 50 MILLIGRAM(S): 200 TABLET, FILM COATED ORAL at 21:10

## 2021-05-30 RX ADMIN — Medication 1: at 17:27

## 2021-05-30 RX ADMIN — TAMSULOSIN HYDROCHLORIDE 0.4 MILLIGRAM(S): 0.4 CAPSULE ORAL at 21:10

## 2021-05-30 RX ADMIN — Medication 50 MILLIGRAM(S): at 05:22

## 2021-05-30 RX ADMIN — PIPERACILLIN AND TAZOBACTAM 25 GRAM(S): 4; .5 INJECTION, POWDER, LYOPHILIZED, FOR SOLUTION INTRAVENOUS at 21:12

## 2021-05-30 RX ADMIN — PIPERACILLIN AND TAZOBACTAM 25 GRAM(S): 4; .5 INJECTION, POWDER, LYOPHILIZED, FOR SOLUTION INTRAVENOUS at 05:21

## 2021-05-30 RX ADMIN — ATORVASTATIN CALCIUM 10 MILLIGRAM(S): 80 TABLET, FILM COATED ORAL at 21:10

## 2021-05-30 RX ADMIN — Medication 50 MILLIGRAM(S): at 21:12

## 2021-05-30 RX ADMIN — CARBIDOPA AND LEVODOPA 1 TABLET(S): 25; 100 TABLET ORAL at 21:10

## 2021-05-30 RX ADMIN — Medication 50 MILLIGRAM(S): at 13:17

## 2021-05-30 NOTE — PROGRESS NOTE ADULT - PROBLEM SELECTOR PLAN 1
- Unclear if pt had syncopal episode but he was found awake and alert by his aid after the fall  - Monitor on tele   -CT head/c-spine no acute path  -PT recs rehab   - Monitor orthostatics- bp improved after aggressive fluid resuscitation on 5/29 ~3L  - TTE 5/28 normal LVEF 55-60%, increased pericardial effusion compared to prior echo, Normal pericardium with moderate sized circumferential pericardial effusion seen predominantly apical and  inferolateral to the left ventricle. No tamponade physiology.  -repeat TTE 5/29 Small pericardial effusion around the LV apex (decreased compare to echo 5/28).  No RA or RV diastolic collapse seen.  -pt was hypotensive to 80's on 5/29 w/ persistent sinus bradycardia 40's, ICU/CCU consults appreciated, dcd lopressor, responded to aggressive fluid boluses, repeat echo 5/29 no tamponade physiology, decreased small pericardial effusion.  d/w ACP and cardiology Dr. Hwang  -started empirically on zosyn, cultures sent, UA neg, procalcitonin 0.1, cortisol 7.4  -monitor vitals closely - Unclear if pt had syncopal episode but he was found awake and alert by his aid after the fall  - Monitor on tele   -CT head/c-spine no acute path  -PT recs rehab   - Monitor orthostatics- bp improved after aggressive fluid resuscitation on 5/29 ~3L  - TTE 5/28 normal LVEF 55-60%, increased pericardial effusion compared to prior echo, Normal pericardium with moderate sized circumferential pericardial effusion seen predominantly apical and  inferolateral to the left ventricle. No tamponade physiology.  -repeat TTE 5/29 Small pericardial effusion around the LV apex (decreased compare to echo 5/28).  No RA or RV diastolic collapse seen.  -pt was hypotensive to 80's on 5/29 w/ persistent sinus bradycardia 40's, ICU/CCU consults appreciated, dcd lopressor, responded to aggressive fluid boluses, repeat echo 5/29 no tamponade physiology, decreased small pericardial effusion.  d/w ACP and cardiology Dr. Hwang, if remains hypotensive, consider transfer to CCU for low dose dopamine gtt  -one rectal temp hypothermic 95.5F, started empirically on zosyn, cultures sent, UA neg, procalcitonin 0.1, cortisol 7.4  -monitor vitals closely

## 2021-05-30 NOTE — PROGRESS NOTE ADULT - PROBLEM SELECTOR PLAN 9
- Pt appears Euvolemic  - held lopressor d/t bradycardia  -hold Lasix and Losartan for now, restart as needed if bp improves  - echo small pericardial effusion  -f/u cardiology recs

## 2021-05-30 NOTE — PROGRESS NOTE ADULT - SUBJECTIVE AND OBJECTIVE BOX
Bennie Hwang MD  Interventional Cardiology / Endovascular Specialist  [place] Office : [phone] [place] [address] [place] [address]  Tel:   [place] Office : [address] [facility] [place] [address]  Tel: [phone]  Cell : [phone]    Pt lying in bed in NAD   	  MEDICATIONS:  clopidogrel Tablet 75 milliGRAM(s) Oral daily  enoxaparin Injectable 40 milliGRAM(s) SubCutaneous daily  tamsulosin 0.4 milliGRAM(s) Oral at bedtime    piperacillin/tazobactam IVPB.. 3.375 Gram(s) IV Intermittent every 8 hours      carbidopa/levodopa  25/100 1 Tablet(s) Oral three times a day  QUEtiapine 50 milliGRAM(s) Oral at bedtime    pantoprazole    Tablet 40 milliGRAM(s) Oral before breakfast    atorvastatin 10 milliGRAM(s) Oral at bedtime  dextrose 40% Gel 15 Gram(s) Oral once  dextrose 50% Injectable 25 Gram(s) IV Push once  dextrose 50% Injectable 12.5 Gram(s) IV Push once  dextrose 50% Injectable 25 Gram(s) IV Push once  glucagon  Injectable 1 milliGRAM(s) IntraMuscular once  hydrocortisone sodium succinate Injectable 50 milliGRAM(s) IV Push every 8 hours  insulin lispro (ADMELOG) corrective regimen sliding scale   SubCutaneous three times a day before meals  insulin lispro (ADMELOG) corrective regimen sliding scale   SubCutaneous at bedtime    dextrose 5%. 1000 milliLiter(s) IV Continuous <Continuous>  dextrose 5%. 1000 milliLiter(s) IV Continuous <Continuous>  lactated ringers. 1000 milliLiter(s) IV Continuous <Continuous>  sodium chloride 0.9%. 1000 milliLiter(s) IV Continuous <Continuous>      PAST MEDICAL/SURGICAL HISTORY  PAST MEDICAL & SURGICAL HISTORY:  Hypercholesterolemia    DM (Diabetes Mellitus)    HTN (Hypertension)    CAD (Coronary Artery Disease)  s/p cardiac stent ( &gt; 20 years ago)    BPH (benign prostatic hyperplasia)    Coronary Stent  x 2 ( 20 years )        SOCIAL HISTORY: Substance Use (street drugs): ( x ) never used  (  ) other:    FAMILY HISTORY:  No pertinent family history in first degree relatives        REVIEW OF SYSTEMS:  CONSTITUTIONAL: No fever, weight loss, or fatigue  EYES: No eye pain, visual disturbances, or discharge  ENMT:  No difficulty hearing, tinnitus, vertigo; No sinus or throat pain  BREASTS: No pain, masses, or nipple discharge  GASTROINTESTINAL: No abdominal or epigastric pain. No nausea, vomiting, or hematemesis; No diarrhea or constipation. No melena or hematochezia.  GENITOURINARY: No dysuria, frequency, hematuria, or incontinence  NEUROLOGICAL: No headaches, memory loss, loss of strength, numbness, or tremors  ENDOCRINE: No heat or cold intolerance; No hair loss  MUSCULOSKELETAL: No joint pain or swelling; No muscle, back, or extremity pain  PSYCHIATRIC: No depression, anxiety, mood swings, or difficulty sleeping  HEME/LYMPH: No easy bruising, or bleeding gums  All others negative    PHYSICAL EXAM:  T(C): 36.1 (05-30-21 @ 13:43), Max: 36.4 (05-30-21 @ 09:43)  HR: 54 (05-30-21 @ 13:43) (54 - 64)  BP: 118/54 (05-30-21 @ 13:43) (109/56 - 136/66)  RR: 18 (05-30-21 @ 13:43) (18 - 18)  SpO2: 100% (05-30-21 @ 13:43) (100% - 100%)  Wt(kg): --  I&O's Summary    29 May 2021 07:01  -  30 May 2021 07:00  --------------------------------------------------------  IN: 440 mL / OUT: 850 mL / NET: -410 mL          EYES: EOMI, PERRLA, conjunctiva and sclera clear  ENMT: No tonsillar erythema, exudates, or enlargement  Cardiovascular: Normal S1 S2, No JVD, No murmurs, No edema  Respiratory: Lungs clear to auscultation	  Gastrointestinal:  Soft, Non-tender, + BS	  Extremities: No edema, cool                          11.3   7.70  )-----------( 254      ( 30 May 2021 09:47 )             34.7     05-30    139  |  102  |  27<H>  ----------------------------<  103<H>  4.1   |  24  |  0.76    Ca    9.3      30 May 2021 09:47  Phos  2.9     05-30  Mg     1.7     05-30    TPro  6.0  /  Alb  3.0<L>  /  TBili  1.1  /  DBili  x   /  AST  6   /  ALT  <5  /  AlkPhos  148<H>  05-29    proBNP:   Lipid Profile:   HgA1c:   TSH:     Consultant(s) Notes Reviewed:  [x ] YES  [ ] NO    Care Discussed with Consultants/Other Providers [ x] YES  [ ] NO    Imaging Personally Reviewed independently:  [x] YES  [ ] NO    All labs, radiologic studies, vitals, orders and medications list reviewed. Patient is seen and examined at bedside. Case discussed with medical team.

## 2021-05-30 NOTE — PROGRESS NOTE ADULT - ASSESSMENT
Assessment and Plan     1) Fall: mechanical , denies LOC     2) CAD s/p stent  -cath in 2010 showed mild luminal disease and patent stents  -denies chest pain  -echo with moderate Pericardial effusion with no evidence of tamponade, no intervention at this time    -c/w plavix     3) Hypotension   - Pericardial Effusion moderate no evidence of tamponade  - Hypotensive today getting fluids s/p 3 liter   - started on abx and stress dose steroids   - BP improved       DVT PPX Xarelto

## 2021-05-30 NOTE — PROVIDER CONTACT NOTE (OTHER) - RECOMMENDATIONS
will continue to monitor
will cont to monitor
will continue to monitor

## 2021-05-30 NOTE — PROGRESS NOTE ADULT - PROBLEM SELECTOR PLAN 2
- No ischemic changes on EKG. Troponin downtrending 16->13  - Continue  Plavix and statin  - echo as above, small pericardial effusion without tamponade physiology  -f/u cardiology  -lopressor held d/t sinus blake

## 2021-05-30 NOTE — PROGRESS NOTE ADULT - SUBJECTIVE AND OBJECTIVE BOX
Dr. Karolina Duckworth  Pager [phone]    PROGRESS NOTE:     Patient is a 81y old  Male who presents with a chief complaint of Fall (29 May 2021 16:45)      SUBJECTIVE / OVERNIGHT EVENTS: pt's bp improved after aggressive fluid resuscitation, denies chest pain/sob, confused  ADDITIONAL REVIEW OF SYSTEMS: sinus blake to 40's on tele    MEDICATIONS  (STANDING):  atorvastatin 10 milliGRAM(s) Oral at bedtime  carbidopa/levodopa  25/100 1 Tablet(s) Oral three times a day  clopidogrel Tablet 75 milliGRAM(s) Oral daily  dextrose 40% Gel 15 Gram(s) Oral once  dextrose 5%. 1000 milliLiter(s) (50 mL/Hr) IV Continuous <Continuous>  dextrose 5%. 1000 milliLiter(s) (100 mL/Hr) IV Continuous <Continuous>  dextrose 50% Injectable 25 Gram(s) IV Push once  dextrose 50% Injectable 12.5 Gram(s) IV Push once  dextrose 50% Injectable 25 Gram(s) IV Push once  enoxaparin Injectable 40 milliGRAM(s) SubCutaneous daily  glucagon  Injectable 1 milliGRAM(s) IntraMuscular once  hydrocortisone sodium succinate Injectable 50 milliGRAM(s) IV Push every 8 hours  insulin lispro (ADMELOG) corrective regimen sliding scale   SubCutaneous three times a day before meals  insulin lispro (ADMELOG) corrective regimen sliding scale   SubCutaneous at bedtime  lactated ringers. 1000 milliLiter(s) (650 mL/Hr) IV Continuous <Continuous>  pantoprazole    Tablet 40 milliGRAM(s) Oral before breakfast  piperacillin/tazobactam IVPB.. 3.375 Gram(s) IV Intermittent every 8 hours  QUEtiapine 50 milliGRAM(s) Oral at bedtime  sodium chloride 0.9%. 1000 milliLiter(s) (75 mL/Hr) IV Continuous <Continuous>  tamsulosin 0.4 milliGRAM(s) Oral at bedtime    MEDICATIONS  (PRN):      CAPILLARY BLOOD GLUCOSE      POCT Blood Glucose.: 120 mg/dL (30 May 2021 08:51)  POCT Blood Glucose.: 168 mg/dL (29 May 2021 21:42)  POCT Blood Glucose.: 171 mg/dL (29 May 2021 16:06)  POCT Blood Glucose.: 145 mg/dL (29 May 2021 12:11)    I&O's Summary    29 May 2021 07:01  -  30 May 2021 07:00  --------------------------------------------------------  IN: 440 mL / OUT: 850 mL / NET: -410 mL        PHYSICAL EXAM:  Vital Signs Last 24 Hrs  T(C): 36.3 (30 May 2021 05:00), Max: 36.3 (29 May 2021 11:21)  T(F): 97.4 (30 May 2021 05:00), Max: 97.4 (30 May 2021 05:00)  HR: 58 (30 May 2021 05:00) (49 - 72)  BP: 121/58 (30 May 2021 05:) (80/42 - 136/66)  BP(mean): --  RR: 18 (30 May 2021 05:00) (18 - 18)  SpO2: 100% (30 May 2021 05:00) (100% - 100%)  CONSTITUTIONAL: NAD, debilitated   RESPIRATORY: b/l air entry  CARDIOVASCULAR: Regular rate and rhythm, normal S1 and S2, no murmur/rub/gallop; No lower extremity edema; Peripheral pulses are 2+ bilaterally  ABDOMEN: Nontender to palpation, normoactive bowel sounds, no rebound/guarding; No hepatosplenomegaly  MUSCULOSKELETAL: no clubbing or cyanosis of digits; no joint swelling or tenderness to palpation  PSYCH: A+O x1, confused, demented    LABS:                        10.7   5.39  )-----------( 236      ( 29 May 2021 15:58 )             33.1         138  |  102  |  32<H>  ----------------------------<  157<H>  4.3   |  25  |  0.82    Ca    8.8      29 May 2021 15:58  Phos  4.0       Mg     1.8         TPro  6.0  /  Alb  3.0<L>  /  TBili  1.1  /  DBili  x   /  AST  6   /  ALT  <5  /  AlkPhos  148<H>            Urinalysis Basic - ( 29 May 2021 17:47 )    Color: Yellow / Appearance: Clear / S.021 / pH: x  Gluc: x / Ketone: Negative  / Bili: Negative / Urobili: 3 mg/dL   Blood: x / Protein: Negative / Nitrite: Negative   Leuk Esterase: Negative / RBC: 1 /HPF / WBC 1 /HPF   Sq Epi: x / Non Sq Epi: 1 /HPF / Bacteria: Negative          RADIOLOGY & ADDITIONAL TESTS:  Results Reviewed:   Imaging Personally Reviewed:  < from: Transthoracic Echocardiogram (21 @ 13:55) >  CONCLUSIONS:  Limited repeat study to re-evaluate pericardial effusion.  1. Small pericardial effusion (about  0.7 cm) posterior and  lateral to the left ventricle.  Small pericardial effusion  around the LV apex.  No RA or RV diastolic collapse seen.  2. Left pleural effusion.  *** Compared with echocardiogram of 2021, the  pericardial effusion has decreased in size.    Electrocardiogram Personally Reviewed:    COORDINATION OF CARE:  Care Discussed with Consultants/Other Providers [Y/N]:  Prior or Outpatient Records Reviewed [Y/N]:   Dr. Karolina Duckworth  Pager [phone]    PROGRESS NOTE:     Patient is a 81y old  Male who presents with a chief complaint of Fall (29 May 2021 16:45)      SUBJECTIVE / OVERNIGHT EVENTS: pt's bp improved after aggressive fluid resuscitation, denies chest pain/sob, confused  ADDITIONAL REVIEW OF SYSTEMS: sinus blake to 40's on tele    MEDICATIONS  (STANDING):  atorvastatin 10 milliGRAM(s) Oral at bedtime  carbidopa/levodopa  25/100 1 Tablet(s) Oral three times a day  clopidogrel Tablet 75 milliGRAM(s) Oral daily  dextrose 40% Gel 15 Gram(s) Oral once  dextrose 5%. 1000 milliLiter(s) (50 mL/Hr) IV Continuous <Continuous>  dextrose 5%. 1000 milliLiter(s) (100 mL/Hr) IV Continuous <Continuous>  dextrose 50% Injectable 25 Gram(s) IV Push once  dextrose 50% Injectable 12.5 Gram(s) IV Push once  dextrose 50% Injectable 25 Gram(s) IV Push once  enoxaparin Injectable 40 milliGRAM(s) SubCutaneous daily  glucagon  Injectable 1 milliGRAM(s) IntraMuscular once  hydrocortisone sodium succinate Injectable 50 milliGRAM(s) IV Push every 8 hours  insulin lispro (ADMELOG) corrective regimen sliding scale   SubCutaneous three times a day before meals  insulin lispro (ADMELOG) corrective regimen sliding scale   SubCutaneous at bedtime  lactated ringers. 1000 milliLiter(s) (650 mL/Hr) IV Continuous <Continuous>  pantoprazole    Tablet 40 milliGRAM(s) Oral before breakfast  piperacillin/tazobactam IVPB.. 3.375 Gram(s) IV Intermittent every 8 hours  QUEtiapine 50 milliGRAM(s) Oral at bedtime  sodium chloride 0.9%. 1000 milliLiter(s) (75 mL/Hr) IV Continuous <Continuous>  tamsulosin 0.4 milliGRAM(s) Oral at bedtime    MEDICATIONS  (PRN):      CAPILLARY BLOOD GLUCOSE      POCT Blood Glucose.: 120 mg/dL (30 May 2021 08:51)  POCT Blood Glucose.: 168 mg/dL (29 May 2021 21:42)  POCT Blood Glucose.: 171 mg/dL (29 May 2021 16:06)  POCT Blood Glucose.: 145 mg/dL (29 May 2021 12:11)    I&O's Summary    29 May 2021 07:01  -  30 May 2021 07:00  --------------------------------------------------------  IN: 440 mL / OUT: 850 mL / NET: -410 mL        PHYSICAL EXAM:  Vital Signs Last 24 Hrs  T(C): 36.3 (30 May 2021 05:00), Max: 36.3 (29 May 2021 11:21)  T(F): 97.4 (30 May 2021 05:00), Max: 97.4 (30 May 2021 05:00)  HR: 58 (30 May 2021 05:00) (49 - 72)  BP: 121/58 (30 May 2021 05:) (80/42 - 136/66)  BP(mean): --  RR: 18 (30 May 2021 05:00) (18 - 18)  SpO2: 100% (30 May 2021 05:00) (100% - 100%)  CONSTITUTIONAL: NAD, debilitated   RESPIRATORY: b/l air entry  CARDIOVASCULAR: Regular, bradycardic, normal S1 and S2, no murmur/rub/gallop; No lower extremity edema; Peripheral pulses are 2+ bilaterally  ABDOMEN: Nontender to palpation, normoactive bowel sounds, no rebound/guarding; No hepatosplenomegaly  MUSCULOSKELETAL: no clubbing or cyanosis of digits; no joint swelling or tenderness to palpation  PSYCH: A+O x1, confused, demented    LABS:                        10.7   5.39  )-----------( 236      ( 29 May 2021 15:58 )             33.1         138  |  102  |  32<H>  ----------------------------<  157<H>  4.3   |  25  |  0.82    Ca    8.8      29 May 2021 15:58  Phos  4.0       Mg     1.8         TPro  6.0  /  Alb  3.0<L>  /  TBili  1.1  /  DBili  x   /  AST  6   /  ALT  <5  /  AlkPhos  148<H>            Urinalysis Basic - ( 29 May 2021 17:47 )    Color: Yellow / Appearance: Clear / S.021 / pH: x  Gluc: x / Ketone: Negative  / Bili: Negative / Urobili: 3 mg/dL   Blood: x / Protein: Negative / Nitrite: Negative   Leuk Esterase: Negative / RBC: 1 /HPF / WBC 1 /HPF   Sq Epi: x / Non Sq Epi: 1 /HPF / Bacteria: Negative          RADIOLOGY & ADDITIONAL TESTS:  Results Reviewed:   Imaging Personally Reviewed:  < from: Transthoracic Echocardiogram (21 @ 13:55) >  CONCLUSIONS:  Limited repeat study to re-evaluate pericardial effusion.  1. Small pericardial effusion (about  0.7 cm) posterior and  lateral to the left ventricle.  Small pericardial effusion  around the LV apex.  No RA or RV diastolic collapse seen.  2. Left pleural effusion.  *** Compared with echocardiogram of 2021, the  pericardial effusion has decreased in size.    Electrocardiogram Personally Reviewed:    COORDINATION OF CARE:  Care Discussed with Consultants/Other Providers [Y/N]:  Prior or Outpatient Records Reviewed [Y/N]:

## 2021-05-31 ENCOUNTER — TRANSCRIPTION ENCOUNTER (OUTPATIENT)
Age: 81
End: 2021-05-31

## 2021-05-31 LAB
ANION GAP SERPL CALC-SCNC: 12 MMOL/L — SIGNIFICANT CHANGE UP (ref 7–14)
BUN SERPL-MCNC: 24 MG/DL — HIGH (ref 7–23)
CALCIUM SERPL-MCNC: 9.2 MG/DL — SIGNIFICANT CHANGE UP (ref 8.4–10.5)
CHLORIDE SERPL-SCNC: 103 MMOL/L — SIGNIFICANT CHANGE UP (ref 98–107)
CO2 SERPL-SCNC: 26 MMOL/L — SIGNIFICANT CHANGE UP (ref 22–31)
CREAT SERPL-MCNC: 0.92 MG/DL — SIGNIFICANT CHANGE UP (ref 0.5–1.3)
GLUCOSE BLDC GLUCOMTR-MCNC: 123 MG/DL — HIGH (ref 70–99)
GLUCOSE BLDC GLUCOMTR-MCNC: 138 MG/DL — HIGH (ref 70–99)
GLUCOSE BLDC GLUCOMTR-MCNC: 154 MG/DL — HIGH (ref 70–99)
GLUCOSE BLDC GLUCOMTR-MCNC: 180 MG/DL — HIGH (ref 70–99)
GLUCOSE SERPL-MCNC: 109 MG/DL — HIGH (ref 70–99)
HCT VFR BLD CALC: 30 % — LOW (ref 39–50)
HGB BLD-MCNC: 10 G/DL — LOW (ref 13–17)
MAGNESIUM SERPL-MCNC: 1.7 MG/DL — SIGNIFICANT CHANGE UP (ref 1.6–2.6)
MCHC RBC-ENTMCNC: 29.2 PG — SIGNIFICANT CHANGE UP (ref 27–34)
MCHC RBC-ENTMCNC: 33.3 GM/DL — SIGNIFICANT CHANGE UP (ref 32–36)
MCV RBC AUTO: 87.5 FL — SIGNIFICANT CHANGE UP (ref 80–100)
NRBC # BLD: 0 /100 WBCS — SIGNIFICANT CHANGE UP
NRBC # FLD: 0 K/UL — SIGNIFICANT CHANGE UP
PHOSPHATE SERPL-MCNC: 2.6 MG/DL — SIGNIFICANT CHANGE UP (ref 2.5–4.5)
PLATELET # BLD AUTO: 282 K/UL — SIGNIFICANT CHANGE UP (ref 150–400)
POTASSIUM SERPL-MCNC: 3.7 MMOL/L — SIGNIFICANT CHANGE UP (ref 3.5–5.3)
POTASSIUM SERPL-SCNC: 3.7 MMOL/L — SIGNIFICANT CHANGE UP (ref 3.5–5.3)
RBC # BLD: 3.43 M/UL — LOW (ref 4.2–5.8)
RBC # FLD: 15.9 % — HIGH (ref 10.3–14.5)
SODIUM SERPL-SCNC: 141 MMOL/L — SIGNIFICANT CHANGE UP (ref 135–145)
WBC # BLD: 6.34 K/UL — SIGNIFICANT CHANGE UP (ref 3.8–10.5)
WBC # FLD AUTO: 6.34 K/UL — SIGNIFICANT CHANGE UP (ref 3.8–10.5)

## 2021-05-31 PROCEDURE — 99233 SBSQ HOSP IP/OBS HIGH 50: CPT

## 2021-05-31 RX ORDER — HYDROCORTISONE 20 MG
25 TABLET ORAL EVERY 8 HOURS
Refills: 0 | Status: DISCONTINUED | OUTPATIENT
Start: 2021-05-31 | End: 2021-06-01

## 2021-05-31 RX ADMIN — Medication 1: at 12:41

## 2021-05-31 RX ADMIN — ENOXAPARIN SODIUM 40 MILLIGRAM(S): 100 INJECTION SUBCUTANEOUS at 08:47

## 2021-05-31 RX ADMIN — PIPERACILLIN AND TAZOBACTAM 25 GRAM(S): 4; .5 INJECTION, POWDER, LYOPHILIZED, FOR SOLUTION INTRAVENOUS at 21:14

## 2021-05-31 RX ADMIN — ATORVASTATIN CALCIUM 10 MILLIGRAM(S): 80 TABLET, FILM COATED ORAL at 21:15

## 2021-05-31 RX ADMIN — Medication 1: at 17:35

## 2021-05-31 RX ADMIN — PANTOPRAZOLE SODIUM 40 MILLIGRAM(S): 20 TABLET, DELAYED RELEASE ORAL at 05:16

## 2021-05-31 RX ADMIN — CLOPIDOGREL BISULFATE 75 MILLIGRAM(S): 75 TABLET, FILM COATED ORAL at 08:46

## 2021-05-31 RX ADMIN — Medication 25 MILLIGRAM(S): at 21:16

## 2021-05-31 RX ADMIN — TAMSULOSIN HYDROCHLORIDE 0.4 MILLIGRAM(S): 0.4 CAPSULE ORAL at 21:14

## 2021-05-31 RX ADMIN — PIPERACILLIN AND TAZOBACTAM 25 GRAM(S): 4; .5 INJECTION, POWDER, LYOPHILIZED, FOR SOLUTION INTRAVENOUS at 05:16

## 2021-05-31 RX ADMIN — QUETIAPINE FUMARATE 50 MILLIGRAM(S): 200 TABLET, FILM COATED ORAL at 21:15

## 2021-05-31 RX ADMIN — CARBIDOPA AND LEVODOPA 1 TABLET(S): 25; 100 TABLET ORAL at 05:16

## 2021-05-31 RX ADMIN — CARBIDOPA AND LEVODOPA 1 TABLET(S): 25; 100 TABLET ORAL at 12:41

## 2021-05-31 RX ADMIN — Medication 50 MILLIGRAM(S): at 05:16

## 2021-05-31 RX ADMIN — CARBIDOPA AND LEVODOPA 1 TABLET(S): 25; 100 TABLET ORAL at 21:14

## 2021-05-31 RX ADMIN — PIPERACILLIN AND TAZOBACTAM 25 GRAM(S): 4; .5 INJECTION, POWDER, LYOPHILIZED, FOR SOLUTION INTRAVENOUS at 12:40

## 2021-05-31 RX ADMIN — Medication 25 MILLIGRAM(S): at 12:41

## 2021-05-31 NOTE — DISCHARGE NOTE PROVIDER - NSDCMRMEDTOKEN_GEN_ALL_CORE_FT
atorvastatin 10 mg oral tablet: 1 tab(s) orally once a day  carbidopa-levodopa 25 mg-100 mg oral tablet: 1 tab(s) orally 3 times a day  clopidogrel 75 mg oral tablet: 1 tab(s) orally once a day  docusate sodium 100 mg oral tablet: 1 tab(s) orally 2 times a day  furosemide 20 mg oral tablet: 1 tab(s) orally once a day  Jardiance 10 mg oral tablet: 1 tab(s) orally once a day (in the morning)  melatonin 3 mg oral tablet: 2 tab(s) orally once a day (at bedtime)  metFORMIN 1000 mg oral tablet: 1 tab(s) orally 2 times a day  metoprolol succinate 50 mg oral tablet, extended release: 1 tab(s) orally once a day  pantoprazole 40 mg oral delayed release tablet: 1 tab(s) orally 2 times a day  rivaroxaban 10 mg oral tablet: 1 tab(s) orally once a day   SEROquel 50 mg oral tablet: 1 tab(s) orally once a day (at bedtime)  tamsulosin 0.4 mg oral capsule: 1 cap(s) orally once a day (at bedtime)   atorvastatin 10 mg oral tablet: 1 tab(s) orally once a day  carbidopa-levodopa 25 mg-100 mg oral tablet: 1 tab(s) orally 3 times a day  clopidogrel 75 mg oral tablet: 1 tab(s) orally once a day  docusate sodium 100 mg oral tablet: 1 tab(s) orally 2 times a day  furosemide 20 mg oral tablet: 1 tab(s) orally once a day  Jardiance 10 mg oral tablet: 1 tab(s) orally once a day (in the morning)  metFORMIN 1000 mg oral tablet: 1 tab(s) orally 2 times a day  metoprolol succinate 50 mg oral tablet, extended release: 1 tab(s) orally once a day  rivaroxaban 10 mg oral tablet: 1 tab(s) orally once a day   SEROquel 50 mg oral tablet: 1 tab(s) orally once a day (at bedtime)  tamsulosin 0.4 mg oral capsule: 1 cap(s) orally once a day (at bedtime)   atorvastatin 10 mg oral tablet: 1 tab(s) orally once a day  carbidopa-levodopa 25 mg-100 mg oral tablet: 1 tab(s) orally 3 times a day  clopidogrel 75 mg oral tablet: 1 tab(s) orally once a day  docusate sodium 100 mg oral tablet: 1 tab(s) orally 2 times a day  Jardiance 10 mg oral tablet: 1 tab(s) orally once a day (in the morning)  melatonin 3 mg oral tablet: 1 tab(s) orally once a day (at bedtime), As needed, Insomnia  metFORMIN 1000 mg oral tablet: 1 tab(s) orally 2 times a day  pantoprazole 40 mg oral delayed release tablet: 1 tab(s) orally once a day (before a meal)  SEROquel 50 mg oral tablet: 1 tab(s) orally once a day (at bedtime)  tamsulosin 0.4 mg oral capsule: 1 cap(s) orally once a day (at bedtime)   atorvastatin 10 mg oral tablet: 1 tab(s) orally once a day  carbidopa-levodopa 25 mg-100 mg oral tablet: 1 tab(s) orally 3 times a day  clopidogrel 75 mg oral tablet: 1 tab(s) orally once a day  docusate sodium 100 mg oral tablet: 1 tab(s) orally 2 times a day  Jardiance 10 mg oral tablet: 1 tab(s) orally once a day (in the morning)  melatonin 3 mg oral tablet: 1 tab(s) orally once a day (at bedtime), As needed, Insomnia  over the counter  metFORMIN 1000 mg oral tablet: 1 tab(s) orally 2 times a day  pantoprazole 40 mg oral delayed release tablet: 1 tab(s) orally once a day (before a meal)  SEROquel 50 mg oral tablet: 1 tab(s) orally once a day (at bedtime)  tamsulosin 0.4 mg oral capsule: 1 cap(s) orally once a day (at bedtime)

## 2021-05-31 NOTE — PROGRESS NOTE ADULT - SUBJECTIVE AND OBJECTIVE BOX
Dr. Karolina Duckworth  Pager [phone]    PROGRESS NOTE:     Patient is a 81y old  Male who presents with a chief complaint of Fall (30 May 2021 16:29)      SUBJECTIVE / OVERNIGHT EVENTS: pt denies chest pain or sob, feels better, appetite improved, in good spirits  ADDITIONAL REVIEW OF SYSTEMS: afebrile    MEDICATIONS  (STANDING):  atorvastatin 10 milliGRAM(s) Oral at bedtime  carbidopa/levodopa  25/100 1 Tablet(s) Oral three times a day  clopidogrel Tablet 75 milliGRAM(s) Oral daily  dextrose 40% Gel 15 Gram(s) Oral once  dextrose 5%. 1000 milliLiter(s) (50 mL/Hr) IV Continuous <Continuous>  dextrose 5%. 1000 milliLiter(s) (100 mL/Hr) IV Continuous <Continuous>  dextrose 50% Injectable 25 Gram(s) IV Push once  dextrose 50% Injectable 12.5 Gram(s) IV Push once  dextrose 50% Injectable 25 Gram(s) IV Push once  enoxaparin Injectable 40 milliGRAM(s) SubCutaneous daily  glucagon  Injectable 1 milliGRAM(s) IntraMuscular once  hydrocortisone sodium succinate Injectable 50 milliGRAM(s) IV Push every 8 hours  insulin lispro (ADMELOG) corrective regimen sliding scale   SubCutaneous three times a day before meals  insulin lispro (ADMELOG) corrective regimen sliding scale   SubCutaneous at bedtime  lactated ringers. 1000 milliLiter(s) (650 mL/Hr) IV Continuous <Continuous>  pantoprazole    Tablet 40 milliGRAM(s) Oral before breakfast  piperacillin/tazobactam IVPB.. 3.375 Gram(s) IV Intermittent every 8 hours  QUEtiapine 50 milliGRAM(s) Oral at bedtime  sodium chloride 0.9%. 1000 milliLiter(s) (75 mL/Hr) IV Continuous <Continuous>  tamsulosin 0.4 milliGRAM(s) Oral at bedtime    MEDICATIONS  (PRN):      CAPILLARY BLOOD GLUCOSE      POCT Blood Glucose.: 123 mg/dL (31 May 2021 08:41)  POCT Blood Glucose.: 160 mg/dL (30 May 2021 21:15)  POCT Blood Glucose.: 163 mg/dL (30 May 2021 17:13)  POCT Blood Glucose.: 147 mg/dL (30 May 2021 12:47)    I&O's Summary      PHYSICAL EXAM:  Vital Signs Last 24 Hrs  T(C): 36.5 (31 May 2021 09:00), Max: 36.9 (31 May 2021 01:00)  T(F): 97.7 (31 May 2021 09:00), Max: 98.4 (31 May 2021 01:00)  HR: 65 (31 May 2021 09:00) (54 - 84)  BP: 134/73 (31 May 2021 09:00) (108/54 - 154/62)  BP(mean): --  RR: 18 (31 May 2021 09:00) (18 - 18)  SpO2: 100% (31 May 2021 09:00) (97% - 100%)  CONSTITUTIONAL: NAD, debilitated   RESPIRATORY: b/l air entry, mainly clear  CARDIOVASCULAR: Regular, bradycardic, normal S1 and S2, no murmur/rub/gallop; No lower extremity edema; Peripheral pulses are 2+ bilaterally  ABDOMEN: Nontender to palpation, normoactive bowel sounds, no rebound/guarding; No hepatosplenomegaly  MUSCULOSKELETAL: no clubbing or cyanosis of digits; no joint swelling or tenderness to palpation  PSYCH: A+O x1-2, mentation improved, chronic dementia    LABS:                        10.0   6.34  )-----------( 282      ( 31 May 2021 06:19 )             30.0     05-31    141  |  103  |  24<H>  ----------------------------<  109<H>  3.7   |  26  |  0.92    Ca    9.2      31 May 2021 06:19  Phos  2.6       Mg     1.7         TPro  6.0  /  Alb  3.0<L>  /  TBili  1.1  /  DBili  x   /  AST  6   /  ALT  <5  /  AlkPhos  148<H>            Urinalysis Basic - ( 29 May 2021 17:47 )    Color: Yellow / Appearance: Clear / S.021 / pH: x  Gluc: x / Ketone: Negative  / Bili: Negative / Urobili: 3 mg/dL   Blood: x / Protein: Negative / Nitrite: Negative   Leuk Esterase: Negative / RBC: 1 /HPF / WBC 1 /HPF   Sq Epi: x / Non Sq Epi: 1 /HPF / Bacteria: Negative        Culture - Blood (collected 29 May 2021 20:58)  Source: .Blood Blood  Preliminary Report (30 May 2021 21:01):    No growth to date.        RADIOLOGY & ADDITIONAL TESTS:  Results Reviewed:   Imaging Personally Reviewed:  Electrocardiogram Personally Reviewed:    COORDINATION OF CARE:  Care Discussed with Consultants/Other Providers [Y/N]:  Prior or Outpatient Records Reviewed [Y/N]:   Dr. Karolina Duckworth  Pager [phone]    PROGRESS NOTE:     Patient is a 81y old  Male who presents with a chief complaint of Fall (30 May 2021 16:29)      SUBJECTIVE / OVERNIGHT EVENTS: pt denies chest pain or sob, feels better, appetite improved, in good spirits  ADDITIONAL REVIEW OF SYSTEMS: afebrile    MEDICATIONS  (STANDING):  atorvastatin 10 milliGRAM(s) Oral at bedtime  carbidopa/levodopa  25/100 1 Tablet(s) Oral three times a day  clopidogrel Tablet 75 milliGRAM(s) Oral daily  dextrose 40% Gel 15 Gram(s) Oral once  dextrose 5%. 1000 milliLiter(s) (50 mL/Hr) IV Continuous <Continuous>  dextrose 5%. 1000 milliLiter(s) (100 mL/Hr) IV Continuous <Continuous>  dextrose 50% Injectable 25 Gram(s) IV Push once  dextrose 50% Injectable 12.5 Gram(s) IV Push once  dextrose 50% Injectable 25 Gram(s) IV Push once  enoxaparin Injectable 40 milliGRAM(s) SubCutaneous daily  glucagon  Injectable 1 milliGRAM(s) IntraMuscular once  hydrocortisone sodium succinate Injectable 50 milliGRAM(s) IV Push every 8 hours  insulin lispro (ADMELOG) corrective regimen sliding scale   SubCutaneous three times a day before meals  insulin lispro (ADMELOG) corrective regimen sliding scale   SubCutaneous at bedtime  lactated ringers. 1000 milliLiter(s) (650 mL/Hr) IV Continuous <Continuous>  pantoprazole    Tablet 40 milliGRAM(s) Oral before breakfast  piperacillin/tazobactam IVPB.. 3.375 Gram(s) IV Intermittent every 8 hours  QUEtiapine 50 milliGRAM(s) Oral at bedtime  sodium chloride 0.9%. 1000 milliLiter(s) (75 mL/Hr) IV Continuous <Continuous>  tamsulosin 0.4 milliGRAM(s) Oral at bedtime    MEDICATIONS  (PRN):      CAPILLARY BLOOD GLUCOSE      POCT Blood Glucose.: 123 mg/dL (31 May 2021 08:41)  POCT Blood Glucose.: 160 mg/dL (30 May 2021 21:15)  POCT Blood Glucose.: 163 mg/dL (30 May 2021 17:13)  POCT Blood Glucose.: 147 mg/dL (30 May 2021 12:47)    I&O's Summary      PHYSICAL EXAM:  Vital Signs Last 24 Hrs  T(C): 36.5 (31 May 2021 09:00), Max: 36.9 (31 May 2021 01:00)  T(F): 97.7 (31 May 2021 09:00), Max: 98.4 (31 May 2021 01:00)  HR: 65 (31 May 2021 09:00) (54 - 84)  BP: 134/73 (31 May 2021 09:00) (108/54 - 154/62)  BP(mean): --  RR: 18 (31 May 2021 09:00) (18 - 18)  SpO2: 100% (31 May 2021 09:00) (97% - 100%)  CONSTITUTIONAL: NAD, debilitated   RESPIRATORY: b/l air entry, mainly clear  CARDIOVASCULAR: Regular, bradycardic, normal S1 and S2, no murmur/rub/gallop; No lower extremity edema; Peripheral pulses are 2+ bilaterally  ABDOMEN: Nontender to palpation, normoactive bowel sounds, no rebound/guarding; No hepatosplenomegaly  MUSCULOSKELETAL: no clubbing or cyanosis of digits; no joint swelling or tenderness to palpation  PSYCH: A+O x1-2, mentation improved, chronic dementia    LABS:                        10.0   6.34  )-----------( 282      ( 31 May 2021 06:19 )             30.0     05-31    141  |  103  |  24<H>  ----------------------------<  109<H>  3.7   |  26  |  0.92    Ca    9.2      31 May 2021 06:19  Phos  2.6       Mg     1.7         TPro  6.0  /  Alb  3.0<L>  /  TBili  1.1  /  DBili  x   /  AST  6   /  ALT  <5  /  AlkPhos  148<H>            Urinalysis Basic - ( 29 May 2021 17:47 )    Color: Yellow / Appearance: Clear / S.021 / pH: x  Gluc: x / Ketone: Negative  / Bili: Negative / Urobili: 3 mg/dL   Blood: x / Protein: Negative / Nitrite: Negative   Leuk Esterase: Negative / RBC: 1 /HPF / WBC 1 /HPF   Sq Epi: x / Non Sq Epi: 1 /HPF / Bacteria: Negative        Culture - Blood (collected 29 May 2021 20:58)  Source: .Blood Blood  Preliminary Report (30 May 2021 21:01):    No growth to date.        RADIOLOGY & ADDITIONAL TESTS:  Results Reviewed:   Imaging Personally Reviewed:  Electrocardiogram Personally Reviewed:    COORDINATION OF CARE:  Care Discussed with Consultants/Other Providers [Y/N]: acp Tessy, taper hydrocortisone 25 mg q8h today and dc tomorrow  Prior or Outpatient Records Reviewed [Y/N]:

## 2021-05-31 NOTE — DISCHARGE NOTE PROVIDER - CARE PROVIDER_API CALL
Dr. Maira Villalobos,   Phone: (   )    -  Fax: (   )    -  Follow Up Time:    Dr. Maira Villalobos,   Phone: (   )    -  Fax: (   )    -  Follow Up Time:     Bennie Hwang  CARDIOVASCULAR DISEASE  [phone] [place] [address]  Phone: [phone]  Fax: [phone]  Follow Up Time:

## 2021-05-31 NOTE — DISCHARGE NOTE PROVIDER - NSDCCPCAREPLAN_GEN_ALL_CORE_FT
PRINCIPAL DISCHARGE DIAGNOSIS  Diagnosis: Frequent falls  Assessment and Plan of Treatment: You were seen for a fall. Maintain a safe environment. Do not change positions quickly. Particpate in program recommended by physical therapy        SECONDARY DISCHARGE DIAGNOSES  Diagnosis: Coronary artery disease involving native heart without angina pectoris, unspecified vessel or lesion type  Assessment and Plan of Treatment: Continue medications as directed    Diagnosis: Essential hypertension  Assessment and Plan of Treatment: Continue blood pressure medication regimen as directed. Monitor for any visual changes, headaches or dizziness.  Monitor blood pressure regularly.  Follow up with your PCP for further management for high blood pressure.      Diagnosis: Type 2 diabetes mellitus without complication, without long-term current use of insulin  Assessment and Plan of Treatment: Continue consistent carbohydrate diet.  Monitor blood glucose levels throughout the day before meals and at bedtime.  Record blood sugars and bring to outpatient providers appointment in order to be reviewed by your doctor for management modifications.  Be aware of diabetes management symptoms including feeling cool and clammy may be related to low glucose levels.  Feeling hot and dry may indicate high glucose levels.  If  you feel these symptoms, check your blood sugar.  Make regular podiatry appointments in order to have feet checked for wounds and toe nails cut by a doctor to prevent infections.       PRINCIPAL DISCHARGE DIAGNOSIS  Diagnosis: Frequent falls  Assessment and Plan of Treatment: You came to the hospital after a fall. Maintain a safe environment. Do not change positions quickly. Participate in program recommended by physical therapy. You had a CT scan of your head which was negative for stroke or bleed. You had episodes of low blood pressure which improved with IV fluids. Your echocardiogram showed a moderate pericardial effusion (fluid around your heart), which decreased in size on repeat echocardiogram. You also had episodes of low heart rate and metoprolol was discontinued. You had one rectal temperature of 95.5 degrees fahrenheit and started empirically on IV antibiotics. Your blood cultures were negative, no UTI. Follow up with your primary care physician and Cardiologist within 1-2 weeks.      SECONDARY DISCHARGE DIAGNOSES  Diagnosis: Coronary artery disease involving native heart without angina pectoris, unspecified vessel or lesion type  Assessment and Plan of Treatment: Continue Plavix and statin. Lopressor was discontinued for slow heart rate. Continue low salt, fat, cholesterol and carbohydrate diet. Follow up with your cardiologist and primary care physician's within 1-2 weeks.    Diagnosis: COVID-19  Assessment and Plan of Treatment: Your COVID PCR was positive on admission, but you do not have respiratory symptoms and you received a course of Decadron and Remdesivir 2 months ago for COVID. You are not requiring supplemental oxygen. Unlikely active infection at this time as you had COVID 2 months ago.    Diagnosis: Essential hypertension  Assessment and Plan of Treatment: Your blood pressure medications were discontinued as you had multiple episodes of low blood pressure this admission. Concern for autonomic disorder causing low blood pressure due to advancing parkinson's disease. Follow up with your Primary Care Physician and/or Cardiologist to discuss whether you should resume any of your blood pressure medications.    Diagnosis: Benign prostatic hyperplasia, unspecified whether lower urinary tract symptoms present  Assessment and Plan of Treatment: Continue flomax.    Diagnosis: Parkinson's disease  Assessment and Plan of Treatment: Continue Sinemet. Suspect advancing parkinson's disease as factor into low blood pressure caused by autonomic dysfunction.    Diagnosis: Type 2 diabetes mellitus without complication, without long-term current use of insulin  Assessment and Plan of Treatment: Continue your home diabetes regimen. Continue a consistent carbohydrate diet (Meaning eating the same amount of carbohydrates at the same time each day). Monitor blood glucose levels four times a day before meals and at bedtime. Record blood sugars and bring to outpatient providers appointment in order to be reviewed by your doctor for management modifications. If your sugars are more than 400 or less than 70 you should contact your Primary Care Physician immediately. Monitor for signs/symptoms of low blood glucose, such as, dizziness, altered mental status, or cool/clammy skin. In addition, monitor for signs/symptoms of high blood glucose, such as, feeling hot, dry, fatigued, or with increased thirst/urination.  Exercise daily for at least 30 minutes and weight loss.  Follow up with your primary care physician and endocrinologist for routine Hemoglobin A1C checks and management.  Follow up with your ophthalmologist for routine yearly vision exams. Make regular podiatry appointments in order to have feet checked for wounds.    Diagnosis: Dementia without behavioral disturbance, unspecified dementia type  Assessment and Plan of Treatment: Continue Seroquel. Frequent re-orientation. Can take melatonin as needed for sleep.

## 2021-05-31 NOTE — DISCHARGE NOTE PROVIDER - PROVIDER TOKENS
FREE:[LAST:[Dr. Maira Villalobos],PHONE:[(   )    -],FAX:[(   )    -]] FREE:[LAST:[Dr. Maira Villalobos],PHONE:[(   )    -],FAX:[(   )    -]],PROVIDER:[TOKEN:[[id number]]]

## 2021-05-31 NOTE — PROGRESS NOTE ADULT - SUBJECTIVE AND OBJECTIVE BOX
Bennie Hwang MD  Interventional Cardiology / Endovascular Specialist  [place] Office : [phone] [place] [address] [place] [address]  Tel:   [place] Office : [address] [facility] [place] [address]  Tel: [phone]  Cell : [phone]    Pt lying in bed in NAD   	  MEDICATIONS:  clopidogrel Tablet 75 milliGRAM(s) Oral daily  enoxaparin Injectable 40 milliGRAM(s) SubCutaneous daily  tamsulosin 0.4 milliGRAM(s) Oral at bedtime    piperacillin/tazobactam IVPB.. 3.375 Gram(s) IV Intermittent every 8 hours      carbidopa/levodopa  25/100 1 Tablet(s) Oral three times a day  QUEtiapine 50 milliGRAM(s) Oral at bedtime    pantoprazole    Tablet 40 milliGRAM(s) Oral before breakfast    atorvastatin 10 milliGRAM(s) Oral at bedtime  dextrose 40% Gel 15 Gram(s) Oral once  dextrose 50% Injectable 25 Gram(s) IV Push once  dextrose 50% Injectable 12.5 Gram(s) IV Push once  dextrose 50% Injectable 25 Gram(s) IV Push once  glucagon  Injectable 1 milliGRAM(s) IntraMuscular once  hydrocortisone sodium succinate Injectable 50 milliGRAM(s) IV Push every 8 hours  insulin lispro (ADMELOG) corrective regimen sliding scale   SubCutaneous three times a day before meals  insulin lispro (ADMELOG) corrective regimen sliding scale   SubCutaneous at bedtime    dextrose 5%. 1000 milliLiter(s) IV Continuous <Continuous>  dextrose 5%. 1000 milliLiter(s) IV Continuous <Continuous>  lactated ringers. 1000 milliLiter(s) IV Continuous <Continuous>  sodium chloride 0.9%. 1000 milliLiter(s) IV Continuous <Continuous>      PAST MEDICAL/SURGICAL HISTORY  PAST MEDICAL & SURGICAL HISTORY:  Hypercholesterolemia    DM (Diabetes Mellitus)    HTN (Hypertension)    CAD (Coronary Artery Disease)  s/p cardiac stent ( &gt; 20 years ago)    BPH (benign prostatic hyperplasia)    Coronary Stent  x 2 ( 20 years )        SOCIAL HISTORY: Substance Use (street drugs): ( x ) never used  (  ) other:    FAMILY HISTORY:  No pertinent family history in first degree relatives        REVIEW OF SYSTEMS:  unable to accurately obtain     PHYSICAL EXAM:  T(C): 36.5 (05-31-21 @ 09:00), Max: 36.9 (05-31-21 @ 01:00)  HR: 65 (05-31-21 @ 09:00) (54 - 84)  BP: 134/73 (05-31-21 @ 09:00) (108/54 - 154/62)  RR: 18 (05-31-21 @ 09:00) (18 - 18)  SpO2: 100% (05-31-21 @ 09:00) (97% - 100%)  Wt(kg): --  I&O's Summary      EYES: EOMI, PERRLA, conjunctiva and sclera clear  ENMT: No tonsillar erythema, exudates, or enlargement  Cardiovascular: Normal S1 S2, No JVD, No murmurs, No edema  Respiratory: Lungs clear to auscultation	  Gastrointestinal:  Soft, Non-tender, + BS	  Extremities: No edema, cool                                         10.0   6.34  )-----------( 282      ( 31 May 2021 06:19 )             30.0     05-31    141  |  103  |  24<H>  ----------------------------<  109<H>  3.7   |  26  |  0.92    Ca    9.2      31 May 2021 06:19  Phos  2.6     05-31  Mg     1.7     05-31    TPro  6.0  /  Alb  3.0<L>  /  TBili  1.1  /  DBili  x   /  AST  6   /  ALT  <5  /  AlkPhos  148<H>  05-29    proBNP:   Lipid Profile:   HgA1c:   TSH:     Consultant(s) Notes Reviewed:  [x ] YES  [ ] NO    Care Discussed with Consultants/Other Providers [ x] YES  [ ] NO    Imaging Personally Reviewed independently:  [x] YES  [ ] NO    All labs, radiologic studies, vitals, orders and medications list reviewed. Patient is seen and examined at bedside. Case discussed with medical team.

## 2021-05-31 NOTE — PROGRESS NOTE ADULT - PROBLEM SELECTOR PLAN 1
- Unclear if pt had syncopal episode but he was found awake and alert by his aid after the fall  - Monitor on tele   -CT head/c-spine no acute path  -PT recs rehab , f/u SW  - Monitor orthostatics- bp improved after aggressive fluid resuscitation on 5/29 ~3L  - TTE 5/28 normal LVEF 55-60%, increased pericardial effusion compared to prior echo, Normal pericardium with moderate sized circumferential pericardial effusion seen predominantly apical and  inferolateral to the left ventricle. No tamponade physiology.  -repeat TTE 5/29 Small pericardial effusion around the LV apex (decreased compare to echo 5/28).  No RA or RV diastolic collapse seen.  -pt was hypotensive to 80's on 5/29 w/ persistent sinus bradycardia 40's, ICU/CCU consults appreciated, dcd lopressor, responded to aggressive fluid boluses, repeat echo 5/29 no tamponade physiology, decreased small pericardial effusion.  d/w ACP and cardiology Dr. Hwang, if remains hypotensive, consider transfer to CCU for low dose dopamine gtt  -one rectal temp hypothermic 95.5F, started empirically on zosyn 5/29, Bcx 5/29 ngtd, UA neg, procalcitonin 0.1, cortisol 7.4  -monitor vitals closely - Unclear if pt had syncopal episode but he was found awake and alert by his aid after the fall  - Monitor on tele   -CT head/c-spine no acute path  -PT recs rehab , f/u SW  - Monitor orthostatics- bp improved after aggressive fluid resuscitation on 5/29 ~3L  - TTE 5/28 normal LVEF 55-60%, increased pericardial effusion compared to prior echo, Normal pericardium with moderate sized circumferential pericardial effusion seen predominantly apical and  inferolateral to the left ventricle. No tamponade physiology.  -repeat TTE 5/29 Small pericardial effusion around the LV apex (decreased compare to echo 5/28).  No RA or RV diastolic collapse seen.  -pt was hypotensive to 80's on 5/29 w/ persistent sinus bradycardia 40's, ICU/CCU consults appreciated, dcd lopressor, responded to aggressive fluid boluses, repeat echo 5/29 no tamponade physiology, decreased small pericardial effusion.  d/w ACP and cardiology Dr. Hwang, if remains hypotensive, consider transfer to CCU for low dose dopamine gtt  -one rectal temp hypothermic 95.5F, started empirically on zosyn 5/29, Bcx 5/29 ngtd, UA neg, procalcitonin 0.1, cortisol 7.4, doubt adrenal insufficiency, taper hydrocortisone to 25 mg q8h today and dc tomorrow  -d/c zosyn if culture remains negative  -monitor vitals closely

## 2021-05-31 NOTE — DISCHARGE NOTE PROVIDER - HOSPITAL COURSE
80 YO M with PMHx of HTN, DM2 A1C 5.7 (5/28/21), HLD, Dementia (AOx2 at baseline), Parkinson's, CAD s/p KUSUM x2  (>20 years ago, continued on Plavix, stent patent on CATH 2010), Dysphagia, COVID in 3/2021, and recent new onset of HFrEF 50% with moderate diastolic dysfunction stage 2 and moderate pulmonary HTN (ECHO 2/2021), L Pleural Effusion, BPH and Gastric Ulcers presenting after fall. Admitted to rule out Syncope and found with orthostatics (+). s/p IVF with improvement however then noted with persistent hypotension likely in setting of aspiration vs adrenal insufficiency. Cortef started with improvement and tapered off with last dose on 6/1. COVID (+) on admission, but no fevers or signs of respiratory issues and COVID spiked domain AB (+). Additional infectious work up negative and continued on Zosyn for 5 day course (5/29-6/2) empirically.     Attempted to reach Brother, Berto Mitchell (Brother [phone]) multiple times with multiple voicemails left, however no answer or call back noted. SW/CM also attempted to reach Brother with no success. 82 YO M with PMHx of HTN, DM2 A1C 5.7 (5/28/21), HLD, Dementia (AOx2 at baseline), Parkinson's, CAD s/p KUSUM x2  (>20 years ago, continued on Plavix, stent patent on CATH 2010), Dysphagia, COVID in 3/2021, and recent new onset of HFrEF 50% with moderate diastolic dysfunction stage 2 and moderate pulmonary HTN (ECHO 2/2021), L Pleural Effusion, BPH and Gastric Ulcers presenting after fall. Admitted to rule out Syncope and found with orthostatics (+). s/p IVF with improvement however then noted with persistent hypotension likely in setting of aspiration vs adrenal insufficiency. Cortef started with improvement and tapered off with last dose on 6/1. COVID (+) on admission, but no fevers or signs of respiratory issues and COVID spiked domain AB (+). Additional infectious work up negative and continued on Zosyn for 5 day course (5/29-6/2) empirically.     Dispo: home with services 80 y/o M with PMH of HTN, DM2, HLD, Dementia (AOx2 at baseline), Parkinson's, CAD s/p KUSUM x2 (>20 years ago, continued on Plavix, stent patent on CATH 2010), Dysphagia, COVID in 3/2021, and recent new onset of HF (EF 50% with moderate diastolic dysfunction stage 2 and moderate pulmonary HTN (ECHO 2/2021)), L Pleural Effusion, BPH and Gastric Ulcers presenting after a fall, admitted for rule out syncope. Unclear if pt had syncopal episode but he was found awake and alert by his aide after the fall. CT head/c-spine no acute pathology. Found to be orthostatic positive, s/p IVF with improvement. TTE 5/28 normal LVEF 55-60%, increased pericardial effusion compared to prior echo, Normal pericardium with moderate sized circumferential pericardial effusion seen predominantly apical and inferolateral to the left ventricle. No tamponade physiology. Pt was hypotensive to 80's on 5/29 with persistent sinus bradycardia 40's, ICU/CCU consults appreciated, d/c'd lopressor and lasix, responded to aggressive fluid boluses, repeat echo 5/29 no tamponade physiology, decreased small pericardial effusion. Pt again hypotensive on 6/6 but asymptomatic. Concern for autonomic disorder causing hypotension due to advancing parkinson's. One rectal temp hypothermic 95.5F, started empirically on zosyn 5/29, Bcx 5/29 ngtd, UA neg, procalcitonin 0.1, cortisol 7.4, doubt adrenal insufficiency, tapered off hydrocortisone. COVID PCR positive on admission, but no respiratory symptoms and COVID spike domain AB (+), s/p course of Decadron and Remdesevir 2 months ago for hypoxic respiratory failure. SpO2 98-99%% on RA at this time. Additional infectious work up negative and continued on Zosyn for 5 day course (5/29-6/2) empirically. PT recommends rehab, family wants him home, has 24 hr aide.     80 y/o M with PMH of HTN, DM2, HLD, Dementia (AOx2 at baseline), Parkinson's, CAD s/p KUSUM x2 (>20 years ago, continued on Plavix, stent patent on CATH 2010), Dysphagia, COVID in 3/2021, and recent new onset of HF (EF 50% with moderate diastolic dysfunction stage 2 and moderate pulmonary HTN (ECHO 2/2021)), L Pleural Effusion, BPH and Gastric Ulcers presenting after a fall, admitted for rule out syncope. Unclear if pt had syncopal episode but he was found awake and alert by his aide after the fall. CT head/c-spine no acute pathology. Found to be orthostatic positive, s/p IVF with improvement. TTE 5/28 normal LVEF 55-60%, increased pericardial effusion compared to prior echo, Normal pericardium with moderate sized circumferential pericardial effusion seen predominantly apical and inferolateral to the left ventricle. No tamponade physiology. Pt was hypotensive to 80's on 5/29 with persistent sinus bradycardia 40's, ICU/CCU consults appreciated, d/c'd lopressor and lasix, responded to aggressive fluid boluses, repeat echo 5/29 no tamponade physiology, decreased small pericardial effusion. Pt again hypotensive on 6/6 but asymptomatic. Concern for autonomic disorder causing hypotension due to advancing parkinson's. One rectal temp hypothermic 95.5F, started empirically on zosyn 5/29, Bcx 5/29 ngtd, UA neg, procalcitonin 0.1, cortisol 7.4, doubt adrenal insufficiency, tapered off hydrocortisone. COVID PCR positive on admission, but no respiratory symptoms and COVID spike domain AB (+), s/p course of Decadron and Remdesevir 2 months ago for hypoxic respiratory failure. SpO2 98-99%% on RA at this time. Additional infectious work up negative and continued on Zosyn for 5 day course (5/29-6/2) empirically. PT recommends rehab, family wants him home, has 24 hr aide.      Case discussed with Dr. Bhatti and patient is medically stable for discharge home. Aides reinstated by CM.

## 2021-06-01 LAB
ANION GAP SERPL CALC-SCNC: 12 MMOL/L — SIGNIFICANT CHANGE UP (ref 7–14)
BUN SERPL-MCNC: 22 MG/DL — SIGNIFICANT CHANGE UP (ref 7–23)
CALCIUM SERPL-MCNC: 9.3 MG/DL — SIGNIFICANT CHANGE UP (ref 8.4–10.5)
CHLORIDE SERPL-SCNC: 101 MMOL/L — SIGNIFICANT CHANGE UP (ref 98–107)
CO2 SERPL-SCNC: 27 MMOL/L — SIGNIFICANT CHANGE UP (ref 22–31)
CREAT SERPL-MCNC: 0.94 MG/DL — SIGNIFICANT CHANGE UP (ref 0.5–1.3)
GLUCOSE BLDC GLUCOMTR-MCNC: 113 MG/DL — HIGH (ref 70–99)
GLUCOSE BLDC GLUCOMTR-MCNC: 122 MG/DL — HIGH (ref 70–99)
GLUCOSE BLDC GLUCOMTR-MCNC: 147 MG/DL — HIGH (ref 70–99)
GLUCOSE BLDC GLUCOMTR-MCNC: 167 MG/DL — HIGH (ref 70–99)
GLUCOSE SERPL-MCNC: 102 MG/DL — HIGH (ref 70–99)
HCT VFR BLD CALC: 32.8 % — LOW (ref 39–50)
HGB BLD-MCNC: 10.7 G/DL — LOW (ref 13–17)
MAGNESIUM SERPL-MCNC: 1.5 MG/DL — LOW (ref 1.6–2.6)
MCHC RBC-ENTMCNC: 29.2 PG — SIGNIFICANT CHANGE UP (ref 27–34)
MCHC RBC-ENTMCNC: 32.6 GM/DL — SIGNIFICANT CHANGE UP (ref 32–36)
MCV RBC AUTO: 89.6 FL — SIGNIFICANT CHANGE UP (ref 80–100)
NRBC # BLD: 0 /100 WBCS — SIGNIFICANT CHANGE UP
NRBC # FLD: 0 K/UL — SIGNIFICANT CHANGE UP
PHOSPHATE SERPL-MCNC: 2.8 MG/DL — SIGNIFICANT CHANGE UP (ref 2.5–4.5)
PLATELET # BLD AUTO: 281 K/UL — SIGNIFICANT CHANGE UP (ref 150–400)
POTASSIUM SERPL-MCNC: 3.5 MMOL/L — SIGNIFICANT CHANGE UP (ref 3.5–5.3)
POTASSIUM SERPL-SCNC: 3.5 MMOL/L — SIGNIFICANT CHANGE UP (ref 3.5–5.3)
RBC # BLD: 3.66 M/UL — LOW (ref 4.2–5.8)
RBC # FLD: 16 % — HIGH (ref 10.3–14.5)
SODIUM SERPL-SCNC: 140 MMOL/L — SIGNIFICANT CHANGE UP (ref 135–145)
WBC # BLD: 7.71 K/UL — SIGNIFICANT CHANGE UP (ref 3.8–10.5)
WBC # FLD AUTO: 7.71 K/UL — SIGNIFICANT CHANGE UP (ref 3.8–10.5)

## 2021-06-01 PROCEDURE — 93010 ELECTROCARDIOGRAM REPORT: CPT

## 2021-06-01 PROCEDURE — 99232 SBSQ HOSP IP/OBS MODERATE 35: CPT

## 2021-06-01 RX ORDER — POTASSIUM CHLORIDE 20 MEQ
40 PACKET (EA) ORAL ONCE
Refills: 0 | Status: COMPLETED | OUTPATIENT
Start: 2021-06-01 | End: 2021-06-01

## 2021-06-01 RX ORDER — MAGNESIUM SULFATE 500 MG/ML
2 VIAL (ML) INJECTION ONCE
Refills: 0 | Status: COMPLETED | OUTPATIENT
Start: 2021-06-01 | End: 2021-06-01

## 2021-06-01 RX ADMIN — CARBIDOPA AND LEVODOPA 1 TABLET(S): 25; 100 TABLET ORAL at 12:30

## 2021-06-01 RX ADMIN — PIPERACILLIN AND TAZOBACTAM 25 GRAM(S): 4; .5 INJECTION, POWDER, LYOPHILIZED, FOR SOLUTION INTRAVENOUS at 05:20

## 2021-06-01 RX ADMIN — Medication 1: at 13:16

## 2021-06-01 RX ADMIN — PIPERACILLIN AND TAZOBACTAM 25 GRAM(S): 4; .5 INJECTION, POWDER, LYOPHILIZED, FOR SOLUTION INTRAVENOUS at 12:30

## 2021-06-01 RX ADMIN — Medication 40 MILLIEQUIVALENT(S): at 10:49

## 2021-06-01 RX ADMIN — QUETIAPINE FUMARATE 50 MILLIGRAM(S): 200 TABLET, FILM COATED ORAL at 21:16

## 2021-06-01 RX ADMIN — ATORVASTATIN CALCIUM 10 MILLIGRAM(S): 80 TABLET, FILM COATED ORAL at 21:16

## 2021-06-01 RX ADMIN — CARBIDOPA AND LEVODOPA 1 TABLET(S): 25; 100 TABLET ORAL at 21:16

## 2021-06-01 RX ADMIN — PIPERACILLIN AND TAZOBACTAM 25 GRAM(S): 4; .5 INJECTION, POWDER, LYOPHILIZED, FOR SOLUTION INTRAVENOUS at 21:15

## 2021-06-01 RX ADMIN — PANTOPRAZOLE SODIUM 40 MILLIGRAM(S): 20 TABLET, DELAYED RELEASE ORAL at 05:20

## 2021-06-01 RX ADMIN — Medication 50 GRAM(S): at 10:49

## 2021-06-01 RX ADMIN — Medication 25 MILLIGRAM(S): at 05:20

## 2021-06-01 RX ADMIN — TAMSULOSIN HYDROCHLORIDE 0.4 MILLIGRAM(S): 0.4 CAPSULE ORAL at 21:17

## 2021-06-01 RX ADMIN — CLOPIDOGREL BISULFATE 75 MILLIGRAM(S): 75 TABLET, FILM COATED ORAL at 12:30

## 2021-06-01 RX ADMIN — CARBIDOPA AND LEVODOPA 1 TABLET(S): 25; 100 TABLET ORAL at 05:20

## 2021-06-01 RX ADMIN — ENOXAPARIN SODIUM 40 MILLIGRAM(S): 100 INJECTION SUBCUTANEOUS at 12:29

## 2021-06-01 NOTE — PROGRESS NOTE ADULT - SUBJECTIVE AND OBJECTIVE BOX
Patient is a 81y old  Male who presents with a chief complaint of Fall (31 May 2021 12:15)      SUBJECTIVE / OVERNIGHT EVENTS: Pt seen and examined at 10:50am, no overnight events, pt denies any sob, chest pain or any other complaints although he is confused. Per nsg no new issues.    MEDICATIONS  (STANDING):  atorvastatin 10 milliGRAM(s) Oral at bedtime  carbidopa/levodopa  25/100 1 Tablet(s) Oral three times a day  clopidogrel Tablet 75 milliGRAM(s) Oral daily  dextrose 40% Gel 15 Gram(s) Oral once  dextrose 5%. 1000 milliLiter(s) (50 mL/Hr) IV Continuous <Continuous>  dextrose 5%. 1000 milliLiter(s) (100 mL/Hr) IV Continuous <Continuous>  dextrose 50% Injectable 25 Gram(s) IV Push once  dextrose 50% Injectable 12.5 Gram(s) IV Push once  dextrose 50% Injectable 25 Gram(s) IV Push once  enoxaparin Injectable 40 milliGRAM(s) SubCutaneous daily  glucagon  Injectable 1 milliGRAM(s) IntraMuscular once  insulin lispro (ADMELOG) corrective regimen sliding scale   SubCutaneous three times a day before meals  insulin lispro (ADMELOG) corrective regimen sliding scale   SubCutaneous at bedtime  pantoprazole    Tablet 40 milliGRAM(s) Oral before breakfast  piperacillin/tazobactam IVPB.. 3.375 Gram(s) IV Intermittent every 8 hours  QUEtiapine 50 milliGRAM(s) Oral at bedtime  tamsulosin 0.4 milliGRAM(s) Oral at bedtime    MEDICATIONS  (PRN):      Vital Signs Last 24 Hrs  T(C): 36.7 (01 Jun 2021 09:00), Max: 37 (01 Jun 2021 01:00)  T(F): 98 (01 Jun 2021 09:00), Max: 98.6 (01 Jun 2021 01:00)  HR: 70 (01 Jun 2021 09:00) (58 - 78)  BP: 134/62 (01 Jun 2021 09:00) (122/67 - 144/62)  BP(mean): --  RR: 18 (01 Jun 2021 09:00) (18 - 18)  SpO2: 99% (01 Jun 2021 09:00) (99% - 100%)  CAPILLARY BLOOD GLUCOSE      POCT Blood Glucose.: 113 mg/dL (01 Jun 2021 09:01)  POCT Blood Glucose.: 138 mg/dL (31 May 2021 21:16)  POCT Blood Glucose.: 154 mg/dL (31 May 2021 17:28)  POCT Blood Glucose.: 180 mg/dL (31 May 2021 12:34)    I&O's Summary    31 May 2021 07:01  -  01 Jun 2021 07:00  --------------------------------------------------------  IN: 200 mL / OUT: 1000 mL / NET: -800 mL        PHYSICAL EXAM:  GENERAL: NAD, well-developed  CHEST/LUNG: Clear to auscultation bilaterally; No wheeze  HEART: Regular rate and rhythm; No murmurs  ABDOMEN: Soft, Nontender, Nondistended  EXTREMITIES: no LE edema  PSYCH: Calm  NEUROLOGY: AA, oriented to self, says he is in [place]  SKIN: No rashes or lesions      LABS:                        10.7   7.71  )-----------( 281      ( 01 Jun 2021 07:36 )             32.8     06-01    140  |  101  |  22  ----------------------------<  102<H>  3.5   |  27  |  0.94    Ca    9.3      01 Jun 2021 07:36  Phos  2.8     06-01  Mg     1.5     06-01                RADIOLOGY & ADDITIONAL TESTS:    Imaging Personally Reviewed:    Consultant(s) Notes Reviewed:      Care Discussed with Consultants/Other Providers:

## 2021-06-01 NOTE — PROGRESS NOTE ADULT - ASSESSMENT
80 yo M HTN, DM, HLD, dementia (AOx2 at baseline), Parkinson's disease, CAD s/p stents x2 (>20 years ago), recent new onset HF (EF 50%, on Xarelto, Moderate diastolic dysfunction (Stage II), moderate pulmonary hypertension, small pericardial effusion), L pleural effusion, BPH, gastric ulcers presenting after fall,    1) Fall  -mechanical , denies LOC     2) CAD s/p stent  -cath in 2010 showed mild luminal disease and patent stents  -denies chest pain  -echo with moderate Pericardial effusion with no evidence of tamponade, no intervention at this time    -c/w plavix     3) Hypotension   - Pericardial Effusion moderate no evidence of tamponade  - s/p 3 liter IVF  - started on abx and s/p stress dose steroids   - BP improved     4) DVT prophylaxis  -lovenox subq

## 2021-06-01 NOTE — PROGRESS NOTE ADULT - PROBLEM SELECTOR PLAN 1
- Unclear if pt had syncopal episode but he was found awake and alert by his aid after the fall  - Monitor on tele   -CT head/c-spine no acute path  -PT recs rehab , f/u SW  - Monitor orthostatics- bp improved after aggressive fluid resuscitation on 5/29 ~3L  - TTE 5/28 normal LVEF 55-60%, increased pericardial effusion compared to prior echo, Normal pericardium with moderate sized circumferential pericardial effusion seen predominantly apical and  inferolateral to the left ventricle. No tamponade physiology.  -repeat TTE 5/29 Small pericardial effusion around the LV apex (decreased compare to echo 5/28).  No RA or RV diastolic collapse seen.  -pt was hypotensive to 80's on 5/29 w/ persistent sinus bradycardia 40's, ICU/CCU consults appreciated, dcd lopressor, responded to aggressive fluid boluses, repeat echo 5/29 no tamponade physiology, decreased small pericardial effusion.  wkend team d/w ACP and cardiology Dr. Hwang, if remains hypotensive, consider transfer to CCU for low dose dopamine gtt  -one rectal temp hypothermic 95.5F, started empirically on zosyn 5/29, Bcx 5/29 ngtd, UA neg, procalcitonin 0.1, cortisol 7.4, doubt adrenal insufficiency, tapered off hydrocortisone  -d/c zosyn if culture remains negative in the next 1-2 days  -monitor vitals closely

## 2021-06-01 NOTE — CHART NOTE - NSCHARTNOTEFT_GEN_A_CORE
Notified by RN that patient's brother, Berto Mitchell ([phone]), called unit. He was upset and states that he has not received any of [facility]'s previous attempts to reach him. Phone number confirmed by RN. When writer attempted to recall number aprox 30 minuts later it went straight to voicemail. Message left. Will await call. Unit informed to notify provider if brother calls.    Joya Fontanez PA-C  Department of Medicine  [facility]   In House Pager #[phone]

## 2021-06-01 NOTE — CHART NOTE - NSCHARTNOTEFT_GEN_A_CORE
MEDICINE PA NOTE     80 YO M with PMHx of HTN, DM2 A1C 5.7 (5/28/21), HLD, Dementia (AOx2 at baseline), Parkinson's, CAD s/p KUSUM x2  (>20 years ago, continued on Plavix, stent patent on CATH 2010), Dysphagia, COVID in 3/2021, and recent new onset of HFrEF 50% with moderate diastolic dysfunction stage 2 and moderate pulmonary HTN (ECHO 2/2021), L Pleural Effusion, BPH and Gastric Ulcers presenting after fall. Admitted to rule out Syncope and found with orthostatics (+). s/p IVF with improvement however then noted with persistent hypotension likely in setting of aspiration vs adrenal insufficiency. Cortef started with improvement and tapered off with last dose on 6/1.     Patient now pending DISPO and goals of care conversation given multiple falls, advanced dementia and with 24/7 HHA with difficulty to care for patient. Attempted to reach Brother, Berto Mitchell (Brother [phone]) multiple times with multiple voicemails left, however no answer or call back noted. SW/JACOB also attempted to reach Brother with no success. Patient does NOT have a cellphone and will continue to try to call Brother. SW will attempt to reach HHA agency to discuss possibility of reaching Family and care at home with HHA. Will continue to monitor patient.     Emma Chester PA-C  Department of Medicine/ [facility]   In House Pager #[phone]

## 2021-06-01 NOTE — PROGRESS NOTE ADULT - SUBJECTIVE AND OBJECTIVE BOX
Bennie Hwang MD  Interventional Cardiology / Advance Heart Failure and Cardiac Transplant Specialist  [place] Office : [address] [place], [place] [address]  Tel:   [place] Office : [address] [facility] [place] [address]  Tel: [phone]  Cell : [phone]    Subjective/Overnight events: Pt is lying in bed comfortable not in distress  	  MEDICATIONS:  clopidogrel Tablet 75 milliGRAM(s) Oral daily  enoxaparin Injectable 40 milliGRAM(s) SubCutaneous daily  tamsulosin 0.4 milliGRAM(s) Oral at bedtime    piperacillin/tazobactam IVPB.. 3.375 Gram(s) IV Intermittent every 8 hours      carbidopa/levodopa  25/100 1 Tablet(s) Oral three times a day  QUEtiapine 50 milliGRAM(s) Oral at bedtime    pantoprazole    Tablet 40 milliGRAM(s) Oral before breakfast    atorvastatin 10 milliGRAM(s) Oral at bedtime  dextrose 40% Gel 15 Gram(s) Oral once  dextrose 50% Injectable 25 Gram(s) IV Push once  dextrose 50% Injectable 12.5 Gram(s) IV Push once  dextrose 50% Injectable 25 Gram(s) IV Push once  glucagon  Injectable 1 milliGRAM(s) IntraMuscular once  insulin lispro (ADMELOG) corrective regimen sliding scale   SubCutaneous three times a day before meals  insulin lispro (ADMELOG) corrective regimen sliding scale   SubCutaneous at bedtime    dextrose 5%. 1000 milliLiter(s) IV Continuous <Continuous>  dextrose 5%. 1000 milliLiter(s) IV Continuous <Continuous>      PAST MEDICAL/SURGICAL HISTORY  PAST MEDICAL & SURGICAL HISTORY:  Hypercholesterolemia    DM (Diabetes Mellitus)    HTN (Hypertension)    CAD (Coronary Artery Disease)  s/p cardiac stent ( &gt; 20 years ago)    BPH (benign prostatic hyperplasia)    Coronary Stent  x 2 ( 20 years )        SOCIAL HISTORY: Substance Use (street drugs): ( x ) never used  (  ) other:    FAMILY HISTORY:  No pertinent family history in first degree relatives        REVIEW OF SYSTEMS:  CONSTITUTIONAL: No fever, weight loss, or fatigue  EYES: No eye pain, visual disturbances, or discharge  ENMT:  No difficulty hearing, tinnitus, vertigo; No sinus or throat pain  BREASTS: No pain, masses, or nipple discharge  GASTROINTESTINAL: No abdominal or epigastric pain. No nausea, vomiting, or hematemesis; No diarrhea or constipation. No melena or hematochezia.  GENITOURINARY: No dysuria, frequency, hematuria, or incontinence  NEUROLOGICAL: No headaches, memory loss, loss of strength, numbness, or tremors  ENDOCRINE: No heat or cold intolerance; No hair loss  MUSCULOSKELETAL: No joint pain or swelling; No muscle, back, or extremity pain  PSYCHIATRIC: No depression, anxiety, mood swings, or difficulty sleeping  HEME/LYMPH: No easy bruising, or bleeding gums  All others negative    PHYSICAL EXAM:  T(C): 36.7 (06-01-21 @ 09:00), Max: 37 (06-01-21 @ 01:00)  HR: 70 (06-01-21 @ 09:00) (58 - 78)  BP: 134/62 (06-01-21 @ 09:00) (122/67 - 144/62)  RR: 18 (06-01-21 @ 09:00) (18 - 18)  SpO2: 99% (06-01-21 @ 09:00) (99% - 100%)  Wt(kg): --  I&O's Summary    31 May 2021 07:01  -  01 Jun 2021 07:00  --------------------------------------------------------  IN: 200 mL / OUT: 1000 mL / NET: -800 mL          EYES: EOMI, PERRLA, conjunctiva and sclera clear  ENMT: No tonsillar erythema, exudates, or enlargement  Cardiovascular: Normal S1 S2, No JVD, No murmurs, No edema  Respiratory: Lungs clear to auscultation	  Gastrointestinal:  Soft, Non-tender, + BS	  Extremities: No edema,                                    10.7   7.71  )-----------( 281      ( 01 Jun 2021 07:36 )             32.8     06-01    140  |  101  |  22  ----------------------------<  102<H>  3.5   |  27  |  0.94    Ca    9.3      01 Jun 2021 07:36  Phos  2.8     06-01  Mg     1.5     06-01      proBNP:   Lipid Profile:   HgA1c:   TSH:     Consultant(s) Notes Reviewed:  [x ] YES  [ ] NO    Care Discussed with Consultants/Other Providers [ x] YES  [ ] NO    Imaging Personally Reviewed independently:  [x] YES  [ ] NO    All labs, radiologic studies, vitals, orders and medications list reviewed. Patient is seen and examined at bedside. Case discussed with medical team.

## 2021-06-01 NOTE — PROGRESS NOTE ADULT - PROBLEM SELECTOR PLAN 2
- No ischemic changes on EKG. Troponin downtrending 16->13  - Continue  Plavix and statin  - echo as above, small pericardial effusion without tamponade physiology  -f/u cardiology  -lopressor held d/t sinus blake  -cont to monitor closely

## 2021-06-01 NOTE — PROGRESS NOTE ADULT - PROBLEM SELECTOR PLAN 10
- Pt on Xarelto, - appears pt was started upon dc after covid in March, more than a month now, change to prophylactic lovenox  - Prior notes indicate pt is DNR, MOLST had been filled. Unable to contact his brother to confirm, (called  brother Stephen Thomson at the number provided in sunrise)  family unable to be contacted so far.     - Hypomagnesemia- replete mag  Plan discussed with ACP

## 2021-06-02 LAB
ANION GAP SERPL CALC-SCNC: 10 MMOL/L — SIGNIFICANT CHANGE UP (ref 7–14)
BUN SERPL-MCNC: 23 MG/DL — SIGNIFICANT CHANGE UP (ref 7–23)
CALCIUM SERPL-MCNC: 8.7 MG/DL — SIGNIFICANT CHANGE UP (ref 8.4–10.5)
CHLORIDE SERPL-SCNC: 103 MMOL/L — SIGNIFICANT CHANGE UP (ref 98–107)
CO2 SERPL-SCNC: 27 MMOL/L — SIGNIFICANT CHANGE UP (ref 22–31)
CREAT SERPL-MCNC: 0.82 MG/DL — SIGNIFICANT CHANGE UP (ref 0.5–1.3)
GLUCOSE BLDC GLUCOMTR-MCNC: 120 MG/DL — HIGH (ref 70–99)
GLUCOSE BLDC GLUCOMTR-MCNC: 179 MG/DL — HIGH (ref 70–99)
GLUCOSE BLDC GLUCOMTR-MCNC: 181 MG/DL — HIGH (ref 70–99)
GLUCOSE BLDC GLUCOMTR-MCNC: 197 MG/DL — HIGH (ref 70–99)
GLUCOSE SERPL-MCNC: 102 MG/DL — HIGH (ref 70–99)
HCT VFR BLD CALC: 33.9 % — LOW (ref 39–50)
HGB BLD-MCNC: 11.1 G/DL — LOW (ref 13–17)
MAGNESIUM SERPL-MCNC: 1.8 MG/DL — SIGNIFICANT CHANGE UP (ref 1.6–2.6)
MCHC RBC-ENTMCNC: 29.3 PG — SIGNIFICANT CHANGE UP (ref 27–34)
MCHC RBC-ENTMCNC: 32.7 GM/DL — SIGNIFICANT CHANGE UP (ref 32–36)
MCV RBC AUTO: 89.4 FL — SIGNIFICANT CHANGE UP (ref 80–100)
NRBC # BLD: 0 /100 WBCS — SIGNIFICANT CHANGE UP
NRBC # FLD: 0 K/UL — SIGNIFICANT CHANGE UP
PHOSPHATE SERPL-MCNC: 3 MG/DL — SIGNIFICANT CHANGE UP (ref 2.5–4.5)
PLATELET # BLD AUTO: 262 K/UL — SIGNIFICANT CHANGE UP (ref 150–400)
POTASSIUM SERPL-MCNC: 3.6 MMOL/L — SIGNIFICANT CHANGE UP (ref 3.5–5.3)
POTASSIUM SERPL-SCNC: 3.6 MMOL/L — SIGNIFICANT CHANGE UP (ref 3.5–5.3)
RBC # BLD: 3.79 M/UL — LOW (ref 4.2–5.8)
RBC # FLD: 16.1 % — HIGH (ref 10.3–14.5)
SODIUM SERPL-SCNC: 140 MMOL/L — SIGNIFICANT CHANGE UP (ref 135–145)
WBC # BLD: 7.55 K/UL — SIGNIFICANT CHANGE UP (ref 3.8–10.5)
WBC # FLD AUTO: 7.55 K/UL — SIGNIFICANT CHANGE UP (ref 3.8–10.5)

## 2021-06-02 PROCEDURE — 99232 SBSQ HOSP IP/OBS MODERATE 35: CPT

## 2021-06-02 RX ADMIN — Medication 1: at 17:49

## 2021-06-02 RX ADMIN — PIPERACILLIN AND TAZOBACTAM 25 GRAM(S): 4; .5 INJECTION, POWDER, LYOPHILIZED, FOR SOLUTION INTRAVENOUS at 13:16

## 2021-06-02 RX ADMIN — CLOPIDOGREL BISULFATE 75 MILLIGRAM(S): 75 TABLET, FILM COATED ORAL at 13:16

## 2021-06-02 RX ADMIN — CARBIDOPA AND LEVODOPA 1 TABLET(S): 25; 100 TABLET ORAL at 21:02

## 2021-06-02 RX ADMIN — Medication 1: at 12:52

## 2021-06-02 RX ADMIN — ENOXAPARIN SODIUM 40 MILLIGRAM(S): 100 INJECTION SUBCUTANEOUS at 13:16

## 2021-06-02 RX ADMIN — PANTOPRAZOLE SODIUM 40 MILLIGRAM(S): 20 TABLET, DELAYED RELEASE ORAL at 05:11

## 2021-06-02 RX ADMIN — PIPERACILLIN AND TAZOBACTAM 25 GRAM(S): 4; .5 INJECTION, POWDER, LYOPHILIZED, FOR SOLUTION INTRAVENOUS at 21:02

## 2021-06-02 RX ADMIN — TAMSULOSIN HYDROCHLORIDE 0.4 MILLIGRAM(S): 0.4 CAPSULE ORAL at 21:02

## 2021-06-02 RX ADMIN — PIPERACILLIN AND TAZOBACTAM 25 GRAM(S): 4; .5 INJECTION, POWDER, LYOPHILIZED, FOR SOLUTION INTRAVENOUS at 05:11

## 2021-06-02 RX ADMIN — CARBIDOPA AND LEVODOPA 1 TABLET(S): 25; 100 TABLET ORAL at 05:11

## 2021-06-02 RX ADMIN — ATORVASTATIN CALCIUM 10 MILLIGRAM(S): 80 TABLET, FILM COATED ORAL at 21:03

## 2021-06-02 RX ADMIN — QUETIAPINE FUMARATE 50 MILLIGRAM(S): 200 TABLET, FILM COATED ORAL at 21:02

## 2021-06-02 RX ADMIN — CARBIDOPA AND LEVODOPA 1 TABLET(S): 25; 100 TABLET ORAL at 13:16

## 2021-06-02 NOTE — PROGRESS NOTE ADULT - SUBJECTIVE AND OBJECTIVE BOX
Bennie Hwang MD  Interventional Cardiology / Advance Heart Failure and Cardiac Transplant Specialist  [place] Office : [address] [place] [address]  Tel:   [place] Office : [address] [facility] [place] [address]  Tel: [phone]  Cell : [phone]    Subjective/Overnight events: Pt is lying in bed comfortable not in distress  	  MEDICATIONS:  clopidogrel Tablet 75 milliGRAM(s) Oral daily  enoxaparin Injectable 40 milliGRAM(s) SubCutaneous daily  tamsulosin 0.4 milliGRAM(s) Oral at bedtime    piperacillin/tazobactam IVPB.. 3.375 Gram(s) IV Intermittent every 8 hours      carbidopa/levodopa  25/100 1 Tablet(s) Oral three times a day  QUEtiapine 50 milliGRAM(s) Oral at bedtime    pantoprazole    Tablet 40 milliGRAM(s) Oral before breakfast    atorvastatin 10 milliGRAM(s) Oral at bedtime  dextrose 40% Gel 15 Gram(s) Oral once  dextrose 50% Injectable 25 Gram(s) IV Push once  dextrose 50% Injectable 12.5 Gram(s) IV Push once  dextrose 50% Injectable 25 Gram(s) IV Push once  glucagon  Injectable 1 milliGRAM(s) IntraMuscular once  insulin lispro (ADMELOG) corrective regimen sliding scale   SubCutaneous three times a day before meals  insulin lispro (ADMELOG) corrective regimen sliding scale   SubCutaneous at bedtime    dextrose 5%. 1000 milliLiter(s) IV Continuous <Continuous>  dextrose 5%. 1000 milliLiter(s) IV Continuous <Continuous>      PAST MEDICAL/SURGICAL HISTORY  PAST MEDICAL & SURGICAL HISTORY:  Hypercholesterolemia    DM (Diabetes Mellitus)    HTN (Hypertension)    CAD (Coronary Artery Disease)  s/p cardiac stent ( &gt; 20 years ago)    BPH (benign prostatic hyperplasia)    Coronary Stent  x 2 ( 20 years )        SOCIAL HISTORY: Substance Use (street drugs): ( x ) never used  (  ) other:    FAMILY HISTORY:  No pertinent family history in first degree relatives        REVIEW OF SYSTEMS:  unable to obtain    PHYSICAL EXAM:  T(C): 36.4 (06-02-21 @ 13:00), Max: 36.7 (06-01-21 @ 17:00)  HR: 61 (06-02-21 @ 13:00) (54 - 78)  BP: 105/59 (06-02-21 @ 13:00) (105/59 - 136/71)  RR: 18 (06-02-21 @ 13:00) (18 - 18)  SpO2: 97% (06-02-21 @ 13:00) (97% - 99%)  Wt(kg): --  I&O's Summary    01 Jun 2021 07:01  -  02 Jun 2021 07:00  --------------------------------------------------------  IN: 0 mL / OUT: 0 mL / NET: 0 mL        EYES: EOMI, PERRLA, conjunctiva and sclera clear  ENMT: No tonsillar erythema, exudates, or enlargement  Cardiovascular: Normal S1 S2, No JVD, No murmurs, No edema  Respiratory: Lungs clear to auscultation	  Gastrointestinal:  Soft, Non-tender, + BS	  Extremities: No edema,                                    11.1   7.55  )-----------( 262      ( 02 Jun 2021 06:56 )             33.9     06-02    140  |  103  |  23  ----------------------------<  102<H>  3.6   |  27  |  0.82    Ca    8.7      02 Jun 2021 06:56  Phos  3.0     06-02  Mg     1.8     06-02      proBNP:   Lipid Profile:   HgA1c:   TSH:     Consultant(s) Notes Reviewed:  [x ] YES  [ ] NO    Care Discussed with Consultants/Other Providers [ x] YES  [ ] NO    Imaging Personally Reviewed independently:  [x] YES  [ ] NO    All labs, radiologic studies, vitals, orders and medications list reviewed. Patient is seen and examined at bedside. Case discussed with medical team.

## 2021-06-02 NOTE — PROGRESS NOTE ADULT - ASSESSMENT
82 yo M HTN, DM, HLD, dementia (AOx2 at baseline), Parkinson's disease, CAD s/p stents x2 (>20 years ago), recent new onset HF (EF 50%, on Xarelto, Moderate diastolic dysfunction (Stage II), moderate pulmonary hypertension, small pericardial effusion), L pleural effusion, BPH, gastric ulcers presenting after fall,    1) Fall  -mechanical , denies LOC     2) CAD s/p stent  -cath in 2010 showed mild luminal disease and patent stents  -denies chest pain  -echo with moderate Pericardial effusion with no evidence of tamponade, no intervention at this time    -c/w plavix     3) Hypotension   - Pericardial Effusion moderate no evidence of tamponade  - s/p 3 liter IVF  - started on abx and s/p stress dose steroids   - BP improved     4) DVT prophylaxis  -lovenox subq

## 2021-06-02 NOTE — PROGRESS NOTE ADULT - SUBJECTIVE AND OBJECTIVE BOX
Patient is a 81y old  Male who presents with a chief complaint of Fall (02 Jun 2021 13:36)      SUBJECTIVE / OVERNIGHT EVENTS: Pt seen and examined at 11:20am, no overnight events, tele with sinus blake at 51/mt, pt denies any sob, chest pain or any other complaints although he is confused. Per nsg no new issues.      MEDICATIONS  (STANDING):  atorvastatin 10 milliGRAM(s) Oral at bedtime  carbidopa/levodopa  25/100 1 Tablet(s) Oral three times a day  clopidogrel Tablet 75 milliGRAM(s) Oral daily  dextrose 40% Gel 15 Gram(s) Oral once  dextrose 5%. 1000 milliLiter(s) (50 mL/Hr) IV Continuous <Continuous>  dextrose 5%. 1000 milliLiter(s) (100 mL/Hr) IV Continuous <Continuous>  dextrose 50% Injectable 25 Gram(s) IV Push once  dextrose 50% Injectable 12.5 Gram(s) IV Push once  dextrose 50% Injectable 25 Gram(s) IV Push once  enoxaparin Injectable 40 milliGRAM(s) SubCutaneous daily  glucagon  Injectable 1 milliGRAM(s) IntraMuscular once  insulin lispro (ADMELOG) corrective regimen sliding scale   SubCutaneous three times a day before meals  insulin lispro (ADMELOG) corrective regimen sliding scale   SubCutaneous at bedtime  pantoprazole    Tablet 40 milliGRAM(s) Oral before breakfast  piperacillin/tazobactam IVPB.. 3.375 Gram(s) IV Intermittent every 8 hours  QUEtiapine 50 milliGRAM(s) Oral at bedtime  tamsulosin 0.4 milliGRAM(s) Oral at bedtime    MEDICATIONS  (PRN):      Vital Signs Last 24 Hrs  T(C): 36.4 (02 Jun 2021 13:00), Max: 36.7 (01 Jun 2021 17:00)  T(F): 97.6 (02 Jun 2021 13:00), Max: 98 (01 Jun 2021 17:00)  HR: 61 (02 Jun 2021 13:00) (54 - 78)  BP: 105/59 (02 Jun 2021 13:00) (105/59 - 136/71)  BP(mean): --  RR: 18 (02 Jun 2021 13:00) (18 - 18)  SpO2: 97% (02 Jun 2021 13:00) (97% - 99%)  CAPILLARY BLOOD GLUCOSE      POCT Blood Glucose.: 197 mg/dL (02 Jun 2021 12:24)  POCT Blood Glucose.: 120 mg/dL (02 Jun 2021 09:44)  POCT Blood Glucose.: 122 mg/dL (01 Jun 2021 21:30)  POCT Blood Glucose.: 147 mg/dL (01 Jun 2021 17:24)    I&O's Summary    01 Jun 2021 07:01  -  02 Jun 2021 07:00  --------------------------------------------------------  IN: 0 mL / OUT: 0 mL / NET: 0 mL        PHYSICAL EXAM:  GENERAL: NAD, well-developed  CHEST/LUNG: Clear to auscultation bilaterally; No wheeze  HEART: Regular rate and rhythm; No murmurs  ABDOMEN: Soft, Nontender, Nondistended  EXTREMITIES: no LE edema  PSYCH: Calm  NEUROLOGY: AA, oriented to self  SKIN: No rashes or lesions      LABS:                        11.1   7.55  )-----------( 262      ( 02 Jun 2021 06:56 )             33.9     06-02    140  |  103  |  23  ----------------------------<  102<H>  3.6   |  27  |  0.82    Ca    8.7      02 Jun 2021 06:56  Phos  3.0     06-02  Mg     1.8     06-02                RADIOLOGY & ADDITIONAL TESTS:    Imaging Personally Reviewed:    Consultant(s) Notes Reviewed:      Care Discussed with Consultants/Other Providers:

## 2021-06-02 NOTE — PROGRESS NOTE ADULT - PROBLEM SELECTOR PLAN 10
- Pt on Xarelto, - appears pt was started upon dc after covid in March, more than a month now, change to prophylactic lovenox  - Prior notes indicate pt is DNR, MOLST had been filled. Unable to contact his brother to confirm, (called  brother Stephen Thomson at the number provided in sunrise)  family unable to be contacted so far.     called again on 6/2 unable to reach brother Berto left VM  Plan discussed with ACP

## 2021-06-03 LAB
ANION GAP SERPL CALC-SCNC: 9 MMOL/L — SIGNIFICANT CHANGE UP (ref 7–14)
BUN SERPL-MCNC: 26 MG/DL — HIGH (ref 7–23)
CALCIUM SERPL-MCNC: 9 MG/DL — SIGNIFICANT CHANGE UP (ref 8.4–10.5)
CHLORIDE SERPL-SCNC: 101 MMOL/L — SIGNIFICANT CHANGE UP (ref 98–107)
CO2 SERPL-SCNC: 26 MMOL/L — SIGNIFICANT CHANGE UP (ref 22–31)
CREAT SERPL-MCNC: 0.96 MG/DL — SIGNIFICANT CHANGE UP (ref 0.5–1.3)
CULTURE RESULTS: SIGNIFICANT CHANGE UP
GLUCOSE BLDC GLUCOMTR-MCNC: 115 MG/DL — HIGH (ref 70–99)
GLUCOSE BLDC GLUCOMTR-MCNC: 144 MG/DL — HIGH (ref 70–99)
GLUCOSE BLDC GLUCOMTR-MCNC: 166 MG/DL — HIGH (ref 70–99)
GLUCOSE BLDC GLUCOMTR-MCNC: 229 MG/DL — HIGH (ref 70–99)
GLUCOSE SERPL-MCNC: 110 MG/DL — HIGH (ref 70–99)
HCT VFR BLD CALC: 31.3 % — LOW (ref 39–50)
HGB BLD-MCNC: 10.3 G/DL — LOW (ref 13–17)
MAGNESIUM SERPL-MCNC: 1.7 MG/DL — SIGNIFICANT CHANGE UP (ref 1.6–2.6)
MCHC RBC-ENTMCNC: 29.2 PG — SIGNIFICANT CHANGE UP (ref 27–34)
MCHC RBC-ENTMCNC: 32.9 GM/DL — SIGNIFICANT CHANGE UP (ref 32–36)
MCV RBC AUTO: 88.7 FL — SIGNIFICANT CHANGE UP (ref 80–100)
NRBC # BLD: 0 /100 WBCS — SIGNIFICANT CHANGE UP
NRBC # FLD: 0 K/UL — SIGNIFICANT CHANGE UP
PHOSPHATE SERPL-MCNC: 3.1 MG/DL — SIGNIFICANT CHANGE UP (ref 2.5–4.5)
PLATELET # BLD AUTO: 254 K/UL — SIGNIFICANT CHANGE UP (ref 150–400)
POTASSIUM SERPL-MCNC: 3.7 MMOL/L — SIGNIFICANT CHANGE UP (ref 3.5–5.3)
POTASSIUM SERPL-SCNC: 3.7 MMOL/L — SIGNIFICANT CHANGE UP (ref 3.5–5.3)
RBC # BLD: 3.53 M/UL — LOW (ref 4.2–5.8)
RBC # FLD: 15.9 % — HIGH (ref 10.3–14.5)
SODIUM SERPL-SCNC: 136 MMOL/L — SIGNIFICANT CHANGE UP (ref 135–145)
SPECIMEN SOURCE: SIGNIFICANT CHANGE UP
WBC # BLD: 9.35 K/UL — SIGNIFICANT CHANGE UP (ref 3.8–10.5)
WBC # FLD AUTO: 9.35 K/UL — SIGNIFICANT CHANGE UP (ref 3.8–10.5)

## 2021-06-03 PROCEDURE — 99232 SBSQ HOSP IP/OBS MODERATE 35: CPT

## 2021-06-03 RX ADMIN — TAMSULOSIN HYDROCHLORIDE 0.4 MILLIGRAM(S): 0.4 CAPSULE ORAL at 21:03

## 2021-06-03 RX ADMIN — CARBIDOPA AND LEVODOPA 1 TABLET(S): 25; 100 TABLET ORAL at 05:17

## 2021-06-03 RX ADMIN — PIPERACILLIN AND TAZOBACTAM 25 GRAM(S): 4; .5 INJECTION, POWDER, LYOPHILIZED, FOR SOLUTION INTRAVENOUS at 05:16

## 2021-06-03 RX ADMIN — CARBIDOPA AND LEVODOPA 1 TABLET(S): 25; 100 TABLET ORAL at 12:23

## 2021-06-03 RX ADMIN — PANTOPRAZOLE SODIUM 40 MILLIGRAM(S): 20 TABLET, DELAYED RELEASE ORAL at 05:17

## 2021-06-03 RX ADMIN — PIPERACILLIN AND TAZOBACTAM 25 GRAM(S): 4; .5 INJECTION, POWDER, LYOPHILIZED, FOR SOLUTION INTRAVENOUS at 12:23

## 2021-06-03 RX ADMIN — QUETIAPINE FUMARATE 50 MILLIGRAM(S): 200 TABLET, FILM COATED ORAL at 21:03

## 2021-06-03 RX ADMIN — ATORVASTATIN CALCIUM 10 MILLIGRAM(S): 80 TABLET, FILM COATED ORAL at 21:03

## 2021-06-03 RX ADMIN — Medication 2: at 12:20

## 2021-06-03 RX ADMIN — CLOPIDOGREL BISULFATE 75 MILLIGRAM(S): 75 TABLET, FILM COATED ORAL at 08:49

## 2021-06-03 RX ADMIN — CARBIDOPA AND LEVODOPA 1 TABLET(S): 25; 100 TABLET ORAL at 21:03

## 2021-06-03 RX ADMIN — ENOXAPARIN SODIUM 40 MILLIGRAM(S): 100 INJECTION SUBCUTANEOUS at 08:49

## 2021-06-03 RX ADMIN — PIPERACILLIN AND TAZOBACTAM 25 GRAM(S): 4; .5 INJECTION, POWDER, LYOPHILIZED, FOR SOLUTION INTRAVENOUS at 21:03

## 2021-06-03 NOTE — PROGRESS NOTE ADULT - SUBJECTIVE AND OBJECTIVE BOX
Bennie Hwang MD  Interventional Cardiology / Advance Heart Failure and Cardiac Transplant Specialist  [place] Office : [address] [place] [address]  Tel:   [place] Office : [address] [facility] [place] [address]  Tel: [phone]  Cell : [phone]    Subjective/Overnight events: Pt is lying in bed comfortable not in distress  	  MEDICATIONS:  clopidogrel Tablet 75 milliGRAM(s) Oral daily  enoxaparin Injectable 40 milliGRAM(s) SubCutaneous daily  tamsulosin 0.4 milliGRAM(s) Oral at bedtime    piperacillin/tazobactam IVPB.. 3.375 Gram(s) IV Intermittent every 8 hours      carbidopa/levodopa  25/100 1 Tablet(s) Oral three times a day  QUEtiapine 50 milliGRAM(s) Oral at bedtime    pantoprazole    Tablet 40 milliGRAM(s) Oral before breakfast    atorvastatin 10 milliGRAM(s) Oral at bedtime  dextrose 40% Gel 15 Gram(s) Oral once  dextrose 50% Injectable 25 Gram(s) IV Push once  dextrose 50% Injectable 12.5 Gram(s) IV Push once  dextrose 50% Injectable 25 Gram(s) IV Push once  glucagon  Injectable 1 milliGRAM(s) IntraMuscular once  insulin lispro (ADMELOG) corrective regimen sliding scale   SubCutaneous three times a day before meals  insulin lispro (ADMELOG) corrective regimen sliding scale   SubCutaneous at bedtime    dextrose 5%. 1000 milliLiter(s) IV Continuous <Continuous>  dextrose 5%. 1000 milliLiter(s) IV Continuous <Continuous>      PAST MEDICAL/SURGICAL HISTORY  PAST MEDICAL & SURGICAL HISTORY:  Hypercholesterolemia    DM (Diabetes Mellitus)    HTN (Hypertension)    CAD (Coronary Artery Disease)  s/p cardiac stent ( &gt; 20 years ago)    BPH (benign prostatic hyperplasia)    Coronary Stent  x 2 ( 20 years )        SOCIAL HISTORY: Substance Use (street drugs): ( x ) never used  (  ) other:    FAMILY HISTORY:  No pertinent family history in first degree relatives        REVIEW OF SYSTEMS:  unable to obtain    PHYSICAL EXAM:  T(C): 36.7 (06-03-21 @ 11:53), Max: 36.8 (06-03-21 @ 09:00)  HR: 60 (06-03-21 @ 11:53) (60 - 94)  BP: 126/65 (06-03-21 @ 11:53) (105/59 - 127/51)  RR: 17 (06-03-21 @ 11:53) (17 - 18)  SpO2: 100% (06-03-21 @ 11:53) (97% - 100%)  Wt(kg): --  I&O's Summary    02 Jun 2021 07:01  -  03 Jun 2021 07:00  --------------------------------------------------------  IN: 0 mL / OUT: 150 mL / NET: -150 mL            EYES: EOMI, PERRLA, conjunctiva and sclera clear  ENMT: No tonsillar erythema, exudates, or enlargement  Cardiovascular: Normal S1 S2, No JVD, No murmurs, No edema  Respiratory: Lungs clear to auscultation	  Gastrointestinal:  Soft, Non-tender, + BS	  Extremities: No edema,                                    10.3   9.35  )-----------( 254      ( 03 Jun 2021 04:08 )             31.3     06-03    136  |  101  |  26<H>  ----------------------------<  110<H>  3.7   |  26  |  0.96    Ca    9.0      03 Jun 2021 04:08  Phos  3.1     06-03  Mg     1.7     06-03      proBNP:   Lipid Profile:   HgA1c:   TSH:     Consultant(s) Notes Reviewed:  [x ] YES  [ ] NO    Care Discussed with Consultants/Other Providers [ x] YES  [ ] NO    Imaging Personally Reviewed independently:  [x] YES  [ ] NO    All labs, radiologic studies, vitals, orders and medications list reviewed. Patient is seen and examined at bedside. Case discussed with medical team.

## 2021-06-03 NOTE — PROGRESS NOTE ADULT - PROBLEM SELECTOR PLAN 10
- Pt on Xarelto, - appears pt was started upon dc after covid in March, more than a month now, change to prophylactic lovenox  - Prior notes indicate pt is DNR, MOLST had been filled. Unable to contact his brother to confirm, (called  brother Stephen Thomson at the number provided in sunrise)    Plan discussed with ACP

## 2021-06-03 NOTE — PROGRESS NOTE ADULT - SUBJECTIVE AND OBJECTIVE BOX
Patient is a 81y old  Male who presents with a chief complaint of Fall (03 Jun 2021 12:04)      SUBJECTIVE / OVERNIGHT EVENTS: Pt seen and examined at 11am, no overnight events, pt denies any sob, chest pain or any other complaints although he is confused. Per nsg no new issues. No reports of any insomnia.        MEDICATIONS  (STANDING):  atorvastatin 10 milliGRAM(s) Oral at bedtime  carbidopa/levodopa  25/100 1 Tablet(s) Oral three times a day  clopidogrel Tablet 75 milliGRAM(s) Oral daily  dextrose 40% Gel 15 Gram(s) Oral once  dextrose 5%. 1000 milliLiter(s) (50 mL/Hr) IV Continuous <Continuous>  dextrose 5%. 1000 milliLiter(s) (100 mL/Hr) IV Continuous <Continuous>  dextrose 50% Injectable 25 Gram(s) IV Push once  dextrose 50% Injectable 12.5 Gram(s) IV Push once  dextrose 50% Injectable 25 Gram(s) IV Push once  enoxaparin Injectable 40 milliGRAM(s) SubCutaneous daily  glucagon  Injectable 1 milliGRAM(s) IntraMuscular once  insulin lispro (ADMELOG) corrective regimen sliding scale   SubCutaneous three times a day before meals  insulin lispro (ADMELOG) corrective regimen sliding scale   SubCutaneous at bedtime  pantoprazole    Tablet 40 milliGRAM(s) Oral before breakfast  piperacillin/tazobactam IVPB.. 3.375 Gram(s) IV Intermittent every 8 hours  QUEtiapine 50 milliGRAM(s) Oral at bedtime  tamsulosin 0.4 milliGRAM(s) Oral at bedtime    MEDICATIONS  (PRN):      Vital Signs Last 24 Hrs  T(C): 36.7 (03 Jun 2021 11:53), Max: 36.8 (03 Jun 2021 09:00)  T(F): 98.1 (03 Jun 2021 11:53), Max: 98.2 (03 Jun 2021 09:00)  HR: 60 (03 Jun 2021 11:53) (60 - 94)  BP: 126/65 (03 Jun 2021 11:53) (105/59 - 127/51)  BP(mean): --  RR: 17 (03 Jun 2021 11:53) (17 - 18)  SpO2: 100% (03 Jun 2021 11:53) (97% - 100%)  CAPILLARY BLOOD GLUCOSE      POCT Blood Glucose.: 229 mg/dL (03 Jun 2021 12:08)  POCT Blood Glucose.: 115 mg/dL (03 Jun 2021 08:46)  POCT Blood Glucose.: 181 mg/dL (02 Jun 2021 22:08)  POCT Blood Glucose.: 179 mg/dL (02 Jun 2021 17:34)    I&O's Summary    02 Jun 2021 07:01  -  03 Jun 2021 07:00  --------------------------------------------------------  IN: 0 mL / OUT: 150 mL / NET: -150 mL        PHYSICAL EXAM:  GENERAL: NAD, well-developed  CHEST/LUNG: Clear to auscultation bilaterally; No wheeze  HEART: Regular rate and rhythm; No murmurs  ABDOMEN: Soft, Nontender, Nondistended  EXTREMITIES: no LE edema  PSYCH: Calm  NEUROLOGY: AA, oriented to self  SKIN: No rashes or lesions      LABS:                        10.3   9.35  )-----------( 254      ( 03 Jun 2021 04:08 )             31.3     06-03    136  |  101  |  26<H>  ----------------------------<  110<H>  3.7   |  26  |  0.96    Ca    9.0      03 Jun 2021 04:08  Phos  3.1     06-03  Mg     1.7     06-03                RADIOLOGY & ADDITIONAL TESTS:    Imaging Personally Reviewed:    Consultant(s) Notes Reviewed:      Care Discussed with Consultants/Other Providers:

## 2021-06-03 NOTE — PROGRESS NOTE ADULT - ASSESSMENT
82 yo M HTN, DM, HLD, dementia (AOx2 at baseline), Parkinson's disease, CAD s/p stents x2 (>20 years ago), recent new onset HF (EF 50%, on Xarelto, Moderate diastolic dysfunction (Stage II), moderate pulmonary hypertension, small pericardial effusion), L pleural effusion, BPH, gastric ulcers presenting after fall,    Tele: SB 50s    1) Fall  -mechanical , denies LOC     2) CAD s/p stent  -cath in 2010 showed mild luminal disease and patent stents  -denies chest pain  -echo with moderate Pericardial effusion with no evidence of tamponade, no intervention at this time    -c/w plavix     3) Hypotension   - Pericardial Effusion moderate no evidence of tamponade  - s/p 3 liter IVF  - started on abx and s/p stress dose steroids   - BP improved     4) DVT prophylaxis  -lovenox subq

## 2021-06-04 ENCOUNTER — TRANSCRIPTION ENCOUNTER (OUTPATIENT)
Age: 81
End: 2021-06-04

## 2021-06-04 LAB
ANION GAP SERPL CALC-SCNC: 10 MMOL/L — SIGNIFICANT CHANGE UP (ref 7–14)
BUN SERPL-MCNC: 22 MG/DL — SIGNIFICANT CHANGE UP (ref 7–23)
CALCIUM SERPL-MCNC: 8.6 MG/DL — SIGNIFICANT CHANGE UP (ref 8.4–10.5)
CHLORIDE SERPL-SCNC: 101 MMOL/L — SIGNIFICANT CHANGE UP (ref 98–107)
CO2 SERPL-SCNC: 27 MMOL/L — SIGNIFICANT CHANGE UP (ref 22–31)
CREAT SERPL-MCNC: 0.88 MG/DL — SIGNIFICANT CHANGE UP (ref 0.5–1.3)
GLUCOSE BLDC GLUCOMTR-MCNC: 121 MG/DL — HIGH (ref 70–99)
GLUCOSE BLDC GLUCOMTR-MCNC: 127 MG/DL — HIGH (ref 70–99)
GLUCOSE BLDC GLUCOMTR-MCNC: 133 MG/DL — HIGH (ref 70–99)
GLUCOSE BLDC GLUCOMTR-MCNC: 160 MG/DL — HIGH (ref 70–99)
GLUCOSE SERPL-MCNC: 104 MG/DL — HIGH (ref 70–99)
HCT VFR BLD CALC: 25.4 % — LOW (ref 39–50)
HCT VFR BLD CALC: 32.5 % — LOW (ref 39–50)
HGB BLD-MCNC: 10.4 G/DL — LOW (ref 13–17)
HGB BLD-MCNC: 8.1 G/DL — LOW (ref 13–17)
MAGNESIUM SERPL-MCNC: 1.5 MG/DL — LOW (ref 1.6–2.6)
MCHC RBC-ENTMCNC: 29.1 PG — SIGNIFICANT CHANGE UP (ref 27–34)
MCHC RBC-ENTMCNC: 29.2 PG — SIGNIFICANT CHANGE UP (ref 27–34)
MCHC RBC-ENTMCNC: 31.9 GM/DL — LOW (ref 32–36)
MCHC RBC-ENTMCNC: 32 GM/DL — SIGNIFICANT CHANGE UP (ref 32–36)
MCV RBC AUTO: 91 FL — SIGNIFICANT CHANGE UP (ref 80–100)
MCV RBC AUTO: 91.7 FL — SIGNIFICANT CHANGE UP (ref 80–100)
NRBC # BLD: 0 /100 WBCS — SIGNIFICANT CHANGE UP
NRBC # BLD: 0 /100 WBCS — SIGNIFICANT CHANGE UP
NRBC # FLD: 0 K/UL — SIGNIFICANT CHANGE UP
NRBC # FLD: 0 K/UL — SIGNIFICANT CHANGE UP
PHOSPHATE SERPL-MCNC: 2.6 MG/DL — SIGNIFICANT CHANGE UP (ref 2.5–4.5)
PLATELET # BLD AUTO: 245 K/UL — SIGNIFICANT CHANGE UP (ref 150–400)
PLATELET # BLD AUTO: 305 K/UL — SIGNIFICANT CHANGE UP (ref 150–400)
POTASSIUM SERPL-MCNC: 3.9 MMOL/L — SIGNIFICANT CHANGE UP (ref 3.5–5.3)
POTASSIUM SERPL-SCNC: 3.9 MMOL/L — SIGNIFICANT CHANGE UP (ref 3.5–5.3)
RBC # BLD: 2.77 M/UL — LOW (ref 4.2–5.8)
RBC # BLD: 3.57 M/UL — LOW (ref 4.2–5.8)
RBC # FLD: 15.8 % — HIGH (ref 10.3–14.5)
RBC # FLD: 15.8 % — HIGH (ref 10.3–14.5)
SODIUM SERPL-SCNC: 138 MMOL/L — SIGNIFICANT CHANGE UP (ref 135–145)
WBC # BLD: 10.5 K/UL — SIGNIFICANT CHANGE UP (ref 3.8–10.5)
WBC # BLD: 8.43 K/UL — SIGNIFICANT CHANGE UP (ref 3.8–10.5)
WBC # FLD AUTO: 10.5 K/UL — SIGNIFICANT CHANGE UP (ref 3.8–10.5)
WBC # FLD AUTO: 8.43 K/UL — SIGNIFICANT CHANGE UP (ref 3.8–10.5)

## 2021-06-04 PROCEDURE — 99232 SBSQ HOSP IP/OBS MODERATE 35: CPT

## 2021-06-04 RX ORDER — MAGNESIUM SULFATE 500 MG/ML
1 VIAL (ML) INJECTION ONCE
Refills: 0 | Status: COMPLETED | OUTPATIENT
Start: 2021-06-04 | End: 2021-06-04

## 2021-06-04 RX ORDER — LANOLIN ALCOHOL/MO/W.PET/CERES
3 CREAM (GRAM) TOPICAL ONCE
Refills: 0 | Status: COMPLETED | OUTPATIENT
Start: 2021-06-04 | End: 2021-06-04

## 2021-06-04 RX ADMIN — Medication 100 GRAM(S): at 12:28

## 2021-06-04 RX ADMIN — CARBIDOPA AND LEVODOPA 1 TABLET(S): 25; 100 TABLET ORAL at 05:50

## 2021-06-04 RX ADMIN — Medication 3 MILLIGRAM(S): at 22:03

## 2021-06-04 RX ADMIN — QUETIAPINE FUMARATE 50 MILLIGRAM(S): 200 TABLET, FILM COATED ORAL at 22:03

## 2021-06-04 RX ADMIN — TAMSULOSIN HYDROCHLORIDE 0.4 MILLIGRAM(S): 0.4 CAPSULE ORAL at 22:03

## 2021-06-04 RX ADMIN — Medication 3 MILLIGRAM(S): at 00:47

## 2021-06-04 RX ADMIN — PANTOPRAZOLE SODIUM 40 MILLIGRAM(S): 20 TABLET, DELAYED RELEASE ORAL at 05:50

## 2021-06-04 RX ADMIN — ATORVASTATIN CALCIUM 10 MILLIGRAM(S): 80 TABLET, FILM COATED ORAL at 22:03

## 2021-06-04 RX ADMIN — CARBIDOPA AND LEVODOPA 1 TABLET(S): 25; 100 TABLET ORAL at 22:03

## 2021-06-04 RX ADMIN — CLOPIDOGREL BISULFATE 75 MILLIGRAM(S): 75 TABLET, FILM COATED ORAL at 08:39

## 2021-06-04 RX ADMIN — CARBIDOPA AND LEVODOPA 1 TABLET(S): 25; 100 TABLET ORAL at 12:28

## 2021-06-04 RX ADMIN — ENOXAPARIN SODIUM 40 MILLIGRAM(S): 100 INJECTION SUBCUTANEOUS at 08:40

## 2021-06-04 RX ADMIN — Medication 1: at 17:30

## 2021-06-04 NOTE — PROGRESS NOTE ADULT - ASSESSMENT
80 yo M HTN, DM, HLD, dementia (AOx2 at baseline), Parkinson's disease, CAD s/p stents x2 (>20 years ago), recent new onset HF (EF 50%, on Xarelto, Moderate diastolic dysfunction (Stage II), moderate pulmonary hypertension, small pericardial effusion), L pleural effusion, BPH, gastric ulcers presenting after fall,    Tele: SB 50s    1) Fall  -mechanical , denies LOC     2) CAD s/p stent  -cath in 2010 showed mild luminal disease and patent stents  -denies chest pain  -echo with moderate Pericardial effusion with no evidence of tamponade, no intervention at this time    -c/w plavix     3) Hypotension   - Pericardial Effusion moderate no evidence of tamponade  - s/p 3 liter IVF  - started on abx and s/p stress dose steroids   - BP improved     4) DVT prophylaxis  -lovenox subq

## 2021-06-04 NOTE — PROGRESS NOTE ADULT - SUBJECTIVE AND OBJECTIVE BOX
Patient is a 81y old  Male who presents with a chief complaint of Fall (03 Jun 2021 12:48)      SUBJECTIVE / OVERNIGHT EVENTS: Pt seen and examined at 11am, no overnight events, pt denies any sob, chest pain or any other complaints although he is confused. Per nsg no reports of any bleeding, no new issues.       MEDICATIONS  (STANDING):  atorvastatin 10 milliGRAM(s) Oral at bedtime  carbidopa/levodopa  25/100 1 Tablet(s) Oral three times a day  clopidogrel Tablet 75 milliGRAM(s) Oral daily  dextrose 40% Gel 15 Gram(s) Oral once  dextrose 5%. 1000 milliLiter(s) (50 mL/Hr) IV Continuous <Continuous>  dextrose 5%. 1000 milliLiter(s) (100 mL/Hr) IV Continuous <Continuous>  dextrose 50% Injectable 25 Gram(s) IV Push once  dextrose 50% Injectable 12.5 Gram(s) IV Push once  dextrose 50% Injectable 25 Gram(s) IV Push once  enoxaparin Injectable 40 milliGRAM(s) SubCutaneous daily  glucagon  Injectable 1 milliGRAM(s) IntraMuscular once  insulin lispro (ADMELOG) corrective regimen sliding scale   SubCutaneous three times a day before meals  insulin lispro (ADMELOG) corrective regimen sliding scale   SubCutaneous at bedtime  pantoprazole    Tablet 40 milliGRAM(s) Oral before breakfast  QUEtiapine 50 milliGRAM(s) Oral at bedtime  tamsulosin 0.4 milliGRAM(s) Oral at bedtime    MEDICATIONS  (PRN):      Vital Signs Last 24 Hrs  T(C): 36.8 (04 Jun 2021 09:00), Max: 36.8 (03 Jun 2021 21:00)  T(F): 98.2 (04 Jun 2021 09:00), Max: 98.3 (03 Jun 2021 21:00)  HR: 65 (04 Jun 2021 09:00) (57 - 82)  BP: 108/58 (04 Jun 2021 09:00) (108/53 - 124/57)  BP(mean): --  RR: 18 (04 Jun 2021 09:00) (18 - 18)  SpO2: 98% (04 Jun 2021 09:00) (98% - 100%)  CAPILLARY BLOOD GLUCOSE      POCT Blood Glucose.: 127 mg/dL (04 Jun 2021 12:21)  POCT Blood Glucose.: 133 mg/dL (04 Jun 2021 08:31)  POCT Blood Glucose.: 166 mg/dL (03 Jun 2021 21:46)  POCT Blood Glucose.: 144 mg/dL (03 Jun 2021 17:14)    I&O's Summary    03 Jun 2021 07:01  -  04 Jun 2021 07:00  --------------------------------------------------------  IN: 0 mL / OUT: 650 mL / NET: -650 mL        PHYSICAL EXAM:  GENERAL: NAD, well-developed  CHEST/LUNG: Clear to auscultation bilaterally; No wheeze  HEART: Regular rate and rhythm; No murmurs  ABDOMEN: Soft, Nontender, Nondistended  EXTREMITIES: no LE edema  PSYCH: Calm  NEUROLOGY: AA, oriented to self      LABS:                        10.4   8.43  )-----------( 245      ( 04 Jun 2021 11:17 )             32.5     06-04    138  |  101  |  22  ----------------------------<  104<H>  3.9   |  27  |  0.88    Ca    8.6      04 Jun 2021 08:23  Phos  2.6     06-04  Mg     1.5     06-04                RADIOLOGY & ADDITIONAL TESTS:    Imaging Personally Reviewed:    Consultant(s) Notes Reviewed:      Care Discussed with Consultants/Other Providers:

## 2021-06-04 NOTE — DISCHARGE NOTE NURSING/CASE MANAGEMENT/SOCIAL WORK - NSDCCRTYPESERV_GEN_ALL_CORE_FT
Resumption of [facility] services/ [facility]-[phone] auth is good for soc  for [facility] paperwork faxed.

## 2021-06-04 NOTE — PROGRESS NOTE ADULT - SUBJECTIVE AND OBJECTIVE BOX
Bennie Hwang MD  Interventional Cardiology / Advance Heart Failure and Cardiac Transplant Specialist  [place] Office : [address] [place] [address] [place] [address]  Tel:   [place] Office : [address] [facility] [place] [address]  Tel: [phone]  Cell : [phone]    Pt is lying in bed comfortable not in distress, no chest pains no SOB no palpitations  	  MEDICATIONS:  clopidogrel Tablet 75 milliGRAM(s) Oral daily  enoxaparin Injectable 40 milliGRAM(s) SubCutaneous daily  tamsulosin 0.4 milliGRAM(s) Oral at bedtime        carbidopa/levodopa  25/100 1 Tablet(s) Oral three times a day  QUEtiapine 50 milliGRAM(s) Oral at bedtime    pantoprazole    Tablet 40 milliGRAM(s) Oral before breakfast    atorvastatin 10 milliGRAM(s) Oral at bedtime  dextrose 40% Gel 15 Gram(s) Oral once  dextrose 50% Injectable 25 Gram(s) IV Push once  dextrose 50% Injectable 12.5 Gram(s) IV Push once  dextrose 50% Injectable 25 Gram(s) IV Push once  glucagon  Injectable 1 milliGRAM(s) IntraMuscular once  insulin lispro (ADMELOG) corrective regimen sliding scale   SubCutaneous three times a day before meals  insulin lispro (ADMELOG) corrective regimen sliding scale   SubCutaneous at bedtime    dextrose 5%. 1000 milliLiter(s) IV Continuous <Continuous>  dextrose 5%. 1000 milliLiter(s) IV Continuous <Continuous>      PAST MEDICAL/SURGICAL HISTORY  PAST MEDICAL & SURGICAL HISTORY:  Hypercholesterolemia    DM (Diabetes Mellitus)    HTN (Hypertension)    CAD (Coronary Artery Disease)  s/p cardiac stent ( &gt; 20 years ago)    BPH (benign prostatic hyperplasia)    Coronary Stent  x 2 ( 20 years )        SOCIAL HISTORY: Substance Use (street drugs): ( x ) never used  (  ) other:    FAMILY HISTORY:  No pertinent family history in first degree relatives           PHYSICAL EXAM:  T(C): 36.6 (06-04-21 @ 13:00), Max: 36.8 (06-03-21 @ 21:00)  HR: 75 (06-04-21 @ 13:00) (57 - 82)  BP: 116/62 (06-04-21 @ 13:00) (108/53 - 124/57)  RR: 18 (06-04-21 @ 13:00) (18 - 18)  SpO2: 98% (06-04-21 @ 13:00) (98% - 100%)  Wt(kg): --  I&O's Summary    03 Jun 2021 07:01  -  04 Jun 2021 07:00  --------------------------------------------------------  IN: 0 mL / OUT: 650 mL / NET: -650 mL             EYES: EOMI, PERRLA, conjunctiva and sclera clear  ENMT: No tonsillar erythema, exudates, or enlargement; Moist mucous membranes, Good dentition, No lesions  Cardiovascular: Normal S1 S2, No JVD, No murmurs, No edema  Respiratory: b/l rhonchi  Gastrointestinal:  Soft, Non-tender, + BS	  Extremities: no edema                                    10.4   8.43  )-----------( 245      ( 04 Jun 2021 11:17 )             32.5     06-04    138  |  101  |  22  ----------------------------<  104<H>  3.9   |  27  |  0.88    Ca    8.6      04 Jun 2021 08:23  Phos  2.6     06-04  Mg     1.5     06-04      proBNP:   Lipid Profile:   HgA1c:   TSH:     Consultant(s) Notes Reviewed:  [x ] YES  [ ] NO    Care Discussed with Consultants/Other Providers [ x] YES  [ ] NO    Imaging Personally Reviewed independently:  [x] YES  [ ] NO    All labs, radiologic studies, vitals, orders and medications list reviewed. Patient is seen and examined at bedside. Case discussed with medical team.

## 2021-06-04 NOTE — DISCHARGE NOTE NURSING/CASE MANAGEMENT/SOCIAL WORK - NSDCFUADDAPPT_GEN_ALL_CORE_FT
[facility] - [phone] Trip # [id number].  Transport set up for Sunday 6/6/21- 12pm  .

## 2021-06-04 NOTE — DISCHARGE NOTE NURSING/CASE MANAGEMENT/SOCIAL WORK - PATIENT PORTAL LINK FT
You can access the FollowMyHealth Patient Portal offered by [facility] by registering at the following website: [email or web address]. By joining [employer]’s FollowMyHealth portal, you will also be able to view your health information using other applications (apps) compatible with our system.

## 2021-06-04 NOTE — DISCHARGE NOTE NURSING/CASE MANAGEMENT/SOCIAL WORK - NSDCCRNAME_GEN_ALL_CORE_FT
[facility] confirmed SOC Sunday 6/6/21 Pt [facility]HA  24hr/7day Live In to be in home by 9Am and will await for pt arrival from hospital

## 2021-06-04 NOTE — DISCHARGE NOTE NURSING/CASE MANAGEMENT/SOCIAL WORK - NSSCNAMETXT_GEN_ALL_CORE
[facility] Refferal made but pending soc date- Pt brother aware and in agreement with d/c- Rn to call once Auth is received

## 2021-06-04 NOTE — DISCHARGE NOTE NURSING/CASE MANAGEMENT/SOCIAL WORK - NSDCCRNUMBER_GEN_ALL_CORE_FT
[phone]/ Coordinator Marva confirmed hha soc- Pt brother Berto Villela aware Hha has bah to pt home

## 2021-06-04 NOTE — CHART NOTE - NSCHARTNOTEFT_GEN_A_CORE
Pt remains with COVID + status from prior infection in march 17, 2021. Pt does not need to quarantine or require isolation

## 2021-06-04 NOTE — PROGRESS NOTE ADULT - PROBLEM SELECTOR PLAN 10
- Pt on Xarelto, - appears pt was started upon dc after covid in March, more than a month now, changed to prophylactic lovenox  - Prior notes indicate pt is DNR, MOLST had been filled. Unable to contact his brother to confirm, (called  brother Stephen Thomson at the number provided in sunrise)    Plan discussed with ACP - Pt on Xarelto, - appears pt was started upon dc after covid in March, more than a month now, changed to prophylactic lovenox  - Prior notes indicate pt is DNR, MOLST had been filled. Unable to contact his brother to confirm, (called  brother Stephen Thomson at the number provided in sunrise)    -Hypomagenesemia - repleted mag    Plan discussed with ACP

## 2021-06-04 NOTE — PROGRESS NOTE ADULT - PROBLEM SELECTOR PLAN 1
- Unclear if pt had syncopal episode but he was found awake and alert by his aid after the fall  - Monitor on tele   -CT head/c-spine no acute path  -PT recs rehab, family want him home has 24 hr aide  - Monitor orthostatics- bp improved after aggressive fluid resuscitation on 5/29 ~3L  - TTE 5/28 normal LVEF 55-60%, increased pericardial effusion compared to prior echo, Normal pericardium with moderate sized circumferential pericardial effusion seen predominantly apical and  inferolateral to the left ventricle. No tamponade physiology.  -repeat TTE 5/29 Small pericardial effusion around the LV apex (decreased compare to echo 5/28).  No RA or RV diastolic collapse seen.  -pt was hypotensive to 80's on 5/29 w/ persistent sinus bradycardia 40's, ICU/CCU consults appreciated, dcd lopressor, responded to aggressive fluid boluses, repeat echo 5/29 no tamponade physiology, decreased small pericardial effusion.  wkend team d/w ACP and cardiology Dr. Hwang, if remains hypotensive, consider transfer to CCU for low dose dopamine gtt  -one rectal temp hypothermic 95.5F, started empirically on zosyn 5/29, Bcx 5/29 ngtd, UA neg, procalcitonin 0.1, cortisol 7.4, doubt adrenal insufficiency, tapered off hydrocortisone  -d/c zosyn completed course  -monitor vitals closely  dc planning   -unable to get 24 hr aide per SW until Sunday or Monday, once able to reinstate HHA pt can be dc

## 2021-06-05 LAB
ANION GAP SERPL CALC-SCNC: 11 MMOL/L — SIGNIFICANT CHANGE UP (ref 7–14)
BUN SERPL-MCNC: 22 MG/DL — SIGNIFICANT CHANGE UP (ref 7–23)
CALCIUM SERPL-MCNC: 8.8 MG/DL — SIGNIFICANT CHANGE UP (ref 8.4–10.5)
CHLORIDE SERPL-SCNC: 102 MMOL/L — SIGNIFICANT CHANGE UP (ref 98–107)
CO2 SERPL-SCNC: 26 MMOL/L — SIGNIFICANT CHANGE UP (ref 22–31)
CREAT SERPL-MCNC: 0.69 MG/DL — SIGNIFICANT CHANGE UP (ref 0.5–1.3)
GLUCOSE BLDC GLUCOMTR-MCNC: 123 MG/DL — HIGH (ref 70–99)
GLUCOSE BLDC GLUCOMTR-MCNC: 142 MG/DL — HIGH (ref 70–99)
GLUCOSE BLDC GLUCOMTR-MCNC: 164 MG/DL — HIGH (ref 70–99)
GLUCOSE BLDC GLUCOMTR-MCNC: 91 MG/DL — SIGNIFICANT CHANGE UP (ref 70–99)
GLUCOSE SERPL-MCNC: 92 MG/DL — SIGNIFICANT CHANGE UP (ref 70–99)
HCT VFR BLD CALC: 31.8 % — LOW (ref 39–50)
HGB BLD-MCNC: 10.3 G/DL — LOW (ref 13–17)
MAGNESIUM SERPL-MCNC: 1.8 MG/DL — SIGNIFICANT CHANGE UP (ref 1.6–2.6)
MCHC RBC-ENTMCNC: 29.3 PG — SIGNIFICANT CHANGE UP (ref 27–34)
MCHC RBC-ENTMCNC: 32.4 GM/DL — SIGNIFICANT CHANGE UP (ref 32–36)
MCV RBC AUTO: 90.3 FL — SIGNIFICANT CHANGE UP (ref 80–100)
NRBC # BLD: 0 /100 WBCS — SIGNIFICANT CHANGE UP
NRBC # FLD: 0 K/UL — SIGNIFICANT CHANGE UP
PHOSPHATE SERPL-MCNC: 2.9 MG/DL — SIGNIFICANT CHANGE UP (ref 2.5–4.5)
PLATELET # BLD AUTO: 256 K/UL — SIGNIFICANT CHANGE UP (ref 150–400)
POTASSIUM SERPL-MCNC: 3.8 MMOL/L — SIGNIFICANT CHANGE UP (ref 3.5–5.3)
POTASSIUM SERPL-SCNC: 3.8 MMOL/L — SIGNIFICANT CHANGE UP (ref 3.5–5.3)
RBC # BLD: 3.52 M/UL — LOW (ref 4.2–5.8)
RBC # FLD: 15.9 % — HIGH (ref 10.3–14.5)
SODIUM SERPL-SCNC: 139 MMOL/L — SIGNIFICANT CHANGE UP (ref 135–145)
WBC # BLD: 7.51 K/UL — SIGNIFICANT CHANGE UP (ref 3.8–10.5)
WBC # FLD AUTO: 7.51 K/UL — SIGNIFICANT CHANGE UP (ref 3.8–10.5)

## 2021-06-05 PROCEDURE — 99232 SBSQ HOSP IP/OBS MODERATE 35: CPT

## 2021-06-05 RX ORDER — POTASSIUM CHLORIDE 20 MEQ
20 PACKET (EA) ORAL ONCE
Refills: 0 | Status: COMPLETED | OUTPATIENT
Start: 2021-06-05 | End: 2021-06-05

## 2021-06-05 RX ORDER — LANOLIN ALCOHOL/MO/W.PET/CERES
3 CREAM (GRAM) TOPICAL AT BEDTIME
Refills: 0 | Status: DISCONTINUED | OUTPATIENT
Start: 2021-06-05 | End: 2021-06-07

## 2021-06-05 RX ORDER — MAGNESIUM SULFATE 500 MG/ML
1 VIAL (ML) INJECTION ONCE
Refills: 0 | Status: COMPLETED | OUTPATIENT
Start: 2021-06-05 | End: 2021-06-05

## 2021-06-05 RX ADMIN — CARBIDOPA AND LEVODOPA 1 TABLET(S): 25; 100 TABLET ORAL at 21:20

## 2021-06-05 RX ADMIN — Medication 3 MILLIGRAM(S): at 22:22

## 2021-06-05 RX ADMIN — ATORVASTATIN CALCIUM 10 MILLIGRAM(S): 80 TABLET, FILM COATED ORAL at 21:20

## 2021-06-05 RX ADMIN — CARBIDOPA AND LEVODOPA 1 TABLET(S): 25; 100 TABLET ORAL at 05:25

## 2021-06-05 RX ADMIN — Medication 100 GRAM(S): at 09:43

## 2021-06-05 RX ADMIN — ENOXAPARIN SODIUM 40 MILLIGRAM(S): 100 INJECTION SUBCUTANEOUS at 09:43

## 2021-06-05 RX ADMIN — QUETIAPINE FUMARATE 50 MILLIGRAM(S): 200 TABLET, FILM COATED ORAL at 21:20

## 2021-06-05 RX ADMIN — CARBIDOPA AND LEVODOPA 1 TABLET(S): 25; 100 TABLET ORAL at 13:19

## 2021-06-05 RX ADMIN — TAMSULOSIN HYDROCHLORIDE 0.4 MILLIGRAM(S): 0.4 CAPSULE ORAL at 21:20

## 2021-06-05 RX ADMIN — CLOPIDOGREL BISULFATE 75 MILLIGRAM(S): 75 TABLET, FILM COATED ORAL at 09:43

## 2021-06-05 RX ADMIN — Medication 20 MILLIEQUIVALENT(S): at 09:43

## 2021-06-05 RX ADMIN — PANTOPRAZOLE SODIUM 40 MILLIGRAM(S): 20 TABLET, DELAYED RELEASE ORAL at 05:25

## 2021-06-05 RX ADMIN — Medication 1: at 17:32

## 2021-06-05 NOTE — PROGRESS NOTE ADULT - SUBJECTIVE AND OBJECTIVE BOX
[facility] Division of Hospital Medicine  Jazmin Grande MD  Pager (M-F, 8A-5P): [phone]  Other Times:  [phone]    Patient is a 81y old  Male who presents with a chief complaint of Fall (04 Jun 2021 12:55)    SUBJECTIVE / OVERNIGHT EVENTS:  pt comfortable in bed - denies complaints     MEDICATIONS  (STANDING):  atorvastatin 10 milliGRAM(s) Oral at bedtime  carbidopa/levodopa  25/100 1 Tablet(s) Oral three times a day  clopidogrel Tablet 75 milliGRAM(s) Oral daily  dextrose 40% Gel 15 Gram(s) Oral once  dextrose 5%. 1000 milliLiter(s) (50 mL/Hr) IV Continuous <Continuous>  dextrose 5%. 1000 milliLiter(s) (100 mL/Hr) IV Continuous <Continuous>  dextrose 50% Injectable 25 Gram(s) IV Push once  dextrose 50% Injectable 12.5 Gram(s) IV Push once  dextrose 50% Injectable 25 Gram(s) IV Push once  enoxaparin Injectable 40 milliGRAM(s) SubCutaneous daily  glucagon  Injectable 1 milliGRAM(s) IntraMuscular once  insulin lispro (ADMELOG) corrective regimen sliding scale   SubCutaneous three times a day before meals  insulin lispro (ADMELOG) corrective regimen sliding scale   SubCutaneous at bedtime  pantoprazole    Tablet 40 milliGRAM(s) Oral before breakfast  QUEtiapine 50 milliGRAM(s) Oral at bedtime  tamsulosin 0.4 milliGRAM(s) Oral at bedtime    MEDICATIONS  (PRN):      CAPILLARY BLOOD GLUCOSE      POCT Blood Glucose.: 142 mg/dL (05 Jun 2021 12:17)  POCT Blood Glucose.: 91 mg/dL (05 Jun 2021 08:25)  POCT Blood Glucose.: 121 mg/dL (04 Jun 2021 21:46)  POCT Blood Glucose.: 160 mg/dL (04 Jun 2021 17:16)    I&O's Summary    04 Jun 2021 07:01  -  05 Jun 2021 07:00  --------------------------------------------------------  IN: 100 mL / OUT: 350 mL / NET: -250 mL    05 Jun 2021 07:01  -  05 Jun 2021 15:11  --------------------------------------------------------  IN: 240 mL / OUT: 0 mL / NET: 240 mL        PHYSICAL EXAM:  Vital Signs Last 24 Hrs  T(C): 36.3 (05 Jun 2021 13:15), Max: 36.7 (05 Jun 2021 01:00)  T(F): 97.3 (05 Jun 2021 13:15), Max: 98.1 (05 Jun 2021 01:00)  HR: 61 (05 Jun 2021 13:15) (58 - 65)  BP: 104/56 (05 Jun 2021 13:15) (104/56 - 145/65)  BP(mean): --  RR: 15 (05 Jun 2021 13:15) (15 - 18)  SpO2: 99% (05 Jun 2021 13:15) (95% - 100%)    PHYSICAL EXAM:  GENERAL: NAD, well-developed  CHEST/LUNG: Clear to auscultation bilaterally; No wheeze  HEART: Regular rate and rhythm; No murmurs  ABDOMEN: Soft, Nontender, Nondistended  EXTREMITIES: no LE edema  PSYCH: Calm  NEUROLOGY: AA, oriented to self    LABS:                        10.3   7.51  )-----------( 256      ( 05 Jun 2021 07:13 )             31.8     06-05    139  |  102  |  22  ----------------------------<  92  3.8   |  26  |  0.69    Ca    8.8      05 Jun 2021 07:13  Phos  2.9     06-05  Mg     1.8     06-05        RADIOLOGY & ADDITIONAL TESTS:  Results Reviewed:   Imaging Personally Reviewed:  Electrocardiogram Personally Reviewed:    COORDINATION OF CARE:  Care Discussed with Consultants/Other Providers [Y/N]: Y  Prior or Outpatient Records Reviewed [Y/N]: Y

## 2021-06-05 NOTE — PROGRESS NOTE ADULT - PROBLEM SELECTOR PLAN 10
- Pt on Xarelto, - appears pt was started upon dc after covid in March, more than a month now, changed to prophylactic lovenox      Plan discussed with ACP

## 2021-06-05 NOTE — PROGRESS NOTE ADULT - SUBJECTIVE AND OBJECTIVE BOX
Bennie Hwang MD  Interventional Cardiology / [facility]  [place] Office : [phone] [place] [address] [place]. [address]  Tel:   [place] Office : [address] [facility] [place] [address]  Tel: [phone]  Cell : [phone]    Pt is lying in bed comfortable not in distress, no chest pains no SOB no palpitations  	  MEDICATIONS:  clopidogrel Tablet 75 milliGRAM(s) Oral daily  enoxaparin Injectable 40 milliGRAM(s) SubCutaneous daily  tamsulosin 0.4 milliGRAM(s) Oral at bedtime        carbidopa/levodopa  25/100 1 Tablet(s) Oral three times a day  QUEtiapine 50 milliGRAM(s) Oral at bedtime    pantoprazole    Tablet 40 milliGRAM(s) Oral before breakfast    atorvastatin 10 milliGRAM(s) Oral at bedtime  dextrose 40% Gel 15 Gram(s) Oral once  dextrose 50% Injectable 25 Gram(s) IV Push once  dextrose 50% Injectable 12.5 Gram(s) IV Push once  dextrose 50% Injectable 25 Gram(s) IV Push once  glucagon  Injectable 1 milliGRAM(s) IntraMuscular once  insulin lispro (ADMELOG) corrective regimen sliding scale   SubCutaneous three times a day before meals  insulin lispro (ADMELOG) corrective regimen sliding scale   SubCutaneous at bedtime    dextrose 5%. 1000 milliLiter(s) IV Continuous <Continuous>  dextrose 5%. 1000 milliLiter(s) IV Continuous <Continuous>      PAST MEDICAL/SURGICAL HISTORY  PAST MEDICAL & SURGICAL HISTORY:  Hypercholesterolemia    DM (Diabetes Mellitus)    HTN (Hypertension)    CAD (Coronary Artery Disease)  s/p cardiac stent ( &gt; 20 years ago)    BPH (benign prostatic hyperplasia)    Coronary Stent  x 2 ( 20 years )        SOCIAL HISTORY: Substance Use (street drugs): ( x ) never used  (  ) other:    FAMILY HISTORY:  No pertinent family history in first degree relatives    PHYSICAL EXAM:  T(C): 36.3 (06-05-21 @ 17:00), Max: 36.7 (06-05-21 @ 01:00)  HR: 66 (06-05-21 @ 17:00) (58 - 66)  BP: 111/64 (06-05-21 @ 17:00) (104/56 - 145/65)  RR: 17 (06-05-21 @ 17:00) (15 - 18)  SpO2: 98% (06-05-21 @ 17:00) (95% - 100%)  Wt(kg): --  I&O's Summary    04 Jun 2021 07:01  -  05 Jun 2021 07:00  --------------------------------------------------------  IN: 100 mL / OUT: 350 mL / NET: -250 mL    05 Jun 2021 07:01  -  05 Jun 2021 19:41  --------------------------------------------------------  IN: 240 mL / OUT: 0 mL / NET: 240 mL          GENERAL: NAD  EYES: EOMI, PERRLA, conjunctiva and sclera clear  ENMT: No tonsillar erythema, exudates, or enlargement; Moist mucous membranes, Good dentition, No lesions  Cardiovascular: Normal S1 S2, No JVD, No murmurs, No edema  Respiratory: b/l rhonchi  Gastrointestinal:  Soft, Non-tender, + BS	  Extremities:no edema                                  10.3   7.51  )-----------( 256      ( 05 Jun 2021 07:13 )             31.8     06-05    139  |  102  |  22  ----------------------------<  92  3.8   |  26  |  0.69    Ca    8.8      05 Jun 2021 07:13  Phos  2.9     06-05  Mg     1.8     06-05      proBNP:   Lipid Profile:   HgA1c:   TSH:     Consultant(s) Notes Reviewed:  [x ] YES  [ ] NO    Care Discussed with Consultants/Other Providers [ x] YES  [ ] NO    Imaging Personally Reviewed independently:  [x] YES  [ ] NO    All labs, radiologic studies, vitals, orders and medications list reviewed. Patient is seen and examined at bedside. Case discussed with medical team.

## 2021-06-06 LAB
ALBUMIN SERPL ELPH-MCNC: 2.9 G/DL — LOW (ref 3.3–5)
ALP SERPL-CCNC: 113 U/L — SIGNIFICANT CHANGE UP (ref 40–120)
ALT FLD-CCNC: <5 U/L — LOW (ref 4–41)
ANION GAP SERPL CALC-SCNC: 12 MMOL/L — SIGNIFICANT CHANGE UP (ref 7–14)
AST SERPL-CCNC: 6 U/L — SIGNIFICANT CHANGE UP (ref 4–40)
BASE EXCESS BLDV CALC-SCNC: 2.5 MMOL/L — HIGH (ref -3–2)
BILIRUB SERPL-MCNC: 1 MG/DL — SIGNIFICANT CHANGE UP (ref 0.2–1.2)
BLOOD GAS VENOUS - CREATININE: 0.9 MG/DL — SIGNIFICANT CHANGE UP (ref 0.5–1.3)
BLOOD GAS VENOUS COMPREHENSIVE RESULT: SIGNIFICANT CHANGE UP
BUN SERPL-MCNC: 27 MG/DL — HIGH (ref 7–23)
CALCIUM SERPL-MCNC: 8.6 MG/DL — SIGNIFICANT CHANGE UP (ref 8.4–10.5)
CHLORIDE BLDV-SCNC: 103 MMOL/L — SIGNIFICANT CHANGE UP (ref 96–108)
CHLORIDE SERPL-SCNC: 102 MMOL/L — SIGNIFICANT CHANGE UP (ref 98–107)
CO2 SERPL-SCNC: 24 MMOL/L — SIGNIFICANT CHANGE UP (ref 22–31)
CREAT SERPL-MCNC: 0.87 MG/DL — SIGNIFICANT CHANGE UP (ref 0.5–1.3)
GAS PNL BLDV: 138 MMOL/L — SIGNIFICANT CHANGE UP (ref 136–146)
GLUCOSE BLDC GLUCOMTR-MCNC: 104 MG/DL — HIGH (ref 70–99)
GLUCOSE BLDC GLUCOMTR-MCNC: 107 MG/DL — HIGH (ref 70–99)
GLUCOSE BLDC GLUCOMTR-MCNC: 117 MG/DL — HIGH (ref 70–99)
GLUCOSE BLDC GLUCOMTR-MCNC: 195 MG/DL — HIGH (ref 70–99)
GLUCOSE BLDV-MCNC: 200 MG/DL — HIGH (ref 70–99)
GLUCOSE SERPL-MCNC: 193 MG/DL — HIGH (ref 70–99)
HCO3 BLDV-SCNC: 26 MMOL/L — SIGNIFICANT CHANGE UP (ref 20–27)
HCT VFR BLD CALC: 31.6 % — LOW (ref 39–50)
HCT VFR BLDA CALC: 30.4 % — LOW (ref 39–51)
HGB BLD CALC-MCNC: 9.8 G/DL — LOW (ref 13–17)
HGB BLD-MCNC: 10.1 G/DL — LOW (ref 13–17)
LACTATE BLDV-MCNC: 2.7 MMOL/L — HIGH (ref 0.5–2)
MAGNESIUM SERPL-MCNC: 1.8 MG/DL — SIGNIFICANT CHANGE UP (ref 1.6–2.6)
MCHC RBC-ENTMCNC: 29.2 PG — SIGNIFICANT CHANGE UP (ref 27–34)
MCHC RBC-ENTMCNC: 32 GM/DL — SIGNIFICANT CHANGE UP (ref 32–36)
MCV RBC AUTO: 91.3 FL — SIGNIFICANT CHANGE UP (ref 80–100)
NRBC # BLD: 0 /100 WBCS — SIGNIFICANT CHANGE UP
NRBC # FLD: 0 K/UL — SIGNIFICANT CHANGE UP
PCO2 BLDV: 47 MMHG — SIGNIFICANT CHANGE UP (ref 41–51)
PH BLDV: 7.38 — SIGNIFICANT CHANGE UP (ref 7.32–7.43)
PHOSPHATE SERPL-MCNC: 3.1 MG/DL — SIGNIFICANT CHANGE UP (ref 2.5–4.5)
PLATELET # BLD AUTO: 280 K/UL — SIGNIFICANT CHANGE UP (ref 150–400)
PO2 BLDV: 34 MMHG — LOW (ref 35–40)
POTASSIUM BLDV-SCNC: 3.7 MMOL/L — SIGNIFICANT CHANGE UP (ref 3.4–4.5)
POTASSIUM SERPL-MCNC: 3.8 MMOL/L — SIGNIFICANT CHANGE UP (ref 3.5–5.3)
POTASSIUM SERPL-SCNC: 3.8 MMOL/L — SIGNIFICANT CHANGE UP (ref 3.5–5.3)
PROT SERPL-MCNC: 6.3 G/DL — SIGNIFICANT CHANGE UP (ref 6–8.3)
RBC # BLD: 3.46 M/UL — LOW (ref 4.2–5.8)
RBC # FLD: 15.8 % — HIGH (ref 10.3–14.5)
SAO2 % BLDV: 57.3 % — LOW (ref 60–85)
SODIUM SERPL-SCNC: 138 MMOL/L — SIGNIFICANT CHANGE UP (ref 135–145)
TSH SERPL-MCNC: 2.5 UIU/ML — SIGNIFICANT CHANGE UP (ref 0.27–4.2)
WBC # BLD: 9.02 K/UL — SIGNIFICANT CHANGE UP (ref 3.8–10.5)
WBC # FLD AUTO: 9.02 K/UL — SIGNIFICANT CHANGE UP (ref 3.8–10.5)

## 2021-06-06 PROCEDURE — 93010 ELECTROCARDIOGRAM REPORT: CPT

## 2021-06-06 PROCEDURE — 99232 SBSQ HOSP IP/OBS MODERATE 35: CPT

## 2021-06-06 RX ADMIN — ENOXAPARIN SODIUM 40 MILLIGRAM(S): 100 INJECTION SUBCUTANEOUS at 10:30

## 2021-06-06 RX ADMIN — PANTOPRAZOLE SODIUM 40 MILLIGRAM(S): 20 TABLET, DELAYED RELEASE ORAL at 06:10

## 2021-06-06 RX ADMIN — QUETIAPINE FUMARATE 50 MILLIGRAM(S): 200 TABLET, FILM COATED ORAL at 20:48

## 2021-06-06 RX ADMIN — Medication 1: at 12:33

## 2021-06-06 RX ADMIN — CARBIDOPA AND LEVODOPA 1 TABLET(S): 25; 100 TABLET ORAL at 20:48

## 2021-06-06 RX ADMIN — TAMSULOSIN HYDROCHLORIDE 0.4 MILLIGRAM(S): 0.4 CAPSULE ORAL at 20:49

## 2021-06-06 RX ADMIN — ATORVASTATIN CALCIUM 10 MILLIGRAM(S): 80 TABLET, FILM COATED ORAL at 20:49

## 2021-06-06 RX ADMIN — CLOPIDOGREL BISULFATE 75 MILLIGRAM(S): 75 TABLET, FILM COATED ORAL at 10:30

## 2021-06-06 RX ADMIN — CARBIDOPA AND LEVODOPA 1 TABLET(S): 25; 100 TABLET ORAL at 06:10

## 2021-06-06 RX ADMIN — CARBIDOPA AND LEVODOPA 1 TABLET(S): 25; 100 TABLET ORAL at 12:34

## 2021-06-06 RX ADMIN — Medication 3 MILLIGRAM(S): at 20:49

## 2021-06-06 NOTE — CHART NOTE - NSCHARTNOTEFT_GEN_A_CORE
ACP MEDICINE COVERAGE - Medicine Subsequent Hospital Care Note    CC: hypotension  HPI/Subjective:  82 yo M HTN, DM, HLD, dementia (AOx2 at baseline), Parkinson's disease, CAD s/p stents x2 (>20 years ago), recent new onset HF (EF 50%, on Xarelto, Moderate diastolic dysfunction (Stage II), moderate pulmonary hypertension, small pericardial effusion), L pleural effusion, BPH, gastric ulcers presenting after fall, ? syncope. Pt is poor historian. Initially stated that he was lifting something when he fell then stated he was walking to the bathroom at the time. Per ED records, pt's aid her him fall but did not witness it. He was found on the floor and was awake and alert at the time. The aid denied any fever, chills, or change in mental status.  Pt now with hypotension. Pt asx.    ROS:  Denies fever, chills, diaphoresis, malaise, night sweats, generalized weakness, headache, changes in vision, dizziness, cough, sputum production, wheezing, hemoptysis, chest pain, palpitations, shortness of breath, dyspnea on exertion, PND, dysphagia, new abdominal pain, nausea, vomiting, diarrhea, constipation, dysuria, increased urinary frequency/urgency,     [ X ] I spoke with the attending, nurse, and family to obtain the history.    Vital Signs Last 24 Hrs  T(C): 36.8 (21 @ 09:00), Max: 36.8 (21 @ 09:00)  T(F): 98.3 (21 @ 09:00), Max: 98.3 (21 @ 09:00)  HR: 72 (21 @ 09:00) (60 - 72)  BP: 82/40 (21 @ 09:00) (82/40 - 134/73)  RR: 17 (21 @ 09:00) (15 - 18)  SpO2: 97% (21 @ 09:00) (97% - 99%) on (O2)    PHYSICAL EXAM:  Constitutional: NAD, well-developed, well-nourished. Lying in bed comfortably.  Ears, nose, Mouth, and Throat: normal external ears and nose, normal hearing, moist oral mucosa  Eyes: normal conjunctiva, EOMI, PEERL  Neck: supple, no JVD  Respiratory: +mild rhonchi b/l. Normal respiratory effort  Cardiovascular: regular rate and rhythm, S1 and S2, no murmurs, rubs or gallops, no edema, 2+ peripheral pulses  Gastrointestinal: soft, nontender, nondistended, +bowel sounds, no hernia  Skin: warm, dry, no rash  Neurologic: sensation grossly intact, CN grossly intact, non-focal exam  Musculoskeletal: no clubbing, no cyanosis, no joint swelling  Psychiatric: A&Ox2 (able to state his name, , and says he is in a hospital), appropriate mood, affect    LABS:                        10.3   7.51  )-----------( 256      ( 2021 07:13 )             31.8     -    139  |  102  |  22  ----------------------------<  92  3.8   |  26  |  0.69    Ca    8.8      2021 07:13  Phos  2.9     -  Mg     1.8     -      PT/INR: PT: 15.5 sec | INR: 1.38 ratio (21 @ 05:48)  PTT: 43.6 sec (21 @ 05:48)    CARDIAC ENZYMES            Serum Pro-Brain Natriuretic Peptide:   D-Dimer Assay: 3455 ng/mL DDU (21 @ 13:53)      Urinanalysis Basic (21 @ 10:42):  Color: Yellow / Appearance: Clear / S.021 / pH: --  Gluc: -- / Ketone: Negative / Bili: Negative / Urobili: 3 mg/dL  Blood: -- / Protein: Negative / Nitrite: Negative  Leuk Esterase: Negative / RBC: 1 / WBC: 1  Sq Epi: -- / Non Sq Epi: -- / Bacteria: Negative      Blood Gas Venous (21 @ 10:42):  pH: 7.40 | HCO3: 28 | pCO2: 48 | pO2: 54 | Lactate: 1.4      Blood Gas Arterial (21 @ 10:42):      CAPILLARY BLOOD GLUCOSE:  POCT Blood Glucose: 104 mg/dL (21 @ 08:35)  POCT Blood Glucose: 123 mg/dL (21 @ 22:07)  POCT Blood Glucose: 164 mg/dL (21 @ 17:16)      COVID PCR:  Detected (21 @ 05:35)      RADIOLOGY:    MEDICATIONS  (STANDING):  atorvastatin 10 milliGRAM(s) Oral at bedtime  carbidopa/levodopa  25/100 1 Tablet(s) Oral three times a day  clopidogrel Tablet 75 milliGRAM(s) Oral daily  dextrose 40% Gel 15 Gram(s) Oral once  dextrose 5%. 1000 milliLiter(s) (50 mL/Hr) IV Continuous <Continuous>  dextrose 5%. 1000 milliLiter(s) (100 mL/Hr) IV Continuous <Continuous>  dextrose 50% Injectable 25 Gram(s) IV Push once  dextrose 50% Injectable 12.5 Gram(s) IV Push once  dextrose 50% Injectable 25 Gram(s) IV Push once  enoxaparin Injectable 40 milliGRAM(s) SubCutaneous daily  glucagon  Injectable 1 milliGRAM(s) IntraMuscular once  insulin lispro (ADMELOG) corrective regimen sliding scale   SubCutaneous three times a day before meals  insulin lispro (ADMELOG) corrective regimen sliding scale   SubCutaneous at bedtime  pantoprazole    Tablet 40 milliGRAM(s) Oral before breakfast  QUEtiapine 50 milliGRAM(s) Oral at bedtime  tamsulosin 0.4 milliGRAM(s) Oral at bedtime    MEDICATIONS  (PRN):  melatonin 3 milliGRAM(s) Oral at bedtime PRN Insomnia    I&O's Summary    2021 07:01  -  2021 07:00  --------------------------------------------------------  IN: 240 mL / OUT: 0 mL / NET: 240 mL      I reviewed the above labs, radiology, medications, tests, telemetry, and EKG interpretation.    ASSESSMENT/PLAN:  81y M PMHx HTN, DM2 A1C 5.7 (21), HLD, Dementia (AOx1-2 at baseline), Parkinson's, CAD s/p KUSUM x2  (>20 years ago, continued on Plavix, stent patent on CATH ), Dysphagia, COVID in 3/2021, and recent new onset of HFrEF 50% with moderate diastolic dysfunction stage 2 and moderate pulmonary HTN (ECHO 2021), L Pleural Effusion, BPH and Gastric Ulcers presenting after fall. Admitted to rule out Syncope. Course c/b hypotension  1. Hypotension- Pts course c/b hypotension - there was initially concern for infection since he had 1 episode of hypothermia and therefore was treated with IV antibiotics. There was a concern for pericardial effusion- bedside echo showed improved pericardial effusion. BP improved after fluids. BP has been stable past week but now hypotensive again. Pt seen and examined at bedside- pt was asx, denied chest pain, dizziness, weakness, shortness of breath, dysuria, cough, any other complaints. Manual BP done by RN 82/40. Manual BP done by me 94/42 in right arm, 90/42 in left arm. Temp normal, other vital signs stable. Pt NSR on tele with HR in 70s. EKG was done STAT that showed no changes, electrical alternans not seen and heart sounds were not muffled, low concern for tamponade. Will be cautious with IVF given hx of pericardial effusion. Given pt is asx, encouraged PO intake, pt states he has not been eating/drinking much but will make sure to have better PO intake today. Will repeat BP in 1 hour. CBC, CMP, VBG with lactate, TSH, Cortisol sent. Will f/u results. If BP still does not improve despite better PO intake, will consider CCU consult. Discussed with Dr. Grande.  Attempted to reach brother Berto x2 with no answer- left voicemail to call floor back.  - Clinical findings, labs, tests, telemetry, and ekg reviewed with attending. Will monitor patient closely.     medium complexity/ risk (45 min) ACP MEDICINE COVERAGE - Medicine Subsequent Hospital Care Note    CC: hypotension  HPI/Subjective:  82 yo M HTN, DM, HLD, dementia (AOx2 at baseline), Parkinson's disease, CAD s/p stents x2 (>20 years ago), recent new onset HF (EF 50%, on Xarelto, Moderate diastolic dysfunction (Stage II), moderate pulmonary hypertension, small pericardial effusion), L pleural effusion, BPH, gastric ulcers presenting after fall, ? syncope. Pt is poor historian. Initially stated that he was lifting something when he fell then stated he was walking to the bathroom at the time. Per ED records, pt's aid her him fall but did not witness it. He was found on the floor and was awake and alert at the time. The aid denied any fever, chills, or change in mental status.  Pt now with hypotension. Pt asx.    ROS:  Denies fever, chills, diaphoresis, malaise, night sweats, generalized weakness, headache, changes in vision, dizziness, cough, sputum production, wheezing, hemoptysis, chest pain, palpitations, shortness of breath, dyspnea on exertion, PND, dysphagia, new abdominal pain, nausea, vomiting, diarrhea, constipation, dysuria, increased urinary frequency/urgency,     [ X ] I spoke with the attending, nurse, and family to obtain the history.    Vital Signs Last 24 Hrs  T(C): 36.8 (21 @ 09:00), Max: 36.8 (21 @ 09:00)  T(F): 98.3 (21 @ 09:00), Max: 98.3 (21 @ 09:00)  HR: 72 (21 @ 09:00) (60 - 72)  BP: 82/40 (21 @ 09:00) (82/40 - 134/73)  RR: 17 (21 @ 09:00) (15 - 18)  SpO2: 97% (21 @ 09:00) (97% - 99%) on (O2)    PHYSICAL EXAM:  Constitutional: NAD, well-developed, well-nourished. Lying in bed comfortably.  Ears, nose, Mouth, and Throat: normal external ears and nose, normal hearing, moist oral mucosa  Eyes: normal conjunctiva, EOMI, PEERL  Neck: supple, no JVD  Respiratory: +mild rhonchi b/l. Normal respiratory effort  Cardiovascular: regular rate and rhythm, S1 and S2, no murmurs, rubs or gallops, no edema, 2+ peripheral pulses  Gastrointestinal: soft, nontender, nondistended, +bowel sounds, no hernia  Skin: warm, dry, no rash  Neurologic: sensation grossly intact, CN grossly intact, non-focal exam  Musculoskeletal: no clubbing, no cyanosis, no joint swelling  Psychiatric: A&Ox2 (able to state his name, , and says he is in a hospital), appropriate mood, affect    LABS:                        10.3   7.51  )-----------( 256      ( 2021 07:13 )             31.8     -    139  |  102  |  22  ----------------------------<  92  3.8   |  26  |  0.69    Ca    8.8      2021 07:13  Phos  2.9     -  Mg     1.8     -      PT/INR: PT: 15.5 sec | INR: 1.38 ratio (21 @ 05:48)  PTT: 43.6 sec (21 @ 05:48)    CARDIAC ENZYMES            Serum Pro-Brain Natriuretic Peptide:   D-Dimer Assay: 3455 ng/mL DDU (21 @ 13:53)      Urinanalysis Basic (21 @ 10:42):  Color: Yellow / Appearance: Clear / S.021 / pH: --  Gluc: -- / Ketone: Negative / Bili: Negative / Urobili: 3 mg/dL  Blood: -- / Protein: Negative / Nitrite: Negative  Leuk Esterase: Negative / RBC: 1 / WBC: 1  Sq Epi: -- / Non Sq Epi: -- / Bacteria: Negative      Blood Gas Venous (21 @ 10:42):  pH: 7.40 | HCO3: 28 | pCO2: 48 | pO2: 54 | Lactate: 1.4      Blood Gas Arterial (21 @ 10:42):      CAPILLARY BLOOD GLUCOSE:  POCT Blood Glucose: 104 mg/dL (21 @ 08:35)  POCT Blood Glucose: 123 mg/dL (21 @ 22:07)  POCT Blood Glucose: 164 mg/dL (21 @ 17:16)      COVID PCR:  Detected (21 @ 05:35)      RADIOLOGY:    MEDICATIONS  (STANDING):  atorvastatin 10 milliGRAM(s) Oral at bedtime  carbidopa/levodopa  25/100 1 Tablet(s) Oral three times a day  clopidogrel Tablet 75 milliGRAM(s) Oral daily  dextrose 40% Gel 15 Gram(s) Oral once  dextrose 5%. 1000 milliLiter(s) (50 mL/Hr) IV Continuous <Continuous>  dextrose 5%. 1000 milliLiter(s) (100 mL/Hr) IV Continuous <Continuous>  dextrose 50% Injectable 25 Gram(s) IV Push once  dextrose 50% Injectable 12.5 Gram(s) IV Push once  dextrose 50% Injectable 25 Gram(s) IV Push once  enoxaparin Injectable 40 milliGRAM(s) SubCutaneous daily  glucagon  Injectable 1 milliGRAM(s) IntraMuscular once  insulin lispro (ADMELOG) corrective regimen sliding scale   SubCutaneous three times a day before meals  insulin lispro (ADMELOG) corrective regimen sliding scale   SubCutaneous at bedtime  pantoprazole    Tablet 40 milliGRAM(s) Oral before breakfast  QUEtiapine 50 milliGRAM(s) Oral at bedtime  tamsulosin 0.4 milliGRAM(s) Oral at bedtime    MEDICATIONS  (PRN):  melatonin 3 milliGRAM(s) Oral at bedtime PRN Insomnia    I&O's Summary    2021 07:01  -  2021 07:00  --------------------------------------------------------  IN: 240 mL / OUT: 0 mL / NET: 240 mL      I reviewed the above labs, radiology, medications, tests, telemetry, and EKG interpretation.    ASSESSMENT/PLAN:  81y M PMHx HTN, DM2 A1C 5.7 (21), HLD, Dementia (AOx1-2 at baseline), Parkinson's, CAD s/p KUSUM x2  (>20 years ago, continued on Plavix, stent patent on CATH ), Dysphagia, COVID in 3/2021, and recent new onset of HFrEF 50% with moderate diastolic dysfunction stage 2 and moderate pulmonary HTN (ECHO 2021), L Pleural Effusion, BPH and Gastric Ulcers presenting after fall. Admitted to rule out Syncope. Course c/b hypotension  1. Hypotension- Pts course c/b hypotension - there was initially concern for infection since he had 1 episode of hypothermia and therefore was treated with IV antibiotics. There was a concern for pericardial effusion- bedside echo showed improved pericardial effusion. BP improved after fluids. BP has been stable past week but now hypotensive again. Pt seen and examined at bedside- pt was asx, denied chest pain, dizziness, weakness, shortness of breath, dysuria, cough, any other complaints. Manual BP done by RN 82/40. Manual BP done by me 94/42 in right arm, 90/42 in left arm. Temp normal, other vital signs stable. Pt NSR on tele with HR in 70s. EKG was done STAT that showed no changes, electrical alternans not seen and heart sounds were not muffled, low concern for tamponade. Will be cautious with IVF given hx of pericardial effusion. Given pt is asx, encouraged PO intake, pt states he has not been eating/drinking much but will make sure to have better PO intake today. Will repeat BP in 1 hour. CBC, CMP, VBG with lactate, TSH, Cortisol sent. Will f/u results. If BP still does not improve despite better PO intake, will consider CCU consult. Discussed with Dr. Grande.  Attempted to reach brother Berto abraham with no answer- left voicemail to call floor back.  - Clinical findings, labs, tests, telemetry, and ekg reviewed with attending. Will monitor patient closely.     Addendum- CBC, CMP, TSH all WNL. Lactate slightly elevated 2.6 however pt asx BP now improved without even giving fluids, no signs of infection (WBC normal, temp normal, HR normal).  Will repeat lactate in AM to make sure it downtrends.    medium complexity/ risk (45 min) [facility] MEDICINE COVERAGE - Medicine Subsequent Hospital Care Note    CC: hypotension  HPI/Subjective:  82 yo M HTN, DM, HLD, dementia (AOx2 at baseline), Parkinson's disease, CAD s/p stents x2 (>20 years ago), recent new onset HF (EF 50%, on Xarelto, Moderate diastolic dysfunction (Stage II), moderate pulmonary hypertension, small pericardial effusion), L pleural effusion, BPH, gastric ulcers presenting after fall, ? syncope. Pt is poor historian. Initially stated that he was lifting something when he fell then stated he was walking to the bathroom at the time. Per ED records, pt's aid her him fall but did not witness it. He was found on the floor and was awake and alert at the time. The aid denied any fever, chills, or change in mental status.  Pt now with hypotension. Pt asx.    ROS:  Denies fever, chills, diaphoresis, malaise, night sweats, generalized weakness, headache, changes in vision, dizziness, cough, sputum production, wheezing, hemoptysis, chest pain, palpitations, shortness of breath, dyspnea on exertion, PND, dysphagia, new abdominal pain, nausea, vomiting, diarrhea, constipation, dysuria, increased urinary frequency/urgency,     [ X ] I spoke with the attending, nurse, and family to obtain the history.    Vital Signs Last 24 Hrs  T(C): 36.8 (21 @ 09:00), Max: 36.8 (21 @ 09:00)  T(F): 98.3 (21 @ 09:00), Max: 98.3 (21 @ 09:00)  HR: 72 (21 @ 09:00) (60 - 72)  BP: 82/40 (21 @ 09:00) (82/40 - 134/73)  RR: 17 (21 @ 09:00) (15 - 18)  SpO2: 97% (21 @ 09:00) (97% - 99%) on (O2)    PHYSICAL EXAM:  Constitutional: NAD, well-developed, well-nourished. Lying in bed comfortably.  Ears, nose, Mouth, and Throat: normal external ears and nose, normal hearing, moist oral mucosa  Eyes: normal conjunctiva, EOMI, PEERL  Neck: supple, no JVD  Respiratory: +mild rhonchi b/l. Normal respiratory effort  Cardiovascular: regular rate and rhythm, S1 and S2, no murmurs, rubs or gallops, no edema, 2+ peripheral pulses  Gastrointestinal: soft, nontender, nondistended, +bowel sounds, no hernia  Skin: warm, dry, no rash  Neurologic: sensation grossly intact, CN grossly intact, non-focal exam  Musculoskeletal: no clubbing, no cyanosis, no joint swelling  Psychiatric: A&Ox2 (able to state his name, , and says he is in a hospital), appropriate mood, affect    LABS:                        10.3   7.51  )-----------( 256      ( 2021 07:13 )             31.8     -    139  |  102  |  22  ----------------------------<  92  3.8   |  26  |  0.69    Ca    8.8      2021 07:13  Phos  2.9     -  Mg     1.8     -      PT/INR: PT: 15.5 sec | INR: 1.38 ratio (21 @ 05:48)  PTT: 43.6 sec (21 @ 05:48)    CARDIAC ENZYMES            Serum Pro-Brain Natriuretic Peptide:   D-Dimer Assay: 3455 ng/mL DDU (21 @ 13:53)      Urinanalysis Basic (21 @ 10:42):  Color: Yellow / Appearance: Clear / S.021 / pH: --  Gluc: -- / Ketone: Negative / Bili: Negative / Urobili: 3 mg/dL  Blood: -- / Protein: Negative / Nitrite: Negative  Leuk Esterase: Negative / RBC: 1 / WBC: 1  Sq Epi: -- / Non Sq Epi: -- / Bacteria: Negative      Blood Gas Venous (21 @ 10:42):  pH: 7.40 | HCO3: 28 | pCO2: 48 | pO2: 54 | Lactate: 1.4      Blood Gas Arterial (21 @ 10:42):      CAPILLARY BLOOD GLUCOSE:  POCT Blood Glucose: 104 mg/dL (21 @ 08:35)  POCT Blood Glucose: 123 mg/dL (21 @ 22:07)  POCT Blood Glucose: 164 mg/dL (21 @ 17:16)      COVID PCR:  Detected (21 @ 05:35)      RADIOLOGY:    MEDICATIONS  (STANDING):  atorvastatin 10 milliGRAM(s) Oral at bedtime  carbidopa/levodopa  25/100 1 Tablet(s) Oral three times a day  clopidogrel Tablet 75 milliGRAM(s) Oral daily  dextrose 40% Gel 15 Gram(s) Oral once  dextrose 5%. 1000 milliLiter(s) (50 mL/Hr) IV Continuous <Continuous>  dextrose 5%. 1000 milliLiter(s) (100 mL/Hr) IV Continuous <Continuous>  dextrose 50% Injectable 25 Gram(s) IV Push once  dextrose 50% Injectable 12.5 Gram(s) IV Push once  dextrose 50% Injectable 25 Gram(s) IV Push once  enoxaparin Injectable 40 milliGRAM(s) SubCutaneous daily  glucagon  Injectable 1 milliGRAM(s) IntraMuscular once  insulin lispro (ADMELOG) corrective regimen sliding scale   SubCutaneous three times a day before meals  insulin lispro (ADMELOG) corrective regimen sliding scale   SubCutaneous at bedtime  pantoprazole    Tablet 40 milliGRAM(s) Oral before breakfast  QUEtiapine 50 milliGRAM(s) Oral at bedtime  tamsulosin 0.4 milliGRAM(s) Oral at bedtime    MEDICATIONS  (PRN):  melatonin 3 milliGRAM(s) Oral at bedtime PRN Insomnia    I&O's Summary    2021 07:01  -  2021 07:00  --------------------------------------------------------  IN: 240 mL / OUT: 0 mL / NET: 240 mL      I reviewed the above labs, radiology, medications, tests, telemetry, and EKG interpretation.    ASSESSMENT/PLAN:  81y M PMHx HTN, DM2 A1C 5.7 (21), HLD, Dementia (AOx1-2 at baseline), Parkinson's, CAD s/p KUSUM x2  (>20 years ago, continued on Plavix, stent patent on CATH ), Dysphagia, COVID in 3/2021, and recent new onset of HFrEF 50% with moderate diastolic dysfunction stage 2 and moderate pulmonary HTN (ECHO 2021), L Pleural Effusion, BPH and Gastric Ulcers presenting after fall. Admitted to rule out Syncope. Course c/b hypotension  1. Hypotension- Pts course c/b hypotension - there was initially concern for infection since he had 1 episode of hypothermia and therefore was treated with IV antibiotics. There was a concern for pericardial effusion- bedside echo showed improved pericardial effusion. BP improved after fluids. BP has been stable past week but now hypotensive again. Pt seen and examined at bedside- pt was asx, denied chest pain, dizziness, weakness, shortness of breath, dysuria, cough, any other complaints. Manual BP done by RN 82/40. Manual BP done by me 94/42 in right arm, 90/42 in left arm. Temp normal, other vital signs stable. Pt NSR on tele with HR in 70s. EKG was done STAT that showed no changes, electrical alternans not seen and heart sounds were not muffled, low concern for tamponade. Will be cautious with IVF given hx of pericardial effusion. Given pt is asx, encouraged PO intake, pt states he has not been eating/drinking much but will make sure to have better PO intake today. Will repeat BP in 1 hour. CBC, CMP, VBG with lactate, TSH, Cortisol sent. Will f/u results. If BP still does not improve despite better PO intake, will consider CCU consult. Discussed with Dr. Grande.  Attempted to reach brother Berto abraham with no answer- left voicemail to call floor back.  - Clinical findings, labs, tests, telemetry, and ekg reviewed with attending. Will monitor patient closely.     Addendum- CBC, CMP, TSH all WNL. Lactate slightly elevated 2.7 however pt asx BP now improved without even giving fluids, no signs of infection (WBC normal, temp normal, HR normal).  Will repeat lactate in AM to make sure it downtrends.    medium complexity/ risk (45 min)

## 2021-06-06 NOTE — PROGRESS NOTE ADULT - SUBJECTIVE AND OBJECTIVE BOX
Bennie Hwang MD  Interventional Cardiology / Advance Heart Failure and Cardiac Transplant Specialist  [place] Office : [phone] [place] [address] [place]. [address]  Tel:   [place] Office : [phone] [facility] [place] [address]  Tel: [phone]  Cell : [phone]    Pt is lying in bed comfortable not in distress   	  MEDICATIONS:  clopidogrel Tablet 75 milliGRAM(s) Oral daily  enoxaparin Injectable 40 milliGRAM(s) SubCutaneous daily  tamsulosin 0.4 milliGRAM(s) Oral at bedtime        carbidopa/levodopa  25/100 1 Tablet(s) Oral three times a day  melatonin 3 milliGRAM(s) Oral at bedtime PRN  QUEtiapine 50 milliGRAM(s) Oral at bedtime    pantoprazole    Tablet 40 milliGRAM(s) Oral before breakfast    atorvastatin 10 milliGRAM(s) Oral at bedtime  dextrose 40% Gel 15 Gram(s) Oral once  dextrose 50% Injectable 25 Gram(s) IV Push once  dextrose 50% Injectable 12.5 Gram(s) IV Push once  dextrose 50% Injectable 25 Gram(s) IV Push once  glucagon  Injectable 1 milliGRAM(s) IntraMuscular once  insulin lispro (ADMELOG) corrective regimen sliding scale   SubCutaneous three times a day before meals  insulin lispro (ADMELOG) corrective regimen sliding scale   SubCutaneous at bedtime    dextrose 5%. 1000 milliLiter(s) IV Continuous <Continuous>  dextrose 5%. 1000 milliLiter(s) IV Continuous <Continuous>      PAST MEDICAL/SURGICAL HISTORY  PAST MEDICAL & SURGICAL HISTORY:  Hypercholesterolemia    DM (Diabetes Mellitus)    HTN (Hypertension)    CAD (Coronary Artery Disease)  s/p cardiac stent ( &gt; 20 years ago)    BPH (benign prostatic hyperplasia)    Coronary Stent  x 2 ( 20 years )        SOCIAL HISTORY: Substance Use (street drugs): ( x ) never used  (  ) other:    FAMILY HISTORY:  No pertinent family history in first degree relatives         PHYSICAL EXAM:  T(C): 36.7 (06-06-21 @ 11:00), Max: 36.8 (06-06-21 @ 09:00)  HR: 60 (06-06-21 @ 11:00) (60 - 72)  BP: 100/46 (06-06-21 @ 11:00) (82/40 - 134/73)  RR: 18 (06-06-21 @ 11:00) (16 - 18)  SpO2: 96% (06-06-21 @ 11:00) (96% - 99%)  Wt(kg): --  I&O's Summary    05 Jun 2021 07:01  -  06 Jun 2021 07:00  --------------------------------------------------------  IN: 240 mL / OUT: 0 mL / NET: 240 mL           EYES: EOMI, PERRLA, conjunctiva and sclera clear  ENMT: No tonsillar erythema, exudates, or enlargement; Moist mucous membranes, Good dentition, No lesions  Cardiovascular: Normal S1 S2, No JVD, No murmurs, No edema  Respiratory: b/l rhonchi   Gastrointestinal:  Soft, Non-tender, + BS	  Extremities: no edema                                   10.1   9.02  )-----------( 280      ( 06 Jun 2021 11:22 )             31.6     06-06    138  |  102  |  27<H>  ----------------------------<  193<H>  3.8   |  24  |  0.87    Ca    8.6      06 Jun 2021 11:22  Phos  3.1     06-06  Mg     1.8     06-06    TPro  6.3  /  Alb  2.9<L>  /  TBili  1.0  /  DBili  x   /  AST  6   /  ALT  <5<L>  /  AlkPhos  113  06-06    proBNP:   Lipid Profile:   HgA1c:   TSH: Thyroid Stimulating Hormone, Serum: 2.50 uIU/mL (06-06 @ 11:22)      Consultant(s) Notes Reviewed:  [x ] YES  [ ] NO    Care Discussed with Consultants/Other Providers [ x] YES  [ ] NO    Imaging Personally Reviewed independently:  [x] YES  [ ] NO    All labs, radiologic studies, vitals, orders and medications list reviewed. Patient is seen and examined at bedside. Case discussed with medical team.

## 2021-06-06 NOTE — PROGRESS NOTE ADULT - SUBJECTIVE AND OBJECTIVE BOX
[facility] Division of Hospital Medicine  Jazmin Grande MD  Pager (M-F, 8A-5P): [phone]  Other Times:  [phone]    Patient is a 81y old  Male who presents with a chief complaint of Fall (05 Jun 2021 15:11)    SUBJECTIVE / OVERNIGHT EVENTS:  Pt hypotensive earlier but denies symptoms.      MEDICATIONS  (STANDING):  atorvastatin 10 milliGRAM(s) Oral at bedtime  carbidopa/levodopa  25/100 1 Tablet(s) Oral three times a day  clopidogrel Tablet 75 milliGRAM(s) Oral daily  dextrose 40% Gel 15 Gram(s) Oral once  dextrose 5%. 1000 milliLiter(s) (50 mL/Hr) IV Continuous <Continuous>  dextrose 5%. 1000 milliLiter(s) (100 mL/Hr) IV Continuous <Continuous>  dextrose 50% Injectable 25 Gram(s) IV Push once  dextrose 50% Injectable 12.5 Gram(s) IV Push once  dextrose 50% Injectable 25 Gram(s) IV Push once  enoxaparin Injectable 40 milliGRAM(s) SubCutaneous daily  glucagon  Injectable 1 milliGRAM(s) IntraMuscular once  insulin lispro (ADMELOG) corrective regimen sliding scale   SubCutaneous three times a day before meals  insulin lispro (ADMELOG) corrective regimen sliding scale   SubCutaneous at bedtime  pantoprazole    Tablet 40 milliGRAM(s) Oral before breakfast  QUEtiapine 50 milliGRAM(s) Oral at bedtime  tamsulosin 0.4 milliGRAM(s) Oral at bedtime    MEDICATIONS  (PRN):  melatonin 3 milliGRAM(s) Oral at bedtime PRN Insomnia      CAPILLARY BLOOD GLUCOSE      POCT Blood Glucose.: 104 mg/dL (06 Jun 2021 08:35)  POCT Blood Glucose.: 123 mg/dL (05 Jun 2021 22:07)  POCT Blood Glucose.: 164 mg/dL (05 Jun 2021 17:16)  POCT Blood Glucose.: 142 mg/dL (05 Jun 2021 12:17)    I&O's Summary    05 Jun 2021 07:01  -  06 Jun 2021 07:00  --------------------------------------------------------  IN: 240 mL / OUT: 0 mL / NET: 240 mL        PHYSICAL EXAM:  Vital Signs Last 24 Hrs  T(C): 36.7 (06 Jun 2021 11:00), Max: 36.8 (06 Jun 2021 09:00)  T(F): 98 (06 Jun 2021 11:00), Max: 98.3 (06 Jun 2021 09:00)  HR: 60 (06 Jun 2021 11:00) (60 - 72)  BP: 100/46 (06 Jun 2021 11:00) (82/40 - 134/73)  BP(mean): --  RR: 18 (06 Jun 2021 11:00) (15 - 18)  SpO2: 96% (06 Jun 2021 11:00) (96% - 99%)    PHYSICAL EXAM:  GENERAL: NAD, well-developed  CHEST/LUNG: Clear to auscultation bilaterally; No wheeze  HEART: Regular rate and rhythm; No murmurs  ABDOMEN: Soft, Nontender, Nondistended  EXTREMITIES: no LE edema  PSYCH: Calm  NEUROLOGY: AA, oriented to self    LABS:                        10.3   7.51  )-----------( 256      ( 05 Jun 2021 07:13 )             31.8     06-05    139  |  102  |  22  ----------------------------<  92  3.8   |  26  |  0.69    Ca    8.8      05 Jun 2021 07:13  Phos  2.9     06-05  Mg     1.8     06-05        RADIOLOGY & ADDITIONAL TESTS:  Results Reviewed:   Imaging Personally Reviewed:  Electrocardiogram Personally Reviewed:    COORDINATION OF CARE:  Care Discussed with Consultants/Other Providers [Y/N]: Y  Prior or Outpatient Records Reviewed [Y/N]: Y

## 2021-06-06 NOTE — PROGRESS NOTE ADULT - PROBLEM SELECTOR PLAN 1
- Unclear if pt had syncopal episode but he was found awake and alert by his aid after the fall  - Monitor on tele   -CT head/c-spine no acute path  -PT recs rehab, family want him home has 24 hr aide  - Monitor orthostatics- bp improved after aggressive fluid resuscitation on 5/29 ~3L  - TTE 5/28 normal LVEF 55-60%, increased pericardial effusion compared to prior echo, Normal pericardium with moderate sized circumferential pericardial effusion seen predominantly apical and  inferolateral to the left ventricle. No tamponade physiology.  -repeat TTE 5/29 Small pericardial effusion around the LV apex (decreased compare to echo 5/28).  No RA or RV diastolic collapse seen.  -pt was hypotensive to 80's on 5/29 w/ persistent sinus bradycardia 40's, ICU/CCU consults appreciated, dcd lopressor, responded to aggressive fluid boluses, repeat echo 5/29 no tamponade physiology, decreased small pericardial effusion.  wkend team d/w ACP and cardiology Dr. Hwang, if remains hypotensive, consider transfer to CCU for low dose dopamine gtt -pt again hypotensive on 6/6 but asymptomatic - would monitor for today and consider CCU   -one rectal temp hypothermic 95.5F, started empirically on zosyn 5/29, Bcx 5/29 ngtd, UA neg, procalcitonin 0.1, cortisol 7.4, doubt adrenal insufficiency, tapered off hydrocortisone  -d/c zosyn completed course  -monitor vitals closely

## 2021-06-07 VITALS
DIASTOLIC BLOOD PRESSURE: 56 MMHG | RESPIRATION RATE: 17 BRPM | HEART RATE: 66 BPM | OXYGEN SATURATION: 97 % | SYSTOLIC BLOOD PRESSURE: 117 MMHG | TEMPERATURE: 98 F

## 2021-06-07 LAB
ALBUMIN SERPL ELPH-MCNC: 2.9 G/DL — LOW (ref 3.3–5)
ALP SERPL-CCNC: 112 U/L — SIGNIFICANT CHANGE UP (ref 40–120)
ALT FLD-CCNC: <5 U/L — SIGNIFICANT CHANGE UP (ref 4–41)
ANION GAP SERPL CALC-SCNC: 12 MMOL/L — SIGNIFICANT CHANGE UP (ref 7–14)
AST SERPL-CCNC: <5 U/L — LOW (ref 4–40)
BASE EXCESS BLDV CALC-SCNC: 3.2 MMOL/L — HIGH (ref -3–2)
BILIRUB SERPL-MCNC: 1.1 MG/DL — SIGNIFICANT CHANGE UP (ref 0.2–1.2)
BLOOD GAS VENOUS - CREATININE: 0.9 MG/DL — SIGNIFICANT CHANGE UP (ref 0.5–1.3)
BLOOD GAS VENOUS COMPREHENSIVE RESULT: SIGNIFICANT CHANGE UP
BUN SERPL-MCNC: 23 MG/DL — SIGNIFICANT CHANGE UP (ref 7–23)
CALCIUM SERPL-MCNC: 8.9 MG/DL — SIGNIFICANT CHANGE UP (ref 8.4–10.5)
CHLORIDE BLDV-SCNC: 104 MMOL/L — SIGNIFICANT CHANGE UP (ref 96–108)
CHLORIDE SERPL-SCNC: 102 MMOL/L — SIGNIFICANT CHANGE UP (ref 98–107)
CO2 SERPL-SCNC: 26 MMOL/L — SIGNIFICANT CHANGE UP (ref 22–31)
CREAT SERPL-MCNC: 0.79 MG/DL — SIGNIFICANT CHANGE UP (ref 0.5–1.3)
GAS PNL BLDV: 138 MMOL/L — SIGNIFICANT CHANGE UP (ref 136–146)
GLUCOSE BLDC GLUCOMTR-MCNC: 108 MG/DL — HIGH (ref 70–99)
GLUCOSE BLDC GLUCOMTR-MCNC: 153 MG/DL — HIGH (ref 70–99)
GLUCOSE BLDC GLUCOMTR-MCNC: 94 MG/DL — SIGNIFICANT CHANGE UP (ref 70–99)
GLUCOSE BLDV-MCNC: 95 MG/DL — SIGNIFICANT CHANGE UP (ref 70–99)
GLUCOSE SERPL-MCNC: 85 MG/DL — SIGNIFICANT CHANGE UP (ref 70–99)
HCO3 BLDV-SCNC: 27 MMOL/L — SIGNIFICANT CHANGE UP (ref 20–27)
HCT VFR BLD CALC: 31.1 % — LOW (ref 39–50)
HCT VFR BLDA CALC: 34 % — LOW (ref 39–51)
HGB BLD CALC-MCNC: 11 G/DL — LOW (ref 13–17)
HGB BLD-MCNC: 10.1 G/DL — LOW (ref 13–17)
LACTATE BLDV-MCNC: 0.7 MMOL/L — SIGNIFICANT CHANGE UP (ref 0.5–2)
MAGNESIUM SERPL-MCNC: 1.7 MG/DL — SIGNIFICANT CHANGE UP (ref 1.6–2.6)
MCHC RBC-ENTMCNC: 29.3 PG — SIGNIFICANT CHANGE UP (ref 27–34)
MCHC RBC-ENTMCNC: 32.5 GM/DL — SIGNIFICANT CHANGE UP (ref 32–36)
MCV RBC AUTO: 90.1 FL — SIGNIFICANT CHANGE UP (ref 80–100)
NRBC # BLD: 0 /100 WBCS — SIGNIFICANT CHANGE UP
NRBC # FLD: 0 K/UL — SIGNIFICANT CHANGE UP
PCO2 BLDV: 40 MMHG — LOW (ref 41–51)
PH BLDV: 7.45 — HIGH (ref 7.32–7.43)
PHOSPHATE SERPL-MCNC: 3.2 MG/DL — SIGNIFICANT CHANGE UP (ref 2.5–4.5)
PLATELET # BLD AUTO: 281 K/UL — SIGNIFICANT CHANGE UP (ref 150–400)
PO2 BLDV: 76 MMHG — HIGH (ref 35–40)
POTASSIUM BLDV-SCNC: 3.8 MMOL/L — SIGNIFICANT CHANGE UP (ref 3.4–4.5)
POTASSIUM SERPL-MCNC: 3.7 MMOL/L — SIGNIFICANT CHANGE UP (ref 3.5–5.3)
POTASSIUM SERPL-SCNC: 3.7 MMOL/L — SIGNIFICANT CHANGE UP (ref 3.5–5.3)
PROT SERPL-MCNC: 6.1 G/DL — SIGNIFICANT CHANGE UP (ref 6–8.3)
RBC # BLD: 3.45 M/UL — LOW (ref 4.2–5.8)
RBC # FLD: 15.5 % — HIGH (ref 10.3–14.5)
SAO2 % BLDV: 96 % — HIGH (ref 60–85)
SODIUM SERPL-SCNC: 140 MMOL/L — SIGNIFICANT CHANGE UP (ref 135–145)
WBC # BLD: 8.91 K/UL — SIGNIFICANT CHANGE UP (ref 3.8–10.5)
WBC # FLD AUTO: 8.91 K/UL — SIGNIFICANT CHANGE UP (ref 3.8–10.5)

## 2021-06-07 PROCEDURE — 99239 HOSP IP/OBS DSCHRG MGMT >30: CPT

## 2021-06-07 RX ORDER — PANTOPRAZOLE SODIUM 20 MG/1
1 TABLET, DELAYED RELEASE ORAL
Qty: 30 | Refills: 0
Start: 2021-06-07 | End: 2021-07-06

## 2021-06-07 RX ORDER — METFORMIN HYDROCHLORIDE 850 MG/1
1 TABLET ORAL
Qty: 60 | Refills: 0
Start: 2021-06-07 | End: 2021-07-06

## 2021-06-07 RX ORDER — DOCUSATE SODIUM 100 MG
1 CAPSULE ORAL
Qty: 60 | Refills: 0
Start: 2021-06-07 | End: 2021-07-06

## 2021-06-07 RX ORDER — QUETIAPINE FUMARATE 200 MG/1
1 TABLET, FILM COATED ORAL
Qty: 30 | Refills: 0
Start: 2021-06-07 | End: 2021-07-06

## 2021-06-07 RX ORDER — CARBIDOPA AND LEVODOPA 25; 100 MG/1; MG/1
1 TABLET ORAL
Qty: 90 | Refills: 0
Start: 2021-06-07 | End: 2021-07-06

## 2021-06-07 RX ORDER — DOCUSATE SODIUM 100 MG
1 CAPSULE ORAL
Qty: 0 | Refills: 0 | DISCHARGE

## 2021-06-07 RX ORDER — ATORVASTATIN CALCIUM 80 MG/1
1 TABLET, FILM COATED ORAL
Qty: 0 | Refills: 0 | DISCHARGE

## 2021-06-07 RX ORDER — LANOLIN ALCOHOL/MO/W.PET/CERES
1 CREAM (GRAM) TOPICAL
Qty: 30 | Refills: 0
Start: 2021-06-07 | End: 2021-07-06

## 2021-06-07 RX ORDER — ATORVASTATIN CALCIUM 80 MG/1
1 TABLET, FILM COATED ORAL
Qty: 30 | Refills: 0
Start: 2021-06-07 | End: 2021-07-06

## 2021-06-07 RX ORDER — METFORMIN HYDROCHLORIDE 850 MG/1
1 TABLET ORAL
Qty: 0 | Refills: 0 | DISCHARGE

## 2021-06-07 RX ORDER — EMPAGLIFLOZIN 10 MG/1
1 TABLET, FILM COATED ORAL
Qty: 0 | Refills: 0 | DISCHARGE

## 2021-06-07 RX ORDER — CARBIDOPA AND LEVODOPA 25; 100 MG/1; MG/1
1 TABLET ORAL
Qty: 0 | Refills: 0 | DISCHARGE

## 2021-06-07 RX ORDER — QUETIAPINE FUMARATE 200 MG/1
1 TABLET, FILM COATED ORAL
Qty: 0 | Refills: 0 | DISCHARGE

## 2021-06-07 RX ORDER — CLOPIDOGREL BISULFATE 75 MG/1
1 TABLET, FILM COATED ORAL
Qty: 30 | Refills: 0
Start: 2021-06-07 | End: 2021-07-06

## 2021-06-07 RX ORDER — EMPAGLIFLOZIN 10 MG/1
1 TABLET, FILM COATED ORAL
Qty: 30 | Refills: 0
Start: 2021-06-07 | End: 2021-07-06

## 2021-06-07 RX ORDER — TAMSULOSIN HYDROCHLORIDE 0.4 MG/1
1 CAPSULE ORAL
Qty: 30 | Refills: 0
Start: 2021-06-07 | End: 2021-07-06

## 2021-06-07 RX ADMIN — CARBIDOPA AND LEVODOPA 1 TABLET(S): 25; 100 TABLET ORAL at 12:41

## 2021-06-07 RX ADMIN — CARBIDOPA AND LEVODOPA 1 TABLET(S): 25; 100 TABLET ORAL at 05:26

## 2021-06-07 RX ADMIN — CLOPIDOGREL BISULFATE 75 MILLIGRAM(S): 75 TABLET, FILM COATED ORAL at 08:55

## 2021-06-07 RX ADMIN — Medication 1: at 12:41

## 2021-06-07 RX ADMIN — ENOXAPARIN SODIUM 40 MILLIGRAM(S): 100 INJECTION SUBCUTANEOUS at 08:55

## 2021-06-07 RX ADMIN — PANTOPRAZOLE SODIUM 40 MILLIGRAM(S): 20 TABLET, DELAYED RELEASE ORAL at 05:26

## 2021-06-07 NOTE — PROGRESS NOTE ADULT - PROBLEM SELECTOR PLAN 8
- Continue Seroquel. Frequent re-orientation. Monitor QTc
- Continue Seroquel. Frequent re-orientation. Monitor QTc  - Updated brother Berto at [phone] on 6/3,brother is concerned about insomnia and is asking for meds, pt is already on seroquel, can add melatonin prn for sleep   dc planning for Sunday, brother wants him home, has 24 hr aide, discussed with cm/sw - discharge cancelled due to hypotension
- Continue Seroquel. Frequent re-orientation. Monitor QTc  - Updated brother Berto at [phone] on 6/3,brother is concerned about insomnia and is asking for meds, pt is already on seroquel, can add melatonin prn for sleep   dc planning for Sunday, brother wants him home, has 24 hr aide, discussed with cm/sw - discharge cancelled due to hypotension
- Continue Seroquel. Frequent re-orientation. Monitor QTc  - Updated brother Berto at [phone] on 6/3,brother is concerned about insomnia and is asking for meds, pt is already on seroquel, can add melatonin prn for sleep   dc planning for Sunday, brother wants him home, has 24 hr aide, discussed with cm/sw
- Continue Seroquel. Frequent re-orientation. Monitor QTc
- Continue Seroquel. Frequent re-orientation. Monitor QTc  - Updated brother Berto at [phone] on 6/3,brother is concerned about insomnia and is asking for meds, pt is already on seroquel, can add melatonin prn for sleep   dc planning for Sunday, brother wants him home, has 24 hr aide, discussed with cm/sw
- Continue Seroquel. Frequent re-orientation. Monitor QTc  -no issues of insomnia reported  - Updated brother Berto at [phone] on 6/3,brother is concerned about insomnia and is asking for meds, pt is already on seroquel and no issues reported, will monitor tonight, dc planning for tomorrow, brother wants him home, has 24 hr aide, discussed with cm/sw
- Continue Seroquel. Frequent re-orientation. Monitor QTc
- Continue Seroquel. Frequent re-orientation. Monitor QTc

## 2021-06-07 NOTE — PROVIDER CONTACT NOTE (OTHER) - NAME OF MD/NP/PA/DO NOTIFIED:
ACP Jodi
CATALINA julian
CATALINA julian
SMITA Lackey
ACP Joya Fontanez
ACP Joya Fontanez
CATALINA julian
CATALINA julian
ACP Shavon Wilson
CATALINA julian
CATALINA julian
ACP Jodi
cherrie julian
CATALINA julian
Sarah Hernandez ACP
CATALINA julian

## 2021-06-07 NOTE — DIETITIAN INITIAL EVALUATION ADULT. - ADD RECOMMEND
consider liberalizing dash and consistent carb restrictions.  Encourage PO intake and honor food preferences as able.

## 2021-06-07 NOTE — PROGRESS NOTE ADULT - PROBLEM SELECTOR PLAN 5
- Monitor FSG  - SSI  - A1c 5.7

## 2021-06-07 NOTE — PROGRESS NOTE ADULT - PROBLEM SELECTOR PLAN 7
- Continue Sinemet - Continue Sinemet  - suspect advancing parkinson's dz as factor into hypotension caused by autonomic dysfunction.

## 2021-06-07 NOTE — PROGRESS NOTE ADULT - PROVIDER SPECIALTY LIST ADULT
Cardiology
Hospitalist

## 2021-06-07 NOTE — PROVIDER CONTACT NOTE (OTHER) - DATE AND TIME:
06-Jun-2021 09:35
29-May-2021 11:35
29-May-2021 15:20
02-Jun-2021 05:10
03-Jun-2021 01:23
29-May-2021 14:30
30-May-2021 09:49
07-Jun-2021 09:35
02-Jun-2021 01:23
02-Jun-2021 21:05
29-May-2021 09:24
29-May-2021 11:30
29-May-2021 13:00
29-May-2021 15:40
29-May-2021 12:00
30-May-2021 21:10

## 2021-06-07 NOTE — PROGRESS NOTE ADULT - NSICDXPILOT_GEN_ALL_CORE
[place]

## 2021-06-07 NOTE — PROVIDER CONTACT NOTE (OTHER) - ASSESSMENT
/46. Vital signs in chart. Patient asymptomatic, sitting up in bed, denies dizziness or lightheadedness.
Patient A&Ox1-2 confused, VS as documented, NSR/SB at times on tele. Patient denies chest pain and SOB, asymptomatic.
Patient A&Ox1-2 confused, VS as documented, NSR/SB at times on tele. Patient denies chest pain and SOB, asymptomatic.
patient is A&Ox2, no dizziness or lightheadedness explained, no SOB or chest pain, VS as noted,
patient is A&Ox2, on room air, VS noted in flowsheet
patient is A&Ox2, on room air, no visible signs of distress, VS as noted
Patient A&Ox1-2 confused, VS as documented, NSR/SB at times on tele. Patient denies chest pain and SOB, asymptomatic.
patient is A&Ox2, on room air, no visible signs of distress, VS as noted
Patient A&Ox1-2 confused, VS as documented, NSR/SB at times on tele. Patient denies chest pain and SOB, asymptomatic.
patient is A&Ox2, on room air, no visible signs of distress, VS as noted
patient is A&Ox2, on room air, no visible signs of distress, VS as noted
Manual BP 82/40 HR 76 on cardiac monitor. Patient is alert, laying in bed asymptomatic, denies lightheadedness or dizziness. Patient baseline A&Ox1-2 to self/place.
patient is A&Ox2, on room air, no visible signs of distress, VS as noted
patient is A&Ox2, on room air, no visible signs of distress, VS as noted
Pt unsteady on feet and has a history of Falls, is unable to get out of bed for orthos at this time.
patient is A&Ox2, on room air, no visible signs of distress, VS as noted

## 2021-06-07 NOTE — PROGRESS NOTE ADULT - PROBLEM SELECTOR PLAN 4
- Monitor BP   - Continue flomax - Monitor BP   - Continue flomax  - monitor off BP meds due to intermittent hypotension

## 2021-06-07 NOTE — PROGRESS NOTE ADULT - SUBJECTIVE AND OBJECTIVE BOX
Bennie Hwang MD  Interventional Cardiology / Endovascular Specialist  [place] Office : [phone] [place] [address] [address] [address]  Tel:   [place] Office : [address] [facility] [place] [address]  Tel: [phone]  Cell : [phone]    Subjective/Overnight events: Patient lying in bed comfortably. No acute distress  	  MEDICATIONS:  clopidogrel Tablet 75 milliGRAM(s) Oral daily  enoxaparin Injectable 40 milliGRAM(s) SubCutaneous daily  tamsulosin 0.4 milliGRAM(s) Oral at bedtime        carbidopa/levodopa  25/100 1 Tablet(s) Oral three times a day  melatonin 3 milliGRAM(s) Oral at bedtime PRN  QUEtiapine 50 milliGRAM(s) Oral at bedtime    pantoprazole    Tablet 40 milliGRAM(s) Oral before breakfast    atorvastatin 10 milliGRAM(s) Oral at bedtime  dextrose 40% Gel 15 Gram(s) Oral once  dextrose 50% Injectable 25 Gram(s) IV Push once  dextrose 50% Injectable 12.5 Gram(s) IV Push once  dextrose 50% Injectable 25 Gram(s) IV Push once  glucagon  Injectable 1 milliGRAM(s) IntraMuscular once  insulin lispro (ADMELOG) corrective regimen sliding scale   SubCutaneous three times a day before meals  insulin lispro (ADMELOG) corrective regimen sliding scale   SubCutaneous at bedtime    dextrose 5%. 1000 milliLiter(s) IV Continuous <Continuous>  dextrose 5%. 1000 milliLiter(s) IV Continuous <Continuous>      PAST MEDICAL/SURGICAL HISTORY  PAST MEDICAL & SURGICAL HISTORY:  Hypercholesterolemia    DM (Diabetes Mellitus)    HTN (Hypertension)    CAD (Coronary Artery Disease)  s/p cardiac stent ( &gt; 20 years ago)    BPH (benign prostatic hyperplasia)    Coronary Stent  x 2 ( 20 years )        SOCIAL HISTORY: Substance Use (street drugs): ( x ) never used  (  ) other:    FAMILY HISTORY:  No pertinent family history in first degree relatives        REVIEW OF SYSTEMS:  unable to obtain    PHYSICAL EXAM:  T(C): 36.9 (06-07-21 @ 12:00), Max: 37.1 (06-07-21 @ 09:00)  HR: 72 (06-07-21 @ 12:00) (62 - 73)  BP: 115/51 (06-07-21 @ 12:00) (106/46 - 148/58)  RR: 16 (06-07-21 @ 12:00) (16 - 18)  SpO2: 98% (06-07-21 @ 12:00) (97% - 99%)  Wt(kg): --  I&O's Summary    06 Jun 2021 07:01  -  07 Jun 2021 07:00  --------------------------------------------------------  IN: 720 mL / OUT: 600 mL / NET: 120 mL    07 Jun 2021 07:01  -  07 Jun 2021 12:56  --------------------------------------------------------  IN: 480 mL / OUT: 275 mL / NET: 205 mL          EYES: EOMI, PERRLA, conjunctiva and sclera clear  ENMT: No tonsillar erythema, exudates, or enlargement;   Cardiovascular: Normal S1 S2, No JVD, No murmurs, No edema  Respiratory: b/l rhonchi   Gastrointestinal:  Soft, Non-tender, + BS	  Extremities: no edema                                     10.1   8.91  )-----------( 281      ( 07 Jun 2021 08:00 )             31.1     06-07    140  |  102  |  23  ----------------------------<  85  3.7   |  26  |  0.79    Ca    8.9      07 Jun 2021 08:00  Phos  3.2     06-07  Mg     1.7     06-07    TPro  6.1  /  Alb  2.9<L>  /  TBili  1.1  /  DBili  x   /  AST  <5<L>  /  ALT  <5  /  AlkPhos  112  06-07    proBNP:   Lipid Profile:   HgA1c:   TSH:     Consultant(s) Notes Reviewed:  [x ] YES  [ ] NO    Care Discussed with Consultants/Other Providers [ x] YES  [ ] NO    Imaging Personally Reviewed independently:  [x] YES  [ ] NO    All labs, radiologic studies, vitals, orders and medications list reviewed. Patient is seen and examined at bedside. Case discussed with medical team.

## 2021-06-07 NOTE — PROVIDER CONTACT NOTE (OTHER) - REASON
Patient /43
Unable to do orthos standing
patient is hypotensive
patient is hypothermic
Patient /39
Vital signs
patientis hypotensive post bolus
Patient /42
patient is bradycardic
hypotensive
Vital signs
patient unable to standing during orthos
Patient /44 HR 54
patient is hypotensive
hypotensive
patient is hypotensive

## 2021-06-07 NOTE — PROGRESS NOTE ADULT - PROBLEM SELECTOR PLAN 3
- COVID PCR positive but pt has no resp symptoms. s/p course of Decadron and Remdesivir 2 months ago for hypoxic resp failure. O2 sat is nml at this time  - Continue to monitor

## 2021-06-07 NOTE — DIETITIAN INITIAL EVALUATION ADULT. - OTHER INFO
Pt is A&Ox1- collateral obtained from RN. Pt is eating 100% of meals and is always stating he wants more food. Per flow sheets, pt has been consistently eating 100% of meals this admission. RN Denies any GI issues (nausea/vomiting/diarrhea/constipation.) Last swallow assessment 3/22/21 recommending pureed, Declo Consistency. Plan to d/c home with 24 HHA. Per last RD note on 3/17/21, pt weighed 145 pounds. Current dosing wt 136 pounds (61.7 kg).

## 2021-06-07 NOTE — DIETITIAN INITIAL EVALUATION ADULT. - PERTINENT MEDS FT
MEDICATIONS  (STANDING):  atorvastatin 10 milliGRAM(s) Oral at bedtime  carbidopa/levodopa  25/100 1 Tablet(s) Oral three times a day  clopidogrel Tablet 75 milliGRAM(s) Oral daily  dextrose 40% Gel 15 Gram(s) Oral once  dextrose 5%. 1000 milliLiter(s) (50 mL/Hr) IV Continuous <Continuous>  dextrose 5%. 1000 milliLiter(s) (100 mL/Hr) IV Continuous <Continuous>  dextrose 50% Injectable 25 Gram(s) IV Push once  dextrose 50% Injectable 12.5 Gram(s) IV Push once  dextrose 50% Injectable 25 Gram(s) IV Push once  enoxaparin Injectable 40 milliGRAM(s) SubCutaneous daily  glucagon  Injectable 1 milliGRAM(s) IntraMuscular once  insulin lispro (ADMELOG) corrective regimen sliding scale   SubCutaneous three times a day before meals  insulin lispro (ADMELOG) corrective regimen sliding scale   SubCutaneous at bedtime  pantoprazole    Tablet 40 milliGRAM(s) Oral before breakfast  QUEtiapine 50 milliGRAM(s) Oral at bedtime  tamsulosin 0.4 milliGRAM(s) Oral at bedtime

## 2021-06-07 NOTE — PROGRESS NOTE ADULT - PROBLEM SELECTOR PLAN 6
- Continue fLomax

## 2021-06-07 NOTE — PROGRESS NOTE ADULT - PROBLEM SELECTOR PROBLEM 1
Frequent falls

## 2021-06-07 NOTE — PROGRESS NOTE ADULT - REASON FOR ADMISSION
Fall

## 2021-06-07 NOTE — DIETITIAN INITIAL EVALUATION ADULT. - PROBLEM SELECTOR PLAN 3
- COVID PCR positive but pt has no resp symptoms. s/p course of Decadrom and Remdesivir 2 months ago for hypoxic resp failure. O2 sat is nml at this time  - Check Abs   - Continue to monitor

## 2021-06-07 NOTE — DIETITIAN INITIAL EVALUATION ADULT. - PERTINENT LABORATORY DATA
06-07 Na 140 mmol/L Glu 85 mg/dL K+ 3.7 mmol/L Cr 0.79 mg/dL BUN 23 mg/dL Phos 3.2 mg/dL  06-07 @ 08:34 POCT 94 mg/dL  06-06 @ 21:44 POCT 107 mg/dL  06-06 @ 17:15 POCT 117 mg/dL  06-06 @ 12:18 POCT 195 mg/dL

## 2021-06-07 NOTE — PROGRESS NOTE ADULT - PROBLEM SELECTOR PROBLEM 9
Chronic diastolic congestive heart failure

## 2021-06-07 NOTE — PROGRESS NOTE ADULT - ATTENDING COMMENTS
pt seen and examined agree with plan above
pt seen and examined agree with plan above
agree with above
pt seen and examined agree with plan above

## 2021-06-07 NOTE — PROGRESS NOTE ADULT - PROBLEM SELECTOR PROBLEM 7
Parkinson's disease

## 2021-06-07 NOTE — PROVIDER CONTACT NOTE (OTHER) - BACKGROUND
Admitted for repeated Falls
patient was admitted due to frequent falls
Patient admitted for frequent falls
Patient admitted for repeated falls. PMH of BPH, CAD, hypertension, DM.
Patient admitted for repeated falls. PMH of BPH, CAD, hypertension, DM.
patient was admitted due to frequent falls
patient was admitted due to repeated falls
patient was admitted due to frequent falls
patient was admitted due to frequent falls
Patient admitted for repeated falls. PMH of BPH, CAD, hypertension, DM.
Patient admitted for repeated falls. PMH of BPH, CAD, hypertension, DM.
patient was admitted due to frequent falls
Patient admitted for repeated falls
patient was admitted due to frequent falls

## 2021-06-07 NOTE — PROGRESS NOTE ADULT - SUBJECTIVE AND OBJECTIVE BOX
[facility] Division of Hospital Medicine  Pankaj Bhatti MD  Pager (M-F, 8A-5P): [phone]  Other Times:  [phone]    Patient is a 81y old  Male who presents with a chief complaint of Fall (06 Jun 2021 11:31)    SUBJECTIVE / OVERNIGHT EVENTS:  No new issues with hypotension in past 24 hours.  No F/C, N/V, CP, SOB, Cough, lightheadedness, dizziness, abdominal pain, diarrhea, dysuria.    MEDICATIONS  (STANDING):  atorvastatin 10 milliGRAM(s) Oral at bedtime  carbidopa/levodopa  25/100 1 Tablet(s) Oral three times a day  clopidogrel Tablet 75 milliGRAM(s) Oral daily  dextrose 40% Gel 15 Gram(s) Oral once  dextrose 5%. 1000 milliLiter(s) (50 mL/Hr) IV Continuous <Continuous>  dextrose 5%. 1000 milliLiter(s) (100 mL/Hr) IV Continuous <Continuous>  dextrose 50% Injectable 25 Gram(s) IV Push once  dextrose 50% Injectable 12.5 Gram(s) IV Push once  dextrose 50% Injectable 25 Gram(s) IV Push once  enoxaparin Injectable 40 milliGRAM(s) SubCutaneous daily  glucagon  Injectable 1 milliGRAM(s) IntraMuscular once  insulin lispro (ADMELOG) corrective regimen sliding scale   SubCutaneous three times a day before meals  insulin lispro (ADMELOG) corrective regimen sliding scale   SubCutaneous at bedtime  pantoprazole    Tablet 40 milliGRAM(s) Oral before breakfast  QUEtiapine 50 milliGRAM(s) Oral at bedtime  tamsulosin 0.4 milliGRAM(s) Oral at bedtime    MEDICATIONS  (PRN):  melatonin 3 milliGRAM(s) Oral at bedtime PRN Insomnia      Vital Signs Last 24 Hrs  T(C): 37.1 (07 Jun 2021 09:00), Max: 37.1 (07 Jun 2021 09:00)  T(F): 98.8 (07 Jun 2021 09:00), Max: 98.8 (07 Jun 2021 09:00)  HR: 66 (07 Jun 2021 09:00) (62 - 73)  BP: 106/46 (07 Jun 2021 09:00) (106/46 - 148/58)  BP(mean): --  RR: 17 (07 Jun 2021 09:00) (17 - 18)  SpO2: 98% (07 Jun 2021 09:00) (97% - 99%)  CAPILLARY BLOOD GLUCOSE      POCT Blood Glucose.: 94 mg/dL (07 Jun 2021 08:34)  POCT Blood Glucose.: 107 mg/dL (06 Jun 2021 21:44)  POCT Blood Glucose.: 117 mg/dL (06 Jun 2021 17:15)  POCT Blood Glucose.: 195 mg/dL (06 Jun 2021 12:18)    I&O's Summary    06 Jun 2021 07:01  -  07 Jun 2021 07:00  --------------------------------------------------------  IN: 720 mL / OUT: 600 mL / NET: 120 mL    07 Jun 2021 07:01  -  07 Jun 2021 11:40  --------------------------------------------------------  IN: 480 mL / OUT: 100 mL / NET: 380 mL        PHYSICAL EXAM:  GENERAL: NAD, well-developed  CHEST/LUNG: Clear to auscultation bilaterally; No wheeze  HEART: Regular rate and rhythm; No murmurs  ABDOMEN: Soft, Nontender, Nondistended  EXTREMITIES: no LE edema  PSYCH: Calm  NEUROLOGY: AA, oriented to self    LABS:                        10.1   8.91  )-----------( 281      ( 07 Jun 2021 08:00 )             31.1     06-07    140  |  102  |  23  ----------------------------<  85  3.7   |  26  |  0.79    Ca    8.9      07 Jun 2021 08:00  Phos  3.2     06-07  Mg     1.7     06-07    TPro  6.1  /  Alb  2.9<L>  /  TBili  1.1  /  DBili  x   /  AST  <5<L>  /  ALT  <5  /  AlkPhos  112  06-07              RADIOLOGY & ADDITIONAL TESTS:    Imaging Personally Reviewed:    Care Discussed with Consultants/Other Providers:

## 2021-06-07 NOTE — PROVIDER CONTACT NOTE (OTHER) - SITUATION
Pt unable to stand for orthos. Pt was able to complete orthos laying down and sitting up.
patient is hypotensive after taking BP laying down manually
Patient /39
Patient /44 HR 54
Vital signs
patient is hypotensive post giving bolus of normal saline
patient is hypotensive
patient is hypotensive post giving bolus of normal saline
Patient /43
Patient BP [id number]
Vital signs
patient is bradycardic, Hr sitting in the low 50s, HR going as low as 48
patient is hypothermic when taking rectal temp
patient unable to stand for orthos due to weakness

## 2021-06-07 NOTE — PROGRESS NOTE ADULT - PROBLEM SELECTOR PROBLEM 6
Benign prostatic hyperplasia, unspecified whether lower urinary tract symptoms present

## 2021-06-07 NOTE — PROGRESS NOTE ADULT - ASSESSMENT
80 y/o M HTN, DM, HLD, dementia (AOx2 at baseline), Parkinson's disease, CAD s/p stents x2 (>20 years ago), recent new onset HF (EF: 50%, on Xarelto, Moderate diastolic dysfunction (Stage II), moderate pulmonary hypertension, small pericardial effusion), L pleural effusion, BPH, gastric ulcers presenting after fall, ?syncope. Concern for autonomic disorder causing hypotension due to advancing parkinson's. 80 y/o M HTN, DM, HLD, dementia (AOx2 at baseline), Parkinson's disease, CAD s/p stents x2 (>20 years ago), recent new onset HF (EF: 50%, on Xarelto, Moderate diastolic dysfunction (Stage II), moderate pulmonary hypertension, small pericardial effusion), L pleural effusion, BPH, gastric ulcers presenting after fall, ?syncope. Concern for autonomic disorder causing hypotension due to advancing parkinson's.    Patient medically optimized for discharge.  Discharge planning 40 minutes - discussed with patient and consultants.

## 2021-06-07 NOTE — PROGRESS NOTE ADULT - PROBLEM SELECTOR PROBLEM 2
Coronary artery disease involving native heart without angina pectoris, unspecified vessel or lesion type

## 2021-06-08 ENCOUNTER — TRANSCRIPTION ENCOUNTER (OUTPATIENT)
Age: 81
End: 2021-06-08

## 2021-06-10 ENCOUNTER — INPATIENT (INPATIENT)
Facility: HOSPITAL | Age: 81
LOS: 6 days | Discharge: NURSING, REHAB OR LONG-TERM CARE | End: 2021-06-17
Attending: HOSPITALIST | Admitting: HOSPITALIST
Payer: MEDICARE

## 2021-06-10 VITALS
SYSTOLIC BLOOD PRESSURE: 88 MMHG | RESPIRATION RATE: 18 BRPM | TEMPERATURE: 98 F | HEART RATE: 59 BPM | HEIGHT: 67 IN | OXYGEN SATURATION: 98 % | DIASTOLIC BLOOD PRESSURE: 42 MMHG

## 2021-06-10 DIAGNOSIS — S72.001A FRACTURE OF UNSPECIFIED PART OF NECK OF RIGHT FEMUR, INITIAL ENCOUNTER FOR CLOSED FRACTURE: ICD-10-CM

## 2021-06-10 DIAGNOSIS — G20 PARKINSON'S DISEASE: ICD-10-CM

## 2021-06-10 DIAGNOSIS — I50.32 CHRONIC DIASTOLIC (CONGESTIVE) HEART FAILURE: ICD-10-CM

## 2021-06-10 DIAGNOSIS — Z29.9 ENCOUNTER FOR PROPHYLACTIC MEASURES, UNSPECIFIED: ICD-10-CM

## 2021-06-10 DIAGNOSIS — I10 ESSENTIAL (PRIMARY) HYPERTENSION: ICD-10-CM

## 2021-06-10 DIAGNOSIS — E11.65 TYPE 2 DIABETES MELLITUS WITH HYPERGLYCEMIA: ICD-10-CM

## 2021-06-10 DIAGNOSIS — U07.1 COVID-19: ICD-10-CM

## 2021-06-10 DIAGNOSIS — R29.6 REPEATED FALLS: ICD-10-CM

## 2021-06-10 DIAGNOSIS — R93.89 ABNORMAL FINDINGS ON DIAGNOSTIC IMAGING OF OTHER SPECIFIED BODY STRUCTURES: ICD-10-CM

## 2021-06-10 DIAGNOSIS — I25.10 ATHEROSCLEROTIC HEART DISEASE OF NATIVE CORONARY ARTERY WITHOUT ANGINA PECTORIS: ICD-10-CM

## 2021-06-10 DIAGNOSIS — I31.3 PERICARDIAL EFFUSION (NONINFLAMMATORY): ICD-10-CM

## 2021-06-10 LAB
ALBUMIN SERPL ELPH-MCNC: 3.1 G/DL — LOW (ref 3.3–5)
ALP SERPL-CCNC: 117 U/L — SIGNIFICANT CHANGE UP (ref 40–120)
ALT FLD-CCNC: 7 U/L — SIGNIFICANT CHANGE UP (ref 4–41)
ANION GAP SERPL CALC-SCNC: 11 MMOL/L — SIGNIFICANT CHANGE UP (ref 7–14)
APPEARANCE UR: CLEAR — SIGNIFICANT CHANGE UP
AST SERPL-CCNC: 7 U/L — SIGNIFICANT CHANGE UP (ref 4–40)
BASOPHILS # BLD AUTO: 0.05 K/UL — SIGNIFICANT CHANGE UP (ref 0–0.2)
BASOPHILS NFR BLD AUTO: 0.6 % — SIGNIFICANT CHANGE UP (ref 0–2)
BILIRUB SERPL-MCNC: 1 MG/DL — SIGNIFICANT CHANGE UP (ref 0.2–1.2)
BILIRUB UR-MCNC: NEGATIVE — SIGNIFICANT CHANGE UP
BLD GP AB SCN SERPL QL: NEGATIVE — SIGNIFICANT CHANGE UP
BUN SERPL-MCNC: 34 MG/DL — HIGH (ref 7–23)
CALCIUM SERPL-MCNC: 9.2 MG/DL — SIGNIFICANT CHANGE UP (ref 8.4–10.5)
CHLORIDE SERPL-SCNC: 103 MMOL/L — SIGNIFICANT CHANGE UP (ref 98–107)
CO2 SERPL-SCNC: 24 MMOL/L — SIGNIFICANT CHANGE UP (ref 22–31)
COLOR SPEC: YELLOW — SIGNIFICANT CHANGE UP
CREAT SERPL-MCNC: 0.74 MG/DL — SIGNIFICANT CHANGE UP (ref 0.5–1.3)
DIFF PNL FLD: NEGATIVE — SIGNIFICANT CHANGE UP
EOSINOPHIL # BLD AUTO: 0.18 K/UL — SIGNIFICANT CHANGE UP (ref 0–0.5)
EOSINOPHIL NFR BLD AUTO: 2.2 % — SIGNIFICANT CHANGE UP (ref 0–6)
GLUCOSE SERPL-MCNC: 97 MG/DL — SIGNIFICANT CHANGE UP (ref 70–99)
GLUCOSE UR QL: ABNORMAL
HCT VFR BLD CALC: 34.8 % — LOW (ref 39–50)
HGB BLD-MCNC: 11.2 G/DL — LOW (ref 13–17)
IANC: 6.24 K/UL — SIGNIFICANT CHANGE UP (ref 1.5–8.5)
IMM GRANULOCYTES NFR BLD AUTO: 0.4 % — SIGNIFICANT CHANGE UP (ref 0–1.5)
KETONES UR-MCNC: NEGATIVE — SIGNIFICANT CHANGE UP
LEUKOCYTE ESTERASE UR-ACNC: NEGATIVE — SIGNIFICANT CHANGE UP
LYMPHOCYTES # BLD AUTO: 1 K/UL — SIGNIFICANT CHANGE UP (ref 1–3.3)
LYMPHOCYTES # BLD AUTO: 12.2 % — LOW (ref 13–44)
MCHC RBC-ENTMCNC: 29.2 PG — SIGNIFICANT CHANGE UP (ref 27–34)
MCHC RBC-ENTMCNC: 32.2 GM/DL — SIGNIFICANT CHANGE UP (ref 32–36)
MCV RBC AUTO: 90.9 FL — SIGNIFICANT CHANGE UP (ref 80–100)
MONOCYTES # BLD AUTO: 0.69 K/UL — SIGNIFICANT CHANGE UP (ref 0–0.9)
MONOCYTES NFR BLD AUTO: 8.4 % — SIGNIFICANT CHANGE UP (ref 2–14)
NEUTROPHILS # BLD AUTO: 6.24 K/UL — SIGNIFICANT CHANGE UP (ref 1.8–7.4)
NEUTROPHILS NFR BLD AUTO: 76.2 % — SIGNIFICANT CHANGE UP (ref 43–77)
NITRITE UR-MCNC: NEGATIVE — SIGNIFICANT CHANGE UP
NRBC # BLD: 0 /100 WBCS — SIGNIFICANT CHANGE UP
NRBC # FLD: 0 K/UL — SIGNIFICANT CHANGE UP
PH UR: 6 — SIGNIFICANT CHANGE UP (ref 5–8)
PLATELET # BLD AUTO: 299 K/UL — SIGNIFICANT CHANGE UP (ref 150–400)
POTASSIUM SERPL-MCNC: 4 MMOL/L — SIGNIFICANT CHANGE UP (ref 3.5–5.3)
POTASSIUM SERPL-SCNC: 4 MMOL/L — SIGNIFICANT CHANGE UP (ref 3.5–5.3)
PROT SERPL-MCNC: 6.7 G/DL — SIGNIFICANT CHANGE UP (ref 6–8.3)
PROT UR-MCNC: ABNORMAL
RBC # BLD: 3.83 M/UL — LOW (ref 4.2–5.8)
RBC # FLD: 15.4 % — HIGH (ref 10.3–14.5)
RH IG SCN BLD-IMP: POSITIVE — SIGNIFICANT CHANGE UP
SARS-COV-2 RNA SPEC QL NAA+PROBE: DETECTED
SODIUM SERPL-SCNC: 138 MMOL/L — SIGNIFICANT CHANGE UP (ref 135–145)
SP GR SPEC: 1.02 — SIGNIFICANT CHANGE UP (ref 1.01–1.02)
TROPONIN T, HIGH SENSITIVITY RESULT: 14 NG/L — SIGNIFICANT CHANGE UP
TROPONIN T, HIGH SENSITIVITY RESULT: 17 NG/L — SIGNIFICANT CHANGE UP
UROBILINOGEN FLD QL: SIGNIFICANT CHANGE UP
WBC # BLD: 8.19 K/UL — SIGNIFICANT CHANGE UP (ref 3.8–10.5)
WBC # FLD AUTO: 8.19 K/UL — SIGNIFICANT CHANGE UP (ref 3.8–10.5)

## 2021-06-10 PROCEDURE — 99221 1ST HOSP IP/OBS SF/LOW 40: CPT

## 2021-06-10 PROCEDURE — 72170 X-RAY EXAM OF PELVIS: CPT | Mod: 26

## 2021-06-10 PROCEDURE — 72125 CT NECK SPINE W/O DYE: CPT | Mod: 26

## 2021-06-10 PROCEDURE — 71045 X-RAY EXAM CHEST 1 VIEW: CPT | Mod: 26

## 2021-06-10 PROCEDURE — 99285 EMERGENCY DEPT VISIT HI MDM: CPT

## 2021-06-10 PROCEDURE — 73552 X-RAY EXAM OF FEMUR 2/>: CPT | Mod: 26,RT

## 2021-06-10 PROCEDURE — 99223 1ST HOSP IP/OBS HIGH 75: CPT

## 2021-06-10 PROCEDURE — 73700 CT LOWER EXTREMITY W/O DYE: CPT | Mod: 26,RT

## 2021-06-10 PROCEDURE — 70450 CT HEAD/BRAIN W/O DYE: CPT | Mod: 26

## 2021-06-10 PROCEDURE — 73020 X-RAY EXAM OF SHOULDER: CPT | Mod: 26,RT

## 2021-06-10 RX ORDER — SODIUM CHLORIDE 9 MG/ML
500 INJECTION INTRAMUSCULAR; INTRAVENOUS; SUBCUTANEOUS ONCE
Refills: 0 | Status: COMPLETED | OUTPATIENT
Start: 2021-06-10 | End: 2021-06-10

## 2021-06-10 RX ORDER — ATORVASTATIN CALCIUM 80 MG/1
10 TABLET, FILM COATED ORAL AT BEDTIME
Refills: 0 | Status: DISCONTINUED | OUTPATIENT
Start: 2021-06-10 | End: 2021-06-17

## 2021-06-10 RX ORDER — ACETAMINOPHEN 500 MG
650 TABLET ORAL ONCE
Refills: 0 | Status: COMPLETED | OUTPATIENT
Start: 2021-06-10 | End: 2021-06-10

## 2021-06-10 RX ORDER — LANOLIN ALCOHOL/MO/W.PET/CERES
3 CREAM (GRAM) TOPICAL AT BEDTIME
Refills: 0 | Status: DISCONTINUED | OUTPATIENT
Start: 2021-06-10 | End: 2021-06-17

## 2021-06-10 RX ORDER — SODIUM CHLORIDE 9 MG/ML
3 INJECTION INTRAMUSCULAR; INTRAVENOUS; SUBCUTANEOUS EVERY 8 HOURS
Refills: 0 | Status: DISCONTINUED | OUTPATIENT
Start: 2021-06-10 | End: 2021-06-17

## 2021-06-10 RX ORDER — PANTOPRAZOLE SODIUM 20 MG/1
40 TABLET, DELAYED RELEASE ORAL
Refills: 0 | Status: DISCONTINUED | OUTPATIENT
Start: 2021-06-10 | End: 2021-06-17

## 2021-06-10 RX ORDER — TAMSULOSIN HYDROCHLORIDE 0.4 MG/1
0.4 CAPSULE ORAL AT BEDTIME
Refills: 0 | Status: DISCONTINUED | OUTPATIENT
Start: 2021-06-11 | End: 2021-06-17

## 2021-06-10 RX ORDER — QUETIAPINE FUMARATE 200 MG/1
50 TABLET, FILM COATED ORAL AT BEDTIME
Refills: 0 | Status: DISCONTINUED | OUTPATIENT
Start: 2021-06-10 | End: 2021-06-17

## 2021-06-10 RX ORDER — CARBIDOPA AND LEVODOPA 25; 100 MG/1; MG/1
1 TABLET ORAL THREE TIMES A DAY
Refills: 0 | Status: DISCONTINUED | OUTPATIENT
Start: 2021-06-10 | End: 2021-06-17

## 2021-06-10 RX ADMIN — SODIUM CHLORIDE 3 MILLILITER(S): 9 INJECTION INTRAMUSCULAR; INTRAVENOUS; SUBCUTANEOUS at 22:05

## 2021-06-10 RX ADMIN — SODIUM CHLORIDE 500 MILLILITER(S): 9 INJECTION INTRAMUSCULAR; INTRAVENOUS; SUBCUTANEOUS at 12:38

## 2021-06-10 RX ADMIN — Medication 650 MILLIGRAM(S): at 12:38

## 2021-06-10 RX ADMIN — SODIUM CHLORIDE 500 MILLILITER(S): 9 INJECTION INTRAMUSCULAR; INTRAVENOUS; SUBCUTANEOUS at 20:30

## 2021-06-10 RX ADMIN — Medication 650 MILLIGRAM(S): at 22:05

## 2021-06-10 NOTE — ED ADULT TRIAGE NOTE - CHIEF COMPLAINT QUOTE
pt has been confused arrives lethargic has hx of dementia  pt noted to have low b/p in triage pain with right leg/hip movement f/s in field 100

## 2021-06-10 NOTE — H&P ADULT - MENTAL STATUS
A&Ox2 (baseline) AOx2, not lethargic, follows simple commands, answers simple questions, slightly confused

## 2021-06-10 NOTE — H&P ADULT - PROBLEM SELECTOR PLAN 2
CXR showed:   New patchy right upper lung opacity, nonspecific finding which could be due to subsegmental atelectasis or developing pneumonia. Continued hazy opacity projecting overthe lower two thirds of the left hemithorax as well as left basilar subtle as retrocardiac opacity, findings which may be related to a large left peripherally enhancing pleural fluid collection. Please see the report of that study for further detail.There is likely associated passive atelectasis. Atelectasis of other cause and/or other underlying pathology including, but not limited to, pneumonia is not excluded.  will hold off abx now given no leukocytosis or fever   check procalcitonin and lactate levels CXR showed: New patchy right upper lung opacity, nonspecific finding which could be due to subsegmental atelectasis or developing pneumonia. Continued hazy opacity projecting over the lower two thirds of the left hemithorax as well as left basilar subtle as retrocardiac opacity, findings which may be related to a large left peripherally enhancing pleural fluid collection. Please see the report of that study for further detail. There is likely associated passive atelectasis. Atelectasis of other cause and/or other underlying pathology including, but not limited to, pneumonia is not excluded.    will hold off abx now given no leukocytosis or fever   check procalcitonin and lactate levels  Patient with hx of Parkinson's and was on dysphagia diet during last admission. Per brother, patient eats regular diet at home. CXR: New patchy right upper lung opacity, nonspecific finding which could be due to subsegmental atelectasis or developing pneumonia. Continued hazy opacity projecting over the lower two thirds of the left hemithorax as well as left basilar subtle as retrocardiac opacity, findings which may be related to a large left peripherally enhancing pleural fluid collection.  -hold off abx now given no leukocytosis, fever or left shift  -check procalcitonin and lactate levels  -CT chest pending

## 2021-06-10 NOTE — H&P ADULT - PROBLEM SELECTOR PLAN 1
Ortho consult appreciated  MR hip ordered  Plan for possible surgery in AM per ortho Ortho consult appreciated  MR hip ordered  Plan for possible surgery in AM per ortho  Patient will need cardiology clearance prior to OR Ortho consult appreciated  MR hip ordered  Plan for possible surgery in AM per ortho  Patient will need cardiology clearance prior to OR  Dr. Hwang consulted for cardiology clearance CT hip:  Acute minimally displaced transversely oriented fracture of the right greater trochanter.  Due to mechanical fall; Rt greater troch fracture r/o intertrochanteric extension;  s/p Ortho consult in ED  f/u MR hip  Plan for possible surgery in AM per ortho  -Pt has multiple cardiac risks including abnormal Nuclear Stress Test dated 02/10/21 significant for moderate defects  suggestive of ischemia, and small-mod pericardial effusion per recent TTE;   -Cardiology consultation prior to OR (pending);   -Dr. Hwang consulted for cardiology clearance  Pain control

## 2021-06-10 NOTE — H&P ADULT - NSHPLABSRESULTS_GEN_ALL_CORE
11.2   8.19  )-----------( 299      ( 10 Richi 2021 12:42 )             34.8       06-10    138  |  103  |  34<H>  ----------------------------<  97  4.0   |  24  |  0.74    Ca    9.2      10 Richi 2021 12:42    TPro  6.7  /  Alb  3.1<L>  /  TBili  1.0  /  DBili  x   /  AST  7   /  ALT  7   /  AlkPhos  117  06-10      Trop; 17 --> 14    < from: Xray Chest 1 View- PORTABLE-Urgent (06.10.21 @ 13:54) >     New patchy right upper lung opacity, nonspecific finding which could be due to subsegmental atelectasis or developing pneumonia.    Continued hazy opacity projecting overthe lower two thirds of the left hemithorax as well as left basilar subtle as retrocardiac opacity, findings which may be related to a large left peripherally enhancing pleural fluid collection. Please see the report of that study for further detail.There is likely associated passive atelectasis. Atelectasis of other cause and/or other underlying pathology including, but not limited to, pneumonia is not excluded. 11.2   8.19  )-----------( 299      ( 10 Richi 2021 12:42 )             34.8       06-10    138  |  103  |  34<H>  ----------------------------<  97  4.0   |  24  |  0.74    Ca    9.2      10 Richi 2021 12:42    TPro  6.7  /  Alb  3.1<L>  /  TBili  1.0  /  DBili  x   /  AST  7   /  ALT  7   /  AlkPhos  117  06-10      Trop; 17 --> 14    < from: Xray Chest 1 View- PORTABLE-Urgent (06.10.21 @ 13:54) >     New patchy right upper lung opacity, nonspecific finding which could be due to subsegmental atelectasis or developing pneumonia.    Continued hazy opacity projecting overthe lower two thirds of the left hemithorax as well as left basilar subtle as retrocardiac opacity, findings which may be related to a large left peripherally enhancing pleural fluid collection. Please see the report of that study for further detail.There is likely associated passive atelectasis. Atelectasis of other cause and/or other underlying pathology including, but not limited to, pneumonia is not excluded.    < from: Transthoracic Echocardiogram (05.29.21 @ 13:55) >    Limited repeat study to re-evaluate pericardial effusion.  1. Small pericardial effusion (about  0.7 cm) posterior and  lateral to the left ventricle.  Small pericardial effusion  around the LV apex.  No RA or RV diastolic collapse seen.  2. Left pleural effusion.  *** Compared with echocardiogram of 5/28/2021, the  pericardial effusion has decreased in size.    < end of copied text >      < from: Transthoracic Echocardiogram (05.28.21 @ 19:28) >    1. Increased relative wall thickness with normal left ventricular mass index, consistent with concentric left  ventricular remodeling. 2. Normal left ventricular systolic function. No segmental  wall motion abnormalities.  3. The right ventricle is not well visualized; grossly  normal right ventricular systolic function.  4. Estimated right ventricular systolic pressure equals 24  mm Hg, assuming right atrial pressure equals 10 mm Hg,  consistent with normal pulmonary pressures.  5. Normal pericardium with Moderate sized circumferential  pericardial effusion seen predominantly apical and  inferolateral to the left ventricle (Largest measurement  1.6 cm apical and lateral to the left ventricle in the 4  chamber view. It measures 1.2 cm inferior to the left  ventricle in the 3 chamber view). No echocardiographic  evidence of tamponade is seen ( no late diastolic RA or  early diastolic RV diastolic collapse)    < from: Nuclear Stress Test-Pharmacologic (Nuclear Stress Test-Pharmacologic .) (02.10.21 @ 15:03) >  * Myocardial Perfusion SPECT results are abnormal. * Review of raw data shows: The study is of adequate  technical quality. Gating quality was fair due to freq APCs, though somewhat better on rest gated imaging. * The left ventricle was normal in size. There are small,  moderate defects in apical, distal lateral walls that are predominantly reversible, suggestiveof ischemia.  * Rest gated wall motion analysis was performed (LVEF = 62 %;LVEDV = 87 ml.), revealing normal  LV function. Therewas no segmental wall motion abnormality.    EKG: sinus bradycardia 55 Flat T in III, AVL, V1 TWI in AVR QTC: 438 EKG, sinus bradycardia, 55bpm, qtc 438, no acute Tw or ST changes - my reading       11.2   8.19  )-----------( 299      ( 10 Richi 2021 12:42 )             34.8       06-10    138  |  103  |  34<H>  ----------------------------<  97  4.0   |  24  |  0.74    Ca    9.2      10 Richi 2021 12:42    TPro  6.7  /  Alb  3.1<L>  /  TBili  1.0  /  DBili  x   /  AST  7   /  ALT  7   /  AlkPhos  117  06-10      Trop; 17 --> 14    < from: Xray Chest 1 View- PORTABLE-Urgent (06.10.21 @ 13:54) >     New patchy right upper lung opacity, nonspecific finding which could be due to subsegmental atelectasis or developing pneumonia.    Continued hazy opacity projecting over the lower two thirds of the left hemithorax as well as left basilar subtle as retrocardiac opacity, findings which may be related to a large left peripherally enhancing pleural fluid collection. Please see the report of that study for further detail. There is likely associated passive atelectasis. Atelectasis of other cause and/or other underlying pathology including, but not limited to, pneumonia is not excluded.    < from: Transthoracic Echocardiogram (05.29.21 @ 13:55) >    Limited repeat study to re-evaluate pericardial effusion.  1. Small pericardial effusion (about  0.7 cm) posterior and  lateral to the left ventricle.  Small pericardial effusion  around the LV apex.  No RA or RV diastolic collapse seen.  2. Left pleural effusion.  *** Compared with echocardiogram of 5/28/2021, the  pericardial effusion has decreased in size.    < end of copied text >      < from: Transthoracic Echocardiogram (05.28.21 @ 19:28) >    1. Increased relative wall thickness with normal left ventricular mass index, consistent with concentric left  ventricular remodeling. 2. Normal left ventricular systolic function. No segmental  wall motion abnormalities.  3. The right ventricle is not well visualized; grossly  normal right ventricular systolic function.  4. Estimated right ventricular systolic pressure equals 24  mm Hg, assuming right atrial pressure equals 10 mm Hg,  consistent with normal pulmonary pressures.  5. Normal pericardium with Moderate sized circumferential  pericardial effusion seen predominantly apical and  inferolateral to the left ventricle (Largest measurement  1.6 cm apical and lateral to the left ventricle in the 4  chamber view. It measures 1.2 cm inferior to the left  ventricle in the 3 chamber view). No echocardiographic  evidence of tamponade is seen ( no late diastolic RA or  early diastolic RV diastolic collapse)    < from: Nuclear Stress Test-Pharmacologic (Nuclear Stress Test-Pharmacologic .) (02.10.21 @ 15:03) >  * Myocardial Perfusion SPECT results are abnormal. * Review of raw data shows: The study is of adequate  technical quality. Gating quality was fair due to freq APCs, though somewhat better on rest gated imaging. * The left ventricle was normal in size. There are small,  moderate defects in apical, distal lateral walls that are predominantly reversible, suggestive of ischemia.  * Rest gated wall motion analysis was performed (LVEF = 62 %;LVEDV = 87 ml.), revealing normal  LV function. There was no segmental wall motion abnormality.

## 2021-06-10 NOTE — CONSULT NOTE ADULT - SUBJECTIVE AND OBJECTIVE BOX
Patient is a 80 yo Male with PMHx of HTN, DM, HLD, Dementia currently Alert and Oriented X 3 (name, place, ); Parkinson's disease, CAD s/p stents x2 (>20 years ago), recent new onset HF (EF 50%, on Xarelto, Moderate diastolic dysfunction (Stage II), moderate pulmonary hypertension, small pericardial effusion), L pleural effusion, BPH, gastric ulcers presenting after an unwitnessed fall 2 days ago. Patient states his pain is worst when he weight bears and ambulates. As per health aid, fall was unwitnessed. She heard him falling and saw him on his back with no LOC. Initially refused going to the hospital, but now having worsened pain. Orthopaedics was consulted for CT findings of Right Greater Trochanteric Fracture - awaiting MRI to rule out intertrochanteric fracture.     PAST MEDICAL & SURGICAL HISTORY:  Hypercholesterolemia    DM (Diabetes Mellitus)    HTN (Hypertension)    CAD (Coronary Artery Disease)  s/p cardiac stent ( &gt; 20 years ago)    BPH (benign prostatic hyperplasia)    Coronary Stent  x 2 ( 20 years )      MEDICATIONS  (STANDING):    Allergies    No Known Allergies    Intolerances                            11.2   8.19  )-----------( 299      ( 10 Richi 2021 12:42 )             34.8     10 Richi 2021 12:42    138    |  103    |  34     ----------------------------<  97     4.0     |  24     |  0.74     Ca    9.2        10 Richi 2021 12:42    TPro  6.7    /  Alb  3.1    /  TBili  1.0    /  DBili  x      /  AST  7      /  ALT  7      /  AlkPhos  117    10 Richi 2021 12:42      Vital Signs Last 24 Hrs  T(C): 36.2 (06-10-21 @ 15:49), Max: 36.7 (06-10-21 @ 12:37)  T(F): 97.1 (06-10-21 @ 15:49), Max: 98.1 (06-10-21 @ 12:37)  HR: 55 (06-10-21 @ 15:49) (55 - 61)  BP: 141/54 (06-10-21 @ 15:49) (88/42 - 141/54)  BP(mean): --  RR: 18 (06-10-21 @ 15:49) (18 - 18)  SpO2: 100% (06-10-21 @ 15:49) (98% - 100%)    Imaging: XR< from: Xray Pelvis AP only (21 @ 03:12) >    EXAM:  RAD PELVIS AP ONLY        PROCEDURE DATE:  May 27 2021         INTERPRETATION:  INDICATION: Unwitnessed fall. Evaluate for pelvic fracture. Trauma protocol    TECHNIQUE: Single frontal view the pelvis.    COMPARISON: X-ray pelvis 10/13/2018.    FINDINGS: No acute displaced pelvic or hip fractures.    Mild bilateral hip arthrosis. Soft tissues are preserved.    IMPRESSION: No acute displaced pelvic or hip fractures.        < end of copied text >    < from: CT Hip No Cont, Right (06.10.21 @ 14:31) >    EXAM:  CT HIP ONLY RT        PROCEDURE DATE:  Richi 10 2021         INTERPRETATION:  EXAMINATION: CT of the right hip without contrast    CLINICAL INFORMATION: Right hip trauma. Right hip pain.    TECHNIQUE: Axial CT images were obtained through the right hip. Coronal and sagittal reformatted images were made.    FINDINGS: There is an acute minimally displaced transversely oriented fracture of the right greater trochanter. No other acute fractures are demonstrated. There is limited evaluation of the right subtrochanteric femur by motion artifact. There is grade 1 anterolisthesis at L5-S1 secondary to chronic bilateral L5 pars defects. There is mild bilateral sacroiliac joint arthrosis. There are scattered enthesopathic bony productive changes. There are vascular calcifications. The prostate is enlarged. There are bilateral hydroceles.    IMPRESSION: Acute minimally displaced transversely oriented fracture of the right greater trochanter. MRI is recommended to further evaluate for intertrochanteric extension of the fracture.      < end of copied text >      Physical Exam  General: NAD, Alert, Awake and oriented to his name  place  Neurologic: No gross deficits, moving all 4s.  Head: NCAT without abrasions, lacerations, or ecchymosis to head, face, or scalp  Eyes: PERRL  Neck/C-Spine: FAROM. No bony TTP.  T/L Spine: No bony TTP, no palpable step-off.    RIGHT LE: Able to SLR without pain. Externally Rotated and shortened. Intact EHL/FHL/TA/GS. SILT. Negative Log roll and heel strike. 1+ DP. Extremities are warm to touch. Compartments are soft and compressible. Tenderness to palpation over right hip/greater troch.     A/P: 81y Male with Right greater troch fracture rule out intertrochanteric extension    -Pain control  -NWB RLE until MRI complete to rule out extension  -FU Labs  - FU MRI of Hip  - WIll discuss with attending and await MRI results for further recommendations   Patient is a 80 yo Male with PMHx of HTN, DM, HLD, Dementia currently Alert and Oriented X 3 (name, place, ); Parkinson's disease, CAD s/p stents x2 (>20 years ago), recent new onset HF (EF 50%, on Xarelto, Moderate diastolic dysfunction (Stage II), moderate pulmonary hypertension, small pericardial effusion), L pleural effusion, BPH, gastric ulcers presenting after an unwitnessed fall 2 days ago. Patient states his pain is worst when he weight bears and ambulates. As per health aid, fall was unwitnessed. She heard him falling and saw him on his back with no LOC. Initially refused going to the hospital, but now having worsened pain. Orthopaedics was consulted for CT findings of Right Greater Trochanteric Fracture - awaiting MRI to rule out intertrochanteric fracture.     PAST MEDICAL & SURGICAL HISTORY:  Hypercholesterolemia    DM (Diabetes Mellitus)    HTN (Hypertension)    CAD (Coronary Artery Disease)  s/p cardiac stent ( &gt; 20 years ago)    BPH (benign prostatic hyperplasia)    Coronary Stent  x 2 ( 20 years )      MEDICATIONS  (STANDING):    Allergies    No Known Allergies    Intolerances                            11.2   8.19  )-----------( 299      ( 10 Richi 2021 12:42 )             34.8     10 Richi 2021 12:42    138    |  103    |  34     ----------------------------<  97     4.0     |  24     |  0.74     Ca    9.2        10 Richi 2021 12:42    TPro  6.7    /  Alb  3.1    /  TBili  1.0    /  DBili  x      /  AST  7      /  ALT  7      /  AlkPhos  117    10 Richi 2021 12:42      Vital Signs Last 24 Hrs  T(C): 36.2 (06-10-21 @ 15:49), Max: 36.7 (06-10-21 @ 12:37)  T(F): 97.1 (06-10-21 @ 15:49), Max: 98.1 (06-10-21 @ 12:37)  HR: 55 (06-10-21 @ 15:49) (55 - 61)  BP: 141/54 (06-10-21 @ 15:49) (88/42 - 141/54)  BP(mean): --  RR: 18 (06-10-21 @ 15:49) (18 - 18)  SpO2: 100% (06-10-21 @ 15:49) (98% - 100%)    Imaging: XR< from: Xray Pelvis AP only (21 @ 03:12) >    EXAM:  RAD PELVIS AP ONLY        PROCEDURE DATE:  May 27 2021         INTERPRETATION:  INDICATION: Unwitnessed fall. Evaluate for pelvic fracture. Trauma protocol    TECHNIQUE: Single frontal view the pelvis.    COMPARISON: X-ray pelvis 10/13/2018.    FINDINGS: No acute displaced pelvic or hip fractures.    Mild bilateral hip arthrosis. Soft tissues are preserved.    IMPRESSION: No acute displaced pelvic or hip fractures.        < end of copied text >    < from: CT Hip No Cont, Right (06.10.21 @ 14:31) >    EXAM:  CT HIP ONLY RT        PROCEDURE DATE:  Richi 10 2021         INTERPRETATION:  EXAMINATION: CT of the right hip without contrast    CLINICAL INFORMATION: Right hip trauma. Right hip pain.    TECHNIQUE: Axial CT images were obtained through the right hip. Coronal and sagittal reformatted images were made.    FINDINGS: There is an acute minimally displaced transversely oriented fracture of the right greater trochanter. No other acute fractures are demonstrated. There is limited evaluation of the right subtrochanteric femur by motion artifact. There is grade 1 anterolisthesis at L5-S1 secondary to chronic bilateral L5 pars defects. There is mild bilateral sacroiliac joint arthrosis. There are scattered enthesopathic bony productive changes. There are vascular calcifications. The prostate is enlarged. There are bilateral hydroceles.    IMPRESSION: Acute minimally displaced transversely oriented fracture of the right greater trochanter. MRI is recommended to further evaluate for intertrochanteric extension of the fracture.      < end of copied text >      Physical Exam  General: NAD, Alert, Awake and oriented to his name  place  Neurologic: No gross deficits, moving all 4s.  Head: NCAT without abrasions, lacerations, or ecchymosis to head, face, or scalp  Eyes: PERRL  Neck/C-Spine: FAROM. No bony TTP.  T/L Spine: No bony TTP, no palpable step-off.    RIGHT LE: Able to SLR without pain. Externally Rotated and shortened. Intact EHL/FHL/TA/GS. SILT. Negative Log roll and heel strike. 1+ DP. Extremities are warm to touch. Compartments are soft and compressible. Tenderness to palpation over right hip/greater troch.     A/P: 81 y Male with Right greater troch fracture rule out intertrochanteric extension    - Pain control  - NWB RLE until MRI complete to rule out extension  - FU Labs  - FU MRI of Hip  - Admit to medicine  - Will need medical optimization and preoperative workup for OR 21 with Dr Eastman if intertrochanteric   extension found on MRI study.    - Will discuss with attending and await MRI results for further recommendations

## 2021-06-10 NOTE — H&P ADULT - PROBLEM SELECTOR PLAN 7
Appears euvolemic  Echo May 2021: < from: Transthoracic Echocardiogram (05.29.21 @ 13:55) >    1. Small pericardial effusion (about  0.7 cm) posterior and  lateral to the left ventricle.  Small pericardial effusion  around the LV apex.  No RA or RV diastolic collapse seen.  2. Left pleural effusion.    < end of copied text > cont asa, plavix  Patient had stress test in Feb 2021:  small, moderate defects in apical, distal lateral walls that are  predominantly reversible, suggestive of ischemia. Patient's brother Berto refused cath at that time. Plavix held given patient's planning for surgery  Patient had stress test in Feb 2021:  small, moderate defects in apical, distal lateral walls that are  predominantly reversible, suggestive of ischemia. Patient's brother Berto refused cath at that time. Plavix held given patient's planning possible ortho surgery  Patient had stress test in Feb 2021:  small, moderate defects in apical, distal lateral walls that are  predominantly reversible, suggestive of ischemia. Patient's brother Berto refused cath at that time.  Cardiology c/s

## 2021-06-10 NOTE — H&P ADULT - PROBLEM SELECTOR PLAN 4
Fall risk protocol   PT consult needs to be ordered after ortho clearance.   CT head/c-spine no acute path  monitor orthostatics  patient also with hx of hypotension. Not on BP meds teresita. Patient with hx of pericardial effusion  Echo on May 28, 2021 showed:  Moderate sized circumferential pericardial effusion seen predominantly apical and  inferolateral to the left ventricle  Repeat echo one day later showed:  Small pericardial effusion (about  0.7 cm) posterior and  lateral to the left ventricle.  Small pericardial effusion around the LV apex  Repeat echo ordered Patient with hx of pericardial effusion  Echo on May 28, 2021 showed:  Moderate sized circumferential pericardial effusion seen predominantly apical and  inferolateral to the left ventricle  Repeat echo one day later showed:  Small pericardial effusion (about  0.7 cm) posterior and  lateral to the left ventricle.  Small pericardial effusion around the LV apex  Repeat echo ordered  Cardiology c/s pending as above

## 2021-06-10 NOTE — ED ADULT NURSE NOTE - ED STAT RN HANDOFF DETAILS
Report given to [facility] RN Rohan. Respirations equal and unlabored, no acute distress noted. Pt transported to [facility].

## 2021-06-10 NOTE — H&P ADULT - ASSESSMENT
80 y/o M HTN, DM, HLD, dementia (AOx2 at baseline), Parkinson's disease, CAD s/p stents x2 (>20 years ago), CHF (EF: 50%, Moderate diastolic dysfunction (Stage II), moderate pulmonary hypertension, small pericardial effusion), L pleural effusion, BPH, gastric ulcers, COVID infection in Feb 2021, hypotension presents with fall.  82 y/o Male HTN, DM Type 2, HLD, dementia (AOx2 at baseline), Parkinson's disease, CAD s/p stents x2 (>20 years ago), CHF (EF: 50%, Moderate diastolic dysfunction (Stage II), moderate pulmonary hypertension, small-mod pericardial effusion), Lt pleural effusion, BPH, gastric ulcers, COVID infection in Feb 2021, hypotension a/w mechanical fall c/b Rt greater troch fracture r/o intertrochanteric extension; Also found to be dehydrated; Pending pre-operative cardiac risk stratification due to multiple cardiac conditions; r/o PNA

## 2021-06-10 NOTE — ED PROVIDER NOTE - OBJECTIVE STATEMENT
80 yo M HTN, DM, HLD, dementia (AOx2 at baseline), Parkinson's disease, CAD s/p stents x2 (>20 years ago), recent new onset HF (EF 50%, on Xarelto, Moderate diastolic dysfunction (Stage II), moderate pulmonary hypertension, small pericardial effusion), L pleural effusion, BPH, gastric ulcers presenting after fall 2 days ago complaining of R hip pain. recently discharged after another fall a few days ago. As per health aid, fall was unwitnessed. She heard him falling and saw him on his back with no LOC. Initially refused going to the hospital, but now having worsened pain and worsened confusion as per aid. Patient unable to provide additional history. 80 yo M HTN, DM, HLD, dementia (AOx2 at baseline), Parkinson's disease, CAD s/p stents x2 (>20 years ago), recent new onset HF (EF 50%, on Xarelto, Moderate diastolic dysfunction (Stage II), moderate pulmonary hypertension, small pericardial effusion), L pleural effusion, BPH, gastric ulcers presenting after fall 2 days ago complaining of R hip pain. recently discharged after another fall a few days ago. As per health aid, fall was unwitnessed. She heard him falling and saw him on his back with no LOC. Initially refused going to the hospital, but now having worsened pain and worsened confusion as per aid. Patient unable to provide additional history.  JONAS

## 2021-06-10 NOTE — H&P ADULT - PROBLEM SELECTOR PLAN 5
cont asa, plavix  Patient had stress test in Feb 2021:  small, moderate defects in apical, distal lateral walls that are  predominantly reversible, suggestive of ischemia. Patient's brother Berto refused cath at that time. Appears euvolemic  Echo May 28 2021: Normal LV function, grossly normal RV function, moderate sized pericardial effusion Appears euvolemic; ProBNP 455  EKG: no acute Tw or ST changes  Echo May 28 2021: Normal LV function, grossly normal RV function, moderate sized pericardial effusion  monitor

## 2021-06-10 NOTE — H&P ADULT - PROBLEM SELECTOR PLAN 3
Patient was diagnosed with COVID in Feb 2021 and was treated with decadron and remdesvir.   Currently 100% on RA Fall risk protocol   PT consult needs to be ordered after ortho clearance.   CT head/c-spine no acute path  monitor orthostatics  patient also with hx of hypotension. Not on BP meds teresita. Fall risk protocol   PT consult needs to be ordered after ortho clearance.   CT head/c-spine no acute path

## 2021-06-10 NOTE — H&P ADULT - PROBLEM SELECTOR PLAN 8
cont carbidopa/levodopa  frequent reorientation for dementia  cont melatonin and seroquel (monitor QTc) cont carbidopa/levodopa  frequent reorientation for dementia  cont melatonin and seroquel (monitor QTc)  Patient with hx of parkinson's and was recommended dysphagia diet during last admission. Per brother, patient eats regular food at home. Will place patient back on dysphagia diet with aspiration precautions. cont carbidopa/levodopa  frequent reorientation for dementia  cont melatonin and seroquel (monitor QTc)  Patient with hx of parkinson's and was recommended dysphagia diet during last admission. Per brother, patient eats regular food at home  -Restart dysphagia diet with aspiration precautions and head of bed elevation  -Family re-education regarding dysphagia diet needed - deferred to primary team

## 2021-06-10 NOTE — ED ADULT NURSE NOTE - OBJECTIVE STATEMENT
Patient alert and awake, oriented x2 to person and place only, able to make needs know and follow simple commands. Patient comfortable at rest but screams in pain when moving right leg. Patient with aide at bedside, reports recent discharge on Monday and was home on Tuesday when he fell while aide was in the bathroom. As per aide at bedside, patient able to stand at baseline even s/p fall on Tuesday. As per EMA. patient on eliquis. Patietn with ecchymosis and rash to extremities, abrasion to left elbow which the aide reports he had when discharged from hospital on Monday. Patient breathing with ease on room air at this time, awaiting for x-ray.

## 2021-06-10 NOTE — ED ADULT NURSE REASSESSMENT NOTE - NS ED NURSE REASSESS COMMENT FT1
Pt received from PABLO Miranda. Pt A&Ox2. Pt resting comfortably in bed at this time. Pt currently denies pain but states that he feels intense pain in his right leg with movement. Respirations equal and unlabored, no acute distress noted. Denies chest pain, palpitations, SOB, headaches, dizziness. Vital signs as noted, comfort measures provided, call bell within reach. Assessment ongoing.

## 2021-06-10 NOTE — H&P ADULT - HISTORY OF PRESENT ILLNESS
82 y/o M HTN, DM, HLD, dementia (AOx2 at baseline), Parkinson's disease, CAD s/p stents x2 (>20 years ago), CHF (EF: 50%, Moderate diastolic dysfunction (Stage II), moderate pulmonary hypertension, small pericardial effusion), L pleural effusion, BPH, gastric ulcers, COVID infection in Feb 2021, hypotension presents with fall. Patient was recently admitted for fall and was discharged on 6/7. Patient returns today because he states he was walking and may have tripped on something. He fell to the ground. He does not recall head trauma or syncope. He was found on the ground by his aide. He states he has pain while ambulating. He cannot specify where exactly the pain. Patient with hx of dementia; therefore, history is limited. Patient denies chest pain, abdominal pain, nausea, vomiting, or diarrhea.  80 y/o Male HTN, DM Type 2, HLD, dementia (AOx2 at baseline), Parkinson's disease, CAD s/p stents x2 (>20 years ago), CHF (EF: 50%, Moderate diastolic dysfunction (Stage II), moderate pulmonary hypertension, small pericardial effusion), L pleural effusion, BPH, gastric ulcers, COVID infection in Feb 2021, hypotension presents with fall. Patient was recently admitted for fall and was discharged on 6/7. Patient returns today because he states he was walking and may have tripped on something. He fell to the ground. He does not recall head trauma or syncope. He was found on the ground by his aide. He states he has pain while ambulating. He cannot specify where exactly the pain. Patient with hx of dementia; therefore, history is limited. Patient denies chest pain, abdominal pain, nausea, vomiting, or diarrhea.  80 y/o Male HTN, DM Type 2, HLD, dementia (AOx2 at baseline), Parkinson's disease, CAD s/p stents x2 (>20 years ago), CHF (EF: 50%, Moderate diastolic dysfunction (Stage II), moderate pulmonary hypertension, small-mod pericardial effusion), Lt pleural effusion, BPH, gastric ulcers, COVID infection in Feb 2021, hypotension presents with fall. Patient was recently admitted for fall and was discharged on 6/7. Patient returns today because he states he was walking and may have tripped on something. He fell to the ground. He does not recall head trauma or syncope. He was found on the ground by his aide. He states he has pain while ambulating. He cannot specify where exactly the pain. Patient with hx of dementia; therefore, history is limited. Patient denies chest pain, abdominal pain, nausea, vomiting, or diarrhea.  Of note, patient on dysphagia diet during last admission. Per the brother, patient eats regular diet at home.

## 2021-06-10 NOTE — H&P ADULT - ATTENDING COMMENTS
80 y/o Male HTN, DM Type 2, HLD, dementia (AOx2 at baseline), Parkinson's disease, CAD s/p stents x2 (>20 years ago), CHF (EF: 50%, Moderate diastolic dysfunction (Stage II), moderate pulmonary hypertension, small-mod pericardial effusion), Lt pleural effusion, BPH, gastric ulcers, COVID infection in Feb 2021, hypotension a/w mechanical fall c/b Rt greater troch fracture r/o intertrochanteric extension; Also found to be dehydrated; Pending pre-operative cardiac risk stratification due to multiple cardiac conditions; r/o PNA

## 2021-06-10 NOTE — ED PROVIDER NOTE - CLINICAL SUMMARY MEDICAL DECISION MAKING FREE TEXT BOX
80 yo M HTN, DM, HLD, dementia (AOx2 at baseline), Parkinson's disease, CAD s/p stents x2 (>20 years ago), recent new onset HF (EF 50%, on Xarelto, Moderate diastolic dysfunction (Stage II), moderate pulmonary hypertension, small pericardial effusion), L pleural effusion, BPH, gastric ulcers presenting after fall 2 days ago complaining of R hip pain. recently discharged after another fall a few days ago. R hip and R proxima ttp, leg externally rotated and short. FAST negative. Likely hip fracture / dislocation. Labs, CT head and neck, XR hip, limited fluids and likely admit 82 yo M HTN, DM, HLD, dementia (AOx2 at baseline), Parkinson's disease, CAD s/p stents x2 (>20 years ago), recent new onset HF (EF 50%, on Xarelto, Moderate diastolic dysfunction (Stage II), moderate pulmonary hypertension, small pericardial effusion), L pleural effusion, BPH, gastric ulcers presenting after fall 2 days ago complaining of R hip pain. recently discharged after another fall a few days ago. R hip and R proximal femur ttp, leg externally rotated and short. FAST negative. Likely hip fracture / dislocation. Labs, CT head and neck, XR hip, limited fluids and likely admit

## 2021-06-10 NOTE — H&P ADULT - PROBLEM SELECTOR PROBLEM 9
Type 2 diabetes mellitus with hyperglycemia, without long-term current use of insulin Type 2 diabetes mellitus without complication, without long-term current use of insulin

## 2021-06-10 NOTE — H&P ADULT - NSHPSOCIALHISTORY_GEN_ALL_CORE
Denies smoking, alcohol or drug use  Patient has 24 hours HHA Denies h/o smoking, alcohol or drug use  Patient has 24 hours HHA  Single  Retired  Former salesman

## 2021-06-10 NOTE — H&P ADULT - PROBLEM SELECTOR PLAN 10
Will hold pharm vte now in anticipation for surgery in AM Will hold pharm vte now in anticipation for surgery

## 2021-06-11 DIAGNOSIS — E11.9 TYPE 2 DIABETES MELLITUS WITHOUT COMPLICATIONS: ICD-10-CM

## 2021-06-11 LAB
A1C WITH ESTIMATED AVERAGE GLUCOSE RESULT: 5.8 % — HIGH (ref 4–5.6)
ANION GAP SERPL CALC-SCNC: 14 MMOL/L — SIGNIFICANT CHANGE UP (ref 7–14)
APTT BLD: 34 SEC — SIGNIFICANT CHANGE UP (ref 27–36.3)
BASE EXCESS BLDV CALC-SCNC: 3.3 MMOL/L — HIGH (ref -3–2)
BASOPHILS # BLD AUTO: 0.06 K/UL — SIGNIFICANT CHANGE UP (ref 0–0.2)
BASOPHILS NFR BLD AUTO: 0.7 % — SIGNIFICANT CHANGE UP (ref 0–2)
BLD GP AB SCN SERPL QL: NEGATIVE — SIGNIFICANT CHANGE UP
BLOOD GAS VENOUS - CREATININE: 0.8 MG/DL — SIGNIFICANT CHANGE UP (ref 0.5–1.3)
BLOOD GAS VENOUS COMPREHENSIVE RESULT: SIGNIFICANT CHANGE UP
BUN SERPL-MCNC: 31 MG/DL — HIGH (ref 7–23)
CALCIUM SERPL-MCNC: 9.1 MG/DL — SIGNIFICANT CHANGE UP (ref 8.4–10.5)
CHLORIDE BLDV-SCNC: 104 MMOL/L — SIGNIFICANT CHANGE UP (ref 96–108)
CHLORIDE SERPL-SCNC: 100 MMOL/L — SIGNIFICANT CHANGE UP (ref 98–107)
CHOLEST SERPL-MCNC: 93 MG/DL — SIGNIFICANT CHANGE UP
CO2 SERPL-SCNC: 23 MMOL/L — SIGNIFICANT CHANGE UP (ref 22–31)
COVID-19 SPIKE DOMAIN AB INTERP: POSITIVE
COVID-19 SPIKE DOMAIN ANTIBODY RESULT: >250 U/ML — HIGH
CREAT SERPL-MCNC: 0.72 MG/DL — SIGNIFICANT CHANGE UP (ref 0.5–1.3)
EOSINOPHIL # BLD AUTO: 0.21 K/UL — SIGNIFICANT CHANGE UP (ref 0–0.5)
EOSINOPHIL NFR BLD AUTO: 2.3 % — SIGNIFICANT CHANGE UP (ref 0–6)
ESTIMATED AVERAGE GLUCOSE: 120 MG/DL — HIGH (ref 68–114)
GAS PNL BLDV: 139 MMOL/L — SIGNIFICANT CHANGE UP (ref 136–146)
GLUCOSE BLDC GLUCOMTR-MCNC: 234 MG/DL — HIGH (ref 70–99)
GLUCOSE BLDC GLUCOMTR-MCNC: 97 MG/DL — SIGNIFICANT CHANGE UP (ref 70–99)
GLUCOSE BLDV-MCNC: 92 MG/DL — SIGNIFICANT CHANGE UP (ref 70–99)
GLUCOSE SERPL-MCNC: 93 MG/DL — SIGNIFICANT CHANGE UP (ref 70–99)
HCO3 BLDV-SCNC: 26 MMOL/L — SIGNIFICANT CHANGE UP (ref 20–27)
HCT VFR BLD CALC: 35.2 % — LOW (ref 39–50)
HCT VFR BLDA CALC: 38.2 % — LOW (ref 39–51)
HDLC SERPL-MCNC: 42 MG/DL — SIGNIFICANT CHANGE UP
HGB BLD CALC-MCNC: 12.4 G/DL — LOW (ref 13–17)
HGB BLD-MCNC: 11.2 G/DL — LOW (ref 13–17)
IANC: 6.95 K/UL — SIGNIFICANT CHANGE UP (ref 1.5–8.5)
IMM GRANULOCYTES NFR BLD AUTO: 0.3 % — SIGNIFICANT CHANGE UP (ref 0–1.5)
INR BLD: 1.37 RATIO — HIGH (ref 0.88–1.16)
LACTATE BLDV-MCNC: 1 MMOL/L — SIGNIFICANT CHANGE UP (ref 0.5–2)
LIPID PNL WITH DIRECT LDL SERPL: 34 MG/DL — SIGNIFICANT CHANGE UP
LYMPHOCYTES # BLD AUTO: 1.02 K/UL — SIGNIFICANT CHANGE UP (ref 1–3.3)
LYMPHOCYTES # BLD AUTO: 11.3 % — LOW (ref 13–44)
MAGNESIUM SERPL-MCNC: 1.4 MG/DL — LOW (ref 1.6–2.6)
MCHC RBC-ENTMCNC: 29.1 PG — SIGNIFICANT CHANGE UP (ref 27–34)
MCHC RBC-ENTMCNC: 31.8 GM/DL — LOW (ref 32–36)
MCV RBC AUTO: 91.4 FL — SIGNIFICANT CHANGE UP (ref 80–100)
MONOCYTES # BLD AUTO: 0.74 K/UL — SIGNIFICANT CHANGE UP (ref 0–0.9)
MONOCYTES NFR BLD AUTO: 8.2 % — SIGNIFICANT CHANGE UP (ref 2–14)
NEUTROPHILS # BLD AUTO: 6.95 K/UL — SIGNIFICANT CHANGE UP (ref 1.8–7.4)
NEUTROPHILS NFR BLD AUTO: 77.2 % — HIGH (ref 43–77)
NON HDL CHOLESTEROL: 51 MG/DL — SIGNIFICANT CHANGE UP
NRBC # BLD: 0 /100 WBCS — SIGNIFICANT CHANGE UP
NRBC # FLD: 0 K/UL — SIGNIFICANT CHANGE UP
PCO2 BLDV: 49 MMHG — SIGNIFICANT CHANGE UP (ref 41–51)
PH BLDV: 7.38 — SIGNIFICANT CHANGE UP (ref 7.32–7.43)
PHOSPHATE SERPL-MCNC: 3.7 MG/DL — SIGNIFICANT CHANGE UP (ref 2.5–4.5)
PLATELET # BLD AUTO: 347 K/UL — SIGNIFICANT CHANGE UP (ref 150–400)
PO2 BLDV: 28 MMHG — LOW (ref 35–40)
POTASSIUM BLDV-SCNC: 3.9 MMOL/L — SIGNIFICANT CHANGE UP (ref 3.4–4.5)
POTASSIUM SERPL-MCNC: 4.2 MMOL/L — SIGNIFICANT CHANGE UP (ref 3.5–5.3)
POTASSIUM SERPL-SCNC: 4.2 MMOL/L — SIGNIFICANT CHANGE UP (ref 3.5–5.3)
PROCALCITONIN SERPL-MCNC: 0.07 NG/ML — SIGNIFICANT CHANGE UP (ref 0.02–0.1)
PROTHROM AB SERPL-ACNC: 15.5 SEC — HIGH (ref 10.6–13.6)
RBC # BLD: 3.85 M/UL — LOW (ref 4.2–5.8)
RBC # FLD: 15.2 % — HIGH (ref 10.3–14.5)
RH IG SCN BLD-IMP: POSITIVE — SIGNIFICANT CHANGE UP
SAO2 % BLDV: 40.1 % — LOW (ref 60–85)
SARS-COV-2 IGG+IGM SERPL QL IA: >250 U/ML — HIGH
SARS-COV-2 IGG+IGM SERPL QL IA: POSITIVE
SODIUM SERPL-SCNC: 137 MMOL/L — SIGNIFICANT CHANGE UP (ref 135–145)
TRIGL SERPL-MCNC: 83 MG/DL — SIGNIFICANT CHANGE UP
TSH SERPL-MCNC: 2.46 UIU/ML — SIGNIFICANT CHANGE UP (ref 0.27–4.2)
WBC # BLD: 9.01 K/UL — SIGNIFICANT CHANGE UP (ref 3.8–10.5)
WBC # FLD AUTO: 9.01 K/UL — SIGNIFICANT CHANGE UP (ref 3.8–10.5)

## 2021-06-11 PROCEDURE — 71250 CT THORAX DX C-: CPT | Mod: 26

## 2021-06-11 PROCEDURE — 99233 SBSQ HOSP IP/OBS HIGH 50: CPT

## 2021-06-11 PROCEDURE — 73721 MRI JNT OF LWR EXTRE W/O DYE: CPT | Mod: 26,RT

## 2021-06-11 PROCEDURE — 93306 TTE W/DOPPLER COMPLETE: CPT | Mod: 26

## 2021-06-11 PROCEDURE — 99231 SBSQ HOSP IP/OBS SF/LOW 25: CPT | Mod: GC

## 2021-06-11 RX ORDER — ACETAMINOPHEN 500 MG
650 TABLET ORAL EVERY 6 HOURS
Refills: 0 | Status: DISCONTINUED | OUTPATIENT
Start: 2021-06-11 | End: 2021-06-17

## 2021-06-11 RX ORDER — DEXTROSE 50 % IN WATER 50 %
25 SYRINGE (ML) INTRAVENOUS ONCE
Refills: 0 | Status: DISCONTINUED | OUTPATIENT
Start: 2021-06-11 | End: 2021-06-17

## 2021-06-11 RX ORDER — DEXTROSE 50 % IN WATER 50 %
12.5 SYRINGE (ML) INTRAVENOUS ONCE
Refills: 0 | Status: DISCONTINUED | OUTPATIENT
Start: 2021-06-11 | End: 2021-06-17

## 2021-06-11 RX ORDER — INSULIN LISPRO 100/ML
VIAL (ML) SUBCUTANEOUS AT BEDTIME
Refills: 0 | Status: DISCONTINUED | OUTPATIENT
Start: 2021-06-11 | End: 2021-06-17

## 2021-06-11 RX ORDER — MAGNESIUM OXIDE 400 MG ORAL TABLET 241.3 MG
400 TABLET ORAL
Refills: 0 | Status: COMPLETED | OUTPATIENT
Start: 2021-06-11 | End: 2021-06-11

## 2021-06-11 RX ORDER — ENOXAPARIN SODIUM 100 MG/ML
40 INJECTION SUBCUTANEOUS DAILY
Refills: 0 | Status: DISCONTINUED | OUTPATIENT
Start: 2021-06-11 | End: 2021-06-17

## 2021-06-11 RX ORDER — SODIUM CHLORIDE 9 MG/ML
1000 INJECTION, SOLUTION INTRAVENOUS
Refills: 0 | Status: DISCONTINUED | OUTPATIENT
Start: 2021-06-11 | End: 2021-06-17

## 2021-06-11 RX ORDER — CLOPIDOGREL BISULFATE 75 MG/1
75 TABLET, FILM COATED ORAL DAILY
Refills: 0 | Status: DISCONTINUED | OUTPATIENT
Start: 2021-06-11 | End: 2021-06-17

## 2021-06-11 RX ORDER — INSULIN LISPRO 100/ML
VIAL (ML) SUBCUTANEOUS
Refills: 0 | Status: DISCONTINUED | OUTPATIENT
Start: 2021-06-11 | End: 2021-06-17

## 2021-06-11 RX ORDER — MORPHINE SULFATE 50 MG/1
1 CAPSULE, EXTENDED RELEASE ORAL EVERY 6 HOURS
Refills: 0 | Status: DISCONTINUED | OUTPATIENT
Start: 2021-06-11 | End: 2021-06-12

## 2021-06-11 RX ORDER — GLUCAGON INJECTION, SOLUTION 0.5 MG/.1ML
1 INJECTION, SOLUTION SUBCUTANEOUS ONCE
Refills: 0 | Status: DISCONTINUED | OUTPATIENT
Start: 2021-06-11 | End: 2021-06-17

## 2021-06-11 RX ORDER — DEXTROSE 50 % IN WATER 50 %
15 SYRINGE (ML) INTRAVENOUS ONCE
Refills: 0 | Status: DISCONTINUED | OUTPATIENT
Start: 2021-06-11 | End: 2021-06-17

## 2021-06-11 RX ADMIN — MAGNESIUM OXIDE 400 MG ORAL TABLET 400 MILLIGRAM(S): 241.3 TABLET ORAL at 13:06

## 2021-06-11 RX ADMIN — CLOPIDOGREL BISULFATE 75 MILLIGRAM(S): 75 TABLET, FILM COATED ORAL at 20:02

## 2021-06-11 RX ADMIN — MAGNESIUM OXIDE 400 MG ORAL TABLET 400 MILLIGRAM(S): 241.3 TABLET ORAL at 17:51

## 2021-06-11 RX ADMIN — Medication 2: at 17:57

## 2021-06-11 RX ADMIN — CARBIDOPA AND LEVODOPA 1 TABLET(S): 25; 100 TABLET ORAL at 13:06

## 2021-06-11 RX ADMIN — CARBIDOPA AND LEVODOPA 1 TABLET(S): 25; 100 TABLET ORAL at 21:01

## 2021-06-11 RX ADMIN — TAMSULOSIN HYDROCHLORIDE 0.4 MILLIGRAM(S): 0.4 CAPSULE ORAL at 21:01

## 2021-06-11 RX ADMIN — SODIUM CHLORIDE 3 MILLILITER(S): 9 INJECTION INTRAMUSCULAR; INTRAVENOUS; SUBCUTANEOUS at 06:20

## 2021-06-11 RX ADMIN — ATORVASTATIN CALCIUM 10 MILLIGRAM(S): 80 TABLET, FILM COATED ORAL at 21:00

## 2021-06-11 RX ADMIN — QUETIAPINE FUMARATE 50 MILLIGRAM(S): 200 TABLET, FILM COATED ORAL at 21:00

## 2021-06-11 RX ADMIN — SODIUM CHLORIDE 3 MILLILITER(S): 9 INJECTION INTRAMUSCULAR; INTRAVENOUS; SUBCUTANEOUS at 13:01

## 2021-06-11 RX ADMIN — SODIUM CHLORIDE 3 MILLILITER(S): 9 INJECTION INTRAMUSCULAR; INTRAVENOUS; SUBCUTANEOUS at 21:05

## 2021-06-11 RX ADMIN — CARBIDOPA AND LEVODOPA 1 TABLET(S): 25; 100 TABLET ORAL at 06:24

## 2021-06-11 RX ADMIN — PANTOPRAZOLE SODIUM 40 MILLIGRAM(S): 20 TABLET, DELAYED RELEASE ORAL at 06:24

## 2021-06-11 NOTE — CONSULT NOTE ADULT - SUBJECTIVE AND OBJECTIVE BOX
Bennie Hwang MD  Interventional Cardiology / Endovascular Specialist  [place] Office : [phone] [place] [address] [place] [address]  Tel:   [place] Office : [address] [facility] [place] [address]  Tel: [phone]  Cell : [phone]    HISTORY OF PRESENTING ILLNESS:  82 y/o Male HTN, DM Type 2, HLD, dementia (AOx2 at baseline), Parkinson's disease, CAD s/p stents x2 (>20 years ago), CHF (EF: 50%, Moderate diastolic dysfunction (Stage II), moderate pulmonary hypertension, small-mod pericardial effusion), Lt pleural effusion, BPH, gastric ulcers, COVID infection in Feb 2021, hypotension presents with fall. Patient was recently admitted for fall and was discharged on 6/7. Patient returns today because he states he was walking and may have tripped on something. He fell to the ground. He does not recall head trauma or syncope. He was found on the ground by his aide. He states he has pain while ambulating. He cannot specify where exactly the pain. Patient with hx of dementia; therefore, history is limited. Patient denies chest pain, abdominal pain, nausea, vomiting, or diarrhea.  Of note, patient on dysphagia diet during last admission. Per the brother, patient eats regular diet at home.    	  MEDICATIONS:  tamsulosin 0.4 milliGRAM(s) Oral at bedtime  acetaminophen   Tablet .. 650 milliGRAM(s) Oral every 6 hours PRN  carbidopa/levodopa  25/100 1 Tablet(s) Oral three times a day  melatonin 3 milliGRAM(s) Oral at bedtime PRN  morphine  - Injectable 1 milliGRAM(s) IV Push every 6 hours PRN  QUEtiapine 50 milliGRAM(s) Oral at bedtime  pantoprazole    Tablet 40 milliGRAM(s) Oral before breakfast  atorvastatin 10 milliGRAM(s) Oral at bedtime  dextrose 40% Gel 15 Gram(s) Oral once  dextrose 50% Injectable 25 Gram(s) IV Push once  dextrose 50% Injectable 12.5 Gram(s) IV Push once  dextrose 50% Injectable 25 Gram(s) IV Push once  glucagon  Injectable 1 milliGRAM(s) IntraMuscular once  insulin lispro (ADMELOG) corrective regimen sliding scale   SubCutaneous three times a day before meals  insulin lispro (ADMELOG) corrective regimen sliding scale   SubCutaneous at bedtime  dextrose 5%. 1000 milliLiter(s) IV Continuous <Continuous>  dextrose 5%. 1000 milliLiter(s) IV Continuous <Continuous>  magnesium oxide 400 milliGRAM(s) Oral two times a day with meals  sodium chloride 0.9% lock flush 3 milliLiter(s) IV Push every 8 hours      PAST MEDICAL/SURGICAL HISTORY  PAST MEDICAL & SURGICAL HISTORY:  Hypercholesterolemia    DM (Diabetes Mellitus)    HTN (Hypertension)    CAD (Coronary Artery Disease)  s/p cardiac stent ( &gt; 20 years ago)    BPH (benign prostatic hyperplasia)    Coronary Stent  x 2 ( 20 years )        SOCIAL HISTORY: Substance Use (street drugs): ( x ) never used  (  ) other:    FAMILY HISTORY:  No pertinent family history in first degree relatives        REVIEW OF SYSTEMS:  CONSTITUTIONAL: No fever, weight loss, or fatigue  EYES: No eye pain, visual disturbances, or discharge  ENMT:  No difficulty hearing, tinnitus, vertigo; No sinus or throat pain  BREASTS: No pain, masses, or nipple discharge  GASTROINTESTINAL: No abdominal or epigastric pain. No nausea, vomiting, or hematemesis; No diarrhea or constipation. No melena or hematochezia.  GENITOURINARY: No dysuria, frequency, hematuria, or incontinence  NEUROLOGICAL: No headaches, memory loss, loss of strength, numbness, or tremors  ENDOCRINE: No heat or cold intolerance; No hair loss  MUSCULOSKELETAL: No joint pain or swelling; No muscle, back, or extremity pain  PSYCHIATRIC: No depression, anxiety, mood swings, or difficulty sleeping  HEME/LYMPH: No easy bruising, or bleeding gums  All others negative    PHYSICAL EXAM:  T(C): 36.4 (06-11-21 @ 06:19), Max: 36.6 (06-10-21 @ 22:00)  HR: 68 (06-11-21 @ 06:19) (60 - 72)  BP: 138/88 (06-11-21 @ 06:19) (113/55 - 138/88)  RR: 16 (06-11-21 @ 06:19) (15 - 20)  SpO2: 100% (06-11-21 @ 06:19) (98% - 100%)  Wt(kg): --  I&O's Summary      Weight (kg): 60.3 (06-11 @ 09:20)        EYES: EOMI, PERRLA, conjunctiva and sclera clear  ENMT: No tonsillar erythema, exudates, or enlargement;   Cardiovascular: Normal S1 S2, No JVD, No murmurs, No edema  Respiratory: b/l rhonchi   Gastrointestinal:  Soft, Non-tender, + BS	  Extremities: no edema                                     11.2   9.01  )-----------( 347      ( 11 Jun 2021 04:07 )             35.2     06-11    137  |  100  |  31<H>  ----------------------------<  93  4.2   |  23  |  0.72    Ca    9.1      11 Jun 2021 04:07  Phos  3.7     06-11  Mg     1.4     06-11    TPro  6.7  /  Alb  3.1<L>  /  TBili  1.0  /  DBili  x   /  AST  7   /  ALT  7   /  AlkPhos  117  06-10    proBNP:   Lipid Profile:   HgA1c:   TSH: Thyroid Stimulating Hormone, Serum: 2.46 uIU/mL (06-11 @ 04:07)      Consultant(s) Notes Reviewed:  [x ] YES  [ ] NO    Care Discussed with Consultants/Other Providers [ x] YES  [ ] NO    Imaging Personally Reviewed independently:  [x] YES  [ ] NO    All labs, radiologic studies, vitals, orders and medications list reviewed. Patient is seen and examined at bedside. Case discussed with medical team.

## 2021-06-11 NOTE — PROGRESS NOTE ADULT - ASSESSMENT
82 y/o Male HTN, DM Type 2, HLD, dementia (AOx2 at baseline), Parkinson's disease, CAD s/p stents x2 (>20 years ago), CHF (EF: 50%, Moderate diastolic dysfunction (Stage II), moderate pulmonary hypertension, small-mod pericardial effusion), Lt pleural effusion, BPH, gastric ulcers, COVID infection in Feb 2021, hypotension a/w mechanical fall c/b Rt greater troch fracture r/o intertrochanteric extension; Also found to be dehydrated; Pending pre-operative cardiac risk stratification due to multiple cardiac conditions; r/o PNA

## 2021-06-11 NOTE — PATIENT PROFILE ADULT - INFORMATION COULD NOT BE OBTAINED DETAILS
STEVEN d/t cognitive limitations. Pts brother, Berto Mitchell, listed as emergency contact. STEVEN d/t cognitive limitations. Pts brother, Berto Mitchell, listed as emergency contact.

## 2021-06-11 NOTE — PROGRESS NOTE ADULT - ASSESSMENT
82 yo M w/ R greater troch fx, MRI pending to r/o IT extension    Plan:  MRI to r/o IT extension  -NPO/IVF  -Please document medical clearance for OR   82 yo M w/ R greater troch fx, MRI pending to r/o IT extension    Plan:  MRI to r/o IT extension  -OR this morning pending MRI  -NPO/IVF  -Please document medical clearance for OR  -Please hold AC

## 2021-06-11 NOTE — CONSULT NOTE ADULT - ASSESSMENT
80 yo M HTN, DM, HLD, dementia (AOx2 at baseline), Parkinson's disease, CAD s/p stents x2 (>20 years ago), recent new onset HF (EF 50%, on Xarelto, Moderate diastolic dysfunction (Stage II), moderate pulmonary hypertension, small pericardial effusion), L pleural effusion, BPH, gastric ulcers presenting after fall,    Tele: SB 50s    1) Fall  -Rt. hip #  - medical management   -plan for Or cancelled     2) CAD s/p stent  -cath in 2010 showed mild luminal disease and patent stents  -denies chest pain  -echo with moderate Pericardial effusion with no evidence of tamponade, no intervention at this time    -c/w plavix     3) DVT prophylaxis  -lovenox subq

## 2021-06-12 LAB
ANION GAP SERPL CALC-SCNC: 10 MMOL/L — SIGNIFICANT CHANGE UP (ref 7–14)
BUN SERPL-MCNC: 28 MG/DL — HIGH (ref 7–23)
CALCIUM SERPL-MCNC: 9.3 MG/DL — SIGNIFICANT CHANGE UP (ref 8.4–10.5)
CHLORIDE SERPL-SCNC: 101 MMOL/L — SIGNIFICANT CHANGE UP (ref 98–107)
CO2 SERPL-SCNC: 27 MMOL/L — SIGNIFICANT CHANGE UP (ref 22–31)
CORTIS SERPL-MCNC: 9.8 UG/DL — SIGNIFICANT CHANGE UP
CREAT SERPL-MCNC: 0.69 MG/DL — SIGNIFICANT CHANGE UP (ref 0.5–1.3)
GLUCOSE BLDC GLUCOMTR-MCNC: 107 MG/DL — HIGH (ref 70–99)
GLUCOSE BLDC GLUCOMTR-MCNC: 145 MG/DL — HIGH (ref 70–99)
GLUCOSE BLDC GLUCOMTR-MCNC: 161 MG/DL — HIGH (ref 70–99)
GLUCOSE BLDC GLUCOMTR-MCNC: 204 MG/DL — HIGH (ref 70–99)
GLUCOSE SERPL-MCNC: 101 MG/DL — HIGH (ref 70–99)
HCT VFR BLD CALC: 34.1 % — LOW (ref 39–50)
HGB BLD-MCNC: 10.9 G/DL — LOW (ref 13–17)
MAGNESIUM SERPL-MCNC: 1.5 MG/DL — LOW (ref 1.6–2.6)
MCHC RBC-ENTMCNC: 29.2 PG — SIGNIFICANT CHANGE UP (ref 27–34)
MCHC RBC-ENTMCNC: 32 GM/DL — SIGNIFICANT CHANGE UP (ref 32–36)
MCV RBC AUTO: 91.4 FL — SIGNIFICANT CHANGE UP (ref 80–100)
NRBC # BLD: 0 /100 WBCS — SIGNIFICANT CHANGE UP
NRBC # FLD: 0 K/UL — SIGNIFICANT CHANGE UP
PHOSPHATE SERPL-MCNC: 2.7 MG/DL — SIGNIFICANT CHANGE UP (ref 2.5–4.5)
PLATELET # BLD AUTO: 368 K/UL — SIGNIFICANT CHANGE UP (ref 150–400)
POTASSIUM SERPL-MCNC: 4.1 MMOL/L — SIGNIFICANT CHANGE UP (ref 3.5–5.3)
POTASSIUM SERPL-SCNC: 4.1 MMOL/L — SIGNIFICANT CHANGE UP (ref 3.5–5.3)
RBC # BLD: 3.73 M/UL — LOW (ref 4.2–5.8)
RBC # FLD: 15.1 % — HIGH (ref 10.3–14.5)
SODIUM SERPL-SCNC: 138 MMOL/L — SIGNIFICANT CHANGE UP (ref 135–145)
WBC # BLD: 6.36 K/UL — SIGNIFICANT CHANGE UP (ref 3.8–10.5)
WBC # FLD AUTO: 6.36 K/UL — SIGNIFICANT CHANGE UP (ref 3.8–10.5)

## 2021-06-12 PROCEDURE — 99232 SBSQ HOSP IP/OBS MODERATE 35: CPT

## 2021-06-12 RX ORDER — OXYCODONE HYDROCHLORIDE 5 MG/1
5 TABLET ORAL EVERY 6 HOURS
Refills: 0 | Status: DISCONTINUED | OUTPATIENT
Start: 2021-06-12 | End: 2021-06-17

## 2021-06-12 RX ORDER — POLYETHYLENE GLYCOL 3350 17 G/17G
17 POWDER, FOR SOLUTION ORAL DAILY
Refills: 0 | Status: DISCONTINUED | OUTPATIENT
Start: 2021-06-12 | End: 2021-06-17

## 2021-06-12 RX ORDER — MAGNESIUM SULFATE 500 MG/ML
2 VIAL (ML) INJECTION ONCE
Refills: 0 | Status: COMPLETED | OUTPATIENT
Start: 2021-06-12 | End: 2021-06-12

## 2021-06-12 RX ORDER — OXYCODONE HYDROCHLORIDE 5 MG/1
10 TABLET ORAL EVERY 6 HOURS
Refills: 0 | Status: DISCONTINUED | OUTPATIENT
Start: 2021-06-12 | End: 2021-06-17

## 2021-06-12 RX ORDER — ACETAMINOPHEN 500 MG
650 TABLET ORAL EVERY 6 HOURS
Refills: 0 | Status: DISCONTINUED | OUTPATIENT
Start: 2021-06-12 | End: 2021-06-17

## 2021-06-12 RX ADMIN — Medication 50 GRAM(S): at 10:43

## 2021-06-12 RX ADMIN — POLYETHYLENE GLYCOL 3350 17 GRAM(S): 17 POWDER, FOR SOLUTION ORAL at 18:28

## 2021-06-12 RX ADMIN — QUETIAPINE FUMARATE 50 MILLIGRAM(S): 200 TABLET, FILM COATED ORAL at 21:44

## 2021-06-12 RX ADMIN — CLOPIDOGREL BISULFATE 75 MILLIGRAM(S): 75 TABLET, FILM COATED ORAL at 12:27

## 2021-06-12 RX ADMIN — CARBIDOPA AND LEVODOPA 1 TABLET(S): 25; 100 TABLET ORAL at 21:44

## 2021-06-12 RX ADMIN — Medication 1: at 16:55

## 2021-06-12 RX ADMIN — ATORVASTATIN CALCIUM 10 MILLIGRAM(S): 80 TABLET, FILM COATED ORAL at 21:44

## 2021-06-12 RX ADMIN — SODIUM CHLORIDE 3 MILLILITER(S): 9 INJECTION INTRAMUSCULAR; INTRAVENOUS; SUBCUTANEOUS at 21:45

## 2021-06-12 RX ADMIN — CARBIDOPA AND LEVODOPA 1 TABLET(S): 25; 100 TABLET ORAL at 13:26

## 2021-06-12 RX ADMIN — SODIUM CHLORIDE 3 MILLILITER(S): 9 INJECTION INTRAMUSCULAR; INTRAVENOUS; SUBCUTANEOUS at 13:29

## 2021-06-12 RX ADMIN — CARBIDOPA AND LEVODOPA 1 TABLET(S): 25; 100 TABLET ORAL at 05:15

## 2021-06-12 RX ADMIN — PANTOPRAZOLE SODIUM 40 MILLIGRAM(S): 20 TABLET, DELAYED RELEASE ORAL at 05:15

## 2021-06-12 RX ADMIN — SODIUM CHLORIDE 3 MILLILITER(S): 9 INJECTION INTRAMUSCULAR; INTRAVENOUS; SUBCUTANEOUS at 05:15

## 2021-06-12 RX ADMIN — Medication 2: at 11:55

## 2021-06-12 RX ADMIN — ENOXAPARIN SODIUM 40 MILLIGRAM(S): 100 INJECTION SUBCUTANEOUS at 12:27

## 2021-06-12 RX ADMIN — TAMSULOSIN HYDROCHLORIDE 0.4 MILLIGRAM(S): 0.4 CAPSULE ORAL at 21:44

## 2021-06-12 NOTE — PHYSICAL THERAPY INITIAL EVALUATION ADULT - MANUAL MUSCLE TESTING RESULTS, REHAB EVAL
bilateral UEs & Left LE grossly 3+/5, Right LE grossly at least 3/5, not formally tested secondary to fracture

## 2021-06-12 NOTE — PHYSICAL THERAPY INITIAL EVALUATION ADULT - ADDITIONAL COMMENTS
Patient is a poor historian, all information was obtained through chart review. Patient lives in an apartment +ramp. Patient has 24/7 home health aides. Pt has wheelchair.    Patient was left sitting in recliner as found, +chair alarm, all lines/tubes intact and call bell within reach, RN aware

## 2021-06-12 NOTE — PHYSICAL THERAPY INITIAL EVALUATION ADULT - RANGE OF MOTION EXAMINATION, REHAB EVAL
except right hip not formally assessed secondary to fracture/bilateral upper extremity ROM was WFL (within functional limits)/bilateral lower extremity ROM was WFL (within functional limits)

## 2021-06-12 NOTE — PHYSICAL THERAPY INITIAL EVALUATION ADULT - DISCHARGE DISPOSITION, PT EVAL
Anticipate rehab however per Ortho note pt pending OR. Please follow progress with PT./rehabilitation facility

## 2021-06-12 NOTE — PHYSICAL THERAPY INITIAL EVALUATION ADULT - WEIGHT-BEARING RESTRICTIONS: SIT/STAND, REHAB EVAL
unable to achieve full standing position secondary to Pt unable to maintain NWB status despite cues/nonweight-bearing

## 2021-06-12 NOTE — PHYSICAL THERAPY INITIAL EVALUATION ADULT - PATIENT PROFILE REVIEW, REHAB EVAL
PT orders received: no formal activity order. Consult with RN Delia RENTERIA, pt may participate in PT evaluation./yes

## 2021-06-12 NOTE — PHYSICAL THERAPY INITIAL EVALUATION ADULT - PERTINENT HX OF CURRENT PROBLEM, REHAB EVAL
Pt is a 81 year old male admitted to [facility] s/p fall 2 days ago. MR Right hip Right greater trochanteric fracture with slight intertrochanteric extension and intertrochanteric hyperemia. PMH: HTN, DM, HLD, Dementia, Parkinson's disease, CAD s/p stents x2 (>20 years ago), recent new onset HF (EF 50%, on Xarelto, Moderate diastolic dysfunction (Stage II), moderate pulmonary hypertension, small pericardial effusion), L pleural effusion, BPH, gastric ulcers. Pt is a 81 year old male admitted to [facility] s/p fall 2 days ago. MR Right hip: Right greater trochanteric fracture with slight intertrochanteric extension and intertrochanteric hyperemia. PMH: HTN, DM, HLD, Dementia, Parkinson's disease, CAD s/p stents x2 (>20 years ago), recent new onset HF (EF 50%, on Xarelto, Moderate diastolic dysfunction (Stage II), moderate pulmonary hypertension, small pericardial effusion), L pleural effusion, BPH, gastric ulcers.

## 2021-06-13 LAB
GLUCOSE BLDC GLUCOMTR-MCNC: 103 MG/DL — HIGH (ref 70–99)
GLUCOSE BLDC GLUCOMTR-MCNC: 138 MG/DL — HIGH (ref 70–99)
GLUCOSE BLDC GLUCOMTR-MCNC: 145 MG/DL — HIGH (ref 70–99)
GLUCOSE BLDC GLUCOMTR-MCNC: 180 MG/DL — HIGH (ref 70–99)

## 2021-06-13 PROCEDURE — 99232 SBSQ HOSP IP/OBS MODERATE 35: CPT

## 2021-06-13 RX ORDER — SENNA PLUS 8.6 MG/1
2 TABLET ORAL AT BEDTIME
Refills: 0 | Status: DISCONTINUED | OUTPATIENT
Start: 2021-06-13 | End: 2021-06-17

## 2021-06-13 RX ADMIN — SODIUM CHLORIDE 3 MILLILITER(S): 9 INJECTION INTRAMUSCULAR; INTRAVENOUS; SUBCUTANEOUS at 05:57

## 2021-06-13 RX ADMIN — SODIUM CHLORIDE 3 MILLILITER(S): 9 INJECTION INTRAMUSCULAR; INTRAVENOUS; SUBCUTANEOUS at 13:11

## 2021-06-13 RX ADMIN — OXYCODONE HYDROCHLORIDE 5 MILLIGRAM(S): 5 TABLET ORAL at 14:58

## 2021-06-13 RX ADMIN — Medication 100 MILLIGRAM(S): at 22:29

## 2021-06-13 RX ADMIN — POLYETHYLENE GLYCOL 3350 17 GRAM(S): 17 POWDER, FOR SOLUTION ORAL at 12:14

## 2021-06-13 RX ADMIN — PANTOPRAZOLE SODIUM 40 MILLIGRAM(S): 20 TABLET, DELAYED RELEASE ORAL at 05:56

## 2021-06-13 RX ADMIN — ENOXAPARIN SODIUM 40 MILLIGRAM(S): 100 INJECTION SUBCUTANEOUS at 12:15

## 2021-06-13 RX ADMIN — Medication 100 MILLIGRAM(S): at 14:15

## 2021-06-13 RX ADMIN — SODIUM CHLORIDE 3 MILLILITER(S): 9 INJECTION INTRAMUSCULAR; INTRAVENOUS; SUBCUTANEOUS at 22:30

## 2021-06-13 RX ADMIN — CLOPIDOGREL BISULFATE 75 MILLIGRAM(S): 75 TABLET, FILM COATED ORAL at 12:14

## 2021-06-13 RX ADMIN — CARBIDOPA AND LEVODOPA 1 TABLET(S): 25; 100 TABLET ORAL at 22:29

## 2021-06-13 RX ADMIN — CARBIDOPA AND LEVODOPA 1 TABLET(S): 25; 100 TABLET ORAL at 05:56

## 2021-06-13 RX ADMIN — ATORVASTATIN CALCIUM 10 MILLIGRAM(S): 80 TABLET, FILM COATED ORAL at 22:28

## 2021-06-13 RX ADMIN — Medication 1: at 16:47

## 2021-06-13 RX ADMIN — TAMSULOSIN HYDROCHLORIDE 0.4 MILLIGRAM(S): 0.4 CAPSULE ORAL at 22:29

## 2021-06-13 RX ADMIN — SENNA PLUS 2 TABLET(S): 8.6 TABLET ORAL at 22:28

## 2021-06-13 RX ADMIN — OXYCODONE HYDROCHLORIDE 5 MILLIGRAM(S): 5 TABLET ORAL at 14:01

## 2021-06-13 RX ADMIN — QUETIAPINE FUMARATE 50 MILLIGRAM(S): 200 TABLET, FILM COATED ORAL at 22:29

## 2021-06-13 RX ADMIN — CARBIDOPA AND LEVODOPA 1 TABLET(S): 25; 100 TABLET ORAL at 14:01

## 2021-06-13 NOTE — PROGRESS NOTE ADULT - ASSESSMENT
82 y/o Male HTN, DM Type 2, HLD, dementia (AOx2 at baseline), Parkinson's disease, CAD s/p stents x2 (>20 years ago), CHF (EF: 50%, Moderate diastolic dysfunction (Stage II), moderate pulmonary hypertension, small-mod pericardial effusion), Lt pleural effusion, BPH, gastric ulcers, COVID infection in Feb 2021, hypotension a/w mechanical fall c/b Rt greater troch fracture, MRI with slight IT extension per ortho no surgical intervention, PT recc MARCOS.

## 2021-06-14 LAB
ANION GAP SERPL CALC-SCNC: 11 MMOL/L — SIGNIFICANT CHANGE UP (ref 7–14)
APPEARANCE UR: CLEAR — SIGNIFICANT CHANGE UP
BILIRUB UR-MCNC: NEGATIVE — SIGNIFICANT CHANGE UP
BUN SERPL-MCNC: 19 MG/DL — SIGNIFICANT CHANGE UP (ref 7–23)
CALCIUM SERPL-MCNC: 8.7 MG/DL — SIGNIFICANT CHANGE UP (ref 8.4–10.5)
CHLORIDE SERPL-SCNC: 103 MMOL/L — SIGNIFICANT CHANGE UP (ref 98–107)
CO2 SERPL-SCNC: 26 MMOL/L — SIGNIFICANT CHANGE UP (ref 22–31)
COLOR SPEC: SIGNIFICANT CHANGE UP
CREAT SERPL-MCNC: 0.68 MG/DL — SIGNIFICANT CHANGE UP (ref 0.5–1.3)
DIFF PNL FLD: NEGATIVE — SIGNIFICANT CHANGE UP
GLUCOSE BLDC GLUCOMTR-MCNC: 113 MG/DL — HIGH (ref 70–99)
GLUCOSE BLDC GLUCOMTR-MCNC: 132 MG/DL — HIGH (ref 70–99)
GLUCOSE BLDC GLUCOMTR-MCNC: 178 MG/DL — HIGH (ref 70–99)
GLUCOSE BLDC GLUCOMTR-MCNC: 198 MG/DL — HIGH (ref 70–99)
GLUCOSE SERPL-MCNC: 93 MG/DL — SIGNIFICANT CHANGE UP (ref 70–99)
GLUCOSE UR QL: ABNORMAL
HCT VFR BLD CALC: 31.1 % — LOW (ref 39–50)
HGB BLD-MCNC: 9.9 G/DL — LOW (ref 13–17)
KETONES UR-MCNC: NEGATIVE — SIGNIFICANT CHANGE UP
LEUKOCYTE ESTERASE UR-ACNC: NEGATIVE — SIGNIFICANT CHANGE UP
MAGNESIUM SERPL-MCNC: 1.5 MG/DL — LOW (ref 1.6–2.6)
MCHC RBC-ENTMCNC: 29 PG — SIGNIFICANT CHANGE UP (ref 27–34)
MCHC RBC-ENTMCNC: 31.8 GM/DL — LOW (ref 32–36)
MCV RBC AUTO: 91.2 FL — SIGNIFICANT CHANGE UP (ref 80–100)
NITRITE UR-MCNC: NEGATIVE — SIGNIFICANT CHANGE UP
NRBC # BLD: 0 /100 WBCS — SIGNIFICANT CHANGE UP
NRBC # FLD: 0 K/UL — SIGNIFICANT CHANGE UP
PH UR: 6.5 — SIGNIFICANT CHANGE UP (ref 5–8)
PHOSPHATE SERPL-MCNC: 3.2 MG/DL — SIGNIFICANT CHANGE UP (ref 2.5–4.5)
PLATELET # BLD AUTO: 333 K/UL — SIGNIFICANT CHANGE UP (ref 150–400)
POTASSIUM SERPL-MCNC: 3.9 MMOL/L — SIGNIFICANT CHANGE UP (ref 3.5–5.3)
POTASSIUM SERPL-SCNC: 3.9 MMOL/L — SIGNIFICANT CHANGE UP (ref 3.5–5.3)
PROT UR-MCNC: NEGATIVE — SIGNIFICANT CHANGE UP
RBC # BLD: 3.41 M/UL — LOW (ref 4.2–5.8)
RBC # FLD: 15.3 % — HIGH (ref 10.3–14.5)
SARS-COV-2 RNA SPEC QL NAA+PROBE: SIGNIFICANT CHANGE UP
SODIUM SERPL-SCNC: 140 MMOL/L — SIGNIFICANT CHANGE UP (ref 135–145)
SP GR SPEC: 1.01 — LOW (ref 1.01–1.02)
UROBILINOGEN FLD QL: SIGNIFICANT CHANGE UP
WBC # BLD: 5.1 K/UL — SIGNIFICANT CHANGE UP (ref 3.8–10.5)
WBC # FLD AUTO: 5.1 K/UL — SIGNIFICANT CHANGE UP (ref 3.8–10.5)

## 2021-06-14 PROCEDURE — 71045 X-RAY EXAM CHEST 1 VIEW: CPT | Mod: 26

## 2021-06-14 PROCEDURE — 99232 SBSQ HOSP IP/OBS MODERATE 35: CPT

## 2021-06-14 RX ADMIN — PANTOPRAZOLE SODIUM 40 MILLIGRAM(S): 20 TABLET, DELAYED RELEASE ORAL at 05:50

## 2021-06-14 RX ADMIN — POLYETHYLENE GLYCOL 3350 17 GRAM(S): 17 POWDER, FOR SOLUTION ORAL at 11:23

## 2021-06-14 RX ADMIN — Medication 3 MILLIGRAM(S): at 21:58

## 2021-06-14 RX ADMIN — CARBIDOPA AND LEVODOPA 1 TABLET(S): 25; 100 TABLET ORAL at 11:22

## 2021-06-14 RX ADMIN — SENNA PLUS 2 TABLET(S): 8.6 TABLET ORAL at 21:58

## 2021-06-14 RX ADMIN — SODIUM CHLORIDE 3 MILLILITER(S): 9 INJECTION INTRAMUSCULAR; INTRAVENOUS; SUBCUTANEOUS at 11:08

## 2021-06-14 RX ADMIN — Medication 1: at 11:51

## 2021-06-14 RX ADMIN — SODIUM CHLORIDE 3 MILLILITER(S): 9 INJECTION INTRAMUSCULAR; INTRAVENOUS; SUBCUTANEOUS at 21:59

## 2021-06-14 RX ADMIN — TAMSULOSIN HYDROCHLORIDE 0.4 MILLIGRAM(S): 0.4 CAPSULE ORAL at 21:58

## 2021-06-14 RX ADMIN — QUETIAPINE FUMARATE 50 MILLIGRAM(S): 200 TABLET, FILM COATED ORAL at 21:58

## 2021-06-14 RX ADMIN — CLOPIDOGREL BISULFATE 75 MILLIGRAM(S): 75 TABLET, FILM COATED ORAL at 11:23

## 2021-06-14 RX ADMIN — ENOXAPARIN SODIUM 40 MILLIGRAM(S): 100 INJECTION SUBCUTANEOUS at 11:22

## 2021-06-14 RX ADMIN — CARBIDOPA AND LEVODOPA 1 TABLET(S): 25; 100 TABLET ORAL at 21:58

## 2021-06-14 RX ADMIN — Medication 1: at 16:46

## 2021-06-14 RX ADMIN — SODIUM CHLORIDE 3 MILLILITER(S): 9 INJECTION INTRAMUSCULAR; INTRAVENOUS; SUBCUTANEOUS at 05:51

## 2021-06-14 RX ADMIN — ATORVASTATIN CALCIUM 10 MILLIGRAM(S): 80 TABLET, FILM COATED ORAL at 21:59

## 2021-06-14 RX ADMIN — CARBIDOPA AND LEVODOPA 1 TABLET(S): 25; 100 TABLET ORAL at 05:50

## 2021-06-14 NOTE — CHART NOTE - NSCHARTNOTEFT_GEN_A_CORE
[facility] MEDICINE COVERAGE - Medicine Subsequent Hospital Care Note    CC: hypothermia   HPI/Subjective:  82 y/o M HTN, DM, HLD, dementia (AOx2 at baseline), Parkinson's disease, CAD s/p stents x2 (>20 years ago), CHF (EF: 50%, Moderate diastolic dysfunction (Stage II), moderate pulmonary hypertension, small pericardial effusion), L pleural effusion, BPH, gastric ulcers, COVID infection in 2021, hypotension presents with fall, now hypothermic.      [x  ] Unable to obtain history due to mental status.    Vital Signs Last 24 Hrs  T(C): 35.1 (21 @ 11:32), Max: 36.9 (21 @ 18:08)  T(F): 95.2 (21 @ 11:32), Max: 98.4 (21 @ 18:08)  HR: 63 (21 @ 11:32) (61 - 72)  BP: 111/52 (21 @ 11:32) (102/60 - 126/47)  RR: 17 (21 @ 11:32) (17 - 18)  SpO2: 98% (21 @ 11:32) (98% - 100%) on (O2)    PHYSICAL EXAM:  Constitutional: NAD, non toxic apperaring  Ears, nose, Mouth, and Throat: normal external ears and nose, normal hearing, moist oral mucosa  Eyes: normal conjunctiva, EOMI, PEERL  Neck: supple, no JVD  Respiratory: Clear to auscultation bilaterally. No wheezes, rales or rhonchi. Normal respiratory effort  Cardiovascular: regular rate and rhythm, S1 and S2, no murmurs, rubs or gallops, no edema, 2+ peripheral pulses  Gastrointestinal: soft, nontender, nondistended, +bowel sounds, no hernia  Skin: warm, dry, no rash  Neurologic: sensation grossly intact, CN grossly intact, non-focal exam  Musculoskeletal: no clubbing, no cyanosis, no joint swelling  Psychiatric: Awake and alert,  flat affect    LABS:                        9.9    5.10  )-----------( 333      ( 2021 07:45 )             31.1         140  |  103  |  19  ----------------------------<  93  3.9   |  26  |  0.68    Ca    8.7      2021 07:45  Phos  3.2       Mg     1.5           PT/INR: PT: 15.5 sec | INR: 1.37 ratio (21 @ 04:07)  PTT: 34.0 sec (21 @ 04:07)  PT/INR: PT: 15.5 sec | INR: 1.38 ratio (21 @ 05:48)  PTT: 43.6 sec (21 @ 05:48)    CARDIAC ENZYMES            Serum Pro-Brain Natriuretic Peptide: 455 pg/mL (06-10-21 @ 14:51)    D-Dimer Assay: 3455 ng/mL DDU (21 @ 13:53)      Urinanalysis Basic (21 @ 12:50):  Color: Yellow / Appearance: Clear / S.023 / pH: --  Gluc: -- / Ketone: Negative / Bili: Negative / Urobili: <2 mg/dL  Blood: -- / Protein: Trace / Nitrite: Negative  Leuk Esterase: Negative / RBC: -- / WBC: --  Sq Epi: -- / Non Sq Epi: -- / Bacteria: --      Blood Gas Venous (21 @ 12:50):  pH: 7.38 | HCO3: 26 | pCO2: 49 | pO2: 28 | Lactate: 1.0  pH: 7.45 | HCO3: 27 | pCO2: 40 | pO2: 76 | Lactate: 0.7  pH: 7.38 | HCO3: 26 | pCO2: 47 | pO2: 34 | Lactate: 2.7      Blood Gas Arterial (21 @ 12:50):      CAPILLARY BLOOD GLUCOSE:  POCT Blood Glucose: 198 mg/dL (21 @ 11:34)  POCT Blood Glucose: 113 mg/dL (21 @ 07:52)  POCT Blood Glucose: 138 mg/dL (21 @ 21:17)      COVID PCR:  Detected (06-10-21 @ 12:42)  Detected (21 @ 05:35)      RADIOLOGY:    MEDICATIONS  (STANDING):  atorvastatin 10 milliGRAM(s) Oral at bedtime  carbidopa/levodopa  25/100 1 Tablet(s) Oral three times a day  clopidogrel Tablet 75 milliGRAM(s) Oral daily  dextrose 40% Gel 15 Gram(s) Oral once  dextrose 5%. 1000 milliLiter(s) (50 mL/Hr) IV Continuous <Continuous>  dextrose 5%. 1000 milliLiter(s) (100 mL/Hr) IV Continuous <Continuous>  dextrose 50% Injectable 25 Gram(s) IV Push once  dextrose 50% Injectable 12.5 Gram(s) IV Push once  dextrose 50% Injectable 25 Gram(s) IV Push once  enoxaparin Injectable 40 milliGRAM(s) SubCutaneous daily  glucagon  Injectable 1 milliGRAM(s) IntraMuscular once  insulin lispro (ADMELOG) corrective regimen sliding scale   SubCutaneous three times a day before meals  insulin lispro (ADMELOG) corrective regimen sliding scale   SubCutaneous at bedtime  pantoprazole    Tablet 40 milliGRAM(s) Oral before breakfast  polyethylene glycol 3350 17 Gram(s) Oral daily  QUEtiapine 50 milliGRAM(s) Oral at bedtime  senna 2 Tablet(s) Oral at bedtime  sodium chloride 0.9% lock flush 3 milliLiter(s) IV Push every 8 hours  tamsulosin 0.4 milliGRAM(s) Oral at bedtime    MEDICATIONS  (PRN):  acetaminophen   Tablet .. 650 milliGRAM(s) Oral every 6 hours PRN Mild Pain (1 - 3), Moderate Pain (4 - 6)  acetaminophen   Tablet .. 650 milliGRAM(s) Oral every 6 hours PRN Mild Pain (1 - 3)  guaiFENesin Oral Liquid (Sugar-Free) 100 milliGRAM(s) Oral every 6 hours PRN Cough  melatonin 3 milliGRAM(s) Oral at bedtime PRN Insomnia  oxyCODONE    IR 10 milliGRAM(s) Oral every 6 hours PRN Severe Pain (7 - 10)  oxyCODONE    IR 5 milliGRAM(s) Oral every 6 hours PRN Moderate Pain (4 - 6)    I&O's Summary    I reviewed the above labs, radiology, medications, tests, telemetry, and EKG interpretation.    ASSESSMENT/PLAN:  82 y/o M HTN, DM, HLD, dementia (AOx2 at baseline), Parkinson's disease, CAD s/p stents x2 (>20 years ago), CHF (EF: 50%, Moderate diastolic dysfunction (Stage II), moderate pulmonary hypertension, small pericardial effusion), L pleural effusion, BPH, gastric ulcers, COVID infection in 2021, hypotension presents with fall, now hypothermic.    - Infectious workup ordered: blood culture x2, UA, CXR and COVID 19 PCR.  - Warming blanket  - hold off antibiotics at this time.  - monitor for fever and leukocytosis  - Clinical findings, labs, tests with attending. Will monitor patient closely.       Low complexity/risk (30min)  medium complexity/ risk (45 min)  high complexity ( >60 min)

## 2021-06-14 NOTE — PROGRESS NOTE ADULT - ASSESSMENT
80 y/o Male HTN, DM Type 2, HLD, dementia (AOx2 at baseline), Parkinson's disease, CAD s/p stents x2 (>20 years ago), CHF (EF: 50%, Moderate diastolic dysfunction (Stage II), moderate pulmonary hypertension, small-mod pericardial effusion), Lt pleural effusion, BPH, gastric ulcers, COVID infection in Feb 2021, hypotension a/w mechanical fall c/b Rt greater troch fracture, MRI with slight IT extension per ortho no surgical intervention, PT recc MARCOS.

## 2021-06-14 NOTE — PROGRESS NOTE ADULT - ASSESSMENT
82 yo M HTN, DM, HLD, dementia (AOx2 at baseline), Parkinson's disease, CAD s/p stents x2 (>20 years ago), recent new onset HF (EF 50%, on Xarelto, Moderate diastolic dysfunction (Stage II), moderate pulmonary hypertension, small pericardial effusion), L pleural effusion, BPH, gastric ulcers presenting after fall,    1) Fall  -Rt. hip fracture  -medical management   -plan for OR cancelled     2) CAD s/p stent  -cath in 2010 showed mild luminal disease and patent stents  -denies chest pain  -echo with moderate Pericardial effusion with no evidence of tamponade, no intervention at this time    -c/w plavix     3) DVT prophylaxis  -lovenox subq

## 2021-06-15 LAB
ANION GAP SERPL CALC-SCNC: 10 MMOL/L — SIGNIFICANT CHANGE UP (ref 7–14)
BUN SERPL-MCNC: 20 MG/DL — SIGNIFICANT CHANGE UP (ref 7–23)
CALCIUM SERPL-MCNC: 9.7 MG/DL — SIGNIFICANT CHANGE UP (ref 8.4–10.5)
CHLORIDE SERPL-SCNC: 102 MMOL/L — SIGNIFICANT CHANGE UP (ref 98–107)
CO2 SERPL-SCNC: 26 MMOL/L — SIGNIFICANT CHANGE UP (ref 22–31)
CREAT SERPL-MCNC: 0.75 MG/DL — SIGNIFICANT CHANGE UP (ref 0.5–1.3)
GLUCOSE BLDC GLUCOMTR-MCNC: 104 MG/DL — HIGH (ref 70–99)
GLUCOSE BLDC GLUCOMTR-MCNC: 117 MG/DL — HIGH (ref 70–99)
GLUCOSE BLDC GLUCOMTR-MCNC: 132 MG/DL — HIGH (ref 70–99)
GLUCOSE BLDC GLUCOMTR-MCNC: 152 MG/DL — HIGH (ref 70–99)
GLUCOSE SERPL-MCNC: 90 MG/DL — SIGNIFICANT CHANGE UP (ref 70–99)
HCT VFR BLD CALC: 32.5 % — LOW (ref 39–50)
HGB BLD-MCNC: 10.5 G/DL — LOW (ref 13–17)
MAGNESIUM SERPL-MCNC: 1.5 MG/DL — LOW (ref 1.6–2.6)
MCHC RBC-ENTMCNC: 29.2 PG — SIGNIFICANT CHANGE UP (ref 27–34)
MCHC RBC-ENTMCNC: 32.3 GM/DL — SIGNIFICANT CHANGE UP (ref 32–36)
MCV RBC AUTO: 90.3 FL — SIGNIFICANT CHANGE UP (ref 80–100)
NRBC # BLD: 0 /100 WBCS — SIGNIFICANT CHANGE UP
NRBC # FLD: 0 K/UL — SIGNIFICANT CHANGE UP
PHOSPHATE SERPL-MCNC: 3.3 MG/DL — SIGNIFICANT CHANGE UP (ref 2.5–4.5)
PLATELET # BLD AUTO: 374 K/UL — SIGNIFICANT CHANGE UP (ref 150–400)
POTASSIUM SERPL-MCNC: 4.4 MMOL/L — SIGNIFICANT CHANGE UP (ref 3.5–5.3)
POTASSIUM SERPL-SCNC: 4.4 MMOL/L — SIGNIFICANT CHANGE UP (ref 3.5–5.3)
RBC # BLD: 3.6 M/UL — LOW (ref 4.2–5.8)
RBC # FLD: 15 % — HIGH (ref 10.3–14.5)
SODIUM SERPL-SCNC: 138 MMOL/L — SIGNIFICANT CHANGE UP (ref 135–145)
WBC # BLD: 6.67 K/UL — SIGNIFICANT CHANGE UP (ref 3.8–10.5)
WBC # FLD AUTO: 6.67 K/UL — SIGNIFICANT CHANGE UP (ref 3.8–10.5)

## 2021-06-15 PROCEDURE — 99232 SBSQ HOSP IP/OBS MODERATE 35: CPT

## 2021-06-15 RX ADMIN — SODIUM CHLORIDE 3 MILLILITER(S): 9 INJECTION INTRAMUSCULAR; INTRAVENOUS; SUBCUTANEOUS at 22:09

## 2021-06-15 RX ADMIN — CARBIDOPA AND LEVODOPA 1 TABLET(S): 25; 100 TABLET ORAL at 22:57

## 2021-06-15 RX ADMIN — SENNA PLUS 2 TABLET(S): 8.6 TABLET ORAL at 22:56

## 2021-06-15 RX ADMIN — CLOPIDOGREL BISULFATE 75 MILLIGRAM(S): 75 TABLET, FILM COATED ORAL at 12:17

## 2021-06-15 RX ADMIN — ATORVASTATIN CALCIUM 10 MILLIGRAM(S): 80 TABLET, FILM COATED ORAL at 22:58

## 2021-06-15 RX ADMIN — TAMSULOSIN HYDROCHLORIDE 0.4 MILLIGRAM(S): 0.4 CAPSULE ORAL at 22:58

## 2021-06-15 RX ADMIN — CARBIDOPA AND LEVODOPA 1 TABLET(S): 25; 100 TABLET ORAL at 13:00

## 2021-06-15 RX ADMIN — CARBIDOPA AND LEVODOPA 1 TABLET(S): 25; 100 TABLET ORAL at 05:19

## 2021-06-15 RX ADMIN — QUETIAPINE FUMARATE 50 MILLIGRAM(S): 200 TABLET, FILM COATED ORAL at 22:58

## 2021-06-15 RX ADMIN — ENOXAPARIN SODIUM 40 MILLIGRAM(S): 100 INJECTION SUBCUTANEOUS at 12:17

## 2021-06-15 RX ADMIN — SODIUM CHLORIDE 3 MILLILITER(S): 9 INJECTION INTRAMUSCULAR; INTRAVENOUS; SUBCUTANEOUS at 05:17

## 2021-06-15 NOTE — PROGRESS NOTE ADULT - ASSESSMENT
81 y.o. Male w/ hx dementia (AOx2 at baseline), Parkinson's disease, HTN, DM Type 2, HLD, CAD s/p stents x2 (>20 years ago), CHF (EF: 50%, Moderate diastolic dysfunction (Stage II), moderate pulmonary hypertension, small-mod pericardial effusion), Lt pleural effusion, BPH, gastric ulcers, COVID infection in Feb 2021, hypotension a/w mechanical fall c/b Rt greater troch fracture, MRI with slight IT extension per ortho no surgical intervention, PT rec MARCOS. Hospital course c/b hypothermia now spontaneously resolved.

## 2021-06-15 NOTE — PROGRESS NOTE ADULT - ASSESSMENT
80 yo M HTN, DM, HLD, dementia (AOx2 at baseline), Parkinson's disease, CAD s/p stents x2 (>20 years ago), recent new onset HF (EF 50%, on Xarelto, Moderate diastolic dysfunction (Stage II), moderate pulmonary hypertension, small pericardial effusion), L pleural effusion, BPH, gastric ulcers presenting after fall,    1) Fall  -Rt. hip fracture  -medical management   -plan for OR cancelled     2) CAD s/p stent  -cath in 2010 showed mild luminal disease and patent stents  -denies chest pain  -echo with moderate Pericardial effusion with no evidence of tamponade, no intervention at this time    -c/w plavix     3) Hypothermia  -f/u cultures  -UA negative    4) DVT prophylaxis  -lovenox subq

## 2021-06-16 LAB
ANION GAP SERPL CALC-SCNC: 11 MMOL/L — SIGNIFICANT CHANGE UP (ref 7–14)
BUN SERPL-MCNC: 20 MG/DL — SIGNIFICANT CHANGE UP (ref 7–23)
CALCIUM SERPL-MCNC: 9.3 MG/DL — SIGNIFICANT CHANGE UP (ref 8.4–10.5)
CHLORIDE SERPL-SCNC: 100 MMOL/L — SIGNIFICANT CHANGE UP (ref 98–107)
CO2 SERPL-SCNC: 25 MMOL/L — SIGNIFICANT CHANGE UP (ref 22–31)
CREAT SERPL-MCNC: 0.69 MG/DL — SIGNIFICANT CHANGE UP (ref 0.5–1.3)
GLUCOSE BLDC GLUCOMTR-MCNC: 110 MG/DL — HIGH (ref 70–99)
GLUCOSE BLDC GLUCOMTR-MCNC: 165 MG/DL — HIGH (ref 70–99)
GLUCOSE BLDC GLUCOMTR-MCNC: 226 MG/DL — HIGH (ref 70–99)
GLUCOSE BLDC GLUCOMTR-MCNC: 98 MG/DL — SIGNIFICANT CHANGE UP (ref 70–99)
GLUCOSE SERPL-MCNC: 98 MG/DL — SIGNIFICANT CHANGE UP (ref 70–99)
HCT VFR BLD CALC: 32.3 % — LOW (ref 39–50)
HGB BLD-MCNC: 10.2 G/DL — LOW (ref 13–17)
MAGNESIUM SERPL-MCNC: 1.5 MG/DL — LOW (ref 1.6–2.6)
MCHC RBC-ENTMCNC: 28.7 PG — SIGNIFICANT CHANGE UP (ref 27–34)
MCHC RBC-ENTMCNC: 31.6 GM/DL — LOW (ref 32–36)
MCV RBC AUTO: 91 FL — SIGNIFICANT CHANGE UP (ref 80–100)
NRBC # BLD: 0 /100 WBCS — SIGNIFICANT CHANGE UP
NRBC # FLD: 0 K/UL — SIGNIFICANT CHANGE UP
PHOSPHATE SERPL-MCNC: 3.3 MG/DL — SIGNIFICANT CHANGE UP (ref 2.5–4.5)
PLATELET # BLD AUTO: 332 K/UL — SIGNIFICANT CHANGE UP (ref 150–400)
POTASSIUM SERPL-MCNC: 3.7 MMOL/L — SIGNIFICANT CHANGE UP (ref 3.5–5.3)
POTASSIUM SERPL-SCNC: 3.7 MMOL/L — SIGNIFICANT CHANGE UP (ref 3.5–5.3)
RBC # BLD: 3.55 M/UL — LOW (ref 4.2–5.8)
RBC # FLD: 15 % — HIGH (ref 10.3–14.5)
SODIUM SERPL-SCNC: 136 MMOL/L — SIGNIFICANT CHANGE UP (ref 135–145)
WBC # BLD: 5.69 K/UL — SIGNIFICANT CHANGE UP (ref 3.8–10.5)
WBC # FLD AUTO: 5.69 K/UL — SIGNIFICANT CHANGE UP (ref 3.8–10.5)

## 2021-06-16 PROCEDURE — 99232 SBSQ HOSP IP/OBS MODERATE 35: CPT

## 2021-06-16 RX ORDER — MAGNESIUM SULFATE 500 MG/ML
1 VIAL (ML) INJECTION ONCE
Refills: 0 | Status: COMPLETED | OUTPATIENT
Start: 2021-06-16 | End: 2021-06-16

## 2021-06-16 RX ADMIN — Medication 650 MILLIGRAM(S): at 08:00

## 2021-06-16 RX ADMIN — CARBIDOPA AND LEVODOPA 1 TABLET(S): 25; 100 TABLET ORAL at 13:26

## 2021-06-16 RX ADMIN — POLYETHYLENE GLYCOL 3350 17 GRAM(S): 17 POWDER, FOR SOLUTION ORAL at 13:27

## 2021-06-16 RX ADMIN — ATORVASTATIN CALCIUM 10 MILLIGRAM(S): 80 TABLET, FILM COATED ORAL at 21:28

## 2021-06-16 RX ADMIN — SODIUM CHLORIDE 3 MILLILITER(S): 9 INJECTION INTRAMUSCULAR; INTRAVENOUS; SUBCUTANEOUS at 05:37

## 2021-06-16 RX ADMIN — CARBIDOPA AND LEVODOPA 1 TABLET(S): 25; 100 TABLET ORAL at 05:39

## 2021-06-16 RX ADMIN — Medication 1: at 11:37

## 2021-06-16 RX ADMIN — PANTOPRAZOLE SODIUM 40 MILLIGRAM(S): 20 TABLET, DELAYED RELEASE ORAL at 05:39

## 2021-06-16 RX ADMIN — SENNA PLUS 2 TABLET(S): 8.6 TABLET ORAL at 21:28

## 2021-06-16 RX ADMIN — CARBIDOPA AND LEVODOPA 1 TABLET(S): 25; 100 TABLET ORAL at 21:28

## 2021-06-16 RX ADMIN — SODIUM CHLORIDE 3 MILLILITER(S): 9 INJECTION INTRAMUSCULAR; INTRAVENOUS; SUBCUTANEOUS at 21:33

## 2021-06-16 RX ADMIN — CLOPIDOGREL BISULFATE 75 MILLIGRAM(S): 75 TABLET, FILM COATED ORAL at 13:26

## 2021-06-16 RX ADMIN — Medication 100 GRAM(S): at 13:27

## 2021-06-16 RX ADMIN — SODIUM CHLORIDE 3 MILLILITER(S): 9 INJECTION INTRAMUSCULAR; INTRAVENOUS; SUBCUTANEOUS at 13:27

## 2021-06-16 RX ADMIN — Medication 650 MILLIGRAM(S): at 09:00

## 2021-06-16 RX ADMIN — ENOXAPARIN SODIUM 40 MILLIGRAM(S): 100 INJECTION SUBCUTANEOUS at 13:27

## 2021-06-16 RX ADMIN — QUETIAPINE FUMARATE 50 MILLIGRAM(S): 200 TABLET, FILM COATED ORAL at 21:28

## 2021-06-16 RX ADMIN — Medication 2: at 16:53

## 2021-06-16 RX ADMIN — TAMSULOSIN HYDROCHLORIDE 0.4 MILLIGRAM(S): 0.4 CAPSULE ORAL at 21:30

## 2021-06-17 ENCOUNTER — TRANSCRIPTION ENCOUNTER (OUTPATIENT)
Age: 81
End: 2021-06-17

## 2021-06-17 VITALS — WEIGHT: 132.94 LBS

## 2021-06-17 LAB
ANION GAP SERPL CALC-SCNC: 9 MMOL/L — SIGNIFICANT CHANGE UP (ref 7–14)
BUN SERPL-MCNC: 25 MG/DL — HIGH (ref 7–23)
CALCIUM SERPL-MCNC: 8.9 MG/DL — SIGNIFICANT CHANGE UP (ref 8.4–10.5)
CHLORIDE SERPL-SCNC: 100 MMOL/L — SIGNIFICANT CHANGE UP (ref 98–107)
CO2 SERPL-SCNC: 28 MMOL/L — SIGNIFICANT CHANGE UP (ref 22–31)
CREAT SERPL-MCNC: 0.73 MG/DL — SIGNIFICANT CHANGE UP (ref 0.5–1.3)
GLUCOSE BLDC GLUCOMTR-MCNC: 103 MG/DL — HIGH (ref 70–99)
GLUCOSE BLDC GLUCOMTR-MCNC: 143 MG/DL — HIGH (ref 70–99)
GLUCOSE BLDC GLUCOMTR-MCNC: 188 MG/DL — HIGH (ref 70–99)
GLUCOSE SERPL-MCNC: 87 MG/DL — SIGNIFICANT CHANGE UP (ref 70–99)
HCT VFR BLD CALC: 30.8 % — LOW (ref 39–50)
HGB BLD-MCNC: 9.9 G/DL — LOW (ref 13–17)
MAGNESIUM SERPL-MCNC: 1.6 MG/DL — SIGNIFICANT CHANGE UP (ref 1.6–2.6)
MCHC RBC-ENTMCNC: 29.4 PG — SIGNIFICANT CHANGE UP (ref 27–34)
MCHC RBC-ENTMCNC: 32.1 GM/DL — SIGNIFICANT CHANGE UP (ref 32–36)
MCV RBC AUTO: 91.4 FL — SIGNIFICANT CHANGE UP (ref 80–100)
NRBC # BLD: 0 /100 WBCS — SIGNIFICANT CHANGE UP
NRBC # FLD: 0 K/UL — SIGNIFICANT CHANGE UP
PHOSPHATE SERPL-MCNC: 3.7 MG/DL — SIGNIFICANT CHANGE UP (ref 2.5–4.5)
PLATELET # BLD AUTO: 330 K/UL — SIGNIFICANT CHANGE UP (ref 150–400)
POTASSIUM SERPL-MCNC: 4.2 MMOL/L — SIGNIFICANT CHANGE UP (ref 3.5–5.3)
POTASSIUM SERPL-SCNC: 4.2 MMOL/L — SIGNIFICANT CHANGE UP (ref 3.5–5.3)
RBC # BLD: 3.37 M/UL — LOW (ref 4.2–5.8)
RBC # FLD: 15.2 % — HIGH (ref 10.3–14.5)
SODIUM SERPL-SCNC: 137 MMOL/L — SIGNIFICANT CHANGE UP (ref 135–145)
WBC # BLD: 6.15 K/UL — SIGNIFICANT CHANGE UP (ref 3.8–10.5)
WBC # FLD AUTO: 6.15 K/UL — SIGNIFICANT CHANGE UP (ref 3.8–10.5)

## 2021-06-17 PROCEDURE — 99239 HOSP IP/OBS DSCHRG MGMT >30: CPT

## 2021-06-17 RX ORDER — LANOLIN ALCOHOL/MO/W.PET/CERES
1 CREAM (GRAM) TOPICAL
Qty: 0 | Refills: 0 | DISCHARGE
Start: 2021-06-17

## 2021-06-17 RX ORDER — OXYCODONE HYDROCHLORIDE 5 MG/1
1 TABLET ORAL
Qty: 0 | Refills: 0 | DISCHARGE
Start: 2021-06-17

## 2021-06-17 RX ORDER — ACETAMINOPHEN 500 MG
2 TABLET ORAL
Qty: 0 | Refills: 0 | DISCHARGE
Start: 2021-06-17

## 2021-06-17 RX ORDER — SENNA PLUS 8.6 MG/1
2 TABLET ORAL
Qty: 0 | Refills: 0 | DISCHARGE
Start: 2021-06-17

## 2021-06-17 RX ORDER — POLYETHYLENE GLYCOL 3350 17 G/17G
17 POWDER, FOR SOLUTION ORAL
Qty: 0 | Refills: 0 | DISCHARGE
Start: 2021-06-17

## 2021-06-17 RX ADMIN — PANTOPRAZOLE SODIUM 40 MILLIGRAM(S): 20 TABLET, DELAYED RELEASE ORAL at 05:25

## 2021-06-17 RX ADMIN — Medication 1: at 11:33

## 2021-06-17 RX ADMIN — CLOPIDOGREL BISULFATE 75 MILLIGRAM(S): 75 TABLET, FILM COATED ORAL at 13:12

## 2021-06-17 RX ADMIN — ENOXAPARIN SODIUM 40 MILLIGRAM(S): 100 INJECTION SUBCUTANEOUS at 13:12

## 2021-06-17 RX ADMIN — CARBIDOPA AND LEVODOPA 1 TABLET(S): 25; 100 TABLET ORAL at 05:25

## 2021-06-17 RX ADMIN — POLYETHYLENE GLYCOL 3350 17 GRAM(S): 17 POWDER, FOR SOLUTION ORAL at 13:11

## 2021-06-17 RX ADMIN — SODIUM CHLORIDE 3 MILLILITER(S): 9 INJECTION INTRAMUSCULAR; INTRAVENOUS; SUBCUTANEOUS at 13:11

## 2021-06-17 RX ADMIN — SODIUM CHLORIDE 3 MILLILITER(S): 9 INJECTION INTRAMUSCULAR; INTRAVENOUS; SUBCUTANEOUS at 06:50

## 2021-06-17 RX ADMIN — CARBIDOPA AND LEVODOPA 1 TABLET(S): 25; 100 TABLET ORAL at 13:11

## 2021-06-17 NOTE — ED PROVIDER NOTE - QRS
Office Visit - Follow Up   Jignesh Smith   Teodoro  Phuc Salter MD   [phone]

## 2021-06-17 NOTE — DISCHARGE NOTE PROVIDER - NSDCCPCAREPLAN_GEN_ALL_CORE_FT
PRINCIPAL DISCHARGE DIAGNOSIS  Diagnosis: Closed fracture of right hip, initial encounter  Assessment and Plan of Treatment: You were seen by Orthopedic Surgeon and it was determined that you did not need surgical intervention, weight bearing as tolerated, pain management, rehab recommended.      SECONDARY DISCHARGE DIAGNOSES  Diagnosis: Pericardial effusion  Assessment and Plan of Treatment: Your echocardiogram showed evidence of a moderate pericardial effusion with no evidence of tamponade, no intervention needed at this time.    Diagnosis: HTN (Hypertension)  Assessment and Plan of Treatment: low salt, Low sodium and fat diet, continue anti-hypertensive medications, and follow up with primary care physician.      Diagnosis: Type 2 diabetes mellitus with hyperglycemia, without long-term current use of insulin  Assessment and Plan of Treatment: Monitor finger sticks pre-meal and bedtime, low salt, fat and carbohydrate diet, minimize glucose intake.   Follow up with primary care physician and endocrinologist for routine Hemoglobin A1C checks and management.  Follow up with your ophthalmologist for routine yearly vision exams. Continue your oral diabetic medications       Diagnosis: CAD (Coronary Artery Disease)  Assessment and Plan of Treatment: Continue Plavix, do not stop unless instructed by your physician.  Continue low salt, fat, cholesterol and carbohydrate diet. Follow up with cardiologist and primary care physician's routine appointment.      Diagnosis: Abnormal CXR  Assessment and Plan of Treatment: Your CXR showed patchy bilateral ground glass opacity which are likely secondary to covid infection in feb 2021

## 2021-06-17 NOTE — PROGRESS NOTE ADULT - ASSESSMENT
81 y.o. Male w/ hx dementia (AOx2 at baseline), Parkinson's disease, HTN, DM Type 2, HLD, CAD s/p stents x2 (>20 years ago), CHF (EF: 50%, Moderate diastolic dysfunction (Stage II), moderate pulmonary hypertension, small-mod pericardial effusion), Lt pleural effusion, BPH, gastric ulcers, COVID infection in Feb 2021, hypotension a/w mechanical fall c/b Rt greater troch fracture, MRI with slight IT extension per ortho no surgical intervention, PT rec MARCOS. Hospital course c/b hypothermia now spontaneously resolved. D/C 40 minutes.

## 2021-06-17 NOTE — DISCHARGE NOTE PROVIDER - NSDCMRMEDTOKEN_GEN_ALL_CORE_FT
atorvastatin 10 mg oral tablet: 1 tab(s) orally once a day  carbidopa-levodopa 25 mg-100 mg oral tablet: 1 tab(s) orally 3 times a day  clopidogrel 75 mg oral tablet: 1 tab(s) orally once a day  docusate sodium 100 mg oral tablet: 1 tab(s) orally 2 times a day  Jardiance 10 mg oral tablet: 1 tab(s) orally once a day (in the morning)  melatonin 3 mg oral tablet: 1 tab(s) orally once a day (at bedtime), As needed, Insomnia  over the counter  metFORMIN 1000 mg oral tablet: 1 tab(s) orally 2 times a day  pantoprazole 40 mg oral delayed release tablet: 1 tab(s) orally once a day (before a meal)  SEROquel 50 mg oral tablet: 1 tab(s) orally once a day (at bedtime)  tamsulosin 0.4 mg oral capsule: 1 cap(s) orally once a day (at bedtime)   acetaminophen 325 mg oral tablet: 2 tab(s) orally every 6 hours, As needed, Mild Pain (1 - 3)  atorvastatin 10 mg oral tablet: 1 tab(s) orally once a day  carbidopa-levodopa 25 mg-100 mg oral tablet: 1 tab(s) orally 3 times a day  clopidogrel 75 mg oral tablet: 1 tab(s) orally once a day  guaiFENesin 100 mg/5 mL oral liquid: 5 milliliter(s) orally every 6 hours, As needed, Cough  Jardiance 10 mg oral tablet: 1 tab(s) orally once a day (in the morning)  melatonin 3 mg oral tablet: 1 tab(s) orally once a day (at bedtime), As needed, Insomnia  metFORMIN 1000 mg oral tablet: 1 tab(s) orally 2 times a day  oxyCODONE 10 mg oral tablet: 1 tab(s) orally every 6 hours, As needed, Severe Pain (7 - 10)  oxyCODONE 5 mg oral tablet: 1 tab(s) orally every 6 hours, As needed, Moderate Pain (4 - 6)  pantoprazole 40 mg oral delayed release tablet: 1 tab(s) orally once a day (before a meal)  polyethylene glycol 3350 oral powder for reconstitution: 17 gram(s) orally once a day  senna oral tablet: 2 tab(s) orally once a day (at bedtime)  SEROquel 50 mg oral tablet: 1 tab(s) orally once a day (at bedtime)  tamsulosin 0.4 mg oral capsule: 1 cap(s) orally once a day (at bedtime)

## 2021-06-17 NOTE — PROGRESS NOTE ADULT - PROBLEM SELECTOR PLAN 3
Fall risk protocol   PT recc MARCOS  CT head/c-spine no acute path
Fall risk protocol   PT recc MARCOS  CT head/c-spine no acute path
Fall risk protocol   PT consulted  CT head/c-spine no acute path
Fall risk protocol   PT consult needs to be ordered after ortho clearance.   CT head/c-spine no acute path
Fall risk protocol   PT recc MARCOS  CT head/c-spine no acute path

## 2021-06-17 NOTE — PROGRESS NOTE ADULT - PROBLEM SELECTOR PLAN 8
cont carbidopa/levodopa  frequent reorientation for dementia  cont melatonin and seroquel (monitor QTc)  Patient with hx of parkinson's and was recommended dysphagia diet during last admission.   -Restart dysphagia diet with aspiration precautions, and head of bed elevation
cont carbidopa/levodopa  frequent reorientation for dementia  cont melatonin and seroquel (monitor QTc)  Patient with hx of parkinson's and was recommended dysphagia diet during last admission.   -Restarted dysphagia diet with aspiration precautions, and head of bed elevation
cont carbidopa/levodopa  frequent reorientation for dementia  cont melatonin and seroquel (monitor QTc)  Patient with hx of parkinson's and was recommended dysphagia diet during last admission.   -Restart dysphagia diet with aspiration precautions, and head of bed elevation
cont carbidopa/levodopa  frequent reorientation for dementia  cont melatonin and seroquel (monitor QTc)  Patient with hx of parkinson's and was recommended dysphagia diet during last admission. Per brother, patient eats regular food at home  -Restart dysphagia diet with aspiration precautions and head of bed elevation  -Family re-education regarding dysphagia diet needed - deferred to primary team
cont carbidopa/levodopa  frequent reorientation for dementia  cont melatonin and seroquel (monitor QTc)  Patient with hx of parkinson's and was recommended dysphagia diet during last admission.   -Restarted dysphagia diet with aspiration precautions, and head of bed elevation

## 2021-06-17 NOTE — PROGRESS NOTE ADULT - ATTENDING COMMENTS
pt seen and examined agree with plan above
MRI reviewed.  Pt with slight extension of Gt fx to IT region, but the fracture does not extend to medial cortex and therefore the fracture is stable.  Pt can be WBAT w walker.  Mobilize w PT.  Pt can eat.  No need for ORIF.

## 2021-06-17 NOTE — DISCHARGE NOTE PROVIDER - HOSPITAL COURSE
81 y.o. Male w/ hx dementia (AOx2 at baseline), Parkinson's disease, HTN, DM Type 2, HLD, CAD s/p stents x2 (>20 years ago), CHF (EF: 50%, Moderate diastolic dysfunction (Stage II), moderate pulmonary hypertension, small-mod pericardial effusion), Lt pleural effusion, BPH, gastric ulcers, COVID infection in Feb 2021, hypotension a/w mechanical fall c/b Rt greater troch fracture, MRI with slight IT extension per ortho no surgical intervention, PT rec MARCOS. Hospital course c/b hypothermia now spontaneously resolved.      Closed fracture of right hip, initial encounter.  Plan: CT hip:  Acute minimally displaced transversely oriented fracture of the right greater trochanter.  Due to mechanical fall; Rt greater troch fracture    MR hip showed slight intertrochanteric extension, per ortho no surgical intervention  Pain control w/ PO oxycodone prn; although no PRN utilized. bowel regimen added  per ortho, WBAT  PT rec MARCOS.     Abnormal chest x-ray.  Plan: CXR: Large L. pleural effusion  CT chest showed minimally decreased large loculated L. pleural effusion (previously noted in March 2021). He was tx w/ IV abx x 5 days empirically for possible empyema. At that time IR spoke with patients brother Berto Mitchell at [phone] regarding drainage. Brother did not want to proceed at that time.  - not in acute respiratory distress, on RA, doesn't appear infectious at this time  - can hold off on further intervention, if decline in respiratory status can consider IR consult  -hold off abx now given no leukocytosis, fever or left shift  -procal 0.07.      Falls frequently.  Plan: Fall risk protocol   PT recc MARCOS  CT head/c-spine no acute path.      Pericardial effusion.  Plan: Patient with hx of pericardial effusion  Repeat echo 10/11/21 showing small to moderate effusion, no tamponade  Cardiology following, no intervention at this time.      Chronic diastolic congestive heart failure.  Plan: Appears euvolemic; ProBNP 455  EKG: no acute Tw or ST changes  Normal LV function on echo.      COVID-19 virus RNA detected. Plan: Patient was diagnosed with COVID in Feb 2021 and was treated with decadron and remdesvir. On admission, PCR was positive, Ab also positive  Currently 100% on RA and asymptomatic.     CAD (Coronary Artery Disease).  Plan: Patient had stress test in Feb 2021:  small, moderate defects in apical, distal lateral walls that are  predominantly reversible, suggestive of ischemia. Patient's brother Berto refused cath at that time.  Pt denies CP  Continuing Plavix and statin. Not on ASA.      Parkinson's disease dementia.  Plan: cont carbidopa/levodopa  frequent reorientation for dementia  cont melatonin and seroquel (monitor QTc)  Patient with hx of parkinson's and was recommended dysphagia diet during last admission.   -Restarted dysphagia diet with aspiration precautions, and head of bed elevation.       Type 2 diabetes mellitus without complication, without long-term current use of insulin.  Plan: hold oral agents   FSG at goal   ISS  monitor fingersticks  A1C 5.8.    81 y.o. Male w/ hx dementia (AOx2 at baseline), Parkinson's disease, HTN, DM Type 2, HLD, CAD s/p stents x2 (>20 years ago), CHF (EF: 50%, Moderate diastolic dysfunction (Stage II), moderate pulmonary hypertension, small-mod pericardial effusion), Lt pleural effusion, BPH, gastric ulcers, COVID infection in Feb 2021, hypotension a/w mechanical fall c/b Rt greater troch fracture, MRI with slight IT extension per ortho no surgical intervention, PT rec MARCOS. Hospital course c/b hypothermia now spontaneously resolved.     Closed fracture of right hip, initial encounter.  Plan: CT hip:  Acute minimally displaced transversely oriented fracture of the right greater trochanter.  Due to mechanical fall; Rt greater troch fracture    MR hip showed slight intertrochanteric extension, per ortho no surgical intervention  Pain control w/ PO oxycodone prn; although no PRN utilized. bowel regimen added  per ortho, WBAT  PT rec MARCOS.     Abnormal chest x-ray.  Plan: CXR: Large L. pleural effusion  CT chest showed minimally decreased large loculated L. pleural effusion (previously noted in March 2021). He was tx w/ IV abx x 5 days empirically for possible empyema. At that time IR spoke with patients brother Berto Mitchell at [phone] regarding drainage. Brother did not want to proceed at that time.  - not in acute respiratory distress, on RA, doesn't appear infectious at this time  - can hold off on further intervention, if decline in respiratory status can consider IR consult  -hold off abx now given no leukocytosis, fever or left shift  -procal 0.07.      Falls frequently.  Plan: Fall risk protocol   PT recc MARCOS  CT head/c-spine no acute path.      Pericardial effusion.  Plan: Patient with hx of pericardial effusion  Repeat echo 10/11/21 showing small to moderate effusion, no tamponade  Cardiology following, no intervention at this time.     Chronic diastolic congestive heart failure.  Plan: Appears euvolemic; ProBNP 455  EKG: no acute Tw or ST changes  Normal LV function on echo.      COVID-19 virus RNA detected. Plan: Patient was diagnosed with COVID in Feb 2021 and was treated with decadron and remdesvir. On admission, PCR was positive, Ab also positive  Currently 100% on RA and asymptomatic.     CAD (Coronary Artery Disease).  Plan: Patient had stress test in Feb 2021:  small, moderate defects in apical, distal lateral walls that are  predominantly reversible, suggestive of ischemia. Patient's brother Berto refused cath at that time.  Pt denies CP  Continuing Plavix and statin. Not on ASA.     Parkinson's disease dementia.  Plan: cont carbidopa/levodopa  frequent reorientation for dementia  cont melatonin and seroquel (monitor QTc)  Patient with hx of parkinson's and was recommended dysphagia diet during last admission.   -Restarted dysphagia diet with aspiration precautions, and head of bed elevation.       Type 2 diabetes mellitus without complication, without long-term current use of insulin.  Plan: hold oral agents   FSG at goal   ISS while in hospital, restarted on PO meds for discharge   monitor fingersticks  A1C 5.8.     Patient stable and cleared for discharge discussed with Dr. Chavez on 6/17.

## 2021-06-17 NOTE — PROGRESS NOTE ADULT - PROBLEM SELECTOR PROBLEM 4
Pericardial effusion

## 2021-06-17 NOTE — DISCHARGE NOTE NURSING/CASE MANAGEMENT/SOCIAL WORK - NSDCCRNAME_GEN_ALL_CORE_FT
[facility] [id number] [facility] [address] via [facility] [phone]

## 2021-06-17 NOTE — PROGRESS NOTE ADULT - PROBLEM SELECTOR PROBLEM 2
Abnormal chest x-ray

## 2021-06-17 NOTE — PROGRESS NOTE ADULT - PROBLEM SELECTOR PLAN 6
Patient was diagnosed with COVID in Feb 2021 and was treated with decadron and remdesvir. On admission, PCR was positive, Ab also positive  Currently 100% on RA
Patient was diagnosed with COVID in Feb 2021 and was treated with decadron and remdesvir. On admission, PCR was positive, Ab also positive  Currently 100% on RA and asymptomatic.
Patient was diagnosed with COVID in Feb 2021 and was treated with decadron and remdesvir. On admission, PCR was positive, Ab also positive  repeat COVID negative on 6/14  Currently 100% on RA and asymptomatic.
Patient was diagnosed with COVID in Feb 2021 and was treated with decadron and remdesvir.  He has tested positive since then  Currently 100% on RA
Patient was diagnosed with COVID in Feb 2021 and was treated with decadron and remdesvir. On admission, PCR was positive, Ab also positive  Currently 100% on RA and asymptomatic.
Patient was diagnosed with COVID in Feb 2021 and was treated with decadron and remdesvir.  He has tested positive since then  Currently 100% on RA
Patient was diagnosed with COVID in Feb 2021 and was treated with decadron and remdesvir.  He has tested positive since then  Currently 100% on RA

## 2021-06-17 NOTE — PROGRESS NOTE ADULT - PROBLEM SELECTOR PLAN 10
lovenox subq    PT rec MARCOS  Dispo: MARCOS
lovenox subq    PT rec MARCOS  Dispo: MARCOS today.
lovenox subq
lovenox subq    PT recc MARCOS  Dispo: MARCOS monday
Will hold pharm vte now in anticipation for surgery, pending final ortho reccs
lovenox subq    PT rec MARCOS  Dispo: MARCOS
lovenox subq    PT recc MARCOS  Dispo: MARCOS

## 2021-06-17 NOTE — PROGRESS NOTE ADULT - SUBJECTIVE AND OBJECTIVE BOX
Bennie Hwang MD  Interventional Cardiology / Advance Heart Failure and Cardiac Transplant Specialist  [place] Office : [address] [place] [address] [place]. [address]  Tel:   [place] Office : [address] [facility] [place] [address]  Tel: [phone]  Cell : [phone]    Subjective/Overnight events: Pt is lying in bed comfortable not in distress  	  MEDICATIONS:  clopidogrel Tablet 75 milliGRAM(s) Oral daily  enoxaparin Injectable 40 milliGRAM(s) SubCutaneous daily  tamsulosin 0.4 milliGRAM(s) Oral at bedtime      guaiFENesin Oral Liquid (Sugar-Free) 100 milliGRAM(s) Oral every 6 hours PRN    acetaminophen   Tablet .. 650 milliGRAM(s) Oral every 6 hours PRN  acetaminophen   Tablet .. 650 milliGRAM(s) Oral every 6 hours PRN  carbidopa/levodopa  25/100 1 Tablet(s) Oral three times a day  melatonin 3 milliGRAM(s) Oral at bedtime PRN  oxyCODONE    IR 10 milliGRAM(s) Oral every 6 hours PRN  oxyCODONE    IR 5 milliGRAM(s) Oral every 6 hours PRN  QUEtiapine 50 milliGRAM(s) Oral at bedtime    pantoprazole    Tablet 40 milliGRAM(s) Oral before breakfast  polyethylene glycol 3350 17 Gram(s) Oral daily  senna 2 Tablet(s) Oral at bedtime    atorvastatin 10 milliGRAM(s) Oral at bedtime  dextrose 40% Gel 15 Gram(s) Oral once  dextrose 50% Injectable 25 Gram(s) IV Push once  dextrose 50% Injectable 12.5 Gram(s) IV Push once  dextrose 50% Injectable 25 Gram(s) IV Push once  glucagon  Injectable 1 milliGRAM(s) IntraMuscular once  insulin lispro (ADMELOG) corrective regimen sliding scale   SubCutaneous three times a day before meals  insulin lispro (ADMELOG) corrective regimen sliding scale   SubCutaneous at bedtime    dextrose 5%. 1000 milliLiter(s) IV Continuous <Continuous>  dextrose 5%. 1000 milliLiter(s) IV Continuous <Continuous>  sodium chloride 0.9% lock flush 3 milliLiter(s) IV Push every 8 hours      PAST MEDICAL/SURGICAL HISTORY  PAST MEDICAL & SURGICAL HISTORY:  Hypercholesterolemia    DM (Diabetes Mellitus)    HTN (Hypertension)    CAD (Coronary Artery Disease)  s/p cardiac stent ( &gt; 20 years ago)    BPH (benign prostatic hyperplasia)    Coronary Stent  x 2 ( 20 years )        SOCIAL HISTORY: Substance Use (street drugs): ( x ) never used  (  ) other:    FAMILY HISTORY:  No pertinent family history in first degree relatives        REVIEW OF SYSTEMS:  unable to obtain    PHYSICAL EXAM:  T(C): 36.6 (06-14-21 @ 05:49), Max: 36.9 (06-13-21 @ 18:08)  HR: 62 (06-14-21 @ 05:49) (61 - 72)  BP: 121/54 (06-14-21 @ 05:49) (102/60 - 126/47)  RR: 17 (06-14-21 @ 05:49) (17 - 18)  SpO2: 100% (06-14-21 @ 05:49) (99% - 100%)  Wt(kg): --  I&O's Summary      EYES:  conjunctiva and sclera clear  ENMT: No tonsillar erythema, exudates, or enlargement;   Cardiovascular: Normal S1 S2, No JVD, No murmurs, No edema  Respiratory: b/l rhonchi   Gastrointestinal:  Soft, Non-tender, + BS	  Extremities: no edema                                     9.9    5.10  )-----------( 333      ( 14 Jun 2021 07:45 )             31.1     06-14    140  |  103  |  19  ----------------------------<  93  3.9   |  26  |  0.68    Ca    8.7      14 Jun 2021 07:45  Phos  3.2     06-14  Mg     1.5     06-14      proBNP:   Lipid Profile:   HgA1c:   TSH:     Consultant(s) Notes Reviewed:  [x ] YES  [ ] NO    Care Discussed with Consultants/Other Providers [ x] YES  [ ] NO    Imaging Personally Reviewed independently:  [x] YES  [ ] NO    All labs, radiologic studies, vitals, orders and medications list reviewed. Patient is seen and examined at bedside. Case discussed with medical team.        
Bennie Hwang MD  Interventional Cardiology / Advance Heart Failure and Cardiac Transplant Specialist  [place] Office : [address] [place] [address] [place]. [address]  Tel:   [place] Office : [address] [facility] [place] [address]  Tel: [phone]  Cell : [phone]    Subjective/Overnight events: Pt is lying in bed comfortable not in distress  	  MEDICATIONS:  clopidogrel Tablet 75 milliGRAM(s) Oral daily  enoxaparin Injectable 40 milliGRAM(s) SubCutaneous daily  tamsulosin 0.4 milliGRAM(s) Oral at bedtime      guaiFENesin Oral Liquid (Sugar-Free) 100 milliGRAM(s) Oral every 6 hours PRN    acetaminophen   Tablet .. 650 milliGRAM(s) Oral every 6 hours PRN  acetaminophen   Tablet .. 650 milliGRAM(s) Oral every 6 hours PRN  carbidopa/levodopa  25/100 1 Tablet(s) Oral three times a day  melatonin 3 milliGRAM(s) Oral at bedtime PRN  oxyCODONE    IR 10 milliGRAM(s) Oral every 6 hours PRN  oxyCODONE    IR 5 milliGRAM(s) Oral every 6 hours PRN  QUEtiapine 50 milliGRAM(s) Oral at bedtime    pantoprazole    Tablet 40 milliGRAM(s) Oral before breakfast  polyethylene glycol 3350 17 Gram(s) Oral daily  senna 2 Tablet(s) Oral at bedtime    atorvastatin 10 milliGRAM(s) Oral at bedtime  dextrose 40% Gel 15 Gram(s) Oral once  dextrose 50% Injectable 25 Gram(s) IV Push once  dextrose 50% Injectable 12.5 Gram(s) IV Push once  dextrose 50% Injectable 25 Gram(s) IV Push once  glucagon  Injectable 1 milliGRAM(s) IntraMuscular once  insulin lispro (ADMELOG) corrective regimen sliding scale   SubCutaneous three times a day before meals  insulin lispro (ADMELOG) corrective regimen sliding scale   SubCutaneous at bedtime    dextrose 5%. 1000 milliLiter(s) IV Continuous <Continuous>  dextrose 5%. 1000 milliLiter(s) IV Continuous <Continuous>  sodium chloride 0.9% lock flush 3 milliLiter(s) IV Push every 8 hours      PAST MEDICAL/SURGICAL HISTORY  PAST MEDICAL & SURGICAL HISTORY:  Hypercholesterolemia    DM (Diabetes Mellitus)    HTN (Hypertension)    CAD (Coronary Artery Disease)  s/p cardiac stent ( &gt; 20 years ago)    BPH (benign prostatic hyperplasia)    Coronary Stent  x 2 ( 20 years )        SOCIAL HISTORY: Substance Use (street drugs): ( x ) never used  (  ) other:    FAMILY HISTORY:  No pertinent family history in first degree relatives        REVIEW OF SYSTEMS:  unable to obtain    PHYSICAL EXAM:  T(C): 36.7 (06-15-21 @ 05:15), Max: 37.2 (06-14-21 @ 21:30)  HR: 74 (06-15-21 @ 05:15) (66 - 87)  BP: 149/64 (06-15-21 @ 05:15) (112/54 - 149/64)  RR: 18 (06-15-21 @ 05:15) (17 - 18)  SpO2: 100% (06-15-21 @ 05:15) (97% - 100%)  Wt(kg): --  I&O's Summary      EYES:  conjunctiva and sclera clear  ENMT: No tonsillar erythema, exudates, or enlargement;   Cardiovascular: Normal S1 S2, No JVD, No murmurs, No edema  Respiratory: b/l rhonchi   Gastrointestinal:  Soft, Non-tender, + BS	  Extremities: no edema                                     10.5   6.67  )-----------( 374      ( 15 Richi 2021 07:35 )             32.5     06-15    138  |  102  |  20  ----------------------------<  90  4.4   |  26  |  0.75    Ca    9.7      15 Richi 2021 07:35  Phos  3.3     06-15  Mg     1.5     06-15      proBNP:   Lipid Profile:   HgA1c:   TSH:     Consultant(s) Notes Reviewed:  [x ] YES  [ ] NO    Care Discussed with Consultants/Other Providers [ x] YES  [ ] NO    Imaging Personally Reviewed independently:  [x] YES  [ ] NO    All labs, radiologic studies, vitals, orders and medications list reviewed. Patient is seen and examined at bedside. Case discussed with medical team.        
ORTHOPAEDICS DAILY PROGRESS NOTE:       SUBJECTIVE/ROS: Seen and examined this AM. Endorses pain on lateral aspect of R hip. Denies Cp/SOB/N/V. MRI pending         MEDICATIONS  (STANDING):  atorvastatin 10 milliGRAM(s) Oral at bedtime  carbidopa/levodopa  25/100 1 Tablet(s) Oral three times a day  dextrose 40% Gel 15 Gram(s) Oral once  dextrose 5%. 1000 milliLiter(s) (50 mL/Hr) IV Continuous <Continuous>  dextrose 5%. 1000 milliLiter(s) (100 mL/Hr) IV Continuous <Continuous>  dextrose 50% Injectable 25 Gram(s) IV Push once  dextrose 50% Injectable 12.5 Gram(s) IV Push once  dextrose 50% Injectable 25 Gram(s) IV Push once  glucagon  Injectable 1 milliGRAM(s) IntraMuscular once  insulin lispro (ADMELOG) corrective regimen sliding scale   SubCutaneous three times a day before meals  insulin lispro (ADMELOG) corrective regimen sliding scale   SubCutaneous at bedtime  pantoprazole    Tablet 40 milliGRAM(s) Oral before breakfast  QUEtiapine 50 milliGRAM(s) Oral at bedtime  sodium chloride 0.9% lock flush 3 milliLiter(s) IV Push every 8 hours  tamsulosin 0.4 milliGRAM(s) Oral at bedtime    MEDICATIONS  (PRN):  melatonin 3 milliGRAM(s) Oral at bedtime PRN Insomnia      OBJECTIVE:    Vital Signs Last 24 Hrs  T(C): 36.4 (2021 06:19), Max: 36.7 (10 Richi 2021 12:37)  T(F): 97.5 (2021 06:19), Max: 98.1 (10 Richi 2021 12:37)  HR: 68 (2021 06:19) (55 - 72)  BP: 138/88 (2021 06:19) (88/42 - 141/54)  BP(mean): --  RR: 16 (2021 06:19) (15 - 20)  SpO2: 100% (2021 06:19) (98% - 100%)    I&O's Detail      Daily Height in cm: 170.18 (10 Richi 2021 10:50)    Daily Weight in k.1 (2021 00:15)    LABS:                        11.2   9.01  )-----------( 347      ( 2021 04:07 )             35.2     06-11    137  |  100  |  31<H>  ----------------------------<  93  4.2   |  23  |  0.72    Ca    9.1      2021 04:07  Phos  3.7     06-11  Mg     1.4     06-11    TPro  6.7  /  Alb  3.1<L>  /  TBili  1.0  /  DBili  x   /  AST  7   /  ALT  7   /  AlkPhos  117  06-10    PT/INR - ( 2021 04:07 )   PT: 15.5 sec;   INR: 1.37 ratio         PTT - ( 2021 04:07 )  PTT:34.0 sec  Urinalysis Basic - ( 10 Richi 2021 12:42 )    Color: Yellow / Appearance: Clear / S.023 / pH: x  Gluc: x / Ketone: Negative  / Bili: Negative / Urobili: <2 mg/dL   Blood: x / Protein: Trace / Nitrite: Negative   Leuk Esterase: Negative / RBC: x / WBC x   Sq Epi: x / Non Sq Epi: x / Bacteria: x                PHYSICAL EXAM:  Gen: NAD  Pulm: nonlabored resp  RLE: skin intact, TTP over G troch  Able to straight leg raise  Denies groin pain with axial load  Denies groin pain with log roll  +gastroc/ta/ehl/fhl  silt s/s/sp/dp/t  1+DP  
Bennie Hwang MD  Interventional Cardiology / Advance Heart Failure and Cardiac Transplant Specialist  [place] Office : [address] [place] [address] [place]. [address]  Tel:   [place] Office : [address] [facility] [place] [address]  Tel: [phone]  Cell : [phone]    Subjective/Overnight events: Pt is lying in bed comfortable not in distress  	  MEDICATIONS:  clopidogrel Tablet 75 milliGRAM(s) Oral daily  enoxaparin Injectable 40 milliGRAM(s) SubCutaneous daily  tamsulosin 0.4 milliGRAM(s) Oral at bedtime      guaiFENesin Oral Liquid (Sugar-Free) 100 milliGRAM(s) Oral every 6 hours PRN    acetaminophen   Tablet .. 650 milliGRAM(s) Oral every 6 hours PRN  acetaminophen   Tablet .. 650 milliGRAM(s) Oral every 6 hours PRN  carbidopa/levodopa  25/100 1 Tablet(s) Oral three times a day  melatonin 3 milliGRAM(s) Oral at bedtime PRN  oxyCODONE    IR 10 milliGRAM(s) Oral every 6 hours PRN  oxyCODONE    IR 5 milliGRAM(s) Oral every 6 hours PRN  QUEtiapine 50 milliGRAM(s) Oral at bedtime    pantoprazole    Tablet 40 milliGRAM(s) Oral before breakfast  polyethylene glycol 3350 17 Gram(s) Oral daily  senna 2 Tablet(s) Oral at bedtime    atorvastatin 10 milliGRAM(s) Oral at bedtime  dextrose 40% Gel 15 Gram(s) Oral once  dextrose 50% Injectable 25 Gram(s) IV Push once  dextrose 50% Injectable 12.5 Gram(s) IV Push once  dextrose 50% Injectable 25 Gram(s) IV Push once  glucagon  Injectable 1 milliGRAM(s) IntraMuscular once  insulin lispro (ADMELOG) corrective regimen sliding scale   SubCutaneous three times a day before meals  insulin lispro (ADMELOG) corrective regimen sliding scale   SubCutaneous at bedtime    dextrose 5%. 1000 milliLiter(s) IV Continuous <Continuous>  dextrose 5%. 1000 milliLiter(s) IV Continuous <Continuous>  sodium chloride 0.9% lock flush 3 milliLiter(s) IV Push every 8 hours      PAST MEDICAL/SURGICAL HISTORY  PAST MEDICAL & SURGICAL HISTORY:  Hypercholesterolemia    DM (Diabetes Mellitus)    HTN (Hypertension)    CAD (Coronary Artery Disease)  s/p cardiac stent ( &gt; 20 years ago)    BPH (benign prostatic hyperplasia)    Coronary Stent  x 2 ( 20 years )        SOCIAL HISTORY: Substance Use (street drugs): ( x ) never used  (  ) other:    FAMILY HISTORY:  No pertinent family history in first degree relatives        REVIEW OF SYSTEMS:  unable to obtain    PHYSICAL EXAM:  T(C): 36.4 (06-17-21 @ 04:20), Max: 36.8 (06-16-21 @ 20:46)  HR: 66 (06-17-21 @ 04:20) (66 - 71)  BP: 105/53 (06-17-21 @ 04:20) (105/53 - 117/69)  RR: 16 (06-17-21 @ 04:20) (16 - 16)  SpO2: 94% (06-17-21 @ 04:20) (94% - 100%)  Wt(kg): --  I&O's Summary            EYES:  conjunctiva and sclera clear  ENMT: No tonsillar erythema, exudates, or enlargement;   Cardiovascular: Normal S1 S2, No JVD, No murmurs, No edema  Respiratory: b/l rhonchi   Gastrointestinal:  Soft, Non-tender, + BS	  Extremities: no edema                                   9.9    6.15  )-----------( 330      ( 17 Jun 2021 07:15 )             30.8     06-17    137  |  100  |  25<H>  ----------------------------<  87  4.2   |  28  |  0.73    Ca    8.9      17 Jun 2021 07:15  Phos  3.7     06-17  Mg     1.6     06-17      proBNP:   Lipid Profile:   HgA1c:   TSH:     Consultant(s) Notes Reviewed:  [x ] YES  [ ] NO    Care Discussed with Consultants/Other Providers [ x] YES  [ ] NO    Imaging Personally Reviewed independently:  [x] YES  [ ] NO    All labs, radiologic studies, vitals, orders and medications list reviewed. Patient is seen and examined at bedside. Case discussed with medical team.        
Patient is a 81y old  Male who presents with a chief complaint of Rt hip pain, fall (12 Jun 2021 11:17)      SUBJECTIVE / OVERNIGHT EVENTS: no events overnight. Pt currently sitting on chair, eating breakfast, denies hip or leg pain. Per RN pt did not endorse pain upon movement from bed to chair.    MEDICATIONS  (STANDING):  atorvastatin 10 milliGRAM(s) Oral at bedtime  carbidopa/levodopa  25/100 1 Tablet(s) Oral three times a day  clopidogrel Tablet 75 milliGRAM(s) Oral daily  dextrose 40% Gel 15 Gram(s) Oral once  dextrose 5%. 1000 milliLiter(s) (50 mL/Hr) IV Continuous <Continuous>  dextrose 5%. 1000 milliLiter(s) (100 mL/Hr) IV Continuous <Continuous>  dextrose 50% Injectable 25 Gram(s) IV Push once  dextrose 50% Injectable 12.5 Gram(s) IV Push once  dextrose 50% Injectable 25 Gram(s) IV Push once  enoxaparin Injectable 40 milliGRAM(s) SubCutaneous daily  glucagon  Injectable 1 milliGRAM(s) IntraMuscular once  insulin lispro (ADMELOG) corrective regimen sliding scale   SubCutaneous three times a day before meals  insulin lispro (ADMELOG) corrective regimen sliding scale   SubCutaneous at bedtime  pantoprazole    Tablet 40 milliGRAM(s) Oral before breakfast  QUEtiapine 50 milliGRAM(s) Oral at bedtime  sodium chloride 0.9% lock flush 3 milliLiter(s) IV Push every 8 hours  tamsulosin 0.4 milliGRAM(s) Oral at bedtime    MEDICATIONS  (PRN):  acetaminophen   Tablet .. 650 milliGRAM(s) Oral every 6 hours PRN Mild Pain (1 - 3), Moderate Pain (4 - 6)  acetaminophen   Tablet .. 650 milliGRAM(s) Oral every 6 hours PRN Mild Pain (1 - 3)  melatonin 3 milliGRAM(s) Oral at bedtime PRN Insomnia  oxyCODONE    IR 10 milliGRAM(s) Oral every 6 hours PRN Severe Pain (7 - 10)  oxyCODONE    IR 5 milliGRAM(s) Oral every 6 hours PRN Moderate Pain (4 - 6)        CAPILLARY BLOOD GLUCOSE      POCT Blood Glucose.: 204 mg/dL (12 Jun 2021 11:31)  POCT Blood Glucose.: 145 mg/dL (12 Jun 2021 07:35)  POCT Blood Glucose.: 97 mg/dL (11 Jun 2021 21:15)  POCT Blood Glucose.: 234 mg/dL (11 Jun 2021 17:27)    I&O's Summary    Vital Signs Last 24 Hrs  T(C): 36.4 (12 Jun 2021 10:30), Max: 36.5 (11 Jun 2021 19:32)  T(F): 97.6 (12 Jun 2021 10:30), Max: 97.7 (11 Jun 2021 19:32)  HR: 58 (12 Jun 2021 10:30) (57 - 71)  BP: 101/55 (12 Jun 2021 10:30) (101/55 - 149/50)  BP(mean): --  RR: 18 (12 Jun 2021 10:30) (15 - 18)  SpO2: 99% (12 Jun 2021 10:30) (99% - 100%)    PHYSICAL EXAM:  GENERAL: NAD, well-developed  CHEST/LUNG: Clear to auscultation bilaterally; No wheeze  HEART: Regular rate and rhythm; No murmurs, rubs, or gallops  ABDOMEN: Soft, Nontender, Nondistended; Bowel sounds present  EXTREMITIES:  2+ Peripheral Pulses, No clubbing, cyanosis, or edema  PSYCH: AAOx2, calm  NEUROLOGY:  follows simple commands, limited hip exam d/t pain upon manipulation. 4/5 b/l LE and 5/5 b/l UE  SKIN: No rashes or lesions  LABS:                        10.9   6.36  )-----------( 368      ( 12 Jun 2021 07:19 )             34.1     06-12    138  |  101  |  28<H>  ----------------------------<  101<H>  4.1   |  27  |  0.69    Ca    9.3      12 Jun 2021 07:19  Phos  2.7     06-12  Mg     1.5     06-12      PT/INR - ( 11 Jun 2021 04:07 )   PT: 15.5 sec;   INR: 1.37 ratio         PTT - ( 11 Jun 2021 04:07 )  PTT:34.0 sec          RADIOLOGY & ADDITIONAL TESTS:    Imaging Personally Reviewed:    Consultant(s) Notes Reviewed:      Care Discussed with Consultants/Other Providers:    
  Bennie Hwang MD  Interventional Cardiology / Endovascular Specialist  [place] Office : [address] [place] [address] [place] [address]  Tel:   [place] Office : [address] [facility] [place] [address]  Tel: [phone]  Cell : [phone]    Subjective/Overnight events: Patient lying in bed comfortably. No acute distress  	  MEDICATIONS:  clopidogrel Tablet 75 milliGRAM(s) Oral daily  enoxaparin Injectable 40 milliGRAM(s) SubCutaneous daily  tamsulosin 0.4 milliGRAM(s) Oral at bedtime      guaiFENesin Oral Liquid (Sugar-Free) 100 milliGRAM(s) Oral every 6 hours PRN    acetaminophen   Tablet .. 650 milliGRAM(s) Oral every 6 hours PRN  acetaminophen   Tablet .. 650 milliGRAM(s) Oral every 6 hours PRN  carbidopa/levodopa  25/100 1 Tablet(s) Oral three times a day  melatonin 3 milliGRAM(s) Oral at bedtime PRN  oxyCODONE    IR 10 milliGRAM(s) Oral every 6 hours PRN  oxyCODONE    IR 5 milliGRAM(s) Oral every 6 hours PRN  QUEtiapine 50 milliGRAM(s) Oral at bedtime    pantoprazole    Tablet 40 milliGRAM(s) Oral before breakfast  polyethylene glycol 3350 17 Gram(s) Oral daily  senna 2 Tablet(s) Oral at bedtime    atorvastatin 10 milliGRAM(s) Oral at bedtime  dextrose 40% Gel 15 Gram(s) Oral once  dextrose 50% Injectable 25 Gram(s) IV Push once  dextrose 50% Injectable 12.5 Gram(s) IV Push once  dextrose 50% Injectable 25 Gram(s) IV Push once  glucagon  Injectable 1 milliGRAM(s) IntraMuscular once  insulin lispro (ADMELOG) corrective regimen sliding scale   SubCutaneous three times a day before meals  insulin lispro (ADMELOG) corrective regimen sliding scale   SubCutaneous at bedtime    dextrose 5%. 1000 milliLiter(s) IV Continuous <Continuous>  dextrose 5%. 1000 milliLiter(s) IV Continuous <Continuous>  sodium chloride 0.9% lock flush 3 milliLiter(s) IV Push every 8 hours      PAST MEDICAL/SURGICAL HISTORY  PAST MEDICAL & SURGICAL HISTORY:  Hypercholesterolemia    DM (Diabetes Mellitus)    HTN (Hypertension)    CAD (Coronary Artery Disease)  s/p cardiac stent ( &gt; 20 years ago)    BPH (benign prostatic hyperplasia)    Coronary Stent  x 2 ( 20 years )        SOCIAL HISTORY: Substance Use (street drugs): ( x ) never used  (  ) other:    FAMILY HISTORY:  No pertinent family history in first degree relatives        REVIEW OF SYSTEMS:  CONSTITUTIONAL: No fever, weight loss, or fatigue  EYES: No eye pain, visual disturbances, or discharge  ENMT:  No difficulty hearing, tinnitus, vertigo; No sinus or throat pain  BREASTS: No pain, masses, or nipple discharge  GASTROINTESTINAL: No abdominal or epigastric pain. No nausea, vomiting, or hematemesis; No diarrhea or constipation. No melena or hematochezia.  GENITOURINARY: No dysuria, frequency, hematuria, or incontinence  NEUROLOGICAL: No headaches, memory loss, loss of strength, numbness, or tremors  ENDOCRINE: No heat or cold intolerance; No hair loss  MUSCULOSKELETAL: No joint pain or swelling; No muscle, back, or extremity pain  PSYCHIATRIC: No depression, anxiety, mood swings, or difficulty sleeping  HEME/LYMPH: No easy bruising, or bleeding gums  All others negative    PHYSICAL EXAM:  T(C): 36.4 (06-13-21 @ 05:42), Max: 36.7 (06-12-21 @ 21:42)  HR: 62 (06-13-21 @ 05:42) (60 - 67)  BP: 116/56 (06-13-21 @ 05:42) (100/56 - 118/53)  RR: 18 (06-13-21 @ 05:42) (18 - 18)  SpO2: 98% (06-13-21 @ 05:42) (98% - 100%)  Wt(kg): --  I&O's Summary      EYES: EOMI, PERRLA, conjunctiva and sclera clear  ENMT: No tonsillar erythema, exudates, or enlargement;   Cardiovascular: Normal S1 S2, No JVD, No murmurs, No edema  Respiratory: b/l rhonchi   Gastrointestinal:  Soft, Non-tender, + BS	  Extremities: no edema                                   10.9   6.36  )-----------( 368      ( 12 Jun 2021 07:19 )             34.1     06-12    138  |  101  |  28<H>  ----------------------------<  101<H>  4.1   |  27  |  0.69    Ca    9.3      12 Jun 2021 07:19  Phos  2.7     06-12  Mg     1.5     06-12      proBNP:   Lipid Profile:   HgA1c:   TSH:     Consultant(s) Notes Reviewed:  [x ] YES  [ ] NO    Care Discussed with Consultants/Other Providers [ x] YES  [ ] NO    Imaging Personally Reviewed independently:  [x] YES  [ ] NO    All labs, radiologic studies, vitals, orders and medications list reviewed. Patient is seen and examined at bedside. Case discussed with medical team.              
Bennie Hwang MD  Interventional Cardiology / Advance Heart Failure and Cardiac Transplant Specialist  [place] Office : [address] [place] [address] [place]. [address]  Tel:   [place] Office : [address] [facility] [place] [address]  Tel: [phone]  Cell : [phone]    Subjective/Overnight events: Pt is lying in bed comfortable not in distress,  	  MEDICATIONS:  clopidogrel Tablet 75 milliGRAM(s) Oral daily  enoxaparin Injectable 40 milliGRAM(s) SubCutaneous daily  tamsulosin 0.4 milliGRAM(s) Oral at bedtime      guaiFENesin Oral Liquid (Sugar-Free) 100 milliGRAM(s) Oral every 6 hours PRN    acetaminophen   Tablet .. 650 milliGRAM(s) Oral every 6 hours PRN  acetaminophen   Tablet .. 650 milliGRAM(s) Oral every 6 hours PRN  carbidopa/levodopa  25/100 1 Tablet(s) Oral three times a day  melatonin 3 milliGRAM(s) Oral at bedtime PRN  oxyCODONE    IR 10 milliGRAM(s) Oral every 6 hours PRN  oxyCODONE    IR 5 milliGRAM(s) Oral every 6 hours PRN  QUEtiapine 50 milliGRAM(s) Oral at bedtime    pantoprazole    Tablet 40 milliGRAM(s) Oral before breakfast  polyethylene glycol 3350 17 Gram(s) Oral daily  senna 2 Tablet(s) Oral at bedtime    atorvastatin 10 milliGRAM(s) Oral at bedtime  dextrose 40% Gel 15 Gram(s) Oral once  dextrose 50% Injectable 12.5 Gram(s) IV Push once  dextrose 50% Injectable 25 Gram(s) IV Push once  dextrose 50% Injectable 25 Gram(s) IV Push once  glucagon  Injectable 1 milliGRAM(s) IntraMuscular once  insulin lispro (ADMELOG) corrective regimen sliding scale   SubCutaneous three times a day before meals  insulin lispro (ADMELOG) corrective regimen sliding scale   SubCutaneous at bedtime    dextrose 5%. 1000 milliLiter(s) IV Continuous <Continuous>  dextrose 5%. 1000 milliLiter(s) IV Continuous <Continuous>  sodium chloride 0.9% lock flush 3 milliLiter(s) IV Push every 8 hours      PAST MEDICAL/SURGICAL HISTORY  PAST MEDICAL & SURGICAL HISTORY:  Hypercholesterolemia    DM (Diabetes Mellitus)    HTN (Hypertension)    CAD (Coronary Artery Disease)  s/p cardiac stent ( &gt; 20 years ago)    BPH (benign prostatic hyperplasia)    Coronary Stent  x 2 ( 20 years )        SOCIAL HISTORY: Substance Use (street drugs): ( x ) never used  (  ) other:    FAMILY HISTORY:  No pertinent family history in first degree relatives        REVIEW OF SYSTEMS:  unable to obtain    PHYSICAL EXAM:  T(C): 36.3 (06-16-21 @ 05:28), Max: 36.9 (06-15-21 @ 12:14)  HR: 58 (06-16-21 @ 05:28) (58 - 79)  BP: 113/45 (06-16-21 @ 05:28) (112/58 - 154/72)  RR: 17 (06-16-21 @ 05:28) (17 - 18)  SpO2: 100% (06-16-21 @ 05:28) (99% - 100%)  Wt(kg): --  I&O's Summary          EYES:  conjunctiva and sclera clear  ENMT: No tonsillar erythema, exudates, or enlargement;   Cardiovascular: Normal S1 S2, No JVD, No murmurs, No edema  Respiratory: b/l rhonchi   Gastrointestinal:  Soft, Non-tender, + BS	  Extremities: no edema                                       10.2   5.69  )-----------( 332      ( 16 Jun 2021 06:44 )             32.3     06-16    136  |  100  |  20  ----------------------------<  98  3.7   |  25  |  0.69    Ca    9.3      16 Jun 2021 06:44  Phos  3.3     06-16  Mg     1.5     06-16      proBNP:   Lipid Profile:   HgA1c:   TSH:     Consultant(s) Notes Reviewed:  [x ] YES  [ ] NO    Care Discussed with Consultants/Other Providers [ x] YES  [ ] NO    Imaging Personally Reviewed independently:  [x] YES  [ ] NO    All labs, radiologic studies, vitals, orders and medications list reviewed. Patient is seen and examined at bedside. Case discussed with medical team.        
  Bennie Hwang MD  Interventional Cardiology / Endovascular Specialist  [place] Office : [address] [place] [address] [place] [address]  Tel:   [place] Office : [address] [facility] [place] [address]  Tel: [phone]  Cell : [phone]    Subjective/Overnight events: Patient lying in bed comfortably. No acute distress  	  MEDICATIONS:  clopidogrel Tablet 75 milliGRAM(s) Oral daily  enoxaparin Injectable 40 milliGRAM(s) SubCutaneous daily  tamsulosin 0.4 milliGRAM(s) Oral at bedtime        acetaminophen   Tablet .. 650 milliGRAM(s) Oral every 6 hours PRN  acetaminophen   Tablet .. 650 milliGRAM(s) Oral every 6 hours PRN  carbidopa/levodopa  25/100 1 Tablet(s) Oral three times a day  melatonin 3 milliGRAM(s) Oral at bedtime PRN  oxyCODONE    IR 10 milliGRAM(s) Oral every 6 hours PRN  oxyCODONE    IR 5 milliGRAM(s) Oral every 6 hours PRN  QUEtiapine 50 milliGRAM(s) Oral at bedtime    pantoprazole    Tablet 40 milliGRAM(s) Oral before breakfast    atorvastatin 10 milliGRAM(s) Oral at bedtime  dextrose 40% Gel 15 Gram(s) Oral once  dextrose 50% Injectable 25 Gram(s) IV Push once  dextrose 50% Injectable 12.5 Gram(s) IV Push once  dextrose 50% Injectable 25 Gram(s) IV Push once  glucagon  Injectable 1 milliGRAM(s) IntraMuscular once  insulin lispro (ADMELOG) corrective regimen sliding scale   SubCutaneous three times a day before meals  insulin lispro (ADMELOG) corrective regimen sliding scale   SubCutaneous at bedtime    dextrose 5%. 1000 milliLiter(s) IV Continuous <Continuous>  dextrose 5%. 1000 milliLiter(s) IV Continuous <Continuous>  sodium chloride 0.9% lock flush 3 milliLiter(s) IV Push every 8 hours      PAST MEDICAL/SURGICAL HISTORY  PAST MEDICAL & SURGICAL HISTORY:  Hypercholesterolemia    DM (Diabetes Mellitus)    HTN (Hypertension)    CAD (Coronary Artery Disease)  s/p cardiac stent ( &gt; 20 years ago)    BPH (benign prostatic hyperplasia)    Coronary Stent  x 2 ( 20 years )        SOCIAL HISTORY: Substance Use (street drugs): ( x ) never used  (  ) other:    FAMILY HISTORY:  No pertinent family history in first degree relatives        REVIEW OF SYSTEMS:  CONSTITUTIONAL: No fever, weight loss, or fatigue  EYES: No eye pain, visual disturbances, or discharge  ENMT:  No difficulty hearing, tinnitus, vertigo; No sinus or throat pain  BREASTS: No pain, masses, or nipple discharge  GASTROINTESTINAL: No abdominal or epigastric pain. No nausea, vomiting, or hematemesis; No diarrhea or constipation. No melena or hematochezia.  GENITOURINARY: No dysuria, frequency, hematuria, or incontinence  NEUROLOGICAL: No headaches, memory loss, loss of strength, numbness, or tremors  ENDOCRINE: No heat or cold intolerance; No hair loss  MUSCULOSKELETAL: No joint pain or swelling; No muscle, back, or extremity pain  PSYCHIATRIC: No depression, anxiety, mood swings, or difficulty sleeping  HEME/LYMPH: No easy bruising, or bleeding gums  All others negative    PHYSICAL EXAM:  T(C): 36.3 (06-12-21 @ 06:47), Max: 36.7 (06-11-21 @ 13:32)  HR: 57 (06-12-21 @ 06:47) (57 - 71)  BP: 122/52 (06-12-21 @ 06:47) (117/58 - 149/50)  RR: 15 (06-12-21 @ 06:47) (15 - 18)  SpO2: 100% (06-12-21 @ 06:47) (99% - 100%)  Wt(kg): --  I&O's Summary        EYES: EOMI, PERRLA, conjunctiva and sclera clear  ENMT: No tonsillar erythema, exudates, or enlargement;   Cardiovascular: Normal S1 S2, No JVD, No murmurs, No edema  Respiratory: b/l rhonchi   Gastrointestinal:  Soft, Non-tender, + BS	  Extremities: no edema                                 10.9   6.36  )-----------( 368      ( 12 Jun 2021 07:19 )             34.1     06-12    138  |  101  |  28<H>  ----------------------------<  101<H>  4.1   |  27  |  0.69    Ca    9.3      12 Jun 2021 07:19  Phos  2.7     06-12  Mg     1.5     06-12    TPro  6.7  /  Alb  3.1<L>  /  TBili  1.0  /  DBili  x   /  AST  7   /  ALT  7   /  AlkPhos  117  06-10    proBNP:   Lipid Profile:   HgA1c:   TSH:     Consultant(s) Notes Reviewed:  [x ] YES  [ ] NO    Care Discussed with Consultants/Other Providers [ x] YES  [ ] NO    Imaging Personally Reviewed independently:  [x] YES  [ ] NO    All labs, radiologic studies, vitals, orders and medications list reviewed. Patient is seen and examined at bedside. Case discussed with medical team.              
Patient is a 81y old  Male who presents with a chief complaint of Rt hip pain, fall (13 Jun 2021 13:16)      SUBJECTIVE / OVERNIGHT EVENTS: No  notable events overnight. Pt c/w mild R. hip pain upon manipulation. Was able to ambulate with PT.. Requesting more food. has no other complaints. no Po oxy prn used.    MEDICATIONS  (STANDING):  atorvastatin 10 milliGRAM(s) Oral at bedtime  carbidopa/levodopa  25/100 1 Tablet(s) Oral three times a day  clopidogrel Tablet 75 milliGRAM(s) Oral daily  dextrose 40% Gel 15 Gram(s) Oral once  dextrose 5%. 1000 milliLiter(s) (50 mL/Hr) IV Continuous <Continuous>  dextrose 5%. 1000 milliLiter(s) (100 mL/Hr) IV Continuous <Continuous>  dextrose 50% Injectable 25 Gram(s) IV Push once  dextrose 50% Injectable 12.5 Gram(s) IV Push once  dextrose 50% Injectable 25 Gram(s) IV Push once  enoxaparin Injectable 40 milliGRAM(s) SubCutaneous daily  glucagon  Injectable 1 milliGRAM(s) IntraMuscular once  insulin lispro (ADMELOG) corrective regimen sliding scale   SubCutaneous three times a day before meals  insulin lispro (ADMELOG) corrective regimen sliding scale   SubCutaneous at bedtime  pantoprazole    Tablet 40 milliGRAM(s) Oral before breakfast  polyethylene glycol 3350 17 Gram(s) Oral daily  QUEtiapine 50 milliGRAM(s) Oral at bedtime  senna 2 Tablet(s) Oral at bedtime  sodium chloride 0.9% lock flush 3 milliLiter(s) IV Push every 8 hours  tamsulosin 0.4 milliGRAM(s) Oral at bedtime    MEDICATIONS  (PRN):  acetaminophen   Tablet .. 650 milliGRAM(s) Oral every 6 hours PRN Mild Pain (1 - 3), Moderate Pain (4 - 6)  acetaminophen   Tablet .. 650 milliGRAM(s) Oral every 6 hours PRN Mild Pain (1 - 3)  guaiFENesin Oral Liquid (Sugar-Free) 100 milliGRAM(s) Oral every 6 hours PRN Cough  melatonin 3 milliGRAM(s) Oral at bedtime PRN Insomnia  oxyCODONE    IR 10 milliGRAM(s) Oral every 6 hours PRN Severe Pain (7 - 10)  oxyCODONE    IR 5 milliGRAM(s) Oral every 6 hours PRN Moderate Pain (4 - 6)        CAPILLARY BLOOD GLUCOSE      POCT Blood Glucose.: 145 mg/dL (13 Jun 2021 11:30)  POCT Blood Glucose.: 103 mg/dL (13 Jun 2021 07:34)  POCT Blood Glucose.: 107 mg/dL (12 Jun 2021 21:08)  POCT Blood Glucose.: 161 mg/dL (12 Jun 2021 16:52)    I&O's Summary    Vital Signs Last 24 Hrs  T(C): 36.7 (13 Jun 2021 13:43), Max: 36.7 (12 Jun 2021 21:42)  T(F): 98 (13 Jun 2021 13:43), Max: 98 (12 Jun 2021 21:42)  HR: 61 (13 Jun 2021 13:43) (60 - 67)  BP: 110/55 (13 Jun 2021 13:43) (100/56 - 118/53)  BP(mean): --  RR: 18 (13 Jun 2021 13:43) (18 - 18)  SpO2: 99% (13 Jun 2021 13:43) (98% - 100%)        PHYSICAL EXAM:  GENERAL: NAD, well-developed  CHEST/LUNG: Clear to auscultation bilaterally; No wheeze  HEART: Regular rate and rhythm; No murmurs, rubs, or gallops  ABDOMEN: Soft, Nontender, Nondistended; Bowel sounds present  EXTREMITIES:  2+ Peripheral Pulses, No clubbing, cyanosis, or edema  PSYCH: AAOx2, calm  NEUROLOGY:  follows simple commands, limited hip exam d/t pain upon manipulation. 4/5 b/l LE and 5/5 b/l UE  SKIN: No rashes or lesions      LABS:                        10.9   6.36  )-----------( 368      ( 12 Jun 2021 07:19 )             34.1     06-12    138  |  101  |  28<H>  ----------------------------<  101<H>  4.1   |  27  |  0.69    Ca    9.3      12 Jun 2021 07:19  Phos  2.7     06-12  Mg     1.5     06-12                RADIOLOGY & ADDITIONAL TESTS:    Imaging Personally Reviewed:    Consultant(s) Notes Reviewed:      Care Discussed with Consultants/Other Providers:    
Patient is a 81y old  Male who presents with a chief complaint of Rt hip pain, fall (2021 06:53)      SUBJECTIVE / OVERNIGHT EVENTS: today AM pt denies pain at rest. ROS limited d/t poor historian. Denies cp, sob, abd pain, N/V.    MEDICATIONS  (STANDING):  atorvastatin 10 milliGRAM(s) Oral at bedtime  carbidopa/levodopa  25/100 1 Tablet(s) Oral three times a day  dextrose 40% Gel 15 Gram(s) Oral once  dextrose 5%. 1000 milliLiter(s) (50 mL/Hr) IV Continuous <Continuous>  dextrose 5%. 1000 milliLiter(s) (100 mL/Hr) IV Continuous <Continuous>  dextrose 50% Injectable 25 Gram(s) IV Push once  dextrose 50% Injectable 12.5 Gram(s) IV Push once  dextrose 50% Injectable 25 Gram(s) IV Push once  glucagon  Injectable 1 milliGRAM(s) IntraMuscular once  insulin lispro (ADMELOG) corrective regimen sliding scale   SubCutaneous three times a day before meals  insulin lispro (ADMELOG) corrective regimen sliding scale   SubCutaneous at bedtime  magnesium oxide 400 milliGRAM(s) Oral two times a day with meals  pantoprazole    Tablet 40 milliGRAM(s) Oral before breakfast  QUEtiapine 50 milliGRAM(s) Oral at bedtime  sodium chloride 0.9% lock flush 3 milliLiter(s) IV Push every 8 hours  tamsulosin 0.4 milliGRAM(s) Oral at bedtime    MEDICATIONS  (PRN):  melatonin 3 milliGRAM(s) Oral at bedtime PRN Insomnia    Vital Signs Last 24 Hrs  T(C): 36.4 (2021 06:19), Max: 36.6 (10 Richi 2021 22:00)  T(F): 97.5 (2021 06:19), Max: 97.8 (10 Richi 2021 22:00)  HR: 68 (2021 06:19) (55 - 72)  BP: 138/88 (2021 06:19) (113/55 - 141/54)  BP(mean): --  RR: 16 (2021 06:19) (15 - 20)  SpO2: 100% (2021 06:19) (98% - 100%)    PHYSICAL EXAM:  GENERAL: NAD, well-developed  CHEST/LUNG: Clear to auscultation bilaterally; No wheeze  HEART: Regular rate and rhythm; No murmurs, rubs, or gallops  ABDOMEN: Soft, Nontender, Nondistended; Bowel sounds present  EXTREMITIES:  2+ Peripheral Pulses, No clubbing, cyanosis, or edema  PSYCH: AAOx2, calm  NEUROLOGY:  follows simple commands, limited hip exam d/t pain. 4/5 b/l LE and 5/5 b/l UE  SKIN: No rashes or lesions    LABS:                        11.2   9.01  )-----------( 347      ( 2021 04:07 )             35.2     06-11    137  |  100  |  31<H>  ----------------------------<  93  4.2   |  23  |  0.72    Ca    9.1      2021 04:07  Phos  3.7     06-11  Mg     1.4     06-11    TPro  6.7  /  Alb  3.1<L>  /  TBili  1.0  /  DBili  x   /  AST  7   /  ALT  7   /  AlkPhos  117  06-10    PT/INR - ( 2021 04:07 )   PT: 15.5 sec;   INR: 1.37 ratio         PTT - ( 2021 04:07 )  PTT:34.0 sec      Urinalysis Basic - ( 10 Richi 2021 12:42 )    Color: Yellow / Appearance: Clear / S.023 / pH: x  Gluc: x / Ketone: Negative  / Bili: Negative / Urobili: <2 mg/dL   Blood: x / Protein: Trace / Nitrite: Negative   Leuk Esterase: Negative / RBC: x / WBC x   Sq Epi: x / Non Sq Epi: x / Bacteria: x        RADIOLOGY & ADDITIONAL TESTS:    Imaging Personally Reviewed:    Consultant(s) Notes Reviewed:      Care Discussed with Consultants/Other Providers: Dr. Lu (Ortho resident)  
Patient is a 81y old  Male who presents with a chief complaint of Rt hip pain, fall (14 Jun 2021 11:31)      SUBJECTIVE / OVERNIGHT EVENTS: No acute events overnight. Pt still has some R hip pain but says improving, mostly pain when moving. Has not required pain meds    ADDITIONAL REVIEW OF SYSTEMS:    MEDICATIONS  (STANDING):  atorvastatin 10 milliGRAM(s) Oral at bedtime  carbidopa/levodopa  25/100 1 Tablet(s) Oral three times a day  clopidogrel Tablet 75 milliGRAM(s) Oral daily  dextrose 40% Gel 15 Gram(s) Oral once  dextrose 5%. 1000 milliLiter(s) (50 mL/Hr) IV Continuous <Continuous>  dextrose 5%. 1000 milliLiter(s) (100 mL/Hr) IV Continuous <Continuous>  dextrose 50% Injectable 25 Gram(s) IV Push once  dextrose 50% Injectable 12.5 Gram(s) IV Push once  dextrose 50% Injectable 25 Gram(s) IV Push once  enoxaparin Injectable 40 milliGRAM(s) SubCutaneous daily  glucagon  Injectable 1 milliGRAM(s) IntraMuscular once  insulin lispro (ADMELOG) corrective regimen sliding scale   SubCutaneous three times a day before meals  insulin lispro (ADMELOG) corrective regimen sliding scale   SubCutaneous at bedtime  pantoprazole    Tablet 40 milliGRAM(s) Oral before breakfast  polyethylene glycol 3350 17 Gram(s) Oral daily  QUEtiapine 50 milliGRAM(s) Oral at bedtime  senna 2 Tablet(s) Oral at bedtime  sodium chloride 0.9% lock flush 3 milliLiter(s) IV Push every 8 hours  tamsulosin 0.4 milliGRAM(s) Oral at bedtime    MEDICATIONS  (PRN):  acetaminophen   Tablet .. 650 milliGRAM(s) Oral every 6 hours PRN Mild Pain (1 - 3), Moderate Pain (4 - 6)  acetaminophen   Tablet .. 650 milliGRAM(s) Oral every 6 hours PRN Mild Pain (1 - 3)  guaiFENesin Oral Liquid (Sugar-Free) 100 milliGRAM(s) Oral every 6 hours PRN Cough  melatonin 3 milliGRAM(s) Oral at bedtime PRN Insomnia  oxyCODONE    IR 10 milliGRAM(s) Oral every 6 hours PRN Severe Pain (7 - 10)  oxyCODONE    IR 5 milliGRAM(s) Oral every 6 hours PRN Moderate Pain (4 - 6)      CAPILLARY BLOOD GLUCOSE      POCT Blood Glucose.: 198 mg/dL (14 Jun 2021 11:34)  POCT Blood Glucose.: 113 mg/dL (14 Jun 2021 07:52)  POCT Blood Glucose.: 138 mg/dL (13 Jun 2021 21:17)  POCT Blood Glucose.: 180 mg/dL (13 Jun 2021 16:28)    I&O's Summary      PHYSICAL EXAM:  Vital Signs Last 24 Hrs  T(C): 36.6 (14 Jun 2021 05:49), Max: 36.9 (13 Jun 2021 18:08)  T(F): 97.8 (14 Jun 2021 05:49), Max: 98.4 (13 Jun 2021 18:08)  HR: 63 (14 Jun 2021 11:32) (61 - 72)  BP: 111/52 (14 Jun 2021 11:32) (102/60 - 126/47)  BP(mean): --  RR: 17 (14 Jun 2021 11:32) (17 - 18)  SpO2: 98% (14 Jun 2021 11:32) (98% - 100%)    GENERAL: NAD, well-developed  CHEST/LUNG: Clear to auscultation bilaterally; No wheeze  HEART: Regular rate and rhythm; No murmurs, rubs, or gallops  ABDOMEN: Soft, Nontender, Nondistended; Bowel sounds present  EXTREMITIES:  2+ Peripheral Pulses, No clubbing, cyanosis, or edema  PSYCH: AAOx2, calm  NEUROLOGY:  follows simple commands, limited hip exam d/t pain upon manipulation. 4/5 b/l LE and 5/5 b/l UE  SKIN: No rashes or lesions      LABS:                        9.9    5.10  )-----------( 333      ( 14 Jun 2021 07:45 )             31.1     06-14    140  |  103  |  19  ----------------------------<  93  3.9   |  26  |  0.68    Ca    8.7      14 Jun 2021 07:45  Phos  3.2     06-14  Mg     1.5     06-14                  RADIOLOGY & ADDITIONAL TESTS:  Results Reviewed:   Imaging Personally Reviewed:  Electrocardiogram Personally Reviewed:    COORDINATION OF CARE:  Care Discussed with Consultants/Other Providers [Y/N]:  Prior or Outpatient Records Reviewed [Y/N]:  
Patient is a 81y old  Male who presents with a chief complaint of Rt hip pain, fall (15 Richi 2021 11:39)      SUBJECTIVE / OVERNIGHT EVENTS:  Patient has no new complaints. Denies cp, SOB, abdominal pain, N/V/D     MEDICATIONS  (STANDING):  atorvastatin 10 milliGRAM(s) Oral at bedtime  carbidopa/levodopa  25/100 1 Tablet(s) Oral three times a day  clopidogrel Tablet 75 milliGRAM(s) Oral daily  dextrose 40% Gel 15 Gram(s) Oral once  dextrose 5%. 1000 milliLiter(s) (50 mL/Hr) IV Continuous <Continuous>  dextrose 5%. 1000 milliLiter(s) (100 mL/Hr) IV Continuous <Continuous>  dextrose 50% Injectable 25 Gram(s) IV Push once  dextrose 50% Injectable 12.5 Gram(s) IV Push once  dextrose 50% Injectable 25 Gram(s) IV Push once  enoxaparin Injectable 40 milliGRAM(s) SubCutaneous daily  glucagon  Injectable 1 milliGRAM(s) IntraMuscular once  insulin lispro (ADMELOG) corrective regimen sliding scale   SubCutaneous three times a day before meals  insulin lispro (ADMELOG) corrective regimen sliding scale   SubCutaneous at bedtime  pantoprazole    Tablet 40 milliGRAM(s) Oral before breakfast  polyethylene glycol 3350 17 Gram(s) Oral daily  QUEtiapine 50 milliGRAM(s) Oral at bedtime  senna 2 Tablet(s) Oral at bedtime  sodium chloride 0.9% lock flush 3 milliLiter(s) IV Push every 8 hours  tamsulosin 0.4 milliGRAM(s) Oral at bedtime    MEDICATIONS  (PRN):  acetaminophen   Tablet .. 650 milliGRAM(s) Oral every 6 hours PRN Mild Pain (1 - 3), Moderate Pain (4 - 6)  acetaminophen   Tablet .. 650 milliGRAM(s) Oral every 6 hours PRN Mild Pain (1 - 3)  guaiFENesin Oral Liquid (Sugar-Free) 100 milliGRAM(s) Oral every 6 hours PRN Cough  melatonin 3 milliGRAM(s) Oral at bedtime PRN Insomnia  oxyCODONE    IR 10 milliGRAM(s) Oral every 6 hours PRN Severe Pain (7 - 10)  oxyCODONE    IR 5 milliGRAM(s) Oral every 6 hours PRN Moderate Pain (4 - 6)      Vital Signs Last 24 Hrs  T(C): 36.9 (15 Richi 2021 12:14), Max: 37.2 (2021 21:30)  T(F): 98.4 (15 Richi 2021 12:14), Max: 99 (2021 21:30)  HR: 79 (15 Richi 2021 12:14) (66 - 87)  BP: 112/58 (15 Richi 2021 12:14) (112/54 - 149/64)  BP(mean): --  RR: 18 (15 Richi 2021 12:14) (17 - 18)  SpO2: 100% (15 Richi 2021 12:14) (97% - 100%)  CAPILLARY BLOOD GLUCOSE      POCT Blood Glucose.: 132 mg/dL (15 Richi 2021 11:40)  POCT Blood Glucose.: 104 mg/dL (15 Richi 2021 07:23)  POCT Blood Glucose.: 132 mg/dL (2021 21:27)  POCT Blood Glucose.: 178 mg/dL (2021 16:35)    I&O's Summary      PHYSICAL EXAM:  GENERAL: NAD, well-developed  HEAD:  Atraumatic, Normocephalic  EYES: EOMI, PERRLA, conjunctiva and sclera clear  NECK: Supple, No JVD  CHEST/LUNG: Clear to auscultation bilaterally; No wheeze  HEART: Regular rate and rhythm; No murmurs, rubs, or gallops  ABDOMEN: Soft, Nontender, Nondistended; Bowel sounds present  EXTREMITIES:  2+ Peripheral Pulses, No clubbing, cyanosis, or edema  PSYCH: AAOx3  NEUROLOGY: non-focal  SKIN: No rashes or lesions    LABS:                        10.5   6.67  )-----------( 374      ( 15 Richi 2021 07:35 )             32.5     06-15    138  |  102  |  20  ----------------------------<  90  4.4   |  26  |  0.75    Ca    9.7      15 Richi 2021 07:35  Phos  3.3     06-15  Mg     1.5     06-15            Urinalysis Basic - ( 2021 13:52 )    Color: Light Yellow / Appearance: Clear / S.009 / pH: x  Gluc: x / Ketone: Negative  / Bili: Negative / Urobili: <2 mg/dL   Blood: x / Protein: Negative / Nitrite: Negative   Leuk Esterase: Negative / RBC: x / WBC x   Sq Epi: x / Non Sq Epi: x / Bacteria: x        RADIOLOGY & ADDITIONAL TESTS:    Imaging Personally Reviewed:    Consultant(s) Notes Reviewed:      Care Discussed with Consultants/Other Providers:  
Patient is a 81y old  Male who presents with a chief complaint of Rt hip pain, fall (15 Richi 2021 13:59)      SUBJECTIVE / OVERNIGHT EVENTS:  Patient has no new complaints. Denies cp, SOB, abdominal pain, N/V/D     MEDICATIONS  (STANDING):  atorvastatin 10 milliGRAM(s) Oral at bedtime  carbidopa/levodopa  25/100 1 Tablet(s) Oral three times a day  clopidogrel Tablet 75 milliGRAM(s) Oral daily  dextrose 40% Gel 15 Gram(s) Oral once  dextrose 5%. 1000 milliLiter(s) (50 mL/Hr) IV Continuous <Continuous>  dextrose 5%. 1000 milliLiter(s) (100 mL/Hr) IV Continuous <Continuous>  dextrose 50% Injectable 12.5 Gram(s) IV Push once  dextrose 50% Injectable 25 Gram(s) IV Push once  dextrose 50% Injectable 25 Gram(s) IV Push once  enoxaparin Injectable 40 milliGRAM(s) SubCutaneous daily  glucagon  Injectable 1 milliGRAM(s) IntraMuscular once  insulin lispro (ADMELOG) corrective regimen sliding scale   SubCutaneous three times a day before meals  insulin lispro (ADMELOG) corrective regimen sliding scale   SubCutaneous at bedtime  pantoprazole    Tablet 40 milliGRAM(s) Oral before breakfast  polyethylene glycol 3350 17 Gram(s) Oral daily  QUEtiapine 50 milliGRAM(s) Oral at bedtime  senna 2 Tablet(s) Oral at bedtime  sodium chloride 0.9% lock flush 3 milliLiter(s) IV Push every 8 hours  tamsulosin 0.4 milliGRAM(s) Oral at bedtime    MEDICATIONS  (PRN):  acetaminophen   Tablet .. 650 milliGRAM(s) Oral every 6 hours PRN Mild Pain (1 - 3), Moderate Pain (4 - 6)  acetaminophen   Tablet .. 650 milliGRAM(s) Oral every 6 hours PRN Mild Pain (1 - 3)  guaiFENesin Oral Liquid (Sugar-Free) 100 milliGRAM(s) Oral every 6 hours PRN Cough  melatonin 3 milliGRAM(s) Oral at bedtime PRN Insomnia  oxyCODONE    IR 10 milliGRAM(s) Oral every 6 hours PRN Severe Pain (7 - 10)  oxyCODONE    IR 5 milliGRAM(s) Oral every 6 hours PRN Moderate Pain (4 - 6)      Vital Signs Last 24 Hrs  T(C): 36.3 (2021 05:28), Max: 36.9 (15 Richi 2021 12:14)  T(F): 97.3 (2021 05:28), Max: 98.4 (15 Richi 2021 12:14)  HR: 58 (2021 05:28) (58 - 79)  BP: 113/45 (2021 05:28) (112/58 - 154/72)  BP(mean): --  RR: 17 (2021 05:28) (17 - 18)  SpO2: 100% (2021 05:28) (99% - 100%)  CAPILLARY BLOOD GLUCOSE      POCT Blood Glucose.: 165 mg/dL (2021 11:19)  POCT Blood Glucose.: 110 mg/dL (2021 07:15)  POCT Blood Glucose.: 152 mg/dL (15 Richi 2021 20:45)  POCT Blood Glucose.: 117 mg/dL (15 Richi 2021 16:17)  POCT Blood Glucose.: 132 mg/dL (15 Richi 2021 11:40)    I&O's Summary      PHYSICAL EXAM:  GENERAL: NAD, well-developed  HEAD:  Atraumatic, Normocephalic  EYES: EOMI, PERRLA, conjunctiva and sclera clear  NECK: Supple, No JVD  CHEST/LUNG: Clear to auscultation bilaterally; No wheeze  HEART: Regular rate and rhythm; No murmurs, rubs, or gallops  ABDOMEN: Soft, Nontender, Nondistended; Bowel sounds present  EXTREMITIES:  2+ Peripheral Pulses, No clubbing, cyanosis, or edema  PSYCH: AAOx3  NEUROLOGY: non-focal  SKIN: No rashes or lesions    LABS:                        10.2   5.69  )-----------( 332      ( 2021 06:44 )             32.3     06-16    136  |  100  |  20  ----------------------------<  98  3.7   |  25  |  0.69    Ca    9.3      2021 06:44  Phos  3.3     06-16  Mg     1.5     06-16            Urinalysis Basic - ( 2021 13:52 )    Color: Light Yellow / Appearance: Clear / S.009 / pH: x  Gluc: x / Ketone: Negative  / Bili: Negative / Urobili: <2 mg/dL   Blood: x / Protein: Negative / Nitrite: Negative   Leuk Esterase: Negative / RBC: x / WBC x   Sq Epi: x / Non Sq Epi: x / Bacteria: x        RADIOLOGY & ADDITIONAL TESTS:    Imaging Personally Reviewed:    Consultant(s) Notes Reviewed:      Care Discussed with Consultants/Other Providers:  
Patient is a 81y old  Male who presents with a chief complaint of Rt hip pain, fall (17 Jun 2021 13:31)      SUBJECTIVE / OVERNIGHT EVENTS:  Patient has no new complaints. Denies cp, SOB, abdominal pain, N/V/D     MEDICATIONS  (STANDING):  atorvastatin 10 milliGRAM(s) Oral at bedtime  carbidopa/levodopa  25/100 1 Tablet(s) Oral three times a day  clopidogrel Tablet 75 milliGRAM(s) Oral daily  dextrose 40% Gel 15 Gram(s) Oral once  dextrose 5%. 1000 milliLiter(s) (50 mL/Hr) IV Continuous <Continuous>  dextrose 5%. 1000 milliLiter(s) (100 mL/Hr) IV Continuous <Continuous>  dextrose 50% Injectable 25 Gram(s) IV Push once  dextrose 50% Injectable 12.5 Gram(s) IV Push once  dextrose 50% Injectable 25 Gram(s) IV Push once  enoxaparin Injectable 40 milliGRAM(s) SubCutaneous daily  glucagon  Injectable 1 milliGRAM(s) IntraMuscular once  insulin lispro (ADMELOG) corrective regimen sliding scale   SubCutaneous three times a day before meals  insulin lispro (ADMELOG) corrective regimen sliding scale   SubCutaneous at bedtime  pantoprazole    Tablet 40 milliGRAM(s) Oral before breakfast  polyethylene glycol 3350 17 Gram(s) Oral daily  QUEtiapine 50 milliGRAM(s) Oral at bedtime  senna 2 Tablet(s) Oral at bedtime  sodium chloride 0.9% lock flush 3 milliLiter(s) IV Push every 8 hours  tamsulosin 0.4 milliGRAM(s) Oral at bedtime    MEDICATIONS  (PRN):  acetaminophen   Tablet .. 650 milliGRAM(s) Oral every 6 hours PRN Mild Pain (1 - 3), Moderate Pain (4 - 6)  acetaminophen   Tablet .. 650 milliGRAM(s) Oral every 6 hours PRN Mild Pain (1 - 3)  guaiFENesin Oral Liquid (Sugar-Free) 100 milliGRAM(s) Oral every 6 hours PRN Cough  melatonin 3 milliGRAM(s) Oral at bedtime PRN Insomnia  oxyCODONE    IR 10 milliGRAM(s) Oral every 6 hours PRN Severe Pain (7 - 10)  oxyCODONE    IR 5 milliGRAM(s) Oral every 6 hours PRN Moderate Pain (4 - 6)      Vital Signs Last 24 Hrs  T(C): 36.6 (17 Jun 2021 13:15), Max: 36.8 (16 Jun 2021 20:46)  T(F): 97.9 (17 Jun 2021 13:15), Max: 98.2 (16 Jun 2021 20:46)  HR: 79 (17 Jun 2021 13:15) (66 - 79)  BP: 105/69 (17 Jun 2021 13:15) (105/53 - 117/69)  BP(mean): --  RR: 16 (17 Jun 2021 13:15) (16 - 16)  SpO2: 100% (17 Jun 2021 13:15) (94% - 100%)  CAPILLARY BLOOD GLUCOSE      POCT Blood Glucose.: 188 mg/dL (17 Jun 2021 11:20)  POCT Blood Glucose.: 103 mg/dL (17 Jun 2021 07:35)  POCT Blood Glucose.: 98 mg/dL (16 Jun 2021 21:21)  POCT Blood Glucose.: 226 mg/dL (16 Jun 2021 16:47)    I&O's Summary      PHYSICAL EXAM:  GENERAL: NAD, well-developed  HEAD:  Atraumatic, Normocephalic  EYES: EOMI, PERRLA, conjunctiva and sclera clear  NECK: Supple, No JVD  CHEST/LUNG: Clear to auscultation bilaterally; No wheeze  HEART: Regular rate and rhythm; No murmurs, rubs, or gallops  ABDOMEN: Soft, Nontender, Nondistended; Bowel sounds present  EXTREMITIES:  2+ Peripheral Pulses, No clubbing, cyanosis, or edema  PSYCH: AAOx3  NEUROLOGY: non-focal  SKIN: No rashes or lesions    LABS:                        9.9    6.15  )-----------( 330      ( 17 Jun 2021 07:15 )             30.8     06-17    137  |  100  |  25<H>  ----------------------------<  87  4.2   |  28  |  0.73    Ca    8.9      17 Jun 2021 07:15  Phos  3.7     06-17  Mg     1.6     06-17                RADIOLOGY & ADDITIONAL TESTS:    Imaging Personally Reviewed:    Consultant(s) Notes Reviewed:      Care Discussed with Consultants/Other Providers:

## 2021-06-17 NOTE — PROGRESS NOTE ADULT - REASON FOR ADMISSION
Rt hip pain, fall

## 2021-06-17 NOTE — PROGRESS NOTE ADULT - PROBLEM SELECTOR PLAN 7
Patient had stress test in Feb 2021:  small, moderate defects in apical, distal lateral walls that are  predominantly reversible, suggestive of ischemia. Patient's brother Berto refused cath at that time.  Pt denies CP  Continuing Plavix and statin. Not on ASA
Plavix held given patient's planning possible ortho surgery  Patient had stress test in Feb 2021:  small, moderate defects in apical, distal lateral walls that are  predominantly reversible, suggestive of ischemia. Patient's brother Berto refused cath at that time.  Cardiology c/s
Plavix held given patient's planning possible ortho surgery  Patient had stress test in Feb 2021:  small, moderate defects in apical, distal lateral walls that are  predominantly reversible, suggestive of ischemia. Patient's brother Berto refused cath at that time.
Patient had stress test in Feb 2021:  small, moderate defects in apical, distal lateral walls that are  predominantly reversible, suggestive of ischemia. Patient's brother Berto refused cath at that time.  Pt denies CP  Continuing Plavix and statin. Not on ASA
Plavix held given patient's planning possible ortho surgery  Patient had stress test in Feb 2021:  small, moderate defects in apical, distal lateral walls that are  predominantly reversible, suggestive of ischemia. Patient's brother Berto refused cath at that time.
Patient had stress test in Feb 2021:  small, moderate defects in apical, distal lateral walls that are  predominantly reversible, suggestive of ischemia. Patient's brother Berto refused cath at that time.  Pt denies CP  Continuing Plavix and statin. Not on ASA
Patient had stress test in Feb 2021:  small, moderate defects in apical, distal lateral walls that are  predominantly reversible, suggestive of ischemia. Patient's brother Berto refused cath at that time.  Pt denies CP  Continuing Plavix and statin. Not on ASA

## 2021-06-17 NOTE — PROGRESS NOTE ADULT - PROBLEM SELECTOR PLAN 4
Patient with hx of pericardial effusion  Repeat echo 10/11/21 showing small to moderate effusion, no tamponade  Cardiology following, no intervention at this time
Patient with hx of pericardial effusion  Repeat echo 10/11/21 showing small to moderate effusion, no tamponade  Cardiology following, no intervention at this time
Patient with hx of pericardial effusion  Echo on May 28, 2021 showed:  Moderate sized circumferential pericardial effusion seen predominantly apical and  inferolateral to the left ventricle  Repeat echo one day later showed:  Small pericardial effusion (about  0.7 cm) posterior and  lateral to the left ventricle.  Small pericardial effusion around the LV apex  Repeat echo ordered
Patient with hx of pericardial effusion  Echo on May 28, 2021 showed:  Moderate sized circumferential pericardial effusion seen predominantly apical and  inferolateral to the left ventricle  Repeat echo one day later showed:  Small pericardial effusion (about  0.7 cm) posterior and  lateral to the left ventricle.  Small pericardial effusion around the LV apex  Repeat echo ordered
Patient with hx of pericardial effusion  Echo on May 28, 2021 showed:  Moderate sized circumferential pericardial effusion seen predominantly apical and  inferolateral to the left ventricle  Repeat echo one day later showed:  Small pericardial effusion (about  0.7 cm) posterior and  lateral to the left ventricle.  Small pericardial effusion around the LV apex  Repeat echo ordered  Cardiology c/s pending as above
Patient with hx of pericardial effusion  Repeat echo 10/11/21 showing small to moderate effusion, no tamponade  Cardiology following, no intervention at this time
Patient with hx of pericardial effusion  Repeat echo 10/11/21 showing small to moderate effusion, no tamponade  Cardiology following, no intervention at this time

## 2021-06-17 NOTE — PROGRESS NOTE ADULT - PROBLEM SELECTOR PROBLEM 8
Parkinson's disease dementia

## 2021-06-17 NOTE — PROGRESS NOTE ADULT - PROVIDER SPECIALTY LIST ADULT
Cardiology
Hospitalist
Cardiology
Orthopedics
Hospitalist

## 2021-06-17 NOTE — DISCHARGE NOTE PROVIDER - CARE PROVIDER_API CALL
Jefe Eastman)  Orthopaedic Surgery  [address], [address]  [place] [address]  Phone: [phone]  Fax: [phone]  Follow Up Time: 1 week    Bennie Hwang  CARDIOVASCULAR DISEASE  [address] [place] [address]  Phone: [phone]  Fax: [phone]  Follow Up Time:

## 2021-06-17 NOTE — PROGRESS NOTE ADULT - PROBLEM SELECTOR PROBLEM 3
Falls frequently

## 2021-06-17 NOTE — PROGRESS NOTE ADULT - PROBLEM SELECTOR PROBLEM 6
COVID-19 virus RNA detected

## 2021-06-17 NOTE — DIETITIAN INITIAL EVALUATION ADULT. - OTHER INFO
Pt 82 yo male with PMHx of dementia, Parkinson's disease, HTN, DM Type 2, HLD, CAD s/p stents x2, CHF, moderate pulmonary hypertension, small-mod pericardial effusion), Lt pleural effusion, BPH, gastric ulcers, COVID infection in Feb 2021, hypotension also with mechanical fall c/b Rt greater troch fracture - per chart review.     At time of visit, Pt appears awake, alert. Pt eats well reported. No report of nausea, vomiting or diarrhea @ this time. +BM (6/17) fecal incontinence - per flow sheet.   Of note Pt's diet rx: Pureed with nectar thick liquids. According to last Swallow evaluation (3/22/21) SLP rec: Puree (dysphagia 1) and Nectar thick liquids. Will recommend to repeat Swallow Evaluation to determine appropriate PO diet & fluid consistency.     Per , plan for Pt to be discharged today. Case discussed with nurse. RDN remains available.

## 2021-06-17 NOTE — PROGRESS NOTE ADULT - PROBLEM SELECTOR PLAN 2
CXR: Large L. pleural effusion  CT chest showed minimally decreased large loculated L. pleural effusion (previously noted in March 2021). He was tx w/ IV abx x 5 days empirically for possible empyema. At that time IR spoke with patients brother Berto Mitchell at [phone] regarding drainage. Brother did not want to proceed at that time.  - not in acute respiratory distress, on RA, doesn't appear infectious at this time  - can hold off on further intervention, if decline in respiratory status can consider IR consult  -hold off abx now given no leukocytosis, fever or left shift  -procal 0.07
CXR: Large L. pleural effusion  CT chest showed minimally decreased large loculated L. pleural effusion (previously noted in March 2021). He was tx w/ IV abx x 5 days empirically for possible empyema. At that time IR spoke with patients brother Berto Mitchell at [phone] regarding drainage. Brother did not want to proceed at that time.  - not in acute respiratory distress, on RA, doesn't appear infectious at this time  - can hold off on further intervention, if decline in respiratory status can consider IR consult  -hold off abx now given no leukocytosis, fever or left shift  -procal 0.07
CXR: Large L. pleural effusion  CT chest showed minimally decreased large loculated L. pleural effusion (previously noted in March 2021). He was tx w/ IV abx x 5 days empirically for possible empyema. At that time IR spoke with patients brother Berto Mitchell at [phone] regarding drainage. Brother did not want to proceed at that time.  - not in acute respiratory distress, on RA, doesn't appear infectious at this time  - can hold off on further intervention, if decline in respiratory status can consider IR consult  -hold off abx now given no leukocytosis, fever or left shift  -procal 0.07
CXR: Large L. pleural effusion  CT chest showed minimally decreased large loculated L. pleural effusion (previously noted in March 2021). He was tx w/ IV abx x 5 days empirically for possible empyema. At that time IR spoke with patients brother Berto Mitchell at [phone] regarding drainage. Brother did not want to proceed at that time.  - not in acute respiratory distress, on RA, doesn't appear infectious at this time  - can hold off on further intervention, if decline in respiratory status can consider IR consult  -hold off abx now given no leukocytosis, fever or left shift  -procal 0.07
CXR: New patchy right upper lung opacity, nonspecific finding which could be due to subsegmental atelectasis or developing pneumonia. Continued hazy opacity projecting over the lower two thirds of the left hemithorax as well as left basilar subtle as retrocardiac opacity, findings which may be related to a large left peripherally enhancing pleural fluid collection.  -hold off abx now given no leukocytosis, fever or left shift  -procal 0.07  -CT chest pending
CXR: Large L. pleural effusion  CT chest showed minimally decreased large loculated L. pleural effusion (previously noted in March 2021). He was tx w/ IV abx x 5 days empirically for possible empyema. At that time IR spoke with patients brother Berto Mitchell at [phone] regarding drainage. Brother did not want to proceed at that time.  - not in acute respiratory distress, on RA, doesn't appear infectious at this time  - can hold off on further intervention, if decline in respiratory status can consider IR consult  -hold off abx now given no leukocytosis, fever or left shift  -procal 0.07
CXR: Large L. pleural effusion  CT chest showed minimally decreased large loculated L. pleural effusion (previously noted in March 2021). He was tx w/ IV abx x 5 days empirically for possible empyema. At that time IR spoke with patients brother Berto Mitchell at [phone] regarding drainage. Brother did not want to proceed at that time.  - not in acute respiratory distress, on RA, doesn't appear infectious at this time  - can hold off on further intervention, if decline in respiratory status can consider IR consult  -hold off abx now given no leukocytosis, fever or left shift  -procal 0.07

## 2021-06-17 NOTE — DISCHARGE NOTE NURSING/CASE MANAGEMENT/SOCIAL WORK - PATIENT PORTAL LINK FT
You can access the FollowMyHealth Patient Portal offered by [facility] by registering at the following website: [email or web address]. By joining [email or web address]’s FollowMyHealth portal, you will also be able to view your health information using other applications (apps) compatible with our system.

## 2021-06-17 NOTE — PROGRESS NOTE ADULT - PROBLEM SELECTOR PLAN 9
hold oral agents   FSG at goal   ISS  monitor fingersticks  A1C 5.8
hold oral agents   BG=wnl  ISS  monitor fingersticks  A1C 5.8
resume oral agents on discharge  FSG at goal   ISS  monitor fingersticks  A1C 5.8
hold oral agents   BG=wnl  ISS  monitor fingersticks  A1C 5.8
hold oral agents   BG=wnl  ISS  monitor fingersticks  A1C 5.8

## 2021-06-17 NOTE — PROGRESS NOTE ADULT - PROBLEM SELECTOR PROBLEM 5
Chronic diastolic congestive heart failure

## 2021-06-17 NOTE — PROGRESS NOTE ADULT - PROBLEM SELECTOR PROBLEM 7
CAD (Coronary Artery Disease)

## 2021-06-17 NOTE — PROGRESS NOTE ADULT - PROBLEM SELECTOR PLAN 1
CT hip:  Acute minimally displaced transversely oriented fracture of the right greater trochanter.  Due to mechanical fall; Rt greater troch fracture    MR hip showed slight intertrochanteric extension, per ortho no surgical intervention  Pain control w/ PO oxycodone prn; although no PRN utilized. bowel regimen added  per ortho, WBAT  PT rec MARCOS
CT hip:  Acute minimally displaced transversely oriented fracture of the right greater trochanter.  Due to mechanical fall; Rt greater troch fracture    MR hip showed slight intertrochanteric extension, per ortho no surgical intervention  Pain control w/ PO oxycodone prn; although no PRN utilized. bowel regimen added  per ortho, WBAT  PT recc MARCOS
CT hip:  Acute minimally displaced transversely oriented fracture of the right greater trochanter.  Due to mechanical fall; Rt greater troch fracture    MR hip showed slight intertrochanteric extension, per ortho no surgical intervention  Pain control w/ PO oxycodone prn; although no PRN utilized. bowel regimen added  per ortho, WBAT  PT rec MARCOS
CT hip:  Acute minimally displaced transversely oriented fracture of the right greater trochanter.  Due to mechanical fall; Rt greater troch fracture    MR hip showed slight intertrochanteric extension, per ortho no surgical intervention  Pain control w/ PO oxycodone prn; although no PRN utilized. bowel regimen added  per ortho, WBAT  PT recc MARCOS
CT hip:  Acute minimally displaced transversely oriented fracture of the right greater trochanter.  Due to mechanical fall; Rt greater troch fracture    MR hip showed slight intertrochanteric extension, per ortho no surgical intervention  Pain control w/ PO oxycodone prn; bowel regimen added  per ortho, WBAT  PT consulted
CT hip:  Acute minimally displaced transversely oriented fracture of the right greater trochanter.  Due to mechanical fall; Rt greater troch fracture   f/u MR hip to r/o intertrochanteric extension;  Keep NPO until final ortho reccs  -Pt has multiple cardiac risks including abnormal Nuclear Stress Test dated 02/10/21 significant for moderate defects  suggestive of ischemia, and small-mod pericardial effusion per recent TTE;   -Cardiology consultation prior to OR (pending);   -Dr. Hwang consulted for cardiology clearance  Pain control w/ IV morphine 1 mg q4h PRN for severe pain
CT hip:  Acute minimally displaced transversely oriented fracture of the right greater trochanter.  Due to mechanical fall; Rt greater troch fracture    MR hip showed slight intertrochanteric extension, per ortho no surgical intervention  Pain control w/ PO oxycodone prn; although no PRN utilized. bowel regimen added  per ortho, WBAT  PT rec MARCOS

## 2021-06-17 NOTE — DIETITIAN INITIAL EVALUATION ADULT. - ADD RECOMMEND
Social EtOH use
1. Swallow evaluation to determine appropriate PO diet/fluid consistency;       2. Encourage & assist Pt with meals; Monitor PO diet tolerance;        3. Add Multivitamins with minerals 1 tab daily for micronutrient coverage;         4. Monitor labs, hydration status;

## 2021-06-17 NOTE — PROGRESS NOTE ADULT - ASSESSMENT
82 yo M HTN, DM, HLD, dementia (AOx2 at baseline), Parkinson's disease, CAD s/p stents x2 (>20 years ago), recent new onset HF (EF 50%, on Xarelto, Moderate diastolic dysfunction (Stage II), moderate pulmonary hypertension, small pericardial effusion), L pleural effusion, BPH, gastric ulcers presenting after fall,    1) Fall  -Rt. hip fracture  -medical management   -plan for OR cancelled     2) CAD s/p stent  -cath in 2010 showed mild luminal disease and patent stents  -denies chest pain  -echo with moderate Pericardial effusion with no evidence of tamponade, no intervention at this time    -c/w plavix     3) Hypothermia  -f/u cultures  -UA negative    4) DVT prophylaxis  -lovenox subq

## 2021-06-17 NOTE — PROGRESS NOTE ADULT - NSICDXPILOT_GEN_ALL_CORE
[place]

## 2021-06-17 NOTE — PROGRESS NOTE ADULT - PROBLEM SELECTOR PLAN 5
Appears euvolemic; ProBNP 455  EKG: no acute Tw or ST changes  Normal LV function on echo
Appears euvolemic; ProBNP 455  EKG: no acute Tw or ST changes  Normal LV function on echo
Appears euvolemic; ProBNP 455  EKG: no acute Tw or ST changes  Echo May 28 2021: Normal LV function, grossly normal RV function, moderate sized pericardial effusion  monitor
Appears euvolemic; ProBNP 455  EKG: no acute Tw or ST changes  Normal LV function on echo
Appears euvolemic; ProBNP 455  EKG: no acute Tw or ST changes  Echo May 28 2021: Normal LV function, grossly normal RV function, moderate sized pericardial effusion  monitor
Appears euvolemic; ProBNP 455  EKG: no acute Tw or ST changes  Echo May 28 2021: Normal LV function, grossly normal RV function, moderate sized pericardial effusion  monitor
Appears euvolemic; ProBNP 455  EKG: no acute Tw or ST changes  Normal LV function on echo

## 2021-06-29 DIAGNOSIS — S72.114D NONDISPLACED FRACTURE OF GREATER TROCHANTER OF RIGHT FEMUR, SUBSEQUENT ENCOUNTER FOR CLOSED FRACTURE WITH ROUTINE HEALING: ICD-10-CM

## 2021-06-29 DIAGNOSIS — S72.144D NONDISPLACED INTERTROCHANTERIC FRACTURE OF RIGHT FEMUR, SUBSEQUENT ENCOUNTER FOR CLOSED FRACTURE WITH ROUTINE HEALING: ICD-10-CM

## 2021-06-30 ENCOUNTER — APPOINTMENT (OUTPATIENT)
Dept: ORTHOPEDIC SURGERY | Facility: CLINIC | Age: 81
End: 2021-06-30

## 2021-07-20 NOTE — PROGRESS NOTE BEHAVIORAL HEALTH - EYE CONTACT
Discharge instructions provided and reviewed. Tylenol and motrin dosing and administration reviewed, pamphlet provided. Instructed to return to ED with any worsening signs/symptoms or any other concerns. Instructed to follow up with ortho referral within 1 week. Caregiver verbalizes understanding with no additional questions at this time. Patient VSS on RA, awake and alert, OOB with steady gait. Patient discharged home with caregiver at this time.   Good

## 2021-08-25 ENCOUNTER — INPATIENT (INPATIENT)
Facility: HOSPITAL | Age: 81
LOS: 26 days | Discharge: HOSPICE | End: 2021-09-21
Attending: HOSPITALIST | Admitting: HOSPITALIST
Payer: MEDICARE

## 2021-08-25 VITALS
TEMPERATURE: 99 F | RESPIRATION RATE: 20 BRPM | OXYGEN SATURATION: 97 % | HEIGHT: 67 IN | HEART RATE: 94 BPM | DIASTOLIC BLOOD PRESSURE: 68 MMHG | WEIGHT: 119.93 LBS | SYSTOLIC BLOOD PRESSURE: 116 MMHG

## 2021-08-25 LAB
ALBUMIN SERPL ELPH-MCNC: 2.2 G/DL — LOW (ref 3.3–5)
ALP SERPL-CCNC: 86 U/L — SIGNIFICANT CHANGE UP (ref 40–120)
ALT FLD-CCNC: <6 U/L — LOW (ref 12–78)
ANION GAP SERPL CALC-SCNC: 5 MMOL/L — SIGNIFICANT CHANGE UP (ref 5–17)
APPEARANCE UR: CLEAR — SIGNIFICANT CHANGE UP
AST SERPL-CCNC: 4 U/L — LOW (ref 15–37)
BASE EXCESS BLDA CALC-SCNC: 1.7 MMOL/L — SIGNIFICANT CHANGE UP (ref -2–2)
BASOPHILS # BLD AUTO: 0.06 K/UL — SIGNIFICANT CHANGE UP (ref 0–0.2)
BASOPHILS NFR BLD AUTO: 0.3 % — SIGNIFICANT CHANGE UP (ref 0–2)
BILIRUB SERPL-MCNC: 0.5 MG/DL — SIGNIFICANT CHANGE UP (ref 0.2–1.2)
BILIRUB UR-MCNC: NEGATIVE — SIGNIFICANT CHANGE UP
BLOOD GAS COMMENTS: SIGNIFICANT CHANGE UP
BLOOD GAS COMMENTS: SIGNIFICANT CHANGE UP
BLOOD GAS SOURCE: SIGNIFICANT CHANGE UP
BUN SERPL-MCNC: 36 MG/DL — HIGH (ref 7–23)
CALCIUM SERPL-MCNC: 8.9 MG/DL — SIGNIFICANT CHANGE UP (ref 8.5–10.1)
CHLORIDE SERPL-SCNC: 103 MMOL/L — SIGNIFICANT CHANGE UP (ref 96–108)
CK SERPL-CCNC: 13 U/L — LOW (ref 26–308)
CO2 SERPL-SCNC: 29 MMOL/L — SIGNIFICANT CHANGE UP (ref 22–31)
COLOR SPEC: YELLOW — SIGNIFICANT CHANGE UP
CREAT SERPL-MCNC: 0.92 MG/DL — SIGNIFICANT CHANGE UP (ref 0.5–1.3)
DIFF PNL FLD: ABNORMAL
EOSINOPHIL # BLD AUTO: 0.09 K/UL — SIGNIFICANT CHANGE UP (ref 0–0.5)
EOSINOPHIL NFR BLD AUTO: 0.5 % — SIGNIFICANT CHANGE UP (ref 0–6)
FLUAV AG NPH QL: SIGNIFICANT CHANGE UP
FLUBV AG NPH QL: SIGNIFICANT CHANGE UP
GLUCOSE SERPL-MCNC: 157 MG/DL — HIGH (ref 70–99)
GLUCOSE UR QL: 1000 MG/DL
HCO3 BLDA-SCNC: 25 MMOL/L — SIGNIFICANT CHANGE UP (ref 21–29)
HCT VFR BLD CALC: 32.7 % — LOW (ref 39–50)
HGB BLD-MCNC: 10.3 G/DL — LOW (ref 13–17)
HOROWITZ INDEX BLDA+IHG-RTO: 21 — SIGNIFICANT CHANGE UP
IMM GRANULOCYTES NFR BLD AUTO: 0.5 % — SIGNIFICANT CHANGE UP (ref 0–1.5)
KETONES UR-MCNC: NEGATIVE — SIGNIFICANT CHANGE UP
LACTATE SERPL-SCNC: 2.6 MMOL/L — HIGH (ref 0.7–2)
LEUKOCYTE ESTERASE UR-ACNC: NEGATIVE — SIGNIFICANT CHANGE UP
LYMPHOCYTES # BLD AUTO: 1.06 K/UL — SIGNIFICANT CHANGE UP (ref 1–3.3)
LYMPHOCYTES # BLD AUTO: 5.4 % — LOW (ref 13–44)
MCHC RBC-ENTMCNC: 28.1 PG — SIGNIFICANT CHANGE UP (ref 27–34)
MCHC RBC-ENTMCNC: 31.5 GM/DL — LOW (ref 32–36)
MCV RBC AUTO: 89.3 FL — SIGNIFICANT CHANGE UP (ref 80–100)
MONOCYTES # BLD AUTO: 0.75 K/UL — SIGNIFICANT CHANGE UP (ref 0–0.9)
MONOCYTES NFR BLD AUTO: 3.8 % — SIGNIFICANT CHANGE UP (ref 2–14)
NEUTROPHILS # BLD AUTO: 17.49 K/UL — HIGH (ref 1.8–7.4)
NEUTROPHILS NFR BLD AUTO: 89.5 % — HIGH (ref 43–77)
NITRITE UR-MCNC: NEGATIVE — SIGNIFICANT CHANGE UP
NRBC # BLD: 0 /100 WBCS — SIGNIFICANT CHANGE UP (ref 0–0)
PCO2 BLDA: 37 MMHG — SIGNIFICANT CHANGE UP (ref 32–46)
PH BLD: 7.45 — SIGNIFICANT CHANGE UP (ref 7.35–7.45)
PH UR: 6 — SIGNIFICANT CHANGE UP (ref 5–8)
PLATELET # BLD AUTO: 528 K/UL — HIGH (ref 150–400)
PO2 BLDA: 78 MMHG — SIGNIFICANT CHANGE UP (ref 74–108)
POTASSIUM SERPL-MCNC: 4.9 MMOL/L — SIGNIFICANT CHANGE UP (ref 3.5–5.3)
POTASSIUM SERPL-SCNC: 4.9 MMOL/L — SIGNIFICANT CHANGE UP (ref 3.5–5.3)
PROT SERPL-MCNC: 7.2 GM/DL — SIGNIFICANT CHANGE UP (ref 6–8.3)
PROT UR-MCNC: NEGATIVE MG/DL — SIGNIFICANT CHANGE UP
RBC # BLD: 3.66 M/UL — LOW (ref 4.2–5.8)
RBC # FLD: 15.7 % — HIGH (ref 10.3–14.5)
RBC CASTS # UR COMP ASSIST: SIGNIFICANT CHANGE UP /HPF (ref 0–4)
SAO2 % BLDA: 95 % — SIGNIFICANT CHANGE UP (ref 92–96)
SARS-COV-2 RNA SPEC QL NAA+PROBE: SIGNIFICANT CHANGE UP
SODIUM SERPL-SCNC: 137 MMOL/L — SIGNIFICANT CHANGE UP (ref 135–145)
SP GR SPEC: 1.01 — SIGNIFICANT CHANGE UP (ref 1.01–1.02)
UROBILINOGEN FLD QL: 1 MG/DL
WBC # BLD: 19.54 K/UL — HIGH (ref 3.8–10.5)
WBC # FLD AUTO: 19.54 K/UL — HIGH (ref 3.8–10.5)

## 2021-08-25 PROCEDURE — 71045 X-RAY EXAM CHEST 1 VIEW: CPT | Mod: 26

## 2021-08-25 PROCEDURE — 93010 ELECTROCARDIOGRAM REPORT: CPT | Mod: 76

## 2021-08-25 PROCEDURE — 99285 EMERGENCY DEPT VISIT HI MDM: CPT

## 2021-08-25 RX ORDER — PIPERACILLIN AND TAZOBACTAM 4; .5 G/20ML; G/20ML
3.38 INJECTION, POWDER, LYOPHILIZED, FOR SOLUTION INTRAVENOUS ONCE
Refills: 0 | Status: COMPLETED | OUTPATIENT
Start: 2021-08-25 | End: 2021-08-25

## 2021-08-25 RX ORDER — VANCOMYCIN HCL 1 G
1000 VIAL (EA) INTRAVENOUS ONCE
Refills: 0 | Status: COMPLETED | OUTPATIENT
Start: 2021-08-25 | End: 2021-08-25

## 2021-08-25 RX ORDER — SODIUM CHLORIDE 9 MG/ML
1000 INJECTION INTRAMUSCULAR; INTRAVENOUS; SUBCUTANEOUS ONCE
Refills: 0 | Status: COMPLETED | OUTPATIENT
Start: 2021-08-25 | End: 2021-08-25

## 2021-08-25 RX ADMIN — Medication 250 MILLIGRAM(S): at 22:30

## 2021-08-25 RX ADMIN — SODIUM CHLORIDE 1000 MILLILITER(S): 9 INJECTION INTRAMUSCULAR; INTRAVENOUS; SUBCUTANEOUS at 20:59

## 2021-08-25 RX ADMIN — PIPERACILLIN AND TAZOBACTAM 200 GRAM(S): 4; .5 INJECTION, POWDER, LYOPHILIZED, FOR SOLUTION INTRAVENOUS at 21:55

## 2021-08-25 NOTE — ED PROVIDER NOTE - CLINICAL SUMMARY MEDICAL DECISION MAKING FREE TEXT BOX
The patient meets SIRS criteria with (   HR > 90 BPM,  RR > 20 or PaCO2 <32 mmHg,  WBC >12k  . Suspected source of infection is: left pleural effusion/pna?   Patient is currently hemodynamically stable, normotensive, mentating at baseline dementa; DNR/DNI per brother.   - Two large-bore IV's, 30ml/kg lactated ringers fluid resuscitation HELD due to CHF status.  - CBC, CMP, PT/PTT/INR, Troponin, BNP, UA/UCx, 2 x BCx, ABG with lactate + COVID-19 rule out.   - CXR  CT Chest Abdominal Pelvis    - Unknown Source: Vancomycin 1g IVPB zosyn

## 2021-08-25 NOTE — ED PROVIDER NOTE - OBJECTIVE STATEMENT
80 y/o Male HTN, DM Type 2, HLD, dementia (AOx2 at baseline), Parkinson's disease, CAD s/p stents x2 (>20 years ago), CHF (EF: 50%, Moderate diastolic dysfunction (Stage II), moderate pulmonary hypertension, small-mod pericardial effusion), Lt pleural effusion, BPH, gastric ulcers, COVID infection in Feb 2021 presenting with "home health aide thinks patient is weak and emaciated" per triage note and EMS. In the ED, patient somnolent, but arousable to voice/name. 80 y/o Male HTN, DM Type 2, HLD, dementia (AOx2 at baseline), Parkinson's disease, CAD s/p stents x2 (>20 years ago), CHF (EF: 50%, Moderate diastolic dysfunction (Stage II), moderate pulmonary hypertension, small-mod pericardial effusion), Lt pleural effusion, BPH, gastric ulcers, COVID infection in Feb 2021 presenting with "home health aide thinks patient is weak and emaciated" per triage note and EMS. In the ED, patient somnolent, but arousable to voice/name. In the ED, patient reports no complaints. Denies any chest pain, shortness of breath, abdominal pain, nausea/vomiting, visual complaints.

## 2021-08-25 NOTE — ED ADULT TRIAGE NOTE - HEART RATE (BEATS/MIN)
94 66 year old F patient with c/o of L ankle wound x2weeks, 1 inch in size.  Denies fever/chills.  Pt noted to very slim in stature.  No distress ntoed. 66 year old F patient with c/o of L ankle wound x2weeks, 1 inch in size.  Denies fever/chills.  Pt noted to very slim in stature.  No distress noted.  No pus or foul smell from wound to L ankle.  Denies chest pain, n/v/d/f/, abd pain.  States she has not been making "a lot of urine", appears generally week.  Breathing easily and unlabored.

## 2021-08-25 NOTE — ED PROVIDER NOTE - PROGRESS NOTE DETAILS
Collateral from Brother, GoC discussed. DNR/DNI, goals are to treat infections but no invasive procedures such as PEG tube for feeding. Found to have elevated WBC 19k, pan scanned, found to have air fluid level in his left chronic pleural effusion, likely "infection/pleural vs parenchymal edema vs bronchopleural fistula". s/p vanc/zosyn. lactate improved after 1L IVF LR, from 2.6 to 1.9. Remains hemodynamically stable. BUN/Cr > 20 likely pre-renal and clinically dehydrated. will admit for sepsis. discussed with brother about diagnosis and likely left pleural infection/empyema and possible drainage of infection, which the brother is ok with. to be admitted.

## 2021-08-25 NOTE — GOALS OF CARE CONVERSATION - ADVANCED CARE PLANNING - CONVERSATION DETAILS
DNR/DNI, no feeding tubes, no invasive procedures, but treat infections. Goal is to make him comfortable.

## 2021-08-25 NOTE — ED ADULT NURSE NOTE - NSIMPLEMENTINTERV_GEN_ALL_ED
Implemented All Fall Risk Interventions:  [place] to call system. Call bell, personal items and telephone within reach. Instruct patient to call for assistance. Room bathroom lighting operational. Non-slip footwear when patient is off stretcher. Physically safe environment: no spills, clutter or unnecessary equipment. Stretcher in lowest position, wheels locked, appropriate side rails in place. Provide visual cue, wrist band, yellow gown, etc. Monitor gait and stability. Monitor for mental status changes and reorient to person, place, and time. Review medications for side effects contributing to fall risk. Reinforce activity limits and safety measures with patient and family.

## 2021-08-25 NOTE — ED PROVIDER NOTE - PHYSICAL EXAMINATION
VITAL SIGNS: I have reviewed nursing notes and confirm.   GEN: mal-developed; (+) malnourished; in no acute distress. Speaking full sentences. (+) cachetic  SKIN: Warm, pink, no rash, no diaphoresis, no cyanosis, well perfused.   HEAD: Normocephalic; atraumatic. No scalp lacerations, no abrasions.  NECK: Supple; non tender.   EYES: Pupils 3mm equal, round, reactive to light and accomodation, conjunctiva and sclera clear. Extra-ocular movements intact bilaterally.  ENT: No nasal discharge; airway clear. Trachea is midline. ORAL: (+) DRY mucosal membranes. No oropharyngeal exudates or erythema. Normal dentition.  CV: Regular rate and rhythm. S1, S2 normal; no murmurs, gallops, or rubs. No lower extremity pitting edema bilaterally. Capillary refill < 2 seconds throughout. Distal pulses intact 2+ throughout.  RESP: (+) LEFT sided rhonchi/decreased breath sounds, no wheezing, no rales. Good air entry on right side.   ABD: Normal bowel sounds, soft, non-distended, non-tender, no hepatosplenomegaly. No CVA tenderness bilaterally. (+) umbilical hernia reducible   MSK: Normal range of motion and movement of all 4 extremities. No joint or muscular pain throughout. No clubbing.   BACK: No thoracolumbar midline or paravertebral tenderness. No step-offs or obvious deformities.  NEURO: Alert & oriented x name and place, Grossly unremarkable. Sensory and motor intact throughout. No focal deficits.  Normal speech and coordination.   PSYCH: Cooperative, appropriate.

## 2021-08-25 NOTE — ED ADULT NURSE NOTE - OBJECTIVE STATEMENT
Pt BIBA from home. Pateint was discharged from a nursing home today and visiting nurse sent patient to hospital. Pt has hx of parkinsons and dementia. Patient put  on cardiac monitor at bedside. Pt BIBA from home and A&0 x1.. Patient was discharged from a nursing home today and visiting nurse sent patient to hospital. Pt has hx of parkinson's and dementia. Patient put  on cardiac monitor at bedside. Pt has a visible hernia on his abdomen.

## 2021-08-25 NOTE — ED ADULT TRIAGE NOTE - CHIEF COMPLAINT QUOTE
sam from home d/c'd today from nursing home per ems visiting nurse feels pt is emaciated hx parkinsons and dementia pt at baseline mental status at present per visiting nurse/ems

## 2021-08-26 DIAGNOSIS — D72.9 DISORDER OF WHITE BLOOD CELLS, UNSPECIFIED: ICD-10-CM

## 2021-08-26 DIAGNOSIS — E11.9 TYPE 2 DIABETES MELLITUS WITHOUT COMPLICATIONS: ICD-10-CM

## 2021-08-26 DIAGNOSIS — J90 PLEURAL EFFUSION, NOT ELSEWHERE CLASSIFIED: ICD-10-CM

## 2021-08-26 DIAGNOSIS — S72.009A FRACTURE OF UNSPECIFIED PART OF NECK OF UNSPECIFIED FEMUR, INITIAL ENCOUNTER FOR CLOSED FRACTURE: ICD-10-CM

## 2021-08-26 DIAGNOSIS — K59.00 CONSTIPATION, UNSPECIFIED: ICD-10-CM

## 2021-08-26 DIAGNOSIS — I25.10 ATHEROSCLEROTIC HEART DISEASE OF NATIVE CORONARY ARTERY WITHOUT ANGINA PECTORIS: ICD-10-CM

## 2021-08-26 DIAGNOSIS — G20 PARKINSON'S DISEASE: ICD-10-CM

## 2021-08-26 DIAGNOSIS — R53.2 FUNCTIONAL QUADRIPLEGIA: ICD-10-CM

## 2021-08-26 DIAGNOSIS — G30.9 ALZHEIMER'S DISEASE, UNSPECIFIED: ICD-10-CM

## 2021-08-26 LAB
GLUCOSE BLDC GLUCOMTR-MCNC: 114 MG/DL — HIGH (ref 70–99)
GLUCOSE BLDC GLUCOMTR-MCNC: 133 MG/DL — HIGH (ref 70–99)
GLUCOSE BLDC GLUCOMTR-MCNC: 134 MG/DL — HIGH (ref 70–99)
GLUCOSE BLDC GLUCOMTR-MCNC: 189 MG/DL — HIGH (ref 70–99)
LACTATE SERPL-SCNC: 1.9 MMOL/L — SIGNIFICANT CHANGE UP (ref 0.7–2)
NT-PROBNP SERPL-SCNC: 863 PG/ML — HIGH (ref 0–450)
PROCALCITONIN SERPL-MCNC: 0.08 NG/ML — SIGNIFICANT CHANGE UP (ref 0.02–0.1)
TROPONIN I SERPL-MCNC: <.015 NG/ML — SIGNIFICANT CHANGE UP (ref 0.01–0.04)

## 2021-08-26 PROCEDURE — 99222 1ST HOSP IP/OBS MODERATE 55: CPT

## 2021-08-26 PROCEDURE — 71260 CT THORAX DX C+: CPT | Mod: 26,MA

## 2021-08-26 PROCEDURE — 74177 CT ABD & PELVIS W/CONTRAST: CPT | Mod: 26,MA

## 2021-08-26 RX ORDER — SODIUM CHLORIDE 9 MG/ML
1000 INJECTION, SOLUTION INTRAVENOUS
Refills: 0 | Status: DISCONTINUED | OUTPATIENT
Start: 2021-08-26 | End: 2021-09-21

## 2021-08-26 RX ORDER — POLYETHYLENE GLYCOL 3350 17 G/17G
17 POWDER, FOR SOLUTION ORAL DAILY
Refills: 0 | Status: DISCONTINUED | OUTPATIENT
Start: 2021-08-26 | End: 2021-09-21

## 2021-08-26 RX ORDER — OXYCODONE HYDROCHLORIDE 5 MG/1
5 TABLET ORAL EVERY 6 HOURS
Refills: 0 | Status: DISCONTINUED | OUTPATIENT
Start: 2021-08-26 | End: 2021-08-26

## 2021-08-26 RX ORDER — DEXTROSE 50 % IN WATER 50 %
15 SYRINGE (ML) INTRAVENOUS ONCE
Refills: 0 | Status: DISCONTINUED | OUTPATIENT
Start: 2021-08-26 | End: 2021-09-21

## 2021-08-26 RX ORDER — GLUCAGON INJECTION, SOLUTION 0.5 MG/.1ML
1 INJECTION, SOLUTION SUBCUTANEOUS ONCE
Refills: 0 | Status: DISCONTINUED | OUTPATIENT
Start: 2021-08-26 | End: 2021-09-21

## 2021-08-26 RX ORDER — DEXTROSE 50 % IN WATER 50 %
25 SYRINGE (ML) INTRAVENOUS ONCE
Refills: 0 | Status: DISCONTINUED | OUTPATIENT
Start: 2021-08-26 | End: 2021-09-21

## 2021-08-26 RX ORDER — PIPERACILLIN AND TAZOBACTAM 4; .5 G/20ML; G/20ML
3.38 INJECTION, POWDER, LYOPHILIZED, FOR SOLUTION INTRAVENOUS EVERY 8 HOURS
Refills: 0 | Status: DISCONTINUED | OUTPATIENT
Start: 2021-08-26 | End: 2021-08-26

## 2021-08-26 RX ORDER — POLYETHYLENE GLYCOL 3350 17 G/17G
17 POWDER, FOR SOLUTION ORAL DAILY
Refills: 0 | Status: DISCONTINUED | OUTPATIENT
Start: 2021-08-26 | End: 2021-08-26

## 2021-08-26 RX ORDER — TAMSULOSIN HYDROCHLORIDE 0.4 MG/1
0.4 CAPSULE ORAL AT BEDTIME
Refills: 0 | Status: DISCONTINUED | OUTPATIENT
Start: 2021-08-26 | End: 2021-09-21

## 2021-08-26 RX ORDER — CEFEPIME 1 G/1
1000 INJECTION, POWDER, FOR SOLUTION INTRAMUSCULAR; INTRAVENOUS EVERY 8 HOURS
Refills: 0 | Status: DISCONTINUED | OUTPATIENT
Start: 2021-08-26 | End: 2021-09-01

## 2021-08-26 RX ORDER — ATORVASTATIN CALCIUM 80 MG/1
10 TABLET, FILM COATED ORAL AT BEDTIME
Refills: 0 | Status: DISCONTINUED | OUTPATIENT
Start: 2021-08-26 | End: 2021-09-21

## 2021-08-26 RX ORDER — DEXTROSE 50 % IN WATER 50 %
12.5 SYRINGE (ML) INTRAVENOUS ONCE
Refills: 0 | Status: DISCONTINUED | OUTPATIENT
Start: 2021-08-26 | End: 2021-09-21

## 2021-08-26 RX ORDER — SENNA PLUS 8.6 MG/1
2 TABLET ORAL AT BEDTIME
Refills: 0 | Status: DISCONTINUED | OUTPATIENT
Start: 2021-08-26 | End: 2021-09-21

## 2021-08-26 RX ORDER — CARBIDOPA AND LEVODOPA 25; 100 MG/1; MG/1
1 TABLET ORAL THREE TIMES A DAY
Refills: 0 | Status: DISCONTINUED | OUTPATIENT
Start: 2021-08-26 | End: 2021-09-21

## 2021-08-26 RX ORDER — ENOXAPARIN SODIUM 100 MG/ML
40 INJECTION SUBCUTANEOUS DAILY
Refills: 0 | Status: DISCONTINUED | OUTPATIENT
Start: 2021-08-26 | End: 2021-09-21

## 2021-08-26 RX ORDER — CLOPIDOGREL BISULFATE 75 MG/1
75 TABLET, FILM COATED ORAL DAILY
Refills: 0 | Status: DISCONTINUED | OUTPATIENT
Start: 2021-08-26 | End: 2021-08-26

## 2021-08-26 RX ORDER — INSULIN LISPRO 100/ML
VIAL (ML) SUBCUTANEOUS
Refills: 0 | Status: DISCONTINUED | OUTPATIENT
Start: 2021-08-26 | End: 2021-09-21

## 2021-08-26 RX ORDER — QUETIAPINE FUMARATE 200 MG/1
50 TABLET, FILM COATED ORAL AT BEDTIME
Refills: 0 | Status: DISCONTINUED | OUTPATIENT
Start: 2021-08-26 | End: 2021-09-21

## 2021-08-26 RX ORDER — SENNA PLUS 8.6 MG/1
2 TABLET ORAL AT BEDTIME
Refills: 0 | Status: DISCONTINUED | OUTPATIENT
Start: 2021-08-26 | End: 2021-08-26

## 2021-08-26 RX ADMIN — Medication 1: at 12:09

## 2021-08-26 RX ADMIN — PIPERACILLIN AND TAZOBACTAM 25 GRAM(S): 4; .5 INJECTION, POWDER, LYOPHILIZED, FOR SOLUTION INTRAVENOUS at 06:49

## 2021-08-26 RX ADMIN — CARBIDOPA AND LEVODOPA 1 TABLET(S): 25; 100 TABLET ORAL at 15:33

## 2021-08-26 RX ADMIN — CARBIDOPA AND LEVODOPA 1 TABLET(S): 25; 100 TABLET ORAL at 08:56

## 2021-08-26 RX ADMIN — PIPERACILLIN AND TAZOBACTAM 25 GRAM(S): 4; .5 INJECTION, POWDER, LYOPHILIZED, FOR SOLUTION INTRAVENOUS at 14:29

## 2021-08-26 RX ADMIN — CLOPIDOGREL BISULFATE 75 MILLIGRAM(S): 75 TABLET, FILM COATED ORAL at 12:12

## 2021-08-26 RX ADMIN — POLYETHYLENE GLYCOL 3350 17 GRAM(S): 17 POWDER, FOR SOLUTION ORAL at 03:54

## 2021-08-26 RX ADMIN — SENNA PLUS 2 TABLET(S): 8.6 TABLET ORAL at 21:38

## 2021-08-26 RX ADMIN — ATORVASTATIN CALCIUM 10 MILLIGRAM(S): 80 TABLET, FILM COATED ORAL at 21:38

## 2021-08-26 RX ADMIN — CARBIDOPA AND LEVODOPA 1 TABLET(S): 25; 100 TABLET ORAL at 21:38

## 2021-08-26 RX ADMIN — ENOXAPARIN SODIUM 40 MILLIGRAM(S): 100 INJECTION SUBCUTANEOUS at 12:12

## 2021-08-26 RX ADMIN — CEFEPIME 100 MILLIGRAM(S): 1 INJECTION, POWDER, FOR SOLUTION INTRAMUSCULAR; INTRAVENOUS at 21:38

## 2021-08-26 RX ADMIN — TAMSULOSIN HYDROCHLORIDE 0.4 MILLIGRAM(S): 0.4 CAPSULE ORAL at 21:38

## 2021-08-26 RX ADMIN — QUETIAPINE FUMARATE 50 MILLIGRAM(S): 200 TABLET, FILM COATED ORAL at 21:38

## 2021-08-26 NOTE — H&P ADULT - HISTORY OF PRESENT ILLNESS
[History reviewed] : History reviewed. [Medications and Allergies reviewed] : Medications and allergies reviewed. Patient is a 81M with a PMH of HTN, DM, HLD, dementia,  Parkinson's disease, CAD s/p stents x2 (>20 years ago), CHF (EF: 50%, Moderate diastolic dysfunction (Stage II), moderate pulmonary hypertension, small-mod pericardial effusion), Lt pleural effusion, BPH, gastric ulcers, COVID infection who was sent to the ED for weakness.  Patient currently awake and alert, oriented x1, unable to provide history.  Has no acute complaints.  Discharged from NH today and was sent to the ED as he appeared weak and emaciated.  Vitals stable, labs show leukocytosis and mild lactic acidosis.  CT reveals a chronic loculated L pleural effusion.  ED attending spoke to family who made the patient DNR/DNI however did agree for chest tube drainage for effusion.  Will admit to med surg.

## 2021-08-26 NOTE — CONSULT NOTE ADULT - ASSESSMENT
Assessment  81 yr old male pt with significant medical hx and dementia presents with weakness. Pt states he feels good but he is not oriented to time or place. pt is hypotensive and has a hemithorax which may be infectious as per radiology. WBC count is 19.5 Pt is currently on vanc zosyn. Assessment  81 yr old male pt with significant medical hx and dementia presents with weakness. Pt states he feels good but he is not oriented to time or place. pt is hypotensive and has a hemithorax which may be infectious as per radiology. WBC count is 19.5 Pt is currently on vanc zosyn.    Plan   Hemithorax- abx should be switched to just cefepime 1000mg every 8 hours  Thoracentesis, with cultures, cytology, gram stain, cell count, PH, LDH, protein     Hypotension- Monitor BP Assessment  81 yr old male pt with significant medical hx and dementia presents with weakness. Pt states he feels good but he is not oriented to time or place. left sided pleural effusion.  WBC count is 19.5 Pt is currently on vanc zosyn.    Plan   Left pleural effusion- abx should be switched to just cefepime 1000mg every 8 hours  Thoracentesis, with cultures, cytology, gram stain, cell count, PH, LDH, protein   MRSA PCR   Blood cultures

## 2021-08-26 NOTE — CONSULT NOTE ADULT - SUBJECTIVE AND OBJECTIVE BOX
HPI:  80 y/o M  pmhx of HTN, DM, HLD, dementia,  Parkinson's disease, CAD s/p stents x2 (>20 years ago), CHF (EF: 50%, Moderate diastolic dysfunction (Stage II), moderate pulmonary hypertension, small-mod pericardial effusion), Lt pleural effusion, BPH, gastric ulcers, COVID infection who was sent to the ED for weakness.  Patient currently awake and alert, oriented x1, unable to provide history.  Has no acute complaints.  Discharged from NH today and was sent to the ED as he appeared weak and emaciated.  Vitals stable, labs show leukocytosis and mild lactic acidosis.      Interval Events:  CT surgery consulted to evaluate pt for CT chest findings of loculated left pleural effusion.         PAST MEDICAL & SURGICAL HISTORY:  Hypercholesterolemia    DM (Diabetes Mellitus)    HTN (Hypertension)    CAD (Coronary Artery Disease)  s/p cardiac stent ( &gt; 20 years ago)    BPH (benign prostatic hyperplasia)    Coronary Stent  x 2 ( 20 years )      Allergies:  No Known Allergies    Home Medications:  acetaminophen 325 mg oral tablet: 2 tab(s) orally every 6 hours, As needed, Mild Pain (1 - 3)  guaiFENesin 100 mg/5 mL oral liquid: 5 milliliter(s) orally every 6 hours, As needed, Cough  melatonin 3 mg oral tablet: 1 tab(s) orally once a day (at bedtime), As needed, Insomnia  oxyCODONE 10 mg oral tablet: 1 tab(s) orally every 6 hours, As needed, Severe Pain (7 - 10)  oxyCODONE 5 mg oral tablet: 1 tab(s) orally every 6 hours, As needed, Moderate Pain (4 - 6)  polyethylene glycol 3350 oral powder for reconstitution: 17 gram(s) orally once a day  senna oral tablet: 2 tab(s) orally once a day (at bedtime)      Family History:  FAMILY HISTORY:  FH: HTN (hypertension)        ROS:  Constitutional: Denies fever, fatigue or weight loss.  Skin: Denies rash.  Eyes: Denies recent vision problems or eye pain.  ENT: Denies congestion, ear pain, or sore throat.  Endocrine: Denies thyroid problems.  Cardiovascular: Denies chest pain or palpation.  Respiratory: SEE HPI  Gastrointestinal: Denies abdominal pain, nausea, vomiting, or diarrhea.  Genitourinary: Denies dysuria.  Musculoskeletal: Denies joint swelling.  Neurologic: Denies headache.      Physical Examination:  GENERAL: No acute distress, well-developed  CHEST/LUNG: decreased breath sounds left  HEART: RRR, no MRGs  NEUROLOGY: A&O x 3, no focal deficits    Data:                        10.3   19.54 )-----------( 528      ( 25 Aug 2021 21:05 )             32.7         137  |  103  |  36<H>  ----------------------------<  157<H>  4.9   |  29  |  0.92    Ca    8.9      25 Aug 2021 21:05    TPro  7.2  /  Alb  2.2<L>  /  TBili  0.5  /  DBili  x   /  AST  4<L>  /  ALT  <6<L>  /  AlkPhos  86        LIVER FUNCTIONS - ( 25 Aug 2021 21:05 )  Alb: 2.2 g/dL / Pro: 7.2 gm/dL / ALK PHOS: 86 U/L / ALT: <6 U/L / AST: 4 U/L / GGT: x           Urinalysis Basic - ( 25 Aug 2021 22:31 )    Color: Yellow / Appearance: Clear / S.010 / pH: x  Gluc: x / Ketone: Negative  / Bili: Negative / Urobili: 1 mg/dL   Blood: x / Protein: Negative mg/dL / Nitrite: Negative   Leuk Esterase: Negative / RBC: 0-2 /HPF / WBC x   Sq Epi: x / Non Sq Epi: x / Bacteria: x    Radiology:  < from: CT Chest w/ IV Cont (21 @ 00:28) >    EXAM:  CT CHEST IC                          EXAM:  CT ABDOMEN AND PELVIS IC                            PROCEDURE DATE:  2021          INTERPRETATION:  CLINICAL INFORMATION: Weakness.  Emaciation.    Thrive.  Dementia.  Leukocytosis.    COMPARISON: CT chest from 2021.  CT chest, pelvis from 2021.    CONTRAST/COMPLICATIONS:  IV Contrast: Omnipaque 350  90 cc administered   10 cc discarded  Oral Contrast: NONE  Complications: None reported at time of study completion    PROCEDURE:  CT of the Chest, Abdomen and Pelvis was performed.  Sagittal and coronal reformats were performed.    FINDINGS:  CHEST:  LUNGS AND LARGE AIRWAYS: Patent central airways. Left lower lobe compressive atelectasis.  PLEURA: Chronic loculated left pleural effusion now demonstrates air-fluid level.  VESSELS: Within normal limits.  HEART: The heart appears mildly enlarged.  Moderate to severe atherosclerotic calcification of the coronary arteries.  Mild calcification of the aortic valve leaflets.  Trace residual pericardial effusion measures up to 7 mm anteriorly-inferiorly (4:74), decreased from 1 cm on 2021.  MEDIASTINUM AND SAIDA nonspecific mediastinal lymph nodes measure up to 1.1 cm short axis in the subcarinal region (4:42) and 8-9 mm in the precarinal/lower paratracheal regions (4:35, 4:36 and 4:38).  CHEST WALL AND LOWER NECK: A right thyroid nodule measures 9 mm (4:15)    ABDOMEN AND PELVIS:  LIVER: Within normal limits.  BILE DUCTS: Normal caliber.  GALLBLADDER : Status post cholecystectomy.  SPLEEN: Within normal limits.  PANCREAS: Atrophic.  ADRENALS: Within normal limits.  KIDNEYS/URETERS: Within normal limits.    BLADDER: Within normal limits.  REPRODUCTIVE ORGANS: Enlarged prostate measures 5.6 x 6.9 x 6.2 cm.  There is an indeterminant 1.9 cm hyperenhancing peripheral zone nodule in the left superior lateral aspect of the prostate gland (4:168 and 6:65).  A right testicular hydrocele is incidentally noted.    BOWEL: No bowel obstruction. Appendix is not visualized.  There is a large amount of stool throughout the entire colon without evidence of fecal impaction or stercoral colitis.  PERITONEUM: No ascites.  VESSELS: Atherosclerotic calcifications of the aortoiliac tree an proximal thigh vasculature.  RETROPERITONEUM/LYMPH NODES: No lymphadenopathy.  ABDOMINAL WALL: Within normal limits.  BONES: No acute fracture or suspicious osseous lesion.  The patient is status post open reduction internal fixation of right humeral fracture. There is subacute fracture in the greater trochanter of the right femur, which was acute on 06/10/2021; no bony union is visualized.  Chronic bilateral L5 spondylolysis and grade 2 anterolisthesis at L5-S1..    IMPRESSION:  CT chest:  1. Chronic loculated left pleural effusion now demonstrates an air-fluid level.  The differential diagnosis includes recent instrumentation, infection/pleural and parenchymal edema and bronchopleural fistula.  2. Trace residual pericardial effusion measures up to 7 mm anteriorly-inferiorly (4:74), decreased from 1 cm on 2021.    CT abdomen/pelvis:  1.  Large amount of stool in the colon without evidence of fecal impaction or stercoral colitis.  Correlate clinically for constipation.  2.  Enlarged prostate measures 5.6 x 6.9 x 6.2 cm and exerts mass effect on the bladder.  There is a 1.9 cm hyperenhancing peripheral zone nodule.  A BPH nodule is favored however the lesion remains indeterminate.  Urologic consultation and prostate MRI may be obtained as clinically warranted.      --- End of Report ---      ALEXY LUCIO MD; Attending Radiologist  This document has been electronically signed. Aug 26 2021  1:53AM    < end of copied text >    Assessment:   80 y/o M extensive pmhx admitted for FTT, with CT chest findings concerning for loculated left pleural effusion,     Plan:  - rec IR drainage with pigtail for left pleural effusion,   - will follow  - cont care per primary team  - discussed with Dr. Apple

## 2021-08-26 NOTE — CONSULT NOTE ADULT - SUBJECTIVE AND OBJECTIVE BOX
FESTUS BLAND  MRN-[id number]        Patient is a 81y old  Male who presents with a chief complaint of FTT, empyema r/o (26 Aug 2021 10:06)      HPI:  Patient is a 81M with a PMH of HTN, DM, HLD, dementia,  Parkinson's disease, CAD s/p stents x2 (>20 years ago), CHF (EF: 50%, Moderate diastolic dysfunction (Stage II), moderate pulmonary hypertension, small-mod pericardial effusion), Lt pleural effusion, BPH, gastric ulcers, COVID infection who was sent to the ED for weakness.  Patient currently awake and alert, oriented x1, unable to provide history.  Has no acute complaints.  Discharged from NH today and was sent to the ED as he appeared weak and emaciated.  Vitals stable, labs show leukocytosis and mild lactic acidosis.  CT reveals a chronic loculated L pleural effusion.  ED attending spoke to family who made the patient DNR/DNI however did agree for chest tube drainage for effusion.  Will admit to med surg.   (26 Aug 2021 03:21)    Pt is A@x0      ID consulted for workup and antibiotic management     PAST MEDICAL & SURGICAL HISTORY:  Hypercholesterolemia    DM (Diabetes Mellitus)    HTN (Hypertension)    CAD (Coronary Artery Disease)  s/p cardiac stent ( &gt; 20 years ago)    BPH (benign prostatic hyperplasia)    Coronary Stent  x 2 ( 20 years )        Allergies  No Known Allergies        ANTIMICROBIALS:  piperacillin/tazobactam IVPB.. 3.375 every 8 hours      MEDICATIONS  (STANDING):  piperacillin/tazobactam IVPB..   25 mL/Hr IV Intermittent (21 @ 06:49)    piperacillin/tazobactam IVPB...   200 mL/Hr IV Intermittent (21 @ 21:55)    vancomycin  IVPB.   250 mL/Hr IV Intermittent (21 @ 22:30)        OTHER MEDS: MEDICATIONS  (STANDING):  atorvastatin 10 at bedtime  carbidopa/levodopa  25/100 1 three times a day  dextrose 40% Gel 15 once  dextrose 50% Injectable 25 once  dextrose 50% Injectable 12.5 once  dextrose 50% Injectable 25 once  enoxaparin Injectable 40 daily  glucagon  Injectable 1 once  insulin lispro (ADMELOG) corrective regimen sliding scale  three times a day before meals  oxyCODONE    IR 5 every 6 hours PRN  polyethylene glycol 3350 17 daily PRN  QUEtiapine 50 at bedtime  senna 2 at bedtime  tamsulosin 0.4 at bedtime      SOCIAL HISTORY:       FAMILY HISTORY:  FH: HTN (hypertension)        REVIEW OF SYSTEMS  [  ] ROS unobtainable because:    [ x ] All other systems negative except as noted below:	    Constitutional:  [ ] fever [ ] chills  [ ] weight loss  [x ] weakness  Skin:  [ ] rash [ ] phlebitis	  Eyes: [ ] icterus [ ] pain  [ ] discharge	  ENMT: [ ] sore throat  [ ] thrush [ ] ulcers [ ] exudates  Respiratory: [ ] dyspnea [ ] hemoptysis [ ] cough [ ] sputum	  Cardiovascular:  [ ] chest pain [ ] palpitations [ ] edema	  Gastrointestinal:  [ ] nausea [ ] vomiting [ ] diarrhea [ ] constipation [ ] pain	  Genitourinary:  [ ] dysuria [ ] frequency [ ] hematuria [ ] discharge [ ] flank pain  [ ] incontinence  Musculoskeletal:  [ ] myalgias [ ] arthralgias [ ] arthritis  [ ] back pain  Neurological:  [ ] headache [ ] seizures  [ ] confusion/altered mental status  Psychiatric:  [ ] anxiety [ ] depression	  Hematology/Lymphatics:  [ ] lymphadenopathy  Endocrine:  [ ] adrenal [ ] thyroid  Allergic/Immunologic:	 [ ] transplant [ ] seasonal    Vital Signs Last 24 Hrs  T(F): 97.3 (21 @ 12:00), Max: 98.9 (21 @ 19:40)    Vital Signs Last 24 Hrs  HR: 78 (21 @ 12:00) (68 - 94)  BP: 104/58 (21 @ 12:00) (104/48 - 128/53)  RR: 20 (21 @ 12:00)  SpO2: 97% (21 @ 12:00) (96% - 97%)  Wt(kg): --    PHYSICAL EXAM:  Constitutional: non-toxic, no distress  HEAD/EYES: anicteric, no conjunctival injection  ENT:  supple, no thrush  Cardiovascular:   normal S1, S2, no murmur, no edema  Respiratory:  Diffuse crackles and wheezing  GI:  soft, non-tender, normal bowel sounds, non-reducible umbilcal hernia  :   caceres, no CVA tenderness  Musculoskeletal:  no synovitis, normal ROM  Neurologic: awake and alert, normal strength, no focal findings  Skin:  no rash, no erythema, no phlebitis  Heme/Onc: no lymphadenopathy   Psychiatric:  awake, alert, appropriate mood          WBC Count: 19.54 K/uL ( @ 21:05)                            10.3   19.54 )-----------( 528      ( 25 Aug 2021 21:05 )             32.7           137  |  103  |  36<H>  ----------------------------<  157<H>  4.9   |  29  |  0.92    Ca    8.9      25 Aug 2021 21:05    TPro  7.2  /  Alb  2.2<L>  /  TBili  0.5  /  DBili  x   /  AST  4<L>  /  ALT  <6<L>  /  AlkPhos  86        Creatinine Trend: 0.92<--    Urinalysis Basic - ( 25 Aug 2021 22:31 )    Color: Yellow / Appearance: Clear / S.010 / pH: x  Gluc: x / Ketone: Negative  / Bili: Negative / Urobili: 1 mg/dL   Blood: x / Protein: Negative mg/dL / Nitrite: Negative   Leuk Esterase: Negative / RBC: 0-2 /HPF / WBC x   Sq Epi: x / Non Sq Epi: x / Bacteria: x        MICROBIOLOGY:          v                    Procalcitonin, Serum: 0.08 (21 @ 10:47)      RADIOLOGY:    EXAM: XR CHEST PORTABLE URGENT 1V      PROCEDURE DATE: 2021        INTERPRETATION: Portable chest radiograph    CLINICAL INFORMATION: Pneumonia. Follow-up    TECHNIQUE: Portable AP view of the chest was obtained.    COMPARISON: 2021 chest available for review.    FINDINGS:    The lungs are show LEFT lower hemithorax airspace consolidation and/or effusion obscuring LEFT diaphragmatic contour. RIGHT lung parenchyma clear.. No pneumothorax.    The heart and mediastinum size and configuration are within normal limits.    Visualized osseous structures are intact.    IMPRESSION: LEFT lower hemithorax airspace consolidation and/or effusion..    --- End of Report ---    EXAM: CT CHEST IC    EXAM: CT ABDOMEN AND PELVIS IC      PROCEDURE DATE: 2021        INTERPRETATION: CLINICAL INFORMATION: Weakness. Emaciation.    Thrive. Dementia. Leukocytosis.    COMPARISON: CT chest from 2021. CT chest, pelvis from 2021.    CONTRAST/COMPLICATIONS:  IV Contrast: Omnipaque 350 90 cc administered 10 cc discarded  Oral Contrast: NONE  Complications: None reported at time of study completion    PROCEDURE:  CT of the Chest, Abdomen and Pelvis was performed.  Sagittal and coronal reformats were performed.    FINDINGS:  CHEST:  LUNGS AND LARGE AIRWAYS: Patent central airways. Left lower lobe compressive atelectasis.  PLEURA: Chronic loculated left pleural effusion now demonstrates air-fluid level.  VESSELS: Within normal limits.  HEART: The heart appears mildly enlarged. Moderate to severe atherosclerotic calcification of the coronary arteries. Mild calcification of the aortic valve leaflets. Trace residual pericardial effusion measures up to 7 mm anteriorly-inferiorly (4:74), decreased from 1 cm on 2021.  MEDIASTINUM AND SAIDA nonspecific mediastinal lymph nodes measure up to 1.1 cm short axis in the subcarinal region (4:42) and 8-9 mm in the precarinal/lower paratracheal regions (4:35, 4:36 and 4:38).  CHEST WALL AND LOWER NECK: A right thyroid nodule measures 9 mm (4:15)    ABDOMEN AND PELVIS:  LIVER: Within normal limits.  BILE DUCTS: Normal caliber.  GALLBLADDER : Status post cholecystectomy.  SPLEEN: Within normal limits.  PANCREAS: Atrophic.  ADRENALS: Within normal limits.  KIDNEYS/URETERS: Within normal limits.    BLADDER: Within normal limits.  REPRODUCTIVE ORGANS: Enlarged prostate measures 5.6 x 6.9 x 6.2 cm. There is an indeterminant 1.9 cm hyperenhancing peripheral zone nodule in the left superior lateral aspect of the prostate gland (4:168 and 6:65). A right testicular hydrocele is incidentally noted.    BOWEL: No bowel obstruction. Appendix is not visualized. There is a large amount of stool throughout the entire colon without evidence of fecal impaction or stercoral colitis.  PERITONEUM: No ascites.  VESSELS: Atherosclerotic calcifications of the aortoiliac tree an proximal thigh vasculature.  RETROPERITONEUM/LYMPH NODES: No lymphadenopathy.  ABDOMINAL WALL: Within normal limits.  BONES: No acute fracture or suspicious osseous lesion. The patient is status post open reduction internal fixation of right humeral fracture. There is subacute fracture in the greater trochanter of the right femur, which was acute on 06/10/2021; no bony union is visualized. Chronic bilateral L5 spondylolysis and grade 2 anterolisthesis at L5-S1..    IMPRESSION:  CT chest:  1. Chronic loculated left pleural effusion now demonstrates an air-fluid level. The differential diagnosis includes recent instrumentation, infection/pleural and parenchymal edema and bronchopleural fistula.  2. Trace residual pericardial effusion measures up to 7 mm anteriorly-inferiorly (4:74), decreased from 1 cm on 2021.    CT abdomen/pelvis:  1. Large amount of stool in the colon without evidence of fecal impaction or stercoral colitis. Correlate clinically for constipation.  2. Enlarged prostate measures 5.6 x 6.9 x 6.2 cm and exerts mass effect on the bladder. There is a 1.9 cm hyperenhancing peripheral zone nodule. A BPH nodule is favored however the lesion remains indeterminate. Urologic consultation and prostate MRI may be obtained as clinically warranted.      --- End of Report ---           FESTUS BLAND  MRN-[id number]        Patient is a 81y old  Male who presents with a chief complaint of FTT, empyema r/o (26 Aug 2021 10:06)      HPI:  Patient is a 81M with a PMH of HTN, DM, HLD, dementia,  Parkinson's disease, CAD s/p stents x2 (>20 years ago), CHF (EF: 50%, Moderate diastolic dysfunction (Stage II), moderate pulmonary hypertension, small-mod pericardial effusion), Lt pleural effusion, BPH, gastric ulcers, COVID infection who was sent to the ED for weakness.  Patient currently awake and alert, oriented x1, unable to provide history.  Has no acute complaints.  Discharged from NH today and was sent to the ED as he appeared weak and emaciated.  Vitals stable, labs show leukocytosis and mild lactic acidosis.  CT reveals a chronic loculated L pleural effusion.  ED attending spoke to family who made the patient DNR/DNI however did agree for chest tube drainage for effusion.  Will admit to med surg.   (26 Aug 2021 03:21)    Pt is A@x0      ID consulted for workup and antibiotic management     PAST MEDICAL & SURGICAL HISTORY:  Hypercholesterolemia    DM (Diabetes Mellitus)    HTN (Hypertension)    CAD (Coronary Artery Disease)  s/p cardiac stent ( &gt; 20 years ago)    BPH (benign prostatic hyperplasia)    Coronary Stent  x 2 ( 20 years )        Allergies  No Known Allergies        ANTIMICROBIALS:  piperacillin/tazobactam IVPB.. 3.375 every 8 hours      MEDICATIONS  (STANDING):  piperacillin/tazobactam IVPB..   25 mL/Hr IV Intermittent (21 @ 06:49)    piperacillin/tazobactam IVPB...   200 mL/Hr IV Intermittent (21 @ 21:55)    vancomycin  IVPB.   250 mL/Hr IV Intermittent (21 @ 22:30)        OTHER MEDS: MEDICATIONS  (STANDING):  atorvastatin 10 at bedtime  carbidopa/levodopa  25/100 1 three times a day  dextrose 40% Gel 15 once  dextrose 50% Injectable 25 once  dextrose 50% Injectable 12.5 once  dextrose 50% Injectable 25 once  enoxaparin Injectable 40 daily  glucagon  Injectable 1 once  insulin lispro (ADMELOG) corrective regimen sliding scale  three times a day before meals  oxyCODONE    IR 5 every 6 hours PRN  polyethylene glycol 3350 17 daily PRN  QUEtiapine 50 at bedtime  senna 2 at bedtime  tamsulosin 0.4 at bedtime      SOCIAL HISTORY:       FAMILY HISTORY:  FH: HTN (hypertension)        REVIEW OF SYSTEMS  [  ] ROS unobtainable because:    [ x ] All other systems negative except as noted below:	    Constitutional:  [ ] fever [ ] chills  [ ] weight loss  [x ] weakness  Skin:  [ ] rash [ ] phlebitis	  Eyes: [ ] icterus [ ] pain  [ ] discharge	  ENMT: [ ] sore throat  [ ] thrush [ ] ulcers [ ] exudates  Respiratory: [ ] dyspnea [ ] hemoptysis [ ] cough [ ] sputum	  Cardiovascular:  [ ] chest pain [ ] palpitations [ ] edema	  Gastrointestinal:  [ ] nausea [ ] vomiting [ ] diarrhea [ ] constipation [ ] pain	  Genitourinary:  [ ] dysuria [ ] frequency [ ] hematuria [ ] discharge [ ] flank pain  [ ] incontinence  Musculoskeletal:  [ ] myalgias [ ] arthralgias [ ] arthritis  [ ] back pain  Neurological:  [ ] headache [ ] seizures  [ ] confusion/altered mental status  Psychiatric:  [ ] anxiety [ ] depression	  Hematology/Lymphatics:  [ ] lymphadenopathy  Endocrine:  [ ] adrenal [ ] thyroid  Allergic/Immunologic:	 [ ] transplant [ ] seasonal    Vital Signs Last 24 Hrs  T(F): 97.3 (21 @ 12:00), Max: 98.9 (21 @ 19:40)    Vital Signs Last 24 Hrs  HR: 78 (21 @ 12:00) (68 - 94)  BP: 104/58 (21 @ 12:00) (104/48 - 128/53)  RR: 20 (21 @ 12:00)  SpO2: 97% (21 @ 12:00) (96% - 97%)  Wt(kg): --    PHYSICAL EXAM:  Constitutional: non-toxic, no distress  HEAD/EYES: anicteric, no conjunctival injection  ENT:  supple, no thrush  Cardiovascular:   normal S1, S2, no murmur, no edema  Respiratory:  Diffuse crackles and wheezing, dimminished breath sounds on the left  GI:  soft, non-tender, normal bowel sounds, reducible umbilcal hernia  :   caceres, no CVA tenderness  Musculoskeletal:  no synovitis, normal ROM  Neurologic: awake and alert. not oriented  Skin:  no rash, no erythema, no phlebitis  Heme/Onc: no lymphadenopathy   Psychiatric:  awake, alert, appropriate mood          WBC Count: 19.54 K/uL ( @ 21:05)                            10.3   19.54 )-----------( 528      ( 25 Aug 2021 21:05 )             32.7           137  |  103  |  36<H>  ----------------------------<  157<H>  4.9   |  29  |  0.92    Ca    8.9      25 Aug 2021 21:05    TPro  7.2  /  Alb  2.2<L>  /  TBili  0.5  /  DBili  x   /  AST  4<L>  /  ALT  <6<L>  /  AlkPhos  86        Creatinine Trend: 0.92<--    Urinalysis Basic - ( 25 Aug 2021 22:31 )    Color: Yellow / Appearance: Clear / S.010 / pH: x  Gluc: x / Ketone: Negative  / Bili: Negative / Urobili: 1 mg/dL   Blood: x / Protein: Negative mg/dL / Nitrite: Negative   Leuk Esterase: Negative / RBC: 0-2 /HPF / WBC x   Sq Epi: x / Non Sq Epi: x / Bacteria: x        MICROBIOLOGY:          v                    Procalcitonin, Serum: 0.08 (21 @ 10:47)      RADIOLOGY:    EXAM: XR CHEST PORTABLE URGENT 1V      PROCEDURE DATE: 2021        INTERPRETATION: Portable chest radiograph    CLINICAL INFORMATION: Pneumonia. Follow-up    TECHNIQUE: Portable AP view of the chest was obtained.    COMPARISON: 2021 chest available for review.    FINDINGS:    The lungs are show LEFT lower hemithorax airspace consolidation and/or effusion obscuring LEFT diaphragmatic contour. RIGHT lung parenchyma clear.. No pneumothorax.    The heart and mediastinum size and configuration are within normal limits.    Visualized osseous structures are intact.    IMPRESSION: LEFT lower hemithorax airspace consolidation and/or effusion..    --- End of Report ---    EXAM: CT CHEST IC    EXAM: CT ABDOMEN AND PELVIS IC      PROCEDURE DATE: 2021        INTERPRETATION: CLINICAL INFORMATION: Weakness. Emaciation.    Thrive. Dementia. Leukocytosis.    COMPARISON: CT chest from 2021. CT chest, pelvis from 2021.    CONTRAST/COMPLICATIONS:  IV Contrast: Omnipaque 350 90 cc administered 10 cc discarded  Oral Contrast: NONE  Complications: None reported at time of study completion    PROCEDURE:  CT of the Chest, Abdomen and Pelvis was performed.  Sagittal and coronal reformats were performed.    FINDINGS:  CHEST:  LUNGS AND LARGE AIRWAYS: Patent central airways. Left lower lobe compressive atelectasis.  PLEURA: Chronic loculated left pleural effusion now demonstrates air-fluid level.  VESSELS: Within normal limits.  HEART: The heart appears mildly enlarged. Moderate to severe atherosclerotic calcification of the coronary arteries. Mild calcification of the aortic valve leaflets. Trace residual pericardial effusion measures up to 7 mm anteriorly-inferiorly (4:74), decreased from 1 cm on 2021.  MEDIASTINUM AND SAIDA nonspecific mediastinal lymph nodes measure up to 1.1 cm short axis in the subcarinal region (4:42) and 8-9 mm in the precarinal/lower paratracheal regions (4:35, 4:36 and 4:38).  CHEST WALL AND LOWER NECK: A right thyroid nodule measures 9 mm (4:15)    ABDOMEN AND PELVIS:  LIVER: Within normal limits.  BILE DUCTS: Normal caliber.  GALLBLADDER : Status post cholecystectomy.  SPLEEN: Within normal limits.  PANCREAS: Atrophic.  ADRENALS: Within normal limits.  KIDNEYS/URETERS: Within normal limits.    BLADDER: Within normal limits.  REPRODUCTIVE ORGANS: Enlarged prostate measures 5.6 x 6.9 x 6.2 cm. There is an indeterminant 1.9 cm hyperenhancing peripheral zone nodule in the left superior lateral aspect of the prostate gland (4:168 and 6:65). A right testicular hydrocele is incidentally noted.    BOWEL: No bowel obstruction. Appendix is not visualized. There is a large amount of stool throughout the entire colon without evidence of fecal impaction or stercoral colitis.  PERITONEUM: No ascites.  VESSELS: Atherosclerotic calcifications of the aortoiliac tree an proximal thigh vasculature.  RETROPERITONEUM/LYMPH NODES: No lymphadenopathy.  ABDOMINAL WALL: Within normal limits.  BONES: No acute fracture or suspicious osseous lesion. The patient is status post open reduction internal fixation of right humeral fracture. There is subacute fracture in the greater trochanter of the right femur, which was acute on 06/10/2021; no bony union is visualized. Chronic bilateral L5 spondylolysis and grade 2 anterolisthesis at L5-S1..    IMPRESSION:  CT chest:  1. Chronic loculated left pleural effusion now demonstrates an air-fluid level. The differential diagnosis includes recent instrumentation, infection/pleural and parenchymal edema and bronchopleural fistula.  2. Trace residual pericardial effusion measures up to 7 mm anteriorly-inferiorly (4:74), decreased from 1 cm on 2021.    CT abdomen/pelvis:  1. Large amount of stool in the colon without evidence of fecal impaction or stercoral colitis. Correlate clinically for constipation.  2. Enlarged prostate measures 5.6 x 6.9 x 6.2 cm and exerts mass effect on the bladder. There is a 1.9 cm hyperenhancing peripheral zone nodule. A BPH nodule is favored however the lesion remains indeterminate. Urologic consultation and prostate MRI may be obtained as clinically warranted.      --- End of Report ---           FESTUS BLAND  MRN-[id number]        Patient is a 81y old  Male who presents with a chief complaint of FTT, empyema r/o (26 Aug 2021 10:06)      HPI:  Patient is a 81M with a PMH of HTN, DM, HLD, dementia,  Parkinson's disease, CAD s/p stents x2 (>20 years ago), CHF (EF: 50%, Moderate diastolic dysfunction (Stage II), moderate pulmonary hypertension, small-mod pericardial effusion), Lt pleural effusion, BPH, gastric ulcers, COVID infection who was sent to the ED for weakness.  Patient currently awake and alert, oriented x1, unable to provide history.  Has no acute complaints.  Discharged from NH today and was sent to the ED as he appeared weak and emaciated.  Vitals stable, labs show leukocytosis and mild lactic acidosis.  CT reveals a chronic loculated L pleural effusion.  ED attending spoke to family who made the patient DNR/DNI however did agree for chest tube drainage for effusion.  Will admit to med surg.   (26 Aug 2021 03:21)    Pt is A@x1      ID consulted for workup and antibiotic management     PAST MEDICAL & SURGICAL HISTORY:  Hypercholesterolemia    DM (Diabetes Mellitus)    HTN (Hypertension)    CAD (Coronary Artery Disease)  s/p cardiac stent ( &gt; 20 years ago)    BPH (benign prostatic hyperplasia)    Coronary Stent  x 2 ( 20 years )        Allergies  No Known Allergies        ANTIMICROBIALS:  piperacillin/tazobactam IVPB.. 3.375 every 8 hours      MEDICATIONS  (STANDING):  piperacillin/tazobactam IVPB..   25 mL/Hr IV Intermittent (21 @ 06:49)    piperacillin/tazobactam IVPB...   200 mL/Hr IV Intermittent (21 @ 21:55)    vancomycin  IVPB.   250 mL/Hr IV Intermittent (21 @ 22:30)        OTHER MEDS: MEDICATIONS  (STANDING):  atorvastatin 10 at bedtime  carbidopa/levodopa  25/100 1 three times a day  dextrose 40% Gel 15 once  dextrose 50% Injectable 25 once  dextrose 50% Injectable 12.5 once  dextrose 50% Injectable 25 once  enoxaparin Injectable 40 daily  glucagon  Injectable 1 once  insulin lispro (ADMELOG) corrective regimen sliding scale  three times a day before meals  oxyCODONE    IR 5 every 6 hours PRN  polyethylene glycol 3350 17 daily PRN  QUEtiapine 50 at bedtime  senna 2 at bedtime  tamsulosin 0.4 at bedtime      SOCIAL HISTORY:     denies toxic habits     FAMILY HISTORY:  FH: HTN (hypertension)        REVIEW OF SYSTEMS  [  ] ROS unobtainable because:    [ x ] All other systems negative except as noted below:	    Constitutional:  [ ] fever [ ] chills  [ ] weight loss  [x ] weakness  Skin:  [ ] rash [ ] phlebitis	  Eyes: [ ] icterus [ ] pain  [ ] discharge	  ENMT: [ ] sore throat  [ ] thrush [ ] ulcers [ ] exudates  Respiratory: [ ] dyspnea [ ] hemoptysis [ ] cough [ ] sputum	  Cardiovascular:  [ ] chest pain [ ] palpitations [ ] edema	  Gastrointestinal:  [ ] nausea [ ] vomiting [ ] diarrhea [ ] constipation [ ] pain	  Genitourinary:  [ ] dysuria [ ] frequency [ ] hematuria [ ] discharge [ ] flank pain  [ ] incontinence  Musculoskeletal:  [ ] myalgias [ ] arthralgias [ ] arthritis  [ ] back pain  Neurological:  [ ] headache [ ] seizures  [ ] confusion/altered mental status  Psychiatric:  [ ] anxiety [ ] depression	  Hematology/Lymphatics:  [ ] lymphadenopathy  Endocrine:  [ ] adrenal [ ] thyroid  Allergic/Immunologic:	 [ ] transplant [ ] seasonal    Vital Signs Last 24 Hrs  T(F): 97.3 (21 @ 12:00), Max: 98.9 (21 @ 19:40)    Vital Signs Last 24 Hrs  HR: 78 (21 @ 12:00) (68 - 94)  BP: 104/58 (21 @ 12:00) (104/48 - 128/53)  RR: 20 (21 @ 12:00)  SpO2: 97% (21 @ 12:00) (96% - 97%)  Wt(kg): --    PHYSICAL EXAM:  Constitutional: non-toxic, no distress  HEAD/EYES: anicteric, no conjunctival injection  ENT:  supple, no thrush  Cardiovascular:   normal S1, S2, no murmur, no edema  Respiratory:  Diffuse crackles and wheezing, diminished breath sounds on the left  GI:  soft, non-tender, normal bowel sounds, reducible umbilical hernia  :   caceres, no CVA tenderness  Musculoskeletal:  no synovitis, normal ROM  Neurologic: awake and alert and oriented to person only   Skin:  no rash, no erythema, no phlebitis  Heme/Onc: no lymphadenopathy   Psychiatric:  awake, alert, appropriate mood          WBC Count: 19.54 K/uL ( @ 21:05)                            10.3   19.54 )-----------( 528      ( 25 Aug 2021 21:05 )             32.7           137  |  103  |  36<H>  ----------------------------<  157<H>  4.9   |  29  |  0.92    Ca    8.9      25 Aug 2021 21:05    TPro  7.2  /  Alb  2.2<L>  /  TBili  0.5  /  DBili  x   /  AST  4<L>  /  ALT  <6<L>  /  AlkPhos  86        Creatinine Trend: 0.92<--    Urinalysis Basic - ( 25 Aug 2021 22:31 )    Color: Yellow / Appearance: Clear / S.010 / pH: x  Gluc: x / Ketone: Negative  / Bili: Negative / Urobili: 1 mg/dL   Blood: x / Protein: Negative mg/dL / Nitrite: Negative   Leuk Esterase: Negative / RBC: 0-2 /HPF / WBC x   Sq Epi: x / Non Sq Epi: x / Bacteria: x        MICROBIOLOGY:  Procalcitonin, Serum: 0.08 (21 @ 10:47)      RADIOLOGY:    EXAM: XR CHEST PORTABLE URGENT 1V      PROCEDURE DATE: 2021        INTERPRETATION: Portable chest radiograph    CLINICAL INFORMATION: Pneumonia. Follow-up    TECHNIQUE: Portable AP view of the chest was obtained.    COMPARISON: 2021 chest available for review.    FINDINGS:    The lungs are show LEFT lower hemithorax airspace consolidation and/or effusion obscuring LEFT diaphragmatic contour. RIGHT lung parenchyma clear.. No pneumothorax.    The heart and mediastinum size and configuration are within normal limits.    Visualized osseous structures are intact.    IMPRESSION: LEFT lower hemithorax airspace consolidation and/or effusion..    --- End of Report ---    EXAM: CT CHEST IC    EXAM: CT ABDOMEN AND PELVIS IC      PROCEDURE DATE: 2021        INTERPRETATION: CLINICAL INFORMATION: Weakness. Emaciation.    Thrive. Dementia. Leukocytosis.    COMPARISON: CT chest from 2021. CT chest, pelvis from 2021.    CONTRAST/COMPLICATIONS:  IV Contrast: Omnipaque 350 90 cc administered 10 cc discarded  Oral Contrast: NONE  Complications: None reported at time of study completion    PROCEDURE:  CT of the Chest, Abdomen and Pelvis was performed.  Sagittal and coronal reformats were performed.    FINDINGS:  CHEST:  LUNGS AND LARGE AIRWAYS: Patent central airways. Left lower lobe compressive atelectasis.  PLEURA: Chronic loculated left pleural effusion now demonstrates air-fluid level.  VESSELS: Within normal limits.  HEART: The heart appears mildly enlarged. Moderate to severe atherosclerotic calcification of the coronary arteries. Mild calcification of the aortic valve leaflets. Trace residual pericardial effusion measures up to 7 mm anteriorly-inferiorly (4:74), decreased from 1 cm on 2021.  MEDIASTINUM AND SAIDA nonspecific mediastinal lymph nodes measure up to 1.1 cm short axis in the subcarinal region (4:42) and 8-9 mm in the precarinal/lower paratracheal regions (4:35, 4:36 and 4:38).  CHEST WALL AND LOWER NECK: A right thyroid nodule measures 9 mm (4:15)    ABDOMEN AND PELVIS:  LIVER: Within normal limits.  BILE DUCTS: Normal caliber.  GALLBLADDER : Status post cholecystectomy.  SPLEEN: Within normal limits.  PANCREAS: Atrophic.  ADRENALS: Within normal limits.  KIDNEYS/URETERS: Within normal limits.    BLADDER: Within normal limits.  REPRODUCTIVE ORGANS: Enlarged prostate measures 5.6 x 6.9 x 6.2 cm. There is an indeterminant 1.9 cm hyperenhancing peripheral zone nodule in the left superior lateral aspect of the prostate gland (4:168 and 6:65). A right testicular hydrocele is incidentally noted.    BOWEL: No bowel obstruction. Appendix is not visualized. There is a large amount of stool throughout the entire colon without evidence of fecal impaction or stercoral colitis.  PERITONEUM: No ascites.  VESSELS: Atherosclerotic calcifications of the aortoiliac tree an proximal thigh vasculature.  RETROPERITONEUM/LYMPH NODES: No lymphadenopathy.  ABDOMINAL WALL: Within normal limits.  BONES: No acute fracture or suspicious osseous lesion. The patient is status post open reduction internal fixation of right humeral fracture. There is subacute fracture in the greater trochanter of the right femur, which was acute on 06/10/2021; no bony union is visualized. Chronic bilateral L5 spondylolysis and grade 2 anterolisthesis at L5-S1..    IMPRESSION:  CT chest:  1. Chronic loculated left pleural effusion now demonstrates an air-fluid level. The differential diagnosis includes recent instrumentation, infection/pleural and parenchymal edema and bronchopleural fistula.  2. Trace residual pericardial effusion measures up to 7 mm anteriorly-inferiorly (4:74), decreased from 1 cm on 2021.    CT abdomen/pelvis:  1. Large amount of stool in the colon without evidence of fecal impaction or stercoral colitis. Correlate clinically for constipation.  2. Enlarged prostate measures 5.6 x 6.9 x 6.2 cm and exerts mass effect on the bladder. There is a 1.9 cm hyperenhancing peripheral zone nodule. A BPH nodule is favored however the lesion remains indeterminate. Urologic consultation and prostate MRI may be obtained as clinically warranted.      --- End of Report ---

## 2021-08-26 NOTE — H&P ADULT - NSHPPHYSICALEXAM_GEN_ALL_CORE
Physical exam:  General: patient in no acute distress, resting comfortably  Head:  Atraumatic, Normocephalic  Eyes: EOMI, PERRLA, clear sclera  Neck: Supple, thyroid nontender, non enlarged  Cardio: S1/S2 +ve, regular rate and rhythm, no M/G/R  Resp: decreased breath sounds on the L side  GI: abdomen soft, nontender, non distended, no guarding, BS +ve x 4  Ext: no significant pedal edema  Neuro: pleasantly demented, AAOx1, gross movement of all extremities  Skin: No rashes or lesions

## 2021-08-26 NOTE — H&P ADULT - ASSESSMENT
Patient is a 81M with a PMH of HTN, DM, HLD, dementia,  Parkinson's disease, CAD s/p stents x2 (>20 years ago), CHF (EF: 50%, Moderate diastolic dysfunction (Stage II), moderate pulmonary hypertension, small-mod pericardial effusion), Lt pleural effusion, BPH, gastric ulcers, COVID infection who was sent to the ED for weakness.  Patient currently awake and alert, oriented x1, unable to provide history.  Has no acute complaints.  Discharged from NH today and was sent to the ED as he appeared weak and emaciated.  Vitals stable, labs show leukocytosis and mild lactic acidosis.  CT reveals a chronic loculated L pleural effusion.  ED attending spoke to family who made the patient DNR/DNI however did agree for chest tube drainage for effusion.  Will admit to med surg.

## 2021-08-26 NOTE — H&P ADULT - NSHPLABSRESULTS_GEN_ALL_CORE
Recent Vitals  T(C): 36.4 (21 @ 02:57), Max: 37.2 (21 @ 19:40)  HR: 70 (21 @ 02:57) (70 - 94)  BP: 119/56 (21 @ 02:57) (104/48 - 119/56)  RR: 24 (21 @ 02:57) (20 - 25)  SpO2: 96% (21 @ 02:57) (96% - 97%)                        10.3   19.54 )-----------( 528      ( 25 Aug 2021 21:05 )             32.7         137  |  103  |  36<H>  ----------------------------<  157<H>  4.9   |  29  |  0.92    Ca    8.9      25 Aug 2021 21:05    TPro  7.2  /  Alb  2.2<L>  /  TBili  0.5  /  DBili  x   /  AST  4<L>  /  ALT  <6<L>  /  AlkPhos  86        LIVER FUNCTIONS - ( 25 Aug 2021 21:05 )  Alb: 2.2 g/dL / Pro: 7.2 gm/dL / ALK PHOS: 86 U/L / ALT: <6 U/L / AST: 4 U/L / GGT: x           Urinalysis Basic - ( 25 Aug 2021 22:31 )    Color: Yellow / Appearance: Clear / S.010 / pH: x  Gluc: x / Ketone: Negative  / Bili: Negative / Urobili: 1 mg/dL   Blood: x / Protein: Negative mg/dL / Nitrite: Negative   Leuk Esterase: Negative / RBC: 0-2 /HPF / WBC x   Sq Epi: x / Non Sq Epi: x / Bacteria: x        Home Medications:  acetaminophen 325 mg oral tablet: 2 tab(s) orally every 6 hours, As needed, Mild Pain (1 - 3) (2021 14:25)  guaiFENesin 100 mg/5 mL oral liquid: 5 milliliter(s) orally every 6 hours, As needed, Cough (2021 14:25)  melatonin 3 mg oral tablet: 1 tab(s) orally once a day (at bedtime), As needed, Insomnia (2021 14:25)  oxyCODONE 10 mg oral tablet: 1 tab(s) orally every 6 hours, As needed, Severe Pain (7 - 10) (2021 14:25)  oxyCODONE 5 mg oral tablet: 1 tab(s) orally every 6 hours, As needed, Moderate Pain (4 - 6) (2021 14:25)  polyethylene glycol 3350 oral powder for reconstitution: 17 gram(s) orally once a day (2021 14:25)  senna oral tablet: 2 tab(s) orally once a day (at bedtime) (2021 14:25)

## 2021-08-26 NOTE — CONSULT NOTE ADULT - ASSESSMENT
BALDO MORTENSEN 81 M 8/26/2021 1942 DR CATHERINE LANDIN     Initial evaluation/Pulmonary Critical Care consultation requested on 8/26/2021  by Dr LANDIN from Dr Jenkins   Patient examined chart reviewed    HOSPITAL ADMISSION   PATIENT CAME  FROM (if information available)      BALDO MORTENSEN 81 M 8/26/2021 1942 DR CATHERINE LANDIN     REVIEW OF SYMPTOMS      Able to give (reliable) ROS  NO     PHYSICAL EXAM    HEENT Unremarkable  atraumatic   RESP Fair air entry EXP prolonged    Harsh breath sound Resp distres mild   CARDIAC S1 S2 No S3     NO JVD    ABDOMEN SOFT BS PRESENT NOT DISTENDED No hepatosplenomegaly   PEDAL EDEMA present No calf tenderness  NO rash                                          8/26/2021 PATIENT PRESENTATION.  80 y/o M  pmhx of HTN, DM, HLD, dementia,  Parkinson's disease, CAD s/p stents x2 (>20 years ago), CHF (EF: 50%, Moderate diastolic dysfunction (Stage II), moderate pulmonary hypertension, small-mod pericardial effusion), Lt pleural effusion, BPH, gastric ulcers, COVID infection who was sent on 8/26/2021  to the ED for weakness.    Patient  awake and alert, oriented x1, unable to provide history on 8/26/2021.  Has no acute complaints.  Discharged from NH today and was sent to the ED as he appeared weak and emaciated.  Vitals stable, labs show leukocytosis and mild lactic acidosis.    CT surgery consulted to evaluate pt for CT chest findings of loculated left pleural effusion.   Pulm consulted 8/26/2021     CT chest ic 8/26/2021  cw 6/11/2021 and 3/2021 ct:  1. Chronic loculated left pleural effusion now demonstrates an air-fluid level.  The differential diagnosis includes recent instrumentation, infection/pleural and parenchymal edema and bronchopleural fistula.  2. Trace residual pericardial effusion measures up to 7 mm anteriorly-inferiorly (4:74), decreased from 1 cm on 06/11/2021.    CT abdomen/pelvis:  1.  Large amount of stool in the colon without evidence of fecal impaction or stercoral colitis.  Correlate clinically for constipation.  2.  Enlarged prostate measures 5.6 x 6.9 x 6.2 cm and exerts mass effect on the bladder.  There is a 1.9 cm hyperenhancing peripheral zone nodule.  A BPH nodule is favored however the lesion remains indeterminate.  Urologic consultation and prostate MRI may be obtained as clinically warranted.                                       8/26/2021  PRESENTATION.  80 y/o M  pmhx of HTN, DM, HLD, dementia,  Parkinson's disease, CAD s/p stents x2 (>20 years ago), CHF (EF: 50%, Moderate diastolic dysfunction (Stage II), moderate pulmonary hypertension, small-mod pericardial effusion), Lt pleural effusion, BPH, gastric ulcers, COVID infection who was sent on 8/26/2021  to the ED for weakness.      8/26/2021 PRESENTING PROBLEMS   WEAKNESS  L PLEURAL EFFUSION   AIR FLUID LEVEL PLEURAL EFFSN POA   ENLARGED PROSTATE  DEMENTIA     PMH.   CAD s/p stents x2 (>20 years ago),   CHF (EF: 50%, Moderate diastolic dysfunction (Stage II),   moderate pulmonary hypertension, small-mod pericardial effusion),   Lt pleural effusion  HTN,   DM,  HLD,   dementia,    Parkinson's disease, ,   BPH,   Gastric ulcers,   COVID    HOME MEDS INCLUDE.     guaiFENesin   oxyCODONE 10                                        GENERAL ISSUES  GOC ADVANCED DIRECTIVE.    dnr                        ALLERGY.    nka                        WEIGHT.         8/26/2021 54                           BMI.                   8/26/2021 18  DYSPHAGIA ELLIE.          COVID STATUS.        APA.                       ABIO.    zosyn (8/26)         BEST PRACTICE ISSUES.                                                  HEAD OF BED ELEVATION. Yes  DVT PROPHYLAXIS.  lvnx 40 (8/26)                                        VALLEJO PROPHYLAXIS.                                                                                         DIET.      dys 1 puree nectar cons carb (8/26)                     INFECTION PROPHYLAXIS.                        PROBLEM ASSESSMENT/RECOMMENDATIONS.  COVID STATUS  Ho covid (+) 6/10/2021   INFECTION  w 8/26/2021 w 19   pr 8/26/2021 pr (-)   la 8/26/2021 la 1.9   Cr 8/26/2021 Cr .9   fluab 8/26/2021 (-)   CAD  PMH CAD stents 2 decade ago   Tr 8/26/2021 Tr (-)   CHF  echo 6/11/2021 n lvsf thickened pericard with 1 cm pericard effs n la   bnp 8/26/2021 bnp 863   ANEMIA  Hb 8/26/2021 Hb 10   mcv 8/26/2021 mvcv 89             OVERALL PLAN.  CHRONIC L PL EFFSN WITH AIR FLUID LEVEL   RO underlying cancer or infection with bp fistula   CTS on case recommend pig tail  To be arranged with ir         TIME SPENT   Over 55 minutes aggregate care time spent on encounter; activities included   direct patient care, counseling and/or coordinating care reviewing notes, lab data/ imaging , discussion with multidisciplinary team/ patient  /family and explaining in detail risks, benefits, alternatives  of the recommendations     BALDO MORTENSEN 81 M 8/26/2021 1942 DR CATHERINE LANDIN

## 2021-08-26 NOTE — H&P ADULT - PROBLEM SELECTOR PLAN 1
Loculated L sided effusion.  Will consult CT surg, Pulm,   Consider empyema  Started on Zosyn and Vanco in the ED, will continue.  Follow blood cultures - de-escalate antibiotics as needed   ID consult in [place]

## 2021-08-26 NOTE — CHART NOTE - NSCHARTNOTEFT_GEN_A_CORE
Patient was seen and examined at bedside.  IR order placed for evaluation for possible drainage.  CT surgery recs appreciated  Will continue with IV abx  ID has been consulted and pending further recs.  Rest of plan from H&P from earlier today. Patient was seen and examined at bedside.  IR order placed for evaluation for possible drainage.  CT surgery recs appreciated  Will continue with IV abx  ID has been consulted and pending further recs.  Attempted to call patient's brother Berto with no answer.   Will hold plavix given last stent was over 20 years ago as per previous cardiology note. Can restart after IR   Discussed with IR for drainage tomorrow vs Monday.  Rest of plan from H&P from earlier today.

## 2021-08-26 NOTE — CHART NOTE - NSCHARTNOTEFT_GEN_A_CORE
IR consulted for right thoracentesis for a loculated effusion seen on previous CT scan  Pt on Plavix, per hospitalist , will hold    -Will d/w IR attending IR consulted for right thoracentesis/chest tube,  for a loculated effusion seen on previous CT scan  Now with leukocytosis, O2Sat is high 90s , no supplemental oxygen  Pt on Plavix, per hospitalist , will hold    -Will d/w IR attending

## 2021-08-27 ENCOUNTER — RESULT REVIEW (OUTPATIENT)
Age: 81
End: 2021-08-27

## 2021-08-27 LAB
A1C WITH ESTIMATED AVERAGE GLUCOSE RESULT: 6.1 % — HIGH (ref 4–5.6)
ALBUMIN SERPL ELPH-MCNC: 1.7 G/DL — LOW (ref 3.3–5)
ALP SERPL-CCNC: 61 U/L — SIGNIFICANT CHANGE UP (ref 40–120)
ALT FLD-CCNC: <6 U/L — LOW (ref 12–78)
ANION GAP SERPL CALC-SCNC: 4 MMOL/L — LOW (ref 5–17)
AST SERPL-CCNC: 5 U/L — LOW (ref 15–37)
BASOPHILS # BLD AUTO: 0.06 K/UL — SIGNIFICANT CHANGE UP (ref 0–0.2)
BASOPHILS NFR BLD AUTO: 0.8 % — SIGNIFICANT CHANGE UP (ref 0–2)
BILIRUB SERPL-MCNC: 0.6 MG/DL — SIGNIFICANT CHANGE UP (ref 0.2–1.2)
BUN SERPL-MCNC: 21 MG/DL — SIGNIFICANT CHANGE UP (ref 7–23)
CALCIUM SERPL-MCNC: 8.2 MG/DL — LOW (ref 8.5–10.1)
CHLORIDE SERPL-SCNC: 105 MMOL/L — SIGNIFICANT CHANGE UP (ref 96–108)
CO2 SERPL-SCNC: 29 MMOL/L — SIGNIFICANT CHANGE UP (ref 22–31)
COVID-19 SPIKE DOMAIN AB INTERP: POSITIVE
COVID-19 SPIKE DOMAIN ANTIBODY RESULT: >250 U/ML — HIGH
CREAT SERPL-MCNC: 0.55 MG/DL — SIGNIFICANT CHANGE UP (ref 0.5–1.3)
CULTURE RESULTS: NO GROWTH — SIGNIFICANT CHANGE UP
EOSINOPHIL # BLD AUTO: 0.31 K/UL — SIGNIFICANT CHANGE UP (ref 0–0.5)
EOSINOPHIL NFR BLD AUTO: 4.1 % — SIGNIFICANT CHANGE UP (ref 0–6)
ESTIMATED AVERAGE GLUCOSE: 128 MG/DL — HIGH (ref 68–114)
GLUCOSE BLDC GLUCOMTR-MCNC: 118 MG/DL — HIGH (ref 70–99)
GLUCOSE BLDC GLUCOMTR-MCNC: 123 MG/DL — HIGH (ref 70–99)
GLUCOSE BLDC GLUCOMTR-MCNC: 132 MG/DL — HIGH (ref 70–99)
GLUCOSE BLDC GLUCOMTR-MCNC: 160 MG/DL — HIGH (ref 70–99)
GLUCOSE SERPL-MCNC: 103 MG/DL — HIGH (ref 70–99)
GRAM STN FLD: SIGNIFICANT CHANGE UP
HCT VFR BLD CALC: 27.1 % — LOW (ref 39–50)
HGB BLD-MCNC: 8.7 G/DL — LOW (ref 13–17)
IMM GRANULOCYTES NFR BLD AUTO: 0.3 % — SIGNIFICANT CHANGE UP (ref 0–1.5)
LYMPHOCYTES # BLD AUTO: 1.04 K/UL — SIGNIFICANT CHANGE UP (ref 1–3.3)
LYMPHOCYTES # BLD AUTO: 13.6 % — SIGNIFICANT CHANGE UP (ref 13–44)
MCHC RBC-ENTMCNC: 28.4 PG — SIGNIFICANT CHANGE UP (ref 27–34)
MCHC RBC-ENTMCNC: 32.1 GM/DL — SIGNIFICANT CHANGE UP (ref 32–36)
MCV RBC AUTO: 88.6 FL — SIGNIFICANT CHANGE UP (ref 80–100)
MONOCYTES # BLD AUTO: 0.67 K/UL — SIGNIFICANT CHANGE UP (ref 0–0.9)
MONOCYTES NFR BLD AUTO: 8.8 % — SIGNIFICANT CHANGE UP (ref 2–14)
NEUTROPHILS # BLD AUTO: 5.53 K/UL — SIGNIFICANT CHANGE UP (ref 1.8–7.4)
NEUTROPHILS NFR BLD AUTO: 72.4 % — SIGNIFICANT CHANGE UP (ref 43–77)
NIGHT BLUE STAIN TISS: SIGNIFICANT CHANGE UP
NRBC # BLD: 0 /100 WBCS — SIGNIFICANT CHANGE UP (ref 0–0)
PLATELET # BLD AUTO: 430 K/UL — HIGH (ref 150–400)
POTASSIUM SERPL-MCNC: 3.5 MMOL/L — SIGNIFICANT CHANGE UP (ref 3.5–5.3)
POTASSIUM SERPL-SCNC: 3.5 MMOL/L — SIGNIFICANT CHANGE UP (ref 3.5–5.3)
PROT SERPL-MCNC: 6 GM/DL — SIGNIFICANT CHANGE UP (ref 6–8.3)
RBC # BLD: 3.06 M/UL — LOW (ref 4.2–5.8)
RBC # FLD: 15.8 % — HIGH (ref 10.3–14.5)
SARS-COV-2 IGG+IGM SERPL QL IA: >250 U/ML — HIGH
SARS-COV-2 IGG+IGM SERPL QL IA: POSITIVE
SODIUM SERPL-SCNC: 138 MMOL/L — SIGNIFICANT CHANGE UP (ref 135–145)
SPECIMEN SOURCE: SIGNIFICANT CHANGE UP
WBC # BLD: 7.63 K/UL — SIGNIFICANT CHANGE UP (ref 3.8–10.5)
WBC # FLD AUTO: 7.63 K/UL — SIGNIFICANT CHANGE UP (ref 3.8–10.5)

## 2021-08-27 PROCEDURE — 99221 1ST HOSP IP/OBS SF/LOW 40: CPT | Mod: 57

## 2021-08-27 PROCEDURE — 88305 TISSUE EXAM BY PATHOLOGIST: CPT | Mod: 26

## 2021-08-27 PROCEDURE — 32557 INSERT CATH PLEURA W/ IMAGE: CPT | Mod: LT

## 2021-08-27 PROCEDURE — 99233 SBSQ HOSP IP/OBS HIGH 50: CPT

## 2021-08-27 PROCEDURE — 71045 X-RAY EXAM CHEST 1 VIEW: CPT | Mod: 26

## 2021-08-27 PROCEDURE — 99232 SBSQ HOSP IP/OBS MODERATE 35: CPT

## 2021-08-27 PROCEDURE — 88112 CYTOPATH CELL ENHANCE TECH: CPT | Mod: 26

## 2021-08-27 RX ADMIN — CEFEPIME 100 MILLIGRAM(S): 1 INJECTION, POWDER, FOR SOLUTION INTRAMUSCULAR; INTRAVENOUS at 22:05

## 2021-08-27 RX ADMIN — ENOXAPARIN SODIUM 40 MILLIGRAM(S): 100 INJECTION SUBCUTANEOUS at 13:46

## 2021-08-27 RX ADMIN — Medication 1: at 09:34

## 2021-08-27 RX ADMIN — CEFEPIME 100 MILLIGRAM(S): 1 INJECTION, POWDER, FOR SOLUTION INTRAMUSCULAR; INTRAVENOUS at 05:51

## 2021-08-27 RX ADMIN — TAMSULOSIN HYDROCHLORIDE 0.4 MILLIGRAM(S): 0.4 CAPSULE ORAL at 22:05

## 2021-08-27 RX ADMIN — SENNA PLUS 2 TABLET(S): 8.6 TABLET ORAL at 22:05

## 2021-08-27 RX ADMIN — ATORVASTATIN CALCIUM 10 MILLIGRAM(S): 80 TABLET, FILM COATED ORAL at 22:05

## 2021-08-27 RX ADMIN — CEFEPIME 100 MILLIGRAM(S): 1 INJECTION, POWDER, FOR SOLUTION INTRAMUSCULAR; INTRAVENOUS at 16:31

## 2021-08-27 RX ADMIN — QUETIAPINE FUMARATE 50 MILLIGRAM(S): 200 TABLET, FILM COATED ORAL at 22:05

## 2021-08-27 RX ADMIN — CARBIDOPA AND LEVODOPA 1 TABLET(S): 25; 100 TABLET ORAL at 05:51

## 2021-08-27 RX ADMIN — CARBIDOPA AND LEVODOPA 1 TABLET(S): 25; 100 TABLET ORAL at 22:05

## 2021-08-27 RX ADMIN — CARBIDOPA AND LEVODOPA 1 TABLET(S): 25; 100 TABLET ORAL at 13:46

## 2021-08-27 NOTE — PROGRESS NOTE ADULT - SUBJECTIVE AND OBJECTIVE BOX
Hospitalist Daily Progress Note    Chief Complaint:  Patient is a 81y old  Male who presents with a chief complaint of FTT, empyema r/o (27 Aug 2021 11:04)      SUBJECTIVE / OVERNIGHT EVENTS:  Patient was seen and examined at bedside. No active complaints.   Patient denies chest pain, SOB, abd pain, N/V, fever, chills, dysuria or any other complaints. All remainder ROS negative.     MEDICATIONS  (STANDING):  atorvastatin 10 milliGRAM(s) Oral at bedtime  carbidopa/levodopa  25/100 1 Tablet(s) Oral three times a day  cefepime   IVPB 1000 milliGRAM(s) IV Intermittent every 8 hours  dextrose 40% Gel 15 Gram(s) Oral once  dextrose 5%. 1000 milliLiter(s) (50 mL/Hr) IV Continuous <Continuous>  dextrose 5%. 1000 milliLiter(s) (100 mL/Hr) IV Continuous <Continuous>  dextrose 50% Injectable 25 Gram(s) IV Push once  dextrose 50% Injectable 12.5 Gram(s) IV Push once  dextrose 50% Injectable 25 Gram(s) IV Push once  enoxaparin Injectable 40 milliGRAM(s) SubCutaneous daily  glucagon  Injectable 1 milliGRAM(s) IntraMuscular once  insulin lispro (ADMELOG) corrective regimen sliding scale   SubCutaneous three times a day before meals  QUEtiapine 50 milliGRAM(s) Oral at bedtime  senna 2 Tablet(s) Oral at bedtime  tamsulosin 0.4 milliGRAM(s) Oral at bedtime    MEDICATIONS  (PRN):  oxyCODONE    IR 5 milliGRAM(s) Oral every 6 hours PRN Moderate Pain (4 - 6)  polyethylene glycol 3350 17 Gram(s) Oral daily PRN constipations        I&O's Summary    26 Aug 2021 07:01  -  27 Aug 2021 07:00  --------------------------------------------------------  IN: 340 mL / OUT: 500 mL / NET: -160 mL        PHYSICAL EXAM:  Vital Signs Last 24 Hrs  T(C): 36.3 (27 Aug 2021 05:44), Max: 36.7 (26 Aug 2021 23:28)  T(F): 97.3 (27 Aug 2021 05:44), Max: 98.1 (26 Aug 2021 23:28)  HR: 64 (27 Aug 2021 05:44) (64 - 78)  BP: 101/62 (27 Aug 2021 05:44) (96/60 - 127/89)  BP(mean): --  RR: 18 (27 Aug 2021 05:44) (18 - 20)  SpO2: 96% (27 Aug 2021 05:44) (96% - 97%)      Constitutional: NAD, Resting  ENT: Supple, No JVD  Lungs: Decreased sounds on left side otherwise CTA  Cardio: RRR, S1/S2, No murmur  Abdomen: Soft, Nontender, Nondistended; Bowel sounds present  Extremities: No calf tenderness, No pitting edema  Musculoskeletal:   No clubbing or cyanosis of digits; no joint swelling or tenderness to palpation  Psych: Calm, cooperative affect appropriate  Neuro: Awake and alert, oriented to name, states to be in a mental hospital,  Skin: No rashes; no palpable lesions    LABS:                        8.7    7.63  )-----------( 430      ( 27 Aug 2021 07:40 )             27.1     08-27    138  |  105  |  21  ----------------------------<  103<H>  3.5   |  29  |  0.55    Ca    8.2<L>      27 Aug 2021 07:40    TPro  6.0  /  Alb  1.7<L>  /  TBili  0.6  /  DBili  x   /  AST  5<L>  /  ALT  <6<L>  /  AlkPhos  61  08-27      CARDIAC MARKERS ( 26 Aug 2021 01:14 )  <.015 ng/mL / x     / x     / x     / x      CARDIAC MARKERS ( 25 Aug 2021 21:05 )  x     / x     / 13 U/L / x     / x          Urinalysis Basic - ( 25 Aug 2021 22:31 )    Color: Yellow / Appearance: Clear / S.010 / pH: x  Gluc: x / Ketone: Negative  / Bili: Negative / Urobili: 1 mg/dL   Blood: x / Protein: Negative mg/dL / Nitrite: Negative   Leuk Esterase: Negative / RBC: 0-2 /HPF / WBC x   Sq Epi: x / Non Sq Epi: x / Bacteria: x        Culture - Urine (collected 26 Aug 2021 09:22)  Source: Clean Catch Clean Catch (Midstream)  Final Report (27 Aug 2021 10:51):    No growth    Culture - Blood (collected 26 Aug 2021 09:09)  Source: .Blood Blood-Peripheral  Preliminary Report (27 Aug 2021 10:01):    No growth to date.    Culture - Blood (collected 26 Aug 2021 09:09)  Source: .Blood Blood-Peripheral  Preliminary Report (27 Aug 2021 10:01):    No growth to date.      CAPILLARY BLOOD GLUCOSE      POCT Blood Glucose.: 160 mg/dL (27 Aug 2021 09:28)  POCT Blood Glucose.: 133 mg/dL (26 Aug 2021 21:44)  POCT Blood Glucose.: 134 mg/dL (26 Aug 2021 16:36)  POCT Blood Glucose.: 189 mg/dL (26 Aug 2021 12:08)        RADIOLOGY REVIEWED

## 2021-08-27 NOTE — CONSULT NOTE ADULT - ASSESSMENT
IR consulted for left thoracentesis possible chest tube placement    80 yo male with dementia, with chronic left loculated pleural effusion, now with air fluid level  A/W leukocytosis, now resolved.  Afebrile, other VSS.  O2Sat high 90s on RA,  No WOB    81M with a PMH of HTN, DM, HLD, dementia,  Parkinson's disease, CAD s/p stents x2 (>20 years ago), CHF (EF: 50%, Moderate diastolic dysfunction (Stage II), moderate pulmonary hypertension, small-mod pericardial effusion), Lt chronic pleural effusion, BPH, gastric ulcers.  H/o COVID     CT Chest w/ IV Cont  Chronic loculated left pleural effusion now demonstrates an air-fluid level.  The differential diagnosis includes recent instrumentation, infection/pleural and parenchymal edema and bronchopleural fistula.      Plan  -Will place Chest tube today, tried calling brother but voicemail was full.  -will d/w case management to see if they have another contact number

## 2021-08-27 NOTE — PROCEDURE NOTE - PROCEDURE FINDINGS AND DETAILS
10.2F left chest pigtail placed, 80cc pus aspirated. 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

## 2021-08-27 NOTE — PROGRESS NOTE ADULT - ASSESSMENT
BALDO MORTENSEN 81 M 8/26/2021 1942 DR CATHERINE LANDIN     REVIEW OF SYMPTOMS      Able to give (reliable) ROS  NO     PHYSICAL EXAM    HEENT Unremarkable  atraumatic   RESP Fair air entry EXP prolonged    Harsh breath sound Resp distres mild   CARDIAC S1 S2 No S3     NO JVD    ABDOMEN SOFT BS PRESENT NOT DISTENDED No hepatosplenomegaly   PEDAL EDEMA present No calf tenderness  NO rash                                          8/26/2021  PRESENTATION.  80 y/o M  pmhx of HTN, DM, HLD, dementia,  Parkinson's disease, CAD s/p stents x2 (>20 years ago), CHF (EF: 50%, Moderate diastolic dysfunction (Stage II), moderate pulmonary hypertension, small-mod pericardial effusion), Lt pleural effusion, BPH, gastric ulcers, COVID infection who was sent on 8/26/2021  to the ED for weakness.      8/26/2021 PRESENTING PROBLEMS   WEAKNESS  L PLEURAL EFFUSION   AIR FLUID LEVEL PLEURAL EFFSN POA   ENLARGED PROSTATE  DEMENTIA     HOSPITAL COURSE   PL EFFSN l Seen by IR 8/27/2021                 GENERAL ISSUES  GOC ADVANCED DIRECTIVE.    dnr                        ALLERGY.    nka                        WEIGHT.         8/26/2021 54                           BMI.                   8/26/2021 18  DYSPHAGIA ELLIE.            BEST PRACTICE ISSUES.                                                  HEAD OF BED ELEVATION. Yes  DVT PROPHYLAXIS.  lvnx 40 (8/26)                                        VALLEJO PROPHYLAXIS.                                                                                         DIET.      dys 1 puree nectar cons carb (8/26)                     INFECTION PROPHYLAXIS.                        PATIENT DATA   TUBES  PROCEDURES.          ABG.   8/26/2021 ra 745/37/78     OXYGENATION.       8/26-8/27/2021 ra 97%    - ra 96%         VITALS/IO/VENT/DRIPS.     8/27/2021 afeb 70 96/60     PROBLEM ASSESSMENT/RECOMMENDATIONS.  COVID STATUS  Ho covid (+) 6/10/2021   CHRONIC L PL EFFSN WITH AIR FLUID LEVEL   RO underlying cancer or infection with bp fistula   CT chest ic 8/26/2021  cw 6/11/2021 and 3/2021 ct:  1. Chronic loculated left pleural effusion now demonstrates an air-fluid level  CTS on case recommend pig tail  To be arranged with ir   8/27/2021 dw ir They will insert pigtail  INFECTION  w 8/26-8/27/2021 w 19 - 7.6  pr 8/26/2021 pr (-)   la 8/26/2021 la 1.9   Cr 8/26/2021 Cr .9   urine c 8/26 (-)  fluab 8/26/2021 (-)   blod c 8/26 (-)   cefepime 1.3 (8/26) (Dr Ramirez) (Pneu)   cont rx   DYSPHAGIA  On dys 1 puree nectar diet (8/26/2021)   CAD  PMH CAD stents 2 decade ago   Tr 8/26/2021 Tr (-)   CHF  echo 6/11/2021 n lvsf thickened pericard with 1 cm pericard effs n la   bnp 8/26/2021 bnp 863   ANEMIA  Hb 8/26 - 8/27/2021 Hb 10 - 8.7   mcv 8/26/2021 mvcv 89   PARKINSONS   carbilevadopa 25 100.3 (8/26)   GOC dnr         OVERALL PLAN.  CHRONIC L PL EFFSN WITH AIR FLUID LEVEL   RO underlying cancer or infection with bp fistula   CTS on case recommend pig tail  To be arranged with ir   8/27/2021 dw ir they will place pigtail  GOC dnr      TIME SPENT   Over 25 minutes aggregate care time spent on encounter; activities included   direct patient care, counseling and/or coordinating care reviewing notes, lab data/ imaging , discussion with multidisciplinary team/ patient  /family and explaining in detail risks, benefits, alternatives  of the recommendations     BALDO MORTENSEN 81 M 8/26/2021 1942 DR CATHERINE LANDIN

## 2021-08-27 NOTE — PROGRESS NOTE ADULT - SUBJECTIVE AND OBJECTIVE BOX
FESTUS BLAND    LVS 2E 289 D    Patient is a 81y old  Male who presents with a chief complaint of FTT, empyema r/o (26 Aug 2021 14:17)       Allergies    No Known Allergies    Intolerances        HPI:  Patient is a 81M with a PMH of HTN, DM, HLD, dementia,  Parkinson's disease, CAD s/p stents x2 (>20 years ago), CHF (EF: 50%, Moderate diastolic dysfunction (Stage II), moderate pulmonary hypertension, small-mod pericardial effusion), Lt pleural effusion, BPH, gastric ulcers, COVID infection who was sent to the ED for weakness.  Patient currently awake and alert, oriented x1, unable to provide history.  Has no acute complaints.  Discharged from NH today and was sent to the ED as he appeared weak and emaciated.  Vitals stable, labs show leukocytosis and mild lactic acidosis.  CT reveals a chronic loculated L pleural effusion.  ED attending spoke to family who made the patient DNR/DNI however did agree for chest tube drainage for effusion.  Will admit to med surg.   (26 Aug 2021 03:21)      PAST MEDICAL & SURGICAL HISTORY:  Hypercholesterolemia    DM (Diabetes Mellitus)    HTN (Hypertension)    CAD (Coronary Artery Disease)  s/p cardiac stent ( &gt; 20 years ago)    BPH (benign prostatic hyperplasia)    Coronary Stent  x 2 ( 20 years )        FAMILY HISTORY:  FH: HTN (hypertension)          MEDICATIONS   atorvastatin 10 milliGRAM(s) Oral at bedtime  carbidopa/levodopa  25/100 1 Tablet(s) Oral three times a day  cefepime   IVPB 1000 milliGRAM(s) IV Intermittent every 8 hours  dextrose 40% Gel 15 Gram(s) Oral once  dextrose 5%. 1000 milliLiter(s) IV Continuous <Continuous>  dextrose 5%. 1000 milliLiter(s) IV Continuous <Continuous>  dextrose 50% Injectable 25 Gram(s) IV Push once  dextrose 50% Injectable 12.5 Gram(s) IV Push once  dextrose 50% Injectable 25 Gram(s) IV Push once  enoxaparin Injectable 40 milliGRAM(s) SubCutaneous daily  glucagon  Injectable 1 milliGRAM(s) IntraMuscular once  insulin lispro (ADMELOG) corrective regimen sliding scale   SubCutaneous three times a day before meals  oxyCODONE    IR 5 milliGRAM(s) Oral every 6 hours PRN  polyethylene glycol 3350 17 Gram(s) Oral daily PRN  QUEtiapine 50 milliGRAM(s) Oral at bedtime  senna 2 Tablet(s) Oral at bedtime  tamsulosin 0.4 milliGRAM(s) Oral at bedtime      Vital Signs Last 24 Hrs  T(C): 36.3 (27 Aug 2021 05:44), Max: 36.7 (26 Aug 2021 23:28)  T(F): 97.3 (27 Aug 2021 05:44), Max: 98.1 (26 Aug 2021 23:28)  HR: 64 (27 Aug 2021 05:44) (64 - 78)  BP: 101/62 (27 Aug 2021 05:44) (96/60 - 127/89)  BP(mean): --  RR: 18 (27 Aug 2021 05:44) (18 - 20)  SpO2: 96% (27 Aug 2021 05:44) (96% - 97%)      21 @ 07:01  -  21 @ 07:00  --------------------------------------------------------  IN: 340 mL / OUT: 500 mL / NET: -160 mL            LABS:                        10.3   19.54 )-----------( 528      ( 25 Aug 2021 21:05 )             32.7         137  |  103  |  36<H>  ----------------------------<  157<H>  4.9   |  29  |  0.92    Ca    8.9      25 Aug 2021 21:05    TPro  7.2  /  Alb  2.2<L>  /  TBili  0.5  /  DBili  x   /  AST  4<L>  /  ALT  <6<L>  /  AlkPhos  86        Urinalysis Basic - ( 25 Aug 2021 22:31 )    Color: Yellow / Appearance: Clear / S.010 / pH: x  Gluc: x / Ketone: Negative  / Bili: Negative / Urobili: 1 mg/dL   Blood: x / Protein: Negative mg/dL / Nitrite: Negative   Leuk Esterase: Negative / RBC: 0-2 /HPF / WBC x   Sq Epi: x / Non Sq Epi: x / Bacteria: x        ABG - ( 25 Aug 2021 21:48 )  pH, Arterial: x     pH, Blood: 7.45  /  pCO2: 37    /  pO2: 78    / HCO3: 25    / Base Excess: 1.7   /  SaO2: 95                  WBC:  WBC Count: 19.54 K/uL ( @ 21:05)      MICROBIOLOGY:  RECENT CULTURES:        CARDIAC MARKERS ( 26 Aug 2021 01:14 )  <.015 ng/mL / x     / x     / x     / x      CARDIAC MARKERS ( 25 Aug 2021 21:05 )  x     / x     / 13 U/L / x     / x                Sodium:  Sodium, Serum: 137 mmol/L ( @ 21:05)      0.92 mg/dL  @ 21:05      Hemoglobin:  Hemoglobin: 10.3 g/dL ( @ 21:05)      Platelets: Platelet Count - Automated: 528 K/uL ( @ 21:05)      LIVER FUNCTIONS - ( 25 Aug 2021 21:05 )  Alb: 2.2 g/dL / Pro: 7.2 gm/dL / ALK PHOS: 86 U/L / ALT: <6 U/L / AST: 4 U/L / GGT: x             Urinalysis Basic - ( 25 Aug 2021 22:31 )    Color: Yellow / Appearance: Clear / S.010 / pH: x  Gluc: x / Ketone: Negative  / Bili: Negative / Urobili: 1 mg/dL   Blood: x / Protein: Negative mg/dL / Nitrite: Negative   Leuk Esterase: Negative / RBC: 0-2 /HPF / WBC x   Sq Epi: x / Non Sq Epi: x / Bacteria: x        RADIOLOGY & ADDITIONAL STUDIES:

## 2021-08-27 NOTE — PROGRESS NOTE ADULT - ASSESSMENT
81M with a PMH of HTN, DM, HLD, dementia,  Parkinson's disease, CAD s/p stents x2 (>20 years ago), CHF (EF: 50%, Moderate diastolic dysfunction (Stage II), moderate pulmonary hypertension, small-mod pericardial effusion), Lt pleural effusion, BPH, gastric ulcers, COVID infection who was sent to the ED for weakness.  CT reveals a chronic loculated L pleural effusion.  ED attending spoke to family who made the patient DNR/DNI however did agree for chest tube drainage for effusion.

## 2021-08-27 NOTE — CONSULT NOTE ADULT - SUBJECTIVE AND OBJECTIVE BOX
IR consulted for left thoracentesis possible chest tube placement          PAST MEDICAL & SURGICAL HISTORY:  Hypercholesterolemia    DM (Diabetes Mellitus)    HTN (Hypertension)    CAD (Coronary Artery Disease)  s/p cardiac stent ( &gt; 20 years ago)    BPH (benign prostatic hyperplasia)    Coronary Stent  x 2 ( 20 years )        Allergies    No Known Allergies    Intolerances        MEDICATIONS  (STANDING):  atorvastatin 10 milliGRAM(s) Oral at bedtime  carbidopa/levodopa  25/100 1 Tablet(s) Oral three times a day  cefepime   IVPB 1000 milliGRAM(s) IV Intermittent every 8 hours  dextrose 40% Gel 15 Gram(s) Oral once  dextrose 5%. 1000 milliLiter(s) (50 mL/Hr) IV Continuous <Continuous>  dextrose 5%. 1000 milliLiter(s) (100 mL/Hr) IV Continuous <Continuous>  dextrose 50% Injectable 25 Gram(s) IV Push once  dextrose 50% Injectable 12.5 Gram(s) IV Push once  dextrose 50% Injectable 25 Gram(s) IV Push once  enoxaparin Injectable 40 milliGRAM(s) SubCutaneous daily  glucagon  Injectable 1 milliGRAM(s) IntraMuscular once  insulin lispro (ADMELOG) corrective regimen sliding scale   SubCutaneous three times a day before meals  QUEtiapine 50 milliGRAM(s) Oral at bedtime  senna 2 Tablet(s) Oral at bedtime  tamsulosin 0.4 milliGRAM(s) Oral at bedtime    MEDICATIONS  (PRN):  oxyCODONE    IR 5 milliGRAM(s) Oral every 6 hours PRN Moderate Pain (4 - 6)  polyethylene glycol 3350 17 Gram(s) Oral daily PRN constipations        SOCIAL HISTORY:    FAMILY HISTORY:  FH: HTN (hypertension)          PHYSICAL EXAM:    Vital Signs Last 24 Hrs  T(C): 36.3 (27 Aug 2021 05:44), Max: 36.7 (26 Aug 2021 23:28)  T(F): 97.3 (27 Aug 2021 05:44), Max: 98.1 (26 Aug 2021 23:28)  HR: 64 (27 Aug 2021 05:44) (64 - 78)  BP: 101/62 (27 Aug 2021 05:44) (96/60 - 127/89)  BP(mean): --  RR: 18 (27 Aug 2021 05:44) (18 - 20)  SpO2: 96% (27 Aug 2021 05:44) (96% - 97%)    General:  Appears stated age, well-groomed, well-nourished, no distress  Lungs:  decreased BS to left  Cardiovascular:  good S1, S2,   Abdomen:  Soft, non-tender, non-distended,   Extremities:  no calf tenderness/swelling b/l  Neuro/Psych:  A &O x1    LABS:                        8.7    7.63  )-----------( 430      ( 27 Aug 2021 07:40 )             27.1         138  |  105  |  21  ----------------------------<  103<H>  3.5   |  29  |  0.55    Ca    8.2<L>      27 Aug 2021 07:40    TPro  6.0  /  Alb  1.7<L>  /  TBili  0.6  /  DBili  x   /  AST  5<L>  /  ALT  <6<L>  /  AlkPhos  61        Urinalysis Basic - ( 25 Aug 2021 22:31 )    Color: Yellow / Appearance: Clear / S.010 / pH: x  Gluc: x / Ketone: Negative  / Bili: Negative / Urobili: 1 mg/dL   Blood: x / Protein: Negative mg/dL / Nitrite: Negative   Leuk Esterase: Negative / RBC: 0-2 /HPF / WBC x   Sq Epi: x / Non Sq Epi: x / Bacteria: x        RADIOLOGY & ADDITIONAL STUDIES:  < from: CT Chest w/ IV Cont (21 @ 00:28) >  IMPRESSION:  CT chest:  1. Chronic loculated left pleural effusion now demonstrates an air-fluid level.  The differential diagnosis includes recent instrumentation, infection/pleural and parenchymal edema and bronchopleural fistula.  2. Trace residual pericardial effusion measures up to 7 mm anteriorly-inferiorly (4:74), decreased from 1 cm on 2021.    CT abdomen/pelvis:  1.  Large amount of stool in the colon without evidence of fecal impaction or stercoral colitis.  Correlate clinically for constipation.  2.  Enlarged prostate measures 5.6 x 6.9 x 6.2 cm and exerts mass effect on the bladder.  There is a 1.9 cm hyperenhancing peripheral zone nodule.  A BPH nodule is favored however the lesion remains indeterminate.  Urologic consultation and prostate MRI may be obtained as clinically warranted.    < end of copied text >

## 2021-08-27 NOTE — PROCEDURE NOTE - PLAN
-low intermittent wall suction drain for now, then per CTS/pulm recs.  -Likely needs VATS and/or palliative.

## 2021-08-28 LAB
ALBUMIN SERPL ELPH-MCNC: 1.7 G/DL — LOW (ref 3.3–5)
ALP SERPL-CCNC: 73 U/L — SIGNIFICANT CHANGE UP (ref 40–120)
ALT FLD-CCNC: <6 U/L — LOW (ref 12–78)
ANION GAP SERPL CALC-SCNC: 4 MMOL/L — LOW (ref 5–17)
AST SERPL-CCNC: 7 U/L — LOW (ref 15–37)
B PERT IGG+IGM PNL SER: ABNORMAL
BASOPHILS # BLD AUTO: 0.04 K/UL — SIGNIFICANT CHANGE UP (ref 0–0.2)
BASOPHILS NFR BLD AUTO: 0.6 % — SIGNIFICANT CHANGE UP (ref 0–2)
BILIRUB SERPL-MCNC: 0.9 MG/DL — SIGNIFICANT CHANGE UP (ref 0.2–1.2)
BUN SERPL-MCNC: 16 MG/DL — SIGNIFICANT CHANGE UP (ref 7–23)
CALCIUM SERPL-MCNC: 7.9 MG/DL — LOW (ref 8.5–10.1)
CHLORIDE SERPL-SCNC: 106 MMOL/L — SIGNIFICANT CHANGE UP (ref 96–108)
CO2 SERPL-SCNC: 29 MMOL/L — SIGNIFICANT CHANGE UP (ref 22–31)
COLOR FLD: SIGNIFICANT CHANGE UP
CREAT SERPL-MCNC: 0.56 MG/DL — SIGNIFICANT CHANGE UP (ref 0.5–1.3)
EOSINOPHIL # BLD AUTO: 0.15 K/UL — SIGNIFICANT CHANGE UP (ref 0–0.5)
EOSINOPHIL # FLD: 0 % — SIGNIFICANT CHANGE UP
EOSINOPHIL NFR BLD AUTO: 2.1 % — SIGNIFICANT CHANGE UP (ref 0–6)
FLUID INTAKE SUBSTANCE CLASS: SIGNIFICANT CHANGE UP
FLUID SEGMENTED GRANULOCYTES: 95 % — SIGNIFICANT CHANGE UP
FOLATE+VIT B12 SERBLD-IMP: 0 % — SIGNIFICANT CHANGE UP
GLUCOSE BLDC GLUCOMTR-MCNC: 102 MG/DL — HIGH (ref 70–99)
GLUCOSE BLDC GLUCOMTR-MCNC: 158 MG/DL — HIGH (ref 70–99)
GLUCOSE BLDC GLUCOMTR-MCNC: 181 MG/DL — HIGH (ref 70–99)
GLUCOSE BLDC GLUCOMTR-MCNC: 97 MG/DL — SIGNIFICANT CHANGE UP (ref 70–99)
GLUCOSE FLD-MCNC: <2 MG/DL — SIGNIFICANT CHANGE UP
GLUCOSE SERPL-MCNC: 85 MG/DL — SIGNIFICANT CHANGE UP (ref 70–99)
HCT VFR BLD CALC: 27.5 % — LOW (ref 39–50)
HGB BLD-MCNC: 8.7 G/DL — LOW (ref 13–17)
IMM GRANULOCYTES NFR BLD AUTO: 0.4 % — SIGNIFICANT CHANGE UP (ref 0–1.5)
LDH SERPL L TO P-CCNC: 122 U/L — SIGNIFICANT CHANGE UP (ref 50–242)
LDH SERPL L TO P-CCNC: SIGNIFICANT CHANGE UP U/L
LYMPHOCYTES # BLD AUTO: 1.24 K/UL — SIGNIFICANT CHANGE UP (ref 1–3.3)
LYMPHOCYTES # BLD AUTO: 17.4 % — SIGNIFICANT CHANGE UP (ref 13–44)
LYMPHOCYTES # FLD: 1 % — SIGNIFICANT CHANGE UP
MCHC RBC-ENTMCNC: 27.7 PG — SIGNIFICANT CHANGE UP (ref 27–34)
MCHC RBC-ENTMCNC: 31.6 GM/DL — LOW (ref 32–36)
MCV RBC AUTO: 87.6 FL — SIGNIFICANT CHANGE UP (ref 80–100)
MESOTHL CELL # FLD: 0 % — SIGNIFICANT CHANGE UP
MONOCYTES # BLD AUTO: 0.71 K/UL — SIGNIFICANT CHANGE UP (ref 0–0.9)
MONOCYTES NFR BLD AUTO: 10 % — SIGNIFICANT CHANGE UP (ref 2–14)
MONOS+MACROS # FLD: 4 % — SIGNIFICANT CHANGE UP
MRSA PCR RESULT.: SIGNIFICANT CHANGE UP
NEUTROPHILS # BLD AUTO: 4.96 K/UL — SIGNIFICANT CHANGE UP (ref 1.8–7.4)
NEUTROPHILS NFR BLD AUTO: 69.5 % — SIGNIFICANT CHANGE UP (ref 43–77)
NRBC # BLD: 0 /100 WBCS — SIGNIFICANT CHANGE UP (ref 0–0)
NRBC # FLD: 0 — SIGNIFICANT CHANGE UP
OTHER CELLS FLD MANUAL: 0 % — SIGNIFICANT CHANGE UP
PH FLD: 7 — SIGNIFICANT CHANGE UP
PLATELET # BLD AUTO: 407 K/UL — HIGH (ref 150–400)
POTASSIUM SERPL-MCNC: 3.8 MMOL/L — SIGNIFICANT CHANGE UP (ref 3.5–5.3)
POTASSIUM SERPL-SCNC: 3.8 MMOL/L — SIGNIFICANT CHANGE UP (ref 3.5–5.3)
PROCALCITONIN SERPL-MCNC: 0.07 NG/ML — SIGNIFICANT CHANGE UP (ref 0.02–0.1)
PROT FLD-MCNC: 2.2 G/DL — SIGNIFICANT CHANGE UP
PROT SERPL-MCNC: 6.1 GM/DL — SIGNIFICANT CHANGE UP (ref 6–8.3)
RBC # BLD: 3.14 M/UL — LOW (ref 4.2–5.8)
RBC # FLD: 15.7 % — HIGH (ref 10.3–14.5)
RCV VOL RI: HIGH /UL (ref 0–0)
S AUREUS DNA NOSE QL NAA+PROBE: SIGNIFICANT CHANGE UP
SODIUM SERPL-SCNC: 139 MMOL/L — SIGNIFICANT CHANGE UP (ref 135–145)
SPECIMEN SOURCE FLD: SIGNIFICANT CHANGE UP
TOTAL NUCLEATED CELL COUNT, BODY FLUID: SIGNIFICANT CHANGE UP /UL
TUBE TYPE: SIGNIFICANT CHANGE UP
WBC # BLD: 7.13 K/UL — SIGNIFICANT CHANGE UP (ref 3.8–10.5)
WBC # FLD AUTO: 7.13 K/UL — SIGNIFICANT CHANGE UP (ref 3.8–10.5)

## 2021-08-28 PROCEDURE — 99232 SBSQ HOSP IP/OBS MODERATE 35: CPT

## 2021-08-28 PROCEDURE — 71045 X-RAY EXAM CHEST 1 VIEW: CPT | Mod: 26

## 2021-08-28 RX ADMIN — POLYETHYLENE GLYCOL 3350 17 GRAM(S): 17 POWDER, FOR SOLUTION ORAL at 21:49

## 2021-08-28 RX ADMIN — SENNA PLUS 2 TABLET(S): 8.6 TABLET ORAL at 21:48

## 2021-08-28 RX ADMIN — ENOXAPARIN SODIUM 40 MILLIGRAM(S): 100 INJECTION SUBCUTANEOUS at 11:39

## 2021-08-28 RX ADMIN — CARBIDOPA AND LEVODOPA 1 TABLET(S): 25; 100 TABLET ORAL at 21:47

## 2021-08-28 RX ADMIN — CARBIDOPA AND LEVODOPA 1 TABLET(S): 25; 100 TABLET ORAL at 06:32

## 2021-08-28 RX ADMIN — Medication 1: at 17:02

## 2021-08-28 RX ADMIN — CEFEPIME 100 MILLIGRAM(S): 1 INJECTION, POWDER, FOR SOLUTION INTRAMUSCULAR; INTRAVENOUS at 21:48

## 2021-08-28 RX ADMIN — CEFEPIME 100 MILLIGRAM(S): 1 INJECTION, POWDER, FOR SOLUTION INTRAMUSCULAR; INTRAVENOUS at 06:32

## 2021-08-28 RX ADMIN — TAMSULOSIN HYDROCHLORIDE 0.4 MILLIGRAM(S): 0.4 CAPSULE ORAL at 21:47

## 2021-08-28 RX ADMIN — QUETIAPINE FUMARATE 50 MILLIGRAM(S): 200 TABLET, FILM COATED ORAL at 21:47

## 2021-08-28 RX ADMIN — ATORVASTATIN CALCIUM 10 MILLIGRAM(S): 80 TABLET, FILM COATED ORAL at 21:47

## 2021-08-28 RX ADMIN — CARBIDOPA AND LEVODOPA 1 TABLET(S): 25; 100 TABLET ORAL at 14:08

## 2021-08-28 RX ADMIN — Medication 1: at 11:38

## 2021-08-28 RX ADMIN — CEFEPIME 100 MILLIGRAM(S): 1 INJECTION, POWDER, FOR SOLUTION INTRAMUSCULAR; INTRAVENOUS at 14:08

## 2021-08-28 NOTE — PROGRESS NOTE ADULT - SUBJECTIVE AND OBJECTIVE BOX
Hospitalist Daily Progress Note    Chief Complaint:  Patient is a 81y old  Male who presents with a chief complaint of FTT, empyema r/o (27 Aug 2021 11:04)      SUBJECTIVE / OVERNIGHT EVENTS:  Patient was seen and examined at bedside. No active complaints.   Patient denies chest pain, SOB, abd pain, N/V, fever, chills, dysuria or any other complaints. All remainder ROS negative.     MEDICATIONS  (STANDING):  atorvastatin 10 milliGRAM(s) Oral at bedtime  carbidopa/levodopa  25/100 1 Tablet(s) Oral three times a day  cefepime   IVPB 1000 milliGRAM(s) IV Intermittent every 8 hours  dextrose 40% Gel 15 Gram(s) Oral once  dextrose 5%. 1000 milliLiter(s) (50 mL/Hr) IV Continuous <Continuous>  dextrose 5%. 1000 milliLiter(s) (100 mL/Hr) IV Continuous <Continuous>  dextrose 50% Injectable 25 Gram(s) IV Push once  dextrose 50% Injectable 12.5 Gram(s) IV Push once  dextrose 50% Injectable 25 Gram(s) IV Push once  enoxaparin Injectable 40 milliGRAM(s) SubCutaneous daily  glucagon  Injectable 1 milliGRAM(s) IntraMuscular once  insulin lispro (ADMELOG) corrective regimen sliding scale   SubCutaneous three times a day before meals  QUEtiapine 50 milliGRAM(s) Oral at bedtime  senna 2 Tablet(s) Oral at bedtime  tamsulosin 0.4 milliGRAM(s) Oral at bedtime    MEDICATIONS  (PRN):  oxyCODONE    IR 5 milliGRAM(s) Oral every 6 hours PRN Moderate Pain (4 - 6)  polyethylene glycol 3350 17 Gram(s) Oral daily PRN constipations        I&O's Summary    26 Aug 2021 07:01  -  27 Aug 2021 07:00  --------------------------------------------------------  IN: 340 mL / OUT: 500 mL / NET: -160 mL        PHYSICAL EXAM:  Vital Signs Last 24 Hrs  T(C): 36.7 (28 Aug 2021 05:36), Max: 36.8 (27 Aug 2021 17:00)  T(F): 98 (28 Aug 2021 05:36), Max: 98.2 (27 Aug 2021 17:00)  HR: 79 (28 Aug 2021 05:36) (66 - 79)  BP: 123/66 (28 Aug 2021 05:36) (101/56 - 123/66)  BP(mean): --  RR: 18 (28 Aug 2021 05:36) (16 - 18)  SpO2: 95% (28 Aug 2021 05:36) (94% - 96%)      Constitutional: NAD, Resting  ENT: Supple, No JVD  Lungs: Decreased sounds on left side otherwise CTA  Cardio: RRR, S1/S2, No murmur  Abdomen: Soft, Nontender, Nondistended; Bowel sounds present  Extremities: No calf tenderness, No pitting edema  Musculoskeletal:   No clubbing or cyanosis of digits; no joint swelling or tenderness to palpation  Psych: Calm, cooperative affect appropriate  Neuro: Awake and alert, oriented to name, states to be in a mental hospital,  Skin: No rashes; no palpable lesions    LABS:                        8.7    7.63  )-----------( 430      ( 27 Aug 2021 07:40 )             27.1     08-27    138  |  105  |  21  ----------------------------<  103<H>  3.5   |  29  |  0.55    Ca    8.2<L>      27 Aug 2021 07:40    TPro  6.0  /  Alb  1.7<L>  /  TBili  0.6  /  DBili  x   /  AST  5<L>  /  ALT  <6<L>  /  AlkPhos  61  08-27      CARDIAC MARKERS ( 26 Aug 2021 01:14 )  <.015 ng/mL / x     / x     / x     / x      CARDIAC MARKERS ( 25 Aug 2021 21:05 )  x     / x     / 13 U/L / x     / x          Urinalysis Basic - ( 25 Aug 2021 22:31 )    Color: Yellow / Appearance: Clear / S.010 / pH: x  Gluc: x / Ketone: Negative  / Bili: Negative / Urobili: 1 mg/dL   Blood: x / Protein: Negative mg/dL / Nitrite: Negative   Leuk Esterase: Negative / RBC: 0-2 /HPF / WBC x   Sq Epi: x / Non Sq Epi: x / Bacteria: x        Culture - Urine (collected 26 Aug 2021 09:22)  Source: Clean Catch Clean Catch (Midstream)  Final Report (27 Aug 2021 10:51):    No growth    Culture - Blood (collected 26 Aug 2021 09:09)  Source: .Blood Blood-Peripheral  Preliminary Report (27 Aug 2021 10:01):    No growth to date.    Culture - Blood (collected 26 Aug 2021 09:09)  Source: .Blood Blood-Peripheral  Preliminary Report (27 Aug 2021 10:01):    No growth to date.      CAPILLARY BLOOD GLUCOSE      POCT Blood Glucose.: 160 mg/dL (27 Aug 2021 09:28)  POCT Blood Glucose.: 133 mg/dL (26 Aug 2021 21:44)  POCT Blood Glucose.: 134 mg/dL (26 Aug 2021 16:36)  POCT Blood Glucose.: 189 mg/dL (26 Aug 2021 12:08)        RADIOLOGY REVIEWED

## 2021-08-28 NOTE — PROGRESS NOTE ADULT - SUBJECTIVE AND OBJECTIVE BOX
FESTUS BLAND    LVS 2E 289 D    Patient is a 81y old  Male who presents with a chief complaint of FTT, empyema r/o (27 Aug 2021 11:53)       Allergies    No Known Allergies    Intolerances        HPI:  Patient is a 81M with a PMH of HTN, DM, HLD, dementia,  Parkinson's disease, CAD s/p stents x2 (>20 years ago), CHF (EF: 50%, Moderate diastolic dysfunction (Stage II), moderate pulmonary hypertension, small-mod pericardial effusion), Lt pleural effusion, BPH, gastric ulcers, COVID infection who was sent to the ED for weakness.  Patient currently awake and alert, oriented x1, unable to provide history.  Has no acute complaints.  Discharged from NH today and was sent to the ED as he appeared weak and emaciated.  Vitals stable, labs show leukocytosis and mild lactic acidosis.  CT reveals a chronic loculated L pleural effusion.  ED attending spoke to family who made the patient DNR/DNI however did agree for chest tube drainage for effusion.  Will admit to med surg.   (26 Aug 2021 03:21)      PAST MEDICAL & SURGICAL HISTORY:  Hypercholesterolemia    DM (Diabetes Mellitus)    HTN (Hypertension)    CAD (Coronary Artery Disease)  s/p cardiac stent ( &gt; 20 years ago)    BPH (benign prostatic hyperplasia)    Coronary Stent  x 2 ( 20 years )        FAMILY HISTORY:  FH: HTN (hypertension)          MEDICATIONS   atorvastatin 10 milliGRAM(s) Oral at bedtime  carbidopa/levodopa  25/100 1 Tablet(s) Oral three times a day  cefepime   IVPB 1000 milliGRAM(s) IV Intermittent every 8 hours  dextrose 40% Gel 15 Gram(s) Oral once  dextrose 5%. 1000 milliLiter(s) IV Continuous <Continuous>  dextrose 5%. 1000 milliLiter(s) IV Continuous <Continuous>  dextrose 50% Injectable 25 Gram(s) IV Push once  dextrose 50% Injectable 12.5 Gram(s) IV Push once  dextrose 50% Injectable 25 Gram(s) IV Push once  enoxaparin Injectable 40 milliGRAM(s) SubCutaneous daily  glucagon  Injectable 1 milliGRAM(s) IntraMuscular once  insulin lispro (ADMELOG) corrective regimen sliding scale   SubCutaneous three times a day before meals  oxyCODONE    IR 5 milliGRAM(s) Oral every 6 hours PRN  polyethylene glycol 3350 17 Gram(s) Oral daily PRN  QUEtiapine 50 milliGRAM(s) Oral at bedtime  senna 2 Tablet(s) Oral at bedtime  tamsulosin 0.4 milliGRAM(s) Oral at bedtime      Vital Signs Last 24 Hrs  T(C): 36.7 (28 Aug 2021 05:36), Max: 36.8 (27 Aug 2021 17:00)  T(F): 98 (28 Aug 2021 05:36), Max: 98.2 (27 Aug 2021 17:00)  HR: 79 (28 Aug 2021 05:36) (66 - 79)  BP: 123/66 (28 Aug 2021 05:36) (101/56 - 123/66)  BP(mean): --  RR: 18 (28 Aug 2021 05:36) (16 - 18)  SpO2: 95% (28 Aug 2021 05:36) (94% - 96%)      08-27-21 @ 07:01  -  08-28-21 @ 07:00  --------------------------------------------------------  IN: 340 mL / OUT: 163 mL / NET: 177 mL            LABS:                        8.7    7.13  )-----------( 407      ( 28 Aug 2021 07:02 )             27.5     08-28    139  |  106  |  16  ----------------------------<  85  3.8   |  29  |  0.56    Ca    7.9<L>      28 Aug 2021 07:02    TPro  6.1  /  Alb  1.7<L>  /  TBili  0.9  /  DBili  x   /  AST  7<L>  /  ALT  <6<L>  /  AlkPhos  73  08-28              WBC:  WBC Count: 7.13 K/uL (08-28 @ 07:02)  WBC Count: 7.63 K/uL (08-27 @ 07:40)  WBC Count: 19.54 K/uL (08-25 @ 21:05)      MICROBIOLOGY:  RECENT CULTURES:  08-27 .Body Fluid LEFT PLEURAL EFFUSION XXXX   Moderate polymorphonuclear leukocytes per low power field  Numerous Gram positive cocci in pairs per oil power field XXXX    08-26 Clean Catch Clean Catch (Midstream) XXXX XXXX   No growth    08-26 .Blood Blood-Peripheral XXXX XXXX   No growth to date.                    Sodium:  Sodium, Serum: 139 mmol/L (08-28 @ 07:02)  Sodium, Serum: 138 mmol/L (08-27 @ 07:40)  Sodium, Serum: 137 mmol/L (08-25 @ 21:05)      0.56 mg/dL 08-28 @ 07:02  0.55 mg/dL 08-27 @ 07:40  0.92 mg/dL 08-25 @ 21:05      Hemoglobin:  Hemoglobin: 8.7 g/dL (08-28 @ 07:02)  Hemoglobin: 8.7 g/dL (08-27 @ 07:40)  Hemoglobin: 10.3 g/dL (08-25 @ 21:05)      Platelets: Platelet Count - Automated: 407 K/uL (08-28 @ 07:02)  Platelet Count - Automated: 430 K/uL (08-27 @ 07:40)  Platelet Count - Automated: 528 K/uL (08-25 @ 21:05)      LIVER FUNCTIONS - ( 28 Aug 2021 07:02 )  Alb: 1.7 g/dL / Pro: 6.1 gm/dL / ALK PHOS: 73 U/L / ALT: <6 U/L / AST: 7 U/L / GGT: x                 RADIOLOGY & ADDITIONAL STUDIES:

## 2021-08-29 LAB
-  CEFTRIAXONE: SIGNIFICANT CHANGE UP
-  CLINDAMYCIN: SIGNIFICANT CHANGE UP
-  ERYTHROMYCIN: SIGNIFICANT CHANGE UP
-  LEVOFLOXACIN: SIGNIFICANT CHANGE UP
-  PENICILLIN: SIGNIFICANT CHANGE UP
-  VANCOMYCIN: SIGNIFICANT CHANGE UP
ANION GAP SERPL CALC-SCNC: 4 MMOL/L — LOW (ref 5–17)
BUN SERPL-MCNC: 13 MG/DL — SIGNIFICANT CHANGE UP (ref 7–23)
CALCIUM SERPL-MCNC: 8.2 MG/DL — LOW (ref 8.5–10.1)
CHLORIDE SERPL-SCNC: 106 MMOL/L — SIGNIFICANT CHANGE UP (ref 96–108)
CO2 SERPL-SCNC: 29 MMOL/L — SIGNIFICANT CHANGE UP (ref 22–31)
CREAT SERPL-MCNC: 0.5 MG/DL — SIGNIFICANT CHANGE UP (ref 0.5–1.3)
GLUCOSE BLDC GLUCOMTR-MCNC: 143 MG/DL — HIGH (ref 70–99)
GLUCOSE BLDC GLUCOMTR-MCNC: 184 MG/DL — HIGH (ref 70–99)
GLUCOSE BLDC GLUCOMTR-MCNC: 185 MG/DL — HIGH (ref 70–99)
GLUCOSE BLDC GLUCOMTR-MCNC: 99 MG/DL — SIGNIFICANT CHANGE UP (ref 70–99)
GLUCOSE SERPL-MCNC: 98 MG/DL — SIGNIFICANT CHANGE UP (ref 70–99)
HCT VFR BLD CALC: 29 % — LOW (ref 39–50)
HGB BLD-MCNC: 9.2 G/DL — LOW (ref 13–17)
MCHC RBC-ENTMCNC: 28 PG — SIGNIFICANT CHANGE UP (ref 27–34)
MCHC RBC-ENTMCNC: 31.7 GM/DL — LOW (ref 32–36)
MCV RBC AUTO: 88.4 FL — SIGNIFICANT CHANGE UP (ref 80–100)
METHOD TYPE: SIGNIFICANT CHANGE UP
METHOD TYPE: SIGNIFICANT CHANGE UP
NRBC # BLD: 0 /100 WBCS — SIGNIFICANT CHANGE UP (ref 0–0)
ORGANISM # SPEC MICROSCOPIC CNT: SIGNIFICANT CHANGE UP
PLATELET # BLD AUTO: 397 K/UL — SIGNIFICANT CHANGE UP (ref 150–400)
POTASSIUM SERPL-MCNC: 4 MMOL/L — SIGNIFICANT CHANGE UP (ref 3.5–5.3)
POTASSIUM SERPL-SCNC: 4 MMOL/L — SIGNIFICANT CHANGE UP (ref 3.5–5.3)
RBC # BLD: 3.28 M/UL — LOW (ref 4.2–5.8)
RBC # FLD: 15.6 % — HIGH (ref 10.3–14.5)
SODIUM SERPL-SCNC: 139 MMOL/L — SIGNIFICANT CHANGE UP (ref 135–145)
WBC # BLD: 7.08 K/UL — SIGNIFICANT CHANGE UP (ref 3.8–10.5)
WBC # FLD AUTO: 7.08 K/UL — SIGNIFICANT CHANGE UP (ref 3.8–10.5)

## 2021-08-29 PROCEDURE — 99232 SBSQ HOSP IP/OBS MODERATE 35: CPT

## 2021-08-29 RX ADMIN — SENNA PLUS 2 TABLET(S): 8.6 TABLET ORAL at 21:48

## 2021-08-29 RX ADMIN — ENOXAPARIN SODIUM 40 MILLIGRAM(S): 100 INJECTION SUBCUTANEOUS at 11:46

## 2021-08-29 RX ADMIN — CEFEPIME 100 MILLIGRAM(S): 1 INJECTION, POWDER, FOR SOLUTION INTRAMUSCULAR; INTRAVENOUS at 21:48

## 2021-08-29 RX ADMIN — Medication 1: at 11:46

## 2021-08-29 RX ADMIN — CARBIDOPA AND LEVODOPA 1 TABLET(S): 25; 100 TABLET ORAL at 14:17

## 2021-08-29 RX ADMIN — QUETIAPINE FUMARATE 50 MILLIGRAM(S): 200 TABLET, FILM COATED ORAL at 21:48

## 2021-08-29 RX ADMIN — CEFEPIME 100 MILLIGRAM(S): 1 INJECTION, POWDER, FOR SOLUTION INTRAMUSCULAR; INTRAVENOUS at 14:15

## 2021-08-29 RX ADMIN — CEFEPIME 100 MILLIGRAM(S): 1 INJECTION, POWDER, FOR SOLUTION INTRAMUSCULAR; INTRAVENOUS at 05:24

## 2021-08-29 RX ADMIN — ATORVASTATIN CALCIUM 10 MILLIGRAM(S): 80 TABLET, FILM COATED ORAL at 21:48

## 2021-08-29 RX ADMIN — TAMSULOSIN HYDROCHLORIDE 0.4 MILLIGRAM(S): 0.4 CAPSULE ORAL at 21:48

## 2021-08-29 RX ADMIN — CARBIDOPA AND LEVODOPA 1 TABLET(S): 25; 100 TABLET ORAL at 05:24

## 2021-08-29 RX ADMIN — CARBIDOPA AND LEVODOPA 1 TABLET(S): 25; 100 TABLET ORAL at 21:48

## 2021-08-29 NOTE — PROGRESS NOTE ADULT - SUBJECTIVE AND OBJECTIVE BOX
Patient seen and examined at bedside in no distress.  Demented, pleasant.   No acute events overnight.    Vital Signs Last 24 Hrs  T(F): 97.4 (08-29-21 @ 10:00), Max: 97.6 (08-28-21 @ 23:44)  HR: 75 (08-29-21 @ 10:00)  BP: 124/68 (08-29-21 @ 10:00)  RR: 18 (08-29-21 @ 10:00)  SpO2: 96% (08-29-21 @ 10:00)    GENERAL: Alert, NAD  CHEST/LUNG: Coarse breath sounds left lung. Pigtail draining white/yellow fluid, output 160cc/24hrs. pigtail flushed with no issue  HEART: S1S2, RRR   ABDOMEN: Soft  EXTREMITIES: No pedal edema b/l     LABS:                        9.2    7.08  )-----------( 397      ( 29 Aug 2021 07:35 )             29.0     08-29    139  |  106  |  13  ----------------------------<  98  4.0   |  29  |  0.50    Ca    8.2<L>      29 Aug 2021 07:35    TPro  6.1  /  Alb  1.7<L>  /  TBili  0.9  /  DBili  x   /  AST  7<L>  /  ALT  <6<L>  /  AlkPhos  73  08-28    A/P: 81M with multiple medical comorbidities a/w FTT, chronic left, loculated pleural effusion s/p IR drainage and pigtail placement 8/27, with decrease in size.   Patient with localized collection, adequately drained. Sepsis/source control obtained. Patient high risk for intervention (would likely require thoracotomy, no just VATS), and family does not want any invasive procedures.   No surgical intervention planned.  Repeat CT chest at the end of this week.   Continue CT, monitor output.  Supplemental O2 as needed.  Continue pulm f/u  Discussed with Dr. Apple

## 2021-08-29 NOTE — PROGRESS NOTE ADULT - ASSESSMENT
BALDO MORTENSEN 81 M 8/26/2021 1942 DR CATHERINE LANDIN     REVIEW OF SYMPTOMS      Able to give (reliable) ROS  NO     PHYSICAL EXAM    HEENT Unremarkable  atraumatic   RESP Fair air entry EXP prolonged    Harsh breath sound Resp distres mild   CARDIAC S1 S2 No S3     NO JVD    ABDOMEN SOFT BS PRESENT NOT DISTENDED No hepatosplenomegaly   PEDAL EDEMA present No calf tenderness  NO rash                                          8/26/2021  PRESENTATION.  82 y/o M  pmhx of HTN, DM, HLD, dementia,  Parkinson's disease, CAD s/p stents x2 (>20 years ago), CHF (EF: 50%, Moderate diastolic dysfunction (Stage II), moderate pulmonary hypertension, small-mod pericardial effusion), Lt pleural effusion, BPH, gastric ulcers, COVID infection who was sent on 8/26/2021  to the ED for weakness.      8/26/2021 PRESENTING PROBLEMS   WEAKNESS  L PLEURAL EFFUSION   AIR FLUID LEVEL PLEURAL EFFSN POA   ENLARGED PROSTATE  DEMENTIA     HOSPITAL COURSE   PL EFFSN l Seen by IR 8/27/2021   PIGTAIL 8/27  pfa cw empyema  (8/27) pfa 8/27 ldh [id number] pr 2.2 g 2 ph 7  numerous gpc pairs   NONVIOLENT NONSELF DESTR LEVEL ONE   8/28                GENERAL ISSUES  [facility] ADVANCED DIRECTIVE.    dnr                        ALLERGY.    nka                        WEIGHT.         8/26/2021 54                           BMI.                   8/26/2021 18  DYSPHAGIA ELLIE.            BEST PRACTICE ISSUES.                                                  HEAD OF BED ELEVATION. Yes  DVT PROPHYLAXIS.  lvnx 40 (8/26)                                        VALLEJO PROPHYLAXIS.                                                                                         DIET.      dys 1 puree nectar cons carb (8/26)                     INFECTION PROPHYLAXIS.                        PATIENT DATA   TUBES  PROCEDURES.     PIGTAIL 8/27 maroon drainage     ABG.   8/26/2021 ra 745/37/78     OXYGENATION.       8/26-8/27/2021 ra 97%    - ra 96%         VITALS/IO/VENT/DRIPS.     8/29/2021 afeb 75 120/60   8/29/2021 chest tube 115     PROBLEM ASSESSMENT/RECOMMENDATIONS.    COVID STATUS  Ho covid (+) 6/10/2021     CHRONIC L PL EFFSN WITH AIR FLUID LEVEL   RO underlying cancer or infection with bp fistula   CT chest ic 8/26/2021  cw 6/11/2021 and 3/2021 ct:  1. Chronic loculated left pleural effusion now demonstrates an air-fluid level  pigtail placd 8/27  pfa 8/27 ldh [id number] pr 2.2 g 2 ph 7   numerous gpc pairs   findings of 8/27 pl fluid cw empyema   Will dw id and cts  [place] sent 8/28 to cts to consider empyemectomy    INFECTION  w 8/26-8/27-8/28/2021 w 19 - 7.6 - 7.3   pr 8/26/2021 pr (-)   la 8/26/2021 la 1.9   Cr 8/26/2021 Cr .9   urine c 8/26 (-)  fluab 8/26/2021 (-)   blod c 8/26 (-)   mrsa 8/28 (-)  cefepime 1.3 (8/26) (Dr Ramirez) (Pneu)   cont rx     DYSPHAGIA  On dys 1 puree nectar diet (8/26/2021)     CAD  PMH CAD stents 2 decade ago   Tr 8/26/2021 Tr (-)     CHF  echo 6/11/2021 n lvsf thickened pericard with 1 cm pericard effs n la   bnp 8/26/2021 bnp 863     ANEMIA  Hb 8/26 - 8/27-8/28/2021 Hb 10 - 8.7 - 8.7   mcv 8/26/2021 mvcv 89     PARKINSONS   carbilevadopa 25 100.3 (8/26)     NONVIOLENT NONSELF DESTR LEVEL ONE   8/28    [facility] dnr         OVERALL PLAN.  CHRONIC L PL EFFSN WITH AIR FLUID LEVEL   RO underlying cancer or infection with bp fistula   8/27 ir placd pigtail   8/27 pfa cw empyema gpc pairs  on cefepime (8/26)   cts on case   [facility] dnr      TIME SPENT   Over 25 minutes aggregate care time spent on encounter; activities included   direct patient care, counseling and/or coordinating care reviewing notes, lab data/ imaging , discussion with multidisciplinary team/ patient  /family and explaining in detail risks, benefits, alternatives  of the recommendations     BALDO MORTENSEN 81 M 8/26/2021 1942 DR CATHERINE LANDIN

## 2021-08-29 NOTE — PROGRESS NOTE ADULT - ASSESSMENT
81 yr old male pt with significant medical hx and dementia presents with weakness. Pt states he feels good but he is not oriented to time or place. left sided pleural effusion.  WBC count is 19.5   81 yr old male pt with significant medical hx and dementia presents with weakness.   Pt states he feels good but he is not oriented to time or place.   CT (I personally reviewed) left sided pleural effusion.  WBC count was 19.5 received  vanc zosyn in ED   Thoracic surgery recommending chest tube placement and to send fluid off for analysis which was done on friday   review of the fluid indicated exudative fluid, there are a lot of wbc cells but also a a lof of rbc. He is breathing on room air but he is coughing  a    Left PLEF  Leukocytosis   functional quadriplegia       Plan   Cefepime 1000mg every 8 hours  hold off on further doses of vancomycin   MRSA PCR negative and thus vancomycin stopped   cultures with strep angiogus   f/u rest of thoracentesis labs   chest tube management per thoracic surgery   daily CXR ideally in the setting of the cehect tube   MRSA PCR negative  Blood cultures   trend WBC  offloading, frequent turning and nutrition optimization    D/W Surgical PA     Aldair Le DO  Infectious Disease Attending  Pager [phone]  After 5pm/weekends please call [phone] for all inquiries and new consults .

## 2021-08-29 NOTE — PROGRESS NOTE ADULT - SUBJECTIVE AND OBJECTIVE BOX
FESTUS BLAND    LVS 2E 289 D    Patient is a 81y old  Male who presents with a chief complaint of FTT, empyema r/o (28 Aug 2021 12:35)       Allergies    No Known Allergies    Intolerances        HPI:  Patient is a 81M with a PMH of HTN, DM, HLD, dementia,  Parkinson's disease, CAD s/p stents x2 (>20 years ago), CHF (EF: 50%, Moderate diastolic dysfunction (Stage II), moderate pulmonary hypertension, small-mod pericardial effusion), Lt pleural effusion, BPH, gastric ulcers, COVID infection who was sent to the ED for weakness.  Patient currently awake and alert, oriented x1, unable to provide history.  Has no acute complaints.  Discharged from NH today and was sent to the ED as he appeared weak and emaciated.  Vitals stable, labs show leukocytosis and mild lactic acidosis.  CT reveals a chronic loculated L pleural effusion.  ED attending spoke to family who made the patient DNR/DNI however did agree for chest tube drainage for effusion.  Will admit to med surg.   (26 Aug 2021 03:21)      PAST MEDICAL & SURGICAL HISTORY:  Hypercholesterolemia    DM (Diabetes Mellitus)    HTN (Hypertension)    CAD (Coronary Artery Disease)  s/p cardiac stent ( &gt; 20 years ago)    BPH (benign prostatic hyperplasia)    Coronary Stent  x 2 ( 20 years )        FAMILY HISTORY:  FH: HTN (hypertension)          MEDICATIONS   atorvastatin 10 milliGRAM(s) Oral at bedtime  carbidopa/levodopa  25/100 1 Tablet(s) Oral three times a day  cefepime   IVPB 1000 milliGRAM(s) IV Intermittent every 8 hours  dextrose 40% Gel 15 Gram(s) Oral once  dextrose 5%. 1000 milliLiter(s) IV Continuous <Continuous>  dextrose 5%. 1000 milliLiter(s) IV Continuous <Continuous>  dextrose 50% Injectable 25 Gram(s) IV Push once  dextrose 50% Injectable 12.5 Gram(s) IV Push once  dextrose 50% Injectable 25 Gram(s) IV Push once  enoxaparin Injectable 40 milliGRAM(s) SubCutaneous daily  glucagon  Injectable 1 milliGRAM(s) IntraMuscular once  insulin lispro (ADMELOG) corrective regimen sliding scale   SubCutaneous three times a day before meals  oxyCODONE    IR 5 milliGRAM(s) Oral every 6 hours PRN  polyethylene glycol 3350 17 Gram(s) Oral daily PRN  QUEtiapine 50 milliGRAM(s) Oral at bedtime  senna 2 Tablet(s) Oral at bedtime  tamsulosin 0.4 milliGRAM(s) Oral at bedtime      Vital Signs Last 24 Hrs  T(C): 36.4 (29 Aug 2021 05:00), Max: 36.4 (28 Aug 2021 13:13)  T(F): 97.6 (29 Aug 2021 05:00), Max: 97.6 (28 Aug 2021 23:44)  HR: 63 (29 Aug 2021 05:00) (63 - 85)  BP: 131/90 (29 Aug 2021 05:00) (90/49 - 131/90)  BP(mean): --  RR: 18 (29 Aug 2021 05:00) (17 - 18)  SpO2: 99% (29 Aug 2021 05:00) (94% - 99%)      08-28-21 @ 07:01  -  08-29-21 @ 07:00  --------------------------------------------------------  IN: 340 mL / OUT: 115 mL / NET: 225 mL            LABS:                        9.2    7.08  )-----------( 397      ( 29 Aug 2021 07:35 )             29.0     08-29    139  |  106  |  13  ----------------------------<  98  4.0   |  29  |  0.50    Ca    8.2<L>      29 Aug 2021 07:35    TPro  6.1  /  Alb  1.7<L>  /  TBili  0.9  /  DBili  x   /  AST  7<L>  /  ALT  <6<L>  /  AlkPhos  73  08-28              WBC:  WBC Count: 7.08 K/uL (08-29 @ 07:35)  WBC Count: 7.13 K/uL (08-28 @ 07:02)  WBC Count: 7.63 K/uL (08-27 @ 07:40)  WBC Count: 19.54 K/uL (08-25 @ 21:05)      MICROBIOLOGY:  RECENT CULTURES:  08-27 .Body Fluid LEFT PLEURAL EFFUSION XXXX   Moderate polymorphonuclear leukocytes per low power field  Numerous Gram positive cocci in pairs per oil power field XXXX    08-26 Clean Catch Clean Catch (Midstream) XXXX XXXX   No growth    08-26 .Blood Blood-Peripheral XXXX XXXX   No growth to date.                    Sodium:  Sodium, Serum: 139 mmol/L (08-29 @ 07:35)  Sodium, Serum: 139 mmol/L (08-28 @ 07:02)  Sodium, Serum: 138 mmol/L (08-27 @ 07:40)  Sodium, Serum: 137 mmol/L (08-25 @ 21:05)      0.50 mg/dL 08-29 @ 07:35  0.56 mg/dL 08-28 @ 07:02  0.55 mg/dL 08-27 @ 07:40  0.92 mg/dL 08-25 @ 21:05      Hemoglobin:  Hemoglobin: 9.2 g/dL (08-29 @ 07:35)  Hemoglobin: 8.7 g/dL (08-28 @ 07:02)  Hemoglobin: 8.7 g/dL (08-27 @ 07:40)  Hemoglobin: 10.3 g/dL (08-25 @ 21:05)      Platelets: Platelet Count - Automated: 397 K/uL (08-29 @ 07:35)  Platelet Count - Automated: 407 K/uL (08-28 @ 07:02)  Platelet Count - Automated: 430 K/uL (08-27 @ 07:40)  Platelet Count - Automated: 528 K/uL (08-25 @ 21:05)      LIVER FUNCTIONS - ( 28 Aug 2021 07:02 )  Alb: 1.7 g/dL / Pro: 6.1 gm/dL / ALK PHOS: 73 U/L / ALT: <6 U/L / AST: 7 U/L / GGT: x                 RADIOLOGY & ADDITIONAL STUDIES:

## 2021-08-29 NOTE — PROGRESS NOTE ADULT - SUBJECTIVE AND OBJECTIVE BOX
FESTUS BLAND  MRN-[id number]      Follow Up:      Interval History:    ROS:    [ ] Unobtainable because:  [ ] All other systems negative except as noted    Constitutional: no fever, no chills  Head: no trauma  Eyes: no vision changes, no eye pain  ENT:  no sore throat, no rhinorrhea  Cardiovascular:  no chest pain, no palpitation  Respiratory:  no SOB, no cough  GI:  no abd pain, no vomiting, no diarrhea  urinary: no dysuria, no hematuria, no flank pain  musculoskeletal:  no joint pain, no joint swelling  skin:  no rash  neurology:  no headache, no seizure, no change in mental status  psych: no anxiety, no depression         Allergies  No Known Allergies        ANTIMICROBIALS:  cefepime   IVPB 1000 every 8 hours      OTHER MEDS:  atorvastatin 10 milliGRAM(s) Oral at bedtime  carbidopa/levodopa  25/100 1 Tablet(s) Oral three times a day  dextrose 40% Gel 15 Gram(s) Oral once  dextrose 5%. 1000 milliLiter(s) IV Continuous <Continuous>  dextrose 5%. 1000 milliLiter(s) IV Continuous <Continuous>  dextrose 50% Injectable 25 Gram(s) IV Push once  dextrose 50% Injectable 12.5 Gram(s) IV Push once  dextrose 50% Injectable 25 Gram(s) IV Push once  enoxaparin Injectable 40 milliGRAM(s) SubCutaneous daily  glucagon  Injectable 1 milliGRAM(s) IntraMuscular once  insulin lispro (ADMELOG) corrective regimen sliding scale   SubCutaneous three times a day before meals  oxyCODONE    IR 5 milliGRAM(s) Oral every 6 hours PRN  polyethylene glycol 3350 17 Gram(s) Oral daily PRN  QUEtiapine 50 milliGRAM(s) Oral at bedtime  senna 2 Tablet(s) Oral at bedtime  tamsulosin 0.4 milliGRAM(s) Oral at bedtime      Physical Exam:  Vital Signs Last 24 Hrs  T(C): 36.9 (29 Aug 2021 23:02), Max: 36.9 (29 Aug 2021 23:02)  T(F): 98.5 (29 Aug 2021 23:02), Max: 98.5 (29 Aug 2021 23:02)  HR: 77 (29 Aug 2021 23:02) (63 - 86)  BP: 90/54 (29 Aug 2021 23:02) (90/49 - 131/90)  BP(mean): --  RR: 18 (29 Aug 2021 23:02) (18 - 18)  SpO2: 100% (29 Aug 2021 23:02) (96% - 100%)    General:    NAD,  non toxic, chronically ill appearing male with temporal wasting   Head: atraumatic, normocephalic  Eye: normal sclera and conjunctiva, pale   ENT:    no oral lesions, neck supple  Cardio:     regular S1, S2,  +murmur  Respiratory:    clear b/l,    no wheezing, s/p chest tube on left side   abd:     soft,   BS +,   no tenderness  :   no CVAT,  no suprapubic tenderness,   no  caceres  Musculoskeletal:   no joint swelling,   no edema  vascular: no central lines, +PIV  Skin:    no rash  Neurologic:     no focal deficit  psych: normal affect    WBC Count: 7.08 K/uL (08-29 @ 07:35)  WBC Count: 7.13 K/uL (08-28 @ 07:02)  WBC Count: 7.63 K/uL (08-27 @ 07:40)  WBC Count: 19.54 K/uL (08-25 @ 21:05)                            9.2    7.08  )-----------( 397      ( 29 Aug 2021 07:35 )             29.0       08-29    139  |  106  |  13  ----------------------------<  98  4.0   |  29  |  0.50    Ca    8.2<L>      29 Aug 2021 07:35    TPro  6.1  /  Alb  1.7<L>  /  TBili  0.9  /  DBili  x   /  AST  7<L>  /  ALT  <6<L>  /  AlkPhos  73  08-28          Creatinine Trend: 0.50<--, 0.56<--, 0.55<--, 0.92<--    pH, Fluid (08.27.21 @ 19:27)    pH, Fluid: 7.0    Fluid Source: Pleural    Glucose, Fluid (08.27.21 @ 19:27)    Glucose, Fluid: <2    Protein Total, Fluid (08.27.21 @ 19:27)    Protein Total, Fluid: 2.2: Test Repeated  Reference Ranges have NOT been established for analytes in body fluids  because of variability in body fluid composition.  The  has not determined the efficacy of this test when  performed on fluid specimens. The performance characteristics of this  test were determined by [facility]. g/dL    Lactate Dehydrogenase, Fluid (08.27.21 @ 19:27)    Lactate Dehydrogenase, Fluid: 26270      MICROBIOLOGY:  v  .Body Fluid LEFT PLEURAL EFFUSION  08-27-21   Moderate Streptococcus anginosus  --  Streptococcus anginosus      Clean Catch Clean Catch (Midstream)  08-26-21   No growth  --  --      .Blood Blood-Peripheral  08-26-21   No growth to date.  --  --    Procalcitonin, Serum: 0.07 (08-28-21 @ 11:31)  Procalcitonin, Serum: 0.08 (08-26-21 @ 10:47)        RADIOLOGY:  < from: Xray Chest 1 View- PORTABLE-Routine (Xray Chest 1 View- PORTABLE-Routine .) (08.28.21 @ 15:55) >    Findings/  Impression: Status post left pigtail. Cardiomegaly. Decreased size of left pleural effusion.    < end of copied text >

## 2021-08-29 NOTE — PROGRESS NOTE ADULT - SUBJECTIVE AND OBJECTIVE BOX
Hospitalist Daily Progress Note    Chief Complaint:  Patient is a 81y old  Male who presents with a chief complaint of FTT, empyema r/o (27 Aug 2021 11:04)      SUBJECTIVE / OVERNIGHT EVENTS:  Patient was seen and examined at bedside. No active complaints.   Patient denies chest pain, SOB, abd pain, N/V, fever, chills, dysuria or any other complaints. All remainder ROS negative.     MEDICATIONS  (STANDING):  atorvastatin 10 milliGRAM(s) Oral at bedtime  carbidopa/levodopa  25/100 1 Tablet(s) Oral three times a day  cefepime   IVPB 1000 milliGRAM(s) IV Intermittent every 8 hours  dextrose 40% Gel 15 Gram(s) Oral once  dextrose 5%. 1000 milliLiter(s) (50 mL/Hr) IV Continuous <Continuous>  dextrose 5%. 1000 milliLiter(s) (100 mL/Hr) IV Continuous <Continuous>  dextrose 50% Injectable 25 Gram(s) IV Push once  dextrose 50% Injectable 12.5 Gram(s) IV Push once  dextrose 50% Injectable 25 Gram(s) IV Push once  enoxaparin Injectable 40 milliGRAM(s) SubCutaneous daily  glucagon  Injectable 1 milliGRAM(s) IntraMuscular once  insulin lispro (ADMELOG) corrective regimen sliding scale   SubCutaneous three times a day before meals  QUEtiapine 50 milliGRAM(s) Oral at bedtime  senna 2 Tablet(s) Oral at bedtime  tamsulosin 0.4 milliGRAM(s) Oral at bedtime    MEDICATIONS  (PRN):  oxyCODONE    IR 5 milliGRAM(s) Oral every 6 hours PRN Moderate Pain (4 - 6)  polyethylene glycol 3350 17 Gram(s) Oral daily PRN constipations          PHYSICAL EXAM:  Vital Signs Last 24 Hrs  T(C): 36.3 (29 Aug 2021 10:00), Max: 36.4 (28 Aug 2021 13:13)  T(F): 97.4 (29 Aug 2021 10:00), Max: 97.6 (28 Aug 2021 23:44)  HR: 75 (29 Aug 2021 10:00) (63 - 85)  BP: 124/68 (29 Aug 2021 10:00) (90/49 - 131/90)  BP(mean): --  RR: 18 (29 Aug 2021 10:00) (17 - 18)  SpO2: 96% (29 Aug 2021 10:00) (94% - 99%)      Constitutional: NAD, Resting  ENT: Supple, No JVD  Lungs: Decreased sounds on left side otherwise CTA  Cardio: RRR, S1/S2, No murmur  Abdomen: Soft, Nontender, Nondistended; Bowel sounds present  Extremities: No calf tenderness, No pitting edema  Musculoskeletal:   No clubbing or cyanosis of digits; no joint swelling or tenderness to palpation  Psych: Calm, cooperative affect appropriate  Neuro: Awake and alert, oriented to name only  Skin: No rashes; no palpable lesions          CBC Full  -  ( 29 Aug 2021 07:35 )  WBC Count : 7.08 K/uL  RBC Count : 3.28 M/uL  Hemoglobin : 9.2 g/dL  Hematocrit : 29.0 %  Platelet Count - Automated : 397 K/uL  Mean Cell Volume : 88.4 fl  Mean Cell Hemoglobin : 28.0 pg  Mean Cell Hemoglobin Concentration : 31.7 gm/dL  Auto Neutrophil # : x  Auto Lymphocyte # : x  Auto Monocyte # : x  Auto Eosinophil # : x  Auto Basophil # : x  Auto Neutrophil % : x  Auto Lymphocyte % : x  Auto Monocyte % : x  Auto Eosinophil % : x  Auto Basophil % : x    08-29    139  |  106  |  13  ----------------------------<  98  4.0   |  29  |  0.50    Ca    8.2<L>      29 Aug 2021 07:35    TPro  6.1  /  Alb  1.7<L>  /  TBili  0.9  /  DBili  x   /  AST  7<L>  /  ALT  <6<L>  /  AlkPhos  73  08-28    BMI: BMI (kg/m2): 18.8 (08-25-21 @ 19:40)  HbA1c: A1C with Estimated Average Glucose Result: 6.1 % (08-27-21 @ 11:16)    Glucose: POCT Blood Glucose.: 185 mg/dL (08-29-21 @ 11:41)    BP: 124/68 (08-29-21 @ 10:00) (90/49 - 131/90)  Lipid Panel: Date/Time: 06-11-21 @ 04:07  Cholesterol, Serum: 93  Direct LDL: --  HDL Cholesterol, Serum: 42  Total Cholesterol/HDL Ration Measurement: --  Triglycerides, Serum: 83    LIVER FUNCTIONS - ( 28 Aug 2021 07:02 )  Alb: 1.7 g/dL / Pro: 6.1 gm/dL / ALK PHOS: 73 U/L / ALT: <6 U/L / AST: 7 U/L / GGT: x                   CAPILLARY BLOOD GLUCOSE      POCT Blood Glucose.: 185 mg/dL (29 Aug 2021 11:41)  POCT Blood Glucose.: 99 mg/dL (29 Aug 2021 07:33)  POCT Blood Glucose.: 97 mg/dL (28 Aug 2021 21:45)  POCT Blood Glucose.: 158 mg/dL (28 Aug 2021 16:17)    COVID-19 PCR: NotDetec (14 Jun 2021 11:37)  COVID-19 PCR: Detected (10 Richi 2021 12:42)  COVID-19 PCR: Detected (27 May 2021 05:35)  SARS-CoV-2: Detected (17 Mar 2021 15:29)        Culture - Acid Fast - Body Fluid w/Smear (collected 27 Aug 2021 19:02)  Source: .Body Fluid LEFT PLEURAL EFFUSION    Culture - Body Fluid with Gram Stain (collected 27 Aug 2021 19:02)  Source: .Body Fluid LEFT PLEURAL EFFUSION  Gram Stain (27 Aug 2021 23:49):    Moderate polymorphonuclear leukocytes per low power field    Numerous Gram positive cocci in pairs per oil power field        RADIOLOGY REVIEWED

## 2021-08-30 LAB
CHOLEST FLD-MCNC: 70 MG/DL — SIGNIFICANT CHANGE UP
GLUCOSE BLDC GLUCOMTR-MCNC: 102 MG/DL — HIGH (ref 70–99)
GLUCOSE BLDC GLUCOMTR-MCNC: 107 MG/DL — HIGH (ref 70–99)
GLUCOSE BLDC GLUCOMTR-MCNC: 149 MG/DL — HIGH (ref 70–99)
GLUCOSE BLDC GLUCOMTR-MCNC: 283 MG/DL — HIGH (ref 70–99)

## 2021-08-30 PROCEDURE — 99232 SBSQ HOSP IP/OBS MODERATE 35: CPT

## 2021-08-30 RX ADMIN — ATORVASTATIN CALCIUM 10 MILLIGRAM(S): 80 TABLET, FILM COATED ORAL at 21:10

## 2021-08-30 RX ADMIN — CARBIDOPA AND LEVODOPA 1 TABLET(S): 25; 100 TABLET ORAL at 21:10

## 2021-08-30 RX ADMIN — QUETIAPINE FUMARATE 50 MILLIGRAM(S): 200 TABLET, FILM COATED ORAL at 21:10

## 2021-08-30 RX ADMIN — CEFEPIME 100 MILLIGRAM(S): 1 INJECTION, POWDER, FOR SOLUTION INTRAMUSCULAR; INTRAVENOUS at 13:50

## 2021-08-30 RX ADMIN — ENOXAPARIN SODIUM 40 MILLIGRAM(S): 100 INJECTION SUBCUTANEOUS at 12:05

## 2021-08-30 RX ADMIN — CEFEPIME 100 MILLIGRAM(S): 1 INJECTION, POWDER, FOR SOLUTION INTRAMUSCULAR; INTRAVENOUS at 21:11

## 2021-08-30 RX ADMIN — Medication 0: at 16:38

## 2021-08-30 RX ADMIN — SENNA PLUS 2 TABLET(S): 8.6 TABLET ORAL at 21:10

## 2021-08-30 RX ADMIN — Medication 3: at 12:02

## 2021-08-30 RX ADMIN — CEFEPIME 100 MILLIGRAM(S): 1 INJECTION, POWDER, FOR SOLUTION INTRAMUSCULAR; INTRAVENOUS at 05:25

## 2021-08-30 RX ADMIN — CARBIDOPA AND LEVODOPA 1 TABLET(S): 25; 100 TABLET ORAL at 14:17

## 2021-08-30 RX ADMIN — CARBIDOPA AND LEVODOPA 1 TABLET(S): 25; 100 TABLET ORAL at 05:26

## 2021-08-30 RX ADMIN — TAMSULOSIN HYDROCHLORIDE 0.4 MILLIGRAM(S): 0.4 CAPSULE ORAL at 21:10

## 2021-08-30 NOTE — DIETITIAN NUTRITION RISK NOTIFICATION - TREATMENT: THE FOLLOWING DIET HAS BEEN RECOMMENDED
Diet, Dysphagia 1 Pureed-Nectar Consistency Fluid:   Consistent Carbohydrate {No Snacks}  Supplement Feeding Modality:  Oral  Health Shake Cans or Servings Per Day:  1       Frequency:  Daily  Ensure Pudding Cans or Servings Per Day:  1       Frequency:  Two Times a day (08-30-21 @ 12:20) [Pending Verification By Attending]  Diet, Dysphagia 1 Pureed-Nectar Consistency Fluid:   Consistent Carbohydrate {No Snacks} (CSTCHO)  DASH/TLC {Sodium & Cholesterol Restricted} (DASH) (08-26-21 @ 03:15) [Active]  Diet, Clear Liquid (08-25-21 @ 20:40) [Available for Activation]

## 2021-08-30 NOTE — DIETITIAN INITIAL EVALUATION ADULT. - OTHER INFO
Unable to interview pt due to confused mental state, lethargy.  Per medical record pt recently d/c from SNF. Wt loss as noted based on last admission in June 2021.  Pt c FTT dx; c multiple medical dxs, family made DNR/I; want comfort measures only; no feeding tube or invasive procedures; pt totally dependent for care; has 24/7 [employer] assistance at home; brother is his HCP.

## 2021-08-30 NOTE — PROGRESS NOTE ADULT - SUBJECTIVE AND OBJECTIVE BOX
FESTUS BLAND  MRN-[id number]    Follow Up:  PNA    Interval History: the pt was seen and examined earlier, no distress, able to state his name, birthday, place, in mittens restrains. The pt states that he is feeling better, no fevers, no new lab work.     PAST MEDICAL & SURGICAL HISTORY:  Hypercholesterolemia    DM (Diabetes Mellitus)    HTN (Hypertension)    CAD (Coronary Artery Disease)  s/p cardiac stent ( &gt; 20 years ago)    BPH (benign prostatic hyperplasia)    Coronary Stent  x 2 ( 20 years )        ROS:    [ ] Unobtainable because:  [ x] All other systems negative    Constitutional: no fever, no chills  Head: no trauma  Eyes: no vision changes, no eye pain  ENT:  no sore throat, no rhinorrhea  Cardiovascular:  no chest pain, no palpitation  Respiratory:  no SOB, + occasional cough  GI:  no abd pain, no vomiting, no diarrhea  urinary: no dysuria, no hematuria, no flank pain  musculoskeletal:  no joint pain, no joint swelling  skin:  no rash  neurology:  no headache, no seizure, no change in mental status  psych: no anxiety, no depression         Allergies  No Known Allergies        ANTIMICROBIALS:  cefepime   IVPB 1000 every 8 hours      OTHER MEDS:  atorvastatin 10 milliGRAM(s) Oral at bedtime  carbidopa/levodopa  25/100 1 Tablet(s) Oral three times a day  dextrose 40% Gel 15 Gram(s) Oral once  dextrose 5%. 1000 milliLiter(s) IV Continuous <Continuous>  dextrose 5%. 1000 milliLiter(s) IV Continuous <Continuous>  dextrose 50% Injectable 25 Gram(s) IV Push once  dextrose 50% Injectable 12.5 Gram(s) IV Push once  dextrose 50% Injectable 25 Gram(s) IV Push once  enoxaparin Injectable 40 milliGRAM(s) SubCutaneous daily  glucagon  Injectable 1 milliGRAM(s) IntraMuscular once  insulin lispro (ADMELOG) corrective regimen sliding scale   SubCutaneous three times a day before meals  oxyCODONE    IR 5 milliGRAM(s) Oral every 6 hours PRN  polyethylene glycol 3350 17 Gram(s) Oral daily PRN  QUEtiapine 50 milliGRAM(s) Oral at bedtime  senna 2 Tablet(s) Oral at bedtime  tamsulosin 0.4 milliGRAM(s) Oral at bedtime      Vital Signs Last 24 Hrs  T(C): 36.3 (30 Aug 2021 12:16), Max: 36.9 (29 Aug 2021 23:02)  T(F): 97.4 (30 Aug 2021 12:16), Max: 98.5 (29 Aug 2021 23:02)  HR: 72 (30 Aug 2021 12:16) (70 - 86)  BP: 99/54 (30 Aug 2021 12:16) (90/54 - 110/59)  BP(mean): --  RR: 17 (30 Aug 2021 12:16) (17 - 18)  SpO2: 98% (30 Aug 2021 12:16) (98% - 100%)    Physical Exam:  General:    NAD,  non toxic, chronically ill appearing male with temporal wasting   Head: atraumatic, normocephalic  Eye: normal sclera and conjunctiva, pale   ENT:    no oral lesions, neck supple  Cardio:     regular S1, S2,  +murmur  Respiratory:    diminished breath sounds at bases,    no wheezing, + chest tube on left side   abd:     soft,   BS +,   no tenderness  :   no CVAT,  no suprapubic tenderness,   no  caceres  Musculoskeletal:   no joint swelling,   no edema  vascular: no central lines, +PIV  Skin:    no rash  Neurologic:     no focal deficit  psych: normal affect    WBC Count: 7.08 K/uL (08-29 @ 07:35)  WBC Count: 7.13 K/uL (08-28 @ 07:02)  WBC Count: 7.63 K/uL (08-27 @ 07:40)  WBC Count: 19.54 K/uL (08-25 @ 21:05)                            9.2    7.08  )-----------( 397      ( 29 Aug 2021 07:35 )             29.0       08-29    139  |  106  |  13  ----------------------------<  98  4.0   |  29  |  0.50    Ca    8.2<L>      29 Aug 2021 07:35            Creatinine Trend: 0.50<--, 0.56<--, 0.55<--, 0.92<--      MICROBIOLOGY:  v  .Body Fluid LEFT PLEURAL EFFUSION  08-27-21   Moderate Streptococcus anginosus  --  Streptococcus anginosus      Clean Catch Clean Catch (Midstream)  08-26-21   No growth  --  --      .Blood Blood-Peripheral  08-26-21   No growth to date.  --  --    Procalcitonin, Serum: 0.07 (08-28-21 @ 11:31)  Procalcitonin, Serum: 0.08 (08-26-21 @ 10:47)    COVID-19 PCR: NotDetec (14 Jun 2021 11:37)  COVID-19 PCR: Detected (10 Richi 2021 12:42)  COVID-19 PCR: Detected (27 May 2021 05:35)  SARS-CoV-2: Detected (17 Mar 2021 15:29)    RADIOLOGY:

## 2021-08-30 NOTE — PROGRESS NOTE ADULT - SUBJECTIVE AND OBJECTIVE BOX
FESTUS [facility]    LVS 2E 289 D    Patient is a 81y old  Male who presents with a chief complaint of FTT, empyema r/o (29 Aug 2021 23:39)       Allergies    No Known Allergies    Intolerances        HPI:  Patient is a 81M with a PMH of HTN, DM, HLD, dementia,  Parkinson's disease, CAD s/p stents x2 (>20 years ago), CHF (EF: 50%, Moderate diastolic dysfunction (Stage II), moderate pulmonary hypertension, small-mod pericardial effusion), Lt pleural effusion, BPH, gastric ulcers, COVID infection who was sent to the ED for weakness.  Patient currently awake and alert, oriented x1, unable to provide history.  Has no acute complaints.  Discharged from NH today and was sent to the ED as he appeared weak and emaciated.  Vitals stable, labs show leukocytosis and mild lactic acidosis.  CT reveals a chronic loculated L pleural effusion.  ED attending spoke to family who made the patient DNR/DNI however did agree for chest tube drainage for effusion.  Will admit to med surg.   (26 Aug 2021 03:21)      PAST MEDICAL & SURGICAL HISTORY:  Hypercholesterolemia    DM (Diabetes Mellitus)    HTN (Hypertension)    CAD (Coronary Artery Disease)  s/p cardiac stent ( &gt; 20 years ago)    BPH (benign prostatic hyperplasia)    Coronary Stent  x 2 ( 20 years )        FAMILY HISTORY:  FH: HTN (hypertension)          MEDICATIONS   atorvastatin 10 milliGRAM(s) Oral at bedtime  carbidopa/levodopa  25/100 1 Tablet(s) Oral three times a day  cefepime   IVPB 1000 milliGRAM(s) IV Intermittent every 8 hours  dextrose 40% Gel 15 Gram(s) Oral once  dextrose 5%. 1000 milliLiter(s) IV Continuous <Continuous>  dextrose 5%. 1000 milliLiter(s) IV Continuous <Continuous>  dextrose 50% Injectable 25 Gram(s) IV Push once  dextrose 50% Injectable 12.5 Gram(s) IV Push once  dextrose 50% Injectable 25 Gram(s) IV Push once  enoxaparin Injectable 40 milliGRAM(s) SubCutaneous daily  glucagon  Injectable 1 milliGRAM(s) IntraMuscular once  insulin lispro (ADMELOG) corrective regimen sliding scale   SubCutaneous three times a day before meals  oxyCODONE    IR 5 milliGRAM(s) Oral every 6 hours PRN  polyethylene glycol 3350 17 Gram(s) Oral daily PRN  QUEtiapine 50 milliGRAM(s) Oral at bedtime  senna 2 Tablet(s) Oral at bedtime  tamsulosin 0.4 milliGRAM(s) Oral at bedtime      Vital Signs Last 24 Hrs  T(C): 36.8 (30 Aug 2021 05:15), Max: 36.9 (29 Aug 2021 23:02)  T(F): 98.2 (30 Aug 2021 05:15), Max: 98.5 (29 Aug 2021 23:02)  HR: 70 (30 Aug 2021 05:15) (70 - 86)  BP: 99/55 (30 Aug 2021 05:15) (90/54 - 110/59)  BP(mean): --  RR: 18 (30 Aug 2021 05:15) (18 - 18)  SpO2: 98% (30 Aug 2021 05:15) (98% - 100%)      08-29-21 @ 07:01  -  08-30-21 @ 07:00  --------------------------------------------------------  IN: 530 mL / OUT: 1730 mL / NET: -1200 mL            LABS:                        9.2    7.08  )-----------( 397      ( 29 Aug 2021 07:35 )             29.0     08-29    139  |  106  |  13  ----------------------------<  98  4.0   |  29  |  0.50    Ca    8.2<L>      29 Aug 2021 07:35                WBC:  WBC Count: 7.08 K/uL (08-29 @ 07:35)  WBC Count: 7.13 K/uL (08-28 @ 07:02)  WBC Count: 7.63 K/uL (08-27 @ 07:40)      MICROBIOLOGY:  RECENT CULTURES:  08-27 .Body Fluid LEFT PLEURAL EFFUSION Streptococcus anginosus   Moderate polymorphonuclear leukocytes per low power field  Numerous Gram positive cocci in pairs per oil power field   Moderate Streptococcus anginosus    08-26 Clean Catch Clean Catch (Midstream) XXXX XXXX   No growth    08-26 .Blood Blood-Peripheral XXXX XXXX   No growth to date.                    Sodium:  Sodium, Serum: 139 mmol/L (08-29 @ 07:35)  Sodium, Serum: 139 mmol/L (08-28 @ 07:02)  Sodium, Serum: 138 mmol/L (08-27 @ 07:40)      0.50 mg/dL 08-29 @ 07:35  0.56 mg/dL 08-28 @ 07:02  0.55 mg/dL 08-27 @ 07:40      Hemoglobin:  Hemoglobin: 9.2 g/dL (08-29 @ 07:35)  Hemoglobin: 8.7 g/dL (08-28 @ 07:02)  Hemoglobin: 8.7 g/dL (08-27 @ 07:40)      Platelets: Platelet Count - Automated: 397 K/uL (08-29 @ 07:35)  Platelet Count - Automated: 407 K/uL (08-28 @ 07:02)  Platelet Count - Automated: 430 K/uL (08-27 @ 07:40)              RADIOLOGY & ADDITIONAL STUDIES:

## 2021-08-30 NOTE — CHART NOTE - NSCHARTNOTEFT_GEN_A_CORE
CT chest ordered to assess pleural effusion    -may need possible TPA or upsizing  -will f/u tomorrow

## 2021-08-30 NOTE — PROGRESS NOTE ADULT - ASSESSMENT
BALDO MORTENSEN 81 M 8/26/2021 1942 DR CATHERINE LANDIN     REVIEW OF SYMPTOMS      Able to give (reliable) ROS  NO     PHYSICAL EXAM    HEENT Unremarkable  atraumatic   RESP Fair air entry EXP prolonged    Harsh breath sound Resp distres mild   CARDIAC S1 S2 No S3     NO JVD    ABDOMEN SOFT BS PRESENT NOT DISTENDED No hepatosplenomegaly   PEDAL EDEMA present No calf tenderness  NO rash                                          8/26/2021  PRESENTATION.  80 y/o M  pmhx of HTN, DM, HLD, dementia,  Parkinson's disease, CAD s/p stents x2 (>20 years ago), CHF (EF: 50%, Moderate diastolic dysfunction (Stage II), moderate pulmonary hypertension, small-mod pericardial effusion), Lt pleural effusion, BPH, gastric ulcers, COVID infection who was sent on 8/26/2021  to the ED for weakness.      8/26/2021 PRESENTING PROBLEMS   WEAKNESS  L PLEURAL EFFUSION   AIR FLUID LEVEL PLEURAL EFFSN POA   ENLARGED PROSTATE  DEMENTIA     HOSPITAL COURSE   PL EFFSN l Seen by IR 8/27/2021   PIGTAIL 8/27  direct examination of the airways is indicated to confirm the finding and identify the cause.  pfa cw empyema  (8/27) pfa 8/27 ldh 17156 pr 2.2 g 2 ph 7  numerous gpc pairs   NONVIOLENT NONSELF DESTR LEVEL ONE   8/28 8/29                GENERAL ISSUES  GOC ADVANCED DIRECTIVE.    dnr                        ALLERGY.    nka                        WEIGHT.         8/26/2021 54                           BMI.                   8/26/2021 18  DYSPHAGIA ELLIE.            PATIENT DATA   TUBES  PROCEDURES.     PIGTAIL 8/27 maroon drainage     ABG.   8/26/2021 ra 745/37/78     OXYGENATION.       8/26-8/27/2021 ra 97%    - ra 96%         VITALS/IO/VENT/DRIPS.     8/30/2021 afeb70 99/55     PROBLEM ASSESSMENT/RECOMMENDATIONS.    COVID STATUS  Ho covid (+) 6/10/2021     CHRONIC L PL EFFSN WITH AIR FLUID LEVEL   RO underlying cancer or infection with bp fistula   CT chest ic 8/26/2021  cw 6/11/2021 and 3/2021 ct:  1. Chronic loculated left pleural effusion now demonstrates an air-fluid level  pigtail placd 8/27  pfa 8/27 ldh 64873 pr 2.2 g 2 ph 7 chol 70   numerous gpc pairs   findings of 8/27 pl fluid cw empyema   8/30/2021 CTS plans no further intervention at present   Surg pa with dw Dr Apple re need to give tpa dnase (discussed with surg pa 8/30/2021)     INFECTION  w 8/26-8/27-8/28/2021 w 19 - 7.6 - 7.3   pr 8/26/2021 pr (-)   la 8/26/2021 la 1.9   Cr 8/26/2021 Cr .9   urine c 8/26 (-)  fluab 8/26/2021 (-)   blod c 8/26 (-)   mrsa 8/28 (-)  pl fl 8/27 strep angiosus  cefepime 1.3 (8/26) (Dr Ramirez) (Pneu)   cont rx     DYSPHAGIA  On dys 1 puree nectar diet (8/26/2021)     CAD  PMH CAD stents 2 decade ago   Tr 8/26/2021 Tr (-)     CHF  echo 6/11/2021 n lvsf thickened pericard with 1 cm pericard effs n la   bnp 8/26/2021 bnp 863     ANEMIA  Hb 8/26 - 8/27-8/28/2021 Hb 10 - 8.7 - 8.7   mcv 8/26/2021 mvcv 89     PARKINSONS   carbilevadopa 25 100.3 (8/26)     NONVIOLENT NONSELF DESTR LEVEL ONE   8/28 8/29    [facility] dnr           OVERALL PLAN.  CHRONIC L PL EFFSN WITH AIR FLUID LEVEL   RO underlying cancer or infection with bp fistula   8/27 ir placd pigtail   8/27 pfa cw empyema strep angiosus  on cefepime (8/26)   cts on case   GO dnr      TIME SPENT   Over 25 minutes aggregate care time spent on encounter; activities included   direct patient care, counseling and/or coordinating care reviewing notes, lab data/ imaging , discussion with multidisciplinary team/ patient  /family and explaining in detail risks, benefits, alternatives  of the recommendations     BALDO MORTENSEN 81 M 8/26/2021 1942 DR CATHERINE LANDIN

## 2021-08-30 NOTE — DIETITIAN INITIAL EVALUATION ADULT. - ORAL NUTRITION SUPPLEMENTS
Vital Cuisine x 1/day (provides 520 kcal, 22 g protein) + Ensure Pudding x 2/day (provides 340 kcal, 8 g protein)

## 2021-08-30 NOTE — PROGRESS NOTE ADULT - SUBJECTIVE AND OBJECTIVE BOX
Patient is a 81y old  Male who presents with a chief complaint of FTT, empyema r/o (30 Aug 2021 11:53)    INTERVAL HPI/OVERNIGHT EVENTS: no acute events overnight  Subjective: no fever/chills/nausea/vomitting    MEDICATIONS  (STANDING):  atorvastatin 10 milliGRAM(s) Oral at bedtime  carbidopa/levodopa  25/100 1 Tablet(s) Oral three times a day  cefepime   IVPB 1000 milliGRAM(s) IV Intermittent every 8 hours  dextrose 40% Gel 15 Gram(s) Oral once  dextrose 5%. 1000 milliLiter(s) (50 mL/Hr) IV Continuous <Continuous>  dextrose 5%. 1000 milliLiter(s) (100 mL/Hr) IV Continuous <Continuous>  dextrose 50% Injectable 25 Gram(s) IV Push once  dextrose 50% Injectable 12.5 Gram(s) IV Push once  dextrose 50% Injectable 25 Gram(s) IV Push once  enoxaparin Injectable 40 milliGRAM(s) SubCutaneous daily  glucagon  Injectable 1 milliGRAM(s) IntraMuscular once  insulin lispro (ADMELOG) corrective regimen sliding scale   SubCutaneous three times a day before meals  QUEtiapine 50 milliGRAM(s) Oral at bedtime  senna 2 Tablet(s) Oral at bedtime  tamsulosin 0.4 milliGRAM(s) Oral at bedtime    MEDICATIONS  (PRN):  oxyCODONE    IR 5 milliGRAM(s) Oral every 6 hours PRN Moderate Pain (4 - 6)  polyethylene glycol 3350 17 Gram(s) Oral daily PRN constipations    Allergies    No Known Allergies    Intolerances      REVIEW OF SYSTEMS:  All other systems reviewed and are negative    Vital Signs Last 24 Hrs  T(C): 36.3 (30 Aug 2021 12:16), Max: 36.9 (29 Aug 2021 23:02)  T(F): 97.4 (30 Aug 2021 12:16), Max: 98.5 (29 Aug 2021 23:02)  HR: 72 (30 Aug 2021 12:16) (70 - 86)  BP: 99/54 (30 Aug 2021 12:16) (90/54 - 110/59)  BP(mean): --  RR: 17 (30 Aug 2021 12:16) (17 - 18)  SpO2: 98% (30 Aug 2021 12:16) (98% - 100%)  Daily     Daily Weight in k.1 (30 Aug 2021 05:15)  I&O's Summary    29 Aug 2021 07:01  -  30 Aug 2021 07:00  --------------------------------------------------------  IN: 530 mL / OUT: 1730 mL / NET: -1200 mL    30 Aug 2021 07:01  -  30 Aug 2021 13:54  --------------------------------------------------------  IN: 480 mL / OUT: 0 mL / NET: 480 mL      CAPILLARY BLOOD GLUCOSE      POCT Blood Glucose.: 283 mg/dL (30 Aug 2021 11:46)  POCT Blood Glucose.: 107 mg/dL (30 Aug 2021 08:27)  POCT Blood Glucose.: 143 mg/dL (29 Aug 2021 21:29)  POCT Blood Glucose.: 184 mg/dL (29 Aug 2021 16:02)    PHYSICAL EXAM:  GENERAL: NAD, well-groomed, well-developed  HEAD:  Atraumatic, Normocephalic  EYES: EOMI, PERRLA, conjunctiva and sclera clear  ENMT: No tonsillar erythema, exudates, or enlargement; Moist mucous membranes, Good dentition, No lesions  NECK: Supple, No JVD, Normal thyroid  NERVOUS SYSTEM:  Alert & Oriented X3, Good concentration; Motor Strength 5/5 B/L upper and lower extremities; DTRs 2+ intact and symmetric  CHEST/LUNG: Clear to percussion bilaterally; No rales, rhonchi, wheezing, or rubs. speaks in full sentences.  no tripoding  HEART: Regular rate and rhythm; No murmurs, rubs, or gallops  ABDOMEN: Soft, Nontender, Nondistended; Bowel sounds present  EXTREMITIES:  2+ Peripheral Pulses, No clubbing, cyanosis, or edema  LYMPH: No lymphadenopathy noted  Lines: Chest tube present. no blood in chest tube     Labs                          9.2    7.08  )-----------( 397      ( 29 Aug 2021 07:35 )             29.0     08-29    139  |  106  |  13  ----------------------------<  98  4.0   |  29  |  0.50    Ca    8.2<L>      29 Aug 2021 07:35                Culture - Fungal, Body Fluid (collected 27 Aug 2021 19:02)  Source: .Body Fluid LEFT PLEURAL EFFUSION  Preliminary Report (30 Aug 2021 10:49):    Testing in progress    Culture - Acid Fast - Body Fluid w/Smear (collected 27 Aug 2021 19:02)  Source: .Body Fluid LEFT PLEURAL EFFUSION    Culture - Body Fluid with Gram Stain (collected 27 Aug 2021 19:02)  Source: .Body Fluid LEFT PLEURAL EFFUSION  Gram Stain (prelim) (29 Aug 2021 22:21):    Moderate polymorphonuclear leukocytes per low power field    Numerous Gram positive cocci in pairs per oil power field  Preliminary Report (29 Aug 2021 22:21):    Moderate Streptococcus anginosus  Organism: Streptococcus anginosus (29 Aug 2021 22:21)  Organism: Streptococcus anginosus (29 Aug 2021 22:20)  Organism: Streptococcus anginosus (29 Aug 2021 22:17)

## 2021-08-30 NOTE — DIETITIAN INITIAL EVALUATION ADULT. - PERTINENT LABORATORY DATA
08-29 Na139 mmol/L Glu 98 mg/dL K+ 4.0 mmol/L Cr  0.50 mg/dL BUN 13 mg/dL 08-28 Alb 1.7 g/dL<L>  08-27-21 A1C 6.1%, average glu 128; 08-29 POCT: 99, 185, 184, 140

## 2021-08-30 NOTE — DIETITIAN INITIAL EVALUATION ADULT. - PERTINENT MEDS FT
Lovenox, Maxipime, Admelog sliding scale, Lipitor, Sinemet, Oxycodone, Miralax, Seroquel, Senna, Flomax

## 2021-08-30 NOTE — PROGRESS NOTE ADULT - ASSESSMENT
81 yr old male pt with significant medical hx and dementia presents with weakness. Pt states he feels good but he is not oriented to time or place. left sided pleural effusion.  WBC count is 19.5   81 yr old male pt with significant medical hx and dementia presents with weakness.   Pt states he feels good but he is not oriented to time or place.   CT (I personally reviewed) left sided pleural effusion.  WBC count was 19.5 received  vanc zosyn in ED   Thoracic surgery recommending chest tube placement and to send fluid off for analysis which was done on friday   review of the fluid indicated exudative fluid, there are a lot of wbc cells but also a a lof of rbc. He is breathing on room air but he is coughing    8/30: no fevers, no new cbc, on RA, fluid culture with Streptococcus anginosus, MRSA PCR not detected. Cefepime continued.     Left PLEF  Leukocytosis   functional quadriplegia       Plan   continue Cefepime 1000mg every 8 hours  hold off on further doses of vancomycin   MRSA PCR negative and thus vancomycin stopped   cultures with strep angiogus   f/u rest of thoracentesis labs   chest tube management per thoracic surgery   daily CXR ideally in the setting of the chest tube   trend WBC  Blood cultures NGTD  offloading, frequent turning and nutrition optimization  Chest PT 81 yr old male pt with significant medical hx and dementia presents with weakness. Pt states he feels good but he is not oriented to time or place. left sided pleural effusion.  WBC count is 19.5   81 yr old male pt with significant medical hx and dementia presents with weakness.   Pt states he feels good but he is not oriented to time or place.   CT (I personally reviewed) left sided pleural effusion.  WBC count was 19.5 received  vanc zosyn in ED   Thoracic surgery recommending chest tube placement and to send fluid off for analysis which was done on friday   review of the fluid indicated exudative fluid, there are a lot of wbc cells but also a a lof of rbc. He is breathing on room air but he is coughing    8/30: no fevers, no new cbc, on RA, fluid culture with Streptococcus anginosus, MRSA PCR not detected. Cefepime continued.     Left PLEF  Leukocytosis   functional quadriplegia       Plan   can stop Cefepime  start ceftriaxone 2g q24hrs   MRSA PCR negative and thus vancomycin stopped   cultures with strep anginosus   chest tube management per thoracic surgery   no plan for invasive and aggressive surgeries or procedures and therefore would plan for long course of antibiotics   daily CXR ideally in the setting of the chest tube   trend WBC  Blood cultures NGTD  offloading, frequent turning and nutrition optimization  Chest PT

## 2021-08-30 NOTE — DIETITIAN INITIAL EVALUATION ADULT. - DIET TYPE
consistent carbohydrate (no snacks)/dysphagia 1, pureed, nectar consistency fluid/supplement (specify)

## 2021-08-30 NOTE — PROGRESS NOTE ADULT - ATTENDING COMMENTS
no plan for invasive and aggressive surgeries or procedures and therefore would plan for long course of antibiotics   place picc line   arrange for 4 weeks of IV ceftriaxone 2g q24hrs   follow all cultures until final   consider TPA in chest tubes or frequent flushes to ensure good flow and drainage     D/w Dr. Jenkins and surgical PA     Aldair Le DO  Infectious Disease Attending  Pager [phone]  After 5pm/weekends please call [phone] for all inquiries and new consults

## 2021-08-30 NOTE — PROGRESS NOTE ADULT - ASSESSMENT
80 yo male w/ pmhx HTN, DM, HLD, dementia, parkinsons' disease, cad sp stent x2, hf w/ PeF, moderate pulm htn, BPH, gastric ulcers, recent covid infection admitted for empyema 2/2 strep anginosus    PULMNOLOGY  # empyema  # loculated left sided effusion  - sp chest tube placement on 8/27  - on cefepime    NEUROLOGY  # parksinson's disease  # dementia   - on carbidopa/levadopa  - seroquel    UROLOGY  # BPH  - flomax    PLAN  - c/w GMF  - c/w cefepime  - follow up with INFECTIOUS DISEASE on duration of abx     CODE: DNR/DNI- paper work resigned today on 8/30 with 2x RN as witnessesses    - discussed care with pt's brother. all questions answered today  82 yo male w/ pmhx HTN, DM, HLD, dementia, parkinsons' disease, cad sp stent x2, hf w/ PeF, moderate pulm htn, BPH, gastric ulcers, recent covid infection admitted for empyema 2/2 strep anginosus    PULMNOLOGY  # empyema  # loculated left sided effusion  - sp chest tube placement on 8/27  - on cefepime 8/26- day # 5    NEUROLOGY  # parksinson's disease  # dementia   - on carbidopa/levadopa  - seroquel    UROLOGY  # BPH  - flomax    CARDIOLOGY  # cad sp 2 stent  - plavix on hold right now due to CT placement    PLAN  - c/w GMF  - c/w cefepime  - follow up with INFECTIOUS DISEASE on duration of abx   - follow up with CT on when plavix can be resumed    CODE: DNR/DNI- paper work resigned today on 8/30 with 2x RN as witnessesses    - discussed care with pt's brother. all questions answered today  80 yo male w/ pmhx HTN, DM, HLD, dementia, parkinsons' disease, cad sp stent x2, hf w/ PeF, moderate pulm htn, BPH, gastric ulcers, recent covid infection admitted for empyema 2/2 strep anginosus    PULMNOLOGY  # empyema  # loculated left sided effusion  - sp chest tube placement on 8/27  - on cefepime 8/26- day # 5    NEUROLOGY  # parksinson's disease  # dementia   - on carbidopa/levadopa  - seroquel    UROLOGY  # BPH  - flomax    CARDIOLOGY  # cad sp 2 stent  - plavix on hold right now due to CT placement    PLAN  - c/w GMF  - c/w cefepime  - follow up with INFECTIOUS DISEASE on duration of abx   - follow up with CT on when plavix can be resumed    CODE: DNR/DNI- paper work resigned today on 8/30 with 2x RN as witnessesses    - discussed care with pt's brother. all questions answered today     edit: 238pm. discussed with CT Surgery, who recommends holding plavix for now. until surgical plans are clarified pending clinical course. pt may need large pig tail catheter to facilitate drainage of empyema

## 2021-08-31 DIAGNOSIS — J86.9 PYOTHORAX WITHOUT FISTULA: ICD-10-CM

## 2021-08-31 DIAGNOSIS — F03.90 UNSPECIFIED DEMENTIA, UNSPECIFIED SEVERITY, WITHOUT BEHAVIORAL DISTURBANCE, PSYCHOTIC DISTURBANCE, MOOD DISTURBANCE, AND ANXIETY: ICD-10-CM

## 2021-08-31 DIAGNOSIS — Z96.89 PRESENCE OF OTHER SPECIFIED FUNCTIONAL IMPLANTS: ICD-10-CM

## 2021-08-31 LAB
-  AMPICILLIN/SULBACTAM: SIGNIFICANT CHANGE UP
-  CEFAZOLIN: SIGNIFICANT CHANGE UP
-  CLINDAMYCIN: SIGNIFICANT CHANGE UP
-  ERYTHROMYCIN: SIGNIFICANT CHANGE UP
-  GENTAMICIN: SIGNIFICANT CHANGE UP
-  OXACILLIN: SIGNIFICANT CHANGE UP
-  PENICILLIN: SIGNIFICANT CHANGE UP
-  RIFAMPIN: SIGNIFICANT CHANGE UP
-  TETRACYCLINE: SIGNIFICANT CHANGE UP
-  TRIMETHOPRIM/SULFAMETHOXAZOLE: SIGNIFICANT CHANGE UP
-  VANCOMYCIN: SIGNIFICANT CHANGE UP
ALBUMIN SERPL ELPH-MCNC: 1.8 G/DL — LOW (ref 3.3–5)
ALP SERPL-CCNC: 82 U/L — SIGNIFICANT CHANGE UP (ref 40–120)
ALT FLD-CCNC: <6 U/L — LOW (ref 12–78)
ANION GAP SERPL CALC-SCNC: 2 MMOL/L — LOW (ref 5–17)
AST SERPL-CCNC: 8 U/L — LOW (ref 15–37)
BILIRUB SERPL-MCNC: 0.6 MG/DL — SIGNIFICANT CHANGE UP (ref 0.2–1.2)
BUN SERPL-MCNC: 11 MG/DL — SIGNIFICANT CHANGE UP (ref 7–23)
CALCIUM SERPL-MCNC: 7.9 MG/DL — LOW (ref 8.5–10.1)
CHLORIDE SERPL-SCNC: 106 MMOL/L — SIGNIFICANT CHANGE UP (ref 96–108)
CHOLEST FLD-MCNC: 129 MG/DL — SIGNIFICANT CHANGE UP
CO2 SERPL-SCNC: 30 MMOL/L — SIGNIFICANT CHANGE UP (ref 22–31)
CREAT SERPL-MCNC: 0.54 MG/DL — SIGNIFICANT CHANGE UP (ref 0.5–1.3)
CULTURE RESULTS: SIGNIFICANT CHANGE UP
CULTURE RESULTS: SIGNIFICANT CHANGE UP
GLUCOSE BLDC GLUCOMTR-MCNC: 106 MG/DL — HIGH (ref 70–99)
GLUCOSE BLDC GLUCOMTR-MCNC: 152 MG/DL — HIGH (ref 70–99)
GLUCOSE BLDC GLUCOMTR-MCNC: 193 MG/DL — HIGH (ref 70–99)
GLUCOSE BLDC GLUCOMTR-MCNC: 194 MG/DL — HIGH (ref 70–99)
GLUCOSE SERPL-MCNC: 102 MG/DL — HIGH (ref 70–99)
HCT VFR BLD CALC: 29.8 % — LOW (ref 39–50)
HGB BLD-MCNC: 9.4 G/DL — LOW (ref 13–17)
MAGNESIUM SERPL-MCNC: 1.6 MG/DL — SIGNIFICANT CHANGE UP (ref 1.6–2.6)
MCHC RBC-ENTMCNC: 27.9 PG — SIGNIFICANT CHANGE UP (ref 27–34)
MCHC RBC-ENTMCNC: 31.5 GM/DL — LOW (ref 32–36)
MCV RBC AUTO: 88.4 FL — SIGNIFICANT CHANGE UP (ref 80–100)
METHOD TYPE: SIGNIFICANT CHANGE UP
NON-GYNECOLOGICAL CYTOLOGY STUDY: SIGNIFICANT CHANGE UP
NRBC # BLD: 0 /100 WBCS — SIGNIFICANT CHANGE UP (ref 0–0)
PHOSPHATE SERPL-MCNC: 2.9 MG/DL — SIGNIFICANT CHANGE UP (ref 2.5–4.5)
PLATELET # BLD AUTO: 365 K/UL — SIGNIFICANT CHANGE UP (ref 150–400)
POTASSIUM SERPL-MCNC: 3.8 MMOL/L — SIGNIFICANT CHANGE UP (ref 3.5–5.3)
POTASSIUM SERPL-SCNC: 3.8 MMOL/L — SIGNIFICANT CHANGE UP (ref 3.5–5.3)
PROT SERPL-MCNC: 6.1 GM/DL — SIGNIFICANT CHANGE UP (ref 6–8.3)
RBC # BLD: 3.37 M/UL — LOW (ref 4.2–5.8)
RBC # FLD: 15.9 % — HIGH (ref 10.3–14.5)
SODIUM SERPL-SCNC: 138 MMOL/L — SIGNIFICANT CHANGE UP (ref 135–145)
SPECIMEN SOURCE: SIGNIFICANT CHANGE UP
SPECIMEN SOURCE: SIGNIFICANT CHANGE UP
TRIGL FLD-MCNC: 65 MG/DL — SIGNIFICANT CHANGE UP
WBC # BLD: 6.82 K/UL — SIGNIFICANT CHANGE UP (ref 3.8–10.5)
WBC # FLD AUTO: 6.82 K/UL — SIGNIFICANT CHANGE UP (ref 3.8–10.5)

## 2021-08-31 PROCEDURE — 99232 SBSQ HOSP IP/OBS MODERATE 35: CPT

## 2021-08-31 PROCEDURE — 71250 CT THORAX DX C-: CPT | Mod: 26

## 2021-08-31 RX ADMIN — CARBIDOPA AND LEVODOPA 1 TABLET(S): 25; 100 TABLET ORAL at 15:20

## 2021-08-31 RX ADMIN — CEFEPIME 100 MILLIGRAM(S): 1 INJECTION, POWDER, FOR SOLUTION INTRAMUSCULAR; INTRAVENOUS at 15:20

## 2021-08-31 RX ADMIN — Medication 1: at 16:23

## 2021-08-31 RX ADMIN — ENOXAPARIN SODIUM 40 MILLIGRAM(S): 100 INJECTION SUBCUTANEOUS at 11:41

## 2021-08-31 RX ADMIN — CARBIDOPA AND LEVODOPA 1 TABLET(S): 25; 100 TABLET ORAL at 05:02

## 2021-08-31 RX ADMIN — ATORVASTATIN CALCIUM 10 MILLIGRAM(S): 80 TABLET, FILM COATED ORAL at 21:22

## 2021-08-31 RX ADMIN — CEFEPIME 100 MILLIGRAM(S): 1 INJECTION, POWDER, FOR SOLUTION INTRAMUSCULAR; INTRAVENOUS at 21:22

## 2021-08-31 RX ADMIN — CARBIDOPA AND LEVODOPA 1 TABLET(S): 25; 100 TABLET ORAL at 21:22

## 2021-08-31 RX ADMIN — CEFEPIME 100 MILLIGRAM(S): 1 INJECTION, POWDER, FOR SOLUTION INTRAMUSCULAR; INTRAVENOUS at 05:02

## 2021-08-31 RX ADMIN — SENNA PLUS 2 TABLET(S): 8.6 TABLET ORAL at 21:22

## 2021-08-31 RX ADMIN — QUETIAPINE FUMARATE 50 MILLIGRAM(S): 200 TABLET, FILM COATED ORAL at 21:22

## 2021-08-31 RX ADMIN — Medication 1: at 11:42

## 2021-08-31 RX ADMIN — TAMSULOSIN HYDROCHLORIDE 0.4 MILLIGRAM(S): 0.4 CAPSULE ORAL at 21:22

## 2021-08-31 NOTE — PROGRESS NOTE ADULT - SUBJECTIVE AND OBJECTIVE BOX
[facility]  Division of Infectious Diseases  [phone]    Name: FESTUS BLAND  Age: 81y  Gender: Male  MRN: [id number]    Interval History--  Notes reviewed.     Past Medical History--  Hypercholesterolemia    DM (Diabetes Mellitus)    HTN (Hypertension)    CAD (Coronary Artery Disease)    BPH (benign prostatic hyperplasia)    Coronary Stent        For details regarding the patient's social history, family history, and other miscellaneous elements, please refer the initial infectious diseases consultation and/or the admitting history and physical examination for this admission.    Allergies    No Known Allergies    Intolerances        Medications--  Antibiotics:  cefepime   IVPB 1000 milliGRAM(s) IV Intermittent every 8 hours    Immunologic:    Other:  atorvastatin  carbidopa/levodopa  25/100  dextrose 40% Gel  dextrose 5%.  dextrose 5%.  dextrose 50% Injectable  dextrose 50% Injectable  dextrose 50% Injectable  enoxaparin Injectable  glucagon  Injectable  insulin lispro (ADMELOG) corrective regimen sliding scale  oxyCODONE    IR PRN  polyethylene glycol 3350 PRN  QUEtiapine  senna  tamsulosin      Review of Systems--  A 10-point review of systems was obtained.     Pertinent positives and negatives--  Constitutional: No fevers. No Chills. No Rigors.   Cardiovascular: No chest pain. No palpitations.  Respiratory: No shortness of breath. No cough.  Gastrointestinal: No nausea or vomiting. No diarrhea or constipation.   Psychiatric: Pleasant. Appropriate affect.    Review of systems otherwise negative except as previously noted.    Physical Examination--  Vital Signs: T(F): 97 (08-30-21 @ 23:33), Max: 97.9 (08-30-21 @ 17:00)  HR: 72 (08-30-21 @ 23:33)  BP: 102/58 (08-30-21 @ 23:33)  RR: 18 (08-30-21 @ 23:33)  SpO2: 96% (08-30-21 @ 23:33)  Wt(kg): --  General: Nontoxic-appearing Male in no acute distress.  HEENT: AT/NC. PERRL. EOMI. Anicteric. Conjunctiva pink and moist. Oropharynx clear. Dentition fair.  Neck: Not rigid. No sense of mass.  Nodes: None palpable.  Lungs: Clear bilaterally without rales, wheezing or rhonchi  Heart: Regular rate and rhythm. No Murmur. No rub. No gallop. No palpable thrill.  Abdomen: Bowel sounds present and normoactive. Soft. Nondistended. Nontender. No sense of mass. No organomegaly.  Back: No spinal tenderness. No costovertebral angle tenderness.   Extremities: No cyanosis or clubbing. No edema.   Skin: Warm. Dry. Good turgor. No rash. No vasculitic stigmata.  Psychiatric: Appropriate affect and mood for situation.         Laboratory Studies--  CBC                        9.4    6.82  )-----------( 365      ( 31 Aug 2021 06:36 )             29.8       Chemistries  08-31    138  |  106  |  11  ----------------------------<  102<H>  3.8   |  30  |  0.54    Ca    7.9<L>      31 Aug 2021 06:36  Phos  2.9     08-31  Mg     1.6     08-31    TPro  6.1  /  Alb  1.8<L>  /  TBili  0.6  /  DBili  x   /  AST  8<L>  /  ALT  <6<L>  /  AlkPhos  82  08-31      Culture Data    Culture - Fungal, Body Fluid (collected 27 Aug 2021 19:02)  Source: .Body Fluid LEFT PLEURAL EFFUSION  Preliminary Report (30 Aug 2021 10:49):    Testing in progress    Culture - Acid Fast - Body Fluid w/Smear (collected 27 Aug 2021 19:02)  Source: .Body Fluid LEFT PLEURAL EFFUSION    Culture - Body Fluid with Gram Stain (collected 27 Aug 2021 19:02)  Source: .Body Fluid LEFT PLEURAL EFFUSION  Gram Stain (prelim) (29 Aug 2021 22:21):    Moderate polymorphonuclear leukocytes per low power field    Numerous Gram positive cocci in pairs per oil power field  Preliminary Report (30 Aug 2021 19:16):    Moderate Streptococcus anginosus    Rare Staphylococcus aureus  Organism: Streptococcus anginosus (29 Aug 2021 22:21)  Organism: Streptococcus anginosus (29 Aug 2021 22:20)  Organism: Streptococcus anginosus (29 Aug 2021 22:17)    Culture - Urine (collected 26 Aug 2021 09:22)  Source: Clean Catch Clean Catch (Midstream)  Final Report (27 Aug 2021 10:51):    No growth    Culture - Blood (collected 26 Aug 2021 09:09)  Source: .Blood Blood-Peripheral  Final Report (31 Aug 2021 10:01):    No Growth Final    Culture - Blood (collected 26 Aug 2021 09:09)  Source: .Blood Blood-Peripheral  Final Report (31 Aug 2021 10:01):    No Growth Final             [facility]  Division of Infectious Diseases  [phone]    Name: FESTUS BLAND  Age: 81y  Gender: Male  MRN: [id number]    Interval History--  Notes reviewed. Seen earlier today.  Confused.  He is in restraints (mittens).  He is hungry and wants to eat lunch.  He is oriented to self only.    Discussed with interventional radiology PA, they are considering upsizing the tube.  For the moment, instillation of TPA is not being considered.    Past Medical History--  Hypercholesterolemia    DM (Diabetes Mellitus)    HTN (Hypertension)    CAD (Coronary Artery Disease)    BPH (benign prostatic hyperplasia)    Coronary Stent        For details regarding the patient's social history, family history, and other miscellaneous elements, please refer the initial infectious diseases consultation and/or the admitting history and physical examination for this admission.    Allergies    No Known Allergies    Intolerances        Medications--  Antibiotics:  cefepime   IVPB 1000 milliGRAM(s) IV Intermittent every 8 hours    Immunologic:    Other:  atorvastatin  carbidopa/levodopa  25/100  dextrose 40% Gel  dextrose 5%.  dextrose 5%.  dextrose 50% Injectable  dextrose 50% Injectable  dextrose 50% Injectable  enoxaparin Injectable  glucagon  Injectable  insulin lispro (ADMELOG) corrective regimen sliding scale  oxyCODONE    IR PRN  polyethylene glycol 3350 PRN  QUEtiapine  senna  tamsulosin      Review of Systems--  Review of systems unable secondary to clinical condition.   Physical Examination--  Vital Signs: T(F): 97 (08-30-21 @ 23:33), Max: 97.9 (08-30-21 @ 17:00)  HR: 72 (08-30-21 @ 23:33)  BP: 102/58 (08-30-21 @ 23:33)  RR: 18 (08-30-21 @ 23:33)  SpO2: 96% (08-30-21 @ 23:33)  Wt(kg): --  General: Somewhat disheveled, confused, nontoxic appearing man in no distress whatsoever  HEENT: Sclera nonicteric.  The conjunctiva are pink and moist.  The oropharynx is not able to be examined secondary to patient appliance  Neck: Supple.  No sense of mass.  Lungs: Diminished breath sounds left greater than right.  Chest tube, left-sided in situ.    Heart: Regular rate and rhythm.  There is no murmur.  There is no rub or gallop.  There is no palpable thrill.  Abdomen: Soft.  Nondistended.  Nontender.  No sense of mass.  No organomegaly  Back: Unable  Extremities: Wasted.  No cyanosis.  No edema.  +/- clubbing  Skin: Warm, dry, no vasculitic stigmata  Psych: Confused, grossly appropriate mood and affect      Laboratory Studies--  CBC                        9.4    6.82  )-----------( 365      ( 31 Aug 2021 06:36 )             29.8       Chemistries  08-31    138  |  106  |  11  ----------------------------<  102<H>  3.8   |  30  |  0.54    Ca    7.9<L>      31 Aug 2021 06:36  Phos  2.9     08-31  Mg     1.6     08-31    TPro  6.1  /  Alb  1.8<L>  /  TBili  0.6  /  DBili  x   /  AST  8<L>  /  ALT  <6<L>  /  AlkPhos  82  08-31      Culture Data    Culture - Fungal, Body Fluid (collected 27 Aug 2021 19:02)  Source: .Body Fluid LEFT PLEURAL EFFUSION  Preliminary Report (30 Aug 2021 10:49):    Testing in progress    Culture - Acid Fast - Body Fluid w/Smear (collected 27 Aug 2021 19:02)  Source: .Body Fluid LEFT PLEURAL EFFUSION    Culture - Body Fluid with Gram Stain (collected 27 Aug 2021 19:02)  Source: .Body Fluid LEFT PLEURAL EFFUSION  Gram Stain (prelim) (29 Aug 2021 22:21):    Moderate polymorphonuclear leukocytes per low power field    Numerous Gram positive cocci in pairs per oil power field  Preliminary Report (30 Aug 2021 19:16):    Moderate Streptococcus anginosus    Rare Staphylococcus aureus  Organism: Streptococcus anginosus (29 Aug 2021 22:21)  Organism: Streptococcus anginosus (29 Aug 2021 22:20)  Organism: Streptococcus anginosus (29 Aug 2021 22:17)    Culture - Urine (collected 26 Aug 2021 09:22)  Source: Clean Catch Clean Catch (Midstream)  Final Report (27 Aug 2021 10:51):    No growth    Culture - Blood (collected 26 Aug 2021 09:09)  Source: .Blood Blood-Peripheral  Final Report (31 Aug 2021 10:01):    No Growth Final    Culture - Blood (collected 26 Aug 2021 09:09)  Source: .Blood Blood-Peripheral  Final Report (31 Aug 2021 10:01):    No Growth Final

## 2021-08-31 NOTE — CHART NOTE - NSCHARTNOTEFT_GEN_A_CORE
IR called to assess left chest tube for suboptimal drainage.  Pt with empyema    Todays CT chest shows Chronic thick-walled left hydropneumothorax slightly decreased.  the Effusion is moderate in size    d/w CT surgeon to place CT to wall suction    -will d/w IR attending if further intervention is warranted  -monitor  chest tube output

## 2021-08-31 NOTE — PROGRESS NOTE ADULT - ASSESSMENT
BALDO MORTENSEN 81 M 8/26/2021 1942 DR CATHERINE LANDIN     REVIEW OF SYMPTOMS      Able to give (reliable) ROS  NO     PHYSICAL EXAM    HEENT Unremarkable  atraumatic   RESP Fair air entry EXP prolonged    Harsh breath sound Resp distres mild   CARDIAC S1 S2 No S3     NO JVD    ABDOMEN SOFT BS PRESENT NOT DISTENDED No hepatosplenomegaly   PEDAL EDEMA present No calf tenderness  NO rash                                          8/26/2021  PRESENTATION.  80 y/o M  pmhx of HTN, DM, HLD, dementia,  Parkinson's disease, CAD s/p stents x2 (>20 years ago), CHF (EF: 50%, Moderate diastolic dysfunction (Stage II), moderate pulmonary hypertension, small-mod pericardial effusion), Lt pleural effusion, BPH, gastric ulcers, COVID infection who was sent on 8/26/2021  to the ED for weakness.      8/26/2021 PRESENTING PROBLEMS   WEAKNESS  L PLEURAL EFFUSION   AIR FLUID LEVEL PLEURAL EFFSN POA   ENLARGED PROSTATE  DEMENTIA     HOSPITAL COURSE   PL EFFSN l Seen by IR 8/27/2021   PIGTAIL 8/27  direct examination of the airways is indicated to confirm the finding and identify the cause.  pfa cw empyema  (8/27) pfa 8/27 ldh 56460 pr 2.2 g 2 ph 7  numerous gpc pairs   NONVIOLENT NONSELF DESTR LEVEL ONE   8/28 8/29                GENERAL ISSUES  GOC ADVANCED DIRECTIVE.    dnr                        ALLERGY.    nka                        WEIGHT.         8/26/2021 54                           BMI.                   8/26/2021 18  DYSPHAGIA ELLIE.            BEST PRACTICE ISSUES.                                                  HEAD OF BED ELEVATION. Yes  DVT PROPHYLAXIS.  lvnx 40 (8/26)                                        VALLEJO PROPHYLAXIS.                                                                                         DIET.      dys 1 puree nectar cons carb (8/26)                       PATIENT DATA   TUBES  PROCEDURES.     PIGTAIL 8/27 maroon drainage     ABG.   8/26/2021 ra 745/37/78     OXYGENATION.       8/26-8/27/2021 ra 97%    - ra 96%         VITALS/IO/VENT/DRIPS.     8/31/2021 afeb 70 100/50     PROBLEM ASSESSMENT/RECOMMENDATIONS.    COVID STATUS  Ho covid (+) 6/10/2021     CHRONIC L PL EMPYEMA WITH AIR FLUID LEVEL   RO underlying cancer or infection with bp fistula   pfa 8/27 ldh 76285 pr 2.2 g 2 ph 7 chol 70   numerous gpc pairs   findings of 8/27 pl fluid cw empyema   Pt has pgtail placed 8/27    INFECTION  w 8/26-8/27-8/28/2021 w 19 - 7.6 - 7.3   pr 8/26/2021 pr (-)   la 8/26/2021 la 1.9   Cr 8/26/2021 Cr .9   urine c 8/26 (-)  fluab 8/26/2021 (-)   blod c 8/26 (-)   mrsa 8/28 (-)  pl fl 8/27 strep angiosus  cefepime 1.3 (8/26) (Dr Ramirez) (Pneu)   cont rx       OVERALL PLAN.  CHRONIC L PL EMPYEMA WITH AIR FLUID LEVEL   RO underlying cancer or infection with bp fistula   8/27 ir placd pigtail   8/27 pfa cw empyema strep angiosus  on cefepime (8/26)   cts on case   GOC dnr      TIME SPENT   Over 25 minutes aggregate care time spent on encounter; activities included   direct patient care, counseling and/or coordinating care reviewing notes, lab data/ imaging , discussion with multidisciplinary team/ patient  /family and explaining in detail risks, benefits, alternatives  of the recommendations     BALDO MORTENSEN 81 M 8/26/2021 1942 DR CATHERINE LANDIN

## 2021-08-31 NOTE — PROGRESS NOTE ADULT - SUBJECTIVE AND OBJECTIVE BOX
Patient is a 81y old  Male who presents with a chief complaint of FTT, empyema r/o (31 Aug 2021 11:12)    INTERVAL HPI/OVERNIGHT EVENTS: No overnight events  SUBJECTIVE: no complaints of pain/discomfort/dyspnea    MEDICATIONS  (STANDING):  atorvastatin 10 milliGRAM(s) Oral at bedtime  carbidopa/levodopa  25/100 1 Tablet(s) Oral three times a day  cefepime   IVPB 1000 milliGRAM(s) IV Intermittent every 8 hours  dextrose 40% Gel 15 Gram(s) Oral once  dextrose 5%. 1000 milliLiter(s) (50 mL/Hr) IV Continuous <Continuous>  dextrose 5%. 1000 milliLiter(s) (100 mL/Hr) IV Continuous <Continuous>  dextrose 50% Injectable 25 Gram(s) IV Push once  dextrose 50% Injectable 12.5 Gram(s) IV Push once  dextrose 50% Injectable 25 Gram(s) IV Push once  enoxaparin Injectable 40 milliGRAM(s) SubCutaneous daily  glucagon  Injectable 1 milliGRAM(s) IntraMuscular once  insulin lispro (ADMELOG) corrective regimen sliding scale   SubCutaneous three times a day before meals  QUEtiapine 50 milliGRAM(s) Oral at bedtime  senna 2 Tablet(s) Oral at bedtime  tamsulosin 0.4 milliGRAM(s) Oral at bedtime    MEDICATIONS  (PRN):  oxyCODONE    IR 5 milliGRAM(s) Oral every 6 hours PRN Moderate Pain (4 - 6)  polyethylene glycol 3350 17 Gram(s) Oral daily PRN constipations    Allergies    No Known Allergies    Intolerances      REVIEW OF SYSTEMS:  All other systems reviewed and are negative    Vital Signs Last 24 Hrs  T(C): 36.1 (31 Aug 2021 11:31), Max: 36.6 (30 Aug 2021 17:00)  T(F): 97 (31 Aug 2021 11:31), Max: 97.9 (30 Aug 2021 17:00)  HR: 68 (31 Aug 2021 11:31) (68 - 74)  BP: 104/64 (31 Aug 2021 11:31) (99/54 - 126/69)  BP(mean): --  RR: 18 (31 Aug 2021 11:31) (17 - 18)  SpO2: 97% (31 Aug 2021 11:31) (96% - 99%)  Daily     Daily Weight in k.2 (30 Aug 2021 23:33)  I&O's Summary    30 Aug 2021 07:01  -  31 Aug 2021 07:00  --------------------------------------------------------  IN: 580 mL / OUT: 680 mL / NET: -100 mL    31 Aug 2021 07:01  -  31 Aug 2021 11:58  --------------------------------------------------------  IN: 240 mL / OUT: 0 mL / NET: 240 mL      CAPILLARY BLOOD GLUCOSE      POCT Blood Glucose.: 194 mg/dL (31 Aug 2021 11:13)  POCT Blood Glucose.: 106 mg/dL (31 Aug 2021 07:54)  POCT Blood Glucose.: 149 mg/dL (30 Aug 2021 21:09)  POCT Blood Glucose.: 102 mg/dL (30 Aug 2021 16:26)    PHYSICAL EXAM:  GENERAL: NAD, well-groomed, well-developed  HEAD:  Atraumatic, Normocephalic  EYES: EOMI, PERRLA, conjunctiva and sclera clear  ENMT: No tonsillar erythema, exudates, or enlargement; Moist mucous membranes, Good dentition, No lesions  NECK: Supple, No JVD, Normal thyroid  CHEST/LUNG: Clear to percussion bilaterally; No rales, rhonchi, wheezing, or rubs. no tripoding. speaks in full short sentences  HEART: Regular rate and rhythm; No murmurs, rubs, or gallops  ABDOMEN: Soft, Nontender, Nondistended; Bowel sounds present  EXTREMITIES:  2+ Peripheral Pulses, No clubbing, cyanosis, or edema  LYMPH: No lymphadenopathy noted  SKIN: No rashes or lesions    Labs                          9.4    6.82  )-----------( 365      ( 31 Aug 2021 06:36 )             29.8     08-    138  |  106  |  11  ----------------------------<  102<H>  3.8   |  30  |  0.54    Ca    7.9<L>      31 Aug 2021 06:36  Phos  2.9       Mg     1.6         TPro  6.1  /  Alb  1.8<L>  /  TBili  0.6  /  DBili  x   /  AST  8<L>  /  ALT  <6<L>  /  AlkPhos  82

## 2021-08-31 NOTE — PROGRESS NOTE ADULT - ASSESSMENT
81 yr old male pt with significant medical hx and dementia presents with weakness. Pt states he feels good but he is not oriented to time or place. left sided pleural effusion.  WBC count is 19.5   81 yr old male pt with significant medical hx and dementia presents with weakness.   Pt states he feels good but he is not oriented to time or place.   CT (I personally reviewed) left sided pleural effusion.  WBC count was 19.5 received  vanc zosyn in ED   Thoracic surgery recommending chest tube placement and to send fluid off for analysis which was done on friday   review of the fluid indicated exudative fluid, there are a lot of wbc cells but also a a lof of rbc. He is breathing on room air but he is coughing    8/30: no fevers, no new cbc, on RA, fluid culture with Streptococcus anginosus, MRSA PCR not detected. Cefepime continued.   8/31: The patient remains afebrile, white blood cell count is normal, and he is breathing comfortably.  Pleural fluid appears to be thick and not flowing well.  Increasing the caliber of the drainage tube was being contemplated.  Fluid is growing Streptococcus anginosus, as well as a Staphylococcus aureus.  Drainage here is the mainstay of therapy, with antibiotics adjunctive.  It is not at all clear that the patient will do well without definitive drainage, the patient's family however appears to be opposed to any kind of surgical drainage.  The current antibiotics are adequate, the isolates in question are both sensitive to cefepime.  No anaerobes have been isolated thus far.

## 2021-08-31 NOTE — PROGRESS NOTE ADULT - SUBJECTIVE AND OBJECTIVE BOX
FESTUS BLAND    LVS 2E 289 D    Patient is a 81y old  Male who presents with a chief complaint of FTT, empyema r/o (30 Aug 2021 16:40)       Allergies    No Known Allergies    Intolerances        HPI:  Patient is a 81M with a PMH of HTN, DM, HLD, dementia,  Parkinson's disease, CAD s/p stents x2 (>20 years ago), CHF (EF: 50%, Moderate diastolic dysfunction (Stage II), moderate pulmonary hypertension, small-mod pericardial effusion), Lt pleural effusion, BPH, gastric ulcers, COVID infection who was sent to the ED for weakness.  Patient currently awake and alert, oriented x1, unable to provide history.  Has no acute complaints.  Discharged from NH today and was sent to the ED as he appeared weak and emaciated.  Vitals stable, labs show leukocytosis and mild lactic acidosis.  CT reveals a chronic loculated L pleural effusion.  ED attending spoke to family who made the patient DNR/DNI however did agree for chest tube drainage for effusion.  Will admit to med surg.   (26 Aug 2021 03:21)      PAST MEDICAL & SURGICAL HISTORY:  Hypercholesterolemia    DM (Diabetes Mellitus)    HTN (Hypertension)    CAD (Coronary Artery Disease)  s/p cardiac stent ( &gt; 20 years ago)    BPH (benign prostatic hyperplasia)    Coronary Stent  x 2 ( 20 years )        FAMILY HISTORY:  FH: HTN (hypertension)          MEDICATIONS   atorvastatin 10 milliGRAM(s) Oral at bedtime  carbidopa/levodopa  25/100 1 Tablet(s) Oral three times a day  cefepime   IVPB 1000 milliGRAM(s) IV Intermittent every 8 hours  dextrose 40% Gel 15 Gram(s) Oral once  dextrose 5%. 1000 milliLiter(s) IV Continuous <Continuous>  dextrose 5%. 1000 milliLiter(s) IV Continuous <Continuous>  dextrose 50% Injectable 25 Gram(s) IV Push once  dextrose 50% Injectable 12.5 Gram(s) IV Push once  dextrose 50% Injectable 25 Gram(s) IV Push once  enoxaparin Injectable 40 milliGRAM(s) SubCutaneous daily  glucagon  Injectable 1 milliGRAM(s) IntraMuscular once  insulin lispro (ADMELOG) corrective regimen sliding scale   SubCutaneous three times a day before meals  oxyCODONE    IR 5 milliGRAM(s) Oral every 6 hours PRN  polyethylene glycol 3350 17 Gram(s) Oral daily PRN  QUEtiapine 50 milliGRAM(s) Oral at bedtime  senna 2 Tablet(s) Oral at bedtime  tamsulosin 0.4 milliGRAM(s) Oral at bedtime      Vital Signs Last 24 Hrs  T(C): 36.1 (30 Aug 2021 23:33), Max: 36.6 (30 Aug 2021 17:00)  T(F): 97 (30 Aug 2021 23:33), Max: 97.9 (30 Aug 2021 17:00)  HR: 72 (30 Aug 2021 23:33) (72 - 74)  BP: 102/58 (30 Aug 2021 23:33) (99/54 - 126/69)  BP(mean): --  RR: 18 (30 Aug 2021 23:33) (17 - 18)  SpO2: 96% (30 Aug 2021 23:33) (96% - 99%)      08-30-21 @ 07:01  -  08-31-21 @ 07:00  --------------------------------------------------------  IN: 580 mL / OUT: 680 mL / NET: -100 mL            LABS:                        9.4    6.82  )-----------( 365      ( 31 Aug 2021 06:36 )             29.8     08-31    138  |  106  |  11  ----------------------------<  102<H>  3.8   |  30  |  0.54    Ca    7.9<L>      31 Aug 2021 06:36  Phos  2.9     08-31  Mg     1.6     08-31    TPro  6.1  /  Alb  1.8<L>  /  TBili  0.6  /  DBili  x   /  AST  8<L>  /  ALT  <6<L>  /  AlkPhos  82  08-31              WBC:  WBC Count: 6.82 K/uL (08-31 @ 06:36)  WBC Count: 7.08 K/uL (08-29 @ 07:35)  WBC Count: 7.13 K/uL (08-28 @ 07:02)      MICROBIOLOGY:  RECENT CULTURES:  08-27 .Body Fluid LEFT PLEURAL EFFUSION Streptococcus anginosus   Moderate polymorphonuclear leukocytes per low power field  Numerous Gram positive cocci in pairs per oil power field   Moderate Streptococcus anginosus  Rare Staphylococcus aureus    08-26 Clean Catch Clean Catch (Midstream) XXXX XXXX   No growth    08-26 .Blood Blood-Peripheral XXXX XXXX   No growth to date.                    Sodium:  Sodium, Serum: 138 mmol/L (08-31 @ 06:36)  Sodium, Serum: 139 mmol/L (08-29 @ 07:35)  Sodium, Serum: 139 mmol/L (08-28 @ 07:02)      0.54 mg/dL 08-31 @ 06:36  0.50 mg/dL 08-29 @ 07:35  0.56 mg/dL 08-28 @ 07:02      Hemoglobin:  Hemoglobin: 9.4 g/dL (08-31 @ 06:36)  Hemoglobin: 9.2 g/dL (08-29 @ 07:35)  Hemoglobin: 8.7 g/dL (08-28 @ 07:02)      Platelets: Platelet Count - Automated: 365 K/uL (08-31 @ 06:36)  Platelet Count - Automated: 397 K/uL (08-29 @ 07:35)  Platelet Count - Automated: 407 K/uL (08-28 @ 07:02)      LIVER FUNCTIONS - ( 31 Aug 2021 06:36 )  Alb: 1.8 g/dL / Pro: 6.1 gm/dL / ALK PHOS: 82 U/L / ALT: <6 U/L / AST: 8 U/L / GGT: x                 RADIOLOGY & ADDITIONAL STUDIES:

## 2021-08-31 NOTE — PROGRESS NOTE ADULT - ASSESSMENT
80 yo male w/ pmhx HTN, DM, HLD, dementia, parkinsons' disease, cad sp stent x2, hf w/ PeF, moderate pulm htn, BPH, gastric ulcers, recent covid infection admitted for empyema 2/2 strep anginosus    PULMNOLOGY  # strep anginous empyema  # loculated left sided effusion  - sp chest tube placement on 8/27  - on cefepime 8/26- day # 6  - INFECTIOUS DISEASE consulted with Dr Alva  - CT SURGERY with Dr Weeks on the case  - pt's brother decline thoracomty/vats and wanted only minimally invasive procedures  - CT chest today showed decrease bilateral opacities and smalle effusion    INFECTIOUS DISEASE  # strep anginous empyema    NEUROLOGY  # parksinson's disease  # dementia   - on carbidopa/levadopa  - seroquel    UROLOGY  # BPH  - flomax    CARDIOLOGY  # cad sp 2 stent  - plavix on hold right now due to CT placement    PLAN  - c/w GMF  - c/w cefepime  - follow up with INFECTIOUS DISEASE on duration of abx   - follow up with CT on when plavix can be resumed and further surgical management of empyema    CODE: DNR/DNI- paper work resigned today on 8/30 with 2x RN as witnessesses

## 2021-09-01 LAB
ADENOSINE DEAMINASE PLR-CCNC: 6 U/L — SIGNIFICANT CHANGE UP (ref 0–30)
ALBUMIN SERPL ELPH-MCNC: 2.1 G/DL — LOW (ref 3.3–5)
ALP SERPL-CCNC: 85 U/L — SIGNIFICANT CHANGE UP (ref 40–120)
ALT FLD-CCNC: 19 U/L — SIGNIFICANT CHANGE UP (ref 12–78)
ANION GAP SERPL CALC-SCNC: 2 MMOL/L — LOW (ref 5–17)
AST SERPL-CCNC: 9 U/L — LOW (ref 15–37)
BILIRUB SERPL-MCNC: 0.6 MG/DL — SIGNIFICANT CHANGE UP (ref 0.2–1.2)
BUN SERPL-MCNC: 15 MG/DL — SIGNIFICANT CHANGE UP (ref 7–23)
CALCIUM SERPL-MCNC: 8.7 MG/DL — SIGNIFICANT CHANGE UP (ref 8.5–10.1)
CHLORIDE SERPL-SCNC: 102 MMOL/L — SIGNIFICANT CHANGE UP (ref 96–108)
CO2 SERPL-SCNC: 33 MMOL/L — HIGH (ref 22–31)
CREAT SERPL-MCNC: 0.45 MG/DL — LOW (ref 0.5–1.3)
CULTURE RESULTS: SIGNIFICANT CHANGE UP
FLUAV AG NPH QL: SIGNIFICANT CHANGE UP
FLUBV AG NPH QL: SIGNIFICANT CHANGE UP
GLUCOSE BLDC GLUCOMTR-MCNC: 106 MG/DL — HIGH (ref 70–99)
GLUCOSE BLDC GLUCOMTR-MCNC: 137 MG/DL — HIGH (ref 70–99)
GLUCOSE BLDC GLUCOMTR-MCNC: 141 MG/DL — HIGH (ref 70–99)
GLUCOSE BLDC GLUCOMTR-MCNC: 190 MG/DL — HIGH (ref 70–99)
GLUCOSE SERPL-MCNC: 98 MG/DL — SIGNIFICANT CHANGE UP (ref 70–99)
GRAM STN FLD: SIGNIFICANT CHANGE UP
HCT VFR BLD CALC: 32.6 % — LOW (ref 39–50)
HGB BLD-MCNC: 10.3 G/DL — LOW (ref 13–17)
MAGNESIUM SERPL-MCNC: 1.9 MG/DL — SIGNIFICANT CHANGE UP (ref 1.6–2.6)
MCHC RBC-ENTMCNC: 28.1 PG — SIGNIFICANT CHANGE UP (ref 27–34)
MCHC RBC-ENTMCNC: 31.6 GM/DL — LOW (ref 32–36)
MCV RBC AUTO: 89.1 FL — SIGNIFICANT CHANGE UP (ref 80–100)
NRBC # BLD: 0 /100 WBCS — SIGNIFICANT CHANGE UP (ref 0–0)
ORGANISM # SPEC MICROSCOPIC CNT: SIGNIFICANT CHANGE UP
PHOSPHATE SERPL-MCNC: 2.8 MG/DL — SIGNIFICANT CHANGE UP (ref 2.5–4.5)
PLATELET # BLD AUTO: 387 K/UL — SIGNIFICANT CHANGE UP (ref 150–400)
POTASSIUM SERPL-MCNC: 4.2 MMOL/L — SIGNIFICANT CHANGE UP (ref 3.5–5.3)
POTASSIUM SERPL-SCNC: 4.2 MMOL/L — SIGNIFICANT CHANGE UP (ref 3.5–5.3)
PROT SERPL-MCNC: 7.1 GM/DL — SIGNIFICANT CHANGE UP (ref 6–8.3)
RBC # BLD: 3.66 M/UL — LOW (ref 4.2–5.8)
RBC # FLD: 15.8 % — HIGH (ref 10.3–14.5)
SARS-COV-2 RNA SPEC QL NAA+PROBE: SIGNIFICANT CHANGE UP
SODIUM SERPL-SCNC: 137 MMOL/L — SIGNIFICANT CHANGE UP (ref 135–145)
SPECIMEN SOURCE: SIGNIFICANT CHANGE UP
WBC # BLD: 7.57 K/UL — SIGNIFICANT CHANGE UP (ref 3.8–10.5)
WBC # FLD AUTO: 7.57 K/UL — SIGNIFICANT CHANGE UP (ref 3.8–10.5)

## 2021-09-01 PROCEDURE — 71045 X-RAY EXAM CHEST 1 VIEW: CPT | Mod: 26

## 2021-09-01 PROCEDURE — 99232 SBSQ HOSP IP/OBS MODERATE 35: CPT

## 2021-09-01 RX ORDER — CEFTRIAXONE 500 MG/1
2000 INJECTION, POWDER, FOR SOLUTION INTRAMUSCULAR; INTRAVENOUS EVERY 24 HOURS
Refills: 0 | Status: DISCONTINUED | OUTPATIENT
Start: 2021-09-02 | End: 2021-09-21

## 2021-09-01 RX ORDER — CEFTRIAXONE 500 MG/1
2000 INJECTION, POWDER, FOR SOLUTION INTRAMUSCULAR; INTRAVENOUS ONCE
Refills: 0 | Status: COMPLETED | OUTPATIENT
Start: 2021-09-01 | End: 2021-09-01

## 2021-09-01 RX ORDER — CEFTRIAXONE 500 MG/1
INJECTION, POWDER, FOR SOLUTION INTRAMUSCULAR; INTRAVENOUS
Refills: 0 | Status: DISCONTINUED | OUTPATIENT
Start: 2021-09-01 | End: 2021-09-21

## 2021-09-01 RX ADMIN — CARBIDOPA AND LEVODOPA 1 TABLET(S): 25; 100 TABLET ORAL at 13:56

## 2021-09-01 RX ADMIN — CARBIDOPA AND LEVODOPA 1 TABLET(S): 25; 100 TABLET ORAL at 05:49

## 2021-09-01 RX ADMIN — Medication 1: at 13:17

## 2021-09-01 RX ADMIN — CARBIDOPA AND LEVODOPA 1 TABLET(S): 25; 100 TABLET ORAL at 21:21

## 2021-09-01 RX ADMIN — SENNA PLUS 2 TABLET(S): 8.6 TABLET ORAL at 21:23

## 2021-09-01 RX ADMIN — CEFEPIME 100 MILLIGRAM(S): 1 INJECTION, POWDER, FOR SOLUTION INTRAMUSCULAR; INTRAVENOUS at 05:48

## 2021-09-01 RX ADMIN — CEFTRIAXONE 100 MILLIGRAM(S): 500 INJECTION, POWDER, FOR SOLUTION INTRAMUSCULAR; INTRAVENOUS at 13:51

## 2021-09-01 RX ADMIN — ENOXAPARIN SODIUM 40 MILLIGRAM(S): 100 INJECTION SUBCUTANEOUS at 13:55

## 2021-09-01 RX ADMIN — QUETIAPINE FUMARATE 50 MILLIGRAM(S): 200 TABLET, FILM COATED ORAL at 21:22

## 2021-09-01 RX ADMIN — TAMSULOSIN HYDROCHLORIDE 0.4 MILLIGRAM(S): 0.4 CAPSULE ORAL at 21:22

## 2021-09-01 RX ADMIN — ATORVASTATIN CALCIUM 10 MILLIGRAM(S): 80 TABLET, FILM COATED ORAL at 21:22

## 2021-09-01 NOTE — PROGRESS NOTE ADULT - SUBJECTIVE AND OBJECTIVE BOX
[facility]  Division of Infectious Diseases  [phone]    Name: FESTUS BLAND  Age: 81y  Gender: Male  MRN: [id number]    Interval History--  Notes reviewed. Seen earlier this am. Resting comfortably. Rouses briefly, then nods off.  Remains in mitten restraints.     Past Medical History--  Hypercholesterolemia    DM (Diabetes Mellitus)    HTN (Hypertension)    CAD (Coronary Artery Disease)    BPH (benign prostatic hyperplasia)    Coronary Stent        For details regarding the patient's social history, family history, and other miscellaneous elements, please refer the initial infectious diseases consultation and/or the admitting history and physical examination for this admission.    Allergies    No Known Allergies    Intolerances        Medications--  Antibiotics:  cefepime   IVPB 1000 milliGRAM(s) IV Intermittent every 8 hours    Immunologic:    Other:  atorvastatin  carbidopa/levodopa  25/100  dextrose 40% Gel  dextrose 5%.  dextrose 5%.  dextrose 50% Injectable  dextrose 50% Injectable  dextrose 50% Injectable  enoxaparin Injectable  glucagon  Injectable  insulin lispro (ADMELOG) corrective regimen sliding scale  oxyCODONE    IR PRN  polyethylene glycol 3350 PRN  QUEtiapine  senna  tamsulosin      Review of Systems--  A 10-point review of systems was obtained.   Review of systems otherwise negative except as previously noted.    Physical Examination--  Vital Signs: T(F): 97.8 (09-01-21 @ 05:12), Max: 97.9 (08-31-21 @ 17:13)  HR: 63 (09-01-21 @ 05:12)  BP: 106/66 (09-01-21 @ 05:12)  RR: 18 (09-01-21 @ 05:12)  SpO2: 95% (09-01-21 @ 05:12)  Wt(kg): --  General: Nontoxic appearing man in no distress whatsoever  HEENT: Sclera nonicteric.  The conjunctiva are pink and moist.  The oropharynx is not able to be examined secondary to patient appliance  Neck: Supple.  No sense of mass.  Lungs: Diminished breath sounds left greater than right.  Chest tube, left-sided in situ. Purulent looking drainage.   Heart: Regular rate and rhythm.  There is no murmur.  There is no rub or gallop.  There is no palpable thrill.  Abdomen: Soft.  Nondistended.  Nontender.  No sense of mass.  No organomegaly  Back: Unable  Extremities: Wasted.  No cyanosis.  No edema.  +/- clubbing  Skin: Warm, dry, no vasculitic stigmata  Psych: Somnolent, rouses.         Laboratory Studies--  CBC                        10.3   7.57  )-----------( 387      ( 01 Sep 2021 07:11 )             32.6       Chemistries  09-01    137  |  102  |  15  ----------------------------<  98  4.2   |  33<H>  |  0.45<L>    Ca    8.7      01 Sep 2021 07:11  Phos  2.8     09-01  Mg     1.9     09-01    TPro  7.1  /  Alb  2.1<L>  /  TBili  0.6  /  DBili  x   /  AST  9<L>  /  ALT  19  /  AlkPhos  85  09-01      Culture Data    Culture - Fungal, Body Fluid (collected 27 Aug 2021 19:02)  Source: .Body Fluid LEFT PLEURAL EFFUSION  Preliminary Report (30 Aug 2021 10:49):    Testing in progress    Culture - Acid Fast - Body Fluid w/Smear (collected 27 Aug 2021 19:02)  Source: .Body Fluid LEFT PLEURAL EFFUSION    Culture - Body Fluid with Gram Stain (08.27.21 @ 19:02)    Gram Stain:   Moderate polymorphonuclear leukocytes per low power field  Numerous Gram positive cocci in pairs per oil power field    -  Ampicillin/Sulbactam: S <=8/4    -  Cefazolin: S <=4    -  Ceftriaxone: S 0.25    -  Clindamycin: R    -  Clindamycin: S <=0.25    -  Erythromycin: R >4    -  Erythromycin: R    -  Gentamicin: S <=1 Should not be used as monotherapy    -  Levofloxacin: S    -  Oxacillin: S 0.5    -  Penicillin: R >8    -  Penicillin: S 0.047    -  RIF- Rifampin: S <=1 Should not be used as monotherapy    -  Tetra/Doxy: S <=1    -  Trimethoprim/Sulfamethoxazole: S <=0.5/9.5    -  Vancomycin: S 2    -  Vancomycin: S    Specimen Source: .Body Fluid LEFT PLEURAL EFFUSION    Culture Results:   Moderate Streptococcus anginosus  Rare Staphylococcus aureus    Organism Identification: Streptococcus anginosus  Staphylococcus aureus    Organism: Staphylococcus aureus    Organism: Streptococcus anginosus    Organism: Streptococcus anginosus    Method Type: ROB    Method Type: ETEST    Method Type: KB      Culture - Urine (collected 26 Aug 2021 09:22)  Source: Clean Catch Clean Catch (Midstream)  Final Report (27 Aug 2021 10:51):    No growth    Culture - Blood (collected 26 Aug 2021 09:09)  Source: .Blood Blood-Peripheral  Final Report (31 Aug 2021 10:01):    No Growth Final    Culture - Blood (collected 26 Aug 2021 09:09)  Source: .Blood Blood-Peripheral  Final Report (31 Aug 2021 10:01):    No Growth Final

## 2021-09-01 NOTE — PROGRESS NOTE ADULT - ASSESSMENT
82 yo male w/ pmhx HTN, DM, HLD, dementia, parkinsons' disease, cad sp stent x2, hf w/ PeF, moderate pulm htn, BPH, gastric ulcers, recent covid infection admitted for empyema 2/2 strep anginosus    # Strep anginous empyema/  Loculated left sided effusion  - sp chest tube placement on 8/27  - on cefepime 8/26  - INFECTIOUS DISEASE Dr. Serrato following.    - CT SURGERY with Dr Weeks on the case  - pt's brother decline thoracotomy/ VATS and wanted only minimally invasive procedures  - CT chest today showed decrease bilateral opacities and smalle effusion    # Parksinson's disease, Functional Quadriplegia - supportive care.   # Dementia  - on carbidopa/levadopa - seroquel  # BPH - flomax  # h/o CAD - plavix on hold right now due to CT placement.  Resume plavix once okay with CT surgery.     DNR / DNI

## 2021-09-01 NOTE — PROGRESS NOTE ADULT - SUBJECTIVE AND OBJECTIVE BOX
FESTUS BLAND    LVS 2E 289 D    Patient is a 81y old  Male who presents with a chief complaint of FTT, empyema r/o (31 Aug 2021 11:58)       Allergies    No Known Allergies    Intolerances        HPI:  Patient is a 81M with a PMH of HTN, DM, HLD, dementia,  Parkinson's disease, CAD s/p stents x2 (>20 years ago), CHF (EF: 50%, Moderate diastolic dysfunction (Stage II), moderate pulmonary hypertension, small-mod pericardial effusion), Lt pleural effusion, BPH, gastric ulcers, COVID infection who was sent to the ED for weakness.  Patient currently awake and alert, oriented x1, unable to provide history.  Has no acute complaints.  Discharged from NH today and was sent to the ED as he appeared weak and emaciated.  Vitals stable, labs show leukocytosis and mild lactic acidosis.  CT reveals a chronic loculated L pleural effusion.  ED attending spoke to family who made the patient DNR/DNI however did agree for chest tube drainage for effusion.  Will admit to med surg.   (26 Aug 2021 03:21)      PAST MEDICAL & SURGICAL HISTORY:  Hypercholesterolemia    DM (Diabetes Mellitus)    HTN (Hypertension)    CAD (Coronary Artery Disease)  s/p cardiac stent ( &gt; 20 years ago)    BPH (benign prostatic hyperplasia)    Coronary Stent  x 2 ( 20 years )        FAMILY HISTORY:  FH: HTN (hypertension)          MEDICATIONS   atorvastatin 10 milliGRAM(s) Oral at bedtime  carbidopa/levodopa  25/100 1 Tablet(s) Oral three times a day  cefepime   IVPB 1000 milliGRAM(s) IV Intermittent every 8 hours  dextrose 40% Gel 15 Gram(s) Oral once  dextrose 5%. 1000 milliLiter(s) IV Continuous <Continuous>  dextrose 5%. 1000 milliLiter(s) IV Continuous <Continuous>  dextrose 50% Injectable 25 Gram(s) IV Push once  dextrose 50% Injectable 12.5 Gram(s) IV Push once  dextrose 50% Injectable 25 Gram(s) IV Push once  enoxaparin Injectable 40 milliGRAM(s) SubCutaneous daily  glucagon  Injectable 1 milliGRAM(s) IntraMuscular once  insulin lispro (ADMELOG) corrective regimen sliding scale   SubCutaneous three times a day before meals  oxyCODONE    IR 5 milliGRAM(s) Oral every 6 hours PRN  polyethylene glycol 3350 17 Gram(s) Oral daily PRN  QUEtiapine 50 milliGRAM(s) Oral at bedtime  senna 2 Tablet(s) Oral at bedtime  tamsulosin 0.4 milliGRAM(s) Oral at bedtime      Vital Signs Last 24 Hrs  T(C): 36.6 (01 Sep 2021 05:12), Max: 36.6 (31 Aug 2021 17:13)  T(F): 97.8 (01 Sep 2021 05:12), Max: 97.9 (31 Aug 2021 17:13)  HR: 63 (01 Sep 2021 05:12) (63 - 83)  BP: 106/66 (01 Sep 2021 05:12) (104/64 - 115/68)  BP(mean): --  RR: 18 (01 Sep 2021 05:12) (17 - 18)  SpO2: 95% (01 Sep 2021 05:12) (95% - 98%)      08-31-21 @ 07:01  -  09-01-21 @ 07:00  --------------------------------------------------------  IN: 580 mL / OUT: 2270 mL / NET: -1690 mL            LABS:                        10.3   7.57  )-----------( 387      ( 01 Sep 2021 07:11 )             32.6     08-31    138  |  106  |  11  ----------------------------<  102<H>  3.8   |  30  |  0.54    Ca    7.9<L>      31 Aug 2021 06:36  Phos  2.9     08-31  Mg     1.6     08-31    TPro  6.1  /  Alb  1.8<L>  /  TBili  0.6  /  DBili  x   /  AST  8<L>  /  ALT  <6<L>  /  AlkPhos  82  08-31              WBC:  WBC Count: 7.57 K/uL (09-01 @ 07:11)  WBC Count: 6.82 K/uL (08-31 @ 06:36)  WBC Count: 7.08 K/uL (08-29 @ 07:35)      MICROBIOLOGY:  RECENT CULTURES:  08-27 .Body Fluid LEFT PLEURAL EFFUSION Streptococcus anginosus  Staphylococcus aureus   Moderate polymorphonuclear leukocytes per low power field  Numerous Gram positive cocci in pairs per oil power field   Moderate Streptococcus anginosus  Rare Staphylococcus aureus    08-26 Clean Catch Clean Catch (Midstream) XXXX XXXX   No growth    08-26 .Blood Blood-Peripheral XXXX XXXX   No Growth Final                    Sodium:  Sodium, Serum: 138 mmol/L (08-31 @ 06:36)  Sodium, Serum: 139 mmol/L (08-29 @ 07:35)      0.54 mg/dL 08-31 @ 06:36  0.50 mg/dL 08-29 @ 07:35      Hemoglobin:  Hemoglobin: 10.3 g/dL (09-01 @ 07:11)  Hemoglobin: 9.4 g/dL (08-31 @ 06:36)  Hemoglobin: 9.2 g/dL (08-29 @ 07:35)      Platelets: Platelet Count - Automated: 387 K/uL (09-01 @ 07:11)  Platelet Count - Automated: 365 K/uL (08-31 @ 06:36)  Platelet Count - Automated: 397 K/uL (08-29 @ 07:35)      LIVER FUNCTIONS - ( 31 Aug 2021 06:36 )  Alb: 1.8 g/dL / Pro: 6.1 gm/dL / ALK PHOS: 82 U/L / ALT: <6 U/L / AST: 8 U/L / GGT: x                 RADIOLOGY & ADDITIONAL STUDIES:

## 2021-09-01 NOTE — PROGRESS NOTE ADULT - SUBJECTIVE AND OBJECTIVE BOX
Patient: FESTUS BLAND [id number] 81y Male                            Hospitalist Attending Note    No complaints.  Chest tube in place.  No SOB.     ____________________PHYSICAL EXAM:  GENERAL:  NAD, alert, oriented to person.    HEENT: NCAT  CARDIOVASCULAR:  S1, S2  LUNGS: Coarse BS L base.   ABDOMEN:  soft, (-) tenderness, (-) distension, (+) bowel sounds, (-) guarding, (-) rebound (-) rigidity  EXTREMITIES:  no cyanosis / clubbing / edema.   ____________________     VITALS:  Vital Signs Last 24 Hrs  T(C): 36.6 (01 Sep 2021 05:12), Max: 36.6 (31 Aug 2021 23:31)  T(F): 97.8 (01 Sep 2021 05:12), Max: 97.9 (31 Aug 2021 23:31)  HR: 77 (01 Sep 2021 12:28) (63 - 80)  BP: 91/55 (01 Sep 2021 12:28) (91/55 - 115/68)  BP(mean): --  RR: 17 (01 Sep 2021 12:28) (17 - 18)  SpO2: 99% (01 Sep 2021 12:28) (95% - 99%) Daily     Daily Weight in k.5 (01 Sep 2021 05:12)  CAPILLARY BLOOD GLUCOSE      POCT Blood Glucose.: 141 mg/dL (01 Sep 2021 16:31)  POCT Blood Glucose.: 190 mg/dL (01 Sep 2021 11:59)  POCT Blood Glucose.: 106 mg/dL (01 Sep 2021 09:03)  POCT Blood Glucose.: 152 mg/dL (31 Aug 2021 22:09)    I&O's Summary    31 Aug 2021 07:  -  01 Sep 2021 07:00  --------------------------------------------------------  IN: 580 mL / OUT: 2270 mL / NET: -1690 mL    01 Sep 2021 07:01  -  01 Sep 2021 17:17  --------------------------------------------------------  IN: 400 mL / OUT: 0 mL / NET: 400 mL        HISTORY:  PAST MEDICAL & SURGICAL HISTORY:  Hypercholesterolemia    DM (Diabetes Mellitus)    HTN (Hypertension)    CAD (Coronary Artery Disease)  s/p cardiac stent ( &gt; 20 years ago)    BPH (benign prostatic hyperplasia)    Coronary Stent  x 2 ( 20 years )    Allergies    No Known Allergies    Intolerances       LABS:                        10.3   7.57  )-----------( 387      ( 01 Sep 2021 07:11 )             32.6         137  |  102  |  15  ----------------------------<  98  4.2   |  33<H>  |  0.45<L>    Ca    8.7      01 Sep 2021 07:11  Phos  2.8       Mg     1.9         TPro  7.1  /  Alb  2.1<L>  /  TBili  0.6  /  DBili  x   /  AST  9<L>  /  ALT  19  /  AlkPhos  85        LIVER FUNCTIONS - ( 01 Sep 2021 07:11 )  Alb: 2.1 g/dL / Pro: 7.1 gm/dL / ALK PHOS: 85 U/L / ALT: 19 U/L / AST: 9 U/L / GGT: x                     MEDICATIONS:  MEDICATIONS  (STANDING):  atorvastatin 10 milliGRAM(s) Oral at bedtime  carbidopa/levodopa  25/100 1 Tablet(s) Oral three times a day  cefTRIAXone   IVPB      dextrose 40% Gel 15 Gram(s) Oral once  dextrose 5%. 1000 milliLiter(s) (50 mL/Hr) IV Continuous <Continuous>  dextrose 5%. 1000 milliLiter(s) (100 mL/Hr) IV Continuous <Continuous>  dextrose 50% Injectable 25 Gram(s) IV Push once  dextrose 50% Injectable 12.5 Gram(s) IV Push once  dextrose 50% Injectable 25 Gram(s) IV Push once  enoxaparin Injectable 40 milliGRAM(s) SubCutaneous daily  glucagon  Injectable 1 milliGRAM(s) IntraMuscular once  insulin lispro (ADMELOG) corrective regimen sliding scale   SubCutaneous three times a day before meals  QUEtiapine 50 milliGRAM(s) Oral at bedtime  senna 2 Tablet(s) Oral at bedtime  tamsulosin 0.4 milliGRAM(s) Oral at bedtime    MEDICATIONS  (PRN):  oxyCODONE    IR 5 milliGRAM(s) Oral every 6 hours PRN Moderate Pain (4 - 6)  polyethylene glycol 3350 17 Gram(s) Oral daily PRN constipations

## 2021-09-01 NOTE — CHART NOTE - NSCHARTNOTEFT_GEN_A_CORE
IR called yesterday to assess chest tube for minimal drainage and possible upsize.,     Found to be semi-clogged 2/2 to consistency of the  sanguineous/serosanguineous pleural fluid    The Chest tube was flushed with 10ml of normal saline, and placed to wall suction.      Output overnight -  120mL    This AM CXR shows decrease in left pleural effusion        Plan  -will defer upsizing 2/2 to improvement of CXR after flushing CT and placing on wall suction  -keep chest tube to wall suction  -orders placed BID to flush catheter  -will d/w CT surgeon

## 2021-09-01 NOTE — PROGRESS NOTE ADULT - ASSESSMENT
81 yr old male pt with significant medical hx and dementia presents with weakness. Pt states he feels good but he is not oriented to time or place. left sided pleural effusion.  WBC count is 19.5   81 yr old male pt with significant medical hx and dementia presents with weakness.   Pt states he feels good but he is not oriented to time or place.   CT (I personally reviewed) left sided pleural effusion.  WBC count was 19.5 received  vanc zosyn in ED   Thoracic surgery recommending chest tube placement and to send fluid off for analysis which was done on friday   review of the fluid indicated exudative fluid, there are a lot of wbc cells but also a a lof of rbc. He is breathing on room air but he is coughing    8/30: no fevers, no new cbc, on RA, fluid culture with Streptococcus anginosus, MRSA PCR not detected. Cefepime continued.   8/31: The patient remains afebrile, white blood cell count is normal, and he is breathing comfortably.  Pleural fluid appears to be thick and not flowing well.  Increasing the caliber of the drainage tube was being contemplated.  Fluid is growing Streptococcus anginosus, as well as a Staphylococcus aureus.  Drainage here is the mainstay of therapy, with antibiotics adjunctive.  It is not at all clear that the patient will do well without definitive drainage, the patient's family however appears to be opposed to any kind of surgical drainage.  The current antibiotics are adequate, the isolates in question are both sensitive to cefepime.  No anaerobes have been isolated thus far.  9/1: Overall little changed. Given chronicity of effusion, I suspect the chances of cure here with current plan are negligible. The best outcome I could hope for would be an 'induction' period with IV antibiotics followed by indefinite suppression with enteral antibiotics. The odds of that plan working are not clear.

## 2021-09-01 NOTE — PROGRESS NOTE ADULT - ASSESSMENT
BALDO MORTENSEN 81 M 8/26/2021 1942 DR CATHERINE LANDIN     REVIEW OF SYMPTOMS      Able to give (reliable) ROS  NO     PHYSICAL EXAM    HEENT Unremarkable  atraumatic   RESP Fair air entry EXP prolonged    Harsh breath sound Resp distres mild   CARDIAC S1 S2 No S3     NO JVD    ABDOMEN SOFT BS PRESENT NOT DISTENDED No hepatosplenomegaly   PEDAL EDEMA present No calf tenderness  NO rash                                        8/26/2021  PRESENTATION.  82 y/o M  pmhx of HTN, DM, HLD, dementia,  Parkinson's disease, CAD s/p stents x2 (>20 years ago), CHF (EF: 50%, Moderate diastolic dysfunction (Stage II), moderate pulmonary hypertension, small-mod pericardial effusion), Lt pleural effusion, BPH, gastric ulcers, COVID infection who was sent on 8/26/2021  to the ED for weakness.      8/26/2021 PRESENTING PROBLEMS   WEAKNESS  L PLEURAL EFFUSION   AIR FLUID LEVEL PLEURAL EFFSN POA   ENLARGED PROSTATE  DEMENTIA     HOSPITAL COURSE   PL EFFSN l Seen by IR 8/27/2021   PIGTAIL 8/27  direct examination of the airways is indicated to confirm the finding and identify the cause.  pfa cw empyema  (8/27) pfa 8/27 ldh [id number] pr 2.2 g 2 ph 7  numerous gpc pairs   NONVIOLENT NONSELF DESTR LEVEL ONE   8/28 8/29    BEST PRACTICE ISSUES.                                                  HEAD OF BED ELEVATION. Yes  DVT PROPHYLAXIS.  lvnx 40 (8/26)                                        VALLEJO PROPHYLAXIS.                                                                                         DIET.      dys 1 puree nectar cons carb (8/26)                     INFECTION PROPHYLAXIS.                      PATIENT DATA   TUBES  PROCEDURES.     PIGTAIL 8/27 maroon drainage     ABG.   8/26/2021 ra 745/37/78     OXYGENATION.       8/26-8/27/2021 ra 97%    - ra 96%         VITALS/IO/VENT/DRIPS.    9/1/2021 afeb 80 100/60     PROBLEM ASSESSMENT/RECOMMENDATIONS.    COVID STATUS  Ho covid (+) 6/10/2021     CHRONIC L PL EMPYEMA WITH AIR FLUID LEVEL   RO underlying cancer or infection with bp fistula   pfa 8/27 ldh [id number] pr 2.2 g 2 ph 7 chol 70   numerous gpc pairs   findings of 8/27 pl fluid cw empyema   Pt has pgtail placed 8/27 9/1/2021 Case dw Dr Apple Plan is to dc on iv abio x 6 w and with pigtauil as fami declining aggressive invasive procedures such as dcortication     INFECTION  w 8/26-8/27-8/28/2021 w 19 - 7.6 - 7.3   pr 8/26/2021 pr (-)   la 8/26/2021 la 1.9   Cr 8/26/2021 Cr .9   urine c 8/26 (-)  fluab 8/26/2021 (-)   blod c 8/26 (-)   mrsa 8/28 (-)  pl fl 8/27 strep angiosus  cefepime 1.3 (8/26) (Dr Ramirez) (Pneu)   cont rx       OVERALL PLAN.  CHRONIC L PL EMPYEMA WITH AIR FLUID LEVEL   9/1/2021 Case dw Dr Apple Plan is to dc on iv abio x 6 w and with pigtauil as fami declining aggressive invasive procedures such as dcortication   GOC dnr    TIME SPENT   Over 25 minutes aggregate care time spent on encounter; activities included   direct patient care, counseling and/or coordinating care reviewing notes, lab data/ imaging , discussion with multidisciplinary team/ patient  /family and explaining in detail risks, benefits, alternatives  of the recommendations     BALDO MORTENSEN 81 M 8/26/2021 1942 DR CATHERINE LANDIN

## 2021-09-02 LAB
ADENOSINE DEAMINASE PLR-CCNC: 151 U/L — HIGH (ref 0–30)
GLUCOSE BLDC GLUCOMTR-MCNC: 101 MG/DL — HIGH (ref 70–99)
GLUCOSE BLDC GLUCOMTR-MCNC: 118 MG/DL — HIGH (ref 70–99)
GLUCOSE BLDC GLUCOMTR-MCNC: 118 MG/DL — HIGH (ref 70–99)
GLUCOSE BLDC GLUCOMTR-MCNC: 120 MG/DL — HIGH (ref 70–99)
GLUCOSE BLDC GLUCOMTR-MCNC: 139 MG/DL — HIGH (ref 70–99)

## 2021-09-02 PROCEDURE — 99232 SBSQ HOSP IP/OBS MODERATE 35: CPT

## 2021-09-02 RX ADMIN — CEFTRIAXONE 100 MILLIGRAM(S): 500 INJECTION, POWDER, FOR SOLUTION INTRAMUSCULAR; INTRAVENOUS at 11:05

## 2021-09-02 RX ADMIN — SENNA PLUS 2 TABLET(S): 8.6 TABLET ORAL at 21:59

## 2021-09-02 RX ADMIN — ENOXAPARIN SODIUM 40 MILLIGRAM(S): 100 INJECTION SUBCUTANEOUS at 11:09

## 2021-09-02 RX ADMIN — CARBIDOPA AND LEVODOPA 1 TABLET(S): 25; 100 TABLET ORAL at 05:13

## 2021-09-02 RX ADMIN — CARBIDOPA AND LEVODOPA 1 TABLET(S): 25; 100 TABLET ORAL at 21:58

## 2021-09-02 RX ADMIN — ATORVASTATIN CALCIUM 10 MILLIGRAM(S): 80 TABLET, FILM COATED ORAL at 21:58

## 2021-09-02 RX ADMIN — QUETIAPINE FUMARATE 50 MILLIGRAM(S): 200 TABLET, FILM COATED ORAL at 21:58

## 2021-09-02 RX ADMIN — TAMSULOSIN HYDROCHLORIDE 0.4 MILLIGRAM(S): 0.4 CAPSULE ORAL at 21:58

## 2021-09-02 RX ADMIN — CARBIDOPA AND LEVODOPA 1 TABLET(S): 25; 100 TABLET ORAL at 14:44

## 2021-09-02 NOTE — PROGRESS NOTE ADULT - SUBJECTIVE AND OBJECTIVE BOX
FESTUS BLAND    LVS 2E 289 D    Patient is a 81y old  Male who presents with a chief complaint of FTT, empyema r/o (01 Sep 2021 17:16)       Allergies    No Known Allergies    Intolerances        HPI:  Patient is a 81M with a PMH of HTN, DM, HLD, dementia,  Parkinson's disease, CAD s/p stents x2 (>20 years ago), CHF (EF: 50%, Moderate diastolic dysfunction (Stage II), moderate pulmonary hypertension, small-mod pericardial effusion), Lt pleural effusion, BPH, gastric ulcers, COVID infection who was sent to the ED for weakness.  Patient currently awake and alert, oriented x1, unable to provide history.  Has no acute complaints.  Discharged from NH today and was sent to the ED as he appeared weak and emaciated.  Vitals stable, labs show leukocytosis and mild lactic acidosis.  CT reveals a chronic loculated L pleural effusion.  ED attending spoke to family who made the patient DNR/DNI however did agree for chest tube drainage for effusion.  Will admit to med surg.   (26 Aug 2021 03:21)      PAST MEDICAL & SURGICAL HISTORY:  Hypercholesterolemia    DM (Diabetes Mellitus)    HTN (Hypertension)    CAD (Coronary Artery Disease)  s/p cardiac stent ( &gt; 20 years ago)    BPH (benign prostatic hyperplasia)    Coronary Stent  x 2 ( 20 years )        FAMILY HISTORY:  FH: HTN (hypertension)          MEDICATIONS   atorvastatin 10 milliGRAM(s) Oral at bedtime  carbidopa/levodopa  25/100 1 Tablet(s) Oral three times a day  cefTRIAXone   IVPB 2000 milliGRAM(s) IV Intermittent every 24 hours  cefTRIAXone   IVPB      dextrose 40% Gel 15 Gram(s) Oral once  dextrose 5%. 1000 milliLiter(s) IV Continuous <Continuous>  dextrose 5%. 1000 milliLiter(s) IV Continuous <Continuous>  dextrose 50% Injectable 25 Gram(s) IV Push once  dextrose 50% Injectable 12.5 Gram(s) IV Push once  dextrose 50% Injectable 25 Gram(s) IV Push once  enoxaparin Injectable 40 milliGRAM(s) SubCutaneous daily  glucagon  Injectable 1 milliGRAM(s) IntraMuscular once  insulin lispro (ADMELOG) corrective regimen sliding scale   SubCutaneous three times a day before meals  oxyCODONE    IR 5 milliGRAM(s) Oral every 6 hours PRN  polyethylene glycol 3350 17 Gram(s) Oral daily PRN  QUEtiapine 50 milliGRAM(s) Oral at bedtime  senna 2 Tablet(s) Oral at bedtime  tamsulosin 0.4 milliGRAM(s) Oral at bedtime      Vital Signs Last 24 Hrs  T(C): 36.4 (02 Sep 2021 05:15), Max: 36.7 (01 Sep 2021 23:40)  T(F): 97.6 (02 Sep 2021 05:15), Max: 98.1 (01 Sep 2021 23:40)  HR: 61 (02 Sep 2021 05:15) (61 - 78)  BP: 113/68 (02 Sep 2021 05:15) (91/55 - 127/71)  BP(mean): --  RR: 18 (02 Sep 2021 05:15) (17 - 18)  SpO2: 95% (02 Sep 2021 05:15) (95% - 100%)      09-01-21 @ 07:01  -  09-02-21 @ 07:00  --------------------------------------------------------  IN: 700 mL / OUT: 200 mL / NET: 500 mL    09-02-21 @ 07:01  -  09-02-21 @ 10:41  --------------------------------------------------------  IN: 0 mL / OUT: 120 mL / NET: -120 mL            LABS:                        10.3   7.57  )-----------( 387      ( 01 Sep 2021 07:11 )             32.6     09-01    137  |  102  |  15  ----------------------------<  98  4.2   |  33<H>  |  0.45<L>    Ca    8.7      01 Sep 2021 07:11  Phos  2.8     09-01  Mg     1.9     09-01    TPro  7.1  /  Alb  2.1<L>  /  TBili  0.6  /  DBili  x   /  AST  9<L>  /  ALT  19  /  AlkPhos  85  09-01              WBC:  WBC Count: 7.57 K/uL (09-01 @ 07:11)  WBC Count: 6.82 K/uL (08-31 @ 06:36)      MICROBIOLOGY:  RECENT CULTURES:  08-27 .Body Fluid LEFT PLEURAL EFFUSION Streptococcus anginosus  Staphylococcus aureus   Moderate polymorphonuclear leukocytes per low power field  Numerous Gram positive cocci in pairs per oil power field   Moderate Streptococcus anginosus  Rare Staphylococcus aureus  Few Prevotella species "Susceptibilities not performed"  Rare Fusobacterium nucleatum "Susceptibilities not performed"                    Sodium:  Sodium, Serum: 137 mmol/L (09-01 @ 07:11)  Sodium, Serum: 138 mmol/L (08-31 @ 06:36)      0.45 mg/dL 09-01 @ 07:11  0.54 mg/dL 08-31 @ 06:36      Hemoglobin:  Hemoglobin: 10.3 g/dL (09-01 @ 07:11)  Hemoglobin: 9.4 g/dL (08-31 @ 06:36)      Platelets: Platelet Count - Automated: 387 K/uL (09-01 @ 07:11)  Platelet Count - Automated: 365 K/uL (08-31 @ 06:36)      LIVER FUNCTIONS - ( 01 Sep 2021 07:11 )  Alb: 2.1 g/dL / Pro: 7.1 gm/dL / ALK PHOS: 85 U/L / ALT: 19 U/L / AST: 9 U/L / GGT: x                 RADIOLOGY & ADDITIONAL STUDIES:

## 2021-09-02 NOTE — PROGRESS NOTE ADULT - ASSESSMENT
BALDO MORTENSEN 81 M 8/26/2021 1942 DR CATHERINE LANDIN     REVIEW OF SYMPTOMS      Able to give (reliable) ROS  NO     PHYSICAL EXAM    HEENT Unremarkable  atraumatic   RESP Fair air entry EXP prolonged    Harsh breath sound Resp distres mild   CARDIAC S1 S2 No S3     NO JVD    ABDOMEN SOFT BS PRESENT NOT DISTENDED No hepatosplenomegaly   PEDAL EDEMA present No calf tenderness  NO rash                                          8/26/2021  PRESENTATION.  82 y/o M  pmhx of HTN, DM, HLD, dementia,  Parkinson's disease, CAD s/p stents x2 (>20 years ago), CHF (EF: 50%, Moderate diastolic dysfunction (Stage II), moderate pulmonary hypertension, small-mod pericardial effusion), Lt pleural effusion, BPH, gastric ulcers, COVID infection who was sent on 8/26/2021  to the ED for weakness.      HOSPITAL COURSE   PL EFFSN l Seen by IR 8/27/2021   PIGTAIL 8/27  pfa cw empyema  (8/27) pfa 8/27 ldh 52300 pr 2.2 g 2 ph 7  numerous gpc pairs   pl fl 8/27 strep angiosus rare staph a   Rocephin 2g (9/2/2021) (Dr PINTO)  cefepime 1.3 (8/26) (Dr Ramirez) (Pneu)   NONVIOLENT NONSELF DESTR LEVEL ONE   8/28 8/29 8/26/2021 PRESENTING PROBLEMS   WEAKNESS  L PLEURAL EFFUSION   AIR FLUID LEVEL PLEURAL EFFSN POA   ENLARGED PROSTATE  DEMENTIA                 GENERAL ISSUES  GOC ADVANCED DIRECTIVE.    dnr                        ALLERGY.    nka                        WEIGHT.         8/26/2021 54                           BMI.                   8/26/2021 18  DYSPHAGIA ELLIE.          COVID STATUS.        BEST PRACTICE ISSUES.                                                  HEAD OF BED ELEVATION. Yes  DVT PROPHYLAXIS.  lvnx 40 (8/26)                                        VALLEJO PROPHYLAXIS.                                                                                         DIET.      dys 1 puree nectar cons carb (8/26)                     INFECTION PROPHYLAXIS.                      PATIENT DATA   TUBES  PROCEDURES.     PIGTAIL 8/27 maroon drainage     ABG.   8/26/2021 ra 745/37/78     OXYGENATION.       8/26-8/27/2021 ra 97%    - ra 96%         VITALS/IO/VENT/DRIPS.    9/2/2021 afeb 69 110/50     PROBLEM ASSESSMENT/RECOMMENDATIONS.    COVID STATUS  Ho covid (+) 6/10/2021     CHRONIC L PL EMPYEMA WITH AIR FLUID LEVEL   RO underlying cancer or infection with bp fistula   pfa 8/27 ldh 78464 pr 2.2 g 2 ph 7 chol 70   numerous gpc pairs   findings of 8/27 pl fluid cw empyema   Pt has pgtail placed 8/27 9/1/2021 Case polo Apple Plan is to dc on iv abio x 6 w and with pigtauil as fami declining aggressive invasive procedures such as dcortication     INFECTION  w 8/26-8/27-8/28/2021 w 19 - 7.6 - 7.3   pr 8/26/2021 pr (-)   la 8/26/2021 la 1.9   Cr 8/26/2021 Cr .9   urine c 8/26 (-)  fluab 8/26/2021 (-)   blod c 8/26 (-)   mrsa 8/28 (-)  pl fl 8/27 strep angiosus rare staph a   Rocephin 2g (9/2/2021) (Dr PINTO)  cont rx    DYSPHAGIA  On dys 1 puree nectar diet (8/26/2021)     CAD  PMH CAD stents 2 decade ago   Tr 8/26/2021 Tr (-)     CHF  echo 6/11/2021 n lvsf thickened pericard with 1 cm pericard effs n la   bnp 8/26/2021 bnp 863     ANEMIA  Hb 8/26 - 8/27-8/28/2021 Hb 10 - 8.7 - 8.7   mcv 8/26/2021 mvcv 89     PARKINSONS   carbilevadopa 25 100.3 (8/26)     NONVIOLENT NONSELF DESTR LEVEL ONE   8/28 8/29    GOC dnr           OVERALL PLAN.  CHRONIC L PL EMPYEMA WITH AIR FLUID LEVEL   9/1/2021 Case dw Dr Apple Plan is to dc on iv abio x 6 w and with pigtauil as fami declining aggressive invasive procedures such as dcortication   GOC dnr    TIME SPENT   Over 25 minutes aggregate care time spent on encounter; activities included   direct patient care, counseling and/or coordinating care reviewing notes, lab data/ imaging , discussion with multidisciplinary team/ patient  /family and explaining in detail risks, benefits, alternatives  of the recommendations     BALDO MORTENSEN 81 M 8/26/2021 1942 DR CATHERINE LANDIN

## 2021-09-02 NOTE — PROGRESS NOTE ADULT - ASSESSMENT
81 yr old male pt with significant medical hx and dementia presents with weakness. Pt states he feels good but he is not oriented to time or place. left sided pleural effusion.  WBC count is 19.5   81 yr old male pt with significant medical hx and dementia presents with weakness.   Pt states he feels good but he is not oriented to time or place.   CT (I personally reviewed) left sided pleural effusion.  WBC count was 19.5 received  vanc zosyn in ED   Thoracic surgery recommending chest tube placement and to send fluid off for analysis which was done on friday   review of the fluid indicated exudative fluid, there are a lot of wbc cells but also a a lof of rbc. He is breathing on room air but he is coughing    8/30: no fevers, no new cbc, on RA, fluid culture with Streptococcus anginosus, MRSA PCR not detected. Cefepime continued.   8/31: The patient remains afebrile, white blood cell count is normal, and he is breathing comfortably.  Pleural fluid appears to be thick and not flowing well.  Increasing the caliber of the drainage tube was being contemplated.  Fluid is growing Streptococcus anginosus, as well as a Staphylococcus aureus.  Drainage here is the mainstay of therapy, with antibiotics adjunctive.  It is not at all clear that the patient will do well without definitive drainage, the patient's family however appears to be opposed to any kind of surgical drainage.  The current antibiotics are adequate, the isolates in question are both sensitive to cefepime.  No anaerobes have been isolated thus far.  9/1: Overall little changed. Given chronicity of effusion, I suspect the chances of cure here with current plan are negligible. The best outcome I could hope for would be an 'induction' period with IV antibiotics followed by indefinite suppression with enteral antibiotics. The odds of that plan working are not clear.   9/2: IR follow up re: management of CT appreciated. No significant change in status otherwise. Appears to be tolerating antibiotics without incident. Afebrile, HD stable. Resp status about the same. Hoping to achieve adequate initial source control and chronic suppressive therapy. Given constraints placed by the family seems to be best option. Still have skepticism that it will work. This being said, I suspect the morbidity (and perhaps mortality) associated with surgical Rx would be excessive.

## 2021-09-02 NOTE — PROGRESS NOTE ADULT - ASSESSMENT
82 yo male w/ pmhx HTN, DM, HLD, dementia, parkinsons' disease, cad sp stent x2, hf w/ PeF, moderate pulm htn, BPH, gastric ulcers, recent covid infection admitted for empyema 2/2 strep anginosus    # Strep anginous empyema/  Loculated left sided effusion  - sp chest tube placement on 8/27  - on cefepime 8/26  - INFECTIOUS DISEASE Dr. Serrato following.    - CT SURGERY with Dr Weeks on the case  - pt's brother decline thoracotomy/ VATS and wanted only minimally invasive procedures  - most recent CT chest today showed decrease bilateral opacities and smaller effusion    # Parksinson's disease, Functional Quadriplegia - supportive care.   # Dementia  - on carbidopa/levadopa - seroquel  # BPH - flomax  # h/o CAD - plavix on hold right now due to CT placement.  Resume plavix once okay with CT surgery.     DNR / DNI

## 2021-09-02 NOTE — PROGRESS NOTE ADULT - SUBJECTIVE AND OBJECTIVE BOX
Patient: FESTUS BLAND [id number] 81y Male                            Hospitalist Attending Note    No complaints.  Chest tube in place.  No pain.  No SOB.     ____________________PHYSICAL EXAM:  GENERAL:  NAD, alert, oriented to person.    HEENT: NCAT  CARDIOVASCULAR:  S1, S2  LUNGS: Coarse BS L base.   ABDOMEN:  soft, (-) tenderness, (-) distension, (+) bowel sounds, (-) guarding, (-) rebound (-) rigidity  EXTREMITIES:  no cyanosis / clubbing / edema.   ____________________    VITALS:  Vital Signs Last 24 Hrs  T(C): 36.3 (02 Sep 2021 17:33), Max: 36.7 (01 Sep 2021 23:40)  T(F): 97.3 (02 Sep 2021 17:33), Max: 98.1 (01 Sep 2021 23:40)  HR: 69 (02 Sep 2021 17:33) (61 - 78)  BP: 121/75 (02 Sep 2021 17:33) (91/58 - 127/71)  BP(mean): --  RR: 18 (02 Sep 2021 17:33) (17 - 18)  SpO2: 100% (02 Sep 2021 17:33) (95% - 100%) Daily     Daily Weight in k.5 (02 Sep 2021 05:15)  CAPILLARY BLOOD GLUCOSE      POCT Blood Glucose.: 118 mg/dL (02 Sep 2021 16:22)  POCT Blood Glucose.: 120 mg/dL (02 Sep 2021 16:11)  POCT Blood Glucose.: 139 mg/dL (02 Sep 2021 11:04)  POCT Blood Glucose.: 101 mg/dL (02 Sep 2021 07:44)  POCT Blood Glucose.: 137 mg/dL (01 Sep 2021 21:20)    I&O's Summary    01 Sep 2021 07:01  -  02 Sep 2021 07:00  --------------------------------------------------------  IN: 700 mL / OUT: 200 mL / NET: 500 mL    02 Sep 2021 07:01  -  02 Sep 2021 17:45  --------------------------------------------------------  IN: 0 mL / OUT: 120 mL / NET: -120 mL        LABS:                        10.3   7.57  )-----------( 387      ( 01 Sep 2021 07:11 )             32.6     09    137  |  102  |  15  ----------------------------<  98  4.2   |  33<H>  |  0.45<L>    Ca    8.7      01 Sep 2021 07:11  Phos  2.8       Mg     1.9         TPro  7.1  /  Alb  2.1<L>  /  TBili  0.6  /  DBili  x   /  AST  9<L>  /  ALT  19  /  AlkPhos  85  09      LIVER FUNCTIONS - ( 01 Sep 2021 07:11 )  Alb: 2.1 g/dL / Pro: 7.1 gm/dL / ALK PHOS: 85 U/L / ALT: 19 U/L / AST: 9 U/L / GGT: x                     MEDICATIONS:  atorvastatin 10 milliGRAM(s) Oral at bedtime  carbidopa/levodopa  25/100 1 Tablet(s) Oral three times a day  cefTRIAXone   IVPB 2000 milliGRAM(s) IV Intermittent every 24 hours  cefTRIAXone   IVPB      dextrose 40% Gel 15 Gram(s) Oral once  dextrose 5%. 1000 milliLiter(s) IV Continuous <Continuous>  dextrose 5%. 1000 milliLiter(s) IV Continuous <Continuous>  dextrose 50% Injectable 25 Gram(s) IV Push once  dextrose 50% Injectable 12.5 Gram(s) IV Push once  dextrose 50% Injectable 25 Gram(s) IV Push once  enoxaparin Injectable 40 milliGRAM(s) SubCutaneous daily  glucagon  Injectable 1 milliGRAM(s) IntraMuscular once  insulin lispro (ADMELOG) corrective regimen sliding scale   SubCutaneous three times a day before meals  oxyCODONE    IR 5 milliGRAM(s) Oral every 6 hours PRN  polyethylene glycol 3350 17 Gram(s) Oral daily PRN  QUEtiapine 50 milliGRAM(s) Oral at bedtime  senna 2 Tablet(s) Oral at bedtime  tamsulosin 0.4 milliGRAM(s) Oral at bedtime

## 2021-09-03 LAB
GLUCOSE BLDC GLUCOMTR-MCNC: 124 MG/DL — HIGH (ref 70–99)
GLUCOSE BLDC GLUCOMTR-MCNC: 129 MG/DL — HIGH (ref 70–99)
GLUCOSE BLDC GLUCOMTR-MCNC: 131 MG/DL — HIGH (ref 70–99)
GLUCOSE BLDC GLUCOMTR-MCNC: 133 MG/DL — HIGH (ref 70–99)

## 2021-09-03 PROCEDURE — 99232 SBSQ HOSP IP/OBS MODERATE 35: CPT

## 2021-09-03 PROCEDURE — 99231 SBSQ HOSP IP/OBS SF/LOW 25: CPT

## 2021-09-03 RX ADMIN — ATORVASTATIN CALCIUM 10 MILLIGRAM(S): 80 TABLET, FILM COATED ORAL at 21:03

## 2021-09-03 RX ADMIN — TAMSULOSIN HYDROCHLORIDE 0.4 MILLIGRAM(S): 0.4 CAPSULE ORAL at 21:00

## 2021-09-03 RX ADMIN — ENOXAPARIN SODIUM 40 MILLIGRAM(S): 100 INJECTION SUBCUTANEOUS at 11:51

## 2021-09-03 RX ADMIN — CARBIDOPA AND LEVODOPA 1 TABLET(S): 25; 100 TABLET ORAL at 13:58

## 2021-09-03 RX ADMIN — SENNA PLUS 2 TABLET(S): 8.6 TABLET ORAL at 21:00

## 2021-09-03 RX ADMIN — CARBIDOPA AND LEVODOPA 1 TABLET(S): 25; 100 TABLET ORAL at 21:00

## 2021-09-03 RX ADMIN — QUETIAPINE FUMARATE 50 MILLIGRAM(S): 200 TABLET, FILM COATED ORAL at 21:01

## 2021-09-03 RX ADMIN — CARBIDOPA AND LEVODOPA 1 TABLET(S): 25; 100 TABLET ORAL at 05:26

## 2021-09-03 RX ADMIN — CEFTRIAXONE 100 MILLIGRAM(S): 500 INJECTION, POWDER, FOR SOLUTION INTRAMUSCULAR; INTRAVENOUS at 11:51

## 2021-09-03 NOTE — PROGRESS NOTE ADULT - SUBJECTIVE AND OBJECTIVE BOX
FESTUS BLAND    LVS 2E 289 D    Patient is a 81y old  Male who presents with a chief complaint of FTT, empyema r/o (03 Sep 2021 06:37)       Allergies    No Known Allergies    Intolerances        HPI:  Patient is a 81M with a PMH of HTN, DM, HLD, dementia,  Parkinson's disease, CAD s/p stents x2 (>20 years ago), CHF (EF: 50%, Moderate diastolic dysfunction (Stage II), moderate pulmonary hypertension, small-mod pericardial effusion), Lt pleural effusion, BPH, gastric ulcers, COVID infection who was sent to the ED for weakness.  Patient currently awake and alert, oriented x1, unable to provide history.  Has no acute complaints.  Discharged from NH today and was sent to the ED as he appeared weak and emaciated.  Vitals stable, labs show leukocytosis and mild lactic acidosis.  CT reveals a chronic loculated L pleural effusion.  ED attending spoke to family who made the patient DNR/DNI however did agree for chest tube drainage for effusion.  Will admit to med surg.   (26 Aug 2021 03:21)      PAST MEDICAL & SURGICAL HISTORY:  Hypercholesterolemia    DM (Diabetes Mellitus)    HTN (Hypertension)    CAD (Coronary Artery Disease)  s/p cardiac stent ( &gt; 20 years ago)    BPH (benign prostatic hyperplasia)    Coronary Stent  x 2 ( 20 years )        FAMILY HISTORY:  FH: HTN (hypertension)          MEDICATIONS   atorvastatin 10 milliGRAM(s) Oral at bedtime  carbidopa/levodopa  25/100 1 Tablet(s) Oral three times a day  cefTRIAXone   IVPB 2000 milliGRAM(s) IV Intermittent every 24 hours  cefTRIAXone   IVPB      dextrose 40% Gel 15 Gram(s) Oral once  dextrose 5%. 1000 milliLiter(s) IV Continuous <Continuous>  dextrose 5%. 1000 milliLiter(s) IV Continuous <Continuous>  dextrose 50% Injectable 25 Gram(s) IV Push once  dextrose 50% Injectable 12.5 Gram(s) IV Push once  dextrose 50% Injectable 25 Gram(s) IV Push once  enoxaparin Injectable 40 milliGRAM(s) SubCutaneous daily  glucagon  Injectable 1 milliGRAM(s) IntraMuscular once  insulin lispro (ADMELOG) corrective regimen sliding scale   SubCutaneous three times a day before meals  polyethylene glycol 3350 17 Gram(s) Oral daily PRN  QUEtiapine 50 milliGRAM(s) Oral at bedtime  senna 2 Tablet(s) Oral at bedtime  tamsulosin 0.4 milliGRAM(s) Oral at bedtime      Vital Signs Last 24 Hrs  T(C): 36.6 (03 Sep 2021 05:11), Max: 36.6 (02 Sep 2021 11:31)  T(F): 97.8 (03 Sep 2021 05:11), Max: 97.9 (02 Sep 2021 23:05)  HR: 67 (03 Sep 2021 05:11) (62 - 69)  BP: 122/68 (03 Sep 2021 05:11) (110/58 - 131/72)  BP(mean): --  RR: 18 (03 Sep 2021 05:11) (17 - 18)  SpO2: 96% (03 Sep 2021 05:11) (96% - 100%)      09-02-21 @ 07:01  -  09-03-21 @ 07:00  --------------------------------------------------------  IN: 480 mL / OUT: 640 mL / NET: -160 mL            LABS:                    WBC:  WBC Count: 7.57 K/uL (09-01 @ 07:11)  WBC Count: 6.82 K/uL (08-31 @ 06:36)      MICROBIOLOGY:  RECENT CULTURES:  08-27 .Body Fluid LEFT PLEURAL EFFUSION Streptococcus anginosus  Staphylococcus aureus   Moderate polymorphonuclear leukocytes per low power field  Numerous Gram positive cocci in pairs per oil power field   Moderate Streptococcus anginosus  Rare Staphylococcus aureus  Few Prevotella species "Susceptibilities not performed"  Rare Fusobacterium nucleatum "Susceptibilities not performed"                    Sodium:  Sodium, Serum: 137 mmol/L (09-01 @ 07:11)  Sodium, Serum: 138 mmol/L (08-31 @ 06:36)      0.45 mg/dL 09-01 @ 07:11  0.54 mg/dL 08-31 @ 06:36      Hemoglobin:  Hemoglobin: 10.3 g/dL (09-01 @ 07:11)  Hemoglobin: 9.4 g/dL (08-31 @ 06:36)      Platelets: Platelet Count - Automated: 387 K/uL (09-01 @ 07:11)  Platelet Count - Automated: 365 K/uL (08-31 @ 06:36)              RADIOLOGY & ADDITIONAL STUDIES:

## 2021-09-03 NOTE — PROGRESS NOTE ADULT - ASSESSMENT
A/P: 81M with multiple medical comorbidities a/w FTT, chronic left, loculated pleural effusion s/p IR drainage and pigtail placement 8/27, with decrease in size.   Patient with localized collection, adequately drained. Sepsis/source control obtained. Patient high risk for intervention (would likely require thoracotomy, not just VATS), and family does not want any invasive procedures.     No surgical intervention planned.  CT flushed and aspirated with 10cc Saline, without difficulty  Repeat CT chest today.  Continue CT, monitor output.  Supplemental O2 as needed.  Continue pulm Follow up

## 2021-09-03 NOTE — PROGRESS NOTE ADULT - SUBJECTIVE AND OBJECTIVE BOX
Patient seen and examined at bedside in no distress.  Demented, in deep sleep, s/p Seroquel  No acute events overnight.      MEDICATIONS  (STANDING):  atorvastatin 10 milliGRAM(s) Oral at bedtime  carbidopa/levodopa  25/100 1 Tablet(s) Oral three times a day  cefTRIAXone   IVPB 2000 milliGRAM(s) IV Intermittent every 24 hours  cefTRIAXone   IVPB      dextrose 40% Gel 15 Gram(s) Oral once  dextrose 5%. 1000 milliLiter(s) (50 mL/Hr) IV Continuous <Continuous>  dextrose 5%. 1000 milliLiter(s) (100 mL/Hr) IV Continuous <Continuous>  dextrose 50% Injectable 25 Gram(s) IV Push once  dextrose 50% Injectable 12.5 Gram(s) IV Push once  dextrose 50% Injectable 25 Gram(s) IV Push once  enoxaparin Injectable 40 milliGRAM(s) SubCutaneous daily  glucagon  Injectable 1 milliGRAM(s) IntraMuscular once  insulin lispro (ADMELOG) corrective regimen sliding scale   SubCutaneous three times a day before meals  QUEtiapine 50 milliGRAM(s) Oral at bedtime  senna 2 Tablet(s) Oral at bedtime  tamsulosin 0.4 milliGRAM(s) Oral at bedtime    MEDICATIONS  (PRN):  polyethylene glycol 3350 17 Gram(s) Oral daily PRN constipations      Vital Signs Last 24 Hrs  T(C): 36.6 (03 Sep 2021 05:11), Max: 36.6 (02 Sep 2021 11:31)  T(F): 97.8 (03 Sep 2021 05:11), Max: 97.9 (02 Sep 2021 23:05)  HR: 67 (03 Sep 2021 05:11) (62 - 69)  BP: 122/68 (03 Sep 2021 05:11) (110/58 - 131/72)  RR: 18 (03 Sep 2021 05:11) (17 - 18)  SpO2: 96% (03 Sep 2021 05:11) (96% - 100%)    GENERAL: Alert, NAD  CHEST/LUNG: Coarse breath sounds left lung. Pigtail draining bloody fluid, output 390cc/24hrs. pigtail flushed with no issue  HEART: S1S2, RRR   ABDOMEN: Soft  EXTREMITIES: No pedal edema b/l       LABS:                        10.3   7.57  )-----------( 387      ( 01 Sep 2021 07:11 )             32.6     09-01    137  |  102  |  15  ----------------------------<  98  4.2   |  33<H>  |  0.45<L>    Ca    8.7      01 Sep 2021 07:11  Phos  2.8     09-01  Mg     1.9     09-01    TPro  7.1  /  Alb  2.1<L>  /  TBili  0.6  /  DBili  x   /  AST  9<L>  /  ALT  19  /  AlkPhos  85  09-01

## 2021-09-03 NOTE — PROGRESS NOTE ADULT - SUBJECTIVE AND OBJECTIVE BOX
Patient: FESTUS BLAND [id number] 81y Male                            Hospitalist Attending Note    No complaints.  Chest tube in place.  No pain.  No SOB.     ____________________PHYSICAL EXAM:  GENERAL:  NAD, alert, oriented to person.  Confused.   HEENT: NCAT  CARDIOVASCULAR:  S1, S2  LUNGS: Coarse BS L base.   ABDOMEN:  soft, (-) tenderness, (-) distension, (+) bowel sounds, (-) guarding, (-) rebound (-) rigidity  EXTREMITIES:  no cyanosis / clubbing / edema.   ____________________    VITALS:  Vital Signs Last 24 Hrs  T(C): 36.4 (03 Sep 2021 12:11), Max: 36.6 (02 Sep 2021 23:05)  T(F): 97.6 (03 Sep 2021 12:11), Max: 97.9 (02 Sep 2021 23:05)  HR: 60 (03 Sep 2021 12:11) (60 - 69)  BP: 112/63 (03 Sep 2021 12:11) (112/63 - 131/72)  BP(mean): --  RR: 18 (03 Sep 2021 12:11) (18 - 18)  SpO2: 95% (03 Sep 2021 12:11) (95% - 100%) Daily     Daily Weight in k (03 Sep 2021 05:11)  CAPILLARY BLOOD GLUCOSE      POCT Blood Glucose.: 129 mg/dL (03 Sep 2021 16:21)  POCT Blood Glucose.: 124 mg/dL (03 Sep 2021 11:19)  POCT Blood Glucose.: 133 mg/dL (03 Sep 2021 07:29)  POCT Blood Glucose.: 118 mg/dL (02 Sep 2021 21:18)    I&O's Summary    02 Sep 2021 07:01  -  03 Sep 2021 07:00  --------------------------------------------------------  IN: 480 mL / OUT: 640 mL / NET: -160 mL        LABS:                        MEDICATIONS:  atorvastatin 10 milliGRAM(s) Oral at bedtime  carbidopa/levodopa  25/100 1 Tablet(s) Oral three times a day  cefTRIAXone   IVPB 2000 milliGRAM(s) IV Intermittent every 24 hours  cefTRIAXone   IVPB      dextrose 40% Gel 15 Gram(s) Oral once  dextrose 5%. 1000 milliLiter(s) IV Continuous <Continuous>  dextrose 5%. 1000 milliLiter(s) IV Continuous <Continuous>  dextrose 50% Injectable 25 Gram(s) IV Push once  dextrose 50% Injectable 12.5 Gram(s) IV Push once  dextrose 50% Injectable 25 Gram(s) IV Push once  enoxaparin Injectable 40 milliGRAM(s) SubCutaneous daily  glucagon  Injectable 1 milliGRAM(s) IntraMuscular once  insulin lispro (ADMELOG) corrective regimen sliding scale   SubCutaneous three times a day before meals  polyethylene glycol 3350 17 Gram(s) Oral daily PRN  QUEtiapine 50 milliGRAM(s) Oral at bedtime  senna 2 Tablet(s) Oral at bedtime  tamsulosin 0.4 milliGRAM(s) Oral at bedtime

## 2021-09-03 NOTE — PROGRESS NOTE ADULT - ASSESSMENT
81 yr old male pt with significant medical hx and dementia presents with weakness. Pt states he feels good but he is not oriented to time or place. left sided pleural effusion.  WBC count is 19.5   81 yr old male pt with significant medical hx and dementia presents with weakness.   Pt states he feels good but he is not oriented to time or place.   CT (I personally reviewed) left sided pleural effusion.  WBC count was 19.5 received  vanc zosyn in ED   Thoracic surgery recommending chest tube placement and to send fluid off for analysis which was done on friday   review of the fluid indicated exudative fluid, there are a lot of wbc cells but also a a lof of rbc. He is breathing on room air but he is coughing    8/30: no fevers, no new cbc, on RA, fluid culture with Streptococcus anginosus, MRSA PCR not detected. Cefepime continued.   8/31: The patient remains afebrile, white blood cell count is normal, and he is breathing comfortably.  Pleural fluid appears to be thick and not flowing well.  Increasing the caliber of the drainage tube was being contemplated.  Fluid is growing Streptococcus anginosus, as well as a Staphylococcus aureus.  Drainage here is the mainstay of therapy, with antibiotics adjunctive.  It is not at all clear that the patient will do well without definitive drainage, the patient's family however appears to be opposed to any kind of surgical drainage.  The current antibiotics are adequate, the isolates in question are both sensitive to cefepime.  No anaerobes have been isolated thus far.  9/1: Overall little changed. Given chronicity of effusion, I suspect the chances of cure here with current plan are negligible. The best outcome I could hope for would be an 'induction' period with IV antibiotics followed by indefinite suppression with enteral antibiotics. The odds of that plan working are not clear.   9/2: IR follow up re: management of CT appreciated. No significant change in status otherwise. Appears to be tolerating antibiotics without incident. Afebrile, HD stable. Resp status about the same. Hoping to achieve adequate initial source control and chronic suppressive therapy. Given constraints placed by the family seems to be best option. Still have skepticism that it will work. This being said, I suspect the morbidity (and perhaps mortality) associated with surgical Rx would be excessive.  9/3: No significant change.  Overall stable on antibiotics.

## 2021-09-03 NOTE — PROGRESS NOTE ADULT - ASSESSMENT
BALDO MORTENSEN 81 M 8/26/2021 1942 DR CATHERINE LANDIN     REVIEW OF SYMPTOMS      Able to give (reliable) ROS  NO     PHYSICAL EXAM    HEENT Unremarkable  atraumatic   RESP Fair air entry EXP prolonged    Harsh breath sound Resp distres mild   CARDIAC S1 S2 No S3     NO JVD    ABDOMEN SOFT BS PRESENT NOT DISTENDED No hepatosplenomegaly   PEDAL EDEMA present No calf tenderness  NO rash                                          8/26/2021  PRESENTATION.  80 y/o M  pmhx of HTN, DM, HLD, dementia,  Parkinson's disease, CAD s/p stents x2 (>20 years ago), CHF (EF: 50%, Moderate diastolic dysfunction (Stage II), moderate pulmonary hypertension, small-mod pericardial effusion), Lt pleural effusion, BPH, gastric ulcers, COVID infection who was sent on 8/26/2021  to the ED for weakness.      HOSPITAL COURSE   PL EFFSN l Seen by IR 8/27/2021   PIGTAIL 8/27  pfa cw empyema  (8/27) pfa 8/27 ldh [id number] pr 2.2 g 2 ph 7  numerous gpc pairs   pl fl 8/27 strep angiosus rare staph a   Rocephin 2g (9/2/2021) (Dr PINTO)  cefepime 1.3 (8/26) (Dr Ramirez) (Pneu)   NONVIOLENT NONSELF DESTR LEVEL ONE   8/28 8/29      BEST PRACTICE ISSUES.                                                  HEAD OF BED ELEVATION. Yes  DVT PROPHYLAXIS.  lvnx 40 (8/26)                                        VALLEJO PROPHYLAXIS.                                                                                         DIET.      dys 1 puree nectar cons carb (8/26)                     INFECTION PROPHYLAXIS.                             PROBLEM ASSESSMENT/RECOMMENDATIONS.    COVID STATUS  Ho covid (+) 6/10/2021     CHRONIC L PL EMPYEMA WITH AIR FLUID LEVEL   RO underlying cancer or infection with bp fistula   pfa 8/27 ldh [id number] pr 2.2 g 2 ph 7 chol 70   numerous gpc pairs   findings of 8/27 pl fluid cw empyema   Pt has pgtail placed 8/27 9/1/2021 Case dw Dr Apple Plan is to dc on iv abio x 6 w and with pigtauil as fami declining aggressive invasive procedures such as dcortication     INFECTION  w 8/26-8/27-8/28/2021 w 19 - 7.6 - 7.3   pr 8/26/2021 pr (-)   la 8/26/2021 la 1.9   Cr 8/26/2021 Cr .9   urine c 8/26 (-)  fluab 8/26/2021 (-)   blod c 8/26 (-)   mrsa 8/28 (-)  pl fl 8/27 strep angiosus rare staph a   Rocephin 2g (9/2/2021) (Dr PINTO)  cont rx      OVERALL PLAN.  CHRONIC L PL EMPYEMA WITH AIR FLUID LEVEL   9/1/2021 Case dw Dr Apple Plan is to dc on iv abio x 6 w and with pigtauil as fami declining aggressive invasive procedures such as dcortication   GOC dnr    TIME SPENT   Over 25 minutes aggregate care time spent on encounter; activities included   direct patient care, counseling and/or coordinating care reviewing notes, lab data/ imaging , discussion with multidisciplinary team/ patient  /family and explaining in detail risks, benefits, alternatives  of the recommendations     BALDO MORTENSEN 81 M 8/26/2021 1942 DR CATHERINE LANDIN

## 2021-09-03 NOTE — PROGRESS NOTE ADULT - ASSESSMENT
82 yo male w/ pmhx HTN, DM, HLD, dementia, parkinsons' disease, cad sp stent x2, hf w/ PeF, moderate pulm htn, BPH, gastric ulcers, recent covid infection admitted for empyema 2/2 strep anginosus    # Strep anginous empyema/  Loculated left sided effusion  - sp chest tube placement on 8/27  - on cefepime 8/26  - INFECTIOUS DISEASE Dr. Serrato following.    - CT SURGERY with Dr Weeks on the case  - pt's brother decline thoracotomy/ VATS and wanted only minimally invasive procedures  - repeat CT Chest per thoracic surgery.     # Parksinson's disease, Functional Quadriplegia - supportive care.   # Dementia  - on carbidopa/levadopa - seroquel  # BPH - flomax  # h/o CAD - plavix on hold right now due to CT placement.  Resume plavix once okay with CT surgery.     DNR / DNI

## 2021-09-04 LAB
ANION GAP SERPL CALC-SCNC: 4 MMOL/L — LOW (ref 5–17)
BUN SERPL-MCNC: 22 MG/DL — SIGNIFICANT CHANGE UP (ref 7–23)
CALCIUM SERPL-MCNC: 8.4 MG/DL — LOW (ref 8.5–10.1)
CHLORIDE SERPL-SCNC: 106 MMOL/L — SIGNIFICANT CHANGE UP (ref 96–108)
CO2 SERPL-SCNC: 28 MMOL/L — SIGNIFICANT CHANGE UP (ref 22–31)
CREAT SERPL-MCNC: 0.54 MG/DL — SIGNIFICANT CHANGE UP (ref 0.5–1.3)
GLUCOSE BLDC GLUCOMTR-MCNC: 130 MG/DL — HIGH (ref 70–99)
GLUCOSE BLDC GLUCOMTR-MCNC: 156 MG/DL — HIGH (ref 70–99)
GLUCOSE BLDC GLUCOMTR-MCNC: 177 MG/DL — HIGH (ref 70–99)
GLUCOSE BLDC GLUCOMTR-MCNC: 98 MG/DL — SIGNIFICANT CHANGE UP (ref 70–99)
GLUCOSE SERPL-MCNC: 100 MG/DL — HIGH (ref 70–99)
HCT VFR BLD CALC: 30.6 % — LOW (ref 39–50)
HGB BLD-MCNC: 9.6 G/DL — LOW (ref 13–17)
MCHC RBC-ENTMCNC: 28.2 PG — SIGNIFICANT CHANGE UP (ref 27–34)
MCHC RBC-ENTMCNC: 31.4 GM/DL — LOW (ref 32–36)
MCV RBC AUTO: 89.7 FL — SIGNIFICANT CHANGE UP (ref 80–100)
NRBC # BLD: 0 /100 WBCS — SIGNIFICANT CHANGE UP (ref 0–0)
PLATELET # BLD AUTO: 316 K/UL — SIGNIFICANT CHANGE UP (ref 150–400)
POTASSIUM SERPL-MCNC: 4.3 MMOL/L — SIGNIFICANT CHANGE UP (ref 3.5–5.3)
POTASSIUM SERPL-SCNC: 4.3 MMOL/L — SIGNIFICANT CHANGE UP (ref 3.5–5.3)
RBC # BLD: 3.41 M/UL — LOW (ref 4.2–5.8)
RBC # FLD: 15.9 % — HIGH (ref 10.3–14.5)
SODIUM SERPL-SCNC: 138 MMOL/L — SIGNIFICANT CHANGE UP (ref 135–145)
WBC # BLD: 6.65 K/UL — SIGNIFICANT CHANGE UP (ref 3.8–10.5)
WBC # FLD AUTO: 6.65 K/UL — SIGNIFICANT CHANGE UP (ref 3.8–10.5)

## 2021-09-04 PROCEDURE — 99232 SBSQ HOSP IP/OBS MODERATE 35: CPT

## 2021-09-04 RX ADMIN — CARBIDOPA AND LEVODOPA 1 TABLET(S): 25; 100 TABLET ORAL at 14:26

## 2021-09-04 RX ADMIN — ATORVASTATIN CALCIUM 10 MILLIGRAM(S): 80 TABLET, FILM COATED ORAL at 21:49

## 2021-09-04 RX ADMIN — CARBIDOPA AND LEVODOPA 1 TABLET(S): 25; 100 TABLET ORAL at 05:35

## 2021-09-04 RX ADMIN — CEFTRIAXONE 100 MILLIGRAM(S): 500 INJECTION, POWDER, FOR SOLUTION INTRAMUSCULAR; INTRAVENOUS at 11:16

## 2021-09-04 RX ADMIN — ENOXAPARIN SODIUM 40 MILLIGRAM(S): 100 INJECTION SUBCUTANEOUS at 11:16

## 2021-09-04 RX ADMIN — CARBIDOPA AND LEVODOPA 1 TABLET(S): 25; 100 TABLET ORAL at 21:49

## 2021-09-04 RX ADMIN — QUETIAPINE FUMARATE 50 MILLIGRAM(S): 200 TABLET, FILM COATED ORAL at 21:49

## 2021-09-04 RX ADMIN — SENNA PLUS 2 TABLET(S): 8.6 TABLET ORAL at 21:48

## 2021-09-04 RX ADMIN — TAMSULOSIN HYDROCHLORIDE 0.4 MILLIGRAM(S): 0.4 CAPSULE ORAL at 21:49

## 2021-09-04 RX ADMIN — Medication 1: at 17:07

## 2021-09-04 NOTE — PROGRESS NOTE ADULT - SUBJECTIVE AND OBJECTIVE BOX
FESTUS BLAND    LVS 2E 289 D    Patient is a 81y old  Male who presents with a chief complaint of FTT, empyema r/o (03 Sep 2021 16:42)       Allergies    No Known Allergies    Intolerances        HPI:  Patient is a 81M with a PMH of HTN, DM, HLD, dementia,  Parkinson's disease, CAD s/p stents x2 (>20 years ago), CHF (EF: 50%, Moderate diastolic dysfunction (Stage II), moderate pulmonary hypertension, small-mod pericardial effusion), Lt pleural effusion, BPH, gastric ulcers, COVID infection who was sent to the ED for weakness.  Patient currently awake and alert, oriented x1, unable to provide history.  Has no acute complaints.  Discharged from NH today and was sent to the ED as he appeared weak and emaciated.  Vitals stable, labs show leukocytosis and mild lactic acidosis.  CT reveals a chronic loculated L pleural effusion.  ED attending spoke to family who made the patient DNR/DNI however did agree for chest tube drainage for effusion.  Will admit to med surg.   (26 Aug 2021 03:21)      PAST MEDICAL & SURGICAL HISTORY:  Hypercholesterolemia    DM (Diabetes Mellitus)    HTN (Hypertension)    CAD (Coronary Artery Disease)  s/p cardiac stent ( &gt; 20 years ago)    BPH (benign prostatic hyperplasia)    Coronary Stent  x 2 ( 20 years )        FAMILY HISTORY:  FH: HTN (hypertension)          MEDICATIONS   atorvastatin 10 milliGRAM(s) Oral at bedtime  carbidopa/levodopa  25/100 1 Tablet(s) Oral three times a day  cefTRIAXone   IVPB 2000 milliGRAM(s) IV Intermittent every 24 hours  cefTRIAXone   IVPB      dextrose 40% Gel 15 Gram(s) Oral once  dextrose 5%. 1000 milliLiter(s) IV Continuous <Continuous>  dextrose 5%. 1000 milliLiter(s) IV Continuous <Continuous>  dextrose 50% Injectable 25 Gram(s) IV Push once  dextrose 50% Injectable 12.5 Gram(s) IV Push once  dextrose 50% Injectable 25 Gram(s) IV Push once  enoxaparin Injectable 40 milliGRAM(s) SubCutaneous daily  glucagon  Injectable 1 milliGRAM(s) IntraMuscular once  insulin lispro (ADMELOG) corrective regimen sliding scale   SubCutaneous three times a day before meals  polyethylene glycol 3350 17 Gram(s) Oral daily PRN  QUEtiapine 50 milliGRAM(s) Oral at bedtime  senna 2 Tablet(s) Oral at bedtime  tamsulosin 0.4 milliGRAM(s) Oral at bedtime      Vital Signs Last 24 Hrs  T(C): 37.2 (04 Sep 2021 04:44), Max: 37.2 (04 Sep 2021 04:44)  T(F): 99 (04 Sep 2021 04:44), Max: 99 (04 Sep 2021 04:44)  HR: 62 (04 Sep 2021 04:44) (60 - 74)  BP: 152/67 (04 Sep 2021 04:44) (109/73 - 152/67)  BP(mean): --  RR: 18 (04 Sep 2021 04:44) (18 - 18)  SpO2: 96% (04 Sep 2021 04:44) (95% - 96%)      09-03-21 @ 07:01  -  09-04-21 @ 07:00  --------------------------------------------------------  IN: 770 mL / OUT: 130 mL / NET: 640 mL            LABS:                    WBC:  WBC Count: 7.57 K/uL (09-01 @ 07:11)      MICROBIOLOGY:  RECENT CULTURES:                  Sodium:  Sodium, Serum: 137 mmol/L (09-01 @ 07:11)      0.45 mg/dL 09-01 @ 07:11      Hemoglobin:  Hemoglobin: 10.3 g/dL (09-01 @ 07:11)      Platelets: Platelet Count - Automated: 387 K/uL (09-01 @ 07:11)              RADIOLOGY & ADDITIONAL STUDIES:

## 2021-09-04 NOTE — PROGRESS NOTE ADULT - ASSESSMENT
BALDO MORTENSEN 81 M 8/26/2021 1942 DR CATHERINE LANDIN     REVIEW OF SYMPTOMS      Able to give (reliable) ROS  NO     PHYSICAL EXAM    HEENT Unremarkable  atraumatic   RESP Fair air entry EXP prolonged    Harsh breath sound Resp distres mild   CARDIAC S1 S2 No S3     NO JVD    ABDOMEN SOFT BS PRESENT NOT DISTENDED No hepatosplenomegaly   PEDAL EDEMA present No calf tenderness  NO rash                                          8/26/2021  PRESENTATION.  82 y/o M  pmhx of HTN, DM, HLD, dementia,  Parkinson's disease, CAD s/p stents x2 (>20 years ago), CHF (EF: 50%, Moderate diastolic dysfunction (Stage II), moderate pulmonary hypertension, small-mod pericardial effusion), Lt pleural effusion, BPH, gastric ulcers, COVID infection who was sent on 8/26/2021  to the ED for weakness.      HOSPITAL COURSE   PL EFFSN l Seen by IR 8/27/2021   PIGTAIL 8/27  pfa cw empyema  (8/27) pfa 8/27 ldh [id number] pr 2.2 g 2 ph 7  numerous gpc pairs   pl fl 8/27 strep angiosus rare staph a   Rocephin 2g (9/2/2021) (Dr PINTO)  cefepime 1.3 (8/26) (Dr Ramirez) (Pneu)  DCed  NONVIOLENT NONSELF DESTR LEVEL ONE   8/28 8/29      BEST PRACTICE ISSUES.                                                  HEAD OF BED ELEVATION. Yes  DVT PROPHYLAXIS.  lvnx 40 (8/26)                                        VALLEJO PROPHYLAXIS.                                                                                         DIET.      dys 1 puree nectar cons carb (8/26)                     INFECTION PROPHYLAXIS.                          OVERALL PLAN.  CHRONIC L PL EMPYEMA WITH AIR FLUID LEVEL   9/1/2021 Case dw Dr Apple Plan is to dc on iv abio x 6 w and with pigtauil as fami declining aggressive invasive procedures such as dcortication   Is on rocephin   GO   dnr    TIME SPENT   Over 25 minutes aggregate care time spent on encounter; activities included   direct patient care, counseling and/or coordinating care reviewing notes, lab data/ imaging , discussion with multidisciplinary team/ patient  /family and explaining in detail risks, benefits, alternatives  of the recommendations     BALDO MORTENSEN 81 M 8/26/2021 1942 DR CATHERINE LANDIN

## 2021-09-04 NOTE — PROGRESS NOTE ADULT - ASSESSMENT
82 yo male w/ pmhx HTN, DM, HLD, dementia, parkinsons' disease, cad sp stent x2, hf w/ PeF, moderate pulm htn, BPH, gastric ulcers, recent covid infection admitted for empyema 2/2 strep anginosus    # Strep anginous empyema/  Loculated left sided effusion  - sp chest tube placement on 8/27  - on cefepime 8/26  - INFECTIOUS DISEASE Dr. Serrato following.    - CT SURGERY with Dr Weeks on the case  - pt's brother decline thoracotomy/ VATS and wanted only minimally invasive procedures  - repeat CT Chest per thoracic surgery.     # Parksinson's disease, Functional Quadriplegia - supportive care.   # Dementia  - on carbidopa/levadopa - seroquel  # BPH - flomax  # h/o CAD - plavix on hold right now due to CT placement.  Resume plavix once okay with CT surgery.   DNR / DNI  I will attempt to contact brother to discuss plan of care post CT.

## 2021-09-04 NOTE — PROGRESS NOTE ADULT - SUBJECTIVE AND OBJECTIVE BOX
Patient: FESTUS BLAND [id number] 81y Male                            Hospitalist Attending Note    No complaints.  Chest tube in place.  No pain.  No SOB.     ____________________PHYSICAL EXAM:  GENERAL:  NAD, alert, oriented to person.  Confused.   HEENT: NCAT  CARDIOVASCULAR:  S1, S2  LUNGS: Coarse BS L base.   ABDOMEN:  soft, (-) tenderness, (-) distension, (+) bowel sounds, (-) guarding, (-) rebound (-) rigidity  EXTREMITIES:  no cyanosis / clubbing / edema.   ____________________    VITALS:  Vital Signs Last 24 Hrs  T(C): 37.1 (04 Sep 2021 11:42), Max: 37.2 (04 Sep 2021 04:44)  T(F): 98.8 (04 Sep 2021 11:42), Max: 99 (04 Sep 2021 04:44)  HR: 61 (04 Sep 2021 11:42) (61 - 74)  BP: 104/62 (04 Sep 2021 11:42) (104/62 - 152/67)  BP(mean): --  RR: 17 (04 Sep 2021 11:42) (17 - 18)  SpO2: 94% (04 Sep 2021 11:42) (94% - 96%) Daily     Daily   CAPILLARY BLOOD GLUCOSE      POCT Blood Glucose.: 130 mg/dL (04 Sep 2021 11:14)  POCT Blood Glucose.: 98 mg/dL (04 Sep 2021 08:22)  POCT Blood Glucose.: 131 mg/dL (03 Sep 2021 20:55)  POCT Blood Glucose.: 129 mg/dL (03 Sep 2021 16:21)    I&O's Summary    03 Sep 2021 07:01  -  04 Sep 2021 07:00  --------------------------------------------------------  IN: 770 mL / OUT: 130 mL / NET: 640 mL        LABS:    09-04    138  |  106  |  22  ----------------------------<  100<H>  4.3   |  28  |  0.54    Ca    8.4<L>      04 Sep 2021 09:47                    MEDICATIONS:  atorvastatin 10 milliGRAM(s) Oral at bedtime  carbidopa/levodopa  25/100 1 Tablet(s) Oral three times a day  cefTRIAXone   IVPB 2000 milliGRAM(s) IV Intermittent every 24 hours  cefTRIAXone   IVPB      dextrose 40% Gel 15 Gram(s) Oral once  dextrose 5%. 1000 milliLiter(s) IV Continuous <Continuous>  dextrose 5%. 1000 milliLiter(s) IV Continuous <Continuous>  dextrose 50% Injectable 25 Gram(s) IV Push once  dextrose 50% Injectable 12.5 Gram(s) IV Push once  dextrose 50% Injectable 25 Gram(s) IV Push once  enoxaparin Injectable 40 milliGRAM(s) SubCutaneous daily  glucagon  Injectable 1 milliGRAM(s) IntraMuscular once  insulin lispro (ADMELOG) corrective regimen sliding scale   SubCutaneous three times a day before meals  polyethylene glycol 3350 17 Gram(s) Oral daily PRN  QUEtiapine 50 milliGRAM(s) Oral at bedtime  senna 2 Tablet(s) Oral at bedtime  tamsulosin 0.4 milliGRAM(s) Oral at bedtime

## 2021-09-05 LAB
ANION GAP SERPL CALC-SCNC: 3 MMOL/L — LOW (ref 5–17)
BUN SERPL-MCNC: 21 MG/DL — SIGNIFICANT CHANGE UP (ref 7–23)
CALCIUM SERPL-MCNC: 8.3 MG/DL — LOW (ref 8.5–10.1)
CHLORIDE SERPL-SCNC: 101 MMOL/L — SIGNIFICANT CHANGE UP (ref 96–108)
CO2 SERPL-SCNC: 31 MMOL/L — SIGNIFICANT CHANGE UP (ref 22–31)
CREAT SERPL-MCNC: 0.67 MG/DL — SIGNIFICANT CHANGE UP (ref 0.5–1.3)
GLUCOSE BLDC GLUCOMTR-MCNC: 137 MG/DL — HIGH (ref 70–99)
GLUCOSE BLDC GLUCOMTR-MCNC: 162 MG/DL — HIGH (ref 70–99)
GLUCOSE BLDC GLUCOMTR-MCNC: 179 MG/DL — HIGH (ref 70–99)
GLUCOSE BLDC GLUCOMTR-MCNC: 88 MG/DL — SIGNIFICANT CHANGE UP (ref 70–99)
GLUCOSE SERPL-MCNC: 78 MG/DL — SIGNIFICANT CHANGE UP (ref 70–99)
HCT VFR BLD CALC: 29.6 % — LOW (ref 39–50)
HGB BLD-MCNC: 9.4 G/DL — LOW (ref 13–17)
MCHC RBC-ENTMCNC: 28.1 PG — SIGNIFICANT CHANGE UP (ref 27–34)
MCHC RBC-ENTMCNC: 31.8 GM/DL — LOW (ref 32–36)
MCV RBC AUTO: 88.4 FL — SIGNIFICANT CHANGE UP (ref 80–100)
NRBC # BLD: 0 /100 WBCS — SIGNIFICANT CHANGE UP (ref 0–0)
PLATELET # BLD AUTO: 299 K/UL — SIGNIFICANT CHANGE UP (ref 150–400)
POTASSIUM SERPL-MCNC: 4 MMOL/L — SIGNIFICANT CHANGE UP (ref 3.5–5.3)
POTASSIUM SERPL-SCNC: 4 MMOL/L — SIGNIFICANT CHANGE UP (ref 3.5–5.3)
RBC # BLD: 3.35 M/UL — LOW (ref 4.2–5.8)
RBC # FLD: 15.9 % — HIGH (ref 10.3–14.5)
SODIUM SERPL-SCNC: 135 MMOL/L — SIGNIFICANT CHANGE UP (ref 135–145)
WBC # BLD: 5.55 K/UL — SIGNIFICANT CHANGE UP (ref 3.8–10.5)
WBC # FLD AUTO: 5.55 K/UL — SIGNIFICANT CHANGE UP (ref 3.8–10.5)

## 2021-09-05 PROCEDURE — 71250 CT THORAX DX C-: CPT | Mod: 26

## 2021-09-05 PROCEDURE — 99232 SBSQ HOSP IP/OBS MODERATE 35: CPT

## 2021-09-05 PROCEDURE — 99497 ADVNCD CARE PLAN 30 MIN: CPT

## 2021-09-05 RX ADMIN — SENNA PLUS 2 TABLET(S): 8.6 TABLET ORAL at 22:17

## 2021-09-05 RX ADMIN — ENOXAPARIN SODIUM 40 MILLIGRAM(S): 100 INJECTION SUBCUTANEOUS at 12:15

## 2021-09-05 RX ADMIN — TAMSULOSIN HYDROCHLORIDE 0.4 MILLIGRAM(S): 0.4 CAPSULE ORAL at 22:17

## 2021-09-05 RX ADMIN — Medication 1: at 16:39

## 2021-09-05 RX ADMIN — CARBIDOPA AND LEVODOPA 1 TABLET(S): 25; 100 TABLET ORAL at 16:42

## 2021-09-05 RX ADMIN — ATORVASTATIN CALCIUM 10 MILLIGRAM(S): 80 TABLET, FILM COATED ORAL at 22:16

## 2021-09-05 RX ADMIN — QUETIAPINE FUMARATE 50 MILLIGRAM(S): 200 TABLET, FILM COATED ORAL at 22:16

## 2021-09-05 RX ADMIN — CEFTRIAXONE 100 MILLIGRAM(S): 500 INJECTION, POWDER, FOR SOLUTION INTRAMUSCULAR; INTRAVENOUS at 12:16

## 2021-09-05 RX ADMIN — CARBIDOPA AND LEVODOPA 1 TABLET(S): 25; 100 TABLET ORAL at 05:25

## 2021-09-05 RX ADMIN — CARBIDOPA AND LEVODOPA 1 TABLET(S): 25; 100 TABLET ORAL at 22:17

## 2021-09-05 NOTE — PROGRESS NOTE ADULT - CONVERSATION DETAILS
Goals of care - I had a lengthy conversation with pt's brother Berto.   We discussed the comorbid conditions, hospital care, and prognosis.  We also discussed advanced directives - made aware of limited prognosis if patient were to be intubated or resuscitated, in consideration of age and comorbid conditions.  He agreed with DNR / DNI, wished for nonsurgical management of empyema, acknowledging the risks, benefits and altneratives.     Total time spent on patient care discussion = 20 minutes.

## 2021-09-05 NOTE — PROGRESS NOTE ADULT - SUBJECTIVE AND OBJECTIVE BOX
Surgery NP note  Patient seen and examined bedside resting comfortably.  No complaints offered. Abdominal pain is well controlled.  Denies nausea and vomiting. Tolerating diet.  Normal flatus/BM.     T(F): 98 (09-05-21 @ 05:21), Max: 98.8 (09-04-21 @ 11:42)  HR: 68 (09-05-21 @ 05:21) (61 - 89)  BP: 114/69 (09-05-21 @ 05:21) (104/62 - 114/69)  RR: 18 (09-05-21 @ 05:21) (17 - 18)  SpO2: 98% (09-05-21 @ 05:21) (94% - 98%)  Wt(kg): --  CAPILLARY BLOOD GLUCOSE      POCT Blood Glucose.: 156 mg/dL (04 Sep 2021 22:32)  POCT Blood Glucose.: 177 mg/dL (04 Sep 2021 16:26)  POCT Blood Glucose.: 130 mg/dL (04 Sep 2021 11:14)  POCT Blood Glucose.: 98 mg/dL (04 Sep 2021 08:22)      PHYSICAL EXAM:  General: NAD, WDWN.   Neuro:  Alert & responsive  HEENT: NCAT, EOMI, conjunctiva clear  CV: +S1+S2 regular rate and rhythm  Lung: Coarse breath sounds left lung. Pigtail draining  s/s fluid, output 150cc/24hrs. pigtail flushed, Patent  Abdomen: soft, Non tender, No Distension. Normal active BS  Extremities: no pedal edema or calf tenderness noted     LABS:     I&O's Detail    04 Sep 2021 07:01  -  05 Sep 2021 07:00  --------------------------------------------------------  IN:    IV PiggyBack: 50 mL  Total IN: 50 mL    OUT:    Chest Tube (mL): 150 mL  Total OUT: 150 mL    Total NET: -100 mL           Surgery NP note  Patient seen and examined bedside resting comfortably.  No complaints offered. Pleasantly confused  Denies nausea and vomiting. Tolerating diet.  Normal flatus/BM. Denies Dyspnea or SOB    T(F): 98 (09-05-21 @ 05:21), Max: 98.8 (09-04-21 @ 11:42)  HR: 68 (09-05-21 @ 05:21) (61 - 89)  BP: 114/69 (09-05-21 @ 05:21) (104/62 - 114/69)  RR: 18 (09-05-21 @ 05:21) (17 - 18)  SpO2: 98% (09-05-21 @ 05:21) (94% - 98%)  Wt(kg): --  CAPILLARY BLOOD GLUCOSE      POCT Blood Glucose.: 156 mg/dL (04 Sep 2021 22:32)  POCT Blood Glucose.: 177 mg/dL (04 Sep 2021 16:26)  POCT Blood Glucose.: 130 mg/dL (04 Sep 2021 11:14)  POCT Blood Glucose.: 98 mg/dL (04 Sep 2021 08:22)      PHYSICAL EXAM:  General: NAD, WDWN.   Neuro:  Alert & responsive, confused on restraints  HEENT: NCAT, EOMI, conjunctiva clear  CV: +S1+S2 regular rate and rhythm  Lung: Coarse breath sounds left lung. Pigtail draining  s/s fluid, output 150cc/24hrs. pigtail flushed, Patent  Abdomen: soft, Non tender, No Distension. Normal active BS  Extremities: no pedal edema or calf tenderness noted     LABS:     I&O's Detail    04 Sep 2021 07:01  -  05 Sep 2021 07:00  --------------------------------------------------------  IN:    IV PiggyBack: 50 mL  Total IN: 50 mL    OUT:    Chest Tube (mL): 150 mL  Total OUT: 150 mL    Total NET: -100 mL

## 2021-09-05 NOTE — PROGRESS NOTE ADULT - ASSESSMENT
80 yo male w/ pmhx HTN, DM, HLD, dementia, parkinsons' disease, cad sp stent x2, hf w/ PeF, moderate pulm htn, BPH, gastric ulcers, recent covid infection admitted for empyema 2/2 strep anginosus    # Strep anginous empyema/  Loculated left sided effusion  - sp chest tube placement on 8/27  - on cefepime 8/26  - INFECTIOUS DISEASE Dr. Serrato following.    - CT SURGERY with Dr Weeks on the case  - pt's brother decline thoracotomy/ VATS and wanted only minimally invasive procedures  - repeat CT Chest per thoracic surgery.     # Parksinson's disease, Functional Quadriplegia - supportive care.   # Dementia  - on carbidopa/levadopa - seroquel  # BPH - flomax  # h/o CAD - plavix on hold right now due to CT placement.  Resume plavix once okay with CT surgery.   DNR / DNI  Plan of care d/w brother Berto [phone].  He is aware of the difficulty of treating empyema with conservative measures.  Agrees with awaiting CT Chest.  He emphasizes, given pt's comorbidities, he wishes for nonsurgical management.  DNR / DNI.  Agreeable to palliative care input.

## 2021-09-05 NOTE — PROGRESS NOTE ADULT - ASSESSMENT
A/P: 81M with multiple medical comorbidities a/w FTT, chronic left, loculated pleural effusion s/p IR drainage and pigtail placement 8/27, with decrease in size.   Patient with localized collection, adequately drained. Sepsis/source control obtained. Patient high risk for intervention (would likely require thoracotomy, not just VATS), and family does not want any invasive procedures.     No surgical intervention planned.  Await CT chest.  Continue CT, monitor output.  Supplemental O2 as needed.  Continue pulm Follow up   A/P: 81M with multiple medical comorbidities a/w FTT, chronic left, loculated pleural effusion s/p IR drainage and pigtail placement 8/27, with decrease in size.   Patient with localized collection, adequately drained. Sepsis/source control obtained. Patient high risk for intervention (would likely require thoracotomy, not just VATS), and family does not want any invasive procedures.     No surgical intervention planned.  Await CT chest.  Continue Chest Tube, monitor output. Flush BID   Supplemental O2 as needed.  Continue pulm Follow up  Will discuss with Dr Apple

## 2021-09-05 NOTE — PROGRESS NOTE ADULT - SUBJECTIVE AND OBJECTIVE BOX
FESTUS BLAND    LVS 2E 289 D    Patient is a 81y old  Male who presents with a chief complaint of FTT, empyema r/o (05 Sep 2021 07:55)       Allergies    No Known Allergies    Intolerances        HPI:  Patient is a 81M with a PMH of HTN, DM, HLD, dementia,  Parkinson's disease, CAD s/p stents x2 (>20 years ago), CHF (EF: 50%, Moderate diastolic dysfunction (Stage II), moderate pulmonary hypertension, small-mod pericardial effusion), Lt pleural effusion, BPH, gastric ulcers, COVID infection who was sent to the ED for weakness.  Patient currently awake and alert, oriented x1, unable to provide history.  Has no acute complaints.  Discharged from NH today and was sent to the ED as he appeared weak and emaciated.  Vitals stable, labs show leukocytosis and mild lactic acidosis.  CT reveals a chronic loculated L pleural effusion.  ED attending spoke to family who made the patient DNR/DNI however did agree for chest tube drainage for effusion.  Will admit to med surg.   (26 Aug 2021 03:21)      PAST MEDICAL & SURGICAL HISTORY:  Hypercholesterolemia    DM (Diabetes Mellitus)    HTN (Hypertension)    CAD (Coronary Artery Disease)  s/p cardiac stent ( &gt; 20 years ago)    BPH (benign prostatic hyperplasia)    Coronary Stent  x 2 ( 20 years )        FAMILY HISTORY:  FH: HTN (hypertension)          MEDICATIONS   atorvastatin 10 milliGRAM(s) Oral at bedtime  carbidopa/levodopa  25/100 1 Tablet(s) Oral three times a day  cefTRIAXone   IVPB 2000 milliGRAM(s) IV Intermittent every 24 hours  cefTRIAXone   IVPB      dextrose 40% Gel 15 Gram(s) Oral once  dextrose 5%. 1000 milliLiter(s) IV Continuous <Continuous>  dextrose 5%. 1000 milliLiter(s) IV Continuous <Continuous>  dextrose 50% Injectable 25 Gram(s) IV Push once  dextrose 50% Injectable 12.5 Gram(s) IV Push once  dextrose 50% Injectable 25 Gram(s) IV Push once  enoxaparin Injectable 40 milliGRAM(s) SubCutaneous daily  glucagon  Injectable 1 milliGRAM(s) IntraMuscular once  insulin lispro (ADMELOG) corrective regimen sliding scale   SubCutaneous three times a day before meals  polyethylene glycol 3350 17 Gram(s) Oral daily PRN  QUEtiapine 50 milliGRAM(s) Oral at bedtime  senna 2 Tablet(s) Oral at bedtime  tamsulosin 0.4 milliGRAM(s) Oral at bedtime      Vital Signs Last 24 Hrs  T(C): 36.7 (05 Sep 2021 05:21), Max: 37.1 (04 Sep 2021 11:42)  T(F): 98 (05 Sep 2021 05:21), Max: 98.8 (04 Sep 2021 11:42)  HR: 68 (05 Sep 2021 05:21) (61 - 89)  BP: 114/69 (05 Sep 2021 05:21) (104/62 - 114/69)  BP(mean): --  RR: 18 (05 Sep 2021 05:21) (17 - 18)  SpO2: 98% (05 Sep 2021 05:21) (94% - 98%)      09-04-21 @ 07:01  -  09-05-21 @ 07:00  --------------------------------------------------------  IN: 50 mL / OUT: 150 mL / NET: -100 mL            LABS:                        9.4    5.55  )-----------( 299      ( 05 Sep 2021 08:29 )             29.6     09-05    135  |  101  |  21  ----------------------------<  78  4.0   |  31  |  0.67    Ca    8.3<L>      05 Sep 2021 08:29                WBC:  WBC Count: 5.55 K/uL (09-05 @ 08:29)  WBC Count: 6.65 K/uL (09-04 @ 14:42)      MICROBIOLOGY:  RECENT CULTURES:                  Sodium:  Sodium, Serum: 135 mmol/L (09-05 @ 08:29)  Sodium, Serum: 138 mmol/L (09-04 @ 09:47)      0.67 mg/dL 09-05 @ 08:29  0.54 mg/dL 09-04 @ 09:47      Hemoglobin:  Hemoglobin: 9.4 g/dL (09-05 @ 08:29)  Hemoglobin: 9.6 g/dL (09-04 @ 14:42)      Platelets: Platelet Count - Automated: 299 K/uL (09-05 @ 08:29)  Platelet Count - Automated: 316 K/uL (09-04 @ 14:42)              RADIOLOGY & ADDITIONAL STUDIES:

## 2021-09-05 NOTE — PROGRESS NOTE ADULT - ASSESSMENT
BALDO MORTENSEN 81 M 8/26/2021 1942 DR CATHERINE LANDIN     REVIEW OF SYMPTOMS      Able to give (reliable) ROS  NO     PHYSICAL EXAM    HEENT Unremarkable  atraumatic   RESP Fair air entry EXP prolonged    Harsh breath sound Resp distres mild   CARDIAC S1 S2 No S3     NO JVD    ABDOMEN SOFT BS PRESENT NOT DISTENDED No hepatosplenomegaly   PEDAL EDEMA present No calf tenderness  NO rash                                          8/26/2021  PRESENTATION.  80 y/o M  pmhx of HTN, DM, HLD, dementia,  Parkinson's disease, CAD s/p stents x2 (>20 years ago), CHF (EF: 50%, Moderate diastolic dysfunction (Stage II), moderate pulmonary hypertension, small-mod pericardial effusion), Lt pleural effusion, BPH, gastric ulcers, COVID infection who was sent on 8/26/2021  to the ED for weakness.      HOSPITAL COURSE   PL EFFSN l Seen by IR 8/27/2021   PIGTAIL 8/27  pfa cw empyema  (8/27) pfa 8/27 ldh 84819 pr 2.2 g 2 ph 7  numerous gpc pairs   pl fl 8/27 strep angiosus rare staph a   Rocephin 2g (9/2/2021) (Dr PINTO)  cefepime 1.3 (8/26) (Dr Ramirez) (Pneu)  DCed  NONVIOLENT NONSELF DESTR LEVEL ONE   8/28 8/29 8/26/2021 PRESENTING PROBLEMS   WEAKNESS  L PLEURAL EFFUSION   AIR FLUID LEVEL PLEURAL EFFSN POA   ENLARGED PROSTATE  DEMENTIA     BEST PRACTICE ISSUES.                                                  HEAD OF BED ELEVATION. Yes  DVT PROPHYLAXIS.  lvnx 40 (8/26)                                        VALLEJO PROPHYLAXIS.                                                                                         DIET.      dys 1 puree nectar cons carb (8/26)                     INFECTION PROPHYLAXIS.                      GENERAL ISSUES  GOC ADVANCED DIRECTIVE.    dnr                        ALLERGY.    nka                        WEIGHT.         8/26/2021 54                           BMI.                   8/26/2021 18         PATIENT DATA   TUBES  PROCEDURES.     PIGTAIL 8/27 maroon drainage     ABG.   8/26/2021 ra 745/37/78     OXYGENATION.       8/26-8/27/2021 ra 97%    - ra 96%         VITALS/IO/VENT/DRIPS.    9/5/2021 afeb 70 110/60       OVERALL PLAN.  CHRONIC L PL EMPYEMA WITH AIR FLUID LEVEL   9/1/2021 Case dw Dr Apple Plan is to dc on iv abio x 6 w and with pigtauil as fami declining aggressive invasive procedures such as dcortication   Is on rocephin   [facility]   dnr        TIME SPENT   Over 25 minutes aggregate care time spent on encounter; activities included   direct patient care, counseling and/or coordinating care reviewing notes, lab data/ imaging , discussion with multidisciplinary team/ patient  /family and explaining in detail risks, benefits, alternatives  of the recommendations     BALDO MORTENSEN 81 M 8/26/2021 1942 DR CATHERINE LANDIN

## 2021-09-05 NOTE — PROGRESS NOTE ADULT - SUBJECTIVE AND OBJECTIVE BOX
Patient: FESTUS BLAND [id number] 81y Male                            Hospitalist Attending Note    No complaints.  Chest tube in place.  No pain.  No SOB.   Brother Berto at bedside.     ____________________PHYSICAL EXAM:  GENERAL:  NAD, alert, oriented to person.  Confused.   HEENT: NCAT  CARDIOVASCULAR:  S1, S2  LUNGS: Coarse BS L base.   ABDOMEN:  soft, (-) tenderness, (-) distension, (+) bowel sounds, (-) guarding, (-) rebound (-) rigidity  EXTREMITIES:  no cyanosis / clubbing / edema.   ____________________    VITALS:  Vital Signs Last 24 Hrs  T(C): 36.6 (05 Sep 2021 12:00), Max: 37.1 (04 Sep 2021 17:32)  T(F): 97.8 (05 Sep 2021 12:00), Max: 98.8 (04 Sep 2021 17:32)  HR: 78 (05 Sep 2021 12:00) (68 - 89)  BP: 111/65 (05 Sep 2021 12:00) (109/67 - 114/69)  BP(mean): --  RR: 18 (05 Sep 2021 12:00) (18 - 18)  SpO2: 100% (05 Sep 2021 12:00) (98% - 100%) Daily     Daily   CAPILLARY BLOOD GLUCOSE      POCT Blood Glucose.: 162 mg/dL (05 Sep 2021 16:21)  POCT Blood Glucose.: 137 mg/dL (05 Sep 2021 11:54)  POCT Blood Glucose.: 88 mg/dL (05 Sep 2021 08:22)  POCT Blood Glucose.: 156 mg/dL (04 Sep 2021 22:32)    I&O's Summary    04 Sep 2021 07:01  -  05 Sep 2021 07:00  --------------------------------------------------------  IN: 50 mL / OUT: 150 mL / NET: -100 mL        LABS:                        9.4    5.55  )-----------( 299      ( 05 Sep 2021 08:29 )             29.6     09-05    135  |  101  |  21  ----------------------------<  78  4.0   |  31  |  0.67    Ca    8.3<L>      05 Sep 2021 08:29                    MEDICATIONS:  atorvastatin 10 milliGRAM(s) Oral at bedtime  carbidopa/levodopa  25/100 1 Tablet(s) Oral three times a day  cefTRIAXone   IVPB 2000 milliGRAM(s) IV Intermittent every 24 hours  cefTRIAXone   IVPB      dextrose 40% Gel 15 Gram(s) Oral once  dextrose 5%. 1000 milliLiter(s) IV Continuous <Continuous>  dextrose 5%. 1000 milliLiter(s) IV Continuous <Continuous>  dextrose 50% Injectable 25 Gram(s) IV Push once  dextrose 50% Injectable 12.5 Gram(s) IV Push once  dextrose 50% Injectable 25 Gram(s) IV Push once  enoxaparin Injectable 40 milliGRAM(s) SubCutaneous daily  glucagon  Injectable 1 milliGRAM(s) IntraMuscular once  insulin lispro (ADMELOG) corrective regimen sliding scale   SubCutaneous three times a day before meals  polyethylene glycol 3350 17 Gram(s) Oral daily PRN  QUEtiapine 50 milliGRAM(s) Oral at bedtime  senna 2 Tablet(s) Oral at bedtime  tamsulosin 0.4 milliGRAM(s) Oral at bedtime

## 2021-09-06 LAB
GLUCOSE BLDC GLUCOMTR-MCNC: 114 MG/DL — HIGH (ref 70–99)
GLUCOSE BLDC GLUCOMTR-MCNC: 132 MG/DL — HIGH (ref 70–99)
GLUCOSE BLDC GLUCOMTR-MCNC: 150 MG/DL — HIGH (ref 70–99)
GLUCOSE BLDC GLUCOMTR-MCNC: 167 MG/DL — HIGH (ref 70–99)

## 2021-09-06 PROCEDURE — 99232 SBSQ HOSP IP/OBS MODERATE 35: CPT

## 2021-09-06 RX ADMIN — QUETIAPINE FUMARATE 50 MILLIGRAM(S): 200 TABLET, FILM COATED ORAL at 21:07

## 2021-09-06 RX ADMIN — CARBIDOPA AND LEVODOPA 1 TABLET(S): 25; 100 TABLET ORAL at 13:41

## 2021-09-06 RX ADMIN — CARBIDOPA AND LEVODOPA 1 TABLET(S): 25; 100 TABLET ORAL at 05:28

## 2021-09-06 RX ADMIN — CEFTRIAXONE 100 MILLIGRAM(S): 500 INJECTION, POWDER, FOR SOLUTION INTRAMUSCULAR; INTRAVENOUS at 11:54

## 2021-09-06 RX ADMIN — ATORVASTATIN CALCIUM 10 MILLIGRAM(S): 80 TABLET, FILM COATED ORAL at 21:07

## 2021-09-06 RX ADMIN — ENOXAPARIN SODIUM 40 MILLIGRAM(S): 100 INJECTION SUBCUTANEOUS at 11:57

## 2021-09-06 RX ADMIN — TAMSULOSIN HYDROCHLORIDE 0.4 MILLIGRAM(S): 0.4 CAPSULE ORAL at 21:07

## 2021-09-06 RX ADMIN — SENNA PLUS 2 TABLET(S): 8.6 TABLET ORAL at 21:07

## 2021-09-06 RX ADMIN — CARBIDOPA AND LEVODOPA 1 TABLET(S): 25; 100 TABLET ORAL at 21:07

## 2021-09-06 NOTE — PROGRESS NOTE ADULT - SUBJECTIVE AND OBJECTIVE BOX
CT scan shows reduction in size of the loculated hydropneumothorax    Drainage 150 cc/24 hpurs    Plan    Long term ABX  Obtain miniatrium

## 2021-09-06 NOTE — PROGRESS NOTE ADULT - ASSESSMENT
BALDO MORTENSEN 81 M 8/26/2021 1942 DR CATHERINE LANDIN     REVIEW OF SYMPTOMS      Able to give (reliable) ROS  NO     PHYSICAL EXAM    HEENT Unremarkable  atraumatic   RESP Fair air entry EXP prolonged    Harsh breath sound Resp distres mild   CARDIAC S1 S2 No S3     NO JVD    ABDOMEN SOFT BS PRESENT NOT DISTENDED No hepatosplenomegaly   PEDAL EDEMA present No calf tenderness  NO rash                                        8/26/2021  PRESENTATION.  80 y/o M  pmhx of HTN, DM, HLD, dementia,  Parkinson's disease, CAD s/p stents x2 (>20 years ago), CHF (EF: 50%, Moderate diastolic dysfunction (Stage II), moderate pulmonary hypertension, small-mod pericardial effusion), Lt pleural effusion, BPH, gastric ulcers, COVID infection who was sent on 8/26/2021  to the ED for weakness.      HOSPITAL COURSE   PL EFFSN l Seen by IR 8/27/2021   PIGTAIL 8/27  pfa cw empyema  (8/27) pfa 8/27 ldh 85412 pr 2.2 g 2 ph 7  numerous gpc pairs   pl fl 8/27 strep angiosus rare staph a   Rocephin 2g (9/2/2021) (Dr PINTO)  cefepime 1.3 (8/26) (Dr Ramirez) (Pneu)  DCed  NONVIOLENT NONSELF DESTR LEVEL ONE   8/28 8/29 8/26/2021 PRESENTING PROBLEMS   WEAKNESS  L PLEURAL EFFUSION   AIR FLUID LEVEL PLEURAL EFFSN POA   ENLARGED PROSTATE  DEMENTIA     BEST PRACTICE ISSUES.                                                  HEAD OF BED ELEVATION. Yes  DVT PROPHYLAXIS.  lvnx 40 (8/26)                                        VALLEJO PROPHYLAXIS.                                                                                         DIET.      dys 1 puree nectar cons carb (8/26)                     INFECTION PROPHYLAXIS.                      GENERAL ISSUES  GOC ADVANCED DIRECTIVE.    dnr                        ALLERGY.    nka                        WEIGHT.         8/26/2021 54                           BMI.                   8/26/2021 18         PATIENT DATA   TUBES  PROCEDURES.     PIGTAIL 8/27 maroon drainage     ABG.   8/26/2021 ra 745/37/78     OXYGENATION.       8/26-8/27/2021 ra 97%    - ra 96%         VITALS/IO/VENT/DRIPS.   9/6/2021 afeb 72 120/60      PROBLEM ASSESSMENT/RECOMMENDATIONS.    COVID STATUS  Ho covid (+) 6/10/2021     CHRONIC L PL EMPYEMA WITH AIR FLUID LEVEL   RO underlying cancer or infection with bp fistula   pfa 8/27 ldh 46640 pr 2.2 g 2 ph 7 chol 70   numerous gpc pairs   findings of 8/27 pl fluid cw empyema   Pt has pgtail placed 8/27 9/1/2021 Case dw Dr Apple Plan is to dc on iv abio x 6 w and with pigtauil as fami declining aggressive invasive procedures such as dcortication     INFECTION  w 8/26-8/27-8/28/2021 w 19 - 7.6 - 7.3   pr 8/26/2021 pr (-)   la 8/26/2021 la 1.9   Cr 8/26/2021 Cr .9   urine c 8/26 (-)  fluab 8/26/2021 (-)   blod c 8/26 (-)   mrsa 8/28 (-)  pl fl 8/27 strep angiosus rare staph a   Rocephin 2g (9/2/2021) (Dr PINTO)  cont rx      OVERALL PLAN.  CHRONIC L PL EMPYEMA WITH AIR FLUID LEVEL   9/1/2021 Case dw Dr Apple Plan is to dc on iv abio x 6 w and with pigtauil as fami declining aggressive invasive procedures such as dcortication   Is on rocephin   [facility]   dnr    TIME SPENT   Over 25 minutes aggregate care time spent on encounter; activities included   direct patient care, counseling and/or coordinating care reviewing notes, lab data/ imaging , discussion with multidisciplinary team/ patient  /family and explaining in detail risks, benefits, alternatives  of the recommendations     BALDO MORTENSEN 81 M 8/26/2021 1942 DR CATHERINE LANDIN

## 2021-09-06 NOTE — PROGRESS NOTE ADULT - ASSESSMENT
80 yo male w/ pmhx HTN, DM, HLD, dementia, parkinsons' disease, cad sp stent x2, hf w/ PeF, moderate pulm htn, BPH, gastric ulcers, recent covid infection admitted for empyema 2/2 strep anginosus    # Strep anginous empyema/  Loculated left sided effusion  - sp chest tube placement on 8/27  - on cefepime 8/26  - INFECTIOUS DISEASE Dr. Serrato following.    - CT SURGERY with Dr Weeks on the case  - pt's brother decline thoracotomy/ VATS and wanted only minimally invasive procedures  - repeat CT Chest per thoracic surgery showing now R sided infiltrate, ? aspiration.  S&S evaluation requested.     # Parksinson's disease, Functional Quadriplegia - supportive care.   # Dementia  - on carbidopa/levadopa - seroquel  # BPH - flomax  # h/o CAD - plavix on hold right now due to CT placement.  Resume plavix once okay with CT surgery.   DNR / DNI  Plan of care d/w brother Berto [phone].  He is aware of the difficulty of treating empyema with conservative measures.  Agrees with awaiting CT Chest.  He emphasizes, given pt's comorbidities, he wishes for nonsurgical management.  DNR / DNI.  Agreeable to palliative care input.

## 2021-09-06 NOTE — PROGRESS NOTE ADULT - SUBJECTIVE AND OBJECTIVE BOX
FESTUS BLAND    LVS 2E 289 D    Patient is a 81y old  Male who presents with a chief complaint of FTT, empyema r/o (05 Sep 2021 16:46)       Allergies    No Known Allergies    Intolerances        HPI:  Patient is a 81M with a PMH of HTN, DM, HLD, dementia,  Parkinson's disease, CAD s/p stents x2 (>20 years ago), CHF (EF: 50%, Moderate diastolic dysfunction (Stage II), moderate pulmonary hypertension, small-mod pericardial effusion), Lt pleural effusion, BPH, gastric ulcers, COVID infection who was sent to the ED for weakness.  Patient currently awake and alert, oriented x1, unable to provide history.  Has no acute complaints.  Discharged from NH today and was sent to the ED as he appeared weak and emaciated.  Vitals stable, labs show leukocytosis and mild lactic acidosis.  CT reveals a chronic loculated L pleural effusion.  ED attending spoke to family who made the patient DNR/DNI however did agree for chest tube drainage for effusion.  Will admit to med surg.   (26 Aug 2021 03:21)      PAST MEDICAL & SURGICAL HISTORY:  Hypercholesterolemia    DM (Diabetes Mellitus)    HTN (Hypertension)    CAD (Coronary Artery Disease)  s/p cardiac stent ( &gt; 20 years ago)    BPH (benign prostatic hyperplasia)    Coronary Stent  x 2 ( 20 years )        FAMILY HISTORY:  FH: HTN (hypertension)          MEDICATIONS   atorvastatin 10 milliGRAM(s) Oral at bedtime  carbidopa/levodopa  25/100 1 Tablet(s) Oral three times a day  cefTRIAXone   IVPB 2000 milliGRAM(s) IV Intermittent every 24 hours  cefTRIAXone   IVPB      dextrose 40% Gel 15 Gram(s) Oral once  dextrose 5%. 1000 milliLiter(s) IV Continuous <Continuous>  dextrose 5%. 1000 milliLiter(s) IV Continuous <Continuous>  dextrose 50% Injectable 25 Gram(s) IV Push once  dextrose 50% Injectable 12.5 Gram(s) IV Push once  dextrose 50% Injectable 25 Gram(s) IV Push once  enoxaparin Injectable 40 milliGRAM(s) SubCutaneous daily  glucagon  Injectable 1 milliGRAM(s) IntraMuscular once  insulin lispro (ADMELOG) corrective regimen sliding scale   SubCutaneous three times a day before meals  polyethylene glycol 3350 17 Gram(s) Oral daily PRN  QUEtiapine 50 milliGRAM(s) Oral at bedtime  senna 2 Tablet(s) Oral at bedtime  tamsulosin 0.4 milliGRAM(s) Oral at bedtime      Vital Signs Last 24 Hrs  T(C): 36.6 (06 Sep 2021 05:58), Max: 36.6 (05 Sep 2021 12:00)  T(F): 97.8 (06 Sep 2021 05:58), Max: 97.8 (05 Sep 2021 12:00)  HR: 65 (06 Sep 2021 05:58) (65 - 91)  BP: 103/60 (06 Sep 2021 05:58) (97/58 - 111/65)  BP(mean): --  RR: 16 (06 Sep 2021 05:58) (16 - 18)  SpO2: 97% (06 Sep 2021 05:58) (95% - 100%)      09-05-21 @ 07:01  -  09-06-21 @ 07:00  --------------------------------------------------------  IN: 0 mL / OUT: 290 mL / NET: -290 mL            LABS:                        9.4    5.55  )-----------( 299      ( 05 Sep 2021 08:29 )             29.6     09-05    135  |  101  |  21  ----------------------------<  78  4.0   |  31  |  0.67    Ca    8.3<L>      05 Sep 2021 08:29                WBC:  WBC Count: 5.55 K/uL (09-05 @ 08:29)  WBC Count: 6.65 K/uL (09-04 @ 14:42)      MICROBIOLOGY:  RECENT CULTURES:                  Sodium:  Sodium, Serum: 135 mmol/L (09-05 @ 08:29)  Sodium, Serum: 138 mmol/L (09-04 @ 09:47)      0.67 mg/dL 09-05 @ 08:29  0.54 mg/dL 09-04 @ 09:47      Hemoglobin:  Hemoglobin: 9.4 g/dL (09-05 @ 08:29)  Hemoglobin: 9.6 g/dL (09-04 @ 14:42)      Platelets: Platelet Count - Automated: 299 K/uL (09-05 @ 08:29)  Platelet Count - Automated: 316 K/uL (09-04 @ 14:42)              RADIOLOGY & ADDITIONAL STUDIES:

## 2021-09-06 NOTE — PROGRESS NOTE ADULT - SUBJECTIVE AND OBJECTIVE BOX
Patient: FESTUS BLAND [id number] 81y Male                            Hospitalist Attending Note    No complaints.  Chest tube in place.  No pain.  No SOB.       ____________________PHYSICAL EXAM:  GENERAL:  NAD, alert, oriented to person.  Confused.   HEENT: NCAT  CARDIOVASCULAR:  S1, S2  LUNGS: Coarse BS L base.   ABDOMEN:  soft, (-) tenderness, (-) distension, (+) bowel sounds, (-) guarding, (-) rebound (-) rigidity  EXTREMITIES:  no cyanosis / clubbing / edema.   ____________________    VITALS:  Vital Signs Last 24 Hrs  T(C): 36.6 (06 Sep 2021 12:35), Max: 36.6 (05 Sep 2021 17:23)  T(F): 97.8 (06 Sep 2021 12:35), Max: 97.8 (05 Sep 2021 17:23)  HR: 72 (06 Sep 2021 12:35) (65 - 91)  BP: 122/66 (06 Sep 2021 12:35) (97/58 - 122/66)  BP(mean): --  RR: 18 (06 Sep 2021 12:35) (16 - 18)  SpO2: 96% (06 Sep 2021 12:35) (95% - 100%) Daily     Daily Weight in k.9 (06 Sep 2021 05:58)  CAPILLARY BLOOD GLUCOSE      POCT Blood Glucose.: 150 mg/dL (06 Sep 2021 11:32)  POCT Blood Glucose.: 114 mg/dL (06 Sep 2021 08:19)  POCT Blood Glucose.: 179 mg/dL (05 Sep 2021 22:10)    I&O's Summary    05 Sep 2021 07:01  -  06 Sep 2021 07:00  --------------------------------------------------------  IN: 0 mL / OUT: 290 mL / NET: -290 mL        LABS:                        9.4    5.55  )-----------( 299      ( 05 Sep 2021 08:29 )             29.6     09-    135  |  101  |  21  ----------------------------<  78  4.0   |  31  |  0.67    Ca    8.3<L>      05 Sep 2021 08:29                    MEDICATIONS:  atorvastatin 10 milliGRAM(s) Oral at bedtime  carbidopa/levodopa  25/100 1 Tablet(s) Oral three times a day  cefTRIAXone   IVPB 2000 milliGRAM(s) IV Intermittent every 24 hours  cefTRIAXone   IVPB      dextrose 40% Gel 15 Gram(s) Oral once  dextrose 5%. 1000 milliLiter(s) IV Continuous <Continuous>  dextrose 5%. 1000 milliLiter(s) IV Continuous <Continuous>  dextrose 50% Injectable 25 Gram(s) IV Push once  dextrose 50% Injectable 12.5 Gram(s) IV Push once  dextrose 50% Injectable 25 Gram(s) IV Push once  enoxaparin Injectable 40 milliGRAM(s) SubCutaneous daily  glucagon  Injectable 1 milliGRAM(s) IntraMuscular once  insulin lispro (ADMELOG) corrective regimen sliding scale   SubCutaneous three times a day before meals  polyethylene glycol 3350 17 Gram(s) Oral daily PRN  QUEtiapine 50 milliGRAM(s) Oral at bedtime  senna 2 Tablet(s) Oral at bedtime  tamsulosin 0.4 milliGRAM(s) Oral at bedtime          < from: CT Chest No Cont (21 @ 15:11) >  Decreased left hydropneumothorax.  Consolidation and atelectasis left lower lobe similar to 2021.  Scattered new patchy groundglass opacities right upper and lower lobes, likelyinfectious.  Dilated esophagus and stomach. Consider intermittent aspiration.      < end of copied text >

## 2021-09-07 DIAGNOSIS — Z51.5 ENCOUNTER FOR PALLIATIVE CARE: ICD-10-CM

## 2021-09-07 DIAGNOSIS — G20 PARKINSON'S DISEASE: ICD-10-CM

## 2021-09-07 DIAGNOSIS — E43 UNSPECIFIED SEVERE PROTEIN-CALORIE MALNUTRITION: ICD-10-CM

## 2021-09-07 DIAGNOSIS — R62.7 ADULT FAILURE TO THRIVE: ICD-10-CM

## 2021-09-07 LAB
ANION GAP SERPL CALC-SCNC: 1 MMOL/L — LOW (ref 5–17)
BUN SERPL-MCNC: 25 MG/DL — HIGH (ref 7–23)
CALCIUM SERPL-MCNC: 8.4 MG/DL — LOW (ref 8.5–10.1)
CHLORIDE SERPL-SCNC: 106 MMOL/L — SIGNIFICANT CHANGE UP (ref 96–108)
CO2 SERPL-SCNC: 31 MMOL/L — SIGNIFICANT CHANGE UP (ref 22–31)
CREAT SERPL-MCNC: 0.54 MG/DL — SIGNIFICANT CHANGE UP (ref 0.5–1.3)
FLUAV AG NPH QL: SIGNIFICANT CHANGE UP
FLUBV AG NPH QL: SIGNIFICANT CHANGE UP
GLUCOSE BLDC GLUCOMTR-MCNC: 107 MG/DL — HIGH (ref 70–99)
GLUCOSE BLDC GLUCOMTR-MCNC: 123 MG/DL — HIGH (ref 70–99)
GLUCOSE BLDC GLUCOMTR-MCNC: 137 MG/DL — HIGH (ref 70–99)
GLUCOSE BLDC GLUCOMTR-MCNC: 171 MG/DL — HIGH (ref 70–99)
GLUCOSE SERPL-MCNC: 105 MG/DL — HIGH (ref 70–99)
HCT VFR BLD CALC: 27.8 % — LOW (ref 39–50)
HGB BLD-MCNC: 8.7 G/DL — LOW (ref 13–17)
MCHC RBC-ENTMCNC: 28.1 PG — SIGNIFICANT CHANGE UP (ref 27–34)
MCHC RBC-ENTMCNC: 31.3 GM/DL — LOW (ref 32–36)
MCV RBC AUTO: 89.7 FL — SIGNIFICANT CHANGE UP (ref 80–100)
NRBC # BLD: 0 /100 WBCS — SIGNIFICANT CHANGE UP (ref 0–0)
PLATELET # BLD AUTO: 269 K/UL — SIGNIFICANT CHANGE UP (ref 150–400)
POTASSIUM SERPL-MCNC: 4.1 MMOL/L — SIGNIFICANT CHANGE UP (ref 3.5–5.3)
POTASSIUM SERPL-SCNC: 4.1 MMOL/L — SIGNIFICANT CHANGE UP (ref 3.5–5.3)
RBC # BLD: 3.1 M/UL — LOW (ref 4.2–5.8)
RBC # FLD: 16.6 % — HIGH (ref 10.3–14.5)
SARS-COV-2 RNA SPEC QL NAA+PROBE: SIGNIFICANT CHANGE UP
SODIUM SERPL-SCNC: 138 MMOL/L — SIGNIFICANT CHANGE UP (ref 135–145)
WBC # BLD: 6.4 K/UL — SIGNIFICANT CHANGE UP (ref 3.8–10.5)
WBC # FLD AUTO: 6.4 K/UL — SIGNIFICANT CHANGE UP (ref 3.8–10.5)

## 2021-09-07 PROCEDURE — 99232 SBSQ HOSP IP/OBS MODERATE 35: CPT

## 2021-09-07 PROCEDURE — 99223 1ST HOSP IP/OBS HIGH 75: CPT

## 2021-09-07 PROCEDURE — 71045 X-RAY EXAM CHEST 1 VIEW: CPT | Mod: 26

## 2021-09-07 PROCEDURE — 93010 ELECTROCARDIOGRAM REPORT: CPT

## 2021-09-07 RX ADMIN — ENOXAPARIN SODIUM 40 MILLIGRAM(S): 100 INJECTION SUBCUTANEOUS at 11:24

## 2021-09-07 RX ADMIN — SENNA PLUS 2 TABLET(S): 8.6 TABLET ORAL at 21:52

## 2021-09-07 RX ADMIN — CEFTRIAXONE 100 MILLIGRAM(S): 500 INJECTION, POWDER, FOR SOLUTION INTRAMUSCULAR; INTRAVENOUS at 11:27

## 2021-09-07 RX ADMIN — ATORVASTATIN CALCIUM 10 MILLIGRAM(S): 80 TABLET, FILM COATED ORAL at 21:52

## 2021-09-07 RX ADMIN — QUETIAPINE FUMARATE 50 MILLIGRAM(S): 200 TABLET, FILM COATED ORAL at 21:52

## 2021-09-07 RX ADMIN — CARBIDOPA AND LEVODOPA 1 TABLET(S): 25; 100 TABLET ORAL at 14:20

## 2021-09-07 RX ADMIN — Medication 1: at 16:52

## 2021-09-07 RX ADMIN — POLYETHYLENE GLYCOL 3350 17 GRAM(S): 17 POWDER, FOR SOLUTION ORAL at 11:25

## 2021-09-07 RX ADMIN — CARBIDOPA AND LEVODOPA 1 TABLET(S): 25; 100 TABLET ORAL at 05:35

## 2021-09-07 RX ADMIN — CARBIDOPA AND LEVODOPA 1 TABLET(S): 25; 100 TABLET ORAL at 21:52

## 2021-09-07 NOTE — CONSULT NOTE ADULT - PROBLEM SELECTOR RECOMMENDATION 8
plan is to promote comfort and treat infection. Pt remains on antibiotics with pigtail in place; empyema effusion decreasing on interval CT scans. Pt appears comfortable and can be redirected if need be. Pigtail is in place with no desire to pursue surgery due to risk, comorbidities. If effusions remain could consider pleurex drain and home with hospice vs placement.  D/W Dr. Vasquez

## 2021-09-07 NOTE — PROGRESS NOTE ADULT - SUBJECTIVE AND OBJECTIVE BOX
[facility]  Division of Infectious Diseases  [phone]    Name: FESTUS BLAND  Age: 81y  Gender: Male  MRN: [id number]    Interval History--  Notes reviewed. Seen earlier today. Remains in mitten restraints. Complained of shortness of breath- thou looked completely comfrotable on RA. When queried re: other symptoms patient also stated he had chest pressure. Further workup left in care of ACP.       Past Medical History--  Hypercholesterolemia    DM (Diabetes Mellitus)    HTN (Hypertension)    CAD (Coronary Artery Disease)    BPH (benign prostatic hyperplasia)    Coronary Stent        For details regarding the patient's social history, family history, and other miscellaneous elements, please refer the initial infectious diseases consultation and/or the admitting history and physical examination for this admission.    Allergies    No Known Allergies    Intolerances        Medications--  Antibiotics:  cefTRIAXone   IVPB 2000 milliGRAM(s) IV Intermittent every 24 hours  cefTRIAXone   IVPB        Immunologic:    Other:  atorvastatin  carbidopa/levodopa  25/100  dextrose 40% Gel  dextrose 5%.  dextrose 5%.  dextrose 50% Injectable  dextrose 50% Injectable  dextrose 50% Injectable  enoxaparin Injectable  glucagon  Injectable  insulin lispro (ADMELOG) corrective regimen sliding scale  polyethylene glycol 3350 PRN  QUEtiapine  senna  tamsulosin      Review of Systems--  Review of systems unable due to dementia.       Physical Examination--  Vital Signs: T(F): 97.5 (09-07-21 @ 14:00), Max: 99.2 (09-06-21 @ 23:25)  HR: 67 (09-07-21 @ 14:00)  BP: 101/60 (09-07-21 @ 14:00)  RR: 17 (09-07-21 @ 14:00)  SpO2: 100% (09-07-21 @ 14:00)  Wt(kg): --  General: Nontoxic appearing man in no distress whatsoever  HEENT: Sclera nonicteric.  The conjunctiva are pink and moist.  The oropharynx is not able to be examined secondary to patient appliance  Neck: Supple.  No sense of mass.  Lungs: Diminished breath sounds left greater than right.  Chest tube, left-sided in situ, scant fluid in tubing  Heart: Regular rate and rhythm.  There is no murmur.  There is no rub or gallop.  There is no palpable thrill.  Abdomen: Soft.  Nondistended.  Nontender.  No sense of mass.  No organomegaly  Back: Unable  Extremities: Wasted.  No cyanosis.  No edema. Mitten restraints. L forearm PIV appears infiltrated.  Skin: Warm, dry, no vasculitic stigmata  Psych: Grossly appropriate mood and affect        Laboratory Studies--  CBC                        8.7    6.40  )-----------( 269      ( 07 Sep 2021 06:28 )             27.8       Chemistries  09-07    138  |  106  |  25<H>  ----------------------------<  105<H>  4.1   |  31  |  0.54    Ca    8.4<L>      07 Sep 2021 06:28        Culture Data

## 2021-09-07 NOTE — PROGRESS NOTE ADULT - ASSESSMENT
BALDO MORTENSEN 81 M 8/26/2021 1942 DR CATHERINE LANDIN     REVIEW OF SYMPTOMS      Able to give (reliable) ROS  NO     PHYSICAL EXAM    HEENT Unremarkable  atraumatic   RESP Fair air entry EXP prolonged    Harsh breath sound Resp distres mild   CARDIAC S1 S2 No S3     NO JVD    ABDOMEN SOFT BS PRESENT NOT DISTENDED No hepatosplenomegaly   PEDAL EDEMA present No calf tenderness  NO rash                                          8/26/2021  PRESENTATION.  80 y/o M  pmhx of HTN, DM, HLD, dementia,  Parkinson's disease, CAD s/p stents x2 (>20 years ago), CHF (EF: 50%, Moderate diastolic dysfunction (Stage II), moderate pulmonary hypertension, small-mod pericardial effusion), Lt pleural effusion, BPH, gastric ulcers, COVID infection who was sent on 8/26/2021  to the ED for weakness.      HOSPITAL COURSE   PL EFFSN l Seen by IR 8/27/2021   PIGTAIL 8/27  pfa cw empyema  (8/27) pfa 8/27 ldh 47121 pr 2.2 g 2 ph 7  numerous gpc pairs   pl fl 8/27 strep angiosus rare staph a   Rocephin 2g (9/2/2021) (Dr PINTO)  cefepime 1.3 (8/26) (Dr Ramirez) (Pneu)  DCed  NONVIOLENT NONSELF DESTR LEVEL ONE   8/28 8/29      BEST PRACTICE ISSUES.                                                  HEAD OF BED ELEVATION. Yes  DVT PROPHYLAXIS.  lvnx 40 (8/26)                                        VALLEJO PROPHYLAXIS.                                                                                         DIET.      dys 1 puree nectar cons carb (8/26)                     INFECTION PROPHYLAXIS.                      GENERAL ISSUES  GOC ADVANCED DIRECTIVE.    dnr                        ALLERGY.    nka                        WEIGHT.         8/26/2021 54                           BMI.                   8/26/2021 18         PATIENT DATA   TUBES  PROCEDURES.     PIGTAIL 8/27 maroon drainage     ABG.   8/26/2021 ra 745/37/78     OXYGENATION.       9/7/2021 ra 98%   8/26-8/27/2021 ra 97%    - ra 96%         VITALS/IO/VENT/DRIPS.  9/7/2021 afeb 80 130/80      PROBLEM ASSESSMENT/RECOMMENDATIONS.    COVID STATUS  Ho covid (+) 6/10/2021     CHRONIC L PL EMPYEMA WITH AIR FLUID LEVEL   RO underlying cancer or infection with bp fistula   pfa 8/27 ldh 69866 pr 2.2 g 2 ph 7 chol 70   numerous gpc pairs   findings of 8/27 pl fluid cw empyema   Pt has pgtail placed 8/27 9/1/2021 Case polo Apple Plan is to dc on iv abio x 6 w and with pigtauil as fami declining aggressive invasive procedures such as dcortication     INFECTION  w 8/26-8/27-8/28/2021 w 19 - 7.6 - 7.3   pr 8/26/2021 pr (-)   la 8/26/2021 la 1.9   Cr 8/26/2021 Cr .9   urine c 8/26 (-)  fluab 8/26/2021 (-)   blod c 8/26 (-)   mrsa 8/28 (-)  pl fl 8/27 strep angiosus rare staph a   Rocephin 2g (9/2/2021) (Dr PINTO)  cont rx    DYSPHAGIA  On dys 1 puree nectar diet (8/26/2021)     CAD  PMH CAD stents 2 decade ago   Tr 8/26/2021 Tr (-)     CHF  echo 6/11/2021 n lvsf thickened pericard with 1 cm pericard effs n la   bnp 8/26/2021 bnp 863     ANEMIA  Hb 8/26 - 8/27-8/28/2021 Hb 10 - 8.7 - 8.7   mcv 8/26/2021 mvcv 89     PARKINSONS   carbilevadopa 25 100.3 (8/26)     NONVIOLENT NONSELF DESTR LEVEL ONE   8/28 8/29    [facility] dnr         OVERALL PLAN.  CHRONIC L PL EMPYEMA WITH AIR FLUID LEVEL   9/1/2021 Case dw Dr Apple Plan is to dc on iv abio x 6 w and with pigtauil as fami declining aggressive invasive procedures such as dcortication   Is on rocephin   [facility]   dnr    TIME SPENT   Over 25 minutes aggregate care time spent on encounter; activities included   direct patient care, counseling and/or coordinating care reviewing notes, lab data/ imaging , discussion with multidisciplinary team/ patient  /family and explaining in detail risks, benefits, alternatives  of the recommendations     BALDO MORTENSEN 81 M 8/26/2021 1942 DR CATHERINE LANDIN

## 2021-09-07 NOTE — PROGRESS NOTE ADULT - ASSESSMENT
82 yo male w/ pmhx HTN, DM, HLD, dementia, parkinsons' disease, cad sp stent x2, hf w/ PeF, moderate pulm htn, BPH, gastric ulcers, recent covid infection admitted for empyema 2/2 strep anginosus    # Strep anginous empyema/  Loculated left sided effusion  - sp chest tube placement on 8/27  - on cefepime 8/26  - INFECTIOUS DISEASE Dr. Serrato following.    - CT SURGERY with Dr Weeks on the case  - pt's brother decline thoracotomy/ VATS and wanted only minimally invasive procedures  - repeat CT Chest per thoracic surgery showing now R sided infiltrate, ? aspiration.  Some improvement in R collection.  S&S evaluation requested.     # Parkinson's disease, Functional Quadriplegia - supportive care.   # Dementia  - on carbidopa/levadopa - seroquel  # BPH - flomax  # h/o CAD - plavix on hold right now due to CT placement.  Resume plavix once okay with CT surgery.   DNR / DNI    Plan of care d/w brother Berto [phone] today.  DNR / DNI.  Agrees with current plan of care, wishes to continue medical therapy, but if pt does not improve, is receptive to palliative measures.

## 2021-09-07 NOTE — CONSULT NOTE ADULT - PROBLEM SELECTOR RECOMMENDATION 9
on ceftriaxone  L pigtail in place draining, CT chest shows some improvement   only has tenderness at insertion site upon palpation   on room air - no complaints

## 2021-09-07 NOTE — CONSULT NOTE ADULT - CONSULT REASON
dysp
left loculated pleural effusion
Abx management
left thoracentesis/chest tube placement
empyema, dementia

## 2021-09-07 NOTE — PROGRESS NOTE ADULT - SUBJECTIVE AND OBJECTIVE BOX
Patient: FESTUS BLAND [id number] 81y Male                            Hospitalist Attending Note    No complaints.  Chest tube in place.  No pain.  No SOB.   No coughing with po intake.    ____________________PHYSICAL EXAM:  GENERAL:  NAD, alert, oriented to person.  Confused.   HEENT: NCAT  CARDIOVASCULAR:  S1, S2  LUNGS: Coarse BS L base.   ABDOMEN:  soft, (-) tenderness, (-) distension, (+) bowel sounds, (-) guarding, (-) rebound (-) rigidity  EXTREMITIES:  no cyanosis / clubbing / edema.   ____________________    VITALS:  Vital Signs Last 24 Hrs  T(C): 36.6 (07 Sep 2021 05:34), Max: 37.3 (06 Sep 2021 23:25)  T(F): 97.9 (07 Sep 2021 05:34), Max: 99.2 (06 Sep 2021 23:25)  HR: 61 (07 Sep 2021 05:34) (61 - 77)  BP: 108/61 (07 Sep 2021 05:34) (103/62 - 109/65)  BP(mean): --  RR: 18 (07 Sep 2021 05:34) (17 - 18)  SpO2: 99% (07 Sep 2021 05:34) (95% - 99%) Daily     Daily Weight in k.6 (07 Sep 2021 05:34)  CAPILLARY BLOOD GLUCOSE      POCT Blood Glucose.: 123 mg/dL (07 Sep 2021 11:23)  POCT Blood Glucose.: 107 mg/dL (07 Sep 2021 07:50)  POCT Blood Glucose.: 167 mg/dL (06 Sep 2021 21:05)  POCT Blood Glucose.: 132 mg/dL (06 Sep 2021 16:54)    I&O's Summary    06 Sep 2021 07:01  -  07 Sep 2021 07:00  --------------------------------------------------------  IN: 100 mL / OUT: 10 mL / NET: 90 mL    07 Sep 2021 07:01  -  07 Sep 2021 13:42  --------------------------------------------------------  IN: 50 mL / OUT: 0 mL / NET: 50 mL        LABS:                        8.7    6.40  )-----------( 269      ( 07 Sep 2021 06:28 )             27.8     0907    138  |  106  |  25<H>  ----------------------------<  105<H>  4.1   |  31  |  0.54    Ca    8.4<L>      07 Sep 2021 06:28                    MEDICATIONS:  atorvastatin 10 milliGRAM(s) Oral at bedtime  carbidopa/levodopa  25/100 1 Tablet(s) Oral three times a day  cefTRIAXone   IVPB 2000 milliGRAM(s) IV Intermittent every 24 hours  cefTRIAXone   IVPB      dextrose 40% Gel 15 Gram(s) Oral once  dextrose 5%. 1000 milliLiter(s) IV Continuous <Continuous>  dextrose 5%. 1000 milliLiter(s) IV Continuous <Continuous>  dextrose 50% Injectable 25 Gram(s) IV Push once  dextrose 50% Injectable 12.5 Gram(s) IV Push once  dextrose 50% Injectable 25 Gram(s) IV Push once  enoxaparin Injectable 40 milliGRAM(s) SubCutaneous daily  glucagon  Injectable 1 milliGRAM(s) IntraMuscular once  insulin lispro (ADMELOG) corrective regimen sliding scale   SubCutaneous three times a day before meals  polyethylene glycol 3350 17 Gram(s) Oral daily PRN  QUEtiapine 50 milliGRAM(s) Oral at bedtime  senna 2 Tablet(s) Oral at bedtime  tamsulosin 0.4 milliGRAM(s) Oral at bedtime

## 2021-09-07 NOTE — PROGRESS NOTE ADULT - NSPROGADDITIONALINFOA_GEN_ALL_CORE
Esteban Serrato MD  Attending Physician  [facility]  Division of Infectious Diseases  [phone]
Dr. Monae Myers covering the service this weekend. Please call [phone] for any ID issues that need attention.    Esteban Serrato MD  Attending Physician  [facility]  Division of Infectious Diseases  [phone]
Esteban Serrato MD  Attending Physician  [facility]  Division of Infectious Diseases  [phone]
Patient may follow up with myself or Dr. Le in office, nonurgently  Thank you for the courtesy of this referral.  I will sign off at this time.    Esteban Serrato MD  Attending Physician  [facility]  Division of Infectious Diseases  [phone]
Esteban Serrato MD  Attending Physician  [facility]  Division of Infectious Diseases  [phone]

## 2021-09-07 NOTE — SWALLOW BEDSIDE ASSESSMENT ADULT - MODE OF PRESENTATION
pt with difficulty drawing liquid into straw/cup/straw/fed by clinician straw/fed by clinician fed by clinician

## 2021-09-07 NOTE — SWALLOW BEDSIDE ASSESSMENT ADULT - H & P REVIEW
80 y/o Male HTN, DM Type 2, HLD, dementia (AOx2 at baseline), Parkinson's disease, CAD s/p stents x2 (>20 years ago), CHF (EF: 50%, Moderate diastolic dysfunction (Stage II), moderate pulmonary hypertension, small-mod pericardial effusion), Lt pleural effusion, BPH, gastric ulcers, COVID infection in Feb 2021 presenting with "home health aide thinks patient is weak and emaciated" per triage note and EMS. In the ED, patient somnolent, but arousable to voice/name. In the ED, patient reports no complaints./yes

## 2021-09-07 NOTE — CONSULT NOTE ADULT - PROBLEM SELECTOR RECOMMENDATION 7
continues on Seroquel   supportive care and reorientation   pt has mittens on to prevent pulling at pigtail and IV

## 2021-09-07 NOTE — SWALLOW BEDSIDE ASSESSMENT ADULT - SWALLOW EVAL: DIAGNOSIS
pt presented with impulsiveness- large bolus/consecutive sip swallow and oropharyngeal phases of swallow marked by increased oral transport times and/or delay in swallow trigger with decreased hyolaryngeal elevation/excursion. no overt signs of aspiration with consistencies trialed

## 2021-09-07 NOTE — SWALLOW BEDSIDE ASSESSMENT ADULT - SLP GENERAL OBSERVATIONS
pt seen bedside, alert and oriented to self- place with choice. pt engaged with SLP, mostly for po trials and request for more or choices, noted decreased initiation for needs and he was able to follow one step directions for oral mech exam. noted hoarse vocal quality.

## 2021-09-07 NOTE — CHART NOTE - NSCHARTNOTEFT_GEN_A_CORE
Pt admitted for empyema 2/2 strep anginosus; chest tube placement (8/27); family requests conservative management; no invasive procedures; pt DNR/I status.  PMHx includes HTN, T2DM, HLD, dementia, Parkinson's Disease, CAD s/p stents x 2, BPH, gastric ulcers, recent COVID infection. Pt remains confused & disoriented.     Factors impacting intake: [X ] none [ ] nausea  [ ] vomiting [ ] diarrhea [ ] constipation  [ ]chewing problems [ ] swallowing issues  [X ] other:    Diet Prescription: Diet, Dysphagia 1 Pureed-Nectar Consistency Fluid:   Consistent Carbohydrate {No Snacks}  Supplement Feeding Modality:  Oral  Health Shake Cans or Servings Per Day:  1       Frequency:  Daily  Ensure Pudding Cans or Servings Per Day:  1       Frequency:  Two Times a day (08-30-21 @ 12:20)    Intake:  per staff pt continues c good appetite; % most meals; requires total assistance; consuming supplements    Current Weight:   55.6 kg (9/7); 54.4 kg (8/25)  % Weight Change: 2.2% wt gain x 13 days    1+ edema noted b/l ankles    Nutrition focused physical exam conducted:   Subcutaneous fat loss: [moderate ] Orbital fat pads region, [moderate ]Buccal fat region, [severe ]Triceps region,  [severe ]Ribs region.    Muscle wasting: [severe ]Temples region, [severe ]Clavicle region, [severe ]Shoulder region, [severe ]Scapula region, [ unable]Interosseous region,  [severe ]thigh region, [severe ]Calf region    Pertinent Medications: MEDICATIONS  (STANDING):  atorvastatin 10 milliGRAM(s) Oral at bedtime  carbidopa/levodopa  25/100 1 Tablet(s) Oral three times a day  cefTRIAXone   IVPB 2000 milliGRAM(s) IV Intermittent every 24 hours  cefTRIAXone   IVPB      dextrose 40% Gel 15 Gram(s) Oral once  dextrose 5%. 1000 milliLiter(s) (50 mL/Hr) IV Continuous <Continuous>  dextrose 5%. 1000 milliLiter(s) (100 mL/Hr) IV Continuous <Continuous>  dextrose 50% Injectable 25 Gram(s) IV Push once  dextrose 50% Injectable 12.5 Gram(s) IV Push once  dextrose 50% Injectable 25 Gram(s) IV Push once  enoxaparin Injectable 40 milliGRAM(s) SubCutaneous daily  glucagon  Injectable 1 milliGRAM(s) IntraMuscular once  insulin lispro (ADMELOG) corrective regimen sliding scale   SubCutaneous three times a day before meals  QUEtiapine 50 milliGRAM(s) Oral at bedtime  senna 2 Tablet(s) Oral at bedtime  tamsulosin 0.4 milliGRAM(s) Oral at bedtime    MEDICATIONS  (PRN):  polyethylene glycol 3350 17 Gram(s) Oral daily PRN constipations    Pertinent Labs: 09-07 Na138 mmol/L Glu 105 mg/dL<H> K+ 4.1 mmol/L Cr  0.54 mg/dL BUN 25 mg/dL<H> 09-01 Phos 2.8 mg/dL 09-01 Alb 2.1 g/dL<L>  08-27-21;  A1C 6.1; 09-06 POCT: 114, 150, 132, 167    Skin:    no pressure ulcers    Estimated Needs:   [X ] no change since previous assessment  (8/30)  [ ] recalculated:     Previous Nutrition Diagnosis:   [x ]  Severe Malnutrition in context of chronic illness  Etiology:  Inadequate energy/protein intake related to dementia, Parkinson's Disease, cardiac hx  Signs & Symptoms:  Physical findings of severe fat depletion & muscle wasting as noted    GOAL:  Provide nutrition/hydration as medically appropriate, as tolerated & within family/pt wishes for tx     Nutrition Diagnosis is [X ] ongoing  [ ] resolved [ ] not applicable     New Nutrition Diagnosis: [ X] not applicable    Interventions:   continue current diet rx as noted  Recommend  [ ] Change Diet To:  [ ] Nutrition Supplement  [ ] Nutrition Support  [ ] Other:     Monitoring and Evaluation:   [x ] PO intake [ x ] Tolerance to diet prescription [ x ] weights [ x ] labs[ x ] follow up per protocol  [ ] other:

## 2021-09-07 NOTE — CONSULT NOTE ADULT - PROBLEM SELECTOR RECOMMENDATION 2
decreased intake and weight loss   was in SNF and when arrived home was sent to hospital for emaciated, weak appearance

## 2021-09-07 NOTE — PROGRESS NOTE ADULT - SUBJECTIVE AND OBJECTIVE BOX
Patient seen and examined bedside resting comfortably, no complaints offered  No overnight events per nursing  Chest tube to suction, no drainage overnight    T(F): 97.9 (09-07-21 @ 05:34), Max: 99.2 (09-06-21 @ 23:25)  HR: 61 (09-07-21 @ 05:34) (61 - 77)  BP: 108/61 (09-07-21 @ 05:34) (103/62 - 122/66)  RR: 18 (09-07-21 @ 05:34) (17 - 18)  SpO2: 99% (09-07-21 @ 05:34) (95% - 99%)  Wt(kg): --      POCT Blood Glucose.: 167 mg/dL (06 Sep 2021 21:05)  POCT Blood Glucose.: 132 mg/dL (06 Sep 2021 16:54)  POCT Blood Glucose.: 150 mg/dL (06 Sep 2021 11:32)  POCT Blood Glucose.: 114 mg/dL (06 Sep 2021 08:19)      PHYSICAL EXAM:  General: NAD  HEENT: NCAT  CV: +S1+S2   Lung: respirations nonlabored, left chest tube to pleurovac/LWS, no output overnight. Flushed with 10cc NS, flushes easily - output drains clear  Abdomen: soft, NT    < from: CT Chest No Cont (09.05.21 @ 15:11) >  IMPRESSION:  Decreased left hydropneumothorax.  Consolidation and atelectasis left lower lobe similar to 08/31/2021.  Scattered new patchy groundglass opacities right upper and lower lobes, likelyinfectious.  Dilated esophagus and stomach. Consider intermittent aspiration.    < end of copied text >      A/P: 81M with multiple medical comorbidities a/w FTT, chronic left loculated pleural effusion s/p IR drainage and pigtail placement 8/27, with decrease in size.   Patient with localized collection, adequately drained. Sepsis/source control obtained. Patient high risk for intervention (would likely require thoracotomy, not just VATS), and family does not want any invasive procedures.   No surgical intervention planned.  -continue medical management, pulm Follow up  -continue chest tube to suction, local care  -miniatrium recommended per Dr Apple  -f/u CXR

## 2021-09-07 NOTE — CHART NOTE - NSCHARTNOTEFT_GEN_A_CORE
Medicine Hospitalist PA    Earlier was asking to see pt for sob and cp. Pt seen and examined appear in no distress, clinically fine. As per Rn pt was looking ok but stating was having cp and trouble breathing. Now when asked pt does not remember being in pain or having trouble breathing. Pt appears having dementia moment.    Vital Signs Last 24 Hrs  T(C): 36.4 (07 Sep 2021 14:00), Max: 37.3 (06 Sep 2021 23:25)  T(F): 97.5 (07 Sep 2021 14:00), Max: 99.2 (06 Sep 2021 23:25)  HR: 67 (07 Sep 2021 14:00) (61 - 77)  BP: 101/60 (07 Sep 2021 14:00) (101/60 - 109/65)  RR: 17 (07 Sep 2021 14:00) (17 - 18)  SpO2: 100% (07 Sep 2021 14:00) (95% - 100%)    General: awake, alert but confused  HEENT: EOM intact, NOMEÍ, tongue midline   CV: S1 S2  Resp: fair air entry b/l, Right CTube in place  Abd: Soft, NT/ND, BS+  Ext: no LE edema, pulses intact, no calf tenderness    A/P: 82 yo male w/ pmhx HTN, DM, HLD, dementia, parkinsons' disease, CAD sp stent x2, hf w/ PeF, moderate pulm htn, BPH, gastric ulcers, recent Covid infection admitted for empyema 2/2 strep anginosus. Was c/o of cp but pt demented. Appears ok  - Cp resolved, pt states he is fine  - No SOB  - EKG  - Continue to monitor vitals

## 2021-09-07 NOTE — PROGRESS NOTE ADULT - SUBJECTIVE AND OBJECTIVE BOX
FESTUS BLAND    LVS 2E 289 D    Patient is a 81y old  Male who presents with a chief complaint of FTT, empyema r/o (07 Sep 2021 06:29)       Allergies    No Known Allergies    Intolerances        HPI:  Patient is a 81M with a PMH of HTN, DM, HLD, dementia,  Parkinson's disease, CAD s/p stents x2 (>20 years ago), CHF (EF: 50%, Moderate diastolic dysfunction (Stage II), moderate pulmonary hypertension, small-mod pericardial effusion), Lt pleural effusion, BPH, gastric ulcers, COVID infection who was sent to the ED for weakness.  Patient currently awake and alert, oriented x1, unable to provide history.  Has no acute complaints.  Discharged from NH today and was sent to the ED as he appeared weak and emaciated.  Vitals stable, labs show leukocytosis and mild lactic acidosis.  CT reveals a chronic loculated L pleural effusion.  ED attending spoke to family who made the patient DNR/DNI however did agree for chest tube drainage for effusion.  Will admit to med surg.   (26 Aug 2021 03:21)      PAST MEDICAL & SURGICAL HISTORY:  Hypercholesterolemia    DM (Diabetes Mellitus)    HTN (Hypertension)    CAD (Coronary Artery Disease)  s/p cardiac stent ( &gt; 20 years ago)    BPH (benign prostatic hyperplasia)    Coronary Stent  x 2 ( 20 years )        FAMILY HISTORY:  FH: HTN (hypertension)          MEDICATIONS   atorvastatin 10 milliGRAM(s) Oral at bedtime  carbidopa/levodopa  25/100 1 Tablet(s) Oral three times a day  cefTRIAXone   IVPB 2000 milliGRAM(s) IV Intermittent every 24 hours  cefTRIAXone   IVPB      dextrose 40% Gel 15 Gram(s) Oral once  dextrose 5%. 1000 milliLiter(s) IV Continuous <Continuous>  dextrose 5%. 1000 milliLiter(s) IV Continuous <Continuous>  dextrose 50% Injectable 25 Gram(s) IV Push once  dextrose 50% Injectable 12.5 Gram(s) IV Push once  dextrose 50% Injectable 25 Gram(s) IV Push once  enoxaparin Injectable 40 milliGRAM(s) SubCutaneous daily  glucagon  Injectable 1 milliGRAM(s) IntraMuscular once  insulin lispro (ADMELOG) corrective regimen sliding scale   SubCutaneous three times a day before meals  polyethylene glycol 3350 17 Gram(s) Oral daily PRN  QUEtiapine 50 milliGRAM(s) Oral at bedtime  senna 2 Tablet(s) Oral at bedtime  tamsulosin 0.4 milliGRAM(s) Oral at bedtime      Vital Signs Last 24 Hrs  T(C): 36.6 (07 Sep 2021 05:34), Max: 37.3 (06 Sep 2021 23:25)  T(F): 97.9 (07 Sep 2021 05:34), Max: 99.2 (06 Sep 2021 23:25)  HR: 61 (07 Sep 2021 05:34) (61 - 77)  BP: 108/61 (07 Sep 2021 05:34) (103/62 - 122/66)  BP(mean): --  RR: 18 (07 Sep 2021 05:34) (17 - 18)  SpO2: 99% (07 Sep 2021 05:34) (95% - 99%)      09-06-21 @ 07:01  -  09-07-21 @ 07:00  --------------------------------------------------------  IN: 100 mL / OUT: 10 mL / NET: 90 mL            LABS:                        8.7    6.40  )-----------( 269      ( 07 Sep 2021 06:28 )             27.8     09-07    138  |  106  |  25<H>  ----------------------------<  105<H>  4.1   |  31  |  0.54    Ca    8.4<L>      07 Sep 2021 06:28                WBC:  WBC Count: 6.40 K/uL (09-07 @ 06:28)  WBC Count: 5.55 K/uL (09-05 @ 08:29)  WBC Count: 6.65 K/uL (09-04 @ 14:42)      MICROBIOLOGY:  RECENT CULTURES:                  Sodium:  Sodium, Serum: 138 mmol/L (09-07 @ 06:28)  Sodium, Serum: 135 mmol/L (09-05 @ 08:29)  Sodium, Serum: 138 mmol/L (09-04 @ 09:47)      0.54 mg/dL 09-07 @ 06:28  0.67 mg/dL 09-05 @ 08:29  0.54 mg/dL 09-04 @ 09:47      Hemoglobin:  Hemoglobin: 8.7 g/dL (09-07 @ 06:28)  Hemoglobin: 9.4 g/dL (09-05 @ 08:29)  Hemoglobin: 9.6 g/dL (09-04 @ 14:42)      Platelets: Platelet Count - Automated: 269 K/uL (09-07 @ 06:28)  Platelet Count - Automated: 299 K/uL (09-05 @ 08:29)  Platelet Count - Automated: 316 K/uL (09-04 @ 14:42)              RADIOLOGY & ADDITIONAL STUDIES:

## 2021-09-07 NOTE — PROGRESS NOTE ADULT - ASSESSMENT
81 yr old male pt with significant medical hx and dementia presents with weakness. Pt states he feels good but he is not oriented to time or place. left sided pleural effusion.  WBC count is 19.5   81 yr old male pt with significant medical hx and dementia presents with weakness.   Pt states he feels good but he is not oriented to time or place.   CT (I personally reviewed) left sided pleural effusion.  WBC count was 19.5 received  vanc zosyn in ED   Thoracic surgery recommending chest tube placement and to send fluid off for analysis which was done on friday   review of the fluid indicated exudative fluid, there are a lot of wbc cells but also a a lof of rbc. He is breathing on room air but he is coughing    8/30: no fevers, no new cbc, on RA, fluid culture with Streptococcus anginosus, MRSA PCR not detected. Cefepime continued.   8/31: The patient remains afebrile, white blood cell count is normal, and he is breathing comfortably.  Pleural fluid appears to be thick and not flowing well.  Increasing the caliber of the drainage tube was being contemplated.  Fluid is growing Streptococcus anginosus, as well as a Staphylococcus aureus.  Drainage here is the mainstay of therapy, with antibiotics adjunctive.  It is not at all clear that the patient will do well without definitive drainage, the patient's family however appears to be opposed to any kind of surgical drainage.  The current antibiotics are adequate, the isolates in question are both sensitive to cefepime.  No anaerobes have been isolated thus far.  9/1: Overall little changed. Given chronicity of effusion, I suspect the chances of cure here with current plan are negligible. The best outcome I could hope for would be an 'induction' period with IV antibiotics followed by indefinite suppression with enteral antibiotics. The odds of that plan working are not clear.   9/2: IR follow up re: management of CT appreciated. No significant change in status otherwise. Appears to be tolerating antibiotics without incident. Afebrile, HD stable. Resp status about the same. Hoping to achieve adequate initial source control and chronic suppressive therapy. Given constraints placed by the family seems to be best option. Still have skepticism that it will work. This being said, I suspect the morbidity (and perhaps mortality) associated with surgical Rx would be excessive.  9/3: No significant change.  Overall stable on antibiotics.  9/7: Overall little change. Informed RN of PIV appearance. ACP assessing SOB/CP, though patient has dementia cannot disregard his complaint. Plan was for 4 weeks of iv antibiotics- stop date would be 9/22/21. Plan appears to be to discharge patient with a miniatrium device for the chest tube. While this would be possible under ideal conditions, its unclear whether its practical with him still requiring restraints. After initial Iv antibiotics would  change him to PO, with ultimate duration to be determined based on clinical course and serial imaging.

## 2021-09-07 NOTE — CONSULT NOTE ADULT - SUBJECTIVE AND OBJECTIVE BOX
HPI:  Patient is a 81M with a PMH of HTN, DM, HLD, dementia,  Parkinson's disease, CAD s/p stents x2 (>20 years ago), CHF (EF: 50%, Moderate diastolic dysfunction (Stage II), moderate pulmonary hypertension, small-mod pericardial effusion), Lt pleural effusion, BPH, gastric ulcers, COVID infection who was sent to the ED for weakness.  Patient currently awake and alert, oriented x1, unable to provide history.  Has no acute complaints.  Discharged from NH today and was sent to the ED as he appeared weak and emaciated.  Vitals stable, labs show leukocytosis and mild lactic acidosis.  CT reveals a chronic loculated L pleural effusion.  ED attending spoke to family who made the patient DNR/DNI however did agree for chest tube drainage for effusion.  Will admit to med surg.   (26 Aug 2021 03:21)    PERTINENT PM/SXH:   Hypercholesterolemia    DM (Diabetes Mellitus)    HTN (Hypertension)    CAD (Coronary Artery Disease)    BPH (benign prostatic hyperplasia)      Coronary Stent      FAMILY HISTORY:  FH: HTN (hypertension)      ITEMS NOT CHECKED ARE NOT PRESENT    SOCIAL HISTORY:   Significant other/partner[ ]  Children[ ]  Jainism/Spirituality:  Substance hx:  [ ]   Tobacco hx:  [ ]   Alcohol hx: [ ]   Home Opioid hx:  [ ] I-Stop Reference No:: [id number]  Living Situation: [ x]Home  [ ]Long term care  [ ]Rehab [ ]Other    ADVANCE DIRECTIVES:    DNR  MOLST  [x ]  Living Will  [ ]   DECISION MAKER(s): Berto Mitchell (brother)  [ ] Health Care Proxy(s)  [ x] Surrogate(s)  [ ] Guardian           Name(s): Phone Number(s): Berto Mitchell (brother) [phone]    BASELINE (I)ADL(s) (prior to admission): 24 hour care at home   [place]: [ ]Total  [ ] Moderate [x ]Dependent    Allergies    No Known Allergies    Intolerances    MEDICATIONS  (STANDING):  atorvastatin 10 milliGRAM(s) Oral at bedtime  carbidopa/levodopa  25/100 1 Tablet(s) Oral three times a day  cefTRIAXone   IVPB 2000 milliGRAM(s) IV Intermittent every 24 hours  cefTRIAXone   IVPB      dextrose 40% Gel 15 Gram(s) Oral once  dextrose 5%. 1000 milliLiter(s) (50 mL/Hr) IV Continuous <Continuous>  dextrose 5%. 1000 milliLiter(s) (100 mL/Hr) IV Continuous <Continuous>  dextrose 50% Injectable 25 Gram(s) IV Push once  dextrose 50% Injectable 12.5 Gram(s) IV Push once  dextrose 50% Injectable 25 Gram(s) IV Push once  enoxaparin Injectable 40 milliGRAM(s) SubCutaneous daily  glucagon  Injectable 1 milliGRAM(s) IntraMuscular once  insulin lispro (ADMELOG) corrective regimen sliding scale   SubCutaneous three times a day before meals  QUEtiapine 50 milliGRAM(s) Oral at bedtime  senna 2 Tablet(s) Oral at bedtime  tamsulosin 0.4 milliGRAM(s) Oral at bedtime    MEDICATIONS  (PRN):  polyethylene glycol 3350 17 Gram(s) Oral daily PRN constipations    PRESENT SYMPTOMS: [ ]Unable to obtain due to poor mentation   Source if other than patient:  [ ]Family   [ ]Team     Pain: [ x]yes [ ]no- pt reports pain at site where pigtail is that pain only occurs when area is touched  QOL impact -   Location -                    Aggravating factors -  Quality -  Radiation -  Timing-  Severity (0-10 scale):  Minimal acceptable level (0-10 scale):     CPOT:    https://www.[facility].org/getattachment/nda17h08-4d5w-[email or web address]-8040z8620p1t/Critical-Care-Pain-Observation-Tool-(CPOT)      PAIN AD Score:     http://geriatrictoolkit.[facility]/cog/painad.pdf (press ctrl +  left click to view)    Dyspnea:                           [ ]Mild [ ]Moderate [ ]Severe  Anxiety:                             [ ]Mild [ ]Moderate [ ]Severe  Fatigue:                             [ ]Mild [ ]Moderate [ ]Severe  Nausea:                             [ ]Mild [ ]Moderate [ ]Severe  Loss of appetite:              [ ]Mild [ ]Moderate [ ]Severe  Constipation:                    [ ]Mild [ ]Moderate [ ]Severe    Other Symptoms:  [ ]All other review of systems negative     Palliative Performance Status Version 2:       30  %    http://npcrc.org/files/news/palliative_performance_scale_ppsv2.pdf  PHYSICAL EXAM:  Vital Signs Last 24 Hrs  T(C): 36.6 (07 Sep 2021 05:34), Max: 37.3 (06 Sep 2021 23:25)  T(F): 97.9 (07 Sep 2021 05:34), Max: 99.2 (06 Sep 2021 23:25)  HR: 61 (07 Sep 2021 05:34) (61 - 77)  BP: 108/61 (07 Sep 2021 05:34) (103/62 - 109/65)  BP(mean): --  RR: 18 (07 Sep 2021 05:34) (17 - 18)  SpO2: 99% (07 Sep 2021 05:34) (95% - 99%) I&O's Summary    06 Sep 2021 07:01  -  07 Sep 2021 07:00  --------------------------------------------------------  IN: 100 mL / OUT: 10 mL / NET: 90 mL    07 Sep 2021 07:01  -  07 Sep 2021 12:41  --------------------------------------------------------  IN: 50 mL / OUT: 0 mL / NET: 50 mL      GENERAL:  [x ]Alert  [x ]Oriented x 2  [ ]Lethargic  [ ]Cachexia  [ ]Unarousable  [ x]Verbal  [ ]Non-Verbal  Behavioral:   [ ] Anxiety  [ ] Delirium [ ] Agitation [ ] Other  HEENT:  [ ]Normal   [x ]Dry mouth   [ ]ET Tube/Trach  [ ]Oral lesions  PULMONARY:   [ ]Clear [x ]Tachypnea  [ ]Audible excessive secretions   [ ]Rhonchi        [ ]Right [ ]Left [ ]Bilateral  [ ]Crackles        [ ]Right [ ]Left [ ]Bilateral  [ ]Wheezing     [ ]Right [ ]Left [ ]Bilateral  [x ]Diminished breath sounds [ ]right [ ]left [ x]bilateral  CARDIOVASCULAR:    [x ]Regular [ ]Irregular [ ]Tachy  [ ]Josemanuel [ ]Murmur [ ]Other  GASTROINTESTINAL:  [ x]Soft  [ ]Distended   [ x]+BS  [ x]Non tender [ ]Tender  [ ]PEG [ ]OGT/ NGT  Last BM: 9/6  GENITOURINARY:  [ ]Normal [x ] Incontinent   [ ]Oliguria/Anuria   [ ][place]  MUSCULOSKELETAL:   [ ]Normal   [ ]Weakness  [ x]Bed/Wheelchair bound [ ]Edema  NEUROLOGIC:   [ ]No focal deficits  [x ]Cognitive impairment  [ x]Dysphagia [ ]Dysarthria [ ]Paresis [ ]Other   SKIN:   [ x]Normal    [ ]Rash  [ ]Pressure ulcer(s)       Present on admission [ ]y [ ]n    CRITICAL CARE:  [ ] Shock Present  [ ]Septic [ ]Cardiogenic [ ]Neurologic [ ]Hypovolemic  [ ]  Vasopressors [ ]  Inotropes   [ ]Respiratory failure present [ ]Mechanical ventilation [ ]Non-invasive ventilatory support [ ]High flow    [ ]Acute  [ ]Chronic [ ]Hypoxic  [ ]Hypercarbic [ ]Other  [ ]Other organ failure     LABS:                        8.7    6.40  )-----------( 269      ( 07 Sep 2021 06:28 )             27.8   09-07    138  |  106  |  25<H>  ----------------------------<  105<H>  4.1   |  31  |  0.54    Ca    8.4<L>      07 Sep 2021 06:28          RADIOLOGY & ADDITIONAL STUDIES:     < from: CT Chest No Cont (09.05.21 @ 15:11) >    EXAM:  CT CHEST                            PROCEDURE DATE:  09/05/2021          INTERPRETATION:  CLINICAL INFORMATION: 81-year-old man with sepsis and chronic left hydropneumothorax    COMPARISON: Chest CT 08/31/2021    CONTRAST/COMPLICATIONS:  No contrast was administered.    PROCEDURE:  CT of the Chest was performed.  Sagittal and coronal reformats were performed.    FINDINGS:    LUNGS AND AIRWAYS: Patent central airways.  Patchy new groundglass opacities right upper and lower lobes which maybe infectious. Consolidation and atelectasis left lower lobe probably unchanged from 08/31/2021.  PLEURA: Decreased left hydropneumothorax with indwelling chest tube.  MEDIASTINUM AND SAIDA: No lymphadenopathy. Dilated esophagus with air-fluid level  VESSELS: Within normal limits.  HEART: Heart size is normal. Moderate pericardial effusion unchanged.  CHEST WALL AND LOWER NECK: Within normal limits.  VISUALIZED UPPER ABDOMEN: Dilated stomach similar to 08/31/2021. Large amount of fecal material inthe visualized colon.  BONES: Degenerative change.    IMPRESSION:  Decreased left hydropneumothorax.  Consolidation and atelectasis left lower lobe similar to 08/31/2021.  Scattered new patchy groundglass opacities right upper and lower lobes, likelyinfectious.  Dilated esophagus and stomach. Consider intermittent aspiration.        --- End of Report ---            ALEXEY HERMAN MD; Attending Radiologist  This document has been electronically signed. Sep  5 2021  4:03PM    < end of copied text >    < from: CT Chest No Cont (08.31.21 @ 10:39) >    EXAM:  CT CHEST                            PROCEDURE DATE:  08/31/2021          INTERPRETATION:  CLINICAL INFORMATION: Assess left pleural effusion.    COMPARISON: CT chest 6/11/2021    CONTRAST/COMPLICATIONS:  None    PROCEDURE:  CT of the Chest was performed.  Sagittal and coronal reformats were performed.    FINDINGS:    LUNGS AND AIRWAYS: Patent central airways.  Single left chest catheter in situ. There is a moderate-sized the loculated hydropneumothorax at the left base with a relatively thick rind slightly decreased in size compared to 6/11/2021. Persistent left basilar atelectasis. Interval improvement in bilateral groundglass opacities. A few scattered small bilateral groundglass opacities persist. PLEURA: As above.  MEDIASTINUM AND SAIDA: No lymphadenopathy. Stable small hypoattenuating right thyroid nodule.  VESSELS: Nonaneurysmal  HEART: Heart size is normal, with coronary artery calcification. Small pericardial effusion.  CHEST WALL AND LOWER NECK: Within normal limits.  VISUALIZED UPPER ABDOMEN: Cholecystectomy. Marked fluid distention of the stomach probably functional. Large colonic stool burden.  BONES: No acute.    IMPRESSION:  Status post left chest catheter.  Chronic thick-walled left hydropneumothorax slightly decreased in size compared to the pleural fluid collection of 6/11/2021. Correlate clinically for active infection  Improvement bilateral groundglass opacities      --- End of Report ---            DAWSON TEJEDA MD; Attending Radiologist  This document has been electronically signed. Aug 31 2021 11:10AM    < end of copied text >    PROTEIN CALORIE MALNUTRITION PRESENT: [ ]mild [ ]moderate [ x]severe [x ]underweight [ ]morbid obesity  https://www.andeal.org/vault/2440/web/files/ONC/Table_Clinical%20Characteristics%20to%20Document%20Malnutrition-White%20JV%20et%20al%202012.pdf    Height (cm): 170.2 (08-25-21 @ 19:40), 170.2 (06-10-21 @ 10:50), 170.2 (05-27-21 @ 10:38)  Weight (kg): 54.4 (08-25-21 @ 19:40), 60.3 (06-11-21 @ 09:20), 61.7 (05-27-21 @ 10:38)  BMI (kg/m2): 18.8 (08-25-21 @ 19:40), 20.8 (06-11-21 @ 09:20), 21.3 (06-10-21 @ 10:50)    [ x]PPSV2 < or = to 30% [x ]significant weight loss  [ x]poor nutritional intake  [ ]anasarca      [ ]Artificial Nutrition      REFERRALS:   [ ]Chaplaincy  [x ]Hospice  [ ]Child Life  [ x]Social Work  [ x]Case management [ ]Holistic Therapy     Goals of Care Document:

## 2021-09-07 NOTE — CONSULT NOTE ADULT - ASSESSMENT
81M with a PMH of HTN, DM, HLD, dementia,  Parkinson's disease, CAD s/p stents x2 (>20 years ago), CHF (EF: 50%, Moderate diastolic dysfunction (Stage II), moderate pulmonary hypertension, small-mod pericardial effusion), Lt pleural effusion, BPH, gastric ulcers, COVID infection admitted with weakness found to have Left empyema s/p pigtail drainage. Pt remains with pigtail in place with GOC documented for no further invasive procedures and focus on comfort. Palliative Care consulted for symptom management and possible hospice.

## 2021-09-08 LAB
GLUCOSE BLDC GLUCOMTR-MCNC: 111 MG/DL — HIGH (ref 70–99)
GLUCOSE BLDC GLUCOMTR-MCNC: 112 MG/DL — HIGH (ref 70–99)
GLUCOSE BLDC GLUCOMTR-MCNC: 161 MG/DL — HIGH (ref 70–99)

## 2021-09-08 PROCEDURE — 99233 SBSQ HOSP IP/OBS HIGH 50: CPT

## 2021-09-08 PROCEDURE — 99232 SBSQ HOSP IP/OBS MODERATE 35: CPT

## 2021-09-08 RX ADMIN — CARBIDOPA AND LEVODOPA 1 TABLET(S): 25; 100 TABLET ORAL at 05:31

## 2021-09-08 RX ADMIN — QUETIAPINE FUMARATE 50 MILLIGRAM(S): 200 TABLET, FILM COATED ORAL at 21:43

## 2021-09-08 RX ADMIN — CARBIDOPA AND LEVODOPA 1 TABLET(S): 25; 100 TABLET ORAL at 21:43

## 2021-09-08 RX ADMIN — CARBIDOPA AND LEVODOPA 1 TABLET(S): 25; 100 TABLET ORAL at 16:09

## 2021-09-08 RX ADMIN — ENOXAPARIN SODIUM 40 MILLIGRAM(S): 100 INJECTION SUBCUTANEOUS at 11:47

## 2021-09-08 RX ADMIN — ATORVASTATIN CALCIUM 10 MILLIGRAM(S): 80 TABLET, FILM COATED ORAL at 21:43

## 2021-09-08 RX ADMIN — Medication 1: at 11:46

## 2021-09-08 RX ADMIN — CEFTRIAXONE 100 MILLIGRAM(S): 500 INJECTION, POWDER, FOR SOLUTION INTRAMUSCULAR; INTRAVENOUS at 12:35

## 2021-09-08 NOTE — PROGRESS NOTE ADULT - SUBJECTIVE AND OBJECTIVE BOX
Patient is a 81y old  Male who presents with a chief complaint of FTT, empyema r/o (08 Sep 2021 11:49)      OVERNIGHT EVENTS:    REVIEW OF SYSTEMS: denies chest pain/SOB, diaphoresis, no F/C, cough, dizziness, headache, blurry vision, nausea, vomiting, abdominal pain. All others review of systems negative     MEDICATIONS  (STANDING):  atorvastatin 10 milliGRAM(s) Oral at bedtime  carbidopa/levodopa  25/100 1 Tablet(s) Oral three times a day  cefTRIAXone   IVPB 2000 milliGRAM(s) IV Intermittent every 24 hours  cefTRIAXone   IVPB      dextrose 40% Gel 15 Gram(s) Oral once  dextrose 5%. 1000 milliLiter(s) (50 mL/Hr) IV Continuous <Continuous>  dextrose 5%. 1000 milliLiter(s) (100 mL/Hr) IV Continuous <Continuous>  dextrose 50% Injectable 25 Gram(s) IV Push once  dextrose 50% Injectable 12.5 Gram(s) IV Push once  dextrose 50% Injectable 25 Gram(s) IV Push once  enoxaparin Injectable 40 milliGRAM(s) SubCutaneous daily  glucagon  Injectable 1 milliGRAM(s) IntraMuscular once  insulin lispro (ADMELOG) corrective regimen sliding scale   SubCutaneous three times a day before meals  QUEtiapine 50 milliGRAM(s) Oral at bedtime  senna 2 Tablet(s) Oral at bedtime  tamsulosin 0.4 milliGRAM(s) Oral at bedtime    MEDICATIONS  (PRN):  polyethylene glycol 3350 17 Gram(s) Oral daily PRN constipations      Allergies    No Known Allergies    Intolerances        T(F): 97.7 (09-08-21 @ 05:29), Max: 97.8 (09-07-21 @ 23:45)  HR: 60 (09-08-21 @ 05:29) (60 - 81)  BP: 117/70 (09-08-21 @ 05:29) (117/70 - 138/67)  RR: 17 (09-08-21 @ 05:29) (17 - 17)  SpO2: 100% (09-08-21 @ 05:29) (98% - 100%)  Wt(kg): --    PHYSICAL EXAM:  GENERAL: NAD  HEAD:  Atraumatic, Normocephalic  EYES: PERRLA, conjunctiva and sclera clear  ENMT: Moist mucous membranes  NECK: Supple, No JVD, Normal thyroid  NERVOUS SYSTEM:  Alert & Awake  CHEST/LUNG: Clear to percussion bilaterally;   HEART: Regular rate and rhythm;   ABDOMEN: Soft, Nontender, Nondistended; Bowel sounds present  EXTREMITIES:  no edema BL LE  SKIN: moist    LABS:                        8.7    6.40  )-----------( 269      ( 07 Sep 2021 06:28 )             27.8     09-07    138  |  106  |  25<H>  ----------------------------<  105<H>  4.1   |  31  |  0.54    Ca    8.4<L>      07 Sep 2021 06:28          Cultures;   CAPILLARY BLOOD GLUCOSE      POCT Blood Glucose.: 161 mg/dL (08 Sep 2021 11:38)  POCT Blood Glucose.: 111 mg/dL (08 Sep 2021 08:03)  POCT Blood Glucose.: 137 mg/dL (07 Sep 2021 21:29)  POCT Blood Glucose.: 171 mg/dL (07 Sep 2021 16:34)    Lipid panel:           RADIOLOGY & ADDITIONAL TESTS:    Imaging Personally Reviewed:  [x ] YES      Consultant(s) Notes Reviewed:  [x ] YES     Care Discussed with [x ] Consultants [X ] Patient [ ] Family  [x ]    [x ]  Other; RN

## 2021-09-08 NOTE — PROGRESS NOTE ADULT - ASSESSMENT
82 yo male w/ pmhx HTN, DM, HLD, dementia, parkinsons' disease, cad sp stent x2, hf w/ PeF, moderate pulm htn, BPH, gastric ulcers, recent covid infection admitted for empyema 2/2 strep anginosus    # Strep anginous empyema/  Loculated left sided effusion  - s/p IR drainage and pigtail placement 8/27, with decrease in size.   - on cefepime 8/26 then switch to iv ceftx 9/1  - INFECTIOUS DISEASE Dr. Serrato following.    - CT SURGERY with Dr Weeks on the case  - pt's brother decline thoracotomy/ VATS and wanted only minimally invasive procedures  - repeat CT Chest per thoracic surgery showing now R sided infiltrate, ? aspiration.  Some improvement in R collection.  S&S evaluation requested.     # Parkinson's disease, Functional Quadriplegia - supportive care.   # BPH - flomax  # h/o CAD - plavix on hold right now due to CT placement.  Resume plavix once okay with CT surgery.   # Dementia  - on carbidopa/levadopa - seroquel  DNR / DNI    Plan of care d/w brother Berto [phone] today.  DNR / DNI.  Agrees with current plan of care, wishes to continue medical therapy, but if pt does not improve, is receptive to palliative measures.   82 yo male w/ pmhx HTN, DM, HLD, dementia, parkinsons' disease, cad sp stent x2, hf w/ PeF, moderate pulm htn, BPH, gastric ulcers, recent covid infection admitted for empyema 2/2 strep anginosus    # Strep anginous empyema/Loculated left sided effusion, Acute metabolic encephalopathy  - s/p IR drainage and pigtail placement 8/27, with decrease in size.   - on cefepime 8/26 then switch to iv ceftx 9/1  - INFECTIOUS DISEASE Dr. Serrato following.    - CT SURGERY with Dr Weeks on the case  - pt's brother decline thoracotomy/ VATS and wanted only minimally invasive procedures  - repeat CT Chest per thoracic surgery showing now R sided infiltrate, ? aspiration.  Some improvement in R collection.  S&S evaluation requested.     # Parkinson's disease, Functional Quadriplegia, Severe protein-calorie malnutrition, Dysphagia - supportive care. dysphagia diet, asp precaution   # BPH - flomax  # h/o CAD - plavix on hold right now due to CT placement.  Resume plavix once okay with CT surgery.   # Dementia  - on carbidopa/levadopa - seroquel  DNR / DNI    Plan of care d/w brother Berto [phone] today.  DNR / DNI.  Agrees with current plan of care, wishes to continue medical therapy, but if pt does not improve, is receptive to palliative measures.

## 2021-09-08 NOTE — PROGRESS NOTE ADULT - PROBLEM SELECTOR PLAN 5
insulin corrective scale
insulin corrective scale
supportive care, continue sinemet
insulin corrective scale

## 2021-09-08 NOTE — PROGRESS NOTE ADULT - SUBJECTIVE AND OBJECTIVE BOX
SUBJECTIVE AND OBJECTIVE: unchanged   INTERVAL HPI/OVERNIGHT EVENTS: none    DNR on chart: Yes  Allergies    No Known Allergies    Intolerances    MEDICATIONS  (STANDING):  atorvastatin 10 milliGRAM(s) Oral at bedtime  carbidopa/levodopa  25/100 1 Tablet(s) Oral three times a day  cefTRIAXone   IVPB 2000 milliGRAM(s) IV Intermittent every 24 hours  cefTRIAXone   IVPB      dextrose 40% Gel 15 Gram(s) Oral once  dextrose 5%. 1000 milliLiter(s) (50 mL/Hr) IV Continuous <Continuous>  dextrose 5%. 1000 milliLiter(s) (100 mL/Hr) IV Continuous <Continuous>  dextrose 50% Injectable 25 Gram(s) IV Push once  dextrose 50% Injectable 12.5 Gram(s) IV Push once  dextrose 50% Injectable 25 Gram(s) IV Push once  enoxaparin Injectable 40 milliGRAM(s) SubCutaneous daily  glucagon  Injectable 1 milliGRAM(s) IntraMuscular once  insulin lispro (ADMELOG) corrective regimen sliding scale   SubCutaneous three times a day before meals  QUEtiapine 50 milliGRAM(s) Oral at bedtime  senna 2 Tablet(s) Oral at bedtime  tamsulosin 0.4 milliGRAM(s) Oral at bedtime    MEDICATIONS  (PRN):  polyethylene glycol 3350 17 Gram(s) Oral daily PRN constipations      ITEMS UNCHECKED ARE NOT PRESENT    PRESENT SYMPTOMS: [ ]Unable to obtain due to poor mentation   Source if other than patient:  [ ]Family   [ ]Team     Pain:  [ ]yes [ x]no  QOL impact -   Location -                    Aggravating factors -  Quality -  Radiation -  Timing-  Severity (0-10 scale):  Minimal acceptable level (0-10 scale):     Dyspnea:                           [ ]Mild [ ]Moderate [ ]Severe  Anxiety:                             [ ]Mild [ ]Moderate [ ]Severe  Fatigue:                             [ ]Mild [ ]Moderate [ ]Severe  Nausea:                             [ ]Mild [ ]Moderate [ ]Severe  Loss of appetite:              [ ]Mild [ ]Moderate [ ]Severe  Constipation:                    [ ]Mild [ ]Moderate [ ]Severe    CPOT:    https://www.[facility].org/getattachment/[email or web address]/Critical-Care-Pain-Observation-Tool-(CPOT)    PAIN AD Score:	  http://geriatrictoolkit.[facility]/cog/painad.pdf (Ctrl + left click to view)    Other Symptoms:   [ x]All other review of systems negative     Palliative Performance Status Version 2:        20-30 %      http://[facility].org/files/news/palliative_performance_scale_ppsv2.pdf  PHYSICAL EXAM:  Vital Signs Last 24 Hrs  T(C): 36.5 (08 Sep 2021 05:29), Max: 36.6 (07 Sep 2021 23:45)  T(F): 97.7 (08 Sep 2021 05:29), Max: 97.8 (07 Sep 2021 23:45)  HR: 60 (08 Sep 2021 05:29) (60 - 81)  BP: 117/70 (08 Sep 2021 05:29) (101/60 - 138/67)  BP(mean): --  RR: 17 (08 Sep 2021 05:29) (17 - 17)  SpO2: 100% (08 Sep 2021 05:29) (98% - 100%) I&O's Summary    07 Sep 2021 07:01  -  08 Sep 2021 07:00  --------------------------------------------------------  IN: 290 mL / OUT: 0 mL / NET: 290 mL       GENERAL:  [x ]Alert  [x ]Oriented x 2  [ ]Lethargic  [ ]Cachexia  [ ]Unarousable  [x ]Verbal  [ ]Non-Verbal  Behavioral:   [ ]Anxiety  [x ]Delirium [ ]Agitation [ ]Other  HEENT:  [ ]Normal   [ x]Dry mouth   [ ]ET Tube/Trach  [ ]Oral lesions  PULMONARY:   [ ]Clear [ ]Tachypnea  [ ]Audible excessive secretions   [ ]Rhonchi        [ ]Right [ ]Left [ ]Bilateral  [ ]Crackles        [ ]Right [ ]Left [ ]Bilateral  [ ]Wheezing     [ ]Right [ ]Left [ ]Bilateral  [x ]Diminished BS [ ] Right [ ]Left [ x]Bilateral  CARDIOVASCULAR:    [x ]Regular [ ]Irregular [ ]Tachy  [ ]Josemanuel [ ]Murmur [ ]Other  GASTROINTESTINAL:  [ x]Soft  [ ]Distended   [x]+BS  [ x]Non tender [ ]Tender  [ ]PEG [ ]OGT/ NGT   Last BM:  9/7  GENITOURINARY:  [ ]Normal [x ]Incontinent   [ ]Oliguria/Anuria   [ ]Dumont  MUSCULOSKELETAL:   [ ]Normal   [ ]Weakness  [ x]Bed/Wheelchair bound [ ]Edema  NEUROLOGIC:   [ ]No focal deficits  [ x] Cognitive impairment  [x ] Dysphagia [ ]Dysarthria [ ] Paresis [ ]Other   SKIN:   [ ]Normal  [ ]Rash   [ ]Pressure ulcer(s) [ ]y [ ]n present on admission    CRITICAL CARE:  [ ]Shock Present  [ ]Septic [ ]Cardiogenic [ ]Neurologic [ ]Hypovolemic  [ ]Vasopressors [ ]Inotropes  [ ]Respiratory failure present [ ]Mechanical Ventilation [ ]Non-invasive ventilatory support [ ]High-Flow   [ ]Acute  [ ]Chronic [ ]Hypoxic  [ ]Hypercarbic [ ]Other  [ ]Other organ failure     LABS:                        8.7    6.40  )-----------( 269      ( 07 Sep 2021 06:28 )             27.8   09-07    138  |  106  |  25<H>  ----------------------------<  105<H>  4.1   |  31  |  0.54    Ca    8.4<L>      07 Sep 2021 06:28          RADIOLOGY & ADDITIONAL STUDIES:    Protein Calorie Malnutrition Present: [ ]mild [ ]moderate [x ]severe [ ]underweight [ ]morbid obesity  https://www.andeal.org/vault/2440/web/files/ONC/Table_Clinical%20Characteristics%20to%20Document%20Malnutrition-White%20JV%20et%20al%241485.pdf    Height (cm): 170.2 (08-25-21 @ 19:40), 170.2 (06-10-21 @ 10:50), 170.2 (05-27-21 @ 10:38)  Weight (kg): 54.4 (08-25-21 @ 19:40), 60.3 (06-11-21 @ 09:20), 61.7 (05-27-21 @ 10:38)  BMI (kg/m2): 18.8 (08-25-21 @ 19:40), 20.8 (06-11-21 @ 09:20), 21.3 (06-10-21 @ 10:50)    [ x]PPSV2 < or = 30%  [ x]significant weight loss [x ]poor nutritional intake [ ]anasarca    [ ]Artificial Nutrition    REFERRALS:   [ ]Chaplaincy  [ x]Hospice  [ ]Child Life  [ x]Social Work  [x ]Case management [ ]Holistic Therapy     Goals of Care Document:     SUBJECTIVE AND OBJECTIVE: unchanged   INTERVAL HPI/OVERNIGHT EVENTS: none    DNR on chart: Yes  Allergies    No Known Allergies    Intolerances    MEDICATIONS  (STANDING):  atorvastatin 10 milliGRAM(s) Oral at bedtime  carbidopa/levodopa  25/100 1 Tablet(s) Oral three times a day  cefTRIAXone   IVPB 2000 milliGRAM(s) IV Intermittent every 24 hours  cefTRIAXone   IVPB      dextrose 40% Gel 15 Gram(s) Oral once  dextrose 5%. 1000 milliLiter(s) (50 mL/Hr) IV Continuous <Continuous>  dextrose 5%. 1000 milliLiter(s) (100 mL/Hr) IV Continuous <Continuous>  dextrose 50% Injectable 25 Gram(s) IV Push once  dextrose 50% Injectable 12.5 Gram(s) IV Push once  dextrose 50% Injectable 25 Gram(s) IV Push once  enoxaparin Injectable 40 milliGRAM(s) SubCutaneous daily  glucagon  Injectable 1 milliGRAM(s) IntraMuscular once  insulin lispro (ADMELOG) corrective regimen sliding scale   SubCutaneous three times a day before meals  QUEtiapine 50 milliGRAM(s) Oral at bedtime  senna 2 Tablet(s) Oral at bedtime  tamsulosin 0.4 milliGRAM(s) Oral at bedtime    MEDICATIONS  (PRN):  polyethylene glycol 3350 17 Gram(s) Oral daily PRN constipations      ITEMS UNCHECKED ARE NOT PRESENT    PRESENT SYMPTOMS: [ ]Unable to obtain due to poor mentation   Source if other than patient:  [ ]Family   [ ]Team     Pain:  [ ]yes [ x]no  QOL impact -   Location -                    Aggravating factors -  Quality -  Radiation -  Timing-  Severity (0-10 scale):  Minimal acceptable level (0-10 scale):     Dyspnea:                           [ ]Mild [ ]Moderate [ ]Severe  Anxiety:                             [ ]Mild [ ]Moderate [ ]Severe  Fatigue:                             [ ]Mild [ ]Moderate [ ]Severe  Nausea:                             [ ]Mild [ ]Moderate [ ]Severe  Loss of appetite:              [ ]Mild [ ]Moderate [ ]Severe  Constipation:                    [ ]Mild [ ]Moderate [ ]Severe    CPOT:    https://www.[facility].org/getattachment/[email or web address]-[email or web address]/Critical-Care-Pain-Observation-Tool-(CPOT)    PAIN AD Score:	  http://geriatrictoolkit.[facility]/cog/painad.pdf (Ctrl + left click to view)    Other Symptoms:   [ x]All other review of systems negative     Palliative Performance Status Version 2:        20-30 %      http://[facility].org/files/news/palliative_performance_scale_ppsv2.pdf  PHYSICAL EXAM:  Vital Signs Last 24 Hrs  T(C): 36.5 (08 Sep 2021 05:29), Max: 36.6 (07 Sep 2021 23:45)  T(F): 97.7 (08 Sep 2021 05:29), Max: 97.8 (07 Sep 2021 23:45)  HR: 60 (08 Sep 2021 05:29) (60 - 81)  BP: 117/70 (08 Sep 2021 05:29) (101/60 - 138/67)  BP(mean): --  RR: 17 (08 Sep 2021 05:29) (17 - 17)  SpO2: 100% (08 Sep 2021 05:29) (98% - 100%) I&O's Summary    07 Sep 2021 07:01  -  08 Sep 2021 07:00  --------------------------------------------------------  IN: 290 mL / OUT: 0 mL / NET: 290 mL       GENERAL:  [x ]Alert  [x ]Oriented x 2  [ ]Lethargic  [ ]Cachexia  [ ]Unarousable  [x ]Verbal  [ ]Non-Verbal  Behavioral:   [ ]Anxiety  [x ]Delirium [ ]Agitation [ ]Other  HEENT:  [ ]Normal   [ x]Dry mouth   [ ]ET Tube/Trach  [ ]Oral lesions  PULMONARY:   [ ]Clear [ ]Tachypnea  [ ]Audible excessive secretions   [ ]Rhonchi        [ ]Right [ ]Left [ ]Bilateral  [ ]Crackles        [ ]Right [ ]Left [ ]Bilateral  [ ]Wheezing     [ ]Right [ ]Left [ ]Bilateral  [x ]Diminished BS [ ] Right [ ]Left [ x]Bilateral  CARDIOVASCULAR:    [x ]Regular [ ]Irregular [ ]Tachy  [ ]Josemanuel [ ]Murmur [ ]Other  GASTROINTESTINAL:  [ x]Soft  [ ]Distended   [x]+BS  [ x]Non tender [ ]Tender  [ ]PEG [ ]OGT/ NGT   Last BM:  9/7  GENITOURINARY:  [ ]Normal [x ]Incontinent   [ ]Oliguria/Anuria   [ ]Dumont  MUSCULOSKELETAL:   [ ]Normal   [ ]Weakness  [ x]Bed/Wheelchair bound [ ]Edema  NEUROLOGIC:   [ ]No focal deficits  [ x] Cognitive impairment  [x ] Dysphagia [ ]Dysarthria [ ] Paresis [ ]Other   SKIN:   [ ]Normal  [ ]Rash   [ ]Pressure ulcer(s) [ ]y [ ]n present on admission    CRITICAL CARE:  [ ]Shock Present  [ ]Septic [ ]Cardiogenic [ ]Neurologic [ ]Hypovolemic  [ ]Vasopressors [ ]Inotropes  [ ]Respiratory failure present [ ]Mechanical Ventilation [ ]Non-invasive ventilatory support [ ]High-Flow   [ ]Acute  [ ]Chronic [ ]Hypoxic  [ ]Hypercarbic [ ]Other  [ ]Other organ failure     LABS:                        8.7    6.40  )-----------( 269      ( 07 Sep 2021 06:28 )             27.8   09-07    138  |  106  |  25<H>  ----------------------------<  105<H>  4.1   |  31  |  0.54    Ca    8.4<L>      07 Sep 2021 06:28          RADIOLOGY & ADDITIONAL STUDIES:     < from: Xray Chest 1 View- PORTABLE-Routine (Xray Chest 1 View- PORTABLE-Routine .) (09.07.21 @ 07:51) >    EXAM:  XR CHEST PORTABLE ROUTINE 1V                            PROCEDURE DATE:  09/07/2021          INTERPRETATION:  Clinical Information: Empyema status post left chest tube    Technique: AP chest x-ray(s).    Comparison: 09/01/2021    Findings/  Impression: Status post left chest pigtail catheter. The heart is unremarkable. No lung consolidations. Minimal loculated left pleural effusion. No pneumothorax.    --- End of Report ---            EVA CALDERA MD; Attending Interventional Radiologist  This document has been electronically signed. Sep  8 2021 11:45AM    < end of copied text >  Protein Calorie Malnutrition Present: [ ]mild [ ]moderate [x ]severe [ ]underweight [ ]morbid obesity  https://www.andeal.org/vault/2152/web/files/ONC/Table_Clinical%20Characteristics%20to%20Document%20Malnutrition-White%20JV%20et%20al%041338.pdf    Height (cm): 170.2 (08-25-21 @ 19:40), 170.2 (06-10-21 @ 10:50), 170.2 (05-27-21 @ 10:38)  Weight (kg): 54.4 (08-25-21 @ 19:40), 60.3 (06-11-21 @ 09:20), 61.7 (05-27-21 @ 10:38)  BMI (kg/m2): 18.8 (08-25-21 @ 19:40), 20.8 (06-11-21 @ 09:20), 21.3 (06-10-21 @ 10:50)    [ x]PPSV2 < or = 30%  [ x]significant weight loss [x ]poor nutritional intake [ ]anasarca    [ ]Artificial Nutrition    REFERRALS:   [ ]Chaplaincy  [ x]Hospice  [ ]Child Life  [ x]Social Work  [x ]Case management [ ]Holistic Therapy     Goals of Care Document:

## 2021-09-08 NOTE — PROGRESS NOTE ADULT - PROBLEM SELECTOR PLAN 8
Berto Mitchell (brother) [phone] I spoke with pt's brother Berto Mitchell  [phone] via phone. He shared how his brother has significantly declined in the past 2 months especially with the recent stay at a rehab when he really declined and lost about 20 pounds, so he does not want his brother to go to a facility but he also has concerns about him going home. He is amenable to hospice referral for home. Will request hospice referral.   D/W Dr. Liu

## 2021-09-08 NOTE — PROGRESS NOTE ADULT - SUBJECTIVE AND OBJECTIVE BOX
FESTUS BLAND    LVS 2E 289 D    Patient is a 81y old  Male who presents with a chief complaint of FTT, empyema r/o (07 Sep 2021 17:12)       Allergies    No Known Allergies    Intolerances        HPI:  Patient is a 81M with a PMH of HTN, DM, HLD, dementia,  Parkinson's disease, CAD s/p stents x2 (>20 years ago), CHF (EF: 50%, Moderate diastolic dysfunction (Stage II), moderate pulmonary hypertension, small-mod pericardial effusion), Lt pleural effusion, BPH, gastric ulcers, COVID infection who was sent to the ED for weakness.  Patient currently awake and alert, oriented x1, unable to provide history.  Has no acute complaints.  Discharged from NH today and was sent to the ED as he appeared weak and emaciated.  Vitals stable, labs show leukocytosis and mild lactic acidosis.  CT reveals a chronic loculated L pleural effusion.  ED attending spoke to family who made the patient DNR/DNI however did agree for chest tube drainage for effusion.  Will admit to med surg.   (26 Aug 2021 03:21)      PAST MEDICAL & SURGICAL HISTORY:  Hypercholesterolemia    DM (Diabetes Mellitus)    HTN (Hypertension)    CAD (Coronary Artery Disease)  s/p cardiac stent ( &gt; 20 years ago)    BPH (benign prostatic hyperplasia)    Coronary Stent  x 2 ( 20 years )        FAMILY HISTORY:  FH: HTN (hypertension)          MEDICATIONS   atorvastatin 10 milliGRAM(s) Oral at bedtime  carbidopa/levodopa  25/100 1 Tablet(s) Oral three times a day  cefTRIAXone   IVPB 2000 milliGRAM(s) IV Intermittent every 24 hours  cefTRIAXone   IVPB      dextrose 40% Gel 15 Gram(s) Oral once  dextrose 5%. 1000 milliLiter(s) IV Continuous <Continuous>  dextrose 5%. 1000 milliLiter(s) IV Continuous <Continuous>  dextrose 50% Injectable 25 Gram(s) IV Push once  dextrose 50% Injectable 12.5 Gram(s) IV Push once  dextrose 50% Injectable 25 Gram(s) IV Push once  enoxaparin Injectable 40 milliGRAM(s) SubCutaneous daily  glucagon  Injectable 1 milliGRAM(s) IntraMuscular once  insulin lispro (ADMELOG) corrective regimen sliding scale   SubCutaneous three times a day before meals  polyethylene glycol 3350 17 Gram(s) Oral daily PRN  QUEtiapine 50 milliGRAM(s) Oral at bedtime  senna 2 Tablet(s) Oral at bedtime  tamsulosin 0.4 milliGRAM(s) Oral at bedtime      Vital Signs Last 24 Hrs  T(C): 36.5 (08 Sep 2021 05:29), Max: 36.6 (07 Sep 2021 23:45)  T(F): 97.7 (08 Sep 2021 05:29), Max: 97.8 (07 Sep 2021 23:45)  HR: 60 (08 Sep 2021 05:29) (60 - 81)  BP: 117/70 (08 Sep 2021 05:29) (101/60 - 138/67)  BP(mean): --  RR: 17 (08 Sep 2021 05:29) (17 - 17)  SpO2: 100% (08 Sep 2021 05:29) (98% - 100%)      09-07-21 @ 07:01  -  09-08-21 @ 07:00  --------------------------------------------------------  IN: 290 mL / OUT: 0 mL / NET: 290 mL            LABS:                        8.7    6.40  )-----------( 269      ( 07 Sep 2021 06:28 )             27.8     09-07    138  |  106  |  25<H>  ----------------------------<  105<H>  4.1   |  31  |  0.54    Ca    8.4<L>      07 Sep 2021 06:28                WBC:  WBC Count: 6.40 K/uL (09-07 @ 06:28)  WBC Count: 5.55 K/uL (09-05 @ 08:29)  WBC Count: 6.65 K/uL (09-04 @ 14:42)      MICROBIOLOGY:  RECENT CULTURES:                  Sodium:  Sodium, Serum: 138 mmol/L (09-07 @ 06:28)  Sodium, Serum: 135 mmol/L (09-05 @ 08:29)  Sodium, Serum: 138 mmol/L (09-04 @ 09:47)      0.54 mg/dL 09-07 @ 06:28  0.67 mg/dL 09-05 @ 08:29  0.54 mg/dL 09-04 @ 09:47      Hemoglobin:  Hemoglobin: 8.7 g/dL (09-07 @ 06:28)  Hemoglobin: 9.4 g/dL (09-05 @ 08:29)  Hemoglobin: 9.6 g/dL (09-04 @ 14:42)      Platelets: Platelet Count - Automated: 269 K/uL (09-07 @ 06:28)  Platelet Count - Automated: 299 K/uL (09-05 @ 08:29)  Platelet Count - Automated: 316 K/uL (09-04 @ 14:42)              RADIOLOGY & ADDITIONAL STUDIES:

## 2021-09-08 NOTE — PROGRESS NOTE ADULT - ASSESSMENT
BALDO MORTENSEN 81 M 8/26/2021 1942 DR CATHERINE LANDIN     REVIEW OF SYMPTOMS      Able to give (reliable) ROS  NO     PHYSICAL EXAM    HEENT Unremarkable  atraumatic   RESP Fair air entry EXP prolonged    Harsh breath sound Resp distres mild   CARDIAC S1 S2 No S3     NO JVD    ABDOMEN SOFT BS PRESENT NOT DISTENDED No hepatosplenomegaly   PEDAL EDEMA present No calf tenderness  NO rash                                          8/26/2021  PRESENTATION.  82 y/o M  pmhx of HTN, DM, HLD, dementia,  Parkinson's disease, CAD s/p stents x2 (>20 years ago), CHF (EF: 50%, Moderate diastolic dysfunction (Stage II), moderate pulmonary hypertension, small-mod pericardial effusion), Lt pleural effusion, BPH, gastric ulcers, COVID infection who was sent on 8/26/2021  to the ED for weakness.      HOSPITAL COURSE   PL EFFSN l Seen by IR 8/27/2021   PIGTAIL 8/27  pfa cw empyema  (8/27) pfa 8/27 ldh [id number] pr 2.2 g 2 ph 7  numerous gpc pairs   pl fl 8/27 strep angiosus rare staph a   Rocephin 2g (9/2/2021) (Dr PINTO)  cefepime 1.3 (8/26) (Dr Ramirez) (Pneu)  DCed  NONVIOLENT NONSELF DESTR LEVEL ONE   8/28 8/29      BEST PRACTICE ISSUES.                                                  HEAD OF BED ELEVATION. Yes  DVT PROPHYLAXIS.  lvnx 40 (8/26)                                        VALLEJO PROPHYLAXIS.                                                                                         DIET.      dys 1 puree nectar cons carb (8/26)                     INFECTION PROPHYLAXIS.                      GENERAL ISSUES  GOC ADVANCED DIRECTIVE.    dnr                        ALLERGY.    nka                        WEIGHT.         8/26/2021 54                           BMI.                   8/26/2021 18         PATIENT DATA   TUBES  PROCEDURES.     PIGTAIL 8/27 maroon drainage     ABG.   8/26/2021 ra 745/37/78     OXYGENATION.       9/7/2021 ra 98%   8/26-8/27/2021 ra 97%    - ra 96%         VITALS/IO/VENT/DRIPS.  9/8/2021 afeb 60 110/70    PROBLEM ASSESSMENT/RECOMMENDATIONS.    COVID STATUS  Ho covid (+) 6/10/2021     CHRONIC L PL EMPYEMA WITH AIR FLUID LEVEL   Pt has pgtail placed 8/27 9/1/2021 Case dw Dr Apple Plan is to dc on iv abio x 6 w and with pigtauil as fami declining aggressive invasive procedures such as dcortication   9/8/2021 Miniatrium plannd     EMPYEMA  pl fl 8/27 strep angiosus rare staph a   Rocephin 2g (9/2/2021) (Dr PINTO)  cont rx    DYSPHAGIA  On dys 1 puree nectar diet (8/26/2021)     CAD  PMH CAD stents 2 decade ago   Tr 8/26/2021 Tr (-)     CHF  echo 6/11/2021 n lvsf thickened pericard with 1 cm pericard effs n la   bnp 8/26/2021 bnp 863     ANEMIA  Hb 8/26 - 8/27-8/28/2021 Hb 10 - 8.7 - 8.7   mcv 8/26/2021 mvcv 89     PARKINSONS   carbilevadopa 25 100.3 (8/26)     NONVIOLENT NONSELF DESTR LEVEL ONE   8/28 8/29    GOC dnr         TIME SPENT   Over 25 minutes aggregate care time spent on encounter; activities included   direct patient care, counseling and/or coordinating care reviewing notes, lab data/ imaging , discussion with multidisciplinary team/ patient  /family and explaining in detail risks, benefits, alternatives  of the recommendations     BALDO MORTENSEN 81 M 8/26/2021 1942 DR CATHERINE LANDIN

## 2021-09-08 NOTE — PROGRESS NOTE ADULT - PROBLEM SELECTOR PLAN 7
[facility]oquel [facility]    CODE: DNR/DNI
continues on Seroquel   supportive care and reorientation   pt has mittens on to prevent pulling at pigtail and IV
seroquel [facility]    CODE: DNR/DNI
seroquel \[facility]\""    CODE: DNR/DNI

## 2021-09-08 NOTE — PROGRESS NOTE ADULT - PROBLEM SELECTOR PROBLEM 7
Alzheimer's dementia
Dementia due to Parkinson's disease with behavioral disturbance
Alzheimer's dementia
Alzheimer's dementia

## 2021-09-08 NOTE — PROGRESS NOTE ADULT - SUBJECTIVE AND OBJECTIVE BOX
Patient seen and examined at bedside resting. No acute events overnight.  Offers no complaints, denies chest pain or SOB.  Tolerating diet. Denies fever, chills, N/V/D.    Vital Signs Last 24 Hrs  T(F): 97.7 (09-08-21 @ 05:29), Max: 97.8 (09-07-21 @ 23:45)  HR: 60 (09-08-21 @ 05:29)  BP: 117/70 (09-08-21 @ 05:29)  RR: 17 (09-08-21 @ 05:29)  SpO2: 100% (09-08-21 @ 05:29)  POCT Blood Glucose.: 161 mg/dL (08 Sep 2021 11:38)      PHYSICAL EXAM:  GENERAL: Alert, NAD  CHEST/LUNG: diminished breath sounds on Right, no crackles or rhonchi noted. CTA on Left. Non labored respirations.   HEART: Regular rate and rhythm; S1 & S2 appreciated  ABDOMEN: + Bowel sounds, soft, NT/ND  EXTREMITIES:  no calf tenderness, no edema    I&O's Detail    07 Sep 2021 07:01  -  08 Sep 2021 07:00  --------------------------------------------------------  IN:    IV PiggyBack: 50 mL    Oral Fluid: 240 mL  Total IN: 290 mL    OUT:    Chest Tube (mL): 0 mL  Total OUT: 0 mL    Total NET: 290 mL    LABS:                        8.7    6.40  )-----------( 269      ( 07 Sep 2021 06:28 )             27.8     09-07    138  |  106  |  25<H>  ----------------------------<  105<H>  4.1   |  31  |  0.54    Ca    8.4<L>      07 Sep 2021 06:28    RADIOLOGY & ADDITIONAL STUDIES:  < from: Xray Chest 1 View- PORTABLE-Routine (Xray Chest 1 View- PORTABLE-Routine .) (09.07.21 @ 07:51) >    EXAM:  XR CHEST PORTABLE ROUTINE 1V                            PROCEDURE DATE:  09/07/2021        INTERPRETATION:  Clinical Information: Empyema status post left chest tube    Technique: AP chest x-ray(s).    Comparison: 09/01/2021    Impression: Status post left chest pigtail catheter. The heart is unremarkable. No lung consolidations. Minimal loculated left pleural effusion. No pneumothorax.    A/P  81M with multiple medical comorbidities a/w FTT, chronic left loculated pleural effusion s/p IR drainage and pigtail placement 8/27, with decrease in size.   Patient with localized collection, adequately drained. Sepsis/source control obtained. Patient high risk for intervention (would likely require thoracotomy, not just VATS), and family does not want any invasive procedures.     - CT flushed at bedside with 20cc with no difficulty, keep to suction  - f/u CT output and CXR in AM on 9/9  - f/u final CXR results  - continue medical and pulmonology management and supportive care  -miniatrium recommended per Dr Apple  - d/w Dr. Apple

## 2021-09-09 LAB
GLUCOSE BLDC GLUCOMTR-MCNC: 104 MG/DL — HIGH (ref 70–99)
GLUCOSE BLDC GLUCOMTR-MCNC: 150 MG/DL — HIGH (ref 70–99)
GLUCOSE BLDC GLUCOMTR-MCNC: 182 MG/DL — HIGH (ref 70–99)
GLUCOSE BLDC GLUCOMTR-MCNC: 92 MG/DL — SIGNIFICANT CHANGE UP (ref 70–99)

## 2021-09-09 PROCEDURE — 99232 SBSQ HOSP IP/OBS MODERATE 35: CPT

## 2021-09-09 PROCEDURE — 71045 X-RAY EXAM CHEST 1 VIEW: CPT | Mod: 26,76

## 2021-09-09 PROCEDURE — 99233 SBSQ HOSP IP/OBS HIGH 50: CPT

## 2021-09-09 RX ADMIN — CARBIDOPA AND LEVODOPA 1 TABLET(S): 25; 100 TABLET ORAL at 21:20

## 2021-09-09 RX ADMIN — ATORVASTATIN CALCIUM 10 MILLIGRAM(S): 80 TABLET, FILM COATED ORAL at 21:20

## 2021-09-09 RX ADMIN — SENNA PLUS 2 TABLET(S): 8.6 TABLET ORAL at 21:20

## 2021-09-09 RX ADMIN — CARBIDOPA AND LEVODOPA 1 TABLET(S): 25; 100 TABLET ORAL at 05:19

## 2021-09-09 RX ADMIN — ENOXAPARIN SODIUM 40 MILLIGRAM(S): 100 INJECTION SUBCUTANEOUS at 12:09

## 2021-09-09 RX ADMIN — TAMSULOSIN HYDROCHLORIDE 0.4 MILLIGRAM(S): 0.4 CAPSULE ORAL at 21:20

## 2021-09-09 RX ADMIN — CEFTRIAXONE 100 MILLIGRAM(S): 500 INJECTION, POWDER, FOR SOLUTION INTRAMUSCULAR; INTRAVENOUS at 12:08

## 2021-09-09 RX ADMIN — QUETIAPINE FUMARATE 50 MILLIGRAM(S): 200 TABLET, FILM COATED ORAL at 21:20

## 2021-09-09 RX ADMIN — Medication 1: at 12:09

## 2021-09-09 RX ADMIN — CARBIDOPA AND LEVODOPA 1 TABLET(S): 25; 100 TABLET ORAL at 14:16

## 2021-09-09 NOTE — PROGRESS NOTE ADULT - SUBJECTIVE AND OBJECTIVE BOX
Patient is a 81y old  Male who presents with a chief complaint of FTT, empyema r/o (09 Sep 2021 08:21)      OVERNIGHT EVENTS:    REVIEW OF SYSTEMS: denies chest pain/SOB, diaphoresis, no F/C, cough, dizziness, headache, blurry vision, nausea, vomiting, abdominal pain. All others review of systems negative     MEDICATIONS  (STANDING):  atorvastatin 10 milliGRAM(s) Oral at bedtime  carbidopa/levodopa  25/100 1 Tablet(s) Oral three times a day  cefTRIAXone   IVPB 2000 milliGRAM(s) IV Intermittent every 24 hours  cefTRIAXone   IVPB      dextrose 40% Gel 15 Gram(s) Oral once  dextrose 5%. 1000 milliLiter(s) (50 mL/Hr) IV Continuous <Continuous>  dextrose 5%. 1000 milliLiter(s) (100 mL/Hr) IV Continuous <Continuous>  dextrose 50% Injectable 25 Gram(s) IV Push once  dextrose 50% Injectable 25 Gram(s) IV Push once  dextrose 50% Injectable 12.5 Gram(s) IV Push once  enoxaparin Injectable 40 milliGRAM(s) SubCutaneous daily  glucagon  Injectable 1 milliGRAM(s) IntraMuscular once  insulin lispro (ADMELOG) corrective regimen sliding scale   SubCutaneous three times a day before meals  QUEtiapine 50 milliGRAM(s) Oral at bedtime  senna 2 Tablet(s) Oral at bedtime  tamsulosin 0.4 milliGRAM(s) Oral at bedtime    MEDICATIONS  (PRN):  polyethylene glycol 3350 17 Gram(s) Oral daily PRN constipations      Allergies    No Known Allergies    Intolerances        T(F): 97.6 (09-09-21 @ 13:42), Max: 98 (09-09-21 @ 00:01)  HR: 66 (09-09-21 @ 13:42) (63 - 80)  BP: 99/60 (09-09-21 @ 13:42) (99/60 - 135/76)  RR: 20 (09-09-21 @ 13:42) (17 - 20)  SpO2: 100% (09-09-21 @ 13:42) (99% - 100%)  Wt(kg): --    PHYSICAL EXAM:  GENERAL: NAD  HEAD:  Atraumatic, Normocephalic  EYES: PERRLA, conjunctiva and sclera clear  ENMT: Moist mucous membranes  NECK: Supple, No JVD, Normal thyroid  NERVOUS SYSTEM:  Alert & Awake  CHEST/LUNG: Clear to percussion bilaterally;   HEART: Regular rate and rhythm;   ABDOMEN: Soft, Nontender, Nondistended; Bowel sounds present  EXTREMITIES:  no edema BL LE  SKIN: moist    LABS:              Cultures;   CAPILLARY BLOOD GLUCOSE      POCT Blood Glucose.: 182 mg/dL (09 Sep 2021 11:19)  POCT Blood Glucose.: 104 mg/dL (09 Sep 2021 07:41)  POCT Blood Glucose.: 112 mg/dL (08 Sep 2021 16:41)    Lipid panel:           RADIOLOGY & ADDITIONAL TESTS:    Imaging Personally Reviewed:  [x ] YES      Consultant(s) Notes Reviewed:  [x ] YES     Care Discussed with [x ] Consultants [X ] Patient [ ] Family  [x ]    [x ]  Other; RN

## 2021-09-09 NOTE — HOSPICE CARE NOTE - CONVESATION DETAILS
Called pt's brother, Berto, to discuss home hospice svces.  Berto is in agreement w hospice POC.  Emailed HCN Consents to   [email or web address].  He will print the forms & call me so I can walk him thru where to sign.   Pt has been medically approved for home hospice care.   Called pt's brother, Berto, to discuss home hospice svces.  Berto is in agreement   w hospice POC.  Emailed HCN Consents to [email or web address].  He will print the forms & call me so I can walk him thru where to sign.

## 2021-09-09 NOTE — CHART NOTE - NSCHARTNOTEFT_GEN_A_CORE
Pt s/p left chest tube removed without incident.    Pleural Effusion resolved  CXR ordered    -Monitor site /vitals   -f/u CXR results

## 2021-09-09 NOTE — PROGRESS NOTE ADULT - ASSESSMENT
80 yo male w/ pmhx HTN, DM, HLD, dementia, parkinsons' disease, cad sp stent x2, hf w/ PeF, moderate pulm htn, BPH, gastric ulcers, recent covid infection admitted for empyema 2/2 strep anginosus    # Strep anginous empyema/Loculated left sided effusion, Acute metabolic encephalopathy  - s/p IR drainage and pigtail placement 8/27, with decrease in size.   - on cefepime 8/26 then switch to iv ceftx 9/1  - INFECTIOUS DISEASE Dr. Serrato following.    - CT SURGERY with Dr Weeks on the case  - pt's brother decline thoracotomy/ VATS and wanted only minimally invasive procedures  - repeat CT Chest per thoracic surgery showing now R sided infiltrate, ? aspiration.  Some improvement in R collection.  S&S evaluation requested.     # Parkinson's disease, Functional Quadriplegia, Severe protein-calorie malnutrition, Dysphagia - supportive care. dysphagia diet, asp precaution   # BPH - flomax  # h/o CAD - plavix on hold right now due to CT placement.  Resume plavix once okay with CT surgery.   # Dementia  - on carbidopa/levadopa - seroquel  DNR / DNI    Plan of care d/w brother Berto [phone] today.  DNR / DNI.  Agrees with current plan of care, wishes to continue medical therapy, but if pt does not improve, is receptive to palliative measures.

## 2021-09-09 NOTE — HOSPICE CARE NOTE - NSPROPTRIGHTCAREGIVER_GEN_A_NUR
yes Quinolones Counseling:  I discussed with the patient the risks of fluoroquinolones including but not limited to GI upset, allergic reaction, drug rash, diarrhea, dizziness, photosensitivity, yeast infections, liver function test abnormalities, tendonitis/tendon rupture.

## 2021-09-09 NOTE — PROGRESS NOTE ADULT - SUBJECTIVE AND OBJECTIVE BOX
FESTUS BLAND    LVS 2E 289 D    Patient is a 81y old  Male who presents with a chief complaint of FTT, empyema r/o (08 Sep 2021 14:19)       Allergies    No Known Allergies    Intolerances        HPI:  Patient is a 81M with a PMH of HTN, DM, HLD, dementia,  Parkinson's disease, CAD s/p stents x2 (>20 years ago), CHF (EF: 50%, Moderate diastolic dysfunction (Stage II), moderate pulmonary hypertension, small-mod pericardial effusion), Lt pleural effusion, BPH, gastric ulcers, COVID infection who was sent to the ED for weakness.  Patient currently awake and alert, oriented x1, unable to provide history.  Has no acute complaints.  Discharged from NH today and was sent to the ED as he appeared weak and emaciated.  Vitals stable, labs show leukocytosis and mild lactic acidosis.  CT reveals a chronic loculated L pleural effusion.  ED attending spoke to family who made the patient DNR/DNI however did agree for chest tube drainage for effusion.  Will admit to med surg.   (26 Aug 2021 03:21)      PAST MEDICAL & SURGICAL HISTORY:  Hypercholesterolemia    DM (Diabetes Mellitus)    HTN (Hypertension)    CAD (Coronary Artery Disease)  s/p cardiac stent ( &gt; 20 years ago)    BPH (benign prostatic hyperplasia)    Coronary Stent  x 2 ( 20 years )        FAMILY HISTORY:  FH: HTN (hypertension)          MEDICATIONS   atorvastatin 10 milliGRAM(s) Oral at bedtime  carbidopa/levodopa  25/100 1 Tablet(s) Oral three times a day  cefTRIAXone   IVPB 2000 milliGRAM(s) IV Intermittent every 24 hours  cefTRIAXone   IVPB      dextrose 40% Gel 15 Gram(s) Oral once  dextrose 5%. 1000 milliLiter(s) IV Continuous <Continuous>  dextrose 5%. 1000 milliLiter(s) IV Continuous <Continuous>  dextrose 50% Injectable 25 Gram(s) IV Push once  dextrose 50% Injectable 12.5 Gram(s) IV Push once  dextrose 50% Injectable 25 Gram(s) IV Push once  enoxaparin Injectable 40 milliGRAM(s) SubCutaneous daily  glucagon  Injectable 1 milliGRAM(s) IntraMuscular once  insulin lispro (ADMELOG) corrective regimen sliding scale   SubCutaneous three times a day before meals  polyethylene glycol 3350 17 Gram(s) Oral daily PRN  QUEtiapine 50 milliGRAM(s) Oral at bedtime  senna 2 Tablet(s) Oral at bedtime  tamsulosin 0.4 milliGRAM(s) Oral at bedtime      Vital Signs Last 24 Hrs  T(C): 36.3 (09 Sep 2021 05:13), Max: 36.7 (09 Sep 2021 00:01)  T(F): 97.3 (09 Sep 2021 05:13), Max: 98 (09 Sep 2021 00:01)  HR: 63 (09 Sep 2021 05:13) (63 - 80)  BP: 135/76 (09 Sep 2021 05:13) (101/70 - 135/76)  BP(mean): --  RR: 18 (09 Sep 2021 05:13) (17 - 18)  SpO2: 100% (09 Sep 2021 05:13) (99% - 100%)      09-08-21 @ 07:01  -  09-09-21 @ 07:00  --------------------------------------------------------  IN: 290 mL / OUT: 1010 mL / NET: -720 mL            LABS:                    WBC:  WBC Count: 6.40 K/uL (09-07 @ 06:28)  WBC Count: 5.55 K/uL (09-05 @ 08:29)      MICROBIOLOGY:  RECENT CULTURES:                  Sodium:  Sodium, Serum: 138 mmol/L (09-07 @ 06:28)  Sodium, Serum: 135 mmol/L (09-05 @ 08:29)      0.54 mg/dL 09-07 @ 06:28  0.67 mg/dL 09-05 @ 08:29      Hemoglobin:  Hemoglobin: 8.7 g/dL (09-07 @ 06:28)  Hemoglobin: 9.4 g/dL (09-05 @ 08:29)      Platelets: Platelet Count - Automated: 269 K/uL (09-07 @ 06:28)  Platelet Count - Automated: 299 K/uL (09-05 @ 08:29)              RADIOLOGY & ADDITIONAL STUDIES:

## 2021-09-10 LAB
GLUCOSE BLDC GLUCOMTR-MCNC: 105 MG/DL — HIGH (ref 70–99)
GLUCOSE BLDC GLUCOMTR-MCNC: 125 MG/DL — HIGH (ref 70–99)
GLUCOSE BLDC GLUCOMTR-MCNC: 135 MG/DL — HIGH (ref 70–99)
GLUCOSE BLDC GLUCOMTR-MCNC: 160 MG/DL — HIGH (ref 70–99)

## 2021-09-10 PROCEDURE — 99233 SBSQ HOSP IP/OBS HIGH 50: CPT

## 2021-09-10 PROCEDURE — 99232 SBSQ HOSP IP/OBS MODERATE 35: CPT

## 2021-09-10 RX ADMIN — TAMSULOSIN HYDROCHLORIDE 0.4 MILLIGRAM(S): 0.4 CAPSULE ORAL at 21:15

## 2021-09-10 RX ADMIN — CARBIDOPA AND LEVODOPA 1 TABLET(S): 25; 100 TABLET ORAL at 05:20

## 2021-09-10 RX ADMIN — CEFTRIAXONE 100 MILLIGRAM(S): 500 INJECTION, POWDER, FOR SOLUTION INTRAMUSCULAR; INTRAVENOUS at 11:56

## 2021-09-10 RX ADMIN — CARBIDOPA AND LEVODOPA 1 TABLET(S): 25; 100 TABLET ORAL at 21:15

## 2021-09-10 RX ADMIN — SENNA PLUS 2 TABLET(S): 8.6 TABLET ORAL at 21:15

## 2021-09-10 RX ADMIN — QUETIAPINE FUMARATE 50 MILLIGRAM(S): 200 TABLET, FILM COATED ORAL at 21:15

## 2021-09-10 RX ADMIN — ATORVASTATIN CALCIUM 10 MILLIGRAM(S): 80 TABLET, FILM COATED ORAL at 21:15

## 2021-09-10 RX ADMIN — CARBIDOPA AND LEVODOPA 1 TABLET(S): 25; 100 TABLET ORAL at 16:07

## 2021-09-10 RX ADMIN — ENOXAPARIN SODIUM 40 MILLIGRAM(S): 100 INJECTION SUBCUTANEOUS at 11:55

## 2021-09-10 RX ADMIN — Medication 1: at 11:54

## 2021-09-10 NOTE — PROGRESS NOTE ADULT - SUBJECTIVE AND OBJECTIVE BOX
Patient is a 81y old  Male who presents with a chief complaint of FTT, empyema r/o (10 Sep 2021 09:06)      OVERNIGHT EVENTS:    REVIEW OF SYSTEMS: denies chest pain/SOB, diaphoresis, no F/C, cough, dizziness, headache, blurry vision, nausea, vomiting, abdominal pain. All others review of systems negative     MEDICATIONS  (STANDING):  atorvastatin 10 milliGRAM(s) Oral at bedtime  carbidopa/levodopa  25/100 1 Tablet(s) Oral three times a day  cefTRIAXone   IVPB 2000 milliGRAM(s) IV Intermittent every 24 hours  cefTRIAXone   IVPB      dextrose 40% Gel 15 Gram(s) Oral once  dextrose 5%. 1000 milliLiter(s) (50 mL/Hr) IV Continuous <Continuous>  dextrose 5%. 1000 milliLiter(s) (100 mL/Hr) IV Continuous <Continuous>  dextrose 50% Injectable 25 Gram(s) IV Push once  dextrose 50% Injectable 12.5 Gram(s) IV Push once  dextrose 50% Injectable 25 Gram(s) IV Push once  enoxaparin Injectable 40 milliGRAM(s) SubCutaneous daily  glucagon  Injectable 1 milliGRAM(s) IntraMuscular once  insulin lispro (ADMELOG) corrective regimen sliding scale   SubCutaneous three times a day before meals  QUEtiapine 50 milliGRAM(s) Oral at bedtime  senna 2 Tablet(s) Oral at bedtime  tamsulosin 0.4 milliGRAM(s) Oral at bedtime    MEDICATIONS  (PRN):  polyethylene glycol 3350 17 Gram(s) Oral daily PRN constipations      Allergies    No Known Allergies    Intolerances        T(F): 96.5 (09-10-21 @ 11:36), Max: 97.9 (09-09-21 @ 17:52)  HR: 68 (09-10-21 @ 11:36) (63 - 74)  BP: 102/57 (09-10-21 @ 11:36) (102/57 - 135/69)  RR: 17 (09-10-21 @ 11:36) (17 - 20)  SpO2: 99% (09-10-21 @ 11:36) (99% - 100%)  Wt(kg): --    PHYSICAL EXAM:  GENERAL: NAD  HEAD:  Atraumatic, Normocephalic  EYES: PERRLA, conjunctiva and sclera clear  ENMT: Moist mucous membranes  NECK: Supple, No JVD, Normal thyroid  NERVOUS SYSTEM:  Alert & Awake  CHEST/LUNG: Clear to percussion bilaterally;   HEART: Regular rate and rhythm;   ABDOMEN: Soft, Nontender, Nondistended; Bowel sounds present  EXTREMITIES:  no edema BL LE  SKIN: moist    LABS:              Cultures;   CAPILLARY BLOOD GLUCOSE      POCT Blood Glucose.: 160 mg/dL (10 Sep 2021 11:30)  POCT Blood Glucose.: 105 mg/dL (10 Sep 2021 08:04)  POCT Blood Glucose.: 150 mg/dL (09 Sep 2021 21:18)  POCT Blood Glucose.: 92 mg/dL (09 Sep 2021 16:18)    Lipid panel:           RADIOLOGY & ADDITIONAL TESTS:    Imaging Personally Reviewed:  [x ] YES      Consultant(s) Notes Reviewed:  [x ] YES     Care Discussed with [x ] Consultants [X ] Patient [ ] Family  [x ]    [x ]  Other; RN

## 2021-09-10 NOTE — PROGRESS NOTE ADULT - PROBLEM SELECTOR PLAN 4
as above
as above
plavix - Currently held as Stents are >20 years old and held for IR procedure
as above
catabolic state smoldering sepsis from empyema, poor intake
as above
as above
Management per IR/thoracic  Serial imaging  Monitor drainage  Overall plan to be determined
Management per IR/thoracic  Serial imaging  Monitor drainage  Overall plan to be determined
as above
plavix - Currently held as Stents are >20 years old and held for IR procedure
Management per IR/thoracic  Serial imaging  Monitor drainage  Overall plan to be determined
as above
as above
Management per IR/thoracic  Serial imaging  Monitor drainage
as above
Management per IR/thoracic  Serial imaging  Monitor drainage  Overall plan to be determined
plavix - Currently held as Stents are >20 years old and held for IR procedure

## 2021-09-10 NOTE — PROGRESS NOTE ADULT - PROBLEM SELECTOR PLAN 2
as above
Good but not overly tight diabetic control to avoid iatrogenic hypoglycemia. Uncontrolled hyperglycemia makes control of infections potentially problematic.
as above
Good but not overly tight diabetic control to avoid iatrogenic hypoglycemia. Uncontrolled hyperglycemia makes control of infections potentially problematic.
Good but not overly tight diabetic control to avoid iatrogenic hypoglycemia. Uncontrolled hyperglycemia makes control of infections potentially problematic.
Consitpation  C/w miralax, senna qd
as above
Good but not overly tight diabetic control to avoid iatrogenic hypoglycemia. Uncontrolled hyperglycemia makes control of infections potentially problematic.
Good but not overly tight diabetic control to avoid iatrogenic hypoglycemia. Uncontrolled hyperglycemia makes control of infections potentially problematic.
decreased intake and weight loss   was in SNF and when arrived home was sent to hospital for emaciated, weak appearance.
Large amt of stool seen on CT  C/w miralax  senna qd
Consitpation  C/w miralax, senna qd

## 2021-09-10 NOTE — PROGRESS NOTE ADULT - PROBLEM SELECTOR PROBLEM 2
Parkinsons disease
Constipation
Failure to thrive in adult
Diabetes mellitus
Parkinsons disease
Diabetes mellitus
Parkinsons disease
Constipation
Diabetes mellitus
Parkinsons disease
Parkinsons disease
Constipation
Parkinsons disease

## 2021-09-10 NOTE — PROGRESS NOTE ADULT - PROBLEM SELECTOR PROBLEM 3
Diabetes mellitus
Parkinsons disease
Functional quadriplegia
Diabetes mellitus
Senile dementia with delirium
Diabetes mellitus
Senile dementia with delirium
Diabetes mellitus
Senile dementia with delirium
Diabetes mellitus
Diabetes mellitus
Senile dementia with delirium
Senile dementia with delirium
Parkinsons disease
Parkinsons disease

## 2021-09-10 NOTE — PROGRESS NOTE ADULT - ASSESSMENT
BALDO MORTENSEN 81 M 8/26/2021 1942 DR CATHERINE LANDIN     REVIEW OF SYMPTOMS      Able to give (reliable) ROS  NO     PHYSICAL EXAM    HEENT Unremarkable  atraumatic   RESP Fair air entry EXP prolonged    Harsh breath sound Resp distres mild   CARDIAC S1 S2 No S3     NO JVD    ABDOMEN SOFT BS PRESENT NOT DISTENDED No hepatosplenomegaly   PEDAL EDEMA present No calf tenderness  NO rash       BEST PRACTICE ISSUES.                                                  HEAD OF BED ELEVATION. Yes  DVT PROPHYLAXIS.  lvnx 40 (8/26)                                        VALLEJO PROPHYLAXIS.                                                                                         DIET.      dys 1 puree nectar cons carb (8/26)                     INFECTION PROPHYLAXIS.                      GENERAL ISSUES  GOC ADVANCED DIRECTIVE.    dnr                        ALLERGY.    nka                        WEIGHT.         8/26/2021 54                           BMI.                   8/26/2021 18         PROBLEM ASSESSMENT/RECOMMENDATIONS.    COVID STATUS  Ho covid (+) 6/10/2021     CHRONIC L PL EMPYEMA WITH AIR FLUID LEVEL   Pt has pgtail placed 8/27 9/1/2021 Case polo [facility] Plan is to dc on iv abio x 6 w and with pigtauil as fami declining aggressive invasive procedures such as dcortication   9/8/2021 [facility] plannd       EMPYEMA  pl fl 8/27 strep angiosus rare staph a   Rocephin 2g (9/2/2021) (Dr PINTO)  cont rx    DYSPHAGIA  On dys 1 puree nectar diet (8/26/2021)     CAD  PMH CAD stents 2 decade ago   Tr 8/26/2021 Tr (-)     CHF  echo 6/11/2021 n lvsf thickened pericard with 1 cm pericard effs n la   bnp 8/26/2021 bnp 863     ANEMIA  Hb 8/26 - 8/27-8/28/2021 Hb 10 - 8.7 - 8.7   mcv 8/26/2021 mvcv 89     PARKINSONS   carbilevadopa 25 100.3 (8/26)     NONVIOLENT NONSELF DESTR LEVEL ONE   8/28 8/29    [facility] dnr         OVERALL PLAN.  CHRONIC L PL EMPYEMA WITH AIR FLUID LEVEL   9/1/2021 Case polo [facility] Plan is to dc on iv abio x 6 w and with pigtauil as fami declining aggressive invasive procedures such as dcortication   Is on rocephin   [facility]   dnr    TIME SPENT   Over 25 minutes aggregate care time spent on encounter; activities included   direct patient care, counseling and/or coordinating care reviewing notes, lab data/ imaging , discussion with multidisciplinary team/ patient  /family and explaining in detail risks, benefits, alternatives  of the recommendations     BALDO MORTENSEN 81 M 8/26/2021 1942 DR CATHERINE LANDIN

## 2021-09-10 NOTE — PROGRESS NOTE ADULT - PROBLEM SELECTOR PLAN 3
as above
Reorient as able  Given the presence of the chest tube, restraints, though regrettable, are appropriate.    If the patient were to remove the chest tube, it would potentially Result in serious harm
as above
Reorient as able  Given the presence of the chest tube, restraints, though regrettable, are appropriate.    If the patient were to remove the chest tube, it would potentially Result in serious harm
Reorient as able  Given the presence of the chest tube, restraints, though regrettable, are appropriate.    If the patient were to remove the chest tube, it would potentially Result in serious harm
as above
carbidopa/levodopa
as above
as above
Reorient as able  Given the presence of the chest tube, restraints, though regrettable, are appropriate.    If the patient were to remove the chest tube, it would potentially Result in serious harm
Reorient as able  Given the presence of the chest tube, restraints, though regrettable, are appropriate.    If the patient were to remove the chest tube, it would potentially Result in serious harm
carbidopa/levodopa
carbidopa/levodopa
dependent for all care has 24/7 day aid at home

## 2021-09-10 NOTE — HOSPICE CARE NOTE - DME DETAILS
As per Berto [facility] bed in Home with Mattress.   Oxygen will need to be ordered on day of DC as Berto state that no one is available to receive the Oxygen presently.

## 2021-09-10 NOTE — PROGRESS NOTE ADULT - PROBLEM SELECTOR PROBLEM 4
Alzheimer's dementia
CAD (coronary artery disease)
Alzheimer's dementia
CAD (coronary artery disease)
Severe protein-calorie malnutrition
Alzheimer's dementia
Alzheimer's dementia
Chest tube in place
Chest tube in place
Alzheimer's dementia
Chest tube in place
Alzheimer's dementia
CAD (coronary artery disease)
Alzheimer's dementia
Alzheimer's dementia
Chest tube in place
Chest tube in place

## 2021-09-10 NOTE — PROGRESS NOTE ADULT - ASSESSMENT
80 yo male w/ pmhx HTN, DM, HLD, dementia, parkinsons' disease, cad sp stent x2, hf w/ PeF, moderate pulm htn, BPH, gastric ulcers, recent covid infection admitted for empyema 2/2 strep anginosus    # Strep anginous empyema/Loculated left sided effusion, Acute metabolic encephalopathy  - s/p IR drainage and pigtail placement 8/27, with decrease in size. Chest tube removed on 9/9  - on cefepime 8/26 then switch to iv ceftx 9/1  - INFECTIOUS DISEASE Dr. Serrato following.    - CT SURGERY with Dr Weeks on the case  - pt's brother decline thoracotomy/ VATS and wanted only minimally invasive procedures  - repeat CT Chest per thoracic surgery showing now R sided infiltrate, aspiration.  Some improvement in R collection.  # Parkinson's disease, Functional Quadriplegia, Severe protein-calorie malnutrition, Dysphagia - supportive care. dysphagia diet, asp precaution   # BPH - flomax  # h/o CAD - plavix on hold right now due to CT placement.  Resume plavix once okay with CT surgery.   # Dementia  - on carbidopa/levadopa - seroquel  DNR / DNI    Plan of care d/w brother Berto [phone] today.  DNR / DNI.  Agrees with current plan of care, wishes to continue medical therapy, but if pt does not improve, is receptive to palliative measures.

## 2021-09-10 NOTE — PROGRESS NOTE ADULT - PROBLEM SELECTOR PROBLEM 1
Pleural effusion
Empyema of left pleural space
Pleural effusion
Empyema of left pleural space
Empyema of left pleural space
Pleural effusion
Empyema of left pleural space
Pleural effusion
Empyema
Empyema of left pleural space
Pleural effusion

## 2021-09-10 NOTE — PROGRESS NOTE ADULT - SUBJECTIVE AND OBJECTIVE BOX
FESTUS GUILLENER    LVS 2E 288 W    Patient is a 81y old  Male who presents with a chief complaint of FTT, empyema r/o (09 Sep 2021 13:53)       Allergies    No Known Allergies    Intolerances        HPI:  Patient is a 81M with a PMH of HTN, DM, HLD, dementia,  Parkinson's disease, CAD s/p stents x2 (>20 years ago), CHF (EF: 50%, Moderate diastolic dysfunction (Stage II), moderate pulmonary hypertension, small-mod pericardial effusion), Lt pleural effusion, BPH, gastric ulcers, COVID infection who was sent to the ED for weakness.  Patient currently awake and alert, oriented x1, unable to provide history.  Has no acute complaints.  Discharged from NH today and was sent to the ED as he appeared weak and emaciated.  Vitals stable, labs show leukocytosis and mild lactic acidosis.  CT reveals a chronic loculated L pleural effusion.  ED attending spoke to family who made the patient DNR/DNI however did agree for chest tube drainage for effusion.  Will admit to med surg.   (26 Aug 2021 03:21)      PAST MEDICAL & SURGICAL HISTORY:  Hypercholesterolemia    DM (Diabetes Mellitus)    HTN (Hypertension)    CAD (Coronary Artery Disease)  s/p cardiac stent ( &gt; 20 years ago)    BPH (benign prostatic hyperplasia)    Coronary Stent  x 2 ( 20 years )        FAMILY HISTORY:  FH: HTN (hypertension)          MEDICATIONS   atorvastatin 10 milliGRAM(s) Oral at bedtime  carbidopa/levodopa  25/100 1 Tablet(s) Oral three times a day  cefTRIAXone   IVPB 2000 milliGRAM(s) IV Intermittent every 24 hours  cefTRIAXone   IVPB      dextrose 40% Gel 15 Gram(s) Oral once  dextrose 5%. 1000 milliLiter(s) IV Continuous <Continuous>  dextrose 5%. 1000 milliLiter(s) IV Continuous <Continuous>  dextrose 50% Injectable 25 Gram(s) IV Push once  dextrose 50% Injectable 12.5 Gram(s) IV Push once  dextrose 50% Injectable 25 Gram(s) IV Push once  enoxaparin Injectable 40 milliGRAM(s) SubCutaneous daily  glucagon  Injectable 1 milliGRAM(s) IntraMuscular once  insulin lispro (ADMELOG) corrective regimen sliding scale   SubCutaneous three times a day before meals  polyethylene glycol 3350 17 Gram(s) Oral daily PRN  QUEtiapine 50 milliGRAM(s) Oral at bedtime  senna 2 Tablet(s) Oral at bedtime  tamsulosin 0.4 milliGRAM(s) Oral at bedtime      Vital Signs Last 24 Hrs  T(C): 36.6 (10 Sep 2021 05:53), Max: 36.6 (09 Sep 2021 17:52)  T(F): 97.9 (10 Sep 2021 05:53), Max: 97.9 (09 Sep 2021 17:52)  HR: 64 (10 Sep 2021 05:53) (63 - 74)  BP: 119/65 (10 Sep 2021 05:53) (99/60 - 135/69)  BP(mean): --  RR: 18 (10 Sep 2021 05:53) (18 - 20)  SpO2: 99% (10 Sep 2021 05:53) (99% - 100%)      09-09-21 @ 07:01  -  09-10-21 @ 07:00  --------------------------------------------------------  IN: 480 mL / OUT: 950 mL / NET: -470 mL            LABS:                    WBC:  WBC Count: 6.40 K/uL (09-07 @ 06:28)      MICROBIOLOGY:  RECENT CULTURES:                  Sodium:  Sodium, Serum: 138 mmol/L (09-07 @ 06:28)      0.54 mg/dL 09-07 @ 06:28      Hemoglobin:  Hemoglobin: 8.7 g/dL (09-07 @ 06:28)      Platelets: Platelet Count - Automated: 269 K/uL (09-07 @ 06:28)              RADIOLOGY & ADDITIONAL STUDIES:

## 2021-09-10 NOTE — PROGRESS NOTE ADULT - PROBLEM SELECTOR PLAN 1
Continue ceftriaxone 2 g daily trough 9/22/21  Midline catheter  After IV Rx completed, Ceftin 500mg PO Q12h, duration TBD  Serial imaging
as above
Follow-up culture data  Continue ceftriaxone 2 g daily  Overall outlook in the absence of definitive management is guarded
Follow-up culture data  Given absent documentation against, will change to ceftriaxone 2 g daily  Await interventional radiology follow-up  Overall outlook in the absence of definitive management is guarded
Follow-up culture data  Cefepime is overly broad, would change to ceftriaxone 2 g daily  No role for vancomycin  Await interventional radiology follow-up  Overall outlook in the absence of definitive management is guarded
as above
Loculated L sided effusion s/p chest tube placement by IR on 8/27   C/w cefepime for now  blood cultures NGTD and pleural fluid cx showing GPC in pairs  ID, Pulm, CTS recs appreciated
as above
Loculated L sided effusion.   Started on Zosyn and Vanco in the ED  ID changed to only cefepime for now  Follow blood cultures - de-escalate antibiotics as needed   ID, Pulm, CTS recs appreciated  IR was consulted for evaluation and placement of pigtail catheter
Follow-up culture data  Continue ceftriaxone 2 g daily  Overall outlook in the absence of definitive management is guarded
as above
as above
on CTX  chest tube output is 0 for last 24 hours  could possibly be removed or have talc pleuradesis ?  will need IR and CTS to weigh in
Loculated L sided effusion s/p chest tube placement by IR on 8/27   C/w cefepime for now  Follow blood cultures and pleural fluid cx  ID, Pulm, CTS recs appreciated

## 2021-09-11 LAB
GLUCOSE BLDC GLUCOMTR-MCNC: 135 MG/DL — HIGH (ref 70–99)
GLUCOSE BLDC GLUCOMTR-MCNC: 137 MG/DL — HIGH (ref 70–99)
GLUCOSE BLDC GLUCOMTR-MCNC: 90 MG/DL — SIGNIFICANT CHANGE UP (ref 70–99)
GLUCOSE BLDC GLUCOMTR-MCNC: 94 MG/DL — SIGNIFICANT CHANGE UP (ref 70–99)

## 2021-09-11 PROCEDURE — 99233 SBSQ HOSP IP/OBS HIGH 50: CPT

## 2021-09-11 PROCEDURE — 99232 SBSQ HOSP IP/OBS MODERATE 35: CPT

## 2021-09-11 RX ADMIN — ENOXAPARIN SODIUM 40 MILLIGRAM(S): 100 INJECTION SUBCUTANEOUS at 12:48

## 2021-09-11 RX ADMIN — CARBIDOPA AND LEVODOPA 1 TABLET(S): 25; 100 TABLET ORAL at 21:17

## 2021-09-11 RX ADMIN — CARBIDOPA AND LEVODOPA 1 TABLET(S): 25; 100 TABLET ORAL at 13:49

## 2021-09-11 RX ADMIN — QUETIAPINE FUMARATE 50 MILLIGRAM(S): 200 TABLET, FILM COATED ORAL at 21:17

## 2021-09-11 RX ADMIN — CEFTRIAXONE 100 MILLIGRAM(S): 500 INJECTION, POWDER, FOR SOLUTION INTRAMUSCULAR; INTRAVENOUS at 12:48

## 2021-09-11 RX ADMIN — ATORVASTATIN CALCIUM 10 MILLIGRAM(S): 80 TABLET, FILM COATED ORAL at 21:17

## 2021-09-11 RX ADMIN — CARBIDOPA AND LEVODOPA 1 TABLET(S): 25; 100 TABLET ORAL at 05:06

## 2021-09-11 NOTE — PROGRESS NOTE ADULT - SUBJECTIVE AND OBJECTIVE BOX
FESTUS GUILLENER    LVS 2E [address]    Patient is a 81y old  Male who presents with a chief complaint of FTT, empyema r/o (11 Sep 2021 08:13)       Allergies    No Known Allergies    Intolerances        HPI:  Patient is a 81M with a PMH of HTN, DM, HLD, dementia,  Parkinson's disease, CAD s/p stents x2 (>20 years ago), CHF (EF: 50%, Moderate diastolic dysfunction (Stage II), moderate pulmonary hypertension, small-mod pericardial effusion), Lt pleural effusion, BPH, gastric ulcers, COVID infection who was sent to the ED for weakness.  Patient currently awake and alert, oriented x1, unable to provide history.  Has no acute complaints.  Discharged from NH today and was sent to the ED as he appeared weak and emaciated.  Vitals stable, labs show leukocytosis and mild lactic acidosis.  CT reveals a chronic loculated L pleural effusion.  ED attending spoke to family who made the patient DNR/DNI however did agree for chest tube drainage for effusion.  Will admit to med surg.   (26 Aug 2021 03:21)      PAST MEDICAL & SURGICAL HISTORY:  Hypercholesterolemia    DM (Diabetes Mellitus)    HTN (Hypertension)    CAD (Coronary Artery Disease)  s/p cardiac stent ( &gt; 20 years ago)    BPH (benign prostatic hyperplasia)    Coronary Stent  x 2 ( 20 years )        FAMILY HISTORY:  FH: HTN (hypertension)          MEDICATIONS   atorvastatin 10 milliGRAM(s) Oral at bedtime  carbidopa/levodopa  25/100 1 Tablet(s) Oral three times a day  cefTRIAXone   IVPB 2000 milliGRAM(s) IV Intermittent every 24 hours  cefTRIAXone   IVPB      dextrose 40% Gel 15 Gram(s) Oral once  dextrose 5%. 1000 milliLiter(s) IV Continuous <Continuous>  dextrose 5%. 1000 milliLiter(s) IV Continuous <Continuous>  dextrose 50% Injectable 25 Gram(s) IV Push once  dextrose 50% Injectable 12.5 Gram(s) IV Push once  dextrose 50% Injectable 25 Gram(s) IV Push once  enoxaparin Injectable 40 milliGRAM(s) SubCutaneous daily  glucagon  Injectable 1 milliGRAM(s) IntraMuscular once  insulin lispro (ADMELOG) corrective regimen sliding scale   SubCutaneous three times a day before meals  polyethylene glycol 3350 17 Gram(s) Oral daily PRN  QUEtiapine 50 milliGRAM(s) Oral at bedtime  senna 2 Tablet(s) Oral at bedtime  tamsulosin 0.4 milliGRAM(s) Oral at bedtime      Vital Signs Last 24 Hrs  T(C): 36.6 (11 Sep 2021 05:41), Max: 36.7 (10 Sep 2021 17:32)  T(F): 97.9 (11 Sep 2021 05:41), Max: 98 (10 Sep 2021 17:32)  HR: 87 (11 Sep 2021 05:41) (68 - 87)  BP: 134/74 (11 Sep 2021 05:41) (100/57 - 134/74)  BP(mean): --  RR: 17 (11 Sep 2021 05:41) (17 - 18)  SpO2: 99% (11 Sep 2021 05:41) (99% - 99%)            LABS:                    WBC:      MICROBIOLOGY:  RECENT CULTURES:                  Sodium:          Hemoglobin:      Platelets:             RADIOLOGY & ADDITIONAL STUDIES:

## 2021-09-11 NOTE — CHART NOTE - NSCHARTNOTEFT_GEN_A_CORE
Assessment: Pt admitted for empyema 2/2 strep anginosus; chest tube placement (8/27) & removal (9/9); family requests conservative management; no invasive procedures; pt DNR/DNI status; approved for hospice care. PMHx includes HTN, DM, HLD, dementia, Parkinson's Disease, CAD s/p stent x2, CHF, moderate pulmonary hypertension, Lt pleural effusion, BPH, gastric ulcers, recent COVID infection. Pt remains confused & disoriented.    Factors impacting intake: [ ] none [ ] nausea  [ ] vomiting [ ] diarrhea [ ] constipation  [x]chewing problems [x] swallowing issues  [x] other: AMS; difficulty feeding self    Diet Prescription: Diet, Dysphagia 1 Pureed-Nectar Consistency Fluid:   Consistent Carbohydrate {No Snacks}  Supplement Feeding Modality:  Oral  Health Shake Cans or Servings Per Day:  1       Frequency:  Daily  Ensure Pudding Cans or Servings Per Day:  1       Frequency:  Two Times a day (08-30-21 @ 12:20)    Intake: Po intake continues to be very good, % per nursing staff; pt consuming nutrition supplements; pt requires total feeding assistance    Current Weight: 55.7 kg (9/10), 55.2 kg (8/26)  % Weight Change: 0.9% wt gain x 15 days    Fluid accumulation: 1+ edema bilateral ankles    Pertinent Medications: MEDICATIONS  (STANDING):  atorvastatin 10 milliGRAM(s) Oral at bedtime  carbidopa/levodopa  25/100 1 Tablet(s) Oral three times a day  cefTRIAXone   IVPB 2000 milliGRAM(s) IV Intermittent every 24 hours  cefTRIAXone   IVPB      dextrose 40% Gel 15 Gram(s) Oral once  dextrose 5%. 1000 milliLiter(s) (50 mL/Hr) IV Continuous <Continuous>  dextrose 5%. 1000 milliLiter(s) (100 mL/Hr) IV Continuous <Continuous>  dextrose 50% Injectable 25 Gram(s) IV Push once  dextrose 50% Injectable 12.5 Gram(s) IV Push once  dextrose 50% Injectable 25 Gram(s) IV Push once  enoxaparin Injectable 40 milliGRAM(s) SubCutaneous daily  glucagon  Injectable 1 milliGRAM(s) IntraMuscular once  insulin lispro (ADMELOG) corrective regimen sliding scale   SubCutaneous three times a day before meals  QUEtiapine 50 milliGRAM(s) Oral at bedtime  senna 2 Tablet(s) Oral at bedtime  tamsulosin 0.4 milliGRAM(s) Oral at bedtime    MEDICATIONS  (PRN):  polyethylene glycol 3350 17 Gram(s) Oral daily PRN constipations    Pertinent Labs:     CAPILLARY BLOOD GLUCOSE    POCT Blood Glucose.: 94 mg/dL (11 Sep 2021 11:49)  POCT Blood Glucose.: 90 mg/dL (11 Sep 2021 08:15)  POCT Blood Glucose.: 125 mg/dL (10 Sep 2021 21:11)  POCT Blood Glucose.: 135 mg/dL (10 Sep 2021 16:05)    Skin: Pt without pressure ulcers    Estimated Needs:   [x] no change since previous assessment on 8/30/21  [ ] recalculated:     Previous Nutrition Diagnosis:   [X] Severe Malnutrition in context of chronic illness  Etiology:  Inadequate energy/protein intake related to dementia, Parkinson's Disease, cardiac hx  Signs & Symptoms:  Physical findings of severe fat depletion & muscle wasting as noted    Previous Goal:  Provide nutrition/hydration as medically appropriate, as tolerated & within family/pt wishes for tx - goal met    Nutrition Diagnosis is [x] ongoing  [ ] resolved [ ] not applicable     New Nutrition Diagnosis: [x] not applicable      Interventions:   Recommend  [x] Continue with current diet rx as ordered  [ ] Change Diet To:  [ ] Nutrition Supplement  [ ] Nutrition Support  [ ] Other:     Monitoring and Evaluation:   [ x ] PO intake [ x ] Tolerance to diet prescription [ x ] weights [ x ] labs[ x ] follow up per protocol  [ ] other:

## 2021-09-11 NOTE — PROGRESS NOTE ADULT - ASSESSMENT
80 yo male w/ pmhx HTN, DM, HLD, dementia, parkinsons' disease, cad sp stent x2, hf w/ PeF, moderate pulm htn, BPH, gastric ulcers, recent covid infection admitted for empyema 2/2 strep anginosus    # Strep anginous empyema/Loculated left sided effusion, Acute metabolic encephalopathy  - s/p IR drainage and pigtail placement 8/27, with decrease in size. Chest tube removed on 9/9  - on cefepime 8/26 then switch to iv ceftx 9/1  - INFECTIOUS DISEASE Dr. Serrato following.    - CT SURGERY with Dr Weeks on the case  - pt's brother decline thoracotomy/ VATS and wanted only minimally invasive procedures  - repeat CT Chest per thoracic surgery showing now R sided infiltrate, aspiration.  Some improvement in R collection.  # Parkinson's disease, Functional Quadriplegia, Severe protein-calorie malnutrition, Dysphagia - supportive care. dysphagia diet, asp precaution   # BPH - flomax  # h/o CAD - plavix on hold right now due to CT placement.  Resume plavix once okay with CT surgery.   # Dementia  - on carbidopa/levadopa - seroquel  DNR / DNI    Plan of care d/w brother Berto [phone] today.  DNR / DNI.  Agrees with current plan of care, wishes to continue medical therapy, but if pt does not improve, is receptive to palliative measures.

## 2021-09-11 NOTE — PROGRESS NOTE ADULT - ASSESSMENT
BALDO MORTENSEN 81 M 8/26/2021 1942 DR CATHERINE LANDIN     REVIEW OF SYMPTOMS      Able to give (reliable) ROS  NO     PHYSICAL EXAM    HEENT Unremarkable  atraumatic   RESP Fair air entry EXP prolonged    Harsh breath sound Resp distres mild   CARDIAC S1 S2 No S3     NO JVD    ABDOMEN SOFT BS PRESENT NOT DISTENDED No hepatosplenomegaly   PEDAL EDEMA present No calf tenderness  NO rash                                          8/26/2021 PATIENT PRESENTATION.  82 y/o M  pmhx of HTN, DM, HLD, dementia,  Parkinson's disease, CAD s/p stents x2 (>20 years ago), CHF (EF: 50%, Moderate diastolic dysfunction (Stage II), moderate pulmonary hypertension, small-mod pericardial effusion), Lt pleural effusion, BPH, gastric ulcers, COVID infection who was sent on 8/26/2021  to the ED for weakness.    Patient  awake and alert, oriented x1, unable to provide history on 8/26/2021.  Has no acute complaints.  Discharged from NH today and was sent to the ED as he appeared weak and emaciated.  Vitals stable, labs show leukocytosis and mild lactic acidosis.    CT surgery consulted to evaluate pt for CT chest findings of loculated left pleural effusion.   Pulm consulted 8/26/2021     BEST PRACTICE ISSUES.                                                  HEAD OF BED ELEVATION. Yes  DVT PROPHYLAXIS.  lvnx 40 (8/26)                                        VALLEJO PROPHYLAXIS.                                                                                         DIET.      dys 1 puree nectar cons carb (8/26)                     INFECTION PROPHYLAXIS.                      GENERAL ISSUES  GOC ADVANCED DIRECTIVE.    dnr                        ALLERGY.    nka                        WEIGHT.         8/26/2021 54                           BMI.                   8/26/2021 18         PROBLEM ASSESSMENT/RECOMMENDATIONS.    COVID STATUS  Ho covid (+) 6/10/2021     CHRONIC L PL EMPYEMA WITH AIR FLUID LEVEL   Pt has pgtail placed 8/27  chest tube removd 9/9    EMPYEMA  pl fl 8/27 strep angiosus rare staph a   Rocephin 2g (9/2/2021) (Dr PINTO)  cont rx    DYSPHAGIA  On dys 1 puree nectar diet (8/26/2021)     CAD  PMH CAD stents 2 decade ago   Tr 8/26/2021 Tr (-)     CHF  echo 6/11/2021 n lvsf thickened pericard with 1 cm pericard effs n la   bnp 8/26/2021 bnp 863     ANEMIA  Hb 8/26 - 8/27-8/28/2021 Hb 10 - 8.7 - 8.7   mcv 8/26/2021 mvcv 89     PARKINSONS   carbilevadopa 25 100.3 (8/26)         OVERALL PLAN.  CHRONIC L PL EMPYEMA WITH AIR FLUID LEVEL   sp chest tube drainage ct dced 9.9   fam declined aggressive operative rx   On rocephin  GOC dnr    TIME SPENT   Over 25 minutes aggregate care time spent on encounter; activities included   direct patient care, counseling and/or coordinating care reviewing notes, lab data/ imaging , discussion with multidisciplinary team/ patient  /family and explaining in detail risks, benefits, alternatives  of the recommendations     BALDO MORTENSEN 81 M 8/26/2021 1942 DR CATHERINE LANDIN

## 2021-09-11 NOTE — PROGRESS NOTE ADULT - SUBJECTIVE AND OBJECTIVE BOX
Patient is a 81y old  Male who presents with a chief complaint of FTT, empyema r/o (10 Sep 2021 13:43)      OVERNIGHT EVENTS:    REVIEW OF SYSTEMS: denies chest pain/SOB, diaphoresis, no F/C, cough, dizziness, headache, blurry vision, nausea, vomiting, abdominal pain. All others review of systems negative     MEDICATIONS  (STANDING):  atorvastatin 10 milliGRAM(s) Oral at bedtime  carbidopa/levodopa  25/100 1 Tablet(s) Oral three times a day  cefTRIAXone   IVPB 2000 milliGRAM(s) IV Intermittent every 24 hours  cefTRIAXone   IVPB      dextrose 40% Gel 15 Gram(s) Oral once  dextrose 5%. 1000 milliLiter(s) (50 mL/Hr) IV Continuous <Continuous>  dextrose 5%. 1000 milliLiter(s) (100 mL/Hr) IV Continuous <Continuous>  dextrose 50% Injectable 25 Gram(s) IV Push once  dextrose 50% Injectable 12.5 Gram(s) IV Push once  dextrose 50% Injectable 25 Gram(s) IV Push once  enoxaparin Injectable 40 milliGRAM(s) SubCutaneous daily  glucagon  Injectable 1 milliGRAM(s) IntraMuscular once  insulin lispro (ADMELOG) corrective regimen sliding scale   SubCutaneous three times a day before meals  QUEtiapine 50 milliGRAM(s) Oral at bedtime  senna 2 Tablet(s) Oral at bedtime  tamsulosin 0.4 milliGRAM(s) Oral at bedtime    MEDICATIONS  (PRN):  polyethylene glycol 3350 17 Gram(s) Oral daily PRN constipations      Allergies    No Known Allergies    Intolerances        T(F): 97.9 (09-11-21 @ 05:41), Max: 98 (09-10-21 @ 17:32)  HR: 87 (09-11-21 @ 05:41) (68 - 87)  BP: 134/74 (09-11-21 @ 05:41) (100/57 - 134/74)  RR: 17 (09-11-21 @ 05:41) (17 - 18)  SpO2: 99% (09-11-21 @ 05:41) (99% - 99%)  Wt(kg): --    PHYSICAL EXAM:  GENERAL: NAD  HEAD:  Atraumatic, Normocephalic  EYES: PERRLA, conjunctiva and sclera clear  ENMT: Moist mucous membranes  NECK: Supple, No JVD, Normal thyroid  NERVOUS SYSTEM:  Alert & Awake  CHEST/LUNG: Clear to percussion bilaterally;   HEART: Regular rate and rhythm;   ABDOMEN: Soft, Nontender, Nondistended; Bowel sounds present  EXTREMITIES:  no edema BL LE  SKIN: moist    LABS:              Cultures;   CAPILLARY BLOOD GLUCOSE      POCT Blood Glucose.: 125 mg/dL (10 Sep 2021 21:11)  POCT Blood Glucose.: 135 mg/dL (10 Sep 2021 16:05)  POCT Blood Glucose.: 160 mg/dL (10 Sep 2021 11:30)    Lipid panel:           RADIOLOGY & ADDITIONAL TESTS:    Imaging Personally Reviewed:  [x ] YES      Consultant(s) Notes Reviewed:  [x ] YES     Care Discussed with [x ] Consultants [X ] Patient [ ] Family  [x ]    [x ]  Other; RN

## 2021-09-12 LAB
GLUCOSE BLDC GLUCOMTR-MCNC: 122 MG/DL — HIGH (ref 70–99)
GLUCOSE BLDC GLUCOMTR-MCNC: 150 MG/DL — HIGH (ref 70–99)
GLUCOSE BLDC GLUCOMTR-MCNC: 175 MG/DL — HIGH (ref 70–99)
GLUCOSE BLDC GLUCOMTR-MCNC: 97 MG/DL — SIGNIFICANT CHANGE UP (ref 70–99)

## 2021-09-12 PROCEDURE — 99232 SBSQ HOSP IP/OBS MODERATE 35: CPT

## 2021-09-12 PROCEDURE — 99233 SBSQ HOSP IP/OBS HIGH 50: CPT

## 2021-09-12 PROCEDURE — 71045 X-RAY EXAM CHEST 1 VIEW: CPT | Mod: 26

## 2021-09-12 RX ADMIN — ATORVASTATIN CALCIUM 10 MILLIGRAM(S): 80 TABLET, FILM COATED ORAL at 22:13

## 2021-09-12 RX ADMIN — CARBIDOPA AND LEVODOPA 1 TABLET(S): 25; 100 TABLET ORAL at 06:08

## 2021-09-12 RX ADMIN — CEFTRIAXONE 100 MILLIGRAM(S): 500 INJECTION, POWDER, FOR SOLUTION INTRAMUSCULAR; INTRAVENOUS at 12:10

## 2021-09-12 RX ADMIN — ENOXAPARIN SODIUM 40 MILLIGRAM(S): 100 INJECTION SUBCUTANEOUS at 12:09

## 2021-09-12 RX ADMIN — QUETIAPINE FUMARATE 50 MILLIGRAM(S): 200 TABLET, FILM COATED ORAL at 22:12

## 2021-09-12 RX ADMIN — CARBIDOPA AND LEVODOPA 1 TABLET(S): 25; 100 TABLET ORAL at 22:13

## 2021-09-12 RX ADMIN — Medication 1: at 12:10

## 2021-09-12 RX ADMIN — CARBIDOPA AND LEVODOPA 1 TABLET(S): 25; 100 TABLET ORAL at 15:44

## 2021-09-12 RX ADMIN — SENNA PLUS 2 TABLET(S): 8.6 TABLET ORAL at 22:13

## 2021-09-12 NOTE — PROGRESS NOTE ADULT - ASSESSMENT
BALDO MORTENSEN 81 M 8/26/2021 1942 DR CATHERINE LANDIN     REVIEW OF SYMPTOMS      Able to give (reliable) ROS  NO     PHYSICAL EXAM    HEENT Unremarkable  atraumatic   RESP Fair air entry EXP prolonged    Harsh breath sound Resp distres mild   CARDIAC S1 S2 No S3     NO JVD    ABDOMEN SOFT BS PRESENT NOT DISTENDED No hepatosplenomegaly   PEDAL EDEMA present No calf tenderness  NO rash       BEST PRACTICE ISSUES.                                                  HEAD OF BED ELEVATION. Yes  DVT PROPHYLAXIS.  lvnx 40 (8/26)                                        VALLEJO PROPHYLAXIS.                                                                                         DIET.      dys 1 puree nectar cons carb (8/26)                     INFECTION PROPHYLAXIS.                      GENERAL ISSUES  GOC ADVANCED DIRECTIVE.    dnr                        ALLERGY.    nka                        WEIGHT.         8/26/2021 54                           BMI.                   8/26/2021 18         PATIENT DATA   TUBES  PROCEDURES.     PIGTAIL 8/27 maroon drainage  chest tube removd 9/9     ABG.   8/26/2021 ra 745/37/78     OXYGENATION.       9/7/2021 ra 98%   8/26-8/27/2021 ra 97%    - ra 96%         VITALS/IO/VENT/DRIPS.  9/12/2021 afeb 70 100/50     PROBLEM ASSESSMENT/RECOMMENDATIONS.    COVID STATUS  Ho covid (+) 6/10/2021     CHRONIC L PL EMPYEMA WITH AIR FLUID LEVEL   Pt has pgtail placed 8/27  chest tube removd 9/9    EMPYEMA  pl fl 8/27 strep angiosus rare staph a   Rocephin 2g (9/2/2021) (Dr PINTO)  cont rx    DYSPHAGIA  On dys 1 puree nectar diet (8/26/2021)     CAD  PMH CAD stents 2 decade ago   Tr 8/26/2021 Tr (-)     CHF  echo 6/11/2021 n lvsf thickened pericard with 1 cm pericard effs n la   bnp 8/26/2021 bnp 863     ANEMIA  Hb 8/26 - 8/27-8/28/2021 Hb 10 - 8.7 - 8.7   mcv 8/26/2021 mvcv 89     PARKINSONS   carbilevadopa 25 100.3 (8/26)         OVERALL PLAN.  82 y/o M  pmhx of HTN, DM, HLD, dementia,  Parkinson's disease, CAD s/p stents x2 (>20 years ago), CHF (EF: 50%, Moderate diastolic dysfunction (Stage II), moderate pulmonary hypertension, small-mod pericardial effusion), Lt pleural effusion, BPH, gastric ulcers, COVID infection who was sent on 8/26/2021 found to have empyema with air fluid levl  Fam refused surgery   Pigtal done 8/27 removd 9/9   pl fl 8/27 strep angiosus rare staph a   Rxed with Rocephin 2g (9/2/2021) (Dr PINTO)     CHRONIC L PL EMPYEMA WITH AIR FLUID LEVEL   Cont abio  GOC dnr    TIME SPENT   Over 25 minutes aggregate care time spent on encounter; activities included   direct patient care, counseling and/or coordinating care reviewing notes, lab data/ imaging , discussion with multidisciplinary team/ patient  /family and explaining in detail risks, benefits, alternatives  of the recommendations     BALDO MORTENSEN 81 M 8/26/2021 1942 DR CATHERINE LANDIN

## 2021-09-12 NOTE — PROGRESS NOTE ADULT - SUBJECTIVE AND OBJECTIVE BOX
Patient is a 81y old  Male who presents with a chief complaint of FTT, empyema r/o (12 Sep 2021 07:52)      OVERNIGHT EVENTS:    REVIEW OF SYSTEMS: denies chest pain/SOB, diaphoresis, no F/C, cough, dizziness, headache, blurry vision, nausea, vomiting, abdominal pain. All others review of systems negative     MEDICATIONS  (STANDING):  atorvastatin 10 milliGRAM(s) Oral at bedtime  carbidopa/levodopa  25/100 1 Tablet(s) Oral three times a day  cefTRIAXone   IVPB 2000 milliGRAM(s) IV Intermittent every 24 hours  cefTRIAXone   IVPB      dextrose 40% Gel 15 Gram(s) Oral once  dextrose 5%. 1000 milliLiter(s) (50 mL/Hr) IV Continuous <Continuous>  dextrose 5%. 1000 milliLiter(s) (100 mL/Hr) IV Continuous <Continuous>  dextrose 50% Injectable 25 Gram(s) IV Push once  dextrose 50% Injectable 12.5 Gram(s) IV Push once  dextrose 50% Injectable 25 Gram(s) IV Push once  enoxaparin Injectable 40 milliGRAM(s) SubCutaneous daily  glucagon  Injectable 1 milliGRAM(s) IntraMuscular once  insulin lispro (ADMELOG) corrective regimen sliding scale   SubCutaneous three times a day before meals  QUEtiapine 50 milliGRAM(s) Oral at bedtime  senna 2 Tablet(s) Oral at bedtime  tamsulosin 0.4 milliGRAM(s) Oral at bedtime    MEDICATIONS  (PRN):  polyethylene glycol 3350 17 Gram(s) Oral daily PRN constipations      Allergies    No Known Allergies    Intolerances        T(F): 97.6 (09-12-21 @ 05:38), Max: 97.7 (09-11-21 @ 23:25)  HR: 61 (09-12-21 @ 05:38) (61 - 67)  BP: 168/84 (09-12-21 @ 05:38) (116/64 - 168/84)  RR: 18 (09-12-21 @ 05:38) (17 - 18)  SpO2: 100% (09-12-21 @ 05:38) (100% - 100%)  Wt(kg): --    PHYSICAL EXAM:  GENERAL: NAD  HEAD:  Atraumatic, Normocephalic  EYES: PERRLA, conjunctiva and sclera clear  ENMT: Moist mucous membranes  NECK: Supple, No JVD, Normal thyroid  NERVOUS SYSTEM:  Alert & Awake  CHEST/LUNG: Clear to percussion bilaterally;   HEART: Regular rate and rhythm;   ABDOMEN: Soft, Nontender, Nondistended; Bowel sounds present  EXTREMITIES:  no edema BL LE  SKIN: moist    LABS:              Cultures;   CAPILLARY BLOOD GLUCOSE      POCT Blood Glucose.: 97 mg/dL (12 Sep 2021 08:04)  POCT Blood Glucose.: 135 mg/dL (11 Sep 2021 21:06)  POCT Blood Glucose.: 137 mg/dL (11 Sep 2021 16:03)  POCT Blood Glucose.: 94 mg/dL (11 Sep 2021 11:49)    Lipid panel:           RADIOLOGY & ADDITIONAL TESTS:    Imaging Personally Reviewed:  [x ] YES      Consultant(s) Notes Reviewed:  [x ] YES     Care Discussed with [x ] Consultants [X ] Patient [ ] Family  [x ]    [x ]  Other; RN

## 2021-09-12 NOTE — PROGRESS NOTE ADULT - SUBJECTIVE AND OBJECTIVE BOX
FESTUS GUILLENER    LVS 2E 288 W    Patient is a 81y old  Male who presents with a chief complaint of FTT, empyema r/o (11 Sep 2021 10:13)       Allergies    No Known Allergies    Intolerances        HPI:  Patient is a 81M with a PMH of HTN, DM, HLD, dementia,  Parkinson's disease, CAD s/p stents x2 (>20 years ago), CHF (EF: 50%, Moderate diastolic dysfunction (Stage II), moderate pulmonary hypertension, small-mod pericardial effusion), Lt pleural effusion, BPH, gastric ulcers, COVID infection who was sent to the ED for weakness.  Patient currently awake and alert, oriented x1, unable to provide history.  Has no acute complaints.  Discharged from NH today and was sent to the ED as he appeared weak and emaciated.  Vitals stable, labs show leukocytosis and mild lactic acidosis.  CT reveals a chronic loculated L pleural effusion.  ED attending spoke to family who made the patient DNR/DNI however did agree for chest tube drainage for effusion.  Will admit to med surg.   (26 Aug 2021 03:21)      PAST MEDICAL & SURGICAL HISTORY:  Hypercholesterolemia    DM (Diabetes Mellitus)    HTN (Hypertension)    CAD (Coronary Artery Disease)  s/p cardiac stent ( &gt; 20 years ago)    BPH (benign prostatic hyperplasia)    Coronary Stent  x 2 ( 20 years )        FAMILY HISTORY:  FH: HTN (hypertension)          MEDICATIONS   atorvastatin 10 milliGRAM(s) Oral at bedtime  carbidopa/levodopa  25/100 1 Tablet(s) Oral three times a day  cefTRIAXone   IVPB 2000 milliGRAM(s) IV Intermittent every 24 hours  cefTRIAXone   IVPB      dextrose 40% Gel 15 Gram(s) Oral once  dextrose 5%. 1000 milliLiter(s) IV Continuous <Continuous>  dextrose 5%. 1000 milliLiter(s) IV Continuous <Continuous>  dextrose 50% Injectable 25 Gram(s) IV Push once  dextrose 50% Injectable 12.5 Gram(s) IV Push once  dextrose 50% Injectable 25 Gram(s) IV Push once  enoxaparin Injectable 40 milliGRAM(s) SubCutaneous daily  glucagon  Injectable 1 milliGRAM(s) IntraMuscular once  insulin lispro (ADMELOG) corrective regimen sliding scale   SubCutaneous three times a day before meals  polyethylene glycol 3350 17 Gram(s) Oral daily PRN  QUEtiapine 50 milliGRAM(s) Oral at bedtime  senna 2 Tablet(s) Oral at bedtime  tamsulosin 0.4 milliGRAM(s) Oral at bedtime      Vital Signs Last 24 Hrs  T(C): 36.4 (12 Sep 2021 05:38), Max: 36.5 (11 Sep 2021 23:25)  T(F): 97.6 (12 Sep 2021 05:38), Max: 97.7 (11 Sep 2021 23:25)  HR: 61 (12 Sep 2021 05:38) (61 - 67)  BP: 168/84 (12 Sep 2021 05:38) (116/64 - 168/84)  BP(mean): --  RR: 18 (12 Sep 2021 05:38) (17 - 18)  SpO2: 100% (12 Sep 2021 05:38) (100% - 100%)      09-11-21 @ 07:01  -  09-12-21 @ 07:00  --------------------------------------------------------  IN: 118 mL / OUT: 400 mL / NET: -282 mL            LABS:                    WBC:      MICROBIOLOGY:  RECENT CULTURES:                  Sodium:          Hemoglobin:      Platelets:             RADIOLOGY & ADDITIONAL STUDIES:

## 2021-09-12 NOTE — PROGRESS NOTE ADULT - ASSESSMENT
82 yo male w/ pmhx HTN, DM, HLD, dementia, parkinsons' disease, cad sp stent x2, hf w/ PeF, moderate pulm htn, BPH, gastric ulcers, recent covid infection admitted for empyema 2/2 strep anginosus    # Strep anginous empyema/Loculated left sided effusion, Acute metabolic encephalopathy  - s/p IR drainage and pigtail placement 8/27, with decrease in size. Chest tube removed on 9/9  - on cefepime 8/26 then switch to iv ceftx 9/1  - INFECTIOUS DISEASE Dr. Serrato following.    - CT SURGERY with Dr Weeks on the case  - pt's brother decline thoracotomy/ VATS and wanted only minimally invasive procedures  - repeat CT Chest per thoracic surgery showing now R sided infiltrate, aspiration.  Some improvement in R collection.  # Parkinson's disease, Functional Quadriplegia, Severe protein-calorie malnutrition, Dysphagia - supportive care. dysphagia diet, asp precaution   # BPH - flomax  # h/o CAD - plavix on hold right now due to CT placement.  Resume plavix once okay with CT surgery.   # Dementia  - on carbidopa/levadopa - seroquel  DNR / DNI    Plan of care d/w brother Berto [phone] today.  DNR / DNI.  Agrees with current plan of care, wishes to continue medical therapy, but if pt does not improve, is receptive to palliative measures.

## 2021-09-13 LAB
FLUAV AG NPH QL: SIGNIFICANT CHANGE UP
FLUAV AG NPH QL: SIGNIFICANT CHANGE UP
FLUBV AG NPH QL: SIGNIFICANT CHANGE UP
FLUBV AG NPH QL: SIGNIFICANT CHANGE UP
GLUCOSE BLDC GLUCOMTR-MCNC: 107 MG/DL — HIGH (ref 70–99)
GLUCOSE BLDC GLUCOMTR-MCNC: 127 MG/DL — HIGH (ref 70–99)
GLUCOSE BLDC GLUCOMTR-MCNC: 145 MG/DL — HIGH (ref 70–99)
GLUCOSE BLDC GLUCOMTR-MCNC: 89 MG/DL — SIGNIFICANT CHANGE UP (ref 70–99)
SARS-COV-2 RNA SPEC QL NAA+PROBE: DETECTED
SARS-COV-2 RNA SPEC QL NAA+PROBE: SIGNIFICANT CHANGE UP

## 2021-09-13 PROCEDURE — 99232 SBSQ HOSP IP/OBS MODERATE 35: CPT

## 2021-09-13 PROCEDURE — 99233 SBSQ HOSP IP/OBS HIGH 50: CPT

## 2021-09-13 RX ADMIN — CEFTRIAXONE 100 MILLIGRAM(S): 500 INJECTION, POWDER, FOR SOLUTION INTRAMUSCULAR; INTRAVENOUS at 12:15

## 2021-09-13 RX ADMIN — ATORVASTATIN CALCIUM 10 MILLIGRAM(S): 80 TABLET, FILM COATED ORAL at 23:17

## 2021-09-13 RX ADMIN — CARBIDOPA AND LEVODOPA 1 TABLET(S): 25; 100 TABLET ORAL at 23:17

## 2021-09-13 RX ADMIN — CARBIDOPA AND LEVODOPA 1 TABLET(S): 25; 100 TABLET ORAL at 06:08

## 2021-09-13 RX ADMIN — SENNA PLUS 2 TABLET(S): 8.6 TABLET ORAL at 23:18

## 2021-09-13 RX ADMIN — CARBIDOPA AND LEVODOPA 1 TABLET(S): 25; 100 TABLET ORAL at 14:33

## 2021-09-13 RX ADMIN — ENOXAPARIN SODIUM 40 MILLIGRAM(S): 100 INJECTION SUBCUTANEOUS at 14:20

## 2021-09-13 RX ADMIN — QUETIAPINE FUMARATE 50 MILLIGRAM(S): 200 TABLET, FILM COATED ORAL at 23:19

## 2021-09-13 RX ADMIN — TAMSULOSIN HYDROCHLORIDE 0.4 MILLIGRAM(S): 0.4 CAPSULE ORAL at 23:18

## 2021-09-13 NOTE — PROGRESS NOTE ADULT - ASSESSMENT
BALDO MORTENSEN 81 M 8/26/2021 1942 DR CATHERINE LANDIN     REVIEW OF SYMPTOMS      Able to give (reliable) ROS  NO     PHYSICAL EXAM    HEENT Unremarkable  atraumatic   RESP Fair air entry EXP prolonged    Harsh breath sound Resp distres mild   CARDIAC S1 S2 No S3     NO JVD    ABDOMEN SOFT BS PRESENT NOT DISTENDED No hepatosplenomegaly   PEDAL EDEMA present No calf tenderness  NO rash                                          8/26/2021  PRESENTATION.  82 y/o M  pmhx of HTN, DM, HLD, dementia,  Parkinson's disease, CAD s/p stents x2 (>20 years ago), CHF (EF: 50%, Moderate diastolic dysfunction (Stage II), moderate pulmonary hypertension, small-mod pericardial effusion), Lt pleural effusion, BPH, gastric ulcers, COVID infection who was sent on 8/26/2021  to the ED for weakness.            HOSPITAL COURSE   PL EFFSN l  pfa cw empyema  (8/27) pfa 8/27 ldh [id number] pr 2.2 g 2 ph 7  numerous gpc pairs   pl fl 8/27 strep angiosus rare staph a   PIGTAIL 8/27  Fam declined surgery  Rocephin 2g (9/2/2021) (Dr PINTO)  cefepime 1.3 (8/26) (Dr Ramirez) (Pneu)  DCed  NONVIOLENT NONSELF DESTR LEVEL ONE   8/28  and periodically after that      BEST PRACTICE ISSUES.                                                  HEAD OF BED ELEVATION. Yes  DVT PROPHYLAXIS.  lvnx 40 (8/26)                                        VALLEJO PROPHYLAXIS.                                                                                         DIET.      dys 1 puree nectar cons carb (8/26)                     INFECTION PROPHYLAXIS.                      GENERAL ISSUES  GOC ADVANCED DIRECTIVE.    dnr                        ALLERGY.    nka                        WEIGHT.         8/26/2021 54                           BMI.                   8/26/2021 18         PATIENT DATA   TUBES  PROCEDURES.     PIGTAIL 8/27 maroon drainage  chest tube removd 9/9     ABG.   8/26/2021 ra 745/37/78     OXYGENATION.       9/7/2021 ra 98%   8/26-8/27/2021 ra 97%    - ra 96%         VITALS/IO/VENT/DRIPS.  9/13/2021 afeb 60 110/60      OVERALL PLAN.    CHRONIC L PL EMPYEMA WITH AIR FLUID LEVEL   Cont abio  placement   GOC dnr    TIME SPENT   Over 25 minutes aggregate care time spent on encounter; activities included   direct patient care, counseling and/or coordinating care reviewing notes, lab data/ imaging , discussion with multidisciplinary team/ patient  /family and explaining in detail risks, benefits, alternatives  of the recommendations     BALDO MORTENSEN 81 M 8/26/2021 1942 DR CATHERINE LANDIN

## 2021-09-13 NOTE — PROGRESS NOTE ADULT - SUBJECTIVE AND OBJECTIVE BOX
FESTUS GUILLENER    LVS 2E [address]    Patient is a 81y old  Male who presents with a chief complaint of FTT, empyema r/o (13 Sep 2021 09:53)       Allergies    No Known Allergies    Intolerances        HPI:  Patient is a 81M with a PMH of HTN, DM, HLD, dementia,  Parkinson's disease, CAD s/p stents x2 (>20 years ago), CHF (EF: 50%, Moderate diastolic dysfunction (Stage II), moderate pulmonary hypertension, small-mod pericardial effusion), Lt pleural effusion, BPH, gastric ulcers, COVID infection who was sent to the ED for weakness.  Patient currently awake and alert, oriented x1, unable to provide history.  Has no acute complaints.  Discharged from NH today and was sent to the ED as he appeared weak and emaciated.  Vitals stable, labs show leukocytosis and mild lactic acidosis.  CT reveals a chronic loculated L pleural effusion.  ED attending spoke to family who made the patient DNR/DNI however did agree for chest tube drainage for effusion.  Will admit to med surg.   (26 Aug 2021 03:21)      PAST MEDICAL & SURGICAL HISTORY:  Hypercholesterolemia    DM (Diabetes Mellitus)    HTN (Hypertension)    CAD (Coronary Artery Disease)  s/p cardiac stent ( &gt; 20 years ago)    BPH (benign prostatic hyperplasia)    Coronary Stent  x 2 ( 20 years )        FAMILY HISTORY:  FH: HTN (hypertension)          MEDICATIONS   atorvastatin 10 milliGRAM(s) Oral at bedtime  carbidopa/levodopa  25/100 1 Tablet(s) Oral three times a day  cefTRIAXone   IVPB 2000 milliGRAM(s) IV Intermittent every 24 hours  cefTRIAXone   IVPB      dextrose 40% Gel 15 Gram(s) Oral once  dextrose 5%. 1000 milliLiter(s) IV Continuous <Continuous>  dextrose 5%. 1000 milliLiter(s) IV Continuous <Continuous>  dextrose 50% Injectable 25 Gram(s) IV Push once  dextrose 50% Injectable 12.5 Gram(s) IV Push once  dextrose 50% Injectable 25 Gram(s) IV Push once  enoxaparin Injectable 40 milliGRAM(s) SubCutaneous daily  glucagon  Injectable 1 milliGRAM(s) IntraMuscular once  insulin lispro (ADMELOG) corrective regimen sliding scale   SubCutaneous three times a day before meals  polyethylene glycol 3350 17 Gram(s) Oral daily PRN  QUEtiapine 50 milliGRAM(s) Oral at bedtime  senna 2 Tablet(s) Oral at bedtime  tamsulosin 0.4 milliGRAM(s) Oral at bedtime      Vital Signs Last 24 Hrs  T(C): 36.3 (13 Sep 2021 05:32), Max: 37 (12 Sep 2021 16:51)  T(F): 97.4 (13 Sep 2021 05:32), Max: 98.6 (12 Sep 2021 16:51)  HR: 59 (13 Sep 2021 05:32) (59 - 91)  BP: 131/63 (13 Sep 2021 05:32) (89/55 - 131/63)  BP(mean): --  RR: 18 (13 Sep 2021 05:32) (18 - 18)  SpO2: 100% (13 Sep 2021 05:32) (99% - 100%)            LABS:                    WBC:      MICROBIOLOGY:  RECENT CULTURES:                  Sodium:          Hemoglobin:      Platelets:             RADIOLOGY & ADDITIONAL STUDIES:

## 2021-09-13 NOTE — PROGRESS NOTE ADULT - SUBJECTIVE AND OBJECTIVE BOX
Patient is a 81y old  Male who presents with a chief complaint of FTT, empyema r/o (12 Sep 2021 09:31)      OVERNIGHT EVENTS:    REVIEW OF SYSTEMS: denies chest pain/SOB, diaphoresis, no F/C, cough, dizziness, headache, blurry vision, nausea, vomiting, abdominal pain. All others review of systems negative     MEDICATIONS  (STANDING):  atorvastatin 10 milliGRAM(s) Oral at bedtime  carbidopa/levodopa  25/100 1 Tablet(s) Oral three times a day  cefTRIAXone   IVPB 2000 milliGRAM(s) IV Intermittent every 24 hours  cefTRIAXone   IVPB      dextrose 40% Gel 15 Gram(s) Oral once  dextrose 5%. 1000 milliLiter(s) (50 mL/Hr) IV Continuous <Continuous>  dextrose 5%. 1000 milliLiter(s) (100 mL/Hr) IV Continuous <Continuous>  dextrose 50% Injectable 25 Gram(s) IV Push once  dextrose 50% Injectable 12.5 Gram(s) IV Push once  dextrose 50% Injectable 25 Gram(s) IV Push once  enoxaparin Injectable 40 milliGRAM(s) SubCutaneous daily  glucagon  Injectable 1 milliGRAM(s) IntraMuscular once  insulin lispro (ADMELOG) corrective regimen sliding scale   SubCutaneous three times a day before meals  QUEtiapine 50 milliGRAM(s) Oral at bedtime  senna 2 Tablet(s) Oral at bedtime  tamsulosin 0.4 milliGRAM(s) Oral at bedtime    MEDICATIONS  (PRN):  polyethylene glycol 3350 17 Gram(s) Oral daily PRN constipations      Allergies    No Known Allergies    Intolerances        T(F): 97.4 (09-13-21 @ 05:32), Max: 98.6 (09-12-21 @ 16:51)  HR: 59 (09-13-21 @ 05:32) (59 - 91)  BP: 131/63 (09-13-21 @ 05:32) (89/55 - 131/63)  RR: 18 (09-13-21 @ 05:32) (18 - 18)  SpO2: 100% (09-13-21 @ 05:32) (99% - 100%)  Wt(kg): --    PHYSICAL EXAM:  GENERAL: NAD  HEAD:  Atraumatic, Normocephalic  EYES: PERRLA, conjunctiva and sclera clear  ENMT: Moist mucous membranes  NECK: Supple, No JVD, Normal thyroid  NERVOUS SYSTEM:  Alert & Awake  CHEST/LUNG: Clear to percussion bilaterally;   HEART: Regular rate and rhythm;   ABDOMEN: Soft, Nontender, Nondistended; Bowel sounds present  EXTREMITIES:  no edema BL LE  SKIN: moist    LABS:              Cultures;   CAPILLARY BLOOD GLUCOSE      POCT Blood Glucose.: 89 mg/dL (13 Sep 2021 08:07)  POCT Blood Glucose.: 122 mg/dL (12 Sep 2021 21:09)  POCT Blood Glucose.: 150 mg/dL (12 Sep 2021 17:28)  POCT Blood Glucose.: 175 mg/dL (12 Sep 2021 12:08)    Lipid panel:           RADIOLOGY & ADDITIONAL TESTS:    Imaging Personally Reviewed:  [x ] YES      Consultant(s) Notes Reviewed:  [x ] YES     Care Discussed with [x ] Consultants [X ] Patient [ ] Family  [x ]    [x ]  Other; RN

## 2021-09-14 LAB
FLUAV AG NPH QL: SIGNIFICANT CHANGE UP
FLUBV AG NPH QL: SIGNIFICANT CHANGE UP
GLUCOSE BLDC GLUCOMTR-MCNC: 103 MG/DL — HIGH (ref 70–99)
GLUCOSE BLDC GLUCOMTR-MCNC: 131 MG/DL — HIGH (ref 70–99)
GLUCOSE BLDC GLUCOMTR-MCNC: 160 MG/DL — HIGH (ref 70–99)
GLUCOSE BLDC GLUCOMTR-MCNC: 92 MG/DL — SIGNIFICANT CHANGE UP (ref 70–99)
SARS-COV-2 RNA SPEC QL NAA+PROBE: SIGNIFICANT CHANGE UP

## 2021-09-14 PROCEDURE — 99232 SBSQ HOSP IP/OBS MODERATE 35: CPT

## 2021-09-14 PROCEDURE — 99233 SBSQ HOSP IP/OBS HIGH 50: CPT

## 2021-09-14 RX ADMIN — ENOXAPARIN SODIUM 40 MILLIGRAM(S): 100 INJECTION SUBCUTANEOUS at 12:46

## 2021-09-14 RX ADMIN — CEFTRIAXONE 100 MILLIGRAM(S): 500 INJECTION, POWDER, FOR SOLUTION INTRAMUSCULAR; INTRAVENOUS at 12:45

## 2021-09-14 RX ADMIN — TAMSULOSIN HYDROCHLORIDE 0.4 MILLIGRAM(S): 0.4 CAPSULE ORAL at 22:27

## 2021-09-14 RX ADMIN — QUETIAPINE FUMARATE 50 MILLIGRAM(S): 200 TABLET, FILM COATED ORAL at 22:27

## 2021-09-14 RX ADMIN — ATORVASTATIN CALCIUM 10 MILLIGRAM(S): 80 TABLET, FILM COATED ORAL at 22:27

## 2021-09-14 RX ADMIN — CARBIDOPA AND LEVODOPA 1 TABLET(S): 25; 100 TABLET ORAL at 22:27

## 2021-09-14 RX ADMIN — SENNA PLUS 2 TABLET(S): 8.6 TABLET ORAL at 22:27

## 2021-09-14 RX ADMIN — CARBIDOPA AND LEVODOPA 1 TABLET(S): 25; 100 TABLET ORAL at 15:24

## 2021-09-14 RX ADMIN — CARBIDOPA AND LEVODOPA 1 TABLET(S): 25; 100 TABLET ORAL at 06:47

## 2021-09-14 NOTE — PROGRESS NOTE ADULT - ASSESSMENT
80 yo male w/ pmhx HTN, DM, HLD, dementia, parkinsons' disease, cad sp stent x2, hf w/ PeF, moderate pulm htn, BPH, gastric ulcers, recent covid infection admitted for empyema 2/2 strep anginosus    # Strep anginous empyema/Loculated left sided effusion, Acute metabolic encephalopathy  - s/p IR drainage and pigtail placement 8/27, with decrease in size. Chest tube removed on 9/9  - on cefepime 8/26 then switch to iv ceftx 9/1  - INFECTIOUS DISEASE Dr. Serrato following.    - CT SURGERY with Dr Weeks on the case  - pt's brother decline thoracotomy/ VATS and wanted only minimally invasive procedures  - repeat CT Chest per thoracic surgery showing now R sided infiltrate, aspiration.  Some improvement in R collection.  # Parkinson's disease, Functional Quadriplegia, Severe protein-calorie malnutrition, Dysphagia - supportive care. dysphagia diet, asp precaution   # BPH - flomax  # h/o CAD - plavix on hold right now due to CT placement.  Resume plavix once okay with CT surgery.   # Dementia  - on carbidopa/levadopa - seroquel  DNR / DNI    Plan of care d/w brother Berto [phone].  DNR / DNI.  Agrees with current plan of care, wishes to palliative measures. waiting for home with hospice.

## 2021-09-14 NOTE — PROGRESS NOTE ADULT - ASSESSMENT
BALDO MORTENSEN 81 M 8/26/2021 1942 DR CATHERINE LANDIN     REVIEW OF SYMPTOMS      Able to give (reliable) ROS  NO     PHYSICAL EXAM    HEENT Unremarkable  atraumatic   RESP Fair air entry EXP prolonged    Harsh breath sound Resp distres mild   CARDIAC S1 S2 No S3     NO JVD    ABDOMEN SOFT BS PRESENT NOT DISTENDED No hepatosplenomegaly   PEDAL EDEMA present No calf tenderness  NO rash                                          8/26/2021  PRESENTATION.  82 y/o M  pmhx of HTN, DM, HLD, dementia,  Parkinson's disease, CAD s/p stents x2 (>20 years ago), CHF (EF: 50%, Moderate diastolic dysfunction (Stage II), moderate pulmonary hypertension, small-mod pericardial effusion), Lt pleural effusion, BPH, gastric ulcers, COVID infection who was sent on 8/26/2021  to the ED for weakness.            HOSPITAL COURSE   PL EFFSN l  pfa cw empyema  (8/27) pfa 8/27 ldh [id number] pr 2.2 g 2 ph 7  numerous gpc pairs   pl fl 8/27 strep angiosus rare staph a   PIGTAIL 8/27 removd 9/9  Fam declined surgery  Rocephin 2g (9/2/2021) (Dr PINTO)  cefepime 1.3 (8/26) (Dr Ramirez) (Pneu)  DCed  NONVIOLENT NONSELF DESTR LEVEL ONE   8/28  and periodically after that      BEST PRACTICE ISSUES.                                                  HEAD OF BED ELEVATION. Yes  DVT PROPHYLAXIS.  lvnx 40 (8/26)                                        VALLEJO PROPHYLAXIS.                                                                                         DIET.      dys 1 puree nectar cons carb (8/26)                     INFECTION PROPHYLAXIS.                      GENERAL ISSUES  GOC ADVANCED DIRECTIVE.    dnr                        ALLERGY.    nka                        WEIGHT.         8/26/2021 54                           BMI.                   8/26/2021 18    PATIENT DATA   TUBES  PROCEDURES.     PIGTAIL 8/27 maroon drainage  chest tube removd 9/9     ABG.   8/26/2021 ra 745/37/78     OXYGENATION.       9/7/2021 ra 98%   8/26-8/27/2021 ra 97%    - ra 96%         VITALS/IO/VENT/DRIPS.  9/14/2021 afeb 70 130/60      OVERALL PLAN.    CHRONIC L PL EMPYEMA WITH AIR FLUID LEVEL   Cont abio  placement   GOC dnr    TIME SPENT   Over 25 minutes aggregate care time spent on encounter; activities included   direct patient care, counseling and/or coordinating care reviewing notes, lab data/ imaging , discussion with multidisciplinary team/ patient  /family and explaining in detail risks, benefits, alternatives  of the recommendations     BALDO MORTENSEN 81 M 8/26/2021 1942 DR CATHERINE LANDIN

## 2021-09-14 NOTE — PROGRESS NOTE ADULT - SUBJECTIVE AND OBJECTIVE BOX
FESTUS BLAND    LVS 2E 284 [address]    Patient is a 81y old  Male who presents with a chief complaint of FTT, empyema r/o (13 Sep 2021 10:23)       Allergies    No Known Allergies    Intolerances        HPI:  Patient is a 81M with a PMH of HTN, DM, HLD, dementia,  Parkinson's disease, CAD s/p stents x2 (>20 years ago), CHF (EF: 50%, Moderate diastolic dysfunction (Stage II), moderate pulmonary hypertension, small-mod pericardial effusion), Lt pleural effusion, BPH, gastric ulcers, COVID infection who was sent to the ED for weakness.  Patient currently awake and alert, oriented x1, unable to provide history.  Has no acute complaints.  Discharged from NH today and was sent to the ED as he appeared weak and emaciated.  Vitals stable, labs show leukocytosis and mild lactic acidosis.  CT reveals a chronic loculated L pleural effusion.  ED attending spoke to family who made the patient DNR/DNI however did agree for chest tube drainage for effusion.  Will admit to med surg.   (26 Aug 2021 03:21)      PAST MEDICAL & SURGICAL HISTORY:  Hypercholesterolemia    DM (Diabetes Mellitus)    HTN (Hypertension)    CAD (Coronary Artery Disease)  s/p cardiac stent ( &gt; 20 years ago)    BPH (benign prostatic hyperplasia)    Coronary Stent  x 2 ( 20 years )        FAMILY HISTORY:  FH: HTN (hypertension)          MEDICATIONS   atorvastatin 10 milliGRAM(s) Oral at bedtime  carbidopa/levodopa  25/100 1 Tablet(s) Oral three times a day  cefTRIAXone   IVPB 2000 milliGRAM(s) IV Intermittent every 24 hours  cefTRIAXone   IVPB      dextrose 40% Gel 15 Gram(s) Oral once  dextrose 5%. 1000 milliLiter(s) IV Continuous <Continuous>  dextrose 5%. 1000 milliLiter(s) IV Continuous <Continuous>  dextrose 50% Injectable 25 Gram(s) IV Push once  dextrose 50% Injectable 12.5 Gram(s) IV Push once  dextrose 50% Injectable 25 Gram(s) IV Push once  enoxaparin Injectable 40 milliGRAM(s) SubCutaneous daily  glucagon  Injectable 1 milliGRAM(s) IntraMuscular once  insulin lispro (ADMELOG) corrective regimen sliding scale   SubCutaneous three times a day before meals  polyethylene glycol 3350 17 Gram(s) Oral daily PRN  QUEtiapine 50 milliGRAM(s) Oral at bedtime  senna 2 Tablet(s) Oral at bedtime  tamsulosin 0.4 milliGRAM(s) Oral at bedtime      Vital Signs Last 24 Hrs  T(C): 36.4 (14 Sep 2021 05:18), Max: 36.7 (13 Sep 2021 17:00)  T(F): 97.5 (14 Sep 2021 05:18), Max: 98 (13 Sep 2021 17:00)  HR: 60 (14 Sep 2021 05:18) (60 - 80)  BP: 112/61 (14 Sep 2021 05:18) (107/61 - 149/69)  BP(mean): --  RR: 18 (14 Sep 2021 05:18) (18 - 19)  SpO2: 100% (14 Sep 2021 05:18) (99% - 100%)      09-13-21 @ 07:01  -  09-14-21 @ 07:00  --------------------------------------------------------  IN: 350 mL / OUT: 1100 mL / NET: -750 mL            LABS:                    WBC:      MICROBIOLOGY:  RECENT CULTURES:                  Sodium:          Hemoglobin:      Platelets:             RADIOLOGY & ADDITIONAL STUDIES:

## 2021-09-14 NOTE — PROGRESS NOTE ADULT - SUBJECTIVE AND OBJECTIVE BOX
Patient is a 81y old  Male who presents with a chief complaint of FTT, empyema r/o (14 Sep 2021 10:14)      OVERNIGHT EVENTS:  no overnight events. rpt COVID-19 neg 9/14    REVIEW OF SYSTEMS: poor historian     MEDICATIONS  (STANDING):  atorvastatin 10 milliGRAM(s) Oral at bedtime  carbidopa/levodopa  25/100 1 Tablet(s) Oral three times a day  cefTRIAXone   IVPB 2000 milliGRAM(s) IV Intermittent every 24 hours  cefTRIAXone   IVPB      dextrose 40% Gel 15 Gram(s) Oral once  dextrose 5%. 1000 milliLiter(s) (50 mL/Hr) IV Continuous <Continuous>  dextrose 5%. 1000 milliLiter(s) (100 mL/Hr) IV Continuous <Continuous>  dextrose 50% Injectable 25 Gram(s) IV Push once  dextrose 50% Injectable 12.5 Gram(s) IV Push once  dextrose 50% Injectable 25 Gram(s) IV Push once  enoxaparin Injectable 40 milliGRAM(s) SubCutaneous daily  glucagon  Injectable 1 milliGRAM(s) IntraMuscular once  insulin lispro (ADMELOG) corrective regimen sliding scale   SubCutaneous three times a day before meals  QUEtiapine 50 milliGRAM(s) Oral at bedtime  senna 2 Tablet(s) Oral at bedtime  tamsulosin 0.4 milliGRAM(s) Oral at bedtime    MEDICATIONS  (PRN):  polyethylene glycol 3350 17 Gram(s) Oral daily PRN constipations      Allergies    No Known Allergies    Intolerances        T(F): 96.2 (09-14-21 @ 11:26), Max: 98 (09-13-21 @ 17:00)  HR: 65 (09-14-21 @ 11:26) (60 - 80)  BP: 107/57 (09-14-21 @ 11:26) (107/57 - 149/69)  RR: 17 (09-14-21 @ 11:26) (17 - 19)  SpO2: 97% (09-14-21 @ 11:26) (97% - 100%)  Wt(kg): --    PHYSICAL EXAM:  GENERAL: NAD  HEAD:  Atraumatic, Normocephalic  EYES: PERRLA, conjunctiva and sclera clear  ENMT: Moist mucous membranes  NECK: Supple, No JVD, Normal thyroid  NERVOUS SYSTEM:  Alert & Awake  CHEST/LUNG: Clear to percussion bilaterally;   HEART: Regular rate and rhythm;   ABDOMEN: Soft, Nontender, Nondistended; Bowel sounds present  EXTREMITIES:  no edema BL LE  SKIN: moist    LABS:              Cultures;   CAPILLARY BLOOD GLUCOSE      POCT Blood Glucose.: 160 mg/dL (14 Sep 2021 11:11)  POCT Blood Glucose.: 92 mg/dL (14 Sep 2021 07:49)  POCT Blood Glucose.: 127 mg/dL (13 Sep 2021 23:24)  POCT Blood Glucose.: 145 mg/dL (13 Sep 2021 17:08)    Lipid panel:           RADIOLOGY & ADDITIONAL TESTS:    Imaging Personally Reviewed:  [x ] YES      Consultant(s) Notes Reviewed:  [x ] YES     Care Discussed with [x ] Consultants [X ] Patient [ ] Family  [x ]    [x ]  Other; RN

## 2021-09-15 LAB
GLUCOSE BLDC GLUCOMTR-MCNC: 116 MG/DL — HIGH (ref 70–99)
GLUCOSE BLDC GLUCOMTR-MCNC: 132 MG/DL — HIGH (ref 70–99)
GLUCOSE BLDC GLUCOMTR-MCNC: 196 MG/DL — HIGH (ref 70–99)
GLUCOSE BLDC GLUCOMTR-MCNC: 88 MG/DL — SIGNIFICANT CHANGE UP (ref 70–99)

## 2021-09-15 PROCEDURE — 99232 SBSQ HOSP IP/OBS MODERATE 35: CPT

## 2021-09-15 RX ADMIN — ENOXAPARIN SODIUM 40 MILLIGRAM(S): 100 INJECTION SUBCUTANEOUS at 13:00

## 2021-09-15 RX ADMIN — QUETIAPINE FUMARATE 50 MILLIGRAM(S): 200 TABLET, FILM COATED ORAL at 22:34

## 2021-09-15 RX ADMIN — CARBIDOPA AND LEVODOPA 1 TABLET(S): 25; 100 TABLET ORAL at 16:25

## 2021-09-15 RX ADMIN — CARBIDOPA AND LEVODOPA 1 TABLET(S): 25; 100 TABLET ORAL at 06:30

## 2021-09-15 RX ADMIN — Medication 1: at 11:30

## 2021-09-15 RX ADMIN — ATORVASTATIN CALCIUM 10 MILLIGRAM(S): 80 TABLET, FILM COATED ORAL at 22:35

## 2021-09-15 RX ADMIN — CEFTRIAXONE 100 MILLIGRAM(S): 500 INJECTION, POWDER, FOR SOLUTION INTRAMUSCULAR; INTRAVENOUS at 14:40

## 2021-09-15 RX ADMIN — TAMSULOSIN HYDROCHLORIDE 0.4 MILLIGRAM(S): 0.4 CAPSULE ORAL at 22:35

## 2021-09-15 RX ADMIN — CARBIDOPA AND LEVODOPA 1 TABLET(S): 25; 100 TABLET ORAL at 22:35

## 2021-09-15 RX ADMIN — SENNA PLUS 2 TABLET(S): 8.6 TABLET ORAL at 22:35

## 2021-09-15 NOTE — PROGRESS NOTE ADULT - ASSESSMENT
80 yo male w/ pmhx HTN, DM, HLD, dementia, parkinsons' disease, cad sp stent x2, hf w/ PeF, moderate pulm htn, BPH, gastric ulcers, recent covid infection admitted for empyema 2/2 strep anginosus    # Strep anginous empyema/Loculated left sided effusion, Acute metabolic encephalopathy  - s/p IR drainage and pigtail placement 8/27, with decrease in size. Chest tube removed on 9/9  - on cefepime 8/26 then switch to iv ceftx 9/1  - INFECTIOUS DISEASE Dr. Serrato following.    - CT SURGERY with Dr Weeks on the case  - pt's brother decline thoracotomy/ VATS and wanted only minimally invasive procedures  - repeat CT Chest per thoracic surgery showing now R sided infiltrate, aspiration.  Some improvement in R collection.  # Parkinson's disease, Functional Quadriplegia, Severe protein-calorie malnutrition, Dysphagia - supportive care. dysphagia diet, asp precaution   # BPH - flomax  # h/o CAD - plavix on hold right now due to CT placement.  Resume plavix once okay with CT surgery.   # Dementia  - on carbidopa/levadopa - seroquel  DNR / DNI    Plan of care d/w brother Berto [phone].  DNR / DNI.  Agrees with current plan of care, wishes to palliative measures. waiting for home with hospice.

## 2021-09-15 NOTE — PROGRESS NOTE ADULT - SUBJECTIVE AND OBJECTIVE BOX
Patient is a 81y old  Male who presents with a chief complaint of FTT, empyema r/o (14 Sep 2021 10:14)      OVERNIGHT EVENTS:  no overnight events. rpt COVID-19 neg 9/14    REVIEW OF SYSTEMS: poor historian     MEDICATIONS  (STANDING):  atorvastatin 10 milliGRAM(s) Oral at bedtime  carbidopa/levodopa  25/100 1 Tablet(s) Oral three times a day  cefTRIAXone   IVPB 2000 milliGRAM(s) IV Intermittent every 24 hours  cefTRIAXone   IVPB      dextrose 40% Gel 15 Gram(s) Oral once  dextrose 5%. 1000 milliLiter(s) (50 mL/Hr) IV Continuous <Continuous>  dextrose 5%. 1000 milliLiter(s) (100 mL/Hr) IV Continuous <Continuous>  dextrose 50% Injectable 25 Gram(s) IV Push once  dextrose 50% Injectable 12.5 Gram(s) IV Push once  dextrose 50% Injectable 25 Gram(s) IV Push once  enoxaparin Injectable 40 milliGRAM(s) SubCutaneous daily  glucagon  Injectable 1 milliGRAM(s) IntraMuscular once  insulin lispro (ADMELOG) corrective regimen sliding scale   SubCutaneous three times a day before meals  QUEtiapine 50 milliGRAM(s) Oral at bedtime  senna 2 Tablet(s) Oral at bedtime  tamsulosin 0.4 milliGRAM(s) Oral at bedtime    MEDICATIONS  (PRN):  polyethylene glycol 3350 17 Gram(s) Oral daily PRN constipations      Allergies    No Known Allergies    Intolerances    Vital Signs Last 24 Hrs  T(C): 36.5 (15 Sep 2021 17:24), Max: 36.5 (15 Sep 2021 17:24)  T(F): 97.7 (15 Sep 2021 17:24), Max: 97.7 (15 Sep 2021 17:24)  HR: 80 (15 Sep 2021 17:24) (61 - 80)  BP: 99/58 (15 Sep 2021 17:24) (99/58 - 145/60)  BP(mean): --  RR: 17 (15 Sep 2021 17:24) (17 - 18)  SpO2: 100% (15 Sep 2021 17:24) (97% - 100%)    PHYSICAL EXAM:  GENERAL: NAD  HEAD:  Atraumatic, Normocephalic  EYES: PERRLA, conjunctiva and sclera clear  ENMT: Moist mucous membranes  NECK: Supple, No JVD, Normal thyroid  NERVOUS SYSTEM:  Alert & Awake  CHEST/LUNG: Clear to percussion bilaterally;   HEART: Regular rate and rhythm;   ABDOMEN: Soft, Nontender, Nondistended; Bowel sounds present  EXTREMITIES:  no edema BL LE  SKIN: moist    LABS:              Cultures;   CAPILLARY BLOOD GLUCOSE      POCT Blood Glucose.: 160 mg/dL (14 Sep 2021 11:11)  POCT Blood Glucose.: 92 mg/dL (14 Sep 2021 07:49)  POCT Blood Glucose.: 127 mg/dL (13 Sep 2021 23:24)  POCT Blood Glucose.: 145 mg/dL (13 Sep 2021 17:08)    Lipid panel:           RADIOLOGY & ADDITIONAL TESTS:    Imaging Personally Reviewed:  [x ] YES      Consultant(s) Notes Reviewed:  [x ] YES     Care Discussed with [x ] Consultants [X ] Patient [ ] Family  [x ]    [x ]  Other; RN

## 2021-09-15 NOTE — PROGRESS NOTE ADULT - ASSESSMENT
BALDO MORTENSEN 81 M 8/26/2021 1942 DR CATHERINE LANDIN     REVIEW OF SYMPTOMS      Able to give (reliable) ROS  NO     PHYSICAL EXAM    HEENT Unremarkable  atraumatic   RESP Fair air entry EXP prolonged    Harsh breath sound Resp distres mild   CARDIAC S1 S2 No S3     NO JVD    ABDOMEN SOFT BS PRESENT NOT DISTENDED No hepatosplenomegaly   PEDAL EDEMA present No calf tenderness  NO rash                                          8/26/2021  PRESENTATION.  80 y/o M  pmhx of HTN, DM, HLD, dementia,  Parkinson's disease, CAD s/p stents x2 (>20 years ago), CHF (EF: 50%, Moderate diastolic dysfunction (Stage II), moderate pulmonary hypertension, small-mod pericardial effusion), Lt pleural effusion, BPH, gastric ulcers, COVID infection who was sent on 8/26/2021  to the ED for weakness.            HOSPITAL COURSE   PL EFFSN l  pfa cw empyema  (8/27) pfa 8/27 ldh [id number] pr 2.2 g 2 ph 7  numerous gpc pairs   pl fl 8/27 strep angiosus rare staph a   PIGTAIL 8/27 removd 9/9  Fam declined surgery  Rocephin 2g (9/2/2021) (Dr PINTO)  cefepime 1.3 (8/26) (Dr Ramirez) (Pneu)  DCed  NONVIOLENT NONSELF DESTR LEVEL ONE   8/28  and periodically after that      BEST PRACTICE ISSUES.                                                  HEAD OF BED ELEVATION. Yes  DVT PROPHYLAXIS.  lvnx 40 (8/26)                                        VALLEJO PROPHYLAXIS.                                                                                         DIET.      dys 1 puree nectar cons carb (8/26)                     INFECTION PROPHYLAXIS.                      GENERAL ISSUES  GOC ADVANCED DIRECTIVE.    dnr                        ALLERGY.    nka                        WEIGHT.         8/26/2021 54                           BMI.                   8/26/2021 18         PROBLEM ASSESSMENT/RECOMMENDATIONS.    COVID STATUS  Ho covid (+) 6/10/2021     CHRONIC L PL EMPYEMA WITH AIR FLUID LEVEL   Pt has pgtail placed 8/27  chest tube removd 9/9    EMPYEMA  pl fl 8/27 strep angiosus rare staph a   Rocephin 2g (9/2/2021) (Dr PINTO)  cont rx      OVERALL PLAN.    CHRONIC L PL EMPYEMA WITH AIR FLUID LEVEL   Cont abio  placement   GOC dnr    TIME SPENT   Over 25 minutes aggregate care time spent on encounter; activities included   direct patient care, counseling and/or coordinating care reviewing notes, lab data/ imaging , discussion with multidisciplinary team/ patient  /family and explaining in detail risks, benefits, alternatives  of the recommendations     BALDO MORTENSEN 81 M 8/26/2021 1942 DR CATHERINE LANDIN

## 2021-09-15 NOTE — CHART NOTE - NSCHARTNOTEFT_GEN_A_CORE
Pt admitted for empyema 2/2 strep anginosus; chest tube placement (8/27) & removal (9/9); family requests conservative management; no invasive procedures; pt DNR/DNI status. PMHx includes HTN, DM, HLD, dementia, Parkinson's Disease, CAD s/p stent x2, CHF, moderate pulmonary hypertension, Lt pleural effusion, BPH, gastric ulcers, recent COVID infection. Pt remains confused & disoriented; stable for d/c; awaiting home hospice arrangements.     Factors impacting intake: [X ] none [ ] nausea  [ ] vomiting [ ] diarrhea [ ] constipation  [ ]chewing problems [ ] swallowing issues  [ ] other:     Diet Prescription: Diet, Dysphagia 1 Pureed-Nectar Consistency Fluid:   Consistent Carbohydrate {No Snacks}  Supplement Feeding Modality:  Oral  Health Shake Cans or Servings Per Day:  1       Frequency:  Daily  Ensure Pudding Cans or Servings Per Day:  1       Frequency:  Two Times a day (08-30-21 @ 12:20)    Intake:  pt continues to eat well; >75% most meals; consumes supplements; requires feeding assistance    Current Weight:   55 kg (9/15); admission 55.2 kg (8/26)  % Weight Change: stable x 20 days    Nutrition focused physical exam conducted:    Subcutaneous fat loss: [moderate ] Orbital fat pads region, [ moderate]Buccal fat region, [severe ]Triceps region,  [severe ]Ribs region.    Muscle wasting: [severe ]Temples region, [severe ]Clavicle region, [severe ]Shoulder region, [unable ]Scapula region, [unable ]Interosseous region,  [moderate ]thigh region, [severe ]Calf region    1+ edema not b/l ankles throughout admission    Pertinent Medications: MEDICATIONS  (STANDING):  atorvastatin 10 milliGRAM(s) Oral at bedtime  carbidopa/levodopa  25/100 1 Tablet(s) Oral three times a day  cefTRIAXone   IVPB 2000 milliGRAM(s) IV Intermittent every 24 hours  cefTRIAXone   IVPB      dextrose 40% Gel 15 Gram(s) Oral once  dextrose 5%. 1000 milliLiter(s) (50 mL/Hr) IV Continuous <Continuous>  dextrose 5%. 1000 milliLiter(s) (100 mL/Hr) IV Continuous <Continuous>  dextrose 50% Injectable 25 Gram(s) IV Push once  dextrose 50% Injectable 12.5 Gram(s) IV Push once  dextrose 50% Injectable 25 Gram(s) IV Push once  enoxaparin Injectable 40 milliGRAM(s) SubCutaneous daily  glucagon  Injectable 1 milliGRAM(s) IntraMuscular once  insulin lispro (ADMELOG) corrective regimen sliding scale   SubCutaneous three times a day before meals  QUEtiapine 50 milliGRAM(s) Oral at bedtime  senna 2 Tablet(s) Oral at bedtime  tamsulosin 0.4 milliGRAM(s) Oral at bedtime    MEDICATIONS  (PRN):  polyethylene glycol 3350 17 Gram(s) Oral daily PRN constipations    Pertinent Labs:   no nutrition-related labs since 9/7; 09/14 POCT: 92. 160, 131, 103  08-27-21  A1C 6.1%    Skin:  no pressure ulcers    Estimated Needs:   [X ] no change since previous assessment (8/30)  [ ] recalculated:     Previous Nutrition Diagnosis:   [X ] Severe Malnutrition in context of chronic illness  Etiology:  Inadequate energy/protein intake related to dementia, Parkinson's Disease, cardiac hx  Signs & Symptoms:  Physical findings of severe fat depletion & muscle wasting as noted    GOAL:  Provide nutrition/hydration as medically appropriate, as tolerated, & within family/pt wishes for tx - met    Nutrition Diagnosis is [ X] ongoing  [ ] resolved [ ] not applicable     New Nutrition Diagnosis: [x ] not applicable    Interventions:   continue current diet rx as noted  Recommend  [ ] Change Diet To:  [ ] Nutrition Supplement  [ ] Nutrition Support  [ ] Other:     Monitoring and Evaluation:   [X ] PO intake [ x ] Tolerance to diet prescription [ x ] weights [ x ] labs[ x ] follow up per protocol  [ ] other:

## 2021-09-15 NOTE — CHART NOTE - NSCHARTNOTEFT_GEN_A_CORE
pt being followed for advanced dementia and empyema post COVID PNA, pt stable for discharge with resumption of 24 hour care and the addition of home hospice support. Agency having difficulty obtaining an aid delaying discharge process, pt is comfortable on room air with no complaints. GOC are known. Will no longer actively follow, please recall should the need arise.

## 2021-09-15 NOTE — PROGRESS NOTE ADULT - SUBJECTIVE AND OBJECTIVE BOX
FESTUS BLAND    LVS 2E 284 D    Allergies    No Known Allergies    Intolerances        PAST MEDICAL & SURGICAL HISTORY:  Hypercholesterolemia    DM (Diabetes Mellitus)    HTN (Hypertension)    CAD (Coronary Artery Disease)  s/p cardiac stent ( &gt; 20 years ago)    BPH (benign prostatic hyperplasia)    Coronary Stent  x 2 ( 20 years )        FAMILY HISTORY:  FH: HTN (hypertension)        Home Medications:  acetaminophen 325 mg oral tablet: 2 tab(s) orally every 6 hours, As needed, Mild Pain (1 - 3) (17 Jun 2021 14:25)  guaiFENesin 100 mg/5 mL oral liquid: 5 milliliter(s) orally every 6 hours, As needed, Cough (17 Jun 2021 14:25)  melatonin 3 mg oral tablet: 1 tab(s) orally once a day (at bedtime), As needed, Insomnia (17 Jun 2021 14:25)  oxyCODONE 10 mg oral tablet: 1 tab(s) orally every 6 hours, As needed, Severe Pain (7 - 10) (17 Jun 2021 14:25)  oxyCODONE 5 mg oral tablet: 1 tab(s) orally every 6 hours, As needed, Moderate Pain (4 - 6) (17 Jun 2021 14:25)  polyethylene glycol 3350 oral powder for reconstitution: 17 gram(s) orally once a day (17 Jun 2021 14:25)  senna oral tablet: 2 tab(s) orally once a day (at bedtime) (17 Jun 2021 14:25)      MEDICATIONS  (STANDING):  atorvastatin 10 milliGRAM(s) Oral at bedtime  carbidopa/levodopa  25/100 1 Tablet(s) Oral three times a day  cefTRIAXone   IVPB 2000 milliGRAM(s) IV Intermittent every 24 hours  cefTRIAXone   IVPB      dextrose 40% Gel 15 Gram(s) Oral once  dextrose 5%. 1000 milliLiter(s) (50 mL/Hr) IV Continuous <Continuous>  dextrose 5%. 1000 milliLiter(s) (100 mL/Hr) IV Continuous <Continuous>  dextrose 50% Injectable 25 Gram(s) IV Push once  dextrose 50% Injectable 12.5 Gram(s) IV Push once  dextrose 50% Injectable 25 Gram(s) IV Push once  enoxaparin Injectable 40 milliGRAM(s) SubCutaneous daily  glucagon  Injectable 1 milliGRAM(s) IntraMuscular once  insulin lispro (ADMELOG) corrective regimen sliding scale   SubCutaneous three times a day before meals  QUEtiapine 50 milliGRAM(s) Oral at bedtime  senna 2 Tablet(s) Oral at bedtime  tamsulosin 0.4 milliGRAM(s) Oral at bedtime    MEDICATIONS  (PRN):  polyethylene glycol 3350 17 Gram(s) Oral daily PRN constipations      Diet, Dysphagia 1 Pureed-Nectar Consistency Fluid:   Consistent Carbohydrate No Snacks  Supplement Feeding Modality:  Oral  Health Shake Cans or Servings Per Day:  1       Frequency:  Daily  Ensure Pudding Cans or Servings Per Day:  1       Frequency:  Two Times a day (08-30-21 @ 12:20) [Active]          Vital Signs Last 24 Hrs  T(C): 36 (15 Sep 2021 05:05), Max: 36.2 (14 Sep 2021 23:28)  T(F): 96.8 (15 Sep 2021 05:05), Max: 97.2 (14 Sep 2021 23:28)  HR: 61 (15 Sep 2021 05:05) (61 - 70)  BP: 145/60 (15 Sep 2021 05:05) (107/57 - 145/60)  BP(mean): --  RR: 17 (15 Sep 2021 05:05) (17 - 18)  SpO2: 100% (15 Sep 2021 05:05) (97% - 100%)      09-14-21 @ 07:01  -  09-15-21 @ 07:00  --------------------------------------------------------  IN: 720 mL / OUT: 700 mL / NET: 20 mL              LABS:                    WBC:      MICROBIOLOGY:  RECENT CULTURES:                  Sodium:          Hemoglobin:      Platelets:             RADIOLOGY & ADDITIONAL STUDIES:      MICROBIOLOGY:  RECENT CULTURES:

## 2021-09-16 LAB
GLUCOSE BLDC GLUCOMTR-MCNC: 186 MG/DL — HIGH (ref 70–99)
GLUCOSE BLDC GLUCOMTR-MCNC: 90 MG/DL — SIGNIFICANT CHANGE UP (ref 70–99)
GLUCOSE BLDC GLUCOMTR-MCNC: 98 MG/DL — SIGNIFICANT CHANGE UP (ref 70–99)

## 2021-09-16 PROCEDURE — 99232 SBSQ HOSP IP/OBS MODERATE 35: CPT

## 2021-09-16 RX ADMIN — SENNA PLUS 2 TABLET(S): 8.6 TABLET ORAL at 21:51

## 2021-09-16 RX ADMIN — CARBIDOPA AND LEVODOPA 1 TABLET(S): 25; 100 TABLET ORAL at 21:51

## 2021-09-16 RX ADMIN — Medication 1: at 11:52

## 2021-09-16 RX ADMIN — CEFTRIAXONE 100 MILLIGRAM(S): 500 INJECTION, POWDER, FOR SOLUTION INTRAMUSCULAR; INTRAVENOUS at 11:55

## 2021-09-16 RX ADMIN — ENOXAPARIN SODIUM 40 MILLIGRAM(S): 100 INJECTION SUBCUTANEOUS at 11:53

## 2021-09-16 RX ADMIN — QUETIAPINE FUMARATE 50 MILLIGRAM(S): 200 TABLET, FILM COATED ORAL at 21:51

## 2021-09-16 RX ADMIN — ATORVASTATIN CALCIUM 10 MILLIGRAM(S): 80 TABLET, FILM COATED ORAL at 21:51

## 2021-09-16 RX ADMIN — CARBIDOPA AND LEVODOPA 1 TABLET(S): 25; 100 TABLET ORAL at 05:41

## 2021-09-16 RX ADMIN — TAMSULOSIN HYDROCHLORIDE 0.4 MILLIGRAM(S): 0.4 CAPSULE ORAL at 21:51

## 2021-09-16 RX ADMIN — CARBIDOPA AND LEVODOPA 1 TABLET(S): 25; 100 TABLET ORAL at 14:55

## 2021-09-16 NOTE — PROGRESS NOTE ADULT - ASSESSMENT
82 yo male w/ pmhx HTN, DM, HLD, dementia, parkinsons' disease, cad sp stent x2, hf w/ PeF, moderate pulm htn, BPH, gastric ulcers, recent covid infection admitted for empyema 2/2 strep anginosus    # Strep anginous empyema/Loculated left sided effusion, Acute metabolic encephalopathy  - s/p IR drainage and pigtail placement 8/27, with decrease in size. Chest tube removed on 9/9  - on cefepime 8/26 then switch to iv ceftx 9/1  - INFECTIOUS DISEASE Dr. Serrato following.    - CT SURGERY with Dr Weeks on the case  - pt's brother decline thoracotomy/ VATS and wanted only minimally invasive procedures  - repeat CT Chest per thoracic surgery showing now R sided infiltrate, aspiration.  Some improvement in R collection.  # Parkinson's disease, Functional Quadriplegia, Severe protein-calorie malnutrition, Dysphagia - supportive care. dysphagia diet, asp precaution   # BPH - flomax  # h/o CAD - plavix on hold right now due to CT placement.  Resume plavix once okay with CT surgery.   # Dementia  - on carbidopa/levadopa - seroquel  DNR / DNI    Plan of care d/w brother Berto [phone].  DNR / DNI.  Agrees with current plan of care, wishes to palliative measures. waiting for home with hospice.

## 2021-09-16 NOTE — PROGRESS NOTE ADULT - SUBJECTIVE AND OBJECTIVE BOX
FESTUS BLAND    LVS 2E 284 D    Allergies    No Known Allergies    Intolerances        PAST MEDICAL & SURGICAL HISTORY:  Hypercholesterolemia    DM (Diabetes Mellitus)    HTN (Hypertension)    CAD (Coronary Artery Disease)  s/p cardiac stent ( &gt; 20 years ago)    BPH (benign prostatic hyperplasia)    Coronary Stent  x 2 ( 20 years )        FAMILY HISTORY:  FH: HTN (hypertension)        Home Medications:  acetaminophen 325 mg oral tablet: 2 tab(s) orally every 6 hours, As needed, Mild Pain (1 - 3) (17 Jun 2021 14:25)  guaiFENesin 100 mg/5 mL oral liquid: 5 milliliter(s) orally every 6 hours, As needed, Cough (17 Jun 2021 14:25)  melatonin 3 mg oral tablet: 1 tab(s) orally once a day (at bedtime), As needed, Insomnia (17 Jun 2021 14:25)  oxyCODONE 10 mg oral tablet: 1 tab(s) orally every 6 hours, As needed, Severe Pain (7 - 10) (17 Jun 2021 14:25)  oxyCODONE 5 mg oral tablet: 1 tab(s) orally every 6 hours, As needed, Moderate Pain (4 - 6) (17 Jun 2021 14:25)  polyethylene glycol 3350 oral powder for reconstitution: 17 gram(s) orally once a day (17 Jun 2021 14:25)  senna oral tablet: 2 tab(s) orally once a day (at bedtime) (17 Jun 2021 14:25)      MEDICATIONS  (STANDING):  atorvastatin 10 milliGRAM(s) Oral at bedtime  carbidopa/levodopa  25/100 1 Tablet(s) Oral three times a day  cefTRIAXone   IVPB 2000 milliGRAM(s) IV Intermittent every 24 hours  cefTRIAXone   IVPB      dextrose 40% Gel 15 Gram(s) Oral once  dextrose 5%. 1000 milliLiter(s) (50 mL/Hr) IV Continuous <Continuous>  dextrose 5%. 1000 milliLiter(s) (100 mL/Hr) IV Continuous <Continuous>  dextrose 50% Injectable 25 Gram(s) IV Push once  dextrose 50% Injectable 12.5 Gram(s) IV Push once  dextrose 50% Injectable 25 Gram(s) IV Push once  enoxaparin Injectable 40 milliGRAM(s) SubCutaneous daily  glucagon  Injectable 1 milliGRAM(s) IntraMuscular once  insulin lispro (ADMELOG) corrective regimen sliding scale   SubCutaneous three times a day before meals  QUEtiapine 50 milliGRAM(s) Oral at bedtime  senna 2 Tablet(s) Oral at bedtime  tamsulosin 0.4 milliGRAM(s) Oral at bedtime    MEDICATIONS  (PRN):  polyethylene glycol 3350 17 Gram(s) Oral daily PRN constipations      Diet, Dysphagia 1 Pureed-Nectar Consistency Fluid:   Consistent Carbohydrate No Snacks  Supplement Feeding Modality:  Oral  Health Shake Cans or Servings Per Day:  1       Frequency:  Daily  Ensure Pudding Cans or Servings Per Day:  1       Frequency:  Two Times a day (08-30-21 @ 12:20) [Active]          Vital Signs Last 24 Hrs  T(C): 36.4 (16 Sep 2021 05:18), Max: 36.5 (15 Sep 2021 17:24)  T(F): 97.6 (16 Sep 2021 05:18), Max: 97.7 (15 Sep 2021 17:24)  HR: 61 (16 Sep 2021 05:18) (61 - 80)  BP: 119/61 (16 Sep 2021 05:18) (99/58 - 119/61)  BP(mean): --  RR: 17 (16 Sep 2021 05:18) (17 - 18)  SpO2: 100% (16 Sep 2021 05:18) (100% - 100%)      09-15-21 @ 07:01  -  09-16-21 @ 07:00  --------------------------------------------------------  IN: 880 mL / OUT: 0 mL / NET: 880 mL              LABS:                    WBC:      MICROBIOLOGY:  RECENT CULTURES:                  Sodium:          Hemoglobin:      Platelets:             RADIOLOGY & ADDITIONAL STUDIES:      MICROBIOLOGY:  RECENT CULTURES:

## 2021-09-16 NOTE — PROGRESS NOTE ADULT - ASSESSMENT
BALDO FESTUS 81 M 8/26/2021 1942 DR CATHERINE LANDIN     REVIEW OF SYMPTOMS      Able to give (reliable) ROS  NO     PHYSICAL EXAM    HEENT Unremarkable  atraumatic   RESP Fair air entry EXP prolonged    Harsh breath sound Resp distres mild   CARDIAC S1 S2 No S3     NO JVD    ABDOMEN SOFT BS PRESENT NOT DISTENDED No hepatosplenomegaly   PEDAL EDEMA present No calf tenderness  NO rash                                          8/26/2021  PRESENTATION.  80 y/o M  pmhx of HTN, DM, HLD, dementia,  Parkinson's disease, CAD s/p stents x2 (>20 years ago), CHF (EF: 50%, Moderate diastolic dysfunction (Stage II), moderate pulmonary hypertension, small-mod pericardial effusion), Lt pleural effusion, BPH, gastric ulcers, COVID infection who was sent on 8/26/2021  to the ED for weakness.        BEST PRACTICE ISSUES.                                                  HEAD OF BED ELEVATION. Yes  DVT PROPHYLAXIS.  lvnx 40 (8/26)                                        VALLEJO PROPHYLAXIS.                                                                                         DIET.      dys 1 puree nectar cons carb (8/26)                     INFECTION PROPHYLAXIS.                      GENERAL ISSUES  GOC ADVANCED DIRECTIVE.    dnr                        ALLERGY.    nka                        WEIGHT.         8/26/2021 54                           BMI.                   8/26/2021 18         PATIENT DATA   TUBES  PROCEDURES.     PIGTAIL 8/27 maroon drainage  chest tube removd 9/9     ABG.   8/26/2021 ra 745/37/78     OXYGENATION.       9/7/2021 ra 98%   8/26-8/27/2021 ra 97%    - ra 96%         VITALS/IO/VENT/DRIPS.  9/16/2021 afeb 70 100/50     PROBLEM ASSESSMENT/RECOMMENDATIONS.    COVID STATUS  Ho covid (+) 6/10/2021     CHRONIC L PL EMPYEMA WITH AIR FLUID LEVEL   Pt has pgtail placed 8/27  chest tube removd 9/9    EMPYEMA  pl fl 8/27 strep angiosus rare staph a   Rocephin 2g (9/2/2021) (Dr PINTO)  cont rx      OVERALL PLAN.    CHRONIC L PL EMPYEMA WITH AIR FLUID LEVEL   Cont abio  placement   GOC dnr    TIME SPENT   Over 25 minutes aggregate care time spent on encounter; activities included   direct patient care, counseling and/or coordinating care reviewing notes, lab data/ imaging , discussion with multidisciplinary team/ patient  /family and explaining in detail risks, benefits, alternatives  of the recommendations     BALDO MORTENSEN 81 M 8/26/2021 1942 DR CATHERINE LANDIN

## 2021-09-16 NOTE — PROGRESS NOTE ADULT - TIME BILLING
rn, family
evaluation of pt, review of records, collaboration with care teams

## 2021-09-16 NOTE — HOSPICE CARE NOTE - CONVESATION DETAILS
I spoke with the pt's brother Berto. We discussed the delivery of the DME. He informs pt lives alone and DME can not be delivered or accepted till the pt arrives back home. He states he will not be able to accept the DME. He is requesting a W/C with foot rest. 02 and W/C will be ordered for delivery once the pt is discharged. I spoke with KAYLEE Johnson in this regard. Will cont to f/u as needed.       Gina Celaya Rn  [phone]

## 2021-09-17 LAB
GLUCOSE BLDC GLUCOMTR-MCNC: 106 MG/DL — HIGH (ref 70–99)
GLUCOSE BLDC GLUCOMTR-MCNC: 118 MG/DL — HIGH (ref 70–99)
GLUCOSE BLDC GLUCOMTR-MCNC: 170 MG/DL — HIGH (ref 70–99)
GLUCOSE BLDC GLUCOMTR-MCNC: 181 MG/DL — HIGH (ref 70–99)
RAPID RVP RESULT: DETECTED
RV+EV RNA SPEC QL NAA+PROBE: DETECTED
SARS-COV-2 RNA SPEC QL NAA+PROBE: SIGNIFICANT CHANGE UP

## 2021-09-17 PROCEDURE — 99232 SBSQ HOSP IP/OBS MODERATE 35: CPT

## 2021-09-17 RX ADMIN — CARBIDOPA AND LEVODOPA 1 TABLET(S): 25; 100 TABLET ORAL at 05:32

## 2021-09-17 RX ADMIN — SENNA PLUS 2 TABLET(S): 8.6 TABLET ORAL at 22:41

## 2021-09-17 RX ADMIN — CARBIDOPA AND LEVODOPA 1 TABLET(S): 25; 100 TABLET ORAL at 17:14

## 2021-09-17 RX ADMIN — ENOXAPARIN SODIUM 40 MILLIGRAM(S): 100 INJECTION SUBCUTANEOUS at 11:51

## 2021-09-17 RX ADMIN — CEFTRIAXONE 100 MILLIGRAM(S): 500 INJECTION, POWDER, FOR SOLUTION INTRAMUSCULAR; INTRAVENOUS at 11:49

## 2021-09-17 RX ADMIN — QUETIAPINE FUMARATE 50 MILLIGRAM(S): 200 TABLET, FILM COATED ORAL at 22:42

## 2021-09-17 RX ADMIN — ATORVASTATIN CALCIUM 10 MILLIGRAM(S): 80 TABLET, FILM COATED ORAL at 22:42

## 2021-09-17 RX ADMIN — Medication 1: at 11:49

## 2021-09-17 RX ADMIN — CARBIDOPA AND LEVODOPA 1 TABLET(S): 25; 100 TABLET ORAL at 22:41

## 2021-09-17 RX ADMIN — TAMSULOSIN HYDROCHLORIDE 0.4 MILLIGRAM(S): 0.4 CAPSULE ORAL at 22:42

## 2021-09-17 NOTE — PROGRESS NOTE ADULT - ASSESSMENT
80 yo male w/ pmhx HTN, DM, HLD, dementia, parkinsons' disease, cad sp stent x2, hf w/ PeF, moderate pulm htn, BPH, gastric ulcers, recent covid infection admitted for empyema 2/2 strep anginosus    # Strep anginous empyema/Loculated left sided effusion, Acute metabolic encephalopathy  - s/p IR drainage and pigtail placement 8/27, with decrease in size. Chest tube removed on 9/9  - on cefepime 8/26 then switch to iv ceftx 9/1  - INFECTIOUS DISEASE Dr. Serrato following.    - CT SURGERY with Dr Weeks on the case  - pt's brother decline thoracotomy/ VATS and wanted only minimally invasive procedures  - repeat CT Chest per thoracic surgery showing now R sided infiltrate, aspiration.  Some improvement in R collection.  # Parkinson's disease, Functional Quadriplegia, Severe protein-calorie malnutrition, Dysphagia - supportive care. dysphagia diet, asp precaution   # BPH - flomax  # h/o CAD - plavix on hold right now due to CT placement.  Resume plavix once okay with CT surgery.   # Dementia  - on carbidopa/levadopa - seroquel  DNR / DNI    Plan of care d/w brother Berto [phone].  DNR / DNI.  Agrees with current plan of care, wishes to palliative measures. waiting for home with hospice.

## 2021-09-17 NOTE — PROGRESS NOTE ADULT - SUBJECTIVE AND OBJECTIVE BOX
HPI:  Patient is a 81M with a PMH of HTN, DM, HLD, dementia,  Parkinson's disease, CAD s/p stents x2 (>20 years ago), CHF (EF: 50%, Moderate diastolic dysfunction (Stage II), moderate pulmonary hypertension, small-mod pericardial effusion), Lt pleural effusion, BPH, gastric ulcers, COVID infection who was sent to the ED for weakness.  Patient currently awake and alert, oriented x1, unable to provide history.  Has no acute complaints.  Discharged from NH today and was sent to the ED as he appeared weak and emaciated.  Vitals stable, labs show leukocytosis and mild lactic acidosis.  CT reveals a chronic loculated L pleural effusion.  ED attending spoke to family who made the patient DNR/DNI however did agree for chest tube drainage for effusion.  Will admit to med surg.   (26 Aug 2021 03:21)

## 2021-09-17 NOTE — PROGRESS NOTE ADULT - ASSESSMENT
BALDO MORTENSEN 81 M 8/26/2021 1942 DR CATHERINE LANDIN     REVIEW OF SYMPTOMS      Able to give (reliable) ROS  NO     PHYSICAL EXAM    HEENT Unremarkable  atraumatic   RESP Fair air entry EXP prolonged    Harsh breath sound Resp distres mild   CARDIAC S1 S2 No S3     NO JVD    ABDOMEN SOFT BS PRESENT NOT DISTENDED No hepatosplenomegaly   PEDAL EDEMA present No calf tenderness  NO rash                                          8/26/2021  PRESENTATION.  82 y/o M  pmhx of HTN, DM, HLD, dementia,  Parkinson's disease, CAD s/p stents x2 (>20 years ago), CHF (EF: 50%, Moderate diastolic dysfunction (Stage II), moderate pulmonary hypertension, small-mod pericardial effusion), Lt pleural effusion, BPH, gastric ulcers, COVID infection who was sent on 8/26/2021  to the ED for weakness.            HOSPITAL COURSE   PL EFFSN l  pfa cw empyema  (8/27) pfa 8/27 ldh [id number] pr 2.2 g 2 ph 7  numerous gpc pairs   pl fl 8/27 strep angiosus rare staph a   PIGTAIL 8/27 removd 9/9  Fam declined surgery  Rocephin 2g (9/2/2021) (Dr PINTO)  cefepime 1.3 (8/26) (Dr Ramirez) (Pneu)  DCed  NONVIOLENT NONSELF DESTR LEVEL ONE   8/28  and periodically after that      BEST PRACTICE ISSUES.                                                  HEAD OF BED ELEVATION. Yes  DVT PROPHYLAXIS.  lvnx 40 (8/26)                                        VALLEJO PROPHYLAXIS.                                                                                         DIET.      dys 1 puree nectar cons carb (8/26)                     INFECTION PROPHYLAXIS.                      GENERAL ISSUES  GOC ADVANCED DIRECTIVE.    dnr                        ALLERGY.    nka                        WEIGHT.         8/26/2021 54                           BMI.                   8/26/2021 18         PATIENT DATA   TUBES  PROCEDURES.     PIGTAIL 8/27 maroon drainage  chest tube removd 9/9     ABG.   8/26/2021 ra 745/37/78     OXYGENATION.       9/7/2021 ra 98%   8/26-8/27/2021 ra 97%    - ra 96%         VITALS/IO/VENT/DRIPS.  9/17/2021 afeb 71 110/70       OVERALL PLAN.    CHRONIC L PL EMPYEMA WITH AIR FLUID LEVEL   Cont abio  placement   GOC dnr    TIME SPENT   Over 25 minutes aggregate care time spent on encounter; activities included   direct patient care, counseling and/or coordinating care reviewing notes, lab data/ imaging , discussion with multidisciplinary team/ patient  /family and explaining in detail risks, benefits, alternatives  of the recommendations     BALDO MORTENSEN 81 M 8/26/2021 1942 DR CATHERINE LANDIN

## 2021-09-17 NOTE — PROGRESS NOTE ADULT - SUBJECTIVE AND OBJECTIVE BOX
FESTUS BLAND    LVS 2E 284 D    Allergies    No Known Allergies    Intolerances        PAST MEDICAL & SURGICAL HISTORY:  Hypercholesterolemia    DM (Diabetes Mellitus)    HTN (Hypertension)    CAD (Coronary Artery Disease)  s/p cardiac stent ( &gt; 20 years ago)    BPH (benign prostatic hyperplasia)    Coronary Stent  x 2 ( 20 years )        FAMILY HISTORY:  FH: HTN (hypertension)        Home Medications:  acetaminophen 325 mg oral tablet: 2 tab(s) orally every 6 hours, As needed, Mild Pain (1 - 3) (17 Jun 2021 14:25)  guaiFENesin 100 mg/5 mL oral liquid: 5 milliliter(s) orally every 6 hours, As needed, Cough (17 Jun 2021 14:25)  melatonin 3 mg oral tablet: 1 tab(s) orally once a day (at bedtime), As needed, Insomnia (17 Jun 2021 14:25)  oxyCODONE 10 mg oral tablet: 1 tab(s) orally every 6 hours, As needed, Severe Pain (7 - 10) (17 Jun 2021 14:25)  oxyCODONE 5 mg oral tablet: 1 tab(s) orally every 6 hours, As needed, Moderate Pain (4 - 6) (17 Jun 2021 14:25)  polyethylene glycol 3350 oral powder for reconstitution: 17 gram(s) orally once a day (17 Jun 2021 14:25)  senna oral tablet: 2 tab(s) orally once a day (at bedtime) (17 Jun 2021 14:25)      MEDICATIONS  (STANDING):  atorvastatin 10 milliGRAM(s) Oral at bedtime  carbidopa/levodopa  25/100 1 Tablet(s) Oral three times a day  cefTRIAXone   IVPB 2000 milliGRAM(s) IV Intermittent every 24 hours  cefTRIAXone   IVPB      dextrose 40% Gel 15 Gram(s) Oral once  dextrose 5%. 1000 milliLiter(s) (50 mL/Hr) IV Continuous <Continuous>  dextrose 5%. 1000 milliLiter(s) (100 mL/Hr) IV Continuous <Continuous>  dextrose 50% Injectable 25 Gram(s) IV Push once  dextrose 50% Injectable 12.5 Gram(s) IV Push once  dextrose 50% Injectable 25 Gram(s) IV Push once  enoxaparin Injectable 40 milliGRAM(s) SubCutaneous daily  glucagon  Injectable 1 milliGRAM(s) IntraMuscular once  insulin lispro (ADMELOG) corrective regimen sliding scale   SubCutaneous three times a day before meals  QUEtiapine 50 milliGRAM(s) Oral at bedtime  senna 2 Tablet(s) Oral at bedtime  tamsulosin 0.4 milliGRAM(s) Oral at bedtime    MEDICATIONS  (PRN):  polyethylene glycol 3350 17 Gram(s) Oral daily PRN constipations      Diet, Dysphagia 1 Pureed-Nectar Consistency Fluid:   Consistent Carbohydrate No Snacks  Supplement Feeding Modality:  Oral  Health Shake Cans or Servings Per Day:  1       Frequency:  Daily  Ensure Pudding Cans or Servings Per Day:  1       Frequency:  Two Times a day (08-30-21 @ 12:20) [Active]          Vital Signs Last 24 Hrs  T(C): 36.4 (17 Sep 2021 05:09), Max: 36.9 (16 Sep 2021 16:25)  T(F): 97.6 (17 Sep 2021 05:09), Max: 98.5 (16 Sep 2021 16:25)  HR: 62 (17 Sep 2021 05:09) (62 - 88)  BP: 112/70 (17 Sep 2021 05:09) (107/50 - 125/55)  BP(mean): --  RR: 18 (17 Sep 2021 05:09) (18 - 18)  SpO2: 100% (17 Sep 2021 05:09) (96% - 100%)      09-16-21 @ 07:01  -  09-17-21 @ 07:00  --------------------------------------------------------  IN: 200 mL / OUT: 0 mL / NET: 200 mL              LABS:                    WBC:      MICROBIOLOGY:  RECENT CULTURES:                  Sodium:          Hemoglobin:      Platelets:             RADIOLOGY & ADDITIONAL STUDIES:      MICROBIOLOGY:  RECENT CULTURES:

## 2021-09-18 LAB
GLUCOSE BLDC GLUCOMTR-MCNC: 128 MG/DL — HIGH (ref 70–99)
GLUCOSE BLDC GLUCOMTR-MCNC: 153 MG/DL — HIGH (ref 70–99)
GLUCOSE BLDC GLUCOMTR-MCNC: 166 MG/DL — HIGH (ref 70–99)
GLUCOSE BLDC GLUCOMTR-MCNC: 96 MG/DL — SIGNIFICANT CHANGE UP (ref 70–99)

## 2021-09-18 PROCEDURE — 99232 SBSQ HOSP IP/OBS MODERATE 35: CPT

## 2021-09-18 RX ADMIN — Medication 1: at 12:40

## 2021-09-18 RX ADMIN — ATORVASTATIN CALCIUM 10 MILLIGRAM(S): 80 TABLET, FILM COATED ORAL at 22:07

## 2021-09-18 RX ADMIN — CARBIDOPA AND LEVODOPA 1 TABLET(S): 25; 100 TABLET ORAL at 15:43

## 2021-09-18 RX ADMIN — QUETIAPINE FUMARATE 50 MILLIGRAM(S): 200 TABLET, FILM COATED ORAL at 22:23

## 2021-09-18 RX ADMIN — CARBIDOPA AND LEVODOPA 1 TABLET(S): 25; 100 TABLET ORAL at 22:07

## 2021-09-18 RX ADMIN — Medication 1: at 17:31

## 2021-09-18 RX ADMIN — TAMSULOSIN HYDROCHLORIDE 0.4 MILLIGRAM(S): 0.4 CAPSULE ORAL at 22:07

## 2021-09-18 RX ADMIN — SENNA PLUS 2 TABLET(S): 8.6 TABLET ORAL at 22:06

## 2021-09-18 RX ADMIN — ENOXAPARIN SODIUM 40 MILLIGRAM(S): 100 INJECTION SUBCUTANEOUS at 12:41

## 2021-09-18 RX ADMIN — CARBIDOPA AND LEVODOPA 1 TABLET(S): 25; 100 TABLET ORAL at 05:21

## 2021-09-18 RX ADMIN — CEFTRIAXONE 100 MILLIGRAM(S): 500 INJECTION, POWDER, FOR SOLUTION INTRAMUSCULAR; INTRAVENOUS at 12:40

## 2021-09-18 NOTE — PROGRESS NOTE ADULT - ASSESSMENT
BALDO MORTENSEN 81 M 8/26/2021 1942 DR CATHERINE LANDIN     REVIEW OF SYMPTOMS      Able to give (reliable) ROS  NO     PHYSICAL EXAM    HEENT Unremarkable  atraumatic   RESP Fair air entry EXP prolonged    Harsh breath sound Resp distres mild   CARDIAC S1 S2 No S3     NO JVD    ABDOMEN SOFT BS PRESENT NOT DISTENDED No hepatosplenomegaly   PEDAL EDEMA present No calf tenderness  NO rash                                          8/26/2021  PRESENTATION.  80 y/o M  pmhx of HTN, DM, HLD, dementia,  Parkinson's disease, CAD s/p stents x2 (>20 years ago), CHF (EF: 50%, Moderate diastolic dysfunction (Stage II), moderate pulmonary hypertension, small-mod pericardial effusion), Lt pleural effusion, BPH, gastric ulcers, COVID infection who was sent on 8/26/2021  to the ED for weakness.            HOSPITAL COURSE   PL EFFSN l  pfa cw empyema  (8/27) pfa 8/27 ldh [id number] pr 2.2 g 2 ph 7  numerous gpc pairs   pl fl 8/27 strep angiosus rare staph a   PIGTAIL 8/27 removd 9/9  Fam declined surgery  Rocephin 2g (9/2/2021) (Dr PINTO)  cefepime 1.3 (8/26) (Dr Ramirez) (Pneu)  DCed  NONVIOLENT NONSELF DESTR LEVEL ONE   8/28  and periodically after that      BEST PRACTICE ISSUES.                                                  HEAD OF BED ELEVATION. Yes  DVT PROPHYLAXIS.  lvnx 40 (8/26)                                        VALLEJO PROPHYLAXIS.                                                                                         DIET.      dys 1 puree nectar cons carb (8/26)                     INFECTION PROPHYLAXIS.                      GENERAL ISSUES  GOC ADVANCED DIRECTIVE.    dnr                        ALLERGY.    nka                        WEIGHT.         8/26/2021 54                           BMI.                   8/26/2021 18         PATIENT DATA   TUBES  PROCEDURES.     PIGTAIL 8/27 maroon drainage  chest tube removd 9/9     ABG.   8/26/2021 ra 745/37/78     OXYGENATION.       9/7/2021 ra 98%   8/26-8/27/2021 ra 97%    - ra 96%         VITALS/IO/VENT/DRIPS.  9/18/2021 afeb 64 140/50      OVERALL PLAN.    CHRONIC L PL EMPYEMA WITH AIR FLUID LEVEL   Cont abio  placement   GOC dnr    TIME SPENT   Over 25 minutes aggregate care time spent on encounter; activities included   direct patient care, counseling and/or coordinating care reviewing notes, lab data/ imaging , discussion with multidisciplinary team/ patient  /family and explaining in detail risks, benefits, alternatives  of the recommendations     BALDO MORTENSEN 81 M 8/26/2021 1942 DR CATHERINE LANDIN

## 2021-09-18 NOTE — PROGRESS NOTE ADULT - SUBJECTIVE AND OBJECTIVE BOX
HPI:  Patient is a 81M with a PMH of HTN, DM, HLD, dementia,  Parkinson's disease, CAD s/p stents x2 (>20 years ago), CHF (EF: 50%, Moderate diastolic dysfunction (Stage II), moderate pulmonary hypertension, small-mod pericardial effusion), Lt pleural effusion, BPH, gastric ulcers, COVID infection who was sent to the ED for weakness.  Patient currently awake and alert, oriented x1, unable to provide history.  Has no acute complaints.  Discharged from NH today and was sent to the ED as he appeared weak and emaciated.  Vitals stable, labs show leukocytosis and mild lactic acidosis.  CT reveals a chronic loculated L pleural effusion.  ED attending spoke to family who made the patient DNR/DNI however did agree for chest tube drainage for effusion.  Will admit to med surg.   (26 Aug 2021 03:21)    Patient is a 81y old  Male who presents with a chief complaint of FTT, empyema r/o (20 Sep 2021 11:11)      INTERVAL HPI/OVERNIGHT EVENTS:    MEDICATIONS  (STANDING):  atorvastatin 10 milliGRAM(s) Oral at bedtime  carbidopa/levodopa  25/100 1 Tablet(s) Oral three times a day  cefTRIAXone   IVPB 2000 milliGRAM(s) IV Intermittent every 24 hours  cefTRIAXone   IVPB      dextrose 40% Gel 15 Gram(s) Oral once  dextrose 5%. 1000 milliLiter(s) (50 mL/Hr) IV Continuous <Continuous>  dextrose 5%. 1000 milliLiter(s) (100 mL/Hr) IV Continuous <Continuous>  dextrose 50% Injectable 25 Gram(s) IV Push once  dextrose 50% Injectable 12.5 Gram(s) IV Push once  dextrose 50% Injectable 25 Gram(s) IV Push once  enoxaparin Injectable 40 milliGRAM(s) SubCutaneous daily  glucagon  Injectable 1 milliGRAM(s) IntraMuscular once  insulin lispro (ADMELOG) corrective regimen sliding scale   SubCutaneous three times a day before meals  nystatin Powder 1 Application(s) Topical two times a day  QUEtiapine 50 milliGRAM(s) Oral at bedtime  senna 2 Tablet(s) Oral at bedtime  tamsulosin 0.4 milliGRAM(s) Oral at bedtime    MEDICATIONS  (PRN):  polyethylene glycol 3350 17 Gram(s) Oral daily PRN constipations      Allergies    No Known Allergies    Intolerances        REVIEW OF SYSTEMS:  CONSTITUTIONAL: No fever, weight loss, or fatigue  EYES: No eye pain, visual disturbances, or discharge  ENMT:  No difficulty hearing, tinnitus, vertigo; No sinus or throat pain  NECK: No pain or stiffness  BREASTS: No pain, masses, or nipple discharge  RESPIRATORY: No cough, wheezing, chills or hemoptysis; No shortness of breath  CARDIOVASCULAR: No chest pain, palpitations, dizziness, or leg swelling  GASTROINTESTINAL: No abdominal or epigastric pain. No nausea, vomiting, or hematemesis; No diarrhea or constipation. No melena or hematochezia.  GENITOURINARY: No dysuria, frequency, hematuria, or incontinence  NEUROLOGICAL: No headaches, memory loss, loss of strength, numbness, or tremors  SKIN: No itching, burning, rashes, or lesions   LYMPH NODES: No enlarged glands  ENDOCRINE: No heat or cold intolerance; No hair loss  MUSCULOSKELETAL: No joint pain or swelling; No muscle, back, or extremity pain  PSYCHIATRIC: No depression, anxiety, mood swings, or difficulty sleeping  HEME/LYMPH: No easy bruising, or bleeding gums  ALLERGY AND IMMUNOLOGIC: No hives or eczema    Vital Signs Last 24 Hrs  T(C): 36.7 (20 Sep 2021 11:22), Max: 36.8 (19 Sep 2021 23:12)  T(F): 98 (20 Sep 2021 11:22), Max: 98.2 (19 Sep 2021 23:12)  HR: 81 (20 Sep 2021 11:22) (69 - 81)  BP: 105/58 (20 Sep 2021 11:22) (95/54 - 123/65)  BP(mean): --  RR: 18 (20 Sep 2021 11:22) (18 - 18)  SpO2: 100% (20 Sep 2021 11:22) (98% - 100%)    PHYSICAL EXAM:  GENERAL: NAD, well-groomed, well-developed  HEAD:  Atraumatic, Normocephalic  EYES: EOMI, PERRLA, conjunctiva and sclera clear  ENMT: No tonsillar erythema, exudates, or enlargement; Moist mucous membranes, Good dentition, No lesions  NECK: Supple, No JVD, Normal thyroid  NERVOUS SYSTEM:  Alert & Oriented X3, Good concentration; Motor Strength 5/5 B/L upper and lower extremities; DTRs 2+ intact and symmetric  CHEST/LUNG: Clear to percussion bilaterally; No rales, rhonchi, wheezing, or rubs  HEART: Regular rate and rhythm; No murmurs, rubs, or gallops  ABDOMEN: Soft, Nontender, Nondistended; Bowel sounds present  EXTREMITIES:  2+ Peripheral Pulses, No clubbing, cyanosis, or edema  LYMPH: No lymphadenopathy noted  SKIN: No rashes or lesions    LABS:              CAPILLARY BLOOD GLUCOSE      POCT Blood Glucose.: 146 mg/dL (20 Sep 2021 11:35)  POCT Blood Glucose.: 93 mg/dL (20 Sep 2021 08:14)  POCT Blood Glucose.: 118 mg/dL (19 Sep 2021 21:20)  POCT Blood Glucose.: 134 mg/dL (19 Sep 2021 16:48)      RADIOLOGY & ADDITIONAL TESTS:    Imaging Personally Reviewed:  [ ] YES  [ ] NO    Consultant(s) Notes Reviewed:  [ ] YES  [ ] NO    Care Discussed with Consultants/Other Providers [ ] YES  [ ] NO

## 2021-09-18 NOTE — PROGRESS NOTE ADULT - SUBJECTIVE AND OBJECTIVE BOX
FESTUS BLAND    LVS 2E 284 D    Allergies    No Known Allergies    Intolerances        PAST MEDICAL & SURGICAL HISTORY:  Hypercholesterolemia    DM (Diabetes Mellitus)    HTN (Hypertension)    CAD (Coronary Artery Disease)  s/p cardiac stent ( &gt; 20 years ago)    BPH (benign prostatic hyperplasia)    Coronary Stent  x 2 ( 20 years )        FAMILY HISTORY:  FH: HTN (hypertension)        Home Medications:  acetaminophen 325 mg oral tablet: 2 tab(s) orally every 6 hours, As needed, Mild Pain (1 - 3) (17 Jun 2021 14:25)  guaiFENesin 100 mg/5 mL oral liquid: 5 milliliter(s) orally every 6 hours, As needed, Cough (17 Jun 2021 14:25)  melatonin 3 mg oral tablet: 1 tab(s) orally once a day (at bedtime), As needed, Insomnia (17 Jun 2021 14:25)  oxyCODONE 10 mg oral tablet: 1 tab(s) orally every 6 hours, As needed, Severe Pain (7 - 10) (17 Jun 2021 14:25)  oxyCODONE 5 mg oral tablet: 1 tab(s) orally every 6 hours, As needed, Moderate Pain (4 - 6) (17 Jun 2021 14:25)  polyethylene glycol 3350 oral powder for reconstitution: 17 gram(s) orally once a day (17 Jun 2021 14:25)  senna oral tablet: 2 tab(s) orally once a day (at bedtime) (17 Jun 2021 14:25)      MEDICATIONS  (STANDING):  atorvastatin 10 milliGRAM(s) Oral at bedtime  carbidopa/levodopa  25/100 1 Tablet(s) Oral three times a day  cefTRIAXone   IVPB 2000 milliGRAM(s) IV Intermittent every 24 hours  cefTRIAXone   IVPB      dextrose 40% Gel 15 Gram(s) Oral once  dextrose 5%. 1000 milliLiter(s) (50 mL/Hr) IV Continuous <Continuous>  dextrose 5%. 1000 milliLiter(s) (100 mL/Hr) IV Continuous <Continuous>  dextrose 50% Injectable 25 Gram(s) IV Push once  dextrose 50% Injectable 12.5 Gram(s) IV Push once  dextrose 50% Injectable 25 Gram(s) IV Push once  enoxaparin Injectable 40 milliGRAM(s) SubCutaneous daily  glucagon  Injectable 1 milliGRAM(s) IntraMuscular once  insulin lispro (ADMELOG) corrective regimen sliding scale   SubCutaneous three times a day before meals  QUEtiapine 50 milliGRAM(s) Oral at bedtime  senna 2 Tablet(s) Oral at bedtime  tamsulosin 0.4 milliGRAM(s) Oral at bedtime    MEDICATIONS  (PRN):  polyethylene glycol 3350 17 Gram(s) Oral daily PRN constipations      Diet, Dysphagia 1 Pureed-Nectar Consistency Fluid:   Consistent Carbohydrate No Snacks  Supplement Feeding Modality:  Oral  Health Shake Cans or Servings Per Day:  1       Frequency:  Daily  Ensure Pudding Cans or Servings Per Day:  1       Frequency:  Two Times a day (08-30-21 @ 12:20) [Active]          Vital Signs Last 24 Hrs  T(C): 36.5 (18 Sep 2021 04:54), Max: 36.5 (18 Sep 2021 04:54)  T(F): 97.7 (18 Sep 2021 04:54), Max: 97.7 (18 Sep 2021 04:54)  HR: 64 (18 Sep 2021 04:54) (64 - 78)  BP: 142/59 (18 Sep 2021 04:54) (111/62 - 142/59)  BP(mean): --  RR: 17 (18 Sep 2021 04:54) (17 - 17)  SpO2: 100% (18 Sep 2021 04:54) (99% - 100%)      09-17-21 @ 07:01  -  09-18-21 @ 07:00  --------------------------------------------------------  IN: 580 mL / OUT: 0 mL / NET: 580 mL              LABS:                    WBC:      MICROBIOLOGY:  RECENT CULTURES:                  Sodium:          Hemoglobin:      Platelets:             RADIOLOGY & ADDITIONAL STUDIES:      MICROBIOLOGY:  RECENT CULTURES:

## 2021-09-18 NOTE — PROGRESS NOTE ADULT - ASSESSMENT
80 yo male w/ pmhx HTN, DM, HLD, dementia, parkinsons' disease, cad sp stent x2, hf w/ PeF, moderate pulm htn, BPH, gastric ulcers, recent covid infection admitted for empyema 2/2 strep anginosus    # Strep anginous empyema/Loculated left sided effusion, Acute metabolic encephalopathy  - s/p IR drainage and pigtail placement 8/27, with decrease in size. Chest tube removed on 9/9  - on cefepime 8/26 then switch to iv ceftx 9/1  - INFECTIOUS DISEASE Dr. Serrato following.    - CT SURGERY with Dr Weeks on the case  - pt's brother decline thoracotomy/ VATS and wanted only minimally invasive procedures  - repeat CT Chest per thoracic surgery showing now R sided infiltrate, aspiration.  Some improvement in R collection.  # Parkinson's disease, Functional Quadriplegia, Severe protein-calorie malnutrition, Dysphagia - supportive care. dysphagia diet, asp precaution   # BPH - flomax  # h/o CAD - plavix on hold right now due to CT placement.  Resume plavix once okay with CT surgery.   # Dementia  - on carbidopa/levadopa - seroquel  DNR / DNI    Plan of care d/w brother Berto [phone].  DNR / DNI.  Agrees with current plan of care, wishes to palliative measures. waiting for home with hospice.

## 2021-09-19 LAB
GLUCOSE BLDC GLUCOMTR-MCNC: 103 MG/DL — HIGH (ref 70–99)
GLUCOSE BLDC GLUCOMTR-MCNC: 118 MG/DL — HIGH (ref 70–99)
GLUCOSE BLDC GLUCOMTR-MCNC: 134 MG/DL — HIGH (ref 70–99)
GLUCOSE BLDC GLUCOMTR-MCNC: 146 MG/DL — HIGH (ref 70–99)

## 2021-09-19 PROCEDURE — 99232 SBSQ HOSP IP/OBS MODERATE 35: CPT

## 2021-09-19 RX ORDER — NYSTATIN CREAM 100000 [USP'U]/G
1 CREAM TOPICAL
Refills: 0 | Status: DISCONTINUED | OUTPATIENT
Start: 2021-09-19 | End: 2021-09-21

## 2021-09-19 RX ADMIN — NYSTATIN CREAM 1 APPLICATION(S): 100000 CREAM TOPICAL at 21:55

## 2021-09-19 RX ADMIN — CARBIDOPA AND LEVODOPA 1 TABLET(S): 25; 100 TABLET ORAL at 13:22

## 2021-09-19 RX ADMIN — CARBIDOPA AND LEVODOPA 1 TABLET(S): 25; 100 TABLET ORAL at 21:54

## 2021-09-19 RX ADMIN — ATORVASTATIN CALCIUM 10 MILLIGRAM(S): 80 TABLET, FILM COATED ORAL at 21:55

## 2021-09-19 RX ADMIN — ENOXAPARIN SODIUM 40 MILLIGRAM(S): 100 INJECTION SUBCUTANEOUS at 13:21

## 2021-09-19 RX ADMIN — TAMSULOSIN HYDROCHLORIDE 0.4 MILLIGRAM(S): 0.4 CAPSULE ORAL at 21:55

## 2021-09-19 RX ADMIN — CARBIDOPA AND LEVODOPA 1 TABLET(S): 25; 100 TABLET ORAL at 05:24

## 2021-09-19 RX ADMIN — CEFTRIAXONE 100 MILLIGRAM(S): 500 INJECTION, POWDER, FOR SOLUTION INTRAMUSCULAR; INTRAVENOUS at 13:22

## 2021-09-19 RX ADMIN — QUETIAPINE FUMARATE 50 MILLIGRAM(S): 200 TABLET, FILM COATED ORAL at 21:54

## 2021-09-19 RX ADMIN — SENNA PLUS 2 TABLET(S): 8.6 TABLET ORAL at 21:55

## 2021-09-19 NOTE — PROGRESS NOTE ADULT - ASSESSMENT
82 yo male w/ pmhx HTN, DM, HLD, dementia, parkinsons' disease, cad sp stent x2, hf w/ PeF, moderate pulm htn, BPH, gastric ulcers, recent covid infection admitted for empyema 2/2 strep anginosus    # Strep anginous empyema/Loculated left sided effusion, Acute metabolic encephalopathy  - s/p IR drainage and pigtail placement 8/27, with decrease in size. Chest tube removed on 9/9  - on cefepime 8/26 then switch to iv ceftx 9/1  - INFECTIOUS DISEASE Dr. Serrato following.    - CT SURGERY with Dr Weeks on the case  - pt's brother decline thoracotomy/ VATS and wanted only minimally invasive procedures  - repeat CT Chest per thoracic surgery showing now R sided infiltrate, aspiration.  Some improvement in R collection.  # Parkinson's disease, Functional Quadriplegia, Severe protein-calorie malnutrition, Dysphagia - supportive care. dysphagia diet, asp precaution   # BPH - flomax  # h/o CAD - plavix on hold right now due to CT placement.  Resume plavix once okay with CT surgery.   # Dementia  - on carbidopa/levadopa - seroquel  DNR / DNI    Plan of care d/w brother Berto [phone].  DNR / DNI.  Agrees with current plan of care, wishes to palliative measures. waiting for home with hospice.

## 2021-09-19 NOTE — CHART NOTE - NSCHARTNOTEFT_GEN_A_CORE
Nutrition follow-up note:    Pt admitted for empyema 2/2 strep anginosus. PMHx includes HTN, DM, HLD, dementia, Parkinson's Disease, CAD s/p stent x2, CHF, moderate pulmonary hypertension, Lt pleural effusion, BPH, gastric ulcers, recent COVID infection. Pt s/p chest tube placement (8/27) & removal (9/9). Per chart family requests conservative management; no invasive procedures; pt DNR/DNI per chart. Pt remains confused & disoriented; stable for d/c; awaiting home hospice arrangements.    Factors impacting intake: [x ] none [ ] nausea  [ ] vomiting [ ] diarrhea [ ] constipation  [ ]chewing problems [ ] swallowing issues  [ ] other:     Diet Prescription: Diet, Dysphagia 1 Pureed-Nectar Consistency Fluid:   Consistent Carbohydrate {No Snacks}  Supplement Feeding Modality:  Oral  Health Shake Cans or Servings Per Day:  1       Frequency:  Daily  Ensure Pudding Cans or Servings Per Day:  1       Frequency:  Two Times a day (08-30-21 @ 12:20)    Intake: 100% intake w/assistance.     Current Weight: no new weights since (9/15), (9/15) 55 kg, (8/25 admission) 54.4 kg.  % Weight Change: unable to assess weight change due to no new weights available.   No edema documented.     Pertinent Medications: MEDICATIONS  (STANDING):  atorvastatin 10 milliGRAM(s) Oral at bedtime  carbidopa/levodopa  25/100 1 Tablet(s) Oral three times a day  cefTRIAXone   IVPB 2000 milliGRAM(s) IV Intermittent every 24 hours  cefTRIAXone   IVPB      dextrose 40% Gel 15 Gram(s) Oral once  dextrose 5%. 1000 milliLiter(s) (50 mL/Hr) IV Continuous <Continuous>  dextrose 5%. 1000 milliLiter(s) (100 mL/Hr) IV Continuous <Continuous>  dextrose 50% Injectable 25 Gram(s) IV Push once  dextrose 50% Injectable 12.5 Gram(s) IV Push once  dextrose 50% Injectable 25 Gram(s) IV Push once  enoxaparin Injectable 40 milliGRAM(s) SubCutaneous daily  glucagon  Injectable 1 milliGRAM(s) IntraMuscular once  insulin lispro (ADMELOG) corrective regimen sliding scale   SubCutaneous three times a day before meals  QUEtiapine 50 milliGRAM(s) Oral at bedtime  senna 2 Tablet(s) Oral at bedtime  tamsulosin 0.4 milliGRAM(s) Oral at bedtime    MEDICATIONS  (PRN):  polyethylene glycol 3350 17 Gram(s) Oral daily PRN constipations    Pertinent Labs: no nutrition-pertinent labs since 9/7.  POCT glucose (9/19) 103, (9/18) 128, 153, 166, 96.      Skin: no pressure ulcer.    Estimated Needs:   [x ] no change since previous assessment (8/30)  [ ] recalculated:     Previous Nutrition Diagnosis:   [X ] Severe Malnutrition in context of chronic illness  Etiology:  Inadequate energy/protein intake related to dementia, Parkinson's Disease, cardiac hx  Signs & Symptoms:  Physical findings of severe fat depletion & muscle wasting as noted    GOAL:  Provide nutrition/hydration as medically appropriate, as tolerated, & within family/pt wishes for tx - (GOAL being met).    Nutrition Diagnosis is [x ] ongoing  [ ] resolved [ ] not applicable     New Nutrition Diagnosis: [x ] not applicable      Interventions:   Recommend:  [x] Continue current diet as Rx'd.  [ ] Change Diet To:  [x ] Nutrition Supplement: Continue current PO supplement as Rx'd.  [ ] Nutrition Support  [ ] Other:     Monitoring and Evaluation:   [x ] PO intake [ x ] Tolerance to diet prescription [ x ] weights [ x ] labs[ x ] follow up per protocol  [ ] other:

## 2021-09-19 NOTE — PROGRESS NOTE ADULT - NUTRITIONAL ASSESSMENT
This patient has been assessed with a concern for Malnutrition and has been determined to have a diagnosis/diagnoses of Severe protein-calorie malnutrition and Underweight (BMI < 19).    This patient is being managed with:   Diet Dysphagia 1 Pureed-Nectar Consistency Fluid-  Consistent Carbohydrate {No Snacks}  Supplement Feeding Modality:  Oral  Health Shake Cans or Servings Per Day:  1       Frequency:  Daily  Ensure Pudding Cans or Servings Per Day:  1       Frequency:  Two Times a day  Entered: Aug 30 2021 12:20PM    
This patient has been assessed with a concern for Malnutrition and has been determined to have a diagnosis/diagnoses of Severe protein-calorie malnutrition and Underweight (BMI < 19).    This patient is being managed with:   Diet Dysphagia 1 Pureed-Nectar Consistency Fluid-  Consistent Carbohydrate {No Snacks}  Supplement Feeding Modality:  Oral  Health Shake Cans or Servings Per Day:  1       Frequency:  Daily  Ensure Pudding Cans or Servings Per Day:  1       Frequency:  Two Times a day  Entered: Aug 30 2021 12:20PM    Diet Dysphagia 1 Pureed-Nectar Consistency Fluid-  Consistent Carbohydrate {No Snacks} (CSTCHO)  DASH/TLC {Sodium & Cholesterol Restricted} (DASH)  Entered: Aug 26 2021  3:14AM    The following pending diet order is being considered for treatment of Severe protein-calorie malnutrition and Underweight (BMI < 19):null
This patient has been assessed with a concern for Malnutrition and has been determined to have a diagnosis/diagnoses of Severe protein-calorie malnutrition and Underweight (BMI < 19).    This patient is being managed with:   Diet Dysphagia 1 Pureed-Nectar Consistency Fluid-  Consistent Carbohydrate {No Snacks}  Supplement Feeding Modality:  Oral  Health Shake Cans or Servings Per Day:  1       Frequency:  Daily  Ensure Pudding Cans or Servings Per Day:  1       Frequency:  Two Times a day  Entered: Aug 30 2021 12:20PM    
Left PLEF  Leukocytosis   functional quadriplegia

## 2021-09-19 NOTE — PROGRESS NOTE ADULT - SUBJECTIVE AND OBJECTIVE BOX
FESTUS BLAND    LVS 2E 284 D    Allergies    No Known Allergies    Intolerances        PAST MEDICAL & SURGICAL HISTORY:  Hypercholesterolemia    DM (Diabetes Mellitus)    HTN (Hypertension)    CAD (Coronary Artery Disease)  s/p cardiac stent ( &gt; 20 years ago)    BPH (benign prostatic hyperplasia)    Coronary Stent  x 2 ( 20 years )        FAMILY HISTORY:  FH: HTN (hypertension)        Home Medications:  acetaminophen 325 mg oral tablet: 2 tab(s) orally every 6 hours, As needed, Mild Pain (1 - 3) (17 Jun 2021 14:25)  guaiFENesin 100 mg/5 mL oral liquid: 5 milliliter(s) orally every 6 hours, As needed, Cough (17 Jun 2021 14:25)  melatonin 3 mg oral tablet: 1 tab(s) orally once a day (at bedtime), As needed, Insomnia (17 Jun 2021 14:25)  oxyCODONE 10 mg oral tablet: 1 tab(s) orally every 6 hours, As needed, Severe Pain (7 - 10) (17 Jun 2021 14:25)  oxyCODONE 5 mg oral tablet: 1 tab(s) orally every 6 hours, As needed, Moderate Pain (4 - 6) (17 Jun 2021 14:25)  polyethylene glycol 3350 oral powder for reconstitution: 17 gram(s) orally once a day (17 Jun 2021 14:25)  senna oral tablet: 2 tab(s) orally once a day (at bedtime) (17 Jun 2021 14:25)      MEDICATIONS  (STANDING):  atorvastatin 10 milliGRAM(s) Oral at bedtime  carbidopa/levodopa  25/100 1 Tablet(s) Oral three times a day  cefTRIAXone   IVPB      cefTRIAXone   IVPB 2000 milliGRAM(s) IV Intermittent every 24 hours  dextrose 40% Gel 15 Gram(s) Oral once  dextrose 5%. 1000 milliLiter(s) (50 mL/Hr) IV Continuous <Continuous>  dextrose 5%. 1000 milliLiter(s) (100 mL/Hr) IV Continuous <Continuous>  dextrose 50% Injectable 25 Gram(s) IV Push once  dextrose 50% Injectable 12.5 Gram(s) IV Push once  dextrose 50% Injectable 25 Gram(s) IV Push once  enoxaparin Injectable 40 milliGRAM(s) SubCutaneous daily  glucagon  Injectable 1 milliGRAM(s) IntraMuscular once  insulin lispro (ADMELOG) corrective regimen sliding scale   SubCutaneous three times a day before meals  QUEtiapine 50 milliGRAM(s) Oral at bedtime  senna 2 Tablet(s) Oral at bedtime  tamsulosin 0.4 milliGRAM(s) Oral at bedtime    MEDICATIONS  (PRN):  polyethylene glycol 3350 17 Gram(s) Oral daily PRN constipations      Diet, Dysphagia 1 Pureed-Nectar Consistency Fluid:   Consistent Carbohydrate No Snacks  Supplement Feeding Modality:  Oral  Health Shake Cans or Servings Per Day:  1       Frequency:  Daily  Ensure Pudding Cans or Servings Per Day:  1       Frequency:  Two Times a day (08-30-21 @ 12:20) [Active]          Vital Signs Last 24 Hrs  T(C): 36.7 (19 Sep 2021 05:00), Max: 36.7 (18 Sep 2021 17:27)  T(F): 98 (19 Sep 2021 05:00), Max: 98.1 (18 Sep 2021 17:27)  HR: 65 (19 Sep 2021 05:00) (61 - 67)  BP: 115/61 (19 Sep 2021 05:00) (111/62 - 115/61)  BP(mean): --  RR: 18 (19 Sep 2021 05:00) (18 - 18)  SpO2: 100% (19 Sep 2021 05:00) (99% - 100%)      09-18-21 @ 07:01  -  09-19-21 @ 07:00  --------------------------------------------------------  IN: 790 mL / OUT: 0 mL / NET: 790 mL              LABS:                    WBC:      MICROBIOLOGY:  RECENT CULTURES:                  Sodium:          Hemoglobin:      Platelets:             RADIOLOGY & ADDITIONAL STUDIES:      MICROBIOLOGY:  RECENT CULTURES:

## 2021-09-19 NOTE — CHART NOTE - NSCHARTNOTESELECT_GEN_ALL_CORE
Event Note
IR
Nutrition Services
Nutrition Services
Palliative Care/Off Service Note
Event Note
IR
Nutrition Services

## 2021-09-19 NOTE — PROGRESS NOTE ADULT - ASSESSMENT
BALDO MORTENSEN 81 M 8/26/2021 1942 DR CATHERINE LANDIN     REVIEW OF SYMPTOMS      Able to give (reliable) ROS  NO     PHYSICAL EXAM    HEENT Unremarkable  atraumatic   RESP Fair air entry EXP prolonged    Harsh breath sound Resp distres mild   CARDIAC S1 S2 No S3     NO JVD    ABDOMEN SOFT BS PRESENT NOT DISTENDED No hepatosplenomegaly   PEDAL EDEMA present No calf tenderness  NO rash                                          8/26/2021  PRESENTATION.  82 y/o M  pmhx of HTN, DM, HLD, dementia,  Parkinson's disease, CAD s/p stents x2 (>20 years ago), CHF (EF: 50%, Moderate diastolic dysfunction (Stage II), moderate pulmonary hypertension, small-mod pericardial effusion), Lt pleural effusion, BPH, gastric ulcers, COVID infection who was sent on 8/26/2021  to the ED for weakness.            HOSPITAL COURSE   PL EFFSN l  pfa cw empyema  (8/27) pfa 8/27 ldh [id number] pr 2.2 g 2 ph 7  numerous gpc pairs   pl fl 8/27 strep angiosus rare staph a   PIGTAIL 8/27 removd 9/9  Fam declined surgery  Rocephin 2g (9/2/2021) (Dr PINTO)  cefepime 1.3 (8/26) (Dr Ramirez) (Pneu)  DCed  NONVIOLENT NONSELF DESTR LEVEL ONE   8/28  and periodically after that      BEST PRACTICE ISSUES.                                                  HEAD OF BED ELEVATION. Yes  DVT PROPHYLAXIS.  lvnx 40 (8/26)                                        VALLEJO PROPHYLAXIS.                                                                                         DIET.      dys 1 puree nectar cons carb (8/26)                     INFECTION PROPHYLAXIS.                      GENERAL ISSUES  GOC ADVANCED DIRECTIVE.    dnr                        ALLERGY.    nka                        WEIGHT.         8/26/2021 54                           BMI.                   8/26/2021 18         PATIENT DATA   TUBES  PROCEDURES.     PIGTAIL 8/27 maroon drainage  chest tube removd 9/9     ABG.   8/26/2021 ra 745/37/78     OXYGENATION.       9/7/2021 ra 98%   8/26-8/27/2021 ra 97%    - ra 96%         VITALS/IO/VENT/DRIPS.  9/19/2021 afeb 79 110/50       OVERALL PLAN.    CHRONIC L PL EMPYEMA WITH AIR FLUID LEVEL   Cont abio  placement   GOC dnr    TIME SPENT   Over 25 minutes aggregate care time spent on encounter; activities included   direct patient care, counseling and/or coordinating care reviewing notes, lab data/ imaging , discussion with multidisciplinary team/ patient  /family and explaining in detail risks, benefits, alternatives  of the recommendations     BALDO MORTENSEN 81 M 8/26/2021 1942 DR CATHERINE LANDIN

## 2021-09-19 NOTE — PROGRESS NOTE ADULT - SUBJECTIVE AND OBJECTIVE BOX
HPI:  Patient is a 81M with a PMH of HTN, DM, HLD, dementia,  Parkinson's disease, CAD s/p stents x2 (>20 years ago), CHF (EF: 50%, Moderate diastolic dysfunction (Stage II), moderate pulmonary hypertension, small-mod pericardial effusion), Lt pleural effusion, BPH, gastric ulcers, COVID infection who was sent to the ED for weakness.  Patient currently awake and alert, oriented x1, unable to provide history.  Has no acute complaints.  Discharged from NH today and was sent to the ED as he appeared weak and emaciated.  Vitals stable, labs show leukocytosis and mild lactic acidosis.  CT reveals a chronic loculated L pleural effusion.  ED attending spoke to family who made the patient DNR/DNI however did agree for chest tube drainage for effusion.  Will admit to med surg.   (26 Aug 2021 03:21)    Patient is a 81y old  Male who presents with a chief complaint of FTT, empyema r/o (20 Sep 2021 11:11)      INTERVAL HPI/OVERNIGHT EVENTS: no acute events overnight     MEDICATIONS  (STANDING):  atorvastatin 10 milliGRAM(s) Oral at bedtime  carbidopa/levodopa  25/100 1 Tablet(s) Oral three times a day  cefTRIAXone   IVPB 2000 milliGRAM(s) IV Intermittent every 24 hours  cefTRIAXone   IVPB      dextrose 40% Gel 15 Gram(s) Oral once  dextrose 5%. 1000 milliLiter(s) (50 mL/Hr) IV Continuous <Continuous>  dextrose 5%. 1000 milliLiter(s) (100 mL/Hr) IV Continuous <Continuous>  dextrose 50% Injectable 25 Gram(s) IV Push once  dextrose 50% Injectable 12.5 Gram(s) IV Push once  dextrose 50% Injectable 25 Gram(s) IV Push once  enoxaparin Injectable 40 milliGRAM(s) SubCutaneous daily  glucagon  Injectable 1 milliGRAM(s) IntraMuscular once  insulin lispro (ADMELOG) corrective regimen sliding scale   SubCutaneous three times a day before meals  nystatin Powder 1 Application(s) Topical two times a day  QUEtiapine 50 milliGRAM(s) Oral at bedtime  senna 2 Tablet(s) Oral at bedtime  tamsulosin 0.4 milliGRAM(s) Oral at bedtime    MEDICATIONS  (PRN):  polyethylene glycol 3350 17 Gram(s) Oral daily PRN constipations      Allergies    No Known Allergies    Intolerances        REVIEW OF SYSTEMS:  CONSTITUTIONAL: No fever, weight loss, or fatigue  EYES: No eye pain, visual disturbances, or discharge  ENMT:  No difficulty hearing, tinnitus, vertigo; No sinus or throat pain  NECK: No pain or stiffness  BREASTS: No pain, masses, or nipple discharge  RESPIRATORY: No cough, wheezing, chills or hemoptysis; No shortness of breath  CARDIOVASCULAR: No chest pain, palpitations, dizziness, or leg swelling  GASTROINTESTINAL: No abdominal or epigastric pain. No nausea, vomiting, or hematemesis; No diarrhea or constipation. No melena or hematochezia.  GENITOURINARY: No dysuria, frequency, hematuria, or incontinence  NEUROLOGICAL: No headaches, memory loss, loss of strength, numbness, or tremors  SKIN: No itching, burning, rashes, or lesions   LYMPH NODES: No enlarged glands  ENDOCRINE: No heat or cold intolerance; No hair loss  MUSCULOSKELETAL: No joint pain or swelling; No muscle, back, or extremity pain  PSYCHIATRIC: No depression, anxiety, mood swings, or difficulty sleeping  HEME/LYMPH: No easy bruising, or bleeding gums  ALLERGY AND IMMUNOLOGIC: No hives or eczema    Vital Signs Last 24 Hrs  T(C): 36.7 (20 Sep 2021 11:22), Max: 36.8 (19 Sep 2021 23:12)  T(F): 98 (20 Sep 2021 11:22), Max: 98.2 (19 Sep 2021 23:12)  HR: 81 (20 Sep 2021 11:22) (69 - 81)  BP: 105/58 (20 Sep 2021 11:22) (95/54 - 123/65)  RR: 18 (20 Sep 2021 11:22) (18 - 18)  SpO2: 100% (20 Sep 2021 11:22) (98% - 100%)    PHYSICAL EXAM:  GENERAL: NAD, well-groomed, well-developed  HEAD:  Atraumatic, Normocephalic  EYES: EOMI, PERRLA, conjunctiva and sclera clear  ENMT: No tonsillar erythema, exudates, or enlargement; Moist mucous membranes, Good dentition, No lesions  NECK: Supple, No JVD, Normal thyroid  NERVOUS SYSTEM:  Alert & Oriented X3, Good concentration; Motor Strength 5/5 B/L upper and lower extremities; DTRs 2+ intact and symmetric  CHEST/LUNG: Clear to percussion bilaterally; No rales, rhonchi, wheezing, or rubs  HEART: Regular rate and rhythm; No murmurs, rubs, or gallops  ABDOMEN: Soft, Nontender, Nondistended; Bowel sounds present  EXTREMITIES:  2+ Peripheral Pulses, No clubbing, cyanosis, or edema  LYMPH: No lymphadenopathy noted  SKIN: No rashes or lesions    LABS:              CAPILLARY BLOOD GLUCOSE      POCT Blood Glucose.: 146 mg/dL (20 Sep 2021 11:35)  POCT Blood Glucose.: 93 mg/dL (20 Sep 2021 08:14)  POCT Blood Glucose.: 118 mg/dL (19 Sep 2021 21:20)  POCT Blood Glucose.: 134 mg/dL (19 Sep 2021 16:48)      RADIOLOGY & ADDITIONAL TESTS:    Imaging Personally Reviewed:  [ ] YES  [ ] NO    Consultant(s) Notes Reviewed:  [ ] YES  [ ] NO    Care Discussed with Consultants/Other Providers [ ] YES  [ ] NO

## 2021-09-20 ENCOUNTER — TRANSCRIPTION ENCOUNTER (OUTPATIENT)
Age: 81
End: 2021-09-20

## 2021-09-20 LAB
GLUCOSE BLDC GLUCOMTR-MCNC: 137 MG/DL — HIGH (ref 70–99)
GLUCOSE BLDC GLUCOMTR-MCNC: 146 MG/DL — HIGH (ref 70–99)
GLUCOSE BLDC GLUCOMTR-MCNC: 93 MG/DL — SIGNIFICANT CHANGE UP (ref 70–99)
GLUCOSE BLDC GLUCOMTR-MCNC: 96 MG/DL — SIGNIFICANT CHANGE UP (ref 70–99)

## 2021-09-20 PROCEDURE — 99232 SBSQ HOSP IP/OBS MODERATE 35: CPT

## 2021-09-20 RX ORDER — QUETIAPINE FUMARATE 200 MG/1
1 TABLET, FILM COATED ORAL
Qty: 0 | Refills: 0 | DISCHARGE
Start: 2021-09-20

## 2021-09-20 RX ORDER — TAMSULOSIN HYDROCHLORIDE 0.4 MG/1
1 CAPSULE ORAL
Qty: 0 | Refills: 0 | DISCHARGE
Start: 2021-09-20

## 2021-09-20 RX ADMIN — TAMSULOSIN HYDROCHLORIDE 0.4 MILLIGRAM(S): 0.4 CAPSULE ORAL at 21:27

## 2021-09-20 RX ADMIN — QUETIAPINE FUMARATE 50 MILLIGRAM(S): 200 TABLET, FILM COATED ORAL at 21:26

## 2021-09-20 RX ADMIN — CARBIDOPA AND LEVODOPA 1 TABLET(S): 25; 100 TABLET ORAL at 21:27

## 2021-09-20 RX ADMIN — ENOXAPARIN SODIUM 40 MILLIGRAM(S): 100 INJECTION SUBCUTANEOUS at 11:17

## 2021-09-20 RX ADMIN — CEFTRIAXONE 100 MILLIGRAM(S): 500 INJECTION, POWDER, FOR SOLUTION INTRAMUSCULAR; INTRAVENOUS at 11:16

## 2021-09-20 RX ADMIN — CARBIDOPA AND LEVODOPA 1 TABLET(S): 25; 100 TABLET ORAL at 15:50

## 2021-09-20 RX ADMIN — NYSTATIN CREAM 1 APPLICATION(S): 100000 CREAM TOPICAL at 17:02

## 2021-09-20 RX ADMIN — ATORVASTATIN CALCIUM 10 MILLIGRAM(S): 80 TABLET, FILM COATED ORAL at 21:26

## 2021-09-20 RX ADMIN — NYSTATIN CREAM 1 APPLICATION(S): 100000 CREAM TOPICAL at 05:03

## 2021-09-20 RX ADMIN — SENNA PLUS 2 TABLET(S): 8.6 TABLET ORAL at 21:26

## 2021-09-20 RX ADMIN — CARBIDOPA AND LEVODOPA 1 TABLET(S): 25; 100 TABLET ORAL at 05:03

## 2021-09-20 NOTE — DISCHARGE NOTE PROVIDER - NSDCCPCAREPLAN_GEN_ALL_CORE_FT
PRINCIPAL DISCHARGE DIAGNOSIS  Diagnosis: Sepsis  Assessment and Plan of Treatment: for hospice      SECONDARY DISCHARGE DIAGNOSES  Diagnosis: Empyema of left pleural space  Assessment and Plan of Treatment: Hospice

## 2021-09-20 NOTE — DISCHARGE NOTE PROVIDER - HOSPITAL COURSE
HPI:  Patient is a 81M with a PMH of HTN, DM, HLD, dementia,  Parkinson's disease, CAD s/p stents x2 (>20 years ago), CHF (EF: 50%, Moderate diastolic dysfunction (Stage II), moderate pulmonary hypertension, small-mod pericardial effusion), Lt pleural effusion, BPH, gastric ulcers, COVID infection who was sent to the ED for weakness.  Patient currently awake and alert, oriented x1, unable to provide history.  Has no acute complaints.  Discharged from NH today and was sent to the ED as he appeared weak and emaciated.  Vitals stable, labs show leukocytosis and mild lactic acidosis.  CT reveals a chronic loculated L pleural effusion.  ED attending spoke to family who made the patient DNR/DNI however did agree for chest tube drainage for effusion.  Will admit to med surg.   (26 Aug 2021 03:21)  82 yo male w/ pmhx HTN, DM, HLD, dementia, parkinsons' disease, cad sp stent x2, hf w/ PeF, moderate pulm htn, BPH, gastric ulcers, recent covid infection admitted for empyema 2/2 strep anginosus    # Strep anginous empyema/Loculated left sided effusion, Acute metabolic encephalopathy  - s/p IR drainage and pigtail placement 8/27, with decrease in size. Chest tube removed on 9/9  - on cefepime 8/26 then switch to iv ceftx 9/1 through 9/22m po ceftin 500 q12h  - INFECTIOUS DISEASE Dr. Serrato following.    - CT SURGERY with Dr Weeks on the case  - pt's brother decline thoracotomy/ VATS and wanted only minimally invasive procedures  - repeat CT Chest per thoracic surgery showing now R sided infiltrate, aspiration.  Some improvement in R collection.  # Parkinson's disease, Functional Quadriplegia, Severe protein-calorie malnutrition, Dysphagia - supportive care. dysphagia diet, asp precaution   # BPH - flomax  # h/o CAD - plavix on hold right now due to CT placement.  Resume plavix once okay with CT surgery.   # Dementia  - on carbidopa/levadopa - seroquel  DNR / DNI    Plan of care d/w brother Berto [phone].  DNR / DNI.  Agrees with current plan of care, wishes to palliative measures. waiting for home with hospice.

## 2021-09-20 NOTE — PROGRESS NOTE ADULT - SUBJECTIVE AND OBJECTIVE BOX
FESTUS BLAND    LVS 2E 284 D    Allergies    No Known Allergies    Intolerances        PAST MEDICAL & SURGICAL HISTORY:  Hypercholesterolemia    DM (Diabetes Mellitus)    HTN (Hypertension)    CAD (Coronary Artery Disease)  s/p cardiac stent ( &gt; 20 years ago)    BPH (benign prostatic hyperplasia)    Coronary Stent  x 2 ( 20 years )        FAMILY HISTORY:  FH: HTN (hypertension)        Home Medications:  acetaminophen 325 mg oral tablet: 2 tab(s) orally every 6 hours, As needed, Mild Pain (1 - 3) (17 Jun 2021 14:25)  guaiFENesin 100 mg/5 mL oral liquid: 5 milliliter(s) orally every 6 hours, As needed, Cough (17 Jun 2021 14:25)  melatonin 3 mg oral tablet: 1 tab(s) orally once a day (at bedtime), As needed, Insomnia (17 Jun 2021 14:25)  oxyCODONE 10 mg oral tablet: 1 tab(s) orally every 6 hours, As needed, Severe Pain (7 - 10) (17 Jun 2021 14:25)  oxyCODONE 5 mg oral tablet: 1 tab(s) orally every 6 hours, As needed, Moderate Pain (4 - 6) (17 Jun 2021 14:25)  polyethylene glycol 3350 oral powder for reconstitution: 17 gram(s) orally once a day (17 Jun 2021 14:25)  senna oral tablet: 2 tab(s) orally once a day (at bedtime) (17 Jun 2021 14:25)      MEDICATIONS  (STANDING):  atorvastatin 10 milliGRAM(s) Oral at bedtime  carbidopa/levodopa  25/100 1 Tablet(s) Oral three times a day  cefTRIAXone   IVPB 2000 milliGRAM(s) IV Intermittent every 24 hours  cefTRIAXone   IVPB      dextrose 40% Gel 15 Gram(s) Oral once  dextrose 5%. 1000 milliLiter(s) (50 mL/Hr) IV Continuous <Continuous>  dextrose 5%. 1000 milliLiter(s) (100 mL/Hr) IV Continuous <Continuous>  dextrose 50% Injectable 25 Gram(s) IV Push once  dextrose 50% Injectable 12.5 Gram(s) IV Push once  dextrose 50% Injectable 25 Gram(s) IV Push once  enoxaparin Injectable 40 milliGRAM(s) SubCutaneous daily  glucagon  Injectable 1 milliGRAM(s) IntraMuscular once  insulin lispro (ADMELOG) corrective regimen sliding scale   SubCutaneous three times a day before meals  nystatin Powder 1 Application(s) Topical two times a day  QUEtiapine 50 milliGRAM(s) Oral at bedtime  senna 2 Tablet(s) Oral at bedtime  tamsulosin 0.4 milliGRAM(s) Oral at bedtime    MEDICATIONS  (PRN):  polyethylene glycol 3350 17 Gram(s) Oral daily PRN constipations      Diet, Dysphagia 1 Pureed-Nectar Consistency Fluid:   Consistent Carbohydrate No Snacks  Supplement Feeding Modality:  Oral  Health Shake Cans or Servings Per Day:  1       Frequency:  Daily  Ensure Pudding Cans or Servings Per Day:  1       Frequency:  Two Times a day (08-30-21 @ 12:20) [Active]          Vital Signs Last 24 Hrs  T(C): 36.3 (20 Sep 2021 05:03), Max: 36.8 (19 Sep 2021 23:12)  T(F): 97.4 (20 Sep 2021 05:03), Max: 98.2 (19 Sep 2021 23:12)  HR: 69 (20 Sep 2021 05:03) (69 - 81)  BP: 123/65 (20 Sep 2021 05:03) (95/54 - 123/65)  BP(mean): --  RR: 18 (20 Sep 2021 05:03) (17 - 18)  SpO2: 100% (20 Sep 2021 05:03) (98% - 100%)      09-19-21 @ 07:01  -  09-20-21 @ 07:00  --------------------------------------------------------  IN: 810 mL / OUT: 0 mL / NET: 810 mL              LABS:                    WBC:      MICROBIOLOGY:  RECENT CULTURES:                  Sodium:          Hemoglobin:      Platelets:             RADIOLOGY & ADDITIONAL STUDIES:      MICROBIOLOGY:  RECENT CULTURES:

## 2021-09-20 NOTE — PROGRESS NOTE ADULT - ASSESSMENT
BALDO MORTENSEN 81 M 8/26/2021 1942 DR CATHERINE LANDIN     REVIEW OF SYMPTOMS      Able to give (reliable) ROS  NO     PHYSICAL EXAM    HEENT Unremarkable  atraumatic   RESP Fair air entry EXP prolonged    Harsh breath sound Resp distres mild   CARDIAC S1 S2 No S3     NO JVD    ABDOMEN SOFT BS PRESENT NOT DISTENDED No hepatosplenomegaly   PEDAL EDEMA present No calf tenderness  NO rash                                          8/26/2021  PRESENTATION.  82 y/o M  pmhx of HTN, DM, HLD, dementia,  Parkinson's disease, CAD s/p stents x2 (>20 years ago), CHF (EF: 50%, Moderate diastolic dysfunction (Stage II), moderate pulmonary hypertension, small-mod pericardial effusion), Lt pleural effusion, BPH, gastric ulcers, COVID infection who was sent on 8/26/2021  to the ED for weakness.            HOSPITAL COURSE   PL EFFSN l  pfa cw empyema  (8/27) pfa 8/27 ldh [id number] pr 2.2 g 2 ph 7  numerous gpc pairs   pl fl 8/27 strep angiosus rare staph a   PIGTAIL 8/27 removd 9/9  Fam declined surgery  Rocephin 2g (9/2/2021) (Dr PINTO)  cefepime 1.3 (8/26) (Dr Ramirez) (Pneu)  DCed  NONVIOLENT NONSELF DESTR LEVEL ONE   8/28  and periodically after that      BEST PRACTICE ISSUES.                                                  HEAD OF BED ELEVATION. Yes  DVT PROPHYLAXIS.  lvnx 40 (8/26)                                        VALLEJO PROPHYLAXIS.                                                                                         DIET.      dys 1 puree nectar cons carb (8/26)                     INFECTION PROPHYLAXIS.                      GENERAL ISSUES  GOC ADVANCED DIRECTIVE.    dnr                        ALLERGY.    nka                        WEIGHT.         8/26/2021 54                           BMI.                   8/26/2021 18           OVERALL PLAN.    CHRONIC L PL EMPYEMA WITH AIR FLUID LEVEL   Cont abio  placement   GOC dnr    TIME SPENT   Over 25 minutes aggregate care time spent on encounter; activities included   direct patient care, counseling and/or coordinating care reviewing notes, lab data/ imaging , discussion with multidisciplinary team/ patient  /family and explaining in detail risks, benefits, alternatives  of the recommendations     BALDO MORTENSEN 81 M 8/26/2021 1942 DR CATHERINE LANDIN

## 2021-09-20 NOTE — DISCHARGE NOTE PROVIDER - NSDCMRMEDTOKEN_GEN_ALL_CORE_FT
atorvastatin 10 mg oral tablet: 1 tab(s) orally once a day  carbidopa-levodopa 25 mg-100 mg oral tablet: 1 tab(s) orally 3 times a day  clopidogrel 75 mg oral tablet: 1 tab(s) orally once a day  guaiFENesin 100 mg/5 mL oral liquid: 5 milliliter(s) orally every 6 hours, As needed, Cough  Jardiance 10 mg oral tablet: 1 tab(s) orally once a day (in the morning)  melatonin 3 mg oral tablet: 1 tab(s) orally once a day (at bedtime), As needed, Insomnia  metFORMIN 1000 mg oral tablet: 1 tab(s) orally 2 times a day  oxyCODONE 10 mg oral tablet: 1 tab(s) orally every 6 hours, As needed, Severe Pain (7 - 10)  oxyCODONE 5 mg oral tablet: 1 tab(s) orally every 6 hours, As needed, Moderate Pain (4 - 6)  pantoprazole 40 mg oral delayed release tablet: 1 tab(s) orally once a day (before a meal)  QUEtiapine 50 mg oral tablet: 1 tab(s) orally once a day (at bedtime)  senna oral tablet: 2 tab(s) orally once a day (at bedtime)  tamsulosin 0.4 mg oral capsule: 1 cap(s) orally once a day (at bedtime)

## 2021-09-21 ENCOUNTER — TRANSCRIPTION ENCOUNTER (OUTPATIENT)
Age: 81
End: 2021-09-21

## 2021-09-21 VITALS
OXYGEN SATURATION: 100 % | TEMPERATURE: 97 F | DIASTOLIC BLOOD PRESSURE: 68 MMHG | HEART RATE: 72 BPM | RESPIRATION RATE: 18 BRPM | SYSTOLIC BLOOD PRESSURE: 157 MMHG

## 2021-09-21 LAB — GLUCOSE BLDC GLUCOMTR-MCNC: 110 MG/DL — HIGH (ref 70–99)

## 2021-09-21 PROCEDURE — 99239 HOSP IP/OBS DSCHRG MGMT >30: CPT

## 2021-09-21 RX ADMIN — CARBIDOPA AND LEVODOPA 1 TABLET(S): 25; 100 TABLET ORAL at 05:12

## 2021-09-21 RX ADMIN — NYSTATIN CREAM 1 APPLICATION(S): 100000 CREAM TOPICAL at 05:12

## 2021-09-21 NOTE — PROGRESS NOTE ADULT - REASON FOR ADMISSION
FTT, empyema r/o

## 2021-09-21 NOTE — PROGRESS NOTE ADULT - PROVIDER SPECIALTY LIST ADULT
Hospitalist
Hospitalist
Infectious Disease
Pulmonology
Pulmonology
Hospitalist
Pulmonology
Hospitalist
Infectious Disease
Infectious Disease
Pulmonology
Thoracic Surgery
Hospitalist
Infectious Disease
Infectious Disease
Pulmonology
Thoracic Surgery
Hospitalist
Pulmonology
Pulmonology
Hospitalist
Infectious Disease
Infectious Disease
Pulmonology
Hospitalist
Palliative Care
Pulmonology
Hospitalist
Hospitalist

## 2021-09-21 NOTE — DISCHARGE NOTE NURSING/CASE MANAGEMENT/SOCIAL WORK - PATIENT PORTAL LINK FT
You can access the FollowMyHealth Patient Portal offered by Health system by registering at the following website: [email or web address]. By joining [facility]’s FollowMyHealth portal, you will also be able to view your health information using other applications (apps) compatible with our system.

## 2021-09-21 NOTE — PROGRESS NOTE ADULT - SUBJECTIVE AND OBJECTIVE BOX
FESTUS BLAND    LVS 2E 284 D    Allergies    No Known Allergies    Intolerances        PAST MEDICAL & SURGICAL HISTORY:  Hypercholesterolemia    DM (Diabetes Mellitus)    HTN (Hypertension)    CAD (Coronary Artery Disease)  s/p cardiac stent ( &gt; 20 years ago)    BPH (benign prostatic hyperplasia)    Coronary Stent  x 2 ( 20 years )        FAMILY HISTORY:  FH: HTN (hypertension)        Home Medications:  guaiFENesin 100 mg/5 mL oral liquid: 5 milliliter(s) orally every 6 hours, As needed, Cough (17 Jun 2021 14:25)  melatonin 3 mg oral tablet: 1 tab(s) orally once a day (at bedtime), As needed, Insomnia (17 Jun 2021 14:25)  oxyCODONE 10 mg oral tablet: 1 tab(s) orally every 6 hours, As needed, Severe Pain (7 - 10) (17 Jun 2021 14:25)  oxyCODONE 5 mg oral tablet: 1 tab(s) orally every 6 hours, As needed, Moderate Pain (4 - 6) (17 Jun 2021 14:25)  QUEtiapine 50 mg oral tablet: 1 tab(s) orally once a day (at bedtime) (20 Sep 2021 13:33)  senna oral tablet: 2 tab(s) orally once a day (at bedtime) (17 Jun 2021 14:25)  tamsulosin 0.4 mg oral capsule: 1 cap(s) orally once a day (at bedtime) (20 Sep 2021 13:33)      MEDICATIONS  (STANDING):  atorvastatin 10 milliGRAM(s) Oral at bedtime  carbidopa/levodopa  25/100 1 Tablet(s) Oral three times a day  cefTRIAXone   IVPB 2000 milliGRAM(s) IV Intermittent every 24 hours  cefTRIAXone   IVPB      dextrose 40% Gel 15 Gram(s) Oral once  dextrose 5%. 1000 milliLiter(s) (50 mL/Hr) IV Continuous <Continuous>  dextrose 5%. 1000 milliLiter(s) (100 mL/Hr) IV Continuous <Continuous>  dextrose 50% Injectable 25 Gram(s) IV Push once  dextrose 50% Injectable 12.5 Gram(s) IV Push once  dextrose 50% Injectable 25 Gram(s) IV Push once  enoxaparin Injectable 40 milliGRAM(s) SubCutaneous daily  glucagon  Injectable 1 milliGRAM(s) IntraMuscular once  insulin lispro (ADMELOG) corrective regimen sliding scale   SubCutaneous three times a day before meals  nystatin Powder 1 Application(s) Topical two times a day  QUEtiapine 50 milliGRAM(s) Oral at bedtime  senna 2 Tablet(s) Oral at bedtime  tamsulosin 0.4 milliGRAM(s) Oral at bedtime    MEDICATIONS  (PRN):  polyethylene glycol 3350 17 Gram(s) Oral daily PRN constipations      Diet, Dysphagia 1 Pureed-Nectar Consistency Fluid:   Consistent Carbohydrate No Snacks  Supplement Feeding Modality:  Oral  Health Shake Cans or Servings Per Day:  1       Frequency:  Daily  Ensure Pudding Cans or Servings Per Day:  1       Frequency:  Two Times a day (08-30-21 @ 12:20) [Active]          Vital Signs Last 24 Hrs  T(C): 36.9 (21 Sep 2021 05:00), Max: 36.9 (21 Sep 2021 05:00)  T(F): 98.4 (21 Sep 2021 05:00), Max: 98.4 (21 Sep 2021 05:00)  HR: 72 (21 Sep 2021 05:00) (70 - 81)  BP: 120/68 (21 Sep 2021 05:00) (105/58 - 127/67)  BP(mean): --  RR: 18 (21 Sep 2021 05:00) (17 - 18)  SpO2: 98% (21 Sep 2021 05:00) (98% - 100%)      09-20-21 @ 07:01  -  09-21-21 @ 07:00  --------------------------------------------------------  IN: 0 mL / OUT: 1700 mL / NET: -1700 mL              LABS:                    WBC:      MICROBIOLOGY:  RECENT CULTURES:                  Sodium:          Hemoglobin:      Platelets:             RADIOLOGY & ADDITIONAL STUDIES:      MICROBIOLOGY:  RECENT CULTURES:

## 2021-09-21 NOTE — DISCHARGE NOTE NURSING/CASE MANAGEMENT/SOCIAL WORK - NSDCVIVACCINE_GEN_ALL_CORE_FT
Tdap; 12-Sep-2017 11:37; Rosy Cid (RN); [employer]; [id number]; IntraMuscular; Deltoid Right.; 0.5 milliLiter(s); VIS (VIS Published: 09-May-2013, VIS Presented: 12-Sep-2017);   Tdap; 27-May-2021 03:26; Zaria Castano); [employer]; [id number] (Exp. Date: 18-Nov-2022); IntraMuscular; Deltoid Right.; 0.5 milliLiter(s); VIS (VIS Published: 09-May-2013, VIS Presented: 27-May-2021);

## 2021-09-25 LAB
CULTURE RESULTS: SIGNIFICANT CHANGE UP
SPECIMEN SOURCE: SIGNIFICANT CHANGE UP

## 2021-09-28 DIAGNOSIS — R53.2 FUNCTIONAL QUADRIPLEGIA: ICD-10-CM

## 2021-09-28 DIAGNOSIS — G20 PARKINSON'S DISEASE: ICD-10-CM

## 2021-09-28 DIAGNOSIS — Z79.1 LONG TERM (CURRENT) USE OF NON-STEROIDAL ANTI-INFLAMMATORIES (NSAID): ICD-10-CM

## 2021-09-28 DIAGNOSIS — B95.0 STREPTOCOCCUS, GROUP A, AS THE CAUSE OF DISEASES CLASSIFIED ELSEWHERE: ICD-10-CM

## 2021-09-28 DIAGNOSIS — N40.0 BENIGN PROSTATIC HYPERPLASIA WITHOUT LOWER URINARY TRACT SYMPTOMS: ICD-10-CM

## 2021-09-28 DIAGNOSIS — J94.2 HEMOTHORAX: ICD-10-CM

## 2021-09-28 DIAGNOSIS — E11.9 TYPE 2 DIABETES MELLITUS WITHOUT COMPLICATIONS: ICD-10-CM

## 2021-09-28 DIAGNOSIS — B94.8 SEQUELAE OF OTHER SPECIFIED INFECTIOUS AND PARASITIC DISEASES: ICD-10-CM

## 2021-09-28 DIAGNOSIS — F02.80 DEMENTIA IN OTHER DISEASES CLASSIFIED ELSEWHERE, UNSPECIFIED SEVERITY, WITHOUT BEHAVIORAL DISTURBANCE, PSYCHOTIC DISTURBANCE, MOOD DISTURBANCE, AND ANXIETY: ICD-10-CM

## 2021-09-28 DIAGNOSIS — E87.2 ACIDOSIS: ICD-10-CM

## 2021-09-28 DIAGNOSIS — A41.9 SEPSIS, UNSPECIFIED ORGANISM: ICD-10-CM

## 2021-09-28 DIAGNOSIS — E78.5 HYPERLIPIDEMIA, UNSPECIFIED: ICD-10-CM

## 2021-09-28 DIAGNOSIS — G93.41 METABOLIC ENCEPHALOPATHY: ICD-10-CM

## 2021-09-28 DIAGNOSIS — Z79.84 LONG TERM (CURRENT) USE OF ORAL HYPOGLYCEMIC DRUGS: ICD-10-CM

## 2021-09-28 DIAGNOSIS — I27.20 PULMONARY HYPERTENSION, UNSPECIFIED: ICD-10-CM

## 2021-09-28 DIAGNOSIS — I31.3 PERICARDIAL EFFUSION (NONINFLAMMATORY): ICD-10-CM

## 2021-09-28 DIAGNOSIS — I11.0 HYPERTENSIVE HEART DISEASE WITH HEART FAILURE: ICD-10-CM

## 2021-09-28 DIAGNOSIS — Z95.5 PRESENCE OF CORONARY ANGIOPLASTY IMPLANT AND GRAFT: ICD-10-CM

## 2021-09-28 DIAGNOSIS — Z66 DO NOT RESUSCITATE: ICD-10-CM

## 2021-09-28 DIAGNOSIS — G30.9 ALZHEIMER'S DISEASE, UNSPECIFIED: ICD-10-CM

## 2021-09-28 DIAGNOSIS — I25.10 ATHEROSCLEROTIC HEART DISEASE OF NATIVE CORONARY ARTERY WITHOUT ANGINA PECTORIS: ICD-10-CM

## 2021-09-28 DIAGNOSIS — R13.10 DYSPHAGIA, UNSPECIFIED: ICD-10-CM

## 2021-09-28 DIAGNOSIS — K59.00 CONSTIPATION, UNSPECIFIED: ICD-10-CM

## 2021-09-28 DIAGNOSIS — J86.9 PYOTHORAX WITHOUT FISTULA: ICD-10-CM

## 2021-09-28 DIAGNOSIS — E43 UNSPECIFIED SEVERE PROTEIN-CALORIE MALNUTRITION: ICD-10-CM

## 2021-09-28 DIAGNOSIS — I50.9 HEART FAILURE, UNSPECIFIED: ICD-10-CM

## 2021-09-28 DIAGNOSIS — Z78.1 PHYSICAL RESTRAINT STATUS: ICD-10-CM

## 2021-09-28 DIAGNOSIS — Z51.5 ENCOUNTER FOR PALLIATIVE CARE: ICD-10-CM

## 2021-09-28 DIAGNOSIS — Z86.16 PERSONAL HISTORY OF COVID-19: ICD-10-CM

## 2021-09-28 DIAGNOSIS — R62.7 ADULT FAILURE TO THRIVE: ICD-10-CM

## 2021-09-28 DIAGNOSIS — J90 PLEURAL EFFUSION, NOT ELSEWHERE CLASSIFIED: ICD-10-CM

## 2021-09-28 DIAGNOSIS — D64.9 ANEMIA, UNSPECIFIED: ICD-10-CM

## 2021-09-28 DIAGNOSIS — Z79.02 LONG TERM (CURRENT) USE OF ANTITHROMBOTICS/ANTIPLATELETS: ICD-10-CM

## 2021-09-29 ENCOUNTER — EMERGENCY (EMERGENCY)
Facility: HOSPITAL | Age: 81
LOS: 1 days | Discharge: HOME | End: 2021-09-29
Attending: EMERGENCY MEDICINE
Payer: OTHER MISCELLANEOUS

## 2021-09-29 VITALS
HEIGHT: 67 IN | WEIGHT: 130.07 LBS | OXYGEN SATURATION: 100 % | DIASTOLIC BLOOD PRESSURE: 60 MMHG | TEMPERATURE: 98 F | HEART RATE: 75 BPM | SYSTOLIC BLOOD PRESSURE: 97 MMHG | RESPIRATION RATE: 18 BRPM

## 2021-09-29 VITALS
SYSTOLIC BLOOD PRESSURE: 142 MMHG | HEART RATE: 58 BPM | RESPIRATION RATE: 16 BRPM | OXYGEN SATURATION: 100 % | TEMPERATURE: 97 F | DIASTOLIC BLOOD PRESSURE: 56 MMHG

## 2021-09-29 LAB
ALBUMIN SERPL ELPH-MCNC: 4 G/DL — SIGNIFICANT CHANGE UP (ref 3.3–5)
ALP SERPL-CCNC: 86 U/L — SIGNIFICANT CHANGE UP (ref 40–120)
ALT FLD-CCNC: 14 U/L — SIGNIFICANT CHANGE UP (ref 10–45)
ANION GAP SERPL CALC-SCNC: 14 MMOL/L — SIGNIFICANT CHANGE UP (ref 5–17)
AST SERPL-CCNC: 19 U/L — SIGNIFICANT CHANGE UP (ref 10–40)
BASOPHILS # BLD AUTO: 0.07 K/UL — SIGNIFICANT CHANGE UP (ref 0–0.2)
BASOPHILS NFR BLD AUTO: 1.1 % — SIGNIFICANT CHANGE UP (ref 0–2)
BILIRUB SERPL-MCNC: 1.1 MG/DL — SIGNIFICANT CHANGE UP (ref 0.2–1.2)
BUN SERPL-MCNC: 22 MG/DL — SIGNIFICANT CHANGE UP (ref 7–23)
CALCIUM SERPL-MCNC: 9.3 MG/DL — SIGNIFICANT CHANGE UP (ref 8.4–10.5)
CHLORIDE SERPL-SCNC: 96 MMOL/L — SIGNIFICANT CHANGE UP (ref 96–108)
CO2 SERPL-SCNC: 22 MMOL/L — SIGNIFICANT CHANGE UP (ref 22–31)
CREAT SERPL-MCNC: 0.53 MG/DL — SIGNIFICANT CHANGE UP (ref 0.5–1.3)
EOSINOPHIL # BLD AUTO: 0.15 K/UL — SIGNIFICANT CHANGE UP (ref 0–0.5)
EOSINOPHIL NFR BLD AUTO: 2.3 % — SIGNIFICANT CHANGE UP (ref 0–6)
GLUCOSE SERPL-MCNC: 102 MG/DL — HIGH (ref 70–99)
HCT VFR BLD CALC: 36.9 % — LOW (ref 39–50)
HGB BLD-MCNC: 11.6 G/DL — LOW (ref 13–17)
IMM GRANULOCYTES NFR BLD AUTO: 0.2 % — SIGNIFICANT CHANGE UP (ref 0–1.5)
LYMPHOCYTES # BLD AUTO: 2.06 K/UL — SIGNIFICANT CHANGE UP (ref 1–3.3)
LYMPHOCYTES # BLD AUTO: 31.4 % — SIGNIFICANT CHANGE UP (ref 13–44)
MCHC RBC-ENTMCNC: 28.4 PG — SIGNIFICANT CHANGE UP (ref 27–34)
MCHC RBC-ENTMCNC: 31.4 GM/DL — LOW (ref 32–36)
MCV RBC AUTO: 90.4 FL — SIGNIFICANT CHANGE UP (ref 80–100)
MONOCYTES # BLD AUTO: 0.55 K/UL — SIGNIFICANT CHANGE UP (ref 0–0.9)
MONOCYTES NFR BLD AUTO: 8.4 % — SIGNIFICANT CHANGE UP (ref 2–14)
NEUTROPHILS # BLD AUTO: 3.72 K/UL — SIGNIFICANT CHANGE UP (ref 1.8–7.4)
NEUTROPHILS NFR BLD AUTO: 56.6 % — SIGNIFICANT CHANGE UP (ref 43–77)
NRBC # BLD: 0 /100 WBCS — SIGNIFICANT CHANGE UP (ref 0–0)
PLATELET # BLD AUTO: 201 K/UL — SIGNIFICANT CHANGE UP (ref 150–400)
POTASSIUM SERPL-MCNC: 4.7 MMOL/L — SIGNIFICANT CHANGE UP (ref 3.5–5.3)
POTASSIUM SERPL-SCNC: 4.7 MMOL/L — SIGNIFICANT CHANGE UP (ref 3.5–5.3)
PROT SERPL-MCNC: 7.6 G/DL — SIGNIFICANT CHANGE UP (ref 6–8.3)
RBC # BLD: 4.08 M/UL — LOW (ref 4.2–5.8)
RBC # FLD: 15.9 % — HIGH (ref 10.3–14.5)
SODIUM SERPL-SCNC: 132 MMOL/L — LOW (ref 135–145)
WBC # BLD: 6.56 K/UL — SIGNIFICANT CHANGE UP (ref 3.8–10.5)
WBC # FLD AUTO: 6.56 K/UL — SIGNIFICANT CHANGE UP (ref 3.8–10.5)

## 2021-09-29 PROCEDURE — 72170 X-RAY EXAM OF PELVIS: CPT

## 2021-09-29 PROCEDURE — 71045 X-RAY EXAM CHEST 1 VIEW: CPT | Mod: 26

## 2021-09-29 PROCEDURE — 93005 ELECTROCARDIOGRAM TRACING: CPT

## 2021-09-29 PROCEDURE — 99285 EMERGENCY DEPT VISIT HI MDM: CPT

## 2021-09-29 PROCEDURE — 70450 CT HEAD/BRAIN W/O DYE: CPT | Mod: MA

## 2021-09-29 PROCEDURE — 72170 X-RAY EXAM OF PELVIS: CPT | Mod: 26

## 2021-09-29 PROCEDURE — 99284 EMERGENCY DEPT VISIT MOD MDM: CPT | Mod: 25

## 2021-09-29 PROCEDURE — 71045 X-RAY EXAM CHEST 1 VIEW: CPT

## 2021-09-29 PROCEDURE — 90471 IMMUNIZATION ADMIN: CPT

## 2021-09-29 PROCEDURE — 80053 COMPREHEN METABOLIC PANEL: CPT

## 2021-09-29 PROCEDURE — 90715 TDAP VACCINE 7 YRS/> IM: CPT

## 2021-09-29 PROCEDURE — 70450 CT HEAD/BRAIN W/O DYE: CPT | Mod: 26,MA

## 2021-09-29 PROCEDURE — 72125 CT NECK SPINE W/O DYE: CPT | Mod: MA

## 2021-09-29 PROCEDURE — 93010 ELECTROCARDIOGRAM REPORT: CPT

## 2021-09-29 PROCEDURE — 85025 COMPLETE CBC W/AUTO DIFF WBC: CPT

## 2021-09-29 PROCEDURE — 72125 CT NECK SPINE W/O DYE: CPT | Mod: 26,MA

## 2021-09-29 RX ORDER — TETANUS TOXOID, REDUCED DIPHTHERIA TOXOID AND ACELLULAR PERTUSSIS VACCINE, ADSORBED 5; 2.5; 8; 8; 2.5 [IU]/.5ML; [IU]/.5ML; UG/.5ML; UG/.5ML; UG/.5ML
0.5 SUSPENSION INTRAMUSCULAR ONCE
Refills: 0 | Status: COMPLETED | OUTPATIENT
Start: 2021-09-29 | End: 2021-09-29

## 2021-09-29 RX ADMIN — TETANUS TOXOID, REDUCED DIPHTHERIA TOXOID AND ACELLULAR PERTUSSIS VACCINE, ADSORBED 0.5 MILLILITER(S): 5; 2.5; 8; 8; 2.5 SUSPENSION INTRAMUSCULAR at 09:32

## 2021-09-29 NOTE — ED PROVIDER NOTE - OBJECTIVE STATEMENT
82yo M pmhx dementia, parkinsons, DM, minimally verbal and poor historian BIBEMS after unwitnessed fall from bed this AM, HHA was in the bathroom and heard a thud, patient was on the floor, arrives to ED awake and at baseline mental status. Patient is DNR/DNI and on hospice.

## 2021-09-29 NOTE — ED PROVIDER NOTE - ATTENDING CONTRIBUTION TO CARE
82 yo male hx of dementia, DNR/DNI, presents after falling out of bed with aide this AM.  neurologically @ baseline per EMS.  scalp abrasion noted.  CT eval for traumatic injury.  per collateral from brother, will attempt to arrange hospice transport back home if scans negative.

## 2021-09-29 NOTE — ED PROVIDER NOTE - CLINICAL SUMMARY MEDICAL DECISION MAKING FREE TEXT BOX
80yo M dementia parkinsons DM DNR/DNI and minimally verbal/poor historian BIBEMS after unwitnessed fall from bed, on plavix, currently at baseline mental status, arrives hemodynamically stable in no obvious distress, will send basic labs, CT head and c-spine, cxr, pelvis xray, ekg, update tetanus and reassess.

## 2021-09-29 NOTE — ED ADULT NURSE REASSESSMENT NOTE - NS ED NURSE REASSESS COMMENT FT1
Called patient's brother Berto to notify him that patient is on his way home via EMS. Patient's 24-hour home aid will be at the home when patient arrives. EMS given patient's MOLST/DNR/DNI forms as well as d/c paperwork.

## 2021-09-29 NOTE — ED PROVIDER NOTE - INTERPRETATION
EKG reviewed for rate, rhythm, axis, intervals and segments, including QRS morphology, P wave appearance T wave appearance, IN interval, and QT interval.  I find the EKG to be unremarkable in all of these regards except as follows: sinus blake

## 2021-09-29 NOTE — ED PROVIDER NOTE - CONSTITUTIONAL APPEARANCE HYGIENE, MLM
Relevant Problems   PULMONARY   (positive) Asthma affecting pregnancy in second trimester      NEURO   (positive) History of cholestasis during pregnancy         (positive) Cholestasis during pregnancy in third trimester      OB   (positive) GDM, class A2   (positive) History of  section       Physical Exam    Airway   Mallampati: II  TM distance: >3 FB  Neck ROM: full       Cardiovascular - normal exam  Rhythm: regular  Rate: normal  (-) murmur     Dental - normal exam           Pulmonary - normal exam  Breath sounds clear to auscultation     Abdominal    Neurological - normal exam                 Anesthesia Plan    ASA 2       Plan - spinal   Neuraxial block will be primary anesthetic                Postoperative Plan: Postoperative administration of opioids is intended.    Pertinent diagnostic labs and testing reviewed    Informed Consent:    Anesthetic plan and risks discussed with patient.         well appearing

## 2021-09-29 NOTE — ED ADULT NURSE NOTE - OBJECTIVE STATEMENT
80 y/o male BIBA s/p unwitnessed fall. A&Ox0 as per EMS patient is normally A&Ox0-A&Ox1. PMH cardiac stents on plavix, HTN, HLD, DM. As per EMS patient is on hospice and suffered an unwitnessed fall, his home aid found him on the floor. 80 y/o male BIBA s/p unwitnessed fall. A&Ox0 as per EMS patient is normally A&Ox0-A&Ox1. PMH cardiac stents on plavix, HTN, HLD, DM, dementia. As per EMS patient is on hospice and suffered an unwitnessed fall, his home aid found him on the floor. Unknown LOC, unknown mechanism. Patient has a skin tear to the anterior forehead minimal active bleeding. Patient minimally responsive, moans to verbal commands. As per EMS the home aid said this is similar to his baseline. MD and nurses at the bedside. IV placed. 80 y/o male BIBA s/p unwitnessed fall. A&Ox0 as per EMS patient is normally A&Ox0-A&Ox1. PMH cardiac stents on plavix, HTN, HLD, DM, dementia. As per EMS patient is on hospice and suffered an unwitnessed fall, his home aid found him on the floor. Unknown LOC, unknown mechanism. Patient in c-collar by EMS. Patient has a skin tear to the anterior forehead minimal active bleeding. Patient responsive to pain, moans to verbal commands. As per EMS the home aid said this is similar to his baseline. MD and nurses at the bedside. IV placed. Patient placed on cardiac monitor. EKG performed.

## 2021-09-29 NOTE — ED PROVIDER NOTE - SKIN, MLM
Skin normal color for race, warm, dry and intact except for the above stated abrasion to forehead. No evidence of rash.

## 2021-09-29 NOTE — CHART NOTE - NSCHARTNOTEFT_GEN_A_CORE
EMERGENCY : GHASSANSW consulted by ED RN as patient on home hospice is cleared for discharge home and in need of EMS transportation as patient with dementia dx. As per RN Joya, she has communicated with patient's brother, Berto [phone], who confirms that patient has 24/7 home health aides and that one of patient's HHA's is waiting at home to receive him. Patient's family aware of hospitalization and discharge plans as per RN. CORKY contacted [facility] and spoke with coordinator Erin who states that transportation has been arranged via [facility] for BLS transport for patient. She requests patient's discharge paperwork be sent to [facility] at [phone]. GHASSANSW faxed D/C paperwork to [facility]. CORKY also contacted patient's home health aide Rowena PH: [phone] who confirms that she is home to receive patient. GHASSANSW made MD and RN aware of transportation arrangements. Patient with no further social work barriers to discharge identified at this time. SW continues to remain available for any needs.

## 2021-09-29 NOTE — ED ADULT NURSE NOTE - NSIMPLEMENTINTERV_GEN_ALL_ED
Implemented All Fall with Harm Risk Interventions:  [place] to call system. Call bell, personal items and telephone within reach. Instruct patient to call for assistance. Room bathroom lighting operational. Non-slip footwear when patient is off stretcher. Physically safe environment: no spills, clutter or unnecessary equipment. Stretcher in lowest position, wheels locked, appropriate side rails in place. Provide visual cue, wrist band, yellow gown, etc. Monitor gait and stability. Monitor for mental status changes and reorient to person, place, and time. Review medications for side effects contributing to fall risk. Reinforce activity limits and safety measures with patient and family. Provide visual clues: red socks. no concerns

## 2021-09-29 NOTE — ED PROVIDER NOTE - PATIENT PORTAL LINK FT
You can access the FollowMyHealth Patient Portal offered by [facility] by registering at the following website: [email or web address]. By joining [employer]’s FollowMyHealth portal, you will also be able to view your health information using other applications (apps) compatible with our system.

## 2021-09-29 NOTE — ED ADULT NURSE REASSESSMENT NOTE - NS ED NURSE REASSESS COMMENT FT1
Called patient's family member Berto at [phone] to update on plan of care.  calling hospice to arrange a ride.

## 2021-10-02 RX ORDER — PANTOPRAZOLE SODIUM 20 MG/1
0 TABLET, DELAYED RELEASE ORAL
Qty: 0 | Refills: 0 | DISCHARGE

## 2021-10-02 RX ORDER — OXYCODONE HYDROCHLORIDE 5 MG/1
1 TABLET ORAL
Qty: 0 | Refills: 0 | DISCHARGE

## 2021-10-02 RX ORDER — LATANOPROST 0.05 MG/ML
1 SOLUTION/ DROPS OPHTHALMIC; TOPICAL
Qty: 0 | Refills: 0 | DISCHARGE

## 2021-10-02 RX ORDER — METFORMIN HYDROCHLORIDE 850 MG/1
1 TABLET ORAL
Qty: 0 | Refills: 0 | DISCHARGE

## 2021-10-02 RX ORDER — RAMIPRIL 5 MG
1 CAPSULE ORAL
Qty: 0 | Refills: 0 | DISCHARGE

## 2021-10-02 RX ORDER — GLIMEPIRIDE 1 MG
0 TABLET ORAL
Qty: 0 | Refills: 0 | DISCHARGE

## 2021-10-02 RX ORDER — LANOLIN ALCOHOL/MO/W.PET/CERES
2 CREAM (GRAM) TOPICAL
Qty: 0 | Refills: 0 | DISCHARGE

## 2021-10-02 RX ORDER — CLOTRIMAZOLE 10 MG
0 TROCHE MUCOUS MEMBRANE
Qty: 0 | Refills: 0 | DISCHARGE

## 2021-10-16 LAB
CULTURE RESULTS: SIGNIFICANT CHANGE UP
NIGHT BLUE STAIN TISS: SIGNIFICANT CHANGE UP
SPECIMEN SOURCE: SIGNIFICANT CHANGE UP

## 2021-11-17 NOTE — PROGRESS NOTE ADULT - PROBLEM/PLAN-2
Faxed
[facility] phoned stated that they needs a office note stated why patient is taking ensure for patients insurance to pay  Please advise  thank you April
Please forward copy of last office note to [address] thank you
DISPLAY PLAN FREE TEXT

## 2021-12-22 NOTE — PATIENT PROFILE ADULT. - NS PRO TALK SOMEONE YN
no Clindamycin Counseling: I counseled the patient regarding use of clindamycin as an antibiotic for prophylactic and/or therapeutic purposes. Clindamycin is active against numerous classes of bacteria, including skin bacteria. Side effects may include nausea, diarrhea, gastrointestinal upset, rash, hives, yeast infections, and in rare cases, colitis.

## 2022-01-04 NOTE — ED PROVIDER NOTE - IV ALTEPLASE EXCL REL HIDDEN
Patient notified of test result at the time of visit.   
SUBJECTIVE  HPI Tony is 9 year old male who presents w/ c/o  body aches , cough and fever  this has been present for 5 days . Other symptoms include nasal congestion . No nausea, vomiting, diarrhea, constipation, urinary difficulties. No SOB or wheezing. ROS negative otherwise    Chart/previous notes reviewed:  Yes    OTC meds tried: [+]  No past medical history on file.   No past surgical history on file.     OBJECTIVE  Vitals:    01/04/22 1333   Pulse: (!) 128   Resp: 18   Temp: 99.4 °F (37.4 °C)   TempSrc: Temporal   SpO2: 99%   Weight: 33.6 kg (74 lb)      Gen: Alert, well hydrated, in no apparent distress  Voice: clear    EAR: nomal canals and TM's  EYES: AGUSTIN, no injection bilateral  NOSE: erythematous swollen mucosa  THROAT: mild erythema of pharynx  NECK: supple without cervical adenopathy. No meningismus    HEART: regular rate and rhythm without murmur, rub or gallop  LUNG:  clear to auscultation without ronchi, wheezing or crackles    ABDOMEN: soft, non-tender, non-distended. Bowel sounds present and active without rigidity. No organomegally  SKIN: color, texture, turgor are normal  VASCULAR: intact    COVID-19 rapid test positive. Results were independently reviewed and interpreted and discussed during patient evaluation.        ASSESSMENT  Covid    PLAN  Underwent discussion with the mother regarding this clinical situation. symptomatic measures were given and meds discussed    F/U: Tony will follow up with his primary care physician as needed or as directed but may return to the [facility] if needed. If symptoms become severe may go to the ER.    Tolu Santoyo, DO      
show

## 2022-01-24 NOTE — PATIENT PROFILE ADULT - NSPROGENOTHERPROVIDER_GEN_A_NUR
"Patient has been re-activated on the list and notified.  Urology clearance received.      ----- Message from Faisal León RN sent at 1/21/2022  1:44 PM CST -----  Regarding: FW: Patient to be added back to transplant list  Sue,    I spoke with this pt this week regarding inactive status. I received this msg today from Dr. Nugent's office and pt is cleared from a Urology standpoint. Are there any other reasons pt is inactive?      ----- Message -----  From: Lobo Malave RN  Sent: 1/21/2022   1:38 PM CST  To: Faisal León RN  Subject: Patient to be added back to transplant list      Good Afternoon Faisal    Mr. Alves contacted the clinic to let Dr. Nugent know he was "removed from transplant list" based on the documentation in his last clinic note, 11-30-21.    Dr. Nugent has updated his note:   Addendum 01/21/2022 - PSA in normal range and MRI negative for any concerning lesions. He is cleared for transplant from a urologic perspective.      Can you please add the patient back to the transplant list?    Thank you        "
67
none

## 2022-02-14 NOTE — ED PROVIDER NOTE - PROGRESS NOTE
Discussed procedure with Dr. Friedman, System Director of Cardiomyopathy, Medical Director of Heart Failure & Transplant bedside. Abdominal wall fat-pad biopsy for Amyloidosis diagnosis not indicated.  Stable.

## 2022-03-24 NOTE — PROGRESS NOTE ADULT - ASSESSMENT
Patient made aware of 24/7 emergency services.
78M PMH dementia, HTN, HLD, DM2, CAD, BPH presents from nursing home for hypoglycemia, lethargy and found to have FS 47 and leukocytosis.     No signs or symptoms of infection at this time.    Blood cultures negative/urine culture negative.      watch for 24 more hours given temperature
78M PMH dementia, HTN, HLD, DM2, CAD, BPH presents from nursing home for hypoglycemia, lethargy and found to have FS 47 and leukocytosis.     afebrile overnight.      d/c to emerge.
78M from home HTN HLD DM2 CAD BPH Dementia  Admit Jan6th for Hypoglycemia  Sepsis, Source ambiguos    Presumed Sepsis  Continue IV ABX  Add doxycycline 100mg IV Q12  Will continue to monitor for signs of cellulitis  Possible mediastinal adenopathy on xray   CT C/A/P w contrast  Trend Lactate, Procalcitonin  Follow Blood culture/Urine Culture  Blood pressure well controlled at this moment,   Patient eating/drinking. Elevated BNP, possible  comorbid CHF, will order echo. Hold fluids    DM  Insulin sliding scale  maintain tight glycemic control    Trend troponins    DVT Profx Lovenox  GI Profx
78M PMH dementia, HTN, HLD, DM2, CAD, BPH presents from nursing home for hypoglycemia, lethargy and found to have FS 47 and leukocytosis.     Still no signs or symptoms of acute infection at this time.     temperature this AM.

## 2022-04-28 NOTE — ED ADULT TRIAGE NOTE - NS ED NOTE AC HIGH RISK COUNTRIES
[Dear  ___] : Dear  [unfilled], [Consult Letter:] : I had the pleasure of evaluating your patient, [unfilled]. [Please see my note below.] : Please see my note below. [Consult Closing:] : Thank you very much for allowing me to participate in the care of this patient.  If you have any questions, please do not hesitate to contact me. [Sincerely,] : Sincerely, No [FreeTextEntry3] : Clarence Boone MD\par Medical Oncology/Hematology\par [facility], [facility]\par \par \par [facility] [employer] [facility]\par

## 2022-05-06 NOTE — ED PROVIDER NOTE - PHYSICAL EXAMINATION
Encounter Date: 2022       History     Chief Complaint   Patient presents with    COVID-19 Concerns     COVID + on Tuesday, vomiting, unable to take medication      47-year-old male with history hyperlipidemia, hypertension, diabetes who presents to the emergency department for COVID-19 concerns.  Patient tested positive for COVID-19 on Tuesday states that since Wednesday he has been having nausea, diarrhea and has been unable to keep down his medications.  Patient states that he was taking p.o. Zofran that he had left over but was unable to keep them down.  Reports that taking hot showers has been helping with his nausea.  Patient also reports substernal chest tightness with started at 9:00 a.m. this morning and rates it a 5/10.  Patient denies shortness of breath, fever, or sore throat.        Review of patient's allergies indicates:  No Known Allergies  Past Medical History:   Diagnosis Date    Hyperlipidemia     Hypertension     Prediabetes      History reviewed. No pertinent surgical history.  History reviewed. No pertinent family history.  Social History     Tobacco Use    Smoking status: Former Smoker     Types: Vaping with nicotine     Quit date: 2020     Years since quittin.7    Smokeless tobacco: Never Used   Substance Use Topics    Alcohol use: Yes     Comment: 1xweekly    Drug use: Not Currently     Types: Marijuana     Review of Systems   Constitutional: Positive for fatigue. Negative for chills and fever.   HENT: Negative for ear pain and sore throat.    Eyes: Negative for pain and visual disturbance.   Respiratory: Negative for shortness of breath.    Cardiovascular: Negative for chest pain.   Gastrointestinal: Positive for diarrhea, nausea and vomiting.   Genitourinary: Negative for difficulty urinating and dysuria.   Musculoskeletal: Negative.    Skin: Negative.    Neurological: Negative for weakness.   Psychiatric/Behavioral: Negative for confusion.       Physical Exam      Initial Vitals [05/06/22 1234]   BP Pulse Resp Temp SpO2   136/89 97 16 98.1 °F (36.7 °C) 95 %      MAP       --         Physical Exam    Nursing note and vitals reviewed.  Constitutional: He appears well-developed and well-nourished.   HENT:   Head: Normocephalic and atraumatic.   Eyes: Conjunctivae are normal. Pupils are equal, round, and reactive to light.   Neck: Neck supple.   Normal range of motion.  Cardiovascular: Normal rate, regular rhythm, normal heart sounds and intact distal pulses. Exam reveals no gallop and no friction rub.    No murmur heard.  Pulmonary/Chest: Breath sounds normal.   Abdominal: Abdomen is soft. Bowel sounds are normal. He exhibits no distension and no mass. There is no abdominal tenderness. There is no rebound and no guarding.   Musculoskeletal:         General: Normal range of motion.      Cervical back: Normal range of motion and neck supple.     Neurological: He is alert and oriented to person, place, and time.   Skin: Skin is warm and dry. Capillary refill takes less than 2 seconds.   Psychiatric: He has a normal mood and affect.         ED Course   Procedures  Labs Reviewed   CBC W/ AUTO DIFFERENTIAL - Abnormal; Notable for the following components:       Result Value    Lymph # 0.6 (*)     Gran % 87.6 (*)     Lymph % 7.8 (*)     All other components within normal limits   COMPREHENSIVE METABOLIC PANEL - Abnormal; Notable for the following components:    Glucose 169 (*)     BUN 21 (*)     Calcium 10.6 (*)     Total Protein 8.9 (*)     Alkaline Phosphatase 146 (*)     AST 57 (*)      (*)     All other components within normal limits   C-REACTIVE PROTEIN - Abnormal; Notable for the following components:    CRP 14.1 (*)     All other components within normal limits   LACTATE DEHYDROGENASE - Abnormal; Notable for the following components:     (*)     All other components within normal limits   LACTIC ACID, PLASMA - Abnormal; Notable for the following components:     Lactate (Lactic Acid) 2.6 (*)     All other components within normal limits   TROPONIN I   FERRITIN   CK   TROPONIN I   LACTIC ACID, PLASMA        ECG Results          EKG 12-lead (Final result)  Result time 05/06/22 16:33:11    Final result by Interface, Lab In [facility] (05/06/22 16:33:11)                 Narrative:    Test Reason : R07.9,    Vent. Rate : 080 BPM     Atrial Rate : 080 BPM     P-R Int : 138 ms          QRS Dur : 082 ms      QT Int : 364 ms       P-R-T Axes : 073 084 054 degrees     QTc Int : 419 ms    Normal sinus rhythm  Normal ECG  When compared with ECG of 21-OCT-2019 09:48,  No significant change was found  Confirmed by Redd Brantley MD ([id number]) on 5/6/2022 4:32:58 PM    Referred By: LORY   SELF           Confirmed By:Redd Brantley MD                            Imaging Results          X-Ray Chest AP Portable (Final result)  Result time 05/06/22 14:31:27    Final result by Vin Casanova MD (05/06/22 14:31:27)                 Impression:      No significant intrathoracic abnormality, with no findings on this examination to indicate COVID-19 pulmonary involvement.  Allowing for a poorer inspiratory depth level on the current examination, no significant detrimental interval change in the appearance of the chest since 10/23/2019 is appreciated.      Electronically signed by: Vin Casanova MD  Date:    05/06/2022  Time:    14:31             Narrative:    EXAMINATION:  XR CHEST AP PORTABLE    CLINICAL HISTORY:  chest pain;    COMPARISON:  Comparison is made to 10/23/2019.  Information obtained from the electronic medical record indicates that the patient tested positive for COVID-19 on 05/03/2022, with current substernal chest tightness reported.    FINDINGS:  Heart size is normal, as is the appearance of the pulmonary vascularity.  Lung zones are clear, and are free of significant airspace consolidation or volume loss.  No pleural fluid of any substantial volume is seen on either side.  No abnormal  mediastinal widening.  No pneumothorax.                                 Medications   droperidoL injection 0.625 mg (has no administration in time range)   ondansetron injection 4 mg (4 mg Intravenous Given 5/6/22 1357)   lactated ringers bolus 1,000 mL (0 mLs Intravenous Stopped 5/6/22 1618)   droperidoL injection 0.625 mg (0.625 mg Intravenous Given 5/6/22 1517)   lactated ringers bolus 1,000 mL (0 mLs Intravenous Stopped 5/6/22 1915)     Medical Decision Making:   History:   Old Medical Records: I decided to obtain old medical records.  Old Records Summarized: records from previous admission(s).  Clinical Tests:   Lab Tests: Ordered and Reviewed  Radiological Study: Ordered and Reviewed  Medical Tests: Ordered and Reviewed  Other:   I have discussed this case with another health care provider.       APC / Resident Notes:   47 y.o. year old male presenting with N/V, chest pain.  On exam patient is afebrile and nontoxic. Heart rate and rhythm are regular. Lungs with clear breath sounds throughout. Abdomen is soft, nontender. No edema.    DDx includes but is not limited to COVID-19, pneumonia, intractable nausea vomiting, electrolyte derangement, dehydration    ED workup reveals patient known COVID positive.  Patient also reports chest tightness since this morning.  Troponin negative x2 and EKG normal sinus rhythm with no ischemic changes.  Patient is low risk heart score.  Will discharge with close follow-up.  Chest x-ray no acute cardiopulmonary abnormalities.  Patient given antiemetics x3.  On reassessment patient is well-appearing denies nausea and passes p.o. challenge.  Patient noted to have elevated lactic acid level likely due to dehydration.  Was rehydrated with 2 L LR in the ED.  Repeat lactate within normal limits.  Electrolytes within normal limits.  Patient vital signs are reassuring will discharge with phenegran PO. Given return precautions for any new or worsening symptoms.     Discussed findings and  plan with patient who verbalized understanding and agrees with the plan and course of treatment. Return to ED precautions discussed. Patient is stable for discharge. I discussed the care of this patient with my supervising physician.                   Clinical Impression:   Final diagnoses:  [R07.9] Chest pain  [U07.1] COVID-19 (Primary)  [E86.0] Dehydration  [R11.2] Non-intractable vomiting with nausea, unspecified vomiting type          ED Disposition Condition    Discharge Stable        ED Prescriptions     Medication Sig Dispense Start Date End Date Auth. Provider    promethazine (PHENERGAN) 25 MG tablet Take 1 tablet (25 mg total) by mouth every 6 (six) hours as needed for Nausea. 12 tablet 5/6/2022 5/9/2022 Analia Busch PA-C        Follow-up Information     Follow up With Specialties Details Why Contact Info    Maira Santiago MD Family Medicine  As needed [id number] [facility] [id number]  [phone]             Analia Busch PA-C  05/06/22 2045       Analia Busch PA-C  05/13/22 1037     Gen: AAOx2, non-toxic  Head: NCAT  HEENT: EOMI, oral mucosa moist, normal conjunctiva  Lung: CTAB, no respiratory distress, no wheezes/rhonchi/rales B/L, speaking in full sentences  CV: RRR, no murmurs, rubs or gallops  Abd: soft, NTND, no guarding, no CVA tenderness  FAST neg   MSK: R hip and R proxima ttp, leg externally rotated and short.   Neuro: No focal sensory or motor deficits, normal CN exam   Skin: Warm, well perfused, no rash  Psych: normal affect. Gen: AAOx2, non-toxic  Head: NCAT  HEENT: EOMI, oral mucosa moist, normal conjunctiva  Lung: CTAB, no respiratory distress, no wheezes/rhonchi/rales B/L, speaking in full sentences  CV: RRR, no murmurs, rubs or gallops  Abd: soft, NTND, no guarding, no CVA tenderness  FAST neg   MSK: R hip and R proxima ttp, leg externally rotated and short.   Neuro: No focal sensory or motor deficits, normal CN exam   Skin: Warm, well perfused, no rash  Psych: normal affect.    JONAS

## 2022-05-06 NOTE — ED CLERICAL - DIVISION
[address] [facility] Physical Therapy  Phone: [phone]   Fax: [phone]    Physical Therapy Daily ONCOLOGY Treatment Note    Date:  2022    Patient Name:  Kat Valencia    :  1944  MRN: [id number]  Restrictions/Precautions:    Medical/Treatment Diagnosis Information:  Diagnosis: C50.911 (ICD-10-CM) - Primary breast malignancy, right ([facility] [address] C50.911 (ICD-10-CM) - Invasive ductal carcinoma of right breast, stage 3 ([facility] [address]  Treatment Diagnosis: decreased R shoulder strength and ROM, fatigue with decreased endurance  Insurance/Certification information:  PT Insurance Information: Medicare and [id number], no CP, no auth  Physician Information:  Referring Practitioner: Sagar Murcia MD  Plan of care signed (Y/N): [x]  Yes []  No     Date of Patient follow up with Physician: radiologist 3/11    Functional Outcomes:   Fatigue: 3-4/10      3/7/22  Quick Dash:  21 20-39%     3/10/22  Fatigue: 3-4/10      4/5/22  QuickDASH: total score: 20, 20.45% disability   22  Fatigue: 2/10       5/6/22  QuickDASH: total score: 16, 11.36% disability   22    Progress Report: []  Yes  [x]  No     Date Range for reporting period:  Beginning 3/7/22  POC: 22  PN:   Ending    Progress report due (10 Rx/or 30 days whichever is less): visit #10 or  (date)     Recertification due (POC duration/ or 90 days whichever is less): visit #12 or 22 (date)     Visit # Insurance Allowable Auth required? Date Range    +  Med nec []  Yes  [x]  No         Latex Allergy:  [x]NO      []YES  Preferred Language for Healthcare:   [x]English       []other:      Pain level:  0/10     SUBJECTIVE:  Pt reports she went to the ED Tuesday due to tingling and decreased movement in R hand. Clear CT, d/c home. Denies issues with her hand since then. Now taking gabapentin. Reports she has 2 days of radiation left.  Has burns on anterior chest.     OBJECTIVE:     [facility]... Extremity ROM & Strength AROM  Right AROM  Left Strength Right Comments   Shoulder flex 115 142 3+     Shoulder AB 80 135 3 R abd - compensates with trunk lean      - 6 min walk - 1446 ft - amb with B knee valgus and B foot pronation. Reports L knee pain  - 5 x STS - 12.87 sec - VC for more through LEs and less momentum      4/5  Upper Extremity ROM & Strength AROM  Right AROM  Left Strength Right Strength Left Comments   Shoulder flex 111 140 3+ 5     Shoulder  140 3+ 5      - 6 min walk - 1333 ft - amb with B knee valgus and B foot pronation. Reports L foot pain   - TUG - 7.06 sec    - 5 x STS - 11.63 sec     3/25   Chest measurement - 97.0 cm   Edema noted in R lateral chest wall   Increased tissue tension at R pec tendon and mild cording noted in R axilla   R pec tightness - 2 fingers from table compared to 1 finger on L     3/14   - 6 min walk - 1480 ft (1.25 m/s) - amb with B knee valgus and B foot pronation.  Denies knee pain   - TUG - 8.37 sec    - 5 x STS - 11.79 sec     3/10 Pt has good PROM but pulls in UT immediately in all motions, attempted stretching but sh jt with good mobility but poor mm control       RESTRICTIONS/PRECAUTIONS:     EDS- very little stretching, previous frozen shoulder L     Exercises/Interventions:     Therapeutic Exercises (14117) Resistance / level Sets/sec Reps Notes   Arm bike    Nu step         Pulleys       Supine AAROM with ranger       SB roll outsI, T, Y's    TB   - mid row  - high row  - LPD   Sh ext/add  ER 4/8 progress to green NV         R very difficult   Pec doorway stretch - 90 deg     B shoulder ER - seated     UT stretch   Levator stretch       Supine piriformis stretch - push away  Supine piriformis stretch - knee to shoulder     Doorway flexion with cane                   4/29 decreased radicular symptoms    Therapeutic Activities (94352)  (Dynamic activities such as compression, designed to improve functional performance)       Educated on s/s of infection and on risk factors for developing lymphedema (no IV, BP, or shots in RUE). Discussed POC for therapy. Provide handout for lymphedema prevention NV     Reviewed HEP from MD - table slides, supine AAROM shoulder flexion with cane, butterfly stretch, pec stretch (demo'd at doorway vs corner), scap squeeze        PN completed this date. Objective measures taken. FUNCTIONAL OBJECTIVE MEASURES (see above) - 6 min walk, 5x STS Discussed progress with therapy and continued limitations in strength and mobility. Discussed POC including completing this POC then taking a break from PT to allow skin to heal from radiation. Then returning to PT with new referral to initiate manual therapy to decrease cording. Reviewed HEP and plan to update HEP in remaining 2 visits. X 35 min      Provided lymphedema prevention handout and reviewed. Answered all pt questions. Pt provided with M below the knee tensoshape to wear on car ride to [place] coming up. Educated on s/s of constriction and educated to take off if too tight.                scifit nu step   X 5 mins             Arm circles B      Shoulder protraction  B      Wall push ups      LT wall slide             Home Management  (providing pt education on safety procedures/instructions)       pt taught body mechanics for sidelying and pillows   X 5 mins                                              Neuromuscular Re-ed (12787)       SB roll up wall - LT (focus on relaxing UT)     Scap retract                                         Manual Intervention (09011)       Manual stretching R shoulder     Has full passive ROM in jt.   Difficulty relaxing UT to move shoudler   STM to pec tendon/cording in axilla   demo'd with hand over hand for self STM at home     4/11 - hold until pt finishes radiation (mid May)    In sidelying  STM to R glut med/ piriformis      In supine STM to R cording                         Modalities:     OTHER:     Pt education:   3/25 educated on edema in R lateral chest. Discussed compression options including ember with build in bra or sports bra. Pt signed release of medical information form for [facility] to obtain home lymphedema pump. 3/18 Provided lymphedema prevention handout and reviewed. Answered all pt questions. Pt provided with M below the knee tensoshape to wear on car ride to [place] coming up. Educated on s/s of constriction and educated to take off if too tight. 3/7 Pt was educated on diagnosis; prognosis; PT POC including pathology and anatomy of etiology of lymphedema, condition precautions; lymphedema management/prevention of flare ups, role of exercise, HEP, expectations for rehab. All pt questions were answered. HEP instruction:  4/29  Access Code: [email or web address]  URL: [email or web address]  Date: 04/29/2022  Prepared by: Leon Crandall    Exercises  Supine Piriformis Stretch with Foot on Ground - 1 x daily - 7 x weekly - 2 sets - 30 sec hold  Supine Figure 4 Piriformis Stretch - 1 x daily - 7 x weekly - 2 sets - 30 sec hold  Supine Chest Stretch on Foam Roll - 1 x daily - 7 x weekly      4/15  Access Code: [email or web address]  URL: [email or web address]  Date: 04/15/2022  Prepared by: Leon Crandall    Exercises  Single Arm Doorway Pec Stretch at 90 Degrees Abduction - 1 x daily - 7 x weekly - 10 reps - 10 sec hold  Shoulder External Rotation and Scapular Retraction with Resistance - 1 x daily - 7 x weekly - 2 sets - 10 reps      Access Code: [email or web address]  URL: [email or web address]  Date: 03/21/2022  Prepared by: Raissa Roberson    Exercises  Standing Shoulder Row with Anchored Resistance - 1 x daily - 7 x weekly - 1 sets - 10 reps  Squatting High Shoulder Row with Resistance - 1 x daily - 7 x weekly - 1 sets - 10 reps  Seated Shoulder External Rotation with Resistance - 1 x daily - 7 x weekly - 1 sets - 10 reps  Standing Shoulder Adduction Reactive Isometrics with Resistance and Elbow Extended - 1 x daily - 7 x weekly - 1 sets - 10 reps  Single Arm Shoulder Extension with Resistance - 1 x daily - 7 x weekly - 1 sets - 10 reps    3/14  Access Code: [email or web address]  URL: [email or web address]  Date: 03/14/2022  Prepared by: Ann Tao    Exercises  Doorway Pec Stretch at 60 Elevation - 1 x daily - 7 x weekly - 2 sets - 30 sec hold  Doorway Pec Stretch at 90 Degrees Abduction - 1 x daily - 7 x weekly - 2 sets - 30 sec hold    3/7  Reviewed HEP from MD. Questions answered about exercise and to complete in pain free range but still feeling a stretch. (see above for exercise)    Therapeutic Exercise and NMR EXR  [] (40844) Provided verbal/tactile cueing for activities related to strengthening, flexibility, endurance, ROM for improvements in  [] LE / Lumbar: LE, proximal hip, and core control with self care, mobility, lifting, ambulation. [] UE / Cervical: cervical, postural, scapular, scapulothoracic and UE control with self care, reaching, carrying, lifting, house/yardwork, driving, computer work.  [] (05651) Provided verbal/tactile cueing for activities related to improving balance, coordination, kinesthetic sense, posture, motor skill, proprioception to assist with   [] LE / lumbar: LE, proximal hip, and core control in self care, mobility, lifting, ambulation and eccentric single leg control. [] UE / cervical: cervical, scapular, scapulothoracic and UE control with self care, reaching, carrying, lifting, house/yardwork, driving, computer work.   [] (09390) Therapist is in constant attendance of 2 or more patients providing skilled therapy interventions, but not providing any significant amount of measurable one-on-one time to either patient, for improvements in  [] LE / lumbar: LE, proximal hip, and core control in self care, mobility, lifting, ambulation and eccentric single leg control. [] UE / cervical: cervical, scapular, scapulothoracic and UE control with self care, reaching, carrying, lifting, house/yardwork, driving, computer work. NMR and Therapeutic Activities:    [x] (82564 or 45592) Provided verbal/tactile cueing for activities related to improving balance, coordination, kinesthetic sense, posture, motor skill, proprioception and motor activation to allow for proper function of   [] LE: / Lumbar core, proximal hip and LE with self care and ADLs  [x] UE / Cervical: cervical, postural, scapular, scapulothoracic and UE control with self care, carrying, lifting, driving, computer work.   [] (48608) Gait Re-education- Provided training and instruction to the patient for proper LE, core and proximal hip recruitment and positioning and eccentric body weight control with ambulation re-education including up and down stairs     Home Management Training / Self Care:  [] (50851) Provided self-care/home management training related to activities of daily living and compensatory training, and/or use of adaptive equipment for improvement with: ADLs and compensatory training, meal preparation, safety procedures and instruction in use of adaptive equipment, including bathing, grooming, dressing, personal hygiene, basic household cleaning and chores.      Home Exercise Program:    [x] (72011) Reviewed/Progressed HEP activities related to strengthening, flexibility, endurance, ROM of   [] LE / Lumbar: core, proximal hip and LE for functional self-care, mobility, lifting and ambulation/stair navigation   [x] UE / Cervical: cervical, postural, scapular, scapulothoracic and UE control with self care, reaching, carrying, lifting, house/yardwork, driving, computer work  [] (06301)Reviewed/Progressed HEP activities related to improving balance, coordination, kinesthetic sense, posture, motor skill, proprioception of   [] LE: core, proximal hip and LE for self care, mobility, lifting, and ambulation/stair navigation    [] UE / Cervical: cervical, postural,  scapular, scapulothoracic and UE control with self care, reaching, carrying, lifting, house/yardwork, driving, computer work    Manual Treatments:  PROM / STM / Oscillations-Mobs:  G-I, II, III, IV (PA's, Inf., Post.)  [] (52644) Provided manual therapy to mobilize LE, proximal hip and/or LS spine soft tissue/joints for the purpose of modulating pain, promoting relaxation,  increasing ROM, reducing/eliminating soft tissue swelling/inflammation/restriction, improving soft tissue extensibility and allowing for proper ROM for normal function with   [] LE / lumbar: self care, mobility, lifting and ambulation. [] UE / Cervical: self care, reaching, carrying, lifting, house/yardwork, driving, computer work. Modalities:  [] (44083) Vasopneumatic compression: Utilized vasopneumatic compression to decrease edema / swelling for the purpose of improving mobility and quad tone / recruitment which will allow for increased overall function including but not limited to self-care, transfers, ambulation, and ascending / descending stairs. Charges:  Timed Code Treatment Minutes: 40   Total Treatment Minutes: 40     [] EVAL - LOW (03925)   [] EVAL - MOD (35447)  [] EVAL - HIGH (55210)  [] RE-EVAL (23664)  [] KE(49190) x  3     [] Ionto  [] NMR (76924) x  1     [] Vaso  [] Manual (54649) x 1       [] Ultrasound  [x] TA x   3     [] Mech Traction (39329)  [] Aquatic Therapy x     [] ES (un) (38651):   [] Home Management Training x  [] ES(attended) (67955)   [] Dry Needling 1-2 muscles (15335):  [] Dry Needling 3+ muscles (985941  [] Group:      [] Other:     GOALS:   Patient stated goal: stretching chest mm and shoulder   [x]? Progressing: []? Met: []? Not Met: []? Adjusted     Therapist goals for Patient:   Short Term Goals: To be achieved in: 2 weeks  1. Independent in HEP and progression per patient tolerance, in order to prevent re-injury. []? Progressing: [x]? Met: []? Not Met: []? Adjusted  2.  Patient will have a decrease in pain to facilitate improvement in movement, function, and ADLs as indicated by improvement with respect to Functional Deficits. []? Progressing: [x]? Met: []? Not Met: []? Adjusted     Long Term Goals: To be achieved in: 6    weeks  1. Disability index score of  0% or less on the QuickDASH  to assist with reaching prior level of function. -? Progressing: -? Met: -? Not Met: -? Adjusted  2. Patient will demonstrate increased R shoulder AROM to 155 deg flexion and abd, to allow for proper joint functioning to allow pt to resume home management without increase in symptoms. -? Progressing: -? Met: -? Not Met: -? Adjusted  3. Patient will demonstrate increased R shoulder strength to 4+/5 in flexion and abd, to allow for proper joint functioning to allow pt to resume home management without increase in symptoms. [x]? Progressing: []? Met: []? Not Met: []? Adjusted  4. Pt will report ability to sleep 7/7 nights without waking up due to her RUE. [x]? Progressing: []? Met: []? Not Met: []? Adjusted    Overall Progression Towards Functional goals/ Treatment Progress Update:  [x] Patient is progressing as expected towards functional goals listed. [] Progression is slowed due to complexities/Impairments listed. [] Progression has been slowed due to co-morbidities. [] Plan just implemented, too soon to assess goals progression <30days   [] Goals require adjustment due to lack of progress  [] Patient is not progressing as expected and requires additional follow up with physician  [] Other    Persisting Functional Limitations/Impairments:  [x]Sleeping []Sitting               []Standing []Transfers        []Walking []Kneeling               []Stairs []Squatting / bending   [x]ADLs [x]Reaching  []Lifting  [x]Housework  []Driving []Job related tasks  []Sports/Recreation []Other:        ASSESSMENT: PN completed this date. Pt continues with decreased R shoulder and AROM and strength which could be limited by RTC tear. Pt improved time on 6MW test, reports decreased fatigue, and improved function on QuickDASH. Plan to finish remaining 2 visits then d/c to HEP. Pt to return to therapy once skin has healed from radiation for further ROM/strengthening and manual therapy to decrease cording and tissue tension. Treatment/Activity Tolerance:  [x] Patient able to complete tx [] Patient limited by fatigue  [] Patient limited by pain  [] Patient limited by other medical complications  [] Other:     Prognosis: [x] Good [] Fair  [] Poor    Patient Requires Follow-up: [x] Yes  [] No    Plan for next treatment session: See flowsheet    PLAN: See eval. PT 2x / week for 6 weeks. [x] Continue per plan of care [] Alter current plan (see comments)  [] Plan of care initiated [] Hold pending MD visit [] Discharge    Electronically signed by: La Mcknight, PT, DPT    Note: If patient does not return for scheduled/ recommended follow up visits, this note will serve as a discharge from care along with most recent update on progress.

## 2022-05-18 NOTE — ED ADULT NURSE NOTE - SEPSIS REFERENCE DATA FOR CRITERIA 1 WBC
Abnormal WBC: < 4,000 OR > 12,000

## 2022-07-13 NOTE — ED PROVIDER NOTE - PSH
Show Applicator Variable?: Yes Duration Of Freeze Thaw-Cycle (Seconds): 3 Post-Care Instructions: I reviewed with the patient in detail post-care instructions. Patient is to wear sunprotection, and avoid picking at any of the treated lesions. Pt may apply Vaseline to crusted or scabbing areas. Render Post-Care Instructions In Note?: no Consent: The patient's consent was obtained including but not limited to risks of crusting, scabbing, blistering, scarring, darker or lighter pigmentary change, recurrence, incomplete removal and infection. Detail Level: Detailed Coronary Stent  x 2 ( 20 years )

## 2022-07-22 NOTE — PROGRESS NOTE ADULT - PROBLEM SELECTOR PROBLEM 10
...
Preventive measure

## 2022-07-23 ENCOUNTER — EMERGENCY (EMERGENCY)
Facility: HOSPITAL | Age: 82
LOS: 1 days | Discharge: HOME | End: 2022-07-23
Attending: EMERGENCY MEDICINE | Admitting: EMERGENCY MEDICINE

## 2022-07-23 VITALS
OXYGEN SATURATION: 100 % | HEART RATE: 72 BPM | HEIGHT: 67 IN | SYSTOLIC BLOOD PRESSURE: 128 MMHG | DIASTOLIC BLOOD PRESSURE: 53 MMHG | TEMPERATURE: 98 F | RESPIRATION RATE: 16 BRPM

## 2022-07-23 LAB
BASOPHILS # BLD AUTO: 0.06 K/UL — SIGNIFICANT CHANGE UP (ref 0–0.2)
BASOPHILS NFR BLD AUTO: 0.6 % — SIGNIFICANT CHANGE UP (ref 0–2)
EOSINOPHIL # BLD AUTO: 0.23 K/UL — SIGNIFICANT CHANGE UP (ref 0–0.5)
EOSINOPHIL NFR BLD AUTO: 2.3 % — SIGNIFICANT CHANGE UP (ref 0–6)
HCT VFR BLD CALC: 35.1 % — LOW (ref 39–50)
HGB BLD-MCNC: 11.1 G/DL — LOW (ref 13–17)
IANC: 7.2 K/UL — SIGNIFICANT CHANGE UP (ref 1.8–7.4)
IMM GRANULOCYTES NFR BLD AUTO: 0.4 % — SIGNIFICANT CHANGE UP (ref 0–1.5)
LYMPHOCYTES # BLD AUTO: 1.95 K/UL — SIGNIFICANT CHANGE UP (ref 1–3.3)
LYMPHOCYTES # BLD AUTO: 19.1 % — SIGNIFICANT CHANGE UP (ref 13–44)
MCHC RBC-ENTMCNC: 29.3 PG — SIGNIFICANT CHANGE UP (ref 27–34)
MCHC RBC-ENTMCNC: 31.6 GM/DL — LOW (ref 32–36)
MCV RBC AUTO: 92.6 FL — SIGNIFICANT CHANGE UP (ref 80–100)
MONOCYTES # BLD AUTO: 0.72 K/UL — SIGNIFICANT CHANGE UP (ref 0–0.9)
MONOCYTES NFR BLD AUTO: 7.1 % — SIGNIFICANT CHANGE UP (ref 2–14)
NEUTROPHILS # BLD AUTO: 7.2 K/UL — SIGNIFICANT CHANGE UP (ref 1.8–7.4)
NEUTROPHILS NFR BLD AUTO: 70.5 % — SIGNIFICANT CHANGE UP (ref 43–77)
NRBC # BLD: 0 /100 WBCS — SIGNIFICANT CHANGE UP
NRBC # FLD: 0 K/UL — SIGNIFICANT CHANGE UP
PLATELET # BLD AUTO: 285 K/UL — SIGNIFICANT CHANGE UP (ref 150–400)
RBC # BLD: 3.79 M/UL — LOW (ref 4.2–5.8)
RBC # FLD: 13.2 % — SIGNIFICANT CHANGE UP (ref 10.3–14.5)
WBC # BLD: 10.2 K/UL — SIGNIFICANT CHANGE UP (ref 3.8–10.5)
WBC # FLD AUTO: 10.2 K/UL — SIGNIFICANT CHANGE UP (ref 3.8–10.5)

## 2022-07-23 PROCEDURE — 99284 EMERGENCY DEPT VISIT MOD MDM: CPT | Mod: 25,GC

## 2022-07-23 PROCEDURE — 73090 X-RAY EXAM OF FOREARM: CPT | Mod: 26,LT

## 2022-07-23 PROCEDURE — 12013 RPR F/E/E/N/L/M 2.6-5.0 CM: CPT | Mod: GC

## 2022-07-23 PROCEDURE — 71045 X-RAY EXAM CHEST 1 VIEW: CPT | Mod: 26

## 2022-07-23 NOTE — ED PROVIDER NOTE - CARE PLAN
Principal Discharge DX:	Injury due to fall   1 Principal Discharge DX:	Injury due to fall  Secondary Diagnosis:	Facial laceration

## 2022-07-23 NOTE — ED PROVIDER NOTE - NSICDXPASTMEDICALHX_GEN_ALL_CORE_FT
PAST MEDICAL HISTORY:  Benign prostatic hyperplasia, unspecified whether lower urinary tract symptoms present     BPH (benign prostatic hyperplasia)     BPH (benign prostatic hyperplasia)     CAD (Coronary Artery Disease) s/p cardiac stent ( > 20 years ago)    Cellulitis     Dementia     Dementia     Diabetes     DM (Diabetes Mellitus)     DM (diabetes mellitus)     HTN (Hypertension)     Hypercholesterolemia     Hyperlipidemia, unspecified hyperlipidemia type     Hypertension, unspecified type     Muscle weakness     Nocardiosis     Parkinson disease     Parkinsons

## 2022-07-23 NOTE — ED PROVIDER NOTE - PATIENT PORTAL LINK FT
You can access the FollowMyHealth Patient Portal offered by [facility] by registering at the following website: [email or web address]. By joining [employer]’s FollowMyHealth portal, you will also be able to view your health information using other applications (apps) compatible with our system.

## 2022-07-23 NOTE — ED PROVIDER NOTE - NSFOLLOWUPINSTRUCTIONS_ED_ALL_ED_FT
You presented to the emergency with injuries after a fall. You had a wound to your forehead, which required 13 stitches. Please visit either the ED or your PCP to remove the stitches in 5-7 days. You received a CAT scan of your head and neck, which did not show any acute changes due to the fall. In addition, you had an abrasion on your left forearm, so you received an x-ray of your forearm, which did not show any fractures. If you experience worsening symptoms, increased confusion, nausea/vomiting, headache, or numbness/weakness in a limb, please come back to the emergency room.

## 2022-07-23 NOTE — ED ADULT TRIAGE NOTE - CHIEF COMPLAINT QUOTE
alert oriented rolled out of bed and hit head has laceration to forehead and abrasion to left wrist no c/o dizziness or blurry vision not ambulatory at baseline PMHx HTN DM cardiac takes plavix

## 2022-07-23 NOTE — ED PROVIDER NOTE - NSICDXPASTSURGICALHX_GEN_ALL_CORE_FT
PAST SURGICAL HISTORY:  Coronary Stent x 2 ( 20 years )    No significant past surgical history

## 2022-07-23 NOTE — ED PROVIDER NOTE - OBJECTIVE STATEMENT
Patient is an 82 y.o. male with PMHx Parkinson's disease, dementia, HTN, DM, presenting with head wound after fall. Patient is poor historian and unable to state how the fall happened. Triage notes states patient fell from bed. Patient endorses being on plavix, consist with previous notes. Patient presents with wound to his forehead and abrasion to left wrist. Patient denies headache, lightheadedness, CP, SOB, abdominal pain, nausea/vomiting, and diarrhea.

## 2022-07-23 NOTE — ED PROVIDER NOTE - ATTENDING CONTRIBUTION TO CARE
82M parkinson's, pt fell and has lac to head, pt on plavix.  Pt poor historian.  Similar episode 1 year ago.  Pt here by himself.  pt on hospice but no aide in ED.  DNR DNI.  Unclear source of fall but pt denies complaints except mild HA and being hungry.  Would pursue ED workup of syncope, if nonactionable, would not hospitalize pt as it may not be beneficial or change the course of his illness meaningfully.  Obtain collateral.  Suture lac.  Check CTH, c-spine.  Pt had Td in sept 2021 here.  VS:  unremarkable    GEN - NAD;   well appearing;   A+O x 2.  Chronically ill appearing.  HEAD - NC/AT   except stellate laceration to forehead about 5cm across, oozing blood, no bone visible.  no palpable depressed skull fx.    ENT - PEERL, EOMI, mucous membranes    moist , no discharge      NECK: Neck supple, non-tender without lymphadenopathy, no masses, no JVD  PULM - CTA b/l,  symmetric breath sounds  COR -  normal heart sounds    ABD - , ND, NT, soft,  BACK - no CVA tenderness, nontender spine     EXTREMS - no edema, no deformity, warm and well perfused   Bilat LE extension contractures.  SKIN - no rash    or bruising      NEUROLOGIC - alert, face symmetric, speech fluent, sensation nl, motor no focal deficit of UE; bilat LE contractures and paresis.

## 2022-07-23 NOTE — ED ADULT NURSE NOTE - OBJECTIVE STATEMENT
Patient received in room 29, A/Ox1-2 (self, place), nonambulatory, s/p fall. Patient on plavix.  Patient states he fell out of bed and hit his head, poor historian. Endorses headache. Abrasion noted to left wrist, old abrasion noted to right hand. Laceration noted to middle of forehead. Denies chest pain, SOB, N/V, fever, and chills. 20G IV placed to right wrist. Bed in lowest position, call bell within reach.

## 2022-07-23 NOTE — ED PROVIDER NOTE - CLINICAL SUMMARY MEDICAL DECISION MAKING FREE TEXT BOX
Patient is an 82 y.o. male with PMHx Parkinson's disease, dementia, HTN, DM, presenting with head wound after fall. Limited history.  Plan: Will get basic labs, EKG, CT head/c-spine, forearm xray. Will repair lac

## 2022-07-23 NOTE — ED PROVIDER NOTE - PROGRESS NOTE DETAILS
Patient comfortable in bed. Lac repaired. Not bleeding currently from lac currently. Patient comfortable in bed. Lac repaired, no more bleeding noticed from wound. Talked with patient's brother and home caregiver. Caregiver will be able receive patient at home once able to get transport.

## 2022-07-23 NOTE — ED PROVIDER NOTE - SKIN WOUND TYPE
Stellate lac to central forehead, and abrasion to left lateral distal forearm/ABRASION(S)/LACERATION(S)

## 2022-07-24 VITALS
DIASTOLIC BLOOD PRESSURE: 66 MMHG | SYSTOLIC BLOOD PRESSURE: 165 MMHG | RESPIRATION RATE: 16 BRPM | OXYGEN SATURATION: 100 % | HEART RATE: 77 BPM

## 2022-07-24 PROBLEM — F03.90 UNSPECIFIED DEMENTIA, UNSPECIFIED SEVERITY, WITHOUT BEHAVIORAL DISTURBANCE, PSYCHOTIC DISTURBANCE, MOOD DISTURBANCE, AND ANXIETY: Chronic | Status: ACTIVE | Noted: 2019-01-05

## 2022-07-24 PROBLEM — I10 ESSENTIAL (PRIMARY) HYPERTENSION: Chronic | Status: ACTIVE | Noted: 2018-10-25

## 2022-07-24 PROBLEM — N40.0 BENIGN PROSTATIC HYPERPLASIA WITHOUT LOWER URINARY TRACT SYMPTOMS: Chronic | Status: ACTIVE | Noted: 2018-10-25

## 2022-07-24 PROBLEM — M62.81 MUSCLE WEAKNESS (GENERALIZED): Chronic | Status: ACTIVE | Noted: 2019-01-05

## 2022-07-24 PROBLEM — E11.9 TYPE 2 DIABETES MELLITUS WITHOUT COMPLICATIONS: Chronic | Status: ACTIVE | Noted: 2018-10-25

## 2022-07-24 PROBLEM — G20 PARKINSON'S DISEASE: Chronic | Status: ACTIVE | Noted: 2021-09-29

## 2022-07-24 PROBLEM — A43.9: Chronic | Status: ACTIVE | Noted: 2019-01-05

## 2022-07-24 PROBLEM — G20 PARKINSON'S DISEASE: Chronic | Status: ACTIVE | Noted: 2019-01-05

## 2022-07-24 PROBLEM — N40.0 BENIGN PROSTATIC HYPERPLASIA WITHOUT LOWER URINARY TRACT SYMPTOMS: Chronic | Status: ACTIVE | Noted: 2019-01-05

## 2022-07-24 PROBLEM — F03.90 UNSPECIFIED DEMENTIA WITHOUT BEHAVIORAL DISTURBANCE: Chronic | Status: ACTIVE | Noted: 2021-09-29

## 2022-07-24 PROBLEM — E11.9 TYPE 2 DIABETES MELLITUS WITHOUT COMPLICATIONS: Chronic | Status: ACTIVE | Noted: 2021-09-29

## 2022-07-24 PROBLEM — L03.90 CELLULITIS, UNSPECIFIED: Chronic | Status: ACTIVE | Noted: 2019-01-05

## 2022-07-24 PROBLEM — E78.5 HYPERLIPIDEMIA, UNSPECIFIED: Chronic | Status: ACTIVE | Noted: 2018-10-25

## 2022-07-24 LAB
ALBUMIN SERPL ELPH-MCNC: 3.8 G/DL — SIGNIFICANT CHANGE UP (ref 3.3–5)
ALP SERPL-CCNC: 108 U/L — SIGNIFICANT CHANGE UP (ref 40–120)
ALT FLD-CCNC: 6 U/L — SIGNIFICANT CHANGE UP (ref 4–41)
ANION GAP SERPL CALC-SCNC: 12 MMOL/L — SIGNIFICANT CHANGE UP (ref 7–14)
APTT BLD: 29.7 SEC — SIGNIFICANT CHANGE UP (ref 27–36.3)
AST SERPL-CCNC: 13 U/L — SIGNIFICANT CHANGE UP (ref 4–40)
BILIRUB SERPL-MCNC: 0.7 MG/DL — SIGNIFICANT CHANGE UP (ref 0.2–1.2)
BUN SERPL-MCNC: 35 MG/DL — HIGH (ref 7–23)
CALCIUM SERPL-MCNC: 8.9 MG/DL — SIGNIFICANT CHANGE UP (ref 8.4–10.5)
CHLORIDE SERPL-SCNC: 98 MMOL/L — SIGNIFICANT CHANGE UP (ref 98–107)
CO2 SERPL-SCNC: 22 MMOL/L — SIGNIFICANT CHANGE UP (ref 22–31)
CREAT SERPL-MCNC: 0.77 MG/DL — SIGNIFICANT CHANGE UP (ref 0.5–1.3)
EGFR: 90 ML/MIN/1.73M2 — SIGNIFICANT CHANGE UP
GLUCOSE SERPL-MCNC: 110 MG/DL — HIGH (ref 70–99)
INR BLD: 1.32 RATIO — HIGH (ref 0.88–1.16)
MAGNESIUM SERPL-MCNC: 1.7 MG/DL — SIGNIFICANT CHANGE UP (ref 1.6–2.6)
PHOSPHATE SERPL-MCNC: 3.5 MG/DL — SIGNIFICANT CHANGE UP (ref 2.5–4.5)
POTASSIUM SERPL-MCNC: 4.3 MMOL/L — SIGNIFICANT CHANGE UP (ref 3.5–5.3)
POTASSIUM SERPL-SCNC: 4.3 MMOL/L — SIGNIFICANT CHANGE UP (ref 3.5–5.3)
PROT SERPL-MCNC: 6.7 G/DL — SIGNIFICANT CHANGE UP (ref 6–8.3)
PROTHROM AB SERPL-ACNC: 15.3 SEC — HIGH (ref 10.5–13.4)
SODIUM SERPL-SCNC: 132 MMOL/L — LOW (ref 135–145)

## 2022-07-24 PROCEDURE — 72125 CT NECK SPINE W/O DYE: CPT | Mod: 26,MA

## 2022-07-24 PROCEDURE — 70450 CT HEAD/BRAIN W/O DYE: CPT | Mod: 26,MA

## 2022-07-24 RX ORDER — SODIUM CHLORIDE 9 MG/ML
500 INJECTION, SOLUTION INTRAVENOUS ONCE
Refills: 0 | Status: COMPLETED | OUTPATIENT
Start: 2022-07-24 | End: 2022-07-24

## 2022-07-24 RX ADMIN — SODIUM CHLORIDE 500 MILLILITER(S): 9 INJECTION, SOLUTION INTRAVENOUS at 01:11

## 2022-07-24 NOTE — PROVIDER CONTACT NOTE (OTHER) - ASSESSMENT
As per provider, pt is cleared for discharge returning to private residence. Writer contacted pt's brother, Sabas, who reported pt to have HHA at residence. Brother provides number for JOHN BASS at [phone]. Brother confirms pt home address. Writer contacted JOHN Bass who reports she is at had and will remain available to accept pt. [facility] arranged with [facility] #[phone]. Writer spoke with Payam who arranged trip #83A for p/up as soon as possible.

## 2022-08-24 NOTE — ED PROVIDER NOTE - EYES, MLM
Staples removed, steri strips applied. Incision CDI.   Clear bilaterally, pupils equal, round and reactive to light.

## 2022-08-31 NOTE — ED ADULT TRIAGE NOTE - CADM TRG TX PRIOR TO ARRIVAL
none [Complicated Symptoms] : complicated symptoms requiring more thorough examination than provided by mirror [de-identified] : Flexible Laryngoscopy: \par -clear to the nasopharynx\par -adenoidal tissue in nasopharynx\par -BOT looks normal\par -incomplete closure of the cords\par -Edema and  of the postcricoid, arytenoids and interarytenoids.

## 2022-09-14 NOTE — PROGRESS NOTE ADULT - PROBLEM SELECTOR PROBLEM 7
Pt arrived to ICU at [address]. Pt A&O x4. VSS. RA. NSR to ST to SA. Denies chest pain, SOB. Lung sounds clear. Bowel sounds active. Pt endorses 1/10 HA. No N/V. L head dressing CDI. PERRLA, 2mm brisk reaction. Clear speech. NIHHS score 0. Strength equal bilaterally in upper and lower extremities. RUE noted minimal difference in weakness when squeezing hands. No T/N/ pain in extremities. Nonpitting edema noted in lower extremities. Pt missing teeth, wears glasses. Reports no vision changes. 4 eyes completed with Margo Wilson RN. No skin breakdown noted. Evans care complete. Fall, seizure, and skin breakdown protocols in place. Prolonged QT interval Essential hypertension

## 2022-09-16 NOTE — PHYSICAL THERAPY INITIAL EVALUATION ADULT - RISK REDUCTION/PREVENTION, PT EVAL
AEB severe muscle/ fat wasting, 4.25% wt loss x 2.5 weeks, clin sig, < 50% PO intake > 5 days
risk factors

## 2022-11-26 NOTE — ED CDU PROVIDER DISPOSITION NOTE - INPATIENT RESIDENT/ACP NOTIFIED DATE
[facility] DISCHARGE SUMMARY        MRN: [id number]    GENERAL INFORMATION:  - Admission Date/Time: 11/22/2022 12:48 AM  - Discharge Date/Time: No discharge date for patient encounter.  - PCP: Deb Uriostegui MD  - Consultants:      Discharge Diagnoses:  Influenza A   Altered Metabolic Encephalopathy  Acute Asthma Exacerbation/reactive airway disease  Generalized deconditioning  Hyperammonemia    Hospital Course:  Ms Mullen is a 82yo F w/ hx of DMII, HTN, HLD, ESRD on HD, lumbar spine stenosis, ACD, carotid artery aneurysms, bilateral renal cyst, and peripheral neuropathy who presented to the ER with the feeling of confusion.     #Stuttered Speech, UE tremors and feeling of confusion - improved  #Suspect acute metabolic , Uremia 2/2 missing HD - improved  - CT Head w/out acute findings, no foal weakness on exam, pt is AOx3  - check TSH and b12/folate  - pt w/ speech changes (stuttering and forgetfullness), likely 2/2 missing HD, influenza and possibly 2/2 hyperammenemia.   - ammonia wnl now, decrease lactulose to 10g bid for goal 2-3 BM's a day, cont for 14 days and f/u w/ PCP   - neurology consulted, cont HD and current treatments, appreciat erecs  - HD per renal, last HD on 11/25, next on 11/28 per nephro  - fall precautions  - f/u w/ Nephro    Elevated ammonia level on admit - resolved  Possible chronic liver Disease  - possibly 2/2 acute infection and missing HD  - ammonia wnl now, LFTs wnl, reviewed CT scans from the past, +hepatomegaly on previous scans  - Liver US - Heterogeneous, coarsened echotexture the liver parenchyma which could be seen with changes of chronic liver disease.  No morphologic changes of cirrhosis.  - INR wnl  - follow up with PCP for monitoring. Informed patient of possible liver disease on US, but reassuring labs (LFTs/INR), and need to follow up with PCP for further evaluation and management and patient expressed understanding and is agreeable w/ the plan.      #Influenza infection    #Bronchitis 2/2 infection - improving  #Reactive airway disease 2/2 influenza infection and/or acute asthma exacerbation - improving  -  tamiflu dose in in ER ordered   - order tamflu after each HD session for a duration of 5 days (total 3 doses), rx for final dose on 11/28 after HD  - cont duonebs q6h prn/albuterol linhaler  - cont prednisone 20mg bid x 5 days total (11/24 - 11/28 )  - recommend outpt PFTs, f/u w/ PCP  - f/u closely w/ PCP for monitoring     ESRD on HD  - missed HD prior to admission  - nephro consulted, appreciate recs, HD per renal     Cartoid aneurysm hx  - CTA H&HNon 9/12/22 - \"Bilateral carotid terminus aneurysms and left ROBERTO aneurysm have not significantly changed.\"  - on asa and statin  - f/u w/ PCP for monitoring     DMII hx, resolved  - hast hgba1c 5 mo ago w/ normal hgba1c     Neuropathy  - cont gabapentin (renally dose, adjust dose on discharge, decreased to 100mg three times a week after HD, patient informed)     Lumbar radiculopathy hx  Left Shoulder OA  - on norco prn at home  - pain controlled w/ tylenol prn at this time  - f/u w/ PCP/ortho as outpt     ACD  - monitor hgb     HLD  - statin     Generalized deconditioning  - PT/OT - HH w/ 24 hour assistance and home PT/OT recommended, HH and DME ordered    Physical Exam   Refer to PN from same day    Lab Results:  Recent Results (from the past 24 hour(s))   GLUCOSE, BEDSIDE - POINT OF CARE    Collection Time: 11/25/22 11:27 AM   Result Value Ref Range    GLUCOSE, BEDSIDE - POINT OF CARE 127 (H) 70 - 99 mg/dL   GLUCOSE, BEDSIDE - POINT OF CARE    Collection Time: 11/25/22  4:42 PM   Result Value Ref Range    GLUCOSE, BEDSIDE - POINT OF CARE 166 (H) 70 - 99 mg/dL   GLUCOSE, BEDSIDE - POINT OF CARE    Collection Time: 11/25/22  8:50 PM   Result Value Ref Range    GLUCOSE, BEDSIDE - POINT OF CARE 153 (H) 70 - 99 mg/dL   Basic Metabolic Panel    Collection Time: 11/26/22  5:01 AM   Result Value Ref Range    Fasting Status      Sodium 136  135 - 145 mmol/L    Potassium 4.3 3.4 - 5.1 mmol/L    Chloride 104 97 - 110 mmol/L    Carbon Dioxide 23 21 - 32 mmol/L    Anion Gap 13 7 - 19 mmol/L    Glucose 126 (H) 70 - 99 mg/dL    BUN 31 (H) 6 - 20 mg/dL    Creatinine 3.00 (H) 0.51 - 0.95 mg/dL    Glomerular Filtration Rate 15 (L) >=60    BUN/ Creatinine Ratio 10 7 - 25    Calcium 9.1 8.4 - 10.2 mg/dL   Magnesium    Collection Time: 11/26/22  5:01 AM   Result Value Ref Range    Magnesium 2.1 1.7 - 2.4 mg/dL   Hepatic Function Panel    Collection Time: 11/26/22  5:01 AM   Result Value Ref Range    Albumin 3.3 (L) 3.6 - 5.1 g/dL    Bilirubin, Total 0.3 0.2 - 1.0 mg/dL    Bilirubin, Direct <0.1 0.0 - 0.2 mg/dL    Alkaline Phosphatase 48 45 - 117 Units/L    GPT/ALT 24 <64 Units/L    GOT/AST 20 <=37 Units/L    Protein, Total 7.7 6.4 - 8.2 g/dL   CBC with Automated Differential (performable only)    Collection Time: 11/26/22  5:01 AM   Result Value Ref Range    WBC 9.5 4.2 - 11.0 K/mcL    RBC 4.67 4.00 - 5.20 mil/mcL    HGB 13.1 12.0 - 15.5 g/dL    HCT 39.1 36.0 - 46.5 %    MCV 83.7 78.0 - 100.0 fl    MCH 28.1 26.0 - 34.0 pg    MCHC 33.5 32.0 - 36.5 g/dL    RDW-CV 13.2 11.0 - 15.0 %    RDW-SD 40.2 39.0 - 50.0 fL     140 - 450 K/mcL    NRBC 0 <=0 /100 WBC    Neutrophil, Percent 89 %    Lymphocytes, Percent 8 %    Mono, Percent 3 %    Eosinophils, Percent 0 %    Basophils, Percent 0 %    Immature Granulocytes 0 %    Absolute Neutrophils 8.4 (H) 1.8 - 7.7 K/mcL    Absolute Lymphocytes 0.7 (L) 1.0 - 4.0 K/mcL    Absolute Monocytes 0.3 0.3 - 0.9 K/mcL    Absolute Eosinophils  0.0 0.0 - 0.5 K/mcL    Absolute Basophils 0.0 0.0 - 0.3 K/mcL    Absolute Immmature Granulocytes 0.0 0.0 - 0.2 K/mcL   GLUCOSE, BEDSIDE - POINT OF CARE    Collection Time: 11/26/22  5:46 AM   Result Value Ref Range    GLUCOSE, BEDSIDE - POINT OF CARE 137 (H) 70 - 99 mg/dL       Imaging:  US LIVER   Final Result      1.    Heterogeneous, coarsened echotexture the liver parenchyma which could    be seen with changes of chronic liver disease.  No morphologic changes of   cirrhosis.         Electronically Signed by: GENIA NAVARRO MD    Signed on: 11/25/2022 1:21 PM          CT HEAD WO CONTRAST   Final Result      1.   No acute intracranial process.   2.   Unchanged left carotid terminus aneurysm.      Electronically Signed by: BOB DEAL MD    Signed on: 11/22/2022 8:16 AM          XR CHEST PA OR AP 1 VIEW   Final Result   1.  Hypoinflation with bronchovascular crowding.   2.  Otherwise no acute cardiopulmonary disease.   3.  If concern persists, consider CT.            Electronically signed by Irving Anderson MD on 11 22 22 at 02:13           Pathology/CX results:  * Cannot find OR log *    Microbiology Results     None           Discharge Medications:     Summary of your Discharge Medications      Take these Medications      Details   acetaminophen 325 MG tablet  Commonly known as: TYLENOL   Take 2 tablets by mouth every 4 hours as needed for Pain.     albuterol 108 (90 Base) MCG/ACT inhaler   Inhale 2 puffs into the lungs Every 6 hours as needed for Shortness of Breath or Wheezing.     amLODIPine 10 MG tablet  Commonly known as: NORVASC   Take 1 tablet by mouth daily.  Comment: New inc dose     aspirin 81 MG EC tablet   Take 1 tablet by mouth daily.     atorvastatin 10 MG tablet  Commonly known as: LIPITOR   Take 1 tablet by mouth daily.     baclofen 10 MG tablet  Commonly known as: LIORESAL   Take 1 tablet by mouth nightly as needed (muscle spasm).     cloNIDine 0.1 MG tablet  Commonly known as: CATAPRES   Take 1 tablet by mouth 2 times daily.     diphenhydrAMINE 25 MG capsule  Commonly known as: BENADRYL   Take 25 mg by mouth every 4 hours as needed for Itching.     epoetin mary 93479 UNIT/ML injection  Commonly known as: PROCRIT,EPOGEN   Inject 1 mL into the skin every 7 days. On Monday     ERYTHROMYCIN OP   Place 0.5 % into right eye 4 times daily.     famotidine 20 MG tablet  Commonly known  as: PEPCID  Start taking on: November 27, 2022   Take 1 tablet by mouth daily. Do not start before November 27, 2022.     fluticasone 50 MCG/ACT nasal spray  Commonly known as: FLONASE   Spray 1 spray in each nostril in the morning and 1 spray in the evening. As directed     gabapentin 100 MG capsule  Commonly known as: NEURONTIN  Start taking on: November 28, 2022   Take 1 capsule by mouth 3 days a week. Give after hemodialysis     guaiFENesin--10 MG/5ML syrup  Commonly known as: ROBITUSSIN DM   Take 10 mLs by mouth every 6 hours as needed for Cough.     ipratropium-albuterol 0.5-2.5 (3) MG/3ML nebulizer solution  Commonly known as: DUONEB   Take 3 mLs by nebulization every 6 hours as needed for Wheezing or Shortness of Breath.  Comment: Rx for nebulizer as well if patient needs one     lactulose 10 GM/15ML solution  Commonly known as: CHRONULAC   Take 15 mLs by mouth in the morning and 15 mLs in the evening. Do all this for 14 days. Hold it if you are having diarrhea or more than two bowel movements a day     metoPROLOL tartrate 25 MG tablet  Commonly known as: LOPRESSOR   Take 1 tablet by mouth every 12 hours.     ONE TOUCH ULTRA 2 w/Device Kit   Use to check bg daily     OneTouch Delica Plus [id number] Misc   Apply 1 each topically 3 times daily. Check bg tid dx: e11.29     OneTouch Verio test strip   Generic drug: blood glucose  Test blood sugar 3 times daily as directed.     oseltamivir 30 MG capsule  Commonly known as: TAMIFLU  Start taking on: November 28, 2022   Take 1 capsule by mouth 1 time for 1 dose. Do not start before November 28, 2022. Take it after hemodialysis on Monday 11/28 (this is your 3rd and final dose)     predniSONE 20 MG tablet  Commonly known as: DELTASONE   Take 1 tablet by mouth daily for 5 doses. Start on 11/26 in the evening     VITAMIN D-3 PO   Take 3 tablets by mouth 3 days a week. M-W-F at dialysis              Discharge Instructions:  Follow-up with primary care physician in  3-5 days.  Take medication as prescribed.      Primary Care Physician:  Deb Uriostegui MD      PCP notified via EPIC    I spent 35 minutes on the discharge.       Toney Adams MD    [facility] [place]  11/26/2022 9:58 AM        Patient Privacy Notice      The 21st Century Cures Act makes medical notes like these available to patients in the interest of transparency. Please be advised that this is a medical document. Medical documents are intended to carry relevant information and the clinical opinion of the practitioner. The medical note is intended as medical provider to provider communication, and may appear blunt or direct. It is written in medical language, and may contain abbreviations or verbiage that are unfamiliar.       03-Sep-2018 23:27

## 2022-12-01 ENCOUNTER — INPATIENT (INPATIENT)
Facility: HOSPITAL | Age: 82
LOS: 19 days | Discharge: HOSPICE | DRG: 981 | End: 2022-12-21
Attending: STUDENT IN AN ORGANIZED HEALTH CARE EDUCATION/TRAINING PROGRAM | Admitting: STUDENT IN AN ORGANIZED HEALTH CARE EDUCATION/TRAINING PROGRAM
Payer: MEDICARE

## 2022-12-01 VITALS
HEIGHT: 63 IN | OXYGEN SATURATION: 100 % | WEIGHT: 169.98 LBS | HEART RATE: 66 BPM | RESPIRATION RATE: 18 BRPM | SYSTOLIC BLOOD PRESSURE: 147 MMHG | TEMPERATURE: 98 F | DIASTOLIC BLOOD PRESSURE: 66 MMHG

## 2022-12-01 LAB
ALBUMIN SERPL ELPH-MCNC: 3.3 G/DL — SIGNIFICANT CHANGE UP (ref 3.3–5)
ALP SERPL-CCNC: 114 U/L — SIGNIFICANT CHANGE UP (ref 40–120)
ALT FLD-CCNC: <5 U/L — LOW (ref 10–45)
ANION GAP SERPL CALC-SCNC: 12 MMOL/L — SIGNIFICANT CHANGE UP (ref 5–17)
APTT BLD: 33 SEC — SIGNIFICANT CHANGE UP (ref 27.5–35.5)
AST SERPL-CCNC: 9 U/L — LOW (ref 10–40)
BASOPHILS # BLD AUTO: 0.04 K/UL — SIGNIFICANT CHANGE UP (ref 0–0.2)
BASOPHILS NFR BLD AUTO: 0.6 % — SIGNIFICANT CHANGE UP (ref 0–2)
BILIRUB SERPL-MCNC: 1.1 MG/DL — SIGNIFICANT CHANGE UP (ref 0.2–1.2)
BUN SERPL-MCNC: 29 MG/DL — HIGH (ref 7–23)
CALCIUM SERPL-MCNC: 8.8 MG/DL — SIGNIFICANT CHANGE UP (ref 8.4–10.5)
CHLORIDE SERPL-SCNC: 113 MMOL/L — HIGH (ref 96–108)
CO2 SERPL-SCNC: 24 MMOL/L — SIGNIFICANT CHANGE UP (ref 22–31)
CREAT SERPL-MCNC: 1.56 MG/DL — HIGH (ref 0.5–1.3)
EGFR: 45 ML/MIN/1.73M2 — LOW
EOSINOPHIL # BLD AUTO: 0.22 K/UL — SIGNIFICANT CHANGE UP (ref 0–0.5)
EOSINOPHIL NFR BLD AUTO: 3.6 % — SIGNIFICANT CHANGE UP (ref 0–6)
FLUAV AG NPH QL: SIGNIFICANT CHANGE UP
FLUBV AG NPH QL: SIGNIFICANT CHANGE UP
GLUCOSE SERPL-MCNC: 86 MG/DL — SIGNIFICANT CHANGE UP (ref 70–99)
HCT VFR BLD CALC: 22.5 % — LOW (ref 39–50)
HGB BLD-MCNC: 7 G/DL — CRITICAL LOW (ref 13–17)
IMM GRANULOCYTES NFR BLD AUTO: 0.5 % — SIGNIFICANT CHANGE UP (ref 0–0.9)
INR BLD: 1.37 RATIO — HIGH (ref 0.88–1.16)
LYMPHOCYTES # BLD AUTO: 1.32 K/UL — SIGNIFICANT CHANGE UP (ref 1–3.3)
LYMPHOCYTES # BLD AUTO: 21.4 % — SIGNIFICANT CHANGE UP (ref 13–44)
MCHC RBC-ENTMCNC: 30.3 PG — SIGNIFICANT CHANGE UP (ref 27–34)
MCHC RBC-ENTMCNC: 31.1 GM/DL — LOW (ref 32–36)
MCV RBC AUTO: 97.4 FL — SIGNIFICANT CHANGE UP (ref 80–100)
MONOCYTES # BLD AUTO: 0.6 K/UL — SIGNIFICANT CHANGE UP (ref 0–0.9)
MONOCYTES NFR BLD AUTO: 9.7 % — SIGNIFICANT CHANGE UP (ref 2–14)
NEUTROPHILS # BLD AUTO: 3.96 K/UL — SIGNIFICANT CHANGE UP (ref 1.8–7.4)
NEUTROPHILS NFR BLD AUTO: 64.2 % — SIGNIFICANT CHANGE UP (ref 43–77)
NRBC # BLD: 0 /100 WBCS — SIGNIFICANT CHANGE UP (ref 0–0)
PLATELET # BLD AUTO: 215 K/UL — SIGNIFICANT CHANGE UP (ref 150–400)
POTASSIUM SERPL-MCNC: 3.6 MMOL/L — SIGNIFICANT CHANGE UP (ref 3.5–5.3)
POTASSIUM SERPL-SCNC: 3.6 MMOL/L — SIGNIFICANT CHANGE UP (ref 3.5–5.3)
PROT SERPL-MCNC: 6.2 G/DL — SIGNIFICANT CHANGE UP (ref 6–8.3)
PROTHROM AB SERPL-ACNC: 15.9 SEC — HIGH (ref 10.5–13.4)
RBC # BLD: 2.31 M/UL — LOW (ref 4.2–5.8)
RBC # FLD: 16.3 % — HIGH (ref 10.3–14.5)
RSV RNA NPH QL NAA+NON-PROBE: SIGNIFICANT CHANGE UP
SARS-COV-2 RNA SPEC QL NAA+PROBE: DETECTED
SODIUM SERPL-SCNC: 149 MMOL/L — HIGH (ref 135–145)
WBC # BLD: 6.17 K/UL — SIGNIFICANT CHANGE UP (ref 3.8–10.5)
WBC # FLD AUTO: 6.17 K/UL — SIGNIFICANT CHANGE UP (ref 3.8–10.5)

## 2022-12-01 PROCEDURE — 93010 ELECTROCARDIOGRAM REPORT: CPT

## 2022-12-01 PROCEDURE — 99285 EMERGENCY DEPT VISIT HI MDM: CPT | Mod: CS,GC

## 2022-12-01 PROCEDURE — 71045 X-RAY EXAM CHEST 1 VIEW: CPT | Mod: 26

## 2022-12-01 RX ORDER — SODIUM CHLORIDE 9 MG/ML
1000 INJECTION INTRAMUSCULAR; INTRAVENOUS; SUBCUTANEOUS ONCE
Refills: 0 | Status: COMPLETED | OUTPATIENT
Start: 2022-12-01 | End: 2022-12-01

## 2022-12-01 RX ADMIN — SODIUM CHLORIDE 1000 MILLILITER(S): 9 INJECTION INTRAMUSCULAR; INTRAVENOUS; SUBCUTANEOUS at 23:20

## 2022-12-01 NOTE — ED PROVIDER NOTE - CLINICAL SUMMARY MEDICAL DECISION MAKING FREE TEXT BOX
82 -year old male with a history of dementia, diabetes, hypertension , hyperlipidemia  has been tested positive at home for COVID his home aid refused to came because of Covid    patient denies any fever, chills,  but on physical exam has bilateral Rales and rhonchi ,not hypoxic, not on oxygen   will obtain chest x-ray to rule out pneumonia ,blood work and social admission to the hospital ZR

## 2022-12-01 NOTE — ED ADULT NURSE NOTE - OBJECTIVE STATEMENT
82y Male PMHx HTN, DM, HLD, parkinsons dz, CAD, CHF, dementia, and abd hernia BIBEMS from home for covid-19 positive home test. As per EMS, [facility] tested positive for covid and tested patient with a home test that was positive. As per EMS, [facility] states agency doesn't provide care for covid+ patients. Pt A&Ox1, which is his baseline as per EMS, productive cough, breathing spontaneously and unlabored on RA. Pt denies HA, SOB, CP, n/v/d/c. IV placed, VSS, pt swabbed and sent, stretcher in lowest position and locked.

## 2022-12-01 NOTE — ED PROVIDER NOTE - PROGRESS NOTE DETAILS
Collateral from Brother, Berto: pt is bedbound, has 24/7 home health aides. [facility]HA had covid, pt caught covid, tested positive this morning (12/1). Because pt is covid(+), home health aide service cannot care for him because of covid(+). Collateral from Brother, [place]: pt is bedbound, has 24/7 home health aides. [facility] had covid, pt caught covid, tested positive this morning (12/1). Because pt is covid(+), home health aide service cannot care for him because of covid(+). PT DNR/DNI w/limited interventions, brother requesting nothing painful, MOLST in chart. - Naren Perez, PGY-2

## 2022-12-01 NOTE — ED PROVIDER NOTE - OBJECTIVE STATEMENT
82yoM w/Hx of Parkinson's disease, dementia (AAOx1 at baseline), HTN, DM, abd hernia p/w covid(+). Limited Hx due to pt mentation; per EMS, pt sent from home by home health aid because pt is covid(+) so home aid cannot care for him. Pt has no acute complaints in ED. Denies HA, SOB, CP, n/v/d/c. 82yoM w/Hx of HTN, DM, HLD, Parkinson's disease, CAD s/p stents x2 (>20 years ago), CHF (EF: 50%, Moderate diastolic dysfunction (Stage II), dementia (AAOx1 at baseline), abd hernia, p/w covid(+). Limited Hx due to pt mentation; per EMS, pt sent from home by home health aid because pt is covid(+) so home aid cannot care for him. Pt has no acute complaints in ED. Denies HA, SOB, CP, n/v/d/c.

## 2022-12-01 NOTE — ED ADULT NURSE REASSESSMENT NOTE - NS ED NURSE REASSESS COMMENT FT1
PRBCs initiated at this time as per MD order with 2 RNs at bedside to confirm. Consent in pt. paper chart. Pre transfusion vitals obtained. Pt educated on s/s of transfusion reactions however pt confused at baseline, pt door left un-fogged and will frequently assess patient for any adverse reactions. Call bell within reach. Safety and comfort maintained.

## 2022-12-02 DIAGNOSIS — I10 ESSENTIAL (PRIMARY) HYPERTENSION: ICD-10-CM

## 2022-12-02 DIAGNOSIS — E11.9 TYPE 2 DIABETES MELLITUS WITHOUT COMPLICATIONS: ICD-10-CM

## 2022-12-02 DIAGNOSIS — R63.8 OTHER SYMPTOMS AND SIGNS CONCERNING FOOD AND FLUID INTAKE: ICD-10-CM

## 2022-12-02 DIAGNOSIS — N17.9 ACUTE KIDNEY FAILURE, UNSPECIFIED: ICD-10-CM

## 2022-12-02 DIAGNOSIS — D64.9 ANEMIA, UNSPECIFIED: ICD-10-CM

## 2022-12-02 DIAGNOSIS — Z79.899 OTHER LONG TERM (CURRENT) DRUG THERAPY: ICD-10-CM

## 2022-12-02 DIAGNOSIS — U07.1 COVID-19: ICD-10-CM

## 2022-12-02 DIAGNOSIS — F03.90 UNSPECIFIED DEMENTIA WITHOUT BEHAVIORAL DISTURBANCE: ICD-10-CM

## 2022-12-02 DIAGNOSIS — G20 PARKINSON'S DISEASE: ICD-10-CM

## 2022-12-02 DIAGNOSIS — I25.10 ATHEROSCLEROTIC HEART DISEASE OF NATIVE CORONARY ARTERY WITHOUT ANGINA PECTORIS: ICD-10-CM

## 2022-12-02 DIAGNOSIS — E87.0 HYPEROSMOLALITY AND HYPERNATREMIA: ICD-10-CM

## 2022-12-02 DIAGNOSIS — Z87.11 PERSONAL HISTORY OF PEPTIC ULCER DISEASE: ICD-10-CM

## 2022-12-02 LAB
A1C WITH ESTIMATED AVERAGE GLUCOSE RESULT: SIGNIFICANT CHANGE UP (ref 4–5.6)
ALBUMIN SERPL ELPH-MCNC: 3.3 G/DL — SIGNIFICANT CHANGE UP (ref 3.3–5)
ALP SERPL-CCNC: 116 U/L — SIGNIFICANT CHANGE UP (ref 40–120)
ALT FLD-CCNC: <5 U/L — LOW (ref 10–45)
ANION GAP SERPL CALC-SCNC: 12 MMOL/L — SIGNIFICANT CHANGE UP (ref 5–17)
AST SERPL-CCNC: 11 U/L — SIGNIFICANT CHANGE UP (ref 10–40)
BASOPHILS # BLD AUTO: 0.04 K/UL — SIGNIFICANT CHANGE UP (ref 0–0.2)
BASOPHILS NFR BLD AUTO: 0.7 % — SIGNIFICANT CHANGE UP (ref 0–2)
BILIRUB SERPL-MCNC: 1.7 MG/DL — HIGH (ref 0.2–1.2)
BLD GP AB SCN SERPL QL: NEGATIVE — SIGNIFICANT CHANGE UP
BUN SERPL-MCNC: 31 MG/DL — HIGH (ref 7–23)
CALCIUM SERPL-MCNC: 8.7 MG/DL — SIGNIFICANT CHANGE UP (ref 8.4–10.5)
CHLORIDE SERPL-SCNC: 114 MMOL/L — HIGH (ref 96–108)
CO2 SERPL-SCNC: 24 MMOL/L — SIGNIFICANT CHANGE UP (ref 22–31)
CREAT SERPL-MCNC: 1.51 MG/DL — HIGH (ref 0.5–1.3)
CRP SERPL-MCNC: 77 MG/L — HIGH (ref 0–4)
EGFR: 46 ML/MIN/1.73M2 — LOW
EOSINOPHIL # BLD AUTO: 0.19 K/UL — SIGNIFICANT CHANGE UP (ref 0–0.5)
EOSINOPHIL NFR BLD AUTO: 3.6 % — SIGNIFICANT CHANGE UP (ref 0–6)
FERRITIN SERPL-MCNC: 44 NG/ML — SIGNIFICANT CHANGE UP (ref 30–400)
FOLATE SERPL-MCNC: 13.5 NG/ML — SIGNIFICANT CHANGE UP
GLUCOSE BLDC GLUCOMTR-MCNC: 103 MG/DL — HIGH (ref 70–99)
GLUCOSE BLDC GLUCOMTR-MCNC: 115 MG/DL — HIGH (ref 70–99)
GLUCOSE SERPL-MCNC: 88 MG/DL — SIGNIFICANT CHANGE UP (ref 70–99)
HCT VFR BLD CALC: 26.4 % — LOW (ref 39–50)
HGB BLD-MCNC: 8 G/DL — LOW (ref 13–17)
IMM GRANULOCYTES NFR BLD AUTO: 0.4 % — SIGNIFICANT CHANGE UP (ref 0–0.9)
LDH SERPL L TO P-CCNC: 163 U/L — SIGNIFICANT CHANGE UP (ref 50–242)
LYMPHOCYTES # BLD AUTO: 0.99 K/UL — LOW (ref 1–3.3)
LYMPHOCYTES # BLD AUTO: 18.5 % — SIGNIFICANT CHANGE UP (ref 13–44)
MAGNESIUM SERPL-MCNC: 1.6 MG/DL — SIGNIFICANT CHANGE UP (ref 1.6–2.6)
MCHC RBC-ENTMCNC: 29.6 PG — SIGNIFICANT CHANGE UP (ref 27–34)
MCHC RBC-ENTMCNC: 30.3 GM/DL — LOW (ref 32–36)
MCV RBC AUTO: 97.8 FL — SIGNIFICANT CHANGE UP (ref 80–100)
MONOCYTES # BLD AUTO: 0.48 K/UL — SIGNIFICANT CHANGE UP (ref 0–0.9)
MONOCYTES NFR BLD AUTO: 9 % — SIGNIFICANT CHANGE UP (ref 2–14)
NEUTROPHILS # BLD AUTO: 3.62 K/UL — SIGNIFICANT CHANGE UP (ref 1.8–7.4)
NEUTROPHILS NFR BLD AUTO: 67.8 % — SIGNIFICANT CHANGE UP (ref 43–77)
NRBC # BLD: 0 /100 WBCS — SIGNIFICANT CHANGE UP (ref 0–0)
PHOSPHATE SERPL-MCNC: 4 MG/DL — SIGNIFICANT CHANGE UP (ref 2.5–4.5)
PLATELET # BLD AUTO: 170 K/UL — SIGNIFICANT CHANGE UP (ref 150–400)
POTASSIUM SERPL-MCNC: 3.3 MMOL/L — LOW (ref 3.5–5.3)
POTASSIUM SERPL-SCNC: 3.3 MMOL/L — LOW (ref 3.5–5.3)
PROT SERPL-MCNC: 6.2 G/DL — SIGNIFICANT CHANGE UP (ref 6–8.3)
RBC # BLD: 2.7 M/UL — LOW (ref 4.2–5.8)
RBC # FLD: 17.2 % — HIGH (ref 10.3–14.5)
RH IG SCN BLD-IMP: POSITIVE — SIGNIFICANT CHANGE UP
SODIUM SERPL-SCNC: 150 MMOL/L — HIGH (ref 135–145)
TSH SERPL-MCNC: 3.59 UIU/ML — SIGNIFICANT CHANGE UP (ref 0.27–4.2)
VIT B12 SERPL-MCNC: 1402 PG/ML — HIGH (ref 232–1245)
WBC # BLD: 5.34 K/UL — SIGNIFICANT CHANGE UP (ref 3.8–10.5)
WBC # FLD AUTO: 5.34 K/UL — SIGNIFICANT CHANGE UP (ref 3.8–10.5)

## 2022-12-02 PROCEDURE — 99223 1ST HOSP IP/OBS HIGH 75: CPT

## 2022-12-02 RX ORDER — QUETIAPINE FUMARATE 200 MG/1
75 TABLET, FILM COATED ORAL AT BEDTIME
Refills: 0 | Status: DISCONTINUED | OUTPATIENT
Start: 2022-12-02 | End: 2022-12-12

## 2022-12-02 RX ORDER — INSULIN LISPRO 100/ML
VIAL (ML) SUBCUTANEOUS
Refills: 0 | Status: DISCONTINUED | OUTPATIENT
Start: 2022-12-02 | End: 2022-12-12

## 2022-12-02 RX ORDER — DEXTROSE 50 % IN WATER 50 %
25 SYRINGE (ML) INTRAVENOUS ONCE
Refills: 0 | Status: DISCONTINUED | OUTPATIENT
Start: 2022-12-02 | End: 2022-12-12

## 2022-12-02 RX ORDER — LISINOPRIL 2.5 MG/1
20 TABLET ORAL DAILY
Refills: 0 | Status: DISCONTINUED | OUTPATIENT
Start: 2022-12-02 | End: 2022-12-02

## 2022-12-02 RX ORDER — DEXTROSE 50 % IN WATER 50 %
15 SYRINGE (ML) INTRAVENOUS ONCE
Refills: 0 | Status: DISCONTINUED | OUTPATIENT
Start: 2022-12-02 | End: 2022-12-12

## 2022-12-02 RX ORDER — SODIUM CHLORIDE 9 MG/ML
1000 INJECTION, SOLUTION INTRAVENOUS
Refills: 0 | Status: DISCONTINUED | OUTPATIENT
Start: 2022-12-02 | End: 2022-12-12

## 2022-12-02 RX ORDER — TAMSULOSIN HYDROCHLORIDE 0.4 MG/1
0.4 CAPSULE ORAL AT BEDTIME
Refills: 0 | Status: DISCONTINUED | OUTPATIENT
Start: 2022-12-02 | End: 2022-12-08

## 2022-12-02 RX ORDER — CHLORHEXIDINE GLUCONATE 213 G/1000ML
1 SOLUTION TOPICAL DAILY
Refills: 0 | Status: DISCONTINUED | OUTPATIENT
Start: 2022-12-02 | End: 2022-12-12

## 2022-12-02 RX ORDER — DEXTROSE 50 % IN WATER 50 %
12.5 SYRINGE (ML) INTRAVENOUS ONCE
Refills: 0 | Status: DISCONTINUED | OUTPATIENT
Start: 2022-12-02 | End: 2022-12-12

## 2022-12-02 RX ORDER — INSULIN LISPRO 100/ML
VIAL (ML) SUBCUTANEOUS AT BEDTIME
Refills: 0 | Status: DISCONTINUED | OUTPATIENT
Start: 2022-12-02 | End: 2022-12-12

## 2022-12-02 RX ORDER — POTASSIUM CHLORIDE 20 MEQ
20 PACKET (EA) ORAL ONCE
Refills: 0 | Status: COMPLETED | OUTPATIENT
Start: 2022-12-02 | End: 2022-12-02

## 2022-12-02 RX ORDER — ASPIRIN/CALCIUM CARB/MAGNESIUM 324 MG
81 TABLET ORAL DAILY
Refills: 0 | Status: DISCONTINUED | OUTPATIENT
Start: 2022-12-02 | End: 2022-12-08

## 2022-12-02 RX ORDER — CARBIDOPA AND LEVODOPA 25; 100 MG/1; MG/1
1 TABLET ORAL THREE TIMES A DAY
Refills: 0 | Status: DISCONTINUED | OUTPATIENT
Start: 2022-12-02 | End: 2022-12-12

## 2022-12-02 RX ORDER — SODIUM CHLORIDE 9 MG/ML
1000 INJECTION, SOLUTION INTRAVENOUS
Refills: 0 | Status: DISCONTINUED | OUTPATIENT
Start: 2022-12-02 | End: 2022-12-04

## 2022-12-02 RX ORDER — ASPIRIN/CALCIUM CARB/MAGNESIUM 324 MG
81 TABLET ORAL DAILY
Refills: 0 | Status: DISCONTINUED | OUTPATIENT
Start: 2022-12-02 | End: 2022-12-02

## 2022-12-02 RX ORDER — ENOXAPARIN SODIUM 100 MG/ML
40 INJECTION SUBCUTANEOUS EVERY 24 HOURS
Refills: 0 | Status: DISCONTINUED | OUTPATIENT
Start: 2022-12-02 | End: 2022-12-08

## 2022-12-02 RX ORDER — PANTOPRAZOLE SODIUM 20 MG/1
40 TABLET, DELAYED RELEASE ORAL
Refills: 0 | Status: DISCONTINUED | OUTPATIENT
Start: 2022-12-02 | End: 2022-12-02

## 2022-12-02 RX ORDER — ACETAMINOPHEN 500 MG
650 TABLET ORAL EVERY 6 HOURS
Refills: 0 | Status: DISCONTINUED | OUTPATIENT
Start: 2022-12-02 | End: 2022-12-12

## 2022-12-02 RX ORDER — GLUCAGON INJECTION, SOLUTION 0.5 MG/.1ML
1 INJECTION, SOLUTION SUBCUTANEOUS ONCE
Refills: 0 | Status: DISCONTINUED | OUTPATIENT
Start: 2022-12-02 | End: 2022-12-12

## 2022-12-02 RX ADMIN — Medication 81 MILLIGRAM(S): at 11:18

## 2022-12-02 RX ADMIN — ENOXAPARIN SODIUM 40 MILLIGRAM(S): 100 INJECTION SUBCUTANEOUS at 05:49

## 2022-12-02 RX ADMIN — QUETIAPINE FUMARATE 75 MILLIGRAM(S): 200 TABLET, FILM COATED ORAL at 23:14

## 2022-12-02 RX ADMIN — Medication 20 MILLIEQUIVALENT(S): at 13:17

## 2022-12-02 RX ADMIN — TAMSULOSIN HYDROCHLORIDE 0.4 MILLIGRAM(S): 0.4 CAPSULE ORAL at 23:14

## 2022-12-02 RX ADMIN — SODIUM CHLORIDE 75 MILLILITER(S): 9 INJECTION, SOLUTION INTRAVENOUS at 13:18

## 2022-12-02 RX ADMIN — CARBIDOPA AND LEVODOPA 1 TABLET(S): 25; 100 TABLET ORAL at 23:15

## 2022-12-02 RX ADMIN — LISINOPRIL 20 MILLIGRAM(S): 2.5 TABLET ORAL at 05:49

## 2022-12-02 RX ADMIN — CARBIDOPA AND LEVODOPA 1 TABLET(S): 25; 100 TABLET ORAL at 05:48

## 2022-12-02 RX ADMIN — PANTOPRAZOLE SODIUM 40 MILLIGRAM(S): 20 TABLET, DELAYED RELEASE ORAL at 05:49

## 2022-12-02 RX ADMIN — CARBIDOPA AND LEVODOPA 1 TABLET(S): 25; 100 TABLET ORAL at 14:45

## 2022-12-02 NOTE — CONSULT NOTE ADULT - SUBJECTIVE AND OBJECTIVE BOX
Bennie Hwang MD  Interventional Cardiology / Advance Heart Failure and Cardiac Transplant Specialist  [place] Office : [phone] [place] [address] [place] [address]  Tel:   [place] Office : [address] [facility] [place] [address]  Tel: [phone]         HISTORY OF PRESENTING ILLNESS:  HPI:  82 year old M PMH of HTN, DM, HLD, dementia,  Parkinson's disease, CAD s/p stents x2 (>20 years ago), CHF (EF: 50%, Moderate diastolic dysfunction (Stage II), moderate pulmonary hypertension, BPH, and gastric ulcers presents to the ED bceause patient was found to be COVID+ and [facility] cannot care for patient.     Patient seen and examined at [facility]- is AO x 2 but cannot provide history.   As per ED notes: "p/w covid(+). Limited Hx due to pt mentation; per EMS, pt sent from home by home health aid because pt is covid(+) so home aid cannot care for him. Pt has no acute complaints in ED. Denies HA, SOB, CP, n/v/d/c    ED Course:   VS: Afebrile, HR: 66, BP: 147/66, saturating 100% on RA  Medications: NS 1 L, 1 unit pRBC   EKG: NSR @ 66 bpm, , 1st degree AV block, no ST changes     PAST MEDICAL & SURGICAL HISTORY:  Hypercholesterolemia      DM (Diabetes Mellitus)      HTN (Hypertension)      CAD (Coronary Artery Disease)  s/p cardiac stent ( &gt; 20 years ago)      BPH (benign prostatic hyperplasia)      Diabetes      Hypertension, unspecified type      Benign prostatic hyperplasia, unspecified whether lower urinary tract symptoms present      Hyperlipidemia, unspecified hyperlipidemia type      Dementia      Parkinson disease      Nocardiosis      Muscle weakness      BPH (benign prostatic hyperplasia)      Cellulitis      Parkinsons      Dementia      DM (diabetes mellitus)      Coronary Stent  x 2 ( 20 years )      No significant past surgical history          SOCIAL HISTORY: Substance Use (street drugs): ( x ) never used  (  ) other:    FAMILY HISTORY:  FH: HTN (hypertension)        MEDICATIONS:  aspirin enteric coated 81 milliGRAM(s) Oral daily  enoxaparin Injectable 40 milliGRAM(s) SubCutaneous every 24 hours        acetaminophen     Tablet .. 650 milliGRAM(s) Oral every 6 hours PRN  carbidopa/levodopa  25/100 1 Tablet(s) Oral three times a day  QUEtiapine 75 milliGRAM(s) Oral at bedtime      dextrose 50% Injectable 25 Gram(s) IV Push once  dextrose 50% Injectable 12.5 Gram(s) IV Push once  dextrose 50% Injectable 25 Gram(s) IV Push once  dextrose Oral Gel 15 Gram(s) Oral once PRN  glucagon  Injectable 1 milliGRAM(s) IntraMuscular once  insulin lispro (ADMELOG) corrective regimen sliding scale   SubCutaneous three times a day before meals  insulin lispro (ADMELOG) corrective regimen sliding scale   SubCutaneous at bedtime    chlorhexidine 2% Cloths 1 Application(s) Topical daily  dextrose 5%. 1000 milliLiter(s) IV Continuous <Continuous>  dextrose 5%. 1000 milliLiter(s) IV Continuous <Continuous>  lactated ringers. 1000 milliLiter(s) IV Continuous <Continuous>  tamsulosin 0.4 milliGRAM(s) Oral at bedtime      FAMILY HISTORY:  FH: HTN (hypertension)          Allergies    Allergy Status Unknown  No Known Allergies    Intolerances    	      PHYSICAL EXAM:  T(C): 36.4 (12-02-22 @ 11:29), Max: 36.8 (12-01-22 @ 20:45)  HR: 60 (12-02-22 @ 11:29) (60 - 69)  BP: 112/60 (12-02-22 @ 11:29) (112/60 - 170/73)  RR: 18 (12-02-22 @ 11:29) (18 - 20)  SpO2: 98% (12-02-22 @ 11:29) (98% - 100%)  Wt(kg): --  I&O's Summary    02 Dec 2022 07:01  -  02 Dec 2022 19:45  --------------------------------------------------------  IN: 240 mL / OUT: 0 mL / NET: 240 mL          EYES: EOMI, PERRLA, conjunctiva and sclera clear  ENMT: No tonsillar erythema, exudates, or enlargement; Moist mucous membranes, Good dentition, No lesions  Cardiovascular: Normal S1 S2, No JVD, No murmurs, No edema  Respiratory: b/l rhonchi  Gastrointestinal:  Soft, Non-tender, + BS	  Extremities: no edema      LABS:	 	    CARDIAC MARKERS:                                  8.0    5.34  )-----------( 170      ( 02 Dec 2022 09:53 )             26.4     12-02    150<H>  |  114<H>  |  31<H>  ----------------------------<  88  3.3<L>   |  24  |  1.51<H>    Ca    8.7      02 Dec 2022 09:52  Phos  4.0     12-02  Mg     1.6     12-02    TPro  6.2  /  Alb  3.3  /  TBili  1.7<H>  /  DBili  x   /  AST  11  /  ALT  <5<L>  /  AlkPhos  116  12-02    proBNP:   Lipid Profile:   HgA1c:   TSH: Thyroid Stimulating Hormone, Serum: 3.59 uIU/mL (12-02 @ 09:53)      Consultant(s) Notes Reviewed:  [x ] YES  [ ] NO    Care Discussed with Consultants/Other Providers [ x] YES  [ ] NO    Imaging Personally Reviewed independently:  [x] YES  [ ] NO    All labs, radiologic studies, vitals, orders and medications list reviewed. Patient is seen and examined at bedside. Case discussed with medical team.    ASSESSMENT/PLAN:

## 2022-12-02 NOTE — PROGRESS NOTE ADULT - PROBLEM SELECTOR PLAN 2
Unclear etiology of anemia- acute blood loss vs nutritional (patient w MIKEL and hypernatremia) vs hemolysis vs AOCID  -post transfusion CBC with hemoglobin improvement from 7 to 8 so corrected appropriately  -Ferritin, B12, folate WNL  -F/u iron and retic  -Maintain active type and screen  -Transfuse for Hb <7

## 2022-12-02 NOTE — PROGRESS NOTE ADULT - ASSESSMENT
82 year old M PMH of HTN, DM, HLD, dementia,  Parkinson's disease, CAD s/p stents x2 (>20 years ago), CHF (EF: 50%, Moderate diastolic dysfunction (Stage II), moderate pulmonary hypertension, BPH, and gastric ulcers presents to the ED bceause patient was found to be COVID+ and [facility] cannot care for patient.

## 2022-12-02 NOTE — H&P ADULT - PROBLEM SELECTOR PLAN 1
Unclear etiology of anemia- will r/o acute blood loss vs nutritional (patient w MIKEL and hypernatremia) vs hemolysis seems less likely w normal T bili  -F/u post transfusion CBC   -F/u add on iron and ferritin   -F/u add on retic  -Maintain active type and screen  -Transfuse for Hb <7   -F/u AM B12, folate and TSH

## 2022-12-02 NOTE — CONSULT NOTE ADULT - ASSESSMENT
82 yo M HTN, DM, HLD, dementia (AOx2 at baseline), Parkinson's disease, CAD s/p stents x2 (>20 years ago), recent new onset HF (EF 50%, on Xarelto, Moderate diastolic dysfunction (Stage II), moderate pulmonary hypertension, small pericardial effusion), L pleural effusion, BPH, gastric ulcers presenting with COVID    1) COVID+  - ON ROOM AIR SATURATING WELL    2) CAD s/p stent  -cath in 2010 showed mild luminal disease and patent stents  -denies chest pain  -echo with small to  moderate Pericardial effusion with no evidence of tamponade   -c/w asa    3) DVT prophylaxis  -lovenox subq

## 2022-12-02 NOTE — PATIENT PROFILE ADULT - FUNCTIONAL ASSESSMENT - BASIC MOBILITY 6.
1-calculated by average/Not able to assess (calculate score using [facility] averaging method)

## 2022-12-02 NOTE — H&P ADULT - PROBLEM SELECTOR PLAN 4
Most likely in setting of decreased PO intake   -F/u AM creatinine   -Encourage PO intake   -Monitor BMP daily  -Avoid nephrotoxic agents  -Renally dose medications

## 2022-12-02 NOTE — PATIENT PROFILE ADULT - FALL HARM RISK - HARM RISK INTERVENTIONS

## 2022-12-02 NOTE — H&P ADULT - ASSESSMENT
82 year old M PMH of HTN, DM, HLD, dementia,  Parkinson's disease, CAD s/p stents x2 (>20 years ago), CHF (EF: 50%, Moderate diastolic dysfunction (Stage II), moderate pulmonary hypertension, BPH, and gastric ulcers presents to the ED bceause patient was found to be COVID+ and [facility] cannot care for patient.     Admitted for w/u of anemia and social admission     Of note- patient will need formal medrec, called brother overnight, does not know medication list.

## 2022-12-02 NOTE — PROGRESS NOTE ADULT - PROBLEM SELECTOR PLAN 1
patient on room air in no acute respiratory distress  -Would monitor respiratory status and defer inpatient tx at this time as patient HD stable with no sx  -Tylenol PRN for fever  -Monitor O2 saturation  -Maintain airborne precautions

## 2022-12-02 NOTE — H&P ADULT - PROBLEM SELECTOR PLAN 8
Patient on ramipril 10 mg in 2021  -Will start lisinopril 20 mg (ramipril 5 mg eq) as patient BP elevated in ER   -DASH diet

## 2022-12-02 NOTE — H&P ADULT - NSHPPHYSICALEXAM_GEN_ALL_CORE
VITALS:   T(C): 36.7 (12-01-22 @ 21:32), Max: 36.7 (12-01-22 @ 19:24)  HR: 66 (12-01-22 @ 21:32) (66 - 66)  BP: 121/57 (12-01-22 @ 21:32) (121/57 - 147/66)  RR: 18 (12-01-22 @ 21:32) (18 - 18)  SpO2: 100% (12-01-22 @ 21:32) (100% - 100%)    GENERAL: NAD, lying in bed comfortably  HEAD:  Atraumatic, Normocephalic  EYES: EOMI, PERRLA, conjunctiva and sclera clear  ENT: Moist mucous membranes  NECK: Supple, No JVD  CHEST/LUNG: Clear to auscultation bilaterally; No rales, rhonchi, wheezing, or rubs. Unlabored respirations  HEART: Regular rate and rhythm; No murmurs, rubs, or gallops  ABDOMEN: BSx4; Soft, nontender, nondistended  EXTREMITIES:  2+ Peripheral Pulses, brisk capillary refill. No clubbing, cyanosis, or edema  NERVOUS SYSTEM:  A&Ox3, no focal deficits   SKIN: No rashes or lesions VITALS:   T(C): 36.7 (12-01-22 @ 21:32), Max: 36.7 (12-01-22 @ 19:24)  HR: 66 (12-01-22 @ 21:32) (66 - 66)  BP: 121/57 (12-01-22 @ 21:32) (121/57 - 147/66)  RR: 18 (12-01-22 @ 21:32) (18 - 18)  SpO2: 100% (12-01-22 @ 21:32) (100% - 100%)    GENERAL: NAD, lying in bed comfortably  HEAD:  Atraumatic, Normocephalic  EYES: EOMI, PERRLA, conjunctiva and sclera clear  ENT: Dry mucous membranes  NECK: Supple, No JVD  CHEST/LUNG: Clear to auscultation bilaterally; No rales, rhonchi, wheezing, or rubs. Unlabored respirations  HEART: Regular rate and rhythm; No murmurs, rubs, or gallops  ABDOMEN: BSx4; Soft, nontender, nondistended  EXTREMITIES:  2+ Peripheral Pulses, brisk capillary refill. No clubbing, cyanosis, or edema  NERVOUS SYSTEM:  A&Ox2, no focal deficits   SKIN: No rashes or lesions

## 2022-12-02 NOTE — H&P ADULT - HISTORY OF PRESENT ILLNESS
82 year old M PMH of HTN, DM, HLD, dementia,  Parkinson's disease, CAD s/p stents x2 (>20 years ago), CHF (EF: 50%, Moderate diastolic dysfunction (Stage II), moderate pulmonary hypertension, small-mod pericardial effusion), Lt pleural effusion, BPH, gastric ulcers, COVID infection presents to the ED bceause patient was found to be COVID+ and HHA cannot care for patient.     Patient seen and examined at [facility]-  As per ED notes: "p/w covid(+). Limited Hx due to pt mentation; per EMS, pt sent from home by home health aid because pt is covid(+) so home aid cannot care for him. Pt has no acute complaints in ED. Denies HA, SOB, CP, n/v/d/c.:     ED Course:    82 year old M PMH of HTN, DM, HLD, dementia,  Parkinson's disease, CAD s/p stents x2 (>20 years ago), CHF (EF: 50%, Moderate diastolic dysfunction (Stage II), moderate pulmonary hypertension, BPH, and gastric ulcers presents to the ED bceause patient was found to be COVID+ and HHA cannot care for patient.     Patient seen and examined at [facility] is AO x 2 but cannot provide history.   As per ED notes: "p/w covid(+). Limited Hx due to pt mentation; per EMS, pt sent from home by home health aid because pt is covid(+) so home aid cannot care for him. Pt has no acute complaints in ED. Denies HA, SOB, CP, n/v/d/c    ED Course:   VS: Afebrile, HR: 66, BP: 147/66, saturating 100% on RA  Medications: NS 1 L, 1 unit pRBC   EKG: NSR @ 66 bpm, , 1st degree AV block, no ST changes

## 2022-12-02 NOTE — H&P ADULT - NSHPLABSRESULTS_GEN_ALL_CORE
LABS:                         7.0    6.17  )-----------( 215      ( 01 Dec 2022 21:13 )             22.5     12-01    149<H>  |  113<H>  |  29<H>  ----------------------------<  86  3.6   |  24  |  1.56<H>    Ca    8.8      01 Dec 2022 21:13    TPro  6.2  /  Alb  3.3  /  TBili  1.1  /  DBili  x   /  AST  9<L>  /  ALT  <5<L>  /  AlkPhos  114  12-01    PT/INR - ( 01 Dec 2022 21:13 )   PT: 15.9 sec;   INR: 1.37 ratio         PTT - ( 01 Dec 2022 21:13 )  PTT:33.0 sec    Records reviewed from prior hospitalization.  Labs reviewed remarkable for   EKG personally reviewed   CXR personally reviewed LABS:                         7.0    6.17  )-----------( 215      ( 01 Dec 2022 21:13 )             22.5     12-01    149<H>  |  113<H>  |  29<H>  ----------------------------<  86  3.6   |  24  |  1.56<H>    Ca    8.8      01 Dec 2022 21:13    TPro  6.2  /  Alb  3.3  /  TBili  1.1  /  DBili  x   /  AST  9<L>  /  ALT  <5<L>  /  AlkPhos  114  12-01    PT/INR - ( 01 Dec 2022 21:13 )   PT: 15.9 sec;   INR: 1.37 ratio         PTT - ( 01 Dec 2022 21:13 )  PTT:33.0 sec    Records reviewed from prior hospitalization.  Labs reviewed remarkable for   EKG personally reviewed  NSR @ 66 bpm, , 1st degree AV block, no ST changes   CXR personally reviewed poor inspiratory effort, unclear if there is small L pleural effusion given type of film, no acute infiltrates

## 2022-12-02 NOTE — H&P ADULT - PROBLEM SELECTOR PLAN 2
patient on room air in no acute respiratory distress  -Would monitor respiratory status and defer inpatient tx at this time as patient HD stable with no sx  -F/u AM ferritin, CRP, LDH  -Tylenol PRN for fever  -Monitor O2 saturation  -Maintain airborne precautions

## 2022-12-02 NOTE — PATIENT PROFILE ADULT - NSPROPTRIGHTBILLOFRIGHTS_GEN_A_NUR
Regarding: Wants a rn to come to the home to show how to care for dressing that is leaking  ----- Message from Rodriguez Garrison sent at 7/20/2018  8:16 PM CDT -----  Patient Name: Devin LUA Ray  Caller: [place]- Wife  Call Back #:[phone]  Services provided: Homecare  Are you currently at (or near) your place of residence? Yes [facility] [address]   Reason for call:Wants a rn to come to the home to show how to care for dressing that is leaking  Call Center Account #: [facility] [place] [facility]   patient

## 2022-12-02 NOTE — PATIENT PROFILE ADULT - NSPROEXTENSIONSOFSELF_GEN_A_NUR
Patient visit for pre op instructions.  Pre operative instruction booklet given  Hibiclens given to patient with showering instructions    
none

## 2022-12-02 NOTE — H&P ADULT - PROBLEM SELECTOR PLAN 7
Patient on DAPT in 2021 and on atorvastatin 80?   -Will continue aspirin 81 mg for now, PCI >20 years ago, no indication for plavix  -[facility]    #H/o CHF  [facility] re Toprol (EKG w 1st degree AV block) and diuretics (currently patient dehydrated)

## 2022-12-03 LAB
A1C WITH ESTIMATED AVERAGE GLUCOSE RESULT: 5.1 % — SIGNIFICANT CHANGE UP (ref 4–5.6)
ANION GAP SERPL CALC-SCNC: 11 MMOL/L — SIGNIFICANT CHANGE UP (ref 5–17)
BUN SERPL-MCNC: 28 MG/DL — HIGH (ref 7–23)
CALCIUM SERPL-MCNC: 8.3 MG/DL — LOW (ref 8.4–10.5)
CHLORIDE SERPL-SCNC: 115 MMOL/L — HIGH (ref 96–108)
CO2 SERPL-SCNC: 25 MMOL/L — SIGNIFICANT CHANGE UP (ref 22–31)
CREAT SERPL-MCNC: 1.49 MG/DL — HIGH (ref 0.5–1.3)
EGFR: 47 ML/MIN/1.73M2 — LOW
ESTIMATED AVERAGE GLUCOSE: 100 MG/DL — SIGNIFICANT CHANGE UP (ref 68–114)
GLUCOSE BLDC GLUCOMTR-MCNC: 116 MG/DL — HIGH (ref 70–99)
GLUCOSE BLDC GLUCOMTR-MCNC: 126 MG/DL — HIGH (ref 70–99)
GLUCOSE BLDC GLUCOMTR-MCNC: 130 MG/DL — HIGH (ref 70–99)
GLUCOSE BLDC GLUCOMTR-MCNC: 142 MG/DL — HIGH (ref 70–99)
GLUCOSE SERPL-MCNC: 74 MG/DL — SIGNIFICANT CHANGE UP (ref 70–99)
HCT VFR BLD CALC: 24.8 % — LOW (ref 39–50)
HGB BLD-MCNC: 7.9 G/DL — LOW (ref 13–17)
IRON SATN MFR SERPL: 31 % — SIGNIFICANT CHANGE UP (ref 16–55)
IRON SATN MFR SERPL: 67 UG/DL — SIGNIFICANT CHANGE UP (ref 45–165)
MCHC RBC-ENTMCNC: 29.7 PG — SIGNIFICANT CHANGE UP (ref 27–34)
MCHC RBC-ENTMCNC: 31.9 GM/DL — LOW (ref 32–36)
MCV RBC AUTO: 93.2 FL — SIGNIFICANT CHANGE UP (ref 80–100)
MRSA PCR RESULT.: DETECTED
NRBC # BLD: 0 /100 WBCS — SIGNIFICANT CHANGE UP (ref 0–0)
PLATELET # BLD AUTO: 217 K/UL — SIGNIFICANT CHANGE UP (ref 150–400)
POTASSIUM SERPL-MCNC: 3.3 MMOL/L — LOW (ref 3.5–5.3)
POTASSIUM SERPL-SCNC: 3.3 MMOL/L — LOW (ref 3.5–5.3)
RBC # BLD: 2.66 M/UL — LOW (ref 4.2–5.8)
RBC # FLD: 16.5 % — HIGH (ref 10.3–14.5)
S AUREUS DNA NOSE QL NAA+PROBE: DETECTED
SODIUM SERPL-SCNC: 151 MMOL/L — HIGH (ref 135–145)
TIBC SERPL-MCNC: 217 UG/DL — LOW (ref 220–430)
TRANSFERRIN SERPL-MCNC: 168 MG/DL — LOW (ref 200–360)
UIBC SERPL-MCNC: 150 UG/DL — SIGNIFICANT CHANGE UP (ref 110–370)
WBC # BLD: 3.83 K/UL — SIGNIFICANT CHANGE UP (ref 3.8–10.5)
WBC # FLD AUTO: 3.83 K/UL — SIGNIFICANT CHANGE UP (ref 3.8–10.5)

## 2022-12-03 PROCEDURE — 99232 SBSQ HOSP IP/OBS MODERATE 35: CPT

## 2022-12-03 RX ORDER — MUPIROCIN 20 MG/G
1 OINTMENT TOPICAL
Refills: 0 | Status: COMPLETED | OUTPATIENT
Start: 2022-12-03 | End: 2022-12-08

## 2022-12-03 RX ORDER — POTASSIUM CHLORIDE 20 MEQ
40 PACKET (EA) ORAL ONCE
Refills: 0 | Status: DISCONTINUED | OUTPATIENT
Start: 2022-12-03 | End: 2022-12-03

## 2022-12-03 RX ORDER — POTASSIUM CHLORIDE 20 MEQ
40 PACKET (EA) ORAL ONCE
Refills: 0 | Status: COMPLETED | OUTPATIENT
Start: 2022-12-03 | End: 2022-12-03

## 2022-12-03 RX ORDER — SODIUM CHLORIDE 9 MG/ML
1000 INJECTION, SOLUTION INTRAVENOUS
Refills: 0 | Status: DISCONTINUED | OUTPATIENT
Start: 2022-12-03 | End: 2022-12-04

## 2022-12-03 RX ADMIN — SODIUM CHLORIDE 75 MILLILITER(S): 9 INJECTION, SOLUTION INTRAVENOUS at 00:22

## 2022-12-03 RX ADMIN — CARBIDOPA AND LEVODOPA 1 TABLET(S): 25; 100 TABLET ORAL at 13:50

## 2022-12-03 RX ADMIN — CHLORHEXIDINE GLUCONATE 1 APPLICATION(S): 213 SOLUTION TOPICAL at 11:40

## 2022-12-03 RX ADMIN — ENOXAPARIN SODIUM 40 MILLIGRAM(S): 100 INJECTION SUBCUTANEOUS at 06:08

## 2022-12-03 RX ADMIN — TAMSULOSIN HYDROCHLORIDE 0.4 MILLIGRAM(S): 0.4 CAPSULE ORAL at 22:09

## 2022-12-03 RX ADMIN — CARBIDOPA AND LEVODOPA 1 TABLET(S): 25; 100 TABLET ORAL at 22:11

## 2022-12-03 RX ADMIN — Medication 81 MILLIGRAM(S): at 11:40

## 2022-12-03 RX ADMIN — Medication 40 MILLIEQUIVALENT(S): at 13:52

## 2022-12-03 RX ADMIN — CARBIDOPA AND LEVODOPA 1 TABLET(S): 25; 100 TABLET ORAL at 06:08

## 2022-12-03 RX ADMIN — SODIUM CHLORIDE 70 MILLILITER(S): 9 INJECTION, SOLUTION INTRAVENOUS at 13:54

## 2022-12-03 RX ADMIN — QUETIAPINE FUMARATE 75 MILLIGRAM(S): 200 TABLET, FILM COATED ORAL at 22:08

## 2022-12-03 NOTE — PROGRESS NOTE ADULT - ASSESSMENT
80 yo M HTN, DM, HLD, dementia (AOx2 at baseline), Parkinson's disease, CAD s/p stents x2 (>20 years ago), recent new onset HF (EF 50%, on Xarelto, Moderate diastolic dysfunction (Stage II), moderate pulmonary hypertension, small pericardial effusion), L pleural effusion, BPH, gastric ulcers presenting with COVID    1) COVID+  - ON ROOM AIR SATURATING WELL    2) CAD s/p stent  -cath in 2010 showed mild luminal disease and patent stents  -denies chest pain  -echo with small to  moderate Pericardial effusion with no evidence of tamponade   -c/w asa    3) DVT prophylaxis  -lovenox subq

## 2022-12-03 NOTE — PROGRESS NOTE ADULT - PROBLEM SELECTOR PLAN 1
on admission, most likely in setting of decreased PO intake   -FWD 1.3, s/p LR   -Na of 151 this morning; will give D5 for 12 hours today to see if improves   - continue to monitor BMP   -Encourage PO intake

## 2022-12-03 NOTE — PROGRESS NOTE ADULT - PROBLEM SELECTOR PLAN 4
Most likely in setting of decreased PO intake   -creatinine currently stable; 1.49 this morning   -Encourage PO intake   -Monitor BMP daily  -Avoid nephrotoxic agents  -Renally dose medications

## 2022-12-04 ENCOUNTER — TRANSCRIPTION ENCOUNTER (OUTPATIENT)
Age: 82
End: 2022-12-04

## 2022-12-04 LAB
ANION GAP SERPL CALC-SCNC: 11 MMOL/L — SIGNIFICANT CHANGE UP (ref 5–17)
BUN SERPL-MCNC: 27 MG/DL — HIGH (ref 7–23)
CALCIUM SERPL-MCNC: 8 MG/DL — LOW (ref 8.4–10.5)
CHLORIDE SERPL-SCNC: 112 MMOL/L — HIGH (ref 96–108)
CO2 SERPL-SCNC: 24 MMOL/L — SIGNIFICANT CHANGE UP (ref 22–31)
CREAT SERPL-MCNC: 1.38 MG/DL — HIGH (ref 0.5–1.3)
EGFR: 52 ML/MIN/1.73M2 — LOW
GLUCOSE BLDC GLUCOMTR-MCNC: 104 MG/DL — HIGH (ref 70–99)
GLUCOSE BLDC GLUCOMTR-MCNC: 111 MG/DL — HIGH (ref 70–99)
GLUCOSE BLDC GLUCOMTR-MCNC: 118 MG/DL — HIGH (ref 70–99)
GLUCOSE BLDC GLUCOMTR-MCNC: 128 MG/DL — HIGH (ref 70–99)
GLUCOSE SERPL-MCNC: 102 MG/DL — HIGH (ref 70–99)
HCT VFR BLD CALC: 24.4 % — LOW (ref 39–50)
HGB BLD-MCNC: 7.9 G/DL — LOW (ref 13–17)
MAGNESIUM SERPL-MCNC: 1.4 MG/DL — LOW (ref 1.6–2.6)
MCHC RBC-ENTMCNC: 30 PG — SIGNIFICANT CHANGE UP (ref 27–34)
MCHC RBC-ENTMCNC: 32.4 GM/DL — SIGNIFICANT CHANGE UP (ref 32–36)
MCV RBC AUTO: 92.8 FL — SIGNIFICANT CHANGE UP (ref 80–100)
NRBC # BLD: 0 /100 WBCS — SIGNIFICANT CHANGE UP (ref 0–0)
PHOSPHATE SERPL-MCNC: 2.8 MG/DL — SIGNIFICANT CHANGE UP (ref 2.5–4.5)
PLATELET # BLD AUTO: 221 K/UL — SIGNIFICANT CHANGE UP (ref 150–400)
POTASSIUM SERPL-MCNC: 3.6 MMOL/L — SIGNIFICANT CHANGE UP (ref 3.5–5.3)
POTASSIUM SERPL-SCNC: 3.6 MMOL/L — SIGNIFICANT CHANGE UP (ref 3.5–5.3)
RBC # BLD: 2.63 M/UL — LOW (ref 4.2–5.8)
RBC # FLD: 15.9 % — HIGH (ref 10.3–14.5)
SODIUM SERPL-SCNC: 147 MMOL/L — HIGH (ref 135–145)
WBC # BLD: 4.34 K/UL — SIGNIFICANT CHANGE UP (ref 3.8–10.5)
WBC # FLD AUTO: 4.34 K/UL — SIGNIFICANT CHANGE UP (ref 3.8–10.5)

## 2022-12-04 PROCEDURE — 99232 SBSQ HOSP IP/OBS MODERATE 35: CPT

## 2022-12-04 RX ORDER — MAGNESIUM SULFATE 500 MG/ML
1 VIAL (ML) INJECTION ONCE
Refills: 0 | Status: COMPLETED | OUTPATIENT
Start: 2022-12-04 | End: 2022-12-04

## 2022-12-04 RX ORDER — SODIUM CHLORIDE 9 MG/ML
1000 INJECTION, SOLUTION INTRAVENOUS
Refills: 0 | Status: DISCONTINUED | OUTPATIENT
Start: 2022-12-04 | End: 2022-12-09

## 2022-12-04 RX ORDER — SODIUM CHLORIDE 9 MG/ML
1000 INJECTION, SOLUTION INTRAVENOUS
Refills: 0 | Status: COMPLETED | OUTPATIENT
Start: 2022-12-04 | End: 2022-12-04

## 2022-12-04 RX ADMIN — MUPIROCIN 1 APPLICATION(S): 20 OINTMENT TOPICAL at 21:57

## 2022-12-04 RX ADMIN — MUPIROCIN 1 APPLICATION(S): 20 OINTMENT TOPICAL at 16:07

## 2022-12-04 RX ADMIN — CARBIDOPA AND LEVODOPA 1 TABLET(S): 25; 100 TABLET ORAL at 21:51

## 2022-12-04 RX ADMIN — CARBIDOPA AND LEVODOPA 1 TABLET(S): 25; 100 TABLET ORAL at 06:21

## 2022-12-04 RX ADMIN — QUETIAPINE FUMARATE 75 MILLIGRAM(S): 200 TABLET, FILM COATED ORAL at 21:51

## 2022-12-04 RX ADMIN — Medication 81 MILLIGRAM(S): at 12:21

## 2022-12-04 RX ADMIN — SODIUM CHLORIDE 70 MILLILITER(S): 9 INJECTION, SOLUTION INTRAVENOUS at 14:20

## 2022-12-04 RX ADMIN — CHLORHEXIDINE GLUCONATE 1 APPLICATION(S): 213 SOLUTION TOPICAL at 12:19

## 2022-12-04 RX ADMIN — Medication 100 GRAM(S): at 13:13

## 2022-12-04 RX ADMIN — CARBIDOPA AND LEVODOPA 1 TABLET(S): 25; 100 TABLET ORAL at 13:14

## 2022-12-04 RX ADMIN — ENOXAPARIN SODIUM 40 MILLIGRAM(S): 100 INJECTION SUBCUTANEOUS at 06:21

## 2022-12-04 RX ADMIN — TAMSULOSIN HYDROCHLORIDE 0.4 MILLIGRAM(S): 0.4 CAPSULE ORAL at 21:52

## 2022-12-04 NOTE — PROGRESS NOTE ADULT - PROBLEM SELECTOR PLAN 1
-  on admission, most likely in setting of decreased PO intake   - FWD 1.3, s/p LR   - Na of 151 noted on 12/3; given D5; sodium level improved to 147 this morning; will give D5 for another 12 hours today to see if improving   - continue to monitor BMP   - Encourage PO intake

## 2022-12-04 NOTE — PROGRESS NOTE ADULT - NSPROGADDITIONALINFOA_GEN_ALL_CORE
Discussed with ACPJayla Double Island Pedicle Flap Text: The defect edges were debeveled with a #15 scalpel blade.  Given the location of the defect, shape of the defect and the proximity to free margins a double island pedicle advancement flap was deemed most appropriate.  Using a sterile surgical marker, an appropriate advancement flap was drawn incorporating the defect, outlining the appropriate donor tissue and placing the expected incisions within the relaxed skin tension lines where possible.    The area thus outlined was incised deep to adipose tissue with a #15 scalpel blade.  The skin margins were undermined to an appropriate distance in all directions around the primary defect and laterally outward around the island pedicle utilizing iris scissors.  There was minimal undermining beneath the pedicle flap.

## 2022-12-04 NOTE — DISCHARGE NOTE NURSING/CASE MANAGEMENT/SOCIAL WORK - PATIENT PORTAL LINK FT
You can access the FollowMyHealth Patient Portal offered by [facility] by registering at the following website: [email or web address]. By joining [facility]’s FollowMyHealth portal, you will also be able to view your health information using other applications (apps) compatible with our system.

## 2022-12-04 NOTE — DISCHARGE NOTE NURSING/CASE MANAGEMENT/SOCIAL WORK - NSDCVIVACCINE_GEN_ALL_CORE_FT
Tdap; 12-Sep-2017 11:37; Rosy Cid (RN); [employer]; [id number]; IntraMuscular; Deltoid Right.; 0.5 milliLiter(s); VIS (VIS Published: 09-May-2013, VIS Presented: 12-Sep-2017);   Tdap; 27-May-2021 03:26; Zaria Castano (RN); [employer]; [id number] (Exp. Date: 18-Nov-2022); IntraMuscular; Deltoid Right.; 0.5 milliLiter(s); VIS (VIS Published: 09-May-2013, VIS Presented: 27-May-2021);   Tdap; 29-Sep-2021 09:32; Joya Franklin (RN); [employer]; [id number] (Exp. Date: 01-Oct-2022); IntraMuscular; Deltoid Left.; 0.5 milliLiter(s); VIS (VIS Published: 09-May-2013, VIS Presented: 29-Sep-2021);   Tdap; 27-May-2019 19:37; Sagrario Walters (RN); [employer]; [id number] (Exp. Date: 12-Mar-2021); IntraMuscular; Deltoid Right.; 0.5 milliLiter(s); VIS (VIS Published: 09-May-2013, VIS Presented: 27-May-2019);

## 2022-12-04 NOTE — PROGRESS NOTE ADULT - PROBLEM SELECTOR PLAN 4
Most likely in setting of decreased PO intake   -creatinine currently stable; 1.38 this morning   -Encourage PO intake   -Monitor BMP daily  -Avoid nephrotoxic agents  -Renally dose medications

## 2022-12-05 LAB
ANION GAP SERPL CALC-SCNC: 11 MMOL/L — SIGNIFICANT CHANGE UP (ref 5–17)
BLD GP AB SCN SERPL QL: NEGATIVE — SIGNIFICANT CHANGE UP
BUN SERPL-MCNC: 23 MG/DL — SIGNIFICANT CHANGE UP (ref 7–23)
CALCIUM SERPL-MCNC: 8.3 MG/DL — LOW (ref 8.4–10.5)
CHLORIDE SERPL-SCNC: 109 MMOL/L — HIGH (ref 96–108)
CO2 SERPL-SCNC: 25 MMOL/L — SIGNIFICANT CHANGE UP (ref 22–31)
CREAT SERPL-MCNC: 1.34 MG/DL — HIGH (ref 0.5–1.3)
EGFR: 54 ML/MIN/1.73M2 — LOW
GLUCOSE BLDC GLUCOMTR-MCNC: 106 MG/DL — HIGH (ref 70–99)
GLUCOSE BLDC GLUCOMTR-MCNC: 159 MG/DL — HIGH (ref 70–99)
GLUCOSE BLDC GLUCOMTR-MCNC: 91 MG/DL — SIGNIFICANT CHANGE UP (ref 70–99)
GLUCOSE BLDC GLUCOMTR-MCNC: 94 MG/DL — SIGNIFICANT CHANGE UP (ref 70–99)
GLUCOSE SERPL-MCNC: 89 MG/DL — SIGNIFICANT CHANGE UP (ref 70–99)
HCT VFR BLD CALC: 24.5 % — LOW (ref 39–50)
HGB BLD-MCNC: 7.7 G/DL — LOW (ref 13–17)
LACTATE SERPL-SCNC: 0.7 MMOL/L — SIGNIFICANT CHANGE UP (ref 0.5–2)
MAGNESIUM SERPL-MCNC: 1.7 MG/DL — SIGNIFICANT CHANGE UP (ref 1.6–2.6)
MCHC RBC-ENTMCNC: 29.5 PG — SIGNIFICANT CHANGE UP (ref 27–34)
MCHC RBC-ENTMCNC: 31.4 GM/DL — LOW (ref 32–36)
MCV RBC AUTO: 93.9 FL — SIGNIFICANT CHANGE UP (ref 80–100)
NRBC # BLD: 0 /100 WBCS — SIGNIFICANT CHANGE UP (ref 0–0)
PLATELET # BLD AUTO: 230 K/UL — SIGNIFICANT CHANGE UP (ref 150–400)
POTASSIUM SERPL-MCNC: 3.5 MMOL/L — SIGNIFICANT CHANGE UP (ref 3.5–5.3)
POTASSIUM SERPL-SCNC: 3.5 MMOL/L — SIGNIFICANT CHANGE UP (ref 3.5–5.3)
RBC # BLD: 2.61 M/UL — LOW (ref 4.2–5.8)
RBC # FLD: 15.7 % — HIGH (ref 10.3–14.5)
RH IG SCN BLD-IMP: POSITIVE — SIGNIFICANT CHANGE UP
SODIUM SERPL-SCNC: 145 MMOL/L — SIGNIFICANT CHANGE UP (ref 135–145)
WBC # BLD: 3.95 K/UL — SIGNIFICANT CHANGE UP (ref 3.8–10.5)
WBC # FLD AUTO: 3.95 K/UL — SIGNIFICANT CHANGE UP (ref 3.8–10.5)

## 2022-12-05 PROCEDURE — 99232 SBSQ HOSP IP/OBS MODERATE 35: CPT

## 2022-12-05 RX ORDER — MAGNESIUM SULFATE 500 MG/ML
2 VIAL (ML) INJECTION ONCE
Refills: 0 | Status: COMPLETED | OUTPATIENT
Start: 2022-12-05 | End: 2022-12-05

## 2022-12-05 RX ADMIN — QUETIAPINE FUMARATE 75 MILLIGRAM(S): 200 TABLET, FILM COATED ORAL at 22:09

## 2022-12-05 RX ADMIN — CARBIDOPA AND LEVODOPA 1 TABLET(S): 25; 100 TABLET ORAL at 13:13

## 2022-12-05 RX ADMIN — MUPIROCIN 1 APPLICATION(S): 20 OINTMENT TOPICAL at 05:51

## 2022-12-05 RX ADMIN — CARBIDOPA AND LEVODOPA 1 TABLET(S): 25; 100 TABLET ORAL at 22:08

## 2022-12-05 RX ADMIN — TAMSULOSIN HYDROCHLORIDE 0.4 MILLIGRAM(S): 0.4 CAPSULE ORAL at 22:08

## 2022-12-05 RX ADMIN — CARBIDOPA AND LEVODOPA 1 TABLET(S): 25; 100 TABLET ORAL at 05:32

## 2022-12-05 RX ADMIN — ENOXAPARIN SODIUM 40 MILLIGRAM(S): 100 INJECTION SUBCUTANEOUS at 05:22

## 2022-12-05 RX ADMIN — Medication 25 GRAM(S): at 16:40

## 2022-12-05 RX ADMIN — CHLORHEXIDINE GLUCONATE 1 APPLICATION(S): 213 SOLUTION TOPICAL at 12:38

## 2022-12-05 RX ADMIN — Medication 81 MILLIGRAM(S): at 12:37

## 2022-12-05 RX ADMIN — MUPIROCIN 1 APPLICATION(S): 20 OINTMENT TOPICAL at 18:25

## 2022-12-05 RX ADMIN — Medication 2: at 12:47

## 2022-12-05 NOTE — CHART NOTE - NSCHARTNOTEFT_GEN_A_CORE
Called by RN, Tmax 94.7; patient seen at bedside, no acute distress. Skin warm to touch. Vital signs are otherwise normal. Will re-check temp in 1hour     Plan:   - lactate level stat.  - hyperthermic blanket applied     Esperanza Rodgers NP   Department of Medicine [id number]. Called by RN, rectal Tmax 94.7; patient seen at bedside, no acute distress. Skin warm to touch. Vital signs are otherwise normal. Will re-check temp in 1hour     Plan:   - Warm blankets applied   - lactate level stat.  - hyperthermic blanket future if needed     Esperanza Rodgers NP   Department of Medicine [id number].

## 2022-12-05 NOTE — PROGRESS NOTE ADULT - NSPROGADDITIONALINFOA_GEN_ALL_CORE
If temp remains stable (was hypothermic requiring bear hugger within last 24hrs) and MIKEL continues to improve will be medically cleared however unclear if pt can go home w/ COVID + and no aide services. CM aware

## 2022-12-05 NOTE — PROGRESS NOTE ADULT - PROBLEM SELECTOR PLAN 4
Most likely in setting of decreased PO intake   -creatinine currently stable; improving  -Encourage PO intake   -Monitor BMP daily  -Avoid nephrotoxic agents  -Renally dose medications

## 2022-12-05 NOTE — PATIENT PROFILE ADULT. - AS SC BRADEN MOISTURE
[facility] ED NOTE    Patient : Jaz Casrto Age: 65 year old Sex: female   MRN: [id number] Encounter Date: 12/5/2022    History of Present Illness      Chief Complaint   Patient presents with   • Dog Bite     Patient 65-year-old female with past medical history interpreted in the emergency department with right wrist dog bite.  Patient states that this happened on Saturday night.  Patient states that it was her own dog.  States that after it happened she cleaned out with soap and water.  She states that since then she has noticed \"a little bit\" of redness around the area so wanted to come in for evaluation. Last tetanus in June of this year.  Denies any fevers.  Denies any redness that extends up her arm.  Denies any further injury other than one puncture wound that is on the ulnar aspect of the ventral surface of her arm.  Denies numbness/tingling. Denies any other symptoms at this time          Review of Systems   All other symptoms were negative except noted in the HPI      Past Medical History     No Known Allergies    Past Medical History:   Diagnosis Date   • Essential (primary) hypertension        No past surgical history on file.    No family history on file.         Physical Exam     ED Triage Vitals [12/05/22 1424]   ED Triage Vitals Group      Temp 97.7 °F (36.5 °C)      Heart Rate (!) 58      Resp 16      BP (!) 162/79      SpO2 99 %      EtCO2 mmHg       Height 5' 3.5\" (1.613 m)      Weight 180 lb 1.9 oz (81.7 kg)      Weight Scale Used Standing scale      BMI (Calculated) 31.41      IBW/kg (Calculated) 53.55       Pulse Ox is [99] on Room Air which is [normal] for this patient    Physical Exam  Vitals and nursing note reviewed.   Constitutional:       Appearance: Normal appearance.   HENT:      Head: Normocephalic and atraumatic.      Nose: Nose normal.      Mouth/Throat:      Mouth: Mucous membranes are moist.      Pharynx: Oropharynx is clear.      Neck: Normal range of motion and neck  supple.   Eyes:      Conjunctiva/sclera: Conjunctivae normal.   Neck:      Trachea: Trachea normal.   Cardiovascular:      Rate and Rhythm: Normal rate.      Pulses: Normal pulses.   Pulmonary:      Effort: Pulmonary effort is normal.   Abdominal:      General: Abdomen is flat.   Musculoskeletal:         General: Normal range of motion.      Comments: To the dorsum of the patient's right wrist there is evidence of well circumscribed area of mild soft tissue swelling and erythema measuring approximately 3 cm x 5 cm.  There is no streaking or extension of redness up patient's arm.  There is no swelling or redness to one particular joint.  There is evidence of well-healed scabbed puncture ciarra to the patient's lateral wrist. No deficit to ROM of entire upper extremity including wrist. CMS intact with good radial pulse and good cap refill.  Compartment of the upper extremity is soft   Skin:     General: Skin is warm and dry.      Capillary Refill: Capillary refill takes less than 2 seconds.   Neurological:      General: No focal deficit present.      Mental Status: She is alert.   Psychiatric:         Mood and Affect: Mood normal.         Behavior: Behavior is cooperative.         ED Course     Patient evaluated.  Work-up ordered.    On reevaluation patient resting comfortably.  States ready go home.    Discharge instructions and return precautions communicated to patient.  Patient left the ER without difficulty.         Procedures    ED Medication Orders (From admission, onward)    Ordered Start     Status Ordering Provider    12/05/22 1433 12/05/22 1445  amoxicillin-clavulanate (AUGMENTIN) 875-125 MG tablet 1 tablet  ONCE         Last MAR action: Given AWA CONDE          Vitals:    12/05/22 1424 12/05/22 1615   BP: (!) 162/79 (!) 155/78   Pulse: (!) 58 62   Resp: 16 20   Temp: 97.7 °F (36.5 °C)    SpO2: 99% 99%   Weight: 81.7 kg (180 lb 1.9 oz)    Height: 5' 3.5\" (1.613 m)        Lab Results     Results for  orders placed or performed during the hospital encounter of 12/05/22   Basic Metabolic Panel   Result Value Ref Range    Fasting Status      Sodium 135 135 - 145 mmol/L    Potassium 4.2 3.4 - 5.1 mmol/L    Chloride 101 97 - 110 mmol/L    Carbon Dioxide 28 21 - 32 mmol/L    Anion Gap 10 7 - 19 mmol/L    Glucose 104 (H) 70 - 99 mg/dL    BUN 11 6 - 20 mg/dL    Creatinine 0.71 0.51 - 0.95 mg/dL    Glomerular Filtration Rate >90 >=60    BUN/ Creatinine Ratio 15 7 - 25    Calcium 8.7 8.4 - 10.2 mg/dL   C Reactive Protein   Result Value Ref Range    C-Reactive Protein 3.4 (H) <=1.0 mg/dL   CBC with Automated Differential (performable only)   Result Value Ref Range    WBC 6.0 4.2 - 11.0 K/mcL    RBC 4.03 4.00 - 5.20 mil/mcL    HGB 11.1 (L) 12.0 - 15.5 g/dL    HCT 33.6 (L) 36.0 - 46.5 %    MCV 83.4 78.0 - 100.0 fl    MCH 27.5 26.0 - 34.0 pg    MCHC 33.0 32.0 - 36.5 g/dL    RDW-CV 15.8 (H) 11.0 - 15.0 %    RDW-SD 48.1 39.0 - 50.0 fL     140 - 450 K/mcL    NRBC 0 <=0 /100 WBC    Neutrophil, Percent 56 %    Lymphocytes, Percent 32 %    Mono, Percent 9 %    Eosinophils, Percent 3 %    Basophils, Percent 0 %    Immature Granulocytes 0 %    Absolute Neutrophils 3.4 1.8 - 7.7 K/mcL    Absolute Lymphocytes 1.9 1.0 - 4.0 K/mcL    Absolute Monocytes 0.6 0.3 - 0.9 K/mcL    Absolute Eosinophils  0.2 0.0 - 0.5 K/mcL    Absolute Basophils 0.0 0.0 - 0.3 K/mcL    Absolute Immmature Granulocytes 0.0 0.0 - 0.2 K/mcL       Radiology Results     Imaging Results          XR WRIST MIN 3 VIEWS RIGHT (Final result)  Result time 12/05/22 15:27:35    Final result                 Impression:    1. No radiographic evidence of acute fracture or dislocation at the right  wrist.  2. If the patient's wrist pain persists, then further evaluation is  recommended with MRI.    Electronically Signed by: MALIHA BARNETT MD   Signed on: 12/5/2022 3:27 PM                Narrative:    EXAM: XR WRIST MIN 3 VIEWS RIGHT    CLINICAL INDICATION: Pain in the right  wrist.    COMPARISON: No prior exams are available for comparison.    TECHNIQUE: 4 views of the right wrist are obtained.      FINDINGS:  No radiographic evidence of acute fracture or dislocation is noted at the  right wrist.  Mild degenerative changes are present.  There is no bone destructive lesion.                                    Medical Decision Making    Differential Diagnosis: Multiple medical diagnoses were considered including, but not limited to, cellulitis, fracture, open fracture, gout, septic joint, septic arthritis, rabies    MDM    Patient is a 65-year-old presenting to the emergency department with dog bite.  Please see HPI for detailed account.  While the patient remained on the unit they remained afebrile, in no acute distress, and vital signs remained stable despite mild asymptomatic hypertension.  Please see physical exam for detailed account.  Please see ED course for reevaluations.    Is the patient potentially septic?  No unlikely based on vital    Patient unlikely to need rabies prophylaxis at this time as dog was her dog.    No leukocytosis.  There is mild depression hemoglobin howeverl past values patient has hemoglobins around this level so likely reflects baseline.  No significant deranged metabolic panel could no changes to bicarb or anion gap pointing way from systemic etiology Causby symptoms.  Patient CRP is mildly elevated however this is a nonspecific finding and likely to be elevated in the setting of a superficial cellulitis.    Likely patient is suffering from superficial cellulitis.  No red flag signs symptoms on exam or history that would require further intervention at this time.  Patient will be started on Augmentin and was given a dose here.  She was advised on taking this medication.  Advised on PCP follow-up within 2 days.  She was skin marked and was given precautions regarding the skin marking. Advised on supportive care measures, red flag signs symptoms, and  follow-up instructions.  All question answered.  Comfortable discharge at this time.  Low suspicion for acute emergent pathology requiring further intervention at this time.    Although multiple diagnoses were considered patient was diagnosed with dog bite, cellulitis    Return precautions were given to patient and included but not limited to new increased or worsening symptoms despite medications, fevers, chest pain, shortness of breath, altered mental status, inability to stay hydrated, rash, facial droop, arm weakness, slurred speech, redness or swelling that extends up and down your arm, inability to move arm, redness or swelling that it localized to 1 joint    Copy of results given to patient    Follow-up with PCP given to patient within 2 days.      Clinical Impression     ED Diagnosis   1. Dog bite, initial encounter     2. Cellulitis of right upper extremity         Disposition        Discharge 12/5/2022  4:12 PM  Jaz Castro discharge to home/self care.          Som Burrell PA-C   12/5/2022 2:33 PM     Discharge Medication List as of 12/5/2022  4:13 PM      START taking these medications    Details   amoxicillin-clavulanate (Augmentin) 875-125 MG per tablet Take 1 tablet by mouth in the morning and 1 tablet in the evening. Do all this for 10 days.Eprescribe, Disp-20 tablet, R-0              History, physical exam, discharge instructions and return precautions delivered using  if appropriate.    Standard health and safety precautions were followed during patient encounter unless otherwise specified.    Charting was completed using Dragon dictation software and may include inadvertent errors related to voice to text. Please contact for clarification regarding any noted discrepancies.    Employed shared decision making with the patient and all questions answered.  The patient and/or family were counseled on the preliminary diagnosis, treatment, prescription medications (especially for any  sedating medications) if applicable, and instructed on concerning signs and symptoms and when to return to the ED for further evaluation.  Involved parties verbalized their understanding of the instructions given.     Som Burrell PA-C  12/05/22 1752       Som Burrell PA-C  12/05/22 2028     (4) rarely moist

## 2022-12-06 LAB
ANION GAP SERPL CALC-SCNC: 12 MMOL/L — SIGNIFICANT CHANGE UP (ref 5–17)
BUN SERPL-MCNC: 24 MG/DL — HIGH (ref 7–23)
CALCIUM SERPL-MCNC: 8.2 MG/DL — LOW (ref 8.4–10.5)
CHLORIDE SERPL-SCNC: 110 MMOL/L — HIGH (ref 96–108)
CO2 SERPL-SCNC: 25 MMOL/L — SIGNIFICANT CHANGE UP (ref 22–31)
CREAT SERPL-MCNC: 1.36 MG/DL — HIGH (ref 0.5–1.3)
EGFR: 53 ML/MIN/1.73M2 — LOW
GLUCOSE BLDC GLUCOMTR-MCNC: 130 MG/DL — HIGH (ref 70–99)
GLUCOSE BLDC GLUCOMTR-MCNC: 141 MG/DL — HIGH (ref 70–99)
GLUCOSE BLDC GLUCOMTR-MCNC: 88 MG/DL — SIGNIFICANT CHANGE UP (ref 70–99)
GLUCOSE BLDC GLUCOMTR-MCNC: 99 MG/DL — SIGNIFICANT CHANGE UP (ref 70–99)
GLUCOSE SERPL-MCNC: 80 MG/DL — SIGNIFICANT CHANGE UP (ref 70–99)
HCT VFR BLD CALC: 25.3 % — LOW (ref 39–50)
HGB BLD-MCNC: 7.9 G/DL — LOW (ref 13–17)
MCHC RBC-ENTMCNC: 29.6 PG — SIGNIFICANT CHANGE UP (ref 27–34)
MCHC RBC-ENTMCNC: 31.2 GM/DL — LOW (ref 32–36)
MCV RBC AUTO: 94.8 FL — SIGNIFICANT CHANGE UP (ref 80–100)
NRBC # BLD: 0 /100 WBCS — SIGNIFICANT CHANGE UP (ref 0–0)
PLATELET # BLD AUTO: 239 K/UL — SIGNIFICANT CHANGE UP (ref 150–400)
POTASSIUM SERPL-MCNC: 3.5 MMOL/L — SIGNIFICANT CHANGE UP (ref 3.5–5.3)
POTASSIUM SERPL-SCNC: 3.5 MMOL/L — SIGNIFICANT CHANGE UP (ref 3.5–5.3)
RBC # BLD: 2.67 M/UL — LOW (ref 4.2–5.8)
RBC # FLD: 15.5 % — HIGH (ref 10.3–14.5)
SODIUM SERPL-SCNC: 147 MMOL/L — HIGH (ref 135–145)
WBC # BLD: 4.62 K/UL — SIGNIFICANT CHANGE UP (ref 3.8–10.5)
WBC # FLD AUTO: 4.62 K/UL — SIGNIFICANT CHANGE UP (ref 3.8–10.5)

## 2022-12-06 PROCEDURE — 99232 SBSQ HOSP IP/OBS MODERATE 35: CPT

## 2022-12-06 RX ORDER — SODIUM CHLORIDE 9 MG/ML
1000 INJECTION, SOLUTION INTRAVENOUS
Refills: 0 | Status: DISCONTINUED | OUTPATIENT
Start: 2022-12-06 | End: 2022-12-09

## 2022-12-06 RX ADMIN — MUPIROCIN 1 APPLICATION(S): 20 OINTMENT TOPICAL at 18:49

## 2022-12-06 RX ADMIN — Medication 81 MILLIGRAM(S): at 11:18

## 2022-12-06 RX ADMIN — CHLORHEXIDINE GLUCONATE 1 APPLICATION(S): 213 SOLUTION TOPICAL at 13:03

## 2022-12-06 RX ADMIN — SODIUM CHLORIDE 75 MILLILITER(S): 9 INJECTION, SOLUTION INTRAVENOUS at 21:57

## 2022-12-06 RX ADMIN — CARBIDOPA AND LEVODOPA 1 TABLET(S): 25; 100 TABLET ORAL at 05:28

## 2022-12-06 RX ADMIN — SODIUM CHLORIDE 75 MILLILITER(S): 9 INJECTION, SOLUTION INTRAVENOUS at 11:14

## 2022-12-06 RX ADMIN — CARBIDOPA AND LEVODOPA 1 TABLET(S): 25; 100 TABLET ORAL at 21:58

## 2022-12-06 RX ADMIN — ENOXAPARIN SODIUM 40 MILLIGRAM(S): 100 INJECTION SUBCUTANEOUS at 05:28

## 2022-12-06 RX ADMIN — TAMSULOSIN HYDROCHLORIDE 0.4 MILLIGRAM(S): 0.4 CAPSULE ORAL at 21:58

## 2022-12-06 RX ADMIN — CARBIDOPA AND LEVODOPA 1 TABLET(S): 25; 100 TABLET ORAL at 15:07

## 2022-12-06 RX ADMIN — Medication 0: at 13:02

## 2022-12-06 RX ADMIN — QUETIAPINE FUMARATE 75 MILLIGRAM(S): 200 TABLET, FILM COATED ORAL at 21:58

## 2022-12-06 RX ADMIN — Medication 0: at 09:01

## 2022-12-06 NOTE — PROGRESS NOTE ADULT - NSPROGADDITIONALINFOA_GEN_ALL_CORE
DC if MIKEL resolved and aids can take him back home DC when MIKEL resolved and aids can take him back home- on IVFF for MIKEL/hypernatremia OMAR when MIKEL resolved and aids can take him back home- on IVFF for MIKEL/hypernatremia  Updated brother by phone Berto

## 2022-12-06 NOTE — PROGRESS NOTE ADULT - PROBLEM SELECTOR PLAN 4
Most likely in setting of decreased PO intake   -creatinine currently stable; improving bmp pending today  -Encourage PO intake   -Monitor BMP daily  -Avoid nephrotoxic agents  -Renally dose medications Most likely in setting of decreased PO intake   -creatinine currently still elevated will start LR  -Encourage PO intake   -Monitor BMP daily  -Avoid nephrotoxic agents  -Renally dose medications

## 2022-12-06 NOTE — PROGRESS NOTE ADULT - PROBLEM SELECTOR PLAN 1
Resolved, was setting of decreased PO intake   - Encourage PO intake  -awaiting bmp this am Resolved, was setting of decreased PO intake   - Encourage PO intake  -still hyper NA will start LR

## 2022-12-07 LAB
ANION GAP SERPL CALC-SCNC: 11 MMOL/L — SIGNIFICANT CHANGE UP (ref 5–17)
BUN SERPL-MCNC: 24 MG/DL — HIGH (ref 7–23)
CALCIUM SERPL-MCNC: 8.8 MG/DL — SIGNIFICANT CHANGE UP (ref 8.4–10.5)
CHLORIDE SERPL-SCNC: 109 MMOL/L — HIGH (ref 96–108)
CO2 SERPL-SCNC: 25 MMOL/L — SIGNIFICANT CHANGE UP (ref 22–31)
CREAT SERPL-MCNC: 1.45 MG/DL — HIGH (ref 0.5–1.3)
EGFR: 49 ML/MIN/1.73M2 — LOW
GLUCOSE BLDC GLUCOMTR-MCNC: 123 MG/DL — HIGH (ref 70–99)
GLUCOSE BLDC GLUCOMTR-MCNC: 123 MG/DL — HIGH (ref 70–99)
GLUCOSE BLDC GLUCOMTR-MCNC: 133 MG/DL — HIGH (ref 70–99)
GLUCOSE BLDC GLUCOMTR-MCNC: 82 MG/DL — SIGNIFICANT CHANGE UP (ref 70–99)
GLUCOSE SERPL-MCNC: 71 MG/DL — SIGNIFICANT CHANGE UP (ref 70–99)
HCT VFR BLD CALC: 26.2 % — LOW (ref 39–50)
HGB BLD-MCNC: 8.3 G/DL — LOW (ref 13–17)
MCHC RBC-ENTMCNC: 29.5 PG — SIGNIFICANT CHANGE UP (ref 27–34)
MCHC RBC-ENTMCNC: 31.7 GM/DL — LOW (ref 32–36)
MCV RBC AUTO: 93.2 FL — SIGNIFICANT CHANGE UP (ref 80–100)
NRBC # BLD: 0 /100 WBCS — SIGNIFICANT CHANGE UP (ref 0–0)
PLATELET # BLD AUTO: 256 K/UL — SIGNIFICANT CHANGE UP (ref 150–400)
POTASSIUM SERPL-MCNC: 3.7 MMOL/L — SIGNIFICANT CHANGE UP (ref 3.5–5.3)
POTASSIUM SERPL-SCNC: 3.7 MMOL/L — SIGNIFICANT CHANGE UP (ref 3.5–5.3)
RBC # BLD: 2.81 M/UL — LOW (ref 4.2–5.8)
RBC # FLD: 15.5 % — HIGH (ref 10.3–14.5)
SODIUM SERPL-SCNC: 145 MMOL/L — SIGNIFICANT CHANGE UP (ref 135–145)
WBC # BLD: 4.65 K/UL — SIGNIFICANT CHANGE UP (ref 3.8–10.5)
WBC # FLD AUTO: 4.65 K/UL — SIGNIFICANT CHANGE UP (ref 3.8–10.5)

## 2022-12-07 PROCEDURE — 99232 SBSQ HOSP IP/OBS MODERATE 35: CPT

## 2022-12-07 RX ADMIN — CARBIDOPA AND LEVODOPA 1 TABLET(S): 25; 100 TABLET ORAL at 21:41

## 2022-12-07 RX ADMIN — ENOXAPARIN SODIUM 40 MILLIGRAM(S): 100 INJECTION SUBCUTANEOUS at 05:07

## 2022-12-07 RX ADMIN — CARBIDOPA AND LEVODOPA 1 TABLET(S): 25; 100 TABLET ORAL at 05:07

## 2022-12-07 RX ADMIN — CHLORHEXIDINE GLUCONATE 1 APPLICATION(S): 213 SOLUTION TOPICAL at 11:17

## 2022-12-07 RX ADMIN — QUETIAPINE FUMARATE 75 MILLIGRAM(S): 200 TABLET, FILM COATED ORAL at 21:41

## 2022-12-07 RX ADMIN — TAMSULOSIN HYDROCHLORIDE 0.4 MILLIGRAM(S): 0.4 CAPSULE ORAL at 21:41

## 2022-12-07 RX ADMIN — CARBIDOPA AND LEVODOPA 1 TABLET(S): 25; 100 TABLET ORAL at 16:34

## 2022-12-07 RX ADMIN — MUPIROCIN 1 APPLICATION(S): 20 OINTMENT TOPICAL at 17:40

## 2022-12-07 RX ADMIN — MUPIROCIN 1 APPLICATION(S): 20 OINTMENT TOPICAL at 05:08

## 2022-12-07 RX ADMIN — Medication 81 MILLIGRAM(S): at 11:17

## 2022-12-07 NOTE — PROGRESS NOTE ADULT - PROBLEM SELECTOR PLAN 4
Most likely in setting of decreased PO intake   -creatinine currently still elevated will start LR  -Encourage PO intake   -Monitor BMP daily  -Avoid nephrotoxic agents  -Renally dose medications

## 2022-12-07 NOTE — PROGRESS NOTE ADULT - PROBLEM SELECTOR PLAN 1
Resolved, was setting of decreased PO intake   - Encourage PO intake  -still hyper NA will continue LR    #MIKEL  Check bladder scan  Discussed with PCA some iVF infiltrated likely didn't get the LR

## 2022-12-07 NOTE — PROGRESS NOTE ADULT - ASSESSMENT
82 yo M HTN, DM, HLD, dementia (AOx2 at baseline), Parkinson's disease, CAD s/p stents x2 (>20 years ago), recent new onset HF (EF 50%, on Xarelto, Moderate diastolic dysfunction (Stage II), moderate pulmonary hypertension, small pericardial effusion), L pleural effusion, BPH, gastric ulcers presenting with COVID    1) COVID+  -sating well on RA    2) CAD s/p stent  -cath in 2010 showed mild luminal disease and patent stents  -denies chest pain  -echo with small to  moderate Pericardial effusion with no evidence of tamponade   -c/w asa    3) DVT prophylaxis  -lovenox subq

## 2022-12-08 DIAGNOSIS — N17.9 ACUTE KIDNEY FAILURE, UNSPECIFIED: ICD-10-CM

## 2022-12-08 LAB
ALBUMIN SERPL ELPH-MCNC: 3.3 G/DL — SIGNIFICANT CHANGE UP (ref 3.3–5)
ALP SERPL-CCNC: 120 U/L — SIGNIFICANT CHANGE UP (ref 40–120)
ALT FLD-CCNC: 6 U/L — LOW (ref 10–45)
ANION GAP SERPL CALC-SCNC: 13 MMOL/L — SIGNIFICANT CHANGE UP (ref 5–17)
ANION GAP SERPL CALC-SCNC: 14 MMOL/L — SIGNIFICANT CHANGE UP (ref 5–17)
ANISOCYTOSIS BLD QL: SLIGHT — SIGNIFICANT CHANGE UP
APTT BLD: 34.5 SEC — SIGNIFICANT CHANGE UP (ref 27.5–35.5)
AST SERPL-CCNC: 16 U/L — SIGNIFICANT CHANGE UP (ref 10–40)
BASOPHILS # BLD AUTO: 0.11 K/UL — SIGNIFICANT CHANGE UP (ref 0–0.2)
BASOPHILS NFR BLD AUTO: 0.9 % — SIGNIFICANT CHANGE UP (ref 0–2)
BILIRUB SERPL-MCNC: 1.1 MG/DL — SIGNIFICANT CHANGE UP (ref 0.2–1.2)
BLD GP AB SCN SERPL QL: NEGATIVE — SIGNIFICANT CHANGE UP
BUN SERPL-MCNC: 24 MG/DL — HIGH (ref 7–23)
BUN SERPL-MCNC: 27 MG/DL — HIGH (ref 7–23)
CALCIUM SERPL-MCNC: 8.3 MG/DL — LOW (ref 8.4–10.5)
CALCIUM SERPL-MCNC: 8.8 MG/DL — SIGNIFICANT CHANGE UP (ref 8.4–10.5)
CHLORIDE SERPL-SCNC: 109 MMOL/L — HIGH (ref 96–108)
CHLORIDE SERPL-SCNC: 109 MMOL/L — HIGH (ref 96–108)
CO2 SERPL-SCNC: 22 MMOL/L — SIGNIFICANT CHANGE UP (ref 22–31)
CO2 SERPL-SCNC: 24 MMOL/L — SIGNIFICANT CHANGE UP (ref 22–31)
CREAT SERPL-MCNC: 1.58 MG/DL — HIGH (ref 0.5–1.3)
CREAT SERPL-MCNC: 1.7 MG/DL — HIGH (ref 0.5–1.3)
DACRYOCYTES BLD QL SMEAR: SLIGHT — SIGNIFICANT CHANGE UP
EGFR: 40 ML/MIN/1.73M2 — LOW
EGFR: 44 ML/MIN/1.73M2 — LOW
EOSINOPHIL # BLD AUTO: 0 K/UL — SIGNIFICANT CHANGE UP (ref 0–0.5)
EOSINOPHIL NFR BLD AUTO: 0 % — SIGNIFICANT CHANGE UP (ref 0–6)
GLUCOSE BLDC GLUCOMTR-MCNC: 103 MG/DL — HIGH (ref 70–99)
GLUCOSE BLDC GLUCOMTR-MCNC: 133 MG/DL — HIGH (ref 70–99)
GLUCOSE BLDC GLUCOMTR-MCNC: 141 MG/DL — HIGH (ref 70–99)
GLUCOSE BLDC GLUCOMTR-MCNC: 146 MG/DL — HIGH (ref 70–99)
GLUCOSE BLDC GLUCOMTR-MCNC: 154 MG/DL — HIGH (ref 70–99)
GLUCOSE BLDC GLUCOMTR-MCNC: 165 MG/DL — HIGH (ref 70–99)
GLUCOSE SERPL-MCNC: 145 MG/DL — HIGH (ref 70–99)
GLUCOSE SERPL-MCNC: 150 MG/DL — HIGH (ref 70–99)
HCT VFR BLD CALC: 20.1 % — CRITICAL LOW (ref 39–50)
HCT VFR BLD CALC: 21.6 % — LOW (ref 39–50)
HCT VFR BLD CALC: 22.1 % — LOW (ref 39–50)
HCT VFR BLD CALC: 23.2 % — LOW (ref 39–50)
HGB BLD-MCNC: 6.5 G/DL — CRITICAL LOW (ref 13–17)
HGB BLD-MCNC: 7 G/DL — CRITICAL LOW (ref 13–17)
HGB BLD-MCNC: 7.1 G/DL — LOW (ref 13–17)
HGB BLD-MCNC: 7.3 G/DL — LOW (ref 13–17)
INR BLD: 1.38 RATIO — HIGH (ref 0.88–1.16)
LYMPHOCYTES # BLD AUTO: 0.89 K/UL — LOW (ref 1–3.3)
LYMPHOCYTES # BLD AUTO: 7 % — LOW (ref 13–44)
MACROCYTES BLD QL: SLIGHT — SIGNIFICANT CHANGE UP
MAGNESIUM SERPL-MCNC: 1.6 MG/DL — SIGNIFICANT CHANGE UP (ref 1.6–2.6)
MANUAL SMEAR VERIFICATION: SIGNIFICANT CHANGE UP
MCHC RBC-ENTMCNC: 29.9 PG — SIGNIFICANT CHANGE UP (ref 27–34)
MCHC RBC-ENTMCNC: 30 PG — SIGNIFICANT CHANGE UP (ref 27–34)
MCHC RBC-ENTMCNC: 30 PG — SIGNIFICANT CHANGE UP (ref 27–34)
MCHC RBC-ENTMCNC: 30.3 PG — SIGNIFICANT CHANGE UP (ref 27–34)
MCHC RBC-ENTMCNC: 31.5 GM/DL — LOW (ref 32–36)
MCHC RBC-ENTMCNC: 31.7 GM/DL — LOW (ref 32–36)
MCHC RBC-ENTMCNC: 32.3 GM/DL — SIGNIFICANT CHANGE UP (ref 32–36)
MCHC RBC-ENTMCNC: 32.9 GM/DL — SIGNIFICANT CHANGE UP (ref 32–36)
MCV RBC AUTO: 92.3 FL — SIGNIFICANT CHANGE UP (ref 80–100)
MCV RBC AUTO: 92.6 FL — SIGNIFICANT CHANGE UP (ref 80–100)
MCV RBC AUTO: 94.4 FL — SIGNIFICANT CHANGE UP (ref 80–100)
MCV RBC AUTO: 95.5 FL — SIGNIFICANT CHANGE UP (ref 80–100)
MONOCYTES # BLD AUTO: 0.56 K/UL — SIGNIFICANT CHANGE UP (ref 0–0.9)
MONOCYTES NFR BLD AUTO: 4.4 % — SIGNIFICANT CHANGE UP (ref 2–14)
NEUTROPHILS # BLD AUTO: 11.19 K/UL — HIGH (ref 1.8–7.4)
NEUTROPHILS NFR BLD AUTO: 87.7 % — HIGH (ref 43–77)
NRBC # BLD: 0 /100 WBCS — SIGNIFICANT CHANGE UP (ref 0–0)
PHOSPHATE SERPL-MCNC: 3.3 MG/DL — SIGNIFICANT CHANGE UP (ref 2.5–4.5)
PLAT MORPH BLD: NORMAL — SIGNIFICANT CHANGE UP
PLATELET # BLD AUTO: 222 K/UL — SIGNIFICANT CHANGE UP (ref 150–400)
PLATELET # BLD AUTO: 247 K/UL — SIGNIFICANT CHANGE UP (ref 150–400)
PLATELET # BLD AUTO: 264 K/UL — SIGNIFICANT CHANGE UP (ref 150–400)
PLATELET # BLD AUTO: 274 K/UL — SIGNIFICANT CHANGE UP (ref 150–400)
POIKILOCYTOSIS BLD QL AUTO: SLIGHT — SIGNIFICANT CHANGE UP
POTASSIUM SERPL-MCNC: 3.6 MMOL/L — SIGNIFICANT CHANGE UP (ref 3.5–5.3)
POTASSIUM SERPL-MCNC: 3.8 MMOL/L — SIGNIFICANT CHANGE UP (ref 3.5–5.3)
POTASSIUM SERPL-SCNC: 3.6 MMOL/L — SIGNIFICANT CHANGE UP (ref 3.5–5.3)
POTASSIUM SERPL-SCNC: 3.8 MMOL/L — SIGNIFICANT CHANGE UP (ref 3.5–5.3)
PROT SERPL-MCNC: 6 G/DL — SIGNIFICANT CHANGE UP (ref 6–8.3)
PROTHROM AB SERPL-ACNC: 15.9 SEC — HIGH (ref 10.5–13.4)
RBC # BLD: 2.17 M/UL — LOW (ref 4.2–5.8)
RBC # BLD: 2.34 M/UL — LOW (ref 4.2–5.8)
RBC # BLD: 2.34 M/UL — LOW (ref 4.2–5.8)
RBC # BLD: 2.43 M/UL — LOW (ref 4.2–5.8)
RBC # FLD: 15.5 % — HIGH (ref 10.3–14.5)
RBC # FLD: 15.6 % — HIGH (ref 10.3–14.5)
RBC # FLD: 15.7 % — HIGH (ref 10.3–14.5)
RBC # FLD: 15.7 % — HIGH (ref 10.3–14.5)
RBC BLD AUTO: ABNORMAL
RH IG SCN BLD-IMP: POSITIVE — SIGNIFICANT CHANGE UP
SCHISTOCYTES BLD QL AUTO: SLIGHT — SIGNIFICANT CHANGE UP
SODIUM SERPL-SCNC: 145 MMOL/L — SIGNIFICANT CHANGE UP (ref 135–145)
SODIUM SERPL-SCNC: 146 MMOL/L — HIGH (ref 135–145)
WBC # BLD: 11.77 K/UL — HIGH (ref 3.8–10.5)
WBC # BLD: 12.76 K/UL — HIGH (ref 3.8–10.5)
WBC # BLD: 7.82 K/UL — SIGNIFICANT CHANGE UP (ref 3.8–10.5)
WBC # BLD: 9.29 K/UL — SIGNIFICANT CHANGE UP (ref 3.8–10.5)
WBC # FLD AUTO: 11.77 K/UL — HIGH (ref 3.8–10.5)
WBC # FLD AUTO: 12.76 K/UL — HIGH (ref 3.8–10.5)
WBC # FLD AUTO: 7.82 K/UL — SIGNIFICANT CHANGE UP (ref 3.8–10.5)
WBC # FLD AUTO: 9.29 K/UL — SIGNIFICANT CHANGE UP (ref 3.8–10.5)

## 2022-12-08 PROCEDURE — 99232 SBSQ HOSP IP/OBS MODERATE 35: CPT

## 2022-12-08 PROCEDURE — 51700 IRRIGATION OF BLADDER: CPT

## 2022-12-08 RX ORDER — TAMSULOSIN HYDROCHLORIDE 0.4 MG/1
0.8 CAPSULE ORAL AT BEDTIME
Refills: 0 | Status: DISCONTINUED | OUTPATIENT
Start: 2022-12-08 | End: 2022-12-09

## 2022-12-08 RX ADMIN — QUETIAPINE FUMARATE 75 MILLIGRAM(S): 200 TABLET, FILM COATED ORAL at 21:53

## 2022-12-08 RX ADMIN — CARBIDOPA AND LEVODOPA 1 TABLET(S): 25; 100 TABLET ORAL at 14:46

## 2022-12-08 RX ADMIN — CHLORHEXIDINE GLUCONATE 1 APPLICATION(S): 213 SOLUTION TOPICAL at 11:10

## 2022-12-08 RX ADMIN — TAMSULOSIN HYDROCHLORIDE 0.8 MILLIGRAM(S): 0.4 CAPSULE ORAL at 21:53

## 2022-12-08 RX ADMIN — CARBIDOPA AND LEVODOPA 1 TABLET(S): 25; 100 TABLET ORAL at 21:52

## 2022-12-08 RX ADMIN — Medication 81 MILLIGRAM(S): at 11:09

## 2022-12-08 RX ADMIN — ENOXAPARIN SODIUM 40 MILLIGRAM(S): 100 INJECTION SUBCUTANEOUS at 06:02

## 2022-12-08 RX ADMIN — MUPIROCIN 1 APPLICATION(S): 20 OINTMENT TOPICAL at 19:38

## 2022-12-08 RX ADMIN — CARBIDOPA AND LEVODOPA 1 TABLET(S): 25; 100 TABLET ORAL at 05:31

## 2022-12-08 RX ADMIN — MUPIROCIN 1 APPLICATION(S): 20 OINTMENT TOPICAL at 05:32

## 2022-12-08 NOTE — CONSULT NOTE ADULT - SUBJECTIVE AND OBJECTIVE BOX
HPI:  82y Male with PMHx 82 year old M PMH of HTN, DM, HLD, dementia,  Parkinson's disease, CAD s/p stents x2 (>20 years ago), CHF (EF: 50%, Moderate diastolic dysfunction (Stage II), moderate pulmonary hypertension, BPH, and gastric ulcers presents to the ED because patient was found to be COVID+ and HHA cannot care for patient. Urology consulted for traumatic caceres removal secondary to patient with dementia and tugging on catheter. Per chart, patient also with gross hematuria few days ago and had caceres placed, but urology first consulted today. Unable to obtain further history secondary to patient's mental status.   Patient seen and examined at bedside, AVSS. H/H 7.1/21.6.  At bedside, 20 Togolese coude placed with wine colored output. Minimal clot evacuated. Urine cleared to translucent fruit punch.     PAST MEDICAL & SURGICAL HISTORY:  Hypercholesterolemia      DM (Diabetes Mellitus)      HTN (Hypertension)      CAD (Coronary Artery Disease)  s/p cardiac stent ( &gt; 20 years ago)      BPH (benign prostatic hyperplasia)      Diabetes      Hypertension, unspecified type      Benign prostatic hyperplasia, unspecified whether lower urinary tract symptoms present      Hyperlipidemia, unspecified hyperlipidemia type      Dementia      Parkinson disease      Nocardiosis      Muscle weakness      BPH (benign prostatic hyperplasia)      Cellulitis      Parkinsons      Dementia      DM (diabetes mellitus)      Coronary Stent  x 2 ( 20 years )      No significant past surgical history          FAMILY HISTORY:  FH: HTN (hypertension)    No known  malignancy     Social History:  Unable to obtain from patient   HCP- Berto Mitchell  Has HHA (02 Dec 2022 00:56)  Denies alcohol and drug abuse, nonsmoker     MEDICATIONS  (STANDING):  carbidopa/levodopa  25/100 1 Tablet(s) Oral three times a day  chlorhexidine 2% Cloths 1 Application(s) Topical daily  dextrose 5%. 1000 milliLiter(s) (70 mL/Hr) IV Continuous <Continuous>  dextrose 5%. 1000 milliLiter(s) (50 mL/Hr) IV Continuous <Continuous>  dextrose 5%. 1000 milliLiter(s) (100 mL/Hr) IV Continuous <Continuous>  dextrose 50% Injectable 25 Gram(s) IV Push once  dextrose 50% Injectable 12.5 Gram(s) IV Push once  dextrose 50% Injectable 25 Gram(s) IV Push once  glucagon  Injectable 1 milliGRAM(s) IntraMuscular once  insulin lispro (ADMELOG) corrective regimen sliding scale   SubCutaneous three times a day before meals  insulin lispro (ADMELOG) corrective regimen sliding scale   SubCutaneous at bedtime  lactated ringers. 1000 milliLiter(s) (75 mL/Hr) IV Continuous <Continuous>  mupirocin 2% Nasal 1 Application(s) Both Nostrils two times a day  QUEtiapine 75 milliGRAM(s) Oral at bedtime  tamsulosin 0.8 milliGRAM(s) Oral at bedtime    MEDICATIONS  (PRN):  acetaminophen     Tablet .. 650 milliGRAM(s) Oral every 6 hours PRN Temp greater or equal to 38C (100.4F), Mild Pain (1 - 3)  dextrose Oral Gel 15 Gram(s) Oral once PRN Blood Glucose LESS THAN 70 milliGRAM(s)/deciliter  guaiFENesin Oral Liquid (Sugar-Free) 100 milliGRAM(s) Oral every 6 hours PRN Cough    Allergies    Allergy Status Unknown    Intolerances      REVIEW OF SYSTEMS: Pertinent positives and negatives as stated in HPI, otherwise negative    Vital signs  T(C): 36.5 (12-08-22 @ 14:43), Max: 36.7 (12-07-22 @ 20:50)  HR: 70 (12-08-22 @ 14:43)  BP: 120/64 (12-08-22 @ 14:43)  SpO2: 100% (12-08-22 @ 14:43)  Wt(kg): --    Physical Exam  Gen: NAD  Pulm: No respiratory distress, no subcostal retractions, no accessory muscle use   Abd: Soft, NT, ND, no rebound tenderness or guarding  : Circumcised, no lesions. Blood noted to meatus. 20 Togolese coude placed with wine colored output. Minimal clot evacuated. Urine cleared to translucent fruit punch.       LABS:        12-08 @ 16:15    WBC --    / Hct --    / SCr 1.58     12-08 @ 14:35    WBC 9.29  / Hct 23.2  / SCr --       12-08    145  |  109<H>  |  24<H>  ----------------------------<  145<H>  3.6   |  22  |  1.58<H>    Ca    8.3<L>      08 Dec 2022 16:15      PT/INR - ( 08 Dec 2022 18:23 )   PT: 15.9 sec;   INR: 1.38 ratio         PTT - ( 08 Dec 2022 18:23 )  PTT:34.5 sec      Urine Cx: pending       HPI:  82y Male with PMHx 82 year old M PMH of HTN, DM, HLD, dementia,  Parkinson's disease, CAD s/p stents x2 (>20 years ago), CHF (EF: 50%, Moderate diastolic dysfunction (Stage II), moderate pulmonary hypertension, BPH, and gastric ulcers presents to the ED because patient was found to be COVID+ and HHA cannot care for patient. Urology consulted for traumatic caceres removal secondary to patient with dementia and tugging on catheter. Per chart, patient also with gross hematuria few days ago and had caceres placed, but urology first consulted today. Unable to obtain further history secondary to patient's mental status.   Patient seen and examined at bedside, AVSS. H/H 7.1/21.6.  At bedside, 22f 3 wau placed; ~400cc of clot evacuated. CBI started, titrated to strawberry on full blast.    PAST MEDICAL & SURGICAL HISTORY:  Hypercholesterolemia      DM (Diabetes Mellitus)      HTN (Hypertension)      CAD (Coronary Artery Disease)  s/p cardiac stent ( &gt; 20 years ago)      BPH (benign prostatic hyperplasia)      Diabetes      Hypertension, unspecified type      Benign prostatic hyperplasia, unspecified whether lower urinary tract symptoms present      Hyperlipidemia, unspecified hyperlipidemia type      Dementia      Parkinson disease      Nocardiosis      Muscle weakness      BPH (benign prostatic hyperplasia)      Cellulitis      Parkinsons      Dementia      DM (diabetes mellitus)      Coronary Stent  x 2 ( 20 years )      No significant past surgical history          FAMILY HISTORY:  FH: HTN (hypertension)    No known  malignancy     Social History:  Unable to obtain from patient   HCP- Berto Mitchell  Has HHA (02 Dec 2022 00:56)  Denies alcohol and drug abuse, nonsmoker     MEDICATIONS  (STANDING):  carbidopa/levodopa  25/100 1 Tablet(s) Oral three times a day  chlorhexidine 2% Cloths 1 Application(s) Topical daily  dextrose 5%. 1000 milliLiter(s) (70 mL/Hr) IV Continuous <Continuous>  dextrose 5%. 1000 milliLiter(s) (50 mL/Hr) IV Continuous <Continuous>  dextrose 5%. 1000 milliLiter(s) (100 mL/Hr) IV Continuous <Continuous>  dextrose 50% Injectable 25 Gram(s) IV Push once  dextrose 50% Injectable 12.5 Gram(s) IV Push once  dextrose 50% Injectable 25 Gram(s) IV Push once  glucagon  Injectable 1 milliGRAM(s) IntraMuscular once  insulin lispro (ADMELOG) corrective regimen sliding scale   SubCutaneous three times a day before meals  insulin lispro (ADMELOG) corrective regimen sliding scale   SubCutaneous at bedtime  lactated ringers. 1000 milliLiter(s) (75 mL/Hr) IV Continuous <Continuous>  mupirocin 2% Nasal 1 Application(s) Both Nostrils two times a day  QUEtiapine 75 milliGRAM(s) Oral at bedtime  tamsulosin 0.8 milliGRAM(s) Oral at bedtime    MEDICATIONS  (PRN):  acetaminophen     Tablet .. 650 milliGRAM(s) Oral every 6 hours PRN Temp greater or equal to 38C (100.4F), Mild Pain (1 - 3)  dextrose Oral Gel 15 Gram(s) Oral once PRN Blood Glucose LESS THAN 70 milliGRAM(s)/deciliter  guaiFENesin Oral Liquid (Sugar-Free) 100 milliGRAM(s) Oral every 6 hours PRN Cough    Allergies    Allergy Status Unknown    Intolerances      REVIEW OF SYSTEMS: Pertinent positives and negatives as stated in HPI, otherwise negative    Vital signs  T(C): 36.5 (12-08-22 @ 14:43), Max: 36.7 (12-07-22 @ 20:50)  HR: 70 (12-08-22 @ 14:43)  BP: 120/64 (12-08-22 @ 14:43)  SpO2: 100% (12-08-22 @ 14:43)  Wt(kg): --    Physical Exam  Gen: NAD  Pulm: No respiratory distress, no subcostal retractions, no accessory muscle use   Abd: Soft, NT, ND, no rebound tenderness or guarding  : Circumcised, no lesions. Blood noted to meatus. 22f 3 way placed; ~400cc of clot evacuated. CBI started, titrated to strawberry on full blast.      LABS:        12-08 @ 16:15    WBC --    / Hct --    / SCr 1.58     12-08 @ 14:35    WBC 9.29  / Hct 23.2  / SCr --       12-08    145  |  109<H>  |  24<H>  ----------------------------<  145<H>  3.6   |  22  |  1.58<H>    Ca    8.3<L>      08 Dec 2022 16:15      PT/INR - ( 08 Dec 2022 18:23 )   PT: 15.9 sec;   INR: 1.38 ratio         PTT - ( 08 Dec 2022 18:23 )  PTT:34.5 sec      Urine Cx: pending

## 2022-12-08 NOTE — CHART NOTE - NSCHARTNOTEFT_GEN_A_CORE
pt pulled FC, profusely bleeding,  RRT was called, Urol was called -- they are coming for re-insertion of FC, restraint, blood transf ordered, Consent is in chart, familyb called , mail box is full, no messege left, spoke to Attending  Sandra Thornton (PA) [employer] # [id number]

## 2022-12-08 NOTE — PROGRESS NOTE ADULT - PROBLEM SELECTOR PLAN 1
Resolved, was setting of decreased PO intake   - Encourage PO intake  -still hyper NA will continue LR

## 2022-12-08 NOTE — CONSULT NOTE ADULT - NS PANP COMMENT GEN_ALL_CORE FT
hematuria s/p caceres catheter placement, worse after patient self removed catheter. hold plavix, continue CBI, irrigate PRN, trend H&H, transfuse PRN. outpatient follow up for hematuria workup in future.

## 2022-12-08 NOTE — CHART NOTE - NSCHARTNOTEFT_GEN_A_CORE
Notified by RN, Pt retaining over 1000mL on bladder scan. Dumont placed with 2,220 mL bloody urine output. Pt seen and evaluated at bedside. Pt alert and in no apparent distress without any complaints of pain. Dumont draining wine colored urine, no clots noted.     Follow up CBC in AM. Consult urology if worsening hematuria, clots noted or poor output    Lachelle Mcneill PA-C  Department of Medicine

## 2022-12-08 NOTE — PROGRESS NOTE ADULT - ASSESSMENT
80 yo M HTN, DM, HLD, dementia (AOx2 at baseline), Parkinson's disease, CAD s/p stents x2 (>20 years ago), recent new onset HF (EF 50%, on Xarelto, Moderate diastolic dysfunction (Stage II), moderate pulmonary hypertension, small pericardial effusion), L pleural effusion, BPH, gastric ulcers presenting with COVID    1) COVID+  -sating well on RA    2) CAD s/p stent  -cath in 2010 showed mild luminal disease and patent stents  -denies chest pain  -echo with small to  moderate Pericardial effusion with no evidence of tamponade   -c/w asa    3) DVT prophylaxis  -lovenox subq

## 2022-12-08 NOTE — CONSULT NOTE ADULT - ASSESSMENT
82y Male with PMHx 82 year old M PMH of HTN, DM, HLD, dementia,  Parkinson's disease, CAD s/p stents x2 (>20 years ago), CHF (EF: 50%, Moderate diastolic dysfunction (Stage II), moderate pulmonary hypertension, BPH, and gastric ulcers presents to the ED because patient was found to be COVID+ and HHA cannot care for patient. Urology consulted for traumatic caceres removal. At bedside, 20 Italian coude placed with wine colored output. Minimal clot evacuated. Urine cleared to translucent fruit punch.        82y Male with PMHx 82 year old M PMH of HTN, DM, HLD, dementia,  Parkinson's disease, CAD s/p stents x2 (>20 years ago), CHF (EF: 50%, Moderate diastolic dysfunction (Stage II), moderate pulmonary hypertension, BPH, and gastric ulcers presents to the ED because patient was found to be COVID+ and HHA cannot care for patient. Urology consulted for traumatic caceres removal. At bedside, 20 Turkmen coude placed with wine colored output. Minimal clot evacuated. Urine cleared to translucent fruit punch.     Recs:  - given light fruit punch color and minimal clots evacuated, no indication for CBI at this time  - continue with caceres catheter; traumatic caceres removal presumed to have caused urethral/prostatic injury. Large size caceres also used for tamponade of urethral injury.   - continue to monitor urine color  - RN staff may hand irrigate catheter PRN   - encourage hydration per primary team protocol  - trend H/H, transfuse PRN per primary team protocol  - gross hematuria may take longer than expected to improve in the setting of anticoagulation  -  flomax if not other contraindication  - Initiate / Continue Bowel Regimen  - Minimize Narcotics / Benzodiazepines  - Minimize anticholinergics / antihistamines  - ultimately, will need gross hematuria workup as outpatient  - please obtain urine culture    [facility] Pager #[phone]    [facility]  [address] [place] [address], [address] [address]  [phone]        82y Male with PMHx 82 year old M PMH of HTN, DM, HLD, dementia,  Parkinson's disease, CAD s/p stents x2 (>20 years ago), CHF (EF: 50%, Moderate diastolic dysfunction (Stage II), moderate pulmonary hypertension, BPH, and gastric ulcers presents to the ED because patient was found to be COVID+ and HHA cannot care for patient. Urology consulted for traumatic caceres removal. At bedside, 20 Polish coude placed with wine colored output. Minimal clot evacuated. Urine cleared to translucent fruit punch.     Recs:  - given large clot burden and dark hematuria, CBI initiated  - continue with caceres catheter; traumatic caceres removal presumed to have caused urethral/prostatic injury. Large size caceres also used for tamponade of urethral injury.   - continue to monitor urine color  - RN staff may hand irrigate catheter PRN   - encourage hydration per primary team protocol  - trend H/H, transfuse PRN per primary team protocol  - gross hematuria may take longer than expected to improve in the setting of anticoagulation  - flomax if not other contraindication  - Initiate / Continue Bowel Regimen  - Minimize Narcotics / Benzodiazepines  - Minimize anticholinergics / antihistamines  - ultimately, will need gross hematuria workup as outpatient  - please obtain urine culture    [facility] Pager #[phone]    [facility]  [address] [place] [address], [address] [address]  [phone]

## 2022-12-08 NOTE — PROGRESS NOTE ADULT - PROBLEM SELECTOR PLAN 4
2/2 urinary retention and pre renal from decreased PO intake  LR @75mls  Bladder scan showing retention x 2 will place caceres  increased Flomax to 0.8mg qhs

## 2022-12-08 NOTE — RAPID RESPONSE TEAM SUMMARY - NSSITUATIONBACKGROUNDRRT_GEN_ALL_CORE
82 year old M PMH of HTN, DM, HLD, dementia,  Parkinson's disease, CAD s/p stents x2 (>20 years ago), CHF (EF: 50%, Moderate diastolic dysfunction (Stage II), moderate pulmonary hypertension, BPH, and gastric ulcers presents to the ED bceause patient was found to be COVID+ and HHA cannot care for patient.   RRT wa called because patient pulled out caceres and was actively bleeding. Most recent Hgb was 7.3, PLT > 200k, INR was  82 year old M PMH of HTN, DM, HLD, dementia,  Parkinson's disease, CAD s/p stents x2 (>20 years ago), CHF (EF: 50%, Moderate diastolic dysfunction (Stage II), moderate pulmonary hypertension, BPH, and gastric ulcers presents to the ED bceause patient was found to be COVID+ and HHA cannot care for patient.   RRT was called because patient pulled out caceres and was actively bleeding. Most recent Hgb was 7.3, PLT was 274k, INR was 1.38. VSS. Aspirin and Lovenox were d/c. 1u of pRBC was ordered. Urology consult was called. Updated primary team at bedside.

## 2022-12-09 DIAGNOSIS — D62 ACUTE POSTHEMORRHAGIC ANEMIA: ICD-10-CM

## 2022-12-09 LAB
ALBUMIN SERPL ELPH-MCNC: 3 G/DL — LOW (ref 3.3–5)
ALP SERPL-CCNC: 98 U/L — SIGNIFICANT CHANGE UP (ref 40–120)
ALT FLD-CCNC: <5 U/L — LOW (ref 10–45)
ANION GAP SERPL CALC-SCNC: 14 MMOL/L — SIGNIFICANT CHANGE UP (ref 5–17)
APTT BLD: 34 SEC — SIGNIFICANT CHANGE UP (ref 27.5–35.5)
AST SERPL-CCNC: 10 U/L — SIGNIFICANT CHANGE UP (ref 10–40)
BASOPHILS # BLD AUTO: 0.05 K/UL — SIGNIFICANT CHANGE UP (ref 0–0.2)
BASOPHILS NFR BLD AUTO: 0.5 % — SIGNIFICANT CHANGE UP (ref 0–2)
BILIRUB SERPL-MCNC: 1.8 MG/DL — HIGH (ref 0.2–1.2)
BUN SERPL-MCNC: 29 MG/DL — HIGH (ref 7–23)
CALCIUM SERPL-MCNC: 8.4 MG/DL — SIGNIFICANT CHANGE UP (ref 8.4–10.5)
CHLORIDE SERPL-SCNC: 110 MMOL/L — HIGH (ref 96–108)
CO2 SERPL-SCNC: 23 MMOL/L — SIGNIFICANT CHANGE UP (ref 22–31)
CREAT SERPL-MCNC: 1.81 MG/DL — HIGH (ref 0.5–1.3)
EGFR: 37 ML/MIN/1.73M2 — LOW
EOSINOPHIL # BLD AUTO: 0.03 K/UL — SIGNIFICANT CHANGE UP (ref 0–0.5)
EOSINOPHIL NFR BLD AUTO: 0.3 % — SIGNIFICANT CHANGE UP (ref 0–6)
GLUCOSE BLDC GLUCOMTR-MCNC: 104 MG/DL — HIGH (ref 70–99)
GLUCOSE BLDC GLUCOMTR-MCNC: 113 MG/DL — HIGH (ref 70–99)
GLUCOSE BLDC GLUCOMTR-MCNC: 113 MG/DL — HIGH (ref 70–99)
GLUCOSE BLDC GLUCOMTR-MCNC: 124 MG/DL — HIGH (ref 70–99)
GLUCOSE SERPL-MCNC: 127 MG/DL — HIGH (ref 70–99)
HCT VFR BLD CALC: 23.4 % — LOW (ref 39–50)
HCT VFR BLD CALC: 24.5 % — LOW (ref 39–50)
HCT VFR BLD CALC: 26.1 % — LOW (ref 39–50)
HCT VFR BLD CALC: 26.7 % — LOW (ref 39–50)
HGB BLD-MCNC: 7.5 G/DL — LOW (ref 13–17)
HGB BLD-MCNC: 7.9 G/DL — LOW (ref 13–17)
HGB BLD-MCNC: 8.7 G/DL — LOW (ref 13–17)
HGB BLD-MCNC: 8.8 G/DL — LOW (ref 13–17)
IMM GRANULOCYTES NFR BLD AUTO: 0.8 % — SIGNIFICANT CHANGE UP (ref 0–0.9)
INR BLD: 1.45 RATIO — HIGH (ref 0.88–1.16)
LACTATE SERPL-SCNC: 1.2 MMOL/L — SIGNIFICANT CHANGE UP (ref 0.5–2)
LYMPHOCYTES # BLD AUTO: 1.44 K/UL — SIGNIFICANT CHANGE UP (ref 1–3.3)
LYMPHOCYTES # BLD AUTO: 14.8 % — SIGNIFICANT CHANGE UP (ref 13–44)
MCHC RBC-ENTMCNC: 29.3 PG — SIGNIFICANT CHANGE UP (ref 27–34)
MCHC RBC-ENTMCNC: 29.8 PG — SIGNIFICANT CHANGE UP (ref 27–34)
MCHC RBC-ENTMCNC: 29.8 PG — SIGNIFICANT CHANGE UP (ref 27–34)
MCHC RBC-ENTMCNC: 30 PG — SIGNIFICANT CHANGE UP (ref 27–34)
MCHC RBC-ENTMCNC: 32.1 GM/DL — SIGNIFICANT CHANGE UP (ref 32–36)
MCHC RBC-ENTMCNC: 32.2 GM/DL — SIGNIFICANT CHANGE UP (ref 32–36)
MCHC RBC-ENTMCNC: 33 GM/DL — SIGNIFICANT CHANGE UP (ref 32–36)
MCHC RBC-ENTMCNC: 33.3 GM/DL — SIGNIFICANT CHANGE UP (ref 32–36)
MCV RBC AUTO: 87.9 FL — SIGNIFICANT CHANGE UP (ref 80–100)
MCV RBC AUTO: 90.5 FL — SIGNIFICANT CHANGE UP (ref 80–100)
MCV RBC AUTO: 92.9 FL — SIGNIFICANT CHANGE UP (ref 80–100)
MCV RBC AUTO: 93.2 FL — SIGNIFICANT CHANGE UP (ref 80–100)
MONOCYTES # BLD AUTO: 1 K/UL — HIGH (ref 0–0.9)
MONOCYTES NFR BLD AUTO: 10.3 % — SIGNIFICANT CHANGE UP (ref 2–14)
NEUTROPHILS # BLD AUTO: 7.12 K/UL — SIGNIFICANT CHANGE UP (ref 1.8–7.4)
NEUTROPHILS NFR BLD AUTO: 73.3 % — SIGNIFICANT CHANGE UP (ref 43–77)
NRBC # BLD: 0 /100 WBCS — SIGNIFICANT CHANGE UP (ref 0–0)
PLATELET # BLD AUTO: 148 K/UL — LOW (ref 150–400)
PLATELET # BLD AUTO: 168 K/UL — SIGNIFICANT CHANGE UP (ref 150–400)
PLATELET # BLD AUTO: 179 K/UL — SIGNIFICANT CHANGE UP (ref 150–400)
PLATELET # BLD AUTO: 191 K/UL — SIGNIFICANT CHANGE UP (ref 150–400)
POTASSIUM SERPL-MCNC: 3.7 MMOL/L — SIGNIFICANT CHANGE UP (ref 3.5–5.3)
POTASSIUM SERPL-SCNC: 3.7 MMOL/L — SIGNIFICANT CHANGE UP (ref 3.5–5.3)
PROT SERPL-MCNC: 5.4 G/DL — LOW (ref 6–8.3)
PROTHROM AB SERPL-ACNC: 16.7 SEC — HIGH (ref 10.5–13.4)
RBC # BLD: 2.52 M/UL — LOW (ref 4.2–5.8)
RBC # BLD: 2.63 M/UL — LOW (ref 4.2–5.8)
RBC # BLD: 2.95 M/UL — LOW (ref 4.2–5.8)
RBC # BLD: 2.97 M/UL — LOW (ref 4.2–5.8)
RBC # FLD: 15.8 % — HIGH (ref 10.3–14.5)
RBC # FLD: 16 % — HIGH (ref 10.3–14.5)
RBC # FLD: 16.1 % — HIGH (ref 10.3–14.5)
RBC # FLD: 16.5 % — HIGH (ref 10.3–14.5)
SODIUM SERPL-SCNC: 147 MMOL/L — HIGH (ref 135–145)
WBC # BLD: 8.72 K/UL — SIGNIFICANT CHANGE UP (ref 3.8–10.5)
WBC # BLD: 9.49 K/UL — SIGNIFICANT CHANGE UP (ref 3.8–10.5)
WBC # BLD: 9.61 K/UL — SIGNIFICANT CHANGE UP (ref 3.8–10.5)
WBC # BLD: 9.72 K/UL — SIGNIFICANT CHANGE UP (ref 3.8–10.5)
WBC # FLD AUTO: 8.72 K/UL — SIGNIFICANT CHANGE UP (ref 3.8–10.5)
WBC # FLD AUTO: 9.49 K/UL — SIGNIFICANT CHANGE UP (ref 3.8–10.5)
WBC # FLD AUTO: 9.61 K/UL — SIGNIFICANT CHANGE UP (ref 3.8–10.5)
WBC # FLD AUTO: 9.72 K/UL — SIGNIFICANT CHANGE UP (ref 3.8–10.5)

## 2022-12-09 PROCEDURE — 51700 IRRIGATION OF BLADDER: CPT

## 2022-12-09 PROCEDURE — 99231 SBSQ HOSP IP/OBS SF/LOW 25: CPT | Mod: 25

## 2022-12-09 PROCEDURE — 71045 X-RAY EXAM CHEST 1 VIEW: CPT | Mod: 26

## 2022-12-09 PROCEDURE — 99232 SBSQ HOSP IP/OBS MODERATE 35: CPT

## 2022-12-09 RX ORDER — SODIUM CHLORIDE 9 MG/ML
1000 INJECTION, SOLUTION INTRAVENOUS ONCE
Refills: 0 | Status: COMPLETED | OUTPATIENT
Start: 2022-12-09 | End: 2022-12-09

## 2022-12-09 RX ORDER — TAMSULOSIN HYDROCHLORIDE 0.4 MG/1
0.4 CAPSULE ORAL AT BEDTIME
Refills: 0 | Status: DISCONTINUED | OUTPATIENT
Start: 2022-12-09 | End: 2022-12-12

## 2022-12-09 RX ORDER — PIPERACILLIN AND TAZOBACTAM 4; .5 G/20ML; G/20ML
3.38 INJECTION, POWDER, LYOPHILIZED, FOR SOLUTION INTRAVENOUS ONCE
Refills: 0 | Status: COMPLETED | OUTPATIENT
Start: 2022-12-09 | End: 2022-12-09

## 2022-12-09 RX ORDER — SODIUM CHLORIDE 9 MG/ML
1000 INJECTION, SOLUTION INTRAVENOUS
Refills: 0 | Status: DISCONTINUED | OUTPATIENT
Start: 2022-12-09 | End: 2022-12-10

## 2022-12-09 RX ORDER — PIPERACILLIN AND TAZOBACTAM 4; .5 G/20ML; G/20ML
3.38 INJECTION, POWDER, LYOPHILIZED, FOR SOLUTION INTRAVENOUS EVERY 8 HOURS
Refills: 0 | Status: DISCONTINUED | OUTPATIENT
Start: 2022-12-09 | End: 2022-12-12

## 2022-12-09 RX ADMIN — CHLORHEXIDINE GLUCONATE 1 APPLICATION(S): 213 SOLUTION TOPICAL at 13:01

## 2022-12-09 RX ADMIN — PIPERACILLIN AND TAZOBACTAM 25 GRAM(S): 4; .5 INJECTION, POWDER, LYOPHILIZED, FOR SOLUTION INTRAVENOUS at 22:13

## 2022-12-09 RX ADMIN — PIPERACILLIN AND TAZOBACTAM 200 GRAM(S): 4; .5 INJECTION, POWDER, LYOPHILIZED, FOR SOLUTION INTRAVENOUS at 12:51

## 2022-12-09 RX ADMIN — SODIUM CHLORIDE 4000 MILLILITER(S): 9 INJECTION, SOLUTION INTRAVENOUS at 09:10

## 2022-12-09 RX ADMIN — SODIUM CHLORIDE 75 MILLILITER(S): 9 INJECTION, SOLUTION INTRAVENOUS at 22:22

## 2022-12-09 RX ADMIN — SODIUM CHLORIDE 75 MILLILITER(S): 9 INJECTION, SOLUTION INTRAVENOUS at 09:10

## 2022-12-09 RX ADMIN — QUETIAPINE FUMARATE 75 MILLIGRAM(S): 200 TABLET, FILM COATED ORAL at 22:14

## 2022-12-09 RX ADMIN — PIPERACILLIN AND TAZOBACTAM 25 GRAM(S): 4; .5 INJECTION, POWDER, LYOPHILIZED, FOR SOLUTION INTRAVENOUS at 16:27

## 2022-12-09 RX ADMIN — CARBIDOPA AND LEVODOPA 1 TABLET(S): 25; 100 TABLET ORAL at 22:13

## 2022-12-09 RX ADMIN — TAMSULOSIN HYDROCHLORIDE 0.4 MILLIGRAM(S): 0.4 CAPSULE ORAL at 22:13

## 2022-12-09 NOTE — PROVIDER CONTACT NOTE (SEPSIS SCREENING) - ACTION/TREATMENT ORDERED:
Per ACP Monae Sanchez's instruction, re-assess pt vital signs at 00:30. No further action/treatment ordered by ACP at this time.
sepsis work-up ordered

## 2022-12-09 NOTE — RAPID RESPONSE TEAM SUMMARY - NSSITUATIONBACKGROUNDRRT_GEN_ALL_CORE
82 year old M PMH of HTN, DM, HLD, dementia,  Parkinson's disease, CAD s/p stents x2 (>20 years ago), CHF (EF: 50%, Moderate diastolic dysfunction (Stage II), moderate pulmonary hypertension, BPH, and gastric ulcers presents to the ED bceause patient was found to be COVID+ and HHA cannot care for patient.     Previous RRT for caceres pulled out and active bleeding. Hgb at that time was 7.3, PLT was 274k, INR was 1.38. VSS. Aspirin and Lovenox were d/c. 1u of pRBC was ordered. Urology following    RRT called for hypotension in setting of 80/40s. Upon arrival pt appeared a little more lethargic than his baseline, however protecting his airway. Repeat BP taken with SBP in 110s, then repeat BP after this 124/60. Appropriate BP cuff placement. Pt currently receiving a RBC transfusion given drop in Hb and hematuria on CBI. Recommended to repeat CBC, CMP, and coags. Trend CBC appropriately afterwards. Primary team at bedside, information relayed to them.

## 2022-12-09 NOTE — PROGRESS NOTE ADULT - ASSESSMENT
Gross hematuria after patient pulled out his caceres catheter  - continue caceres  - continue CBI  - transfuse as necessary  - irrigate catheter prn  - will try to wean CBI later today but for now keep on very fast rate  - continue restraints/mittens to prevent patient from self removing catheter again    Gross hematuria after patient pulled out his caceres catheter  - continue caceres  - continue CBI  - transfuse as necessary  - irrigate catheter prn  - will try to wean CBI later today but for now keep on very fast rate  - continue restraints/mittens to prevent patient from self removing catheter again     D/w Dr. Ng

## 2022-12-09 NOTE — PROVIDER CONTACT NOTE (SEPSIS SCREENING) - BACKGROUND:
admitted for Covid, now bleeding post pulling his caceres catheter yesterday, lethargic, s/p RRT this am for low BP. 4 unit of PRBC transfusing, received 1L LR bolus, LR @ 75ml/hr maintained.
Pt admitted for COVID-19, anemia, and hypernatremia. RRT called during dayshift as pt pulled out his caceres & was actively bleeding; 1 unit PRBC started at 18:00 & completed at 21:00, post transfusion CBC collected. Also, urology placed caceres/CBI.   Hbg = 6.5; Hct = 20.1. Pt AOx1 confused (no acute change in mental status), no acute distress. Caceres/CBI is irrigating red blood thus pt actively bleeding (urology aware as they have seen pt at bedside multiple times during shift). 1 unit PRBC ordered - Pre transfusion BP: 70/37 HR: 63; 15 minutes after transfusion started BP: 75/40 HR: 65

## 2022-12-09 NOTE — PROGRESS NOTE ADULT - PROBLEM SELECTOR PLAN 4
2/2 urinary retention and pre renal from decreased PO intake  LR @75mls  caceres in place now with CBI  Flomax 0.4

## 2022-12-09 NOTE — PROGRESS NOTE ADULT - PROBLEM SELECTOR PLAN 1
causing hemorrhagic shock  from caceres trauma and likely prostate damage after talking to Urology   -Discussed with urology no intervention planned and documented in my chart note is GOC conversation  -No esclation of care  -Continue current mgmt w/ blood, fluid, cbi  -no surgery  -transfuse if actively bleeding

## 2022-12-09 NOTE — CHART NOTE - NSCHARTNOTEFT_GEN_A_CORE
SPoke with Berto the pts brother and HCP regarding hemorrhagic shock w/ hypotension and caceres trauma causing hematuria.    He reiterates DNR/DNI and expressed that the patient would not want ICU or pressors or surgery.  He would be ok with ongoing IVF and PRBC and CBI for supportive care and conservative management of the bleed.  I explained these measures may be able to stop the bleeding and recover but is at risk for death from the bleeding.  He understands and states the patient has told him that he doesn't want to live longer or have painful procedures or things done to him.  He feels the patient would want DNR/DNI no escalation of care but continue with current treatment.    Also spoke with uro PA to update on the hypotension and asked if any additional imaging would be helpful.  CT urogram w/ contrast would be helpful if looking to embolize/operate but given his MIKEL cannot get this done.  Also pt would not want surgery. W ill continue CBI, transfusions and IVF resuscitation.     Fadia Johnston DO  Hospitalist  [phone]

## 2022-12-09 NOTE — CHART NOTE - NSCHARTNOTEFT_GEN_A_CORE
12/9	pt is hypotensive--85/51, RRT was called, started with ivf, BP improved to 124/61, he is lethargic, third PRBC in progress, on CBI, ivf ordered, spoke to Brother Berto, Attending is aware    Sandra Thornton (PA) [employer] # [id number]

## 2022-12-10 LAB
ALBUMIN SERPL ELPH-MCNC: 2.6 G/DL — LOW (ref 3.3–5)
ALP SERPL-CCNC: 75 U/L — SIGNIFICANT CHANGE UP (ref 40–120)
ALT FLD-CCNC: <5 U/L — LOW (ref 10–45)
ANION GAP SERPL CALC-SCNC: 10 MMOL/L — SIGNIFICANT CHANGE UP (ref 5–17)
APTT BLD: 31.1 SEC — SIGNIFICANT CHANGE UP (ref 27.5–35.5)
AST SERPL-CCNC: 12 U/L — SIGNIFICANT CHANGE UP (ref 10–40)
BILIRUB SERPL-MCNC: 1.6 MG/DL — HIGH (ref 0.2–1.2)
BUN SERPL-MCNC: 26 MG/DL — HIGH (ref 7–23)
CALCIUM SERPL-MCNC: 8.3 MG/DL — LOW (ref 8.4–10.5)
CHLORIDE SERPL-SCNC: 114 MMOL/L — HIGH (ref 96–108)
CO2 SERPL-SCNC: 25 MMOL/L — SIGNIFICANT CHANGE UP (ref 22–31)
CREAT SERPL-MCNC: 1.8 MG/DL — HIGH (ref 0.5–1.3)
EGFR: 38 ML/MIN/1.73M2 — LOW
GLUCOSE BLDC GLUCOMTR-MCNC: 136 MG/DL — HIGH (ref 70–99)
GLUCOSE BLDC GLUCOMTR-MCNC: 161 MG/DL — HIGH (ref 70–99)
GLUCOSE BLDC GLUCOMTR-MCNC: 91 MG/DL — SIGNIFICANT CHANGE UP (ref 70–99)
GLUCOSE BLDC GLUCOMTR-MCNC: 98 MG/DL — SIGNIFICANT CHANGE UP (ref 70–99)
GLUCOSE SERPL-MCNC: 97 MG/DL — SIGNIFICANT CHANGE UP (ref 70–99)
HCT VFR BLD CALC: 20.2 % — CRITICAL LOW (ref 39–50)
HCT VFR BLD CALC: 23.4 % — LOW (ref 39–50)
HGB BLD-MCNC: 6.6 G/DL — CRITICAL LOW (ref 13–17)
HGB BLD-MCNC: 7.5 G/DL — LOW (ref 13–17)
INR BLD: 1.35 RATIO — HIGH (ref 0.88–1.16)
MAGNESIUM SERPL-MCNC: 1.5 MG/DL — LOW (ref 1.6–2.6)
MCHC RBC-ENTMCNC: 29 PG — SIGNIFICANT CHANGE UP (ref 27–34)
MCHC RBC-ENTMCNC: 29.2 PG — SIGNIFICANT CHANGE UP (ref 27–34)
MCHC RBC-ENTMCNC: 32.1 GM/DL — SIGNIFICANT CHANGE UP (ref 32–36)
MCHC RBC-ENTMCNC: 32.7 GM/DL — SIGNIFICANT CHANGE UP (ref 32–36)
MCV RBC AUTO: 89.4 FL — SIGNIFICANT CHANGE UP (ref 80–100)
MCV RBC AUTO: 90.3 FL — SIGNIFICANT CHANGE UP (ref 80–100)
NRBC # BLD: 0 /100 WBCS — SIGNIFICANT CHANGE UP (ref 0–0)
NRBC # BLD: 0 /100 WBCS — SIGNIFICANT CHANGE UP (ref 0–0)
PHOSPHATE SERPL-MCNC: 3.5 MG/DL — SIGNIFICANT CHANGE UP (ref 2.5–4.5)
PLATELET # BLD AUTO: 171 K/UL — SIGNIFICANT CHANGE UP (ref 150–400)
PLATELET # BLD AUTO: 178 K/UL — SIGNIFICANT CHANGE UP (ref 150–400)
POTASSIUM SERPL-MCNC: 3.9 MMOL/L — SIGNIFICANT CHANGE UP (ref 3.5–5.3)
POTASSIUM SERPL-SCNC: 3.9 MMOL/L — SIGNIFICANT CHANGE UP (ref 3.5–5.3)
PROT SERPL-MCNC: 5.1 G/DL — LOW (ref 6–8.3)
PROTHROM AB SERPL-ACNC: 15.6 SEC — HIGH (ref 10.5–13.4)
RBC # BLD: 2.26 M/UL — LOW (ref 4.2–5.8)
RBC # BLD: 2.59 M/UL — LOW (ref 4.2–5.8)
RBC # FLD: 17.2 % — HIGH (ref 10.3–14.5)
RBC # FLD: 17.4 % — HIGH (ref 10.3–14.5)
SODIUM SERPL-SCNC: 149 MMOL/L — HIGH (ref 135–145)
WBC # BLD: 10.37 K/UL — SIGNIFICANT CHANGE UP (ref 3.8–10.5)
WBC # BLD: 10.39 K/UL — SIGNIFICANT CHANGE UP (ref 3.8–10.5)
WBC # FLD AUTO: 10.37 K/UL — SIGNIFICANT CHANGE UP (ref 3.8–10.5)
WBC # FLD AUTO: 10.39 K/UL — SIGNIFICANT CHANGE UP (ref 3.8–10.5)

## 2022-12-10 PROCEDURE — 99233 SBSQ HOSP IP/OBS HIGH 50: CPT

## 2022-12-10 RX ORDER — SODIUM CHLORIDE 9 MG/ML
1000 INJECTION, SOLUTION INTRAVENOUS
Refills: 0 | Status: DISCONTINUED | OUTPATIENT
Start: 2022-12-10 | End: 2022-12-11

## 2022-12-10 RX ADMIN — CHLORHEXIDINE GLUCONATE 1 APPLICATION(S): 213 SOLUTION TOPICAL at 12:57

## 2022-12-10 RX ADMIN — Medication 650 MILLIGRAM(S): at 22:10

## 2022-12-10 RX ADMIN — PIPERACILLIN AND TAZOBACTAM 25 GRAM(S): 4; .5 INJECTION, POWDER, LYOPHILIZED, FOR SOLUTION INTRAVENOUS at 14:01

## 2022-12-10 RX ADMIN — TAMSULOSIN HYDROCHLORIDE 0.4 MILLIGRAM(S): 0.4 CAPSULE ORAL at 22:10

## 2022-12-10 RX ADMIN — Medication 650 MILLIGRAM(S): at 22:50

## 2022-12-10 RX ADMIN — PIPERACILLIN AND TAZOBACTAM 25 GRAM(S): 4; .5 INJECTION, POWDER, LYOPHILIZED, FOR SOLUTION INTRAVENOUS at 22:11

## 2022-12-10 RX ADMIN — PIPERACILLIN AND TAZOBACTAM 25 GRAM(S): 4; .5 INJECTION, POWDER, LYOPHILIZED, FOR SOLUTION INTRAVENOUS at 05:48

## 2022-12-10 RX ADMIN — CARBIDOPA AND LEVODOPA 1 TABLET(S): 25; 100 TABLET ORAL at 13:52

## 2022-12-10 RX ADMIN — CARBIDOPA AND LEVODOPA 1 TABLET(S): 25; 100 TABLET ORAL at 22:10

## 2022-12-10 RX ADMIN — SODIUM CHLORIDE 100 MILLILITER(S): 9 INJECTION, SOLUTION INTRAVENOUS at 13:52

## 2022-12-10 RX ADMIN — QUETIAPINE FUMARATE 75 MILLIGRAM(S): 200 TABLET, FILM COATED ORAL at 22:10

## 2022-12-10 NOTE — CHART NOTE - NSCHARTNOTEFT_GEN_A_CORE
Pt with hematuria today - s/p multiple blood transfusions  new CBC with anemia Hg <7                   6.6    10.39 )-----------( 178      ( 10 Dec 2022 20:55 )             20.2       Will transfuse 1 unit PRBCs   Will check CBC in am.   Blood Transfusion Consent in chart from previous transfusions.

## 2022-12-10 NOTE — PROGRESS NOTE ADULT - PROBLEM SELECTOR PLAN 4
2/2 urinary retention and pre renal from decreased PO intake  -s/p LR. -D5W for now.  caceres in place now with CBI  Flomax 0.4

## 2022-12-10 NOTE — INPATIENT CERTIFICATION FOR MEDICARE PATIENTS - CURRENT MEDICAL NEEDS AND CARE PLANS
Post-op Discharge Instructions Gynecology    Nurse Instruction: Call MD prior to Discharge.   Discharge Diet: Regular   Discharge Activity: Increase activity as tolerated. No vigorous activity or exercise for 4-6 weeks.    No swimming or bath for 7-10 days.    No driving or operating machinery for 1 weeks or while on narcotics.    Lifting restrictions up to 15 pounds for 4-6 weeks.    Pelvic rest for 6 weeks which includes intercourse, douching and tampons.   Discharge Instructions: Call the OB/GYN Clinic Nurse at [phone] for problems such as fever (temperature >100.4), chills, pain not controlled by usual oral pain medications, persistent nausea and vomiting, constipation, heavy odorous vaginal discharge, burning or pain associated with urination.  Call for excessive vaginal bleeding, saturating more than one pad an hour for more than three consecutive hours.  Call for any problems with the incision, drainage or redness from incision site or increasing pain.    Avoid constipation and straining. Use stool softeners and fiber to avoid constipation.   Discharge Wound care: Keep your incisions clean and dry by running soapy water over them and dabbing them dry. It is best to shower for the first week while the healing process is underway; then after 7-10 days it is ok to take a bath.    The incision was closed with Steri-Strips which may fall off on their own. If they do not, then they may be removed after 10-14 days.   Discharge Follow-up: Follow up for postoperative exam in 2 weeks with Dr. Soler.    Follow up with Primary Care Provider as scheduled for management of active medical problems and for adult preventive services.    Call Dr. Soler at the OB/GYN Clinic at [phone] as necessary if you have problems in the interim.      Possible Home/Possible Skilled Nursing Facilty (SNF)

## 2022-12-10 NOTE — PROGRESS NOTE ADULT - PROBLEM SELECTOR PLAN 2
was setting of decreased PO intake   - Encourage PO intake  -still hyper NA; s/p LR. Will run D5W x 10 hours and encourage PO intake.

## 2022-12-10 NOTE — PROGRESS NOTE ADULT - PROBLEM SELECTOR PLAN 1
causing hemorrhagic shock  from caceres trauma and likely prostate damage after talking to Urology   -Discussed with urology no intervention planned and documented in my chart note is GOC conversation  -No escalation of care  -Continue current mgmt w/ blood, fluid, cbi  -no surgery  -transfuse if actively bleeding  -F/u urology recs.  -Trend H/H and transfuse for goal Hgb > at least 7. -Repeat CBC tonight.  -Hypothermia on 12/9. -F/u cultures - ngtd. -Urine studies if possible. -C/w zosyn for now. -CXR clear.

## 2022-12-10 NOTE — PROGRESS NOTE ADULT - ASSESSMENT
82 yo M HTN, DM, HLD, dementia (AOx2 at baseline), Parkinson's disease, CAD s/p stents x2 (>20 years ago), recent new onset HF (EF 50%, on Xarelto, Moderate diastolic dysfunction (Stage II), moderate pulmonary hypertension, small pericardial effusion), L pleural effusion, BPH, gastric ulcers presenting with COVID    1) COVID+  -sating well on RA    2) CAD s/p stent  -cath in 2010 showed mild luminal disease and patent stents  -denies chest pain  -echo with small to  moderate Pericardial effusion with no evidence of tamponade   - asa on hold for hematuria    3) Anemia  -2/2 hematuria from patient self pulling out caceres  -s/p PRBC transfusions  -urology on board, on CBI      4)  DVT prophylaxis  -lovenox subq on hold 2/2 hematuria

## 2022-12-10 NOTE — PROGRESS NOTE ADULT - ASSESSMENT
Gross hematuria after patient pulled out his caceres catheter  - continue caceres  - continue CBI  - transfuse as necessary  - irrigate catheter prn  - continue restraints/mittens to prevent patient from self removing catheter again     Possible wean CBI tomorrow.

## 2022-12-11 ENCOUNTER — TRANSCRIPTION ENCOUNTER (OUTPATIENT)
Age: 82
End: 2022-12-11

## 2022-12-11 LAB
ALBUMIN SERPL ELPH-MCNC: 2.4 G/DL — LOW (ref 3.3–5)
ALP SERPL-CCNC: 67 U/L — SIGNIFICANT CHANGE UP (ref 40–120)
ALT FLD-CCNC: <5 U/L — LOW (ref 10–45)
ANION GAP SERPL CALC-SCNC: 11 MMOL/L — SIGNIFICANT CHANGE UP (ref 5–17)
AST SERPL-CCNC: 8 U/L — LOW (ref 10–40)
BILIRUB SERPL-MCNC: 1.5 MG/DL — HIGH (ref 0.2–1.2)
BUN SERPL-MCNC: 23 MG/DL — SIGNIFICANT CHANGE UP (ref 7–23)
CALCIUM SERPL-MCNC: 7.8 MG/DL — LOW (ref 8.4–10.5)
CHLORIDE SERPL-SCNC: 110 MMOL/L — HIGH (ref 96–108)
CO2 SERPL-SCNC: 26 MMOL/L — SIGNIFICANT CHANGE UP (ref 22–31)
CREAT SERPL-MCNC: 1.63 MG/DL — HIGH (ref 0.5–1.3)
EGFR: 42 ML/MIN/1.73M2 — LOW
GLUCOSE BLDC GLUCOMTR-MCNC: 122 MG/DL — HIGH (ref 70–99)
GLUCOSE BLDC GLUCOMTR-MCNC: 145 MG/DL — HIGH (ref 70–99)
GLUCOSE BLDC GLUCOMTR-MCNC: 153 MG/DL — HIGH (ref 70–99)
GLUCOSE BLDC GLUCOMTR-MCNC: 163 MG/DL — HIGH (ref 70–99)
GLUCOSE SERPL-MCNC: 106 MG/DL — HIGH (ref 70–99)
HCT VFR BLD CALC: 21 % — CRITICAL LOW (ref 39–50)
HCT VFR BLD CALC: 25.2 % — LOW (ref 39–50)
HGB BLD-MCNC: 6.8 G/DL — CRITICAL LOW (ref 13–17)
HGB BLD-MCNC: 8.1 G/DL — LOW (ref 13–17)
MCHC RBC-ENTMCNC: 28.6 PG — SIGNIFICANT CHANGE UP (ref 27–34)
MCHC RBC-ENTMCNC: 29.2 PG — SIGNIFICANT CHANGE UP (ref 27–34)
MCHC RBC-ENTMCNC: 32.1 GM/DL — SIGNIFICANT CHANGE UP (ref 32–36)
MCHC RBC-ENTMCNC: 32.4 GM/DL — SIGNIFICANT CHANGE UP (ref 32–36)
MCV RBC AUTO: 89 FL — SIGNIFICANT CHANGE UP (ref 80–100)
MCV RBC AUTO: 90.1 FL — SIGNIFICANT CHANGE UP (ref 80–100)
NRBC # BLD: 0 /100 WBCS — SIGNIFICANT CHANGE UP (ref 0–0)
NRBC # BLD: 0 /100 WBCS — SIGNIFICANT CHANGE UP (ref 0–0)
PLATELET # BLD AUTO: 165 K/UL — SIGNIFICANT CHANGE UP (ref 150–400)
PLATELET # BLD AUTO: 166 K/UL — SIGNIFICANT CHANGE UP (ref 150–400)
POTASSIUM SERPL-MCNC: 3.1 MMOL/L — LOW (ref 3.5–5.3)
POTASSIUM SERPL-SCNC: 3.1 MMOL/L — LOW (ref 3.5–5.3)
PROT SERPL-MCNC: 4.8 G/DL — LOW (ref 6–8.3)
RBC # BLD: 2.33 M/UL — LOW (ref 4.2–5.8)
RBC # BLD: 2.83 M/UL — LOW (ref 4.2–5.8)
RBC # FLD: 16.5 % — HIGH (ref 10.3–14.5)
RBC # FLD: 16.8 % — HIGH (ref 10.3–14.5)
SARS-COV-2 RNA SPEC QL NAA+PROBE: SIGNIFICANT CHANGE UP
SODIUM SERPL-SCNC: 147 MMOL/L — HIGH (ref 135–145)
WBC # BLD: 10.97 K/UL — HIGH (ref 3.8–10.5)
WBC # BLD: 9.25 K/UL — SIGNIFICANT CHANGE UP (ref 3.8–10.5)
WBC # FLD AUTO: 10.97 K/UL — HIGH (ref 3.8–10.5)
WBC # FLD AUTO: 9.25 K/UL — SIGNIFICANT CHANGE UP (ref 3.8–10.5)

## 2022-12-11 PROCEDURE — 99233 SBSQ HOSP IP/OBS HIGH 50: CPT

## 2022-12-11 RX ORDER — POTASSIUM CHLORIDE 20 MEQ
40 PACKET (EA) ORAL EVERY 4 HOURS
Refills: 0 | Status: DISCONTINUED | OUTPATIENT
Start: 2022-12-11 | End: 2022-12-11

## 2022-12-11 RX ORDER — SODIUM CHLORIDE 9 MG/ML
1000 INJECTION, SOLUTION INTRAVENOUS
Refills: 0 | Status: DISCONTINUED | OUTPATIENT
Start: 2022-12-11 | End: 2022-12-12

## 2022-12-11 RX ORDER — SODIUM CHLORIDE 9 MG/ML
1000 INJECTION, SOLUTION INTRAVENOUS
Refills: 0 | Status: DISCONTINUED | OUTPATIENT
Start: 2022-12-11 | End: 2022-12-11

## 2022-12-11 RX ORDER — POTASSIUM CHLORIDE 20 MEQ
40 PACKET (EA) ORAL EVERY 4 HOURS
Refills: 0 | Status: COMPLETED | OUTPATIENT
Start: 2022-12-11 | End: 2022-12-11

## 2022-12-11 RX ADMIN — Medication 2: at 13:52

## 2022-12-11 RX ADMIN — QUETIAPINE FUMARATE 75 MILLIGRAM(S): 200 TABLET, FILM COATED ORAL at 22:35

## 2022-12-11 RX ADMIN — Medication 650 MILLIGRAM(S): at 22:34

## 2022-12-11 RX ADMIN — PIPERACILLIN AND TAZOBACTAM 25 GRAM(S): 4; .5 INJECTION, POWDER, LYOPHILIZED, FOR SOLUTION INTRAVENOUS at 13:55

## 2022-12-11 RX ADMIN — SODIUM CHLORIDE 100 MILLILITER(S): 9 INJECTION, SOLUTION INTRAVENOUS at 13:54

## 2022-12-11 RX ADMIN — CHLORHEXIDINE GLUCONATE 1 APPLICATION(S): 213 SOLUTION TOPICAL at 13:53

## 2022-12-11 RX ADMIN — Medication 650 MILLIGRAM(S): at 23:00

## 2022-12-11 RX ADMIN — PIPERACILLIN AND TAZOBACTAM 25 GRAM(S): 4; .5 INJECTION, POWDER, LYOPHILIZED, FOR SOLUTION INTRAVENOUS at 06:20

## 2022-12-11 RX ADMIN — CARBIDOPA AND LEVODOPA 1 TABLET(S): 25; 100 TABLET ORAL at 06:20

## 2022-12-11 RX ADMIN — Medication 40 MILLIEQUIVALENT(S): at 10:09

## 2022-12-11 RX ADMIN — PIPERACILLIN AND TAZOBACTAM 25 GRAM(S): 4; .5 INJECTION, POWDER, LYOPHILIZED, FOR SOLUTION INTRAVENOUS at 22:50

## 2022-12-11 RX ADMIN — CARBIDOPA AND LEVODOPA 1 TABLET(S): 25; 100 TABLET ORAL at 22:34

## 2022-12-11 RX ADMIN — Medication 40 MILLIEQUIVALENT(S): at 13:54

## 2022-12-11 RX ADMIN — TAMSULOSIN HYDROCHLORIDE 0.4 MILLIGRAM(S): 0.4 CAPSULE ORAL at 22:34

## 2022-12-11 RX ADMIN — CARBIDOPA AND LEVODOPA 1 TABLET(S): 25; 100 TABLET ORAL at 13:54

## 2022-12-11 RX ADMIN — Medication 2: at 18:02

## 2022-12-11 NOTE — PROGRESS NOTE ADULT - PROBLEM SELECTOR PLAN 1
causing hemorrhagic shock  from Dumont trauma and likely prostate damage after talking to Urology   -Discussed with urology no intervention planned and documented in prior chart note is GOC conversation  -No escalation of care previously  -Continue current mgmt w/ blood, fluid, cbi  -no surgery previously  -transfuse if actively bleeding  -F/u urology recs.  -Trend H/H and transfuse for goal Hgb > at least 7. -Got 1 prbc overnight and 1 this am. Still with hematuria. Urology says keep NPO at MN in case goes for OR tomorrow.  -PREOP RISK STRATIFICATION: -Patient is intermediate risk for an intermediate risk procedure. -Cards also asked to comment.   -Hypothermia on 12/9. -F/u cultures - ngtd. -Urine studies if possible. -C/w zosyn for now. -CXR clear.

## 2022-12-11 NOTE — PROGRESS NOTE ADULT - ASSESSMENT
80 yo M HTN, DM, HLD, dementia (AOx2 at baseline), Parkinson's disease, CAD s/p stents x2 (>20 years ago), recent new onset HF (EF 50%, on Xarelto, Moderate diastolic dysfunction (Stage II), moderate pulmonary hypertension, small pericardial effusion), L pleural effusion, BPH, gastric ulcers presenting with COVID    1) COVID+  -sating well on RA    2) CAD s/p stent  -cath in 2010 showed mild luminal disease and patent stents  -denies chest pain  -echo with small to  moderate Pericardial effusion with no evidence of tamponade   - asa on hold for hematuria    3) Anemia  -2/2 hematuria from patient self pulling out caceres  -s/p PRBC transfusions  -urology on board, on CBI  -continue to monitor H/H    4)  DVT prophylaxis  -lovenox subq on hold 2/2 hematuria     82 yo M HTN, DM, HLD, dementia (AOx2 at baseline), Parkinson's disease, CAD s/p stents x2 (>20 years ago), recent new onset HF (EF 50%, on Xarelto, Moderate diastolic dysfunction (Stage II), moderate pulmonary hypertension, small pericardial effusion), L pleural effusion, BPH, gastric ulcers presenting with COVID    1) COVID+  -sating well on RA    2) CAD s/p stent  -cath in 2010 showed mild luminal disease and patent stents  -denies chest pain  -echo with small to  moderate Pericardial effusion with no evidence of tamponade   - asa on hold for hematuria  - Planned for cystoscopy with fulguration and clot evacuation. optimized from a cardiac standpoint moderate risk for MACE     3) Anemia  -2/2 hematuria from patient self pulling out caceres  -s/p PRBC transfusions  -urology on board, on CBI  -continue to monitor H/H    4)  DVT prophylaxis  -lovenox subq on hold 2/2 hematuria

## 2022-12-11 NOTE — PROGRESS NOTE ADULT - ASSESSMENT
Gross hematuria after patient pulled out his caceres catheter  - continue caceres  - continue CBI  - transfuse as necessary, trend h/h  - irrigate catheter prn  - continue restraints/mittens to prevent patient from self removing catheter again   - may need to go to OR for cystoscopy with fulguration and clot evacuation  - would need clearance for procedure if necessary.   Gross hematuria after patient pulled out his caceres catheter  - continue caceres  - continue CBI  - transfuse as necessary, trend h/h  - irrigate catheter prn  - continue restraints/mittens to prevent patient from self removing catheter again   - may need to go to OR for cystoscopy with fulguration and clot evacuation--> Will add on for OR  - Obtain preop labs--> CBC/BMP/COAGS, T+S  - NPO at midnight with IVF, TONIGHT for possible procedure 12/12/22  - would need clearance for procedure if necessary--> Please document  - Discussed with NP  - Discussed with attending Dr. Berenice Miller [facility]  [address] [place], [place] [id number] [phone]

## 2022-12-11 NOTE — PROGRESS NOTE ADULT - PROBLEM SELECTOR PLAN 2
was setting of decreased PO intake   - Encourage PO intake  -still hyper NA; s/p LR. -S/p D5W x 10 hours and will encourage PO intake. -C/w D5W for now.

## 2022-12-12 ENCOUNTER — TRANSCRIPTION ENCOUNTER (OUTPATIENT)
Age: 82
End: 2022-12-12

## 2022-12-12 DIAGNOSIS — R31.9 HEMATURIA, UNSPECIFIED: ICD-10-CM

## 2022-12-12 LAB
ANION GAP SERPL CALC-SCNC: 10 MMOL/L — SIGNIFICANT CHANGE UP (ref 5–17)
ANION GAP SERPL CALC-SCNC: 11 MMOL/L — SIGNIFICANT CHANGE UP (ref 5–17)
BLD GP AB SCN SERPL QL: NEGATIVE — SIGNIFICANT CHANGE UP
BUN SERPL-MCNC: 15 MG/DL — SIGNIFICANT CHANGE UP (ref 7–23)
BUN SERPL-MCNC: 19 MG/DL — SIGNIFICANT CHANGE UP (ref 7–23)
CALCIUM SERPL-MCNC: 7.6 MG/DL — LOW (ref 8.4–10.5)
CALCIUM SERPL-MCNC: 7.9 MG/DL — LOW (ref 8.4–10.5)
CHLORIDE SERPL-SCNC: 105 MMOL/L — SIGNIFICANT CHANGE UP (ref 96–108)
CHLORIDE SERPL-SCNC: 107 MMOL/L — SIGNIFICANT CHANGE UP (ref 96–108)
CO2 SERPL-SCNC: 26 MMOL/L — SIGNIFICANT CHANGE UP (ref 22–31)
CO2 SERPL-SCNC: 26 MMOL/L — SIGNIFICANT CHANGE UP (ref 22–31)
CREAT SERPL-MCNC: 1.4 MG/DL — HIGH (ref 0.5–1.3)
CREAT SERPL-MCNC: 1.52 MG/DL — HIGH (ref 0.5–1.3)
EGFR: 46 ML/MIN/1.73M2 — LOW
EGFR: 51 ML/MIN/1.73M2 — LOW
GLUCOSE BLDC GLUCOMTR-MCNC: 118 MG/DL — HIGH (ref 70–99)
GLUCOSE BLDC GLUCOMTR-MCNC: 126 MG/DL — HIGH (ref 70–99)
GLUCOSE BLDC GLUCOMTR-MCNC: 134 MG/DL — HIGH (ref 70–99)
GLUCOSE SERPL-MCNC: 115 MG/DL — HIGH (ref 70–99)
GLUCOSE SERPL-MCNC: 125 MG/DL — HIGH (ref 70–99)
HCT VFR BLD CALC: 20.4 % — CRITICAL LOW (ref 39–50)
HCT VFR BLD CALC: 28.3 % — LOW (ref 39–50)
HGB BLD-MCNC: 6.4 G/DL — CRITICAL LOW (ref 13–17)
HGB BLD-MCNC: 9.3 G/DL — LOW (ref 13–17)
INR BLD: 1.45 RATIO — HIGH (ref 0.88–1.16)
MCHC RBC-ENTMCNC: 28.8 PG — SIGNIFICANT CHANGE UP (ref 27–34)
MCHC RBC-ENTMCNC: 29.5 PG — SIGNIFICANT CHANGE UP (ref 27–34)
MCHC RBC-ENTMCNC: 31.4 GM/DL — LOW (ref 32–36)
MCHC RBC-ENTMCNC: 32.9 GM/DL — SIGNIFICANT CHANGE UP (ref 32–36)
MCV RBC AUTO: 89.8 FL — SIGNIFICANT CHANGE UP (ref 80–100)
MCV RBC AUTO: 91.9 FL — SIGNIFICANT CHANGE UP (ref 80–100)
NRBC # BLD: 0 /100 WBCS — SIGNIFICANT CHANGE UP (ref 0–0)
NRBC # BLD: 0 /100 WBCS — SIGNIFICANT CHANGE UP (ref 0–0)
PLATELET # BLD AUTO: 142 K/UL — LOW (ref 150–400)
PLATELET # BLD AUTO: 163 K/UL — SIGNIFICANT CHANGE UP (ref 150–400)
POTASSIUM SERPL-MCNC: 3.3 MMOL/L — LOW (ref 3.5–5.3)
POTASSIUM SERPL-MCNC: 3.7 MMOL/L — SIGNIFICANT CHANGE UP (ref 3.5–5.3)
POTASSIUM SERPL-SCNC: 3.3 MMOL/L — LOW (ref 3.5–5.3)
POTASSIUM SERPL-SCNC: 3.7 MMOL/L — SIGNIFICANT CHANGE UP (ref 3.5–5.3)
PROTHROM AB SERPL-ACNC: 16.7 SEC — HIGH (ref 10.5–13.4)
RBC # BLD: 2.22 M/UL — LOW (ref 4.2–5.8)
RBC # BLD: 3.15 M/UL — LOW (ref 4.2–5.8)
RBC # FLD: 16.6 % — HIGH (ref 10.3–14.5)
RBC # FLD: 17.3 % — HIGH (ref 10.3–14.5)
RH IG SCN BLD-IMP: POSITIVE — SIGNIFICANT CHANGE UP
SODIUM SERPL-SCNC: 142 MMOL/L — SIGNIFICANT CHANGE UP (ref 135–145)
SODIUM SERPL-SCNC: 143 MMOL/L — SIGNIFICANT CHANGE UP (ref 135–145)
WBC # BLD: 7.24 K/UL — SIGNIFICANT CHANGE UP (ref 3.8–10.5)
WBC # BLD: 9.69 K/UL — SIGNIFICANT CHANGE UP (ref 3.8–10.5)
WBC # FLD AUTO: 7.24 K/UL — SIGNIFICANT CHANGE UP (ref 3.8–10.5)
WBC # FLD AUTO: 9.69 K/UL — SIGNIFICANT CHANGE UP (ref 3.8–10.5)

## 2022-12-12 PROCEDURE — 51703 INSERT BLADDER CATH COMPLEX: CPT

## 2022-12-12 PROCEDURE — 99233 SBSQ HOSP IP/OBS HIGH 50: CPT

## 2022-12-12 PROCEDURE — 99231 SBSQ HOSP IP/OBS SF/LOW 25: CPT

## 2022-12-12 RX ORDER — HYDRALAZINE HCL 50 MG
5 TABLET ORAL ONCE
Refills: 0 | Status: COMPLETED | OUTPATIENT
Start: 2022-12-12 | End: 2022-12-12

## 2022-12-12 RX ORDER — QUETIAPINE FUMARATE 200 MG/1
75 TABLET, FILM COATED ORAL AT BEDTIME
Refills: 0 | Status: DISCONTINUED | OUTPATIENT
Start: 2022-12-12 | End: 2022-12-21

## 2022-12-12 RX ORDER — ACETAMINOPHEN 500 MG
650 TABLET ORAL EVERY 6 HOURS
Refills: 0 | Status: DISCONTINUED | OUTPATIENT
Start: 2022-12-12 | End: 2022-12-21

## 2022-12-12 RX ORDER — SODIUM CHLORIDE 9 MG/ML
1000 INJECTION, SOLUTION INTRAVENOUS
Refills: 0 | Status: DISCONTINUED | OUTPATIENT
Start: 2022-12-12 | End: 2022-12-21

## 2022-12-12 RX ORDER — INSULIN LISPRO 100/ML
VIAL (ML) SUBCUTANEOUS
Refills: 0 | Status: DISCONTINUED | OUTPATIENT
Start: 2022-12-12 | End: 2022-12-21

## 2022-12-12 RX ORDER — CARBIDOPA AND LEVODOPA 25; 100 MG/1; MG/1
1 TABLET ORAL THREE TIMES A DAY
Refills: 0 | Status: DISCONTINUED | OUTPATIENT
Start: 2022-12-12 | End: 2022-12-21

## 2022-12-12 RX ORDER — POTASSIUM CHLORIDE 20 MEQ
40 PACKET (EA) ORAL ONCE
Refills: 0 | Status: COMPLETED | OUTPATIENT
Start: 2022-12-12 | End: 2022-12-12

## 2022-12-12 RX ORDER — CHLORHEXIDINE GLUCONATE 213 G/1000ML
1 SOLUTION TOPICAL DAILY
Refills: 0 | Status: DISCONTINUED | OUTPATIENT
Start: 2022-12-12 | End: 2022-12-21

## 2022-12-12 RX ORDER — FINASTERIDE 5 MG/1
5 TABLET, FILM COATED ORAL DAILY
Refills: 0 | Status: DISCONTINUED | OUTPATIENT
Start: 2022-12-12 | End: 2022-12-21

## 2022-12-12 RX ORDER — POTASSIUM CHLORIDE 20 MEQ
10 PACKET (EA) ORAL
Refills: 0 | Status: COMPLETED | OUTPATIENT
Start: 2022-12-12 | End: 2022-12-12

## 2022-12-12 RX ORDER — SODIUM CHLORIDE 9 MG/ML
1000 INJECTION, SOLUTION INTRAVENOUS
Refills: 0 | Status: DISCONTINUED | OUTPATIENT
Start: 2022-12-12 | End: 2022-12-16

## 2022-12-12 RX ORDER — DEXTROSE 50 % IN WATER 50 %
25 SYRINGE (ML) INTRAVENOUS ONCE
Refills: 0 | Status: DISCONTINUED | OUTPATIENT
Start: 2022-12-12 | End: 2022-12-21

## 2022-12-12 RX ORDER — GLUCAGON INJECTION, SOLUTION 0.5 MG/.1ML
1 INJECTION, SOLUTION SUBCUTANEOUS ONCE
Refills: 0 | Status: DISCONTINUED | OUTPATIENT
Start: 2022-12-12 | End: 2022-12-21

## 2022-12-12 RX ORDER — DEXTROSE 50 % IN WATER 50 %
15 SYRINGE (ML) INTRAVENOUS ONCE
Refills: 0 | Status: DISCONTINUED | OUTPATIENT
Start: 2022-12-12 | End: 2022-12-21

## 2022-12-12 RX ORDER — INSULIN LISPRO 100/ML
VIAL (ML) SUBCUTANEOUS
Refills: 0 | Status: DISCONTINUED | OUTPATIENT
Start: 2022-12-12 | End: 2022-12-12

## 2022-12-12 RX ORDER — DEXTROSE 50 % IN WATER 50 %
12.5 SYRINGE (ML) INTRAVENOUS ONCE
Refills: 0 | Status: DISCONTINUED | OUTPATIENT
Start: 2022-12-12 | End: 2022-12-21

## 2022-12-12 RX ORDER — TAMSULOSIN HYDROCHLORIDE 0.4 MG/1
0.4 CAPSULE ORAL AT BEDTIME
Refills: 0 | Status: DISCONTINUED | OUTPATIENT
Start: 2022-12-12 | End: 2022-12-19

## 2022-12-12 RX ADMIN — SODIUM CHLORIDE 100 MILLILITER(S): 9 INJECTION, SOLUTION INTRAVENOUS at 16:00

## 2022-12-12 RX ADMIN — Medication 100 MILLIEQUIVALENT(S): at 11:25

## 2022-12-12 RX ADMIN — CHLORHEXIDINE GLUCONATE 1 APPLICATION(S): 213 SOLUTION TOPICAL at 11:29

## 2022-12-12 RX ADMIN — Medication 5 MILLIGRAM(S): at 16:40

## 2022-12-12 RX ADMIN — PIPERACILLIN AND TAZOBACTAM 25 GRAM(S): 4; .5 INJECTION, POWDER, LYOPHILIZED, FOR SOLUTION INTRAVENOUS at 06:35

## 2022-12-12 NOTE — PROGRESS NOTE ADULT - ASSESSMENT
80 yo M HTN, DM, HLD, dementia (AOx2 at baseline), Parkinson's disease, CAD s/p stents x2 (>20 years ago), recent new onset HF (EF 50%, on Xarelto, Moderate diastolic dysfunction (Stage II), moderate pulmonary hypertension, small pericardial effusion), L pleural effusion, BPH, gastric ulcers presenting with COVID    1) COVID+  -sating well on RA    2) CAD s/p stent  -cath in 2010 showed mild luminal disease and patent stents  -denies chest pain  -echo with small to  moderate Pericardial effusion with no evidence of tamponade   - asa on hold for hematuria  - s/p  cystoscopy with fulguration and clot evacuation.    3) Anemia  -2/2 hematuria from patient self pulling out caceres  -s/p PRBC transfusions  -urology on board, on CBI  -continue to monitor H/H    4)  DVT prophylaxis  -lovenox subq on hold 2/2 hematuria

## 2022-12-12 NOTE — BRIEF OPERATIVE NOTE - NSICDXBRIEFOPLAUNCH_GEN_ALL_CORE
Discharge teaching and instructions for diagnosis/procedure of total abdominal hysterectomy completed with patient using teachback method. Patient given handout on surgical incision care. AVS reviewed. Prescriptions available to patient at their local pharmacy. Patient voiced understanding regarding prescriptions, follow up appointments, and care of self at home. Discharged in a wheelchair to  home with support per family. <--- Click to Launch ICDx for PreOp, PostOp and Procedure

## 2022-12-12 NOTE — PROGRESS NOTE ADULT - ATTENDING COMMENTS
patient with persistent hematuria and anemia requiring multiple transfusions. patient developing clots despite CBI. discussed with patient's brother/HCP Berto [phone], who is in agreement with plan to proceed with procedure. He is aware of the risks of the procedure and anesthesia. He is ok with intubation but does not want chest compressions should the patient decompensate.

## 2022-12-12 NOTE — PROGRESS NOTE ADULT - PROBLEM SELECTOR PLAN 1
causing hemorrhagic shock  from Dumont trauma and likely prostate damage after talking to Urology   -Discussed with urology no intervention planned and documented in prior chart note is GOC conversation  -No escalation of care previously  -Continue current mgmt w/ blood, fluid, cbi  -no surgery previously  -transfuse if actively bleeding  -F/u urology recs.  -Trend H/H and transfuse for goal Hgb > at least 7. -requires another unit this morning 12/12  -PREOP RISK STRATIFICATION: documentd 12/11   -Brother would constent to procedure, he is on the fence

## 2022-12-12 NOTE — PROVIDER CONTACT NOTE (CRITICAL VALUE NOTIFICATION) - SITUATION
cbc stat results 6.6/20.2
h&h 6.4/20.4
hemoglobin of 6.8 and hematocrit of 21
Lab called to report critical value result, Hbg = 6.5; Hct = 20.1.

## 2022-12-12 NOTE — BRIEF OPERATIVE NOTE - OPERATION/FINDINGS
Cystoscopy, clot evacuation, fulguration of bladder neck and prostate  Noted brisk bleeding from area of right bladder neck

## 2022-12-12 NOTE — PROGRESS NOTE ADULT - NSPROGADDITIONALINFOA_GEN_ALL_CORE
Control pain  Discussed procedure with brother Berto HCP- he is on the fence  He will discuss with urology

## 2022-12-12 NOTE — PRE-ANESTHESIA EVALUATION ADULT - NSANTHPMHFT_GEN_ALL_CORE
82 year old M PMH of HTN, DM, HLD, dementia,  Parkinson's disease, CAD s/p stents x2 (>20 years ago), CHF (EF: 50%, Moderate diastolic dysfunction (Stage II), moderate pulmonary hypertension, BPH, and gastric ulcers presents to the ED bceause patient was found to be COVID+ and HHA cannot care for patient.   last TTE 2021 nl PA pressures LVEF 51%

## 2022-12-12 NOTE — PROCEDURE NOTE - ADDITIONAL PROCEDURE DETAILS
Called by primary team for gross hematuria after caceres trauma. Pt seen and evaluated at bedside.    Using aseptic technique, area prepped in traditional sterile fashion, lidocaine urojet instilled into urethra, and 22F 3 way catheter placed without resistance. Dark red hematuric urine drained. Caceres was irrigated with normal saline, ~400cc of clot evacuated. 30cc sterile water placed into balloon and caceres catheter secured with stat lock. CBI initiated, titrated to strawberry on full blast. pt tolerated procedure well. plan for caceres per primary team. please page with any acute  concerns or questions.  p: [phone]
old caceres removed, CBI clotted off.     24f 6eye placed, about 200cc of clot irrigated out. 22f 3 way then placed and CBI restarted, urine draining pink on a fast rate CBI.    plan for OR this am   receiving 2u prbcs

## 2022-12-12 NOTE — PROGRESS NOTE ADULT - ASSESSMENT
Gross hematuria after patient pulled out his caceres catheter  Continued hematuria over the weekend with CBI clotting off  HCT 20.4 today from 25.2, requiring 1U pRBC today (7 units since admission)    - plan for OR today for cystoscopy, clot evacuation and fulguration  - Dr. Ng to discuss with patient's son  - continue caceres  - continue CBI  - transfuse as necessary, trend h/h  - irrigate catheter prn  - continue restraints/mittens to prevent patient from self removing catheter again   - NPO at midnight   - medicine and cards clearance documented  - Discussed with Hospitalist     D/w Dr. Ng  [facility]  [address] [place], [place] [id number] [phone]

## 2022-12-12 NOTE — PRE-ANESTHESIA EVALUATION ADULT - NSANTHADDINFOFT_GEN_ALL_CORE
discussed with brother Berto [place] re: DNR order. He is ok with assisted breathing including ETT and IV rx however does not consent to chest compressions.

## 2022-12-12 NOTE — PROGRESS NOTE ADULT - ASSESSMENT
A/P: 82y M s/p cystoscopy, clot evacuation and fulguration of prostate and bladder neck.   22f 3 way caceres placed on CBI with traction to caceres.     -- continue CBI full blast overnight with 2 bags open, do not leg the bags run dry   -- if caceres not draining, hand irrigate and page urology   -- traction removed  -- continue tamsulosin   -- started finasteride 5mg qd  -- pacu labs improved  -- trend h/h  -- transfuse prn     Urology   Pager [phone]   Requested Prescriptions     Pending Prescriptions Disp Refills    gabapentin (NEURONTIN) 300 mg capsule 90 Capsule 1     Sig: Take 1 capsule in morning, 1 capsule in afternoon, and 2 capsules at bedtime

## 2022-12-12 NOTE — CHART NOTE - NSCHARTNOTEFT_GEN_A_CORE
Patient s/p cystoscopy, clot evacuation and fulguration of prostate and bladder neck.   22f 3 way caceres placed on CBI with traction to caceres.     -- continue CBI full blast overnight with 2 bags open, do not leg the bags run dry   -- if caceres not draining, hand irrigate and page urology   -- urology to removed traction tape at post-op check this evening  -- continue tamsulosin   -- started finasteride 5mg qd  -- ordered pacu labs  -- transfuse prn     Signed out to ACP.     Urology   Pager [phone]

## 2022-12-13 LAB
ANION GAP SERPL CALC-SCNC: 10 MMOL/L — SIGNIFICANT CHANGE UP (ref 5–17)
APPEARANCE UR: ABNORMAL
BACTERIA # UR AUTO: NEGATIVE — SIGNIFICANT CHANGE UP
BILIRUB UR-MCNC: NEGATIVE — SIGNIFICANT CHANGE UP
BUN SERPL-MCNC: 12 MG/DL — SIGNIFICANT CHANGE UP (ref 7–23)
CALCIUM SERPL-MCNC: 7 MG/DL — LOW (ref 8.4–10.5)
CHLORIDE SERPL-SCNC: 102 MMOL/L — SIGNIFICANT CHANGE UP (ref 96–108)
CO2 SERPL-SCNC: 24 MMOL/L — SIGNIFICANT CHANGE UP (ref 22–31)
COLOR SPEC: SIGNIFICANT CHANGE UP
CREAT SERPL-MCNC: 1.15 MG/DL — SIGNIFICANT CHANGE UP (ref 0.5–1.3)
DIFF PNL FLD: ABNORMAL
EGFR: 64 ML/MIN/1.73M2 — SIGNIFICANT CHANGE UP
EPI CELLS # UR: 1 /HPF — SIGNIFICANT CHANGE UP
GLUCOSE BLDC GLUCOMTR-MCNC: 127 MG/DL — HIGH (ref 70–99)
GLUCOSE BLDC GLUCOMTR-MCNC: 130 MG/DL — HIGH (ref 70–99)
GLUCOSE BLDC GLUCOMTR-MCNC: 170 MG/DL — HIGH (ref 70–99)
GLUCOSE BLDC GLUCOMTR-MCNC: 95 MG/DL — SIGNIFICANT CHANGE UP (ref 70–99)
GLUCOSE SERPL-MCNC: 116 MG/DL — HIGH (ref 70–99)
GLUCOSE UR QL: NEGATIVE — SIGNIFICANT CHANGE UP
HCT VFR BLD CALC: 20.7 % — CRITICAL LOW (ref 39–50)
HCT VFR BLD CALC: 27.1 % — LOW (ref 39–50)
HGB BLD-MCNC: 6.8 G/DL — CRITICAL LOW (ref 13–17)
HGB BLD-MCNC: 9 G/DL — LOW (ref 13–17)
HYALINE CASTS # UR AUTO: 1 /LPF — SIGNIFICANT CHANGE UP (ref 0–2)
KETONES UR-MCNC: NEGATIVE — SIGNIFICANT CHANGE UP
LEUKOCYTE ESTERASE UR-ACNC: NEGATIVE — SIGNIFICANT CHANGE UP
MCHC RBC-ENTMCNC: 29.5 PG — SIGNIFICANT CHANGE UP (ref 27–34)
MCHC RBC-ENTMCNC: 30 PG — SIGNIFICANT CHANGE UP (ref 27–34)
MCHC RBC-ENTMCNC: 32.9 GM/DL — SIGNIFICANT CHANGE UP (ref 32–36)
MCHC RBC-ENTMCNC: 33.2 GM/DL — SIGNIFICANT CHANGE UP (ref 32–36)
MCV RBC AUTO: 88.9 FL — SIGNIFICANT CHANGE UP (ref 80–100)
MCV RBC AUTO: 91.2 FL — SIGNIFICANT CHANGE UP (ref 80–100)
NITRITE UR-MCNC: NEGATIVE — SIGNIFICANT CHANGE UP
NRBC # BLD: 0 /100 WBCS — SIGNIFICANT CHANGE UP (ref 0–0)
NRBC # BLD: 0 /100 WBCS — SIGNIFICANT CHANGE UP (ref 0–0)
PH UR: 6.5 — SIGNIFICANT CHANGE UP (ref 5–8)
PLATELET # BLD AUTO: 154 K/UL — SIGNIFICANT CHANGE UP (ref 150–400)
PLATELET # BLD AUTO: 155 K/UL — SIGNIFICANT CHANGE UP (ref 150–400)
POTASSIUM SERPL-MCNC: 3.6 MMOL/L — SIGNIFICANT CHANGE UP (ref 3.5–5.3)
POTASSIUM SERPL-SCNC: 3.6 MMOL/L — SIGNIFICANT CHANGE UP (ref 3.5–5.3)
PROT UR-MCNC: ABNORMAL
RBC # BLD: 2.27 M/UL — LOW (ref 4.2–5.8)
RBC # BLD: 3.05 M/UL — LOW (ref 4.2–5.8)
RBC # FLD: 15.6 % — HIGH (ref 10.3–14.5)
RBC # FLD: 16.3 % — HIGH (ref 10.3–14.5)
RBC CASTS # UR COMP ASSIST: 1448 /HPF — HIGH (ref 0–4)
SODIUM SERPL-SCNC: 136 MMOL/L — SIGNIFICANT CHANGE UP (ref 135–145)
SP GR SPEC: 1.01 — LOW (ref 1.01–1.02)
UROBILINOGEN FLD QL: NEGATIVE — SIGNIFICANT CHANGE UP
WBC # BLD: 7.35 K/UL — SIGNIFICANT CHANGE UP (ref 3.8–10.5)
WBC # BLD: 9.16 K/UL — SIGNIFICANT CHANGE UP (ref 3.8–10.5)
WBC # FLD AUTO: 7.35 K/UL — SIGNIFICANT CHANGE UP (ref 3.8–10.5)
WBC # FLD AUTO: 9.16 K/UL — SIGNIFICANT CHANGE UP (ref 3.8–10.5)
WBC UR QL: 3 /HPF — SIGNIFICANT CHANGE UP (ref 0–5)

## 2022-12-13 PROCEDURE — 99233 SBSQ HOSP IP/OBS HIGH 50: CPT

## 2022-12-13 PROCEDURE — 51700 IRRIGATION OF BLADDER: CPT

## 2022-12-13 PROCEDURE — 99231 SBSQ HOSP IP/OBS SF/LOW 25: CPT | Mod: 25

## 2022-12-13 RX ADMIN — Medication 650 MILLIGRAM(S): at 00:15

## 2022-12-13 RX ADMIN — Medication 650 MILLIGRAM(S): at 00:45

## 2022-12-13 RX ADMIN — CARBIDOPA AND LEVODOPA 1 TABLET(S): 25; 100 TABLET ORAL at 17:17

## 2022-12-13 RX ADMIN — QUETIAPINE FUMARATE 75 MILLIGRAM(S): 200 TABLET, FILM COATED ORAL at 00:15

## 2022-12-13 RX ADMIN — Medication 2: at 12:37

## 2022-12-13 RX ADMIN — CARBIDOPA AND LEVODOPA 1 TABLET(S): 25; 100 TABLET ORAL at 22:25

## 2022-12-13 RX ADMIN — TAMSULOSIN HYDROCHLORIDE 0.4 MILLIGRAM(S): 0.4 CAPSULE ORAL at 01:12

## 2022-12-13 RX ADMIN — TAMSULOSIN HYDROCHLORIDE 0.4 MILLIGRAM(S): 0.4 CAPSULE ORAL at 22:26

## 2022-12-13 RX ADMIN — CARBIDOPA AND LEVODOPA 1 TABLET(S): 25; 100 TABLET ORAL at 00:14

## 2022-12-13 RX ADMIN — FINASTERIDE 5 MILLIGRAM(S): 5 TABLET, FILM COATED ORAL at 12:36

## 2022-12-13 RX ADMIN — QUETIAPINE FUMARATE 75 MILLIGRAM(S): 200 TABLET, FILM COATED ORAL at 22:26

## 2022-12-13 RX ADMIN — CARBIDOPA AND LEVODOPA 1 TABLET(S): 25; 100 TABLET ORAL at 05:21

## 2022-12-13 NOTE — PROVIDER CONTACT NOTE (OTHER) - ASSESSMENT
Confused, frequent non productive cough, appetite good, voiding adequately. Denies pain.
pt alertxconfused ,asymptomatic
Patient Alert but confused, incontinent, noticed blood tinged output from the urethra, bladder distended.
pt in bed, asleep. all other vitals stable. patient cold to touch. RN at bedside ready to give transfusion.
pt alertxconfused ,asymptomatic

## 2022-12-13 NOTE — PROVIDER CONTACT NOTE (OTHER) - BACKGROUND
admitted w/anemia,hematuria w/CBI
Admitted with covid 19 infection, Hx DM Parkinson. dementia, BPH
admitted w/Anemia covid positive
pt a&ox1
History of HTN, DM, HLD, Parkinsons Disease, CAD, CHF, Dementia, BPH, Admitted for Covid 19 infection

## 2022-12-13 NOTE — PROVIDER CONTACT NOTE (OTHER) - ACTION/TREATMENT ORDERED:
pt isolated >10days for COVID.  No symptoms.  Isolation discontinued.
urology will come assess pt
Warming blanket applied.   lactate.
insert indwelling Dumont catheter
NP Nikky ordered hypothermic blanket. sepsis workup to be done. do not give blood right now now.
keep NPO

## 2022-12-13 NOTE — PROGRESS NOTE ADULT - PROBLEM SELECTOR PLAN 1
causing hemorrhagic shock now improving  -Appreciate urology care   -No escalation of care previously documented to ICU for pressors  -Continue current mgmt w/ blood, fluid, cbi   -transfuse if actively bleeding  -Trend H/H and transfuse for goal Hgb > at least 7 will transfuse again 12/13

## 2022-12-13 NOTE — PROVIDER CONTACT NOTE (OTHER) - REASON
Bladder scan- 1210 ml
temp 93.4
COVID-19 Isolation Discontinuation
pt NPO
Rectal temp 94.8,
CBI connected to caceres not draining and caceres site leaking

## 2022-12-13 NOTE — PROVIDER CONTACT NOTE (CRITICAL VALUE NOTIFICATION) - TEST AND RESULT REPORTED:
cbc stat results 6.6/20.2
h&h 6.4/20.4
hemoglobin of 6.8 and hematocrit of 21
Hbg = 6.5; Hct = 20.1
Hgb 6.8, Hct 20.7

## 2022-12-13 NOTE — PROGRESS NOTE ADULT - ASSESSMENT
82 yo M HTN, DM, HLD, dementia (AOx2 at baseline), Parkinson's disease, CAD s/p stents x2 (>20 years ago), recent new onset HF (EF 50%, on Xarelto, Moderate diastolic dysfunction (Stage II), moderate pulmonary hypertension, small pericardial effusion), L pleural effusion, BPH, gastric ulcers presenting with COVID    1) COVID+  -sating well on RA    2) CAD s/p stent  -cath in 2010 showed mild luminal disease and patent stents  -denies chest pain  -echo with small to  moderate Pericardial effusion with no evidence of tamponade   - asa on hold for hematuria  - s/p  cystoscopy with fulguration and clot evacuation. now anemic to 6.6     3) Anemia  -2/2 hematuria from patient self pulling out caceres  -s/p PRBC transfusions  -urology on board, on CBI  -continue to monitor H/H    4)  DVT prophylaxis  -lovenox subq on hold 2/2 hematuria

## 2022-12-13 NOTE — PROVIDER CONTACT NOTE (CRITICAL VALUE NOTIFICATION) - BACKGROUND
pt admitted for covid19, pmh of BPH, cellulitis, dementia & parkinson's disease,
pt admitted for covid, urinary retention and caceres placed - now with hematuria after incidentally pulling out caceres - urology following - CBI in process
Pt admitted for COVID-19, anemia, and hypernatremia. RRT called during dayshift as pt pulled out his caceres & was actively bleeding; 1 unit PRBC started at 18:00 & completed at 21:00, post transfusion CBC collected. Also, urology placed caceres/CBI.
admitted w/anemia Covid positive
pt a&ox1, s/p cysto, clot evac & fulguration. patient has rcvd multiple blood transfusions during hospital stay.

## 2022-12-13 NOTE — PROGRESS NOTE ADULT - ASSESSMENT
82 year old M PMH of HTN, DM, HLD, dementia,  Parkinson's disease, CAD s/p stents x2 (>20 years ago), CHF (EF: 50%, Moderate diastolic dysfunction (Stage II), moderate pulmonary hypertension, BPH, and gastric ulcers presents to the ED bceause patient was found to be COVID+ and HHA cannot care for patient now with acute blood loss anemia from caceres trauma after found to be retaining urine and pulled out caceres.

## 2022-12-13 NOTE — PROVIDER CONTACT NOTE (OTHER) - SITUATION
Bladder scan- 1210 ml
s/p cysto, clot evac & fulguration
pt NPO ,Pt on sinemet 25/100,
CBI connected to caceres not draining ,caceres site leaking
Rectal temp 94.8

## 2022-12-13 NOTE — PROGRESS NOTE ADULT - PROBLEM SELECTOR PLAN 4
2/2 urinary retention and pre renal from decreased PO intake  -s/p LR. -D5W for now.  caceres in place now with CBI  Flomax 0.4 now Finasteride as well   -CBI  -Appreciate urology support  -Ordered BMP to be drawn with next blood draw after transfusion

## 2022-12-13 NOTE — CHART NOTE - NSCHARTNOTEFT_GEN_A_CORE
Called by primary team for cbi not draining. Pt seen and evaluated at bedside. Irrigated with NS. Evacuated ~30cc of clot. Urine cleared easily to light pink, CBI restarted and titrated to light pink on moderate gtt.

## 2022-12-13 NOTE — PROVIDER CONTACT NOTE (CRITICAL VALUE NOTIFICATION) - ASSESSMENT
pt confused, alert to self, VS WNL, no SOB or pain noted
Hbg = 6.5; Hct = 20.1. Pt AOx1 confused (no acute change in mental status), no acute distress. Dumont/CBI is irrigating red blood thus pt actively bleeding (urology aware as they have seen pt at bedside multiple times during shift).
pt confused at baseline, VSS
pt in bed, awake. no signs of bleeding noted.
pt Alertxconfused ,hematuria w/CBI

## 2022-12-13 NOTE — PROVIDER CONTACT NOTE (CRITICAL VALUE NOTIFICATION) - ACTION/TREATMENT ORDERED:
CATALINA Sher made aware. will order 1PRBC.
ordered  1 unit PRBC over 3hours
transfuse
1 unit PRBC ordered by SMITA Sanchez
2 units PRBC ordered

## 2022-12-13 NOTE — PROGRESS NOTE ADULT - ASSESSMENT
A/P: 82y M with hematuria after traumatic caceres s/p cystoscopy, clot evacuation and fulguration of prostate and bladder neck.   22f 3 way caceres placed on CBI with traction to caceres.   H/h 28.3/9.3 in PACU after 1 U pRBC 12/12    -- continue CBI, weaned to medium gtt  -- if caceres not draining, hand irrigate and page urology   -- traction removed  -- continue tamsulosin   -- started finasteride 5mg qd  -- trend h/h  -- transfuse prn     D/w Dr. Ng    Urology   Pager [phone]

## 2022-12-14 LAB
ANION GAP SERPL CALC-SCNC: 9 MMOL/L — SIGNIFICANT CHANGE UP (ref 5–17)
BUN SERPL-MCNC: 15 MG/DL — SIGNIFICANT CHANGE UP (ref 7–23)
CALCIUM SERPL-MCNC: 6.9 MG/DL — LOW (ref 8.4–10.5)
CHLORIDE SERPL-SCNC: 102 MMOL/L — SIGNIFICANT CHANGE UP (ref 96–108)
CO2 SERPL-SCNC: 23 MMOL/L — SIGNIFICANT CHANGE UP (ref 22–31)
CREAT SERPL-MCNC: 1.2 MG/DL — SIGNIFICANT CHANGE UP (ref 0.5–1.3)
CULTURE RESULTS: NO GROWTH — SIGNIFICANT CHANGE UP
CULTURE RESULTS: SIGNIFICANT CHANGE UP
CULTURE RESULTS: SIGNIFICANT CHANGE UP
EGFR: 61 ML/MIN/1.73M2 — SIGNIFICANT CHANGE UP
GLUCOSE BLDC GLUCOMTR-MCNC: 105 MG/DL — HIGH (ref 70–99)
GLUCOSE BLDC GLUCOMTR-MCNC: 125 MG/DL — HIGH (ref 70–99)
GLUCOSE BLDC GLUCOMTR-MCNC: 131 MG/DL — HIGH (ref 70–99)
GLUCOSE BLDC GLUCOMTR-MCNC: 98 MG/DL — SIGNIFICANT CHANGE UP (ref 70–99)
GLUCOSE SERPL-MCNC: 134 MG/DL — HIGH (ref 70–99)
HCT VFR BLD CALC: 23.7 % — LOW (ref 39–50)
HGB BLD-MCNC: 7.8 G/DL — LOW (ref 13–17)
MCHC RBC-ENTMCNC: 29.8 PG — SIGNIFICANT CHANGE UP (ref 27–34)
MCHC RBC-ENTMCNC: 32.9 GM/DL — SIGNIFICANT CHANGE UP (ref 32–36)
MCV RBC AUTO: 90.5 FL — SIGNIFICANT CHANGE UP (ref 80–100)
NRBC # BLD: 0 /100 WBCS — SIGNIFICANT CHANGE UP (ref 0–0)
PLATELET # BLD AUTO: 156 K/UL — SIGNIFICANT CHANGE UP (ref 150–400)
POTASSIUM SERPL-MCNC: 3.5 MMOL/L — SIGNIFICANT CHANGE UP (ref 3.5–5.3)
POTASSIUM SERPL-SCNC: 3.5 MMOL/L — SIGNIFICANT CHANGE UP (ref 3.5–5.3)
RBC # BLD: 2.62 M/UL — LOW (ref 4.2–5.8)
RBC # FLD: 16.1 % — HIGH (ref 10.3–14.5)
SODIUM SERPL-SCNC: 134 MMOL/L — LOW (ref 135–145)
SPECIMEN SOURCE: SIGNIFICANT CHANGE UP
WBC # BLD: 7.67 K/UL — SIGNIFICANT CHANGE UP (ref 3.8–10.5)
WBC # FLD AUTO: 7.67 K/UL — SIGNIFICANT CHANGE UP (ref 3.8–10.5)

## 2022-12-14 PROCEDURE — 99233 SBSQ HOSP IP/OBS HIGH 50: CPT

## 2022-12-14 PROCEDURE — 51700 IRRIGATION OF BLADDER: CPT

## 2022-12-14 RX ADMIN — QUETIAPINE FUMARATE 75 MILLIGRAM(S): 200 TABLET, FILM COATED ORAL at 22:00

## 2022-12-14 RX ADMIN — FINASTERIDE 5 MILLIGRAM(S): 5 TABLET, FILM COATED ORAL at 13:34

## 2022-12-14 RX ADMIN — CARBIDOPA AND LEVODOPA 1 TABLET(S): 25; 100 TABLET ORAL at 22:00

## 2022-12-14 RX ADMIN — CARBIDOPA AND LEVODOPA 1 TABLET(S): 25; 100 TABLET ORAL at 05:46

## 2022-12-14 RX ADMIN — TAMSULOSIN HYDROCHLORIDE 0.4 MILLIGRAM(S): 0.4 CAPSULE ORAL at 22:00

## 2022-12-14 RX ADMIN — CARBIDOPA AND LEVODOPA 1 TABLET(S): 25; 100 TABLET ORAL at 13:34

## 2022-12-14 NOTE — PROGRESS NOTE ADULT - ASSESSMENT
82 yo M HTN, DM, HLD, dementia (AOx2 at baseline), Parkinson's disease, CAD s/p stents x2 (>20 years ago), recent new onset HF (EF 50%, on Xarelto, Moderate diastolic dysfunction (Stage II), moderate pulmonary hypertension, small pericardial effusion), L pleural effusion, BPH, gastric ulcers presenting with COVID    1) COVID+  -sating well on RA    2) CAD s/p stent  -cath in 2010 showed mild luminal disease and patent stents  -denies chest pain  -echo with small to  moderate Pericardial effusion with no evidence of tamponade   - asa on hold for hematuria      3) Anemia  -2/2 hematuria from patient self pulling out caceres  -s/p PRBC transfusions  -urology on board, on CBI. s/p cystoscopy with fulguration and clot evacuation.   -continue to monitor H/H    4)  DVT prophylaxis  -lovenox subq on hold 2/2 hematuria

## 2022-12-14 NOTE — PROGRESS NOTE ADULT - ASSESSMENT
A/P: 82y M with hematuria after traumatic caceres s/p cystoscopy, clot evacuation and fulguration of prostate and bladder neck.   22f 3 way caceres placed on CBI with traction to caceres.   H/h 27/9 after 1 U pRBC 1213    -- continue CBI, weaned to medium gtt  -- if caceres not draining, hand irrigate and page urology   -- traction removed  -- continue tamsulosin   -- started finasteride 5mg qd  -- trend h/h  -- transfuse prn     D/w Dr. Ng    Urology   Pager [phone]

## 2022-12-14 NOTE — PROGRESS NOTE ADULT - PROBLEM SELECTOR PLAN 4
2/2 urinary retention and pre renal from decreased PO intake now improving  -s/p LR. -D5W for now.  caceres in place now with CBI  Flomax 0.4 now Finasteride as well   -CBI  -Appreciate urology support

## 2022-12-14 NOTE — PROGRESS NOTE ADULT - PROBLEM SELECTOR PLAN 1
causing hemorrhagic shock now improving  -Appreciate urology care   -No escalation of care previously documented to ICU for pressors  -Continue current mgmt w/ blood, fluid, cbi   -transfuse if actively bleeding  -Trend H/H and transfuse for goal Hgb > at least 7

## 2022-12-15 LAB
ANION GAP SERPL CALC-SCNC: 8 MMOL/L — SIGNIFICANT CHANGE UP (ref 5–17)
BUN SERPL-MCNC: 13 MG/DL — SIGNIFICANT CHANGE UP (ref 7–23)
CALCIUM SERPL-MCNC: 7.4 MG/DL — LOW (ref 8.4–10.5)
CHLORIDE SERPL-SCNC: 104 MMOL/L — SIGNIFICANT CHANGE UP (ref 96–108)
CO2 SERPL-SCNC: 26 MMOL/L — SIGNIFICANT CHANGE UP (ref 22–31)
CREAT SERPL-MCNC: 1.05 MG/DL — SIGNIFICANT CHANGE UP (ref 0.5–1.3)
EGFR: 72 ML/MIN/1.73M2 — SIGNIFICANT CHANGE UP
GLUCOSE BLDC GLUCOMTR-MCNC: 117 MG/DL — HIGH (ref 70–99)
GLUCOSE BLDC GLUCOMTR-MCNC: 119 MG/DL — HIGH (ref 70–99)
GLUCOSE BLDC GLUCOMTR-MCNC: 127 MG/DL — HIGH (ref 70–99)
GLUCOSE BLDC GLUCOMTR-MCNC: 129 MG/DL — HIGH (ref 70–99)
GLUCOSE SERPL-MCNC: 113 MG/DL — HIGH (ref 70–99)
HCT VFR BLD CALC: 21.3 % — LOW (ref 39–50)
HCT VFR BLD CALC: 27 % — LOW (ref 39–50)
HGB BLD-MCNC: 6.9 G/DL — CRITICAL LOW (ref 13–17)
HGB BLD-MCNC: 8.8 G/DL — LOW (ref 13–17)
MCHC RBC-ENTMCNC: 29.2 PG — SIGNIFICANT CHANGE UP (ref 27–34)
MCHC RBC-ENTMCNC: 29.4 PG — SIGNIFICANT CHANGE UP (ref 27–34)
MCHC RBC-ENTMCNC: 32.4 GM/DL — SIGNIFICANT CHANGE UP (ref 32–36)
MCHC RBC-ENTMCNC: 32.6 GM/DL — SIGNIFICANT CHANGE UP (ref 32–36)
MCV RBC AUTO: 89.7 FL — SIGNIFICANT CHANGE UP (ref 80–100)
MCV RBC AUTO: 90.6 FL — SIGNIFICANT CHANGE UP (ref 80–100)
NRBC # BLD: 0 /100 WBCS — SIGNIFICANT CHANGE UP (ref 0–0)
NRBC # BLD: 0 /100 WBCS — SIGNIFICANT CHANGE UP (ref 0–0)
PLATELET # BLD AUTO: 184 K/UL — SIGNIFICANT CHANGE UP (ref 150–400)
PLATELET # BLD AUTO: 205 K/UL — SIGNIFICANT CHANGE UP (ref 150–400)
POTASSIUM SERPL-MCNC: 3.3 MMOL/L — LOW (ref 3.5–5.3)
POTASSIUM SERPL-SCNC: 3.3 MMOL/L — LOW (ref 3.5–5.3)
RBC # BLD: 2.35 M/UL — LOW (ref 4.2–5.8)
RBC # BLD: 3.01 M/UL — LOW (ref 4.2–5.8)
RBC # FLD: 16 % — HIGH (ref 10.3–14.5)
RBC # FLD: 16.2 % — HIGH (ref 10.3–14.5)
SODIUM SERPL-SCNC: 138 MMOL/L — SIGNIFICANT CHANGE UP (ref 135–145)
WBC # BLD: 5.82 K/UL — SIGNIFICANT CHANGE UP (ref 3.8–10.5)
WBC # BLD: 6.66 K/UL — SIGNIFICANT CHANGE UP (ref 3.8–10.5)
WBC # FLD AUTO: 5.82 K/UL — SIGNIFICANT CHANGE UP (ref 3.8–10.5)
WBC # FLD AUTO: 6.66 K/UL — SIGNIFICANT CHANGE UP (ref 3.8–10.5)

## 2022-12-15 PROCEDURE — 99233 SBSQ HOSP IP/OBS HIGH 50: CPT

## 2022-12-15 RX ORDER — POTASSIUM CHLORIDE 20 MEQ
40 PACKET (EA) ORAL ONCE
Refills: 0 | Status: COMPLETED | OUTPATIENT
Start: 2022-12-15 | End: 2022-12-15

## 2022-12-15 RX ADMIN — TAMSULOSIN HYDROCHLORIDE 0.4 MILLIGRAM(S): 0.4 CAPSULE ORAL at 22:54

## 2022-12-15 RX ADMIN — CARBIDOPA AND LEVODOPA 1 TABLET(S): 25; 100 TABLET ORAL at 05:47

## 2022-12-15 RX ADMIN — FINASTERIDE 5 MILLIGRAM(S): 5 TABLET, FILM COATED ORAL at 12:16

## 2022-12-15 RX ADMIN — QUETIAPINE FUMARATE 75 MILLIGRAM(S): 200 TABLET, FILM COATED ORAL at 22:54

## 2022-12-15 RX ADMIN — CARBIDOPA AND LEVODOPA 1 TABLET(S): 25; 100 TABLET ORAL at 22:54

## 2022-12-15 RX ADMIN — CARBIDOPA AND LEVODOPA 1 TABLET(S): 25; 100 TABLET ORAL at 12:16

## 2022-12-15 RX ADMIN — Medication 650 MILLIGRAM(S): at 09:32

## 2022-12-15 RX ADMIN — Medication 40 MILLIEQUIVALENT(S): at 09:09

## 2022-12-15 RX ADMIN — Medication 100 MILLIGRAM(S): at 09:49

## 2022-12-15 RX ADMIN — Medication 650 MILLIGRAM(S): at 10:02

## 2022-12-15 NOTE — PROGRESS NOTE ADULT - NSPROGADDITIONALINFOA_GEN_ALL_CORE
Updated brother Berto by phone Updated brother Berto by phone  Plan for TOV on Friday no hematuria and if that is good and hgb stable then will be medically cleared

## 2022-12-15 NOTE — PROGRESS NOTE ADULT - PROBLEM SELECTOR PLAN 2
was setting of decreased PO intake   - Encourage PO intake  -still hyper NA; s/p LR. -S/p D5W x 10 hours and will encourage PO intake. -C/w D5W for now. was setting of decreased PO intake   - Encourage PO intake  -still hyper NA; s/p LR. -S/p D5W x 10 hours and will encourage PO intake. -C/w D5W for now.    #hypokalemia  -Repleted

## 2022-12-15 NOTE — PROGRESS NOTE ADULT - PROBLEM SELECTOR PLAN 4
2/2 urinary retention and pre renal from decreased PO intake now improving  -s/p LR. -D5W for now.  caceres in place now with CBI clamped possible TOV on Friday if remains clear  Flomax 0.4 now Finasteride as well   -CBI  -Appreciate urology support

## 2022-12-15 NOTE — PROGRESS NOTE ADULT - PROBLEM SELECTOR PLAN 1
causing hemorrhagic shock now improving  -Appreciate urology care   -No escalation of care previously documented to ICU for pressors  -Continue current mgmt w/ blood, fluid, cbi   -transfuse if actively bleeding  -Trend H/H and transfuse for goal Hgb > at least 7  If still dropping and no more hematuria will check alternate sources

## 2022-12-15 NOTE — PROGRESS NOTE ADULT - ASSESSMENT
80 yo M HTN, DM, HLD, dementia (AOx2 at baseline), Parkinson's disease, CAD s/p stents x2 (>20 years ago), recent new onset HF (EF 50%, on Xarelto, Moderate diastolic dysfunction (Stage II), moderate pulmonary hypertension, small pericardial effusion), L pleural effusion, BPH, gastric ulcers presenting with COVID    1) COVID+  -sating well on RA    2) CAD s/p stent  -cath in 2010 showed mild luminal disease and patent stents  -denies chest pain  -echo with small to  moderate Pericardial effusion with no evidence of tamponade   - asa on hold for hematuria      3) Anemia  -2/2 hematuria from patient self pulling out caceres  -s/p PRBC transfusions  -urology on board, on CBI. s/p cystoscopy with fulguration and clot evacuation.   -continue to monitor H/H    4)  DVT prophylaxis  -lovenox subq on hold 2/2 hematuria

## 2022-12-15 NOTE — PROGRESS NOTE ADULT - ASSESSMENT
A/P: 82y M with hematuria after traumatic caceres s/p cystoscopy, clot evacuation and fulguration of prostate and bladder neck.   22f 3 way caceres placed on CBI with traction to caceres.   H/h 27/9 after 1 U pRBC 12/13    -- F/u AM labs  -- CBI clamped, caceres now draining clear yellow urine  -- continue to monitor urine color, if remains clear will plan for TOV Friday  -- if caceres not draining, hand irrigate and page urology   -- continue tamsulosin   -- started finasteride 5mg qd  -- trend h/h  -- transfuse prn     D/w Dr. Ng    Urology   Pager [phone]   A/P: 82y M with hematuria after traumatic caceres s/p cystoscopy, clot evacuation and fulguration of prostate and bladder neck.   22f 3 way caceres placed on CBI with traction to caceres.   H/h 27/9 after 1 U pRBC 12/13    -- F/u AM labs  -- CBI clamped, caceres now draining clear yellow urine  -- continue to monitor urine color, if remains clear will plan for TOV Friday  -- if caceres not draining, hand irrigate and page urology   -- continue tamsulosin   -- started finasteride 5mg qd  -- trend h/h; consider alternative sources for anemia as h/h was low prior to this  current caceres trauma  -- transfuse prn     D/w Dr. Ng    Urology   Pager [phone]

## 2022-12-16 LAB
ANION GAP SERPL CALC-SCNC: 12 MMOL/L — SIGNIFICANT CHANGE UP (ref 5–17)
BUN SERPL-MCNC: 11 MG/DL — SIGNIFICANT CHANGE UP (ref 7–23)
CALCIUM SERPL-MCNC: 7.6 MG/DL — LOW (ref 8.4–10.5)
CHLORIDE SERPL-SCNC: 102 MMOL/L — SIGNIFICANT CHANGE UP (ref 96–108)
CO2 SERPL-SCNC: 25 MMOL/L — SIGNIFICANT CHANGE UP (ref 22–31)
CREAT SERPL-MCNC: 1.02 MG/DL — SIGNIFICANT CHANGE UP (ref 0.5–1.3)
EGFR: 74 ML/MIN/1.73M2 — SIGNIFICANT CHANGE UP
GLUCOSE BLDC GLUCOMTR-MCNC: 103 MG/DL — HIGH (ref 70–99)
GLUCOSE BLDC GLUCOMTR-MCNC: 104 MG/DL — HIGH (ref 70–99)
GLUCOSE BLDC GLUCOMTR-MCNC: 122 MG/DL — HIGH (ref 70–99)
GLUCOSE BLDC GLUCOMTR-MCNC: 127 MG/DL — HIGH (ref 70–99)
GLUCOSE SERPL-MCNC: 98 MG/DL — SIGNIFICANT CHANGE UP (ref 70–99)
HCT VFR BLD CALC: 25.4 % — LOW (ref 39–50)
HGB BLD-MCNC: 8.2 G/DL — LOW (ref 13–17)
MAGNESIUM SERPL-MCNC: 1.1 MG/DL — LOW (ref 1.6–2.6)
MCHC RBC-ENTMCNC: 29.2 PG — SIGNIFICANT CHANGE UP (ref 27–34)
MCHC RBC-ENTMCNC: 32.3 GM/DL — SIGNIFICANT CHANGE UP (ref 32–36)
MCV RBC AUTO: 90.4 FL — SIGNIFICANT CHANGE UP (ref 80–100)
NRBC # BLD: 0 /100 WBCS — SIGNIFICANT CHANGE UP (ref 0–0)
PLATELET # BLD AUTO: 213 K/UL — SIGNIFICANT CHANGE UP (ref 150–400)
POTASSIUM SERPL-MCNC: 3.6 MMOL/L — SIGNIFICANT CHANGE UP (ref 3.5–5.3)
POTASSIUM SERPL-SCNC: 3.6 MMOL/L — SIGNIFICANT CHANGE UP (ref 3.5–5.3)
RBC # BLD: 2.81 M/UL — LOW (ref 4.2–5.8)
RBC # FLD: 15.9 % — HIGH (ref 10.3–14.5)
SODIUM SERPL-SCNC: 139 MMOL/L — SIGNIFICANT CHANGE UP (ref 135–145)
WBC # BLD: 6.57 K/UL — SIGNIFICANT CHANGE UP (ref 3.8–10.5)
WBC # FLD AUTO: 6.57 K/UL — SIGNIFICANT CHANGE UP (ref 3.8–10.5)

## 2022-12-16 PROCEDURE — 99233 SBSQ HOSP IP/OBS HIGH 50: CPT

## 2022-12-16 PROCEDURE — 51702 INSERT TEMP BLADDER CATH: CPT

## 2022-12-16 RX ORDER — MAGNESIUM SULFATE 500 MG/ML
2 VIAL (ML) INJECTION ONCE
Refills: 0 | Status: COMPLETED | OUTPATIENT
Start: 2022-12-16 | End: 2022-12-16

## 2022-12-16 RX ORDER — MAGNESIUM SULFATE 500 MG/ML
1 VIAL (ML) INJECTION ONCE
Refills: 0 | Status: DISCONTINUED | OUTPATIENT
Start: 2022-12-16 | End: 2022-12-16

## 2022-12-16 RX ADMIN — Medication 25 GRAM(S): at 09:41

## 2022-12-16 RX ADMIN — CARBIDOPA AND LEVODOPA 1 TABLET(S): 25; 100 TABLET ORAL at 12:38

## 2022-12-16 RX ADMIN — QUETIAPINE FUMARATE 75 MILLIGRAM(S): 200 TABLET, FILM COATED ORAL at 21:22

## 2022-12-16 RX ADMIN — TAMSULOSIN HYDROCHLORIDE 0.4 MILLIGRAM(S): 0.4 CAPSULE ORAL at 21:32

## 2022-12-16 RX ADMIN — CARBIDOPA AND LEVODOPA 1 TABLET(S): 25; 100 TABLET ORAL at 05:35

## 2022-12-16 RX ADMIN — CARBIDOPA AND LEVODOPA 1 TABLET(S): 25; 100 TABLET ORAL at 21:24

## 2022-12-16 RX ADMIN — FINASTERIDE 5 MILLIGRAM(S): 5 TABLET, FILM COATED ORAL at 12:39

## 2022-12-16 NOTE — PROGRESS NOTE ADULT - ASSESSMENT
A/P: 82y M with hematuria after traumatic caceres s/p cystoscopy, clot evacuation and fulguration of prostate and bladder neck.   22f 3 way caceres placed on CBI with traction to caceres.   H/h 25.4/8.2 after 1 U pRBC 12/15    -- Caceres removed at 7:15 am 12/16  -- F/u TOV  -- If unable to void in 8 hours please bladder scan and call urology  -- Urology to replace caceres (no straight catheter) if fails TOV today  -- continue tamsulosin   -- started finasteride 5mg qd  -- trend h/h  -- transfuse prn     D/w Dr. Ng    Urology   Pager [phone]

## 2022-12-16 NOTE — PROGRESS NOTE ADULT - PROBLEM SELECTOR PLAN 1
causing hemorrhagic shock now improving  -Appreciate urology care   -No escalation of care previously documented to ICU for pressors  -Continue current mgmt w/ blood, fluid, cbi (now off)  -transfuse if actively bleeding  -Trend H/H and transfuse for goal Hgb > at least 7  If still dropping and no more hematuria will check alternate sources

## 2022-12-16 NOTE — PROCEDURE NOTE - NSURITECHNIQUE_GU_A_CORE
Proper hand hygiene was performed/Sterile gloves were worn for the duration of the procedure/A sterile drape was used to cover all adjacent areas/The site was cleaned with soap/water and sterile solution (betadine)/The catheter was appropriately lubricated/The catheter was secured with a securement device (e.g. StatLock)/The urinary drainage system is closed at the end of the procedure/The collection bag is below the level of the patient and urinary bladder/All applicable medical record documentation is completed
Proper hand hygiene was performed/Sterile gloves were worn for the duration of the procedure/A sterile drape was used to cover all adjacent areas/The site was cleaned with soap/water and sterile solution (betadine)/The catheter was appropriately lubricated/The catheter was secured with a securement device (e.g. StatLock)/The urinary drainage system is closed at the end of the procedure/The collection bag is below the level of the patient and urinary bladder/All applicable medical record documentation is completed
Proper hand hygiene was performed/Sterile gloves were worn for the duration of the procedure/A sterile drape was used to cover all adjacent areas/The site was cleaned with soap/water and sterile solution (betadine)/The catheter was appropriately lubricated/The catheter was secured with a securement device (e.g. StatLock)/The collection bag is below the level of the patient and urinary bladder/All applicable medical record documentation is completed

## 2022-12-16 NOTE — PROCEDURE NOTE - NSPROCDETAILS_GEN_ALL_CORE
sterile technique, old cysto removed, new tube inserted
sterile technique, indwelling urinary device inserted/prior to placement, an active physician order for the placement of a urinary catheter was verified/a urinary catheter insertion kit was used for all insertion materials
sterile technique, indwelling urinary device inserted/sterile technique, non-indwelling urinary device inserted/sterile technique, old cysto removed, new tube inserted/prior to placement, an active physician order for the placement of a urinary catheter was verified/a urinary catheter insertion kit was used for all insertion materials

## 2022-12-16 NOTE — PROCEDURE NOTE - NSINDICATIONS_GEN_A_CORE
urinry obstruction or retention
continuous bladder irrigation
prolongerd immobilzation/sedation/urinry obstruction or retention

## 2022-12-16 NOTE — PROGRESS NOTE ADULT - PROBLEM SELECTOR PLAN 4
2/2 urinary retention and pre renal from decreased PO intake now improving  caceres in place now with CBI clamped possible TOV on Friday if remains clear  Flomax 0.4 now Finasteride as well   -CBI done, TOV 12/16  -Appreciate urology support

## 2022-12-17 LAB
GLUCOSE BLDC GLUCOMTR-MCNC: 101 MG/DL — HIGH (ref 70–99)
GLUCOSE BLDC GLUCOMTR-MCNC: 111 MG/DL — HIGH (ref 70–99)
GLUCOSE BLDC GLUCOMTR-MCNC: 117 MG/DL — HIGH (ref 70–99)
HCT VFR BLD CALC: 25.7 % — LOW (ref 39–50)
HGB BLD-MCNC: 8.6 G/DL — LOW (ref 13–17)
MAGNESIUM SERPL-MCNC: 1.4 MG/DL — LOW (ref 1.6–2.6)
MCHC RBC-ENTMCNC: 29.8 PG — SIGNIFICANT CHANGE UP (ref 27–34)
MCHC RBC-ENTMCNC: 33.5 GM/DL — SIGNIFICANT CHANGE UP (ref 32–36)
MCV RBC AUTO: 88.9 FL — SIGNIFICANT CHANGE UP (ref 80–100)
NRBC # BLD: 0 /100 WBCS — SIGNIFICANT CHANGE UP (ref 0–0)
PLATELET # BLD AUTO: 247 K/UL — SIGNIFICANT CHANGE UP (ref 150–400)
RBC # BLD: 2.89 M/UL — LOW (ref 4.2–5.8)
RBC # FLD: 16.1 % — HIGH (ref 10.3–14.5)
WBC # BLD: 7.63 K/UL — SIGNIFICANT CHANGE UP (ref 3.8–10.5)
WBC # FLD AUTO: 7.63 K/UL — SIGNIFICANT CHANGE UP (ref 3.8–10.5)

## 2022-12-17 PROCEDURE — 99233 SBSQ HOSP IP/OBS HIGH 50: CPT

## 2022-12-17 RX ORDER — MAGNESIUM SULFATE 500 MG/ML
2 VIAL (ML) INJECTION ONCE
Refills: 0 | Status: COMPLETED | OUTPATIENT
Start: 2022-12-17 | End: 2022-12-17

## 2022-12-17 RX ORDER — MAGNESIUM SULFATE 500 MG/ML
1 VIAL (ML) INJECTION ONCE
Refills: 0 | Status: COMPLETED | OUTPATIENT
Start: 2022-12-17 | End: 2022-12-17

## 2022-12-17 RX ADMIN — CHLORHEXIDINE GLUCONATE 1 APPLICATION(S): 213 SOLUTION TOPICAL at 14:50

## 2022-12-17 RX ADMIN — CARBIDOPA AND LEVODOPA 1 TABLET(S): 25; 100 TABLET ORAL at 21:37

## 2022-12-17 RX ADMIN — FINASTERIDE 5 MILLIGRAM(S): 5 TABLET, FILM COATED ORAL at 14:49

## 2022-12-17 RX ADMIN — CARBIDOPA AND LEVODOPA 1 TABLET(S): 25; 100 TABLET ORAL at 05:37

## 2022-12-17 RX ADMIN — CARBIDOPA AND LEVODOPA 1 TABLET(S): 25; 100 TABLET ORAL at 14:51

## 2022-12-17 RX ADMIN — TAMSULOSIN HYDROCHLORIDE 0.4 MILLIGRAM(S): 0.4 CAPSULE ORAL at 21:37

## 2022-12-17 RX ADMIN — Medication 25 GRAM(S): at 10:31

## 2022-12-17 RX ADMIN — QUETIAPINE FUMARATE 75 MILLIGRAM(S): 200 TABLET, FILM COATED ORAL at 21:37

## 2022-12-17 NOTE — PROGRESS NOTE ADULT - PROBLEM SELECTOR PLAN 4
2/2 urinary retention and pre renal from decreased PO intake now improving  Flomax 0.4 now Finasteride as well   -CBI done, TOV 12/16  -Appreciate urology support  -caceres placed again on 12/16 in the afternoon, urology recommended outpatient followup and dc with caceres

## 2022-12-17 NOTE — PROGRESS NOTE ADULT - PROBLEM SELECTOR PLAN 1
caused hemorrhagic shock, now improving  -Appreciate urology care   -No escalation of care previously documented to ICU for pressors  -Continue current mgmt w/ blood, fluid, cbi (now off)  -transfuse if actively bleeding  -Trend H/H and transfuse for goal Hgb > at least 7  If still dropping and no more hematuria will check alternate sources

## 2022-12-17 NOTE — PROGRESS NOTE ADULT - ASSESSMENT
A/P: 82y M with hematuria after traumatic caceres s/p cystoscopy, clot evacuation and fulguration of prostate and bladder neck.   [address]f [address] way caceres placed on CBI with traction to caceres.   H/h 25.4/8.2 after 1 U pRBC 12/15    -- Caceres removed at 7:15 am 12/16  -- Failed TOV --> replaced with 20Fr coude yesterday afternoon  -- continue tamsulosin   -- continue finasteride 5mg qd  -- trend h/h  -- transfuse prn   --patient to be discharged with caceres catheter to follow up outpatient with urology    [facility]  [address] [place] [id number] [phone]

## 2022-12-18 LAB
ANION GAP SERPL CALC-SCNC: 9 MMOL/L — SIGNIFICANT CHANGE UP (ref 5–17)
BUN SERPL-MCNC: 11 MG/DL — SIGNIFICANT CHANGE UP (ref 7–23)
CALCIUM SERPL-MCNC: 8.1 MG/DL — LOW (ref 8.4–10.5)
CHLORIDE SERPL-SCNC: 103 MMOL/L — SIGNIFICANT CHANGE UP (ref 96–108)
CO2 SERPL-SCNC: 27 MMOL/L — SIGNIFICANT CHANGE UP (ref 22–31)
CREAT SERPL-MCNC: 1 MG/DL — SIGNIFICANT CHANGE UP (ref 0.5–1.3)
CULTURE RESULTS: SIGNIFICANT CHANGE UP
CULTURE RESULTS: SIGNIFICANT CHANGE UP
EGFR: 76 ML/MIN/1.73M2 — SIGNIFICANT CHANGE UP
GLUCOSE BLDC GLUCOMTR-MCNC: 102 MG/DL — HIGH (ref 70–99)
GLUCOSE BLDC GLUCOMTR-MCNC: 114 MG/DL — HIGH (ref 70–99)
GLUCOSE BLDC GLUCOMTR-MCNC: 138 MG/DL — HIGH (ref 70–99)
GLUCOSE BLDC GLUCOMTR-MCNC: 83 MG/DL — SIGNIFICANT CHANGE UP (ref 70–99)
GLUCOSE SERPL-MCNC: 83 MG/DL — SIGNIFICANT CHANGE UP (ref 70–99)
HCT VFR BLD CALC: 25.8 % — LOW (ref 39–50)
HGB BLD-MCNC: 8.2 G/DL — LOW (ref 13–17)
MAGNESIUM SERPL-MCNC: 1.5 MG/DL — LOW (ref 1.6–2.6)
MCHC RBC-ENTMCNC: 29.5 PG — SIGNIFICANT CHANGE UP (ref 27–34)
MCHC RBC-ENTMCNC: 31.8 GM/DL — LOW (ref 32–36)
MCV RBC AUTO: 92.8 FL — SIGNIFICANT CHANGE UP (ref 80–100)
NRBC # BLD: 0 /100 WBCS — SIGNIFICANT CHANGE UP (ref 0–0)
PHOSPHATE SERPL-MCNC: 2.6 MG/DL — SIGNIFICANT CHANGE UP (ref 2.5–4.5)
PLATELET # BLD AUTO: 281 K/UL — SIGNIFICANT CHANGE UP (ref 150–400)
POTASSIUM SERPL-MCNC: 3.6 MMOL/L — SIGNIFICANT CHANGE UP (ref 3.5–5.3)
POTASSIUM SERPL-SCNC: 3.6 MMOL/L — SIGNIFICANT CHANGE UP (ref 3.5–5.3)
RBC # BLD: 2.78 M/UL — LOW (ref 4.2–5.8)
RBC # FLD: 15.3 % — HIGH (ref 10.3–14.5)
SODIUM SERPL-SCNC: 139 MMOL/L — SIGNIFICANT CHANGE UP (ref 135–145)
SPECIMEN SOURCE: SIGNIFICANT CHANGE UP
SPECIMEN SOURCE: SIGNIFICANT CHANGE UP
WBC # BLD: 6.15 K/UL — SIGNIFICANT CHANGE UP (ref 3.8–10.5)
WBC # FLD AUTO: 6.15 K/UL — SIGNIFICANT CHANGE UP (ref 3.8–10.5)

## 2022-12-18 PROCEDURE — 99233 SBSQ HOSP IP/OBS HIGH 50: CPT

## 2022-12-18 RX ORDER — MAGNESIUM SULFATE 500 MG/ML
2 VIAL (ML) INJECTION ONCE
Refills: 0 | Status: COMPLETED | OUTPATIENT
Start: 2022-12-18 | End: 2022-12-18

## 2022-12-18 RX ADMIN — CARBIDOPA AND LEVODOPA 1 TABLET(S): 25; 100 TABLET ORAL at 05:21

## 2022-12-18 RX ADMIN — CARBIDOPA AND LEVODOPA 1 TABLET(S): 25; 100 TABLET ORAL at 14:09

## 2022-12-18 RX ADMIN — FINASTERIDE 5 MILLIGRAM(S): 5 TABLET, FILM COATED ORAL at 14:10

## 2022-12-18 RX ADMIN — QUETIAPINE FUMARATE 75 MILLIGRAM(S): 200 TABLET, FILM COATED ORAL at 22:52

## 2022-12-18 RX ADMIN — Medication 25 GRAM(S): at 09:47

## 2022-12-18 RX ADMIN — CARBIDOPA AND LEVODOPA 1 TABLET(S): 25; 100 TABLET ORAL at 22:52

## 2022-12-18 RX ADMIN — TAMSULOSIN HYDROCHLORIDE 0.4 MILLIGRAM(S): 0.4 CAPSULE ORAL at 22:52

## 2022-12-18 RX ADMIN — CHLORHEXIDINE GLUCONATE 1 APPLICATION(S): 213 SOLUTION TOPICAL at 14:11

## 2022-12-18 NOTE — PROGRESS NOTE ADULT - PROBLEM SELECTOR PLAN 1
caused hemorrhagic shock, now improving  -Appreciate urology care   -No escalation of care previously documented to ICU for pressors  -Continue current mgmt w/ blood, fluid, cbi (now off)  -transfuse if actively bleeding  -Trend H/H and transfuse for goal Hgb > at least 7 - has been stable now in the 8s, and caceres has clear urine

## 2022-12-18 NOTE — PROGRESS NOTE ADULT - PROBLEM SELECTOR PLAN 4
2/2 urinary retention and pre renal from decreased PO intake now improving  Flomax 0.4 now Finasteride as well   -CBI done, TOV 12/16  -Appreciate urology support  -caceres placed again on 12/16 in the afternoon, urology recommended outpatient followup and dc with caceres  -spoke with brother Berto Mitchell - he's hesitant to bring pt home with caceres given patient keeps pulling it out and the aides cannot help with the caceres, and wishes to hear about hospice services to help with caceres management. Hospice consulted.

## 2022-12-19 LAB
ANION GAP SERPL CALC-SCNC: 11 MMOL/L — SIGNIFICANT CHANGE UP (ref 5–17)
BUN SERPL-MCNC: 12 MG/DL — SIGNIFICANT CHANGE UP (ref 7–23)
CALCIUM SERPL-MCNC: 8.4 MG/DL — SIGNIFICANT CHANGE UP (ref 8.4–10.5)
CHLORIDE SERPL-SCNC: 100 MMOL/L — SIGNIFICANT CHANGE UP (ref 96–108)
CO2 SERPL-SCNC: 26 MMOL/L — SIGNIFICANT CHANGE UP (ref 22–31)
CREAT SERPL-MCNC: 0.98 MG/DL — SIGNIFICANT CHANGE UP (ref 0.5–1.3)
EGFR: 78 ML/MIN/1.73M2 — SIGNIFICANT CHANGE UP
GLUCOSE BLDC GLUCOMTR-MCNC: 114 MG/DL — HIGH (ref 70–99)
GLUCOSE BLDC GLUCOMTR-MCNC: 123 MG/DL — HIGH (ref 70–99)
GLUCOSE BLDC GLUCOMTR-MCNC: 151 MG/DL — HIGH (ref 70–99)
GLUCOSE BLDC GLUCOMTR-MCNC: 92 MG/DL — SIGNIFICANT CHANGE UP (ref 70–99)
GLUCOSE SERPL-MCNC: 84 MG/DL — SIGNIFICANT CHANGE UP (ref 70–99)
HCT VFR BLD CALC: 29.2 % — LOW (ref 39–50)
HGB BLD-MCNC: 9.7 G/DL — LOW (ref 13–17)
MAGNESIUM SERPL-MCNC: 1.7 MG/DL — SIGNIFICANT CHANGE UP (ref 1.6–2.6)
MCHC RBC-ENTMCNC: 29.5 PG — SIGNIFICANT CHANGE UP (ref 27–34)
MCHC RBC-ENTMCNC: 33.2 GM/DL — SIGNIFICANT CHANGE UP (ref 32–36)
MCV RBC AUTO: 88.8 FL — SIGNIFICANT CHANGE UP (ref 80–100)
NRBC # BLD: 0 /100 WBCS — SIGNIFICANT CHANGE UP (ref 0–0)
PLATELET # BLD AUTO: 160 K/UL — SIGNIFICANT CHANGE UP (ref 150–400)
POTASSIUM SERPL-MCNC: 3.7 MMOL/L — SIGNIFICANT CHANGE UP (ref 3.5–5.3)
POTASSIUM SERPL-SCNC: 3.7 MMOL/L — SIGNIFICANT CHANGE UP (ref 3.5–5.3)
RBC # BLD: 3.29 M/UL — LOW (ref 4.2–5.8)
RBC # FLD: 14.9 % — HIGH (ref 10.3–14.5)
SODIUM SERPL-SCNC: 137 MMOL/L — SIGNIFICANT CHANGE UP (ref 135–145)
WBC # BLD: 5.33 K/UL — SIGNIFICANT CHANGE UP (ref 3.8–10.5)
WBC # FLD AUTO: 5.33 K/UL — SIGNIFICANT CHANGE UP (ref 3.8–10.5)

## 2022-12-19 PROCEDURE — 99233 SBSQ HOSP IP/OBS HIGH 50: CPT

## 2022-12-19 RX ORDER — TAMSULOSIN HYDROCHLORIDE 0.4 MG/1
0.8 CAPSULE ORAL AT BEDTIME
Refills: 0 | Status: DISCONTINUED | OUTPATIENT
Start: 2022-12-19 | End: 2022-12-21

## 2022-12-19 RX ADMIN — CHLORHEXIDINE GLUCONATE 1 APPLICATION(S): 213 SOLUTION TOPICAL at 14:01

## 2022-12-19 RX ADMIN — TAMSULOSIN HYDROCHLORIDE 0.8 MILLIGRAM(S): 0.4 CAPSULE ORAL at 22:59

## 2022-12-19 RX ADMIN — CARBIDOPA AND LEVODOPA 1 TABLET(S): 25; 100 TABLET ORAL at 22:59

## 2022-12-19 RX ADMIN — CARBIDOPA AND LEVODOPA 1 TABLET(S): 25; 100 TABLET ORAL at 13:59

## 2022-12-19 RX ADMIN — CARBIDOPA AND LEVODOPA 1 TABLET(S): 25; 100 TABLET ORAL at 05:38

## 2022-12-19 RX ADMIN — QUETIAPINE FUMARATE 75 MILLIGRAM(S): 200 TABLET, FILM COATED ORAL at 22:59

## 2022-12-19 RX ADMIN — FINASTERIDE 5 MILLIGRAM(S): 5 TABLET, FILM COATED ORAL at 13:59

## 2022-12-19 NOTE — DIETITIAN INITIAL EVALUATION ADULT - OTHER INFO
Noted: EMR: S/P RRT for hypotension, Gross hematuria after patient pulled out his caceres catheter    Problem: MIKEL (acute kidney injury).Now improving   ·  Plan: 2/2 urinary retention and pre renal from decreased PO intake now improving

## 2022-12-19 NOTE — DIETITIAN INITIAL EVALUATION ADULT - PERTINENT LABORATORY DATA
12-19    137  |  100  |  12  ----------------------------<  84  3.7   |  26  |  0.98    Ca    8.4      19 Dec 2022 07:21  Phos  2.6     12-18  Mg     1.7     12-19    POCT Blood Glucose.: 114 mg/dL (12-19-22 @ 16:46)  A1C with Estimated Average Glucose Result: 5.1 % (12-03-22 @ 07:24)  A1C with Estimated Average Glucose Result: See Note (12-02-22 @ 09:53)

## 2022-12-19 NOTE — PROGRESS NOTE ADULT - PROBLEM SELECTOR PLAN 4
2/2 urinary retention and pre renal from decreased PO intake now improving  Increased Flomax to 0.8mg qhs, continuef inasteride as well   -CBI done, TOV 12/16, failed  -caceres placed again on 12/16 in the afternoon, urology recommended outpatient followup and dc with caceres  -spoke with brother Berto Mitchell - he's hesitant to bring pt home with caceres given patient keeps pulling it out and the aides cannot help with the caceres, and wishes to hear about hospice services to help  - Hospice consulted  - Will repeat TOV again today. Explained to brother that if pt fails, will need a caceres again  - Appreciate urology support

## 2022-12-19 NOTE — DIETITIAN INITIAL EVALUATION ADULT - ADD RECOMMEND
Add Glucerna x2 daily to add 570 calories and  protein 28gm daily; assist with feeding meals; monitor/encourage PO intake. ; monitor weight, skin, labs.

## 2022-12-19 NOTE — PROGRESS NOTE ADULT - PROBLEM SELECTOR PLAN 1
caused hemorrhagic shock, now improving  -Appreciate urology care   -No escalation of care previously documented to ICU for pressors  -Continue current mgmt w/ blood, fluid, cbi (now off)  -transfuse if actively bleeding  -Trend H/H and transfuse for goal Hgb > at least 7 - has been stable now, caceres has clear urine

## 2022-12-19 NOTE — DIETITIAN INITIAL EVALUATION ADULT - PERTINENT MEDS FT
MEDICATIONS  (STANDING):  carbidopa/levodopa  25/100 1 Tablet(s) Oral three times a day  chlorhexidine 2% Cloths 1 Application(s) Topical daily  dextrose 5%. 1000 milliLiter(s) (50 mL/Hr) IV Continuous <Continuous>  dextrose 5%. 1000 milliLiter(s) (100 mL/Hr) IV Continuous <Continuous>  dextrose 50% Injectable 25 Gram(s) IV Push once  dextrose 50% Injectable 12.5 Gram(s) IV Push once  dextrose 50% Injectable 25 Gram(s) IV Push once  finasteride 5 milliGRAM(s) Oral daily  glucagon  Injectable 1 milliGRAM(s) IntraMuscular once  insulin lispro (ADMELOG) corrective regimen sliding scale   SubCutaneous Before meals and at bedtime  QUEtiapine 75 milliGRAM(s) Oral at bedtime  tamsulosin 0.8 milliGRAM(s) Oral at bedtime    MEDICATIONS  (PRN):  acetaminophen     Tablet .. 650 milliGRAM(s) Oral every 6 hours PRN Temp greater or equal to 38C (100.4F), Mild Pain (1 - 3)  dextrose Oral Gel 15 Gram(s) Oral once PRN Blood Glucose LESS THAN 70 milliGRAM(s)/deciliter  guaiFENesin Oral Liquid (Sugar-Free) 100 milliGRAM(s) Oral every 6 hours PRN Cough

## 2022-12-19 NOTE — DIETITIAN INITIAL EVALUATION ADULT - ORAL INTAKE PTA/DIET HISTORY
Patient visited at lunchtime, noted consuming 50% of meal. Patient  noted with mitts on his hands, requiring total feeding assistance. Patient unable to provide diet or weight history, he did state his height is 5'7" which was not in the header at the time. Patient  was offered glucerna shake, he seemed familiar and accepted offer. and accepted. Patient admitted form home with Covid +infection,  where he has a private HHA.

## 2022-12-20 DIAGNOSIS — R33.9 RETENTION OF URINE, UNSPECIFIED: ICD-10-CM

## 2022-12-20 LAB
GLUCOSE BLDC GLUCOMTR-MCNC: 139 MG/DL — HIGH (ref 70–99)
GLUCOSE BLDC GLUCOMTR-MCNC: 139 MG/DL — HIGH (ref 70–99)
GLUCOSE BLDC GLUCOMTR-MCNC: 96 MG/DL — SIGNIFICANT CHANGE UP (ref 70–99)
GLUCOSE BLDC GLUCOMTR-MCNC: 98 MG/DL — SIGNIFICANT CHANGE UP (ref 70–99)

## 2022-12-20 PROCEDURE — 99233 SBSQ HOSP IP/OBS HIGH 50: CPT

## 2022-12-20 RX ADMIN — FINASTERIDE 5 MILLIGRAM(S): 5 TABLET, FILM COATED ORAL at 11:32

## 2022-12-20 RX ADMIN — QUETIAPINE FUMARATE 75 MILLIGRAM(S): 200 TABLET, FILM COATED ORAL at 22:12

## 2022-12-20 RX ADMIN — CHLORHEXIDINE GLUCONATE 1 APPLICATION(S): 213 SOLUTION TOPICAL at 11:34

## 2022-12-20 RX ADMIN — TAMSULOSIN HYDROCHLORIDE 0.8 MILLIGRAM(S): 0.4 CAPSULE ORAL at 22:12

## 2022-12-20 RX ADMIN — CARBIDOPA AND LEVODOPA 1 TABLET(S): 25; 100 TABLET ORAL at 13:31

## 2022-12-20 RX ADMIN — CARBIDOPA AND LEVODOPA 1 TABLET(S): 25; 100 TABLET ORAL at 05:13

## 2022-12-20 RX ADMIN — CARBIDOPA AND LEVODOPA 1 TABLET(S): 25; 100 TABLET ORAL at 22:12

## 2022-12-20 NOTE — PROGRESS NOTE ADULT - PROBLEM SELECTOR PLAN 9
- A1c 5.1   -ISS
-F/u A1C  -ISS
- A1c 5.1   -ISS
- A1c 5.1   - ISS  - monitor blood glucose
- A1c 5.1   -ISS
- currently normotensive; CTM

## 2022-12-20 NOTE — PROGRESS NOTE ADULT - PROBLEM SELECTOR PLAN 3
patient on room air in no acute respiratory distress  -Would monitor respiratory status and defer inpatient tx at this time as patient HD stable with no sx  -Tylenol PRN for fever  -Monitor O2 saturation  -Off airborne precautions  12/12
patient on room air in no acute respiratory distress  -Would monitor respiratory status and defer inpatient tx at this time as patient HD stable with no sx  -Tylenol PRN for fever  -Monitor O2 saturation  -Off airborne precautions 12/12
, most likely in setting of decreased PO intake   -FWD 1.3, started on IV fluids  -F/u AM Na  -Encourage PO intake
Unclear etiology of anemia- acute blood loss vs nutritional (patient w MIKEL and hypernatremia) vs hemolysis vs AOCID  -post transfusion CBC with hemoglobin improvement from 7 to 8 so corrected appropriately  -Ferritin, B12, folate WNL  -F/u iron and retic  -Maintain active type and screen  -Transfuse for Hb <7  - currently stable
Unclear etiology of anemia- acute blood loss vs nutritional (patient w MIKEL and hypernatremia) vs hemolysis vs AOCID  -post transfusion CBC with hemoglobin improvement from 7 to 8 so corrected appropriately  -Ferritin, B12, folate WNL  -F/u iron and retic  -Maintain active type and screen  -Transfuse for Hb <7  - currently stable
patient on room air in no acute respiratory distress  -Would monitor respiratory status and defer inpatient tx at this time as patient HD stable with no sx  -Tylenol PRN for fever  -Monitor O2 saturation  -Maintain airborne precautions until 12/12
patient on room air in no acute respiratory distress  -Would monitor respiratory status and defer inpatient tx at this time as patient HD stable with no sx  -Tylenol PRN for fever  -Monitor O2 saturation  -Off airborne precautions  12/12
Unclear etiology of anemia- acute blood loss vs nutritional (patient w MIKEL and hypernatremia) vs hemolysis vs AOCID  -post transfusion CBC with hemoglobin improvement from 7 to 8 so corrected appropriately  -Ferritin, B12, folate WNL  -F/u iron and retic  -Maintain active type and screen  -Transfuse for Hb <7  - currently stable
patient on room air in no acute respiratory distress  -Would monitor respiratory status and defer inpatient tx at this time as patient HD stable with no sx  -Tylenol PRN for fever  -Monitor O2 saturation  -Off airborne precautions 12/12
patient on room air in no acute respiratory distress  -Would monitor respiratory status and defer inpatient tx at this time as patient HD stable with no sx  -Tylenol PRN for fever  -Monitor O2 saturation  -Off airborne precautions  12/12
Resolved  #hypokalemia  - Repleted
patient on room air in no acute respiratory distress  -Would monitor respiratory status and defer inpatient tx at this time as patient HD stable with no sx  -Tylenol PRN for fever  -Monitor O2 saturation  -Off airborne precautions  12/12
patient on room air in no acute respiratory distress  -Would monitor respiratory status and defer inpatient tx at this time as patient HD stable with no sx  -Tylenol PRN for fever  -Monitor O2 saturation  -Off airborne precautions  12/12
Unclear etiology of anemia- acute blood loss vs nutritional (patient w MIKEL and hypernatremia) vs hemolysis vs AOCID  -post transfusion CBC with hemoglobin improvement from 7 to 8 so corrected appropriately  -Ferritin, B12, folate WNL  -F/u iron and retic  -Maintain active type and screen  -Transfuse for Hb <7  - currently stable
patient on room air in no acute respiratory distress  -Would monitor respiratory status and defer inpatient tx at this time as patient HD stable with no sx  -Tylenol PRN for fever  -Monitor O2 saturation  -Maintain airborne precautions until 12/12
patient on room air in no acute respiratory distress  -Would monitor respiratory status and defer inpatient tx at this time as patient HD stable with no sx  -Tylenol PRN for fever  -Monitor O2 saturation  -Maintain airborne precautions until 12/12
patient on room air in no acute respiratory distress  -Would monitor respiratory status and defer inpatient tx at this time as patient HD stable with no sx  -Tylenol PRN for fever  -Monitor O2 saturation  -Off airborne precautions  12/12

## 2022-12-20 NOTE — PROGRESS NOTE ADULT - PROBLEM SELECTOR PLAN 12
Diet soft and bite-sized   DVT ppx: lovenox off due to bleed. -SCD's.   CODE STATUS: DNR/DNI.  Ethics: discussed w/ brother Berto Mitchell, updated him on 12/19  Hospice eval

## 2022-12-20 NOTE — PROGRESS NOTE ADULT - PROBLEM SELECTOR PROBLEM 4
MIKEL (acute kidney injury)
2019 novel coronavirus disease (COVID-19)
MIKEL (acute kidney injury)

## 2022-12-20 NOTE — PROGRESS NOTE ADULT - PROBLEM SELECTOR PROBLEM 10
Medication management
T2DM (type 2 diabetes mellitus)
Medication management

## 2022-12-20 NOTE — PROGRESS NOTE ADULT - PROBLEM SELECTOR PLAN 2
caused hemorrhagic shock, now improving  -Appreciate urology care   -No escalation of care previously documented to ICU for pressors  -Continue current mgmt w/ blood, fluid, cbi (now off)  -transfuse if actively bleeding  -Trend H/H and transfuse for goal Hgb > at least 7 - has been stable now

## 2022-12-20 NOTE — PROGRESS NOTE ADULT - PROBLEM SELECTOR PROBLEM 3
2019 novel coronavirus disease (COVID-19)
Anemia
Hypernatremia
Hypernatremia
Anemia
2019 novel coronavirus disease (COVID-19)
Anemia
2019 novel coronavirus disease (COVID-19)
Anemia

## 2022-12-20 NOTE — PROGRESS NOTE ADULT - PROBLEM SELECTOR PLAN 11
Diet soft and bite-sized   DVT ppx: lovenox off due to bleed. -SCD's.   CODE STATUS: DNR/DNI.  Ethics: discussed w/ brother Berto Mitcehll, updated him on 12/19  Pending hospice eval
Diet soft and bite-sized   DVT ppx: lovenox off due to bleed. -SCD's.   CODE STATUS: DNR/DNI.    12. Discussed plan with ACP Devan
Diet soft and bite-sized   DVT ppx: lovenox off due to bleed. -SCD's.   CODE STATUS: DNR/DNI.
Diet soft and bite-sized   DVT ppx: lovenox off due to bleed. -SCD's.   CODE STATUS: DNR/DNI.
Diet DASH/consistent carb  DVT ppx: lovenox  CM for safe dispo home and MIKEL resolution
Diet soft and bite-sized   DVT ppx: lovenox off due to bleed. -SCD's.   CODE STATUS: DNR/DNI.    12. Discussed plan with SMITA Steve and PABLO.
Diet DASH/consistent carb  DVT ppx: lovenox  CM for safe dispo home and MIKEL resolution
F: D5  E: replete PRN  N: DASH/consistent carb    DVT ppx: lovenox  CM for safe dispo
Diet soft and bite-sized   DVT ppx: lovenox off due to bleed. -SCD's.   CODE STATUS: DNR/DNI.
F: D5  E: replete PRN  N: DASH/consistent carb    DVT ppx: lovenox  CM for safe dispo home
Diet DASH/consistent carb  DVT ppx: lovenox  CM for safe dispo home
Diet soft and bite-sized   DVT ppx: lovenox off due to bleed. -SCD's.   CODE STATUS: DNR/DNI.
Diet soft and bite-sized   DVT ppx: lovenox off due to bleed. -SCD's.   CODE STATUS: DNR/DNI.
Diet soft and bite-sized   DVT ppx: lovenox off due to bleed. -SCD's.   CODE STATUS: DNR/DNI.    12. Discussed plan with ACP Devan
Diet DASH/consistent carb  DVT ppx: lovenox  CM for safe dispo home
Diet DASH/consistent carb  DVT ppx: lovenox off due to bleed
F: LR  E: replete PRN  N: DASH/consistent carb    DVT ppx: lovenox
Diet soft and bite-sized   DVT ppx: lovenox off due to bleed. -SCD's.   CODE STATUS: DNR/DNI.  Ethics: discussed w/ brother Berto Mitchell, updated him on 12/18 - requested hospice services as patient had hospice over a year ago. Awaiting eval
[facility]  Metformin ER 500mg QD, Quetiapine 75mg QHS, Flomax 0.4mg QHS, Sinemet 25-100mg TID and Melatonin 10mg QHS

## 2022-12-20 NOTE — PROGRESS NOTE ADULT - PROBLEM SELECTOR PROBLEM 5
Parkinsons disease
MIEKL (acute kidney injury)
Parkinsons disease

## 2022-12-20 NOTE — PROGRESS NOTE ADULT - PROBLEM SELECTOR PLAN 7
-Continue ASA, per pharmacy has not had Plavix filled in 1 year
-Continue ASA, per pharmacy has not had Plavix filled in 1 year
-per pharmacy has not had Plavix filled in 1 year  -ASA on hold in setting of hematuria.   -cards f/u appreciated.
-per pharmacy has not had Plavix filled in 1 year  -ASA on hold in setting of hematuria.   -cards f/u appreciated.
-Continue ASA, per pharmacy has not had Plavix filled in 1 year
-Fall precaution  -Aspiration precaution
-per pharmacy has not had Plavix filled in 1 year  -ASA on hold in setting of hematuria.   -cards f/u appreciated.
-Continue ASA, per pharmacy has not had Plavix filled in 1 year
-per pharmacy has not had Plavix filled in 1 year  -ASA on hold in setting of hematuria.   -cards f/u appreciated
-Continue ASA, per pharmacy has not had Plavix filled in 1 year
-per pharmacy has not had Plavix filled in 1 year  -ASA on hold in setting of hematuria.   -cards f/u appreciated.
-Continue ASA, per pharmacy has not had Plavix filled in 1 year
-per pharmacy has not had Plavix filled in 1 year  -ASA on hold in setting of hematuria.   -cards f/u appreciated.

## 2022-12-20 NOTE — PROGRESS NOTE ADULT - PROBLEM SELECTOR PLAN 10
-Brother did not know medications so called [facility] per his request, the only medications that have been prescribed since January are Metformin ER 500mg QD, Quetiapine 75mg QHS, Flomax 0.4mg QHS, Sinemet 25-100mg TID and Melatonin 10mg QHS
[facility]:  Metformin ER 500mg QD, Quetiapine 75mg QHS, Flomax 0.4mg QHS, Sinemet 25-100mg TID and Melatonin 10mg QHS
-Brother did not know medications so called [facility] per his request, the only medications that have been prescribed since January are Metformin ER 500mg QD, Quetiapine 75mg QHS, Flomax 0.4mg QHS, Sinemet 25-100mg TID and Melatonin 10mg QHS
[facility]:  Metformin ER 500mg QD, Quetiapine 75mg QHS, Flomax 0.4mg QHS, Sinemet 25-100mg TID and Melatonin 10mg QHS
- A1c 5.1   - ISS  - monitor blood glucose
[facility]:  Metformin ER 500mg QD, Quetiapine 75mg QHS, Flomax 0.4mg QHS, Sinemet 25-100mg TID and Melatonin 10mg QHS
[facility]:  Metformin ER 500mg QD, Quetiapine 75mg QHS, Flomax 0.4mg QHS, Sinemet 25-100mg TID and Melatonin 10mg QHS
-Brother did not know medications so called [facility] per his request, the only medications that have been prescribed since January are Metformin ER 500mg QD, Quetiapine 75mg QHS, Flomax 0.4mg QHS, Sinemet 25-100mg TID and Melatonin 10mg QHS
[facility]:  Metformin ER 500mg QD, Quetiapine 75mg QHS, Flomax 0.4mg QHS, Sinemet 25-100mg TID and Melatonin 10mg QHS

## 2022-12-20 NOTE — PROGRESS NOTE ADULT - PROBLEM SELECTOR PROBLEM 9
T2DM (type 2 diabetes mellitus)
HTN (hypertension)
T2DM (type 2 diabetes mellitus)

## 2022-12-20 NOTE — PROGRESS NOTE ADULT - PROBLEM SELECTOR PLAN 6
-Fall precaution  -Aspiration precaution
-Fall precaution  -Aspiration precaution  -Dysphagia screen
-Fall precaution  -Aspiration precaution
-Fall precaution  -Aspiration precaution  -Dysphagia screen
-Fall precaution  -Aspiration precaution
-WiIl continue carbidopa/levodopa 25/100 TID for now  -Dysphagia screen and diet advanced to soft and bite-sized.
-Fall precaution  -Aspiration precaution  -Dysphagia screen
-Fall precaution  -Aspiration precaution

## 2022-12-20 NOTE — PROGRESS NOTE ADULT - NS ATTEND AMEND GEN_ALL_CORE FT

## 2022-12-20 NOTE — PROGRESS NOTE ADULT - PROBLEM SELECTOR PLAN 1
Patient failed TOV on 12/16, now pending repeat TOV  - straight cath X1 today with 700cc of urine  - increased floxax to 0.8mg qhs  - continue finasteride  - may need to be discharged with caceres catheter however brother will not take him home with caceres

## 2022-12-20 NOTE — CHART NOTE - NSCHARTNOTEFT_GEN_A_CORE
Bladder scan at 0800 with 502ml and on straight cath had 700ml. Bladder scan at 1500 with 533 had 600 ml on straight cath. Plan for bladder scan again at 2300 and anticipate if >350ml residual then Dumont catheter will be placed. Family refusing pt to be discharge with Dumont, anticipate attending MD will have GOC discussion about potential devastating results of urinary retention w/o Dumont vs need for restraining mittens to prevent pt from pulling out catheter which he has done before.

## 2022-12-20 NOTE — CHART NOTE - NSCHARTNOTESELECT_GEN_ALL_CORE
Event Note
Event Note
GOC/Event Note
Urology/Event Note
Urology/Event Note
Event Note
low temp/Event Note
urinary retention/Event Note

## 2022-12-20 NOTE — PROGRESS NOTE ADULT - PROBLEM SELECTOR PROBLEM 8
HTN (hypertension)
HTN (hypertension)
CAD (coronary artery disease)
HTN (hypertension)

## 2022-12-20 NOTE — PROGRESS NOTE ADULT - REASON FOR ADMISSION
COVID 19, social admission

## 2022-12-20 NOTE — PROGRESS NOTE ADULT - SUBJECTIVE AND OBJECTIVE BOX
Bennie Hwang MD  Interventional Cardiology / Endovascular Specialist  [place] Office : [address] [place] [address] Tel:   [place] Office : [address] [facility] [address]  Tel: [phone]      Subjective/Overnight events: Patient lying in bed comfortably. No acute distress.   	  MEDICATIONS:      guaiFENesin Oral Liquid (Sugar-Free) 100 milliGRAM(s) Oral every 6 hours PRN    acetaminophen     Tablet .. 650 milliGRAM(s) Oral every 6 hours PRN  carbidopa/levodopa  25/100 1 Tablet(s) Oral three times a day  QUEtiapine 75 milliGRAM(s) Oral at bedtime      dextrose 50% Injectable 25 Gram(s) IV Push once  dextrose 50% Injectable 12.5 Gram(s) IV Push once  dextrose 50% Injectable 25 Gram(s) IV Push once  dextrose Oral Gel 15 Gram(s) Oral once PRN  finasteride 5 milliGRAM(s) Oral daily  glucagon  Injectable 1 milliGRAM(s) IntraMuscular once  insulin lispro (ADMELOG) corrective regimen sliding scale   SubCutaneous Before meals and at bedtime    chlorhexidine 2% Cloths 1 Application(s) Topical daily  dextrose 5%. 1000 milliLiter(s) IV Continuous <Continuous>  dextrose 5%. 1000 milliLiter(s) IV Continuous <Continuous>  tamsulosin 0.4 milliGRAM(s) Oral at bedtime      PAST MEDICAL/SURGICAL HISTORY  PAST MEDICAL & SURGICAL HISTORY:  Hypercholesterolemia      DM (Diabetes Mellitus)      HTN (Hypertension)      CAD (Coronary Artery Disease)  s/p cardiac stent ( &gt; 20 years ago)      BPH (benign prostatic hyperplasia)      Diabetes      Hypertension, unspecified type      Benign prostatic hyperplasia, unspecified whether lower urinary tract symptoms present      Hyperlipidemia, unspecified hyperlipidemia type      Dementia      Parkinson disease      Nocardiosis      Muscle weakness      BPH (benign prostatic hyperplasia)      Cellulitis      Parkinsons      Dementia      DM (diabetes mellitus)      Coronary Stent  x 2 ( 20 years )      No significant past surgical history          SOCIAL HISTORY: Substance Use (street drugs): ( x ) never used  (  ) other:    FAMILY HISTORY:  FH: HTN (hypertension)            PHYSICAL EXAM:  T(C): 36.6 (12-19-22 @ 09:24), Max: 37 (12-18-22 @ 21:09)  HR: 51 (12-19-22 @ 09:24) (51 - 78)  BP: 125/56 (12-19-22 @ 09:24) (102/59 - 170/67)  RR: 18 (12-19-22 @ 09:24) (17 - 18)  SpO2: 95% (12-19-22 @ 09:24) (95% - 100%)  Wt(kg): --  I&O's Summary    18 Dec 2022 07:01  -  19 Dec 2022 07:00  --------------------------------------------------------  IN: 360 mL / OUT: 2225 mL / NET: -1865 mL    19 Dec 2022 07:01  -  19 Dec 2022 12:04  --------------------------------------------------------  IN: 240 mL / OUT: 400 mL / NET: -160 mL            EYES: , conjunctiva and sclera clear  ENMT: No tonsillar erythema, exudates, or enlargement  Cardiovascular: Normal S1 S2, No JVD, No murmurs, No edema  Respiratory: Lungs clear to auscultation	  Gastrointestinal:  Soft, Non-tender, + BS	  Extremities: No edema                                     9.7    5.33  )-----------( 160      ( 19 Dec 2022 07:27 )             29.2     12-19    137  |  100  |  12  ----------------------------<  84  3.7   |  26  |  0.98    Ca    8.4      19 Dec 2022 07:21  Phos  2.6     12-18  Mg     1.7     12-19      proBNP:   Lipid Profile:   HgA1c:   TSH:     Consultant(s) Notes Reviewed:  [x ] YES  [ ] NO    Care Discussed with Consultants/Other Providers [ x] YES  [ ] NO    Imaging Personally Reviewed independently:  [x] YES  [ ] NO    All labs, radiologic studies, vitals, orders and medications list reviewed. Patient is seen and examined at bedside. Case discussed with medical team.              
  Bennie Hwang MD  Interventional Cardiology / Endovascular Specialist  [place] Office : [address] [place] [address] Tel:   [place] Office : [address] [facility] [place] [address]  Tel: [phone]      Subjective/Overnight events: Patient lying in bed comfortably. No acute distress.   	  MEDICATIONS:  aspirin enteric coated 81 milliGRAM(s) Oral daily  enoxaparin Injectable 40 milliGRAM(s) SubCutaneous every 24 hours      guaiFENesin Oral Liquid (Sugar-Free) 100 milliGRAM(s) Oral every 6 hours PRN    acetaminophen     Tablet .. 650 milliGRAM(s) Oral every 6 hours PRN  carbidopa/levodopa  25/100 1 Tablet(s) Oral three times a day  QUEtiapine 75 milliGRAM(s) Oral at bedtime      dextrose 50% Injectable 25 Gram(s) IV Push once  dextrose 50% Injectable 12.5 Gram(s) IV Push once  dextrose 50% Injectable 25 Gram(s) IV Push once  dextrose Oral Gel 15 Gram(s) Oral once PRN  glucagon  Injectable 1 milliGRAM(s) IntraMuscular once  insulin lispro (ADMELOG) corrective regimen sliding scale   SubCutaneous three times a day before meals  insulin lispro (ADMELOG) corrective regimen sliding scale   SubCutaneous at bedtime    chlorhexidine 2% Cloths 1 Application(s) Topical daily  dextrose 5%. 1000 milliLiter(s) IV Continuous <Continuous>  dextrose 5%. 1000 milliLiter(s) IV Continuous <Continuous>  dextrose 5%. 1000 milliLiter(s) IV Continuous <Continuous>  lactated ringers. 1000 milliLiter(s) IV Continuous <Continuous>  mupirocin 2% Nasal 1 Application(s) Both Nostrils two times a day  tamsulosin 0.4 milliGRAM(s) Oral at bedtime      PAST MEDICAL/SURGICAL HISTORY  PAST MEDICAL & SURGICAL HISTORY:  Hypercholesterolemia      DM (Diabetes Mellitus)      HTN (Hypertension)      CAD (Coronary Artery Disease)  s/p cardiac stent ( &gt; 20 years ago)      BPH (benign prostatic hyperplasia)      Diabetes      Hypertension, unspecified type      Benign prostatic hyperplasia, unspecified whether lower urinary tract symptoms present      Hyperlipidemia, unspecified hyperlipidemia type      Dementia      Parkinson disease      Nocardiosis      Muscle weakness      BPH (benign prostatic hyperplasia)      Cellulitis      Parkinsons      Dementia      DM (diabetes mellitus)      Coronary Stent  x 2 ( 20 years )      No significant past surgical history          SOCIAL HISTORY: Substance Use (street drugs): ( x ) never used  (  ) other:    FAMILY HISTORY:  FH: HTN (hypertension)            PHYSICAL EXAM:  T(C): 36.6 (12-07-22 @ 04:14), Max: 36.6 (12-07-22 @ 04:14)  HR: 61 (12-07-22 @ 05:20) (53 - 94)  BP: 155/69 (12-07-22 @ 04:14) (139/83 - 162/65)  RR: 18 (12-07-22 @ 04:14) (18 - 18)  SpO2: 99% (12-07-22 @ 04:14) (96% - 100%)  Wt(kg): --  I&O's Summary    06 Dec 2022 07:01  -  07 Dec 2022 07:00  --------------------------------------------------------  IN: 1310 mL / OUT: 350 mL / NET: 960 mL          GENERAL: NAD  EYES: EOMI, PERRLA, conjunctiva and sclera clear  ENMT: No tonsillar erythema, exudates, or enlargement  Cardiovascular: Normal S1 S2, No JVD, No murmurs, No edema  Respiratory: Lungs clear to auscultation	  Gastrointestinal:  Soft, Non-tender, + BS	  Extremities: No edema                                     8.3    4.65  )-----------( 256      ( 07 Dec 2022 07:27 )             26.2     12-07    145  |  109<H>  |  24<H>  ----------------------------<  71  3.7   |  25  |  1.45<H>    Ca    8.8      07 Dec 2022 07:26      proBNP:   Lipid Profile:   HgA1c:   TSH:     Consultant(s) Notes Reviewed:  [x ] YES  [ ] NO    Care Discussed with Consultants/Other Providers [ x] YES  [ ] NO    Imaging Personally Reviewed independently:  [x] YES  [ ] NO    All labs, radiologic studies, vitals, orders and medications list reviewed. Patient is seen and examined at bedside. Case discussed with medical team.              
  Bennie Hwang MD  Interventional Cardiology / Endovascular Specialist  [place] Office : [address] [place] [address] [place] [address]  Tel:   [place] Office : [address] [facility] [place] [address]  Tel: [phone]    Pt is lying in bed comfortable not in distress   	  MEDICATIONS:  aspirin enteric coated 81 milliGRAM(s) Oral daily  enoxaparin Injectable 40 milliGRAM(s) SubCutaneous every 24 hours      guaiFENesin Oral Liquid (Sugar-Free) 100 milliGRAM(s) Oral every 6 hours PRN    acetaminophen     Tablet .. 650 milliGRAM(s) Oral every 6 hours PRN  carbidopa/levodopa  25/100 1 Tablet(s) Oral three times a day  QUEtiapine 75 milliGRAM(s) Oral at bedtime      dextrose 50% Injectable 25 Gram(s) IV Push once  dextrose 50% Injectable 12.5 Gram(s) IV Push once  dextrose 50% Injectable 25 Gram(s) IV Push once  dextrose Oral Gel 15 Gram(s) Oral once PRN  glucagon  Injectable 1 milliGRAM(s) IntraMuscular once  insulin lispro (ADMELOG) corrective regimen sliding scale   SubCutaneous three times a day before meals  insulin lispro (ADMELOG) corrective regimen sliding scale   SubCutaneous at bedtime    chlorhexidine 2% Cloths 1 Application(s) Topical daily  dextrose 5%. 1000 milliLiter(s) IV Continuous <Continuous>  dextrose 5%. 1000 milliLiter(s) IV Continuous <Continuous>  dextrose 5%. 1000 milliLiter(s) IV Continuous <Continuous>  lactated ringers. 1000 milliLiter(s) IV Continuous <Continuous>  mupirocin 2% Nasal 1 Application(s) Both Nostrils two times a day  tamsulosin 0.4 milliGRAM(s) Oral at bedtime      PAST MEDICAL/SURGICAL HISTORY  PAST MEDICAL & SURGICAL HISTORY:  Hypercholesterolemia      DM (Diabetes Mellitus)      HTN (Hypertension)      CAD (Coronary Artery Disease)  s/p cardiac stent ( &gt; 20 years ago)      BPH (benign prostatic hyperplasia)      Diabetes      Hypertension, unspecified type      Benign prostatic hyperplasia, unspecified whether lower urinary tract symptoms present      Hyperlipidemia, unspecified hyperlipidemia type      Dementia      Parkinson disease      Nocardiosis      Muscle weakness      BPH (benign prostatic hyperplasia)      Cellulitis      Parkinsons      Dementia      DM (diabetes mellitus)      Coronary Stent  x 2 ( 20 years )      No significant past surgical history          SOCIAL HISTORY: Substance Use (street drugs): ( x ) never used  (  ) other:    FAMILY HISTORY:  FH: HTN (hypertension)        REVIEW OF SYSTEMS:  CONSTITUTIONAL: No fever, weight loss, or fatigue  EYES: No eye pain, visual disturbances, or discharge  ENMT:  No difficulty hearing, tinnitus, vertigo; No sinus or throat pain  BREASTS: No pain, masses, or nipple discharge  GASTROINTESTINAL: No abdominal or epigastric pain. No nausea, vomiting, or hematemesis; No diarrhea or constipation. No melena or hematochezia.  GENITOURINARY: No dysuria, frequency, hematuria, or incontinence  NEUROLOGICAL: No headaches, memory loss, loss of strength, numbness, or tremors  ENDOCRINE: No heat or cold intolerance; No hair loss  MUSCULOSKELETAL: No joint pain or swelling; No muscle, back, or extremity pain  PSYCHIATRIC: No depression, anxiety, mood swings, or difficulty sleeping  HEME/LYMPH: No easy bruising, or bleeding gums  All others negative    PHYSICAL EXAM:  T(C): 36.4 (12-06-22 @ 20:49), Max: 36.4 (12-06-22 @ 04:49)  HR: 71 (12-06-22 @ 20:49) (62 - 94)  BP: 162/65 (12-06-22 @ 20:49) (139/83 - 162/65)  RR: 18 (12-06-22 @ 20:49) (17 - 18)  SpO2: 100% (12-06-22 @ 20:49) (96% - 100%)  Wt(kg): --  I&O's Summary    05 Dec 2022 07:01  -  06 Dec 2022 07:00  --------------------------------------------------------  IN: 390 mL / OUT: 250 mL / NET: 140 mL    06 Dec 2022 07:01  -  06 Dec 2022 22:28  --------------------------------------------------------  IN: 240 mL / OUT: 350 mL / NET: -110 mL      EYES:   PERRLA   ENMT:   Moist mucous membranes, Good dentition, No lesions  Cardiovascular: Normal S1 S2, No JVD, No murmurs, No edema  Respiratory: Lungs clear to auscultation	  Gastrointestinal:  Soft, Non-tender, + BS	  Extremities: no edema                            7.9    4.62  )-----------( 239      ( 06 Dec 2022 07:31 )             25.3     12-06    147<H>  |  110<H>  |  24<H>  ----------------------------<  80  3.5   |  25  |  1.36<H>    Ca    8.2<L>      06 Dec 2022 07:23  Mg     1.7     12-05      proBNP:   Lipid Profile:   HgA1c:   TSH:     Consultant(s) Notes Reviewed:  [x ] YES  [ ] NO    Care Discussed with Consultants/Other Providers [ x] YES  [ ] NO    Imaging Personally Reviewed independently:  [x] YES  [ ] NO    All labs, radiologic studies, vitals, orders and medications list reviewed. Patient is seen and examined at bedside. Case discussed with medical team.              
  Bennie Hwang MD  Interventional Cardiology / Endovascular Specialist  [place] Office : [address] [place], [place] [address]  Tel:   [place] Office : [address] [facility] [place] [address]  Tel: [phone]    Subjective/Overnight events: Patient lying in bed comfortably. No acute distress.   	  MEDICATIONS:    piperacillin/tazobactam IVPB.. 3.375 Gram(s) IV Intermittent every 8 hours    guaiFENesin Oral Liquid (Sugar-Free) 100 milliGRAM(s) Oral every 6 hours PRN    acetaminophen     Tablet .. 650 milliGRAM(s) Oral every 6 hours PRN  carbidopa/levodopa  25/100 1 Tablet(s) Oral three times a day  QUEtiapine 75 milliGRAM(s) Oral at bedtime      dextrose 50% Injectable 25 Gram(s) IV Push once  dextrose 50% Injectable 12.5 Gram(s) IV Push once  dextrose 50% Injectable 25 Gram(s) IV Push once  dextrose Oral Gel 15 Gram(s) Oral once PRN  glucagon  Injectable 1 milliGRAM(s) IntraMuscular once  insulin lispro (ADMELOG) corrective regimen sliding scale   SubCutaneous three times a day before meals  insulin lispro (ADMELOG) corrective regimen sliding scale   SubCutaneous at bedtime    chlorhexidine 2% Cloths 1 Application(s) Topical daily  dextrose 5%. 1000 milliLiter(s) IV Continuous <Continuous>  dextrose 5%. 1000 milliLiter(s) IV Continuous <Continuous>  lactated ringers. 1000 milliLiter(s) IV Continuous <Continuous>  tamsulosin 0.4 milliGRAM(s) Oral at bedtime      PAST MEDICAL/SURGICAL HISTORY  PAST MEDICAL & SURGICAL HISTORY:  Hypercholesterolemia      DM (Diabetes Mellitus)      HTN (Hypertension)      CAD (Coronary Artery Disease)  s/p cardiac stent ( &gt; 20 years ago)      BPH (benign prostatic hyperplasia)      Diabetes      Hypertension, unspecified type      Benign prostatic hyperplasia, unspecified whether lower urinary tract symptoms present      Hyperlipidemia, unspecified hyperlipidemia type      Dementia      Parkinson disease      Nocardiosis      Muscle weakness      BPH (benign prostatic hyperplasia)      Cellulitis      Parkinsons      Dementia      DM (diabetes mellitus)      Coronary Stent  x 2 ( 20 years )      No significant past surgical history          SOCIAL HISTORY: Substance Use (street drugs): ( x ) never used  (  ) other:    FAMILY HISTORY:  FH: HTN (hypertension)        REVIEW OF SYSTEMS:  CONSTITUTIONAL: No fever, weight loss, or fatigue  EYES: No eye pain, visual disturbances, or discharge  ENMT:  No difficulty hearing, tinnitus, vertigo; No sinus or throat pain  BREASTS: No pain, masses, or nipple discharge  GASTROINTESTINAL: No abdominal or epigastric pain. No nausea, vomiting, or hematemesis; No diarrhea or constipation. No melena or hematochezia.  GENITOURINARY: No dysuria, frequency, hematuria, or incontinence  NEUROLOGICAL: No headaches, memory loss, loss of strength, numbness, or tremors  ENDOCRINE: No heat or cold intolerance; No hair loss  MUSCULOSKELETAL: No joint pain or swelling; No muscle, back, or extremity pain  PSYCHIATRIC: No depression, anxiety, mood swings, or difficulty sleeping  HEME/LYMPH: No easy bruising, or bleeding gums  All others negative    PHYSICAL EXAM:  T(C): 36.3 (12-09-22 @ 15:53), Max: 36.8 (12-08-22 @ 21:26)  HR: 64 (12-09-22 @ 15:53) (48 - 90)  BP: 127/57 (12-09-22 @ 15:53) (70/37 - 131/72)  RR: 14 (12-09-22 @ 15:53) (12 - 18)  SpO2: 100% (12-09-22 @ 15:53) (99% - 100%)  Wt(kg): --  I&O's Summary    08 Dec 2022 07:01  -  09 Dec 2022 07:00  --------------------------------------------------------  IN: 920 mL / OUT: 83742 mL / NET: -37458 mL    09 Dec 2022 07:01  -  09 Dec 2022 16:30  --------------------------------------------------------  IN: 0 mL / OUT: 3000 mL / NET: -3000 mL      GENERAL: NAD  EYES: EOMI, PERRLA, conjunctiva and sclera clear  ENMT: No tonsillar erythema, exudates, or enlargement  Cardiovascular: Normal S1 S2, No JVD, No murmurs, No edema  Respiratory: Lungs clear to auscultation	  Gastrointestinal:  Soft, Non-tender, + BS	  Extremities: No edema                               8.7    9.49  )-----------( 168      ( 09 Dec 2022 15:53 )             26.1     12-09    147<H>  |  110<H>  |  29<H>  ----------------------------<  127<H>  3.7   |  23  |  1.81<H>    Ca    8.4      09 Dec 2022 07:26  Phos  3.3     12-08  Mg     1.6     12-08    TPro  5.4<L>  /  Alb  3.0<L>  /  TBili  1.8<H>  /  DBili  x   /  AST  10  /  ALT  <5<L>  /  AlkPhos  98  12-09    proBNP:   Lipid Profile:   HgA1c:   TSH:     Consultant(s) Notes Reviewed:  [x ] YES  [ ] NO    Care Discussed with Consultants/Other Providers [ x] YES  [ ] NO    Imaging Personally Reviewed independently:  [x] YES  [ ] NO    All labs, radiologic studies, vitals, orders and medications list reviewed. Patient is seen and examined at bedside. Case discussed with medical team.              
Bennie Hwang MD  Interventional Cardiology / Advance Heart Failure and Cardiac Transplant Specialist  [place] Office : [address] [place] [address] [place]. [address]  Tel:   [place] Office : [address] [place] [address]  Tel: [phone]       Pt is lying in bed comfortable not in distress, confused  	  MEDICATIONS:  aspirin enteric coated 81 milliGRAM(s) Oral daily  enoxaparin Injectable 40 milliGRAM(s) SubCutaneous every 24 hours      guaiFENesin Oral Liquid (Sugar-Free) 100 milliGRAM(s) Oral every 6 hours PRN    acetaminophen     Tablet .. 650 milliGRAM(s) Oral every 6 hours PRN  carbidopa/levodopa  25/100 1 Tablet(s) Oral three times a day  QUEtiapine 75 milliGRAM(s) Oral at bedtime      dextrose 50% Injectable 25 Gram(s) IV Push once  dextrose 50% Injectable 12.5 Gram(s) IV Push once  dextrose 50% Injectable 25 Gram(s) IV Push once  dextrose Oral Gel 15 Gram(s) Oral once PRN  glucagon  Injectable 1 milliGRAM(s) IntraMuscular once  insulin lispro (ADMELOG) corrective regimen sliding scale   SubCutaneous three times a day before meals  insulin lispro (ADMELOG) corrective regimen sliding scale   SubCutaneous at bedtime    chlorhexidine 2% Cloths 1 Application(s) Topical daily  dextrose 5%. 1000 milliLiter(s) IV Continuous <Continuous>  dextrose 5%. 1000 milliLiter(s) IV Continuous <Continuous>  dextrose 5%. 1000 milliLiter(s) IV Continuous <Continuous>  lactated ringers. 1000 milliLiter(s) IV Continuous <Continuous>  mupirocin 2% Nasal 1 Application(s) Both Nostrils two times a day  tamsulosin 0.4 milliGRAM(s) Oral at bedtime      PAST MEDICAL/SURGICAL HISTORY  PAST MEDICAL & SURGICAL HISTORY:  Hypercholesterolemia      DM (Diabetes Mellitus)      HTN (Hypertension)      CAD (Coronary Artery Disease)  s/p cardiac stent ( &gt; 20 years ago)      BPH (benign prostatic hyperplasia)      Diabetes      Hypertension, unspecified type      Benign prostatic hyperplasia, unspecified whether lower urinary tract symptoms present      Hyperlipidemia, unspecified hyperlipidemia type      Dementia      Parkinson disease      Nocardiosis      Muscle weakness      BPH (benign prostatic hyperplasia)      Cellulitis      Parkinsons      Dementia      DM (diabetes mellitus)      Coronary Stent  x 2 ( 20 years )      No significant past surgical history          SOCIAL HISTORY: Substance Use (street drugs): ( x ) never used  (  ) other:    FAMILY HISTORY:  FH: HTN (hypertension)         PHYSICAL EXAM:  T(C): 36.4 (12-03-22 @ 21:09), Max: 36.4 (12-03-22 @ 21:09)  HR: 61 (12-03-22 @ 21:09) (47 - 61)  BP: 146/63 (12-03-22 @ 21:09) (112/65 - 146/63)  RR: 18 (12-03-22 @ 21:09) (18 - 18)  SpO2: 100% (12-03-22 @ 21:09) (100% - 100%)  Wt(kg): --  I&O's Summary    02 Dec 2022 07:01  -  03 Dec 2022 07:00  --------------------------------------------------------  IN: 700 mL / OUT: 0 mL / NET: 700 mL        EYES:   PERRLA   ENMT:   Moist mucous membranes, Good dentition, No lesions  Cardiovascular: Normal S1 S2, No JVD, No murmurs, No edema  Respiratory: Lungs clear to auscultation	  Gastrointestinal:  Soft, Non-tender, + BS	  Extremities: no edema                                    7.9    3.83  )-----------( 217      ( 03 Dec 2022 07:21 )             24.8     12-03    151<H>  |  115<H>  |  28<H>  ----------------------------<  74  3.3<L>   |  25  |  1.49<H>    Ca    8.3<L>      03 Dec 2022 07:19  Phos  4.0     12-02  Mg     1.6     12-02    TPro  6.2  /  Alb  3.3  /  TBili  1.7<H>  /  DBili  x   /  AST  11  /  ALT  <5<L>  /  AlkPhos  116  12-02    proBNP:   Lipid Profile:   HgA1c:   TSH:     Consultant(s) Notes Reviewed:  [x ] YES  [ ] NO    Care Discussed with Consultants/Other Providers [ x] YES  [ ] NO    Imaging Personally Reviewed independently:  [x] YES  [ ] NO    All labs, radiologic studies, vitals, orders and medications list reviewed. Patient is seen and examined at bedside. Case discussed with medical team.        
Bennie Hwang MD  Interventional Cardiology / Advance Heart Failure and Cardiac Transplant Specialist  [place] Office : [address] [place] [address] [place]. [address]  Tel:   [place] Office : [address] [facility] [place] [address]  Tel: [phone]       Pt is lying in bed comfortable not in distress,   	  MEDICATIONS:      guaiFENesin Oral Liquid (Sugar-Free) 100 milliGRAM(s) Oral every 6 hours PRN    acetaminophen     Tablet .. 650 milliGRAM(s) Oral every 6 hours PRN  carbidopa/levodopa  25/100 1 Tablet(s) Oral three times a day  QUEtiapine 75 milliGRAM(s) Oral at bedtime      dextrose 50% Injectable 25 Gram(s) IV Push once  dextrose 50% Injectable 12.5 Gram(s) IV Push once  dextrose 50% Injectable 25 Gram(s) IV Push once  dextrose Oral Gel 15 Gram(s) Oral once PRN  finasteride 5 milliGRAM(s) Oral daily  glucagon  Injectable 1 milliGRAM(s) IntraMuscular once  insulin lispro (ADMELOG) corrective regimen sliding scale   SubCutaneous Before meals and at bedtime    chlorhexidine 2% Cloths 1 Application(s) Topical daily  dextrose 5%. 1000 milliLiter(s) IV Continuous <Continuous>  dextrose 5%. 1000 milliLiter(s) IV Continuous <Continuous>  magnesium sulfate  IVPB 1 Gram(s) IV Intermittent once  tamsulosin 0.4 milliGRAM(s) Oral at bedtime      PAST MEDICAL/SURGICAL HISTORY  PAST MEDICAL & SURGICAL HISTORY:  Hypercholesterolemia      DM (Diabetes Mellitus)      HTN (Hypertension)      CAD (Coronary Artery Disease)  s/p cardiac stent ( &gt; 20 years ago)      BPH (benign prostatic hyperplasia)      Diabetes      Hypertension, unspecified type      Benign prostatic hyperplasia, unspecified whether lower urinary tract symptoms present      Hyperlipidemia, unspecified hyperlipidemia type      Dementia      Parkinson disease      Nocardiosis      Muscle weakness      BPH (benign prostatic hyperplasia)      Cellulitis      Parkinsons      Dementia      DM (diabetes mellitus)      Coronary Stent  x 2 ( 20 years )      No significant past surgical history          SOCIAL HISTORY: Substance Use (street drugs): ( x ) never used  (  ) other:    FAMILY HISTORY:  FH: HTN (hypertension)            PHYSICAL EXAM:  T(C): 36.2 (12-17-22 @ 10:26), Max: 36.7 (12-17-22 @ 05:13)  HR: 64 (12-17-22 @ 10:26) (52 - 67)  BP: 102/62 (12-17-22 @ 10:26) (97/60 - 123/72)  RR: 18 (12-17-22 @ 10:26) (18 - 18)  SpO2: 100% (12-17-22 @ 10:26) (97% - 100%)  Wt(kg): --  I&O's Summary    16 Dec 2022 07:01  -  17 Dec 2022 07:00  --------------------------------------------------------  IN: 1150 mL / OUT: 2175 mL / NET: -1025 mL    17 Dec 2022 07:01  -  17 Dec 2022 13:24  --------------------------------------------------------  IN: 140 mL / OUT: 600 mL / NET: -460 mL        EYES:   PERRLA   ENMT:   Moist mucous membranes, Good dentition, No lesions  Cardiovascular: Normal S1 S2, No JVD, No murmurs, No edema  Respiratory: Lungs clear to auscultation	  Gastrointestinal:  Soft, Non-tender, + BS	  Extremities: no edema                                    8.6    7.63  )-----------( 247      ( 17 Dec 2022 08:53 )             25.7     12-16    139  |  102  |  11  ----------------------------<  98  3.6   |  25  |  1.02    Ca    7.6<L>      16 Dec 2022 05:35  Mg     1.4     12-17      proBNP:   Lipid Profile:   HgA1c:   TSH:     Consultant(s) Notes Reviewed:  [x ] YES  [ ] NO    Care Discussed with Consultants/Other Providers [ x] YES  [ ] NO    Imaging Personally Reviewed independently:  [x] YES  [ ] NO    All labs, radiologic studies, vitals, orders and medications list reviewed. Patient is seen and examined at bedside. Case discussed with medical team.        
Bennie Hwang MD  Interventional Cardiology / Advance Heart Failure and Cardiac Transplant Specialist  [place] Office : [address] [place] [address] [place] [address]  Tel:   [place] Office : [address] [facility] [place] [address]  Tel: [phone]       Pt is lying in bed comfortable not in distress   	  MEDICATIONS:  aspirin enteric coated 81 milliGRAM(s) Oral daily  enoxaparin Injectable 40 milliGRAM(s) SubCutaneous every 24 hours      guaiFENesin Oral Liquid (Sugar-Free) 100 milliGRAM(s) Oral every 6 hours PRN    acetaminophen     Tablet .. 650 milliGRAM(s) Oral every 6 hours PRN  carbidopa/levodopa  25/100 1 Tablet(s) Oral three times a day  QUEtiapine 75 milliGRAM(s) Oral at bedtime      dextrose 50% Injectable 25 Gram(s) IV Push once  dextrose 50% Injectable 12.5 Gram(s) IV Push once  dextrose 50% Injectable 25 Gram(s) IV Push once  dextrose Oral Gel 15 Gram(s) Oral once PRN  glucagon  Injectable 1 milliGRAM(s) IntraMuscular once  insulin lispro (ADMELOG) corrective regimen sliding scale   SubCutaneous three times a day before meals  insulin lispro (ADMELOG) corrective regimen sliding scale   SubCutaneous at bedtime    chlorhexidine 2% Cloths 1 Application(s) Topical daily  dextrose 5%. 1000 milliLiter(s) IV Continuous <Continuous>  dextrose 5%. 1000 milliLiter(s) IV Continuous <Continuous>  dextrose 5%. 1000 milliLiter(s) IV Continuous <Continuous>  mupirocin 2% Nasal 1 Application(s) Both Nostrils two times a day  tamsulosin 0.4 milliGRAM(s) Oral at bedtime      PAST MEDICAL/SURGICAL HISTORY  PAST MEDICAL & SURGICAL HISTORY:  Hypercholesterolemia      DM (Diabetes Mellitus)      HTN (Hypertension)      CAD (Coronary Artery Disease)  s/p cardiac stent ( &gt; 20 years ago)      BPH (benign prostatic hyperplasia)      Diabetes      Hypertension, unspecified type      Benign prostatic hyperplasia, unspecified whether lower urinary tract symptoms present      Hyperlipidemia, unspecified hyperlipidemia type      Dementia      Parkinson disease      Nocardiosis      Muscle weakness      BPH (benign prostatic hyperplasia)      Cellulitis      Parkinsons      Dementia      DM (diabetes mellitus)      Coronary Stent  x 2 ( 20 years )      No significant past surgical history          SOCIAL HISTORY: Substance Use (street drugs): ( x ) never used  (  ) other:    FAMILY HISTORY:  FH: HTN (hypertension)         PHYSICAL EXAM:  T(C): 36.7 (12-04-22 @ 20:45), Max: 36.7 (12-04-22 @ 20:45)  HR: 60 (12-04-22 @ 20:45) (56 - 60)  BP: 163/70 (12-04-22 @ 20:45) (136/78 - 163/70)  RR: 18 (12-04-22 @ 20:45) (18 - 18)  SpO2: 100% (12-04-22 @ 20:45) (99% - 100%)  Wt(kg): --  I&O's Summary    03 Dec 2022 07:01  -  04 Dec 2022 07:00  --------------------------------------------------------  IN: 0 mL / OUT: 1 mL / NET: -1 mL        EYES:   PERRLA   ENMT:   Moist mucous membranes, Good dentition, No lesions  Cardiovascular: Normal S1 S2, No JVD, No murmurs, No edema  Respiratory: Lungs clear to auscultation	  Gastrointestinal:  Soft, Non-tender, + BS	  Extremities: no edema                                    7.9    4.34  )-----------( 221      ( 04 Dec 2022 07:05 )             24.4     12-04    147<H>  |  112<H>  |  27<H>  ----------------------------<  102<H>  3.6   |  24  |  1.38<H>    Ca    8.0<L>      04 Dec 2022 07:04  Phos  2.8     12-04  Mg     1.4     12-04      proBNP:   Lipid Profile:   HgA1c:   TSH:     Consultant(s) Notes Reviewed:  [x ] YES  [ ] NO    Care Discussed with Consultants/Other Providers [ x] YES  [ ] NO    Imaging Personally Reviewed independently:  [x] YES  [ ] NO    All labs, radiologic studies, vitals, orders and medications list reviewed. Patient is seen and examined at bedside. Case discussed with medical team.        
Subjective    Patient seen and examined.   Appears comfortable, irrelevant conversation at baseline mental status.     Objective    Vital signs  T(F): , Max: 98.5 (12-13-22 @ 18:38)  HR: 70 (12-14-22 @ 05:10)  BP: 114/61 (12-14-22 @ 05:10)  SpO2: 99% (12-14-22 @ 05:10)  Wt(kg): --    Output     12-13 @ 07:01  -  12-14 @ 07:00  --------------------------------------------------------  IN: 90 mL / OUT: 0 mL / NET: 90 mL        General: NAD  Abdomen: soft/non-tender/non-distended  : 22f 3 way in place draining clear red urine, increased to fast gtt now clear    Labs      12-13 @ 21:00    WBC 7.35  / Hct 27.1  / SCr 1.15     12-13 @ 06:55    WBC 9.16  / Hct 20.7  / SCr --         Culture - Blood (12.09.22 @ 11:13)    Specimen Source: .Blood Blood-Peripheral    Culture Results:   No growth to date.    
Subjective    Patient seen and examined.  No issues overnight. CBI clamped now draining clear yellow urine.    Objective    Vital signs  T(F): , Max: 98.6 (12-14-22 @ 13:10)  HR: 80 (12-15-22 @ 05:04)  BP: 125/67 (12-15-22 @ 05:04)  SpO2: 98% (12-15-22 @ 05:04)  Wt(kg): --    Output     12-14 @ 07:01  -  12-15 @ 07:00  --------------------------------------------------------  IN: 750 mL / OUT: 0 mL / NET: 750 mL        General: NAD  Abdomen: soft/non-tender/non-distended  : 22f 3 way in place, CBI clamped overnight, now draining clear yellow urine    Labs      12-14 @ 07:35    WBC --    / Hct --    / SCr 1.20     12-14 @ 07:33    WBC 7.67  / Hct 23.7  / SCr --       
  Bennie Hwang MD  Interventional Cardiology / Endovascular Specialist  [place] Office : [address] [place] [address] Tel:   [place] Office : [address] [facility] [address]  Tel: [phone]      Subjective/Overnight events: Patient lying in bed comfortably. No acute distress.   	  MEDICATIONS:      guaiFENesin Oral Liquid (Sugar-Free) 100 milliGRAM(s) Oral every 6 hours PRN    acetaminophen     Tablet .. 650 milliGRAM(s) Oral every 6 hours PRN  carbidopa/levodopa  25/100 1 Tablet(s) Oral three times a day  QUEtiapine 75 milliGRAM(s) Oral at bedtime      dextrose 50% Injectable 25 Gram(s) IV Push once  dextrose 50% Injectable 12.5 Gram(s) IV Push once  dextrose 50% Injectable 25 Gram(s) IV Push once  dextrose Oral Gel 15 Gram(s) Oral once PRN  finasteride 5 milliGRAM(s) Oral daily  glucagon  Injectable 1 milliGRAM(s) IntraMuscular once  insulin lispro (ADMELOG) corrective regimen sliding scale   SubCutaneous Before meals and at bedtime    chlorhexidine 2% Cloths 1 Application(s) Topical daily  dextrose 5%. 1000 milliLiter(s) IV Continuous <Continuous>  dextrose 5%. 1000 milliLiter(s) IV Continuous <Continuous>  tamsulosin 0.8 milliGRAM(s) Oral at bedtime      PAST MEDICAL/SURGICAL HISTORY  PAST MEDICAL & SURGICAL HISTORY:  Hypercholesterolemia      DM (Diabetes Mellitus)      HTN (Hypertension)      CAD (Coronary Artery Disease)  s/p cardiac stent ( &gt; 20 years ago)      BPH (benign prostatic hyperplasia)      Diabetes      Hypertension, unspecified type      Benign prostatic hyperplasia, unspecified whether lower urinary tract symptoms present      Hyperlipidemia, unspecified hyperlipidemia type      Dementia      Parkinson disease      Nocardiosis      Muscle weakness      BPH (benign prostatic hyperplasia)      Cellulitis      Parkinsons      Dementia      DM (diabetes mellitus)      Coronary Stent  x 2 ( 20 years )      No significant past surgical history          SOCIAL HISTORY: Substance Use (street drugs): ( x ) never used  (  ) other:    FAMILY HISTORY:  FH: HTN (hypertension)        PHYSICAL EXAM:  T(C): 36.3 (12-20-22 @ 13:10), Max: 36.9 (12-19-22 @ 21:20)  HR: 61 (12-20-22 @ 13:10) (55 - 75)  BP: 135/71 (12-20-22 @ 13:10) (121/57 - 161/73)  RR: 16 (12-20-22 @ 13:10) (16 - 18)  SpO2: 95% (12-20-22 @ 13:10) (95% - 100%)  Wt(kg): --  I&O's Summary    19 Dec 2022 07:01  -  20 Dec 2022 07:00  --------------------------------------------------------  IN: 1080 mL / OUT: 1050 mL / NET: 30 mL    20 Dec 2022 07:01  -  20 Dec 2022 15:00  --------------------------------------------------------  IN: 360 mL / OUT: 700 mL / NET: -340 mL          GENERAL: NAD  EYES: EOMI, PERRLA, conjunctiva and sclera clear  ENMT: No tonsillar erythema, exudates, or enlargement  Cardiovascular: Normal S1 S2, No JVD, No murmurs, No edema  Respiratory: Lungs clear to auscultation	  Gastrointestinal:  Soft, Non-tender, + BS	  Extremities: No edema                                     9.7    5.33  )-----------( 160      ( 19 Dec 2022 07:27 )             29.2     12-19    137  |  100  |  12  ----------------------------<  84  3.7   |  26  |  0.98    Ca    8.4      19 Dec 2022 07:21  Mg     1.7     12-19      proBNP:   Lipid Profile:   HgA1c:   TSH:     Consultant(s) Notes Reviewed:  [x ] YES  [ ] NO    Care Discussed with Consultants/Other Providers [ x] YES  [ ] NO    Imaging Personally Reviewed independently:  [x] YES  [ ] NO    All labs, radiologic studies, vitals, orders and medications list reviewed. Patient is seen and examined at bedside. Case discussed with medical team.              
  Bennie Hwang MD  Interventional Cardiology / Endovascular Specialist  [place] Office : [address] [place] [address] [place] [address]  Tel:   [place] Office : [address] [facility] [place] [address]  Tel: [phone]    Pt is lying in bed comfortable not in distress   	  MEDICATIONS:  aspirin enteric coated 81 milliGRAM(s) Oral daily  enoxaparin Injectable 40 milliGRAM(s) SubCutaneous every 24 hours      guaiFENesin Oral Liquid (Sugar-Free) 100 milliGRAM(s) Oral every 6 hours PRN    acetaminophen     Tablet .. 650 milliGRAM(s) Oral every 6 hours PRN  carbidopa/levodopa  25/100 1 Tablet(s) Oral three times a day  QUEtiapine 75 milliGRAM(s) Oral at bedtime      dextrose 50% Injectable 25 Gram(s) IV Push once  dextrose 50% Injectable 12.5 Gram(s) IV Push once  dextrose 50% Injectable 25 Gram(s) IV Push once  dextrose Oral Gel 15 Gram(s) Oral once PRN  glucagon  Injectable 1 milliGRAM(s) IntraMuscular once  insulin lispro (ADMELOG) corrective regimen sliding scale   SubCutaneous three times a day before meals  insulin lispro (ADMELOG) corrective regimen sliding scale   SubCutaneous at bedtime    chlorhexidine 2% Cloths 1 Application(s) Topical daily  dextrose 5%. 1000 milliLiter(s) IV Continuous <Continuous>  dextrose 5%. 1000 milliLiter(s) IV Continuous <Continuous>  dextrose 5%. 1000 milliLiter(s) IV Continuous <Continuous>  mupirocin 2% Nasal 1 Application(s) Both Nostrils two times a day  tamsulosin 0.4 milliGRAM(s) Oral at bedtime      PAST MEDICAL/SURGICAL HISTORY  PAST MEDICAL & SURGICAL HISTORY:  Hypercholesterolemia      DM (Diabetes Mellitus)      HTN (Hypertension)      CAD (Coronary Artery Disease)  s/p cardiac stent ( &gt; 20 years ago)      BPH (benign prostatic hyperplasia)      Diabetes      Hypertension, unspecified type      Benign prostatic hyperplasia, unspecified whether lower urinary tract symptoms present      Hyperlipidemia, unspecified hyperlipidemia type      Dementia      Parkinson disease      Nocardiosis      Muscle weakness      BPH (benign prostatic hyperplasia)      Cellulitis      Parkinsons      Dementia      DM (diabetes mellitus)      Coronary Stent  x 2 ( 20 years )      No significant past surgical history          SOCIAL HISTORY: Substance Use (street drugs): ( x ) never used  (  ) other:    FAMILY HISTORY:  FH: HTN (hypertension)        PHYSICAL EXAM:  T(C): 36.5 (12-05-22 @ 20:38), Max: 37.3 (12-05-22 @ 08:50)  HR: 75 (12-05-22 @ 20:38) (57 - 75)  BP: 144/71 (12-05-22 @ 20:38) (132/76 - 147/77)  RR: 18 (12-05-22 @ 20:38) (17 - 18)  SpO2: 99% (12-05-22 @ 20:38) (99% - 100%)  Wt(kg): --  I&O's Summary    04 Dec 2022 07:01  -  05 Dec 2022 07:00  --------------------------------------------------------  IN: 200 mL / OUT: 500 mL / NET: -300 mL    05 Dec 2022 07:01  -  05 Dec 2022 23:24  --------------------------------------------------------  IN: 340 mL / OUT: 0 mL / NET: 340 mL      EYES:   PERRLA   ENMT:   Moist mucous membranes, Good dentition, No lesions  Cardiovascular: Normal S1 S2, No JVD, No murmurs, No edema  Respiratory: Lungs clear to auscultation	  Gastrointestinal:  Soft, Non-tender, + BS	  Extremities: no edema                        7.7    3.95  )-----------( 230      ( 05 Dec 2022 07:12 )             24.5     12-05    145  |  109<H>  |  23  ----------------------------<  89  3.5   |  25  |  1.34<H>    Ca    8.3<L>      05 Dec 2022 07:12  Phos  2.8     12-04  Mg     1.7     12-05      proBNP:   Lipid Profile:   HgA1c:   TSH:     Consultant(s) Notes Reviewed:  [x ] YES  [ ] NO    Care Discussed with Consultants/Other Providers [ x] YES  [ ] NO    Imaging Personally Reviewed independently:  [x] YES  [ ] NO    All labs, radiologic studies, vitals, orders and medications list reviewed. Patient is seen and examined at bedside. Case discussed with medical team.              
Subjective    Patient seen and examined. Resting comfortably, caceres draining clear yellow urine, removed.     Objective    Vital signs  T(F): , Max: 98 (12-15-22 @ 22:25)  HR: 67 (12-16-22 @ 05:03)  BP: 141/64 (12-16-22 @ 05:03)  SpO2: 100% (12-16-22 @ 05:03)  Wt(kg): --    Output     12-15 @ 07:01  -  12-16 @ 07:00  --------------------------------------------------------  IN: 1420 mL / OUT: 800 mL / NET: 620 mL        General: NAD  Abdomen: soft/non-tender/non-distended  : caceres draining clear yellow urine, removed    Labs      12-16 @ 05:35    WBC 6.57  / Hct 25.4  / SCr 1.02     12-15 @ 18:25    WBC 6.66  / Hct 27.0  / SCr --         
Subjective  pt seen and examined. resting in bed    Objective    Vital signs  T(F): , Max: 98.3 (12-09-22 @ 21:27)  HR: 68 (12-10-22 @ 06:30)  BP: 118/56 (12-10-22 @ 06:30)  SpO2: 100% (12-10-22 @ 04:07)  Wt(kg): --    Output     OUT:    Intermittent Catheterization - Urethral (mL): 3000 mL  Total OUT: 3000 mL    Total NET: -3000 mL          Gen: NAD  Abd: SNN  : cathter in place, cbi low drip, clear/pink    Labs      12-09 @ 15:53    WBC 9.49  / Hct 26.1  / SCr --       12-09 @ 08:13    WBC 8.72  / Hct 26.7  / SCr --         Urine Cx: ?  Blood Cx: ?    Imaging
UROLOGY DAILY PROGRESS NOTE:     Subjective: Patient seen and examined at bedside. CBI ran out overnight, and catheter clotted off.        Objective:  Vital signs  T(F): , Max: 98.2 (12-08-22 @ 21:26)  HR: 66 (12-09-22 @ 05:42)  BP: 103/64 (12-09-22 @ 05:42)  SpO2: 100% (12-09-22 @ 05:42)  Wt(kg): --    I&O's Summary    08 Dec 2022 07:01  -  09 Dec 2022 07:00  --------------------------------------------------------  IN: 480 mL / OUT: 60300 mL / NET: -95823 mL        Gen: NAD  Pulm: No respiratory distress, no subcostal retractions  CV: RRR, no JVD  Abd: Soft, NT, ND  : Enriquez not draining; used 6 eye catheter to irrigate 80cc of clot, urine now light pink; changed to 22F 3-way and CBI restarted     Labs:  12-09  9.61  / 23.4  /x      12-08  11.77 / 20.1  /x                              7.5    9.61  )-----------( 148      ( 09 Dec 2022 03:43 )             23.4     12-08    146<H>  |  109<H>  |  27<H>  ----------------------------<  150<H>  3.8   |  24  |  1.70<H>    Ca    8.8      08 Dec 2022 18:23  Phos  3.3     12-08  Mg     1.6     12-08    TPro  6.0  /  Alb  3.3  /  TBili  1.1  /  DBili  x   /  AST  16  /  ALT  6<L>  /  AlkPhos  120  12-08    PT/INR - ( 08 Dec 2022 18:23 )   PT: 15.9 sec;   INR: 1.38 ratio         PTT - ( 08 Dec 2022 18:23 )  PTT:34.5 sec    Urine Cx:  
  Bennie Hwang MD  Interventional Cardiology / Endovascular Specialist  [place] Office : [address] [place] [address] [place] [address]  Tel:   [place] Office : [address] [facility] [place] [address]  Tel: [phone]    Subjective/Overnight events: Patient lying in bed receiving PRBC transfusion. no acute distress  	  MEDICATIONS:      guaiFENesin Oral Liquid (Sugar-Free) 100 milliGRAM(s) Oral every 6 hours PRN    acetaminophen     Tablet .. 650 milliGRAM(s) Oral every 6 hours PRN  carbidopa/levodopa  25/100 1 Tablet(s) Oral three times a day  QUEtiapine 75 milliGRAM(s) Oral at bedtime      dextrose 50% Injectable 25 Gram(s) IV Push once  dextrose 50% Injectable 12.5 Gram(s) IV Push once  dextrose 50% Injectable 25 Gram(s) IV Push once  dextrose Oral Gel 15 Gram(s) Oral once PRN  finasteride 5 milliGRAM(s) Oral daily  glucagon  Injectable 1 milliGRAM(s) IntraMuscular once  insulin lispro (ADMELOG) corrective regimen sliding scale   SubCutaneous Before meals and at bedtime    chlorhexidine 2% Cloths 1 Application(s) Topical daily  dextrose 5%. 1000 milliLiter(s) IV Continuous <Continuous>  dextrose 5%. 1000 milliLiter(s) IV Continuous <Continuous>  dextrose 5%. 1000 milliLiter(s) IV Continuous <Continuous>  tamsulosin 0.4 milliGRAM(s) Oral at bedtime      PAST MEDICAL/SURGICAL HISTORY  PAST MEDICAL & SURGICAL HISTORY:  Hypercholesterolemia      DM (Diabetes Mellitus)      HTN (Hypertension)      CAD (Coronary Artery Disease)  s/p cardiac stent ( &gt; 20 years ago)      BPH (benign prostatic hyperplasia)      Diabetes      Hypertension, unspecified type      Benign prostatic hyperplasia, unspecified whether lower urinary tract symptoms present      Hyperlipidemia, unspecified hyperlipidemia type      Dementia      Parkinson disease      Nocardiosis      Muscle weakness      BPH (benign prostatic hyperplasia)      Cellulitis      Parkinsons      Dementia      DM (diabetes mellitus)      Coronary Stent  x 2 ( 20 years )      No significant past surgical history          SOCIAL HISTORY: Substance Use (street drugs): ( x ) never used  (  ) other:    FAMILY HISTORY:  FH: HTN (hypertension)        PHYSICAL EXAM:  T(C): 36.9 (12-13-22 @ 21:12), Max: 36.9 (12-13-22 @ 18:38)  HR: 80 (12-13-22 @ 21:12) (54 - 90)  BP: 119/84 (12-13-22 @ 21:12) (95/53 - 131/65)  RR: 18 (12-13-22 @ 21:12) (17 - 18)  SpO2: 98% (12-13-22 @ 21:12) (95% - 99%)  Wt(kg): --  I&O's Summary    12 Dec 2022 07:01  -  13 Dec 2022 07:00  --------------------------------------------------------  IN: 1318 mL / OUT: 0 mL / NET: 1318 mL        GENERAL: NAD  EYES: EOMI, PERRLA, conjunctiva and sclera clear  ENMT: No tonsillar erythema, exudates, or enlargement  Cardiovascular: Normal S1 S2, No JVD, No murmurs, No edema  Respiratory: Lungs clear to auscultation	  Gastrointestinal:  Soft, Non-tender, + BS	  Extremities: No edema                             9.0    7.35  )-----------( 155      ( 13 Dec 2022 21:00 )             27.1     12-13    136  |  102  |  12  ----------------------------<  116<H>  3.6   |  24  |  1.15    Ca    7.0<L>      13 Dec 2022 21:00      proBNP:   Lipid Profile:   HgA1c:   TSH:     Consultant(s) Notes Reviewed:  [x ] YES  [ ] NO    Care Discussed with Consultants/Other Providers [ x] YES  [ ] NO    Imaging Personally Reviewed independently:  [x] YES  [ ] NO    All labs, radiologic studies, vitals, orders and medications list reviewed. Patient is seen and examined at bedside. Case discussed with medical team.              
Bennie Hwang MD  Interventional Cardiology / Advance Heart Failure and Cardiac Transplant Specialist  [place] Office : [address] [place] [address] [place]. [address]  Tel:   [place] Office : [address] [facility] [place] [address]  Tel: [phone]       Pt is lying in bed comfortable not in distress, confused  	  MEDICATIONS:      guaiFENesin Oral Liquid (Sugar-Free) 100 milliGRAM(s) Oral every 6 hours PRN    acetaminophen     Tablet .. 650 milliGRAM(s) Oral every 6 hours PRN  carbidopa/levodopa  25/100 1 Tablet(s) Oral three times a day  QUEtiapine 75 milliGRAM(s) Oral at bedtime      dextrose 50% Injectable 25 Gram(s) IV Push once  dextrose 50% Injectable 12.5 Gram(s) IV Push once  dextrose 50% Injectable 25 Gram(s) IV Push once  dextrose Oral Gel 15 Gram(s) Oral once PRN  finasteride 5 milliGRAM(s) Oral daily  glucagon  Injectable 1 milliGRAM(s) IntraMuscular once  insulin lispro (ADMELOG) corrective regimen sliding scale   SubCutaneous Before meals and at bedtime    chlorhexidine 2% Cloths 1 Application(s) Topical daily  dextrose 5%. 1000 milliLiter(s) IV Continuous <Continuous>  dextrose 5%. 1000 milliLiter(s) IV Continuous <Continuous>  dextrose 5%. 1000 milliLiter(s) IV Continuous <Continuous>  tamsulosin 0.4 milliGRAM(s) Oral at bedtime      PAST MEDICAL/SURGICAL HISTORY  PAST MEDICAL & SURGICAL HISTORY:  Hypercholesterolemia      DM (Diabetes Mellitus)      HTN (Hypertension)      CAD (Coronary Artery Disease)  s/p cardiac stent ( &gt; 20 years ago)      BPH (benign prostatic hyperplasia)      Diabetes      Hypertension, unspecified type      Benign prostatic hyperplasia, unspecified whether lower urinary tract symptoms present      Hyperlipidemia, unspecified hyperlipidemia type      Dementia      Parkinson disease      Nocardiosis      Muscle weakness      BPH (benign prostatic hyperplasia)      Cellulitis      Parkinsons      Dementia      DM (diabetes mellitus)      Coronary Stent  x 2 ( 20 years )      No significant past surgical history          SOCIAL HISTORY: Substance Use (street drugs): ( x ) never used  (  ) other:    FAMILY HISTORY:  FH: HTN (hypertension)           PHYSICAL EXAM:  T(C): 36.3 (12-15-22 @ 17:11), Max: 36.8 (12-15-22 @ 01:15)  HR: 73 (12-15-22 @ 17:11) (59 - 82)  BP: 146/67 (12-15-22 @ 17:11) (110/69 - 148/73)  RR: 18 (12-15-22 @ 17:11) (18 - 18)  SpO2: 100% (12-15-22 @ 17:11) (98% - 100%)  Wt(kg): --  I&O's Summary    14 Dec 2022 07:01  -  15 Dec 2022 07:00  --------------------------------------------------------  IN: 750 mL / OUT: 0 mL / NET: 750 mL    15 Dec 2022 07:01  -  15 Dec 2022 21:00  --------------------------------------------------------  IN: 1050 mL / OUT: 800 mL / NET: 250 mL          GENERAL: confused   EYES:   PERRLA   ENMT:   Moist mucous membranes, Good dentition, No lesions  Cardiovascular: Normal S1 S2, No JVD, No murmurs, No edema  Respiratory: Lungs clear to auscultation	  Gastrointestinal:  Soft, Non-tender, + BS	  Extremities: no edema                                    8.8    6.66  )-----------( 205      ( 15 Dec 2022 18:25 )             27.0     12-15    138  |  104  |  13  ----------------------------<  113<H>  3.3<L>   |  26  |  1.05    Ca    7.4<L>      15 Dec 2022 07:25      proBNP:   Lipid Profile:   HgA1c:   TSH:     Consultant(s) Notes Reviewed:  [x ] YES  [ ] NO    Care Discussed with Consultants/Other Providers [ x] YES  [ ] NO    Imaging Personally Reviewed independently:  [x] YES  [ ] NO    All labs, radiologic studies, vitals, orders and medications list reviewed. Patient is seen and examined at bedside. Case discussed with medical team.        
  Bennie Hwang MD  Interventional Cardiology / Endovascular Specialist  [place] Office : [address] [place] [address] Tel:   [place] Office : [address] [facility] [place] [address]  Tel: [phone]      Subjective/Overnight events: Patient lying in bed comfortably. No acute distress.   	  MEDICATIONS:  aspirin enteric coated 81 milliGRAM(s) Oral daily  enoxaparin Injectable 40 milliGRAM(s) SubCutaneous every 24 hours      guaiFENesin Oral Liquid (Sugar-Free) 100 milliGRAM(s) Oral every 6 hours PRN    acetaminophen     Tablet .. 650 milliGRAM(s) Oral every 6 hours PRN  carbidopa/levodopa  25/100 1 Tablet(s) Oral three times a day  QUEtiapine 75 milliGRAM(s) Oral at bedtime      dextrose 50% Injectable 25 Gram(s) IV Push once  dextrose 50% Injectable 12.5 Gram(s) IV Push once  dextrose 50% Injectable 25 Gram(s) IV Push once  dextrose Oral Gel 15 Gram(s) Oral once PRN  glucagon  Injectable 1 milliGRAM(s) IntraMuscular once  insulin lispro (ADMELOG) corrective regimen sliding scale   SubCutaneous three times a day before meals  insulin lispro (ADMELOG) corrective regimen sliding scale   SubCutaneous at bedtime    chlorhexidine 2% Cloths 1 Application(s) Topical daily  dextrose 5%. 1000 milliLiter(s) IV Continuous <Continuous>  dextrose 5%. 1000 milliLiter(s) IV Continuous <Continuous>  dextrose 5%. 1000 milliLiter(s) IV Continuous <Continuous>  lactated ringers. 1000 milliLiter(s) IV Continuous <Continuous>  mupirocin 2% Nasal 1 Application(s) Both Nostrils two times a day  tamsulosin 0.8 milliGRAM(s) Oral at bedtime      PAST MEDICAL/SURGICAL HISTORY  PAST MEDICAL & SURGICAL HISTORY:  Hypercholesterolemia      DM (Diabetes Mellitus)      HTN (Hypertension)      CAD (Coronary Artery Disease)  s/p cardiac stent ( &gt; 20 years ago)      BPH (benign prostatic hyperplasia)      Diabetes      Hypertension, unspecified type      Benign prostatic hyperplasia, unspecified whether lower urinary tract symptoms present      Hyperlipidemia, unspecified hyperlipidemia type      Dementia      Parkinson disease      Nocardiosis      Muscle weakness      BPH (benign prostatic hyperplasia)      Cellulitis      Parkinsons      Dementia      DM (diabetes mellitus)      Coronary Stent  x 2 ( 20 years )      No significant past surgical history          SOCIAL HISTORY: Substance Use (street drugs): ( x ) never used  (  ) other:    FAMILY HISTORY:  FH: HTN (hypertension)        PHYSICAL EXAM:  T(C): 36.4 (12-08-22 @ 04:34), Max: 36.7 (12-07-22 @ 20:50)  HR: 60 (12-08-22 @ 04:34) (60 - 73)  BP: 116/68 (12-08-22 @ 04:34) (116/68 - 152/75)  RR: 18 (12-08-22 @ 04:34) (18 - 18)  SpO2: 99% (12-08-22 @ 04:34) (99% - 100%)  Wt(kg): --  I&O's Summary    07 Dec 2022 07:01  -  08 Dec 2022 07:00  --------------------------------------------------------  IN: 710 mL / OUT: 3670 mL / NET: -2960 mL          GENERAL: NAD  EYES: EOMI, PERRLA, conjunctiva and sclera clear  ENMT: No tonsillar erythema, exudates, or enlargement  Cardiovascular: Normal S1 S2, No JVD, No murmurs, No edema  Respiratory: Lungs clear to auscultation	  Gastrointestinal:  Soft, Non-tender, + BS	  Extremities: No edema                                     8.3    4.65  )-----------( 256      ( 07 Dec 2022 07:27 )             26.2     12-07    145  |  109<H>  |  24<H>  ----------------------------<  71  3.7   |  25  |  1.45<H>    Ca    8.8      07 Dec 2022 07:26      proBNP:   Lipid Profile:   HgA1c:   TSH:     Consultant(s) Notes Reviewed:  [x ] YES  [ ] NO    Care Discussed with Consultants/Other Providers [ x] YES  [ ] NO    Imaging Personally Reviewed independently:  [x] YES  [ ] NO    All labs, radiologic studies, vitals, orders and medications list reviewed. Patient is seen and examined at bedside. Case discussed with medical team.              
  Bennie Hwang MD  Interventional Cardiology / Endovascular Specialist  [place] Office : [address] [place] [address] Tel:   [place] Office : [address] [facility] [place] [address]  Tel: [phone]      Subjective/Overnight events: Patient lying in bed, No acute distress.   	  MEDICATIONS:    piperacillin/tazobactam IVPB.. 3.375 Gram(s) IV Intermittent every 8 hours    guaiFENesin Oral Liquid (Sugar-Free) 100 milliGRAM(s) Oral every 6 hours PRN    acetaminophen     Tablet .. 650 milliGRAM(s) Oral every 6 hours PRN  carbidopa/levodopa  25/100 1 Tablet(s) Oral three times a day  QUEtiapine 75 milliGRAM(s) Oral at bedtime      dextrose 50% Injectable 25 Gram(s) IV Push once  dextrose 50% Injectable 12.5 Gram(s) IV Push once  dextrose 50% Injectable 25 Gram(s) IV Push once  dextrose Oral Gel 15 Gram(s) Oral once PRN  glucagon  Injectable 1 milliGRAM(s) IntraMuscular once  insulin lispro (ADMELOG) corrective regimen sliding scale   SubCutaneous three times a day before meals  insulin lispro (ADMELOG) corrective regimen sliding scale   SubCutaneous at bedtime    chlorhexidine 2% Cloths 1 Application(s) Topical daily  dextrose 5%. 1000 milliLiter(s) IV Continuous <Continuous>  dextrose 5%. 1000 milliLiter(s) IV Continuous <Continuous>  lactated ringers. 1000 milliLiter(s) IV Continuous <Continuous>  tamsulosin 0.4 milliGRAM(s) Oral at bedtime      PAST MEDICAL/SURGICAL HISTORY  PAST MEDICAL & SURGICAL HISTORY:  Hypercholesterolemia      DM (Diabetes Mellitus)      HTN (Hypertension)      CAD (Coronary Artery Disease)  s/p cardiac stent ( &gt; 20 years ago)      BPH (benign prostatic hyperplasia)      Diabetes      Hypertension, unspecified type      Benign prostatic hyperplasia, unspecified whether lower urinary tract symptoms present      Hyperlipidemia, unspecified hyperlipidemia type      Dementia      Parkinson disease      Nocardiosis      Muscle weakness      BPH (benign prostatic hyperplasia)      Cellulitis      Parkinsons      Dementia      DM (diabetes mellitus)      Coronary Stent  x 2 ( 20 years )      No significant past surgical history          SOCIAL HISTORY: Substance Use (street drugs): ( x ) never used  (  ) other:    FAMILY HISTORY:  FH: HTN (hypertension)          PHYSICAL EXAM:  T(C): 36.3 (12-10-22 @ 12:08), Max: 36.8 (12-09-22 @ 21:27)  HR: 64 (12-10-22 @ 12:08) (56 - 83)  BP: 144/70 (12-10-22 @ 12:08) (100/57 - 144/70)  RR: 18 (12-10-22 @ 12:08) (12 - 18)  SpO2: 100% (12-10-22 @ 12:08) (100% - 100%)  Wt(kg): --  I&O's Summary    09 Dec 2022 07:01  -  10 Dec 2022 07:00  --------------------------------------------------------  IN: 0 mL / OUT: 3000 mL / NET: -3000 mL          GENERAL: NAD  EYES: conjunctiva and sclera clear  ENMT: No tonsillar erythema, exudates, or enlargement  Cardiovascular: Normal S1 S2, No JVD, No murmurs, No edema  Respiratory: Lungs clear to auscultation	  Gastrointestinal:  Soft,   Extremities: No edema                                     8.7    9.49  )-----------( 168      ( 09 Dec 2022 15:53 )             26.1     12-09    147<H>  |  110<H>  |  29<H>  ----------------------------<  127<H>  3.7   |  23  |  1.81<H>    Ca    8.4      09 Dec 2022 07:26  Phos  3.3     12-08  Mg     1.6     12-08    TPro  5.4<L>  /  Alb  3.0<L>  /  TBili  1.8<H>  /  DBili  x   /  AST  10  /  ALT  <5<L>  /  AlkPhos  98  12-09    proBNP:   Lipid Profile:   HgA1c:   TSH:     Consultant(s) Notes Reviewed:  [x ] YES  [ ] NO    Care Discussed with Consultants/Other Providers [ x] YES  [ ] NO    Imaging Personally Reviewed independently:  [x] YES  [ ] NO    All labs, radiologic studies, vitals, orders and medications list reviewed. Patient is seen and examined at bedside. Case discussed with medical team.              
  Bennie Hwang MD  Interventional Cardiology / Endovascular Specialist  [place] Office : [address] [place] [address] Tel:   [place] Office : [address] [facility] [address]  Tel: [phone]      Subjective/Overnight events: Patient lying in bed receiving PRBC transfusion. no acute distress  	  MEDICATIONS:    piperacillin/tazobactam IVPB.. 3.375 Gram(s) IV Intermittent every 8 hours    guaiFENesin Oral Liquid (Sugar-Free) 100 milliGRAM(s) Oral every 6 hours PRN    acetaminophen     Tablet .. 650 milliGRAM(s) Oral every 6 hours PRN  carbidopa/levodopa  25/100 1 Tablet(s) Oral three times a day  QUEtiapine 75 milliGRAM(s) Oral at bedtime      dextrose 50% Injectable 25 Gram(s) IV Push once  dextrose 50% Injectable 12.5 Gram(s) IV Push once  dextrose 50% Injectable 25 Gram(s) IV Push once  dextrose Oral Gel 15 Gram(s) Oral once PRN  glucagon  Injectable 1 milliGRAM(s) IntraMuscular once  insulin lispro (ADMELOG) corrective regimen sliding scale   SubCutaneous three times a day before meals  insulin lispro (ADMELOG) corrective regimen sliding scale   SubCutaneous at bedtime    chlorhexidine 2% Cloths 1 Application(s) Topical daily  dextrose 5%. 1000 milliLiter(s) IV Continuous <Continuous>  dextrose 5%. 1000 milliLiter(s) IV Continuous <Continuous>  dextrose 5%. 1000 milliLiter(s) IV Continuous <Continuous>  potassium chloride   Powder 40 milliEquivalent(s) Oral every 4 hours  tamsulosin 0.4 milliGRAM(s) Oral at bedtime      PAST MEDICAL/SURGICAL HISTORY  PAST MEDICAL & SURGICAL HISTORY:  Hypercholesterolemia      DM (Diabetes Mellitus)      HTN (Hypertension)      CAD (Coronary Artery Disease)  s/p cardiac stent ( &gt; 20 years ago)      BPH (benign prostatic hyperplasia)      Diabetes      Hypertension, unspecified type      Benign prostatic hyperplasia, unspecified whether lower urinary tract symptoms present      Hyperlipidemia, unspecified hyperlipidemia type      Dementia      Parkinson disease      Nocardiosis      Muscle weakness      BPH (benign prostatic hyperplasia)      Cellulitis      Parkinsons      Dementia      DM (diabetes mellitus)      Coronary Stent  x 2 ( 20 years )      No significant past surgical history          SOCIAL HISTORY: Substance Use (street drugs): ( x ) never used  (  ) other:    FAMILY HISTORY:  FH: HTN (hypertension)        PHYSICAL EXAM:  T(C): 36.8 (12-11-22 @ 05:40), Max: 36.8 (12-10-22 @ 23:30)  HR: 75 (12-11-22 @ 05:40) (64 - 82)  BP: 122/68 (12-11-22 @ 05:40) (108/60 - 144/70)  RR: 18 (12-11-22 @ 05:40) (18 - 18)  SpO2: 100% (12-11-22 @ 05:40) (98% - 100%)  Wt(kg): --  I&O's Summary    10 Dec 2022 07:01  -  11 Dec 2022 07:00  --------------------------------------------------------  IN: 2195 mL / OUT: 74369 mL / NET: -88599 mL          GENERAL: NAD  EYES: EOMI, PERRLA, conjunctiva and sclera clear  ENMT: No tonsillar erythema, exudates, or enlargement  Cardiovascular: Normal S1 S2, No JVD, No murmurs, No edema  Respiratory: Lungs clear to auscultation	  Gastrointestinal:  Soft, Non-tender, + BS	  Extremities: No edema                                     6.8    9.25  )-----------( 166      ( 11 Dec 2022 07:21 )             21.0     12-11    147<H>  |  110<H>  |  23  ----------------------------<  106<H>  3.1<L>   |  26  |  1.63<H>    Ca    7.8<L>      11 Dec 2022 07:21  Phos  3.5     12-10  Mg     1.5     12-10    TPro  4.8<L>  /  Alb  2.4<L>  /  TBili  1.5<H>  /  DBili  x   /  AST  8<L>  /  ALT  <5<L>  /  AlkPhos  67  12-11    proBNP:   Lipid Profile:   HgA1c:   TSH:     Consultant(s) Notes Reviewed:  [x ] YES  [ ] NO    Care Discussed with Consultants/Other Providers [ x] YES  [ ] NO    Imaging Personally Reviewed independently:  [x] YES  [ ] NO    All labs, radiologic studies, vitals, orders and medications list reviewed. Patient is seen and examined at bedside. Case discussed with medical team.              
  Bennie Hwang MD  Interventional Cardiology / Endovascular Specialist  [place] Office : [address] [place] [address] [place]. [address]  Tel:   [place] Office : [address] [facility] [place] [address]  Tel: [phone]    Subjective/Overnight events: Patient lying in bed receiving PRBC transfusion. no acute distress  	  MEDICATIONS:      guaiFENesin Oral Liquid (Sugar-Free) 100 milliGRAM(s) Oral every 6 hours PRN    acetaminophen     Tablet .. 650 milliGRAM(s) Oral every 6 hours PRN  carbidopa/levodopa  25/100 1 Tablet(s) Oral three times a day  QUEtiapine 75 milliGRAM(s) Oral at bedtime      dextrose 50% Injectable 25 Gram(s) IV Push once  dextrose 50% Injectable 12.5 Gram(s) IV Push once  dextrose 50% Injectable 25 Gram(s) IV Push once  dextrose Oral Gel 15 Gram(s) Oral once PRN  finasteride 5 milliGRAM(s) Oral daily  glucagon  Injectable 1 milliGRAM(s) IntraMuscular once  insulin lispro (ADMELOG) corrective regimen sliding scale   SubCutaneous three times a day before meals    chlorhexidine 2% Cloths 1 Application(s) Topical daily  dextrose 5%. 1000 milliLiter(s) IV Continuous <Continuous>  dextrose 5%. 1000 milliLiter(s) IV Continuous <Continuous>  dextrose 5%. 1000 milliLiter(s) IV Continuous <Continuous>  tamsulosin 0.4 milliGRAM(s) Oral at bedtime      PAST MEDICAL/SURGICAL HISTORY  PAST MEDICAL & SURGICAL HISTORY:  Hypercholesterolemia      DM (Diabetes Mellitus)      HTN (Hypertension)      CAD (Coronary Artery Disease)  s/p cardiac stent ( &gt; 20 years ago)      BPH (benign prostatic hyperplasia)      Diabetes      Hypertension, unspecified type      Benign prostatic hyperplasia, unspecified whether lower urinary tract symptoms present      Hyperlipidemia, unspecified hyperlipidemia type      Dementia      Parkinson disease      Nocardiosis      Muscle weakness      BPH (benign prostatic hyperplasia)      Cellulitis      Parkinsons      Dementia      DM (diabetes mellitus)      Coronary Stent  x 2 ( 20 years )      No significant past surgical history          SOCIAL HISTORY: Substance Use (street drugs): ( x ) never used  (  ) other:    FAMILY HISTORY:  FH: HTN (hypertension)      PHYSICAL EXAM:  T(C): 36.5 (12-12-22 @ 21:15), Max: 37.1 (12-12-22 @ 20:00)  HR: 89 (12-12-22 @ 21:15) (55 - 89)  BP: 105/63 (12-12-22 @ 21:15) (105/63 - 201/78)  RR: 19 (12-12-22 @ 21:15) (16 - 19)  SpO2: 98% (12-12-22 @ 21:15) (98% - 100%)  Wt(kg): --  I&O's Summary    11 Dec 2022 07:01  -  12 Dec 2022 07:00  --------------------------------------------------------  IN: 1525 mL / OUT: 66720 mL / NET: -62827 mL      Height (cm): 170.2 (12-12 @ 13:03)  Weight (kg): 77.1 (12-12 @ 13:03)  BMI (kg/m2): 26.6 (12-12 @ 13:03)  BSA (m2): 1.89 (12-12 @ 13:03)      GENERAL: NAD  EYES: EOMI, PERRLA, conjunctiva and sclera clear  ENMT: No tonsillar erythema, exudates, or enlargement  Cardiovascular: Normal S1 S2, No JVD, No murmurs, No edema  Respiratory: Lungs clear to auscultation	  Gastrointestinal:  Soft, Non-tender, + BS	  Extremities: No edema                         9.3    9.69  )-----------( 163      ( 12 Dec 2022 16:13 )             28.3     12-12    142  |  105  |  15  ----------------------------<  115<H>  3.7   |  26  |  1.40<H>    Ca    7.9<L>      12 Dec 2022 16:13    TPro  4.8<L>  /  Alb  2.4<L>  /  TBili  1.5<H>  /  DBili  x   /  AST  8<L>  /  ALT  <5<L>  /  AlkPhos  67  12-11    proBNP:   Lipid Profile:   HgA1c:   TSH:     Consultant(s) Notes Reviewed:  [x ] YES  [ ] NO    Care Discussed with Consultants/Other Providers [ x] YES  [ ] NO    Imaging Personally Reviewed independently:  [x] YES  [ ] NO    All labs, radiologic studies, vitals, orders and medications list reviewed. Patient is seen and examined at bedside. Case discussed with medical team.              
 Post op Check    Pt seen and examined without complaints. Pain is controlled. Denies SOB/CP/N/V. Hx limited due to mental status.     Vital Signs Last 24 Hrs  T(C): 36.4 (12 Dec 2022 18:30), Max: 36.7 (11 Dec 2022 21:26)  T(F): 97.5 (12 Dec 2022 18:30), Max: 98.1 (11 Dec 2022 21:26)  HR: 73 (12 Dec 2022 18:00) (55 - 80)  BP: 133/60 (12 Dec 2022 18:00) (117/64 - 201/78)  BP(mean): 87 (12 Dec 2022 18:00) (3 - 118)  RR: 17 (12 Dec 2022 18:30) (16 - 19)  SpO2: 100% (12 Dec 2022 18:00) (98% - 100%)    Parameters below as of 12 Dec 2022 18:30  Patient On (Oxygen Delivery Method): room air        I&O's Summary    11 Dec 2022 07:01  -  12 Dec 2022 07:00  --------------------------------------------------------  IN: 1525 mL / OUT: 41181 mL / NET: -79834 mL        Physical Exam  Gen: NAD, A&Ox3  Pulm: No respiratory distress, no subcostal retractions  Abd: Soft, NT, ND  : +caceres in place, traction removed, cbi on fast drip, urine pink                          9.3    9.69  )-----------( 163      ( 12 Dec 2022 16:13 )             28.3       12-12    142  |  105  |  15  ----------------------------<  115<H>  3.7   |  26  |  1.40<H>    Ca    7.9<L>      12 Dec 2022 16:13    TPro  4.8<L>  /  Alb  2.4<L>  /  TBili  1.5<H>  /  DBili  x   /  AST  8<L>  /  ALT  <5<L>  /  AlkPhos  67  12-11      A/P: 82y Male s/p   DVT prophylaxis/OOB  Incentive spirometry  Strict I&O's  Analgesia and antiemetics as needed  Diet  AM labs  
Bennie Hwang MD  Interventional Cardiology / Advance Heart Failure and Cardiac Transplant Specialist  [place] Office : [address] [place] [address]  Tel:   [place] Office : [address] [facility] [address]  Tel: [phone]      Subjective/Overnight events: Pt is lying in bed no acute distress noted  	  MEDICATIONS:      guaiFENesin Oral Liquid (Sugar-Free) 100 milliGRAM(s) Oral every 6 hours PRN    acetaminophen     Tablet .. 650 milliGRAM(s) Oral every 6 hours PRN  carbidopa/levodopa  25/100 1 Tablet(s) Oral three times a day  QUEtiapine 75 milliGRAM(s) Oral at bedtime      dextrose 50% Injectable 25 Gram(s) IV Push once  dextrose 50% Injectable 12.5 Gram(s) IV Push once  dextrose 50% Injectable 25 Gram(s) IV Push once  dextrose Oral Gel 15 Gram(s) Oral once PRN  finasteride 5 milliGRAM(s) Oral daily  glucagon  Injectable 1 milliGRAM(s) IntraMuscular once  insulin lispro (ADMELOG) corrective regimen sliding scale   SubCutaneous Before meals and at bedtime    chlorhexidine 2% Cloths 1 Application(s) Topical daily  dextrose 5%. 1000 milliLiter(s) IV Continuous <Continuous>  dextrose 5%. 1000 milliLiter(s) IV Continuous <Continuous>  dextrose 5%. 1000 milliLiter(s) IV Continuous <Continuous>  tamsulosin 0.4 milliGRAM(s) Oral at bedtime      PAST MEDICAL/SURGICAL HISTORY  PAST MEDICAL & SURGICAL HISTORY:  Hypercholesterolemia      DM (Diabetes Mellitus)      HTN (Hypertension)      CAD (Coronary Artery Disease)  s/p cardiac stent ( &gt; 20 years ago)      BPH (benign prostatic hyperplasia)      Diabetes      Hypertension, unspecified type      Benign prostatic hyperplasia, unspecified whether lower urinary tract symptoms present      Hyperlipidemia, unspecified hyperlipidemia type      Dementia      Parkinson disease      Nocardiosis      Muscle weakness      BPH (benign prostatic hyperplasia)      Cellulitis      Parkinsons      Dementia      DM (diabetes mellitus)      Coronary Stent  x 2 ( 20 years )      No significant past surgical history          SOCIAL HISTORY: Substance Use (street drugs): ( x ) never used  (  ) other:    FAMILY HISTORY:  FH: HTN (hypertension)            PHYSICAL EXAM:  T(C): 36.7 (12-14-22 @ 05:10), Max: 36.9 (12-13-22 @ 18:38)  HR: 70 (12-14-22 @ 05:10) (54 - 91)  BP: 114/61 (12-14-22 @ 05:10) (109/65 - 127/67)  RR: 18 (12-14-22 @ 05:10) (17 - 18)  SpO2: 99% (12-14-22 @ 05:10) (95% - 99%)  Wt(kg): --  I&O's Summary    13 Dec 2022 07:01  -  14 Dec 2022 07:00  --------------------------------------------------------  IN: 1350 mL / OUT: 0 mL / NET: 1350 mL          GENERAL: NAD  EYES:conjunctiva and sclera clear  ENMT: No tonsillar erythema, exudates, or enlargement  Cardiovascular: Normal S1 S2, No JVD, No murmurs, No edema  Respiratory: Lungs clear to auscultation	  Gastrointestinal:  Soft, Non-tender, + BS	  Extremities: No edema                                     7.8    7.67  )-----------( 156      ( 14 Dec 2022 07:33 )             23.7     12-14    134<L>  |  102  |  15  ----------------------------<  134<H>  3.5   |  23  |  1.20    Ca    6.9<L>      14 Dec 2022 07:35      proBNP:   Lipid Profile:   HgA1c:   TSH:     Consultant(s) Notes Reviewed:  [x ] YES  [ ] NO    Care Discussed with Consultants/Other Providers [ x] YES  [ ] NO    Imaging Personally Reviewed independently:  [x] YES  [ ] NO    All labs, radiologic studies, vitals, orders and medications list reviewed. Patient is seen and examined at bedside. Case discussed with medical team.        
Overnight events:  Yesterday afternoon, patient failed TOV and a 20F coude catheter was replaced    Subjective  Patient seen and examined. Resting comfortably.   Caceres draining clear yellow urine    Objective    Vital signs  T(F): , Max: 98.1 (12-17-22 @ 05:13)  HR: 64 (12-17-22 @ 10:26)  BP: 102/62 (12-17-22 @ 10:26)  SpO2: 100% (12-17-22 @ 10:26)  Wt(kg): --    Output     OUT:    Indwelling Catheter - Urethral (mL): 2175 mL  Total OUT: 2175 mL    Total NET: -2175 mL      OUT:    Indwelling Catheter - Urethral (mL): 300 mL  Total OUT: 300 mL    Total NET: -300 mL          Gen: NAD  Abd: soft, nontender, nondistended  : caceres secured in place, draining CYU    Labs      12-17 @ 08:53    WBC 7.63  / Hct 25.7  / SCr --       12-16 @ 05:35    WBC 6.57  / Hct 25.4  / SCr 1.02         Culture - Urine (collected 12-13-22 @ 12:35)  Source: Catheterized Catheterized  Final Report (12-14-22 @ 17:54):    No growth    Culture - Blood (collected 12-13-22 @ 12:35)  Source: .Blood Blood-Venous  Preliminary Report (12-14-22 @ 18:02):    No growth to date.    Culture - Blood (collected 12-13-22 @ 12:35)  Source: .Blood Blood-Peripheral  Preliminary Report (12-14-22 @ 18:02):    No growth to date.      
PROGRESS NOTE:   Fadia Johnston DO  Hospitalist  Pager [phone]  After 5pm/weekends or if no answer ext: [phone]      Patient is a 82y old  Male who presents with a chief complaint of COVID 19, social admission (14 Dec 2022 07:27)      SUBJECTIVE / OVERNIGHT EVENTS: Confused but awake and alert today. CBI draining well     ADDITIONAL REVIEW OF SYSTEMS:  no fever or chlls no n/v/d      MEDICATIONS  (STANDING):  carbidopa/levodopa  25/100 1 Tablet(s) Oral three times a day  chlorhexidine 2% Cloths 1 Application(s) Topical daily  dextrose 5%. 1000 milliLiter(s) (100 mL/Hr) IV Continuous <Continuous>  dextrose 5%. 1000 milliLiter(s) (50 mL/Hr) IV Continuous <Continuous>  dextrose 5%. 1000 milliLiter(s) (100 mL/Hr) IV Continuous <Continuous>  dextrose 50% Injectable 25 Gram(s) IV Push once  dextrose 50% Injectable 12.5 Gram(s) IV Push once  dextrose 50% Injectable 25 Gram(s) IV Push once  finasteride 5 milliGRAM(s) Oral daily  glucagon  Injectable 1 milliGRAM(s) IntraMuscular once  insulin lispro (ADMELOG) corrective regimen sliding scale   SubCutaneous Before meals and at bedtime  QUEtiapine 75 milliGRAM(s) Oral at bedtime  tamsulosin 0.4 milliGRAM(s) Oral at bedtime    MEDICATIONS  (PRN):  acetaminophen     Tablet .. 650 milliGRAM(s) Oral every 6 hours PRN Temp greater or equal to 38C (100.4F), Mild Pain (1 - 3)  dextrose Oral Gel 15 Gram(s) Oral once PRN Blood Glucose LESS THAN 70 milliGRAM(s)/deciliter  guaiFENesin Oral Liquid (Sugar-Free) 100 milliGRAM(s) Oral every 6 hours PRN Cough      CAPILLARY BLOOD GLUCOSE      POCT Blood Glucose.: 131 mg/dL (14 Dec 2022 08:49)  POCT Blood Glucose.: 127 mg/dL (13 Dec 2022 21:51)  POCT Blood Glucose.: 95 mg/dL (13 Dec 2022 17:20)  POCT Blood Glucose.: 170 mg/dL (13 Dec 2022 12:24)    I&O's Summary    13 Dec 2022 07:01  -  14 Dec 2022 07:00  --------------------------------------------------------  IN: 1350 mL / OUT: 0 mL / NET: 1350 mL        PHYSICAL EXAM:  Vital Signs Last 24 Hrs  T(C): 36.7 (14 Dec 2022 05:10), Max: 36.9 (13 Dec 2022 18:38)  T(F): 98 (14 Dec 2022 05:10), Max: 98.5 (13 Dec 2022 18:38)  HR: 70 (14 Dec 2022 05:10) (54 - 91)  BP: 114/61 (14 Dec 2022 05:10) (109/65 - 127/67)  BP(mean): --  RR: 18 (14 Dec 2022 05:10) (17 - 18)  SpO2: 99% (14 Dec 2022 05:10) (95% - 99%)    Parameters below as of 14 Dec 2022 05:10  Patient On (Oxygen Delivery Method): room air        CONSTITUTIONAL: NAD,  non toxic   RESPIRATORY: Normal respiratory effort; lungs are clear to auscultation bilaterally  CARDIOVASCULAR: Regular rate and rhythm, normal S1 and S2, no murmur/rub/gallop; No lower extremity edema; Peripheral pulses are 2+ bilaterally  ABDOMEN: Nontender to palpation, normoactive bowel sounds, no rebound/guarding; No hepatosplenomegaly  MUSCLOSKELETAL: no clubbing or cyanosis of digits; no joint swelling or tenderness to palpation  PSYCH: A+O to person only   : caceres and CBI    LABS:                        7.8    7.67  )-----------( 156      ( 14 Dec 2022 07:33 )             23.7     12-14    134<L>  |  102  |  15  ----------------------------<  134<H>  3.5   |  23  |  1.20    Ca    6.9<L>      14 Dec 2022 07:35            Urinalysis Basic - ( 13 Dec 2022 12:35 )    Color: LIGHT BROWN / Appearance: Slightly Turbid / S.006 / pH: x  Gluc: x / Ketone: Negative  / Bili: Negative / Urobili: Negative   Blood: x / Protein: 30 mg/dL / Nitrite: Negative   Leuk Esterase: Negative / RBC: 1448 /hpf / WBC 3 /HPF   Sq Epi: x / Non Sq Epi: 1 /hpf / Bacteria: Negative          RADIOLOGY & ADDITIONAL TESTS:  Results Reviewed:   Imaging Personally Reviewed:  Electrocardiogram Personally Reviewed:    COORDINATION OF CARE:  Care Discussed with Consultants/Other Providers [Y/N]:  Prior or Outpatient Records Reviewed [Y/N]:  
Subjective    Patient seen and examined. Not responsive to questioning.   CBI clotted off.     Objective    Vital signs  T(F): , Max: 98.1 (12-11-22 @ 21:26)  HR: 68 (12-12-22 @ 06:32)  BP: 123/68 (12-12-22 @ 06:32)  SpO2: 100% (12-12-22 @ 06:32)  Wt(kg): --    Output     12-11 @ 07:01  -  12-12 @ 07:00  --------------------------------------------------------  IN: 1525 mL / OUT: 34248 mL / NET: -14121 mL        General: NAD  Abdomen: soft/non-tender/non-distended  : CBI clamped, irrigated 60 cc dark clot via 22f 3 way, unable to continue irrigation will require 6-eye.     Labs      12-12 @ 06:51    WBC 7.24  / Hct 20.4  / SCr 1.52     12-11 @ 18:05    WBC 10.97 / Hct 25.2  / SCr --         
Subjective    Patient seen and examined. Resting comfortably.     Objective    Vital signs  T(F): , Max: 98.7 (12-12-22 @ 20:00)  HR: 80 (12-13-22 @ 05:12)  BP: 127/58 (12-13-22 @ 05:12)  SpO2: 95% (12-13-22 @ 05:12)  Wt(kg): --    Output     12-12 @ 07:01  -  12-13 @ 07:00  --------------------------------------------------------  IN: 800 mL / OUT: 0 mL / NET: 800 mL        General: NAD, mittens on hands  Abdomen: soft/non-tender/non-distended  : 22f 3 way in place draining clear pink urine on fast CBI    Labs      12-12 @ 16:13    WBC 9.69  / Hct 28.3  / SCr 1.40     12-12 @ 06:51    WBC 7.24  / Hct 20.4  / SCr 1.52         
Subjective:  Pt seen and examined earlier today.  Overnight noted to have drop in H/H, received one unit of blood without adequate response. Received another unit today.  Remains on CBI, unable to wean    Objective    Vital signs  T(F): , Max: 98.2 (12-10-22 @ 23:30)  HR: 67 (12-11-22 @ 12:54)  BP: 125/67 (12-11-22 @ 12:54)  SpO2: 100% (12-11-22 @ 12:54)  Wt(kg): --    Output     OUT:    Intermittent Catheterization - Urethral (mL): 69391 mL  Total OUT: 82043 mL    Total NET: -33522 mL      OUT:    Intermittent Catheterization - Urethral (mL): 4400 mL  Total OUT: 4400 mL    Total NET: -4400 mL          Gen: NAD  Resp: no resp distress  Abd: s/nt/nd  : 3-way secured on cbi with translucent punch urine on medium gtt. my rate to fast gtt with improvement of color to pink.     Labs             6.8    9.25  )-----------( 166      ( 11 Dec 2022 07:21 )             21.0   12-11    147<H>  |  110<H>  |  23  ----------------------------<  106<H>  3.1<L>   |  26  |  1.63<H>    Ca    7.8<L>      11 Dec 2022 07:21  Phos  3.5     12-10  Mg     1.5     12-10    TPro  4.8<L>  /  Alb  2.4<L>  /  TBili  1.5<H>  /  DBili  x   /  AST  8<L>  /  ALT  <5<L>  /  AlkPhos  67  12-11    
PROGRESS NOTE:   Fadia Johnston DO  Hospitalist  Pager [phone]  After 5pm/weekends or if no answer ext: [phone]      Patient is a 82y old  Male who presents with a chief complaint of COVID 19, social admission (15 Dec 2022 07:20)      SUBJECTIVE / OVERNIGHT EVENTS:  MOI, CBI clamped this am per urology     ADDITIONAL REVIEW OF SYSTEMS:  no fever or chills  no nvd    MEDICATIONS  (STANDING):  carbidopa/levodopa  25/100 1 Tablet(s) Oral three times a day  chlorhexidine 2% Cloths 1 Application(s) Topical daily  dextrose 5%. 1000 milliLiter(s) (100 mL/Hr) IV Continuous <Continuous>  dextrose 5%. 1000 milliLiter(s) (50 mL/Hr) IV Continuous <Continuous>  dextrose 5%. 1000 milliLiter(s) (100 mL/Hr) IV Continuous <Continuous>  dextrose 50% Injectable 25 Gram(s) IV Push once  dextrose 50% Injectable 12.5 Gram(s) IV Push once  dextrose 50% Injectable 25 Gram(s) IV Push once  finasteride 5 milliGRAM(s) Oral daily  glucagon  Injectable 1 milliGRAM(s) IntraMuscular once  insulin lispro (ADMELOG) corrective regimen sliding scale   SubCutaneous Before meals and at bedtime  QUEtiapine 75 milliGRAM(s) Oral at bedtime  tamsulosin 0.4 milliGRAM(s) Oral at bedtime    MEDICATIONS  (PRN):  acetaminophen     Tablet .. 650 milliGRAM(s) Oral every 6 hours PRN Temp greater or equal to 38C (100.4F), Mild Pain (1 - 3)  dextrose Oral Gel 15 Gram(s) Oral once PRN Blood Glucose LESS THAN 70 milliGRAM(s)/deciliter  guaiFENesin Oral Liquid (Sugar-Free) 100 milliGRAM(s) Oral every 6 hours PRN Cough      CAPILLARY BLOOD GLUCOSE      POCT Blood Glucose.: 119 mg/dL (15 Dec 2022 08:09)  POCT Blood Glucose.: 105 mg/dL (14 Dec 2022 21:59)  POCT Blood Glucose.: 125 mg/dL (14 Dec 2022 17:18)  POCT Blood Glucose.: 98 mg/dL (14 Dec 2022 13:08)    I&O's Summary    14 Dec 2022 07:01  -  15 Dec 2022 07:00  --------------------------------------------------------  IN: 750 mL / OUT: 0 mL / NET: 750 mL        PHYSICAL EXAM:  Vital Signs Last 24 Hrs  T(C): 36.6 (15 Dec 2022 05:04), Max: 37 (14 Dec 2022 13:10)  T(F): 97.9 (15 Dec 2022 05:04), Max: 98.6 (14 Dec 2022 13:10)  HR: 80 (15 Dec 2022 05:04) (69 - 109)  BP: 125/67 (15 Dec 2022 05:04) (110/64 - 148/73)  BP(mean): --  RR: 18 (15 Dec 2022 05:04) (18 - 18)  SpO2: 98% (15 Dec 2022 05:04) (96% - 100%)    Parameters below as of 15 Dec 2022 05:04  Patient On (Oxygen Delivery Method): room air        CONSTITUTIONAL: NAD, well-developed,   RESPIRATORY: Normal respiratory effort; lungs are clear to auscultation bilaterally  CARDIOVASCULAR: Regular rate and rhythm, normal S1 and S2, no murmur/rub/gallop; No lower extremity edema; Peripheral pulses are 2+ bilaterally  ABDOMEN: Nontender to palpation, normoactive bowel sounds, no rebound/guarding; No hepatosplenomegaly  MUSCLOSKELETAL: no clubbing or cyanosis of digits; no joint swelling or tenderness to palpation  PSYCH: A+O to person only     LABS:                        6.9    5.82  )-----------( 184      ( 15 Dec 2022 07:25 )             21.3     12-15    138  |  104  |  13  ----------------------------<  113<H>  3.3<L>   |  26  |  1.05    Ca    7.4<L>      15 Dec 2022 07:25            Urinalysis Basic - ( 13 Dec 2022 12:35 )    Color: LIGHT BROWN / Appearance: Slightly Turbid / S.006 / pH: x  Gluc: x / Ketone: Negative  / Bili: Negative / Urobili: Negative   Blood: x / Protein: 30 mg/dL / Nitrite: Negative   Leuk Esterase: Negative / RBC: 1448 /hpf / WBC 3 /HPF   Sq Epi: x / Non Sq Epi: 1 /hpf / Bacteria: Negative        Culture - Urine (collected 13 Dec 2022 12:35)  Source: Catheterized Catheterized  Final Report (14 Dec 2022 17:54):    No growth    Culture - Blood (collected 13 Dec 2022 12:35)  Source: .Blood Blood-Venous  Preliminary Report (14 Dec 2022 18:02):    No growth to date.    Culture - Blood (collected 13 Dec 2022 12:35)  Source: .Blood Blood-Peripheral  Preliminary Report (14 Dec 2022 18:02):    No growth to date.        RADIOLOGY & ADDITIONAL TESTS:  Results Reviewed:   Imaging Personally Reviewed:  Electrocardiogram Personally Reviewed:    COORDINATION OF CARE:  Care Discussed with Consultants/Other Providers [Y/N]:  Prior or Outpatient Records Reviewed [Y/N]:  
PROGRESS NOTE:   Fadia Johnston DO  Hospitalist  Pager [phone]  After 5pm/weekends or if no answer ext: [phone]      Patient is a 82y old  Male who presents with a chief complaint of COVID 19, social admission (07 Dec 2022 13:29)      SUBJECTIVE / OVERNIGHT EVENTS: MOI, confused only knows self and since yesterday has been retaining urine    ADDITIONAL REVIEW OF SYSTEMS:  no fever or vomiting  MEDICATIONS  (STANDING):  aspirin enteric coated 81 milliGRAM(s) Oral daily  carbidopa/levodopa  25/100 1 Tablet(s) Oral three times a day  chlorhexidine 2% Cloths 1 Application(s) Topical daily  dextrose 5%. 1000 milliLiter(s) (50 mL/Hr) IV Continuous <Continuous>  dextrose 5%. 1000 milliLiter(s) (100 mL/Hr) IV Continuous <Continuous>  dextrose 5%. 1000 milliLiter(s) (70 mL/Hr) IV Continuous <Continuous>  dextrose 50% Injectable 25 Gram(s) IV Push once  dextrose 50% Injectable 12.5 Gram(s) IV Push once  dextrose 50% Injectable 25 Gram(s) IV Push once  enoxaparin Injectable 40 milliGRAM(s) SubCutaneous every 24 hours  glucagon  Injectable 1 milliGRAM(s) IntraMuscular once  insulin lispro (ADMELOG) corrective regimen sliding scale   SubCutaneous three times a day before meals  insulin lispro (ADMELOG) corrective regimen sliding scale   SubCutaneous at bedtime  lactated ringers. 1000 milliLiter(s) (75 mL/Hr) IV Continuous <Continuous>  mupirocin 2% Nasal 1 Application(s) Both Nostrils two times a day  QUEtiapine 75 milliGRAM(s) Oral at bedtime  tamsulosin 0.8 milliGRAM(s) Oral at bedtime    MEDICATIONS  (PRN):  acetaminophen     Tablet .. 650 milliGRAM(s) Oral every 6 hours PRN Temp greater or equal to 38C (100.4F), Mild Pain (1 - 3)  dextrose Oral Gel 15 Gram(s) Oral once PRN Blood Glucose LESS THAN 70 milliGRAM(s)/deciliter  guaiFENesin Oral Liquid (Sugar-Free) 100 milliGRAM(s) Oral every 6 hours PRN Cough      CAPILLARY BLOOD GLUCOSE      POCT Blood Glucose.: 103 mg/dL (08 Dec 2022 08:27)  POCT Blood Glucose.: 123 mg/dL (07 Dec 2022 21:40)  POCT Blood Glucose.: 133 mg/dL (07 Dec 2022 17:28)  POCT Blood Glucose.: 123 mg/dL (07 Dec 2022 12:45)    I&O's Summary    07 Dec 2022 07:01  -  08 Dec 2022 07:00  --------------------------------------------------------  IN: 710 mL / OUT: 3670 mL / NET: -2960 mL        PHYSICAL EXAM:  Vital Signs Last 24 Hrs  T(C): 36.4 (08 Dec 2022 04:34), Max: 36.7 (07 Dec 2022 20:50)  T(F): 97.5 (08 Dec 2022 04:34), Max: 98 (07 Dec 2022 20:50)  HR: 60 (08 Dec 2022 04:34) (60 - 73)  BP: 116/68 (08 Dec 2022 04:34) (116/68 - 152/75)  BP(mean): --  RR: 18 (08 Dec 2022 04:34) (18 - 18)  SpO2: 99% (08 Dec 2022 04:34) (99% - 100%)    Parameters below as of 08 Dec 2022 04:34  Patient On (Oxygen Delivery Method): room air        CONSTITUTIONAL: NAD, well-developed, non toxic  RESPIRATORY: Normal respiratory effort; lungs are clear to auscultation bilaterally  CARDIOVASCULAR: Regular rate and rhythm, normal S1 and S2, no murmur/rub/gallop; No lower extremity edema; Peripheral pulses are 2+ bilaterally  ABDOMEN: Nontender to palpation, normoactive bowel sounds, no rebound/guarding; No hepatosplenomegaly  MUSCLOSKELETAL: no clubbing or cyanosis of digits; no joint swelling or tenderness to palpation  PSYCH: A+O to person only    LABS:                        8.3    4.65  )-----------( 256      ( 07 Dec 2022 07:27 )             26.2     12-07    145  |  109<H>  |  24<H>  ----------------------------<  71  3.7   |  25  |  1.45<H>    Ca    8.8      07 Dec 2022 07:26                  RADIOLOGY & ADDITIONAL TESTS:  Results Reviewed:   Imaging Personally Reviewed:  Electrocardiogram Personally Reviewed:    COORDINATION OF CARE:  Care Discussed with Consultants/Other Providers [Y/N]:  Prior or Outpatient Records Reviewed [Y/N]:  
PROGRESS NOTE:   Fadia Johnston DO  Hospitalist  Pager [phone]  After 5pm/weekends or if no answer ext: [phone]      Patient is a 82y old  Male who presents with a chief complaint of COVID 19, social admission (05 Dec 2022 15:43)      SUBJECTIVE / OVERNIGHT EVENTS: No hypothermia overnight bmp still pending    ADDITIONAL REVIEW OF SYSTEMS:  no fever or chills  no nvd    MEDICATIONS  (STANDING):  aspirin enteric coated 81 milliGRAM(s) Oral daily  carbidopa/levodopa  25/100 1 Tablet(s) Oral three times a day  chlorhexidine 2% Cloths 1 Application(s) Topical daily  dextrose 5%. 1000 milliLiter(s) (50 mL/Hr) IV Continuous <Continuous>  dextrose 5%. 1000 milliLiter(s) (100 mL/Hr) IV Continuous <Continuous>  dextrose 5%. 1000 milliLiter(s) (70 mL/Hr) IV Continuous <Continuous>  dextrose 50% Injectable 25 Gram(s) IV Push once  dextrose 50% Injectable 12.5 Gram(s) IV Push once  dextrose 50% Injectable 25 Gram(s) IV Push once  enoxaparin Injectable 40 milliGRAM(s) SubCutaneous every 24 hours  glucagon  Injectable 1 milliGRAM(s) IntraMuscular once  insulin lispro (ADMELOG) corrective regimen sliding scale   SubCutaneous three times a day before meals  insulin lispro (ADMELOG) corrective regimen sliding scale   SubCutaneous at bedtime  mupirocin 2% Nasal 1 Application(s) Both Nostrils two times a day  QUEtiapine 75 milliGRAM(s) Oral at bedtime  tamsulosin 0.4 milliGRAM(s) Oral at bedtime    MEDICATIONS  (PRN):  acetaminophen     Tablet .. 650 milliGRAM(s) Oral every 6 hours PRN Temp greater or equal to 38C (100.4F), Mild Pain (1 - 3)  dextrose Oral Gel 15 Gram(s) Oral once PRN Blood Glucose LESS THAN 70 milliGRAM(s)/deciliter  guaiFENesin Oral Liquid (Sugar-Free) 100 milliGRAM(s) Oral every 6 hours PRN Cough      CAPILLARY BLOOD GLUCOSE      POCT Blood Glucose.: 88 mg/dL (06 Dec 2022 08:52)  POCT Blood Glucose.: 106 mg/dL (05 Dec 2022 22:29)  POCT Blood Glucose.: 94 mg/dL (05 Dec 2022 17:25)  POCT Blood Glucose.: 159 mg/dL (05 Dec 2022 12:34)    I&O's Summary    05 Dec 2022 07:01  -  06 Dec 2022 07:00  --------------------------------------------------------  IN: 390 mL / OUT: 250 mL / NET: 140 mL        PHYSICAL EXAM:  Vital Signs Last 24 Hrs  T(C): 36.4 (06 Dec 2022 04:49), Max: 36.8 (05 Dec 2022 14:14)  T(F): 97.5 (06 Dec 2022 04:49), Max: 98.3 (05 Dec 2022 14:14)  HR: 62 (06 Dec 2022 04:49) (62 - 75)  BP: 151/70 (06 Dec 2022 04:49) (132/76 - 151/70)  BP(mean): --  RR: 17 (06 Dec 2022 04:49) (17 - 18)  SpO2: 99% (06 Dec 2022 04:49) (99% - 99%)    Parameters below as of 06 Dec 2022 04:49  Patient On (Oxygen Delivery Method): room air        CONSTITUTIONAL: NAD, well-developed  RESPIRATORY: Normal respiratory effort; lungs are clear to auscultation bilaterally  CARDIOVASCULAR: Regular rate and rhythm, normal S1 and S2, no murmur/rub/gallop; No lower extremity edema; Peripheral pulses are 2+ bilaterally  ABDOMEN: Nontender to palpation, normoactive bowel sounds, no rebound/guarding; No hepatosplenomegaly  MUSCLOSKELETAL: no clubbing or cyanosis of digits; no joint swelling or tenderness to palpation  PSYCH: A+O to person, place, and time; affect appropriate    LABS:                        7.9    4.62  )-----------( 239      ( 06 Dec 2022 07:31 )             25.3     12-06    147<H>  |  110<H>  |  24<H>  ----------------------------<  80  3.5   |  25  |  1.36<H>    Ca    8.2<L>      06 Dec 2022 07:23  Mg     1.7     12-05                  RADIOLOGY & ADDITIONAL TESTS:  Results Reviewed:   Imaging Personally Reviewed:  Electrocardiogram Personally Reviewed:    COORDINATION OF CARE:  Care Discussed with Consultants/Other Providers [Y/N]:  Prior or Outpatient Records Reviewed [Y/N]:  
PROGRESS NOTE:   Fadia Johnston DO  Hospitalist  Pager [phone]  After 5pm/weekends or if no answer ext: [phone]      Patient is a 82y old  Male who presents with a chief complaint of COVID 19, social admission (07 Dec 2022 09:53)      SUBJECTIVE / OVERNIGHT EVENTS: MOI    ADDITIONAL REVIEW OF SYSTEMS:  no fever or chill  no n/v/d    MEDICATIONS  (STANDING):  aspirin enteric coated 81 milliGRAM(s) Oral daily  carbidopa/levodopa  25/100 1 Tablet(s) Oral three times a day  chlorhexidine 2% Cloths 1 Application(s) Topical daily  dextrose 5%. 1000 milliLiter(s) (50 mL/Hr) IV Continuous <Continuous>  dextrose 5%. 1000 milliLiter(s) (100 mL/Hr) IV Continuous <Continuous>  dextrose 5%. 1000 milliLiter(s) (70 mL/Hr) IV Continuous <Continuous>  dextrose 50% Injectable 25 Gram(s) IV Push once  dextrose 50% Injectable 12.5 Gram(s) IV Push once  dextrose 50% Injectable 25 Gram(s) IV Push once  enoxaparin Injectable 40 milliGRAM(s) SubCutaneous every 24 hours  glucagon  Injectable 1 milliGRAM(s) IntraMuscular once  insulin lispro (ADMELOG) corrective regimen sliding scale   SubCutaneous three times a day before meals  insulin lispro (ADMELOG) corrective regimen sliding scale   SubCutaneous at bedtime  lactated ringers. 1000 milliLiter(s) (75 mL/Hr) IV Continuous <Continuous>  mupirocin 2% Nasal 1 Application(s) Both Nostrils two times a day  QUEtiapine 75 milliGRAM(s) Oral at bedtime  tamsulosin 0.4 milliGRAM(s) Oral at bedtime    MEDICATIONS  (PRN):  acetaminophen     Tablet .. 650 milliGRAM(s) Oral every 6 hours PRN Temp greater or equal to 38C (100.4F), Mild Pain (1 - 3)  dextrose Oral Gel 15 Gram(s) Oral once PRN Blood Glucose LESS THAN 70 milliGRAM(s)/deciliter  guaiFENesin Oral Liquid (Sugar-Free) 100 milliGRAM(s) Oral every 6 hours PRN Cough      CAPILLARY BLOOD GLUCOSE      POCT Blood Glucose.: 123 mg/dL (07 Dec 2022 12:45)  POCT Blood Glucose.: 82 mg/dL (07 Dec 2022 08:26)  POCT Blood Glucose.: 130 mg/dL (06 Dec 2022 21:57)  POCT Blood Glucose.: 99 mg/dL (06 Dec 2022 17:39)    I&O's Summary    06 Dec 2022 07:01  -  07 Dec 2022 07:00  --------------------------------------------------------  IN: 1310 mL / OUT: 350 mL / NET: 960 mL        PHYSICAL EXAM:  Vital Signs Last 24 Hrs  T(C): 36.6 (07 Dec 2022 04:14), Max: 36.6 (07 Dec 2022 04:14)  T(F): 97.9 (07 Dec 2022 04:14), Max: 97.9 (07 Dec 2022 04:14)  HR: 61 (07 Dec 2022 05:20) (53 - 71)  BP: 155/69 (07 Dec 2022 04:14) (155/69 - 162/65)  BP(mean): --  RR: 18 (07 Dec 2022 04:14) (18 - 18)  SpO2: 99% (07 Dec 2022 04:14) (99% - 100%)    Parameters below as of 07 Dec 2022 04:14  Patient On (Oxygen Delivery Method): room air        CONSTITUTIONAL: NAD,non toxic appearing  RESPIRATORY: Normal respiratory effort; lungs are clear to auscultation bilaterally  CARDIOVASCULAR: Regular rate and rhythm, normal S1 and S2, no murmur/rub/gallop; No lower extremity edema; Peripheral pulses are 2+ bilaterally  ABDOMEN: Nontender to palpation, normoactive bowel sounds, no rebound/guarding; No hepatosplenomegaly  MUSCLOSKELETAL: no clubbing or cyanosis of digits; no joint swelling or tenderness to palpation  PSYCH: A+O to person only    LABS:                        8.3    4.65  )-----------( 256      ( 07 Dec 2022 07:27 )             26.2     12-07    145  |  109<H>  |  24<H>  ----------------------------<  71  3.7   |  25  |  1.45<H>    Ca    8.8      07 Dec 2022 07:26                  RADIOLOGY & ADDITIONAL TESTS:  Results Reviewed:   Imaging Personally Reviewed:  Electrocardiogram Personally Reviewed:    COORDINATION OF CARE:  Care Discussed with Consultants/Other Providers [Y/N]:  Prior or Outpatient Records Reviewed [Y/N]:  
[facility] Division of Hospital Medicine  Eula Hughes MD  Available via MS Teams  Pager: [phone]    SUBJECTIVE / OVERNIGHT EVENTS:  - no events overnight, urinating okay, no changes, A&Ox1 right now and states "I need people like you who can back me up"    ADDITIONAL REVIEW OF SYSTEMS:    MEDICATIONS  (STANDING):  carbidopa/levodopa  25/100 1 Tablet(s) Oral three times a day  chlorhexidine 2% Cloths 1 Application(s) Topical daily  dextrose 5%. 1000 milliLiter(s) (100 mL/Hr) IV Continuous <Continuous>  dextrose 5%. 1000 milliLiter(s) (50 mL/Hr) IV Continuous <Continuous>  dextrose 50% Injectable 25 Gram(s) IV Push once  dextrose 50% Injectable 12.5 Gram(s) IV Push once  dextrose 50% Injectable 25 Gram(s) IV Push once  finasteride 5 milliGRAM(s) Oral daily  glucagon  Injectable 1 milliGRAM(s) IntraMuscular once  insulin lispro (ADMELOG) corrective regimen sliding scale   SubCutaneous Before meals and at bedtime  QUEtiapine 75 milliGRAM(s) Oral at bedtime  tamsulosin 0.4 milliGRAM(s) Oral at bedtime    MEDICATIONS  (PRN):  acetaminophen     Tablet .. 650 milliGRAM(s) Oral every 6 hours PRN Temp greater or equal to 38C (100.4F), Mild Pain (1 - 3)  dextrose Oral Gel 15 Gram(s) Oral once PRN Blood Glucose LESS THAN 70 milliGRAM(s)/deciliter  guaiFENesin Oral Liquid (Sugar-Free) 100 milliGRAM(s) Oral every 6 hours PRN Cough      I&O's Summary    17 Dec 2022 07:01  -  18 Dec 2022 07:00  --------------------------------------------------------  IN: 280 mL / OUT: 2850 mL / NET: -2570 mL    18 Dec 2022 07:01  -  18 Dec 2022 13:52  --------------------------------------------------------  IN: 0 mL / OUT: 600 mL / NET: -600 mL        PHYSICAL EXAM:  Vital Signs Last 24 Hrs  T(C): 36.9 (18 Dec 2022 05:12), Max: 36.9 (18 Dec 2022 05:12)  T(F): 98.4 (18 Dec 2022 05:12), Max: 98.4 (18 Dec 2022 05:12)  HR: 63 (18 Dec 2022 05:12) (63 - 73)  BP: 133/67 (18 Dec 2022 05:12) (122/57 - 137/68)  BP(mean): --  RR: 18 (18 Dec 2022 05:12) (16 - 18)  SpO2: 99% (18 Dec 2022 05:12) (99% - 100%)    Parameters below as of 18 Dec 2022 05:12  Patient On (Oxygen Delivery Method): room air      CONSTITUTIONAL: NAD, well-developed, well-groomed  EYES: PERRLA; conjunctiva and sclera clear  ENMT: Moist oral mucosa,  RESPIRATORY: Normal respiratory effort; lungs are clear to auscultation bilaterally  CARDIOVASCULAR: Regular rate and rhythm, normal S1 and S2, no murmur/rub/gallop; No lower extremity edema; Peripheral pulses are 2+ bilaterally  ABDOMEN: Nontender to palpation, normoactive bowel sounds, no rebound/guarding;  MUSCULOSKELETAL:  no clubbing or cyanosis of digits; no joint swelling or tenderness to palpation  PSYCH: A+O x1  NEUROLOGY: CN 2-12 are intact and symmetric; no gross sensory deficits   SKIN: No rashes; no palpable lesions    LABS:                        8.2    6.15  )-----------( 281      ( 18 Dec 2022 07:17 )             25.8     12-18    139  |  103  |  11  ----------------------------<  83  3.6   |  27  |  1.00    Ca    8.1<L>      18 Dec 2022 07:17  Phos  2.6     12-18  Mg     1.5     12-18    COVID-19 PCR: NotDetec (11 Dec 2022 19:28)      RADIOLOGY & ADDITIONAL TESTS:  New Results Reviewed Today: cbc cmp  New Imaging Personally Reviewed Today: n/a  New Electrocardiogram Personally Reviewed Today: n/a  Prior or Outpatient Records Reviewed Today: n/a    COMMUNICATION:  Care Discussed with Consultants/Other Providers and Details of Discussion: n/a   Discussions with Patient/Family: n/a  PCP Communication: n/a      
PROGRESS NOTE:   Authoted by Dr. Shannon Lindquist MD, Available on MS Teams    Patient is a 82y old  Male who presents with a chief complaint of COVID 19, social admission (19 Dec 2022 12:03)      SUBJECTIVE / OVERNIGHT EVENTS: Patient states he feels good today. No chest pain or shortness of breath. Has caceres with clear urine.      ADDITIONAL REVIEW OF SYSTEMS:    MEDICATIONS  (STANDING):  carbidopa/levodopa  25/100 1 Tablet(s) Oral three times a day  chlorhexidine 2% Cloths 1 Application(s) Topical daily  dextrose 5%. 1000 milliLiter(s) (100 mL/Hr) IV Continuous <Continuous>  dextrose 5%. 1000 milliLiter(s) (50 mL/Hr) IV Continuous <Continuous>  dextrose 50% Injectable 25 Gram(s) IV Push once  dextrose 50% Injectable 12.5 Gram(s) IV Push once  dextrose 50% Injectable 25 Gram(s) IV Push once  finasteride 5 milliGRAM(s) Oral daily  glucagon  Injectable 1 milliGRAM(s) IntraMuscular once  insulin lispro (ADMELOG) corrective regimen sliding scale   SubCutaneous Before meals and at bedtime  QUEtiapine 75 milliGRAM(s) Oral at bedtime  tamsulosin 0.8 milliGRAM(s) Oral at bedtime    MEDICATIONS  (PRN):  acetaminophen     Tablet .. 650 milliGRAM(s) Oral every 6 hours PRN Temp greater or equal to 38C (100.4F), Mild Pain (1 - 3)  dextrose Oral Gel 15 Gram(s) Oral once PRN Blood Glucose LESS THAN 70 milliGRAM(s)/deciliter  guaiFENesin Oral Liquid (Sugar-Free) 100 milliGRAM(s) Oral every 6 hours PRN Cough      CAPILLARY BLOOD GLUCOSE      POCT Blood Glucose.: 114 mg/dL (19 Dec 2022 16:46)  POCT Blood Glucose.: 123 mg/dL (19 Dec 2022 12:43)  POCT Blood Glucose.: 92 mg/dL (19 Dec 2022 08:04)  POCT Blood Glucose.: 102 mg/dL (18 Dec 2022 20:59)  POCT Blood Glucose.: 114 mg/dL (18 Dec 2022 17:23)    I&O's Summary    18 Dec 2022 07:01  -  19 Dec 2022 07:00  --------------------------------------------------------  IN: 360 mL / OUT: 2225 mL / NET: -1865 mL    19 Dec 2022 07:01  -  19 Dec 2022 16:52  --------------------------------------------------------  IN: 480 mL / OUT: 1050 mL / NET: -570 mL        PHYSICAL EXAM:  Vital Signs Last 24 Hrs  T(C): 36.6 (19 Dec 2022 13:20), Max: 37 (18 Dec 2022 21:09)  T(F): 97.8 (19 Dec 2022 13:20), Max: 98.6 (18 Dec 2022 21:09)  HR: 47 (19 Dec 2022 13:20) (47 - 78)  BP: 112/56 (19 Dec 2022 13:20) (102/59 - 153/72)  BP(mean): --  RR: 18 (19 Dec 2022 13:20) (17 - 18)  SpO2: 100% (19 Dec 2022 13:20) (95% - 100%)    Parameters below as of 19 Dec 2022 13:20  Patient On (Oxygen Delivery Method): room air        CONSTITUTIONAL: NAD, well-developed, with mittens  RESPIRATORY: Normal respiratory effort; lungs are clear to auscultation bilaterally  CARDIOVASCULAR: Regular rate and rhythm, normal S1 and S2, no murmur/rub/gallop; No lower extremity edema  ABDOMEN: Nontender to palpation, normoactive bowel sounds, no rebound/guarding  MUSCLOSKELETAL: no clubbing or cyanosis of digits; no joint swelling or tenderness to palpation  PSYCH: A+O to person, place, affect appropriate  NEURO: decreased strength in b/l LE    LABS:                        9.7    5.33  )-----------( 160      ( 19 Dec 2022 07:27 )             29.2     12-19    137  |  100  |  12  ----------------------------<  84  3.7   |  26  |  0.98    Ca    8.4      19 Dec 2022 07:21  Phos  2.6     12-18  Mg     1.7     12-19
PROGRESS NOTE:   Fadia Johnston DO  Hospitalist  Pager [phone]  After 5pm/weekends or if no answer ext: [phone]      Patient is a 82y old  Male who presents with a chief complaint of COVID 19, social admission (11 Dec 2022 16:35)      SUBJECTIVE / OVERNIGHT EVENTS: CBI clotted and still requiring transfusion    ADDITIONAL REVIEW OF SYSTEMS:  not able to obtain 2/2 dementia     MEDICATIONS  (STANDING):  carbidopa/levodopa  25/100 1 Tablet(s) Oral three times a day  chlorhexidine 2% Cloths 1 Application(s) Topical daily  dextrose 5%. 1000 milliLiter(s) (50 mL/Hr) IV Continuous <Continuous>  dextrose 5%. 1000 milliLiter(s) (100 mL/Hr) IV Continuous <Continuous>  dextrose 5%. 1000 milliLiter(s) (100 mL/Hr) IV Continuous <Continuous>  dextrose 50% Injectable 25 Gram(s) IV Push once  dextrose 50% Injectable 12.5 Gram(s) IV Push once  dextrose 50% Injectable 25 Gram(s) IV Push once  glucagon  Injectable 1 milliGRAM(s) IntraMuscular once  insulin lispro (ADMELOG) corrective regimen sliding scale   SubCutaneous three times a day before meals  insulin lispro (ADMELOG) corrective regimen sliding scale   SubCutaneous at bedtime  piperacillin/tazobactam IVPB.. 3.375 Gram(s) IV Intermittent every 8 hours  potassium chloride   Powder 40 milliEquivalent(s) Oral once  QUEtiapine 75 milliGRAM(s) Oral at bedtime  tamsulosin 0.4 milliGRAM(s) Oral at bedtime    MEDICATIONS  (PRN):  acetaminophen     Tablet .. 650 milliGRAM(s) Oral every 6 hours PRN Temp greater or equal to 38C (100.4F), Mild Pain (1 - 3)  dextrose Oral Gel 15 Gram(s) Oral once PRN Blood Glucose LESS THAN 70 milliGRAM(s)/deciliter  guaiFENesin Oral Liquid (Sugar-Free) 100 milliGRAM(s) Oral every 6 hours PRN Cough      CAPILLARY BLOOD GLUCOSE      POCT Blood Glucose.: 145 mg/dL (11 Dec 2022 21:21)  POCT Blood Glucose.: 163 mg/dL (11 Dec 2022 18:00)  POCT Blood Glucose.: 153 mg/dL (11 Dec 2022 13:32)  POCT Blood Glucose.: 122 mg/dL (11 Dec 2022 09:23)    I&O's Summary    11 Dec 2022 07:01  -  12 Dec 2022 07:00  --------------------------------------------------------  IN: 1525 mL / OUT: 18555 mL / NET: -34263 mL        PHYSICAL EXAM:  Vital Signs Last 24 Hrs  T(C): 36.5 (12 Dec 2022 06:32), Max: 36.7 (11 Dec 2022 21:26)  T(F): 97.7 (12 Dec 2022 06:32), Max: 98.1 (11 Dec 2022 21:26)  HR: 68 (12 Dec 2022 06:32) (67 - 73)  BP: 123/68 (12 Dec 2022 06:32) (117/64 - 125/67)  BP(mean): --  RR: 18 (12 Dec 2022 06:32) (18 - 18)  SpO2: 100% (12 Dec 2022 06:32) (100% - 100%)    Parameters below as of 12 Dec 2022 06:32  Patient On (Oxygen Delivery Method): room air        CONSTITUTIONAL: NAD, well-developed, confused  RESPIRATORY: Normal respiratory effort; lungs are clear to auscultation bilaterally  CARDIOVASCULAR: Regular rate and rhythm, normal S1 and S2, no murmur/rub/gallop; No lower extremity edema; Peripheral pulses are 2+ bilaterally  ABDOMEN: Nontender to palpation, normoactive bowel sounds, no rebound/guarding; No hepatosplenomegaly  MUSCLOSKELETAL: no clubbing or cyanosis of digits; no joint swelling or tenderness to palpation  PSYCH: A+O to person   YESIKA caceres in place    LABS:                        6.4    7.24  )-----------( 142      ( 12 Dec 2022 06:51 )             20.4     12-12    143  |  107  |  19  ----------------------------<  125<H>  3.3<L>   |  26  |  1.52<H>    Ca    7.6<L>      12 Dec 2022 06:51  Phos  3.5     12-10  Mg     1.5     12-10    TPro  4.8<L>  /  Alb  2.4<L>  /  TBili  1.5<H>  /  DBili  x   /  AST  8<L>  /  ALT  <5<L>  /  AlkPhos  67  12-11    PT/INR - ( 12 Dec 2022 06:51 )   PT: 16.7 sec;   INR: 1.45 ratio         PTT - ( 10 Dec 2022 12:27 )  PTT:31.1 sec          Culture - Blood (collected 09 Dec 2022 11:13)  Source: .Blood Blood-Peripheral  Preliminary Report (10 Dec 2022 15:06):    No growth to date.    Culture - Blood (collected 09 Dec 2022 11:13)  Source: .Blood Blood-Peripheral  Preliminary Report (10 Dec 2022 15:06):    No growth to date.        RADIOLOGY & ADDITIONAL TESTS:  Results Reviewed:   Imaging Personally Reviewed:  Electrocardiogram Personally Reviewed:    COORDINATION OF CARE:  Care Discussed with Consultants/Other Providers [Y/N]:  Prior or Outpatient Records Reviewed [Y/N]:  
PROGRESS NOTE:   Fadia Johnston DO  Hospitalist  Pager [phone]  After 5pm/weekends or if no answer ext: [phone]      Patient is a 82y old  Male who presents with a chief complaint of COVID 19, social admission (09 Dec 2022 07:21)      SUBJECTIVE / OVERNIGHT EVENTS:  Pt self removed caceres yesterday causing significant hematuria and leading to 2 RRT for hemorrhagic shock.  Difficult to arouse this am.     ADDITIONAL REVIEW OF SYSTEMS:  not able to obtain    MEDICATIONS  (STANDING):  carbidopa/levodopa  25/100 1 Tablet(s) Oral three times a day  chlorhexidine 2% Cloths 1 Application(s) Topical daily  dextrose 5%. 1000 milliLiter(s) (50 mL/Hr) IV Continuous <Continuous>  dextrose 5%. 1000 milliLiter(s) (100 mL/Hr) IV Continuous <Continuous>  dextrose 50% Injectable 25 Gram(s) IV Push once  dextrose 50% Injectable 12.5 Gram(s) IV Push once  dextrose 50% Injectable 25 Gram(s) IV Push once  glucagon  Injectable 1 milliGRAM(s) IntraMuscular once  insulin lispro (ADMELOG) corrective regimen sliding scale   SubCutaneous three times a day before meals  insulin lispro (ADMELOG) corrective regimen sliding scale   SubCutaneous at bedtime  lactated ringers. 1000 milliLiter(s) (75 mL/Hr) IV Continuous <Continuous>  piperacillin/tazobactam IVPB.- 3.375 Gram(s) IV Intermittent once  piperacillin/tazobactam IVPB.. 3.375 Gram(s) IV Intermittent every 8 hours  QUEtiapine 75 milliGRAM(s) Oral at bedtime  tamsulosin 0.4 milliGRAM(s) Oral at bedtime    MEDICATIONS  (PRN):  acetaminophen     Tablet .. 650 milliGRAM(s) Oral every 6 hours PRN Temp greater or equal to 38C (100.4F), Mild Pain (1 - 3)  dextrose Oral Gel 15 Gram(s) Oral once PRN Blood Glucose LESS THAN 70 milliGRAM(s)/deciliter  guaiFENesin Oral Liquid (Sugar-Free) 100 milliGRAM(s) Oral every 6 hours PRN Cough      CAPILLARY BLOOD GLUCOSE      POCT Blood Glucose.: 113 mg/dL (09 Dec 2022 11:49)  POCT Blood Glucose.: 124 mg/dL (09 Dec 2022 07:43)  POCT Blood Glucose.: 133 mg/dL (08 Dec 2022 21:28)  POCT Blood Glucose.: 141 mg/dL (08 Dec 2022 19:37)  POCT Blood Glucose.: 154 mg/dL (08 Dec 2022 17:34)  POCT Blood Glucose.: 165 mg/dL (08 Dec 2022 17:16)    I&O's Summary    08 Dec 2022 07:01  -  09 Dec 2022 07:00  --------------------------------------------------------  IN: 920 mL / OUT: 91700 mL / NET: -83765 mL    09 Dec 2022 07:01  -  09 Dec 2022 13:54  --------------------------------------------------------  IN: 0 mL / OUT: 3000 mL / NET: -3000 mL        PHYSICAL EXAM:  Vital Signs Last 24 Hrs  T(C): 36.3 (09 Dec 2022 05:42), Max: 36.8 (08 Dec 2022 21:26)  T(F): 97.3 (09 Dec 2022 05:42), Max: 98.2 (08 Dec 2022 21:26)  HR: 58 (09 Dec 2022 09:30) (48 - 90)  BP: 109/63 (09 Dec 2022 09:30) (70/37 - 131/72)  BP(mean): --  RR: 12 (09 Dec 2022 09:30) (12 - 18)  SpO2: 100% (09 Dec 2022 09:30) (99% - 100%)    Parameters below as of 09 Dec 2022 09:30  Patient On (Oxygen Delivery Method): room air        CONSTITUTIONAL: Ill appearing, difficult to arouse even to sternal rub  RESPIRATORY: Normal respiratory effort; lungs are clear to auscultation bilaterally  CARDIOVASCULAR: Regular rate and rhythm, normal S1 and S2, no murmur/rub/gallop; No lower extremity edema; Peripheral pulses are 2+ bilaterally  ABDOMEN: Nontender to palpation, normoactive bowel sounds, no rebound/guarding; No hepatosplenomegaly  MUSCLOSKELETAL: no clubbing or cyanosis of digits; no joint swelling or tenderness to palpation  PSYCH: AAO x 0     LABS:                        8.8    8.72  )-----------( 179      ( 09 Dec 2022 08:13 )             26.7     12-09    147<H>  |  110<H>  |  29<H>  ----------------------------<  127<H>  3.7   |  23  |  1.81<H>    Ca    8.4      09 Dec 2022 07:26  Phos  3.3     12-08  Mg     1.6     12-08    TPro  5.4<L>  /  Alb  3.0<L>  /  TBili  1.8<H>  /  DBili  x   /  AST  10  /  ALT  <5<L>  /  AlkPhos  98  12-09    PT/INR - ( 09 Dec 2022 08:13 )   PT: 16.7 sec;   INR: 1.45 ratio         PTT - ( 09 Dec 2022 08:13 )  PTT:34.0 sec            RADIOLOGY & ADDITIONAL TESTS:  Results Reviewed:   Imaging Personally Reviewed:  Electrocardiogram Personally Reviewed:    COORDINATION OF CARE:  Care Discussed with Consultants/Other Providers [Y/N]:  Prior or Outpatient Records Reviewed [Y/N]:  
PROGRESS NOTE:   Fadia Johnston DO  Hospitalist  Pager [phone]  After 5pm/weekends or if no answer ext: [phone]      Patient is a 82y old  Male who presents with a chief complaint of COVID 19, social admission (13 Dec 2022 07:00)      SUBJECTIVE / OVERNIGHT EVENTS: Went to OR yesterday for clot evaculation, again clogged caceres this am still on CBI    ADDITIONAL REVIEW OF SYSTEMS:  no fever or chills  no n/v/d    MEDICATIONS  (STANDING):  carbidopa/levodopa  25/100 1 Tablet(s) Oral three times a day  chlorhexidine 2% Cloths 1 Application(s) Topical daily  dextrose 5%. 1000 milliLiter(s) (50 mL/Hr) IV Continuous <Continuous>  dextrose 5%. 1000 milliLiter(s) (100 mL/Hr) IV Continuous <Continuous>  dextrose 5%. 1000 milliLiter(s) (100 mL/Hr) IV Continuous <Continuous>  dextrose 50% Injectable 25 Gram(s) IV Push once  dextrose 50% Injectable 12.5 Gram(s) IV Push once  dextrose 50% Injectable 25 Gram(s) IV Push once  finasteride 5 milliGRAM(s) Oral daily  glucagon  Injectable 1 milliGRAM(s) IntraMuscular once  insulin lispro (ADMELOG) corrective regimen sliding scale   SubCutaneous Before meals and at bedtime  QUEtiapine 75 milliGRAM(s) Oral at bedtime  tamsulosin 0.4 milliGRAM(s) Oral at bedtime    MEDICATIONS  (PRN):  acetaminophen     Tablet .. 650 milliGRAM(s) Oral every 6 hours PRN Temp greater or equal to 38C (100.4F), Mild Pain (1 - 3)  dextrose Oral Gel 15 Gram(s) Oral once PRN Blood Glucose LESS THAN 70 milliGRAM(s)/deciliter  guaiFENesin Oral Liquid (Sugar-Free) 100 milliGRAM(s) Oral every 6 hours PRN Cough      CAPILLARY BLOOD GLUCOSE      POCT Blood Glucose.: 130 mg/dL (13 Dec 2022 08:31)  POCT Blood Glucose.: 134 mg/dL (12 Dec 2022 22:17)  POCT Blood Glucose.: 118 mg/dL (12 Dec 2022 15:44)    I&O's Summary    12 Dec 2022 07:01  -  13 Dec 2022 07:00  --------------------------------------------------------  IN: 1318 mL / OUT: 0 mL / NET: 1318 mL        PHYSICAL EXAM:  Vital Signs Last 24 Hrs  T(C): 36.3 (13 Dec 2022 08:56), Max: 37.1 (12 Dec 2022 20:00)  T(F): 97.4 (13 Dec 2022 08:56), Max: 98.7 (12 Dec 2022 20:00)  HR: 60 (13 Dec 2022 08:56) (55 - 90)  BP: 95/53 (13 Dec 2022 08:56) (95/53 - 201/78)  BP(mean): 64 (12 Dec 2022 19:00) (3 - 118)  RR: 18 (13 Dec 2022 08:56) (16 - 19)  SpO2: 96% (13 Dec 2022 08:56) (95% - 100%)    Parameters below as of 13 Dec 2022 08:56  Patient On (Oxygen Delivery Method): room air        CONSTITUTIONAL: NAD, well-developed, non toxic  RESPIRATORY: Normal respiratory effort; lungs are clear to auscultation bilaterally  CARDIOVASCULAR: Regular rate and rhythm, normal S1 and S2, no murmur/rub/gallop; No lower extremity edema; Peripheral pulses are 2+ bilaterally  ABDOMEN: Nontender to palpation, normoactive bowel sounds, no rebound/guarding; No hepatosplenomegaly  MUSCLOSKELETAL: no clubbing or cyanosis of digits; no joint swelling or tenderness to palpation  PSYCH: A+O to person     LABS:                        6.8    9.16  )-----------( 154      ( 13 Dec 2022 06:55 )             20.7     12-12    142  |  105  |  15  ----------------------------<  115<H>  3.7   |  26  |  1.40<H>    Ca    7.9<L>      12 Dec 2022 16:13      PT/INR - ( 12 Dec 2022 06:51 )   PT: 16.7 sec;   INR: 1.45 ratio                     RADIOLOGY & ADDITIONAL TESTS:  Results Reviewed:   Imaging Personally Reviewed:  Electrocardiogram Personally Reviewed:    COORDINATION OF CARE:  Care Discussed with Consultants/Other Providers [Y/N]:  Prior or Outpatient Records Reviewed [Y/N]:  
Patient is a 82y old  Male who presents with a chief complaint of COVID 19, social admission (11 Dec 2022 16:35)        SUBJECTIVE / OVERNIGHT EVENTS: Patient still with hematuria requiring transfusions. Required 1 prbc overnight. no complaints.       MEDICATIONS  (STANDING):  carbidopa/levodopa  25/100 1 Tablet(s) Oral three times a day  chlorhexidine 2% Cloths 1 Application(s) Topical daily  dextrose 5%. 1000 milliLiter(s) (50 mL/Hr) IV Continuous <Continuous>  dextrose 5%. 1000 milliLiter(s) (100 mL/Hr) IV Continuous <Continuous>  dextrose 5%. 1000 milliLiter(s) (100 mL/Hr) IV Continuous <Continuous>  dextrose 50% Injectable 25 Gram(s) IV Push once  dextrose 50% Injectable 12.5 Gram(s) IV Push once  dextrose 50% Injectable 25 Gram(s) IV Push once  glucagon  Injectable 1 milliGRAM(s) IntraMuscular once  insulin lispro (ADMELOG) corrective regimen sliding scale   SubCutaneous three times a day before meals  insulin lispro (ADMELOG) corrective regimen sliding scale   SubCutaneous at bedtime  piperacillin/tazobactam IVPB.. 3.375 Gram(s) IV Intermittent every 8 hours  QUEtiapine 75 milliGRAM(s) Oral at bedtime  tamsulosin 0.4 milliGRAM(s) Oral at bedtime    MEDICATIONS  (PRN):  acetaminophen     Tablet .. 650 milliGRAM(s) Oral every 6 hours PRN Temp greater or equal to 38C (100.4F), Mild Pain (1 - 3)  dextrose Oral Gel 15 Gram(s) Oral once PRN Blood Glucose LESS THAN 70 milliGRAM(s)/deciliter  guaiFENesin Oral Liquid (Sugar-Free) 100 milliGRAM(s) Oral every 6 hours PRN Cough      Vital Signs Last 24 Hrs  T(C): 36.5 (11 Dec 2022 12:54), Max: 36.8 (10 Dec 2022 23:30)  T(F): 97.7 (11 Dec 2022 12:54), Max: 98.2 (10 Dec 2022 23:30)  HR: 67 (11 Dec 2022 12:54) (67 - 82)  BP: 125/67 (11 Dec 2022 12:54) (108/60 - 130/54)  BP(mean): --  RR: 18 (11 Dec 2022 12:54) (18 - 18)  SpO2: 100% (11 Dec 2022 12:54) (98% - 100%)    Parameters below as of 11 Dec 2022 12:54  Patient On (Oxygen Delivery Method): room air      CAPILLARY BLOOD GLUCOSE      POCT Blood Glucose.: 163 mg/dL (11 Dec 2022 18:00)  POCT Blood Glucose.: 153 mg/dL (11 Dec 2022 13:32)  POCT Blood Glucose.: 122 mg/dL (11 Dec 2022 09:23)  POCT Blood Glucose.: 161 mg/dL (10 Dec 2022 21:44)    I&O's Summary    10 Dec 2022 07:01  -  11 Dec 2022 07:00  --------------------------------------------------------  IN: 2195 mL / OUT: 41155 mL / NET: -85814 mL    11 Dec 2022 07:01  -  11 Dec 2022 19:16  --------------------------------------------------------  IN: 0 mL / OUT: 4400 mL / NET: -4400 mL          PHYSICAL EXAM:   GENERAL: NAD,   HEAD:  Atraumatic, Normocephalic  EYES:  conjunctiva and sclera clear  NECK: Supple,    CHEST/LUNG: Clear to auscultation bilaterally; No wheeze  HEART: S1S2 normal. Regular rate and rhythm; No murmurs, rubs, or gallops  ABDOMEN: Soft, Nontender, Nondistended; Bowel sounds present; Dumont with hematuria noted.   EXTREMITIES:    No clubbing, cyanosis, or edema  PSYCH/Neuro: Awake and answers questions but has signs of dementia.   SKIN: No rashes or lesions      LABS:                        8.1    10.97 )-----------( 165      ( 11 Dec 2022 18:05 )             25.2     12-11    147<H>  |  110<H>  |  23  ----------------------------<  106<H>  3.1<L>   |  26  |  1.63<H>    Ca    7.8<L>      11 Dec 2022 07:21  Phos  3.5     12-10  Mg     1.5     12-10    TPro  4.8<L>  /  Alb  2.4<L>  /  TBili  1.5<H>  /  DBili  x   /  AST  8<L>  /  ALT  <5<L>  /  AlkPhos  67  12-11    PT/INR - ( 10 Dec 2022 12:27 )   PT: 15.6 sec;   INR: 1.35 ratio         PTT - ( 10 Dec 2022 12:27 )  PTT:31.1 sec        < from: Xray Chest 1 View- PORTABLE-Urgent (Xray Chest 1 View- PORTABLE-Urgent .) (12.09.22 @ 11:17) >  IMPRESSION:  Clear lungs.    --- End of Report ---    < end of copied text >      RADIOLOGY & ADDITIONAL TESTS:    Imaging Personally Reviewed:  Consultant(s) Notes Reviewed:  urology, cards  Care Discussed with Consultants/Other Providers:  
PROGRESS NOTE:   Authoted by Dr. Shannon Lindquist MD, Available on MS Teams    Patient is a 82y old  Male who presents with a chief complaint of Infection due to severe acute respiratory syndrome coronavirus 2 (SARS-CoV-2)     (19 Dec 2022 17:07)      SUBJECTIVE / OVERNIGHT EVENTS: Patient feels fine today. No chest pain or shortness of breath. No nausea, vomiting, or abdominal pain. Patient straight cath X1 today with 700cc output.    ADDITIONAL REVIEW OF SYSTEMS:    MEDICATIONS  (STANDING):  carbidopa/levodopa  25/100 1 Tablet(s) Oral three times a day  chlorhexidine 2% Cloths 1 Application(s) Topical daily  dextrose 5%. 1000 milliLiter(s) (50 mL/Hr) IV Continuous <Continuous>  dextrose 5%. 1000 milliLiter(s) (100 mL/Hr) IV Continuous <Continuous>  dextrose 50% Injectable 25 Gram(s) IV Push once  dextrose 50% Injectable 12.5 Gram(s) IV Push once  dextrose 50% Injectable 25 Gram(s) IV Push once  finasteride 5 milliGRAM(s) Oral daily  glucagon  Injectable 1 milliGRAM(s) IntraMuscular once  insulin lispro (ADMELOG) corrective regimen sliding scale   SubCutaneous Before meals and at bedtime  QUEtiapine 75 milliGRAM(s) Oral at bedtime  tamsulosin 0.8 milliGRAM(s) Oral at bedtime    MEDICATIONS  (PRN):  acetaminophen     Tablet .. 650 milliGRAM(s) Oral every 6 hours PRN Temp greater or equal to 38C (100.4F), Mild Pain (1 - 3)  dextrose Oral Gel 15 Gram(s) Oral once PRN Blood Glucose LESS THAN 70 milliGRAM(s)/deciliter  guaiFENesin Oral Liquid (Sugar-Free) 100 milliGRAM(s) Oral every 6 hours PRN Cough      CAPILLARY BLOOD GLUCOSE      POCT Blood Glucose.: 139 mg/dL (20 Dec 2022 12:25)  POCT Blood Glucose.: 98 mg/dL (20 Dec 2022 08:44)  POCT Blood Glucose.: 151 mg/dL (19 Dec 2022 21:46)  POCT Blood Glucose.: 114 mg/dL (19 Dec 2022 16:46)    I&O's Summary    19 Dec 2022 07:01  -  20 Dec 2022 07:00  --------------------------------------------------------  IN: 1080 mL / OUT: 1050 mL / NET: 30 mL    20 Dec 2022 07:01  -  20 Dec 2022 14:24  --------------------------------------------------------  IN: 360 mL / OUT: 700 mL / NET: -340 mL        PHYSICAL EXAM:  Vital Signs Last 24 Hrs  T(C): 36.3 (20 Dec 2022 13:10), Max: 36.9 (19 Dec 2022 21:20)  T(F): 97.3 (20 Dec 2022 13:10), Max: 98.4 (19 Dec 2022 21:20)  HR: 61 (20 Dec 2022 13:10) (55 - 75)  BP: 135/71 (20 Dec 2022 13:10) (121/57 - 161/73)  BP(mean): --  RR: 16 (20 Dec 2022 13:10) (16 - 18)  SpO2: 95% (20 Dec 2022 13:10) (95% - 100%)    Parameters below as of 20 Dec 2022 13:10  Patient On (Oxygen Delivery Method): room air        CONSTITUTIONAL: NAD, thin male  RESPIRATORY: Normal respiratory effort; lungs are clear to auscultation bilaterally  CARDIOVASCULAR: Regular rate and rhythm, normal S1 and S2, no murmur/rub/gallop; No lower extremity edema  ABDOMEN: Nontender to palpation, normoactive bowel sounds, no rebound/guarding  MUSCLOSKELETAL: no clubbing or cyanosis of digits; no joint swelling or tenderness to palpation  PSYCH: A+O to person, place, and time; affect appropriate  NEURO: moving all extremities however decreased strength    LABS:                        9.7    5.33  )-----------( 160      ( 19 Dec 2022 07:27 )             29.2     12-19    137  |  100  |  12  ----------------------------<  84  3.7   |  26  |  0.98    Ca    8.4      19 Dec 2022 07:21  Mg     1.7     12-19
[facility] Division of Hospital Medicine  Magdiel Leal MD  Available via MS Teams    SUBJECTIVE / OVERNIGHT EVENTS: No acute events overnight. Patient does not appear to be in distress. History limited due to dementia baseline. Saturating well on room air. Sodium improving.     ADDITIONAL REVIEW OF SYSTEMS: Unable to obtain complete ROS due to dementia baseline     MEDICATIONS  (STANDING):  aspirin enteric coated 81 milliGRAM(s) Oral daily  carbidopa/levodopa  25/100 1 Tablet(s) Oral three times a day  chlorhexidine 2% Cloths 1 Application(s) Topical daily  dextrose 5%. 1000 milliLiter(s) (50 mL/Hr) IV Continuous <Continuous>  dextrose 5%. 1000 milliLiter(s) (100 mL/Hr) IV Continuous <Continuous>  dextrose 5%. 1000 milliLiter(s) (70 mL/Hr) IV Continuous <Continuous>  dextrose 50% Injectable 25 Gram(s) IV Push once  dextrose 50% Injectable 12.5 Gram(s) IV Push once  dextrose 50% Injectable 25 Gram(s) IV Push once  enoxaparin Injectable 40 milliGRAM(s) SubCutaneous every 24 hours  glucagon  Injectable 1 milliGRAM(s) IntraMuscular once  insulin lispro (ADMELOG) corrective regimen sliding scale   SubCutaneous three times a day before meals  insulin lispro (ADMELOG) corrective regimen sliding scale   SubCutaneous at bedtime  mupirocin 2% Nasal 1 Application(s) Both Nostrils two times a day  QUEtiapine 75 milliGRAM(s) Oral at bedtime  tamsulosin 0.4 milliGRAM(s) Oral at bedtime    MEDICATIONS  (PRN):  acetaminophen     Tablet .. 650 milliGRAM(s) Oral every 6 hours PRN Temp greater or equal to 38C (100.4F), Mild Pain (1 - 3)  dextrose Oral Gel 15 Gram(s) Oral once PRN Blood Glucose LESS THAN 70 milliGRAM(s)/deciliter  guaiFENesin Oral Liquid (Sugar-Free) 100 milliGRAM(s) Oral every 6 hours PRN Cough      I&O's Summary    03 Dec 2022 07:01  -  04 Dec 2022 07:00  --------------------------------------------------------  IN: 0 mL / OUT: 1 mL / NET: -1 mL      PHYSICAL EXAM:  Vital Signs Last 24 Hrs  T(C): 36.2 (04 Dec 2022 12:41), Max: 36.4 (03 Dec 2022 21:09)  T(F): 97.2 (04 Dec 2022 12:41), Max: 97.5 (03 Dec 2022 21:09)  HR: 58 (04 Dec 2022 12:41) (56 - 61)  BP: 143/71 (04 Dec 2022 12:41) (136/78 - 146/63)  RR: 18 (04 Dec 2022 12:41) (18 - 18)  SpO2: 99% (04 Dec 2022 12:41) (99% - 100%)    Parameters below as of 04 Dec 2022 12:41  Patient On (Oxygen Delivery Method): room air      CONSTITUTIONAL: NAD, well-developed   EYES: PERRLA; conjunctiva and sclera clear  ENMT: Moist oral mucosa, no pharyngeal injection or exudates   NECK: Supple   RESPIRATORY: Normal respiratory effort; lungs are clear to auscultation bilaterally  CARDIOVASCULAR: Regular rate and rhythm, normal S1 and S2, no murmur   ABDOMEN: Nontender to palpation, normoactive bowel sounds   MUSCULOSKELETAL: no clubbing or cyanosis of digits; no joint swelling or tenderness to palpation  PSYCH: unable to fully assess due to dementia baseline  NEUROLOGY: unable to fully assess due to dementia baseline  SKIN: No rashes     LABS:                        7.9    4.34  )-----------( 221      ( 04 Dec 2022 07:05 )             24.4     12-04    147<H>  |  112<H>  |  27<H>  ----------------------------<  102<H>  3.6   |  24  |  1.38<H>    Ca    8.0<L>      04 Dec 2022 07:04  Phos  2.8     12-04  Mg     1.4     12-04      RADIOLOGY & ADDITIONAL TESTS:  New Imaging Personally Reviewed Today:  New Electrocardiogram Personally Reviewed Today:  Other Results Reviewed Today:   Prior or Outpatient Records Reviewed Today with Summary:    COORDINATION OF CARE:  Consultant Communication and Details of Discussion (where applicable):    
[facility] Division of Hospital Medicine  Eula Hughes MD  Available via MS Teams  Pager: [phone]    SUBJECTIVE / OVERNIGHT EVENTS:  - no acute events overnight. denies any nausea, vomiting, headaches. following commands this morning.     ADDITIONAL REVIEW OF SYSTEMS:    MEDICATIONS  (STANDING):  carbidopa/levodopa  25/100 1 Tablet(s) Oral three times a day  chlorhexidine 2% Cloths 1 Application(s) Topical daily  dextrose 5%. 1000 milliLiter(s) (100 mL/Hr) IV Continuous <Continuous>  dextrose 5%. 1000 milliLiter(s) (50 mL/Hr) IV Continuous <Continuous>  dextrose 50% Injectable 25 Gram(s) IV Push once  dextrose 50% Injectable 12.5 Gram(s) IV Push once  dextrose 50% Injectable 25 Gram(s) IV Push once  finasteride 5 milliGRAM(s) Oral daily  glucagon  Injectable 1 milliGRAM(s) IntraMuscular once  insulin lispro (ADMELOG) corrective regimen sliding scale   SubCutaneous Before meals and at bedtime  QUEtiapine 75 milliGRAM(s) Oral at bedtime  tamsulosin 0.4 milliGRAM(s) Oral at bedtime    MEDICATIONS  (PRN):  acetaminophen     Tablet .. 650 milliGRAM(s) Oral every 6 hours PRN Temp greater or equal to 38C (100.4F), Mild Pain (1 - 3)  dextrose Oral Gel 15 Gram(s) Oral once PRN Blood Glucose LESS THAN 70 milliGRAM(s)/deciliter  guaiFENesin Oral Liquid (Sugar-Free) 100 milliGRAM(s) Oral every 6 hours PRN Cough      I&O's Summary    16 Dec 2022 07:01  -  17 Dec 2022 07:00  --------------------------------------------------------  IN: 1150 mL / OUT: 2175 mL / NET: -1025 mL    17 Dec 2022 07:01  -  17 Dec 2022 15:08  --------------------------------------------------------  IN: 280 mL / OUT: 600 mL / NET: -320 mL        PHYSICAL EXAM:  Vital Signs Last 24 Hrs  T(C): 36.3 (17 Dec 2022 13:56), Max: 36.7 (17 Dec 2022 05:13)  T(F): 97.4 (17 Dec 2022 13:56), Max: 98.1 (17 Dec 2022 05:13)  HR: 64 (17 Dec 2022 13:56) (54 - 67)  BP: 137/68 (17 Dec 2022 13:56) (102/62 - 137/68)  BP(mean): --  RR: 16 (17 Dec 2022 13:56) (16 - 18)  SpO2: 99% (17 Dec 2022 13:56) (97% - 100%)    Parameters below as of 17 Dec 2022 13:56  Patient On (Oxygen Delivery Method): room air      CONSTITUTIONAL: NAD, well-developed, well-groomed  EYES: PERRLA; conjunctiva and sclera clear  ENMT: Moist oral mucosa,  RESPIRATORY: Normal respiratory effort; lungs are clear to auscultation bilaterally  CARDIOVASCULAR: Regular rate and rhythm, normal S1 and S2, no murmur/rub/gallop; No lower extremity edema; Peripheral pulses are 2+ bilaterally  ABDOMEN: Nontender to palpation, normoactive bowel sounds, no rebound/guarding;  MUSCULOSKELETAL:  no clubbing or cyanosis of digits; no joint swelling or tenderness to palpation  PSYCH: A+O x1.5 (to himself but thinks he is in [facility])  NEUROLOGY: CN 2-12 are intact and symmetric; no gross sensory deficits   SKIN: No rashes; no palpable lesions    LABS:                        8.6    7.63  )-----------( 247      ( 17 Dec 2022 08:53 )             25.7     12-16    139  |  102  |  11  ----------------------------<  98  3.6   |  25  |  1.02    Ca    7.6<L>      16 Dec 2022 05:35  Mg     1.4     12-17    COVID-19 PCR: NotDetec (11 Dec 2022 19:28)      RADIOLOGY & ADDITIONAL TESTS:  New Results Reviewed Today:  cbc cmp  New Imaging Personally Reviewed Today: n/a  New Electrocardiogram Personally Reviewed Today: n/a  Prior or Outpatient Records Reviewed Today: n.a     COMMUNICATION:  Care Discussed with Consultants/Other Providers and Details of Discussion: n/a  Discussions with Patient/Family: n/a   PCP Communication: n/a       
[facility] Division of Hospital Medicine  Magdiel Leal MD  Available via MS Teams    SUBJECTIVE / OVERNIGHT EVENTS: No acute events overnight. History limited due to dementia. Does not endorse any pain. Currently on room air.     ADDITIONAL REVIEW OF SYSTEMS: Unable to obtain complete ROS due to dementia     MEDICATIONS  (STANDING):  aspirin enteric coated 81 milliGRAM(s) Oral daily  carbidopa/levodopa  25/100 1 Tablet(s) Oral three times a day  chlorhexidine 2% Cloths 1 Application(s) Topical daily  dextrose 5%. 1000 milliLiter(s) (50 mL/Hr) IV Continuous <Continuous>  dextrose 5%. 1000 milliLiter(s) (70 mL/Hr) IV Continuous <Continuous>  dextrose 5%. 1000 milliLiter(s) (100 mL/Hr) IV Continuous <Continuous>  dextrose 50% Injectable 25 Gram(s) IV Push once  dextrose 50% Injectable 12.5 Gram(s) IV Push once  dextrose 50% Injectable 25 Gram(s) IV Push once  enoxaparin Injectable 40 milliGRAM(s) SubCutaneous every 24 hours  glucagon  Injectable 1 milliGRAM(s) IntraMuscular once  insulin lispro (ADMELOG) corrective regimen sliding scale   SubCutaneous three times a day before meals  insulin lispro (ADMELOG) corrective regimen sliding scale   SubCutaneous at bedtime  lactated ringers. 1000 milliLiter(s) (75 mL/Hr) IV Continuous <Continuous>  potassium chloride    Tablet ER 40 milliEquivalent(s) Oral once  QUEtiapine 75 milliGRAM(s) Oral at bedtime  tamsulosin 0.4 milliGRAM(s) Oral at bedtime    MEDICATIONS  (PRN):  acetaminophen     Tablet .. 650 milliGRAM(s) Oral every 6 hours PRN Temp greater or equal to 38C (100.4F), Mild Pain (1 - 3)  dextrose Oral Gel 15 Gram(s) Oral once PRN Blood Glucose LESS THAN 70 milliGRAM(s)/deciliter      I&O's Summary    02 Dec 2022 07:01  -  03 Dec 2022 07:00  --------------------------------------------------------  IN: 700 mL / OUT: 0 mL / NET: 700 mL      PHYSICAL EXAM:  Vital Signs Last 24 Hrs  T(C): 36.3 (03 Dec 2022 11:50), Max: 36.9 (02 Dec 2022 20:52)  T(F): 97.4 (03 Dec 2022 11:50), Max: 98.4 (02 Dec 2022 20:52)  HR: 47 (03 Dec 2022 11:50) (47 - 62)  BP: 112/65 (03 Dec 2022 11:50) (112/65 - 155/78)  RR: 18 (03 Dec 2022 11:50) (18 - 18)  SpO2: 100% (03 Dec 2022 11:50) (100% - 100%)    Parameters below as of 03 Dec 2022 11:50  Patient On (Oxygen Delivery Method): room air      CONSTITUTIONAL: NAD, well-developed   EYES: PERRLA; conjunctiva and sclera clear  ENMT: Moist oral mucosa, no pharyngeal injection or exudates   NECK: Supple   RESPIRATORY: Normal respiratory effort; lungs are clear to auscultation bilaterally  CARDIOVASCULAR: Regular rate and rhythm, normal S1 and S2, no murmur   ABDOMEN: Nontender to palpation, normoactive bowel sounds   MUSCULOSKELETAL: no clubbing or cyanosis of digits; no joint swelling or tenderness to palpation  PSYCH: unable to fully assess due to dementia baseline  NEUROLOGY: unable to fully assess due to dementia baseline  SKIN: No rashes     LABS:                        7.9    3.83  )-----------( 217      ( 03 Dec 2022 07:21 )             24.8     12-03    151<H>  |  115<H>  |  28<H>  ----------------------------<  74  3.3<L>   |  25  |  1.49<H>    Ca    8.3<L>      03 Dec 2022 07:19  Phos  4.0     12-02  Mg     1.6     12-02    TPro  6.2  /  Alb  3.3  /  TBili  1.7<H>  /  DBili  x   /  AST  11  /  ALT  <5<L>  /  AlkPhos  116  12-02    PT/INR - ( 01 Dec 2022 21:13 )   PT: 15.9 sec;   INR: 1.37 ratio         PTT - ( 01 Dec 2022 21:13 )  PTT:33.0 sec      RADIOLOGY & ADDITIONAL TESTS:  New Imaging Personally Reviewed Today:  New Electrocardiogram Personally Reviewed Today:  Other Results Reviewed Today:   Prior or Outpatient Records Reviewed Today with Summary:    COORDINATION OF CARE:  Consultant Communication and Details of Discussion (where applicable):    
[facility] Division of Hospital Medicine  Jason MollyDO warren  Pager (BHUPENDRA, 2Q-6Z): [phone]  Other Times:  [phone]    Patient is a 82y old  Male who presents with a chief complaint of COVID 19, social admission (02 Dec 2022 00:56)    SUBJECTIVE / OVERNIGHT EVENTS: Patient admitted overnight as he is now COVID+ and his 24/7 aides cannot care for him. Patient seen and examined at bedside this morning, feels well, denies chest pain, shortness of breath, cough.    REVIEW OF SYSTEMS:    CONSTITUTIONAL: No weakness, fevers or chills  EYES/ENT: No visual changes;  No vertigo or throat pain   NECK: No pain or stiffness  RESPIRATORY: No cough, wheezing, hemoptysis; No shortness of breath  CARDIOVASCULAR: No chest pain or palpitations  GASTROINTESTINAL: No abdominal or epigastric pain. No nausea, vomiting, or hematemesis; No diarrhea or constipation. No melena or hematochezia.  GENITOURINARY: No dysuria, frequency or hematuria  NEUROLOGICAL: No numbness or weakness  SKIN: No itching, burning, rashes, or lesions  MSK: No joint pain, no back pain  HEME: No easy bleeding, no easy bruising  All other review of systems is negative unless indicated above.    MEDICATIONS  (STANDING):  aspirin enteric coated 81 milliGRAM(s) Oral daily  carbidopa/levodopa  25/100 1 Tablet(s) Oral three times a day  chlorhexidine 2% Cloths 1 Application(s) Topical daily  dextrose 5%. 1000 milliLiter(s) (50 mL/Hr) IV Continuous <Continuous>  dextrose 5%. 1000 milliLiter(s) (100 mL/Hr) IV Continuous <Continuous>  dextrose 50% Injectable 25 Gram(s) IV Push once  dextrose 50% Injectable 12.5 Gram(s) IV Push once  dextrose 50% Injectable 25 Gram(s) IV Push once  enoxaparin Injectable 40 milliGRAM(s) SubCutaneous every 24 hours  glucagon  Injectable 1 milliGRAM(s) IntraMuscular once  insulin lispro (ADMELOG) corrective regimen sliding scale   SubCutaneous three times a day before meals  insulin lispro (ADMELOG) corrective regimen sliding scale   SubCutaneous at bedtime  lactated ringers. 1000 milliLiter(s) (75 mL/Hr) IV Continuous <Continuous>  QUEtiapine 75 milliGRAM(s) Oral at bedtime  tamsulosin 0.4 milliGRAM(s) Oral at bedtime    MEDICATIONS  (PRN):  acetaminophen     Tablet .. 650 milliGRAM(s) Oral every 6 hours PRN Temp greater or equal to 38C (100.4F), Mild Pain (1 - 3)  dextrose Oral Gel 15 Gram(s) Oral once PRN Blood Glucose LESS THAN 70 milliGRAM(s)/deciliter      CAPILLARY BLOOD GLUCOSE        I&O's Summary    02 Dec 2022 07:01  -  02 Dec 2022 15:12  --------------------------------------------------------  IN: 240 mL / OUT: 0 mL / NET: 240 mL        PHYSICAL EXAM:  Vital Signs Last 24 Hrs  T(C): 36.4 (02 Dec 2022 11:29), Max: 36.8 (01 Dec 2022 20:45)  T(F): 97.5 (02 Dec 2022 11:29), Max: 98.2 (01 Dec 2022 20:45)  HR: 60 (02 Dec 2022 11:29) (60 - 69)  BP: 112/60 (02 Dec 2022 11:29) (112/60 - 170/73)  BP(mean): 76 (01 Dec 2022 21:32) (76 - 76)  RR: 18 (02 Dec 2022 11:29) (18 - 20)  SpO2: 98% (02 Dec 2022 11:29) (98% - 100%)    Parameters below as of 02 Dec 2022 11:29  Patient On (Oxygen Delivery Method): room air    CONSTITUTIONAL: Elderly male laying in bed in NAD, well-developed  EYES: EOMI; conjunctiva and sclera clear  ENMT: Dry oral mucosa, no pharyngeal injection or exudates  RESPIRATORY: Normal respiratory effort; lungs are clear to auscultation bilaterally  CARDIOVASCULAR: Regular rate and rhythm, normal S1 and S2, no murmur/rub/gallop; No lower extremity edema  ABDOMEN: Nontender to palpation, normoactive bowel sounds, no rebound/guarding  MUSCULOSKELETAL: No clubbing or cyanosis of digits; no joint swelling or tenderness to palpation  PSYCH: A+O to person, not place or time; affect appropriate  SKIN: No rashes; no palpable lesions    LABS:                        8.0    5.34  )-----------( 170      ( 02 Dec 2022 09:53 )             26.4     12-02    150<H>  |  114<H>  |  31<H>  ----------------------------<  88  3.3<L>   |  24  |  1.51<H>    Ca    8.7      02 Dec 2022 09:52  Phos  4.0     12-02  Mg     1.6     12-02    TPro  6.2  /  Alb  3.3  /  TBili  1.7<H>  /  DBili  x   /  AST  11  /  ALT  <5<L>  /  AlkPhos  116  12-02    PT/INR - ( 01 Dec 2022 21:13 )   PT: 15.9 sec;   INR: 1.37 ratio         PTT - ( 01 Dec 2022 21:13 )  PTT:33.0 sec  
PROGRESS NOTE:   Fadia Johnston DO  Hospitalist  Pager [phone]  After 5pm/weekends or if no answer ext: [phone]      Patient is a 82y old  Male who presents with a chief complaint of COVID 19, social admission (04 Dec 2022 11:25)      SUBJECTIVE / OVERNIGHT EVENTS: MOI, confused and does not know he is in the hospital    ADDITIONAL REVIEW OF SYSTEMS:  no fever or chils but hypothermia    MEDICATIONS  (STANDING):  aspirin enteric coated 81 milliGRAM(s) Oral daily  carbidopa/levodopa  25/100 1 Tablet(s) Oral three times a day  chlorhexidine 2% Cloths 1 Application(s) Topical daily  dextrose 5%. 1000 milliLiter(s) (50 mL/Hr) IV Continuous <Continuous>  dextrose 5%. 1000 milliLiter(s) (100 mL/Hr) IV Continuous <Continuous>  dextrose 5%. 1000 milliLiter(s) (70 mL/Hr) IV Continuous <Continuous>  dextrose 50% Injectable 25 Gram(s) IV Push once  dextrose 50% Injectable 12.5 Gram(s) IV Push once  dextrose 50% Injectable 25 Gram(s) IV Push once  enoxaparin Injectable 40 milliGRAM(s) SubCutaneous every 24 hours  glucagon  Injectable 1 milliGRAM(s) IntraMuscular once  insulin lispro (ADMELOG) corrective regimen sliding scale   SubCutaneous three times a day before meals  insulin lispro (ADMELOG) corrective regimen sliding scale   SubCutaneous at bedtime  mupirocin 2% Nasal 1 Application(s) Both Nostrils two times a day  QUEtiapine 75 milliGRAM(s) Oral at bedtime  tamsulosin 0.4 milliGRAM(s) Oral at bedtime    MEDICATIONS  (PRN):  acetaminophen     Tablet .. 650 milliGRAM(s) Oral every 6 hours PRN Temp greater or equal to 38C (100.4F), Mild Pain (1 - 3)  dextrose Oral Gel 15 Gram(s) Oral once PRN Blood Glucose LESS THAN 70 milliGRAM(s)/deciliter  guaiFENesin Oral Liquid (Sugar-Free) 100 milliGRAM(s) Oral every 6 hours PRN Cough      CAPILLARY BLOOD GLUCOSE      POCT Blood Glucose.: 159 mg/dL (05 Dec 2022 12:34)  POCT Blood Glucose.: 91 mg/dL (05 Dec 2022 08:52)  POCT Blood Glucose.: 118 mg/dL (04 Dec 2022 22:24)  POCT Blood Glucose.: 128 mg/dL (04 Dec 2022 17:36)    I&O's Summary    04 Dec 2022 07:01  -  05 Dec 2022 07:00  --------------------------------------------------------  IN: 200 mL / OUT: 500 mL / NET: -300 mL        PHYSICAL EXAM:  Vital Signs Last 24 Hrs  T(C): 37.3 (05 Dec 2022 08:50), Max: 37.3 (05 Dec 2022 08:50)  T(F): 99.1 (05 Dec 2022 08:50), Max: 99.1 (05 Dec 2022 08:50)  HR: 57 (05 Dec 2022 06:14) (57 - 60)  BP: 147/77 (05 Dec 2022 06:14) (147/77 - 163/70)  BP(mean): --  RR: 18 (05 Dec 2022 06:14) (18 - 18)  SpO2: 100% (05 Dec 2022 06:14) (100% - 100%)    Parameters below as of 05 Dec 2022 06:14  Patient On (Oxygen Delivery Method): room air        CONSTITUTIONAL: NAD, well-developed  RESPIRATORY: Normal respiratory effort; lungs are clear to auscultation bilaterally  CARDIOVASCULAR: Regular rate and rhythm, normal S1 and S2, no murmur/rub/gallop; No lower extremity edema; Peripheral pulses are 2+ bilaterally  ABDOMEN: Nontender to palpation, normoactive bowel sounds, no rebound/guarding; No hepatosplenomegaly  MUSCLOSKELETAL: no clubbing or cyanosis of digits; no joint swelling or tenderness to palpation  PSYCH: A+O x 1te    LABS:                        7.7    3.95  )-----------( 230      ( 05 Dec 2022 07:12 )             24.5     12-05    145  |  109<H>  |  23  ----------------------------<  89  3.5   |  25  |  1.34<H>    Ca    8.3<L>      05 Dec 2022 07:12  Phos  2.8     12-04  Mg     1.7     12-05                  RADIOLOGY & ADDITIONAL TESTS:  Results Reviewed:   Imaging Personally Reviewed:  Electrocardiogram Personally Reviewed:    COORDINATION OF CARE:  Care Discussed with Consultants/Other Providers [Y/N]:  Prior or Outpatient Records Reviewed [Y/N]:  
PROGRESS NOTE:   Fadia Johnston DO  Hospitalist  Pager [phone]  After 5pm/weekends or if no answer ext: [phone]      Patient is a 82y old  Male who presents with a chief complaint of COVID 19, social admission (16 Dec 2022 07:24)      SUBJECTIVE / OVERNIGHT EVENTS: TOV this am    ADDITIONAL REVIEW OF SYSTEMS:  no fever or chills  no n/v/d    MEDICATIONS  (STANDING):  carbidopa/levodopa  25/100 1 Tablet(s) Oral three times a day  chlorhexidine 2% Cloths 1 Application(s) Topical daily  dextrose 5%. 1000 milliLiter(s) (50 mL/Hr) IV Continuous <Continuous>  dextrose 5%. 1000 milliLiter(s) (100 mL/Hr) IV Continuous <Continuous>  dextrose 5%. 1000 milliLiter(s) (100 mL/Hr) IV Continuous <Continuous>  dextrose 50% Injectable 25 Gram(s) IV Push once  dextrose 50% Injectable 12.5 Gram(s) IV Push once  dextrose 50% Injectable 25 Gram(s) IV Push once  finasteride 5 milliGRAM(s) Oral daily  glucagon  Injectable 1 milliGRAM(s) IntraMuscular once  insulin lispro (ADMELOG) corrective regimen sliding scale   SubCutaneous Before meals and at bedtime  QUEtiapine 75 milliGRAM(s) Oral at bedtime  tamsulosin 0.4 milliGRAM(s) Oral at bedtime    MEDICATIONS  (PRN):  acetaminophen     Tablet .. 650 milliGRAM(s) Oral every 6 hours PRN Temp greater or equal to 38C (100.4F), Mild Pain (1 - 3)  dextrose Oral Gel 15 Gram(s) Oral once PRN Blood Glucose LESS THAN 70 milliGRAM(s)/deciliter  guaiFENesin Oral Liquid (Sugar-Free) 100 milliGRAM(s) Oral every 6 hours PRN Cough      CAPILLARY BLOOD GLUCOSE      POCT Blood Glucose.: 127 mg/dL (16 Dec 2022 11:33)  POCT Blood Glucose.: 122 mg/dL (16 Dec 2022 09:00)  POCT Blood Glucose.: 117 mg/dL (15 Dec 2022 21:45)  POCT Blood Glucose.: 129 mg/dL (15 Dec 2022 16:41)    I&O's Summary    15 Dec 2022 07:01  -  16 Dec 2022 07:00  --------------------------------------------------------  IN: 1420 mL / OUT: 800 mL / NET: 620 mL        PHYSICAL EXAM:  Vital Signs Last 24 Hrs  T(C): 36.7 (16 Dec 2022 09:19), Max: 36.7 (15 Dec 2022 22:25)  T(F): 98 (16 Dec 2022 09:19), Max: 98 (15 Dec 2022 22:25)  HR: 67 (16 Dec 2022 09:19) (63 - 74)  BP: 123/61 (16 Dec 2022 09:19) (113/51 - 146/67)  BP(mean): --  RR: 18 (16 Dec 2022 09:19) (17 - 18)  SpO2: 100% (16 Dec 2022 09:19) (100% - 100%)    Parameters below as of 16 Dec 2022 05:03  Patient On (Oxygen Delivery Method): room air        CONSTITUTIONAL: NAD, non toxic  RESPIRATORY: Normal respiratory effort; lungs are clear to auscultation bilaterally  CARDIOVASCULAR: Regular rate and rhythm, normal S1 and S2, no murmur/rub/gallop; No lower extremity edema; Peripheral pulses are 2+ bilaterally  ABDOMEN: Nontender to palpation, normoactive bowel sounds, no rebound/guarding; No hepatosplenomegaly  MUSCLOSKELETAL: no clubbing or cyanosis of digits; no joint swelling or tenderness to palpation  PSYCH: A+O to person     LABS:                        8.2    6.57  )-----------( 213      ( 16 Dec 2022 05:35 )             25.4     12-16    139  |  102  |  11  ----------------------------<  98  3.6   |  25  |  1.02    Ca    7.6<L>      16 Dec 2022 05:35  Mg     1.1     12-16                  RADIOLOGY & ADDITIONAL TESTS:  Results Reviewed:   Imaging Personally Reviewed:  Electrocardiogram Personally Reviewed:    COORDINATION OF CARE:  Care Discussed with Consultants/Other Providers [Y/N]:  Prior or Outpatient Records Reviewed [Y/N]:  
Patient is a 82y old  Male who presents with a chief complaint of COVID 19, social admission (10 Dec 2022 13:32)        SUBJECTIVE / OVERNIGHT EVENTS: Says he wants to urinate. Denies any pain. Denies SOB. Unable to give full ROS due to underlying dementia.       MEDICATIONS  (STANDING):  carbidopa/levodopa  25/100 1 Tablet(s) Oral three times a day  chlorhexidine 2% Cloths 1 Application(s) Topical daily  dextrose 5%. 1000 milliLiter(s) (50 mL/Hr) IV Continuous <Continuous>  dextrose 5%. 1000 milliLiter(s) (100 mL/Hr) IV Continuous <Continuous>  dextrose 5%. 1000 milliLiter(s) (100 mL/Hr) IV Continuous <Continuous>  dextrose 50% Injectable 25 Gram(s) IV Push once  dextrose 50% Injectable 12.5 Gram(s) IV Push once  dextrose 50% Injectable 25 Gram(s) IV Push once  glucagon  Injectable 1 milliGRAM(s) IntraMuscular once  insulin lispro (ADMELOG) corrective regimen sliding scale   SubCutaneous three times a day before meals  insulin lispro (ADMELOG) corrective regimen sliding scale   SubCutaneous at bedtime  piperacillin/tazobactam IVPB.. 3.375 Gram(s) IV Intermittent every 8 hours  QUEtiapine 75 milliGRAM(s) Oral at bedtime  tamsulosin 0.4 milliGRAM(s) Oral at bedtime    MEDICATIONS  (PRN):  acetaminophen     Tablet .. 650 milliGRAM(s) Oral every 6 hours PRN Temp greater or equal to 38C (100.4F), Mild Pain (1 - 3)  dextrose Oral Gel 15 Gram(s) Oral once PRN Blood Glucose LESS THAN 70 milliGRAM(s)/deciliter  guaiFENesin Oral Liquid (Sugar-Free) 100 milliGRAM(s) Oral every 6 hours PRN Cough      Vital Signs Last 24 Hrs  T(C): 36.3 (10 Dec 2022 12:08), Max: 36.8 (09 Dec 2022 21:27)  T(F): 97.3 (10 Dec 2022 12:08), Max: 98.3 (09 Dec 2022 21:27)  HR: 64 (10 Dec 2022 12:08) (64 - 83)  BP: 144/70 (10 Dec 2022 12:08) (100/57 - 144/70)  BP(mean): --  RR: 18 (10 Dec 2022 12:08) (17 - 18)  SpO2: 100% (10 Dec 2022 12:08) (100% - 100%)    Parameters below as of 10 Dec 2022 12:08  Patient On (Oxygen Delivery Method): room air      CAPILLARY BLOOD GLUCOSE      POCT Blood Glucose.: 136 mg/dL (10 Dec 2022 17:35)  POCT Blood Glucose.: 98 mg/dL (10 Dec 2022 12:32)  POCT Blood Glucose.: 91 mg/dL (10 Dec 2022 08:40)  POCT Blood Glucose.: 113 mg/dL (09 Dec 2022 21:21)    I&O's Summary    09 Dec 2022 07:01  -  10 Dec 2022 07:00  --------------------------------------------------------  IN: 0 mL / OUT: 3000 mL / NET: -3000 mL          PHYSICAL EXAM:   GENERAL: NAD,    HEAD:  Atraumatic, Normocephalic  EYES:   conjunctiva and sclera clear  NECK: Supple,    CHEST/LUNG: Clear to auscultation bilaterally; No wheeze  HEART: S1S2 normal. Regular rate and rhythm; No murmurs, rubs, or gallops  ABDOMEN: Soft, Nontender, Nondistended; Bowel sounds present; CBI Dumont with blood tinged urine present.   EXTREMITIES:  2+ Peripheral Pulses, No clubbing, cyanosis, or edema  PSYCH/Neuro: signs of dementia and PD  SKIN: No rashes or lesions      LABS:                        7.5    10.37 )-----------( 171      ( 10 Dec 2022 12:27 )             23.4     12-10    149<H>  |  114<H>  |  26<H>  ----------------------------<  97  3.9   |  25  |  1.80<H>    Ca    8.3<L>      10 Dec 2022 12:27  Phos  3.5     12-10  Mg     1.5     12-10    TPro  5.1<L>  /  Alb  2.6<L>  /  TBili  1.6<H>  /  DBili  x   /  AST  12  /  ALT  <5<L>  /  AlkPhos  75  12-10    PT/INR - ( 10 Dec 2022 12:27 )   PT: 15.6 sec;   INR: 1.35 ratio         PTT - ( 10 Dec 2022 12:27 )  PTT:31.1 sec        < from: Xray Chest 1 View- PORTABLE-Urgent (Xray Chest 1 View- PORTABLE-Urgent .) (12.09.22 @ 11:17) >  IMPRESSION:  Clear lungs.    < end of copied text >      RADIOLOGY & ADDITIONAL TESTS:    Imaging Personally Reviewed: cxr report  Consultant(s) Notes Reviewed:  urology, cards  Care Discussed with Consultants/Other Providers:

## 2022-12-20 NOTE — PROGRESS NOTE ADULT - PROBLEM SELECTOR PROBLEM 1
Anemia due to acute blood loss
Hypernatremia
Anemia due to acute blood loss
Hypernatremia
2019 novel coronavirus disease (COVID-19)
Hypernatremia
Anemia due to acute blood loss
Urinary retention
Anemia due to acute blood loss
Hypernatremia

## 2022-12-20 NOTE — PROGRESS NOTE ADULT - PROBLEM SELECTOR PLAN 4
patient on room air in no acute respiratory distress  -Would monitor respiratory status and defer inpatient tx at this time as patient HD stable with no sx  -Tylenol PRN for fever  -Monitor O2 saturation  -Off airborne precautions 12/12

## 2022-12-20 NOTE — PROGRESS NOTE ADULT - PROBLEM SELECTOR PROBLEM 7
CAD (coronary artery disease)
Dementia
CAD (coronary artery disease)

## 2022-12-20 NOTE — PROGRESS NOTE ADULT - PROBLEM SELECTOR PLAN 5
-WiIl continue carbidopa/levodopa 25/100 TID for now
-WiIl continue carbidopa/levodopa 25/100 TID for now  -Dysphagia screen and diet advanced to soft and bite-sized.
-WiIl continue carbidopa/levodopa 25/100 TID for now
-WiIl continue carbidopa/levodopa 25/100 TID for now
-WiIl continue carbidopa/levodopa 25/100 TID for now  -Dysphagia screen and diet advanced to soft and bite-sized.
-WiIl continue carbidopa/levodopa 25/100 TID for now  -Dysphagia screen and diet advanced to soft and bite-sized.
-WiIl continue carbidopa/levodopa 25/100 TID for now
-WiIl continue carbidopa/levodopa 25/100 TID for now  -Dysphagia screen and diet advanced to soft and bite-sized.
-WiIl continue carbidopa/levodopa 25/100 TID for now  -Dysphagia screen and diet advanced to soft and bite-sized.
-WiIl continue carbidopa/levodopa 25/100 TID for now
-WiIl continue carbidopa/levodopa 25/100 TID for now  -Dysphagia screen and diet advanced to soft and bite-sized.
2/2 urinary retention and pre renal from decreased PO intake now improving  Increased Flomax to 0.8mg qhs, continue finasteride as well   -CBI done, TOV 12/16, failed  -caceres placed again on 12/16 in the afternoon, urology recommended outpatient followup and dc with caceres  -spoke with brother Berto Mitchell - he's hesitant to bring pt home with caecres given patient keeps pulling it out and the aides cannot help with the caceres, and wishes to hear about hospice services to help  - Hospice consulted  - Appreciate urology support
-WiIl continue carbidopa/levodopa 25/100 TID for now
-WiIl continue carbidopa/levodopa 25/100 TID for now  -Dysphagia screen and diet advanced to soft and bite-sized.

## 2022-12-20 NOTE — PROGRESS NOTE ADULT - ASSESSMENT
82 year old M PMH of HTN, DM, HLD, dementia,  Parkinson's disease, CAD s/p stents x2 (>20 years ago), CHF (EF: 50%, Moderate diastolic dysfunction (Stage II), moderate pulmonary hypertension, BPH, and gastric ulcers presents to the ED bceause patient was found to be COVID+ and HHA cannot care for patient now with acute blood loss anemia from caceres trauma after found to be retaining urine. Now pending repeat TOV

## 2022-12-20 NOTE — PROGRESS NOTE ADULT - PROBLEM SELECTOR PROBLEM 6
Dementia
Dementia
Parkinsons disease
Dementia

## 2022-12-20 NOTE — PROGRESS NOTE ADULT - PROBLEM SELECTOR PROBLEM 11
Nutrition, metabolism, and development symptoms
Medication management
Nutrition, metabolism, and development symptoms

## 2022-12-20 NOTE — PROGRESS NOTE ADULT - PROVIDER SPECIALTY LIST ADULT
Cardiology
Cardiology
Hospitalist
Urology
Cardiology
Hospitalist
Cardiology
Hospitalist
Urology
Cardiology
Cardiology
Urology
Cardiology
Cardiology
Internal Medicine
Hospitalist
Internal Medicine

## 2022-12-20 NOTE — PROGRESS NOTE ADULT - PROBLEM SELECTOR PLAN 8
- currently normotensive; CTM
-Most recent /60, CTM
- currently normotensive; CTM
-per pharmacy has not had Plavix filled in 1 year  -ASA on hold in setting of hematuria.   -cards f/u appreciated
- currently normotensive; CTM

## 2022-12-21 ENCOUNTER — TRANSCRIPTION ENCOUNTER (OUTPATIENT)
Age: 82
End: 2022-12-21

## 2022-12-21 VITALS
TEMPERATURE: 98 F | DIASTOLIC BLOOD PRESSURE: 61 MMHG | RESPIRATION RATE: 18 BRPM | OXYGEN SATURATION: 100 % | HEART RATE: 60 BPM | SYSTOLIC BLOOD PRESSURE: 109 MMHG

## 2022-12-21 LAB
GLUCOSE BLDC GLUCOMTR-MCNC: 128 MG/DL — HIGH (ref 70–99)
GLUCOSE BLDC GLUCOMTR-MCNC: 138 MG/DL — HIGH (ref 70–99)
GLUCOSE BLDC GLUCOMTR-MCNC: 153 MG/DL — HIGH (ref 70–99)

## 2022-12-21 PROCEDURE — 99239 HOSP IP/OBS DSCHRG MGMT >30: CPT

## 2022-12-21 RX ORDER — TAMSULOSIN HYDROCHLORIDE 0.4 MG/1
1 CAPSULE ORAL
Qty: 0 | Refills: 0 | DISCHARGE

## 2022-12-21 RX ORDER — TRAZODONE HCL 50 MG
75 TABLET ORAL
Qty: 0 | Refills: 0 | DISCHARGE

## 2022-12-21 RX ORDER — TAMSULOSIN HYDROCHLORIDE 0.4 MG/1
2 CAPSULE ORAL
Qty: 60 | Refills: 0
Start: 2022-12-21 | End: 2023-01-19

## 2022-12-21 RX ORDER — ATORVASTATIN CALCIUM 80 MG/1
1 TABLET, FILM COATED ORAL
Qty: 0 | Refills: 0 | DISCHARGE

## 2022-12-21 RX ORDER — ASPIRIN/CALCIUM CARB/MAGNESIUM 324 MG
1 TABLET ORAL
Qty: 0 | Refills: 0 | DISCHARGE

## 2022-12-21 RX ORDER — METFORMIN HYDROCHLORIDE 850 MG/1
1 TABLET ORAL
Qty: 0 | Refills: 0 | DISCHARGE

## 2022-12-21 RX ORDER — QUETIAPINE FUMARATE 200 MG/1
3 TABLET, FILM COATED ORAL
Qty: 90 | Refills: 0
Start: 2022-12-21 | End: 2023-01-19

## 2022-12-21 RX ORDER — FINASTERIDE 5 MG/1
1 TABLET, FILM COATED ORAL
Qty: 30 | Refills: 0
Start: 2022-12-21 | End: 2023-01-19

## 2022-12-21 RX ORDER — METOPROLOL TARTRATE 50 MG
1 TABLET ORAL
Qty: 0 | Refills: 0 | DISCHARGE

## 2022-12-21 RX ORDER — RAMIPRIL 5 MG
1 CAPSULE ORAL
Qty: 0 | Refills: 0 | DISCHARGE

## 2022-12-21 RX ORDER — CLOPIDOGREL BISULFATE 75 MG/1
1 TABLET, FILM COATED ORAL
Qty: 0 | Refills: 0 | DISCHARGE

## 2022-12-21 RX ORDER — OMEPRAZOLE 10 MG/1
1 CAPSULE, DELAYED RELEASE ORAL
Qty: 0 | Refills: 0 | DISCHARGE

## 2022-12-21 RX ORDER — CARBIDOPA AND LEVODOPA 25; 100 MG/1; MG/1
1 TABLET ORAL
Qty: 0 | Refills: 0 | DISCHARGE

## 2022-12-21 RX ORDER — MAGNESIUM HYDROXIDE 400 MG/1
0 TABLET, CHEWABLE ORAL
Qty: 0 | Refills: 0 | DISCHARGE

## 2022-12-21 RX ORDER — CARBIDOPA AND LEVODOPA 25; 100 MG/1; MG/1
1 TABLET ORAL
Qty: 0 | Refills: 0 | DISCHARGE
Start: 2022-12-21

## 2022-12-21 RX ADMIN — Medication 2: at 18:01

## 2022-12-21 RX ADMIN — CHLORHEXIDINE GLUCONATE 1 APPLICATION(S): 213 SOLUTION TOPICAL at 11:38

## 2022-12-21 RX ADMIN — CARBIDOPA AND LEVODOPA 1 TABLET(S): 25; 100 TABLET ORAL at 05:27

## 2022-12-21 RX ADMIN — FINASTERIDE 5 MILLIGRAM(S): 5 TABLET, FILM COATED ORAL at 11:38

## 2022-12-21 RX ADMIN — CARBIDOPA AND LEVODOPA 1 TABLET(S): 25; 100 TABLET ORAL at 13:23

## 2022-12-21 NOTE — DISCHARGE NOTE PROVIDER - HOSPITAL COURSE
82 year old M PMH of HTN, DM, HLD, dementia,  Parkinson's disease, CAD s/p stents x2 (>20 years ago), CHF (EF: 50%, Moderate diastolic dysfunction (Stage II), moderate pulmonary hypertension, BPH, and gastric ulcers presents to the ED bceause patient was found to be COVID+ and HHA cannot care for patient.     Patient seen and examined at [facility]- is AO x 2 but cannot provide history.   As per ED notes: "p/w covid(+). Limited Hx due to pt mentation; per EMS, pt sent from home by home health aid because pt is covid(+) so home aid cannot care for him. Pt has no acute complaints in ED. Denies HA, SOB, CP, n/v/d/c  Dx:	Covid 19, On room air now off Isln       	Hematuria/ Anemia. s/p 12/12 1 unit PRBC; s/p OR cystoscopy with clot evacuation and flugration of prostate and bladder neck failed TOV now with F/C 12/21       	Hypernatremia - most likely in setting of decreased PO intake resolved       	PAtient needs to follow up with urology outpatient for management and treatment of urinary retention requiring caceres . 82 year old M PMH of HTN, DM, HLD, dementia,  Parkinson's disease, CAD s/p stents x2 (>20 years ago), CHF (EF: 50%, Moderate diastolic dysfunction (Stage II), moderate pulmonary hypertension, BPH, and gastric ulcers presents to the ED bceause patient was found to be COVID+ and HHA cannot care for patient.     Patient seen and examined at [facility]- is AO x 2 but cannot provide history.   As per ED notes: "p/w covid(+). Limited Hx due to pt mentation; per EMS, pt sent from home by home health aid because pt is covid(+) so home aid cannot care for him. Pt has no acute complaints in ED. Denies HA, SOB, CP, n/v/d/c  Dx:	Covid 19, On room air now off O2       	Hematuria/ Anemia. s/p 12/12 1 unit PRBC; s/p OR cystoscopy with clot evacuation and flugration of prostate and bladder neck failed TOV now with F/C 12/21       	Hypernatremia - most likely in setting of decreased PO intake resolved       	PAtient needs to follow up with urology outpatient for management and treatment of urinary retention requiring caceres .     D/W ACP Cipriano, brother Berto

## 2022-12-21 NOTE — DISCHARGE NOTE PROVIDER - PROVIDER TOKENS
PROVIDER:[TOKEN:[[id number]],FOLLOWUP:[1 week]] PROVIDER:[TOKEN:[[id number]],FOLLOWUP:[1 week]],PROVIDER:[TOKEN:[[id number]]]

## 2022-12-21 NOTE — DISCHARGE NOTE PROVIDER - NSDCMRMEDTOKEN_GEN_ALL_CORE_FT
carbidopa-levodopa 25 mg-100 mg oral tablet: 1 tab(s) orally 3 times a day  finasteride 5 mg oral tablet: 1 tab(s) orally once a day  latanoprost 0.005% ophthalmic solution: 1 drop(s) to each affected eye once a day (in the evening)  melatonin 3 mg oral tablet: 1 tab(s) orally once a day (at bedtime), As needed, Insomnia  QUEtiapine 25 mg oral tablet: 3 tab(s) orally once a day (at bedtime)  senna oral tablet: 2 tab(s) orally once a day (at bedtime)  tamsulosin 0.4 mg oral capsule: 2 cap(s) orally once a day (at bedtime)

## 2022-12-21 NOTE — DISCHARGE NOTE PROVIDER - NSDCCPCAREPLAN_GEN_ALL_CORE_FT
PRINCIPAL DISCHARGE DIAGNOSIS  Diagnosis: 2019 novel coronavirus disease (COVID-19)  Assessment and Plan of Treatment: You tested positive for COVID 19.  You no longer require hospitalization.  Please restrict activities outside of your home except for getting medical care.  Do not go to work, school, or public areas.  Avoid using public transportation, ride-sharing, or taxis.  Separate yourself from other people and animals in your home.  Call ahead before visiting your doctor.  Wear a facemask when you are around other people. Cover your cough and sneezes.  Clean your hands often.  Avoid sharing personal household items.  Clean all frequently touched surfaces daily.        SECONDARY DISCHARGE DIAGNOSES  Diagnosis: T2DM (type 2 diabetes mellitus)  Assessment and Plan of Treatment: hold off on medications pt 's glucose level has been in the low 100s and appetite poor , please monitor closely and follow up with PCP for managment    Diagnosis: Hypernatremia  Assessment and Plan of Treatment: improved , ensure adequate hydration and ensure supplementation    Diagnosis: Urinary retention  Assessment and Plan of Treatment: caceres catheter placed , pt unable to void on his own, please follow up with urologist Dr. Mooney    Diagnosis: CAD (coronary artery disease)  Assessment and Plan of Treatment: OFF anticoagulation due to gross hematuria , please follow up with PCP  Coronary artery disease is a condition where the arteries the supply the heart muscle get clogges with fatty deposits & puts you at risk for a heart attack  Call your doctor if you have any new pain, pressure, or discomfort in the center of your chest, pain, tingling or discomfort in arms, back, neck, jaw, or stomach, shortness of breath, nausea, vomiting, burping or heartburn, sweating, cold and clammy skin, racing or abnormal heartbeat for more than 10 minutes or if they keep coming & going.  Call 911 and do not tr to get to hospital by care  You can help yourself with lefestyle changes (quitting smoking if you smoke), eat lots of fruits & vegetables & low fat dairy products, not a lot of meat & fatty foods, walk or some form of physical activity most days of the week, lose weight if you are overweight  Take your cardiac medication as prescribed to lower cholesterol, to lower blood pressure, aspirin to prevent blood clots, and diabetes control  Make sure to keep appointments with doctor for cardiac follow up care      Diagnosis: Parkinsons disease  Assessment and Plan of Treatment: c/w meds and supportive care    Diagnosis: Anemia  Assessment and Plan of Treatment: Improved , please follow up with your PCP for monitoring of blood work     PRINCIPAL DISCHARGE DIAGNOSIS  Diagnosis: 2019 novel coronavirus disease (COVID-19)  Assessment and Plan of Treatment: You tested positive for COVID 19.  You no longer require hospitalization.  Please restrict activities outside of your home except for getting medical care.  Do not go to work, school, or public areas.  Avoid using public transportation, ride-sharing, or taxis.  Separate yourself from other people and animals in your home.  Call ahead before visiting your doctor.  Wear a facemask when you are around other people. Cover your cough and sneezes.  Clean your hands often.  Avoid sharing personal household items.  Clean all frequently touched surfaces daily.        SECONDARY DISCHARGE DIAGNOSES  Diagnosis: Anemia  Assessment and Plan of Treatment: Improved , please follow up with your PCP for monitoring of blood work    Diagnosis: Parkinsons disease  Assessment and Plan of Treatment: c/w meds and supportive care    Diagnosis: CAD (coronary artery disease)  Assessment and Plan of Treatment: OFF anticoagulation due to gross hematuria , please follow up with PCP  Coronary artery disease is a condition where the arteries the supply the heart muscle get clogges with fatty deposits & puts you at risk for a heart attack  Call your doctor if you have any new pain, pressure, or discomfort in the center of your chest, pain, tingling or discomfort in arms, back, neck, jaw, or stomach, shortness of breath, nausea, vomiting, burping or heartburn, sweating, cold and clammy skin, racing or abnormal heartbeat for more than 10 minutes or if they keep coming & going.  Call 911 and do not tr to get to hospital by care  You can help yourself with lefestyle changes (quitting smoking if you smoke), eat lots of fruits & vegetables & low fat dairy products, not a lot of meat & fatty foods, walk or some form of physical activity most days of the week, lose weight if you are overweight  Take your cardiac medication as prescribed to lower cholesterol, to lower blood pressure, aspirin to prevent blood clots, and diabetes control  Make sure to keep appointments with doctor for cardiac follow up care      Diagnosis: Urinary retention  Assessment and Plan of Treatment: caceres catheter placed , patient unable to void on his own, please follow up with urologist Dr. Mooney. Continue medications as prescribed.    Diagnosis: Hypernatremia  Assessment and Plan of Treatment: improved , ensure adequate hydration and ensure supplementation    Diagnosis: T2DM (type 2 diabetes mellitus)  Assessment and Plan of Treatment: hold off on medications pt 's glucose level has been in the low 100s and appetite poor , please monitor closely and follow up with PCP for managment

## 2022-12-21 NOTE — DISCHARGE NOTE PROVIDER - CARE PROVIDERS DIRECT ADDRESSES
[email or web address]

## 2022-12-21 NOTE — DISCHARGE NOTE PROVIDER - ATTENDING DISCHARGE PHYSICAL EXAMINATION:
PHYSICAL EXAM:    Vital Signs Last 24 Hrs  T(C): 36.2 (21 Dec 2022 13:41), Max: 36.5 (20 Dec 2022 21:37)  T(F): 97.1 (21 Dec 2022 13:41), Max: 97.7 (20 Dec 2022 21:37)  HR: 54 (21 Dec 2022 13:41) (54 - 72)  BP: 139/59 (21 Dec 2022 13:41) (91/54 - 139/59)  BP(mean): --  RR: 18 (21 Dec 2022 13:41) (18 - 18)  SpO2: 100% (21 Dec 2022 13:41) (100% - 100%)    General: No acute distress.  HEENT: No scleral icterus or injection.  Moist MM.  No oropharyngeal exudates.    Neck: Supple.  Full ROM.  No JVD.   Heart: RRR.  Normal S1 and S2.  No murmurs, rubs, or gallops.   Lungs: CTAB. No wheezes, crackles, or rhonchi.    Abdomen: BS+, soft, NT/ND.   Skin: Warm and dry.  No rashes.  Extremities: No edema, clubbing, or cyanosis.   Musculoskeletal: No deformities. Moving all extremities  Neuro: A&Ox2. Calm and pleasant, follows commands

## 2022-12-21 NOTE — DISCHARGE NOTE PROVIDER - NSDCFUADDAPPT_GEN_ALL_CORE_FT
APPTS ARE READY TO BE MADE: [ ] YES    Best Family or Patient Contact (if needed):    Additional Information about above appointments (if needed):    1: pcp  2: urology   3:     Other comments or requests:

## 2023-01-04 PROCEDURE — 87040 BLOOD CULTURE FOR BACTERIA: CPT

## 2023-01-04 PROCEDURE — 82803 BLOOD GASES ANY COMBINATION: CPT

## 2023-01-04 PROCEDURE — 87637 SARSCOV2&INF A&B&RSV AMP PRB: CPT

## 2023-01-04 PROCEDURE — 71045 X-RAY EXAM CHEST 1 VIEW: CPT

## 2023-01-04 PROCEDURE — 83735 ASSAY OF MAGNESIUM: CPT

## 2023-01-04 PROCEDURE — 85018 HEMOGLOBIN: CPT

## 2023-01-04 PROCEDURE — 84443 ASSAY THYROID STIM HORMONE: CPT

## 2023-01-04 PROCEDURE — 85730 THROMBOPLASTIN TIME PARTIAL: CPT

## 2023-01-04 PROCEDURE — 87640 STAPH A DNA AMP PROBE: CPT

## 2023-01-04 PROCEDURE — 81001 URINALYSIS AUTO W/SCOPE: CPT

## 2023-01-04 PROCEDURE — 85610 PROTHROMBIN TIME: CPT

## 2023-01-04 PROCEDURE — 84100 ASSAY OF PHOSPHORUS: CPT

## 2023-01-04 PROCEDURE — 85025 COMPLETE CBC W/AUTO DIFF WBC: CPT

## 2023-01-04 PROCEDURE — 83036 HEMOGLOBIN GLYCOSYLATED A1C: CPT

## 2023-01-04 PROCEDURE — 84132 ASSAY OF SERUM POTASSIUM: CPT

## 2023-01-04 PROCEDURE — 36430 TRANSFUSION BLD/BLD COMPNT: CPT

## 2023-01-04 PROCEDURE — P9040: CPT

## 2023-01-04 PROCEDURE — 80053 COMPREHEN METABOLIC PANEL: CPT

## 2023-01-04 PROCEDURE — 82728 ASSAY OF FERRITIN: CPT

## 2023-01-04 PROCEDURE — 99285 EMERGENCY DEPT VISIT HI MDM: CPT | Mod: 25

## 2023-01-04 PROCEDURE — 82746 ASSAY OF FOLIC ACID SERUM: CPT

## 2023-01-04 PROCEDURE — 82330 ASSAY OF CALCIUM: CPT

## 2023-01-04 PROCEDURE — P9016: CPT

## 2023-01-04 PROCEDURE — 83540 ASSAY OF IRON: CPT

## 2023-01-04 PROCEDURE — 86901 BLOOD TYPING SEROLOGIC RH(D): CPT

## 2023-01-04 PROCEDURE — 85027 COMPLETE CBC AUTOMATED: CPT

## 2023-01-04 PROCEDURE — 82962 GLUCOSE BLOOD TEST: CPT

## 2023-01-04 PROCEDURE — 36415 COLL VENOUS BLD VENIPUNCTURE: CPT

## 2023-01-04 PROCEDURE — 80048 BASIC METABOLIC PNL TOTAL CA: CPT

## 2023-01-04 PROCEDURE — 87641 MR-STAPH DNA AMP PROBE: CPT

## 2023-01-04 PROCEDURE — 82435 ASSAY OF BLOOD CHLORIDE: CPT

## 2023-01-04 PROCEDURE — 85014 HEMATOCRIT: CPT

## 2023-01-04 PROCEDURE — 83550 IRON BINDING TEST: CPT

## 2023-01-04 PROCEDURE — 86850 RBC ANTIBODY SCREEN: CPT

## 2023-01-04 PROCEDURE — 83615 LACTATE (LD) (LDH) ENZYME: CPT

## 2023-01-04 PROCEDURE — 82607 VITAMIN B-12: CPT

## 2023-01-04 PROCEDURE — 83605 ASSAY OF LACTIC ACID: CPT

## 2023-01-04 PROCEDURE — 86140 C-REACTIVE PROTEIN: CPT

## 2023-01-04 PROCEDURE — U0003: CPT

## 2023-01-04 PROCEDURE — 87086 URINE CULTURE/COLONY COUNT: CPT

## 2023-01-04 PROCEDURE — 82947 ASSAY GLUCOSE BLOOD QUANT: CPT

## 2023-01-04 PROCEDURE — 86900 BLOOD TYPING SEROLOGIC ABO: CPT

## 2023-01-04 PROCEDURE — 84295 ASSAY OF SERUM SODIUM: CPT

## 2023-01-04 PROCEDURE — U0005: CPT

## 2023-01-04 PROCEDURE — 82565 ASSAY OF CREATININE: CPT

## 2023-01-04 PROCEDURE — 84466 ASSAY OF TRANSFERRIN: CPT

## 2023-01-04 PROCEDURE — 86923 COMPATIBILITY TEST ELECTRIC: CPT

## 2023-01-10 NOTE — ASU PATIENT PROFILE, ADULT - PMH
Quality 226: Preventive Care And Screening: Tobacco Use: Screening And Cessation Intervention: Patient screened for tobacco use and is an ex/non-smoker Quality 130: Documentation Of Current Medications In The Medical Record: Current Medications Documented Detail Level: Detailed BPH (benign prostatic hyperplasia)    CAD (Coronary Artery Disease)  s/p cardiac stent ( > 20 years ago)  DM (Diabetes Mellitus)    HTN (Hypertension)    Hypercholesterolemia

## 2023-03-29 NOTE — H&P PST ADULT - NEUROLOGICAL DETAILS
Multiple Vitamin (TAB-A-GLADIS) TABS  Passed protocol      Office Visit    2/22/2023  [facility] [place]   Bentley Brunner MD  Internal Medicine        responds to pain/responds to verbal commands/alert and oriented x 3/normal strength

## 2023-04-14 NOTE — OCCUPATIONAL THERAPY INITIAL EVALUATION ADULT - PHYSICAL ASSIST/NONPHYSICAL ASSIST: SCOOT/BRIDGE, REHAB EVAL
Patient's Name: Monica Prabhakar  MRN: [id number]    YOB: 1959    Date of Service: 4/14/2023   Age: 63 yrs      CHIEF COMPLAINT: Office Visit (Back pain is 6/10 and left leg pain is 7/10)    I had the pleasure of seeing Monica Prabhakar in the [facility] outpatient office today for a follow up visit.  I last evaluated her on 3/14/2023. Since that time she has initiated Instaflex, letting the medication build up without noticeable difference of symptoms. Last month while walking in [employer] bilateral hips started have painful sharp pressure feeling like they were locking up causing her to leave the store. Those symptoms occurred twice.  Today patient indicates back and left knee pain. She wore raised heeled boots for Easter with worsening medial left knee pain the following day. She has been active with her 4 grandchildren. Her 4 year old granddaughter has been spending more time at her house. Patient has 2 daughters that work at the [employer]. They will be launching a [employer] ship this weekend that she will be attending with her daughters and . Her back and left knee symptoms have been progressively worsening. She has not experienced hip pain in the past. Patient is agreeable to update lumbar Mri with worsening symptoms or recurrent hip pain. She underwent trigger finger release of left thumb with Dr Hooper. She demonstrates ability to move thumb without locking clicking or pain. Her sutures are in place edges well approximated without erythema, drainage, edema, or drainage. She is scheduled to follow up with Dr Hooper on 4/18. The worst pain is described as  in nature, with severity of Back pain is 6/10 and left leg pain is 7/10.  The pain is aggravated by prolonged walking, standing, preparing meals, dishes, laundry, stooping, lifting, carrying, picking up grandkids and alleviated by pacing activities, sleeping with pillow between knees, applying diclofenac gel, hydrocodone,  paraffin, taking frequent rest breaks sitting. She denies new onset urinary or bowel incontinence or saddle anesthesia.    Patient currently being prescribed pregabalin 100 mg 3 times daily, hydrocodone 5/325 mg every 8 hours as needed, diclofenac gel topically from our office with moderate benefit and no associated side effects. She recognizes her limitations with activities and will continue to pace herself. She will contact the office when symptoms become severe and she would like to proceed with updating lumbar MRI with potential for epidural steroid injections.    We reviewed lumbar MRI from 9/16/2019 demonstrating increased disc protrusion at L4-L5 which is predominantly right sided causing prominent foraminal stenosis on the right with more mild stenosis on the left. prominent degenerative facet change and hypertrophy causing mild central canal stenosis at L5-S1 and mild narrowing of both L5 foramina due to facet spurring.    INTERVENTIONS (from last visit with updates):  1.   Injections:    • Date 10/2/2019, Bilateral L4-L5 TFESI  • Date 12/18/2020, Bilateral L4-L5,L5-S1 TFESI  • Date 8/30/2021, Bilateral L4-L5,L5-S1 TFESI, 100% pain reduction  • Date 2/16/2022, Bilateral L4-L5, L5-S1 TFESI  • Date 7/25/2022 bilateral L4-L5, L5-S1 TFESI. 50% improvement     2.   Physical Therapy: Not in a structured PT program.    3.   Surgeries (spine, joint, related to pain):   - None    If on contract (update):  • Urine tox screen:  6/15/22  • Prescription Drug Monitoring Program verified this visit.  • Opioid contract: Yes, Date signed: 6/15/2022        REVIEW OF SYSTEMS: Denies any bowel or bladder issues, falls, saddle anesthesia, fevers, or new muscle spasms.    PAST MEDICAL, SOCIAL and FAMILY HISTORY: Reviewed, noted in EPIC, and otherwise unchanged.     ALLERGIES:    ALLERGIES:   Allergen Reactions   • Nalbuphine Other (See Comments)     \"tingling\" maybe itchy   • Penicillins RASH   • Quinolones NAUSEA     \"really  sick feeling\"        MEDICATIONS:    Current Outpatient Medications   Medication Sig Dispense Refill   • chlorzoxazone (PARAFON FORTE) 500 MG tablet Take 1 tablet by mouth in the morning and 1 tablet at noon and 1 tablet in the evening. Do not start before January 4, 2023. 90 tablet 2   • rizatriptan (MAXALT) 10 MG tablet Take 1 tablet by mouth at onset of migraine. May repeat after 2 hours if needed. 10 tablet 0   • ondansetron (ZOFRAN ODT) 4 MG disintegrating tablet Place 1 tablet onto the tongue every 8 hours as needed for Nausea. 12 tablet 0   • HYDROcodone-acetaminophen (NORCO) 5-325 MG per tablet Take 1 tablet by mouth every 8 hours as needed for Pain. 90 tablet 0   • benzonatate (TESSALON PERLES) 100 MG capsule Take 2 capsules by mouth every 8 hours as needed for Cough. 20 capsule 0   • losartan (COZAAR) 25 MG tablet Take 0.5 tablets every day by oral route in the evening. 90 tablet 4   • varenicline (CHANTIX) 0.5 MG tablet Take 1 tablet every day by oral route. 90 tablet 1   • butalbital-aspirin-caffeine (FIORINAL) capsule Take 1 capsule by mouth every 8 hours as needed for Headaches. Do not start before March 3, 2023. 90 capsule 1   • pregabalin (LYRICA) 100 MG capsule Take 1 capsule by mouth in the morning and 1 capsule at noon and 1 capsule in the evening. 90 capsule 2   • promethazine (PHENERGAN) 25 MG tablet Take 1 tablet by mouth every 6 hours as needed for Nausea. 30 tablet 0   • venlafaxine XR (EFFEXOR XR) 150 MG 24 hr capsule Take 1 capsule by mouth daily. 90 capsule 0   • ipratropium-albuterol (DUONEB) 0.5-2.5 (3) MG/3ML nebulizer solution Take 3 mLs by nebulization every 6 hours as needed for Wheezing or Shortness of Breath. 360 mL 0   • albuterol 108 (90 Base) MCG/ACT inhaler Inhale 2 puffs into the lungs every 4 hours as needed for Shortness of Breath or Wheezing. 8.5 g 0   • atorvastatin (LIPITOR) 40 MG tablet Take 1 tablet every day by oral route at bedtime. 90 tablet 3   • nitroGLYCERIN  (NITROSTAT) 0.4 MG sublingual tablet DISSOLVE ONE TABLET UNDER TONGUE AS NEEDED FOR CHEST PAIN UP TO THREE TIMES THEN CALL 911 25 tablet 4   • diclofenac (VOLTAREN) 1 % gel Apply 4 grams topically 4 times daily. Apply to the left lateral foot. 300 g 2   • chlorproMAZINE (THORAZINE) 50 MG tablet Take 1 tablet by mouth at noon and 2 tablets by mouth at bedtime. 90 tablet 5   • aspirin (Aspirin Low Dose) 81 MG EC tablet Take 2 tablets by mouth daily 180 tablet 3   • famotidine (PEPCID) 20 MG tablet Take 1 tablet every day by oral route in the evening. 90 tablet 3   • nitroGLYcerin (NITROSTAT) 0.4 MG sublingual tablet DISSOLVE ONE TABLET UNDER TONGUE AS NEEDED FOR CHEST PAIN UP TO THREE TIMES THEN CALL 911 25 tablet 4   • pantoprazole (PROTONIX) 40 MG tablet Take 1 tablet every day by oral route in the morning. 90 tablet 3   • propranolol (INDERAL LA) 120 MG 24 hr capsule Take 1 capsule every day by oral route. 90 capsule 4   • Misc Natural Products (COLON CLEANSE PO) Take 1 tablet by mouth daily.     • fluticasone (FLONASE) 50 MCG/ACT nasal spray Spray 2 sprays in each nostril as needed (as needed for allergic rhinitis). 16 g 2   • ciclopirox (PENLAC) 8 % topical solution Apply topically to affected area nightly. 6.6 mL 0   • naLOXone (NARCAN) 4 MG/0.1ML nasal spray Spray the content of 1 device into 1 nostril. Call 911. May repeat with 2nd device in alternate nostril if no response in 2-3 minutes. 2 each 1   • ZINC SULFATE PO Takes ones tablet every other day.     • Cholecalciferol (Vitamin D3) 125 mcg (5,000 units) tablet Take 125 mcg by mouth daily.     • Cyanocobalamin (B-12) 5000 MCG Cap Take 1 capsule by mouth daily.     • Ascorbic Acid (Vitamin C) 500 MG Cap Take 500 mg by mouth daily.      • clindamycin (CLEOCIN T) 1 % topical solution apply by topical route every day at bedtime a thin layer to the affected area(s) 30 mL 2     No current facility-administered medications for this visit.        PHYSICAL EXAM:     Blood pressure 110/66, pulse 77, height 5' (1.524 m), weight 59 kg (130 lb), SpO2 96 %. Body mass index is 25.39 kg/m².  General: NAD, well hydrated, well nourished, adult female.  Psych: Mood and affect are appropriate   Eyes: No scleral icterus, EOMI.   Respiratory: Respiratory effort normal, no retractions.   Vascular exam: No significant lower extremity edema, limbs normal temp. Calves are soft and non tender,.   Skin: Skin turgor is normal, without erythema.   Neurologic exam:                  Sensation: Intact to light touch with some decrease in the left lateral thigh.             DTR's: 2+ and symmetric in the UEs, 1+ and symmetric in the LEs              UMN Findings: There were no Valencia’s signs. Toes were downgoing. No ankle clonus.             Motor Exam:                        RUE:   D 5/5    B 5/5    T 5/5    WE 5/5    HI 5/5                        LUE:   D 5/5    B 5/5    T 5/5    WE 5/5    HI 5/5                        RLE:   HF 5/5    KE 5/5    DF 5-/5    EHL 4+/5    TF 4+/5                        LLE:    HF 5/5 pain to back   KE 5/5   DF 5-/5    EHL 4+/5    TF 4+/5   MSK exam:              Full nonpainful ROM bilat shoulders and decreased internal rotation bilateral hips. Mild pain with left hip flexion internal and external rotation.             Hamstring and hip flexor ROM decreased  Spine exam:   Spinal lordosis is exaggerated in the lumbar spine and kyphosis is prominent in the thoracic spine  Mild scoliosis  No pelvic obliquity.  No spinal percussion tenderness.  Mild palpation tenderness bilateral L4-5, L5-S1  SI joints mildly painful to palpation.  lumbar ROM is decreased in flexion and extension  Negative Standing flexion test.  Pain was mildly exacerbated by facet loading in the lumbar spine.  - lumbar/cervical Spurling's bilat     + Straight leg raise in seated position, bilateral      ASSESSMENT:  1. Lumbar radiculopathy    2. Left knee pain, unspecified chronicity    3. Lumbosacral  spondylosis without myelopathy        RECOMMENDATIONS:    · I educated her on the anatomy, pathophysiology, and biomechanics of the disease process.  · I reviewed the options for further diagnostic work up in detail.  · I reviewed the conservative and interventional treatment options in detail.  •  pregabalin 100 mg 3 times daily.  • diclofenac 1% gel topically up to 4 times daily as needed for pain.  • hydrocodone 5/325 mg every 8 hours as needed for severe pain.  •  Narcan prescription Current: yes  · Side effects, risks, and potential benefits of the medications were reviewed in detail.  · Red flags were reviewed and she voiced understanding.  · All questions answered.  · Consider updating lumbar MRI with worsening back or radicular hip pain.  · Follow up in the office in 4 weeks.      Thank you for allowing me to participate in this patient's care.    Jose G Junior DO, is my collaborating physician.     MENDOZA Greenberg   Physical Medicine & Rehabilitation    Opioids:  I discussed the risks of chronic opioid therapy to include, constipation, nausea, opioid-induced hyperalgesia, addiction, physical dependence, respiratory depression, overdose,  and death. The patient was counseled that Opioids may affect the ability to safely operate machinery or vehicle and any mode of transportation.     The patient is reporting improved pain control, functionality, and or quality of life related to opioid therapy. The benefits of these medications at this time outweigh the risks. The patient has been counseled - and has agreed - to keep their medications in a safe place (including a non-movable lockbox), and not to drink alcohol while using this medication.    The patient believes that this medication is helping to control the pain, is accepting of these risks, as well as agreeable to items set forth in the medication agreement and would like to continue with opioid therapy. Patient verbalized agreement and  understanding.      I spent a total of 58 minutes on the day of the visit.   which includes preparing to see the patient  by reviewing prior records,  obtaining and reviewing history,  performing a physical exam, counseling the patient, documenting clinical information in the medical record,ordering medications/tests/procedures and coordinating care        nonverbal cues (demo/gestures)/2 person assist/verbal cues

## 2023-04-28 NOTE — ED PROVIDER NOTE - NS ED MD TWO NIGHTS YN
Detail Level: Detailed
Show Applicator Variable?: Yes
Consent: The patient's consent was obtained including but not limited to risks of crusting, scabbing, blistering, scarring, darker or lighter pigmentary change, recurrence, incomplete removal and infection.
Render Post-Care Instructions In Note?: no
Number Of Freeze-Thaw Cycles: 2 freeze-thaw cycles
Post-Care Instructions: I reviewed with the patient in detail post-care instructions. Patient is to wear sunprotection, and avoid picking at any of the treated lesions. Pt may apply Vaseline to crusted or scabbing areas.
Duration Of Freeze Thaw-Cycle (Seconds): 0
Yes

## 2023-05-08 NOTE — ED ADULT TRIAGE NOTE - HEIGHT IN FEET
History and Physical    Patient Name:  Georgi Hughes    :  1993    Chief Complaint:   Abd pain     History of Present Illness:   Georgi Hughes presents to [facility] presents with 3 days increasing epigastric and LUQ abd pain non radiating, constant, sharp, worse with movement, 9 out 10, associated with N/V and non bloody diarrhea. Labs show mildly elevated lipase. CT abd neg. UA suggestive of uti, started on IVF, IV ab    Past Medical History:   has a past medical history of Concussion, Depression, Food allergy, Hypothyroidism, Metabolic disorder, and PCOS (polycystic ovarian syndrome). Surgical History:   has a past surgical history that includes polypectomy; Tonsillectomy and adenoidectomy; knee surgery; Cholecystectomy; Colonoscopy (2014); Upper gastrointestinal endoscopy (2014); Upper gastrointestinal endoscopy (N/A, 10/21/2019); Colonoscopy (N/A, 10/21/2019); laparoscopy (N/A, 2020); Upper gastrointestinal endoscopy (N/A, 2021); and Colonoscopy (N/A, 2021). Social History:   reports that she has never smoked. She has never used smokeless tobacco. She reports current alcohol use. She reports that she does not use drugs. Family History:  family history includes Cancer in her father, maternal aunt, maternal grandmother, and paternal grandfather; Iva Siskin in her paternal grandmother; Polycystic Ovary Syndrome in her mother; Thyroid Disease in her maternal grandmother. Medications:  Prior to Admission medications    Medication Sig Start Date End Date Taking?  Authorizing Provider   linaclotide Caroljef Mcleod) 145 MCG capsule Take 1 capsule by mouth every morning (before breakfast)  Patient not taking: Reported on 2023   SARAY Duarte - NP   Dulaglutide (TRULICITY) 5.92 IL/9.2HI SOPN Inject 0.75 mg into the skin once a week 23   SARAY Flores   Cholecalciferol (VITAMIN D3) 1.25 MG (23992 UT) CAPS Take 1 capsule by mouth once a
5

## 2023-05-22 NOTE — CONSULT NOTE ADULT - NS PANP OPT1 GEN_ALL_CORE
I attest my time as PA/NP is greater than 50% of the total combined time spent on qualifying patient care activities by the PA/NP and attending.
Aide

## 2023-07-06 NOTE — PATIENT PROFILE ADULT - NSPROPTRIGHTSUPPORTPERSON_GEN_A_NUR
MA Rooming Questions  Patient: Alex Heath  MRN: [id number]    Date: 7/6/2023        1. Do you have any new issues?   no         2. Do you need any refills on medications?    no    3. Have you had any imaging done since your last visit? yes - [facility]    4. Have you been hospitalized or seen in the emergency room since your last visit here?   yes - [facility]    5. Did the patient have a depression screening completed today?  Yes    PHQ-9 Total Score: 0 (7/6/2023  3:35 PM)       PHQ-9 Given to (if applicable):               PHQ-9 Score (if applicable):                     [] Positive     [x]  Negative              Does question #9 need addressed (if applicable)                     [] Yes    []  No               Kyacee Garcia CMA
continue with observation. I will re evaluate her blood counts again in 2 months. She was a patient of Dr. Robel Melgar (Hem/Onc) at [facility]. He saw her before for breast cancer and anemia. She stopped seeing him (stated he retired). I answered all her questions and concerns for today. Recent imaging and labs were reviewed and discussed with the patient.
declines

## 2023-08-01 NOTE — DISCHARGE NOTE ADULT - HOSPITAL COURSE
no The patient was admitted to the surgical service on 10/16/17 for scheduled Laparoscopic Cholecystectomy. The patient tolerated the procedure well. There were no complications. The patient was extubated in the OR and transferred to the PACU in stable condition and was stable overnight. Given patients CAD history and his taking of ASA and Plavix, was kept overnight for hemodynamic monitoring for possible bleeding.  Patient remained stable overnight. This morning  the patient was hemodynamically stable, was tolerating PO diet, was voiding urine and passing stool, was ambulating, and was comfortable with adequate pain control. The patient was instructed to follow up with  within 1-2 weeks after discharge from the hospital. Patient was instructed to hold off restarting Plavix until this Friday (10/20/17). The patient felt comfortable with discharge. The patient was discharged to home. The patient had no other issues. The patient was admitted to the surgical service on 10/16/17 for scheduled Laparoscopic Cholecystectomy. The patient tolerated the procedure well. There were no complications. The patient was extubated in the OR and transferred to the PACU in stable condition and was stable overnight. Given patients CAD history and his taking of ASA and Plavix, was kept overnight for hemodynamic monitoring for possible bleeding.  Patient remained stable overnight. This morning  the patient was hemodynamically stable, was tolerating PO diet, was voiding urine and passing stool, was ambulating, and was comfortable with adequate pain control. The patient was instructed to follow up with  within 10 days after discharge from the hospital. Patient was instructed to hold off restarting Plavix until this Friday (10/20/17). The patient felt comfortable with discharge. The patient was discharged to home. The patient had no other issues. The patient was admitted to the surgical service on 10/16/17 for scheduled Laparoscopic Cholecystectomy. The patient tolerated the procedure well. There were no complications. The patient was extubated in the OR and transferred to the PACU in stable condition and was stable overnight. Given patients CAD history and his taking of ASA and Plavix, was kept overnight for hemodynamic monitoring for possible bleeding.  Patient remained stable overnight. This morning  the patient was hemodynamically stable, was tolerating PO diet, was voiding urine and passing stool, was ambulating, and was comfortable with adequate pain control. The patient was instructed to follow up with  within 10 days after discharge from the hospital. Patient was instructed to hold off restarting Plavix until this Friday (10/20/17). The patient felt comfortable with discharge.   10/17: Pt was having mild AMS , Psych consulted for safe discharge and PT románal requested- pt  lives alone in his apartment .  Psych evaluated him , considered this is secondary to surgery and pain medication . They  recommended the brother to take him home for few days until he is stronger. Discussed to Mr Berto Mitchell in length regarding the same.    The patient was discharged to home. The patient had no other issues.

## 2023-08-29 NOTE — H&P ADULT - PROBLEM SELECTOR PLAN 2
[FreeTextEntry1] : sore throat  [de-identified] : 33 y/o female presents with concerns for sore throat over the past several days. She feels otherwise well and reports no other acute complaints or concerns. ROS as documented below. - CT chest large left pleural fluid collection suspicious for empyema --> A 17.1 x 11.0 x 8.3 cm fluid collection with peripheral enhancement in the left basilar pleural space  - Pt with L pleural effusion 2/2020, followed by Pulm at that time  - Thoracentesis warranted given the unilateral presentation, brother refused then  - Given concern for empyema now, will re-address with brother to see if wishes have changed - CT chest large left pleural fluid collection suspicious for empyema --> A 17.1 x 11.0 x 8.3 cm fluid collection with peripheral enhancement in the left basilar pleural space  - Pt with L pleural effusion 2/2020, followed by Pulm at that time  - Thoracentesis warranted given the unilateral presentation, brother refused then  - Given concern for empyema now, re-addressed with brother to see if his wishes have changed - does NOT want to pursue thoracentesis - CT chest large left pleural fluid collection suspicious for empyema --> A 17.1 x 11.0 x 8.3 cm fluid collection with peripheral enhancement in the left basilar pleural space  - Pt with L pleural effusion 2/2020, followed by Pulm at that time  - Thoracentesis warranted given the unilateral presentation, brother refused then  - Given concern for empyema now, re-addressed with brother to see if his wishes have changed - explained risks/benefits, does NOT want to pursue thoracentesis

## 2023-09-19 NOTE — DIETITIAN INITIAL EVALUATION ADULT. - PROBLEM SELECTOR PROBLEM 1
legal guardian's) consent, to reduce the patient's risk of exposure to COVID-19 and provide necessary medical care. The patient (and/or legal guardian) has also been advised to contact this office for worsening conditions or problems, and seek emergency medical treatment and/or call 911 if deemed necessary. Patient identification was verified at the start of the visit: Yes    Services were provided through a video synchronous discussion virtually to substitute for in-person clinic visit. Patient and provider were located at their individual homes.   --Nathaly Kaufman DO on 9/19/2023 at 2:22 PM Closed fracture of right hip, initial encounter

## 2023-09-21 NOTE — ED PROVIDER NOTE - NSCAREINITIATED _GEN_ER
"Anesthesia Transfer of Care Note    Patient: Shapolo LANGLEY Leonardo    Procedure(s) Performed: Procedure(s) (LRB):  BLOCK, NERVE, FACET JOINT, LUMBAR, MEDIAL BRANCH (Bilateral)    Patient location: PACU    Anesthesia Type: general    Transport from OR: Transported from OR on room air with adequate spontaneous ventilation    Post pain: adequate analgesia    Post assessment: no apparent anesthetic complications    Post vital signs: stable    Level of consciousness: responds to stimulation    Nausea/Vomiting: no nausea/vomiting    Complications: none    Transfer of care protocol was followed      Last vitals:   Visit Vitals  /71 (BP Location: Left arm, Patient Position: Lying)   Pulse 67   Temp 36.1 °C (97 °F) (Oral)   Resp 20   Ht 5' 7" (1.702 m)   Wt 97.5 kg (215 lb)   LMP  (LMP Unknown)   SpO2 96%   BMI 33.67 kg/m²     " Abelardo, Josseline(Attending)

## 2023-10-30 NOTE — CONSULT NOTE ADULT - ATTENDING COMMENTS
81 yr old male pt with significant medical hx and dementia presents with weakness.   Pt states he feels good but he is not oriented to time or place.   CT (I personally reviewed) left sided pleural effusion.  WBC count is 19.5 received  vanc zosyn in ED   Thoracic surgery recommending chest tube placement and to send fluid off for analysis     Left PLEF  Leukocytosis   functional quadriplegia       Plan   change antibiotics to cefepime 1000mg every 8 hours  hold off on further doses of vancomycin   send MRSA PCR  use thoracentesis order set and send  cultures, cytology, gram stain, cell count, PH, LDH, protein   MRSA PCR   Blood cultures  trend WBC  offloading, frequent turning and nutrition optimization    Aldair Le DO  Infectious Disease Attending  Pager [phone]  After 5pm/weekends please call [phone] for all inquiries and new consults
General

## 2023-11-09 NOTE — DISCHARGE NOTE ADULT - FUNCTIONAL STATUS DATE
04-Sep-2018 06-Sep-2018 07-Sep-2018 Niacinamide Counseling: I recommended taking niacin or niacinamide, also know as vitamin B3, twice daily. Recent evidence suggests that taking vitamin B3 (500 mg twice daily) can reduce the risk of actinic keratoses and non-melanoma skin cancers. Side effects of vitamin B3 include flushing and headache.

## 2023-11-10 NOTE — H&P ADULT - NSHPREVIEWOFSYSTEMS_GEN_ALL_CORE
difficult to obtain full ROS as pt very forgetful, and intermittently confused Risks/benefits discussed with patient/surrogate

## 2023-11-18 NOTE — ED ADULT NURSE NOTE - CCCP TRG CHIEF CMPLNT
hypoglycemia

## 2024-01-02 NOTE — ED PROVIDER NOTE - PSYCHIATRIC, MLM
"[facility]   Physical Therapy Treatment Note      Name: Gary Elizabeth  Clinic Number: [id number]    Therapy Diagnosis:        Encounter Diagnoses   Name Primary?    Vertigo      Benign paroxysmal positional vertigo, unspecified laterality      Dizzinesses Yes     Physician: Chad Diane MD    Visit Date: 1/2/2024  Physician Orders: PT Eval and Treat   Medical Diagnosis from Referral: vertigo  Evaluation Date: 12/13/2023  Authorization Period Expiration: 12/10/2024  Plan of Care Expiration: 01/12/2024  Progress Note Due: 01/12/2024  Date of Surgery: NA  Visit # / Visits authorized: 4/18   FOTO: 6 to be completed on visit 6      Precautions: Fall      Time In: 8:40  Time Out: 9:04  Total Billable Time: 24 minutes    PTA Visit #: 0/5  Subjective   Patient reports: "I had just a little dizziness when getting out of bed this morning."   Response to previous treatment: No adverse effect  Functional change: decreased dizziness     Dizziness: Current 0/10  Objective    Objective Measures updated at progress report unless specified.   Treatment   JA received the treatments listed below:      neuromuscular re-education including: Two  trial of Epley's Maneuver to R side to decrease vertigo symptoms and promote canalith particle repositioning. PT held patient in each position for 30 seconds   Patient only reported symptoms of dizziness and PT only noted nystagmus in first position. Patient allowed to rest 3 minutes following each Epley's Maneuver.     Therapeutic exercise: PT provided patient with demonstration and Home exercise program of cervical muscle stretches to decrease cervical stiffness that could be exacerbating his vertigo symptoms.     Assessment   Gary is a 75 y.o. male referred to outpatient Physical Therapy with a medical diagnosis of vertigo. Patient had no reports of dizziness with R Jama Hallpike and PT noted no nystagmus with Jama Hallpike testing. Patient reported minimal " symptoms of dizziness when he turned to his R side to get out of bed this morning. Upon [place] Hallpike testing  PT noted mild nystagmus that lasted less than 5 seconds with R side testing and no nystagmus with L side testing. Patient had minimal reports of dizziness with first treatment position during Epley's maneuver to treat R side and no further reports of dizziness following. PT completed second trial of Epley's Maneuver to promote resolution of symptoms. PT noted that patient had decrease L cervical rotation ROM today during treatment. PT educated patient on how decreased cervical flexibility can exacerbate vertigo symptoms with position changes especially head turns. PT provided patient with instruction and printed information on cervical muscle stretching. Patient demonstrated good carryover of instruction with cervical stretches. NO adverse effects noted to PT treatment today. Patient reported no symptoms of dizziness at end of treatment.     JA Is progressing well towards his goals.   Patient prognosis is Good.     Patient will continue to benefit from skilled outpatient physical therapy to address the deficits listed in the problem list box on initial evaluation, provide pt/family education and to maximize pt's level of independence in the home and community environment.     Patient's spiritual, cultural and educational needs considered and pt agreeable to plan of care and goals.     Anticipated Barriers for therapy: chronic history of vertigo episodes      Goals:   Short Term Goals: 2 weeks   Patient will report decreased episodes of dizziness by 50% for improved quality life.   Patient will rate intensity of dizziness at rest 2/10.      Long Term Goals: 4 weeks   Patient will report no dizziness with rolling over in bed.  Patient will report no dizziness with R cervical rotation.   Patient will rate dizziness 0/10 at rest for improved quality of life.     Plan   Plan of care Certification: 12/13/2023 to  01/12/2024.  Outpatient Physical Therapy 2 times weekly for 4 weeks to include the following interventions: Neuromuscular Re-ed and Patient Education.   Continue POC per PT orders to progress patient toward rehab goals.     Jon Mtz, PT , DPT      Alert and oriented to person, place, time/situation. normal mood and affect. no apparent risk to self or others.

## 2024-03-19 NOTE — ED ADULT TRIAGE NOTE - NS ED TRIAGE AVPU SCALE
Emergency Department Midlevel Supervisory Note     I had a face to face encounter with this patient seen by the Advanced Practice Provider (NEENA). I personally made/approved the management plan and take responsibility for the patient management. I personally saw patient and performed a substantive portion of the visit including all aspects of the medical decision making.     ED Course:  6:39 PM  Jovita Olivia PA-C staffed patient with me. I agree with their assessment and plan of management, and I will see the patient.  8:00 PM I met with the patient to introduce myself, gather additional history, perform my initial exam, and discuss the plan.   9:50 PM patient rechecked.  Eating chicken wings, still with some wheezing and tachycardia.  Plan for admission       MDM:  Patient seen earlier today and left AMA due to no . Returns with continued shortness of breath, improved from earlier today.  On initial exam, tachycardic and tachypneic, still with some wheezing but patient says she feels better than earlier today.  Labs from earlier today independently interpreted by me.  CT PE independently interpreted by me not demonstrate acute pulmonary embolism or infiltrate.  Patient will be admitted for further evaluation and treatment.  Patient was given DuoNeb here with improvement.    1. Severe asthma with exacerbation, unspecified whether persistent          Brief HPI:     Soren Freitas is a 35 year old female who presents for evaluation of shortness of breath.      Brief Physical Exam: BP (!) 148/90   Pulse (!) 125   Temp 98.6  F (37  C) (Tympanic)   Resp 23   Wt 95.3 kg (210 lb)   SpO2 96%   BMI 33.89 kg/m    Physical Exam  Vitals and nursing note reviewed.   Constitutional:       Appearance: Normal appearance.   HENT:      Head: Normocephalic and atraumatic.      Right Ear: External ear normal.      Left Ear: External ear normal.      Nose: Nose normal.      Mouth/Throat:      Mouth: Mucous  membranes are moist.   Eyes:      Extraocular Movements: Extraocular movements intact.      Conjunctiva/sclera: Conjunctivae normal.      Pupils: Pupils are equal, round, and reactive to light.   Cardiovascular:      Rate and Rhythm: Normal rate and regular rhythm.   Pulmonary:      Effort: Pulmonary effort is normal.      Breath sounds: Wheezing (diffuse) present. No rales.   Abdominal:      General: Abdomen is flat. There is no distension.      Palpations: Abdomen is soft.      Tenderness: There is no abdominal tenderness. There is no guarding.   Musculoskeletal:         General: Normal range of motion.      Cervical back: Normal range of motion and neck supple.      Right lower leg: No edema.      Left lower leg: No edema.   Lymphadenopathy:      Cervical: No cervical adenopathy.   Skin:     General: Skin is warm and dry.   Neurological:      General: No focal deficit present.      Mental Status: She is alert and oriented to person, place, and time. Mental status is at baseline.      Comments: No gross focal neurologic deficits   Psychiatric:         Mood and Affect: Mood normal.         Behavior: Behavior normal.         Thought Content: Thought content normal.           Labs and Imaging:  Results for orders placed or performed during the hospital encounter of 03/19/24   CT Chest Pulmonary Embolism w Contrast    Impression    IMPRESSION:  1.  Somewhat limited exam due to contrast timing, however no proximal pulmonary embolus or findings of right heart strain.  2.  Shira-bronchovascular groundglass opacities, scattered airway debris, and bronchial wall thickening in the upper and lower lungs, similar to 05/25/2022. As before, differential considerations include viral infection, infectious/inflammatory airway   disease, or drug toxicity.  3.  Unchanged heterogeneous sclerosis of the spine.   HCG QUALitative pregnancy (blood)   Result Value Ref Range    hCG Serum Qualitative Negative Negative   Extra Blue Top Tube    Result Value Ref Range    Hold Specimen JIC    Extra Green Top (Lithium Heparin) Tube   Result Value Ref Range    Hold Specimen JIC    Extra Purple Top Tube   Result Value Ref Range    Hold Specimen JIC    Creatinine   Result Value Ref Range    Creatinine 0.88 0.51 - 0.95 mg/dL    GFR Estimate 88 >60 mL/min/1.73m2       Lars Vitale MD  [facility] EMERGENCY DEPARTMENT  [address] [place] [id number] [phone]     Lars Vitale MD  03/19/24 [id number]     Verbal - The patient responds to verbal stimuli by opening their eyes when someone speaks to them. The patient is not fully oriented to time, place, or person.

## 2024-04-04 NOTE — BRIEF OPERATIVE NOTE - PRE-OP
<<-----Click on this checkbox to enter Pre-Op Dx
To get better and follow your care plan as instructed.

## 2024-04-11 NOTE — ED PROVIDER NOTE - SKIN, MLM
acid (VITAMIN C) 500 MG tablet Take 2 tablets by mouth daily    Automatic Reconciliation, Ar   vitamin D (CHOLECALCIFEROL) 25 MCG (1000 UT) TABS tablet Take 1 tablet by mouth daily    Automatic Reconciliation, Ar   citalopram (CELEXA) 40 MG tablet Take 1 tablet by mouth every evening Indications: anxiety/depression    Automatic Reconciliation, Ar   furosemide (LASIX) 20 MG tablet Take 1 tablet by mouth daily    Automatic Reconciliation, Ar   lubiprostone (AMITIZA) 24 MCG capsule Take 1 capsule by mouth 2 times daily (with meals) Indications: IBS/constipation    Automatic Reconciliation, Ar       Allergies   Allergen Reactions    Codeine Other (See Comments)     Sleep walking, safety    Orange Juice Nausea Only    Terbinafine Nausea And Vomiting     Lamasil       Review of Systems  Gen: No fever, chills, malaise, weight loss/gain.   Heent: No headache, rhinorrhea, epistaxis, ear pain, hearing loss, sinus pain, neck pain/stiffness, sore throat.   Heart: No chest pain, palpitations, DE JESUS, pnd, or orthopnea.   Resp: No cough, hemoptysis, wheezing and shortness of breath.   GI: No nausea, vomiting, diarrhea, constipation, melena or hematochezia.   : No urinary obstruction, dysuria or hematuria.   Derm: No rash, new skin lesion or pruritis.   Musc/skeletal: Difficulty walking   Vasc: No edema, cyanosis or claudication.   Endo: No heat/cold intolerance, no polyuria,polydipsia or polyphagia.   Neuro: No unilateral weakness, numbness, tingling. No seizures.   Heme: No easy bruising or bleeding.          Physical Exam:     Physical Exam:  /65   Pulse 85   Temp 98.4 °F (36.9 °C) (Oral)   Resp 16   Ht 1.422 m (4' 8\")   Wt 69.9 kg (154 lb)   SpO2 97%   BMI 34.53 kg/m²         Temp (24hrs), Av.4 °F (36.9 °C), Min:98.4 °F (36.9 °C), Max:98.4 °F (36.9 °C)    No intake/output data recorded.   No intake/output data recorded.    General:  Awake, cooperative, no distress.   Head:  Normocephalic, without obvious 
Skin normal color for race, warm, dry and intact. No evidence of rash.

## 2024-06-07 NOTE — ED ADULT NURSE NOTE - GENITOURINARY ASSESSMENT
WDL
My signature below certifies that the above stated patient is homebound and upon completion of the Face-To-Face encounter, has the need for intermittent skilled nursing, physical therapy and/or speech or occupational therapy services in their home for their current diagnosis as outlined in their initial plan of care. These services will continue to be monitored by myself or another physician.

## 2024-06-20 NOTE — DISCHARGE NOTE ADULT - NS MD DC FALL RISK RISK
Patient requests all Lab, Cardiology, and Radiology Results on their Discharge Instructions
For information on Fall & Injury Prevention, visit [email or web address]

## 2024-07-20 NOTE — ED ADULT NURSE NOTE - NS ED NURSE LEVEL OF CONSCIOUSNESS MENTAL STATUS
Hospital Medicine History & Physical Note    Date of Service  7/19/2024    Primary Care Physician  Michelle Jim P.A.-C.    Consultants  None    Specialist Names: None    Code Status  Full Code    Chief Complaint  Chief Complaint   Patient presents with    Shortness of Breath     X 1 day. D/C from [facility] yesterday post thoracentesis for pleural effusion. On Eliquis.  called EMS as pt was short of breath & slid off the couch, no trauma. Pt in a fib with RVR, rate 160s. Tachypneic, afebrile. Denies chest pain.        History of Presenting Illness  Tereza Sánchez is a 78 y.o. female who presented 7/19/2024 with shortness of breath confusion.  Patient was discharged yesterday after thoracentesis and termination.  The  the patient refused to go to rehab and instead went home.  At home the patient's condition declined to the point where now she is altered with her mental status is much worse than her respiratory status.  The patient will need at this point repeat thoracentesis.  She will need continued oxygen support.  She will need heart rate control.  She will need treatment for infection as the patient appears to be worsening with her infection and will require antibiotics as well as culture results to be obtained.  At this point in time the patient's  is agreeing to go to his rehab and thus we will need PT OT reevaluation and referral to rehab once she is stabilized.    I discussed the plan of care with patient, family, bedside RN, and emergency room physician .    Review of Systems  Review of Systems   Unable to perform ROS: Acuity of condition       Past Medical History   has a past medical history of Anesthesia, Arthritis, Asthma, Bowel habit changes, Breath shortness, Cancer (HCC), Chiari malformation (1970's), Chickenpox, Chronic back pain, Contracture of hand joint, Decreased lung capacity, Dental disorder, Disorder of thyroid, Heart burn, High cholesterol, Hypertension, Indigestion,  Joint replacement, Obesity, Pain, Pain (08/06/2018), Peripheral edema (06/06/2013), Pneumonia, PONV (postoperative nausea and vomiting), Psychiatric problem, Scoliosis, Shortness of breath (08/06/2018), Sleep apnea, Sleep apnea (08/07/2018), Snoring, sore throat (01/15/2015), Supplemental oxygen dependent, and Syringomyelia (HCC).    Surgical History   has a past surgical history that includes rubin by laparoscopy (2002); shoulder arthroplasty total (2004); hip arthroplasty total (5/19/08); hip arthroplasty total; us-needle core bx-breast panel; node biopsy sentinel (2/6/2015); thyroid lobectomy (Left, 8/17/2018); thyroidectomy total (8/17/2018); laminotomy; hip replacement, total; cyst excision (1973); shunt insertion; mastectomy (2/6/2015); hip revision total (Left, 9/2/2021); other abdominal surgery; and pr reconstr total shoulder implant (Left, 5/4/2022).     Family History  family history includes Hypertension in her mother; Sleep Apnea in her brother.   Family history reviewed with patient. There is no family history that is pertinent to the chief complaint.     Social History   reports that she has never smoked. She has never used smokeless tobacco. She reports that she does not drink alcohol and does not use drugs.    Allergies  Allergies   Allergen Reactions    Sulfamethoxazole W-Trimethoprim Rash     * full body rash*>  10 years ago    Morphine Vomiting     hallucinations       Medications  Prior to Admission Medications   Prescriptions Last Dose Informant Patient Reported? Taking?   DULoxetine (CYMBALTA) 30 MG Cap DR Particles UNK at [facility] Family Member, Historical No No   Sig: Take 1 Capsule by mouth every day. Indications: Major Depressive Disorder   Esomeprazole Magnesium 20 MG Tablet Delayed Response UNK at [facility] Family Member, Historical No No   Sig: Take 20 mg by mouth 1 time a day as needed (heartburn ). Indications: Heartburn   QUEtiapine (SEROQUEL) 25 MG Tab UNK at [facility] Family Member, Historical Yes  "No   Sig: Take 25 mg by mouth at bedtime as needed (sleep). Indications: sleep   acetaminophen (TYLENOL) 500 MG Tab UNK at [facility] Family Member, Historical Yes No   Sig: Take 500 mg by mouth every 6 hours as needed for Mild Pain or Moderate Pain. Indications: Pain   alendronate (FOSAMAX) 70 MG Tab UNK at [facility] Family Member, Historical Yes No   Sig: Take 70 mg by mouth every Monday. Indications: Osteoporosis   amitriptyline (ELAVIL) 100 MG Tab UNK at [facility] Family Member, Historical Yes No   Sig: Take 100 mg by mouth every evening. Indications: \"pain\"   apixaban (ELIQUIS) 5mg Tab UNK at [facility] Family Member, Historical No No   Sig: Take 1 Tablet by mouth 2 times a day. Indications: DVT/PE   atorvastatin (LIPITOR) 10 MG Tab UNK at [facility] Family Member, Historical No No   Sig: TAKE 1 TABLET BY MOUTH EVERY EVENING. CHOLESTEROL   baclofen (LIORESAL) 10 MG Tab UNK at [facility] Family Member, Historical Yes No   Sig: Take 10 mg by mouth 2 times a day as needed (spasms). Indications: Muscle Spasm   ferrous sulfate 325 (65 Fe) MG tablet UNK at [facility] Family Member, Historical No No   Sig: Take 1 Tablet by mouth every 48 hours.   furosemide (LASIX) 20 MG Tab UNK at [facility] Family Member, Historical Yes No   Sig: Take 20 mg by mouth every day. Hold for SBP <100  Indications: Edema   metoprolol tartrate (LOPRESSOR) 25 MG Tab UNK at [facility] Family Member, Historical No No   Sig: Take 1 Tablet by mouth 2 times a day. Indications: Atrial Fibrillation, High Blood Pressure Disorder   nystatin (NYSTOP) powder UNK at [facility] Family Member, Historical Yes No   Sig: Apply 1 Application  topically 1 time a day as needed (rash). Indications: Skin Infection due to Candida Yeast   ondansetron (ZOFRAN ODT) 4 MG TABLET DISPERSIBLE UNK at [facility] Family Member, Historical No No   Sig: Take 1 Tablet by mouth every 8 hours as needed for Nausea/Vomiting. Indications: Nausea and Vomiting   potassium chloride SA (KDUR) 20 MEQ Tab CR UNK at [facility] Family Member, Historical Yes No   Sig: " Take 20 mEq by mouth every day. Hold if lasix is held   Indications: takes with Lasix   pregabalin (LYRICA) 150 MG Cap UNK at [facility] Family Member, Historical Yes No   Sig: Take 150 mg by mouth 2 times a day. Indications: Neuropathic Pain   senna-docusate (PERICOLACE OR SENOKOT S) 8.6-50 MG Tab UNK at [facility] Family Member, Historical No No   Sig: Take 1 Tablet by mouth at bedtime. Indications: Constipation   spironolactone (ALDACTONE) 25 MG Tab UNK at [facility] Family Member, Historical No No   Sig: Take 1 Tablet by mouth every day. Indications: Edema   tamsulosin (FLOMAX) 0.4 MG capsule UNK at [facility] Family Member, Historical No No   Sig: Take 1 Capsule by mouth at bedtime. Indications: Obstruction of Bladder Outflow      Facility-Administered Medications: None       Physical Exam  Temp:  [37 °C (98.6 °F)] 37 °C (98.6 °F)  Pulse:  [102-151] 107  Resp:  [14-44] 22  BP: (102-153)/(64-98) 111/68  SpO2:  [88 %-95 %] 94 %  Blood Pressure : 111/68   Temperature: 37 °C (98.6 °F)   Pulse: (!) 107   Respiration: (!) 22   Pulse Oximetry: 94 %       Physical Exam  Vitals and nursing note reviewed. Exam conducted with a chaperone present.   Constitutional:       General: She is not in acute distress.     Appearance: She is obese. She is ill-appearing. She is not diaphoretic.      Interventions: Face mask in place.   HENT:      Head: Normocephalic.      Right Ear: Tympanic membrane and external ear normal.      Left Ear: Tympanic membrane normal.      Nose: Nose normal. No rhinorrhea.      Mouth/Throat:      Mouth: Mucous membranes are dry.      Pharynx: Oropharynx is clear.   Eyes:      General:         Right eye: No discharge.         Left eye: No discharge.      Extraocular Movements: Extraocular movements intact.      Conjunctiva/sclera: Conjunctivae normal.      Pupils: Pupils are equal, round, and reactive to light.   Cardiovascular:      Rate and Rhythm: Tachycardia present. Rhythm irregular.      Heart sounds: Murmur heard.    Pulmonary:      Effort: Tachypnea and accessory muscle usage present.      Breath sounds: Examination of the right-upper field reveals decreased breath sounds. Examination of the right-middle field reveals decreased breath sounds. Examination of the right-lower field reveals decreased breath sounds. Examination of the left-lower field reveals decreased breath sounds. Decreased breath sounds present.   Abdominal:      General: Abdomen is flat. Bowel sounds are normal.      Palpations: Abdomen is soft.      Tenderness: There is no guarding.      Hernia: No hernia is present.   Musculoskeletal:      Cervical back: Normal range of motion and neck supple. No rigidity.      Right lower leg: Edema present.      Left lower leg: Edema present.   Lymphadenopathy:      Cervical: No cervical adenopathy.   Skin:     General: Skin is warm and dry.      Coloration: Skin is pale. Skin is not jaundiced.      Findings: No bruising or erythema.   Neurological:      Mental Status: She is disoriented, confused and unresponsive.      GCS: GCS eye subscore is 4. GCS verbal subscore is 4. GCS motor subscore is 4.   Psychiatric:         Attention and Perception: She is inattentive.         Mood and Affect: Affect is flat.         Speech: Speech is delayed and slurred.         Behavior: Behavior is uncooperative and slowed.         Cognition and Memory: Cognition is impaired. Memory is impaired.         Laboratory:  Recent Labs     07/17/24  0904 07/18/24  0822 07/19/24  1147   WBC 12.2* 12.4* 19.6*   RBC 4.85 4.45 5.86*   HEMOGLOBIN 13.0 12.3 15.9   HEMATOCRIT 42.5 38.9 50.2*   MCV 87.6 87.4 85.7   MCH 26.8* 27.6 27.1   MCHC 30.6* 31.6* 31.7*   RDW 52.1* 51.8* 51.3*   PLATELETCT 471* 459* 606*   MPV 11.2 11.0 11.8     Recent Labs     07/17/24  0904 07/18/24  0822 07/19/24  1449   SODIUM 137 138 140   POTASSIUM 3.9 3.7 4.0   CHLORIDE 92* 92* 99   CO2 33 30 18*   GLUCOSE 96 76 113*   BUN 16 17 18   CREATININE 0.61 0.51 0.61   CALCIUM 9.1  "8.8 8.7     Recent Labs     07/17/24  0904 07/18/24  0822 07/19/24  1449   ALTSGPT  --   --  27   ASTSGOT  --   --  74*   ALKPHOSPHAT  --   --  93   TBILIRUBIN  --   --  0.6   GLUCOSE 96 76 113*         No results for input(s): \"NTPROBNP\" in the last 72 hours.      Recent Labs     07/19/24  1400 07/19/24  1449   TROPONINT 55* 54*       Imaging:  DX-CHEST-PORTABLE (1 VIEW)   Final Result      Possible mild increase in right-sided opacity, pleural effusion with associated atelectasis and/or consolidation.      US-THORACENTESIS PUNCTURE RIGHT    (Results Pending)       X-Ray:  I have personally reviewed the images and compared with prior images.  EKG:  I have personally reviewed the images and compared with prior images.    Assessment/Plan:  Justification for Admission Status  I anticipate this patient will require at least two midnights for appropriate medical management, necessitating inpatient admission because patient is acute hypoxic respiratory failure with tachycardia with a history of atrial fibrillation, with encephalopathy of acute on chronic situation, and will require placement and at least 48 hours of inpatient management.    Patient will need a Telemetry bed on MEDICAL service .  The need is secondary to worsening altered mental status with hypoxia and tachycardia..    * Acute respiratory failure with hypoxemia (HCC)- (present on admission)  Assessment & Plan  Patient is acute respiratory failure with hypoxia which is most to her pleural effusion on the right side.  Patient will need continued oxygen support to keep oxygen saturations above 90%  RT protocol neck treatments  Patient will need thoracentesis    Leukocytosis- (present on admission)  Assessment & Plan  Patient's white blood cell count at this point is elevated most likely secondary to unknown source of infection  Patient at this point will be started on IV vancomycin and Unasyn after blood cultures are taken  We will monitor culture " results  Patient will need thoracentesis also that will need to be sent for culture as well since the white blood cell continues to be elevated    Thrombocytosis- (present on admission)  Assessment & Plan  Secondary to the worsening infection the patient seems to be also elevating her platelet counts and we will need to monitor this    History of pulmonary embolism- (present on admission)  Assessment & Plan  Because of history of pulmonary embolism continue at this point with anticoagulation using Eliquis    Chronic diastolic heart failure (HCC)- (present on admission)  Assessment & Plan  Patient has a history of chronic diastolic heart failure which at this point will need diuresis and optimization with beta-blocker as well.  Patient is chronically on anticoagulation and thus not on aspirin.    Paroxysmal atrial fibrillation (HCC)- (present on admission)  Assessment & Plan  Continue rate control which currently is elevated at 108  Not sure if the patient has taken the medications patient will need to continue with anticoagulation as well and we will need to monitor on telemetry.    Polypharmacy- (present on admission)  Assessment & Plan  Patient definitely has polypharmacy and at this point will need to reduce the amount of medications she takes    Risk for falls- (present on admission)  Assessment & Plan  Patient was just discharged yesterday and after physical therapy and Occupational Therapy evaluation they recommended rehab and the patient's  refused now he is okay with it.    Insomnia- (present on admission)  Assessment & Plan  Chronic insomnia at this point I am stopping any medications that may sedate her more as the patient is very sedated already    History of breast cancer- (present on admission)  Assessment & Plan  History of breast cancer about 6 years ago she saw Dr. Dia.  There is a potential that the cancer may be recurring and that is why she is getting pleural effusions we may need a  full cancer workup    Major depressive disorder- (present on admission)  Assessment & Plan  Currently not suicidal or homicidal continue with Cymbalta and amitriptyline    Dyslipidemia- (present on admission)  Assessment & Plan  Low-fat low-cholesterol diet  Statin  Fasting lipid panel    GERD (gastroesophageal reflux disease)- (present on admission)  Assessment & Plan  PPI therapy with omeprazole        VTE prophylaxis: SCDs/TEDs   Awake

## 2024-08-05 NOTE — SWALLOW BEDSIDE ASSESSMENT ADULT - NS ASR SWALLOW FINDINGS DISCUS
Pts daughter/POA called office stating pt needs eliquis called in for her. She is reporting that pt is taking 2.5mg in the morning and 2.5mg in the evening of this medication. I am not able to put it in this way. Please send for pt to the [facility] in [place]    Physician/Nursing/Patient

## 2024-09-22 NOTE — ED ADULT NURSE NOTE - NS ED NURSE LEVEL OF CONSCIOUSNESS MENTAL STATUS
Goal Outcome Evaluation:      Pt restless. Didn't sleep till now, has been awake complaining about everything. Encourage pt with positive attitude and reinforcements. Got up with 1 assist and that made him happy. Continuing of care                                         Awake/Alert/Cooperative

## 2025-04-08 NOTE — ED ADULT TRIAGE NOTE - ACCOMPANIED BY
Patient ID: Michael León is a 57 y.o. male.    Chief Complaint: Annual Exam    History of Present Illness    CHIEF COMPLAINT:  Patient presents today for follow up of weight management    WEIGHT MANAGEMENT:  He reports Qsymia has been more effective than previous Wegovy treatment. After 3.5 weeks on Qsymia, he notes decreased appetite, reduced desire to eat, and decreased food intake. While on Wegovy, he experienced increased eating despite feeling full, which he attributes to childhood habits.    MEDICAL HISTORY:  He had a colonoscopy at age 52 with polyps found and was advised to return for follow up in 5 years. He denies smoking history.    SEXUAL HEALTH:  He reports low libido with identified low testosterone level.      ROS:  General: -fever, -chills, -fatigue, -weight gain, -weight loss  Eyes: -vision changes, -redness, -discharge  ENT: -ear pain, -nasal congestion, -sore throat  Cardiovascular: -chest pain, -palpitations, -lower extremity edema  Respiratory: -cough, -shortness of breath  Gastrointestinal: -abdominal pain, -nausea, -vomiting, -diarrhea, -constipation, -blood in stool, +loss of appetite  Genitourinary: -dysuria, -hematuria, -frequency  Musculoskeletal: -joint pain, -muscle pain  Skin: -rash, -lesion  Neurological: -headache, -dizziness, -numbness, -tingling  Psychiatric: -anxiety, -depression, -sleep difficulty  Male Genitourinary: +decreased libido         Physical Exam    General: No acute distress. Well-developed. Well-nourished.  Eyes: EOMI. Sclerae anicteric.  HENT: Normocephalic. Atraumatic. Nares patent. Moist oral mucosa.  Cardiovascular: Regular rate. Regular rhythm. No murmurs. No rubs. No gallops. Normal S1, S2.  Respiratory: Normal respiratory effort. Clear to auscultation bilaterally. No rales. No rhonchi. No wheezing.  Musculoskeletal: No  obvious deformity.  Extremities: No lower extremity edema.  Neurological: Alert & oriented x3. No slurred speech. Normal  gait.  Psychiatric: Normal mood. Normal affect. Good insight. Good judgment.  Skin: Warm. Dry. No rash.         Assessment & Plan    R68.82 Low libido  E66.813, E66.01, Z68.41 Class 3 severe obesity with serious comorbidity and body mass index (BMI) of 40.0 to 44.9 in adult, unspecified obesity type  Z00.00 Routine general medical exam at a health care facility  E78.2 Mixed hyperlipidemia  I10 Essential (primary) hypertension    IMPRESSION:  - Determined Qsymia (phentermine-topiramate) is more effective for patient's weight loss than Wegovy (semaglutide) and explained mechanism of action in relation to appetite suppression.  - Considered testosterone replacement therapy due to low levels and reported low libido.  - Investigated potential hormonal issues, including possible Cushing's disease.    LOW LIBIDO:  - Discussed hormone testing process and rationale, including 24-hour urine collection for cortisol and dexamethasone suppression test.  - Ordered these tests along with two separate testosterone level tests.  - Provided information on testosterone replacement therapy, including potential benefits for sexual function and possible risks related to prostate health and cardiovascular disease.  - Scheduled follow-up for testosterone replacement therapy initiation if levels are confirmed low.    CLASS 3 SEVERE OBESITY WITH SERIOUS COMORBIDITY AND BODY MASS INDEX (BMI) OF 40.0 TO 44.9 IN ADULT, UNSPECIFIED OBESITY TYPE:  - Continued current medications including Qsymia for weight management.    ROUTINE GENERAL MEDICAL EXAMINATION AT A HEALTH CARE FACILITY:  - Ordered comprehensive blood panel including CBC, liver function tests, renal function tests, electrolytes panel, diabetes screening, lipid panel, and PSA test.  - Scheduled follow up in spring 2024 for colonoscopy (5 years after previous procedure in 2021).    MIXED HYPERLIPIDEMIA:  - Continue current cholesterol medication.  - Check cholesterol levels at next  visit.    ESSENTIAL (PRIMARY) HYPERTENSION:  - Patient's blood pressure is well-controlled.            No follow-ups on file.    This note was generated with the assistance of ambient listening technology. Verbal consent was obtained by the patient and accompanying visitor(s) for the recording of patient appointment to facilitate this note. I attest to having reviewed and edited the generated note for accuracy, though some syntax or spelling errors may persist. Please contact the author of this note for any clarification.       EMT/paramedic

## 2025-06-27 NOTE — ED ADULT TRIAGE NOTE - GLASGOW COMA SCALE: EYE OPENING, MLM
[Change in Activity] : no change in activity [Fever Above 102] : no fever [Rash] : no rash [Itching] : no itching [Eye Pain] : no eye pain [Redness] : no redness [Earache] : no earache [Wheezing] : no wheezing [Cough] : no cough [Joint Pains] : no arthralgias [Joint Swelling] : no joint swelling [Appropriate Age Development] : development not appropriate for age (E4) spontaneous

## (undated) DEVICE — SOL IRR POUR H2O 1500ML

## (undated) DEVICE — ACMI SELF-SEALING SEAL UP TO 7FR

## (undated) DEVICE — VENODYNE/SCD SLEEVE CALF LARGE

## (undated) DEVICE — GOWN TRIMAX LG

## (undated) DEVICE — TUBING SUCTION 20FT

## (undated) DEVICE — PACK CYSTO

## (undated) DEVICE — FOLEY HOLDER STATLOCK 2 WAY ADULT

## (undated) DEVICE — BOSTON SCIENTIFC PUMPING SYSTEM SAPS SINGLE ACTION 10CC

## (undated) DEVICE — SOL IRR BAG NS 0.9% 3000ML

## (undated) DEVICE — GLV 8 PROTEXIS (WHITE)

## (undated) DEVICE — ELCTR PLASMA BUTTON OVAL 24FR 12-30 DEG

## (undated) DEVICE — DRAPE EQUIPMENT BANDED BAG 30 X 30" (SHOWER CAP)

## (undated) DEVICE — PRESSURE INFUSOR BAG 3000ML

## (undated) DEVICE — ADAPTER CHECK FLO 9FR STERILE

## (undated) DEVICE — TUBING RANGER FLUID IRRIGATION SET DISP

## (undated) DEVICE — POSITIONER FOAM HEADREST (PINK)

## (undated) DEVICE — POSITIONER FOAM EGG CRATE ULNAR 2PCS (PINK)

## (undated) DEVICE — Device

## (undated) DEVICE — WARMING BLANKET UPPER ADULT

## (undated) DEVICE — IRR BULB PATHFINDER + 10"

## (undated) DEVICE — GLV 7.5 PROTEXIS (WHITE)

## (undated) DEVICE — GLV 6.5 PROTEXIS (WHITE)

## (undated) DEVICE — DRAPE DRAINAGE BAG RELAX & GEMINI

## (undated) DEVICE — GLV 7 PROTEXIS (WHITE)